# Patient Record
Sex: FEMALE | Race: OTHER | NOT HISPANIC OR LATINO | URBAN - METROPOLITAN AREA
[De-identification: names, ages, dates, MRNs, and addresses within clinical notes are randomized per-mention and may not be internally consistent; named-entity substitution may affect disease eponyms.]

---

## 2017-01-01 ENCOUNTER — OUTPATIENT (OUTPATIENT)
Dept: OUTPATIENT SERVICES | Facility: HOSPITAL | Age: 82
LOS: 1 days | End: 2017-01-01
Payer: MEDICAID

## 2017-01-03 ENCOUNTER — RX RENEWAL (OUTPATIENT)
Age: 82
End: 2017-01-03

## 2017-01-04 ENCOUNTER — OUTPATIENT (OUTPATIENT)
Dept: OUTPATIENT SERVICES | Facility: HOSPITAL | Age: 82
LOS: 1 days | Discharge: HOME | End: 2017-01-04

## 2017-01-04 DIAGNOSIS — C92.10 CHRONIC MYELOID LEUKEMIA, BCR/ABL-POSITIVE, NOT HAVING ACHIEVED REMISSION: ICD-10-CM

## 2017-01-05 ENCOUNTER — RX RENEWAL (OUTPATIENT)
Age: 82
End: 2017-01-05

## 2017-01-06 ENCOUNTER — OUTPATIENT (OUTPATIENT)
Dept: OUTPATIENT SERVICES | Facility: HOSPITAL | Age: 82
LOS: 1 days | End: 2017-01-06
Payer: MEDICAID

## 2017-01-06 ENCOUNTER — RESULT CHARGE (OUTPATIENT)
Age: 82
End: 2017-01-06

## 2017-01-06 ENCOUNTER — APPOINTMENT (OUTPATIENT)
Dept: INTERNAL MEDICINE | Facility: CLINIC | Age: 82
End: 2017-01-06

## 2017-01-06 DIAGNOSIS — I10 ESSENTIAL (PRIMARY) HYPERTENSION: ICD-10-CM

## 2017-01-06 DIAGNOSIS — Z00.00 ENCOUNTER FOR GENERAL ADULT MEDICAL EXAMINATION WITHOUT ABNORMAL FINDINGS: ICD-10-CM

## 2017-01-06 DIAGNOSIS — I82.5Z9 CHRONIC EMBOLISM AND THROMBOSIS OF UNSPECIFIED DEEP VEINS OF UNSPECIFIED DISTAL LOWER EXTREMITY: ICD-10-CM

## 2017-01-06 DIAGNOSIS — E11.9 TYPE 2 DIABETES MELLITUS WITHOUT COMPLICATIONS: ICD-10-CM

## 2017-01-06 DIAGNOSIS — E78.0 PURE HYPERCHOLESTEROLEMIA: ICD-10-CM

## 2017-01-06 DIAGNOSIS — D47.1 CHRONIC MYELOPROLIFERATIVE DISEASE: ICD-10-CM

## 2017-01-06 DIAGNOSIS — E78.00 PURE HYPERCHOLESTEROLEMIA, UNSPECIFIED: ICD-10-CM

## 2017-01-06 DIAGNOSIS — E03.9 HYPOTHYROIDISM, UNSPECIFIED: ICD-10-CM

## 2017-01-06 LAB
INR PPP: 2.3 RATIO
POCT-PROTHROMBIN TIME: 27.8 SECS
QUALITY CONTROL: YES

## 2017-01-06 PROCEDURE — 85610 PROTHROMBIN TIME: CPT

## 2017-01-09 ENCOUNTER — APPOINTMENT (OUTPATIENT)
Dept: INTERNAL MEDICINE | Facility: CLINIC | Age: 82
End: 2017-01-09

## 2017-01-18 ENCOUNTER — RESULT REVIEW (OUTPATIENT)
Age: 82
End: 2017-01-18

## 2017-01-19 ENCOUNTER — APPOINTMENT (OUTPATIENT)
Dept: HEMATOLOGY ONCOLOGY | Facility: CLINIC | Age: 82
End: 2017-01-19

## 2017-01-19 VITALS
BODY MASS INDEX: 25.6 KG/M2 | HEART RATE: 100 BPM | OXYGEN SATURATION: 96 % | WEIGHT: 126.74 LBS | DIASTOLIC BLOOD PRESSURE: 78 MMHG | RESPIRATION RATE: 16 BRPM | TEMPERATURE: 98.4 F | SYSTOLIC BLOOD PRESSURE: 165 MMHG

## 2017-01-19 LAB
HCT VFR BLD CALC: 30.9 % — LOW (ref 34.5–45)
HGB BLD-MCNC: 10.9 G/DL — LOW (ref 11.5–15.5)
MCHC RBC-ENTMCNC: 35.3 GM/DL — SIGNIFICANT CHANGE UP (ref 32–36)
MCHC RBC-ENTMCNC: 41.8 PG — HIGH (ref 27–34)
MCV RBC AUTO: 118 FL — HIGH (ref 80–100)
PLATELET # BLD AUTO: 211 K/UL — SIGNIFICANT CHANGE UP (ref 150–400)
RBC # BLD: 2.62 M/UL — LOW (ref 3.8–5.2)
RBC # FLD: 12.8 % — SIGNIFICANT CHANGE UP (ref 10.3–14.5)
WBC # BLD: 3.6 K/UL — LOW (ref 3.8–10.5)
WBC # FLD AUTO: 3.6 K/UL — LOW (ref 3.8–10.5)

## 2017-01-20 DIAGNOSIS — R69 ILLNESS, UNSPECIFIED: ICD-10-CM

## 2017-01-23 DIAGNOSIS — D45 POLYCYTHEMIA VERA: ICD-10-CM

## 2017-01-30 ENCOUNTER — RX RENEWAL (OUTPATIENT)
Age: 82
End: 2017-01-30

## 2017-02-03 ENCOUNTER — APPOINTMENT (OUTPATIENT)
Dept: INTERNAL MEDICINE | Facility: CLINIC | Age: 82
End: 2017-02-03

## 2017-02-03 ENCOUNTER — RESULT CHARGE (OUTPATIENT)
Age: 82
End: 2017-02-03

## 2017-02-03 LAB
INR PPP: 1.6 RATIO
POCT-PROTHROMBIN TIME: 18.7 SECS
QUALITY CONTROL: YES

## 2017-02-14 ENCOUNTER — OUTPATIENT (OUTPATIENT)
Dept: OUTPATIENT SERVICES | Facility: HOSPITAL | Age: 82
LOS: 1 days | Discharge: HOME | End: 2017-02-14

## 2017-02-14 DIAGNOSIS — C92.10 CHRONIC MYELOID LEUKEMIA, BCR/ABL-POSITIVE, NOT HAVING ACHIEVED REMISSION: ICD-10-CM

## 2017-02-15 ENCOUNTER — RESULT REVIEW (OUTPATIENT)
Age: 82
End: 2017-02-15

## 2017-02-16 ENCOUNTER — APPOINTMENT (OUTPATIENT)
Dept: HEMATOLOGY ONCOLOGY | Facility: CLINIC | Age: 82
End: 2017-02-16

## 2017-02-16 VITALS
OXYGEN SATURATION: 97 % | TEMPERATURE: 99 F | RESPIRATION RATE: 16 BRPM | WEIGHT: 126.76 LBS | BODY MASS INDEX: 25.6 KG/M2 | DIASTOLIC BLOOD PRESSURE: 75 MMHG | HEART RATE: 92 BPM | SYSTOLIC BLOOD PRESSURE: 120 MMHG

## 2017-02-16 LAB
HCT VFR BLD CALC: 33 % — LOW (ref 34.5–45)
HGB BLD-MCNC: 11 G/DL — LOW (ref 11.5–15.5)
MCHC RBC-ENTMCNC: 33.4 G/DL — SIGNIFICANT CHANGE UP (ref 32–36)
MCHC RBC-ENTMCNC: 40.2 PG — HIGH (ref 27–34)
MCV RBC AUTO: 120 FL — HIGH (ref 80–100)
PLATELET # BLD AUTO: 322 K/UL — SIGNIFICANT CHANGE UP (ref 150–400)
RBC # BLD: 2.74 M/UL — LOW (ref 3.8–5.2)
RBC # FLD: 12.8 % — SIGNIFICANT CHANGE UP (ref 10.3–14.5)
WBC # BLD: 7.8 K/UL — SIGNIFICANT CHANGE UP (ref 3.8–10.5)
WBC # FLD AUTO: 7.8 K/UL — SIGNIFICANT CHANGE UP (ref 3.8–10.5)

## 2017-02-17 ENCOUNTER — APPOINTMENT (OUTPATIENT)
Dept: INTERNAL MEDICINE | Facility: CLINIC | Age: 82
End: 2017-02-17

## 2017-02-17 ENCOUNTER — OUTPATIENT (OUTPATIENT)
Dept: OUTPATIENT SERVICES | Facility: HOSPITAL | Age: 82
LOS: 1 days | End: 2017-02-17
Payer: MEDICAID

## 2017-02-17 ENCOUNTER — RESULT CHARGE (OUTPATIENT)
Age: 82
End: 2017-02-17

## 2017-02-17 DIAGNOSIS — I82.5Z9 CHRONIC EMBOLISM AND THROMBOSIS OF UNSPECIFIED DEEP VEINS OF UNSPECIFIED DISTAL LOWER EXTREMITY: ICD-10-CM

## 2017-02-17 DIAGNOSIS — D47.1 CHRONIC MYELOPROLIFERATIVE DISEASE: ICD-10-CM

## 2017-02-17 DIAGNOSIS — E11.9 TYPE 2 DIABETES MELLITUS WITHOUT COMPLICATIONS: ICD-10-CM

## 2017-02-17 DIAGNOSIS — E03.9 HYPOTHYROIDISM, UNSPECIFIED: ICD-10-CM

## 2017-02-17 DIAGNOSIS — Z00.00 ENCOUNTER FOR GENERAL ADULT MEDICAL EXAMINATION WITHOUT ABNORMAL FINDINGS: ICD-10-CM

## 2017-02-17 DIAGNOSIS — I10 ESSENTIAL (PRIMARY) HYPERTENSION: ICD-10-CM

## 2017-02-17 DIAGNOSIS — E78.0 PURE HYPERCHOLESTEROLEMIA: ICD-10-CM

## 2017-02-17 DIAGNOSIS — D45 POLYCYTHEMIA VERA: ICD-10-CM

## 2017-02-17 DIAGNOSIS — Z79.01 LONG TERM (CURRENT) USE OF ANTICOAGULANTS: ICD-10-CM

## 2017-02-17 DIAGNOSIS — E78.00 PURE HYPERCHOLESTEROLEMIA, UNSPECIFIED: ICD-10-CM

## 2017-02-17 LAB
INR PPP: 2.9 RATIO
POCT-PROTHROMBIN TIME: 34.6 SECS
QUALITY CONTROL: YES

## 2017-02-17 PROCEDURE — 85610 PROTHROMBIN TIME: CPT

## 2017-03-03 ENCOUNTER — MEDICATION RENEWAL (OUTPATIENT)
Age: 82
End: 2017-03-03

## 2017-03-10 ENCOUNTER — OUTPATIENT (OUTPATIENT)
Dept: OUTPATIENT SERVICES | Facility: HOSPITAL | Age: 82
LOS: 1 days | Discharge: HOME | End: 2017-03-10

## 2017-03-10 DIAGNOSIS — C92.10 CHRONIC MYELOID LEUKEMIA, BCR/ABL-POSITIVE, NOT HAVING ACHIEVED REMISSION: ICD-10-CM

## 2017-03-13 ENCOUNTER — APPOINTMENT (OUTPATIENT)
Dept: HEMATOLOGY ONCOLOGY | Facility: CLINIC | Age: 82
End: 2017-03-13

## 2017-03-14 ENCOUNTER — APPOINTMENT (OUTPATIENT)
Dept: INTERNAL MEDICINE | Facility: CLINIC | Age: 82
End: 2017-03-14

## 2017-03-27 ENCOUNTER — OUTPATIENT (OUTPATIENT)
Dept: OUTPATIENT SERVICES | Facility: HOSPITAL | Age: 82
LOS: 1 days | End: 2017-03-27
Payer: MEDICAID

## 2017-03-27 ENCOUNTER — APPOINTMENT (OUTPATIENT)
Dept: INTERNAL MEDICINE | Facility: CLINIC | Age: 82
End: 2017-03-27

## 2017-03-27 ENCOUNTER — RESULT CHARGE (OUTPATIENT)
Age: 82
End: 2017-03-27

## 2017-03-27 VITALS
SYSTOLIC BLOOD PRESSURE: 124 MMHG | HEIGHT: 59 IN | HEART RATE: 86 BPM | WEIGHT: 126 LBS | DIASTOLIC BLOOD PRESSURE: 60 MMHG | OXYGEN SATURATION: 98 % | BODY MASS INDEX: 25.4 KG/M2

## 2017-03-27 VITALS — SYSTOLIC BLOOD PRESSURE: 108 MMHG | DIASTOLIC BLOOD PRESSURE: 56 MMHG

## 2017-03-27 DIAGNOSIS — I10 ESSENTIAL (PRIMARY) HYPERTENSION: ICD-10-CM

## 2017-03-27 LAB
HBA1C MFR BLD HPLC: 7.9
INR PPP: 2.8 RATIO
POCT-PROTHROMBIN TIME: 33.8 SECS
QUALITY CONTROL: YES

## 2017-03-27 PROCEDURE — 83036 HEMOGLOBIN GLYCOSYLATED A1C: CPT

## 2017-03-27 PROCEDURE — G0463: CPT

## 2017-03-27 PROCEDURE — 85610 PROTHROMBIN TIME: CPT

## 2017-03-28 DIAGNOSIS — E11.9 TYPE 2 DIABETES MELLITUS WITHOUT COMPLICATIONS: ICD-10-CM

## 2017-03-28 DIAGNOSIS — I82.5Z9 CHRONIC EMBOLISM AND THROMBOSIS OF UNSPECIFIED DEEP VEINS OF UNSPECIFIED DISTAL LOWER EXTREMITY: ICD-10-CM

## 2017-03-28 DIAGNOSIS — D47.3 ESSENTIAL (HEMORRHAGIC) THROMBOCYTHEMIA: ICD-10-CM

## 2017-04-03 ENCOUNTER — MEDICATION RENEWAL (OUTPATIENT)
Age: 82
End: 2017-04-03

## 2017-04-04 ENCOUNTER — OUTPATIENT (OUTPATIENT)
Dept: OUTPATIENT SERVICES | Facility: HOSPITAL | Age: 82
LOS: 1 days | Discharge: HOME | End: 2017-04-04

## 2017-04-04 ENCOUNTER — RX RENEWAL (OUTPATIENT)
Age: 82
End: 2017-04-04

## 2017-04-04 DIAGNOSIS — C92.10 CHRONIC MYELOID LEUKEMIA, BCR/ABL-POSITIVE, NOT HAVING ACHIEVED REMISSION: ICD-10-CM

## 2017-04-06 ENCOUNTER — APPOINTMENT (OUTPATIENT)
Dept: HEMATOLOGY ONCOLOGY | Facility: CLINIC | Age: 82
End: 2017-04-06

## 2017-04-07 ENCOUNTER — INPATIENT (INPATIENT)
Facility: HOSPITAL | Age: 82
LOS: 5 days | Discharge: HOME | DRG: 637 | End: 2017-04-13
Attending: HOSPITALIST | Admitting: HOSPITALIST
Payer: MEDICAID

## 2017-04-07 ENCOUNTER — APPOINTMENT (OUTPATIENT)
Dept: INTERNAL MEDICINE | Facility: CLINIC | Age: 82
End: 2017-04-07

## 2017-04-07 VITALS — SYSTOLIC BLOOD PRESSURE: 142 MMHG | DIASTOLIC BLOOD PRESSURE: 92 MMHG | HEART RATE: 128 BPM | RESPIRATION RATE: 22 BRPM

## 2017-04-07 DIAGNOSIS — I10 ESSENTIAL (PRIMARY) HYPERTENSION: ICD-10-CM

## 2017-04-07 DIAGNOSIS — E11.65 TYPE 2 DIABETES MELLITUS WITH HYPERGLYCEMIA: ICD-10-CM

## 2017-04-07 DIAGNOSIS — R55 SYNCOPE AND COLLAPSE: ICD-10-CM

## 2017-04-07 DIAGNOSIS — K56.69 OTHER INTESTINAL OBSTRUCTION: ICD-10-CM

## 2017-04-07 DIAGNOSIS — I82.409 ACUTE EMBOLISM AND THROMBOSIS OF UNSPECIFIED DEEP VEINS OF UNSPECIFIED LOWER EXTREMITY: ICD-10-CM

## 2017-04-07 DIAGNOSIS — A41.9 SEPSIS, UNSPECIFIED ORGANISM: ICD-10-CM

## 2017-04-07 DIAGNOSIS — E87.2 ACIDOSIS: ICD-10-CM

## 2017-04-07 DIAGNOSIS — I26.99 OTHER PULMONARY EMBOLISM WITHOUT ACUTE COR PULMONALE: ICD-10-CM

## 2017-04-07 DIAGNOSIS — N30.90 CYSTITIS, UNSPECIFIED WITHOUT HEMATURIA: ICD-10-CM

## 2017-04-07 DIAGNOSIS — D45 POLYCYTHEMIA VERA: ICD-10-CM

## 2017-04-07 LAB
ACETONE SERPL-MCNC: ABNORMAL
ACETONE SERPL-MCNC: ABNORMAL
ALBUMIN SERPL ELPH-MCNC: 3.6 G/DL — SIGNIFICANT CHANGE UP (ref 3.3–5)
ALP SERPL-CCNC: 96 U/L — SIGNIFICANT CHANGE UP (ref 40–120)
ALT FLD-CCNC: 44 U/L RC — SIGNIFICANT CHANGE UP (ref 10–45)
ANION GAP SERPL CALC-SCNC: 19 MMOL/L — HIGH (ref 5–17)
ANION GAP SERPL CALC-SCNC: 19 MMOL/L — HIGH (ref 5–17)
ANION GAP SERPL CALC-SCNC: 22 MMOL/L — HIGH (ref 5–17)
APPEARANCE UR: CLEAR — SIGNIFICANT CHANGE UP
APTT BLD: 40.7 SEC — HIGH (ref 27.5–37.4)
AST SERPL-CCNC: 56 U/L — HIGH (ref 10–40)
BASOPHILS # BLD AUTO: 0.1 K/UL — SIGNIFICANT CHANGE UP (ref 0–0.2)
BASOPHILS NFR BLD AUTO: 1.1 % — SIGNIFICANT CHANGE UP (ref 0–2)
BILIRUB SERPL-MCNC: 0.4 MG/DL — SIGNIFICANT CHANGE UP (ref 0.2–1.2)
BILIRUB UR-MCNC: NEGATIVE — SIGNIFICANT CHANGE UP
BUN SERPL-MCNC: 18 MG/DL — SIGNIFICANT CHANGE UP (ref 7–23)
BUN SERPL-MCNC: 20 MG/DL — SIGNIFICANT CHANGE UP (ref 7–23)
BUN SERPL-MCNC: 26 MG/DL — HIGH (ref 7–23)
CALCIUM SERPL-MCNC: 8.6 MG/DL — SIGNIFICANT CHANGE UP (ref 8.4–10.5)
CALCIUM SERPL-MCNC: 9 MG/DL — SIGNIFICANT CHANGE UP (ref 8.4–10.5)
CALCIUM SERPL-MCNC: 9.4 MG/DL — SIGNIFICANT CHANGE UP (ref 8.4–10.5)
CHLORIDE SERPL-SCNC: 106 MMOL/L — SIGNIFICANT CHANGE UP (ref 96–108)
CHLORIDE SERPL-SCNC: 107 MMOL/L — SIGNIFICANT CHANGE UP (ref 96–108)
CHLORIDE SERPL-SCNC: 98 MMOL/L — SIGNIFICANT CHANGE UP (ref 96–108)
CK MB CFR SERPL CALC: 1 NG/ML — SIGNIFICANT CHANGE UP (ref 0–3.8)
CK SERPL-CCNC: 62 U/L — SIGNIFICANT CHANGE UP (ref 25–170)
CO2 SERPL-SCNC: 13 MMOL/L — LOW (ref 22–31)
CO2 SERPL-SCNC: 15 MMOL/L — LOW (ref 22–31)
CO2 SERPL-SCNC: 19 MMOL/L — LOW (ref 22–31)
COLOR SPEC: YELLOW — SIGNIFICANT CHANGE UP
CREAT SERPL-MCNC: 0.82 MG/DL — SIGNIFICANT CHANGE UP (ref 0.5–1.3)
CREAT SERPL-MCNC: 0.87 MG/DL — SIGNIFICANT CHANGE UP (ref 0.5–1.3)
CREAT SERPL-MCNC: 0.97 MG/DL — SIGNIFICANT CHANGE UP (ref 0.5–1.3)
DIFF PNL FLD: ABNORMAL
EOSINOPHIL # BLD AUTO: 0 K/UL — SIGNIFICANT CHANGE UP (ref 0–0.5)
EOSINOPHIL NFR BLD AUTO: 0.1 % — SIGNIFICANT CHANGE UP (ref 0–6)
GAS PNL BLDV: SIGNIFICANT CHANGE UP
GLUCOSE SERPL-MCNC: 163 MG/DL — HIGH (ref 70–99)
GLUCOSE SERPL-MCNC: 232 MG/DL — HIGH (ref 70–99)
GLUCOSE SERPL-MCNC: 310 MG/DL — HIGH (ref 70–99)
GLUCOSE UR QL: >1000
HCT VFR BLD CALC: 36.1 % — SIGNIFICANT CHANGE UP (ref 34.5–45)
HGB BLD-MCNC: 12.3 G/DL — SIGNIFICANT CHANGE UP (ref 11.5–15.5)
INR BLD: 2.24 RATIO — HIGH (ref 0.88–1.16)
KETONES UR-MCNC: ABNORMAL
LEUKOCYTE ESTERASE UR-ACNC: NEGATIVE — SIGNIFICANT CHANGE UP
LIDOCAIN IGE QN: 41 U/L — SIGNIFICANT CHANGE UP (ref 7–60)
LYMPHOCYTES # BLD AUTO: 0.5 K/UL — LOW (ref 1–3.3)
LYMPHOCYTES # BLD AUTO: 4.9 % — LOW (ref 13–44)
MAGNESIUM SERPL-MCNC: 1.5 MG/DL — LOW (ref 1.6–2.6)
MCHC RBC-ENTMCNC: 34 GM/DL — SIGNIFICANT CHANGE UP (ref 32–36)
MCHC RBC-ENTMCNC: 38.4 PG — HIGH (ref 27–34)
MCV RBC AUTO: 113 FL — HIGH (ref 80–100)
MONOCYTES # BLD AUTO: 0.5 K/UL — SIGNIFICANT CHANGE UP (ref 0–0.9)
MONOCYTES NFR BLD AUTO: 4.6 % — SIGNIFICANT CHANGE UP (ref 2–14)
NEUTROPHILS # BLD AUTO: 9.1 K/UL — HIGH (ref 1.8–7.4)
NEUTROPHILS NFR BLD AUTO: 89.3 % — HIGH (ref 43–77)
NITRITE UR-MCNC: NEGATIVE — SIGNIFICANT CHANGE UP
NT-PROBNP SERPL-SCNC: 593 PG/ML — HIGH (ref 0–300)
PH UR: 5.5 — SIGNIFICANT CHANGE UP (ref 4.8–8)
PHOSPHATE SERPL-MCNC: 2.6 MG/DL — SIGNIFICANT CHANGE UP (ref 2.5–4.5)
PLATELET # BLD AUTO: 319 K/UL — SIGNIFICANT CHANGE UP (ref 150–400)
POTASSIUM SERPL-MCNC: 4.3 MMOL/L — SIGNIFICANT CHANGE UP (ref 3.5–5.3)
POTASSIUM SERPL-MCNC: 4.4 MMOL/L — SIGNIFICANT CHANGE UP (ref 3.5–5.3)
POTASSIUM SERPL-MCNC: 4.6 MMOL/L — SIGNIFICANT CHANGE UP (ref 3.5–5.3)
POTASSIUM SERPL-SCNC: 4.3 MMOL/L — SIGNIFICANT CHANGE UP (ref 3.5–5.3)
POTASSIUM SERPL-SCNC: 4.4 MMOL/L — SIGNIFICANT CHANGE UP (ref 3.5–5.3)
POTASSIUM SERPL-SCNC: 4.6 MMOL/L — SIGNIFICANT CHANGE UP (ref 3.5–5.3)
PROT SERPL-MCNC: 7.4 G/DL — SIGNIFICANT CHANGE UP (ref 6–8.3)
PROT UR-MCNC: 100 MG/DL
PROTHROM AB SERPL-ACNC: 24.6 SEC — HIGH (ref 9.8–12.7)
RAPID RVP RESULT: SIGNIFICANT CHANGE UP
RBC # BLD: 3.19 M/UL — LOW (ref 3.8–5.2)
RBC # FLD: 12.7 % — SIGNIFICANT CHANGE UP (ref 10.3–14.5)
SODIUM SERPL-SCNC: 136 MMOL/L — SIGNIFICANT CHANGE UP (ref 135–145)
SODIUM SERPL-SCNC: 141 MMOL/L — SIGNIFICANT CHANGE UP (ref 135–145)
SODIUM SERPL-SCNC: 141 MMOL/L — SIGNIFICANT CHANGE UP (ref 135–145)
SP GR SPEC: 1.02 — SIGNIFICANT CHANGE UP (ref 1.01–1.02)
TROPONIN T SERPL-MCNC: <0.01 NG/ML — SIGNIFICANT CHANGE UP (ref 0–0.06)
UROBILINOGEN FLD QL: NEGATIVE — SIGNIFICANT CHANGE UP
WBC # BLD: 10.2 K/UL — SIGNIFICANT CHANGE UP (ref 3.8–10.5)
WBC # FLD AUTO: 10.2 K/UL — SIGNIFICANT CHANGE UP (ref 3.8–10.5)

## 2017-04-07 PROCEDURE — 99285 EMERGENCY DEPT VISIT HI MDM: CPT | Mod: 25

## 2017-04-07 PROCEDURE — 93010 ELECTROCARDIOGRAM REPORT: CPT

## 2017-04-07 PROCEDURE — 70450 CT HEAD/BRAIN W/O DYE: CPT | Mod: 26

## 2017-04-07 PROCEDURE — 71010: CPT | Mod: 26

## 2017-04-07 RX ORDER — INSULIN GLARGINE 100 [IU]/ML
10 INJECTION, SOLUTION SUBCUTANEOUS ONCE
Qty: 0 | Refills: 0 | Status: DISCONTINUED | OUTPATIENT
Start: 2017-04-07 | End: 2017-04-07

## 2017-04-07 RX ORDER — INSULIN LISPRO 100/ML
5 VIAL (ML) SUBCUTANEOUS ONCE
Qty: 0 | Refills: 0 | Status: DISCONTINUED | OUTPATIENT
Start: 2017-04-07 | End: 2017-04-07

## 2017-04-07 RX ORDER — WARFARIN SODIUM 2.5 MG/1
1 TABLET ORAL
Qty: 0 | Refills: 0 | COMMUNITY

## 2017-04-07 RX ORDER — SIMVASTATIN 20 MG/1
40 TABLET, FILM COATED ORAL AT BEDTIME
Qty: 0 | Refills: 0 | Status: DISCONTINUED | OUTPATIENT
Start: 2017-04-07 | End: 2017-04-13

## 2017-04-07 RX ORDER — METFORMIN HYDROCHLORIDE 850 MG/1
1 TABLET ORAL
Qty: 0 | Refills: 0 | COMMUNITY

## 2017-04-07 RX ORDER — AZITHROMYCIN 500 MG/1
500 TABLET, FILM COATED ORAL ONCE
Qty: 0 | Refills: 0 | Status: DISCONTINUED | OUTPATIENT
Start: 2017-04-07 | End: 2017-04-07

## 2017-04-07 RX ORDER — SODIUM CHLORIDE 9 MG/ML
1000 INJECTION, SOLUTION INTRAVENOUS
Qty: 0 | Refills: 0 | Status: DISCONTINUED | OUTPATIENT
Start: 2017-04-07 | End: 2017-04-08

## 2017-04-07 RX ORDER — GLUCAGON INJECTION, SOLUTION 0.5 MG/.1ML
1 INJECTION, SOLUTION SUBCUTANEOUS ONCE
Qty: 0 | Refills: 0 | Status: DISCONTINUED | OUTPATIENT
Start: 2017-04-07 | End: 2017-04-08

## 2017-04-07 RX ORDER — FELODIPINE 5 MG/1
1 TABLET, FILM COATED, EXTENDED RELEASE ORAL
Qty: 0 | Refills: 0 | COMMUNITY

## 2017-04-07 RX ORDER — DEXTROSE 50 % IN WATER 50 %
25 SYRINGE (ML) INTRAVENOUS ONCE
Qty: 0 | Refills: 0 | Status: DISCONTINUED | OUTPATIENT
Start: 2017-04-07 | End: 2017-04-08

## 2017-04-07 RX ORDER — INSULIN GLARGINE 100 [IU]/ML
8 INJECTION, SOLUTION SUBCUTANEOUS ONCE
Qty: 0 | Refills: 0 | Status: COMPLETED | OUTPATIENT
Start: 2017-04-07 | End: 2017-04-07

## 2017-04-07 RX ORDER — SODIUM CHLORIDE 9 MG/ML
3 INJECTION INTRAMUSCULAR; INTRAVENOUS; SUBCUTANEOUS ONCE
Qty: 0 | Refills: 0 | Status: COMPLETED | OUTPATIENT
Start: 2017-04-07 | End: 2017-04-07

## 2017-04-07 RX ORDER — SODIUM CHLORIDE 9 MG/ML
1000 INJECTION INTRAMUSCULAR; INTRAVENOUS; SUBCUTANEOUS ONCE
Qty: 0 | Refills: 0 | Status: COMPLETED | OUTPATIENT
Start: 2017-04-07 | End: 2017-04-07

## 2017-04-07 RX ORDER — INSULIN GLARGINE 100 [IU]/ML
10 INJECTION, SOLUTION SUBCUTANEOUS AT BEDTIME
Qty: 0 | Refills: 0 | Status: DISCONTINUED | OUTPATIENT
Start: 2017-04-07 | End: 2017-04-07

## 2017-04-07 RX ORDER — MAGNESIUM SULFATE 500 MG/ML
2 VIAL (ML) INJECTION ONCE
Qty: 0 | Refills: 0 | Status: COMPLETED | OUTPATIENT
Start: 2017-04-07 | End: 2017-04-07

## 2017-04-07 RX ORDER — CHOLECALCIFEROL (VITAMIN D3) 125 MCG
2000 CAPSULE ORAL
Qty: 0 | Refills: 0 | Status: DISCONTINUED | OUTPATIENT
Start: 2017-04-07 | End: 2017-04-13

## 2017-04-07 RX ORDER — SODIUM CHLORIDE 9 MG/ML
1000 INJECTION INTRAMUSCULAR; INTRAVENOUS; SUBCUTANEOUS
Qty: 0 | Refills: 0 | Status: DISCONTINUED | OUTPATIENT
Start: 2017-04-07 | End: 2017-04-07

## 2017-04-07 RX ORDER — WARFARIN SODIUM 2.5 MG/1
5 TABLET ORAL ONCE
Qty: 0 | Refills: 0 | Status: COMPLETED | OUTPATIENT
Start: 2017-04-07 | End: 2017-04-07

## 2017-04-07 RX ORDER — INSULIN LISPRO 100/ML
VIAL (ML) SUBCUTANEOUS AT BEDTIME
Qty: 0 | Refills: 0 | Status: DISCONTINUED | OUTPATIENT
Start: 2017-04-07 | End: 2017-04-07

## 2017-04-07 RX ORDER — CEFTRIAXONE 500 MG/1
1 INJECTION, POWDER, FOR SOLUTION INTRAMUSCULAR; INTRAVENOUS ONCE
Qty: 0 | Refills: 0 | Status: COMPLETED | OUTPATIENT
Start: 2017-04-07 | End: 2017-04-07

## 2017-04-07 RX ORDER — LEVOTHYROXINE SODIUM 125 MCG
150 TABLET ORAL DAILY
Qty: 0 | Refills: 0 | Status: DISCONTINUED | OUTPATIENT
Start: 2017-04-07 | End: 2017-04-13

## 2017-04-07 RX ORDER — DEXTROSE 50 % IN WATER 50 %
12.5 SYRINGE (ML) INTRAVENOUS ONCE
Qty: 0 | Refills: 0 | Status: DISCONTINUED | OUTPATIENT
Start: 2017-04-07 | End: 2017-04-08

## 2017-04-07 RX ORDER — INSULIN LISPRO 100/ML
VIAL (ML) SUBCUTANEOUS
Qty: 0 | Refills: 0 | Status: DISCONTINUED | OUTPATIENT
Start: 2017-04-07 | End: 2017-04-07

## 2017-04-07 RX ORDER — CHOLECALCIFEROL (VITAMIN D3) 125 MCG
2 CAPSULE ORAL
Qty: 0 | Refills: 0 | COMMUNITY

## 2017-04-07 RX ORDER — DEXTROSE 50 % IN WATER 50 %
1 SYRINGE (ML) INTRAVENOUS ONCE
Qty: 0 | Refills: 0 | Status: DISCONTINUED | OUTPATIENT
Start: 2017-04-07 | End: 2017-04-08

## 2017-04-07 RX ORDER — INSULIN LISPRO 100/ML
VIAL (ML) SUBCUTANEOUS EVERY 4 HOURS
Qty: 0 | Refills: 0 | Status: DISCONTINUED | OUTPATIENT
Start: 2017-04-07 | End: 2017-04-08

## 2017-04-07 RX ADMIN — CEFTRIAXONE 100 GRAM(S): 500 INJECTION, POWDER, FOR SOLUTION INTRAMUSCULAR; INTRAVENOUS at 14:32

## 2017-04-07 RX ADMIN — SODIUM CHLORIDE 1000 MILLILITER(S): 9 INJECTION INTRAMUSCULAR; INTRAVENOUS; SUBCUTANEOUS at 12:30

## 2017-04-07 RX ADMIN — SODIUM CHLORIDE 3 MILLILITER(S): 9 INJECTION INTRAMUSCULAR; INTRAVENOUS; SUBCUTANEOUS at 11:50

## 2017-04-07 RX ADMIN — SODIUM CHLORIDE 75 MILLILITER(S): 9 INJECTION, SOLUTION INTRAVENOUS at 21:14

## 2017-04-07 RX ADMIN — SODIUM CHLORIDE 1000 MILLILITER(S): 9 INJECTION INTRAMUSCULAR; INTRAVENOUS; SUBCUTANEOUS at 14:55

## 2017-04-07 RX ADMIN — SIMVASTATIN 40 MILLIGRAM(S): 20 TABLET, FILM COATED ORAL at 21:54

## 2017-04-07 RX ADMIN — WARFARIN SODIUM 5 MILLIGRAM(S): 2.5 TABLET ORAL at 21:54

## 2017-04-07 RX ADMIN — Medication 50 GRAM(S): at 14:31

## 2017-04-07 RX ADMIN — INSULIN GLARGINE 8 UNIT(S): 100 INJECTION, SOLUTION SUBCUTANEOUS at 21:54

## 2017-04-07 RX ADMIN — Medication: at 20:07

## 2017-04-07 NOTE — H&P ADULT. - PROBLEM SELECTOR PLAN 3
-2/2 dehydration  -CTH neg for hemorage or mass effect  -encourage PO intake  -Start IV line, IVF @ 50cc/hr -2/2 dehydration  -CTH neg for hemorage or mass effect  -encourage PO intake  -Start IV line, IVF D51/2NS @ 50cc/hr

## 2017-04-07 NOTE — ED PROVIDER NOTE - OBJECTIVE STATEMENT
82yo F hx of sp hysterectomy, Polycythemia on hydroxyurea, DMII on metformin, hypothryoid, CVA 2007 no focal residual weakness, SBO treated w/o surgery x 2 (2011), Rt DVT on coumadin, pw progressive weakness x 1 week, and N/V x 1.  Pt walks.   Last BM yesterday, Pt endorse epigastric abd pain  ONC: Hematology  PMD: [phone] [facility] 84yo F hx of sp hysterectomy, Polycythemia on hydroxyurea, DMII on metformin, hypothryoid, CVA 2007 no focal residual weakness, SBO treated w/o surgery x 2 (2011), Rt DVT on coumadin, pw progressive weakness x 1 week, and fall and  N/V x 1 today.  Pt walks with cane at baseline. Had episode of abd pain aw decreased po intake and constipation 1 week ago. Pt passed bm 1 day later. Pt and daughter state pt had unwitnessed fall today and was found down. Son in law heard fall in next room and came to help. pt denies loc, change in vision, focal numbness or weakness.  Pt then had episode of n/v after the fall. Pt states her legs gave out. she did not slip.  Currently denies pain. No fever/chills, no change in vision, no throat pain, no chest pain, no abdominal pain, no dysuria, no joint pain, no rashes, no focal numbness or weakness, no hematochezia or melena.   Last BM yesterday, Pt endorse epigastric abd pain  ONC: Hematology  PMD: [phone] [facility] 82yo F hx of sp hysterectomy, Polycythemia on hydroxyurea, DMII on metformin, hypothyroid, CVA 2007 no focal residual weakness, SBO treated w/o surgery x 2 (2011), Rt DVT on coumadin, pw progressive weakness x 1 week, and fall and  N/V x 1 today.  Pt walks with cane at baseline. Had episode of abd pain aw decreased po intake and constipation 1 week ago. Pt passed bm 1 day later. Pt and daughter state pt had unwitnessed fall today and was found down. Son in law heard fall in next room and came to help. pt denies loc, change in vision, focal numbness or weakness.  Pt then had episode of n/v after the fall. Pt states her legs gave out. she did not slip.  Currently denies pain. No fever/chills, no change in vision, no throat pain, no chest pain, no abdominal pain, no dysuria, no joint pain, no rashes, no focal numbness or weakness, no hematochezia or melena.   Last BM yesterday, Pt endorse epigastric abd pain  ONC: Hematology  PMD: [phone] [facility]

## 2017-04-07 NOTE — ED PROVIDER NOTE - SECONDARY DIAGNOSIS.
Malaise and fatigue Type 2 diabetes mellitus with hyperglycemia, unspecified long term insulin use status

## 2017-04-07 NOTE — H&P ADULT. - HISTORY OF PRESENT ILLNESS
83F PMHx of SBO, DMII, Polycythemia Vera (Pipe 2 +) , DVT, HTN, HLD BIBEMS after a fall following 1 week of poor PO intake due to abdominal pain N/V. Patient stated that she felt light headed this morning after she rolled out of bed and prior falling. No one witnessed the fall, son-in-law found patient on the floor. Pt has not had similar episode in the past ***.Daughter states that patient has been unable to tolerate regular food starting about a week ago and vomited most of her food due to abdominal pain similar to her SBO abdominal pain. At the time patient consumed mostly liquid diet( tea and cranberry juice). Her symptoms improved over the proceeding days and she was able to hold down a regular food on the day of admission. However, patient has been progressively weak over the past week leading up to the fall. Patient endorses increased urinary frequency but denies burning sensation, painful urination, and back pain. Pt denied chest pain, fever, head ache, focal weakness, slurred speech, loss of consciousness. Patient endorsed having regular BM and passing gas.     ED VS: T  /92 P128 RR: 22    ANC 9.1,  Acetone in urine and serum,  Mg 1.5.     Pt received  2 L bolus NS and Ceftriaxone x1 for suspected UTI and Mg SO4 83F PMHx of SBO, DMII, Polycythemia Vera (Pipe 2 +) , DVT, HTN, HLD BIBEMS after a fall following 1 week of poor PO intake due to abdominal pain N/V. Patient stated that she felt light headed this morning after she rolled out of bed and prior falling. No one witnessed the fall, son-in-law found patient on the floor. Pt has total of 3 fall episodes over the past 3 weeks however, the first two falls were not proceeded by lighheadedness .Daughter states that patient has been unable to tolerate regular food starting about a week ago and vomited most of her food due to abdominal pain similar to her SBO abdominal pain. At the time patient consumed mostly liquid diet( tea and cranberry juice). Her symptoms improved over the proceeding days and she was able to hold down a regular food on the day of admission. However, patient has been progressively weak over the past week leading up to the fall. Patient endorses increased urinary frequency but denies burning sensation, painful urination, and back pain. Pt denied chest pain, fever, head ache, focal weakness, slurred speech, loss of consciousness. Patient endorsed having regular BM and passing gas.     ED VS: T  /92 P128 RR: 22    ANC 9.1,  Acetone in urine and serum,  Mg 1.5.     Pt received  2 L bolus NS and Ceftriaxone x1 for suspected UTI and Mg SO4 83F PMHx of SBO, DMII, Polycythemia Vera (Pipe 2 +) , DVT, HTN, HLD BIBEMS after a fall following 1 week of poor PO intake due to abdominal pain N/V. Patient stated that she felt light headed this morning after she rolled out of bed and prior falling. No one witnessed the fall, son-in-law found patient on the floor. Pt has total of 3 fall episodes over the past 3 weeks however, the first two falls were not proceeded by [facility] [employer] .Daughter states that patient has been unable to tolerate regular food starting about a week ago and vomited most of her food due to abdominal pain similar to her SBO abdominal pain. At the time patient consumed mostly liquid diet( tea and cranberry juice). Her symptoms improved over the proceeding days and she was able to hold down a regular food on the day of admission. However, patient has been progressively weak over the past week leading up to the fall. Patient endorses increased urinary frequency but denies burning sensation, painful urination, and back pain. Pt denied chest pain, fever, head ache, focal weakness, slurred speech, loss of consciousness. Patient endorsed having regular BM and passing gas.     ED VS: T101.5 rectal   /92 P128 RR: 22 O2: 93% on RA    ANC 9.1,  Acetone in urine and serum,  Mg 1.5.     Pt received  2 L bolus NS and Ceftriaxone x1 for suspected UTI and Mg SO4

## 2017-04-07 NOTE — ED PROVIDER NOTE - CARE PLAN
Principal Discharge DX:	Cystitis  Secondary Diagnosis:	Malaise and fatigue  Secondary Diagnosis:	Type 2 diabetes mellitus with hyperglycemia, unspecified long term insulin use status

## 2017-04-07 NOTE — ED PROVIDER NOTE - PHYSICAL EXAMINATION
AAOX3, NAD, NCAT, EOMI, PERRL, MMM, Neck Supple, no cspine tenderness or decrease rom. HR regular, tachycardic to 120, sys and de la torre blowing murmur, RESP: TachypniecABD S/NT/ND +BS, No CVAT, EXT warm, well perfused, No Edema, Distal Pulses Intact, No Rash,  MAEx4, Sensation to soft touch grossly intact, normal strength/tone. AAOX3, NAD, NCAT, EOMI, PERRL, MMM, Neck Supple, no cspine tenderness or decrease rom. HR regular, tachycardic to 120, sys and de la torre blowing murmur, RESP: TachypniecA, no wheeze, breath sounds symmetric. ABD S/epigastric ttp w/o guard or rebound. ND  Ext warm, well perfused, No Edema, Distal Pulses Intact, No Rash,  MAEx4, Sensation to soft touch grossly intact, normal strength/tone.

## 2017-04-07 NOTE — ED PROVIDER NOTE - PMH
Adult Hypothyroidism    Benign Hypertension    Deep Vein Thrombosis (DVT)    Diabetes    Hypercholesteremia    SBO (Small Bowel Obstruction)    Stroke

## 2017-04-07 NOTE — H&P ADULT. - PROBLEM SELECTOR PLAN 1
-Meets criteria with Fever, tachycardia, suspect UTI given polyuria  -s/p Cefriaxone x1 in ED and 2L NL bolus  -BCx, UCx sent  -U/A obtained after abx was neg  -Obtain bladder scan  -cont Ceftriaxone pending culture results  -Encourage PO hydration and feeds  -Monitor VS per routine, continue tele monitoring  -trend CBC with diff

## 2017-04-07 NOTE — H&P ADULT. - FAMILY HISTORY
Child  Still living? Unknown  Family history of diabetes mellitus (DM), Age at diagnosis: Age Unknown  Family history of breast cancer, Age at diagnosis: Age Unknown     Father  Still living? Unknown  Family history of secondary lung cancer, Age at diagnosis: Age Unknown

## 2017-04-07 NOTE — H&P ADULT. - PROBLEM SELECTOR PROBLEM 5
Benign Hypertension Type 2 diabetes mellitus with hyperglycemia, without long-term current use of insulin

## 2017-04-07 NOTE — ED ADULT NURSE NOTE - OBJECTIVE STATEMENT
Pt presents to Ed with c/o fall. Per pt daughter pt had an unwitnessed fall today, Pt reports that she did not slip   and fall her legs gave out on her. Pt A&Ox3 denies chest pain or sob, + episode of nausea with vomiting after fall,   no fever chills headache or dizziness. Pt ambulates with a cane.

## 2017-04-07 NOTE — ED PROVIDER NOTE - ATTENDING CONTRIBUTION TO CARE
I, Dr. Ryne Ponce, interviewed the patient and performed a physical exam and spoke to the resident about the plan of care for this patient.  Pt with fall with intermittent malaise and fatigue for about one week.  No reported fever at home.  Exam: febrile rectal temp, 5-/5 motor LE, nontender abdomen, slightly tachycardic, no respiratory distress, clear lungs.  Plan: likely cystitis, antibx, fluids, labs, tba.

## 2017-04-07 NOTE — H&P ADULT. - NEGATIVE NEUROLOGICAL SYMPTOMS
no syncope/no facial palsy/no headache/no loss of consciousness/pre-syncope/no tremors/no loss of sensation

## 2017-04-07 NOTE — H&P ADULT. - PROBLEM SELECTOR PROBLEM 4
Type 2 diabetes mellitus with hyperglycemia, without long-term current use of insulin SBO (Small Bowel Obstruction)

## 2017-04-07 NOTE — H&P ADULT. - LAB RESULTS AND INTERPRETATION
Labs significant for hyperglycemia and  glucosuria. Pt doesn't meet criteria for DKA but does have acetone in urine. ANC is elevated at 9.1, may be infectious or reactionary.

## 2017-04-07 NOTE — H&P ADULT. - RADIOLOGY RESULTS AND INTERPRETATION
chronic changes on CT without evidence of acute stoke or hemorrhage. CXR suggestive of unilateral L pleural effusion - to be correlated with PE

## 2017-04-07 NOTE — H&P ADULT. - ASSESSMENT
83F PMHx DMII, chronic SBO, Pipe 2+ PCV, BIBEMS after pre-syncopal fall 83F PMHx DMII, chronic SBO, Pipe 2+ PCV, BIBEMS after pre-syncopal fall due to dehydration in setting of PO feed intolerance. 83F PMHx DMII, chronic SBO, Pipe 2+ PCV, BIBEMS after pre-syncopal fall due to dehydration in setting of Sepsis and PO feed intolerance.

## 2017-04-07 NOTE — H&P ADULT. - PROBLEM SELECTOR PLAN 5
-Started IVF @ 50,  ISS, Lantus 10,    -obtain A1c, monitor FS.  -adjust insulin regiment prn  -  -  -  -  - -Started IVF @ 50,  ISS, Lantus 10,    -obtain A1c, monitor FS.  -adjust insulin regiment prn

## 2017-04-07 NOTE — H&P ADULT. - RS GEN PE MLT RESP DETAILS PC
no chest wall tenderness/good air movement/breath sounds equal/airway patent/respirations non-labored/basilar crackles bilaterally

## 2017-04-07 NOTE — H&P ADULT. - PROBLEM SELECTOR PLAN 2
-HCO3 13, Gap 21 ,   -2/2 ketotic state triggered Hyperglycemic diuresis   - continue volume rustication IVF 50cc/hr   -Start SSI and Lantus 10  - repeat BMP Q4, replete lytes prn  - repeat ABG if acidosis worsens or doesn't improve  -start DKA protocol if ABG pH < 7.3 -HCO3 13, Gap 21 ,   -2/2 ketotic state triggered Hyperglycemic diuresis   - continue volume rustication IVF with D5 1/2 NS @ 75cc/hr   -Start SSI and Lantus 10  - repeat BMP Q4, replete lytes prn  - repeat ABG if acidosis worsens or doesn't improve  -start DKA protocol if ABG pH < 7.3

## 2017-04-07 NOTE — ED PROVIDER NOTE - MEDICAL DECISION MAKING DETAILS
Generalized weakness and fall on coumadin, concern for sepsis vs cardiac. PE significant for no ext signs of trauma, cardiac murmur, tachypnea, tachycardia, mild epi gastric tenderness - Sepsis and c/u breen - undifferentiated: 1) IV Access/IVF/LABS/UA/Cultures/CE  2) CXR 3) IV Abx 4) Admit

## 2017-04-07 NOTE — ED PROVIDER NOTE - PROGRESS NOTE DETAILS
Dr. Ponce Note: pt improved, UA resulted with expected infection as likely source of malaise and fever, antibx already given, fluids given, doubt DKA but will recheck labs and acetone, stable for admission. Dr. Ponce Note: small acetone, will repeat labs and call endocrine for consult for possible mild DKA. Discussed with Endocrine -Igormis - Pt to be started on home insulin dose; recommend a1c. This was communicated to the inpt team. - Ayden French, Resident Discussed repeat labwork with Dr. Green. He will coordinate insulin regimen with team. Pt remains welll appearing. repeat hr 94. - Ayden French, Resident

## 2017-04-08 ENCOUNTER — TRANSCRIPTION ENCOUNTER (OUTPATIENT)
Age: 82
End: 2017-04-08

## 2017-04-08 LAB
ANION GAP SERPL CALC-SCNC: 13 MMOL/L — SIGNIFICANT CHANGE UP (ref 5–17)
ANION GAP SERPL CALC-SCNC: 14 MMOL/L — SIGNIFICANT CHANGE UP (ref 5–17)
BUN SERPL-MCNC: 11 MG/DL — SIGNIFICANT CHANGE UP (ref 7–23)
BUN SERPL-MCNC: 14 MG/DL — SIGNIFICANT CHANGE UP (ref 7–23)
CALCIUM SERPL-MCNC: 8.7 MG/DL — SIGNIFICANT CHANGE UP (ref 8.4–10.5)
CALCIUM SERPL-MCNC: 8.7 MG/DL — SIGNIFICANT CHANGE UP (ref 8.4–10.5)
CHLORIDE SERPL-SCNC: 104 MMOL/L — SIGNIFICANT CHANGE UP (ref 96–108)
CHLORIDE SERPL-SCNC: 107 MMOL/L — SIGNIFICANT CHANGE UP (ref 96–108)
CO2 SERPL-SCNC: 18 MMOL/L — LOW (ref 22–31)
CO2 SERPL-SCNC: 21 MMOL/L — LOW (ref 22–31)
CREAT SERPL-MCNC: 0.63 MG/DL — SIGNIFICANT CHANGE UP (ref 0.5–1.3)
CREAT SERPL-MCNC: 0.67 MG/DL — SIGNIFICANT CHANGE UP (ref 0.5–1.3)
CULTURE RESULTS: NO GROWTH — SIGNIFICANT CHANGE UP
GLUCOSE SERPL-MCNC: 170 MG/DL — HIGH (ref 70–99)
GLUCOSE SERPL-MCNC: 212 MG/DL — HIGH (ref 70–99)
HBA1C BLD-MCNC: 8.6 % — HIGH (ref 4–5.6)
HCT VFR BLD CALC: 35.9 % — SIGNIFICANT CHANGE UP (ref 34.5–45)
HGB BLD-MCNC: 12 G/DL — SIGNIFICANT CHANGE UP (ref 11.5–15.5)
INR BLD: 2.37 RATIO — HIGH (ref 0.88–1.16)
MCHC RBC-ENTMCNC: 33.3 GM/DL — SIGNIFICANT CHANGE UP (ref 32–36)
MCHC RBC-ENTMCNC: 38.1 PG — HIGH (ref 27–34)
MCV RBC AUTO: 114 FL — HIGH (ref 80–100)
PLATELET # BLD AUTO: 303 K/UL — SIGNIFICANT CHANGE UP (ref 150–400)
POTASSIUM SERPL-MCNC: 3.9 MMOL/L — SIGNIFICANT CHANGE UP (ref 3.5–5.3)
POTASSIUM SERPL-MCNC: 3.9 MMOL/L — SIGNIFICANT CHANGE UP (ref 3.5–5.3)
POTASSIUM SERPL-SCNC: 3.9 MMOL/L — SIGNIFICANT CHANGE UP (ref 3.5–5.3)
POTASSIUM SERPL-SCNC: 3.9 MMOL/L — SIGNIFICANT CHANGE UP (ref 3.5–5.3)
PROTHROM AB SERPL-ACNC: 26.3 SEC — HIGH (ref 9.8–12.7)
RBC # BLD: 3.14 M/UL — LOW (ref 3.8–5.2)
RBC # FLD: 12.7 % — SIGNIFICANT CHANGE UP (ref 10.3–14.5)
SODIUM SERPL-SCNC: 138 MMOL/L — SIGNIFICANT CHANGE UP (ref 135–145)
SODIUM SERPL-SCNC: 139 MMOL/L — SIGNIFICANT CHANGE UP (ref 135–145)
SPECIMEN SOURCE: SIGNIFICANT CHANGE UP
WBC # BLD: 6.3 K/UL — SIGNIFICANT CHANGE UP (ref 3.8–10.5)
WBC # FLD AUTO: 6.3 K/UL — SIGNIFICANT CHANGE UP (ref 3.8–10.5)

## 2017-04-08 PROCEDURE — 99223 1ST HOSP IP/OBS HIGH 75: CPT | Mod: GC

## 2017-04-08 RX ORDER — DEXTROSE 50 % IN WATER 50 %
12.5 SYRINGE (ML) INTRAVENOUS ONCE
Qty: 0 | Refills: 0 | Status: DISCONTINUED | OUTPATIENT
Start: 2017-04-08 | End: 2017-04-13

## 2017-04-08 RX ORDER — WARFARIN SODIUM 2.5 MG/1
2.5 TABLET ORAL ONCE
Qty: 0 | Refills: 0 | Status: COMPLETED | OUTPATIENT
Start: 2017-04-08 | End: 2017-04-08

## 2017-04-08 RX ORDER — GLUCAGON INJECTION, SOLUTION 0.5 MG/.1ML
1 INJECTION, SOLUTION SUBCUTANEOUS ONCE
Qty: 0 | Refills: 0 | Status: DISCONTINUED | OUTPATIENT
Start: 2017-04-08 | End: 2017-04-13

## 2017-04-08 RX ORDER — DEXTROSE 50 % IN WATER 50 %
25 SYRINGE (ML) INTRAVENOUS ONCE
Qty: 0 | Refills: 0 | Status: DISCONTINUED | OUTPATIENT
Start: 2017-04-08 | End: 2017-04-13

## 2017-04-08 RX ORDER — HYDROXYUREA 500 MG/1
500 CAPSULE ORAL DAILY
Qty: 0 | Refills: 0 | Status: DISCONTINUED | OUTPATIENT
Start: 2017-04-08 | End: 2017-04-13

## 2017-04-08 RX ORDER — INSULIN LISPRO 100/ML
VIAL (ML) SUBCUTANEOUS
Qty: 0 | Refills: 0 | Status: DISCONTINUED | OUTPATIENT
Start: 2017-04-08 | End: 2017-04-13

## 2017-04-08 RX ORDER — LOSARTAN POTASSIUM 100 MG/1
50 TABLET, FILM COATED ORAL DAILY
Qty: 0 | Refills: 0 | Status: DISCONTINUED | OUTPATIENT
Start: 2017-04-08 | End: 2017-04-13

## 2017-04-08 RX ORDER — SODIUM CHLORIDE 9 MG/ML
1000 INJECTION, SOLUTION INTRAVENOUS
Qty: 0 | Refills: 0 | Status: DISCONTINUED | OUTPATIENT
Start: 2017-04-08 | End: 2017-04-13

## 2017-04-08 RX ORDER — INSULIN LISPRO 100/ML
VIAL (ML) SUBCUTANEOUS AT BEDTIME
Qty: 0 | Refills: 0 | Status: DISCONTINUED | OUTPATIENT
Start: 2017-04-08 | End: 2017-04-13

## 2017-04-08 RX ORDER — DEXTROSE 50 % IN WATER 50 %
1 SYRINGE (ML) INTRAVENOUS ONCE
Qty: 0 | Refills: 0 | Status: DISCONTINUED | OUTPATIENT
Start: 2017-04-08 | End: 2017-04-13

## 2017-04-08 RX ORDER — AMLODIPINE BESYLATE 2.5 MG/1
10 TABLET ORAL DAILY
Qty: 0 | Refills: 0 | Status: DISCONTINUED | OUTPATIENT
Start: 2017-04-09 | End: 2017-04-13

## 2017-04-08 RX ORDER — INSULIN GLARGINE 100 [IU]/ML
5 INJECTION, SOLUTION SUBCUTANEOUS AT BEDTIME
Qty: 0 | Refills: 0 | Status: DISCONTINUED | OUTPATIENT
Start: 2017-04-08 | End: 2017-04-13

## 2017-04-08 RX ADMIN — Medication 1: at 18:21

## 2017-04-08 RX ADMIN — INSULIN GLARGINE 5 UNIT(S): 100 INJECTION, SOLUTION SUBCUTANEOUS at 22:05

## 2017-04-08 RX ADMIN — SIMVASTATIN 40 MILLIGRAM(S): 20 TABLET, FILM COATED ORAL at 22:06

## 2017-04-08 RX ADMIN — LOSARTAN POTASSIUM 50 MILLIGRAM(S): 100 TABLET, FILM COATED ORAL at 13:20

## 2017-04-08 RX ADMIN — HYDROXYUREA 500 MILLIGRAM(S): 500 CAPSULE ORAL at 11:28

## 2017-04-08 RX ADMIN — Medication 2000 UNIT(S): at 22:06

## 2017-04-08 RX ADMIN — Medication 2: at 06:30

## 2017-04-08 RX ADMIN — Medication 150 MICROGRAM(S): at 06:04

## 2017-04-08 RX ADMIN — Medication 1: at 13:15

## 2017-04-08 RX ADMIN — WARFARIN SODIUM 2.5 MILLIGRAM(S): 2.5 TABLET ORAL at 22:06

## 2017-04-08 RX ADMIN — Medication 2000 UNIT(S): at 08:32

## 2017-04-08 NOTE — DISCHARGE NOTE ADULT - PATIENT PORTAL LINK FT
“You can access the [facility] Patient Portal, offered by [facility], by registering with the following website: [email or web address]”

## 2017-04-08 NOTE — DISCHARGE NOTE ADULT - PLAN OF CARE
A1C <7 Your A1C was 8.1 AG < 14 You were admitted for a metabolic complication of your Diabetes Mellitus. This can be avoided by appropriate glycemic control. Please check your finger sticks regularly.  -Please take your discharge anti-glycemic agents Patient would follow-up with Hematology You have a diagnosis of Polycythemia Vera.  Please continue Hydroxyurea  F/u with Heme as outpatient Your A1C was 8.1 on admission  -Please take your discharge anti-glycemic agents A1C < 7 You were admitted for a metabolic complication of your Diabetes Mellitus. This can be avoided by appropriate glycemic control. Please check your finger sticks regularly.  -Please take your discharge anti-glycemic agents  -You are discharged on Metformin  -f/u with your PCP in the next 2 weeks You have a history of Polycythemia Vera.  Please continue Hydroxyurea  F/u with Heme as outpatient Your A1C was 8.6 on admission  -Please take your discharge anti-glycemic agents  -Follow-up with your PCP within the next 2 weeks On the CT scan of your head, there was an abnormality in the pituitary gland. This can influence your body's hormone levels. You should have an MRI brain performed to evaluate this, but this is not an emergency. Follow up at the clinic ([phone]) to help set this up. Follow up with your primary care physician ([address] [place] [address] [phone]) within 2 weeks Follow up with your primary care physician ([address] [place] [address] [phone]) within 2 weeks You were admitted for a metabolic complication of your Diabetes Mellitus. This can be avoided by appropriate glycemic control. Please check your finger sticks regularly.  -Please take your metformin 500mg twice daily.  -You are discharged on Metformin  -f/u with your PCP in the next 2 weeks [phone] Your A1C was 8.6 on admission  -Please take your metformin 500mg two times per day.  -Follow-up with your PCP within the next 2 weeks You had one positive blood culture grow a bacteria after 5 days of growth. The other three bottles and subsequent cultures were negative. They may have been a contaminated sample, however you must finish your course of antibiotics in case this was a real infection. Fortunately, you are not showing any clinical signs of infection at this time. Finish 10 additional days of Flagyl.

## 2017-04-08 NOTE — DIETITIAN INITIAL EVALUATION ADULT. - PROBLEM SELECTOR PLAN 2
-HCO3 13, Gap 21 ,   -2/2 ketotic state triggered Hyperglycemic diuresis   - continue volume rustication IVF with D5 1/2 NS @ 75cc/hr   -Start SSI and Lantus 10  - repeat BMP Q4, replete lytes prn  - repeat ABG if acidosis worsens or doesn't improve  -start DKA protocol if ABG pH < 7.3

## 2017-04-08 NOTE — DIETITIAN INITIAL EVALUATION ADULT. - ENERGY NEEDS
Ht:5ft1in, Wt:124.5pounds (adm),   BMI:23.6 (likely inaccurate as pt with edema),   IBW:105pounds (+/-10%), 119%IBW  Pt with 1+generalized, 2+aggie leg edema; no pressure ulcers noted.  Pertinent Information: Pt admitted after presyncopal fall due to dehydration in setting of sepsis and PO feeding intolerance.

## 2017-04-08 NOTE — DISCHARGE NOTE ADULT - SECONDARY DIAGNOSIS.
Type 2 diabetes mellitus with hyperglycemia, unspecified long term insulin use status Polycythemia vera SBO (Small Bowel Obstruction) Benign Hypertension Pituitary abnormality Bacteremia

## 2017-04-08 NOTE — DISCHARGE NOTE ADULT - CARE PLAN
Principal Discharge DX:	Metabolic acidosis, increased anion gap (IAG)  Secondary Diagnosis:	Type 2 diabetes mellitus with hyperglycemia, unspecified long term insulin use status  Secondary Diagnosis:	Polycythemia vera  Secondary Diagnosis:	SBO (Small Bowel Obstruction)  Secondary Diagnosis:	Benign Hypertension Principal Discharge DX:	Metabolic acidosis, increased anion gap (IAG)  Secondary Diagnosis:	Type 2 diabetes mellitus with hyperglycemia, unspecified long term insulin use status  Goal:	A1C <7  Instructions for follow-up, activity and diet:	Your A1C was 8.1  Secondary Diagnosis:	Polycythemia vera  Secondary Diagnosis:	SBO (Small Bowel Obstruction)  Secondary Diagnosis:	Benign Hypertension Principal Discharge DX:	Metabolic acidosis, increased anion gap (IAG)  Goal:	AG < 14  Instructions for follow-up, activity and diet:	You were admitted for a metabolic complication of your Diabetes Mellitus. This can be avoided by appropriate glycemic control. Please check your finger sticks regularly.  -Please take your discharge anti-glycemic agents  Secondary Diagnosis:	Type 2 diabetes mellitus with hyperglycemia, unspecified long term insulin use status  Goal:	A1C <7  Instructions for follow-up, activity and diet:	Your A1C was 8.1 on admission  -Please take your discharge anti-glycemic agents  Secondary Diagnosis:	Polycythemia vera  Goal:	Patient would follow-up with Hematology  Instructions for follow-up, activity and diet:	You have a diagnosis of Polycythemia Vera.  Please continue Hydroxyurea  F/u with Heme as outpatient Principal Discharge DX:	Metabolic acidosis, increased anion gap (IAG)  Goal:	AG < 14  Instructions for follow-up, activity and diet:	You were admitted for a metabolic complication of your Diabetes Mellitus. This can be avoided by appropriate glycemic control. Please check your finger sticks regularly.  -Please take your discharge anti-glycemic agents  -You are discharged on Metformin  -f/u with your PCP in the next 2 weeks  Secondary Diagnosis:	Type 2 diabetes mellitus with hyperglycemia, unspecified long term insulin use status  Goal:	A1C < 7  Instructions for follow-up, activity and diet:	Your A1C was 8.6 on admission  -Please take your discharge anti-glycemic agents  -Follow-up with your PCP within the next 2 weeks  Secondary Diagnosis:	Polycythemia vera  Goal:	Patient would follow-up with Hematology  Instructions for follow-up, activity and diet:	You have a history of Polycythemia Vera.  Please continue Hydroxyurea  F/u with Heme as outpatient Principal Discharge DX:	Metabolic acidosis, increased anion gap (IAG)  Goal:	Follow up with your primary care physician ([address] [place] [address] [phone]) within 2 weeks  Instructions for follow-up, activity and diet:	You were admitted for a metabolic complication of your Diabetes Mellitus. This can be avoided by appropriate glycemic control. Please check your finger sticks regularly.  -Please take your metformin 500mg twice daily.  -You are discharged on Metformin  -f/u with your PCP in the next 2 weeks [phone]  Secondary Diagnosis:	Type 2 diabetes mellitus with hyperglycemia, unspecified long term insulin use status  Goal:	Follow up with your primary care physician ([address] [place] [address] [phone]) within 2 weeks  Instructions for follow-up, activity and diet:	Your A1C was 8.6 on admission  -Please take your metformin 500mg two times per day.  -Follow-up with your PCP within the next 2 weeks  Secondary Diagnosis:	Polycythemia vera  Goal:	Patient would follow-up with Hematology  Instructions for follow-up, activity and diet:	You have a history of Polycythemia Vera.  Please continue Hydroxyurea  F/u with Heme as outpatient  Secondary Diagnosis:	Pituitary abnormality  Instructions for follow-up, activity and diet:	On the CT scan of your head, there was an abnormality in the pituitary gland. This can influence your body's hormone levels. You should have an MRI brain performed to evaluate this, but this is not an emergency. Follow up at the clinic ([phone]) to help set this up. Principal Discharge DX:	Metabolic acidosis, increased anion gap (IAG)  Goal:	Follow up with your primary care physician ([address] [place] [address] [phone]) within 2 weeks  Instructions for follow-up, activity and diet:	You were admitted for a metabolic complication of your Diabetes Mellitus. This can be avoided by appropriate glycemic control. Please check your finger sticks regularly.  -Please take your metformin 500mg twice daily.  -You are discharged on Metformin  -f/u with your PCP in the next 2 weeks [phone]  Secondary Diagnosis:	Type 2 diabetes mellitus with hyperglycemia, unspecified long term insulin use status  Goal:	Follow up with your primary care physician ([address] [place] [address] [phone]) within 2 weeks  Instructions for follow-up, activity and diet:	Your A1C was 8.6 on admission  -Please take your metformin 500mg two times per day.  -Follow-up with your PCP within the next 2 weeks  Secondary Diagnosis:	Polycythemia vera  Goal:	Patient would follow-up with Hematology  Instructions for follow-up, activity and diet:	You have a history of Polycythemia Vera.  Please continue Hydroxyurea  F/u with Heme as outpatient  Secondary Diagnosis:	Pituitary abnormality  Instructions for follow-up, activity and diet:	On the CT scan of your head, there was an abnormality in the pituitary gland. This can influence your body's hormone levels. You should have an MRI brain performed to evaluate this, but this is not an emergency. Follow up at the clinic ([phone]) to help set this up.  Secondary Diagnosis:	Bacteremia  Instructions for follow-up, activity and diet:	You had one positive blood culture grow a bacteria after 5 days of growth. The other three bottles and subsequent cultures were negative. They may have been a contaminated sample, however you must finish your course of antibiotics in case this was a real infection. Fortunately, you are not showing any clinical signs of infection at this time. Finish 10 additional days of Flagyl. Principal Discharge DX:	Metabolic acidosis, increased anion gap (IAG)  Goal:	Follow up with your primary care physician ([address] [place] [address] [phone]) within 2 weeks  Instructions for follow-up, activity and diet:	You were admitted for a metabolic complication of your Diabetes Mellitus. This can be avoided by appropriate glycemic control. Please check your finger sticks regularly.  -Please take your metformin 500mg twice daily.  -You are discharged on Metformin  -f/u with your PCP in the next 2 weeks [phone]  Secondary Diagnosis:	Type 2 diabetes mellitus with hyperglycemia, unspecified long term insulin use status  Goal:	Follow up with your primary care physician ([address] [place] [address] [phone]) within 2 weeks  Instructions for follow-up, activity and diet:	Your A1C was 8.6 on admission  -Please take your metformin 500mg two times per day.  -Follow-up with your PCP within the next 2 weeks  Secondary Diagnosis:	Polycythemia vera  Goal:	Patient would follow-up with Hematology  Instructions for follow-up, activity and diet:	You have a history of Polycythemia Vera.  Please continue Hydroxyurea  F/u with Heme as outpatient  Secondary Diagnosis:	Pituitary abnormality  Instructions for follow-up, activity and diet:	On the CT scan of your head, there was an abnormality in the pituitary gland. This can influence your body's hormone levels. You should have an MRI brain performed to evaluate this, but this is not an emergency. Follow up at the clinic ([phone]) to help set this up.  Secondary Diagnosis:	Bacteremia  Instructions for follow-up, activity and diet:	You had one positive blood culture grow a bacteria after 5 days of growth. The other three bottles and subsequent cultures were negative. They may have been a contaminated sample, however you must finish your course of antibiotics in case this was a real infection. Fortunately, you are not showing any clinical signs of infection at this time. Finish 10 additional days of Flagyl. Principal Discharge DX:	Metabolic acidosis, increased anion gap (IAG)  Goal:	Follow up with your primary care physician ([address] [place] [address] [phone]) within 2 weeks  Instructions for follow-up, activity and diet:	You were admitted for a metabolic complication of your Diabetes Mellitus. This can be avoided by appropriate glycemic control. Please check your finger sticks regularly.  -Please take your metformin 500mg twice daily.  -You are discharged on Metformin  -f/u with your PCP in the next 2 weeks [phone]  Secondary Diagnosis:	Type 2 diabetes mellitus with hyperglycemia, unspecified long term insulin use status  Goal:	Follow up with your primary care physician ([address] [place] [address] [phone]) within 2 weeks  Instructions for follow-up, activity and diet:	Your A1C was 8.6 on admission  -Please take your metformin 500mg two times per day.  -Follow-up with your PCP within the next 2 weeks  Secondary Diagnosis:	Polycythemia vera  Goal:	Patient would follow-up with Hematology  Instructions for follow-up, activity and diet:	You have a history of Polycythemia Vera.  Please continue Hydroxyurea  F/u with Heme as outpatient  Secondary Diagnosis:	Pituitary abnormality  Instructions for follow-up, activity and diet:	On the CT scan of your head, there was an abnormality in the pituitary gland. This can influence your body's hormone levels. You should have an MRI brain performed to evaluate this, but this is not an emergency. Follow up at the clinic ([phone]) to help set this up.  Secondary Diagnosis:	Bacteremia  Instructions for follow-up, activity and diet:	You had one positive blood culture grow a bacteria after 5 days of growth. The other three bottles and subsequent cultures were negative. They may have been a contaminated sample, however you must finish your course of antibiotics in case this was a real infection. Fortunately, you are not showing any clinical signs of infection at this time. Finish 10 additional days of Flagyl.

## 2017-04-08 NOTE — DIETITIAN INITIAL EVALUATION ADULT. - ORAL INTAKE PTA
Pt reports decreased PO intake for about 1 month PTA. States prior to this she ate 3 meals/day with occasional snacks./poor

## 2017-04-08 NOTE — DISCHARGE NOTE ADULT - MEDICATION SUMMARY - MEDICATIONS TO TAKE
I will START or STAY ON the medications listed below when I get home from the hospital:    blink  -- 1 drop(s) to each affected eye in the morning and at bedtime  -- Indication: For Dry eyes    metroNIDAZOLE 500 mg oral tablet  -- 1 tab(s) by mouth every 8 hours for 10 more days (last day 4/22/17)  -- Indication: For Bacteremia    losartan 50 mg oral tablet  -- 1 tab(s) by mouth once a day (in the morning)  -- Indication: For HTN    warfarin 2.5 mg oral tablet  -- 1 tab(s) by mouth once a day on Mon, Tues, Wed, Thurs , Sat & Sun  -- Indication: For Afib    warfarin 2.5 mg oral tablet  -- 2 tab(s) by mouth once a day on Fridays  -- Indication: For Afib    metFORMIN 500 mg oral tablet  -- 1 tab(s) by mouth 2 times a day  -- Indication: For Diabetes    simvastatin 40 mg oral tablet  -- 1 tab(s) by mouth once a day (in the evening)  -- Indication: For HLD    hydroxyurea 500 mg oral capsule  -- 1 cap(s) by mouth once a day  -- Indication: For Polycythemia vera    alendronate 70 mg oral tablet  -- 1 tab(s) by mouth once a week ( wednesdays )  -- Indication: For Osteoporosis    felodipine 10 mg oral tablet, extended release  -- 1 tab(s) by mouth once a day (in the morning)  -- Indication: For HTN    senna oral tablet  -- 1 tab(s) by mouth once a day  -- Indication: For Constipation    docusate sodium 100 mg oral capsule  -- 1 cap(s) by mouth 2 times a day  -- Indication: For Constipation    levothyroxine 150 mcg (0.15 mg) oral tablet  -- 1 tab(s) by mouth once a day (in the morning)  -- Indication: For Hypothyroidism    Vitamin D3 2000 intl units oral capsule  -- 1 cap(s) by mouth 2 times a day  -- Indication: For Osteoporosis

## 2017-04-08 NOTE — DIETITIAN INITIAL EVALUATION ADULT. - NS AS NUTRI INTERV ED CONTENT
Provided verbal and written instruction on consistent CHO/heart healthy diet guidelines and Coumadin/Vitamin K interaction; written materials provided./Recommended modifications/Purpose of the nutrition education/Nutrition relationship to health/disease

## 2017-04-08 NOTE — DIETITIAN INITIAL EVALUATION ADULT. - NUTRITION INTERVENTION
Meals and Snack/Nutrition Education/Collaboration and Referral of Nutrition Care/Medical Food Supplements

## 2017-04-08 NOTE — DISCHARGE NOTE ADULT - PROVIDER TOKENS
FREE:[LAST:[General Internal Medicine],PHONE:[phone]],FAX:[(   )    -],ADDRESS:[[address] [place] [address]   [phone]]]

## 2017-04-08 NOTE — DIETITIAN INITIAL EVALUATION ADULT. - PHYSICAL APPEARANCE
However, nutrition focused physical exam conducted and pt found with mild temporal wasting./overweight

## 2017-04-08 NOTE — DIETITIAN INITIAL EVALUATION ADULT. - PROBLEM SELECTOR PLAN 3
-2/2 dehydration  -CTH neg for hemorage or mass effect  -encourage PO intake  -Start IV line, IVF D51/2NS @ 50cc/hr

## 2017-04-08 NOTE — DIETITIAN INITIAL EVALUATION ADULT. - SOURCE
patient/other (specify)/comprehensive chart review. Noted pt's preferred language is Tagalog, however, pt offered  phone and refused- requested to conduct interview in English.

## 2017-04-08 NOTE — DIETITIAN INITIAL EVALUATION ADULT. - OTHER INFO
Pt reports weight loss as above to new weight of 120pounds. Noted admission weight 124.5pounds, ?4.5pound weight gain due to fluid retention as noted pt with edema. Pt reports food allergy to "spicy food" with reaction of mouth swelling. Pt unable to specify which ingredients in spicy food she is allergic to. Noted micronutrient supplementation PTA consisted of vitamin D3 per H&P. Pt reports weight loss as above to new weight of 120pounds. Noted admission weight 124.5pounds, ?4.5pound weight gain due to fluid retention as noted pt with edema. Pt reports food allergy to "spicy food" with reaction of mouth swelling. Pt unable to specify which ingredients in spicy food she is allergic to. Noted micronutrient supplementation PTA consisted of vitamin D3 per H&P. Pt reports self monitoring morning fasting blood glucose daily, unable to recall results but states "sometimes they are high and sometimes they are low." Currently, pt continued with a decreased appetite and reports eating <25% of meals. Pt willing to try oral nutritional supplements to promote PO intake. Denies nausea/vomiting/diarrhea at this time. Pt reports no difficulty chewing/swallowing. Pt receptive to instruction on nutrition therapy for DM, heart health, and Coumadin/Vitamin K interaction; accepted written materials.

## 2017-04-08 NOTE — DISCHARGE NOTE ADULT - CARE PROVIDER_API CALL
General Internal Medicine,   [address] [facility], [facility] [address]   [place] [address]   [phone]  Phone: [phone]  Fax: (   )    -

## 2017-04-08 NOTE — DIETITIAN INITIAL EVALUATION ADULT. - ADHERENCE
Pt states she did not follow any therapeutic diet guidelines. Reports living with daughter who does the cooking. Pt states she adds some salt to food, avoids sugar, and chooses sugar free sweets when possible.

## 2017-04-08 NOTE — DISCHARGE NOTE ADULT - HOSPITAL COURSE
83F PMHx DMII, Chronic SBO, Pipe 2+ PCV, BIBEMS after pre-syncopal fall due to dehydration in the setting of HONK, now mild DKA. Pt also has an AG of 23 , VBG pH 7.34 83F PMHx DMII, Chronic SBO, Pipe 2+ PCV, BIBEMS after pre-syncopal fall due to dehydration in the setting of HONK, now mild DKA. Pt also has an AG of 22 , VBG pH 7.34. One week prior to admission patient had experienced episodes of nausea, vomiting,  poor PO intake and polyuria. Symptoms however resolved a couple days prior to presentation. Patient was febrile and tachycardic in ED prompting sepsis management with Ceftriaxone x 1 and 2 L Normal saline volume resuscitation Urinalysis, Chest X-ray was done, Urine culture, urinalysis and blood cultures were sent. Urinalysis was positive for ketones and glucose, X-ray show ?L lung plural effusion vs atelectasis.   Insulin sliding scale with Lantus 10U were started with D5 1/2NS at 75 cc/hr at admission. Serial BMP showed resolution of metabolic acidosis, and normalization of anion gap overnight. Patient was then monitored off antibiotics for one day without sings of infection. Physical therapy evaluated patient and recommended*****.  Endocrine discharge medication rec *****  Pt is medically stable at discharge 83F PMHx DMII, Chronic SBO, Pipe 2+ PCV, BIBEMS after an unwitnessed pre-syncopal fall due to dehydration in the setting of Hyperglycemia hyperosmolar Non-ketotic state, with mild DKA. Patient also had an anion gap of 22 , and VBG pH of 7.34 at admisson. One week prior to admission patient had experienced episodes of nausea, vomiting,  poor PO intake and polyuria. Symptoms however resolved a couple days prior to presentation. Patient was febrile and tachycardic in ED prompting sepsis management with Ceftriaxone x 1 and 2 L Normal saline volume resuscitation Urinalysis, Chest X-ray was done, Urine culture, urinalysis and blood cultures were sent. Urinalysis was positive for ketones and glucose, X-ray show ?L lung plural effusion vs atelectasis. Cultures were negative.  Insulin sliding scale with Lantus 10U were started with D5 1/2NS at 75 cc/hr at admission. Serial BMP showed resolution of metabolic acidosis, and normalization of anion gap overnight. Patient was then monitored off antibiotics without sings of infection. Physical therapy evaluated patient and recommended [facility]. Family and  facilitated acceptance and transfer to [facility].  Endocrinology recommended that patient continues on home Metformin 1000mg daily.  Pt is medically stable at discharge 83F PMHx DMII, Chronic SBO, Pipe 2+ PCV, BIBEMS after an unwitnessed pre-syncopal fall due to dehydration in the setting of Hyperglycemia hyperosmolar Non-ketotic state, with mild DKA. Patient also had an anion gap of 22 , and VBG pH of 7.34 at admisson. One week prior to admission patient had experienced episodes of nausea, vomiting,  poor PO intake and polyuria. Symptoms however resolved a couple days prior to presentation. Patient was febrile and tachycardic in ED prompting sepsis management with Ceftriaxone x 1 and 2 L Normal saline volume resuscitation Urinalysis, Chest X-ray was done, Urine culture, urinalysis and blood cultures were sent. Urinalysis was positive for ketones and glucose, X-ray show ?L lung plural effusion vs atelectasis. Cultures were negative.  Insulin sliding scale with Lantus 10U were started with D5 1/2NS at 75 cc/hr at admission. Serial BMP showed resolution of metabolic acidosis, and normalization of anion gap overnight. Patient was then monitored off antibiotics without sings of infection. Physical therapy evaluated patient and recommended [facility]. Family and  facilitated acceptance and transfer to [facility].  Endocrinology recommended that patient continues on home Metformin 1000mg daily.  Pt is medically stable at discharge    When patient was about to be discharged to [facility] on 4/12, a BCx became positive for GNRs at 5 days of growth. The other 3 original bottles remained negative and subsequent cultures were negative. She was initially continued on CTX and changed to Flagyl at discharge as the organism was identified as Bacteroides. She was discharged to [facility] in stable condition. 83F PMHx DMII, Chronic SBO, Pipe 2+ PCV, BIBEMS after an unwitnessed pre-syncopal fall due to dehydration in the setting of Hyperglycemia hyperosmolar Non-ketotic state, with mild DKA. Patient also had an anion gap of 22 , and VBG pH of 7.34 at admisson. One week prior to admission patient had experienced episodes of nausea, vomiting,  poor PO intake and polyuria. Symptoms however resolved a couple days prior to presentation. Patient was febrile and tachycardic in ED prompting sepsis management with Ceftriaxone x 1 and 2 L Normal saline volume resuscitation Urinalysis, Chest X-ray was done, Urine culture, urinalysis and blood cultures were sent. Urinalysis was positive for ketones and glucose, X-ray show ?L lung plural effusion vs atelectasis. Cultures were negative.  Insulin sliding scale with Lantus 10U were started with D5 1/2NS at 75 cc/hr at admission. Serial BMP showed resolution of metabolic acidosis, and normalization of anion gap overnight. Patient was then monitored off antibiotics without sings of infection. Physical therapy evaluated patient and recommended [facility]. Family and  facilitated acceptance and transfer to [facility].  Endocrinology recommended that patient continues on home Metformin 1000mg daily.  Pt is medically stable at discharge    When patient was about to be discharged to [facility] on 4/12, a BCx became positive for GNRs at 5 days of growth. The other 3 original bottles remained negative and subsequent cultures were negative. She was initially continued on CTX and changed to Flagyl at discharge as the organism was identified as Bacteroides. She was discharged to [facility] in stable condition.   To clarify, pt did not have an infection leading to sepsis upon admission.  SIRs criteria found to be due to DKA. Pt also diagnosed with severe protein calorie malnutrition on 4/8/17 as noted by 30 lb 20% unintentional weight loss in 3 months PTA, inadequate po intake x 1 mo PTA, Found with temporal muscle wasting.

## 2017-04-09 LAB
ANION GAP SERPL CALC-SCNC: 13 MMOL/L — SIGNIFICANT CHANGE UP (ref 5–17)
APTT BLD: 42.2 SEC — HIGH (ref 27.5–37.4)
BUN SERPL-MCNC: 10 MG/DL — SIGNIFICANT CHANGE UP (ref 7–23)
CALCIUM SERPL-MCNC: 8.7 MG/DL — SIGNIFICANT CHANGE UP (ref 8.4–10.5)
CALCIUM SERPL-MCNC: 8.9 MG/DL — SIGNIFICANT CHANGE UP (ref 8.4–10.5)
CHLORIDE SERPL-SCNC: 104 MMOL/L — SIGNIFICANT CHANGE UP (ref 96–108)
CO2 SERPL-SCNC: 23 MMOL/L — SIGNIFICANT CHANGE UP (ref 22–31)
CORTIS AM PEAK SERPL-MCNC: 10.3 UG/DL — SIGNIFICANT CHANGE UP (ref 6–18.4)
CREAT SERPL-MCNC: 0.71 MG/DL — SIGNIFICANT CHANGE UP (ref 0.5–1.3)
GLUCOSE SERPL-MCNC: 158 MG/DL — HIGH (ref 70–99)
HCT VFR BLD CALC: 37.7 % — SIGNIFICANT CHANGE UP (ref 34.5–45)
HGB BLD-MCNC: 12.2 G/DL — SIGNIFICANT CHANGE UP (ref 11.5–15.5)
INR BLD: 3.18 RATIO — HIGH (ref 0.88–1.16)
MCHC RBC-ENTMCNC: 32.3 GM/DL — SIGNIFICANT CHANGE UP (ref 32–36)
MCHC RBC-ENTMCNC: 36.6 PG — HIGH (ref 27–34)
MCV RBC AUTO: 113 FL — HIGH (ref 80–100)
PLATELET # BLD AUTO: 330 K/UL — SIGNIFICANT CHANGE UP (ref 150–400)
POTASSIUM SERPL-MCNC: 3.9 MMOL/L — SIGNIFICANT CHANGE UP (ref 3.5–5.3)
POTASSIUM SERPL-SCNC: 3.9 MMOL/L — SIGNIFICANT CHANGE UP (ref 3.5–5.3)
PROTHROM AB SERPL-ACNC: 35.5 SEC — HIGH (ref 9.8–12.7)
PTH-INTACT FLD-MCNC: 63 PG/ML — SIGNIFICANT CHANGE UP (ref 15–65)
RBC # BLD: 3.33 M/UL — LOW (ref 3.8–5.2)
RBC # FLD: 12.4 % — SIGNIFICANT CHANGE UP (ref 10.3–14.5)
SODIUM SERPL-SCNC: 140 MMOL/L — SIGNIFICANT CHANGE UP (ref 135–145)
T4 FREE SERPL-MCNC: 1.6 NG/DL — SIGNIFICANT CHANGE UP (ref 0.9–1.8)
TSH SERPL-MCNC: 2.07 UIU/ML — SIGNIFICANT CHANGE UP (ref 0.27–4.2)
WBC # BLD: 4.9 K/UL — SIGNIFICANT CHANGE UP (ref 3.8–10.5)
WBC # FLD AUTO: 4.9 K/UL — SIGNIFICANT CHANGE UP (ref 3.8–10.5)

## 2017-04-09 PROCEDURE — 93010 ELECTROCARDIOGRAM REPORT: CPT

## 2017-04-09 PROCEDURE — 99233 SBSQ HOSP IP/OBS HIGH 50: CPT | Mod: GC

## 2017-04-09 RX ORDER — WARFARIN SODIUM 2.5 MG/1
2.5 TABLET ORAL ONCE
Qty: 0 | Refills: 0 | Status: COMPLETED | OUTPATIENT
Start: 2017-04-09 | End: 2017-04-09

## 2017-04-09 RX ADMIN — Medication: at 18:23

## 2017-04-09 RX ADMIN — Medication 2000 UNIT(S): at 21:40

## 2017-04-09 RX ADMIN — Medication 2000 UNIT(S): at 09:43

## 2017-04-09 RX ADMIN — INSULIN GLARGINE 5 UNIT(S): 100 INJECTION, SOLUTION SUBCUTANEOUS at 21:47

## 2017-04-09 RX ADMIN — Medication 150 MICROGRAM(S): at 05:51

## 2017-04-09 RX ADMIN — HYDROXYUREA 500 MILLIGRAM(S): 500 CAPSULE ORAL at 09:43

## 2017-04-09 RX ADMIN — AMLODIPINE BESYLATE 10 MILLIGRAM(S): 2.5 TABLET ORAL at 05:51

## 2017-04-09 RX ADMIN — WARFARIN SODIUM 2.5 MILLIGRAM(S): 2.5 TABLET ORAL at 21:39

## 2017-04-09 RX ADMIN — LOSARTAN POTASSIUM 50 MILLIGRAM(S): 100 TABLET, FILM COATED ORAL at 05:51

## 2017-04-09 RX ADMIN — Medication 1: at 21:40

## 2017-04-09 RX ADMIN — SIMVASTATIN 40 MILLIGRAM(S): 20 TABLET, FILM COATED ORAL at 21:39

## 2017-04-10 LAB
24R-OH-CALCIDIOL SERPL-MCNC: 46.2 NG/ML — SIGNIFICANT CHANGE UP (ref 30–100)
ACTH SER-ACNC: 32 PG/ML — SIGNIFICANT CHANGE UP (ref 0–46)
ANION GAP SERPL CALC-SCNC: 12 MMOL/L — SIGNIFICANT CHANGE UP (ref 5–17)
APTT BLD: 43.5 SEC — HIGH (ref 27.5–37.4)
BUN SERPL-MCNC: 14 MG/DL — SIGNIFICANT CHANGE UP (ref 7–23)
CALCIUM SERPL-MCNC: 8.9 MG/DL — SIGNIFICANT CHANGE UP (ref 8.4–10.5)
CHLORIDE SERPL-SCNC: 105 MMOL/L — SIGNIFICANT CHANGE UP (ref 96–108)
CO2 SERPL-SCNC: 22 MMOL/L — SIGNIFICANT CHANGE UP (ref 22–31)
CREAT SERPL-MCNC: 0.64 MG/DL — SIGNIFICANT CHANGE UP (ref 0.5–1.3)
GLUCOSE SERPL-MCNC: 139 MG/DL — HIGH (ref 70–99)
HCT VFR BLD CALC: 36.1 % — SIGNIFICANT CHANGE UP (ref 34.5–45)
HGB BLD-MCNC: 12.3 G/DL — SIGNIFICANT CHANGE UP (ref 11.5–15.5)
INR BLD: 3.22 RATIO — HIGH (ref 0.88–1.16)
MCHC RBC-ENTMCNC: 34.1 GM/DL — SIGNIFICANT CHANGE UP (ref 32–36)
MCHC RBC-ENTMCNC: 38.3 PG — HIGH (ref 27–34)
MCV RBC AUTO: 112 FL — HIGH (ref 80–100)
PLATELET # BLD AUTO: 329 K/UL — SIGNIFICANT CHANGE UP (ref 150–400)
POTASSIUM SERPL-MCNC: 4 MMOL/L — SIGNIFICANT CHANGE UP (ref 3.5–5.3)
POTASSIUM SERPL-SCNC: 4 MMOL/L — SIGNIFICANT CHANGE UP (ref 3.5–5.3)
PROTHROM AB SERPL-ACNC: 35.6 SEC — HIGH (ref 9.8–12.7)
RBC # BLD: 3.22 M/UL — LOW (ref 3.8–5.2)
RBC # FLD: 12.2 % — SIGNIFICANT CHANGE UP (ref 10.3–14.5)
SODIUM SERPL-SCNC: 139 MMOL/L — SIGNIFICANT CHANGE UP (ref 135–145)
WBC # BLD: 4.7 K/UL — SIGNIFICANT CHANGE UP (ref 3.8–10.5)
WBC # FLD AUTO: 4.7 K/UL — SIGNIFICANT CHANGE UP (ref 3.8–10.5)

## 2017-04-10 PROCEDURE — 99232 SBSQ HOSP IP/OBS MODERATE 35: CPT | Mod: GC

## 2017-04-10 RX ORDER — METFORMIN HYDROCHLORIDE 850 MG/1
1 TABLET ORAL
Qty: 0 | Refills: 0 | COMMUNITY

## 2017-04-10 RX ORDER — WARFARIN SODIUM 2.5 MG/1
2 TABLET ORAL ONCE
Qty: 0 | Refills: 0 | Status: COMPLETED | OUTPATIENT
Start: 2017-04-10 | End: 2017-04-10

## 2017-04-10 RX ORDER — METFORMIN HYDROCHLORIDE 850 MG/1
1 TABLET ORAL
Qty: 60 | Refills: 0
Start: 2017-04-10 | End: 2017-05-10

## 2017-04-10 RX ADMIN — Medication 150 MICROGRAM(S): at 05:17

## 2017-04-10 RX ADMIN — HYDROXYUREA 500 MILLIGRAM(S): 500 CAPSULE ORAL at 12:27

## 2017-04-10 RX ADMIN — Medication 2000 UNIT(S): at 12:27

## 2017-04-10 RX ADMIN — WARFARIN SODIUM 2 MILLIGRAM(S): 2.5 TABLET ORAL at 21:59

## 2017-04-10 RX ADMIN — Medication: at 15:30

## 2017-04-10 RX ADMIN — Medication: at 19:21

## 2017-04-10 RX ADMIN — SIMVASTATIN 40 MILLIGRAM(S): 20 TABLET, FILM COATED ORAL at 21:59

## 2017-04-10 RX ADMIN — Medication 2000 UNIT(S): at 21:59

## 2017-04-10 RX ADMIN — AMLODIPINE BESYLATE 10 MILLIGRAM(S): 2.5 TABLET ORAL at 05:17

## 2017-04-10 RX ADMIN — LOSARTAN POTASSIUM 50 MILLIGRAM(S): 100 TABLET, FILM COATED ORAL at 05:17

## 2017-04-10 RX ADMIN — INSULIN GLARGINE 5 UNIT(S): 100 INJECTION, SOLUTION SUBCUTANEOUS at 21:59

## 2017-04-10 NOTE — PHYSICAL THERAPY INITIAL EVALUATION ADULT - ADDITIONAL COMMENTS
Pt lives at home with daughter and the daughter family, pt states she has one flight of stairs to bedroom with 2 hand rails and 3 steps to den with one hand rail, pt states she was independent with ambulation and ADL's. however over past year pt states she has had 3 falls

## 2017-04-10 NOTE — PHYSICAL THERAPY INITIAL EVALUATION ADULT - PERTINENT HX OF CURRENT PROBLEM, REHAB EVAL
Pt is a 83 year old female admitted to [facility] on 4/7/17 for cystitis. Pt with PMH of DM2, chronic SBO, Jak2+PCV, BIBEMS after pre-syncopal fall due to dehydration in setting of sepsis and PO feed intolerance. Hospital Course: Sepsis: fever, tachycardia, tachypnea on admission, Anion Gap metabolic acidosis secondary to developing DKA  gap closed s/p volume resuscitation and insulin

## 2017-04-11 LAB
ANION GAP SERPL CALC-SCNC: 12 MMOL/L — SIGNIFICANT CHANGE UP (ref 5–17)
BUN SERPL-MCNC: 23 MG/DL — SIGNIFICANT CHANGE UP (ref 7–23)
CALCIUM SERPL-MCNC: 9.2 MG/DL — SIGNIFICANT CHANGE UP (ref 8.4–10.5)
CHLORIDE SERPL-SCNC: 106 MMOL/L — SIGNIFICANT CHANGE UP (ref 96–108)
CO2 SERPL-SCNC: 23 MMOL/L — SIGNIFICANT CHANGE UP (ref 22–31)
CREAT SERPL-MCNC: 0.74 MG/DL — SIGNIFICANT CHANGE UP (ref 0.5–1.3)
GLUCOSE SERPL-MCNC: 138 MG/DL — HIGH (ref 70–99)
INR BLD: 3.32 RATIO — HIGH (ref 0.88–1.16)
POTASSIUM SERPL-MCNC: 4.2 MMOL/L — SIGNIFICANT CHANGE UP (ref 3.5–5.3)
POTASSIUM SERPL-SCNC: 4.2 MMOL/L — SIGNIFICANT CHANGE UP (ref 3.5–5.3)
PROTHROM AB SERPL-ACNC: 37.1 SEC — HIGH (ref 9.8–12.7)
SODIUM SERPL-SCNC: 141 MMOL/L — SIGNIFICANT CHANGE UP (ref 135–145)

## 2017-04-11 PROCEDURE — 99232 SBSQ HOSP IP/OBS MODERATE 35: CPT | Mod: GC

## 2017-04-11 RX ORDER — WARFARIN SODIUM 2.5 MG/1
1 TABLET ORAL ONCE
Qty: 0 | Refills: 0 | Status: COMPLETED | OUTPATIENT
Start: 2017-04-11 | End: 2017-04-11

## 2017-04-11 RX ADMIN — AMLODIPINE BESYLATE 10 MILLIGRAM(S): 2.5 TABLET ORAL at 06:25

## 2017-04-11 RX ADMIN — LOSARTAN POTASSIUM 50 MILLIGRAM(S): 100 TABLET, FILM COATED ORAL at 06:25

## 2017-04-11 RX ADMIN — Medication: at 17:34

## 2017-04-11 RX ADMIN — HYDROXYUREA 500 MILLIGRAM(S): 500 CAPSULE ORAL at 14:01

## 2017-04-11 RX ADMIN — WARFARIN SODIUM 1 MILLIGRAM(S): 2.5 TABLET ORAL at 21:53

## 2017-04-11 RX ADMIN — Medication: at 13:34

## 2017-04-11 RX ADMIN — SIMVASTATIN 40 MILLIGRAM(S): 20 TABLET, FILM COATED ORAL at 21:53

## 2017-04-11 RX ADMIN — Medication 150 MICROGRAM(S): at 06:25

## 2017-04-11 RX ADMIN — Medication 2000 UNIT(S): at 13:34

## 2017-04-11 RX ADMIN — Medication 2000 UNIT(S): at 21:53

## 2017-04-11 RX ADMIN — INSULIN GLARGINE 5 UNIT(S): 100 INJECTION, SOLUTION SUBCUTANEOUS at 21:53

## 2017-04-12 LAB
APTT BLD: 45.3 SEC — HIGH (ref 27.5–37.4)
CULTURE RESULTS: SIGNIFICANT CHANGE UP
GRAM STN FLD: SIGNIFICANT CHANGE UP
INR BLD: 3 RATIO — HIGH (ref 0.88–1.16)
PROTHROM AB SERPL-ACNC: 33.1 SEC — HIGH (ref 9.8–12.7)
SPECIMEN SOURCE: SIGNIFICANT CHANGE UP

## 2017-04-12 PROCEDURE — 99232 SBSQ HOSP IP/OBS MODERATE 35: CPT | Mod: GC

## 2017-04-12 RX ORDER — CEFTRIAXONE 500 MG/1
INJECTION, POWDER, FOR SOLUTION INTRAMUSCULAR; INTRAVENOUS
Qty: 0 | Refills: 0 | Status: DISCONTINUED | OUTPATIENT
Start: 2017-04-12 | End: 2017-04-13

## 2017-04-12 RX ORDER — DOCUSATE SODIUM 100 MG
100 CAPSULE ORAL
Qty: 0 | Refills: 0 | Status: DISCONTINUED | OUTPATIENT
Start: 2017-04-12 | End: 2017-04-13

## 2017-04-12 RX ORDER — SENNA PLUS 8.6 MG/1
1 TABLET ORAL DAILY
Qty: 0 | Refills: 0 | Status: DISCONTINUED | OUTPATIENT
Start: 2017-04-12 | End: 2017-04-13

## 2017-04-12 RX ORDER — WARFARIN SODIUM 2.5 MG/1
1 TABLET ORAL ONCE
Qty: 0 | Refills: 0 | Status: COMPLETED | OUTPATIENT
Start: 2017-04-12 | End: 2017-04-12

## 2017-04-12 RX ORDER — CEFTRIAXONE 500 MG/1
1 INJECTION, POWDER, FOR SOLUTION INTRAMUSCULAR; INTRAVENOUS EVERY 24 HOURS
Qty: 0 | Refills: 0 | Status: DISCONTINUED | OUTPATIENT
Start: 2017-04-13 | End: 2017-04-13

## 2017-04-12 RX ORDER — CEFTRIAXONE 500 MG/1
1 INJECTION, POWDER, FOR SOLUTION INTRAMUSCULAR; INTRAVENOUS ONCE
Qty: 0 | Refills: 0 | Status: COMPLETED | OUTPATIENT
Start: 2017-04-12 | End: 2017-04-12

## 2017-04-12 RX ADMIN — SENNA PLUS 1 TABLET(S): 8.6 TABLET ORAL at 10:30

## 2017-04-12 RX ADMIN — AMLODIPINE BESYLATE 10 MILLIGRAM(S): 2.5 TABLET ORAL at 05:27

## 2017-04-12 RX ADMIN — Medication 100 MILLIGRAM(S): at 18:07

## 2017-04-12 RX ADMIN — Medication 150 MICROGRAM(S): at 05:27

## 2017-04-12 RX ADMIN — WARFARIN SODIUM 1 MILLIGRAM(S): 2.5 TABLET ORAL at 21:31

## 2017-04-12 RX ADMIN — Medication 1: at 13:39

## 2017-04-12 RX ADMIN — LOSARTAN POTASSIUM 50 MILLIGRAM(S): 100 TABLET, FILM COATED ORAL at 05:27

## 2017-04-12 RX ADMIN — SIMVASTATIN 40 MILLIGRAM(S): 20 TABLET, FILM COATED ORAL at 21:31

## 2017-04-12 RX ADMIN — INSULIN GLARGINE 5 UNIT(S): 100 INJECTION, SOLUTION SUBCUTANEOUS at 21:31

## 2017-04-12 RX ADMIN — Medication 2000 UNIT(S): at 10:28

## 2017-04-12 RX ADMIN — CEFTRIAXONE 100 GRAM(S): 500 INJECTION, POWDER, FOR SOLUTION INTRAMUSCULAR; INTRAVENOUS at 11:38

## 2017-04-12 RX ADMIN — Medication 100 MILLIGRAM(S): at 05:27

## 2017-04-12 RX ADMIN — Medication 2000 UNIT(S): at 21:31

## 2017-04-12 RX ADMIN — HYDROXYUREA 500 MILLIGRAM(S): 500 CAPSULE ORAL at 10:29

## 2017-04-12 NOTE — PROVIDER CONTACT NOTE (OTHER) - ACTION/TREATMENT ORDERED:
No treatment at this time. Continue to monitor.
MD aware. Order for colace and senna. Continue to monitor.
MD notified and aware. Will continue to closely monitor the patient

## 2017-04-12 NOTE — PROVIDER CONTACT NOTE (OTHER) - ASSESSMENT
Pt A+Ox3. Pt states last BM was 4/7; states she hasn't been eating much during her hospital stay because she doesn't like the food. +BSx4. Pt states she is passing gas.
Patient is A&OX3. Patient is asymptomatic. VS T-97.9 P-84 R-18 Bp-152/73 O2-97% on room air. Patient is SR 90s now

## 2017-04-12 NOTE — PROVIDER CONTACT NOTE (OTHER) - SITUATION
last BM 4/7
Monitor showed 5.2 seconds of PAT, HR 160s. Pt asymptomatic. 127/72, HR 94, T 97.5. EKG done
Patient had PAT for 3 seconds on the monitor

## 2017-04-13 ENCOUNTER — APPOINTMENT (OUTPATIENT)
Dept: HEMATOLOGY ONCOLOGY | Facility: CLINIC | Age: 82
End: 2017-04-13

## 2017-04-13 VITALS
SYSTOLIC BLOOD PRESSURE: 89 MMHG | RESPIRATION RATE: 18 BRPM | TEMPERATURE: 97 F | DIASTOLIC BLOOD PRESSURE: 56 MMHG | HEART RATE: 85 BPM | OXYGEN SATURATION: 98 %

## 2017-04-13 LAB
ANION GAP SERPL CALC-SCNC: 13 MMOL/L — SIGNIFICANT CHANGE UP (ref 5–17)
BASOPHILS # BLD AUTO: 0 K/UL — SIGNIFICANT CHANGE UP (ref 0–0.2)
BASOPHILS NFR BLD AUTO: 1 % — SIGNIFICANT CHANGE UP (ref 0–2)
BUN SERPL-MCNC: 24 MG/DL — HIGH (ref 7–23)
CALCIUM SERPL-MCNC: 9.8 MG/DL — SIGNIFICANT CHANGE UP (ref 8.4–10.5)
CHLORIDE SERPL-SCNC: 104 MMOL/L — SIGNIFICANT CHANGE UP (ref 96–108)
CO2 SERPL-SCNC: 23 MMOL/L — SIGNIFICANT CHANGE UP (ref 22–31)
CREAT SERPL-MCNC: 0.73 MG/DL — SIGNIFICANT CHANGE UP (ref 0.5–1.3)
EOSINOPHIL # BLD AUTO: 0.1 K/UL — SIGNIFICANT CHANGE UP (ref 0–0.5)
EOSINOPHIL NFR BLD AUTO: 3 % — SIGNIFICANT CHANGE UP (ref 0–6)
GLUCOSE SERPL-MCNC: 158 MG/DL — HIGH (ref 70–99)
HCT VFR BLD CALC: 33.7 % — LOW (ref 34.5–45)
HGB BLD-MCNC: 12 G/DL — SIGNIFICANT CHANGE UP (ref 11.5–15.5)
INR BLD: 2.11 RATIO — HIGH (ref 0.88–1.16)
LYMPHOCYTES # BLD AUTO: 1 K/UL — SIGNIFICANT CHANGE UP (ref 1–3.3)
LYMPHOCYTES # BLD AUTO: 16 % — SIGNIFICANT CHANGE UP (ref 13–44)
MAGNESIUM SERPL-MCNC: 1.7 MG/DL — SIGNIFICANT CHANGE UP (ref 1.6–2.6)
MCHC RBC-ENTMCNC: 35.5 GM/DL — SIGNIFICANT CHANGE UP (ref 32–36)
MCHC RBC-ENTMCNC: 39.9 PG — HIGH (ref 27–34)
MCV RBC AUTO: 112 FL — HIGH (ref 80–100)
MONOCYTES # BLD AUTO: 0.5 K/UL — SIGNIFICANT CHANGE UP (ref 0–0.9)
MONOCYTES NFR BLD AUTO: 6 % — SIGNIFICANT CHANGE UP (ref 2–14)
NEUTROPHILS # BLD AUTO: 4.8 K/UL — SIGNIFICANT CHANGE UP (ref 1.8–7.4)
NEUTROPHILS NFR BLD AUTO: 72 % — SIGNIFICANT CHANGE UP (ref 43–77)
PHOSPHATE SERPL-MCNC: 3.7 MG/DL — SIGNIFICANT CHANGE UP (ref 2.5–4.5)
PLATELET # BLD AUTO: 339 K/UL — SIGNIFICANT CHANGE UP (ref 150–400)
POTASSIUM SERPL-MCNC: 4.6 MMOL/L — SIGNIFICANT CHANGE UP (ref 3.5–5.3)
POTASSIUM SERPL-SCNC: 4.6 MMOL/L — SIGNIFICANT CHANGE UP (ref 3.5–5.3)
PROTHROM AB SERPL-ACNC: 23.4 SEC — HIGH (ref 9.8–12.7)
RBC # BLD: 3 M/UL — LOW (ref 3.8–5.2)
RBC # FLD: 12.6 % — SIGNIFICANT CHANGE UP (ref 10.3–14.5)
SODIUM SERPL-SCNC: 140 MMOL/L — SIGNIFICANT CHANGE UP (ref 135–145)
WBC # BLD: 6.5 K/UL — SIGNIFICANT CHANGE UP (ref 3.8–10.5)
WBC # FLD AUTO: 6.5 K/UL — SIGNIFICANT CHANGE UP (ref 3.8–10.5)

## 2017-04-13 PROCEDURE — 83735 ASSAY OF MAGNESIUM: CPT

## 2017-04-13 PROCEDURE — 87086 URINE CULTURE/COLONY COUNT: CPT

## 2017-04-13 PROCEDURE — 85014 HEMATOCRIT: CPT

## 2017-04-13 PROCEDURE — 84439 ASSAY OF FREE THYROXINE: CPT

## 2017-04-13 PROCEDURE — 82550 ASSAY OF CK (CPK): CPT

## 2017-04-13 PROCEDURE — 84295 ASSAY OF SERUM SODIUM: CPT

## 2017-04-13 PROCEDURE — 84132 ASSAY OF SERUM POTASSIUM: CPT

## 2017-04-13 PROCEDURE — 80053 COMPREHEN METABOLIC PANEL: CPT

## 2017-04-13 PROCEDURE — 87581 M.PNEUMON DNA AMP PROBE: CPT

## 2017-04-13 PROCEDURE — 83970 ASSAY OF PARATHORMONE: CPT

## 2017-04-13 PROCEDURE — 84100 ASSAY OF PHOSPHORUS: CPT

## 2017-04-13 PROCEDURE — 87040 BLOOD CULTURE FOR BACTERIA: CPT

## 2017-04-13 PROCEDURE — 82306 VITAMIN D 25 HYDROXY: CPT

## 2017-04-13 PROCEDURE — 82024 ASSAY OF ACTH: CPT

## 2017-04-13 PROCEDURE — 82553 CREATINE MB FRACTION: CPT

## 2017-04-13 PROCEDURE — 84443 ASSAY THYROID STIM HORMONE: CPT

## 2017-04-13 PROCEDURE — 82310 ASSAY OF CALCIUM: CPT

## 2017-04-13 PROCEDURE — 82947 ASSAY GLUCOSE BLOOD QUANT: CPT

## 2017-04-13 PROCEDURE — 93005 ELECTROCARDIOGRAM TRACING: CPT

## 2017-04-13 PROCEDURE — 87486 CHLMYD PNEUM DNA AMP PROBE: CPT

## 2017-04-13 PROCEDURE — 96374 THER/PROPH/DIAG INJ IV PUSH: CPT

## 2017-04-13 PROCEDURE — 87798 DETECT AGENT NOS DNA AMP: CPT

## 2017-04-13 PROCEDURE — 99285 EMERGENCY DEPT VISIT HI MDM: CPT | Mod: 25

## 2017-04-13 PROCEDURE — 97116 GAIT TRAINING THERAPY: CPT

## 2017-04-13 PROCEDURE — 96375 TX/PRO/DX INJ NEW DRUG ADDON: CPT

## 2017-04-13 PROCEDURE — 85730 THROMBOPLASTIN TIME PARTIAL: CPT

## 2017-04-13 PROCEDURE — 97161 PT EVAL LOW COMPLEX 20 MIN: CPT

## 2017-04-13 PROCEDURE — 85027 COMPLETE CBC AUTOMATED: CPT

## 2017-04-13 PROCEDURE — 83690 ASSAY OF LIPASE: CPT

## 2017-04-13 PROCEDURE — 82533 TOTAL CORTISOL: CPT

## 2017-04-13 PROCEDURE — 84484 ASSAY OF TROPONIN QUANT: CPT

## 2017-04-13 PROCEDURE — 83036 HEMOGLOBIN GLYCOSYLATED A1C: CPT

## 2017-04-13 PROCEDURE — 82435 ASSAY OF BLOOD CHLORIDE: CPT

## 2017-04-13 PROCEDURE — 70450 CT HEAD/BRAIN W/O DYE: CPT

## 2017-04-13 PROCEDURE — 80048 BASIC METABOLIC PNL TOTAL CA: CPT

## 2017-04-13 PROCEDURE — 83880 ASSAY OF NATRIURETIC PEPTIDE: CPT

## 2017-04-13 PROCEDURE — 83605 ASSAY OF LACTIC ACID: CPT

## 2017-04-13 PROCEDURE — 81001 URINALYSIS AUTO W/SCOPE: CPT

## 2017-04-13 PROCEDURE — 97530 THERAPEUTIC ACTIVITIES: CPT

## 2017-04-13 PROCEDURE — 71045 X-RAY EXAM CHEST 1 VIEW: CPT

## 2017-04-13 PROCEDURE — 99239 HOSP IP/OBS DSCHRG MGMT >30: CPT

## 2017-04-13 PROCEDURE — 82330 ASSAY OF CALCIUM: CPT

## 2017-04-13 PROCEDURE — 82803 BLOOD GASES ANY COMBINATION: CPT

## 2017-04-13 PROCEDURE — 87633 RESP VIRUS 12-25 TARGETS: CPT

## 2017-04-13 PROCEDURE — 82009 KETONE BODYS QUAL: CPT

## 2017-04-13 PROCEDURE — 85610 PROTHROMBIN TIME: CPT

## 2017-04-13 RX ORDER — WARFARIN SODIUM 2.5 MG/1
2 TABLET ORAL ONCE
Qty: 0 | Refills: 0 | Status: DISCONTINUED | OUTPATIENT
Start: 2017-04-13 | End: 2017-04-13

## 2017-04-13 RX ORDER — DOCUSATE SODIUM 100 MG
1 CAPSULE ORAL
Qty: 0 | Refills: 0 | COMMUNITY
Start: 2017-04-13

## 2017-04-13 RX ORDER — SENNA PLUS 8.6 MG/1
1 TABLET ORAL
Qty: 0 | Refills: 0 | COMMUNITY
Start: 2017-04-13

## 2017-04-13 RX ADMIN — HYDROXYUREA 500 MILLIGRAM(S): 500 CAPSULE ORAL at 11:21

## 2017-04-13 RX ADMIN — CEFTRIAXONE 100 GRAM(S): 500 INJECTION, POWDER, FOR SOLUTION INTRAMUSCULAR; INTRAVENOUS at 09:21

## 2017-04-13 RX ADMIN — AMLODIPINE BESYLATE 10 MILLIGRAM(S): 2.5 TABLET ORAL at 05:22

## 2017-04-13 RX ADMIN — Medication 2000 UNIT(S): at 09:20

## 2017-04-13 RX ADMIN — LOSARTAN POTASSIUM 50 MILLIGRAM(S): 100 TABLET, FILM COATED ORAL at 05:22

## 2017-04-13 RX ADMIN — Medication 1: at 13:46

## 2017-04-13 RX ADMIN — Medication 150 MICROGRAM(S): at 05:22

## 2017-04-14 ENCOUNTER — APPOINTMENT (OUTPATIENT)
Dept: INTERNAL MEDICINE | Facility: CLINIC | Age: 82
End: 2017-04-14

## 2017-04-14 LAB
CULTURE RESULTS: SIGNIFICANT CHANGE UP
SPECIMEN SOURCE: SIGNIFICANT CHANGE UP

## 2017-04-17 LAB
CULTURE RESULTS: SIGNIFICANT CHANGE UP
CULTURE RESULTS: SIGNIFICANT CHANGE UP
SPECIMEN SOURCE: SIGNIFICANT CHANGE UP
SPECIMEN SOURCE: SIGNIFICANT CHANGE UP

## 2017-05-11 ENCOUNTER — APPOINTMENT (OUTPATIENT)
Dept: INTERNAL MEDICINE | Facility: CLINIC | Age: 82
End: 2017-05-11

## 2017-05-11 ENCOUNTER — OUTPATIENT (OUTPATIENT)
Dept: OUTPATIENT SERVICES | Facility: HOSPITAL | Age: 82
LOS: 1 days | Discharge: HOME | End: 2017-05-11

## 2017-05-11 DIAGNOSIS — C92.10 CHRONIC MYELOID LEUKEMIA, BCR/ABL-POSITIVE, NOT HAVING ACHIEVED REMISSION: ICD-10-CM

## 2017-05-12 ENCOUNTER — APPOINTMENT (OUTPATIENT)
Dept: INTERNAL MEDICINE | Facility: CLINIC | Age: 82
End: 2017-05-12

## 2017-05-15 ENCOUNTER — APPOINTMENT (OUTPATIENT)
Dept: HEMATOLOGY ONCOLOGY | Facility: CLINIC | Age: 82
End: 2017-05-15

## 2017-05-17 RX ORDER — WALKER
EACH MISCELLANEOUS
Qty: 1 | Refills: 0 | Status: ACTIVE | OUTPATIENT
Start: 2017-05-17

## 2017-05-19 ENCOUNTER — OUTPATIENT (OUTPATIENT)
Dept: OUTPATIENT SERVICES | Facility: HOSPITAL | Age: 82
LOS: 1 days | End: 2017-05-19
Payer: MEDICAID

## 2017-05-19 ENCOUNTER — APPOINTMENT (OUTPATIENT)
Dept: INTERNAL MEDICINE | Facility: CLINIC | Age: 82
End: 2017-05-19

## 2017-05-19 ENCOUNTER — RESULT CHARGE (OUTPATIENT)
Age: 82
End: 2017-05-19

## 2017-05-19 VITALS
SYSTOLIC BLOOD PRESSURE: 130 MMHG | BODY MASS INDEX: 24.39 KG/M2 | DIASTOLIC BLOOD PRESSURE: 84 MMHG | HEIGHT: 59 IN | HEART RATE: 100 BPM | OXYGEN SATURATION: 96 % | WEIGHT: 121 LBS

## 2017-05-19 DIAGNOSIS — E11.9 TYPE 2 DIABETES MELLITUS WITHOUT COMPLICATIONS: ICD-10-CM

## 2017-05-19 DIAGNOSIS — I10 ESSENTIAL (PRIMARY) HYPERTENSION: ICD-10-CM

## 2017-05-19 DIAGNOSIS — E03.9 HYPOTHYROIDISM, UNSPECIFIED: ICD-10-CM

## 2017-05-19 DIAGNOSIS — I82.5Z9 CHRONIC EMBOLISM AND THROMBOSIS OF UNSPECIFIED DEEP VEINS OF UNSPECIFIED DISTAL LOWER EXTREMITY: ICD-10-CM

## 2017-05-19 DIAGNOSIS — E78.00 PURE HYPERCHOLESTEROLEMIA, UNSPECIFIED: ICD-10-CM

## 2017-05-19 LAB
INR PPP: 3.1 RATIO
POCT-PROTHROMBIN TIME: 37.5 SECS
QUALITY CONTROL: YES

## 2017-05-19 PROCEDURE — G0463: CPT

## 2017-05-19 PROCEDURE — 85610 PROTHROMBIN TIME: CPT

## 2017-05-25 ENCOUNTER — RESULT CHARGE (OUTPATIENT)
Age: 82
End: 2017-05-25

## 2017-05-26 ENCOUNTER — OUTPATIENT (OUTPATIENT)
Dept: OUTPATIENT SERVICES | Facility: HOSPITAL | Age: 82
LOS: 1 days | End: 2017-05-26
Payer: MEDICAID

## 2017-05-26 ENCOUNTER — APPOINTMENT (OUTPATIENT)
Dept: INTERNAL MEDICINE | Facility: CLINIC | Age: 82
End: 2017-05-26

## 2017-05-26 DIAGNOSIS — Z79.01 LONG TERM (CURRENT) USE OF ANTICOAGULANTS: ICD-10-CM

## 2017-05-26 DIAGNOSIS — I10 ESSENTIAL (PRIMARY) HYPERTENSION: ICD-10-CM

## 2017-05-26 LAB
INR PPP: 2.7 RATIO
POCT-PROTHROMBIN TIME: 32.8 SECS
QUALITY CONTROL: YES

## 2017-05-26 PROCEDURE — 85610 PROTHROMBIN TIME: CPT

## 2017-06-09 ENCOUNTER — OUTPATIENT (OUTPATIENT)
Dept: OUTPATIENT SERVICES | Facility: HOSPITAL | Age: 82
LOS: 1 days | Discharge: HOME | End: 2017-06-09

## 2017-06-09 DIAGNOSIS — C92.10 CHRONIC MYELOID LEUKEMIA, BCR/ABL-POSITIVE, NOT HAVING ACHIEVED REMISSION: ICD-10-CM

## 2017-06-12 ENCOUNTER — APPOINTMENT (OUTPATIENT)
Dept: HEMATOLOGY ONCOLOGY | Facility: CLINIC | Age: 82
End: 2017-06-12

## 2017-06-15 ENCOUNTER — RX RENEWAL (OUTPATIENT)
Age: 82
End: 2017-06-15

## 2017-06-19 ENCOUNTER — APPOINTMENT (OUTPATIENT)
Dept: HEMATOLOGY ONCOLOGY | Facility: CLINIC | Age: 82
End: 2017-06-19

## 2017-06-19 ENCOUNTER — RESULT REVIEW (OUTPATIENT)
Age: 82
End: 2017-06-19

## 2017-06-19 VITALS
HEART RATE: 94 BPM | WEIGHT: 117.95 LBS | RESPIRATION RATE: 16 BRPM | BODY MASS INDEX: 23.82 KG/M2 | SYSTOLIC BLOOD PRESSURE: 101 MMHG | TEMPERATURE: 98.3 F | DIASTOLIC BLOOD PRESSURE: 65 MMHG | OXYGEN SATURATION: 98 %

## 2017-06-23 ENCOUNTER — APPOINTMENT (OUTPATIENT)
Dept: INTERNAL MEDICINE | Facility: CLINIC | Age: 82
End: 2017-06-23

## 2017-06-23 ENCOUNTER — RESULT CHARGE (OUTPATIENT)
Age: 82
End: 2017-06-23

## 2017-06-23 ENCOUNTER — OUTPATIENT (OUTPATIENT)
Dept: OUTPATIENT SERVICES | Facility: HOSPITAL | Age: 82
LOS: 1 days | End: 2017-06-23
Payer: MEDICAID

## 2017-06-23 DIAGNOSIS — E78.00 PURE HYPERCHOLESTEROLEMIA, UNSPECIFIED: ICD-10-CM

## 2017-06-23 DIAGNOSIS — E03.9 HYPOTHYROIDISM, UNSPECIFIED: ICD-10-CM

## 2017-06-23 DIAGNOSIS — E11.9 TYPE 2 DIABETES MELLITUS WITHOUT COMPLICATIONS: ICD-10-CM

## 2017-06-23 DIAGNOSIS — Z79.01 LONG TERM (CURRENT) USE OF ANTICOAGULANTS: ICD-10-CM

## 2017-06-23 DIAGNOSIS — I82.5Z9 CHRONIC EMBOLISM AND THROMBOSIS OF UNSPECIFIED DEEP VEINS OF UNSPECIFIED DISTAL LOWER EXTREMITY: ICD-10-CM

## 2017-06-23 DIAGNOSIS — I10 ESSENTIAL (PRIMARY) HYPERTENSION: ICD-10-CM

## 2017-06-23 LAB
INR PPP: 1.6 RATIO
POCT-PROTHROMBIN TIME: 19.4 SECS
QUALITY CONTROL: YES

## 2017-06-23 PROCEDURE — 85610 PROTHROMBIN TIME: CPT

## 2017-06-30 ENCOUNTER — APPOINTMENT (OUTPATIENT)
Dept: INTERNAL MEDICINE | Facility: CLINIC | Age: 82
End: 2017-06-30

## 2017-06-30 ENCOUNTER — RESULT CHARGE (OUTPATIENT)
Age: 82
End: 2017-06-30

## 2017-06-30 ENCOUNTER — OUTPATIENT (OUTPATIENT)
Dept: OUTPATIENT SERVICES | Facility: HOSPITAL | Age: 82
LOS: 1 days | End: 2017-06-30
Payer: MEDICAID

## 2017-06-30 DIAGNOSIS — Z79.01 LONG TERM (CURRENT) USE OF ANTICOAGULANTS: ICD-10-CM

## 2017-06-30 DIAGNOSIS — I82.5Z9 CHRONIC EMBOLISM AND THROMBOSIS OF UNSPECIFIED DEEP VEINS OF UNSPECIFIED DISTAL LOWER EXTREMITY: ICD-10-CM

## 2017-06-30 DIAGNOSIS — I10 ESSENTIAL (PRIMARY) HYPERTENSION: ICD-10-CM

## 2017-06-30 DIAGNOSIS — E11.9 TYPE 2 DIABETES MELLITUS WITHOUT COMPLICATIONS: ICD-10-CM

## 2017-06-30 DIAGNOSIS — E03.9 HYPOTHYROIDISM, UNSPECIFIED: ICD-10-CM

## 2017-06-30 DIAGNOSIS — E78.00 PURE HYPERCHOLESTEROLEMIA, UNSPECIFIED: ICD-10-CM

## 2017-06-30 LAB
INR PPP: 1.8 RATIO
POCT-PROTHROMBIN TIME: 21.8 SECS
QUALITY CONTROL: YES

## 2017-06-30 PROCEDURE — 85610 PROTHROMBIN TIME: CPT

## 2017-07-07 ENCOUNTER — APPOINTMENT (OUTPATIENT)
Dept: INTERNAL MEDICINE | Facility: CLINIC | Age: 82
End: 2017-07-07

## 2017-07-07 ENCOUNTER — OUTPATIENT (OUTPATIENT)
Dept: OUTPATIENT SERVICES | Facility: HOSPITAL | Age: 82
LOS: 1 days | End: 2017-07-07
Payer: MEDICAID

## 2017-07-07 ENCOUNTER — RESULT CHARGE (OUTPATIENT)
Age: 82
End: 2017-07-07

## 2017-07-07 DIAGNOSIS — E78.00 PURE HYPERCHOLESTEROLEMIA, UNSPECIFIED: ICD-10-CM

## 2017-07-07 DIAGNOSIS — I82.5Z9 CHRONIC EMBOLISM AND THROMBOSIS OF UNSPECIFIED DEEP VEINS OF UNSPECIFIED DISTAL LOWER EXTREMITY: ICD-10-CM

## 2017-07-07 DIAGNOSIS — Z79.01 LONG TERM (CURRENT) USE OF ANTICOAGULANTS: ICD-10-CM

## 2017-07-07 DIAGNOSIS — E11.9 TYPE 2 DIABETES MELLITUS WITHOUT COMPLICATIONS: ICD-10-CM

## 2017-07-07 DIAGNOSIS — I10 ESSENTIAL (PRIMARY) HYPERTENSION: ICD-10-CM

## 2017-07-07 DIAGNOSIS — E03.9 HYPOTHYROIDISM, UNSPECIFIED: ICD-10-CM

## 2017-07-07 LAB
INR PPP: 2.6 RATIO
POCT-PROTHROMBIN TIME: 31.7 SECS
QUALITY CONTROL: YES

## 2017-07-07 PROCEDURE — 85610 PROTHROMBIN TIME: CPT

## 2017-07-21 ENCOUNTER — APPOINTMENT (OUTPATIENT)
Dept: INTERNAL MEDICINE | Facility: CLINIC | Age: 82
End: 2017-07-21

## 2017-07-21 ENCOUNTER — RESULT CHARGE (OUTPATIENT)
Age: 82
End: 2017-07-21

## 2017-07-21 LAB
INR PPP: 3 RATIO
POCT-PROTHROMBIN TIME: 35.6 SECS
QUALITY CONTROL: YES

## 2017-07-24 ENCOUNTER — RX RENEWAL (OUTPATIENT)
Age: 82
End: 2017-07-24

## 2017-07-31 ENCOUNTER — RX RENEWAL (OUTPATIENT)
Age: 82
End: 2017-07-31

## 2017-08-04 ENCOUNTER — RESULT CHARGE (OUTPATIENT)
Age: 82
End: 2017-08-04

## 2017-08-04 ENCOUNTER — APPOINTMENT (OUTPATIENT)
Dept: INTERNAL MEDICINE | Facility: CLINIC | Age: 82
End: 2017-08-04

## 2017-08-04 ENCOUNTER — OUTPATIENT (OUTPATIENT)
Dept: OUTPATIENT SERVICES | Facility: HOSPITAL | Age: 82
LOS: 1 days | End: 2017-08-04
Payer: MEDICAID

## 2017-08-04 DIAGNOSIS — E11.9 TYPE 2 DIABETES MELLITUS WITHOUT COMPLICATIONS: ICD-10-CM

## 2017-08-04 DIAGNOSIS — I82.5Z9 CHRONIC EMBOLISM AND THROMBOSIS OF UNSPECIFIED DEEP VEINS OF UNSPECIFIED DISTAL LOWER EXTREMITY: ICD-10-CM

## 2017-08-04 DIAGNOSIS — I10 ESSENTIAL (PRIMARY) HYPERTENSION: ICD-10-CM

## 2017-08-04 DIAGNOSIS — E78.00 PURE HYPERCHOLESTEROLEMIA, UNSPECIFIED: ICD-10-CM

## 2017-08-04 DIAGNOSIS — E03.9 HYPOTHYROIDISM, UNSPECIFIED: ICD-10-CM

## 2017-08-04 DIAGNOSIS — Z79.01 LONG TERM (CURRENT) USE OF ANTICOAGULANTS: ICD-10-CM

## 2017-08-04 LAB
INR PPP: 2.5 RATIO
POCT-PROTHROMBIN TIME: 30.5 SECS
QUALITY CONTROL: YES

## 2017-08-04 PROCEDURE — 85610 PROTHROMBIN TIME: CPT

## 2017-08-14 ENCOUNTER — APPOINTMENT (OUTPATIENT)
Dept: INTERNAL MEDICINE | Facility: CLINIC | Age: 82
End: 2017-08-14

## 2017-08-18 ENCOUNTER — APPOINTMENT (OUTPATIENT)
Dept: INTERNAL MEDICINE | Facility: CLINIC | Age: 82
End: 2017-08-18

## 2017-08-18 ENCOUNTER — RESULT CHARGE (OUTPATIENT)
Age: 82
End: 2017-08-18

## 2017-08-18 ENCOUNTER — OUTPATIENT (OUTPATIENT)
Dept: OUTPATIENT SERVICES | Facility: HOSPITAL | Age: 82
LOS: 1 days | End: 2017-08-18
Payer: MEDICAID

## 2017-08-18 DIAGNOSIS — Z79.01 LONG TERM (CURRENT) USE OF ANTICOAGULANTS: ICD-10-CM

## 2017-08-18 DIAGNOSIS — E11.9 TYPE 2 DIABETES MELLITUS WITHOUT COMPLICATIONS: ICD-10-CM

## 2017-08-18 DIAGNOSIS — E03.9 HYPOTHYROIDISM, UNSPECIFIED: ICD-10-CM

## 2017-08-18 DIAGNOSIS — I82.5Z9 CHRONIC EMBOLISM AND THROMBOSIS OF UNSPECIFIED DEEP VEINS OF UNSPECIFIED DISTAL LOWER EXTREMITY: ICD-10-CM

## 2017-08-18 DIAGNOSIS — I10 ESSENTIAL (PRIMARY) HYPERTENSION: ICD-10-CM

## 2017-08-18 DIAGNOSIS — E78.00 PURE HYPERCHOLESTEROLEMIA, UNSPECIFIED: ICD-10-CM

## 2017-08-18 LAB
INR PPP: 2.2 RATIO
POCT-PROTHROMBIN TIME: 26.4 SECS
QUALITY CONTROL: YES

## 2017-08-18 PROCEDURE — 85610 PROTHROMBIN TIME: CPT

## 2017-08-21 ENCOUNTER — RX RENEWAL (OUTPATIENT)
Age: 82
End: 2017-08-21

## 2017-09-01 PROCEDURE — G9001: CPT

## 2017-09-08 ENCOUNTER — RESULT CHARGE (OUTPATIENT)
Age: 82
End: 2017-09-08

## 2017-09-08 ENCOUNTER — APPOINTMENT (OUTPATIENT)
Dept: INTERNAL MEDICINE | Facility: CLINIC | Age: 82
End: 2017-09-08
Payer: MEDICAID

## 2017-09-08 LAB
INR PPP: 2.5 RATIO
POCT-PROTHROMBIN TIME: 30.5 SECS
QUALITY CONTROL: YES

## 2017-09-08 PROCEDURE — ZZZZZ: CPT

## 2017-09-14 ENCOUNTER — RECORD ABSTRACTING (OUTPATIENT)
Age: 82
End: 2017-09-14

## 2017-09-14 ENCOUNTER — OUTPATIENT (OUTPATIENT)
Dept: OUTPATIENT SERVICES | Facility: HOSPITAL | Age: 82
LOS: 1 days | Discharge: HOME | End: 2017-09-14

## 2017-09-14 DIAGNOSIS — C92.10 CHRONIC MYELOID LEUKEMIA, BCR/ABL-POSITIVE, NOT HAVING ACHIEVED REMISSION: ICD-10-CM

## 2017-09-17 ENCOUNTER — TRANSCRIPTION ENCOUNTER (OUTPATIENT)
Age: 82
End: 2017-09-17

## 2017-09-18 ENCOUNTER — RESULT REVIEW (OUTPATIENT)
Age: 82
End: 2017-09-18

## 2017-09-18 ENCOUNTER — APPOINTMENT (OUTPATIENT)
Dept: HEMATOLOGY ONCOLOGY | Facility: CLINIC | Age: 82
End: 2017-09-18

## 2017-09-18 VITALS
RESPIRATION RATE: 16 BRPM | BODY MASS INDEX: 23.6 KG/M2 | SYSTOLIC BLOOD PRESSURE: 131 MMHG | DIASTOLIC BLOOD PRESSURE: 80 MMHG | TEMPERATURE: 98.9 F | OXYGEN SATURATION: 96 % | WEIGHT: 116.84 LBS | HEART RATE: 87 BPM

## 2017-09-18 LAB
ALBUMIN SERPL ELPH-MCNC: 4.2 G/DL — SIGNIFICANT CHANGE UP (ref 3.3–5)
ALP SERPL-CCNC: 52 U/L — SIGNIFICANT CHANGE UP (ref 40–120)
ALT FLD-CCNC: 12 U/L — SIGNIFICANT CHANGE UP (ref 10–45)
ANION GAP SERPL CALC-SCNC: 14 MMOL/L — SIGNIFICANT CHANGE UP (ref 5–17)
AST SERPL-CCNC: 19 U/L — SIGNIFICANT CHANGE UP (ref 10–40)
BILIRUB SERPL-MCNC: 0.4 MG/DL — SIGNIFICANT CHANGE UP (ref 0.2–1.2)
BUN SERPL-MCNC: 21 MG/DL — SIGNIFICANT CHANGE UP (ref 7–23)
CALCIUM SERPL-MCNC: 9.7 MG/DL — SIGNIFICANT CHANGE UP (ref 8.4–10.5)
CHLORIDE SERPL-SCNC: 101 MMOL/L — SIGNIFICANT CHANGE UP (ref 96–108)
CO2 SERPL-SCNC: 23 MMOL/L — SIGNIFICANT CHANGE UP (ref 22–31)
CREAT SERPL-MCNC: 0.89 MG/DL — SIGNIFICANT CHANGE UP (ref 0.5–1.3)
GLUCOSE SERPL-MCNC: 139 MG/DL — HIGH (ref 70–99)
HCT VFR BLD CALC: 37 % — SIGNIFICANT CHANGE UP (ref 34.5–45)
HGB BLD-MCNC: 13 G/DL — SIGNIFICANT CHANGE UP (ref 11.5–15.5)
MCHC RBC-ENTMCNC: 35 G/DL — SIGNIFICANT CHANGE UP (ref 32–36)
MCHC RBC-ENTMCNC: 38.8 PG — HIGH (ref 27–34)
MCV RBC AUTO: 111 FL — HIGH (ref 80–100)
PLATELET # BLD AUTO: 340 K/UL — SIGNIFICANT CHANGE UP (ref 150–400)
POTASSIUM SERPL-MCNC: 5.3 MMOL/L — SIGNIFICANT CHANGE UP (ref 3.5–5.3)
POTASSIUM SERPL-SCNC: 5.3 MMOL/L — SIGNIFICANT CHANGE UP (ref 3.5–5.3)
PROT SERPL-MCNC: 7.4 G/DL — SIGNIFICANT CHANGE UP (ref 6–8.3)
RBC # BLD: 3.34 M/UL — LOW (ref 3.8–5.2)
RBC # FLD: 12.3 % — SIGNIFICANT CHANGE UP (ref 10.3–14.5)
SODIUM SERPL-SCNC: 138 MMOL/L — SIGNIFICANT CHANGE UP (ref 135–145)
WBC # BLD: 5.8 K/UL — SIGNIFICANT CHANGE UP (ref 3.8–10.5)
WBC # FLD AUTO: 5.8 K/UL — SIGNIFICANT CHANGE UP (ref 3.8–10.5)

## 2017-09-19 DIAGNOSIS — D47.3 ESSENTIAL (HEMORRHAGIC) THROMBOCYTHEMIA: ICD-10-CM

## 2017-09-22 ENCOUNTER — OUTPATIENT (OUTPATIENT)
Dept: OUTPATIENT SERVICES | Facility: HOSPITAL | Age: 82
LOS: 1 days | End: 2017-09-22
Payer: MEDICAID

## 2017-09-22 ENCOUNTER — APPOINTMENT (OUTPATIENT)
Dept: INTERNAL MEDICINE | Facility: CLINIC | Age: 82
End: 2017-09-22
Payer: MEDICAID

## 2017-09-22 ENCOUNTER — RESULT CHARGE (OUTPATIENT)
Age: 82
End: 2017-09-22

## 2017-09-22 VITALS
DIASTOLIC BLOOD PRESSURE: 84 MMHG | WEIGHT: 113 LBS | SYSTOLIC BLOOD PRESSURE: 132 MMHG | HEIGHT: 59 IN | BODY MASS INDEX: 22.78 KG/M2

## 2017-09-22 DIAGNOSIS — I10 ESSENTIAL (PRIMARY) HYPERTENSION: ICD-10-CM

## 2017-09-22 DIAGNOSIS — E78.00 PURE HYPERCHOLESTEROLEMIA, UNSPECIFIED: ICD-10-CM

## 2017-09-22 LAB
HBA1C MFR BLD HPLC: 6.6 %
INR PPP: 1.89 RATIO
POCT-PROTHROMBIN TIME: 22 SECS
QUALITY CONTROL: YES

## 2017-09-22 PROCEDURE — 99213 OFFICE O/P EST LOW 20 MIN: CPT | Mod: GE

## 2017-09-22 PROCEDURE — G0463: CPT

## 2017-09-22 PROCEDURE — 85610 PROTHROMBIN TIME: CPT

## 2017-09-29 DIAGNOSIS — E11.9 TYPE 2 DIABETES MELLITUS WITHOUT COMPLICATIONS: ICD-10-CM

## 2017-09-29 DIAGNOSIS — E78.00 PURE HYPERCHOLESTEROLEMIA, UNSPECIFIED: ICD-10-CM

## 2017-09-29 DIAGNOSIS — M81.0 AGE-RELATED OSTEOPOROSIS WITHOUT CURRENT PATHOLOGICAL FRACTURE: ICD-10-CM

## 2017-09-29 DIAGNOSIS — D45 POLYCYTHEMIA VERA: ICD-10-CM

## 2017-09-29 DIAGNOSIS — I82.5Z9 CHRONIC EMBOLISM AND THROMBOSIS OF UNSPECIFIED DEEP VEINS OF UNSPECIFIED DISTAL LOWER EXTREMITY: ICD-10-CM

## 2017-10-02 ENCOUNTER — TRANSCRIPTION ENCOUNTER (OUTPATIENT)
Age: 82
End: 2017-10-02

## 2017-10-09 ENCOUNTER — RESULT CHARGE (OUTPATIENT)
Age: 82
End: 2017-10-09

## 2017-10-09 ENCOUNTER — APPOINTMENT (OUTPATIENT)
Dept: INTERNAL MEDICINE | Facility: CLINIC | Age: 82
End: 2017-10-09

## 2017-10-09 ENCOUNTER — OUTPATIENT (OUTPATIENT)
Dept: OUTPATIENT SERVICES | Facility: HOSPITAL | Age: 82
LOS: 1 days | End: 2017-10-09
Payer: MEDICARE

## 2017-10-09 DIAGNOSIS — Z79.01 LONG TERM (CURRENT) USE OF ANTICOAGULANTS: ICD-10-CM

## 2017-10-09 DIAGNOSIS — E78.00 PURE HYPERCHOLESTEROLEMIA, UNSPECIFIED: ICD-10-CM

## 2017-10-09 DIAGNOSIS — I10 ESSENTIAL (PRIMARY) HYPERTENSION: ICD-10-CM

## 2017-10-09 DIAGNOSIS — E11.9 TYPE 2 DIABETES MELLITUS WITHOUT COMPLICATIONS: ICD-10-CM

## 2017-10-09 DIAGNOSIS — M81.0 AGE-RELATED OSTEOPOROSIS WITHOUT CURRENT PATHOLOGICAL FRACTURE: ICD-10-CM

## 2017-10-09 DIAGNOSIS — D45 POLYCYTHEMIA VERA: ICD-10-CM

## 2017-10-09 DIAGNOSIS — I82.5Z9 CHRONIC EMBOLISM AND THROMBOSIS OF UNSPECIFIED DEEP VEINS OF UNSPECIFIED DISTAL LOWER EXTREMITY: ICD-10-CM

## 2017-10-09 LAB
INR PPP: 3.3 RATIO
POCT-PROTHROMBIN TIME: 39.6 SECS
QUALITY CONTROL: YES

## 2017-10-09 PROCEDURE — 85610 PROTHROMBIN TIME: CPT

## 2017-10-19 ENCOUNTER — RESULT CHARGE (OUTPATIENT)
Age: 82
End: 2017-10-19

## 2017-10-19 ENCOUNTER — APPOINTMENT (OUTPATIENT)
Dept: INTERNAL MEDICINE | Facility: CLINIC | Age: 82
End: 2017-10-19

## 2017-10-19 LAB
INR PPP: 2.2 RATIO
POCT-PROTHROMBIN TIME: 26 SECS
QUALITY CONTROL: YES

## 2017-11-10 ENCOUNTER — APPOINTMENT (OUTPATIENT)
Dept: INTERNAL MEDICINE | Facility: CLINIC | Age: 82
End: 2017-11-10

## 2017-11-10 ENCOUNTER — RESULT CHARGE (OUTPATIENT)
Age: 82
End: 2017-11-10

## 2017-11-10 ENCOUNTER — OUTPATIENT (OUTPATIENT)
Dept: OUTPATIENT SERVICES | Facility: HOSPITAL | Age: 82
LOS: 1 days | End: 2017-11-10
Payer: MEDICAID

## 2017-11-10 DIAGNOSIS — E78.00 PURE HYPERCHOLESTEROLEMIA, UNSPECIFIED: ICD-10-CM

## 2017-11-10 DIAGNOSIS — D45 POLYCYTHEMIA VERA: ICD-10-CM

## 2017-11-10 DIAGNOSIS — I10 ESSENTIAL (PRIMARY) HYPERTENSION: ICD-10-CM

## 2017-11-10 DIAGNOSIS — E11.9 TYPE 2 DIABETES MELLITUS WITHOUT COMPLICATIONS: ICD-10-CM

## 2017-11-10 DIAGNOSIS — Z79.01 LONG TERM (CURRENT) USE OF ANTICOAGULANTS: ICD-10-CM

## 2017-11-10 DIAGNOSIS — I82.5Z9 CHRONIC EMBOLISM AND THROMBOSIS OF UNSPECIFIED DEEP VEINS OF UNSPECIFIED DISTAL LOWER EXTREMITY: ICD-10-CM

## 2017-11-10 DIAGNOSIS — M81.0 AGE-RELATED OSTEOPOROSIS WITHOUT CURRENT PATHOLOGICAL FRACTURE: ICD-10-CM

## 2017-11-10 LAB
INR PPP: 1.2 RATIO
POCT-PROTHROMBIN TIME: 14.4 SECS
QUALITY CONTROL: YES

## 2017-11-10 PROCEDURE — 85610 PROTHROMBIN TIME: CPT

## 2017-11-15 LAB
INR PPP: 2 RATIO
PT BLD: 22.9 SEC

## 2017-11-17 ENCOUNTER — RX RENEWAL (OUTPATIENT)
Age: 82
End: 2017-11-17

## 2017-12-01 ENCOUNTER — APPOINTMENT (OUTPATIENT)
Dept: INTERNAL MEDICINE | Facility: CLINIC | Age: 82
End: 2017-12-01

## 2017-12-12 ENCOUNTER — RX RENEWAL (OUTPATIENT)
Age: 82
End: 2017-12-12

## 2017-12-13 ENCOUNTER — RX RENEWAL (OUTPATIENT)
Age: 82
End: 2017-12-13

## 2017-12-15 ENCOUNTER — RECORD ABSTRACTING (OUTPATIENT)
Age: 82
End: 2017-12-15

## 2017-12-15 ENCOUNTER — OUTPATIENT (OUTPATIENT)
Dept: OUTPATIENT SERVICES | Facility: HOSPITAL | Age: 82
LOS: 1 days | Discharge: HOME | End: 2017-12-15

## 2017-12-15 DIAGNOSIS — C92.10 CHRONIC MYELOID LEUKEMIA, BCR/ABL-POSITIVE, NOT HAVING ACHIEVED REMISSION: ICD-10-CM

## 2017-12-20 ENCOUNTER — RECORD ABSTRACTING (OUTPATIENT)
Age: 82
End: 2017-12-20

## 2017-12-21 ENCOUNTER — APPOINTMENT (OUTPATIENT)
Dept: HEMATOLOGY ONCOLOGY | Facility: CLINIC | Age: 82
End: 2017-12-21
Payer: MEDICARE

## 2017-12-21 ENCOUNTER — RESULT REVIEW (OUTPATIENT)
Age: 82
End: 2017-12-21

## 2017-12-21 VITALS
RESPIRATION RATE: 16 BRPM | HEART RATE: 82 BPM | OXYGEN SATURATION: 99 % | DIASTOLIC BLOOD PRESSURE: 70 MMHG | TEMPERATURE: 98 F | SYSTOLIC BLOOD PRESSURE: 120 MMHG

## 2017-12-21 LAB
ALBUMIN SERPL ELPH-MCNC: 4.2 G/DL — SIGNIFICANT CHANGE UP (ref 3.3–5)
ALP SERPL-CCNC: 59 U/L — SIGNIFICANT CHANGE UP (ref 40–120)
ALT FLD-CCNC: 18 U/L — SIGNIFICANT CHANGE UP (ref 10–45)
ANION GAP SERPL CALC-SCNC: 14 MMOL/L — SIGNIFICANT CHANGE UP (ref 5–17)
AST SERPL-CCNC: 18 U/L — SIGNIFICANT CHANGE UP (ref 10–40)
BILIRUB SERPL-MCNC: 0.2 MG/DL — SIGNIFICANT CHANGE UP (ref 0.2–1.2)
BUN SERPL-MCNC: 20 MG/DL — SIGNIFICANT CHANGE UP (ref 7–23)
CALCIUM SERPL-MCNC: 9.8 MG/DL — SIGNIFICANT CHANGE UP (ref 8.4–10.5)
CHLORIDE SERPL-SCNC: 98 MMOL/L — SIGNIFICANT CHANGE UP (ref 96–108)
CO2 SERPL-SCNC: 30 MMOL/L — SIGNIFICANT CHANGE UP (ref 22–31)
CREAT SERPL-MCNC: 0.74 MG/DL — SIGNIFICANT CHANGE UP (ref 0.5–1.3)
GLUCOSE SERPL-MCNC: 135 MG/DL — HIGH (ref 70–99)
POTASSIUM SERPL-MCNC: 5 MMOL/L — SIGNIFICANT CHANGE UP (ref 3.5–5.3)
POTASSIUM SERPL-SCNC: 5 MMOL/L — SIGNIFICANT CHANGE UP (ref 3.5–5.3)
PROT SERPL-MCNC: 7.5 G/DL — SIGNIFICANT CHANGE UP (ref 6–8.3)
SODIUM SERPL-SCNC: 142 MMOL/L — SIGNIFICANT CHANGE UP (ref 135–145)

## 2017-12-21 PROCEDURE — 99214 OFFICE O/P EST MOD 30 MIN: CPT

## 2017-12-22 ENCOUNTER — RESULT CHARGE (OUTPATIENT)
Age: 82
End: 2017-12-22

## 2017-12-22 ENCOUNTER — APPOINTMENT (OUTPATIENT)
Dept: INTERNAL MEDICINE | Facility: CLINIC | Age: 82
End: 2017-12-22
Payer: MEDICARE

## 2017-12-22 ENCOUNTER — LABORATORY RESULT (OUTPATIENT)
Age: 82
End: 2017-12-22

## 2017-12-22 ENCOUNTER — OUTPATIENT (OUTPATIENT)
Dept: OUTPATIENT SERVICES | Facility: HOSPITAL | Age: 82
LOS: 1 days | End: 2017-12-22
Payer: MEDICARE

## 2017-12-22 VITALS
DIASTOLIC BLOOD PRESSURE: 60 MMHG | SYSTOLIC BLOOD PRESSURE: 122 MMHG | HEIGHT: 59 IN | WEIGHT: 111 LBS | BODY MASS INDEX: 22.38 KG/M2

## 2017-12-22 DIAGNOSIS — I10 ESSENTIAL (PRIMARY) HYPERTENSION: ICD-10-CM

## 2017-12-22 DIAGNOSIS — Z00.00 ENCOUNTER FOR GENERAL ADULT MEDICAL EXAMINATION W/OUT ABNORMAL FINDINGS: ICD-10-CM

## 2017-12-22 DIAGNOSIS — D45 POLYCYTHEMIA VERA: ICD-10-CM

## 2017-12-22 LAB
INR PPP: 1.4 RATIO
POCT-PROTHROMBIN TIME: 16.6 SECS
QUALITY CONTROL: YES

## 2017-12-22 PROCEDURE — 85610 PROTHROMBIN TIME: CPT

## 2017-12-22 PROCEDURE — G0463: CPT

## 2017-12-22 PROCEDURE — 99213 OFFICE O/P EST LOW 20 MIN: CPT | Mod: GE

## 2017-12-29 DIAGNOSIS — E03.9 HYPOTHYROIDISM, UNSPECIFIED: ICD-10-CM

## 2017-12-29 DIAGNOSIS — I82.5Z9 CHRONIC EMBOLISM AND THROMBOSIS OF UNSPECIFIED DEEP VEINS OF UNSPECIFIED DISTAL LOWER EXTREMITY: ICD-10-CM

## 2017-12-29 DIAGNOSIS — E11.9 TYPE 2 DIABETES MELLITUS WITHOUT COMPLICATIONS: ICD-10-CM

## 2018-01-09 LAB
INR PPP: 2.52 RATIO
PT BLD: 29 SEC

## 2018-01-10 LAB
T4 FREE SERPL-MCNC: 2.6 NG/DL
TSH SERPL-ACNC: 0.04 UIU/ML
TSH SERPL-ACNC: 0.18 UIU/ML

## 2018-01-16 LAB
INR PPP: 2.85 RATIO
PT BLD: 32.9 SEC

## 2018-01-24 LAB
INR PPP: 2.87 RATIO
PT BLD: 33.1 SEC

## 2018-01-25 ENCOUNTER — RECORD ABSTRACTING (OUTPATIENT)
Age: 83
End: 2018-01-25

## 2018-02-13 ENCOUNTER — MEDICATION RENEWAL (OUTPATIENT)
Age: 83
End: 2018-02-13

## 2018-02-13 ENCOUNTER — RECORD ABSTRACTING (OUTPATIENT)
Age: 83
End: 2018-02-13

## 2018-02-26 ENCOUNTER — RX RENEWAL (OUTPATIENT)
Age: 83
End: 2018-02-26

## 2018-03-13 LAB
INR PPP: 1.94 RATIO
PT BLD: 22.2 SEC

## 2018-03-15 LAB
INR PPP: 1.95 RATIO
PT BLD: 22.3 SEC

## 2018-03-19 ENCOUNTER — OUTPATIENT (OUTPATIENT)
Dept: OUTPATIENT SERVICES | Facility: HOSPITAL | Age: 83
LOS: 1 days | Discharge: HOME | End: 2018-03-19

## 2018-03-19 DIAGNOSIS — C92.10 CHRONIC MYELOID LEUKEMIA, BCR/ABL-POSITIVE, NOT HAVING ACHIEVED REMISSION: ICD-10-CM

## 2018-03-21 LAB
INR PPP: 3.46 RATIO
PT BLD: 40.1 SEC

## 2018-04-01 ENCOUNTER — OUTPATIENT (OUTPATIENT)
Dept: OUTPATIENT SERVICES | Facility: HOSPITAL | Age: 83
LOS: 1 days | End: 2018-04-01
Payer: MEDICAID

## 2018-04-01 PROCEDURE — G9001: CPT

## 2018-04-04 LAB
INR PPP: 1.7 RATIO
PT BLD: 19.4 SEC

## 2018-04-05 ENCOUNTER — MEDICATION RENEWAL (OUTPATIENT)
Age: 83
End: 2018-04-05

## 2018-04-10 ENCOUNTER — RX RENEWAL (OUTPATIENT)
Age: 83
End: 2018-04-10

## 2018-04-10 ENCOUNTER — INPATIENT (INPATIENT)
Facility: HOSPITAL | Age: 83
LOS: 9 days | Discharge: HOME | DRG: 74 | End: 2018-04-20
Attending: PSYCHIATRY & NEUROLOGY | Admitting: PSYCHIATRY & NEUROLOGY
Payer: MEDICARE

## 2018-04-10 VITALS
RESPIRATION RATE: 18 BRPM | SYSTOLIC BLOOD PRESSURE: 153 MMHG | DIASTOLIC BLOOD PRESSURE: 82 MMHG | OXYGEN SATURATION: 98 % | TEMPERATURE: 98 F | HEART RATE: 87 BPM

## 2018-04-10 DIAGNOSIS — R29.810 FACIAL WEAKNESS: ICD-10-CM

## 2018-04-10 DIAGNOSIS — R69 ILLNESS, UNSPECIFIED: ICD-10-CM

## 2018-04-10 LAB
ALBUMIN SERPL ELPH-MCNC: 4 G/DL — SIGNIFICANT CHANGE UP (ref 3.3–5)
ALP SERPL-CCNC: 64 U/L — SIGNIFICANT CHANGE UP (ref 40–120)
ALT FLD-CCNC: 25 U/L RC — SIGNIFICANT CHANGE UP (ref 10–45)
ANION GAP SERPL CALC-SCNC: 14 MMOL/L — SIGNIFICANT CHANGE UP (ref 5–17)
APTT BLD: 39.9 SEC — HIGH (ref 27.5–37.4)
AST SERPL-CCNC: 21 U/L — SIGNIFICANT CHANGE UP (ref 10–40)
B-OH-BUTYR SERPL-SCNC: 0.6 MMOL/L — HIGH
BASOPHILS # BLD AUTO: 0.1 K/UL — SIGNIFICANT CHANGE UP (ref 0–0.2)
BASOPHILS NFR BLD AUTO: 0.6 % — SIGNIFICANT CHANGE UP (ref 0–2)
BILIRUB SERPL-MCNC: 0.4 MG/DL — SIGNIFICANT CHANGE UP (ref 0.2–1.2)
BLD GP AB SCN SERPL QL: NEGATIVE — SIGNIFICANT CHANGE UP
BUN SERPL-MCNC: 22 MG/DL — SIGNIFICANT CHANGE UP (ref 7–23)
CALCIUM SERPL-MCNC: 9.5 MG/DL — SIGNIFICANT CHANGE UP (ref 8.4–10.5)
CHLORIDE SERPL-SCNC: 105 MMOL/L — SIGNIFICANT CHANGE UP (ref 96–108)
CO2 SERPL-SCNC: 19 MMOL/L — LOW (ref 22–31)
CREAT SERPL-MCNC: 0.91 MG/DL — SIGNIFICANT CHANGE UP (ref 0.5–1.3)
EOSINOPHIL # BLD AUTO: 0.1 K/UL — SIGNIFICANT CHANGE UP (ref 0–0.5)
EOSINOPHIL NFR BLD AUTO: 0.8 % — SIGNIFICANT CHANGE UP (ref 0–6)
GAS PNL BLDV: SIGNIFICANT CHANGE UP
GLUCOSE BLDC GLUCOMTR-MCNC: 157 MG/DL — HIGH (ref 70–99)
GLUCOSE SERPL-MCNC: 235 MG/DL — HIGH (ref 70–99)
HCT VFR BLD CALC: 38.6 % — SIGNIFICANT CHANGE UP (ref 34.5–45)
HGB BLD-MCNC: 12.6 G/DL — SIGNIFICANT CHANGE UP (ref 11.5–15.5)
INR BLD: 2.4 RATIO — HIGH (ref 0.88–1.16)
LYMPHOCYTES # BLD AUTO: 1.8 K/UL — SIGNIFICANT CHANGE UP (ref 1–3.3)
LYMPHOCYTES # BLD AUTO: 18.6 % — SIGNIFICANT CHANGE UP (ref 13–44)
MCHC RBC-ENTMCNC: 32.5 GM/DL — SIGNIFICANT CHANGE UP (ref 32–36)
MCHC RBC-ENTMCNC: 36.7 PG — HIGH (ref 27–34)
MCV RBC AUTO: 113 FL — HIGH (ref 80–100)
MONOCYTES # BLD AUTO: 0.6 K/UL — SIGNIFICANT CHANGE UP (ref 0–0.9)
MONOCYTES NFR BLD AUTO: 6.5 % — SIGNIFICANT CHANGE UP (ref 2–14)
NEUTROPHILS # BLD AUTO: 7 K/UL — SIGNIFICANT CHANGE UP (ref 1.8–7.4)
NEUTROPHILS NFR BLD AUTO: 73.5 % — SIGNIFICANT CHANGE UP (ref 43–77)
PLATELET # BLD AUTO: 324 K/UL — SIGNIFICANT CHANGE UP (ref 150–400)
POTASSIUM SERPL-MCNC: 4.3 MMOL/L — SIGNIFICANT CHANGE UP (ref 3.5–5.3)
POTASSIUM SERPL-SCNC: 4.3 MMOL/L — SIGNIFICANT CHANGE UP (ref 3.5–5.3)
PROT SERPL-MCNC: 8.1 G/DL — SIGNIFICANT CHANGE UP (ref 6–8.3)
PROTHROM AB SERPL-ACNC: 26.4 SEC — HIGH (ref 9.8–12.7)
RBC # BLD: 3.42 M/UL — LOW (ref 3.8–5.2)
RBC # FLD: 12.1 % — SIGNIFICANT CHANGE UP (ref 10.3–14.5)
RH IG SCN BLD-IMP: POSITIVE — SIGNIFICANT CHANGE UP
SODIUM SERPL-SCNC: 138 MMOL/L — SIGNIFICANT CHANGE UP (ref 135–145)
TROPONIN T SERPL-MCNC: <0.01 NG/ML — SIGNIFICANT CHANGE UP (ref 0–0.06)
TSH SERPL-MCNC: 0.27 UIU/ML — SIGNIFICANT CHANGE UP (ref 0.27–4.2)
WBC # BLD: 9.5 K/UL — SIGNIFICANT CHANGE UP (ref 3.8–10.5)
WBC # FLD AUTO: 9.5 K/UL — SIGNIFICANT CHANGE UP (ref 3.8–10.5)

## 2018-04-10 PROCEDURE — 70496 CT ANGIOGRAPHY HEAD: CPT | Mod: 26

## 2018-04-10 PROCEDURE — 99284 EMERGENCY DEPT VISIT MOD MDM: CPT | Mod: 25,GC

## 2018-04-10 PROCEDURE — 93010 ELECTROCARDIOGRAM REPORT: CPT

## 2018-04-10 PROCEDURE — 70450 CT HEAD/BRAIN W/O DYE: CPT | Mod: 26,59

## 2018-04-10 PROCEDURE — 70553 MRI BRAIN STEM W/O & W/DYE: CPT | Mod: 26

## 2018-04-10 PROCEDURE — 70498 CT ANGIOGRAPHY NECK: CPT | Mod: 26

## 2018-04-10 PROCEDURE — 74177 CT ABD & PELVIS W/CONTRAST: CPT | Mod: 26

## 2018-04-10 PROCEDURE — 71045 X-RAY EXAM CHEST 1 VIEW: CPT | Mod: 26

## 2018-04-10 RX ORDER — DEXTROSE 50 % IN WATER 50 %
1 SYRINGE (ML) INTRAVENOUS ONCE
Qty: 0 | Refills: 0 | Status: DISCONTINUED | OUTPATIENT
Start: 2018-04-10 | End: 2018-04-10

## 2018-04-10 RX ORDER — LEVOTHYROXINE SODIUM 125 MCG
125 TABLET ORAL DAILY
Qty: 0 | Refills: 0 | Status: DISCONTINUED | OUTPATIENT
Start: 2018-04-10 | End: 2018-04-17

## 2018-04-10 RX ORDER — DEXTROSE 50 % IN WATER 50 %
12.5 SYRINGE (ML) INTRAVENOUS ONCE
Qty: 0 | Refills: 0 | Status: DISCONTINUED | OUTPATIENT
Start: 2018-04-10 | End: 2018-04-10

## 2018-04-10 RX ORDER — DEXTROSE 50 % IN WATER 50 %
25 SYRINGE (ML) INTRAVENOUS ONCE
Qty: 0 | Refills: 0 | Status: DISCONTINUED | OUTPATIENT
Start: 2018-04-10 | End: 2018-04-10

## 2018-04-10 RX ORDER — INSULIN LISPRO 100/ML
VIAL (ML) SUBCUTANEOUS AT BEDTIME
Qty: 0 | Refills: 0 | Status: DISCONTINUED | OUTPATIENT
Start: 2018-04-10 | End: 2018-04-15

## 2018-04-10 RX ORDER — HYDROXYUREA 500 MG/1
500 CAPSULE ORAL DAILY
Qty: 0 | Refills: 0 | Status: DISCONTINUED | OUTPATIENT
Start: 2018-04-10 | End: 2018-04-20

## 2018-04-10 RX ORDER — SODIUM CHLORIDE 9 MG/ML
1000 INJECTION, SOLUTION INTRAVENOUS
Qty: 0 | Refills: 0 | Status: DISCONTINUED | OUTPATIENT
Start: 2018-04-10 | End: 2018-04-10

## 2018-04-10 RX ORDER — INSULIN LISPRO 100/ML
VIAL (ML) SUBCUTANEOUS
Qty: 0 | Refills: 0 | Status: DISCONTINUED | OUTPATIENT
Start: 2018-04-10 | End: 2018-04-15

## 2018-04-10 RX ORDER — LISINOPRIL 2.5 MG/1
40 TABLET ORAL DAILY
Qty: 0 | Refills: 0 | Status: DISCONTINUED | OUTPATIENT
Start: 2018-04-10 | End: 2018-04-20

## 2018-04-10 RX ORDER — SODIUM CHLORIDE 9 MG/ML
1000 INJECTION, SOLUTION INTRAVENOUS
Qty: 0 | Refills: 0 | Status: DISCONTINUED | OUTPATIENT
Start: 2018-04-10 | End: 2018-04-20

## 2018-04-10 RX ORDER — METRONIDAZOLE 500 MG
1 TABLET ORAL
Qty: 0 | Refills: 0 | COMMUNITY

## 2018-04-10 RX ORDER — DEXTROSE 50 % IN WATER 50 %
25 SYRINGE (ML) INTRAVENOUS ONCE
Qty: 0 | Refills: 0 | Status: DISCONTINUED | OUTPATIENT
Start: 2018-04-10 | End: 2018-04-20

## 2018-04-10 RX ORDER — ATORVASTATIN CALCIUM 80 MG/1
40 TABLET, FILM COATED ORAL AT BEDTIME
Qty: 0 | Refills: 0 | Status: DISCONTINUED | OUTPATIENT
Start: 2018-04-10 | End: 2018-04-20

## 2018-04-10 RX ORDER — WARFARIN SODIUM 2.5 MG/1
2.5 TABLET ORAL ONCE
Qty: 0 | Refills: 0 | Status: DISCONTINUED | OUTPATIENT
Start: 2018-04-10 | End: 2018-04-10

## 2018-04-10 RX ORDER — ACETAMINOPHEN 500 MG
1000 TABLET ORAL ONCE
Qty: 0 | Refills: 0 | Status: COMPLETED | OUTPATIENT
Start: 2018-04-10 | End: 2018-04-10

## 2018-04-10 RX ORDER — DEXTROSE 50 % IN WATER 50 %
12.5 SYRINGE (ML) INTRAVENOUS ONCE
Qty: 0 | Refills: 0 | Status: DISCONTINUED | OUTPATIENT
Start: 2018-04-10 | End: 2018-04-20

## 2018-04-10 RX ORDER — SODIUM CHLORIDE 9 MG/ML
1000 INJECTION INTRAMUSCULAR; INTRAVENOUS; SUBCUTANEOUS ONCE
Qty: 0 | Refills: 0 | Status: COMPLETED | OUTPATIENT
Start: 2018-04-10 | End: 2018-04-10

## 2018-04-10 RX ORDER — GLUCAGON INJECTION, SOLUTION 0.5 MG/.1ML
1 INJECTION, SOLUTION SUBCUTANEOUS ONCE
Qty: 0 | Refills: 0 | Status: DISCONTINUED | OUTPATIENT
Start: 2018-04-10 | End: 2018-04-10

## 2018-04-10 RX ORDER — LOSARTAN POTASSIUM 100 MG/1
1 TABLET, FILM COATED ORAL
Qty: 0 | Refills: 0 | COMMUNITY

## 2018-04-10 RX ORDER — INSULIN GLARGINE 100 [IU]/ML
5 INJECTION, SOLUTION SUBCUTANEOUS AT BEDTIME
Qty: 0 | Refills: 0 | Status: DISCONTINUED | OUTPATIENT
Start: 2018-04-10 | End: 2018-04-10

## 2018-04-10 RX ORDER — INSULIN LISPRO 100/ML
1 VIAL (ML) SUBCUTANEOUS
Qty: 0 | Refills: 0 | Status: DISCONTINUED | OUTPATIENT
Start: 2018-04-10 | End: 2018-04-10

## 2018-04-10 RX ORDER — GLUCAGON INJECTION, SOLUTION 0.5 MG/.1ML
1 INJECTION, SOLUTION SUBCUTANEOUS ONCE
Qty: 0 | Refills: 0 | Status: DISCONTINUED | OUTPATIENT
Start: 2018-04-10 | End: 2018-04-20

## 2018-04-10 RX ORDER — ONDANSETRON 8 MG/1
4 TABLET, FILM COATED ORAL ONCE
Qty: 0 | Refills: 0 | Status: COMPLETED | OUTPATIENT
Start: 2018-04-10 | End: 2018-04-10

## 2018-04-10 RX ORDER — CHOLECALCIFEROL (VITAMIN D3) 125 MCG
2000 CAPSULE ORAL
Qty: 0 | Refills: 0 | Status: DISCONTINUED | OUTPATIENT
Start: 2018-04-10 | End: 2018-04-20

## 2018-04-10 RX ORDER — LEVOTHYROXINE SODIUM 125 MCG
1 TABLET ORAL
Qty: 0 | Refills: 0 | COMMUNITY

## 2018-04-10 RX ORDER — DEXTROSE 50 % IN WATER 50 %
1 SYRINGE (ML) INTRAVENOUS ONCE
Qty: 0 | Refills: 0 | Status: DISCONTINUED | OUTPATIENT
Start: 2018-04-10 | End: 2018-04-20

## 2018-04-10 RX ORDER — FELODIPINE 5 MG/1
1 TABLET, FILM COATED, EXTENDED RELEASE ORAL
Qty: 0 | Refills: 0 | COMMUNITY

## 2018-04-10 RX ADMIN — SODIUM CHLORIDE 1000 MILLILITER(S): 9 INJECTION INTRAMUSCULAR; INTRAVENOUS; SUBCUTANEOUS at 09:42

## 2018-04-10 RX ADMIN — Medication 400 MILLIGRAM(S): at 17:12

## 2018-04-10 RX ADMIN — ONDANSETRON 4 MILLIGRAM(S): 8 TABLET, FILM COATED ORAL at 09:42

## 2018-04-10 RX ADMIN — Medication 1000 MILLIGRAM(S): at 17:43

## 2018-04-10 NOTE — H&P ADULT - NSHPLABSRESULTS_GEN_ALL_CORE
< from: CT Brain Stroke Protocol (04.10.18 @ 08:00) >    IMPRESSION:  No acute hemorrhage. Patchy hypoattenuation within the white matter,   likely representing chronic microvascular changes. Unchanged   calcification of the bilateral globus pallidus and dentate nuclei within   the cerebellum. These findings were discussed with Dr. Choi at 4/10/2018   8:21 AM by Dr. Chu with read back confirmation.       < end of copied text >    < from: CT Abdomen and Pelvis w/ IV Cont (04.10.18 @ 08:43) >      IMPRESSION: No acute pathology or evidence of bowel obstruction as   questioned.    < end of copied text > < from: CT Brain Stroke Protocol (04.10.18 @ 08:00) >    IMPRESSION:  No acute hemorrhage. Patchy hypoattenuation within the white matter,   likely representing chronic microvascular changes. Unchanged   calcification of the bilateral globus pallidus and dentate nuclei within   the cerebellum. These findings were discussed with Dr. Choi at 4/10/2018   8:21 AM by Dr. Chu with read back confirmation.       < end of copied text >    < from: CT Abdomen and Pelvis w/ IV Cont (04.10.18 @ 08:43) >      IMPRESSION: No acute pathology or evidence of bowel obstruction as   questioned.    < end of copied text >    < from: CT Angio Head w/ IV Cont (04.10.18 @ 08:44) >    CTA head: The arteries of the [place] of [place] are normal in caliber.   There is no aneurysm.    CTA neck: Suspicion of progressive right ICA stenosis which on prior   Doppler of 6/11/2014 was in the 50-69% range and now measures 87% by   NASCET criteria with dense atherosclerotic calcification seen. Left ICA   with a roughly 62% stenosis by NASCET criteria which appears to be a   progression compared with the patient's prior study as well. Recommend   correlation with ultrasound for further evaluation    < end of copied text >

## 2018-04-10 NOTE — ED PROVIDER NOTE - CONSTITUTIONAL, MLM
normal... Well appearing, well nourished, awake, alert, x2. Cooperative w/ exam. Able to identify objects and her daughter. Slurred speech.

## 2018-04-10 NOTE — ED PROVIDER NOTE - GASTROINTESTINAL, MLM
Abdomen soft, non-tender, no guarding. Abdomen distended, mild firmness, not peritonea. No abd tenderness.

## 2018-04-10 NOTE — ED PROVIDER NOTE - NEUROLOGICAL, MLM
Alert and oriented x 2. L sided loss of nasolabial fold w/ some slurred speech. Able to identify a pen and glasses. Not cooperative w/ visual field exam. Moving all extremities. Sensation appreciable.

## 2018-04-10 NOTE — H&P ADULT - ASSESSMENT
84 RH female PMH CVA, DVT on coumadin, Polycythemia vera Hypothyroid, SBO, HLD, DM, hx of stroke p/w left facial droop, N/V. PE lower motor left facial subtle weakness, very subtle dysmetria bilaterally L >R. CTh as above no acute pathology CTA with right ICA 87% stenosis left CA 62% stenosis  NIHSS 4, pre MRS 3will admit to r/o CVA (? left pontine)    Would get MRI brain without contrast to look at the extent and distribution of the stroke  pending above may consider getting carotid doppler to further eval carotids  continue with Coumadin for secondary stroke prevention at this time.   Atorvastatin 40, titrate the dose according to LDL.   TTE with bubble study and telemetry   DVT prophylaxis, Neurochecks  Permissive HTN up to 220/120 mmHg for first 24 hours after the symptom onset followed by gradual normotension.   HbA1C and LDL.   PT/OT/Speech and swallow/safety eval. 84 RH female PMH CVA, DVT on coumadin, Polycythemia vera Hypothyroid, SBO, HLD, DM, hx of stroke p/w left facial droop, N/V. PE lower motor left facial subtle weakness, very subtle dysmetria bilaterally L >R. CTh as above no acute pathology CTA with right ICA 87% stenosis left CA 62% stenosis  NIHSS 4, pre MRS 3will admit to r/o CVA (? left pontine)  NIHSS 4 preMRS 2    Would get MRI brain without contrast to look at the extent and distribution of the stroke  pending above may consider getting carotid doppler to further eval carotids  continue with Coumadin for secondary stroke prevention at this time.   Atorvastatin 40, titrate the dose according to LDL.   TTE with bubble study and telemetry   DVT prophylaxis, Neurochecks  Permissive HTN up to 220/120 mmHg for first 24 hours after the symptom onset followed by gradual normotension.   HbA1C and LDL.   PT/OT/Speech and swallow/safety eval. 84 RH female PMH CVA, DVT on coumadin, Polycythemia vera Hypothyroid, SBO, HLD, DM, hx of stroke p/w left facial droop, N/V. PE lower motor left facial subtle weakness, very subtle dysmetria bilaterally L >R. CTh as above no acute pathology CTA with right ICA 87% stenosis left CA 62% stenosis  NIHSS 4, pre MRS 3will admit to r/o CVA (? left pontine)  NIHSS 4 preMRS 2    Would get MRI brain without contrast to look at the extent and distribution of the stroke  pending above may consider getting carotid doppler to further eval carotids  Will hold coumadin tonight, if INR 4/11 is <2, will redose at that time  Atorvastatin 40, titrate the dose according to LDL.   TTE with bubble study and telemetry   DVT prophylaxis, Neurochecks  Permissive HTN up to 220/120 mmHg for first 24 hours after the symptom onset followed by gradual normotension.   HbA1C and LDL.   PT/OT/Speech and swallow/safety eval.

## 2018-04-10 NOTE — ED PROVIDER NOTE - OBJECTIVE STATEMENT
83 y/o female hx of DVT on coumadin, Hypothyroid, HLD, DM, hx of stroke p/w facial droop. Per daughter, went to bed at 10:30 pm, normal. Woke up this morning with a L sided facial droop so daughter called EMS. Got 5 of coumadin last night. Patient A+Ox2, able to name objects and identify her daughter. Obvious L facial droop w/ loss nasolabial fold. Finger stick 190. Code stroke called w/ neuro at the stretcher at 7:50 am.

## 2018-04-10 NOTE — H&P ADULT - NSHPPHYSICALEXAM_GEN_ALL_CORE
Neuro: AAOx3; knows age, month, obeys commands, no to very subtle dysarthria; calculations intact, fluent names, repeats     CN: PERRL, EOMI, VFF normal gaze preference, facial palsy including forehead and eye closure     Motor: no drift other than subtle LLE drift and diffuse weakness L > R    Sensory: Intact to LT and PP no extinction    Coordination: FTN grossly intact bilaterally with very subtle dysmetria bilaterally with L > R  reflexes: brisk out of proportion to age and hx of DM with bilaterally toes upgoing

## 2018-04-10 NOTE — ED PROVIDER NOTE - ATTENDING CONTRIBUTION TO CARE
Initially a code stroke based on pre-activation from EMS, pt with 10+hrs of facial droop and confusion.  While in ED, pt with increasing abdominal distension and vomiting.  BS 220s, no signs of DKA on initial blood work.  Exam revealed L facial droop, follows commands, abdomen soft and nontender initially, RRR no cardiac irregularity on initial exam.

## 2018-04-10 NOTE — ED PROVIDER NOTE - MEDICAL DECISION MAKING DETAILS
Frank Chu (Resident): 83 y/o hx of stroke, DVT on coumadin, hypothyroid, DM p.w facial droop. Last normal over 9 hours ago - not a TPA candidate cooperative w/ exam except visual fields, likely 2/2 to the comotion at her bedside - neuro at bedside, normal FS - ddx includes ICH vs occlusive stroke - also will consider cardiac causes, and infectious etiology - patient currently protecting her airway Frank Chu (Resident): 83 y/o hx of stroke, DVT on coumadin, hypothyroid, DM p.w facial droop. Last normal over 9 hours ago - not a TPA candidate cooperative w/ exam except visual fields, likely 2/2 to the comotion at her bedside - neuro at bedside, normal FS - ddx includes ICH vs occlusive stroke - also will consider cardiac causes, and infectious etiology - patient currently protecting her airway - also has hx of SBO and some abd distension w/ vomiting, will run off CTA head and neck to catch abdomen and r/o SBO

## 2018-04-10 NOTE — H&P ADULT - HISTORY OF PRESENT ILLNESS
84 RH female PMH CVA, DVT on coumadin, Polycythemia vera Hypothyroid, SBO, HLD, DM, hx of stroke p/w left facial droop, N/V. LKN last night 4/9 at 11 PM, prior to going to sleep. Upon wakening patient's daughter noted new left facial weakness. Per  daughter face presently does not look significantly different than baseline ( it appears different compared to BL pictures). Patient could not say if she has new weakness, difficulty with gait requiring assistance at baseline (recently at rehab). Denies numbness, visual changes. Denies abdominal pain, + bowel movement this am, Endorses N/V. Denies vertigo 84 RH [place] female presenting with left facial droop, vertigo, nausea, vomiting. Patient has PMH CVA (residual left sided weakness), DVT on coumadin, Polycythemia vera Hypothyroid, SBO, HLD, DM. LKN last night 4/9 at 11 PM, prior to going to sleep. Around 2am patient woke up and felt like the room was spinning. In the morning, daughter noted new left facial weakness. Per daughter, face presently does not look significantly different than baseline (it appears different compared to recent pictures). Patient reports generalized weakness and had difficulty with gait requiring assistance at baseline (recently at rehab). Denies numbness, visual changes, abdominal pain, vertigo. Had bowel movement this am, Endorses nausea/vomiting.

## 2018-04-10 NOTE — H&P ADULT - ATTENDING COMMENTS
84-year-old right-handed [place] lady First evaluated at [facility] on 4/11/18 with dizziness. She was a somewhat difficult historian. She has a history of "3 strokes", beginning about 6 years prior to admission. Most of these previous strokes seemed to involve a facial palsy and probable dysarthria, from which she largely recovered. She has also had a DVT and has been continuing Coumadin. During the night of 4/10/18, she developed dizziness or imbalance, nausea, vomiting, and a probably new left facial palsy. ROS otherwise negative. Exam. Alert and attentive;? Mild dysarthria; moderate-severe left facial palsy which mostly spared the frontalis; moved all extremities well against gravity without drift; very mild action tremor of her arms but no limb ataxia; gait not tested; mildly decreased temperature sensation over her right face, arm and leg. Remainder of neurologic exam was nonfocal. CT head (4/10/18) to my eye was unremarkable, showing calcification of the dentate nuclei bilaterally. CTA neck (4/10/18) to my eye showed severe right ICA stenosis, measured at "87%", and moderate left ICA stenosis, measured at "62%". MRI brain (4/11/18) to my eye showed possible chronic lacunar infarcts in the corona radiata/centrum semiovale bilaterally, but no acute pathology. There was a small hyperintensity on DWI involving the left lateral medulla, but no correlate on FLAIR therefore this might have been artifactual Impression. She has a history of "3 strokes" over the previous 6 years, mostly with facial palsy and dysarthria, and largely resolving. She has been taking Coumadin for a DVT. On 4/10/18 she developed dizziness/imbalance, nausea, vomiting and a left facial palsy. Her clinical syndrome may localize to the right kavitha but this is uncertain. Etiology is uncertain, but it's possible that she has had a small, MRI- negative infarct, most likely due to small vessel disease. She has severe, asymptomatic right carotid stenosis, and moderate asymptomatic left carotid stenosis. Given her age, suspect that she will not benefit from revascularization of her carotid arteries, either endarterectomy or stenting. I doubt that she will benefit from further neurovascular investigation. Plan. Swallowing evaluation; PT/OT evaluation; continue Coumadin; emphasis should be on discharge planning. 84-year-old right-handed [place] lady First evaluated at [facility] on 4/11/18 with dizziness. She was a somewhat difficult historian. She has a history of "3 strokes", beginning about 6 years prior to admission. Most of these previous strokes seemed to involve a facial palsy and probable dysarthria, from which she largely recovered. She has also had a DVT and has been continuing Coumadin. During the night of 4/10/18, she developed dizziness or imbalance, nausea, vomiting, and a probably new left facial palsy. ROS otherwise negative. Exam. Alert and attentive;? Mild dysarthria; moderate-severe left facial palsy which mostly spared the frontalis; moved all extremities well against gravity without drift; very mild action tremor of her arms but no limb ataxia; gait not tested; mildly decreased temperature sensation over her right face, arm and leg. Remainder of neurologic exam was nonfocal. CT head (4/10/18) to my eye was unremarkable, showing calcification of the dentate nuclei bilaterally. CTA neck (4/10/18) to my eye showed severe right ICA stenosis, measured at "87%", and moderate left ICA stenosis, measured at "62%". MRI brain (4/11/18) to my eye showed possible chronic lacunar infarcts in the corona radiata/centrum semiovale bilaterally, but no acute pathology. There was a small hyperintensity on DWI involving the left lateral medulla, but no correlate on FLAIR therefore this might have been artifactual Impression. She has a history of "3 strokes" over the previous 6 years, mostly with facial palsy and dysarthria, and largely resolving. She has been taking Coumadin for a DVT. On 4/10/18 she developed dizziness/imbalance, nausea, vomiting and a left facial palsy. Her clinical syndrome may localize to the right kavitha but this is uncertain. Etiology is uncertain, but it's possible that she has had a small, MRI- negative infarct, most likely due to small vessel disease. She has severe, asymptomatic right carotid stenosis, and moderate asymptomatic left carotid stenosis. Given her age, suspect that she will not benefit from revascularization of her carotid arteries, either endarterectomy or stenting. I doubt that she will benefit from further neurovascular investigation. Plan. Swallowing evaluation; PT/OT evaluation; continue Coumadin; emphasis should be on discharge planning. Note- no chemical DVT prophylaxis required because pt is therapeutically anticoagulated. No mechanical DVT prophylaxis because pt may have bilateral DVTs and this would present a risk in terms of venous thromboembolism.

## 2018-04-10 NOTE — ED ADULT NURSE NOTE - OBJECTIVE STATEMENT
84yr female presented to ED by EMS with daughter and 20g IV in R hand c/o facial droop and slurred speech.  Per daughter pt was last seen normal around 10:30pm last night, daughter noticed pt had slurred speech this morning when speaking with her and facial droop to L side of face. Pt has hx of Stroke with residual L sided weakness, DVT and SBO, on Coumadin.  Pt is a&ox3, slurring speech noted, L sided weakness noted, per daughter she is "a little more weaker than normal," facial droop noted to L side, Pt is able to follow commands, responds to questions, pt was vomiting upon arrival, reports some nausea now, abdomin soft distended, non tender to palpation, no complaints of chest pain or sob, no ha, dizziess, skin warm dry & intact.  Pt was taken to CT with Neuro Team at bedside, 2 IV established by RN 20g L hand.

## 2018-04-11 LAB
ALBUMIN SERPL ELPH-MCNC: 3.5 G/DL — SIGNIFICANT CHANGE UP (ref 3.3–5)
ALP SERPL-CCNC: 65 U/L — SIGNIFICANT CHANGE UP (ref 40–120)
ALT FLD-CCNC: 16 U/L RC — SIGNIFICANT CHANGE UP (ref 10–45)
ANION GAP SERPL CALC-SCNC: 20 MMOL/L — HIGH (ref 5–17)
APTT BLD: 30.8 SEC — SIGNIFICANT CHANGE UP (ref 27.5–37.4)
AST SERPL-CCNC: 13 U/L — SIGNIFICANT CHANGE UP (ref 10–40)
BILIRUB SERPL-MCNC: 0.6 MG/DL — SIGNIFICANT CHANGE UP (ref 0.2–1.2)
BUN SERPL-MCNC: 16 MG/DL — SIGNIFICANT CHANGE UP (ref 7–23)
CALCIUM SERPL-MCNC: 10 MG/DL — SIGNIFICANT CHANGE UP (ref 8.4–10.5)
CHLORIDE SERPL-SCNC: 108 MMOL/L — SIGNIFICANT CHANGE UP (ref 96–108)
CHOLEST SERPL-MCNC: 190 MG/DL — SIGNIFICANT CHANGE UP (ref 10–199)
CHOLEST SERPL-MCNC: 205 MG/DL — HIGH (ref 10–199)
CO2 SERPL-SCNC: 17 MMOL/L — LOW (ref 22–31)
CREAT SERPL-MCNC: 0.89 MG/DL — SIGNIFICANT CHANGE UP (ref 0.5–1.3)
GLUCOSE BLDC GLUCOMTR-MCNC: 114 MG/DL — HIGH (ref 70–99)
GLUCOSE BLDC GLUCOMTR-MCNC: 129 MG/DL — HIGH (ref 70–99)
GLUCOSE BLDC GLUCOMTR-MCNC: 212 MG/DL — HIGH (ref 70–99)
GLUCOSE BLDC GLUCOMTR-MCNC: 213 MG/DL — HIGH (ref 70–99)
GLUCOSE SERPL-MCNC: 135 MG/DL — HIGH (ref 70–99)
HBA1C BLD-MCNC: 7 % — HIGH (ref 4–5.6)
HCT VFR BLD CALC: 42 % — SIGNIFICANT CHANGE UP (ref 34.5–45)
HDLC SERPL-MCNC: 59 MG/DL — SIGNIFICANT CHANGE UP (ref 40–125)
HDLC SERPL-MCNC: 64 MG/DL — SIGNIFICANT CHANGE UP (ref 40–125)
HGB BLD-MCNC: 14.1 G/DL — SIGNIFICANT CHANGE UP (ref 11.5–15.5)
INR BLD: 2.25 RATIO — HIGH (ref 0.88–1.16)
LIPID PNL WITH DIRECT LDL SERPL: 104 MG/DL — SIGNIFICANT CHANGE UP
LIPID PNL WITH DIRECT LDL SERPL: 123 MG/DL — SIGNIFICANT CHANGE UP
MCHC RBC-ENTMCNC: 33.5 GM/DL — SIGNIFICANT CHANGE UP (ref 32–36)
MCHC RBC-ENTMCNC: 37.8 PG — HIGH (ref 27–34)
MCV RBC AUTO: 113 FL — HIGH (ref 80–100)
PLATELET # BLD AUTO: 336 K/UL — SIGNIFICANT CHANGE UP (ref 150–400)
POTASSIUM SERPL-MCNC: 3.7 MMOL/L — SIGNIFICANT CHANGE UP (ref 3.5–5.3)
POTASSIUM SERPL-SCNC: 3.7 MMOL/L — SIGNIFICANT CHANGE UP (ref 3.5–5.3)
PROT SERPL-MCNC: 7.7 G/DL — SIGNIFICANT CHANGE UP (ref 6–8.3)
PROTHROM AB SERPL-ACNC: 24.7 SEC — HIGH (ref 9.8–12.7)
RBC # BLD: 3.72 M/UL — LOW (ref 3.8–5.2)
RBC # FLD: 12 % — SIGNIFICANT CHANGE UP (ref 10.3–14.5)
SODIUM SERPL-SCNC: 145 MMOL/L — SIGNIFICANT CHANGE UP (ref 135–145)
TOTAL CHOLESTEROL/HDL RATIO MEASUREMENT: 3 RATIO — LOW (ref 3.3–7.1)
TOTAL CHOLESTEROL/HDL RATIO MEASUREMENT: 3.5 RATIO — SIGNIFICANT CHANGE UP (ref 3.3–7.1)
TRIGL SERPL-MCNC: 110 MG/DL — SIGNIFICANT CHANGE UP (ref 10–149)
TRIGL SERPL-MCNC: 117 MG/DL — SIGNIFICANT CHANGE UP (ref 10–149)
WBC # BLD: 8.3 K/UL — SIGNIFICANT CHANGE UP (ref 3.8–10.5)
WBC # FLD AUTO: 8.3 K/UL — SIGNIFICANT CHANGE UP (ref 3.8–10.5)

## 2018-04-11 PROCEDURE — 99223 1ST HOSP IP/OBS HIGH 75: CPT

## 2018-04-11 RX ORDER — ACETAMINOPHEN 500 MG
1000 TABLET ORAL ONCE
Qty: 0 | Refills: 0 | Status: COMPLETED | OUTPATIENT
Start: 2018-04-11 | End: 2018-04-11

## 2018-04-11 RX ORDER — ACETAMINOPHEN 500 MG
650 TABLET ORAL EVERY 6 HOURS
Qty: 0 | Refills: 0 | Status: DISCONTINUED | OUTPATIENT
Start: 2018-04-11 | End: 2018-04-20

## 2018-04-11 RX ORDER — WARFARIN SODIUM 2.5 MG/1
2.5 TABLET ORAL AT BEDTIME
Qty: 0 | Refills: 0 | Status: COMPLETED | OUTPATIENT
Start: 2018-04-11 | End: 2018-04-11

## 2018-04-11 RX ADMIN — Medication 1 DROP(S): at 17:49

## 2018-04-11 RX ADMIN — Medication 400 MILLIGRAM(S): at 01:07

## 2018-04-11 RX ADMIN — Medication 2000 UNIT(S): at 17:49

## 2018-04-11 RX ADMIN — Medication 1 DROP(S): at 06:44

## 2018-04-11 RX ADMIN — ATORVASTATIN CALCIUM 40 MILLIGRAM(S): 80 TABLET, FILM COATED ORAL at 22:08

## 2018-04-11 RX ADMIN — WARFARIN SODIUM 2.5 MILLIGRAM(S): 2.5 TABLET ORAL at 22:08

## 2018-04-11 RX ADMIN — HYDROXYUREA 500 MILLIGRAM(S): 500 CAPSULE ORAL at 15:51

## 2018-04-11 RX ADMIN — Medication 1000 MILLIGRAM(S): at 01:37

## 2018-04-11 RX ADMIN — Medication 4: at 17:48

## 2018-04-11 RX ADMIN — Medication 650 MILLIGRAM(S): at 15:50

## 2018-04-11 NOTE — SWALLOW BEDSIDE ASSESSMENT ADULT - SWALLOW EVAL: RECOMMENDED FEEDING/EATING TECHNIQUES
small sips/bites/crush medication (when feasible)/maintain upright posture during/after eating for 30 mins/no straws/position upright (90 degrees)

## 2018-04-11 NOTE — SWALLOW BEDSIDE ASSESSMENT ADULT - ORAL PHASE
Delayed oral transit time suspected premature spillover Lingual stasis/Decreased anterior-posterior movement of the bolus/Palatal stasis/Delayed oral transit time

## 2018-04-11 NOTE — SWALLOW BEDSIDE ASSESSMENT ADULT - PHARYNGEAL PHASE
Delayed pharyngeal swallow/Decreased laryngeal elevation Decreased laryngeal elevation/Wet vocal quality post oral intake/Delayed pharyngeal swallow

## 2018-04-11 NOTE — SWALLOW BEDSIDE ASSESSMENT ADULT - COMMENTS
Hx contd: HC: 4/10 Failed dysphagia screen @ 08:20. CT Brain: No acute hemorrhage. Patchy hypoattenuation within the white matter, likely representing chronic microvascular changes. Unchanged calcification of the bilateral globus pallidus and dentate nuclei within the cerebellum. CT A/P: No acute pathology or evidence of bowel obstruction as questioned. CTA head: The arteries of the [place] of [place] are normal in caliber. There is no aneurysm. CTA neck: Suspicion of progressive right ICA stenosis which on prior Doppler of 6/11/2014 was in the 50-69% range and now measures 87% by NASCET criteria with dense atherosclerotic calcification seen. Left ICA with a roughly 62% stenosis by NASCET criteria which appears to be a progression compared with the patient's prior study as well. Recommend correlation with ultrasound for further evaluation. CXR: Left basilar atelectasis. MRI Head: No acute infarct or hemorrhage. Fairly extensive periventricular white matter and pontine microvascular ischemic change with scattered chronic white matter infarcts and a tiny chronic right pontine infarct. Subtle enhancement of the left facial nerve labyrinthine segment with extension to the geniculate ganglion on may represent Bell palsy. Hx contd: HC: 4/10 Failed dysphagia screen @ 08:20. CT Brain: No acute hemorrhage. Patchy hypoattenuation within the white matter, likely representing chronic microvascular changes. Unchanged calcification of the bilateral globus pallidus and dentate nuclei within the cerebellum. CT A/P: No acute pathology or evidence of bowel obstruction as questioned. CTA head: The arteries of the [place] of [place] are normal in caliber. There is no aneurysm. CTA neck: Suspicion of progressive right ICA stenosis which on prior Doppler of 6/11/2014 was in the 50-69% range and now measures 87% by NASCET criteria with dense atherosclerotic calcification seen. Left ICA with a roughly 62% stenosis by NASCET criteria which appears to be a progression compared with the patient's prior study as well. Recommend correlation with ultrasound for further evaluation. CXR: Left basilar atelectasis. MRI Head: No acute infarct or hemorrhage. Fairly extensive periventricular white matter and pontine microvascular ischemic change with scattered chronic white matter infarcts and a tiny chronic right pontine infarct. Subtle enhancement of the left facial nerve labyrinthine segment with extension to the geniculate ganglion on may represent Bell palsy.    *Of note per d/w Stroke MD, team currently suspected unimaged CVA.

## 2018-04-11 NOTE — SWALLOW BEDSIDE ASSESSMENT ADULT - SLP PERTINENT HISTORY OF CURRENT PROBLEM
85yo F p/w L facial droop, vertigo, nausea, vomiting. Pt has PMH CVA (residual L sided weakness), DVT on coumadin, Polycythemia vera Hypothyroid, SBO, HLD, DM. LKN last night 4/9 at 11 PM, prior to going to sleep. Around 2am pt woke up and felt like the room was spinning. In the morning, daughter noted new L facial weakness. Per daughter, face presently does not look significantly different than baseline (it appears different compared to recent pictures). Pt reports generalized weakness and had difficulty with gait requiring assistance at baseline (recently at rehab). Denies numbness, visual changes, abdominal pain, vertigo. Had bowel movement this am, Endorses nausea/vomiting. Per Assessment PE lower motor left facial subtle weakness, very subtle dysmetria bilaterally L >R. CTh as above no acute pathology CTA with R ICA 87% stenosis L CA 62% stenosis  NIHSS 4, pre MRS 3. Dx: will admit to r/o CVA (? left pontine).

## 2018-04-11 NOTE — SWALLOW BEDSIDE ASSESSMENT ADULT - SWALLOW EVAL: PATIENT/FAMILY GOALS STATEMENT
Patient and daughter at bedside report h/o CVA in 2004, at which point pt reports she did not experience any dysphagia. Pt never participated in swallowing assessments or therapy, and was discharged home. Denies h/o PNA, denies h/o GERD. Daughter reports pt's clarity of speech is impaired and has increased latency in responses since Monday.

## 2018-04-11 NOTE — SWALLOW BEDSIDE ASSESSMENT ADULT - SLP GENERAL OBSERVATIONS
Patient encountered supine in bed, positioned upright for purpose of evaluation. Evaluation performed in English with daughter supplementing as needed in Tagalog. Pt A&O grossly x3, able to make basic wants and needs known and follow directives for purpose of evaluation. Mildly imprecise articulation noted.

## 2018-04-11 NOTE — SWALLOW BEDSIDE ASSESSMENT ADULT - SWALLOW EVAL: DIAGNOSIS
Patient presents with oral and suspected pharyngeal dysphagia characterized by impaired oral management with chewables, delayed pharyngeal swallow trigger greatest with thin liquids, reduced hyolaryngeal elevation upon palpation, c/o difficulty initiating swallow with chewables in absence of liquid wash, and s/s of laryngeal penetration and/or aspiration with thin liquids.

## 2018-04-11 NOTE — SWALLOW BEDSIDE ASSESSMENT ADULT - ASR SWALLOW ASPIRATION MONITOR
gurgly voice/pneumonia/fever/throat clearing/Monitor for s/s aspiration/laryngeal penetration. If noted:  D/C p.o. intake, provide non-oral nutrition/hydration/meds, and contact this service @ [phone]/upper respiratory infection/cough/change of breathing pattern

## 2018-04-12 LAB
GLUCOSE BLDC GLUCOMTR-MCNC: 103 MG/DL — HIGH (ref 70–99)
GLUCOSE BLDC GLUCOMTR-MCNC: 204 MG/DL — HIGH (ref 70–99)
GLUCOSE BLDC GLUCOMTR-MCNC: 205 MG/DL — HIGH (ref 70–99)
GLUCOSE BLDC GLUCOMTR-MCNC: 244 MG/DL — HIGH (ref 70–99)
INR BLD: 2.86 RATIO — HIGH (ref 0.88–1.16)
PROTHROM AB SERPL-ACNC: 33 SEC — HIGH (ref 10–13.1)

## 2018-04-12 PROCEDURE — 93970 EXTREMITY STUDY: CPT | Mod: 26

## 2018-04-12 PROCEDURE — 74230 X-RAY XM SWLNG FUNCJ C+: CPT | Mod: 26

## 2018-04-12 RX ORDER — WARFARIN SODIUM 2.5 MG/1
2.5 TABLET ORAL ONCE
Qty: 0 | Refills: 0 | Status: DISCONTINUED | OUTPATIENT
Start: 2018-04-12 | End: 2018-04-12

## 2018-04-12 RX ORDER — ASPIRIN/CALCIUM CARB/MAGNESIUM 324 MG
81 TABLET ORAL DAILY
Qty: 0 | Refills: 0 | Status: DISCONTINUED | OUTPATIENT
Start: 2018-04-12 | End: 2018-04-20

## 2018-04-12 RX ADMIN — Medication 650 MILLIGRAM(S): at 22:24

## 2018-04-12 RX ADMIN — Medication 4: at 17:56

## 2018-04-12 RX ADMIN — HYDROXYUREA 500 MILLIGRAM(S): 500 CAPSULE ORAL at 12:37

## 2018-04-12 RX ADMIN — Medication 4: at 08:59

## 2018-04-12 RX ADMIN — LISINOPRIL 40 MILLIGRAM(S): 2.5 TABLET ORAL at 06:24

## 2018-04-12 RX ADMIN — Medication 125 MICROGRAM(S): at 06:24

## 2018-04-12 RX ADMIN — Medication 81 MILLIGRAM(S): at 17:56

## 2018-04-12 RX ADMIN — Medication 2000 UNIT(S): at 17:56

## 2018-04-12 RX ADMIN — Medication 4: at 12:37

## 2018-04-12 RX ADMIN — Medication 1 DROP(S): at 06:24

## 2018-04-12 RX ADMIN — ATORVASTATIN CALCIUM 40 MILLIGRAM(S): 80 TABLET, FILM COATED ORAL at 22:03

## 2018-04-12 RX ADMIN — Medication 2000 UNIT(S): at 06:24

## 2018-04-12 RX ADMIN — Medication 1 DROP(S): at 17:56

## 2018-04-12 NOTE — SWALLOW VFSS/MBS ASSESSMENT ADULT - PHARYNGEAL PHASE COMMENTS
Patient with difficulty initiating cued repeat swallow to clear oropharyngeal residue; vallecular residue noted spilling into hypopharynx, however upon cues to initiate repeat swallow pt continuously stated "I feel like I swallowed already." Pt benefitted from liquid wash to clear oropharyngeal residue. trace aspiration noted with retrieval to the level of the vocal folds with teaspoon of nectar thick liquid when utilized as a wash

## 2018-04-12 NOTE — OCCUPATIONAL THERAPY INITIAL EVALUATION ADULT - ADDITIONAL COMMENTS
MR Head 4/10 No acute infarct or hemorrhage. Fairly extensive periventricular white matter and pontine microvascular ischemic change with scattered chronic white matter infarcts and a tiny chronic right pontine infarct.Subtle enhancement of the left facial nerve labyrinthine segment with extension to the geniculate ganglion on may represent Bell palsy..

## 2018-04-12 NOTE — SWALLOW VFSS/MBS ASSESSMENT ADULT - LARYNGEAL PENETRATION DURING THE SWALLOW - SILENT
deep, over the arytenoids, without complete retrieval/Mild episode of trace deep penetration, retrieved with subsequent swallows Trace/shallow, over the arytenoids, with retrieval Mild/Moderate over the arytenoids, to the level of the vocal folds without retrieval/Moderate/Mild Trace

## 2018-04-12 NOTE — PROGRESS NOTE ADULT - ASSESSMENT
ASSESSMENT: 84-year-old right-handed [place] lady First evaluated at [facility] on 4/11/18 with dizziness. She was a somewhat difficult historian. She has a history of "3 strokes", beginning about 6 years prior to admission. Most of these previous strokes seemed to involve a facial palsy and probable dysarthria, from which she largely recovered. She has also had a DVT and has been continuing Coumadin. On 4/10/18 she developed dizziness/imbalance, nausea, vomiting and a left facial palsy. Her clinical syndrome may localize to the right kavitha but this is uncertain. Etiology is uncertain, but it's possible that she has had a small, MRI- negative infarct, most likely due to small vessel disease. She has severe, asymptomatic right carotid stenosis, and moderate asymptomatic left carotid stenosis. Given her age, suspect that she will not benefit from revascularization of her carotid arteries, either endarterectomy or stenting. I doubt that she will benefit from further neurovascular investigation.      NEURO: neurologically without acute change, slow titration to normotension, titrate home statin to LDL goal less than 70, MR imaging as noted above,  Physical therapy/OT eval pending.     ANTITHROMBOTIC THERAPY: Coumadin with INR goal 2-3 for history of DVT.     PULMONARY: CXR left basilar atelectasis encourage incentive spirometry, protecting airway, saturating well     CARDIOVASCULAR: check TTE, cardiac monitoring                              SBP goal: slow titration to normotension     GASTROINTESTINAL:  dysphagia screen failed, SLP eval for  D1 nectar diet and MBS      Diet: D1 Nectar     RENAL: BUN/Cr without acute change, good urine output      Na Goal: Greater than 135     Claudio:n    HEMATOLOGY: H/H without acute change, Platelets 336, LE duplex pending to confirm B/L DVT.     DVT ppx: Therapeutic on Coumadin (INR goal 2-3), mechanical ppx held at this time given concern for b/l DVT    ID: afebrile, no leukocytosis     OTHER: current medical condition and plan of care d/w patient at bedside, all questions answered and concerns addressed.     DISPOSITION: Rehab or home depending on PT eval once stable and workup is complete      CORE MEASURES:        Admission NIHSS: 4     TPA: [] YES [x] NO      LDL/HDL:123/59     Depression  : p     Statin Therapy: y      Dysphagia Screen: [] PASS [x] FAIL     Smoking [] YES [x] NO      Afib [] YES [x] NO     Stroke Education [] YES [] NO

## 2018-04-12 NOTE — SWALLOW VFSS/MBS ASSESSMENT ADULT - RESIDUE IN LARYNGEAL VESTIBULE / VENTRICLE
along the laryngeal surface of the epiglottis/Trace along the laryngeal surface of the epiglottis and the vocal folds/Trace along the laryngeal surface of the epiglottis and the vocal folds/Mild/Trace

## 2018-04-12 NOTE — SWALLOW VFSS/MBS ASSESSMENT ADULT - ORAL PHASE COMMENTS
up to 11.3s oral transit time >9s oral transit time majority of oral prep took place off flouro to minimize exposure; >45s oral transit time

## 2018-04-12 NOTE — PHYSICAL THERAPY INITIAL EVALUATION ADULT - ADDITIONAL COMMENTS
Pt lives in private house with daughter, son in law and grandson, 3 steps to enter, bed/bath on 2nd floor

## 2018-04-12 NOTE — SWALLOW VFSS/MBS ASSESSMENT ADULT - SLP PERTINENT HISTORY OF CURRENT PROBLEM
83yo F p/w L facial droop, vertigo, nausea, vomiting. Pt has PMH CVA (residual L sided weakness), DVT on coumadin, Polycythemia vera Hypothyroid, SBO, HLD, DM. LKN last night 4/9 at 11 PM, prior to going to sleep. Around 2am pt woke up and felt like the room was spinning. In the morning, daughter noted new L facial weakness. Per daughter, face presently does not look significantly different than baseline (it appears different compared to recent pictures). Pt reports generalized weakness and had difficulty with gait requiring assistance at baseline (recently at rehab). Denies numbness, visual changes, abdominal pain, vertigo. Had bowel movement this am, Endorses nausea/vomiting. Per Assessment PE lower motor left facial subtle weakness, very subtle dysmetria bilaterally L >R. CTh as above no acute pathology CTA with R ICA 87% stenosis L CA 62% stenosis  NIHSS 4, pre MRS 3. Dx: will admit to r/o CVA (? left pontine).

## 2018-04-12 NOTE — OCCUPATIONAL THERAPY INITIAL EVALUATION ADULT - PERTINENT HX OF CURRENT PROBLEM, REHAB EVAL
84 RH F p/w L facial droop, vertigo, nausea, vomiting.LKN last night 4/9 at 11 PM, prior to going to sleep. Around 2am patient woke up and felt like the room was spinning. In the morning, daughter noted new left facial weakness.Reports generalized weakness and had difficulty with gait requiring assistance at baseline (recently at rehab).

## 2018-04-12 NOTE — SWALLOW VFSS/MBS ASSESSMENT ADULT - COMMENTS
Hx contd: HC: 4/10 Failed dysphagia screen @ 08:20. CT Brain: No acute hemorrhage. Patchy hypoattenuation within the white matter, likely representing chronic microvascular changes. Unchanged calcification of the bilateral globus pallidus and dentate nuclei within the cerebellum. CT A/P: No acute pathology or evidence of bowel obstruction as questioned. CTA head: The arteries of the [place] are normal in caliber. There is no aneurysm. CTA neck: Suspicion of progressive right ICA stenosis which on prior Doppler of 6/11/2014 was in the 50-69% range and now measures 87% by NASCET criteria with dense atherosclerotic calcification seen. Left ICA with a roughly 62% stenosis by NASCET criteria which appears to be a progression compared with the patient's prior study as well. Recommend correlation with ultrasound for further evaluation. CXR: Left basilar atelectasis. MRI Head: No acute infarct or hemorrhage. Fairly extensive periventricular white matter and pontine microvascular ischemic change with scattered chronic white matter infarcts and a tiny chronic right pontine infarct. Subtle enhancement of the left facial nerve labyrinthine segment with extension to the geniculate ganglion on may represent Bell palsy.

## 2018-04-12 NOTE — OCCUPATIONAL THERAPY INITIAL EVALUATION ADULT - LIVES WITH, PROFILE
Lives in private house with daughter, son in law and grandson, 3 steps to enter, bed/bath on 2nd floor

## 2018-04-12 NOTE — PHYSICAL THERAPY INITIAL EVALUATION ADULT - PERTINENT HX OF CURRENT PROBLEM, REHAB EVAL
Pt is an 83 y/o female p/w L facial droop, vertigo, nausea, vomiting. LKN last night 4/9 at 11 PM, prior to going to sleep. Around 2am patient woke up and felt like the room was spinning. In the morning, daughter noted new left facial weakness. Reports generalized weakness and had difficulty with gait requiring assistance at baseline (recently at rehab).

## 2018-04-12 NOTE — PHYSICAL THERAPY INITIAL EVALUATION ADULT - GENERAL OBSERVATIONS, REHAB EVAL
Pt received semi-supine in bed, being fed by PCA Josefina, (+) telemetry. Pt alert, agreeable to PT eval.

## 2018-04-12 NOTE — PHYSICAL THERAPY INITIAL EVALUATION ADULT - STRENGTHENING, PT EVAL
GOAL: Pt will improve B LE strength by 1/2 grade to improve level of independence with mobility skills and ADLs in 2 weeks.

## 2018-04-12 NOTE — OCCUPATIONAL THERAPY INITIAL EVALUATION ADULT - PLANNED THERAPY INTERVENTIONS, OT EVAL
strengthening/neuromuscular re-education/ADL retraining/balance training/bed mobility training/transfer training

## 2018-04-12 NOTE — SWALLOW VFSS/MBS ASSESSMENT ADULT - ORAL PHASE
moderate spillover to the level of the pyriform sinuses mild-moderate spillover to the level of the pyriform sinuses moderate episodic spillover to the level of the pyriform sinuses mild spillover to the level of the pyriform sinuses +mild post swallow residue/Reduced anterior - posterior transport/Residue in oral cavity

## 2018-04-12 NOTE — PHYSICAL THERAPY INITIAL EVALUATION ADULT - PLANNED THERAPY INTERVENTIONS, PT EVAL
gait training/strengthening/balance training/GOAL: Pt will negotiate 3 stairs with 1 HR and step to pattern with CGA in 2 weeks./bed mobility training/transfer training

## 2018-04-12 NOTE — PHYSICAL THERAPY INITIAL EVALUATION ADULT - IMPAIRMENTS FOUND, PT EVAL
gait, locomotion, and balance/posture/cognitive impairment/muscle strength/aerobic capacity/endurance

## 2018-04-12 NOTE — SWALLOW VFSS/MBS ASSESSMENT ADULT - ROSENBEK'S PENETRATION ASPIRATION SCALE
(3) contrast remains above the vocal cords, visible residue remains (penetration) episode of 3 reduced to 2 with subsequent swallows (1) no aspiration, contrast does not enter airway (2) contrast enters airway, remains above the vocal cords, no residue remains (penetration) (6) contrast passes glottis, no subglottic residue remains (aspiration)

## 2018-04-12 NOTE — SWALLOW VFSS/MBS ASSESSMENT ADULT - RECOMMENDED CONSISTENCY
Dysphagia 1 with Honey thickened liquids    MD/team Please enter the following as notes to RN: 1) Full assist with meals, 2) Crush meds or provide via alternate source, 3) Provide small single bites and sips at slow rate, 4) Alternate food and liquids to aid in oral and pharyngeal clearance, 5) No straws, 6) Maintain upright posture during/after eating for 30 mins, 7) Aspiration precautions. Monitor for s/s aspiration/laryngeal penetration. If noted:  D/C p.o. intake, provide non-oral nutrition/hydration/meds.

## 2018-04-12 NOTE — SWALLOW VFSS/MBS ASSESSMENT ADULT - ASPIRATION DURING THE SWALLOW - SILENT
Trace/episodic, with retrieval to the level of the vocal folds; +latent cough >30s following incident with retrieval to the level of the vocal folds/Trace

## 2018-04-12 NOTE — OCCUPATIONAL THERAPY INITIAL EVALUATION ADULT - ADL RETRAINING, OT EVAL
Patient will be independent with UB dressing within two weeks. Pt will be independent with toileting in two weeks

## 2018-04-12 NOTE — PROGRESS NOTE ADULT - SUBJECTIVE AND OBJECTIVE BOX
THE PATIENT WAS SEEN AND EXAMINED BY ME WITH THE HOUSESTAFF AND STROKE TEAM DURING MORNING ROUNDS.   HPI:  84 RH [place] female presented with left facial droop, vertigo, nausea, vomiting. Patient has PMH Stroke (residual left sided weakness), DVT on coumadin, Polycythemia vera Hypothyroid, SBO, HLD, DM. LKN last night 4/9 at 11 PM, prior to going to sleep. Around 2am morning of admission patient woke up and felt like the room was spinning. In the morning, daughter noted new left facial weakness. Per daughter, face presently does not look significantly different than baseline (it appears different compared to recent pictures). Patient reported generalized weakness and had difficulty with gait requiring assistance at baseline (recently at rehab). Denied numbness, visual changes, abdominal pain, vertigo. Had bowel movement this am, Endorses nausea/vomiting.      SUBJECTIVE: No events overnight.  No new neurologic complaints.      acetaminophen   Tablet 650 milliGRAM(s) Oral every 6 hours PRN  artificial  tears Solution 1 Drop(s) Both EYES two times a day  atorvastatin 40 milliGRAM(s) Oral at bedtime  cholecalciferol 2000 Unit(s) Oral two times a day  dextrose 5%. 1000 milliLiter(s) IV Continuous <Continuous>  dextrose 50% Injectable 12.5 Gram(s) IV Push once  dextrose 50% Injectable 25 Gram(s) IV Push once  dextrose 50% Injectable 25 Gram(s) IV Push once  dextrose Gel 1 Dose(s) Oral once PRN  glucagon  Injectable 1 milliGRAM(s) IntraMuscular once PRN  hydroxyurea 500 milliGRAM(s) Oral daily  insulin lispro (HumaLOG) corrective regimen sliding scale   SubCutaneous three times a day before meals  insulin lispro (HumaLOG) corrective regimen sliding scale   SubCutaneous at bedtime  levothyroxine 125 MICROGram(s) Oral daily  lisinopril 40 milliGRAM(s) Oral daily      PHYSICAL EXAM:   Vital Signs Last 24 Hrs  T(C): 36.6 (12 Apr 2018 05:00), Max: 36.9 (12 Apr 2018 00:00)  T(F): 97.9 (12 Apr 2018 05:00), Max: 98.4 (12 Apr 2018 00:00)  HR: 80 (12 Apr 2018 09:02) (80 - 97)  BP: 126/67 (12 Apr 2018 09:02) (126/67 - 171/84)  BP(mean): --  RR: 18 (12 Apr 2018 05:00) (18 - 18)  SpO2: 97% (12 Apr 2018 09:02) (94% - 99%)    General: No acute distress  HEENT: EOM intact, visual fields full  Abdomen: Soft, nontender, nondistended   Extremities: No edema    NEUROLOGICAL EXAM:  Mental status: Awake, alert, oriented x3, no aphasia, no neglect, normal memory   Cranial Nerves: left facial palsy, no nystagmus,  dysarthria,  tongue midline  Motor exam: moved all extremities well against gravity without drift; very mild action tremor of her arms  Sensation:  mildly decreased temperature sensation over her right face, arm and leg  Coordination/ Gait: No dysmetria   LABS:                        14.1   8.3   )-----------( 336      ( 11 Apr 2018 10:22 )             42.0    04-11    145  |  108  |  16  ----------------------------<  135<H>  3.7   |  17<L>  |  0.89    Ca    10.0      11 Apr 2018 10:22    TPro  7.7  /  Alb  3.5  /  TBili  0.6  /  DBili  x   /  AST  13  /  ALT  16  /  AlkPhos  65  04-11  PT/INR - ( 11 Apr 2018 10:22 )   PT: 24.7 sec;   INR: 2.25 ratio         PTT - ( 11 Apr 2018 10:22 )  PTT:30.8 sec  Hemoglobin A1C, Whole Blood: 7.0 % (04-11 @ 13:31)      IMAGING: Reviewed by me.   CT Angio Neck w/ IV Cont (04.10.18)   CTA head: The arteries of the [place] are normal in caliber.   There is no aneurysm.    CTA neck: Suspicion of progressive right ICA stenosis which on prior   Doppler of 6/11/2014 was in the 50-69% range and now measures 87% by   NASCET criteria with dense atherosclerotic calcification seen. Left ICA   with a roughly 62% stenosis by NASCET criteria which appears to be a   progression compared with the patient's prior study as well. Recommend   correlation with ultrasound for further evaluation       CT Brain Stroke Protocol (04.10.18)   No acute hemorrhage. Patchy hypoattenuation within the white matter,   likely representing chronic microvascular changes. Unchanged   calcification of the bilateral globus pallidus and dentate nuclei within   the cerebellum.     MR Head w/wo IV Cont (04.10.18)   No acute infarct or hemorrhage.  Fairly extensive periventricular white matter and pontine microvascular   ischemic change with scattered chronic white matter infarcts and a tiny   chronic right pontine infarct.  Subtle enhancement of the left facial nerve labyrinthine segment with   extension to the geniculate ganglion on may represent Bell palsy.. . Patient was off floor for testing. This note is duplicate of previous day.    HPI:  84 RH [place] female presented with left facial droop, vertigo, nausea, vomiting. Patient has PMH Stroke (residual left sided weakness), DVT on coumadin, Polycythemia vera Hypothyroid, SBO, HLD, DM. LKN last night 4/9 at 11 PM, prior to going to sleep. Around 2am morning of admission patient woke up and felt like the room was spinning. In the morning, daughter noted new left facial weakness. Per daughter, face presently does not look significantly different than baseline (it appears different compared to recent pictures). Patient reported generalized weakness and had difficulty with gait requiring assistance at baseline (recently at rehab). Denied numbness, visual changes, abdominal pain, vertigo. Had bowel movement this am, Endorses nausea/vomiting.      SUBJECTIVE: No events overnight.  No new neurologic complaints.      acetaminophen   Tablet 650 milliGRAM(s) Oral every 6 hours PRN  artificial  tears Solution 1 Drop(s) Both EYES two times a day  atorvastatin 40 milliGRAM(s) Oral at bedtime  cholecalciferol 2000 Unit(s) Oral two times a day  dextrose 5%. 1000 milliLiter(s) IV Continuous <Continuous>  dextrose 50% Injectable 12.5 Gram(s) IV Push once  dextrose 50% Injectable 25 Gram(s) IV Push once  dextrose 50% Injectable 25 Gram(s) IV Push once  dextrose Gel 1 Dose(s) Oral once PRN  glucagon  Injectable 1 milliGRAM(s) IntraMuscular once PRN  hydroxyurea 500 milliGRAM(s) Oral daily  insulin lispro (HumaLOG) corrective regimen sliding scale   SubCutaneous three times a day before meals  insulin lispro (HumaLOG) corrective regimen sliding scale   SubCutaneous at bedtime  levothyroxine 125 MICROGram(s) Oral daily  lisinopril 40 milliGRAM(s) Oral daily      PHYSICAL EXAM:   Vital Signs Last 24 Hrs  T(C): 36.6 (12 Apr 2018 05:00), Max: 36.9 (12 Apr 2018 00:00)  T(F): 97.9 (12 Apr 2018 05:00), Max: 98.4 (12 Apr 2018 00:00)  HR: 80 (12 Apr 2018 09:02) (80 - 97)  BP: 126/67 (12 Apr 2018 09:02) (126/67 - 171/84)  BP(mean): --  RR: 18 (12 Apr 2018 05:00) (18 - 18)  SpO2: 97% (12 Apr 2018 09:02) (94% - 99%)    General: No acute distress  HEENT: EOM intact, visual fields full  Abdomen: Soft, nontender, nondistended   Extremities: No edema    NEUROLOGICAL EXAM: Mental status: Awake, alert, oriented x3, no aphasia, no neglect, normal memory   Cranial Nerves: left facial palsy, no nystagmus,  dysarthria,  tongue midline  Motor exam: moved all extremities well against gravity without drift; very mild action tremor of her arms  Sensation:  mildly decreased temperature sensation over her right face, arm and leg  Coordination/ Gait: No dysmetria   LABS:                        14.1   8.3   )-----------( 336      ( 11 Apr 2018 10:22 )             42.0    04-11    145  |  108  |  16  ----------------------------<  135<H>  3.7   |  17<L>  |  0.89    Ca    10.0      11 Apr 2018 10:22    TPro  7.7  /  Alb  3.5  /  TBili  0.6  /  DBili  x   /  AST  13  /  ALT  16  /  AlkPhos  65  04-11  PT/INR - ( 11 Apr 2018 10:22 )   PT: 24.7 sec;   INR: 2.25 ratio         PTT - ( 11 Apr 2018 10:22 )  PTT:30.8 sec  Hemoglobin A1C, Whole Blood: 7.0 % (04-11 @ 13:31)      IMAGING: Reviewed by me.   CT Angio Neck w/ IV Cont (04.10.18)   CTA head: The arteries of the [place] are normal in caliber.   There is no aneurysm.    CTA neck: Suspicion of progressive right ICA stenosis which on prior   Doppler of 6/11/2014 was in the 50-69% range and now measures 87% by   NASCET criteria with dense atherosclerotic calcification seen. Left ICA   with a roughly 62% stenosis by NASCET criteria which appears to be a   progression compared with the patient's prior study as well. Recommend   correlation with ultrasound for further evaluation       CT Brain Stroke Protocol (04.10.18)   No acute hemorrhage. Patchy hypoattenuation within the white matter,   likely representing chronic microvascular changes. Unchanged   calcification of the bilateral globus pallidus and dentate nuclei within   the cerebellum.     MR Head w/wo IV Cont (04.10.18)   No acute infarct or hemorrhage.  Fairly extensive periventricular white matter and pontine microvascular   ischemic change with scattered chronic white matter infarcts and a tiny   chronic right pontine infarct.  Subtle enhancement of the left facial nerve labyrinthine segment with   extension to the geniculate ganglion on may represent Bell palsy..

## 2018-04-12 NOTE — OCCUPATIONAL THERAPY INITIAL EVALUATION ADULT - STRENGTHENING, PT EVAL
Patient will increase strength in LUE/LLE by 1/2 grade in two weeks to facilitate increased ability to perform ADLs and functional mobility

## 2018-04-12 NOTE — PHYSICAL THERAPY INITIAL EVALUATION ADULT - BALANCE TRAINING, PT EVAL
GOAL: Pt will improve static/dynamic sitting and standing balance by 1/2 grade to improve level of independence with mobility skills and ADLs in 2 weeks.

## 2018-04-12 NOTE — OCCUPATIONAL THERAPY INITIAL EVALUATION ADULT - DIAGNOSIS, OT EVAL
Decreased balance, strength, endurance, coordination, cognition impacting ability to perform ADLs and functional mobility

## 2018-04-12 NOTE — SWALLOW VFSS/MBS ASSESSMENT ADULT - MODE OF PRESENTATION
spoon/fed by clinician spoon/self fed/cup/fed by clinician cup/spoon/self fed/fed by clinician self fed/cup/straw fed by clinician/spoon

## 2018-04-12 NOTE — SWALLOW VFSS/MBS ASSESSMENT ADULT - DIAGNOSTIC IMPRESSIONS
Patient presents with oropharyngeal dysphagia characterized by impaired oral management, premature bolus spillover, mild-moderate pharyngeal retention and difficulty initiating cued repeat swallows, deep silent laryngeal penetration with nectar thickened liquids without retrieval, and trace silent aspiration with thin liquids and chewables. A chin tuck posture is ineffective in maintaining airway protection.     Disorders include: reduced lingual strength/ROM/Rate of motion, reduced tongue to palate contact, reduced BOT to posterior pharyngeal wall contact, delay in trigger of the swallow reflex, reduced hyo-laryngeal excursion, reduced laryngeal closure, reduced pharyngeal contractility, and s/s of reduced supraglottic and subglottic sensation.

## 2018-04-13 LAB
ANION GAP SERPL CALC-SCNC: 14 MMOL/L — SIGNIFICANT CHANGE UP (ref 5–17)
BUN SERPL-MCNC: 30 MG/DL — HIGH (ref 7–23)
CALCIUM SERPL-MCNC: 9.5 MG/DL — SIGNIFICANT CHANGE UP (ref 8.4–10.5)
CHLORIDE SERPL-SCNC: 106 MMOL/L — SIGNIFICANT CHANGE UP (ref 96–108)
CO2 SERPL-SCNC: 19 MMOL/L — LOW (ref 22–31)
CREAT SERPL-MCNC: 0.88 MG/DL — SIGNIFICANT CHANGE UP (ref 0.5–1.3)
GLUCOSE BLDC GLUCOMTR-MCNC: 135 MG/DL — HIGH (ref 70–99)
GLUCOSE BLDC GLUCOMTR-MCNC: 236 MG/DL — HIGH (ref 70–99)
GLUCOSE BLDC GLUCOMTR-MCNC: 261 MG/DL — HIGH (ref 70–99)
GLUCOSE BLDC GLUCOMTR-MCNC: 265 MG/DL — HIGH (ref 70–99)
GLUCOSE SERPL-MCNC: 139 MG/DL — HIGH (ref 70–99)
HCT VFR BLD CALC: 39.1 % — SIGNIFICANT CHANGE UP (ref 34.5–45)
HGB BLD-MCNC: 12.6 G/DL — SIGNIFICANT CHANGE UP (ref 11.5–15.5)
INR BLD: 2.27 RATIO — HIGH (ref 0.88–1.16)
MCHC RBC-ENTMCNC: 32.2 GM/DL — SIGNIFICANT CHANGE UP (ref 32–36)
MCHC RBC-ENTMCNC: 37 PG — HIGH (ref 27–34)
MCV RBC AUTO: 114.7 FL — HIGH (ref 80–100)
PLATELET # BLD AUTO: 319 K/UL — SIGNIFICANT CHANGE UP (ref 150–400)
POTASSIUM SERPL-MCNC: 4.3 MMOL/L — SIGNIFICANT CHANGE UP (ref 3.5–5.3)
POTASSIUM SERPL-SCNC: 4.3 MMOL/L — SIGNIFICANT CHANGE UP (ref 3.5–5.3)
PROTHROM AB SERPL-ACNC: 26.1 SEC — HIGH (ref 10–13.1)
RBC # BLD: 3.41 M/UL — LOW (ref 3.8–5.2)
RBC # FLD: 13 % — SIGNIFICANT CHANGE UP (ref 10.3–14.5)
SODIUM SERPL-SCNC: 139 MMOL/L — SIGNIFICANT CHANGE UP (ref 135–145)
WBC # BLD: 6.3 K/UL — SIGNIFICANT CHANGE UP (ref 3.8–10.5)
WBC # FLD AUTO: 6.3 K/UL — SIGNIFICANT CHANGE UP (ref 3.8–10.5)

## 2018-04-13 PROCEDURE — 99233 SBSQ HOSP IP/OBS HIGH 50: CPT

## 2018-04-13 RX ORDER — WARFARIN SODIUM 2.5 MG/1
2.5 TABLET ORAL ONCE
Qty: 0 | Refills: 0 | Status: COMPLETED | OUTPATIENT
Start: 2018-04-13 | End: 2018-04-13

## 2018-04-13 RX ADMIN — Medication 6: at 17:27

## 2018-04-13 RX ADMIN — Medication 1 DROP(S): at 17:28

## 2018-04-13 RX ADMIN — Medication 1 DROP(S): at 05:29

## 2018-04-13 RX ADMIN — Medication 2000 UNIT(S): at 05:29

## 2018-04-13 RX ADMIN — WARFARIN SODIUM 2.5 MILLIGRAM(S): 2.5 TABLET ORAL at 21:32

## 2018-04-13 RX ADMIN — Medication 125 MICROGRAM(S): at 05:29

## 2018-04-13 RX ADMIN — Medication 81 MILLIGRAM(S): at 12:04

## 2018-04-13 RX ADMIN — Medication 6: at 12:26

## 2018-04-13 RX ADMIN — Medication 2000 UNIT(S): at 17:27

## 2018-04-13 RX ADMIN — Medication 60 MILLIGRAM(S): at 17:27

## 2018-04-13 RX ADMIN — HYDROXYUREA 500 MILLIGRAM(S): 500 CAPSULE ORAL at 12:04

## 2018-04-13 RX ADMIN — ATORVASTATIN CALCIUM 40 MILLIGRAM(S): 80 TABLET, FILM COATED ORAL at 21:31

## 2018-04-13 NOTE — PROGRESS NOTE ADULT - ASSESSMENT
ASSESSMENT: 84-year-old right-handed Greek lady First evaluated at [facility] on 4/11/18 with dizziness. She was a somewhat difficult historian. She has a history of "3 strokes", beginning about 6 years prior to admission. Most of these previous strokes seemed to involve a facial palsy and probable dysarthria, from which she largely recovered. She has also had a DVT and has been continuing Coumadin. On 4/10/18 she developed dizziness/imbalance, nausea, vomiting and a left facial palsy. Her clinical syndrome may localize to the right kavitha but this is uncertain. Etiology is uncertain, but it's possible that she has had a small, MRI- negative infarct, most likely due to small vessel disease. She has severe, asymptomatic right carotid stenosis, and moderate asymptomatic left carotid stenosis. Given her age, suspect that she will not benefit from revascularization of her carotid arteries, either endarterectomy or stenting. I doubt that she will benefit from further neurovascular investigation.      NEURO: neurologically without acute change, slow titration to normotension, titrate home statin to LDL goal less than 70, MR imaging as noted above,  Physical therapy/OT eval pending.     ANTITHROMBOTIC THERAPY: Coumadin with INR goal 2-3 for history of DVT.     PULMONARY: CXR left basilar atelectasis encourage incentive spirometry, protecting airway, saturating well     CARDIOVASCULAR: check TTE, cardiac monitoring                              SBP goal: slow titration to normotension     GASTROINTESTINAL:  dysphagia screen failed, SLP eval for  D1 nectar diet and MBS      Diet: D1 Nectar     RENAL: BUN/Cr without acute change, good urine output      Na Goal: Greater than 135     Claudio:n    HEMATOLOGY: H/H without acute change, Platelets 336, LE duplex pending to confirm B/L DVT.     DVT ppx: Therapeutic on Coumadin (INR goal 2-3), mechanical ppx held at this time given concern for b/l DVT    ID: afebrile, no leukocytosis     OTHER: current medical condition and plan of care d/w patient at bedside, all questions answered and concerns addressed.     DISPOSITION: Rehab or home depending on PT eval once stable and workup is complete      CORE MEASURES:        Admission NIHSS: 4     TPA: [] YES [x] NO      LDL/HDL:123/59     Depression  : p     Statin Therapy: y      Dysphagia Screen: [] PASS [x] FAIL     Smoking [] YES [x] NO      Afib [] YES [x] NO     Stroke Education [] YES [] NO ASSESSMENT: 84-year-old right-handed Nigerian lady First evaluated at [facility] on 4/11/18 with dizziness. She was a somewhat difficult historian. She has a history of "3 strokes", beginning about 6 years prior to admission. Most of these previous strokes seemed to involve a facial palsy and probable dysarthria, from which she largely recovered. She has also had a DVT and has been continuing Coumadin. On 4/10/18 she developed dizziness/imbalance, nausea, vomiting and a left facial palsy. 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 She has severe, asymptomatic right carotid stenosis, and moderate asymptomatic left carotid stenosis. Given her age, suspect that she will not benefit from revascularization of her carotid arteries, either endarterectomy or stenting. I doubt that she will benefit from further neurovascular investigation.      NEURO: neurologically without acute change, slow titration to normotension, titrate home statin to LDL goal less than 70, MR imaging as noted above,  PT/OT  recommends rehab placement.     ANTITHROMBOTIC THERAPY: Coumadin with INR goal 2-3 for history of DVT. Prothrombin Time and INR, Plasma in AM (04.13.18 @ 07:55)      PULMONARY: CXR left basilar atelectasis encourage incentive spirometry, protecting airway, saturating well     CARDIOVASCULAR: check TTE, cardiac monitoring                              SBP goal: slow titration to normotension     GASTROINTESTINAL:  dysphagia screen failed, SLP eval for  D1 nectar diet and MBS      Diet: D1 Nectar     RENAL: BUN/Cr without acute change, good urine output      Na Goal: Greater than 135     Claudio:n    HEMATOLOGY: H/H without acute change, Platelets 336, LE duplex pending to confirm B/L DVT.     DVT ppx: Therapeutic on Coumadin (INR goal 2-3), mechanical ppx held at this time given concern for b/l DVT    ID: afebrile, no leukocytosis     OTHER: current medical condition and plan of care d/w patient at bedside, all questions answered and concerns addressed.     DISPOSITION: Rehab or home depending on PT eval once stable and workup is complete      CORE MEASURES:        Admission NIHSS: 4     TPA: [] YES [x] NO      LDL/HDL:123/59     Depression  : p     Statin Therapy: y      Dysphagia Screen: [] PASS [x] FAIL     Smoking [] YES [x] NO      Afib [] YES [x] NO     Stroke Education [] YES [] NO ASSESSMENT: 84-year-old right-handed [place]yanese lady First evaluated at [facility] on 4/11/18 with dizziness. She was a somewhat difficult historian. She has a history of "3 strokes", beginning about 6 years prior to admission. Most of these previous strokes seemed to involve a facial palsy and probable dysarthria, from which she largely recovered. She has also had a DVT and has been continuing Coumadin. On 4/10/18 she developed dizziness/imbalance, nausea, vomiting and a left facial palsy. 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 She has severe, asymptomatic right carotid stenosis, and moderate asymptomatic left carotid stenosis. Given her age, suspect that she will not benefit from revascularization of her carotid arteries, either endarterectomy or stenting. I doubt that she will benefit from further neurovascular investigation.      NEURO: neurologically without acute change, slow titration to normotension, titrate home statin to LDL goal less than 70, MR imaging as noted above,  PT/OT  recommends rehab placement.     ANTITHROMBOTIC THERAPY: Coumadin with INR goal 2-3 for history of DVT.       PULMONARY: CXR left basilar atelectasis encourage incentive spirometry, protecting airway, saturating well     CARDIOVASCULAR: check TTE, cardiac monitoring                              SBP goal: slow titration to normotension     GASTROINTESTINAL:  dysphagia screen failed, SLP eval for  D1 nectar diet and MBS      Diet: D1 Nectar     RENAL: BUN/Cr without acute change, good urine output      Na Goal: Greater than 135     Claudio: n     HEMATOLOGY: H/H without acute change,  LE duplex pending to confirm B/L DVT.     DVT ppx: Therapeutic on Coumadin (INR goal 2-3), mechanical ppx held at this time given concern for b/l DVT    ID: afebrile, no leukocytosis     OTHER: current medical condition and plan of care d/w patient at bedside, all questions answered and concerns addressed.     DISPOSITION: Rehab placement       CORE MEASURES:        Admission NIHSS: 4     TPA: [] YES [x] NO      LDL/HDL:123/59     Depression  : p     Statin Therapy: y      Dysphagia Screen: [] PASS [x] FAIL     Smoking [] YES [x] NO      Afib [] YES [x] NO     Stroke Education [] YES [] NO ASSESSMENT: 84-year-old right-handed [place] lady First evaluated at [facility] on 4/11/18 with dizziness. She was a somewhat difficult historian. She has a history of "3 strokes", beginning about 6 years prior to admission. Most of these previous strokes seemed to involve a facial palsy and probable dysarthria, from which she largely recovered. She has also had a DVT and has been continuing Coumadin. On 4/10/18 she developed dizziness/imbalance, nausea, vomiting and a left facial palsy. 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 She has severe, asymptomatic right carotid stenosis, and moderate asymptomatic left carotid stenosis. Given her age, suspect that she will not benefit from revascularization of her carotid arteries, either endarterectomy or stenting. I doubt that she will benefit from further neurovascular investigation.      NEURO: neurologically without acute change, clinical exam along with MRI findings suggestive of Bell's Palsy. Patient to be started on Prednisone taper starting at 60 mg daily x5 days   slow titration to normotension, titrate home statin to LDL goal less than 70, MR imaging as noted above,  PT/OT  recommends rehab placement.     ANTITHROMBOTIC THERAPY: Coumadin with INR goal 2-3 for history of DVT.       PULMONARY: CXR left basilar atelectasis encourage incentive spirometry, protecting airway, saturating well     CARDIOVASCULAR: check TTE, cardiac monitoring                              SBP goal: slow titration to normotension     GASTROINTESTINAL:  dysphagia screen failed, SLP eval for  D1 nectar diet and MBS      Diet: D1 Nectar     RENAL: BUN/Cr without acute change, good urine output      Na Goal: Greater than 135     Claudio: n     HEMATOLOGY: H/H without acute change,  LE duplex pending to confirm B/L DVT.     DVT ppx: Therapeutic on Coumadin (INR goal 2-3), mechanical ppx held at this time given concern for b/l DVT    ID: afebrile, no leukocytosis     OTHER: current medical condition and plan of care d/w patient at bedside, all questions answered and concerns addressed.     DISPOSITION: Rehab placement       CORE MEASURES:        Admission NIHSS: 4     TPA: [] YES [x] NO      LDL/HDL:123/59     Depression  : p     Statin Therapy: y      Dysphagia Screen: [] PASS [x] FAIL     Smoking [] YES [x] NO      Afib [] YES [x] NO     Stroke Education [] YES [] NO ASSESSMENT: 84-year-old right-handed [place]i lady First evaluated at [facility] on 4/11/18 with dizziness. She was a somewhat difficult historian. She has a history of "3 strokes", beginning about 6 years prior to admission. Most of these previous strokes seemed to involve a facial palsy and probable dysarthria, from which she largely recovered. She has also had a DVT and has been continuing Coumadin. On 4/10/18 she developed dizziness/imbalance, nausea, vomiting and a left facial palsy. 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 She has severe, asymptomatic right carotid stenosis, and moderate asymptomatic left carotid stenosis. Given her age, suspect that she will not benefit from revascularization of her carotid arteries, either endarterectomy or stenting. I doubt that she will benefit from further neurovascular investigation.      NEURO: neurologically without acute change, clinical exam along with MRI findings suggestive of Bell's Palsy. Patient to be started on Prednisone taper starting at 60 mg daily x5 days with vigilant to attention glucose control   slow titration to normotension, titrate home statin to LDL goal less than 70, MR imaging as noted above,  PT/OT  recommends rehab placement.     ANTITHROMBOTIC THERAPY: Coumadin with INR goal 2-3 for history of DVT.       PULMONARY: CXR left basilar atelectasis encourage incentive spirometry, protecting airway, saturating well     CARDIOVASCULAR: check TTE, cardiac monitoring                              SBP goal: slow titration to normotension     GASTROINTESTINAL:  dysphagia screen failed, SLP eval for  D1 nectar diet and MBS      Diet: D1 Nectar     RENAL: BUN/Cr without acute change, good urine output      Na Goal: Greater than 135     Claudio: n     HEMATOLOGY: H/H without acute change,  LE duplex pending to confirm B/L DVT.     DVT ppx: Therapeutic on Coumadin (INR goal 2-3), mechanical ppx held at this time given concern for b/l DVT    ID: afebrile, no leukocytosis     OTHER: current medical condition and plan of care d/w patient at bedside, all questions answered and concerns addressed.     DISPOSITION: Rehab placement       CORE MEASURES:        Admission NIHSS: 4     TPA: [] YES [x] NO      LDL/HDL:123/59     Depression  : p     Statin Therapy: y      Dysphagia Screen: [] PASS [x] FAIL     Smoking [] YES [x] NO      Afib [] YES [x] NO     Stroke Education [] YES [] NO ASSESSMENT: 84-year-old right-handed Austrian lady First evaluated at [facility] on 4/11/18 with dizziness. She was a somewhat difficult historian. She has a history of "3 strokes", beginning about 6 years prior to admission. Most of these previous strokes seemed to involve a facial palsy and probable dysarthria, from which she largely recovered. She has also had a DVT and has been continuing Coumadin. On 4/10/18 she developed dizziness/imbalance, nausea, vomiting and a left facial palsy. 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 She has severe, asymptomatic right carotid stenosis, and moderate asymptomatic left carotid stenosis. Given her age, suspect that she will not benefit from revascularization of her carotid arteries, either endarterectomy or stenting. I doubt that she will benefit from further neurovascular investigation.      NEURO: neurologically without acute change, clinical exam along with MRI findings suggestive of Bell's Palsy. Patient to be started on Prednisone taper starting at 60 mg daily x5 days with vigilant to attention glucose control   slow titration to normotension, titrate home statin to LDL goal less than 70, MR imaging as noted above,  PT/OT  recommends rehab placement.     ANTITHROMBOTIC THERAPY: [facility] Duplex (-) for DVT's will d/c Coumadin and start Aspirin 81 mg daily.       PULMONARY: CXR left basilar atelectasis encourage incentive spirometry, protecting airway, saturating well     CARDIOVASCULAR: check TTE, cardiac monitoring                              SBP goal: slow titration to normotension     GASTROINTESTINAL:  dysphagia screen failed, SLP eval for  D1 nectar diet and MBS      Diet: D1 Nectar     RENAL: BUN/Cr without acute change, good urine output      Na Goal: Greater than 135     Claudio: n     HEMATOLOGY: H/H without acute change,  LE duplex pending to confirm B/L DVT.     DVT ppx: Therapeutic on Coumadin (INR goal 2-3), mechanical ppx held at this time given concern for b/l DVT    ID: afebrile, no leukocytosis     OTHER: current medical condition and plan of care d/w patient at bedside, all questions answered and concerns addressed.     DISPOSITION: Rehab placement       CORE MEASURES:        Admission NIHSS: 4     TPA: [] YES [x] NO      LDL/HDL:123/59     Depression  : p     Statin Therapy: y      Dysphagia Screen: [] PASS [x] FAIL     Smoking [] YES [x] NO      Afib [] YES [x] NO     Stroke Education [] YES [] NO ASSESSMENT: 84-year-old right-handed [place]e lady First evaluated at [facility] on 4/11/18 with dizziness. She was a somewhat difficult historian. She has a history of "3 strokes", beginning about 6 years prior to admission. Most of these previous strokes seemed to involve a facial palsy and probable dysarthria, from which she largely recovered. She has also had a DVT and has been continuing Coumadin. On 4/10/18 she developed dizziness/imbalance, nausea, vomiting and a left facial palsy. 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 She has severe, asymptomatic right carotid stenosis, and moderate asymptomatic left carotid stenosis. Given her age, suspect that she will not benefit from revascularization of her carotid arteries, either endarterectomy or stenting. I doubt that she will benefit from further neurovascular investigation.      NEURO: neurologically without acute change, clinical exam along with MRI findings suggestive of Bell's Palsy. At this point, highly doubt stroke. Patient to be started on Prednisone taper starting at 60 mg daily x5 days with vigilant to attention glucose control   slow titration to normotension, titrate home statin to LDL goal less than 70, MR imaging as noted above,  PT/OT  recommends rehab placement.     ANTITHROMBOTIC THERAPY: VA Duplex (-) for DVT's will d/c Coumadin and start Aspirin 81 mg daily.       PULMONARY: CXR left basilar atelectasis encourage incentive spirometry, protecting airway, saturating well     CARDIOVASCULAR: check TTE, cardiac monitoring                              SBP goal: slow titration to normotension     GASTROINTESTINAL:  dysphagia screen failed, SLP eval for  D1 nectar diet and MBS      Diet: D1 Nectar     RENAL: BUN/Cr without acute change, good urine output      Na Goal: Greater than 135     Claudio: n     HEMATOLOGY: H/H without acute change,  LE duplex pending to confirm B/L DVT.     DVT ppx: Therapeutic on Coumadin (INR goal 2-3), mechanical ppx held at this time given concern for b/l DVT    ID: afebrile, no leukocytosis     OTHER: current medical condition and plan of care d/w patient at bedside, all questions answered and concerns addressed.     DISPOSITION: Rehab placement       CORE MEASURES:        Admission NIHSS: 4     TPA: [] YES [x] NO      LDL/HDL:123/59     Depression  : p     Statin Therapy: y      Dysphagia Screen: [] PASS [x] FAIL     Smoking [] YES [x] NO      Afib [] YES [x] NO     Stroke Education [] YES [] NO

## 2018-04-13 NOTE — PROGRESS NOTE ADULT - SUBJECTIVE AND OBJECTIVE BOX
THE PATIENT WAS SEEN AND EXAMINED BY ME WITH THE HOUSESTAFF AND STROKE TEAM DURING MORNING ROUNDS.   HPI:  84 RH [place] female presenting with left facial droop, vertigo, nausea, vomiting. Patient has PMH CVA (residual left sided weakness), DVT on coumadin, Polycythemia vera Hypothyroid, SBO, HLD, DM. LKN last night 4/9 at 11 PM, prior to going to sleep. Around 2am patient woke up and felt like the room was spinning. In the morning, daughter noted new left facial weakness. Per daughter, face presently does not look significantly different than baseline (it appears different compared to recent pictures). Patient reports generalized weakness and had difficulty with gait requiring assistance at baseline (recently at rehab). Denies numbness, visual changes, abdominal pain, vertigo. Had bowel movement this am, Endorses nausea/vomiting. (10 Apr 2018 09:25)      SUBJECTIVE: No events overnight.  No new neurologic complaints.      acetaminophen   Tablet 650 milliGRAM(s) Oral every 6 hours PRN  artificial  tears Solution 1 Drop(s) Both EYES two times a day  aspirin  chewable 81 milliGRAM(s) Oral daily  atorvastatin 40 milliGRAM(s) Oral at bedtime  cholecalciferol 2000 Unit(s) Oral two times a day  dextrose 5%. 1000 milliLiter(s) IV Continuous <Continuous>  dextrose 50% Injectable 12.5 Gram(s) IV Push once  dextrose 50% Injectable 25 Gram(s) IV Push once  dextrose 50% Injectable 25 Gram(s) IV Push once  dextrose Gel 1 Dose(s) Oral once PRN  glucagon  Injectable 1 milliGRAM(s) IntraMuscular once PRN  hydroxyurea 500 milliGRAM(s) Oral daily  insulin lispro (HumaLOG) corrective regimen sliding scale   SubCutaneous three times a day before meals  insulin lispro (HumaLOG) corrective regimen sliding scale   SubCutaneous at bedtime  levothyroxine 125 MICROGram(s) Oral daily  lisinopril 40 milliGRAM(s) Oral daily  warfarin 2.5 milliGRAM(s) Oral once      PHYSICAL EXAM:   Vital Signs Last 24 Hrs  T(C): 36.9 (13 Apr 2018 04:23), Max: 36.9 (13 Apr 2018 04:23)  T(F): 98.4 (13 Apr 2018 04:23), Max: 98.4 (13 Apr 2018 04:23)  HR: 73 (13 Apr 2018 05:25) (73 - 89)  BP: 105/57 (13 Apr 2018 05:25) (101/61 - 138/75)  BP(mean): --  RR: 18 (13 Apr 2018 04:23) (18 - 18)  SpO2: 96% (13 Apr 2018 04:23) (95% - 99%)    General: No acute distress  HEENT: EOM intact, visual fields full  Abdomen: Soft, nontender, nondistended   Extremities: No edema    NEUROLOGICAL EXAM:  Mental status: Awake, alert, oriented x3, no aphasia, no neglect, normal memory   Cranial Nerves: No facial asymmetry, no nystagmus, no dysarthria,  tongue midline  Motor exam: Normal tone, no drift, 5/5 RUE, 5/5 RLE, 5/5 LUE, 5/5 LLE, normal fine finger movements.  Sensation: Intact to light touch   Coordination/ Gait: No dysmetria, ALAN intact and symmetric bilaterally    LABS:                        12.6   6.30  )-----------( 319      ( 13 Apr 2018 07:44 )             39.1    04-13    139  |  106  |  30<H>  ----------------------------<  139<H>  4.3   |  19<L>  |  0.88    Ca    9.5      13 Apr 2018 05:58    TPro  7.7  /  Alb  3.5  /  TBili  0.6  /  DBili  x   /  AST  13  /  ALT  16  /  AlkPhos  65  04-11  PT/INR - ( 13 Apr 2018 07:55 )   PT: 26.1 sec;   INR: 2.27 ratio         PTT - ( 11 Apr 2018 10:22 )  PTT:30.8 sec  Hemoglobin A1C, Whole Blood: 7.0 % (04-11 @ 13:31)      IMAGING: Reviewed by me. THE PATIENT WAS SEEN AND EXAMINED BY ME WITH THE HOUSESTAFF AND STROKE TEAM DURING MORNING ROUNDS.   HPI:  84 RH [place] female presenting with left facial droop, vertigo, nausea, vomiting. Patient has PMH CVA (residual left sided weakness), DVT on coumadin, Polycythemia vera Hypothyroid, SBO, HLD, DM. LKN last night 4/9 at 11 PM, prior to going to sleep. Around 2am patient woke up and felt like the room was spinning. In the morning, daughter noted new left facial weakness. Per daughter, face presently does not look significantly different than baseline (it appears different compared to recent pictures). Patient reports generalized weakness and had difficulty with gait requiring assistance at baseline (recently at rehab). Denies numbness, visual changes, abdominal pain, vertigo. Had bowel movement this am, Endorses nausea/vomiting. (10 Apr 2018 09:25)      SUBJECTIVE: No events overnight.  No new neurologic complaints.      acetaminophen   Tablet 650 milliGRAM(s) Oral every 6 hours PRN  artificial  tears Solution 1 Drop(s) Both EYES two times a day  aspirin  chewable 81 milliGRAM(s) Oral daily  atorvastatin 40 milliGRAM(s) Oral at bedtime  cholecalciferol 2000 Unit(s) Oral two times a day  dextrose 5%. 1000 milliLiter(s) IV Continuous <Continuous>  dextrose 50% Injectable 12.5 Gram(s) IV Push once  dextrose 50% Injectable 25 Gram(s) IV Push once  dextrose 50% Injectable 25 Gram(s) IV Push once  dextrose Gel 1 Dose(s) Oral once PRN  glucagon  Injectable 1 milliGRAM(s) IntraMuscular once PRN  hydroxyurea 500 milliGRAM(s) Oral daily  insulin lispro (HumaLOG) corrective regimen sliding scale   SubCutaneous three times a day before meals  insulin lispro (HumaLOG) corrective regimen sliding scale   SubCutaneous at bedtime  levothyroxine 125 MICROGram(s) Oral daily  lisinopril 40 milliGRAM(s) Oral daily  warfarin 2.5 milliGRAM(s) Oral once      PHYSICAL EXAM:   Vital Signs Last 24 Hrs  T(C): 36.9 (13 Apr 2018 04:23), Max: 36.9 (13 Apr 2018 04:23)  T(F): 98.4 (13 Apr 2018 04:23), Max: 98.4 (13 Apr 2018 04:23)  HR: 73 (13 Apr 2018 05:25) (73 - 89)  BP: 105/57 (13 Apr 2018 05:25) (101/61 - 138/75)  BP(mean): --  RR: 18 (13 Apr 2018 04:23) (18 - 18)  SpO2: 96% (13 Apr 2018 04:23) (95% - 99%)    General: No acute distress  HEENT: EOM intact, visual fields full  Abdomen: Soft, nontender, nondistended   Extremities: No edema    NEUROLOGICAL EXAM:  Mental status: Awake, alert, oriented x3, no aphasia, no neglect, normal memory   Cranial Nerves: left facial palsy, + clear involvement of the of the left Frontalis muscle ,no nystagmus,  dysarthria,  tongue midline  Motor exam: moved all extremities well against gravity without drift; very mild action tremor of her arms  Sensation:  mildly decreased temperature sensation over her right face, arm and leg  Coordination/ Gait: No dysmetria   LABS:                        12.6   6.30  )-----------( 319      ( 13 Apr 2018 07:44 )             39.1    04-13    139  |  106  |  30<H>  ----------------------------<  139<H>  4.3   |  19<L>  |  0.88    Ca    9.5      13 Apr 2018 05:58    TPro  7.7  /  Alb  3.5  /  TBili  0.6  /  DBili  x   /  AST  13  /  ALT  16  /  AlkPhos  65  04-11  PT/INR - ( 13 Apr 2018 07:55 )   PT: 26.1 sec;   INR: 2.27 ratio         PTT - ( 11 Apr 2018 10:22 )  PTT:30.8 sec  Hemoglobin A1C, Whole Blood: 7.0 % (04-11 @ 13:31)      IMAGING: Reviewed by me.     CT Angio Neck w/ IV Cont (04.10.18)   CTA head: The arteries of the [place] are normal in caliber.   There is no aneurysm.    CTA neck: Suspicion of progressive right ICA stenosis which on prior   Doppler of 6/11/2014 was in the 50-69% range and now measures 87% by   NASCET criteria with dense atherosclerotic calcification seen. Left ICA   with a roughly 62% stenosis by NASCET criteria which appears to be a   progression compared with the patient's prior study as well. Recommend   correlation with ultrasound for further evaluation       CT Brain Stroke Protocol (04.10.18)   No acute hemorrhage. Patchy hypoattenuation within the white matter,   likely representing chronic microvascular changes. Unchanged   calcification of the bilateral globus pallidus and dentate nuclei within   the cerebellum.     MR Head w/wo IV Cont (04.10.18)   No acute infarct or hemorrhage.  Fairly extensive periventricular white matter and pontine microvascular   ischemic change with scattered chronic white matter infarcts and a tiny   chronic right pontine infarct.  Subtle enhancement of the left facial nerve labyrinthine segment with   extension to the geniculate ganglion on may represent Bell palsy.. THE PATIENT WAS SEEN AND EXAMINED BY ME WITH THE HOUSESTAFF AND STROKE TEAM DURING MORNING ROUNDS.   HPI:  84 RH [place] female presenting with left facial droop, vertigo, nausea, vomiting. Patient has PMH CVA (residual left sided weakness), DVT on coumadin, Polycythemia vera Hypothyroid, SBO, HLD, DM. LKN last night 4/9 at 11 PM, prior to going to sleep. Around 2am patient woke up and felt like the room was spinning. In the morning, daughter noted new left facial weakness. Per daughter, face presently does not look significantly different than baseline (it appears different compared to recent pictures). Patient reports generalized weakness and had difficulty with gait requiring assistance at baseline (recently at rehab). Denies numbness, visual changes, abdominal pain, vertigo. Had bowel movement this am,       SUBJECTIVE: No events overnight.  No new neurologic complaints.      acetaminophen   Tablet 650 milliGRAM(s) Oral every 6 hours PRN  artificial  tears Solution 1 Drop(s) Both EYES two times a day  aspirin  chewable 81 milliGRAM(s) Oral daily  atorvastatin 40 milliGRAM(s) Oral at bedtime  cholecalciferol 2000 Unit(s) Oral two times a day  dextrose 5%. 1000 milliLiter(s) IV Continuous <Continuous>  dextrose 50% Injectable 12.5 Gram(s) IV Push once  dextrose 50% Injectable 25 Gram(s) IV Push once  dextrose 50% Injectable 25 Gram(s) IV Push once  dextrose Gel 1 Dose(s) Oral once PRN  glucagon  Injectable 1 milliGRAM(s) IntraMuscular once PRN  hydroxyurea 500 milliGRAM(s) Oral daily  insulin lispro (HumaLOG) corrective regimen sliding scale   SubCutaneous three times a day before meals  insulin lispro (HumaLOG) corrective regimen sliding scale   SubCutaneous at bedtime  levothyroxine 125 MICROGram(s) Oral daily  lisinopril 40 milliGRAM(s) Oral daily  warfarin 2.5 milliGRAM(s) Oral once      PHYSICAL EXAM:   Vital Signs Last 24 Hrs  T(C): 36.9 (13 Apr 2018 04:23), Max: 36.9 (13 Apr 2018 04:23)  T(F): 98.4 (13 Apr 2018 04:23), Max: 98.4 (13 Apr 2018 04:23)  HR: 73 (13 Apr 2018 05:25) (73 - 89)  BP: 105/57 (13 Apr 2018 05:25) (101/61 - 138/75)  BP(mean): --  RR: 18 (13 Apr 2018 04:23) (18 - 18)  SpO2: 96% (13 Apr 2018 04:23) (95% - 99%)    General: No acute distress  HEENT: EOM intact, visual fields full  Abdomen: Soft, nontender, nondistended   Extremities: No edema    NEUROLOGICAL EXAM:  Mental status: Awake, alert, oriented x3, no aphasia, no neglect, normal memory   Cranial Nerves: left facial palsy, + clear involvement of the of the left Frontalis muscle ,no nystagmus,  dysarthria,  tongue midline  Motor exam: moved all extremities well against gravity without drift; very mild action tremor of her arms  Sensation:  mildly decreased temperature sensation over her right face, arm and leg  Coordination/ Gait: No dysmetria   LABS:                        12.6   6.30  )-----------( 319      ( 13 Apr 2018 07:44 )             39.1    04-13    139  |  106  |  30<H>  ----------------------------<  139<H>  4.3   |  19<L>  |  0.88    Ca    9.5      13 Apr 2018 05:58    TPro  7.7  /  Alb  3.5  /  TBili  0.6  /  DBili  x   /  AST  13  /  ALT  16  /  AlkPhos  65  04-11  PT/INR - ( 13 Apr 2018 07:55 )   PT: 26.1 sec;   INR: 2.27 ratio         PTT - ( 11 Apr 2018 10:22 )  PTT:30.8 sec  Hemoglobin A1C, Whole Blood: 7.0 % (04-11 @ 13:31)      IMAGING: Reviewed by me.     CT Angio Neck w/ IV Cont (04.10.18)   CTA head: The arteries of the [place] are normal in caliber.   There is no aneurysm.    CTA neck: Suspicion of progressive right ICA stenosis which on prior   Doppler of 6/11/2014 was in the 50-69% range and now measures 87% by   NASCET criteria with dense atherosclerotic calcification seen. Left ICA   with a roughly 62% stenosis by NASCET criteria which appears to be a   progression compared with the patient's prior study as well. Recommend   correlation with ultrasound for further evaluation       CT Brain Stroke Protocol (04.10.18)   No acute hemorrhage. Patchy hypoattenuation within the white matter,   likely representing chronic microvascular changes. Unchanged   calcification of the bilateral globus pallidus and dentate nuclei within   the cerebellum.     MR Head w/wo IV Cont (04.10.18)   No acute infarct or hemorrhage.  Fairly extensive periventricular white matter and pontine microvascular   ischemic change with scattered chronic white matter infarcts and a tiny   chronic right pontine infarct.  Subtle enhancement of the left facial nerve labyrinthine segment with   extension to the geniculate ganglion on may represent Bell palsy..

## 2018-04-14 LAB
GLUCOSE BLDC GLUCOMTR-MCNC: 197 MG/DL — HIGH (ref 70–99)
GLUCOSE BLDC GLUCOMTR-MCNC: 233 MG/DL — HIGH (ref 70–99)
GLUCOSE BLDC GLUCOMTR-MCNC: 304 MG/DL — HIGH (ref 70–99)
GLUCOSE BLDC GLUCOMTR-MCNC: 346 MG/DL — HIGH (ref 70–99)

## 2018-04-14 PROCEDURE — 99233 SBSQ HOSP IP/OBS HIGH 50: CPT

## 2018-04-14 RX ORDER — ENOXAPARIN SODIUM 100 MG/ML
40 INJECTION SUBCUTANEOUS DAILY
Qty: 0 | Refills: 0 | Status: DISCONTINUED | OUTPATIENT
Start: 2018-04-14 | End: 2018-04-20

## 2018-04-14 RX ADMIN — Medication 1 DROP(S): at 05:40

## 2018-04-14 RX ADMIN — Medication 1 DROP(S): at 17:50

## 2018-04-14 RX ADMIN — Medication 125 MICROGRAM(S): at 05:40

## 2018-04-14 RX ADMIN — Medication 2000 UNIT(S): at 17:50

## 2018-04-14 RX ADMIN — Medication 8: at 12:45

## 2018-04-14 RX ADMIN — LISINOPRIL 40 MILLIGRAM(S): 2.5 TABLET ORAL at 05:40

## 2018-04-14 RX ADMIN — ENOXAPARIN SODIUM 40 MILLIGRAM(S): 100 INJECTION SUBCUTANEOUS at 21:22

## 2018-04-14 RX ADMIN — HYDROXYUREA 500 MILLIGRAM(S): 500 CAPSULE ORAL at 12:30

## 2018-04-14 RX ADMIN — Medication 2000 UNIT(S): at 05:40

## 2018-04-14 RX ADMIN — Medication 60 MILLIGRAM(S): at 05:40

## 2018-04-14 RX ADMIN — Medication 81 MILLIGRAM(S): at 12:30

## 2018-04-14 RX ADMIN — Medication 2: at 08:30

## 2018-04-14 RX ADMIN — ATORVASTATIN CALCIUM 40 MILLIGRAM(S): 80 TABLET, FILM COATED ORAL at 21:22

## 2018-04-14 RX ADMIN — Medication 8: at 17:50

## 2018-04-14 NOTE — PROGRESS NOTE ADULT - ASSESSMENT
ASSESSMENT: 84-year-old right-handed [place] lady First evaluated at [facility] on 4/11/18 with dizziness. She was a somewhat difficult historian. She has a history of "3 strokes", beginning about 6 years prior to admission. Most of these previous strokes seemed to involve a facial palsy and probable dysarthria, from which she largely recovered. She has also had a DVT and has been continuing Coumadin. On 4/10/18 she developed dizziness/imbalance, nausea, vomiting and a left facial palsy. 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 She has severe, asymptomatic right carotid stenosis, and moderate asymptomatic left carotid stenosis. Given her age, suspect that she will not benefit from revascularization of her carotid arteries, either endarterectomy or stenting. I doubt that she will benefit from further neurovascular investigation.      NEURO: neurologically without acute change, clinical exam along with MRI findings suggestive of Bell's Palsy. At this point, highly doubt stroke. Patient to be started on Prednisone taper starting at 60 mg daily x5 days with vigilant to attention glucose control   slow titration to normotension, titrate home statin to LDL goal less than 70, MR imaging as noted above,  PT/OT  recommends rehab placement.     ANTITHROMBOTIC THERAPY: VA Duplex (-) for DVT's will d/c Coumadin and start Aspirin 81 mg daily.       PULMONARY: CXR left basilar atelectasis encourage incentive spirometry, protecting airway, saturating well     CARDIOVASCULAR: check TTE, cardiac monitoring                              SBP goal: slow titration to normotension     GASTROINTESTINAL:  dysphagia screen failed, SLP eval for  D1 nectar diet and MBS      Diet: D1 Nectar     RENAL: BUN/Cr without acute change, good urine output      Na Goal: Greater than 135     Claudio: n     HEMATOLOGY: H/H without acute change,  LE duplex pending to confirm B/L DVT.     DVT ppx: Therapeutic on Coumadin (INR goal 2-3), mechanical ppx held at this time given concern for b/l DVT    ID: afebrile, no leukocytosis     OTHER: current medical condition and plan of care d/w patient at bedside, all questions answered and concerns addressed.     DISPOSITION: Rehab placement       CORE MEASURES:        Admission NIHSS: 4     TPA: [] YES [x] NO      LDL/HDL:123/59     Depression  : p     Statin Therapy: y      Dysphagia Screen: [] PASS [x] FAIL     Smoking [] YES [x] NO      Afib [] YES [x] NO     Stroke Education [] YES [] NO ASSESSMENT: 84-year-old right-handed [place] lady First evaluated at [facility] on 4/11/18 with dizziness. She was a somewhat difficult historian. She has a history of "3 strokes", beginning about 6 years prior to admission. Most of these previous strokes seemed to involve a facial palsy and probable dysarthria, from which she largely recovered. She has also had a DVT and has been continuing Coumadin. On 4/10/18 she developed dizziness/imbalance, nausea, vomiting and a left facial palsy. 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 She has severe, asymptomatic right carotid stenosis, and moderate asymptomatic left carotid stenosis. Given her age, suspect that she will not benefit from revascularization of her carotid arteries, either endarterectomy or stenting. I doubt that she will benefit from further neurovascular investigation.      NEURO: neurologically without acute change, clinical exam along with MRI findings suggestive of Bell's Palsy. At this point, highly doubt stroke. Patient to be started on Prednisone taper starting at 60 mg daily x5 days with vigilant to attention glucose control, slow titration to normotension, titrate home statin to LDL goal less than 70, MR imaging as noted above,  PT/OT  recommends rehab placement.     ANTITHROMBOTIC THERAPY: VA Duplex (-) for DVT's will d/c Coumadin and start Aspirin 81 mg daily.       PULMONARY: CXR left basilar atelectasis encourage incentive spirometry, protecting airway, saturating well     CARDIOVASCULAR: penidng TTE or may be done as outpatient, cardiac monitoring                              SBP goal: slow titration to normotension     GASTROINTESTINAL:  dysphagia screen failed, SLP eval for  D1 nectar diet      Diet: D1 Nectar     RENAL: BUN/Cr without acute change, good urine output      Na Goal: Greater than 135     Claudio: n     HEMATOLOGY: H/H without acute change,  LE duplex pending negative for DVT, coumadin stopped, started on ASA and chemical dvt ppx.     DVT ppx: lovenox    ID: afebrile, no leukocytosis     OTHER: current medical condition and plan of care d/w patient at bedside, all questions answered and concerns addressed.     DISPOSITION: Rehab placement pending.       CORE MEASURES:        Admission NIHSS: 4     TPA: [] YES [x] NO      LDL/HDL:123/59     Depression  : p     Statin Therapy: y      Dysphagia Screen: [] PASS [x] FAIL     Smoking [] YES [x] NO      Afib [] YES [x] NO     Stroke Education [] YES [] NO ASSESSMENT: 84-year-old right-handed [place] lady First evaluated at [facility] on 4/11/18 with dizziness. She was a somewhat difficult historian. She has a history of "3 strokes", beginning about 6 years prior to admission. Most of these previous strokes seemed to involve a facial palsy and probable dysarthria, from which she largely recovered. She has also had a DVT and has been continuing Coumadin. On 4/10/18 she developed dizziness/imbalance, nausea, vomiting and a left facial palsy. 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 She has severe, asymptomatic right carotid stenosis, and moderate asymptomatic left carotid stenosis. Given her age, suspect that she will not benefit from revascularization of her carotid arteries, either endarterectomy or stenting. I doubt that she will benefit from further neurovascular investigation.      NEURO: neurologically without acute change, clinical exam along with MRI findings suggestive of Bell's Palsy. no evidence of acute infarction on MRI. Patient to be started on Prednisone taper starting at 60 mg daily x5 days with vigilant to attention glucose control, slow titration to normotension, titrate home statin to LDL goal less than 70, MR imaging as noted above,  PT/OT  recommends rehab placement.     ANTITHROMBOTIC THERAPY: VA Duplex (-) for DVT's will d/c Coumadin and start Aspirin 81 mg daily.       PULMONARY: CXR left basilar atelectasis encourage incentive spirometry, protecting airway, saturating well     CARDIOVASCULAR: penidng TTE or may be done as outpatient, cardiac monitoring                              SBP goal: slow titration to normotension     GASTROINTESTINAL:  dysphagia screen failed, SLP eval for  D1 nectar diet      Diet: D1 Nectar     RENAL: BUN/Cr without acute change, good urine output      Na Goal: Greater than 135     Claudio: n     HEMATOLOGY: H/H without acute change,  LE duplex pending negative for DVT, coumadin stopped, started on ASA and chemical dvt ppx.     DVT ppx: lovenox    ID: afebrile, no leukocytosis     OTHER: current medical condition and plan of care d/w patient at bedside, all questions answered and concerns addressed.     DISPOSITION: Rehab placement pending.       CORE MEASURES:        Admission NIHSS: 4     TPA: [] YES [x] NO      LDL/HDL:123/59     Depression  : p     Statin Therapy: y      Dysphagia Screen: [] PASS [x] FAIL     Smoking [] YES [x] NO      Afib [] YES [x] NO     Stroke Education [] YES [] NO

## 2018-04-14 NOTE — PROGRESS NOTE ADULT - SUBJECTIVE AND OBJECTIVE BOX
THE PATIENT WAS SEEN AND EXAMINED BY ME WITH THE HOUSESTAFF AND STROKE TEAM DURING MORNING ROUNDS.   HPI:  84 RH [place] female presented with left facial droop, vertigo, nausea, vomiting. Patient has PMH Stroke (residual left sided weakness), DVT on coumadin, Polycythemia vera Hypothyroid, SBO, HLD, DM. LKN last night 4/9 at 11 PM, prior to going to sleep. Around 2am morning of admission patient woke up and felt like the room was spinning. In the morning, daughter noted new left facial weakness. Per daughter, face presently does not look significantly different than baseline (it appears different compared to recent pictures). Patient reported generalized weakness and had difficulty with gait requiring assistance at baseline (recently at rehab). Denied numbness, visual changes, abdominal pain, vertigo. Had bowel movement this am, Endorses nausea/vomiting.      SUBJECTIVE: No events overnight.  No new neurologic complaints.      acetaminophen   Tablet 650 milliGRAM(s) Oral every 6 hours PRN  artificial  tears Solution 1 Drop(s) Both EYES two times a day  aspirin  chewable 81 milliGRAM(s) Oral daily  atorvastatin 40 milliGRAM(s) Oral at bedtime  cholecalciferol 2000 Unit(s) Oral two times a day  dextrose 5%. 1000 milliLiter(s) IV Continuous <Continuous>  dextrose 50% Injectable 12.5 Gram(s) IV Push once  dextrose 50% Injectable 25 Gram(s) IV Push once  dextrose 50% Injectable 25 Gram(s) IV Push once  dextrose Gel 1 Dose(s) Oral once PRN  glucagon  Injectable 1 milliGRAM(s) IntraMuscular once PRN  hydroxyurea 500 milliGRAM(s) Oral daily  insulin lispro (HumaLOG) corrective regimen sliding scale   SubCutaneous three times a day before meals  insulin lispro (HumaLOG) corrective regimen sliding scale   SubCutaneous at bedtime  levothyroxine 125 MICROGram(s) Oral daily  lisinopril 40 milliGRAM(s) Oral daily  predniSONE   Tablet 60 milliGRAM(s) Oral daily      PHYSICAL EXAM:   Vital Signs Last 24 Hrs  T(C): 36.4 (14 Apr 2018 05:30), Max: 36.8 (13 Apr 2018 15:30)  T(F): 97.5 (14 Apr 2018 05:30), Max: 98.3 (13 Apr 2018 15:30)  HR: 80 (14 Apr 2018 05:30) (74 - 90)  BP: 113/73 (14 Apr 2018 05:30) (104/60 - 152/84)  BP(mean): --  RR: 18 (14 Apr 2018 05:30) (18 - 18)  SpO2: 95% (14 Apr 2018 05:30) (95% - 97%)    General: No acute distress  HEENT: EOM intact, visual fields full  Abdomen: Soft, nontender, nondistended   Extremities: No edema    NEUROLOGICAL EXAM: Mental status: Awake, alert, oriented x3, no aphasia, no neglect, normal memory   Cranial Nerves: left facial palsy, no nystagmus,  dysarthria,  tongue midline  Motor exam: moved all extremities well against gravity without drift; very mild action tremor of her arms  Sensation:  mildly decreased temperature sensation over her right face, arm and leg  Coordination/ Gait: No dysmetria     LABS:                        12.6   6.30  )-----------( 319      ( 13 Apr 2018 07:44 )             39.1    04-13    139  |  106  |  30<H>  ----------------------------<  139<H>  4.3   |  19<L>  |  0.88    Ca    9.5      13 Apr 2018 05:58    PT/INR - ( 13 Apr 2018 07:55 )   PT: 26.1 sec;   INR: 2.27 ratio           Hemoglobin A1C, Whole Blood: 7.0 % (04-11 @ 13:31)      IMAGING: Reviewed by me.   CTA neck: Suspicion of progressive right ICA stenosis which on prior   Doppler of 6/11/2014 was in the 50-69% range and now measures 87% by   NASCET criteria with dense atherosclerotic calcification seen. Left ICA   with a roughly 62% stenosis by NASCET criteria which appears to be a   progression compared with the patient's prior study as well. Recommend   correlation with ultrasound for further evaluation       CT Brain Stroke Protocol (04.10.18)   No acute hemorrhage. Patchy hypoattenuation within the white matter,   likely representing chronic microvascular changes. Unchanged   calcification of the bilateral globus pallidus and dentate nuclei within   the cerebellum.     MR Head w/wo IV Cont (04.10.18)   No acute infarct or hemorrhage.  Fairly extensive periventricular white matter and pontine microvascular   ischemic change with scattered chronic white matter infarcts and a tiny   chronic right pontine infarct.  Subtle enhancement of the left facial nerve labyrinthine segment with   extension to the geniculate ganglion on may represent Bell palsy..

## 2018-04-15 ENCOUNTER — TRANSCRIPTION ENCOUNTER (OUTPATIENT)
Age: 83
End: 2018-04-15

## 2018-04-15 DIAGNOSIS — M81.0 AGE-RELATED OSTEOPOROSIS WITHOUT CURRENT PATHOLOGICAL FRACTURE: ICD-10-CM

## 2018-04-15 DIAGNOSIS — E03.9 HYPOTHYROIDISM, UNSPECIFIED: ICD-10-CM

## 2018-04-15 DIAGNOSIS — E78.4 OTHER HYPERLIPIDEMIA: ICD-10-CM

## 2018-04-15 DIAGNOSIS — I10 ESSENTIAL (PRIMARY) HYPERTENSION: ICD-10-CM

## 2018-04-15 DIAGNOSIS — E55.9 VITAMIN D DEFICIENCY, UNSPECIFIED: ICD-10-CM

## 2018-04-15 DIAGNOSIS — E11.65 TYPE 2 DIABETES MELLITUS WITH HYPERGLYCEMIA: ICD-10-CM

## 2018-04-15 LAB
GLUCOSE BLDC GLUCOMTR-MCNC: 201 MG/DL — HIGH (ref 70–99)
GLUCOSE BLDC GLUCOMTR-MCNC: 300 MG/DL — HIGH (ref 70–99)
GLUCOSE BLDC GLUCOMTR-MCNC: 319 MG/DL — HIGH (ref 70–99)
GLUCOSE BLDC GLUCOMTR-MCNC: 320 MG/DL — HIGH (ref 70–99)

## 2018-04-15 PROCEDURE — 99223 1ST HOSP IP/OBS HIGH 75: CPT | Mod: GC

## 2018-04-15 RX ORDER — INSULIN LISPRO 100/ML
VIAL (ML) SUBCUTANEOUS
Qty: 0 | Refills: 0 | Status: DISCONTINUED | OUTPATIENT
Start: 2018-04-15 | End: 2018-04-17

## 2018-04-15 RX ORDER — INSULIN GLARGINE 100 [IU]/ML
8 INJECTION, SOLUTION SUBCUTANEOUS AT BEDTIME
Qty: 0 | Refills: 0 | Status: DISCONTINUED | OUTPATIENT
Start: 2018-04-15 | End: 2018-04-16

## 2018-04-15 RX ORDER — SODIUM CHLORIDE 9 MG/ML
500 INJECTION INTRAMUSCULAR; INTRAVENOUS; SUBCUTANEOUS
Qty: 0 | Refills: 0 | Status: DISCONTINUED | OUTPATIENT
Start: 2018-04-15 | End: 2018-04-19

## 2018-04-15 RX ORDER — INSULIN LISPRO 100/ML
VIAL (ML) SUBCUTANEOUS
Qty: 0 | Refills: 0 | Status: DISCONTINUED | OUTPATIENT
Start: 2018-04-15 | End: 2018-04-15

## 2018-04-15 RX ORDER — SODIUM CHLORIDE 9 MG/ML
1000 INJECTION INTRAMUSCULAR; INTRAVENOUS; SUBCUTANEOUS
Qty: 0 | Refills: 0 | Status: DISCONTINUED | OUTPATIENT
Start: 2018-04-15 | End: 2018-04-19

## 2018-04-15 RX ORDER — INSULIN GLARGINE 100 [IU]/ML
4 INJECTION, SOLUTION SUBCUTANEOUS AT BEDTIME
Qty: 0 | Refills: 0 | Status: DISCONTINUED | OUTPATIENT
Start: 2018-04-15 | End: 2018-04-15

## 2018-04-15 RX ORDER — SODIUM CHLORIDE 9 MG/ML
250 INJECTION INTRAMUSCULAR; INTRAVENOUS; SUBCUTANEOUS ONCE
Qty: 0 | Refills: 0 | Status: COMPLETED | OUTPATIENT
Start: 2018-04-15 | End: 2018-04-15

## 2018-04-15 RX ORDER — INSULIN LISPRO 100/ML
4 VIAL (ML) SUBCUTANEOUS
Qty: 0 | Refills: 0 | Status: DISCONTINUED | OUTPATIENT
Start: 2018-04-15 | End: 2018-04-16

## 2018-04-15 RX ORDER — INSULIN GLARGINE 100 [IU]/ML
3 INJECTION, SOLUTION SUBCUTANEOUS AT BEDTIME
Qty: 0 | Refills: 0 | Status: DISCONTINUED | OUTPATIENT
Start: 2018-04-15 | End: 2018-04-15

## 2018-04-15 RX ADMIN — Medication 2000 UNIT(S): at 05:22

## 2018-04-15 RX ADMIN — Medication 4 UNIT(S): at 18:15

## 2018-04-15 RX ADMIN — SODIUM CHLORIDE 250 MILLILITER(S): 9 INJECTION INTRAMUSCULAR; INTRAVENOUS; SUBCUTANEOUS at 12:52

## 2018-04-15 RX ADMIN — Medication 81 MILLIGRAM(S): at 11:55

## 2018-04-15 RX ADMIN — HYDROXYUREA 500 MILLIGRAM(S): 500 CAPSULE ORAL at 11:56

## 2018-04-15 RX ADMIN — SODIUM CHLORIDE 250 MILLILITER(S): 9 INJECTION INTRAMUSCULAR; INTRAVENOUS; SUBCUTANEOUS at 18:19

## 2018-04-15 RX ADMIN — LISINOPRIL 40 MILLIGRAM(S): 2.5 TABLET ORAL at 05:22

## 2018-04-15 RX ADMIN — Medication 60 MILLIGRAM(S): at 05:22

## 2018-04-15 RX ADMIN — ENOXAPARIN SODIUM 40 MILLIGRAM(S): 100 INJECTION SUBCUTANEOUS at 11:56

## 2018-04-15 RX ADMIN — ATORVASTATIN CALCIUM 40 MILLIGRAM(S): 80 TABLET, FILM COATED ORAL at 21:40

## 2018-04-15 RX ADMIN — INSULIN GLARGINE 8 UNIT(S): 100 INJECTION, SOLUTION SUBCUTANEOUS at 21:40

## 2018-04-15 RX ADMIN — Medication 2000 UNIT(S): at 17:22

## 2018-04-15 RX ADMIN — Medication 1 DROP(S): at 05:22

## 2018-04-15 RX ADMIN — Medication 3: at 12:48

## 2018-04-15 RX ADMIN — Medication 125 MICROGRAM(S): at 05:22

## 2018-04-15 RX ADMIN — Medication 4: at 18:15

## 2018-04-15 RX ADMIN — Medication 4: at 08:56

## 2018-04-15 RX ADMIN — Medication 1 DROP(S): at 17:22

## 2018-04-15 RX ADMIN — SODIUM CHLORIDE 75 MILLILITER(S): 9 INJECTION INTRAMUSCULAR; INTRAVENOUS; SUBCUTANEOUS at 20:56

## 2018-04-15 NOTE — DISCHARGE NOTE ADULT - CARE PLAN
Principal Discharge DX:	Bell palsy  Goal:	side effect prevention  Assessment and plan of treatment:	- C/W steroids  Secondary Diagnosis:	Age-related osteoporosis without current pathological fracture  Secondary Diagnosis:	Adult hypothyroidism  Secondary Diagnosis:	Hypertension associated with diabetes  Secondary Diagnosis:	Deep vein thrombosis (DVT) of distal vein of both lower extremities, unspecified chronicity  Secondary Diagnosis:	Hypercholesteremia  Secondary Diagnosis:	Vitamin D deficiency

## 2018-04-15 NOTE — CONSULT NOTE ADULT - ASSESSMENT
83 y/o female with well controlled T2DM c/w neuropathy and steroid induced hyperglycemia here with Bell's palsy requiring steroid management. Patient also noted to have h/o hypothyroidism, osteoporosis, vitamin D Deficiency, hypertension, and hyperlipidemia

## 2018-04-15 NOTE — DISCHARGE NOTE ADULT - MEDICATION SUMMARY - MEDICATIONS TO STOP TAKING
I will STOP taking the medications listed below when I get home from the hospital:    simvastatin 40 mg oral tablet  -- 1 tab(s) by mouth once a day (in the evening) I will STOP taking the medications listed below when I get home from the hospital:  None I will STOP taking the medications listed below when I get home from the hospital:    warfarin 2.5 mg oral tablet  -- 1 tab(s) by mouth once a day on Mon, Tues, Wed, Thurs , Sat & Sun    warfarin 2.5 mg oral tablet  -- 2 tab(s) by mouth once a day on Fridays

## 2018-04-15 NOTE — CONSULT NOTE ADULT - SUBJECTIVE AND OBJECTIVE BOX
Reason For Consult: Steroid Induced Hyperglycemia    HPI: 84 RH [place] female presenting with left facial droop, vertigo, nausea, vomiting. Patient has PMH CVA (residual left sided weakness), DVT on coumadin, Polycythemia vera Hypothyroid, SBO, HLD, DM. LKN last night 4/9 at 11 PM, prior to going to sleep. Around 2am patient woke up and felt like the room was spinning. In the morning, daughter noted new left facial weakness. Per daughter, face presently does not look significantly different than baseline (it appears different compared to recent pictures). Patient reports generalized weakness and had difficulty with gait requiring assistance at baseline (recently at rehab). Denies numbness, visual changes, abdominal pain, vertigo. Had bowel movement this am, Endorses nausea/vomiting. (10 Apr 2018 09:25)      Endocrine History:        PAST MEDICAL & SURGICAL HISTORY:  SBO (Small Bowel Obstruction)  Stroke  Deep Vein Thrombosis (DVT)  Adult Hypothyroidism  Hypercholesteremia  Diabetes  Benign Hypertension  FH: Total Abdominal Hysterectomy and Bilateral Salpingo-Oophorectomy      FAMILY HISTORY:  Family history of breast cancer (Child)  Family history of secondary lung cancer  Family history of diabetes mellitus (DM) (Child)      Social History:    Outpatient Medications:  Home Medications:   * Patient Currently Takes Medications as of 10-Apr-2018 11:38 documented in Structured Notes  · 	metFORMIN 500 mg oral tablet: 1 tab(s) orally 2 times a day, Last Dose Taken:    · 	lisinopril 40 mg oral tablet: 1 tab(s) orally once a day, Last Dose Taken:    · 	levothyroxine 125 mcg (0.125 mg) oral tablet: 1 tab(s) orally once a day, Last Dose Taken:    · 	alendronate 70 mg oral tablet: 1 tab(s) orally once a week ( wednesdays ), Last Dose Taken:    · 	hydroxyurea 500 mg oral capsule: 1 cap(s) orally once a day, Last Dose Taken:    · 	simvastatin 40 mg oral tablet: 1 tab(s) orally once a day (in the evening), Last Dose Taken:    · 	warfarin 2.5 mg oral tablet: 1 tab(s) orally once a day on Mon, Tues, Wed, Thurs , Sat & Sun, Last Dose Taken:    · 	warfarin 2.5 mg oral tablet: 2 tab(s) orally once a day on Fridays, Last Dose Taken:    · 	blink: 1 drop(s) to each affected eye in the morning and at bedtime, Last Dose Taken:    · 	Vitamin D3 2000 intl units oral capsule: 1 cap(s) orally 2 times a day, Last Dose Taken:      MEDICATIONS  (STANDING):  artificial  tears Solution 1 Drop(s) Both EYES two times a day  aspirin  chewable 81 milliGRAM(s) Oral daily  atorvastatin 40 milliGRAM(s) Oral at bedtime  cholecalciferol 2000 Unit(s) Oral two times a day  dextrose 5%. 1000 milliLiter(s) (50 mL/Hr) IV Continuous <Continuous>  dextrose 50% Injectable 12.5 Gram(s) IV Push once  dextrose 50% Injectable 25 Gram(s) IV Push once  dextrose 50% Injectable 25 Gram(s) IV Push once  enoxaparin Injectable 40 milliGRAM(s) SubCutaneous daily  hydroxyurea 500 milliGRAM(s) Oral daily  insulin glargine Injectable (LANTUS) 4 Unit(s) SubCutaneous at bedtime  insulin lispro (HumaLOG) corrective regimen sliding scale LOW  SubCutaneous three times a day before meals  insulin lispro (HumaLOG) corrective regimen sliding scale MOD  SubCutaneous at bedtime  levothyroxine 125 MICROGram(s) Oral daily  lisinopril 40 milliGRAM(s) Oral daily  predniSONE   Tablet 60 milliGRAM(s) Oral daily  sodium chloride 0.9% Bolus 250 milliLiter(s) IV Bolus once    MEDICATIONS  (PRN):  acetaminophen   Tablet 650 milliGRAM(s) Oral every 6 hours PRN pain  dextrose Gel 1 Dose(s) Oral once PRN Blood Glucose LESS THAN 70 milliGRAM(s)/deciliter  glucagon  Injectable 1 milliGRAM(s) IntraMuscular once PRN Glucose LESS THAN 70 milligrams/deciliter      Allergies  No Known Allergies      Review of Systems:  Constitutional: No fever  Eyes: No blurry vision  Neuro: No tremors  HEENT: No pain  Cardiovascular: No chest pain, palpitations  Respiratory: No SOB, no cough  GI: No nausea, vomiting, abdominal pain  : No dysuria  Skin: no rash  Psych: no depression  Endocrine: no polyuria, polydipsia  Hem/lymph: no swelling  Osteoporosis: no fractures    ALL OTHER SYSTEMS REVIEWED AND NEGATIVE      PHYSICAL EXAM:  VITALS: T(C): 36.7 (04-15-18 @ 12:27)  T(F): 98.1 (04-15-18 @ 12:27), Max: 98.1 (04-15-18 @ 12:27)  HR: 82 (04-15-18 @ 12:27) (76 - 99)  BP: 107/67 (04-15-18 @ 12:27) (96/58 - 155/70)  RR:  (18 - 18)  SpO2:  (94% - 98%)  Wt(kg): 56 kg    GENERAL: NAD, well-groomed, well-developed  EYES: No proptosis, no lid lag, anicteric  HEENT:  Atraumatic, Normocephalic, moist mucous membranes  THYROID: Normal size, no palpable nodules  RESPIRATORY: Clear to auscultation bilaterally; No rales, rhonchi, wheezing, or rubs  CARDIOVASCULAR: Regular rate and rhythm; No murmurs; no peripheral edema  GI: Soft, nontender, non distended, normal bowel sounds  SKIN: Dry, intact, No rashes or lesions  MUSCULOSKELETAL: Full range of motion, normal strength  NEURO: sensation intact, extraocular movements intact, no tremor, normal reflexes  PSYCH: Alert and oriented x 3, normal affect, normal mood  CUSHING'S SIGNS: no striae    POCT Blood Glucose.: 300 mg/dL (04-15-18 @ 12:26)  POCT Blood Glucose.: 201 mg/dL (04-15-18 @ 08:02)    POCT Blood Glucose.: 233 mg/dL (04-14-18 @ 21:37)  POCT Blood Glucose.: 346 mg/dL (04-14-18 @ 17:18)  POCT Blood Glucose.: 304 mg/dL (04-14-18 @ 12:23)  POCT Blood Glucose.: 197 mg/dL (04-14-18 @ 08:14)    POCT Blood Glucose.: 236 mg/dL (04-13-18 @ 21:39)  POCT Blood Glucose.: 261 mg/dL (04-13-18 @ 17:24)  POCT Blood Glucose.: 265 mg/dL (04-13-18 @ 12:12)  POCT Blood Glucose.: 135 mg/dL (04-13-18 @ 08:53)    POCT Blood Glucose.: 103 mg/dL (04-12-18 @ 21:57)  POCT Blood Glucose.: 244 mg/dL (04-12-18 @ 17:43)                            12.6   6.30  )-----------( 319      ( 13 Apr 2018 07:44 )             39.1       04-13    139  |  106  |  30<H>  ----------------------------<  139<H>  4.3   |  19<L>  |  0.88    EGFR if : 70  EGFR if non : 60    Ca    9.5      04-13        Thyroid Function Tests:  04-10 @ 10:59   TSH 0.27       Hemoglobin A1C, Whole Blood: 7.0 % <H> [4.0 - 5.6] (04-11-18 @ 13:31)      04-11 Chol 205<H>  HDL 59 Trig 117,   04-10 Chol 190  HDL 64 Trig 110 Reason For Consult: Steroid Induced Hyperglycemia    HPI: 84 RH [place] female presenting with left facial droop, vertigo, nausea, vomiting. Patient has PMH CVA (residual left sided weakness), DVT on coumadin, Polycythemia vera Hypothyroid, SBO, HLD, DM. LKN last night 4/9 at 11 PM, prior to going to sleep. Around 2am patient woke up and felt like the room was spinning. In the morning, daughter noted new left facial weakness. Per daughter, face presently does not look significantly different than baseline (it appears different compared to recent pictures). Patient reports generalized weakness and had difficulty with gait requiring assistance at baseline (recently at rehab). Denies numbness, visual changes, abdominal pain, vertigo. Had bowel movement this am, Endorses nausea/vomiting. (10 Apr 2018 09:25)      Endocrine History:  No endocrinologist. Gets managed by Internal medicine at [address] [place] [address].  Patient is A&Ox3 and relatively good historian, does not know medication dosages. Also called and spoke with daughter Magalie    T2DM diagnosed at age 70. Takes Metformin 500 mg BID (confirmed with daughter) and checks FS once/day with FS in 100s. Admits to neuropathy and cannot recall last ophtho visit. No h/o CKD, CAD. But admits to h/o CVA x 3 episodes  Eats 3 meals/day and breakfast is coffee, boiled egg, bread and butter. Lunch is beef or chicken, vegetables and some rice. Dinner is the same. No soda or juice. Admits to strong family history of T2DM in mother and 4/5 sisters    Admits to h/o hypothyroidism, diagnosed at age 20, and has been on LT4 125 mcg QD (recently dose adjusted in Jan 2018), TSH is 0.27. Patient has no palpitations, tremors, diaphoresis, but admits to walking with a cane. No recent falls.    Patient also has a h/o osteoporosis and takes Fosamax 70 mg once/week since 2016 (confirmed by daughter) and has upper and low dentures and no atypical thigh pain. She has no h/o fractures but admits to frequent falls and last fall was in 2017 and she was in rehab at the time. takes Vitamin D3 2000 units QD    She is adherent to HTN and HLD medications including Lisinopril and Lipitor.    PAST MEDICAL & SURGICAL HISTORY:  SBO (Small Bowel Obstruction)  Stroke  Deep Vein Thrombosis (DVT)  Adult Hypothyroidism  Hypercholesteremia  Diabetes  Benign Hypertension  FH: Total Abdominal Hysterectomy and Bilateral Salpingo-Oophorectomy      FAMILY HISTORY:  Family history of breast cancer (Child)  Family history of secondary lung cancer  Family history of diabetes mellitus (DM) (Child)      Social History: No tobacco, alcohol, illicit drug use. Lives at home with daughter and her family. Son-in-law is a dentist    Outpatient Medications:  Home Medications:   * Patient Currently Takes Medications as of 10-Apr-2018 11:38 documented in Structured Notes  · 	metFORMIN 500 mg oral tablet: 1 tab(s) orally 2 times a day, Last Dose Taken:    · 	lisinopril 40 mg oral tablet: 1 tab(s) orally once a day, Last Dose Taken:    · 	levothyroxine 125 mcg (0.125 mg) oral tablet: 1 tab(s) orally once a day, Last Dose Taken:    · 	alendronate 70 mg oral tablet: 1 tab(s) orally once a week ( wednesdays ), Last Dose Taken:    · 	hydroxyurea 500 mg oral capsule: 1 cap(s) orally once a day, Last Dose Taken:    · 	simvastatin 40 mg oral tablet: 1 tab(s) orally once a day (in the evening), Last Dose Taken:    · 	warfarin 2.5 mg oral tablet: 1 tab(s) orally once a day on Mon, Tues, Wed, Thurs , Sat & Sun, Last Dose Taken:    · 	warfarin 2.5 mg oral tablet: 2 tab(s) orally once a day on Fridays, Last Dose Taken:    · 	blink: 1 drop(s) to each affected eye in the morning and at bedtime, Last Dose Taken:    · 	Vitamin D3 2000 intl units oral capsule: 1 cap(s) orally 2 times a day, Last Dose Taken:      MEDICATIONS  (STANDING):  artificial  tears Solution 1 Drop(s) Both EYES two times a day  aspirin  chewable 81 milliGRAM(s) Oral daily  atorvastatin 40 milliGRAM(s) Oral at bedtime  cholecalciferol 2000 Unit(s) Oral two times a day  dextrose 5%. 1000 milliLiter(s) (50 mL/Hr) IV Continuous <Continuous>  dextrose 50% Injectable 12.5 Gram(s) IV Push once  dextrose 50% Injectable 25 Gram(s) IV Push once  dextrose 50% Injectable 25 Gram(s) IV Push once  enoxaparin Injectable 40 milliGRAM(s) SubCutaneous daily  hydroxyurea 500 milliGRAM(s) Oral daily  insulin glargine Injectable (LANTUS) 4 Unit(s) SubCutaneous at bedtime  insulin lispro (HumaLOG) corrective regimen sliding scale LOW  SubCutaneous three times a day before meals  insulin lispro (HumaLOG) corrective regimen sliding scale MOD  SubCutaneous at bedtime  levothyroxine 125 MICROGram(s) Oral daily  lisinopril 40 milliGRAM(s) Oral daily  predniSONE   Tablet 60 milliGRAM(s) Oral daily  sodium chloride 0.9% Bolus 250 milliLiter(s) IV Bolus once    MEDICATIONS  (PRN):  acetaminophen   Tablet 650 milliGRAM(s) Oral every 6 hours PRN pain  dextrose Gel 1 Dose(s) Oral once PRN Blood Glucose LESS THAN 70 milliGRAM(s)/deciliter  glucagon  Injectable 1 milliGRAM(s) IntraMuscular once PRN Glucose LESS THAN 70 milligrams/deciliter      Allergies  No Known Allergies      Review of Systems:  Constitutional: No fever  Eyes: No blurry vision  Neuro: No tremors  HEENT: No pain  Cardiovascular: No chest pain, palpitations  Respiratory: No SOB, no cough  GI: No nausea, vomiting, abdominal pain  : No dysuria  Skin: no rash  Psych: no depression  Endocrine: no polyuria, polydipsia  Hem/lymph: no swelling  Osteoporosis: no fractures    ALL OTHER SYSTEMS REVIEWED AND NEGATIVE      PHYSICAL EXAM:  VITALS: T(C): 36.7 (04-15-18 @ 12:27)  T(F): 98.1 (04-15-18 @ 12:27), Max: 98.1 (04-15-18 @ 12:27)  HR: 82 (04-15-18 @ 12:27) (76 - 99)  BP: 107/67 (04-15-18 @ 12:27) (96/58 - 155/70)  RR:  (18 - 18)  SpO2:  (94% - 98%)  Wt(kg): 56 kg    GENERAL: NAD, well-groomed, well-developed, thin female  EYES: No proptosis, no lid lag, anicteric  HEENT:  Atraumatic, Normocephalic, moist mucous membranes, Left sided facial droop noted on exam  THYROID: Normal size, no palpable nodules  RESPIRATORY: Clear to auscultation bilaterally; No rales, rhonchi, wheezing, or rubs  CARDIOVASCULAR: Regular rate and rhythm; No murmurs; no peripheral edema  GI: Soft, nontender, non distended, normal bowel sounds  SKIN: Dry, intact, No rashes or lesions  MUSCULOSKELETAL: Full range of motion, normal strength  NEURO: sensation intact, extraocular movements intact, no tremor  PSYCH: Alert and oriented x 3, normal affect, normal mood  CUSHING'S SIGNS: no striae    POCT Blood Glucose.: 300 mg/dL (04-15-18 @ 12:26)  POCT Blood Glucose.: 201 mg/dL (04-15-18 @ 08:02)    POCT Blood Glucose.: 233 mg/dL (04-14-18 @ 21:37)  POCT Blood Glucose.: 346 mg/dL (04-14-18 @ 17:18)  POCT Blood Glucose.: 304 mg/dL (04-14-18 @ 12:23)  POCT Blood Glucose.: 197 mg/dL (04-14-18 @ 08:14)    POCT Blood Glucose.: 236 mg/dL (04-13-18 @ 21:39)  POCT Blood Glucose.: 261 mg/dL (04-13-18 @ 17:24)  POCT Blood Glucose.: 265 mg/dL (04-13-18 @ 12:12)  POCT Blood Glucose.: 135 mg/dL (04-13-18 @ 08:53)    POCT Blood Glucose.: 103 mg/dL (04-12-18 @ 21:57)  POCT Blood Glucose.: 244 mg/dL (04-12-18 @ 17:43)                            12.6   6.30  )-----------( 319      ( 13 Apr 2018 07:44 )             39.1       04-13    139  |  106  |  30<H>  ----------------------------<  139<H>  4.3   |  19<L>  |  0.88    EGFR if : 70  EGFR if non : 60    Ca    9.5      04-13        Thyroid Function Tests:  04-10 @ 10:59   TSH 0.27       Hemoglobin A1C, Whole Blood: 7.0 % <H> [4.0 - 5.6] (04-11-18 @ 13:31)      04-11 Chol 205<H>  HDL 59 Trig 117,   04-10 Chol 190  HDL 64 Trig 110

## 2018-04-15 NOTE — DISCHARGE NOTE ADULT - MEDICATION SUMMARY - MEDICATIONS TO TAKE
I will START or STAY ON the medications listed below when I get home from the hospital:    predniSONE 5 mg oral tablet  -- 6 tab(s) oral - by mouth once a day x 1 days  5 tab(s) oral - by mouth once a day x 1 days  4 tab(s) oral - by mouth once a day x 1 days  3 tab(s) oral - by mouth once a day x 1 days  2 tab(s) oral - by mouth once a day x 1 days  1 tab(s) oral - by mouth once a day x 1 days  -- It is very important that you take or use this exactly as directed.  Do not skip doses or discontinue unless directed by your doctor.  Obtain medical advice before taking any non-prescription drugs as some may affect the action of this medication.  Take with food or milk.    -- Indication: For Bell's palsy    acetaminophen 325 mg oral tablet  -- 2 tab(s) by mouth every 6 hours, As needed, pain  -- Indication: For pain    aspirin 81 mg oral tablet, chewable  -- 1 tab(s) by mouth once a day  -- Indication: For stroke prevention    lisinopril 40 mg oral tablet  -- 1 tab(s) by mouth once a day  -- Indication: For Essential hypertension    warfarin 2.5 mg oral tablet  -- 1 tab(s) by mouth once a day on Mon, Tues, Wed, Thurs , Sat & Sun  -- Indication: For DVT    warfarin 2.5 mg oral tablet  -- 2 tab(s) by mouth once a day on Fridays  -- Indication: For DVT    metFORMIN 500 mg oral tablet  -- 1 tab(s) by mouth 2 times a day  -- Indication: For Controlled type 2 diabetes mellitus with hyperglycemia, without long-term current use of insulin    insulin glargine  -- 8 unit(s) subcutaneous once a day  -- Indication: For Controlled type 2 diabetes mellitus with hyperglycemia, without long-term current use of insulin    atorvastatin 40 mg oral tablet  -- 1 tab(s) by mouth once a day (at bedtime)  -- Indication: For Other hyperlipidemia    hydroxyurea 500 mg oral capsule  -- 1 cap(s) by mouth once a day  -- Indication: For Age-related osteoporosis without current pathological fracture    alendronate 70 mg oral tablet  -- 1 tab(s) by mouth once a week ( wednesdays )  -- Indication: For Age-related osteoporosis without current pathological fracture    Artificial Tears ophthalmic solution  -- 1 drop(s) to each affected eye 2 times a day  -- Indication: For Eye dryness    levothyroxine 125 mcg (0.125 mg) oral tablet  -- 1 tab(s) by mouth once a day  -- Indication: For Hypothyroidism, unspecified type    Vitamin D3 2000 intl units oral capsule  -- 1 cap(s) by mouth 2 times a day  -- Indication: For Age-related osteoporosis without current pathological fracture I will START or STAY ON the medications listed below when I get home from the hospital:    acetaminophen 325 mg oral tablet  -- 2 tab(s) by mouth every 6 hours, As needed, pain  -- Indication: For pain    aspirin 81 mg oral tablet, chewable  -- 1 tab(s) by mouth once a day  -- Indication: For stroke prevention    lisinopril 40 mg oral tablet  -- 1 tab(s) by mouth once a day  -- Indication: For Essential hypertension    warfarin 2.5 mg oral tablet  -- 1 tab(s) by mouth once a day on Mon, Tues, Wed, Thurs , Sat & Sun  -- Indication: For DVT    warfarin 2.5 mg oral tablet  -- 2 tab(s) by mouth once a day on Fridays  -- Indication: For DVT    metFORMIN 500 mg oral tablet  -- 1 tab(s) by mouth 2 times a day  -- Indication: For Controlled type 2 diabetes mellitus with hyperglycemia, without long-term current use of insulin    atorvastatin 40 mg oral tablet  -- 1 tab(s) by mouth once a day (at bedtime)  -- Indication: For Other hyperlipidemia    hydroxyurea 500 mg oral capsule  -- 1 cap(s) by mouth once a day  -- Indication: For Age-related osteoporosis without current pathological fracture    alendronate 70 mg oral tablet  -- 1 tab(s) by mouth once a week ( wednesdays )  -- Indication: For Age-related osteoporosis without current pathological fracture    Artificial Tears ophthalmic solution  -- 1 drop(s) to each affected eye 2 times a day  -- Indication: For Eye dryness    levothyroxine 125 mcg (0.125 mg) oral tablet  -- 1 tab(s) by mouth once a day  -- Indication: For Hypothyroidism, unspecified type    Vitamin D3 2000 intl units oral capsule  -- 1 cap(s) by mouth 2 times a day  -- Indication: For Age-related osteoporosis without current pathological fracture I will START or STAY ON the medications listed below when I get home from the hospital:    acetaminophen 325 mg oral tablet  -- 2 tab(s) by mouth every 6 hours, As needed, pain  -- Indication: For pain    aspirin 81 mg oral tablet, chewable  -- 1 tab(s) by mouth once a day  -- Indication: For stroke prevention    lisinopril 40 mg oral tablet  -- 1 tab(s) by mouth once a day  -- Indication: For Essential hypertension    metFORMIN 500 mg oral tablet  -- 1 tab(s) by mouth 2 times a day  -- Indication: For Controlled type 2 diabetes mellitus with hyperglycemia, without long-term current use of insulin    atorvastatin 40 mg oral tablet  -- 1 tab(s) by mouth once a day (at bedtime)  -- Indication: For Other hyperlipidemia    hydroxyurea 500 mg oral capsule  -- 1 cap(s) by mouth once a day  -- Indication: For Age-related osteoporosis without current pathological fracture    alendronate 70 mg oral tablet  -- 1 tab(s) by mouth once a week ( wednesdays )  -- Indication: For Age-related osteoporosis without current pathological fracture    Artificial Tears ophthalmic solution  -- 1 drop(s) to each affected eye 2 times a day  -- Indication: For Eye dryness    levothyroxine 125 mcg (0.125 mg) oral tablet  -- 1 tab(s) by mouth once a day  -- Indication: For Hypothyroidism, unspecified type    Vitamin D3 2000 intl units oral capsule  -- 1 cap(s) by mouth 2 times a day  -- Indication: For Age-related osteoporosis without current pathological fracture I will START or STAY ON the medications listed below when I get home from the hospital:    acetaminophen 325 mg oral tablet  -- 2 tab(s) by mouth every 6 hours, As needed, pain  -- Indication: For pain    aspirin 81 mg oral tablet, chewable  -- 1 tab(s) by mouth once a day  -- Indication: For stroke prevention    lisinopril 40 mg oral tablet  -- 1 tab(s) by mouth once a day  -- Indication: For Essential hypertension    metFORMIN 500 mg oral tablet  -- 1 tab(s) by mouth 2 times a day  -- Indication: For Controlled type 2 diabetes mellitus with hyperglycemia, without long-term current use of insulin    atorvastatin 40 mg oral tablet  -- 1 tab(s) by mouth once a day (at bedtime)  -- Indication: For HLD    hydroxyurea 500 mg oral capsule  -- 1 cap(s) by mouth once a day  -- Indication: For Age-related osteoporosis without current pathological fracture    alendronate 70 mg oral tablet  -- 1 tab(s) by mouth once a week ( wednesdays )  -- Indication: For Age-related osteoporosis without current pathological fracture    Artificial Tears ophthalmic solution  -- 1 drop(s) to each affected eye 2 times a day  -- Indication: For Eye dryness    levothyroxine 125 mcg (0.125 mg) oral tablet  -- 1 tab(s) by mouth once a day  -- Indication: For Hypothyroidism, unspecified type    Vitamin D3 2000 intl units oral capsule  -- 1 cap(s) by mouth 2 times a day  -- Indication: For Age-related osteoporosis without current pathological fracture

## 2018-04-15 NOTE — PROGRESS NOTE ADULT - SUBJECTIVE AND OBJECTIVE BOX
THE PATIENT WAS SEEN AND EXAMINED BY ME WITH THE HOUSESTAFF AND STROKE TEAM DURING MORNING ROUNDS.   HPI:  84 RH [place] female presented with left facial droop, vertigo, nausea, vomiting. Patient has PMH Stroke (residual left sided weakness), DVT on coumadin, Polycythemia vera Hypothyroid, SBO, HLD, DM. LKN last night 4/9 at 11 PM, prior to going to sleep. Around 2am morning of admission patient woke up and felt like the room was spinning. In the morning, daughter noted new left facial weakness. Per daughter, face presently does not look significantly different than baseline (it appears different compared to recent pictures). Patient reported generalized weakness and had difficulty with gait requiring assistance at baseline (recently at rehab). Denied numbness, visual changes, abdominal pain, vertigo. Had bowel movement this am, Endorses nausea/vomiting      SUBJECTIVE: No events overnight.  No new neurologic complaints.      acetaminophen   Tablet 650 milliGRAM(s) Oral every 6 hours PRN  artificial  tears Solution 1 Drop(s) Both EYES two times a day  aspirin  chewable 81 milliGRAM(s) Oral daily  atorvastatin 40 milliGRAM(s) Oral at bedtime  cholecalciferol 2000 Unit(s) Oral two times a day  dextrose 5%. 1000 milliLiter(s) IV Continuous <Continuous>  dextrose 50% Injectable 12.5 Gram(s) IV Push once  dextrose 50% Injectable 25 Gram(s) IV Push once  dextrose 50% Injectable 25 Gram(s) IV Push once  dextrose Gel 1 Dose(s) Oral once PRN  enoxaparin Injectable 40 milliGRAM(s) SubCutaneous daily  glucagon  Injectable 1 milliGRAM(s) IntraMuscular once PRN  hydroxyurea 500 milliGRAM(s) Oral daily  insulin lispro (HumaLOG) corrective regimen sliding scale   SubCutaneous three times a day before meals  insulin lispro (HumaLOG) corrective regimen sliding scale   SubCutaneous at bedtime  levothyroxine 125 MICROGram(s) Oral daily  lisinopril 40 milliGRAM(s) Oral daily  predniSONE   Tablet 60 milliGRAM(s) Oral daily      PHYSICAL EXAM:   Vital Signs Last 24 Hrs  T(C): 36.4 (15 Apr 2018 05:00), Max: 36.7 (14 Apr 2018 11:59)  T(F): 97.6 (15 Apr 2018 05:00), Max: 98 (14 Apr 2018 11:59)  HR: 76 (15 Apr 2018 05:00) (76 - 99)  BP: 115/73 (15 Apr 2018 05:00) (96/58 - 118/73)  BP(mean): --  RR: 18 (15 Apr 2018 05:00) (18 - 18)  SpO2: 96% (15 Apr 2018 05:00) (94% - 96%)    General: No acute distress  HEENT: EOM intact, visual fields full  Abdomen: Soft, nontender, nondistended   Extremities: No edema    NEUROLOGICAL EXAM: Mental status: Awake, alert, oriented x3, no aphasia, no neglect, normal memory   Cranial Nerves: left facial palsy, no nystagmus,  dysarthria,  tongue midline  Motor exam: moved all extremities well against gravity without drift; very mild action tremor of her arms  Sensation:  mildly decreased temperature sensation over her right face, arm and leg  Coordination/ Gait: No dysmetria     LABS:                        12.6   6.30  )-----------( 319      ( 13 Apr 2018 07:44 )             39.1        PT/INR - ( 13 Apr 2018 07:55 )   PT: 26.1 sec;   INR: 2.27 ratio           Hemoglobin A1C, Whole Blood: 7.0 % (04-11 @ 13:31)      IMAGING: Reviewed by me.   CTA neck: Suspicion of progressive right ICA stenosis which on prior   Doppler of 6/11/2014 was in the 50-69% range and now measures 87% by   NASCET criteria with dense atherosclerotic calcification seen. Left ICA   with a roughly 62% stenosis by NASCET criteria which appears to be a   progression compared with the patient's prior study as well. Recommend   correlation with ultrasound for further evaluation       CT Brain Stroke Protocol (04.10.18)   No acute hemorrhage. Patchy hypoattenuation within the white matter,   likely representing chronic microvascular changes. Unchanged   calcification of the bilateral globus pallidus and dentate nuclei within   the cerebellum.     MR Head w/wo IV Cont (04.10.18)   No acute infarct or hemorrhage.  Fairly extensive periventricular white matter and pontine microvascular   ischemic change with scattered chronic white matter infarcts and a tiny   chronic right pontine infarct.  Subtle enhancement of the left facial nerve labyrinthine segment with   extension to the geniculate ganglion on may represent Bell palsy..

## 2018-04-15 NOTE — DISCHARGE NOTE ADULT - OTHER SIGNIFICANT FINDINGS
< from: MR Head w/wo IV Cont (04.10.18 @ 21:17) >  Impression:    No acute infarct or hemorrhage.    Fairly extensive periventricular white matter and pontine microvascular   ischemic change with scattered chronic white matter infarcts and a tiny   chronic right pontine infarct.    Subtle enhancement of the left facial nerve labyrinthine segment with   extension to the geniculate ganglion on may represent Bell palsy..    < end of copied text >      < from: CT Angio Neck w/ IV Cont (04.10.18 @ 08:44) >    IMPRESSION:    CTA head: The arteries of the [place] are normal in caliber.   There is no aneurysm.    CTA neck: Suspicion of progressive right ICA stenosis which on prior   Doppler of 6/11/2014 was in the 50-69% range and now measures 87% by   NASCET criteria with dense atherosclerotic calcification seen. Left ICA   with a roughly 62% stenosis by NASCET criteria which appears to be a   progression compared with the patient's prior study as well. Recommend   correlation with ultrasound for further evaluation    < end of copied text >

## 2018-04-15 NOTE — DISCHARGE NOTE ADULT - HOSPITAL COURSE
84-year-old right-handed [place] Islander lady First evaluated at [facility] on 4/11/18 with dizziness, left sided weakness which was chronic and acute left sided facial weakness, initially more severe in lower face than upper face.     She has also had a DVT and has been continuing Coumadin. On 4/10/18 she developed dizziness/imbalance, nausea, vomiting and a left facial palsy. 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     She has severe, asymptomatic right carotid stenosis, and moderate asymptomatic left carotid stenosis. Given her age, suspect that she will not benefit from revascularization of her carotid arteries, either endarterectomy or stenting. I doubt that she will benefit from further neurovascular investigation. 84-year-old right-handed [place] lady First evaluated at [facility] on 4/11/18 with dizziness, left sided weakness which was chronic and acute left sided facial weakness, initially more severe in lower face than upper face.     She has also had a DVT and has been continuing Coumadin. On 4/10/18 she developed dizziness/imbalance, nausea, vomiting and a left facial palsy. 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 We repeated Doppler LE which didn't show any evidence of DVT in LE, Warfarin was stopped.    She has severe, asymptomatic right carotid stenosis, and moderate asymptomatic left carotid stenosis. Given her age, suspect that she will not benefit from revascularization of her carotid arteries, either endarterectomy or stenting. I doubt that she will benefit from further neurovascular investigation. She was started on ASA, which she will continue indefinitely.    Given Bell's palsy, we started medrol dose pack, since the patient had Hx of T2DM, and multiple endocrine issues, endocrinology was consulted, outpatient medical management of DM as medication reconciliation and outpatient follow up with endocrinology recommended.    Physical therapy evaluated the patient and recommended subacute rehab, for which she was rejected. The daughter decided to take the patient home with home care and PT training. Logistics explained to her.

## 2018-04-15 NOTE — PROGRESS NOTE ADULT - ASSESSMENT
ASSESSMENT: 84-year-old right-handed [place] lady First evaluated at [facility] on 4/11/18 with dizziness. She was a somewhat difficult historian. She has a history of "3 strokes", beginning about 6 years prior to admission. Most of these previous strokes seemed to involve a facial palsy and probable dysarthria, from which she largely recovered. She has also had a DVT and has been continuing Coumadin. On 4/10/18 she developed dizziness/imbalance, nausea, vomiting and a left facial palsy. 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 She has severe, asymptomatic right carotid stenosis, and moderate asymptomatic left carotid stenosis. Given her age, suspect that she will not benefit from revascularization of her carotid arteries, either endarterectomy or stenting. I doubt that she will benefit from further neurovascular investigation.      NEURO: neurologically without acute change, clinical exam along with MRI findings suggestive of Bell's Palsy. no evidence of acute infarction on MRI. Patient to be started on Prednisone taper starting at 60 mg daily x5 days with vigilant to attention glucose control, slow titration to normotension, titrate home statin to LDL goal less than 70, MR imaging as noted above,  PT/OT  recommends rehab placement.     ANTITHROMBOTIC THERAPY: VA Duplex (-) for DVT's will d/c Coumadin and start Aspirin 81 mg daily.       PULMONARY: CXR left basilar atelectasis encourage incentive spirometry, protecting airway, saturating well     CARDIOVASCULAR: penidng TTE or may be done as outpatient, cardiac monitoring                              SBP goal: slow titration to normotension     GASTROINTESTINAL:  dysphagia screen failed, SLP eval for  D1 nectar diet      Diet: D1 Nectar     RENAL: BUN/Cr without acute change, good urine output      Na Goal: Greater than 135     Claudio: n     HEMATOLOGY: H/H without acute change,  LE duplex pending negative for DVT, coumadin stopped, started on ASA and chemical dvt ppx.     DVT ppx: lovenox    ID: afebrile, no leukocytosis     OTHER: current medical condition and plan of care d/w patient at bedside, all questions answered and concerns addressed.  Blood sugars rising while on Prednisone, will consult endocrine, presently on sliding scale only.    DISPOSITION: Rehab placement pending.       CORE MEASURES:        Admission NIHSS: 4     TPA: [] YES [x] NO      LDL/HDL:123/59     Depression  : p     Statin Therapy: y      Dysphagia Screen: [] PASS [x] FAIL     Smoking [] YES [x] NO      Afib [] YES [x] NO     Stroke Education [] YES [] NO ASSESSMENT: 84-year-old right-handed [place] lady First evaluated at [facility] on 4/11/18 with dizziness. She was a somewhat difficult historian. She has a history of "3 strokes", beginning about 6 years prior to admission. Most of these previous strokes seemed to involve a facial palsy and probable dysarthria, from which she largely recovered. She has also had a DVT and has been continuing Coumadin. On 4/10/18 she developed dizziness/imbalance, nausea, vomiting and a left facial palsy. 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 She has severe, asymptomatic right carotid stenosis, and moderate asymptomatic left carotid stenosis. Given her age, suspect that she will not benefit from revascularization of her carotid arteries, either endarterectomy or stenting. I doubt that she will benefit from further neurovascular investigation.      NEURO: neurologically without acute change, clinical exam along with MRI findings suggestive of Bell's Palsy. no evidence of acute infarction on MRI. Patient to be started on Prednisone taper starting at 60 mg daily x5 days with vigilant to attention glucose control, slow titration to normotension, titrate home statin to LDL goal less than 70, MR imaging as noted above,  PT/OT  recommends rehab placement.     ANTITHROMBOTIC THERAPY: VA Duplex (-) for DVT's will d/c Coumadin and start Aspirin 81 mg daily.       PULMONARY: CXR left basilar atelectasis encourage incentive spirometry, protecting airway, saturating well     CARDIOVASCULAR: penidng TTE or may be done as outpatient, cardiac monitoring                              SBP goal: slow titration to normotension     GASTROINTESTINAL:  dysphagia screen failed, SLP eval for  D1 nectar diet      Diet: D1 Nectar     RENAL: BUN/Cr without acute change, good urine output      Na Goal: Greater than 135     Claudio: n     HEMATOLOGY: H/H without acute change,  LE duplex pending negative for DVT, coumadin stopped, started on ASA and chemical dvt ppx.     DVT ppx: lovenox    ID: afebrile, no leukocytosis     OTHER: current medical condition and plan of care d/w patient at bedside, all questions answered and concerns addressed.  Blood sugars rising while on Prednisone, on before meal sliding scale and QHS sliding scale- added 3 units Lantus Qhs, called endocrine for consult, will continue to monitor.    DISPOSITION: Rehab placement pending.       CORE MEASURES:        Admission NIHSS: 4     TPA: [] YES [x] NO      LDL/HDL:123/59     Depression  : 0     Statin Therapy: y      Dysphagia Screen: [] PASS [x] FAIL     Smoking [] YES [x] NO      Afib [] YES [x] NO     Stroke Education [x] YES [] NO

## 2018-04-15 NOTE — DISCHARGE NOTE ADULT - PATIENT PORTAL LINK FT
You can access the [facility] Patient Portal, offered by [facility], by registering with the following website: [email or web address]

## 2018-04-15 NOTE — CONSULT NOTE ADULT - PROBLEM SELECTOR RECOMMENDATION 9
- Patient at goal HbA1c while only on Metformin 500 mg BID at home  - Noted to have acute steroid induced hyperglycemia  - Recommend Lantus 10 units QHS  - Recommend Humalog 4 units TID AC and low SSI AC and HS    Outpatient Plan  - Likely only Metformin 1000 mg BID and then after steroids are complete can resume Metformin 500 mg BID  - Incase FS > 250 with Metformin 1000 mg BID x 2 days, then can add Tradjenta 5 mg QD, to be determined in rehab facility  - F/u [phone] with Endocrinology given multiple endocrine issues - Patient at goal HbA1c while only on Metformin 500 mg BID at home  - Noted to have acute steroid induced hyperglycemia  - Recommend Lantus 8 units QHS  - Recommend Humalog 4 units TID AC and low SSI AC only    Outpatient Plan  - Likely only Metformin 1000 mg BID and then after steroids are complete can resume Metformin 500 mg BID  - Incase FS > 250 with Metformin 1000 mg BID x 2 days, then can add Tradjenta 5 mg QD, to be determined in rehab facility  - F/u [facility] with Endocrinology given multiple endocrine issues

## 2018-04-15 NOTE — DISCHARGE NOTE ADULT - CARE PROVIDER_API CALL
Diogenes Coy (MBBS), Neurology; Vascular Neurology  [address] [address]  Phone: [phone]  Fax: [phone] Diogenes Coy (LETICIA), Neurology; Vascular Neurology  [address] [facility]  [address]  [place] [address]  Phone: [phone]  Fax: [phone]    Balbina Jurado), EndocrinologyMetabDiabetes; Internal Medicine  [address] [facility]  [address]  [place] [address]  Phone: [phone]  Fax: [phone]

## 2018-04-15 NOTE — DISCHARGE NOTE ADULT - CARE PROVIDERS DIRECT ADDRESSES
[email or web address]_Unknown

## 2018-04-15 NOTE — CONSULT NOTE ADULT - PROBLEM SELECTOR RECOMMENDATION 6
- Recommend continue Lipitor 40 mg QHS    D/w attending    Sintia Bright DO  Endocrine Fellow   Pager: [phone] (9am-5pm on Weekdays)  Pager: [phone] (5pm-9am on Weekdays and Weekends)

## 2018-04-15 NOTE — DISCHARGE NOTE ADULT - SECONDARY DIAGNOSIS.
Age-related osteoporosis without current pathological fracture Adult hypothyroidism Hypertension associated with diabetes Deep vein thrombosis (DVT) of distal vein of both lower extremities, unspecified chronicity Hypercholesteremia Vitamin D deficiency

## 2018-04-15 NOTE — CONSULT NOTE ADULT - PROBLEM SELECTOR RECOMMENDATION 3
- Recommend continue Fosamax 70 mg once/week and monitor for signs of GERD  - F/u outpatient DXA and monitor renal function while on bisphosphonates

## 2018-04-16 ENCOUNTER — OUTPATIENT (OUTPATIENT)
Dept: OUTPATIENT SERVICES | Facility: HOSPITAL | Age: 83
LOS: 1 days | Discharge: HOME | End: 2018-04-16

## 2018-04-16 DIAGNOSIS — C92.10 CHRONIC MYELOID LEUKEMIA, BCR/ABL-POSITIVE, NOT HAVING ACHIEVED REMISSION: ICD-10-CM

## 2018-04-16 LAB
ANION GAP SERPL CALC-SCNC: 11 MMOL/L — SIGNIFICANT CHANGE UP (ref 5–17)
BASOPHILS # BLD AUTO: 0.04 K/UL — SIGNIFICANT CHANGE UP (ref 0–0.2)
BASOPHILS NFR BLD AUTO: 0.9 % — SIGNIFICANT CHANGE UP (ref 0–2)
BUN SERPL-MCNC: 28 MG/DL — HIGH (ref 7–23)
CALCIUM SERPL-MCNC: 8.9 MG/DL — SIGNIFICANT CHANGE UP (ref 8.4–10.5)
CHLORIDE SERPL-SCNC: 105 MMOL/L — SIGNIFICANT CHANGE UP (ref 96–108)
CO2 SERPL-SCNC: 22 MMOL/L — SIGNIFICANT CHANGE UP (ref 22–31)
CREAT SERPL-MCNC: 0.66 MG/DL — SIGNIFICANT CHANGE UP (ref 0.5–1.3)
EOSINOPHIL # BLD AUTO: 0 K/UL — SIGNIFICANT CHANGE UP (ref 0–0.5)
EOSINOPHIL NFR BLD AUTO: 0 % — SIGNIFICANT CHANGE UP (ref 0–6)
GLUCOSE BLDC GLUCOMTR-MCNC: 225 MG/DL — HIGH (ref 70–99)
GLUCOSE BLDC GLUCOMTR-MCNC: 327 MG/DL — HIGH (ref 70–99)
GLUCOSE BLDC GLUCOMTR-MCNC: 362 MG/DL — HIGH (ref 70–99)
GLUCOSE BLDC GLUCOMTR-MCNC: 383 MG/DL — HIGH (ref 70–99)
GLUCOSE SERPL-MCNC: 387 MG/DL — HIGH (ref 70–99)
HCT VFR BLD CALC: 38.4 % — SIGNIFICANT CHANGE UP (ref 34.5–45)
HGB BLD-MCNC: 12.7 G/DL — SIGNIFICANT CHANGE UP (ref 11.5–15.5)
LYMPHOCYTES # BLD AUTO: 0.21 K/UL — LOW (ref 1–3.3)
LYMPHOCYTES # BLD AUTO: 4.3 % — LOW (ref 13–44)
MCHC RBC-ENTMCNC: 33.1 GM/DL — SIGNIFICANT CHANGE UP (ref 32–36)
MCHC RBC-ENTMCNC: 36.5 PG — HIGH (ref 27–34)
MCV RBC AUTO: 110.3 FL — HIGH (ref 80–100)
MONOCYTES # BLD AUTO: 0.04 K/UL — SIGNIFICANT CHANGE UP (ref 0–0.9)
MONOCYTES NFR BLD AUTO: 0.9 % — LOW (ref 2–14)
NEUTROPHILS # BLD AUTO: 4.58 K/UL — SIGNIFICANT CHANGE UP (ref 1.8–7.4)
NEUTROPHILS NFR BLD AUTO: 93 % — HIGH (ref 43–77)
PLATELET # BLD AUTO: 381 K/UL — SIGNIFICANT CHANGE UP (ref 150–400)
POTASSIUM SERPL-MCNC: 4.6 MMOL/L — SIGNIFICANT CHANGE UP (ref 3.5–5.3)
POTASSIUM SERPL-SCNC: 4.6 MMOL/L — SIGNIFICANT CHANGE UP (ref 3.5–5.3)
RBC # BLD: 3.48 M/UL — LOW (ref 3.8–5.2)
RBC # FLD: 12.6 % — SIGNIFICANT CHANGE UP (ref 10.3–14.5)
SODIUM SERPL-SCNC: 138 MMOL/L — SIGNIFICANT CHANGE UP (ref 135–145)
T4 FREE SERPL-MCNC: 1.8 NG/DL — SIGNIFICANT CHANGE UP (ref 0.9–1.8)
WBC # BLD: 4.88 K/UL — SIGNIFICANT CHANGE UP (ref 3.8–10.5)
WBC # FLD AUTO: 4.88 K/UL — SIGNIFICANT CHANGE UP (ref 3.8–10.5)

## 2018-04-16 PROCEDURE — 99232 SBSQ HOSP IP/OBS MODERATE 35: CPT

## 2018-04-16 PROCEDURE — 93306 TTE W/DOPPLER COMPLETE: CPT | Mod: 26

## 2018-04-16 RX ORDER — ACETAMINOPHEN 500 MG
2 TABLET ORAL
Qty: 0 | Refills: 0 | DISCHARGE
Start: 2018-04-16

## 2018-04-16 RX ORDER — SIMVASTATIN 20 MG/1
1 TABLET, FILM COATED ORAL
Qty: 0 | Refills: 0 | COMMUNITY

## 2018-04-16 RX ORDER — ATORVASTATIN CALCIUM 80 MG/1
1 TABLET, FILM COATED ORAL
Qty: 0 | Refills: 0 | COMMUNITY
Start: 2018-04-16

## 2018-04-16 RX ORDER — INSULIN GLARGINE 100 [IU]/ML
11 INJECTION, SOLUTION SUBCUTANEOUS AT BEDTIME
Qty: 0 | Refills: 0 | Status: DISCONTINUED | OUTPATIENT
Start: 2018-04-16 | End: 2018-04-17

## 2018-04-16 RX ORDER — INSULIN GLARGINE 100 [IU]/ML
8 INJECTION, SOLUTION SUBCUTANEOUS
Qty: 0 | Refills: 0 | COMMUNITY
Start: 2018-04-16

## 2018-04-16 RX ORDER — INSULIN LISPRO 100/ML
6 VIAL (ML) SUBCUTANEOUS
Qty: 0 | Refills: 0 | Status: DISCONTINUED | OUTPATIENT
Start: 2018-04-16 | End: 2018-04-18

## 2018-04-16 RX ORDER — ASPIRIN/CALCIUM CARB/MAGNESIUM 324 MG
1 TABLET ORAL
Qty: 0 | Refills: 0 | DISCHARGE
Start: 2018-04-16

## 2018-04-16 RX ADMIN — ATORVASTATIN CALCIUM 40 MILLIGRAM(S): 80 TABLET, FILM COATED ORAL at 21:34

## 2018-04-16 RX ADMIN — Medication 4 UNIT(S): at 12:32

## 2018-04-16 RX ADMIN — LISINOPRIL 40 MILLIGRAM(S): 2.5 TABLET ORAL at 05:19

## 2018-04-16 RX ADMIN — Medication 125 MICROGRAM(S): at 05:19

## 2018-04-16 RX ADMIN — Medication 2000 UNIT(S): at 17:41

## 2018-04-16 RX ADMIN — Medication 5: at 12:31

## 2018-04-16 RX ADMIN — Medication 6 UNIT(S): at 17:42

## 2018-04-16 RX ADMIN — Medication 1 DROP(S): at 17:41

## 2018-04-16 RX ADMIN — Medication 1 DROP(S): at 05:19

## 2018-04-16 RX ADMIN — Medication 4: at 17:42

## 2018-04-16 RX ADMIN — Medication 81 MILLIGRAM(S): at 11:17

## 2018-04-16 RX ADMIN — Medication 4 UNIT(S): at 12:35

## 2018-04-16 RX ADMIN — Medication 2000 UNIT(S): at 05:19

## 2018-04-16 RX ADMIN — ENOXAPARIN SODIUM 40 MILLIGRAM(S): 100 INJECTION SUBCUTANEOUS at 11:17

## 2018-04-16 RX ADMIN — Medication 60 MILLIGRAM(S): at 05:19

## 2018-04-16 RX ADMIN — INSULIN GLARGINE 11 UNIT(S): 100 INJECTION, SOLUTION SUBCUTANEOUS at 21:34

## 2018-04-16 RX ADMIN — Medication 5: at 10:41

## 2018-04-16 NOTE — PROGRESS NOTE ADULT - ASSESSMENT
ASSESSMENT: 84-year-old right-handed Syrian lady First evaluated at [facility] on 4/11/18 with dizziness. She was a somewhat difficult historian. She has a history of "3 strokes", beginning about 6 years prior to admission. Most of these previous strokes seemed to involve a facial palsy and probable dysarthria, from which she largely recovered. She has also had a DVT and has been continuing Coumadin. On 4/10/18 she developed dizziness/imbalance, nausea, vomiting and a left facial palsy. 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 She has severe, asymptomatic right carotid stenosis, and moderate asymptomatic left carotid stenosis. Given her age, suspect that she will not benefit from revascularization of her carotid arteries, either endarterectomy or stenting. I doubt that she will benefit from further neurovascular investigation.      NEURO: neurologically without acute change, clinical exam along with MRI findings suggestive of Bell's Palsy. no evidence of acute infarction on MRI. Patient to be started on Prednisone taper starting at 60 mg daily x5 days with vigilant attention  to glucose control, slow titration to normotension, titrate home statin to LDL goal less than 70, MR imaging as noted above,  PT/OT  recommends rehab placement.     ANTITHROMBOTIC THERAPY: VA Duplex (-) for DVTs, continue ASA alone at this time.       PULMONARY: CXR left basilar atelectasis encourage incentive spirometry, protecting airway, saturating well     CARDIOVASCULAR: pending TTE (may be done as outpatient), cardiac monitoring                              SBP goal: slow titration to normotension     GASTROINTESTINAL:  dysphagia screen failed, SLP eval for  D1 nectar diet, tolerating well      Diet: D1 Nectar     RENAL:  good urine output, maintain adequate hydration      Na Goal: Greater than 135     Claudio: n     HEMATOLOGY: no active bleeding noted  LE duplex pending negative for DVT, coumadin stopped,  on ASA and chemical dvt ppx.     DVT ppx: lovenox    ID: afebrile, no si/sx of infection     OTHER: current medical condition and plan of care d/w patient at bedside, all questions answered and concerns addressed.  Blood sugars rising while on Prednisone, endocrine consult appreciated: Lantus/Humalog while admitted, likely outpatient plan for metformin as recommended then + Trajenta (TBD in rehab).     DISPOSITION: Rehab placement pending.       CORE MEASURES:        Admission NIHSS: 4     TPA: [] YES [x] NO      LDL/HDL:123/59     Depression  : 0     Statin Therapy: y      Dysphagia Screen: [] PASS [x] FAIL     Smoking [] YES [x] NO      Afib [] YES [x] NO     Stroke Education [x] YES [] NO

## 2018-04-16 NOTE — PROGRESS NOTE ADULT - SUBJECTIVE AND OBJECTIVE BOX
Chief Complaint: type 2 diabetes exacerbated by steroids     Interval history: Pt continues on high dose steroids at 60 mg prednisone daily.  Order will run through 4/18.  She is on MDI but sugars consistently in 300s over past day.     MEDICATIONS  (STANDING):  artificial  tears Solution 1 Drop(s) Both EYES two times a day  aspirin  chewable 81 milliGRAM(s) Oral daily  atorvastatin 40 milliGRAM(s) Oral at bedtime  cholecalciferol 2000 Unit(s) Oral two times a day  dextrose 5%. 1000 milliLiter(s) (50 mL/Hr) IV Continuous <Continuous>  dextrose 50% Injectable 12.5 Gram(s) IV Push once  dextrose 50% Injectable 25 Gram(s) IV Push once  dextrose 50% Injectable 25 Gram(s) IV Push once  enoxaparin Injectable 40 milliGRAM(s) SubCutaneous daily  hydroxyurea 500 milliGRAM(s) Oral daily  insulin glargine Injectable (LANTUS) 8 Unit(s) SubCutaneous at bedtime  insulin lispro (HumaLOG) corrective regimen sliding scale   SubCutaneous three times a day before meals  insulin lispro Injectable (HumaLOG) 4 Unit(s) SubCutaneous three times a day before meals  levothyroxine 125 MICROGram(s) Oral daily  lisinopril 40 milliGRAM(s) Oral daily  predniSONE   Tablet 60 milliGRAM(s) Oral daily  sodium chloride 0.9%. 500 milliLiter(s) (250 mL/Hr) IV Continuous <Continuous>  sodium chloride 0.9%. 1000 milliLiter(s) (75 mL/Hr) IV Continuous <Continuous>    MEDICATIONS  (PRN):  acetaminophen   Tablet 650 milliGRAM(s) Oral every 6 hours PRN pain  dextrose Gel 1 Dose(s) Oral once PRN Blood Glucose LESS THAN 70 milliGRAM(s)/deciliter  glucagon  Injectable 1 milliGRAM(s) IntraMuscular once PRN Glucose LESS THAN 70 milligrams/deciliter      Allergies    No Known Allergies    Intolerances      Review of Systems:  Skin: no rash  Endocrine: no polyuria, polydipsia    ALL OTHER SYSTEMS REVIEWED AND NEGATIVE    PHYSICAL EXAM:  VITALS: T(C): 36.7 (04-16-18 @ 07:24)  T(F): 98.1 (04-16-18 @ 07:24), Max: 98.2 (04-15-18 @ 19:47)  HR: 77 (04-16-18 @ 07:24) (77 - 95)  BP: 152/77 (04-16-18 @ 07:24) (97/61 - 152/77)  RR:  (18 - 18)  SpO2:  (96% - 99%)  Wt(kg): --  GENERAL: NAD, well-developed  EYES: No proptosis, sclera anicteric  HEENT:  Atraumatic, Normocephalic, moist mucous membranes  SKIN: Dry, intact, No diffuse rashes     POCT Blood Glucose.: 362 mg/dL (04-16-18 @ 12:24)  POCT Blood Glucose.: 383 mg/dL (04-16-18 @ 10:26)  POCT Blood Glucose.: 319 mg/dL (04-15-18 @ 21:28)  POCT Blood Glucose.: 320 mg/dL (04-15-18 @ 17:26)  POCT Blood Glucose.: 300 mg/dL (04-15-18 @ 12:26)  POCT Blood Glucose.: 201 mg/dL (04-15-18 @ 08:02)  POCT Blood Glucose.: 233 mg/dL (04-14-18 @ 21:37)  POCT Blood Glucose.: 346 mg/dL (04-14-18 @ 17:18)  POCT Blood Glucose.: 304 mg/dL (04-14-18 @ 12:23)  POCT Blood Glucose.: 197 mg/dL (04-14-18 @ 08:14)  POCT Blood Glucose.: 236 mg/dL (04-13-18 @ 21:39)  POCT Blood Glucose.: 261 mg/dL (04-13-18 @ 17:24)      04-16    138  |  105  |  28<H>  ----------------------------<  387<H>  4.6   |  22  |  0.66    EGFR if : 94  EGFR if non : 81    Ca    8.9      04-16            Thyroid Function Tests:  04-16 @ 09:33 TSH -- FreeT4 1.8 T3 -- Anti TPO -- Anti Thyroglobulin Ab -- TSI --  04-10 @ 10:59 TSH 0.27 FreeT4 -- T3 -- Anti TPO -- Anti Thyroglobulin Ab -- TSI --      Hemoglobin A1C, Whole Blood: 7.0 % <H> [4.0 - 5.6] (04-11-18 @ 13:31)

## 2018-04-16 NOTE — PROGRESS NOTE ADULT - ASSESSMENT
85 y/o female with well controlled T2DM c/w neuropathy and steroid induced hyperglycemia here with Bell's palsy requiring steroid management. Patient also noted to have h/o hypothyroidism, osteoporosis, vitamin D Deficiency, hypertension, and hyperlipidemia

## 2018-04-16 NOTE — PROGRESS NOTE ADULT - SUBJECTIVE AND OBJECTIVE BOX
THE PATIENT WAS SEEN AND EXAMINED BY ME WITH THE HOUSESTAFF AND STROKE TEAM DURING MORNING ROUNDS.   HPI:  84 RH [place] female presented with left facial droop, vertigo, nausea, vomiting. Patient has PMH Stroke (residual left sided weakness), DVT on coumadin, Polycythemia vera Hypothyroid, SBO, HLD, DM. LKN last night 4/9 at 11 PM, prior to going to sleep. Around 2am morning of admission patient woke up and felt like the room was spinning. In the morning, daughter noted new left facial weakness. Per daughter, face presently does not look significantly different than baseline (it appears different compared to recent pictures). Patient reported generalized weakness and had difficulty with gait requiring assistance at baseline (recently at rehab). Denied numbness, visual changes, abdominal pain, vertigo. Had bowel movement this am, Endorses nausea/vomiting      SUBJECTIVE: No events overnight.  No new neurologic complaints.      acetaminophen   Tablet 650 milliGRAM(s) Oral every 6 hours PRN  artificial  tears Solution 1 Drop(s) Both EYES two times a day  aspirin  chewable 81 milliGRAM(s) Oral daily  atorvastatin 40 milliGRAM(s) Oral at bedtime  cholecalciferol 2000 Unit(s) Oral two times a day  dextrose 5%. 1000 milliLiter(s) IV Continuous <Continuous>  dextrose 50% Injectable 12.5 Gram(s) IV Push once  dextrose 50% Injectable 25 Gram(s) IV Push once  dextrose 50% Injectable 25 Gram(s) IV Push once  dextrose Gel 1 Dose(s) Oral once PRN  enoxaparin Injectable 40 milliGRAM(s) SubCutaneous daily  glucagon  Injectable 1 milliGRAM(s) IntraMuscular once PRN  hydroxyurea 500 milliGRAM(s) Oral daily  insulin glargine Injectable (LANTUS) 8 Unit(s) SubCutaneous at bedtime  insulin lispro (HumaLOG) corrective regimen sliding scale   SubCutaneous three times a day before meals  insulin lispro Injectable (HumaLOG) 4 Unit(s) SubCutaneous three times a day before meals  levothyroxine 125 MICROGram(s) Oral daily  lisinopril 40 milliGRAM(s) Oral daily  predniSONE   Tablet 60 milliGRAM(s) Oral daily  sodium chloride 0.9%. 500 milliLiter(s) IV Continuous <Continuous>  sodium chloride 0.9%. 1000 milliLiter(s) IV Continuous <Continuous>      PHYSICAL EXAM:   Vital Signs Last 24 Hrs  T(C): 36.7 (16 Apr 2018 07:24), Max: 36.8 (15 Apr 2018 19:47)  T(F): 98.1 (16 Apr 2018 07:24), Max: 98.2 (15 Apr 2018 19:47)  HR: 77 (16 Apr 2018 07:24) (77 - 95)  BP: 152/77 (16 Apr 2018 07:24) (97/61 - 152/77)  BP(mean): --  RR: 18 (16 Apr 2018 07:24) (18 - 18)  SpO2: 96% (16 Apr 2018 07:24) (96% - 99%)    General: No acute distress  HEENT: EOM intact, visual fields full  Abdomen: Soft, nontender, nondistended   Extremities: No edema    NEUROLOGICAL EXAM: Mental status: Awake, alert, oriented x3, no aphasia, no neglect, normal memory   Cranial Nerves: left facial palsy, no nystagmus,  dysarthria,  tongue midline  Motor exam: moved all extremities well against gravity without drift; very mild action tremor of her arms  Sensation:  mildly decreased temperature sensation over her right face, arm and leg  Coordination/ Gait: No dysmetria     LABS:         Hemoglobin A1C, Whole Blood: 7.0 % (04-11 @ 13:31)      IMAGING: Reviewed by me.     CTA neck: Suspicion of progressive right ICA stenosis which on prior   Doppler of 6/11/2014 was in the 50-69% range and now measures 87% by   NASCET criteria with dense atherosclerotic calcification seen. Left ICA   with a roughly 62% stenosis by NASCET criteria which appears to be a   progression compared with the patient's prior study as well. Recommend   correlation with ultrasound for further evaluation       CT Brain Stroke Protocol (04.10.18)   No acute hemorrhage. Patchy hypoattenuation within the white matter,   likely representing chronic microvascular changes. Unchanged   calcification of the bilateral globus pallidus and dentate nuclei within   the cerebellum.     MR Head w/wo IV Cont (04.10.18)   No acute infarct or hemorrhage.  Fairly extensive periventricular white matter and pontine microvascular   ischemic change with scattered chronic white matter infarcts and a tiny   chronic right pontine infarct.  Subtle enhancement of the left facial nerve labyrinthine segment with   extension to the geniculate ganglion on may represent Bell palsy..

## 2018-04-17 LAB
GLUCOSE BLDC GLUCOMTR-MCNC: 228 MG/DL — HIGH (ref 70–99)
GLUCOSE BLDC GLUCOMTR-MCNC: 263 MG/DL — HIGH (ref 70–99)
GLUCOSE BLDC GLUCOMTR-MCNC: 267 MG/DL — HIGH (ref 70–99)
GLUCOSE BLDC GLUCOMTR-MCNC: 280 MG/DL — HIGH (ref 70–99)

## 2018-04-17 PROCEDURE — 99232 SBSQ HOSP IP/OBS MODERATE 35: CPT

## 2018-04-17 RX ORDER — LEVOTHYROXINE SODIUM 125 MCG
100 TABLET ORAL DAILY
Qty: 0 | Refills: 0 | Status: DISCONTINUED | OUTPATIENT
Start: 2018-04-17 | End: 2018-04-20

## 2018-04-17 RX ORDER — INSULIN GLARGINE 100 [IU]/ML
13 INJECTION, SOLUTION SUBCUTANEOUS AT BEDTIME
Qty: 0 | Refills: 0 | Status: COMPLETED | OUTPATIENT
Start: 2018-04-17 | End: 2018-04-17

## 2018-04-17 RX ORDER — SODIUM CHLORIDE 9 MG/ML
500 INJECTION INTRAMUSCULAR; INTRAVENOUS; SUBCUTANEOUS ONCE
Qty: 0 | Refills: 0 | Status: DISCONTINUED | OUTPATIENT
Start: 2018-04-17 | End: 2018-04-19

## 2018-04-17 RX ORDER — INSULIN LISPRO 100/ML
VIAL (ML) SUBCUTANEOUS AT BEDTIME
Qty: 0 | Refills: 0 | Status: DISCONTINUED | OUTPATIENT
Start: 2018-04-17 | End: 2018-04-19

## 2018-04-17 RX ORDER — INSULIN LISPRO 100/ML
VIAL (ML) SUBCUTANEOUS
Qty: 0 | Refills: 0 | Status: DISCONTINUED | OUTPATIENT
Start: 2018-04-17 | End: 2018-04-19

## 2018-04-17 RX ADMIN — ATORVASTATIN CALCIUM 40 MILLIGRAM(S): 80 TABLET, FILM COATED ORAL at 21:29

## 2018-04-17 RX ADMIN — Medication 6 UNIT(S): at 18:26

## 2018-04-17 RX ADMIN — Medication 6 UNIT(S): at 12:13

## 2018-04-17 RX ADMIN — Medication 2: at 21:30

## 2018-04-17 RX ADMIN — Medication 2000 UNIT(S): at 18:27

## 2018-04-17 RX ADMIN — Medication 1 DROP(S): at 05:31

## 2018-04-17 RX ADMIN — INSULIN GLARGINE 13 UNIT(S): 100 INJECTION, SOLUTION SUBCUTANEOUS at 21:29

## 2018-04-17 RX ADMIN — Medication 3: at 09:16

## 2018-04-17 RX ADMIN — Medication 2000 UNIT(S): at 05:25

## 2018-04-17 RX ADMIN — Medication 60 MILLIGRAM(S): at 05:25

## 2018-04-17 RX ADMIN — Medication 4: at 12:14

## 2018-04-17 RX ADMIN — Medication 6: at 19:00

## 2018-04-17 RX ADMIN — Medication 81 MILLIGRAM(S): at 12:05

## 2018-04-17 RX ADMIN — ENOXAPARIN SODIUM 40 MILLIGRAM(S): 100 INJECTION SUBCUTANEOUS at 12:06

## 2018-04-17 RX ADMIN — Medication 125 MICROGRAM(S): at 05:26

## 2018-04-17 RX ADMIN — HYDROXYUREA 500 MILLIGRAM(S): 500 CAPSULE ORAL at 12:05

## 2018-04-17 RX ADMIN — Medication 6 UNIT(S): at 09:17

## 2018-04-17 RX ADMIN — SODIUM CHLORIDE 250 MILLILITER(S): 9 INJECTION INTRAMUSCULAR; INTRAVENOUS; SUBCUTANEOUS at 18:38

## 2018-04-17 RX ADMIN — Medication 1 DROP(S): at 18:28

## 2018-04-17 NOTE — PROVIDER CONTACT NOTE (OTHER) - ASSESSMENT
Pt alert and oriented x3, neurologically and hemodynamically stable.
/71, HR 90  c/o dizziness increased when standing
Dopplers negative for B/L LE dvts
asymptomatic
neuro exam unchanged, denies dizziness

## 2018-04-17 NOTE — PROVIDER CONTACT NOTE (OTHER) - SITUATION
Pt has history of b/l le dvt's. Doppers (4/12) negative. Provider notified to see whether it was ok to put PAS stockings on patient.
b/p 96/61, hr 102. pt awake and responsive with no complaints
c/o dizziness  sbp<120
sbp 104/68
Pt's  at 1718.

## 2018-04-17 NOTE — PROGRESS NOTE ADULT - SUBJECTIVE AND OBJECTIVE BOX
THE PATIENT WAS SEEN AND EXAMINED BY ME WITH THE HOUSESTAFF AND STROKE TEAM DURING MORNING ROUNDS.   HPI:  84 RH [place] female presented with left facial droop, vertigo, nausea, vomiting. Patient has PMH Stroke (residual left sided weakness), DVT on coumadin, Polycythemia vera Hypothyroid, SBO, HLD, DM. LKN last night 4/9 at 11 PM, prior to going to sleep. Around 2am morning of admission patient woke up and felt like the room was spinning. In the morning, daughter noted new left facial weakness. Per daughter, face presently does not look significantly different than baseline (it appears different compared to recent pictures). Patient reported generalized weakness and had difficulty with gait requiring assistance at baseline (recently at rehab). Denied numbness, visual changes, abdominal pain, vertigo. Had bowel movement this am, Endorsed nausea/vomiting.        SUBJECTIVE: No events overnight.  No new neurologic complaints.      acetaminophen   Tablet 650 milliGRAM(s) Oral every 6 hours PRN  artificial  tears Solution 1 Drop(s) Both EYES two times a day  aspirin  chewable 81 milliGRAM(s) Oral daily  atorvastatin 40 milliGRAM(s) Oral at bedtime  cholecalciferol 2000 Unit(s) Oral two times a day  dextrose 5%. 1000 milliLiter(s) IV Continuous <Continuous>  dextrose 50% Injectable 12.5 Gram(s) IV Push once  dextrose 50% Injectable 25 Gram(s) IV Push once  dextrose 50% Injectable 25 Gram(s) IV Push once  dextrose Gel 1 Dose(s) Oral once PRN  enoxaparin Injectable 40 milliGRAM(s) SubCutaneous daily  glucagon  Injectable 1 milliGRAM(s) IntraMuscular once PRN  hydroxyurea 500 milliGRAM(s) Oral daily  insulin glargine Injectable (LANTUS) 11 Unit(s) SubCutaneous at bedtime  insulin lispro (HumaLOG) corrective regimen sliding scale   SubCutaneous three times a day before meals  insulin lispro Injectable (HumaLOG) 6 Unit(s) SubCutaneous three times a day before meals  levothyroxine 100 MICROGram(s) Oral daily  lisinopril 40 milliGRAM(s) Oral daily  predniSONE   Tablet 60 milliGRAM(s) Oral daily  sodium chloride 0.9%. 500 milliLiter(s) IV Continuous <Continuous>  sodium chloride 0.9%. 1000 milliLiter(s) IV Continuous <Continuous>      PHYSICAL EXAM:   Vital Signs Last 24 Hrs  T(C): 36.9 (17 Apr 2018 05:12), Max: 36.9 (17 Apr 2018 05:12)  T(F): 98.4 (17 Apr 2018 05:12), Max: 98.4 (17 Apr 2018 05:12)  HR: 83 (17 Apr 2018 05:12) (83 - 98)  BP: 116/74 (17 Apr 2018 05:12) (111/71 - 155/78)  BP(mean): --  RR: 18 (17 Apr 2018 05:12) (18 - 18)  SpO2: 97% (17 Apr 2018 05:12) (95% - 98%)    General: No acute distress  HEENT: EOM intact, visual fields full  Abdomen: Soft, nontender, nondistended   Extremities: No edema    NEUROLOGICAL EXAM: Mental status: Awake, alert, oriented x3, no aphasia, no neglect, normal memory   Cranial Nerves: left facial palsy, no nystagmus,  dysarthria,  tongue midline  Motor exam: moved all extremities well against gravity without drift; very mild action tremor of her arms  Sensation:  mildly decreased temperature sensation over her right face, arm and leg  Coordination/ Gait: No dysmetria     LABS:                        12.7   4.88  )-----------( 381      ( 16 Apr 2018 15:17 )             38.4    04-16    138  |  105  |  28<H>  ----------------------------<  387<H>  4.6   |  22  |  0.66    Ca    8.9      16 Apr 2018 12:16      Hemoglobin A1C, Whole Blood: 7.0 % (04-11 @ 13:31)      IMAGING: Reviewed by me.   CT Angio Neck w/ IV Cont (04.10.18)   CTA head: The arteries of the [place] are normal in caliber.   There is no aneurysm.    CTA neck: Suspicion of progressive right ICA stenosis which on prior   Doppler of 6/11/2014 was in the 50-69% range and now measures 87% by   NASCET criteria with dense atherosclerotic calcification seen. Left ICA   with a roughly 62% stenosis by NASCET criteria which appears to be a   progression compared with the patient's prior study as well. Recommend   correlation with ultrasound for further evaluation       CT Brain Stroke Protocol (04.10.18)   No acute hemorrhage. Patchy hypoattenuation within the white matter,   likely representing chronic microvascular changes. Unchanged   calcification of the bilateral globus pallidus and dentate nuclei within   the cerebellum.     MR Head w/wo IV Cont (04.10.18)   No acute infarct or hemorrhage.  Fairly extensive periventricular white matter and pontine microvascular   ischemic change with scattered chronic white matter infarcts and a tiny   chronic right pontine infarct.  Subtle enhancement of the left facial nerve labyrinthine segment with   extension to the geniculate ganglion on may represent Bell palsy..

## 2018-04-17 NOTE — PROVIDER CONTACT NOTE (OTHER) - BACKGROUND
HIstory of B/L LE dvts. Admitted for stroke
facial weakness
facial weakness
issac estrada, hx stroke
Patient is a diabetic and started prednisone yesterday. Patient has sliding scale humalog ordered.

## 2018-04-17 NOTE — PROGRESS NOTE ADULT - ASSESSMENT
ASSESSMENT: 84-year-old right-handed [place] lady First evaluated at [facility] on 4/11/18 with dizziness. She was a somewhat difficult historian. She has a history of "3 strokes", beginning about 6 years prior to admission. Most of these previous strokes seemed to involve a facial palsy and probable dysarthria, from which she largely recovered. She has also had a DVT and has been continuing Coumadin. On 4/10/18 she developed dizziness/imbalance, nausea, vomiting and a left facial palsy. 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 She has severe, asymptomatic right carotid stenosis, and moderate asymptomatic left carotid stenosis. Given her age, suspect that she will not benefit from revascularization of her carotid arteries, either endarterectomy or stenting. I doubt that she will benefit from further neurovascular investigation.      NEURO: neurologically without acute change, clinical exam along with MRI findings suggestive of Bell's Palsy. no evidence of acute infarction on MRI. Patient to be started on Prednisone taper starting at 60 mg daily x5 days with vigilant attention  to glucose control, slow titration to normotension, titrate home statin to LDL goal less than 70, MR imaging as noted above,  PT/OT  recommends rehab placement.     ANTITHROMBOTIC THERAPY: VA Duplex (-) for DVTs, continue ASA alone at this time.       PULMONARY: CXR left basilar atelectasis encourage incentive spirometry, protecting airway, saturating well     CARDIOVASCULAR:  TTE ef 78%, minimal Mr, mild AR, mild TR,  maintain normotension                              SBP goal:  normotension     GASTROINTESTINAL:  dysphagia screen failed, SLP eval for  D1 nectar diet, tolerating well      Diet: D1 Nectar     RENAL:  good urine output, maintain adequate hydration      Na Goal: Greater than 135     Claudio: n     HEMATOLOGY: no active bleeding noted  LE duplex pending negative for DVT, coumadin stopped,  on ASA and chemical dvt ppx.     DVT ppx: lovenox    ID: afebrile, no si/sx of infection     OTHER: current medical condition and plan of care d/w patient at bedside, all questions answered and concerns addressed.  Blood sugars rising while on Prednisone, endocrine consult appreciated    DISPOSITION: Rehab placement pending.       CORE MEASURES:        Admission NIHSS: 4     TPA: [] YES [x] NO      LDL/HDL:123/59     Depression  : 0     Statin Therapy: y      Dysphagia Screen: [] PASS [x] FAIL     Smoking [] YES [x] NO      Afib [] YES [x] NO     Stroke Education [x] YES [] NO

## 2018-04-17 NOTE — PROGRESS NOTE ADULT - PROBLEM SELECTOR PLAN 1
- Patient at goal HbA1c while only on Metformin 500 mg BID at home  - Noted to have acute steroid induced hyperglycemia  - Recommend increase Lantus from 11 to 12 and to give last dose of this dose of lantus tomorrow AM assuming that she will get 1 more dose of 60 mg prednisone tomorrow AM   - Recommend continue humalog at 6 units TID AC   -will increase from low to moderate SSI AC hs    Outpatient Plan  - after steroids are complete can resume Metformin 500 mg BID as long as GFR is >30  - F/u [address] [place] [address] with Endocrinology given multiple endocrine issues.    If pt goes to rehab: would suggest to send on the above insulin doses until steroids have stopped.  Meaning, continue the humalog 6 units tidac with last dose before dinner on the day when final dose of prednisone was given that AM.  Would give lantus up until the night BEFORE the final dose of steroid is given, then stop. After insulin off, then resume metformin 500 mg bid in rehab going forward.

## 2018-04-18 LAB
GLUCOSE BLDC GLUCOMTR-MCNC: 150 MG/DL — HIGH (ref 70–99)
GLUCOSE BLDC GLUCOMTR-MCNC: 232 MG/DL — HIGH (ref 70–99)
GLUCOSE BLDC GLUCOMTR-MCNC: 254 MG/DL — HIGH (ref 70–99)
GLUCOSE BLDC GLUCOMTR-MCNC: 257 MG/DL — HIGH (ref 70–99)

## 2018-04-18 PROCEDURE — 99232 SBSQ HOSP IP/OBS MODERATE 35: CPT

## 2018-04-18 RX ADMIN — ATORVASTATIN CALCIUM 40 MILLIGRAM(S): 80 TABLET, FILM COATED ORAL at 21:39

## 2018-04-18 RX ADMIN — Medication 60 MILLIGRAM(S): at 05:15

## 2018-04-18 RX ADMIN — ENOXAPARIN SODIUM 40 MILLIGRAM(S): 100 INJECTION SUBCUTANEOUS at 13:14

## 2018-04-18 RX ADMIN — Medication 6 UNIT(S): at 08:48

## 2018-04-18 RX ADMIN — LISINOPRIL 40 MILLIGRAM(S): 2.5 TABLET ORAL at 05:17

## 2018-04-18 RX ADMIN — Medication 81 MILLIGRAM(S): at 13:13

## 2018-04-18 RX ADMIN — Medication 2000 UNIT(S): at 17:46

## 2018-04-18 RX ADMIN — HYDROXYUREA 500 MILLIGRAM(S): 500 CAPSULE ORAL at 13:13

## 2018-04-18 RX ADMIN — Medication 2: at 21:39

## 2018-04-18 RX ADMIN — Medication 6: at 17:45

## 2018-04-18 RX ADMIN — Medication 1 DROP(S): at 05:15

## 2018-04-18 RX ADMIN — Medication 4: at 13:13

## 2018-04-18 RX ADMIN — Medication 100 MICROGRAM(S): at 05:15

## 2018-04-18 RX ADMIN — Medication 1 DROP(S): at 17:46

## 2018-04-18 RX ADMIN — Medication 2000 UNIT(S): at 05:39

## 2018-04-18 NOTE — PROGRESS NOTE ADULT - ASSESSMENT
ASSESSMENT: 84-year-old right-handed [place] lady First evaluated at [facility] on 4/11/18 with dizziness. She was a somewhat difficult historian. She has a history of "3 strokes", beginning about 6 years prior to admission. Most of these previous strokes seemed to involve a facial palsy and probable dysarthria, from which she largely recovered. She has also had a DVT and has been continuing Coumadin. On 4/10/18 she developed dizziness/imbalance, nausea, vomiting and a left facial palsy. 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 She has severe, asymptomatic right carotid stenosis, and moderate asymptomatic left carotid stenosis. Given her age, suspect that she will not benefit from revascularization of her carotid arteries, either endarterectomy or stenting.     NEURO: neurologically without acute change, clinical exam along with MRI findings suggestive of Bell's Palsy. no evidence of acute infarction on MRI. S/p 5 day course of prednisone,  titration to normotension, titrate home statin to LDL goal less than 70, MR imaging as noted above,  PT/OT  recommends rehab placement.     ANTITHROMBOTIC THERAPY: VA Duplex (-) for DVTs, continue ASA alone at this time.  She should follow up with PMD re: DVT.      PULMONARY: CXR left basilar atelectasis encourage incentive spirometry, protecting airway, saturating well     CARDIOVASCULAR:  TTE ef 78%, minimal Mr, mild AR, mild TR,  maintain normotension                              SBP goal:  normotension     GASTROINTESTINAL:  dysphagia screen failed, SLP eval for  D1 nectar diet, tolerating well      Diet: D1 Nectar     RENAL:  good urine output, maintain adequate hydration      Na Goal: Greater than 135     Clauido: n     HEMATOLOGY: no active bleeding noted  LE duplex pending negative for DVT, coumadin stopped,  on ASA and chemical dvt ppx.     DVT ppx: lovenox    ID: afebrile, no si/sx of infection     OTHER: current medical condition and plan of care d/w patient at bedside, all questions answered and concerns addressed.  Blood sugars were rising while on Prednisone, endocrine consult appreciated, regimen to be adjusted accordingly as steroids taper off according to endo recs.    DISPOSITION: Rehab placement pending.       CORE MEASURES:        Admission NIHSS: 4     TPA: [] YES [x] NO      LDL/HDL:123/59     Depression  : 0     Statin Therapy: y      Dysphagia Screen: [] PASS [x] FAIL     Smoking [] YES [x] NO      Afib [] YES [x] NO     Stroke Education [x] YES [] NO

## 2018-04-18 NOTE — DIETITIAN INITIAL EVALUATION ADULT. - ADHERENCE
Pt followed a consistent CHO diet and avoided sweets and sugary beverages. Ate regular diet consistency PTA. Confirms NKFA. Took vitamin D3 PTA. States daughter checked her sugar once daily PTA and cannot recall usual sugar levels. Took metformin PTA./good

## 2018-04-18 NOTE — PROGRESS NOTE ADULT - SUBJECTIVE AND OBJECTIVE BOX
Chief Complaint: type 2 diabetes     Interval history: Pt states that she is feeling well, no acute complaints.  Appetite is good.  Her sugars have been on 100-200s over the past day.      MEDICATIONS  (STANDING):  artificial  tears Solution 1 Drop(s) Both EYES two times a day  aspirin  chewable 81 milliGRAM(s) Oral daily  atorvastatin 40 milliGRAM(s) Oral at bedtime  cholecalciferol 2000 Unit(s) Oral two times a day  dextrose 5%. 1000 milliLiter(s) (50 mL/Hr) IV Continuous <Continuous>  dextrose 50% Injectable 12.5 Gram(s) IV Push once  dextrose 50% Injectable 25 Gram(s) IV Push once  dextrose 50% Injectable 25 Gram(s) IV Push once  enoxaparin Injectable 40 milliGRAM(s) SubCutaneous daily  hydroxyurea 500 milliGRAM(s) Oral daily  insulin lispro (HumaLOG) corrective regimen sliding scale   SubCutaneous three times a day before meals  insulin lispro (HumaLOG) corrective regimen sliding scale   SubCutaneous at bedtime  levothyroxine 100 MICROGram(s) Oral daily  lisinopril 40 milliGRAM(s) Oral daily  sodium chloride 0.9% Bolus 500 milliLiter(s) IV Bolus once  sodium chloride 0.9%. 500 milliLiter(s) (250 mL/Hr) IV Continuous <Continuous>  sodium chloride 0.9%. 1000 milliLiter(s) (75 mL/Hr) IV Continuous <Continuous>    MEDICATIONS  (PRN):  acetaminophen   Tablet 650 milliGRAM(s) Oral every 6 hours PRN pain  dextrose Gel 1 Dose(s) Oral once PRN Blood Glucose LESS THAN 70 milliGRAM(s)/deciliter  glucagon  Injectable 1 milliGRAM(s) IntraMuscular once PRN Glucose LESS THAN 70 milligrams/deciliter      Allergies    No Known Allergies    Intolerances      Review of Systems:  Skin: no rash  Endocrine: no polyuria, polydipsia    ALL OTHER SYSTEMS REVIEWED AND NEGATIVE    PHYSICAL EXAM:  VITALS: T(C): 36.4 (04-18-18 @ 12:09)  T(F): 97.6 (04-18-18 @ 12:09), Max: 98.5 (04-17-18 @ 18:42)  HR: 80 (04-18-18 @ 12:09) (75 - 102)  BP: 111/70 (04-18-18 @ 12:09) (96/61 - 148/77)  RR:  (18 - 18)  SpO2:  (95% - 99%)  Wt(kg): --  GENERAL: NAD, well-developed, sitting up in chair with feet elevated   EYES: No proptosis, sclera anicteric  HEENT:  Atraumatic, Normocephalic, moist mucous membranes  SKIN: Dry, intact, No diffuse rashes   PSYCH: Alert and oriented x 3, normal affect, normal mood    POCT Blood Glucose.: 232 mg/dL (04-18-18 @ 12:55)  POCT Blood Glucose.: 150 mg/dL (04-18-18 @ 08:33)  POCT Blood Glucose.: 280 mg/dL (04-17-18 @ 21:18)  POCT Blood Glucose.: 263 mg/dL (04-17-18 @ 17:16)  POCT Blood Glucose.: 228 mg/dL (04-17-18 @ 11:55)  POCT Blood Glucose.: 267 mg/dL (04-17-18 @ 09:05)  POCT Blood Glucose.: 225 mg/dL (04-16-18 @ 21:33)  POCT Blood Glucose.: 327 mg/dL (04-16-18 @ 17:24)  POCT Blood Glucose.: 362 mg/dL (04-16-18 @ 12:24)  POCT Blood Glucose.: 383 mg/dL (04-16-18 @ 10:26)  POCT Blood Glucose.: 319 mg/dL (04-15-18 @ 21:28)  POCT Blood Glucose.: 320 mg/dL (04-15-18 @ 17:26)      04-16    138  |  105  |  28<H>  ----------------------------<  387<H>  4.6   |  22  |  0.66    EGFR if : 94  EGFR if non : 81    Ca    8.9      04-16            Thyroid Function Tests:  04-16 @ 09:33 TSH -- FreeT4 1.8 T3 -- Anti TPO -- Anti Thyroglobulin Ab -- TSI --  04-10 @ 10:59 TSH 0.27 FreeT4 -- T3 -- Anti TPO -- Anti Thyroglobulin Ab -- TSI --      Hemoglobin A1C, Whole Blood: 7.0 % <H> [4.0 - 5.6] (04-11-18 @ 13:31)

## 2018-04-18 NOTE — DIETITIAN INITIAL EVALUATION ADULT. - NS AS NUTRI INTERV MEALS SNACK
Carbohydrate - modified diet/Texture-modified diet/1) Encourage good PO intake  . 2) Provide food preferences within therapeutic diet when requested.

## 2018-04-18 NOTE — DIETITIAN INITIAL EVALUATION ADULT. - ENERGY NEEDS
ht: 59 inches, wt: 124.7 pounds, BMI: 25.2 kg/m2, IBW: 97.5 pounds (+/- 10%), 128 %IBW  Edema: none noted. Skin: intact.  Other pertinent information: 85 y/o female presented c nausea/vomiting facial droop and history of multiple strokes. MRI suggestive of Bell's Palsy, no evidence of infarct.

## 2018-04-18 NOTE — PROGRESS NOTE ADULT - SUBJECTIVE AND OBJECTIVE BOX
THE PATIENT WAS SEEN AND EXAMINED BY ME WITH THE HOUSESTAFF AND STROKE TEAM DURING MORNING ROUNDS.   HPI:  84 RH [place] female presented with left facial droop, vertigo, nausea, vomiting. Patient has PMH Stroke (residual left sided weakness), DVT on coumadin, Polycythemia vera Hypothyroid, SBO, HLD, DM. LKN last night 4/9 at 11 PM, prior to going to sleep. Around 2am morning of admission patient woke up and felt like the room was spinning. In the morning, daughter noted new left facial weakness. Per daughter, face presently does not look significantly different than baseline (it appears different compared to recent pictures). Patient reported generalized weakness and had difficulty with gait requiring assistance at baseline (recently at rehab). Denied numbness, visual changes, abdominal pain, vertigo. Had bowel movement this am, Endorsed nausea/vomiting.      SUBJECTIVE: No events overnight.  No new neurologic complaints.      acetaminophen   Tablet 650 milliGRAM(s) Oral every 6 hours PRN  artificial  tears Solution 1 Drop(s) Both EYES two times a day  aspirin  chewable 81 milliGRAM(s) Oral daily  atorvastatin 40 milliGRAM(s) Oral at bedtime  cholecalciferol 2000 Unit(s) Oral two times a day  dextrose 5%. 1000 milliLiter(s) IV Continuous <Continuous>  dextrose 50% Injectable 12.5 Gram(s) IV Push once  dextrose 50% Injectable 25 Gram(s) IV Push once  dextrose 50% Injectable 25 Gram(s) IV Push once  dextrose Gel 1 Dose(s) Oral once PRN  enoxaparin Injectable 40 milliGRAM(s) SubCutaneous daily  glucagon  Injectable 1 milliGRAM(s) IntraMuscular once PRN  hydroxyurea 500 milliGRAM(s) Oral daily  insulin lispro (HumaLOG) corrective regimen sliding scale   SubCutaneous three times a day before meals  insulin lispro (HumaLOG) corrective regimen sliding scale   SubCutaneous at bedtime  insulin lispro Injectable (HumaLOG) 6 Unit(s) SubCutaneous three times a day before meals  levothyroxine 100 MICROGram(s) Oral daily  lisinopril 40 milliGRAM(s) Oral daily  sodium chloride 0.9% Bolus 500 milliLiter(s) IV Bolus once  sodium chloride 0.9%. 500 milliLiter(s) IV Continuous <Continuous>  sodium chloride 0.9%. 1000 milliLiter(s) IV Continuous <Continuous>      PHYSICAL EXAM:   Vital Signs Last 24 Hrs  T(C): 36.9 (18 Apr 2018 08:02), Max: 36.9 (17 Apr 2018 18:42)  T(F): 98.5 (18 Apr 2018 08:02), Max: 98.5 (17 Apr 2018 18:42)  HR: 79 (18 Apr 2018 08:02) (75 - 102)  BP: 137/83 (18 Apr 2018 08:02) (96/61 - 148/77)  BP(mean): --  RR: 18 (18 Apr 2018 08:02) (18 - 18)  SpO2: 96% (18 Apr 2018 08:02) (96% - 99%)    General: No acute distress  HEENT: EOM intact, visual fields full  Abdomen: Soft, nontender, nondistended   Extremities: No edema    NEUROLOGICAL EXAM: Mental status: Awake, alert, oriented x3, no aphasia, no neglect, normal memory   Cranial Nerves: left facial palsy, no nystagmus,  dysarthria,  tongue midline  Motor exam: moved all extremities well against gravity without drift; very mild action tremor of her arms  Sensation:  mildly decreased temperature sensation over her right face, arm and leg  Coordination/ Gait: No dysmetria     LABS:                        12.7   4.88  )-----------( 381      ( 16 Apr 2018 15:17 )             38.4    04-16    138  |  105  |  28<H>  ----------------------------<  387<H>  4.6   |  22  |  0.66    Ca    8.9      16 Apr 2018 12:16      Hemoglobin A1C, Whole Blood: 7.0 % (04-11 @ 13:31)      IMAGING: Reviewed by me.   CT Angio Neck w/ IV Cont (04.10.18)   CTA head: The arteries of the [place] are normal in caliber.   There is no aneurysm.    CTA neck: Suspicion of progressive right ICA stenosis which on prior   Doppler of 6/11/2014 was in the 50-69% range and now measures 87% by   NASCET criteria with dense atherosclerotic calcification seen. Left ICA   with a roughly 62% stenosis by NASCET criteria which appears to be a   progression compared with the patient's prior study as well. Recommend   correlation with ultrasound for further evaluation       CT Brain Stroke Protocol (04.10.18)   No acute hemorrhage. Patchy hypoattenuation within the white matter,   likely representing chronic microvascular changes. Unchanged   calcification of the bilateral globus pallidus and dentate nuclei within   the cerebellum.     MR Head w/wo IV Cont (04.10.18)   No acute infarct or hemorrhage.  Fairly extensive periventricular white matter and pontine microvascular   ischemic change with scattered chronic white matter infarcts and a tiny   chronic right pontine infarct.  Subtle enhancement of the left facial nerve labyrinthine segment with   extension to the geniculate ganglion on may represent Bell palsy..

## 2018-04-18 NOTE — PROGRESS NOTE ADULT - PROBLEM SELECTOR PLAN 1
- Patient at goal HbA1c while only on Metformin 500 mg BID at home  - Noted to have acute steroid induced hyperglycemia  - Received last dose of 60 mg prednisone this morning with expectation that it will be out of her system overnight tonight; given that, will stop her standing lantus and humalog and watch how she does as sugars typically well controlled by A1c off of any steroids   -ok to continue SSI for now achs, if sugars are only mildly elevated, would reduce to low SSI    Outpatient Plan  - after steroids are complete can resume Metformin 500 mg BID as long as GFR is >30 outpt or at rehab, but here would just keep on SSI while inpt   - F/u [facility] with Endocrinology given multiple endocrine issues.

## 2018-04-19 ENCOUNTER — APPOINTMENT (OUTPATIENT)
Dept: HEMATOLOGY ONCOLOGY | Facility: CLINIC | Age: 83
End: 2018-04-19

## 2018-04-19 LAB
GLUCOSE BLDC GLUCOMTR-MCNC: 112 MG/DL — HIGH (ref 70–99)
GLUCOSE BLDC GLUCOMTR-MCNC: 144 MG/DL — HIGH (ref 70–99)
GLUCOSE BLDC GLUCOMTR-MCNC: 159 MG/DL — HIGH (ref 70–99)
GLUCOSE BLDC GLUCOMTR-MCNC: 187 MG/DL — HIGH (ref 70–99)

## 2018-04-19 PROCEDURE — 99232 SBSQ HOSP IP/OBS MODERATE 35: CPT

## 2018-04-19 RX ORDER — INSULIN LISPRO 100/ML
VIAL (ML) SUBCUTANEOUS
Qty: 0 | Refills: 0 | Status: DISCONTINUED | OUTPATIENT
Start: 2018-04-19 | End: 2018-04-20

## 2018-04-19 RX ORDER — INSULIN LISPRO 100/ML
VIAL (ML) SUBCUTANEOUS AT BEDTIME
Qty: 0 | Refills: 0 | Status: DISCONTINUED | OUTPATIENT
Start: 2018-04-19 | End: 2018-04-20

## 2018-04-19 RX ADMIN — HYDROXYUREA 500 MILLIGRAM(S): 500 CAPSULE ORAL at 12:12

## 2018-04-19 RX ADMIN — Medication 100 MICROGRAM(S): at 05:20

## 2018-04-19 RX ADMIN — Medication 2000 UNIT(S): at 05:20

## 2018-04-19 RX ADMIN — Medication 1 DROP(S): at 05:21

## 2018-04-19 RX ADMIN — ATORVASTATIN CALCIUM 40 MILLIGRAM(S): 80 TABLET, FILM COATED ORAL at 21:51

## 2018-04-19 RX ADMIN — Medication 2000 UNIT(S): at 17:51

## 2018-04-19 RX ADMIN — Medication 1 DROP(S): at 17:50

## 2018-04-19 RX ADMIN — LISINOPRIL 40 MILLIGRAM(S): 2.5 TABLET ORAL at 05:20

## 2018-04-19 RX ADMIN — ENOXAPARIN SODIUM 40 MILLIGRAM(S): 100 INJECTION SUBCUTANEOUS at 12:11

## 2018-04-19 RX ADMIN — Medication 81 MILLIGRAM(S): at 12:11

## 2018-04-19 RX ADMIN — Medication 2: at 12:46

## 2018-04-19 NOTE — PROGRESS NOTE ADULT - PROBLEM SELECTOR PROBLEM 3
Age-related osteoporosis without current pathological fracture

## 2018-04-19 NOTE — PROGRESS NOTE ADULT - ASSESSMENT
85 y/o female with well controlled T2DM c/w neuropathy and steroid induced hyperglycemia here with Bell's palsy requiring steroid management. Patient also noted to have h/o hypothyroidism, osteoporosis, vitamin D Deficiency, hypertension, and hyperlipidemia. Final dose of steroids was on AM of 4/18.

## 2018-04-19 NOTE — PROGRESS NOTE ADULT - ASSESSMENT
ASSESSMENT: 84-year-old right-handed [place] lady First evaluated at [facility] on 4/11/18 with dizziness. She was a somewhat difficult historian. She has a history of "3 strokes", beginning about 6 years prior to admission. Most of these previous strokes seemed to involve a facial palsy and probable dysarthria, from which she largely recovered. She has also had a DVT and has been continuing Coumadin. On 4/10/18 she developed dizziness/imbalance, nausea, vomiting and a left facial palsy. 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 She has severe, asymptomatic right carotid stenosis, and moderate asymptomatic left carotid stenosis. Given her age, suspect that she will not benefit from revascularization of her carotid arteries, either endarterectomy or stenting.     NEURO: neurologically without acute change, clinical exam along with MRI findings suggestive of Bell's Palsy. no evidence of acute infarction on MRI. S/p 5 day course of prednisone,  titration to normotension, titrate home statin to LDL goal less than 70, MR imaging as noted above,  PT/OT  recommends [facility].     ANTITHROMBOTIC THERAPY: VA Duplex (-) for DVTs, continue ASA alone at this time.  She should follow up with PMD re: DVT.      PULMONARY: CXR left basilar atelectasis encourage incentive spirometry, protecting airway, saturating well     CARDIOVASCULAR:  TTE ef 78%, minimal Mr, mild AR, mild TR,  maintain normotension                              SBP goal:  normotension     GASTROINTESTINAL:  dysphagia screen failed, SLP eval for  D1 nectar diet, tolerating well      Diet: D1 Nectar     RENAL:  good urine output, maintain adequate hydration      Na Goal: Greater than 135     Claudio: n     HEMATOLOGY: no active bleeding noted  LE duplex pending negative for DVT, coumadin stopped,  on ASA and chemical dvt ppx.     DVT ppx: lovenox    ID: afebrile, no si/sx of infection     OTHER: current medical condition and plan of care d/w patient at bedside, all questions answered and concerns addressed.  Blood sugars were rising while on Prednisone, endocrine consult appreciated, regimen to be adjusted accordingly as steroids tapered off according to endo recs. Maintain on HISS while inpatient.     DISPOSITION: Rehab placement pending.       CORE MEASURES:        Admission NIHSS: 4     TPA: [] YES [x] NO      LDL/HDL:123/59     Depression  : 0     Statin Therapy: y      Dysphagia Screen: [] PASS [x] FAIL     Smoking [] YES [x] NO      Afib [] YES [x] NO     Stroke Education [x] YES [] NO

## 2018-04-19 NOTE — PROGRESS NOTE ADULT - PROBLEM SELECTOR PROBLEM 1
Controlled type 2 diabetes mellitus with hyperglycemia, without long-term current use of insulin

## 2018-04-19 NOTE — PROGRESS NOTE ADULT - SUBJECTIVE AND OBJECTIVE BOX
Chief Complaint: type 2 diabetes     Interval history: Pt had last dose of steroids yesterday AM which was a 60 mg prednisone dose.  Face appears a bit more symmetric today.  Her steroid course has finished with no steroid given this AM.  In that setting, sugars are much improved and on SSI only.  Appetite ok.     MEDICATIONS  (STANDING):  artificial  tears Solution 1 Drop(s) Both EYES two times a day  aspirin  chewable 81 milliGRAM(s) Oral daily  atorvastatin 40 milliGRAM(s) Oral at bedtime  cholecalciferol 2000 Unit(s) Oral two times a day  dextrose 5%. 1000 milliLiter(s) (50 mL/Hr) IV Continuous <Continuous>  dextrose 50% Injectable 12.5 Gram(s) IV Push once  dextrose 50% Injectable 25 Gram(s) IV Push once  dextrose 50% Injectable 25 Gram(s) IV Push once  enoxaparin Injectable 40 milliGRAM(s) SubCutaneous daily  hydroxyurea 500 milliGRAM(s) Oral daily  insulin lispro (HumaLOG) corrective regimen sliding scale   SubCutaneous three times a day before meals  insulin lispro (HumaLOG) corrective regimen sliding scale   SubCutaneous at bedtime  levothyroxine 100 MICROGram(s) Oral daily  lisinopril 40 milliGRAM(s) Oral daily    MEDICATIONS  (PRN):  acetaminophen   Tablet 650 milliGRAM(s) Oral every 6 hours PRN pain  dextrose Gel 1 Dose(s) Oral once PRN Blood Glucose LESS THAN 70 milliGRAM(s)/deciliter  glucagon  Injectable 1 milliGRAM(s) IntraMuscular once PRN Glucose LESS THAN 70 milligrams/deciliter      Allergies    No Known Allergies    Intolerances      Review of Systems:  Skin: no rash  Endocrine: no polyuria, polydipsia    ALL OTHER SYSTEMS REVIEWED AND NEGATIVE    PHYSICAL EXAM:  VITALS: T(C): 37.1 (04-19-18 @ 12:19)  T(F): 98.8 (04-19-18 @ 12:19), Max: 98.8 (04-19-18 @ 12:19)  HR: 76 (04-19-18 @ 12:19) (70 - 102)  BP: 113/71 (04-19-18 @ 12:19) (103/64 - 142/76)  RR:  (18 - 18)  SpO2:  (94% - 98%)  Wt(kg): --  GENERAL: NAD, well-developed  EYES: No proptosis, sclera anicteric  HEENT:  Atraumatic, Normocephalic, moist mucous membranes  SKIN: Dry, intact, No diffuse rashes   PSYCH: Alert and oriented x 3, normal affect, normal mood    POCT Blood Glucose.: 159 mg/dL (04-19-18 @ 12:45)  POCT Blood Glucose.: 112 mg/dL (04-19-18 @ 09:03)  POCT Blood Glucose.: 254 mg/dL (04-18-18 @ 21:39)  POCT Blood Glucose.: 257 mg/dL (04-18-18 @ 17:34)  POCT Blood Glucose.: 232 mg/dL (04-18-18 @ 12:55)  POCT Blood Glucose.: 150 mg/dL (04-18-18 @ 08:33)  POCT Blood Glucose.: 280 mg/dL (04-17-18 @ 21:18)  POCT Blood Glucose.: 263 mg/dL (04-17-18 @ 17:16)  POCT Blood Glucose.: 228 mg/dL (04-17-18 @ 11:55)  POCT Blood Glucose.: 267 mg/dL (04-17-18 @ 09:05)  POCT Blood Glucose.: 225 mg/dL (04-16-18 @ 21:33)  POCT Blood Glucose.: 327 mg/dL (04-16-18 @ 17:24)                  Thyroid Function Tests:  04-16 @ 09:33 TSH -- FreeT4 1.8 T3 -- Anti TPO -- Anti Thyroglobulin Ab -- TSI --  04-10 @ 10:59 TSH 0.27 FreeT4 -- T3 -- Anti TPO -- Anti Thyroglobulin Ab -- TSI --      Hemoglobin A1C, Whole Blood: 7.0 % <H> [4.0 - 5.6] (04-11-18 @ 13:31)

## 2018-04-19 NOTE — PROGRESS NOTE ADULT - SUBJECTIVE AND OBJECTIVE BOX
THE PATIENT WAS SEEN AND EXAMINED BY ME WITH THE HOUSESTAFF AND STROKE TEAM DURING MORNING ROUNDS.   HPI:  84 RH [place] female presented with left facial droop, vertigo, nausea, vomiting. Patient has PMH Stroke (residual left sided weakness), DVT on coumadin, Polycythemia vera Hypothyroid, SBO, HLD, DM. LKN last night 4/9 at 11 PM, prior to going to sleep. Around 2am morning of admission patient woke up and felt like the room was spinning. In the morning, daughter noted new left facial weakness. Per daughter, face presently does not look significantly different than baseline (it appears different compared to recent pictures). Patient reported generalized weakness and had difficulty with gait requiring assistance at baseline (recently at rehab). Denied numbness, visual changes, abdominal pain, vertigo. Had bowel movement this am, Endorsed nausea/vomiting.        SUBJECTIVE: No events overnight.  No new neurologic complaints.      acetaminophen   Tablet 650 milliGRAM(s) Oral every 6 hours PRN  artificial  tears Solution 1 Drop(s) Both EYES two times a day  aspirin  chewable 81 milliGRAM(s) Oral daily  atorvastatin 40 milliGRAM(s) Oral at bedtime  cholecalciferol 2000 Unit(s) Oral two times a day  dextrose 5%. 1000 milliLiter(s) IV Continuous <Continuous>  dextrose 50% Injectable 12.5 Gram(s) IV Push once  dextrose 50% Injectable 25 Gram(s) IV Push once  dextrose 50% Injectable 25 Gram(s) IV Push once  dextrose Gel 1 Dose(s) Oral once PRN  enoxaparin Injectable 40 milliGRAM(s) SubCutaneous daily  glucagon  Injectable 1 milliGRAM(s) IntraMuscular once PRN  hydroxyurea 500 milliGRAM(s) Oral daily  insulin lispro (HumaLOG) corrective regimen sliding scale   SubCutaneous three times a day before meals  insulin lispro (HumaLOG) corrective regimen sliding scale   SubCutaneous at bedtime  levothyroxine 100 MICROGram(s) Oral daily  lisinopril 40 milliGRAM(s) Oral daily  sodium chloride 0.9% Bolus 500 milliLiter(s) IV Bolus once  sodium chloride 0.9%. 500 milliLiter(s) IV Continuous <Continuous>  sodium chloride 0.9%. 1000 milliLiter(s) IV Continuous <Continuous>      PHYSICAL EXAM:   Vital Signs Last 24 Hrs  T(C): 36.9 (19 Apr 2018 09:08), Max: 37 (19 Apr 2018 04:50)  T(F): 98.4 (19 Apr 2018 09:08), Max: 98.6 (19 Apr 2018 04:50)  HR: 70 (19 Apr 2018 09:08) (70 - 102)  BP: 120/68 (19 Apr 2018 09:08) (103/64 - 142/76)  BP(mean): --  RR: 18 (19 Apr 2018 09:08) (18 - 18)  SpO2: 98% (19 Apr 2018 09:08) (94% - 98%)    General: No acute distress  HEENT: EOM intact, visual fields full  Abdomen: Soft, nontender, nondistended   Extremities: No edema    NEUROLOGICAL EXAM: Mental status: Awake, alert, oriented x3, no aphasia, no neglect, normal memory   Cranial Nerves: left facial palsy, no nystagmus,  dysarthria,  tongue midline  Motor exam: moved all extremities well against gravity without drift; very mild action tremor of her arms  Sensation:  mildly decreased temperature sensation over her right face, arm and leg  Coordination/ Gait: No dysmetria     LABS:         Hemoglobin A1C, Whole Blood: 7.0 % (04-11 @ 13:31)      IMAGING: Reviewed by me.     CT Angio Neck w/ IV Cont (04.10.18)   CTA head: The arteries of the [place] are normal in caliber.   There is no aneurysm.    CTA neck: Suspicion of progressive right ICA stenosis which on prior   Doppler of 6/11/2014 was in the 50-69% range and now measures 87% by   NASCET criteria with dense atherosclerotic calcification seen. Left ICA   with a roughly 62% stenosis by NASCET criteria which appears to be a   progression compared with the patient's prior study as well. Recommend   correlation with ultrasound for further evaluation       CT Brain Stroke Protocol (04.10.18)   No acute hemorrhage. Patchy hypoattenuation within the white matter,   likely representing chronic microvascular changes. Unchanged   calcification of the bilateral globus pallidus and dentate nuclei within   the cerebellum.     MR Head w/wo IV Cont (04.10.18)   No acute infarct or hemorrhage.  Fairly extensive periventricular white matter and pontine microvascular   ischemic change with scattered chronic white matter infarcts and a tiny   chronic right pontine infarct.  Subtle enhancement of the left facial nerve labyrinthine segment with   extension to the geniculate ganglion on may represent Bell palsy..

## 2018-04-19 NOTE — PROGRESS NOTE ADULT - PROBLEM SELECTOR PLAN 2
- TSH low end of normal at 0.27 and free T4 at ULN at 1.8 today  - recommend that team reducees Synthroid to 100 mcg QD with close outpt f/u as pt's weight based dose is about 90 mcg daily .
- TSH low end of normal at 0.27 and free T4 at ULN at 1.8   - after above labs resulted, we recommended that team reduce Synthroid to 100 mcg QD with close outpt f/u as pt's weight based dose is about 90 mcg daily (and goal not to overreplace in her case).

## 2018-04-19 NOTE — PROGRESS NOTE ADULT - PROBLEM SELECTOR PLAN 4
Recommend continue Vitamin D3 2000 units QD. Recommend continue Vitamin D3 2000 units QD.    WILL SIGN OFF.  please call if future questions arise [phone]

## 2018-04-19 NOTE — PROGRESS NOTE ADULT - PROBLEM SELECTOR PLAN 1
- Patient at goal HbA1c while only on Metformin 500 mg BID at home  - Noted to have acute steroid induced hyperglycemia, with steroids ending as of morning of 4/18  -while inpt for remainder of stay, would reduce from moderate to low SSI achs only    Outpatient Plan  - resume Metformin 500 mg BID as long as GFR is >30   - F/u [address] [place] [address] with Endocrinology given multiple endocrine issues.

## 2018-04-19 NOTE — PROGRESS NOTE ADULT - PROBLEM SELECTOR PLAN 3
Recommend continue Fosamax 70 mg once/week and monitor for signs of GERD  - F/u outpatient DXA and monitor renal function while on bisphosphonates.

## 2018-04-20 VITALS
TEMPERATURE: 98 F | OXYGEN SATURATION: 97 % | RESPIRATION RATE: 18 BRPM | SYSTOLIC BLOOD PRESSURE: 94 MMHG | DIASTOLIC BLOOD PRESSURE: 61 MMHG | HEART RATE: 85 BPM

## 2018-04-20 LAB
GLUCOSE BLDC GLUCOMTR-MCNC: 122 MG/DL — HIGH (ref 70–99)
GLUCOSE BLDC GLUCOMTR-MCNC: 199 MG/DL — HIGH (ref 70–99)

## 2018-04-20 PROCEDURE — 83605 ASSAY OF LACTIC ACID: CPT

## 2018-04-20 PROCEDURE — 70496 CT ANGIOGRAPHY HEAD: CPT

## 2018-04-20 PROCEDURE — 93005 ELECTROCARDIOGRAM TRACING: CPT

## 2018-04-20 PROCEDURE — 99285 EMERGENCY DEPT VISIT HI MDM: CPT | Mod: 25

## 2018-04-20 PROCEDURE — 80061 LIPID PANEL: CPT

## 2018-04-20 PROCEDURE — 92611 MOTION FLUOROSCOPY/SWALLOW: CPT

## 2018-04-20 PROCEDURE — 85014 HEMATOCRIT: CPT

## 2018-04-20 PROCEDURE — 82947 ASSAY GLUCOSE BLOOD QUANT: CPT

## 2018-04-20 PROCEDURE — G8996: CPT

## 2018-04-20 PROCEDURE — G8998: CPT

## 2018-04-20 PROCEDURE — 74230 X-RAY XM SWLNG FUNCJ C+: CPT

## 2018-04-20 PROCEDURE — 92610 EVALUATE SWALLOWING FUNCTION: CPT

## 2018-04-20 PROCEDURE — 82435 ASSAY OF BLOOD CHLORIDE: CPT

## 2018-04-20 PROCEDURE — 84295 ASSAY OF SERUM SODIUM: CPT

## 2018-04-20 PROCEDURE — 85610 PROTHROMBIN TIME: CPT

## 2018-04-20 PROCEDURE — 96374 THER/PROPH/DIAG INJ IV PUSH: CPT | Mod: XU

## 2018-04-20 PROCEDURE — 84443 ASSAY THYROID STIM HORMONE: CPT

## 2018-04-20 PROCEDURE — G8988: CPT

## 2018-04-20 PROCEDURE — 97161 PT EVAL LOW COMPLEX 20 MIN: CPT

## 2018-04-20 PROCEDURE — 82010 KETONE BODYS QUAN: CPT

## 2018-04-20 PROCEDURE — 74177 CT ABD & PELVIS W/CONTRAST: CPT

## 2018-04-20 PROCEDURE — 97530 THERAPEUTIC ACTIVITIES: CPT

## 2018-04-20 PROCEDURE — G8978: CPT

## 2018-04-20 PROCEDURE — G8987: CPT

## 2018-04-20 PROCEDURE — G8979: CPT

## 2018-04-20 PROCEDURE — 80048 BASIC METABOLIC PNL TOTAL CA: CPT

## 2018-04-20 PROCEDURE — 97116 GAIT TRAINING THERAPY: CPT

## 2018-04-20 PROCEDURE — 85730 THROMBOPLASTIN TIME PARTIAL: CPT

## 2018-04-20 PROCEDURE — G8997: CPT

## 2018-04-20 PROCEDURE — 86900 BLOOD TYPING SEROLOGIC ABO: CPT

## 2018-04-20 PROCEDURE — 82962 GLUCOSE BLOOD TEST: CPT

## 2018-04-20 PROCEDURE — 93306 TTE W/DOPPLER COMPLETE: CPT

## 2018-04-20 PROCEDURE — 84132 ASSAY OF SERUM POTASSIUM: CPT

## 2018-04-20 PROCEDURE — 93970 EXTREMITY STUDY: CPT

## 2018-04-20 PROCEDURE — A9585: CPT

## 2018-04-20 PROCEDURE — 86901 BLOOD TYPING SEROLOGIC RH(D): CPT

## 2018-04-20 PROCEDURE — 97110 THERAPEUTIC EXERCISES: CPT

## 2018-04-20 PROCEDURE — 84484 ASSAY OF TROPONIN QUANT: CPT

## 2018-04-20 PROCEDURE — 97166 OT EVAL MOD COMPLEX 45 MIN: CPT

## 2018-04-20 PROCEDURE — 71045 X-RAY EXAM CHEST 1 VIEW: CPT

## 2018-04-20 PROCEDURE — 70450 CT HEAD/BRAIN W/O DYE: CPT

## 2018-04-20 PROCEDURE — 82330 ASSAY OF CALCIUM: CPT

## 2018-04-20 PROCEDURE — 82803 BLOOD GASES ANY COMBINATION: CPT

## 2018-04-20 PROCEDURE — 85027 COMPLETE CBC AUTOMATED: CPT

## 2018-04-20 PROCEDURE — 83036 HEMOGLOBIN GLYCOSYLATED A1C: CPT

## 2018-04-20 PROCEDURE — 70498 CT ANGIOGRAPHY NECK: CPT

## 2018-04-20 PROCEDURE — 80053 COMPREHEN METABOLIC PANEL: CPT

## 2018-04-20 PROCEDURE — 84439 ASSAY OF FREE THYROXINE: CPT

## 2018-04-20 PROCEDURE — 70553 MRI BRAIN STEM W/O & W/DYE: CPT

## 2018-04-20 PROCEDURE — 86850 RBC ANTIBODY SCREEN: CPT

## 2018-04-20 RX ORDER — ATORVASTATIN CALCIUM 80 MG/1
1 TABLET, FILM COATED ORAL
Qty: 30 | Refills: 0
Start: 2018-04-20 | End: 2018-05-19

## 2018-04-20 RX ORDER — WARFARIN SODIUM 2.5 MG/1
1 TABLET ORAL
Qty: 0 | Refills: 0 | COMMUNITY

## 2018-04-20 RX ORDER — WARFARIN SODIUM 2.5 MG/1
2 TABLET ORAL
Qty: 0 | Refills: 0 | COMMUNITY

## 2018-04-20 RX ADMIN — ENOXAPARIN SODIUM 40 MILLIGRAM(S): 100 INJECTION SUBCUTANEOUS at 12:20

## 2018-04-20 RX ADMIN — HYDROXYUREA 500 MILLIGRAM(S): 500 CAPSULE ORAL at 12:20

## 2018-04-20 RX ADMIN — LISINOPRIL 40 MILLIGRAM(S): 2.5 TABLET ORAL at 05:36

## 2018-04-20 RX ADMIN — Medication 100 MICROGRAM(S): at 05:36

## 2018-04-20 RX ADMIN — Medication 2000 UNIT(S): at 05:36

## 2018-04-20 RX ADMIN — Medication 1: at 12:24

## 2018-04-20 RX ADMIN — Medication 81 MILLIGRAM(S): at 12:20

## 2018-04-20 RX ADMIN — Medication 1 DROP(S): at 05:36

## 2018-04-20 NOTE — PROGRESS NOTE ADULT - SUBJECTIVE AND OBJECTIVE BOX
THE PATIENT WAS SEEN AND EXAMINED BY ME WITH THE HOUSESTAFF AND STROKE TEAM DURING MORNING ROUNDS.   HPI:  84 RH [place] female presenting with left facial droop, vertigo, nausea, vomiting. Patient has PMH CVA (residual left sided weakness), DVT on coumadin, Polycythemia vera Hypothyroid, SBO, HLD, DM. LKN last night 4/9 at 11 PM, prior to going to sleep. Around 2am patient woke up and felt like the room was spinning. In the morning, daughter noted new left facial weakness. Per daughter, face presently does not look significantly different than baseline (it appears different compared to recent pictures). Patient reports generalized weakness and had difficulty with gait requiring assistance at baseline (recently at rehab). Denies numbness, visual changes, abdominal pain, vertigo. Had bowel movement this am, Endorses nausea/vomiting. (10 Apr 2018 09:25)      SUBJECTIVE: No events overnight.  No new neurologic complaints.      acetaminophen   Tablet 650 milliGRAM(s) Oral every 6 hours PRN  artificial  tears Solution 1 Drop(s) Both EYES two times a day  aspirin  chewable 81 milliGRAM(s) Oral daily  atorvastatin 40 milliGRAM(s) Oral at bedtime  cholecalciferol 2000 Unit(s) Oral two times a day  dextrose 5%. 1000 milliLiter(s) IV Continuous <Continuous>  dextrose 50% Injectable 12.5 Gram(s) IV Push once  dextrose 50% Injectable 25 Gram(s) IV Push once  dextrose 50% Injectable 25 Gram(s) IV Push once  dextrose Gel 1 Dose(s) Oral once PRN  enoxaparin Injectable 40 milliGRAM(s) SubCutaneous daily  glucagon  Injectable 1 milliGRAM(s) IntraMuscular once PRN  hydroxyurea 500 milliGRAM(s) Oral daily  insulin lispro (HumaLOG) corrective regimen sliding scale   SubCutaneous three times a day before meals  insulin lispro (HumaLOG) corrective regimen sliding scale   SubCutaneous at bedtime  levothyroxine 100 MICROGram(s) Oral daily  lisinopril 40 milliGRAM(s) Oral daily      PHYSICAL EXAM:   Vital Signs Last 24 Hrs  T(C): 36.7 (20 Apr 2018 04:57), Max: 37.1 (19 Apr 2018 12:19)  T(F): 98 (20 Apr 2018 04:57), Max: 98.8 (19 Apr 2018 12:19)  HR: 71 (20 Apr 2018 04:57) (69 - 76)  BP: 136/64 (20 Apr 2018 04:57) (95/56 - 136/64)  BP(mean): --  RR: 18 (20 Apr 2018 04:57) (18 - 18)  SpO2: 95% (20 Apr 2018 04:57) (95% - 98%)    General: No acute distress  HEENT: EOM intact, visual fields full  Abdomen: Soft, nontender, nondistended   Extremities: No edema    NEUROLOGICAL EXAM:  Mental status: Awake, alert, oriented x3, no aphasia, no neglect, normal memory   Cranial Nerves: No facial asymmetry, no nystagmus, no dysarthria,  tongue midline  Motor exam: Normal tone, no drift, 5/5 RUE, 5/5 RLE, 5/5 LUE, 5/5 LLE, normal fine finger movements.  Sensation: Intact to light touch   Coordination/ Gait: No dysmetria, ALAN intact and symmetric bilaterally    LABS:         Hemoglobin A1C, Whole Blood: 7.0 % (04-11 @ 13:31)      IMAGING: Reviewed by me.     CT Angio Neck w/ IV Cont (04.10.18)   CTA head: The arteries of the [place] are normal in caliber.   There is no aneurysm.    CTA neck: Suspicion of progressive right ICA stenosis which on prior   Doppler of 6/11/2014 was in the 50-69% range and now measures 87% by   NASCET criteria with dense atherosclerotic calcification seen. Left ICA   with a roughly 62% stenosis by NASCET criteria which appears to be a   progression compared with the patient's prior study as well. Recommend   correlation with ultrasound for further evaluation       CT Brain Stroke Protocol (04.10.18)   No acute hemorrhage. Patchy hypoattenuation within the white matter,   likely representing chronic microvascular changes. Unchanged   calcification of the bilateral globus pallidus and dentate nuclei within   the cerebellum.     MR Head w/wo IV Cont (04.10.18)   No acute infarct or hemorrhage.  Fairly extensive periventricular white matter and pontine microvascular   ischemic change with scattered chronic white matter infarcts and a tiny   chronic right pontine infarct.  Subtle enhancement of the left facial nerve labyrinthine segment with   extension to the geniculate ganglion on may represent Bell palsy..

## 2018-04-20 NOTE — PROGRESS NOTE ADULT - ASSESSMENT
ASSESSMENT: 84-year-old right-handed French lady First evaluated at [facility] on 4/11/18 with dizziness. She was a somewhat difficult historian. She has a history of "3 strokes", beginning about 6 years prior to admission. Most of these previous strokes seemed to involve a facial palsy and probable dysarthria, from which she largely recovered. She has also had a DVT and has been continuing Coumadin. On 4/10/18 she developed dizziness/imbalance, nausea, vomiting and a left facial palsy. 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 She has severe, asymptomatic right carotid stenosis, and moderate asymptomatic left carotid stenosis. Given her age, suspect that she will not benefit from revascularization of her carotid arteries, either endarterectomy or stenting.     NEURO: neurologically without acute change, clinical exam along with MRI findings suggestive of Bell's Palsy. no evidence of acute infarction on MRI. S/p 5 day course of prednisone,  titration to normotension, titrate home statin to LDL goal less than 70, MR imaging as noted above,  PT/OT  recommends VICENTA.     ANTITHROMBOTIC THERAPY: VA Duplex (-) for DVTs, continue ASA alone at this time.  She should follow up with PMD re: DVT.      PULMONARY: CXR left basilar atelectasis encourage incentive spirometry, protecting airway, saturating well     CARDIOVASCULAR:  TTE ef 78%, minimal Mr, mild AR, mild TR,  maintain normotension                              SBP goal:  normotension     GASTROINTESTINAL:  dysphagia screen failed, SLP eval for  D1 nectar diet, tolerating well      Diet: D1 Nectar     RENAL:  good urine output, maintain adequate hydration      Na Goal: Greater than 135     Claudio: n     HEMATOLOGY: no active bleeding noted  LE duplex pending negative for DVT, coumadin stopped,  on ASA and chemical dvt ppx.     DVT ppx: lovenox    ID: afebrile, no si/sx of infection     OTHER: current medical condition and plan of care d/w patient at bedside, all questions answered and concerns addressed.  Blood sugars were rising while on Prednisone, endocrine consult appreciated, regimen to be adjusted accordingly as steroids tapered off according to endo recs. Maintain on HISS while inpatient.     DISPOSITION: Rehab placement pending.       CORE MEASURES:        Admission NIHSS: 4     TPA: [] YES [x] NO      LDL/HDL:123/59     Depression  : 0     Statin Therapy: y      Dysphagia Screen: [] PASS [x] FAIL     Smoking [] YES [x] NO      Afib [] YES [x] NO     Stroke Education [x] YES [] NO ASSESSMENT: 84-year-old right-handed [place] lady First evaluated at [facility] on 4/11/18 with dizziness. She was a somewhat difficult historian. She has a history of "3 strokes", beginning about 6 years prior to admission. Most of these previous strokes seemed to involve a facial palsy and probable dysarthria, from which she largely recovered. She has also had a DVT and has been continuing Coumadin. On 4/10/18 she developed dizziness/imbalance, nausea, vomiting and a left facial palsy. 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 She has severe, asymptomatic right carotid stenosis, and moderate asymptomatic left carotid stenosis. Given her age, suspect that she will not benefit from revascularization of her carotid arteries, either endarterectomy or stenting.     NEURO: neurologically without acute change, clinical exam along with MRI findings suggestive of Bell's Palsy. no evidence of acute infarction on MRI. S/p 5 day course of prednisone,  titration to normotension, titrate home statin to LDL goal less than 70, MR imaging as noted above,  PT/OT  recommends VICENTA.     ANTITHROMBOTIC THERAPY: VA Duplex (-) for DVTs, continue ASA alone at this time.  She should follow up with PMD re: DVT.      PULMONARY: CXR left basilar atelectasis encourage incentive spirometry, protecting airway, saturating well     CARDIOVASCULAR:  TTE ef 78%, minimal Mr, mild AR, mild TR,  maintain normotension                              SBP goal:  normotension     GASTROINTESTINAL:  dysphagia screen failed, SLP eval for  D1 nectar diet, tolerating well      Diet: D1 Nectar     RENAL:  good urine output, maintain adequate hydration      Na Goal: Greater than 135     Claudio: n     HEMATOLOGY: no active bleeding noted  LE duplex pending negative for DVT, coumadin stopped,  on ASA and chemical dvt ppx.     DVT ppx: lovenox    ID: afebrile, no si/sx of infection     OTHER: current medical condition and plan of care d/w patient at bedside, all questions answered and concerns addressed.  Endocrine was consulted for steroid induced hyperglycemia now with hypoglycemia off steroids, per Endocrine moderate to low ISS as inpatient and to Metformin 500 mg BID as o/p.     DISPOSITION: Rehab placement pending.       CORE MEASURES:        Admission NIHSS: 4     TPA: [] YES [x] NO      LDL/HDL:123/59     Depression  : 0     Statin Therapy: y      Dysphagia Screen: [] PASS [x] FAIL     Smoking [] YES [x] NO      Afib [] YES [x] NO     Stroke Education [x] YES [] NO

## 2018-04-20 NOTE — PROGRESS NOTE ADULT - PROVIDER SPECIALTY LIST ADULT
Endocrinology
Endocrinology
Neurology
Endocrinology
Neurology
Neurology
Endocrinology

## 2018-04-24 ENCOUNTER — MEDICATION RENEWAL (OUTPATIENT)
Age: 83
End: 2018-04-24

## 2018-05-04 ENCOUNTER — APPOINTMENT (OUTPATIENT)
Dept: INTERNAL MEDICINE | Facility: CLINIC | Age: 83
End: 2018-05-04
Payer: MEDICARE

## 2018-05-04 ENCOUNTER — OUTPATIENT (OUTPATIENT)
Dept: OUTPATIENT SERVICES | Facility: HOSPITAL | Age: 83
LOS: 1 days | End: 2018-05-04
Payer: MEDICARE

## 2018-05-04 VITALS
BODY MASS INDEX: 22.18 KG/M2 | WEIGHT: 110 LBS | HEART RATE: 90 BPM | DIASTOLIC BLOOD PRESSURE: 52 MMHG | SYSTOLIC BLOOD PRESSURE: 110 MMHG | HEIGHT: 59 IN

## 2018-05-04 DIAGNOSIS — G51.0 BELL'S PALSY: ICD-10-CM

## 2018-05-04 DIAGNOSIS — I10 ESSENTIAL (PRIMARY) HYPERTENSION: ICD-10-CM

## 2018-05-04 PROCEDURE — G0463: CPT

## 2018-05-04 PROCEDURE — 99213 OFFICE O/P EST LOW 20 MIN: CPT | Mod: GE

## 2018-05-09 ENCOUNTER — RX RENEWAL (OUTPATIENT)
Age: 83
End: 2018-05-09

## 2018-05-10 DIAGNOSIS — G51.0 BELL'S PALSY: ICD-10-CM

## 2018-05-24 ENCOUNTER — APPOINTMENT (OUTPATIENT)
Dept: ENDOCRINOLOGY | Facility: CLINIC | Age: 83
End: 2018-05-24

## 2018-05-25 ENCOUNTER — OUTPATIENT (OUTPATIENT)
Dept: OUTPATIENT SERVICES | Facility: HOSPITAL | Age: 83
LOS: 1 days | Discharge: HOME | End: 2018-05-25

## 2018-05-25 DIAGNOSIS — C92.10 CHRONIC MYELOID LEUKEMIA, BCR/ABL-POSITIVE, NOT HAVING ACHIEVED REMISSION: ICD-10-CM

## 2018-05-30 ENCOUNTER — RECORD ABSTRACTING (OUTPATIENT)
Age: 83
End: 2018-05-30

## 2018-05-31 ENCOUNTER — RESULT REVIEW (OUTPATIENT)
Age: 83
End: 2018-05-31

## 2018-05-31 ENCOUNTER — APPOINTMENT (OUTPATIENT)
Dept: HEMATOLOGY ONCOLOGY | Facility: CLINIC | Age: 83
End: 2018-05-31

## 2018-05-31 VITALS
TEMPERATURE: 98 F | RESPIRATION RATE: 16 BRPM | DIASTOLIC BLOOD PRESSURE: 83 MMHG | SYSTOLIC BLOOD PRESSURE: 135 MMHG | HEART RATE: 83 BPM | OXYGEN SATURATION: 98 % | WEIGHT: 110.89 LBS | BODY MASS INDEX: 22.4 KG/M2

## 2018-05-31 LAB
ALBUMIN SERPL ELPH-MCNC: 3.8 G/DL — SIGNIFICANT CHANGE UP (ref 3.3–5)
ALP SERPL-CCNC: 64 U/L — SIGNIFICANT CHANGE UP (ref 40–120)
ALT FLD-CCNC: 33 U/L — SIGNIFICANT CHANGE UP (ref 10–45)
ANION GAP SERPL CALC-SCNC: 12 MMOL/L — SIGNIFICANT CHANGE UP (ref 5–17)
AST SERPL-CCNC: 30 U/L — SIGNIFICANT CHANGE UP (ref 10–40)
BASOPHILS # BLD AUTO: 0.1 K/UL — SIGNIFICANT CHANGE UP (ref 0–0.2)
BASOPHILS NFR BLD AUTO: 1.5 % — SIGNIFICANT CHANGE UP (ref 0–2)
BILIRUB SERPL-MCNC: 0.4 MG/DL — SIGNIFICANT CHANGE UP (ref 0.2–1.2)
BUN SERPL-MCNC: 16 MG/DL — SIGNIFICANT CHANGE UP (ref 7–23)
CALCIUM SERPL-MCNC: 9.7 MG/DL — SIGNIFICANT CHANGE UP (ref 8.4–10.5)
CHLORIDE SERPL-SCNC: 105 MMOL/L — SIGNIFICANT CHANGE UP (ref 96–108)
CO2 SERPL-SCNC: 24 MMOL/L — SIGNIFICANT CHANGE UP (ref 22–31)
CREAT SERPL-MCNC: 0.62 MG/DL — SIGNIFICANT CHANGE UP (ref 0.5–1.3)
EOSINOPHIL # BLD AUTO: 0.1 K/UL — SIGNIFICANT CHANGE UP (ref 0–0.5)
EOSINOPHIL NFR BLD AUTO: 1.4 % — SIGNIFICANT CHANGE UP (ref 0–6)
GLUCOSE SERPL-MCNC: 147 MG/DL — HIGH (ref 70–99)
LYMPHOCYTES # BLD AUTO: 1 K/UL — SIGNIFICANT CHANGE UP (ref 1–3.3)
LYMPHOCYTES # BLD AUTO: 22.9 % — SIGNIFICANT CHANGE UP (ref 13–44)
MONOCYTES # BLD AUTO: 0.4 K/UL — SIGNIFICANT CHANGE UP (ref 0–0.9)
MONOCYTES NFR BLD AUTO: 8.4 % — SIGNIFICANT CHANGE UP (ref 2–14)
NEUTROPHILS # BLD AUTO: 2.8 K/UL — SIGNIFICANT CHANGE UP (ref 1.8–7.4)
NEUTROPHILS NFR BLD AUTO: 65.8 % — SIGNIFICANT CHANGE UP (ref 43–77)
POTASSIUM SERPL-MCNC: 4.4 MMOL/L — SIGNIFICANT CHANGE UP (ref 3.5–5.3)
POTASSIUM SERPL-SCNC: 4.4 MMOL/L — SIGNIFICANT CHANGE UP (ref 3.5–5.3)
PROT SERPL-MCNC: 6.5 G/DL — SIGNIFICANT CHANGE UP (ref 6–8.3)
SODIUM SERPL-SCNC: 141 MMOL/L — SIGNIFICANT CHANGE UP (ref 135–145)

## 2018-05-31 RX ORDER — LISINOPRIL 40 MG/1
40 TABLET ORAL
Qty: 30 | Refills: 5 | Status: DISCONTINUED | COMMUNITY
Start: 2018-04-24 | End: 2018-05-31

## 2018-06-08 ENCOUNTER — RX RENEWAL (OUTPATIENT)
Age: 83
End: 2018-06-08

## 2018-06-12 DIAGNOSIS — R13.10 DYSPHAGIA, UNSPECIFIED: ICD-10-CM

## 2018-06-18 ENCOUNTER — RX RENEWAL (OUTPATIENT)
Age: 83
End: 2018-06-18

## 2018-06-26 ENCOUNTER — RX RENEWAL (OUTPATIENT)
Age: 83
End: 2018-06-26

## 2018-06-26 ENCOUNTER — APPOINTMENT (OUTPATIENT)
Dept: ENDOCRINOLOGY | Facility: CLINIC | Age: 83
End: 2018-06-26
Payer: MEDICARE

## 2018-06-26 VITALS
WEIGHT: 110 LBS | DIASTOLIC BLOOD PRESSURE: 60 MMHG | BODY MASS INDEX: 22.18 KG/M2 | HEIGHT: 59 IN | SYSTOLIC BLOOD PRESSURE: 110 MMHG

## 2018-06-26 LAB
GLUCOSE BLDC GLUCOMTR-MCNC: 118
HBA1C MFR BLD HPLC: 6.5

## 2018-06-26 PROCEDURE — 99205 OFFICE O/P NEW HI 60 MIN: CPT | Mod: 25

## 2018-06-26 PROCEDURE — 77085 DXA BONE DENSITY AXL VRT FX: CPT | Mod: GA

## 2018-06-27 ENCOUNTER — OUTPATIENT (OUTPATIENT)
Dept: OUTPATIENT SERVICES | Facility: HOSPITAL | Age: 83
LOS: 1 days | Discharge: HOME | End: 2018-06-27

## 2018-06-27 DIAGNOSIS — C92.10 CHRONIC MYELOID LEUKEMIA, BCR/ABL-POSITIVE, NOT HAVING ACHIEVED REMISSION: ICD-10-CM

## 2018-07-02 ENCOUNTER — RESULT REVIEW (OUTPATIENT)
Age: 83
End: 2018-07-02

## 2018-07-02 ENCOUNTER — APPOINTMENT (OUTPATIENT)
Dept: HEMATOLOGY ONCOLOGY | Facility: CLINIC | Age: 83
End: 2018-07-02

## 2018-07-02 VITALS
OXYGEN SATURATION: 98 % | DIASTOLIC BLOOD PRESSURE: 71 MMHG | HEART RATE: 90 BPM | SYSTOLIC BLOOD PRESSURE: 108 MMHG | WEIGHT: 111.33 LBS | TEMPERATURE: 98.6 F | RESPIRATION RATE: 16 BRPM | BODY MASS INDEX: 22.49 KG/M2

## 2018-07-12 ENCOUNTER — APPOINTMENT (OUTPATIENT)
Dept: SPEECH THERAPY | Facility: HOSPITAL | Age: 83
End: 2018-07-12
Payer: MEDICARE

## 2018-07-12 ENCOUNTER — APPOINTMENT (OUTPATIENT)
Dept: RADIOLOGY | Facility: HOSPITAL | Age: 83
End: 2018-07-12
Payer: MEDICARE

## 2018-07-12 ENCOUNTER — OUTPATIENT (OUTPATIENT)
Dept: OUTPATIENT SERVICES | Facility: HOSPITAL | Age: 83
LOS: 1 days | Discharge: HOME | End: 2018-07-12

## 2018-07-12 ENCOUNTER — OUTPATIENT (OUTPATIENT)
Dept: OUTPATIENT SERVICES | Facility: HOSPITAL | Age: 83
LOS: 1 days | End: 2018-07-12

## 2018-07-12 DIAGNOSIS — R13.10 DYSPHAGIA, UNSPECIFIED: ICD-10-CM

## 2018-07-12 PROCEDURE — 74230 X-RAY XM SWLNG FUNCJ C+: CPT | Mod: 26

## 2018-07-16 ENCOUNTER — APPOINTMENT (OUTPATIENT)
Dept: HEMATOLOGY ONCOLOGY | Facility: CLINIC | Age: 83
End: 2018-07-16

## 2018-07-16 ENCOUNTER — RESULT REVIEW (OUTPATIENT)
Age: 83
End: 2018-07-16

## 2018-07-16 DIAGNOSIS — R13.12 DYSPHAGIA, OROPHARYNGEAL PHASE: ICD-10-CM

## 2018-07-16 LAB
ANION GAP SERPL CALC-SCNC: 15 MMOL/L — SIGNIFICANT CHANGE UP (ref 5–17)
BASOPHILS # BLD AUTO: 0 K/UL — SIGNIFICANT CHANGE UP (ref 0–0.2)
BASOPHILS NFR BLD AUTO: 0.6 % — SIGNIFICANT CHANGE UP (ref 0–2)
BUN SERPL-MCNC: 28 MG/DL — HIGH (ref 7–23)
CALCIUM SERPL-MCNC: 9.5 MG/DL — SIGNIFICANT CHANGE UP (ref 8.4–10.5)
CHLORIDE SERPL-SCNC: 106 MMOL/L — SIGNIFICANT CHANGE UP (ref 96–108)
CO2 SERPL-SCNC: 24 MMOL/L — SIGNIFICANT CHANGE UP (ref 22–31)
CREAT SERPL-MCNC: 0.69 MG/DL — SIGNIFICANT CHANGE UP (ref 0.5–1.3)
EOSINOPHIL # BLD AUTO: 0.1 K/UL — SIGNIFICANT CHANGE UP (ref 0–0.5)
EOSINOPHIL NFR BLD AUTO: 1.7 % — SIGNIFICANT CHANGE UP (ref 0–6)
GLUCOSE SERPL-MCNC: 120 MG/DL — HIGH (ref 70–99)
LYMPHOCYTES # BLD AUTO: 1.2 K/UL — SIGNIFICANT CHANGE UP (ref 1–3.3)
LYMPHOCYTES # BLD AUTO: 23.8 % — SIGNIFICANT CHANGE UP (ref 13–44)
MONOCYTES # BLD AUTO: 0.4 K/UL — SIGNIFICANT CHANGE UP (ref 0–0.9)
MONOCYTES NFR BLD AUTO: 8.5 % — SIGNIFICANT CHANGE UP (ref 2–14)
NEUTROPHILS # BLD AUTO: 3.3 K/UL — SIGNIFICANT CHANGE UP (ref 1.8–7.4)
NEUTROPHILS NFR BLD AUTO: 65.4 % — SIGNIFICANT CHANGE UP (ref 43–77)
POTASSIUM SERPL-MCNC: 4.7 MMOL/L — SIGNIFICANT CHANGE UP (ref 3.5–5.3)
POTASSIUM SERPL-SCNC: 4.7 MMOL/L — SIGNIFICANT CHANGE UP (ref 3.5–5.3)
SODIUM SERPL-SCNC: 145 MMOL/L — SIGNIFICANT CHANGE UP (ref 135–145)

## 2018-07-31 ENCOUNTER — OUTPATIENT (OUTPATIENT)
Dept: OUTPATIENT SERVICES | Facility: HOSPITAL | Age: 83
LOS: 1 days | Discharge: HOME | End: 2018-07-31

## 2018-07-31 DIAGNOSIS — C92.10 CHRONIC MYELOID LEUKEMIA, BCR/ABL-POSITIVE, NOT HAVING ACHIEVED REMISSION: ICD-10-CM

## 2018-08-10 ENCOUNTER — RX RENEWAL (OUTPATIENT)
Age: 83
End: 2018-08-10

## 2018-08-10 ENCOUNTER — APPOINTMENT (OUTPATIENT)
Dept: INTERNAL MEDICINE | Facility: CLINIC | Age: 83
End: 2018-08-10

## 2018-08-28 ENCOUNTER — OUTPATIENT (OUTPATIENT)
Dept: OUTPATIENT SERVICES | Facility: HOSPITAL | Age: 83
LOS: 1 days | Discharge: HOME | End: 2018-08-28

## 2018-08-28 DIAGNOSIS — C92.10 CHRONIC MYELOID LEUKEMIA, BCR/ABL-POSITIVE, NOT HAVING ACHIEVED REMISSION: ICD-10-CM

## 2018-09-06 ENCOUNTER — RECORD ABSTRACTING (OUTPATIENT)
Age: 83
End: 2018-09-06

## 2018-09-06 ENCOUNTER — RESULT REVIEW (OUTPATIENT)
Age: 83
End: 2018-09-06

## 2018-09-06 ENCOUNTER — APPOINTMENT (OUTPATIENT)
Dept: ULTRASOUND IMAGING | Facility: IMAGING CENTER | Age: 83
End: 2018-09-06
Payer: MEDICARE

## 2018-09-06 ENCOUNTER — APPOINTMENT (OUTPATIENT)
Dept: HEMATOLOGY ONCOLOGY | Facility: CLINIC | Age: 83
End: 2018-09-06

## 2018-09-06 ENCOUNTER — OUTPATIENT (OUTPATIENT)
Dept: OUTPATIENT SERVICES | Facility: HOSPITAL | Age: 83
LOS: 1 days | End: 2018-09-06
Payer: MEDICARE

## 2018-09-06 VITALS
HEART RATE: 87 BPM | DIASTOLIC BLOOD PRESSURE: 75 MMHG | RESPIRATION RATE: 17 BRPM | SYSTOLIC BLOOD PRESSURE: 111 MMHG | OXYGEN SATURATION: 97 % | TEMPERATURE: 98.2 F

## 2018-09-06 DIAGNOSIS — D45 POLYCYTHEMIA VERA: ICD-10-CM

## 2018-09-06 LAB
ALBUMIN SERPL ELPH-MCNC: 3.9 G/DL — SIGNIFICANT CHANGE UP (ref 3.3–5)
ALP SERPL-CCNC: 67 U/L — SIGNIFICANT CHANGE UP (ref 30–120)
ALT FLD-CCNC: 12 U/L — SIGNIFICANT CHANGE UP (ref 10–45)
ANION GAP SERPL CALC-SCNC: 17 MMOL/L — SIGNIFICANT CHANGE UP (ref 5–17)
AST SERPL-CCNC: 15 U/L — SIGNIFICANT CHANGE UP (ref 10–40)
BASOPHILS # BLD AUTO: 0.1 K/UL — SIGNIFICANT CHANGE UP (ref 0–0.2)
BASOPHILS NFR BLD AUTO: 0.7 % — SIGNIFICANT CHANGE UP (ref 0–2)
BILIRUB SERPL-MCNC: 0.2 MG/DL — SIGNIFICANT CHANGE UP (ref 0.2–1.2)
BUN SERPL-MCNC: 26 MG/DL — HIGH (ref 7–23)
CALCIUM SERPL-MCNC: 9.5 MG/DL — SIGNIFICANT CHANGE UP (ref 8.4–10.5)
CHLORIDE SERPL-SCNC: 102 MMOL/L — SIGNIFICANT CHANGE UP (ref 96–108)
CO2 SERPL-SCNC: 21 MMOL/L — LOW (ref 22–31)
CREAT SERPL-MCNC: 0.86 MG/DL — SIGNIFICANT CHANGE UP (ref 0.5–1.3)
EOSINOPHIL # BLD AUTO: 0.4 K/UL — SIGNIFICANT CHANGE UP (ref 0–0.5)
EOSINOPHIL NFR BLD AUTO: 5.3 % — SIGNIFICANT CHANGE UP (ref 0–6)
GLUCOSE SERPL-MCNC: 296 MG/DL — HIGH (ref 70–99)
LYMPHOCYTES # BLD AUTO: 1.1 K/UL — SIGNIFICANT CHANGE UP (ref 1–3.3)
LYMPHOCYTES # BLD AUTO: 15.1 % — SIGNIFICANT CHANGE UP (ref 13–44)
MONOCYTES # BLD AUTO: 0.4 K/UL — SIGNIFICANT CHANGE UP (ref 0–0.9)
MONOCYTES NFR BLD AUTO: 6.1 % — SIGNIFICANT CHANGE UP (ref 2–14)
NEUTROPHILS # BLD AUTO: 5.4 K/UL — SIGNIFICANT CHANGE UP (ref 1.8–7.4)
NEUTROPHILS NFR BLD AUTO: 72.9 % — SIGNIFICANT CHANGE UP (ref 43–77)
POTASSIUM SERPL-MCNC: 4.8 MMOL/L — SIGNIFICANT CHANGE UP (ref 3.5–5.3)
POTASSIUM SERPL-SCNC: 4.8 MMOL/L — SIGNIFICANT CHANGE UP (ref 3.5–5.3)
PROT SERPL-MCNC: 6.5 G/DL — SIGNIFICANT CHANGE UP (ref 6–8.3)
SODIUM SERPL-SCNC: 140 MMOL/L — SIGNIFICANT CHANGE UP (ref 135–145)

## 2018-09-06 PROCEDURE — 93970 EXTREMITY STUDY: CPT

## 2018-09-06 PROCEDURE — 93970 EXTREMITY STUDY: CPT | Mod: 26

## 2018-09-10 ENCOUNTER — OUTPATIENT (OUTPATIENT)
Dept: OUTPATIENT SERVICES | Facility: HOSPITAL | Age: 83
LOS: 1 days | End: 2018-09-10
Payer: MEDICARE

## 2018-09-10 ENCOUNTER — APPOINTMENT (OUTPATIENT)
Dept: INTERNAL MEDICINE | Facility: CLINIC | Age: 83
End: 2018-09-10
Payer: MEDICARE

## 2018-09-10 VITALS
WEIGHT: 113 LBS | HEIGHT: 59 IN | SYSTOLIC BLOOD PRESSURE: 110 MMHG | DIASTOLIC BLOOD PRESSURE: 70 MMHG | BODY MASS INDEX: 22.78 KG/M2

## 2018-09-10 DIAGNOSIS — E11.42 TYPE 2 DIABETES MELLITUS WITH DIABETIC POLYNEUROPATHY: ICD-10-CM

## 2018-09-10 DIAGNOSIS — I87.8 OTHER SPECIFIED DISORDERS OF VEINS: ICD-10-CM

## 2018-09-10 DIAGNOSIS — I10 ESSENTIAL (PRIMARY) HYPERTENSION: ICD-10-CM

## 2018-09-10 PROCEDURE — G0463: CPT

## 2018-09-10 PROCEDURE — 99214 OFFICE O/P EST MOD 30 MIN: CPT | Mod: GC

## 2018-09-10 RX ORDER — GABAPENTIN 300 MG/1
300 CAPSULE ORAL
Qty: 30 | Refills: 2 | Status: DISCONTINUED | COMMUNITY
Start: 2018-05-31 | End: 2018-09-10

## 2018-09-10 RX ORDER — GABAPENTIN 300 MG/1
300 CAPSULE ORAL
Qty: 180 | Refills: 1 | Status: DISCONTINUED | COMMUNITY
Start: 2018-06-26 | End: 2018-09-10

## 2018-09-10 NOTE — HISTORY OF PRESENT ILLNESS
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  Pt. reports that she is experiencing burning pain that keeps her up at night.  She recently had b/l dopplers of her legs 9/6 which showed no DVTs.  Her gabapentin was increased from 300 to 600mg/day however pt. reports that she still is experiencing pain.  Pt. does not regularly elevate legs and doesn't use compression stockings on a regular basis.  \par \par

## 2018-09-10 NOTE — ASSESSMENT
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 \par \par #Venous stasis\par - gave script for SCD's \par \par #DM2\par - latest HbgA1c = 6.7%\par \par Belle Rustemi\par PGY1

## 2018-09-10 NOTE — REVIEW OF SYSTEMS
[Lower Ext Edema] : lower extremity edema [Diarrhea] : diarrhea [Fever] : no fever [Fatigue] : no fatigue [Vision Problems] : no vision problems [Sore Throat] : no sore throat [Chest Pain] : no chest pain [Palpitations] : no palpitations [Shortness Of Breath] : no shortness of breath [Abdominal Pain] : no abdominal pain [Nausea] : no nausea [Constipation] : no constipation [Vomiting] : no vomiting [Dysuria] : no dysuria

## 2018-09-13 ENCOUNTER — APPOINTMENT (OUTPATIENT)
Dept: NEUROLOGY | Facility: CLINIC | Age: 83
End: 2018-09-13

## 2018-09-27 ENCOUNTER — RESULT REVIEW (OUTPATIENT)
Age: 83
End: 2018-09-27

## 2018-09-27 ENCOUNTER — APPOINTMENT (OUTPATIENT)
Dept: HEMATOLOGY ONCOLOGY | Facility: CLINIC | Age: 83
End: 2018-09-27

## 2018-09-27 VITALS
SYSTOLIC BLOOD PRESSURE: 138 MMHG | RESPIRATION RATE: 16 BRPM | TEMPERATURE: 98 F | DIASTOLIC BLOOD PRESSURE: 68 MMHG | OXYGEN SATURATION: 96 % | HEART RATE: 81 BPM

## 2018-10-03 ENCOUNTER — APPOINTMENT (OUTPATIENT)
Dept: INTERNAL MEDICINE | Facility: CLINIC | Age: 83
End: 2018-10-03

## 2018-10-19 ENCOUNTER — APPOINTMENT (OUTPATIENT)
Dept: INTERNAL MEDICINE | Facility: CLINIC | Age: 83
End: 2018-10-19
Payer: MEDICARE

## 2018-10-19 VITALS
BODY MASS INDEX: 23.79 KG/M2 | HEIGHT: 59 IN | DIASTOLIC BLOOD PRESSURE: 70 MMHG | HEART RATE: 46 BPM | SYSTOLIC BLOOD PRESSURE: 140 MMHG | OXYGEN SATURATION: 86 % | WEIGHT: 118 LBS

## 2018-10-19 DIAGNOSIS — H26.9 UNSPECIFIED CATARACT: ICD-10-CM

## 2018-10-19 PROCEDURE — 99213 OFFICE O/P EST LOW 20 MIN: CPT | Mod: GE

## 2018-10-19 RX ORDER — PREGABALIN 50 MG/1
50 CAPSULE ORAL
Qty: 60 | Refills: 0 | Status: DISCONTINUED | COMMUNITY
Start: 2018-09-10 | End: 2018-10-19

## 2018-10-19 NOTE — PHYSICAL EXAM
[No Acute Distress] : no acute distress [Well Nourished] : well nourished [EOMI] : extraocular movements intact [Normal TMs] : both tympanic membranes were normal [No Respiratory Distress] : no respiratory distress  [Clear to Auscultation] : lungs were clear to auscultation bilaterally [No Accessory Muscle Use] : no accessory muscle use [Normal Rate] : normal rate  [Regular Rhythm] : with a regular rhythm [Normal S1, S2] : normal S1 and S2 [No Murmur] : no murmur heard [No Edema] : there was no peripheral edema [Soft] : abdomen soft [Non Tender] : non-tender [Non-distended] : non-distended [Normal Bowel Sounds] : normal bowel sounds [No Rash] : no rash [No Focal Deficits] : no focal deficits

## 2018-10-24 ENCOUNTER — RX RENEWAL (OUTPATIENT)
Age: 83
End: 2018-10-24

## 2018-10-26 RX ORDER — ZOSTER VACCINE RECOMBINANT, ADJUVANTED 50 MCG/0.5
50 KIT INTRAMUSCULAR ONCE
Qty: 1 | Refills: 0 | Status: COMPLETED | COMMUNITY
Start: 2018-10-26

## 2018-11-01 ENCOUNTER — OUTPATIENT (OUTPATIENT)
Dept: OUTPATIENT SERVICES | Facility: HOSPITAL | Age: 83
LOS: 1 days | Discharge: HOME | End: 2018-11-01

## 2018-11-01 DIAGNOSIS — C92.10 CHRONIC MYELOID LEUKEMIA, BCR/ABL-POSITIVE, NOT HAVING ACHIEVED REMISSION: ICD-10-CM

## 2018-11-12 ENCOUNTER — RESULT REVIEW (OUTPATIENT)
Age: 83
End: 2018-11-12

## 2018-11-12 ENCOUNTER — APPOINTMENT (OUTPATIENT)
Dept: HEMATOLOGY ONCOLOGY | Facility: CLINIC | Age: 83
End: 2018-11-12

## 2018-11-12 VITALS
HEART RATE: 82 BPM | RESPIRATION RATE: 17 BRPM | TEMPERATURE: 98.6 F | SYSTOLIC BLOOD PRESSURE: 151 MMHG | DIASTOLIC BLOOD PRESSURE: 69 MMHG | OXYGEN SATURATION: 97 %

## 2018-11-12 LAB
ALBUMIN SERPL ELPH-MCNC: 4.5 G/DL — SIGNIFICANT CHANGE UP (ref 3.3–5)
ALP SERPL-CCNC: 91 U/L — SIGNIFICANT CHANGE UP (ref 30–120)
ALT FLD-CCNC: 22 U/L — SIGNIFICANT CHANGE UP (ref 10–45)
ANION GAP SERPL CALC-SCNC: 18 MMOL/L — HIGH (ref 5–17)
AST SERPL-CCNC: 20 U/L — SIGNIFICANT CHANGE UP (ref 10–40)
BILIRUB SERPL-MCNC: 0.3 MG/DL — SIGNIFICANT CHANGE UP (ref 0.2–1.2)
BUN SERPL-MCNC: 26 MG/DL — HIGH (ref 7–23)
CALCIUM SERPL-MCNC: 10 MG/DL — SIGNIFICANT CHANGE UP (ref 8.4–10.5)
CHLORIDE SERPL-SCNC: 96 MMOL/L — SIGNIFICANT CHANGE UP (ref 96–108)
CO2 SERPL-SCNC: 26 MMOL/L — SIGNIFICANT CHANGE UP (ref 22–31)
CREAT SERPL-MCNC: 0.84 MG/DL — SIGNIFICANT CHANGE UP (ref 0.5–1.3)
GLUCOSE SERPL-MCNC: 157 MG/DL — HIGH (ref 70–99)
HCT VFR BLD CALC: 41.8 % — SIGNIFICANT CHANGE UP (ref 34.5–45)
HGB BLD-MCNC: 13.9 G/DL — SIGNIFICANT CHANGE UP (ref 11.5–15.5)
MCHC RBC-ENTMCNC: 32.4 PG — SIGNIFICANT CHANGE UP (ref 27–34)
MCHC RBC-ENTMCNC: 33.3 G/DL — SIGNIFICANT CHANGE UP (ref 32–36)
MCV RBC AUTO: 97.2 FL — SIGNIFICANT CHANGE UP (ref 80–100)
PLATELET # BLD AUTO: 468 K/UL — HIGH (ref 150–400)
POTASSIUM SERPL-MCNC: 5.4 MMOL/L — HIGH (ref 3.5–5.3)
POTASSIUM SERPL-SCNC: 5.4 MMOL/L — HIGH (ref 3.5–5.3)
PROT SERPL-MCNC: 7.8 G/DL — SIGNIFICANT CHANGE UP (ref 6–8.3)
RBC # BLD: 4.3 M/UL — SIGNIFICANT CHANGE UP (ref 3.8–5.2)
RBC # FLD: 12.4 % — SIGNIFICANT CHANGE UP (ref 10.3–14.5)
SODIUM SERPL-SCNC: 139 MMOL/L — SIGNIFICANT CHANGE UP (ref 135–145)
WBC # BLD: 9.5 K/UL — SIGNIFICANT CHANGE UP (ref 3.8–10.5)
WBC # FLD AUTO: 9.5 K/UL — SIGNIFICANT CHANGE UP (ref 3.8–10.5)

## 2018-11-14 RX ORDER — PREGABALIN 100 MG/1
100 CAPSULE ORAL TWICE DAILY
Qty: 60 | Refills: 2 | Status: DISCONTINUED | COMMUNITY
Start: 2018-10-19 | End: 2018-11-14

## 2018-11-14 NOTE — ASSESSMENT
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 Carrillo, longitudinally following with Dr. De La O

## 2018-11-14 NOTE — HISTORY OF PRESENT ILLNESS
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

## 2018-11-14 NOTE — PHYSICAL EXAM
[Ambulatory and capable of all self care but unable to carry out any work activities] : Status 2- Ambulatory and capable of all self care but unable to carry out any work activities. Up and about more than 50% of waking hours [Normal] : affect appropriate [de-identified] : cane/wheelchiar dependent for ambulation [de-identified] : chronic venous changes in lower extremity

## 2018-11-14 NOTE — REVIEW OF SYSTEMS
[Dysphagia] : dysphagia [Difficulty Walking] : difficulty walking [Fever] : no fever [Chills] : no chills [Eye Pain] : no eye pain [Odynophagia] : no odynophagia [Chest Pain] : no chest pain [Shortness Of Breath] : no shortness of breath [Abdominal Pain] : no abdominal pain [Vomiting] : no vomiting [Dysuria] : no dysuria [Joint Pain] : no joint pain [Easy Bleeding] : no tendency for easy bleeding [Easy Bruising] : no tendency for easy bruising [FreeTextEntry4] : chronic since stroke

## 2018-11-19 NOTE — ASSESSMENT
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

## 2018-11-19 NOTE — REVIEW OF SYSTEMS
[Fever] : no fever [Chills] : no chills [Fatigue] : no fatigue [Night Sweats] : no night sweats [Earache] : no earache [Chest Pain] : no chest pain [Palpitations] : no palpitations [Leg Claudication] : no leg claudication [Lower Ext Edema] : no lower extremity edema [Orthopnea] : no orthopnea [Shortness Of Breath] : no shortness of breath [Wheezing] : no wheezing [Dyspnea on Exertion] : no dyspnea on exertion [Abdominal Pain] : no abdominal pain [Nausea] : no nausea [Constipation] : no constipation [Diarrhea] : diarrhea [Vomiting] : no vomiting [Dysuria] : no dysuria [Hematuria] : no hematuria [Headache] : no headache [Dizziness] : no dizziness [Depression] : no depression [FreeTextEntry3] : no acute visit changes

## 2018-11-19 NOTE — HISTORY OF PRESENT ILLNESS
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  \par \par Pt. saw heme/onc in September for monitoring of polycythemia vera.  Pt. is currently at Hct goal.  B/L duplex LE US were performed to rule out DVT because pt is to stop warfarin because of bleeding risk; results showed no DVT.  \par \par On her last visit pt. was complaining of persistent burning pain in her legs b/l and LE swelling.  At that visit pt was switched from gabapentin to lyrica and pt. was given a script for compression stockings.  Pt. reports that the lyrica produced the same relief as the gabapentin but that the compression stockings helped her edema \par \par Pt. has no new complaints or hospital visits.  Depression screen negative.  Up to date w/ pneumonia vaccine, hep C screen.  \par

## 2018-11-19 NOTE — PLAN
[FreeTextEntry1] : # LE burning pain\par -  2/2 to peripheral neuropathy 2/2 long standing DM\par - at last visit A1c was 6.7% (Septmber- did not recheck because <3 months ago) and pt's gabapentin was switched to lyrica.  Pt reports that switch to lyrica did not make a difference.  Increased dosage from 50mg BID to 100mg BID.  Advised to make f/u appointment in 6 months to reassess response.  \par \par # Lower extremity swelling\par - 2/2 to venous valvular incompetence as pt. does not have a history of HF \par - on last visit pt. was given a script for compression stockings and told to elevate legs as much as possible.  \par \par #Hypothyroidism\par - Pt has no signs or symptoms of hypo or hyperthyroidism but because she has not had a TSH w/ reflex T4 performed recently, ordered to assess if levothyroxine dosage is sufficient.  \par \par #DM2\par - last A1c in september was 6.7%; has f/u visit w/ endocrinology in november - will likely have repeat a1c then\par - gave referral for ophthalmology and podiatry for annual eye and foot exam \par \par #Health care maintenance\par - HCV screen negative, received prevnar and pneumovax\par - gave flu shot and wrote script for shingles vaccine; on next 6 month visit needs script for second dose of singles vaccine\par

## 2018-12-04 ENCOUNTER — RECORD ABSTRACTING (OUTPATIENT)
Age: 83
End: 2018-12-04

## 2018-12-10 ENCOUNTER — RX RENEWAL (OUTPATIENT)
Age: 83
End: 2018-12-10

## 2018-12-10 ENCOUNTER — TRANSCRIPTION ENCOUNTER (OUTPATIENT)
Age: 83
End: 2018-12-10

## 2018-12-24 ENCOUNTER — APPOINTMENT (OUTPATIENT)
Dept: INTERNAL MEDICINE | Facility: CLINIC | Age: 83
End: 2018-12-24

## 2019-01-15 NOTE — CONSULT NOTE ADULT - PROBLEM SELECTOR RECOMMENDATION 2
No significant past surgical history - TSH low end of normal at 0.27  - Given risk of Atrial fibrillation with excess thyroid hormone, recommend check FT4 in the am. If FT4 is high, recommend change Synthroid to 112 mcg QD

## 2019-02-14 ENCOUNTER — INPATIENT (INPATIENT)
Facility: HOSPITAL | Age: 84
LOS: 11 days | Discharge: NURSING, REHAB OR LONG-TERM CARE | DRG: 335 | End: 2019-02-26
Attending: SURGERY | Admitting: SURGERY
Payer: MEDICARE

## 2019-02-14 VITALS
WEIGHT: 119.93 LBS | HEART RATE: 102 BPM | OXYGEN SATURATION: 96 % | TEMPERATURE: 98 F | RESPIRATION RATE: 18 BRPM | SYSTOLIC BLOOD PRESSURE: 191 MMHG | DIASTOLIC BLOOD PRESSURE: 90 MMHG

## 2019-02-14 DIAGNOSIS — K56.609 UNSPECIFIED INTESTINAL OBSTRUCTION, UNSPECIFIED AS TO PARTIAL VERSUS COMPLETE OBSTRUCTION: ICD-10-CM

## 2019-02-14 LAB
ALBUMIN SERPL ELPH-MCNC: 4.3 G/DL — SIGNIFICANT CHANGE UP (ref 3.3–5)
ALP SERPL-CCNC: 90 U/L — SIGNIFICANT CHANGE UP (ref 40–120)
ALT FLD-CCNC: 19 U/L — SIGNIFICANT CHANGE UP (ref 10–45)
ANION GAP SERPL CALC-SCNC: 18 MMOL/L — HIGH (ref 5–17)
APTT BLD: 31 SEC — SIGNIFICANT CHANGE UP (ref 27.5–36.3)
AST SERPL-CCNC: 21 U/L — SIGNIFICANT CHANGE UP (ref 10–40)
BASOPHILS # BLD AUTO: 0.1 K/UL — SIGNIFICANT CHANGE UP (ref 0–0.2)
BASOPHILS NFR BLD AUTO: 0.4 % — SIGNIFICANT CHANGE UP (ref 0–2)
BILIRUB SERPL-MCNC: 0.4 MG/DL — SIGNIFICANT CHANGE UP (ref 0.2–1.2)
BLD GP AB SCN SERPL QL: NEGATIVE — SIGNIFICANT CHANGE UP
BUN SERPL-MCNC: 24 MG/DL — HIGH (ref 7–23)
CALCIUM SERPL-MCNC: 10.5 MG/DL — SIGNIFICANT CHANGE UP (ref 8.4–10.5)
CHLORIDE SERPL-SCNC: 95 MMOL/L — LOW (ref 96–108)
CO2 SERPL-SCNC: 24 MMOL/L — SIGNIFICANT CHANGE UP (ref 22–31)
CREAT SERPL-MCNC: 0.78 MG/DL — SIGNIFICANT CHANGE UP (ref 0.5–1.3)
EOSINOPHIL # BLD AUTO: 0.1 K/UL — SIGNIFICANT CHANGE UP (ref 0–0.5)
EOSINOPHIL NFR BLD AUTO: 0.4 % — SIGNIFICANT CHANGE UP (ref 0–6)
GAS PNL BLDV: SIGNIFICANT CHANGE UP
GAS PNL BLDV: SIGNIFICANT CHANGE UP
GLUCOSE SERPL-MCNC: 238 MG/DL — HIGH (ref 70–99)
HCT VFR BLD CALC: 50.6 % — HIGH (ref 34.5–45)
HGB BLD-MCNC: 16.7 G/DL — HIGH (ref 11.5–15.5)
INR BLD: 1.01 RATIO — SIGNIFICANT CHANGE UP (ref 0.88–1.16)
LIDOCAIN IGE QN: 42 U/L — SIGNIFICANT CHANGE UP (ref 7–60)
LYMPHOCYTES # BLD AUTO: 1 K/UL — SIGNIFICANT CHANGE UP (ref 1–3.3)
LYMPHOCYTES # BLD AUTO: 6.4 % — LOW (ref 13–44)
MCHC RBC-ENTMCNC: 32.2 PG — SIGNIFICANT CHANGE UP (ref 27–34)
MCHC RBC-ENTMCNC: 33 GM/DL — SIGNIFICANT CHANGE UP (ref 32–36)
MCV RBC AUTO: 97.6 FL — SIGNIFICANT CHANGE UP (ref 80–100)
MONOCYTES # BLD AUTO: 0.5 K/UL — SIGNIFICANT CHANGE UP (ref 0–0.9)
MONOCYTES NFR BLD AUTO: 3.3 % — SIGNIFICANT CHANGE UP (ref 2–14)
NEUTROPHILS # BLD AUTO: 13.3 K/UL — HIGH (ref 1.8–7.4)
NEUTROPHILS NFR BLD AUTO: 89.6 % — HIGH (ref 43–77)
PLATELET # BLD AUTO: 538 K/UL — HIGH (ref 150–400)
POTASSIUM SERPL-MCNC: 4.6 MMOL/L — SIGNIFICANT CHANGE UP (ref 3.5–5.3)
POTASSIUM SERPL-SCNC: 4.6 MMOL/L — SIGNIFICANT CHANGE UP (ref 3.5–5.3)
PROT SERPL-MCNC: 8.1 G/DL — SIGNIFICANT CHANGE UP (ref 6–8.3)
PROTHROM AB SERPL-ACNC: 11.6 SEC — SIGNIFICANT CHANGE UP (ref 10–12.9)
RBC # BLD: 5.18 M/UL — SIGNIFICANT CHANGE UP (ref 3.8–5.2)
RBC # FLD: 13.8 % — SIGNIFICANT CHANGE UP (ref 10.3–14.5)
RH IG SCN BLD-IMP: POSITIVE — SIGNIFICANT CHANGE UP
SODIUM SERPL-SCNC: 137 MMOL/L — SIGNIFICANT CHANGE UP (ref 135–145)
WBC # BLD: 14.9 K/UL — HIGH (ref 3.8–10.5)
WBC # FLD AUTO: 14.9 K/UL — HIGH (ref 3.8–10.5)

## 2019-02-14 PROCEDURE — 99285 EMERGENCY DEPT VISIT HI MDM: CPT | Mod: 25

## 2019-02-14 PROCEDURE — 99222 1ST HOSP IP/OBS MODERATE 55: CPT

## 2019-02-14 PROCEDURE — 71045 X-RAY EXAM CHEST 1 VIEW: CPT | Mod: 26

## 2019-02-14 PROCEDURE — 74177 CT ABD & PELVIS W/CONTRAST: CPT | Mod: 26

## 2019-02-14 PROCEDURE — 93010 ELECTROCARDIOGRAM REPORT: CPT

## 2019-02-14 RX ORDER — INSULIN LISPRO 100/ML
VIAL (ML) SUBCUTANEOUS EVERY 6 HOURS
Qty: 0 | Refills: 0 | Status: DISCONTINUED | OUTPATIENT
Start: 2019-02-14 | End: 2019-02-19

## 2019-02-14 RX ORDER — SODIUM CHLORIDE 9 MG/ML
500 INJECTION INTRAMUSCULAR; INTRAVENOUS; SUBCUTANEOUS ONCE
Qty: 0 | Refills: 0 | Status: COMPLETED | OUTPATIENT
Start: 2019-02-14 | End: 2019-02-14

## 2019-02-14 RX ORDER — ACETAMINOPHEN 500 MG
1000 TABLET ORAL ONCE
Qty: 0 | Refills: 0 | Status: COMPLETED | OUTPATIENT
Start: 2019-02-14 | End: 2019-02-14

## 2019-02-14 RX ORDER — ONDANSETRON 8 MG/1
4 TABLET, FILM COATED ORAL ONCE
Qty: 0 | Refills: 0 | Status: COMPLETED | OUTPATIENT
Start: 2019-02-14 | End: 2019-02-14

## 2019-02-14 RX ORDER — LEVOTHYROXINE SODIUM 125 MCG
62.5 TABLET ORAL DAILY
Qty: 0 | Refills: 0 | Status: DISCONTINUED | OUTPATIENT
Start: 2019-02-14 | End: 2019-02-14

## 2019-02-14 RX ORDER — ENOXAPARIN SODIUM 100 MG/ML
40 INJECTION SUBCUTANEOUS DAILY
Qty: 0 | Refills: 0 | Status: DISCONTINUED | OUTPATIENT
Start: 2019-02-14 | End: 2019-02-19

## 2019-02-14 RX ORDER — ACETAMINOPHEN 500 MG
1000 TABLET ORAL ONCE
Qty: 0 | Refills: 0 | Status: COMPLETED | OUTPATIENT
Start: 2019-02-15 | End: 2019-02-15

## 2019-02-14 RX ORDER — ONDANSETRON 8 MG/1
4 TABLET, FILM COATED ORAL ONCE
Qty: 0 | Refills: 0 | Status: DISCONTINUED | OUTPATIENT
Start: 2019-02-14 | End: 2019-02-14

## 2019-02-14 RX ORDER — SODIUM CHLORIDE 9 MG/ML
1000 INJECTION, SOLUTION INTRAVENOUS
Qty: 0 | Refills: 0 | Status: DISCONTINUED | OUTPATIENT
Start: 2019-02-14 | End: 2019-02-15

## 2019-02-14 RX ORDER — METOPROLOL TARTRATE 50 MG
5 TABLET ORAL EVERY 6 HOURS
Qty: 0 | Refills: 0 | Status: DISCONTINUED | OUTPATIENT
Start: 2019-02-14 | End: 2019-02-22

## 2019-02-14 RX ORDER — LEVOTHYROXINE SODIUM 125 MCG
62.5 TABLET ORAL AT BEDTIME
Qty: 0 | Refills: 0 | Status: DISCONTINUED | OUTPATIENT
Start: 2019-02-14 | End: 2019-02-17

## 2019-02-14 RX ADMIN — SODIUM CHLORIDE 500 MILLILITER(S): 9 INJECTION INTRAMUSCULAR; INTRAVENOUS; SUBCUTANEOUS at 18:33

## 2019-02-14 RX ADMIN — Medication 1000 MILLIGRAM(S): at 15:00

## 2019-02-14 RX ADMIN — ONDANSETRON 4 MILLIGRAM(S): 8 TABLET, FILM COATED ORAL at 18:34

## 2019-02-14 RX ADMIN — Medication 400 MILLIGRAM(S): at 14:41

## 2019-02-14 RX ADMIN — Medication 1000 MILLIGRAM(S): at 23:00

## 2019-02-14 RX ADMIN — SODIUM CHLORIDE 500 MILLILITER(S): 9 INJECTION INTRAMUSCULAR; INTRAVENOUS; SUBCUTANEOUS at 14:41

## 2019-02-14 RX ADMIN — Medication 400 MILLIGRAM(S): at 22:25

## 2019-02-14 RX ADMIN — SODIUM CHLORIDE 100 MILLILITER(S): 9 INJECTION, SOLUTION INTRAVENOUS at 22:25

## 2019-02-14 RX ADMIN — ONDANSETRON 4 MILLIGRAM(S): 8 TABLET, FILM COATED ORAL at 14:41

## 2019-02-14 NOTE — ED ADULT NURSE NOTE - NSIMPLEMENTINTERV_GEN_ALL_ED
Implemented All Fall with Harm Risk Interventions:  [place] to call system. Call bell, personal items and telephone within reach. Instruct patient to call for assistance. Room bathroom lighting operational. Non-slip footwear when patient is off stretcher. Physically safe environment: no spills, clutter or unnecessary equipment. Stretcher in lowest position, wheels locked, appropriate side rails in place. Provide visual cue, wrist band, yellow gown, etc. Monitor gait and stability. Monitor for mental status changes and reorient to person, place, and time. Review medications for side effects contributing to fall risk. Reinforce activity limits and safety measures with patient and family. Provide visual clues: red socks.

## 2019-02-14 NOTE — ED ADULT NURSE REASSESSMENT NOTE - STATUS
1/13/2017          Antoni Martínez  [address]  [facility] [place] [address]    Dear Antoni:    [facility] wants to ensure your discharge home is safe and you or your loved ones have had all your questions answered regarding your care after you leave the hospital.    You may receive a telephone call within two days of your discharge.  This call is to make certain you understand your discharge instructions as well as ensure we provided you with the best care possible during your stay with us.     The call will only last approximately 3-5 minutes and will be done by a nurse.    Once again, we want to ensure your discharge home is safe and that you have a clear understanding of any next steps in your care.  If you have any questions or concerns, please do not hesitate to contact us, we are here for you.  Thank you for choosing [facility] for your healthcare needs.    Sincerely,    Karthik Miranda    [facility]         admitted, awaiting bed

## 2019-02-14 NOTE — ED PROVIDER NOTE - OBJECTIVE STATEMENT
84yo F with PMH HTN, DM, CVA, DVT, Polycythemia vera (on hydoxyurea), SBO x 2, PSH hysterectomy, presenting with 84yo F with PMH HTN, DM, CVA, DVT, Polycythemia vera (on hydroxyurea), SBO x 2, PSH hysterectomy, presenting with daughter at bedside c/o abd pain/n/v x 2 days. Pt reports sharp epigastric pain radiating down entire L side of abd to suprapubic area began after breakfast yesterday with associated nausea and 1 episode of vomiting. Pain improved with normal BM yesterday. Pt felt well until this AM when attempting to eat tea and toast had another episode of vomiting and unable to tolerate PO meds. Abd pain returned, 9/10 in severity, and feels similar to SBO in past. No BM today and denies flatulence. Takes 81mg ASA daily, no other blood thinners. Denies fever/chills, CP, palpitations, cough, SOB, diarrhea, melena, BRBPR, urinary symptoms.     [facility] 84[facility] F with PMH HTN, DM, CVA, DVT, Polycythemia vera (on hydroxyurea), SBO x 2, PSH hysterectomy, presenting with daughter at bedside c/o abd pain/n/v x 2 days. Pt reports sharp epigastric pain radiating down entire L side of abd to suprapubic area began after breakfast yesterday with associated nausea and 1 episode of vomiting. Pain improved with normal BM yesterday. Pt felt well until this AM when attempting to eat tea and toast had another episode of vomiting and unable to tolerate PO meds. Abd pain returned, 9/10 in severity, and feels similar to SBO in past. No BM today and denies flatulence. Takes 81mg ASA daily, no other blood thinners. Denies fever/chills, CP, palpitations, cough, SOB, diarrhea, melena, BRBPR, urinary symptoms, back pain.     [facility] 84yo F with PMH HTN, DM, CVA, DVT, Polycythemia vera (on hydroxyurea), SBO x 2, PSH hysterectomy, presenting with daughter at bedside c/o abd pain/n/v x 2 days. Pt reports sharp epigastric pain radiating down entire L side of abd to suprapubic area began after breakfast yesterday with associated nausea and 1 episode of nb/nb vomiting. Pain improved with normal BM yesterday. Pt felt well until this AM when attempting to eat tea and toast had another episode of vomiting and unable to tolerate PO meds. Abd pain returned, 9/10 in severity, and feels similar to SBO in past. No BM today and denies flatulence. Takes 81mg ASA daily, no other blood thinners. Denies fever/chills, CP, palpitations, cough, SOB, diarrhea, melena, BRBPR, urinary symptoms, back pain.     [facility]

## 2019-02-14 NOTE — ED PROVIDER NOTE - ATTENDING CONTRIBUTION TO CARE
86yo F with PMH HTN, DM, CVA, DVT, Polycythemia vera (on hydroxyurea), SBO x 2, PSH hysterectomy, presents with abdominal pain, N/V since yesterday. epigastric pain radiating to entire abdomen. sharp constant pain. pain improved yesterday after bowel movement. but today patient had breakfast and then pain recurred with associated vomiting. pain rated as severe. not passing flatus today. no bm today. feels like her prior sbo's.  no f/ch, uri symptoms, cough, urinary complaints.   PE nontoxic. lungs CTA. abd diffusely ttp. non peritoneal. not significantly distended.

## 2019-02-14 NOTE — ED ADULT NURSE NOTE - OBJECTIVE STATEMENT
85 y.o F A&Ox3 with PMH of HTN, DVT, CVA, polycythemia (takes hydroxyurea), hysterectomy, SBOx2. presents to the ED c.o ABD pain, n/v, for 2 days. Pt. reports sharp epigastric pain radiating to LLQ. Pt. also endorses nausea and vomiting today. Pt. reports she had a normal BM yesterday. Has not been passing flatus. Reports pain worsened this morning after she had a piece of toast and tea and then had an episode of emesis. As per family, pt. has not been able to keep anything down today. Takes baby aspirin daily. ABD appears slight distended, tender to palpation, pos. bowel sounds auscultated. Denies fevers, chills, dizziness, SOB, CP, palpitations, blood in stool, dysuria, hematuria. Safety and comfort provided. Family at bedside.

## 2019-02-14 NOTE — H&P ADULT - ATTENDING COMMENTS
Patient was seen and examined on admission  on 2/14.  85  year old female with  past medical history significant for HTN, DM, CVA, DVT, Polycythemia vera (on hydroxyurea), SBO x 2, PSH hysterectomy, Initial sinus tachycardia which improved after IVF resuscitation. She now is hemodynamically appropriate. On physical exam abdomen is mildly distended but overall soft.   I have reviewed PMH, PSH, labs, meds and imaging.  WBC 14, likely hemoconcentrated as Hb is 16 and the patient has been undergoing emesis.  Will monitor AM labs to monitor leukocytosis.  Lactate 1.2   CT scan of the abdomen/pelvis demonstrates small bowel obstruction with single transition point in the anterior mid abdomen.    Patient admitted to ACS service.  NPO, IVF  Will continue serial abdominal exams.  Repeat labs in the morning.   If no resolution will consider exploratory laparotomy and lysis of adhesions.  I have discussed the plan with the patient and her daughter. Patient was seen and examined on admission  on 2/14.  85  year old female with  past medical history significant for HTN, DM, CVA, DVT, Polycythemia vera (on hydroxyurea), SBO x 2, PSH hysterectomy, Initial sinus tachycardia which improved after IVF resuscitation. She now is hemodynamically appropriate. On physical exam abdomen is mildly distended but overall soft.   I have reviewed PMH, PSH, labs, meds and imaging.  WBC 14, likely hemoconcentrated as Hb is 16 and the patient has been undergoing emesis.  Will monitor AM labs to monitor leukocytosis.  Lactate 1.2   glc 148  CT scan of the abdomen/pelvis demonstrates small bowel obstruction with single transition point in the anterior mid abdomen.    Patient admitted to ACS service.  NPO, IVF  Will continue serial abdominal exams.  Repeat labs in the morning.   If no resolution will consider exploratory laparotomy and lysis of adhesions.  Will monitor blood glucose closely for hyperglycemia secondary to DM type 2-goal BG < 180  -will continue insulin sliding scale.   I have discussed the plan with the patient and her daughter. Patient was seen and examined on admission  on 2/14.  85  year old female with  past medical history significant for HTN, DM, CVA, DVT, Polycythemia vera (on hydroxyurea), SBO x 2, PSH hysterectomy, Initial sinus tachycardia which improved after IVF resuscitation. She now is hemodynamically appropriate. On physical exam abdomen is mildly distended but overall soft.   I have reviewed PMH, PSH, labs, meds and imaging.  WBC 14, likely hemoconcentrated as Hb is 16 and the patient has been undergoing emesis.  Will monitor AM labs to monitor leukocytosis.  Lactate 1.2   glc 148  CT scan of the abdomen/pelvis demonstrates small bowel obstruction with single transition point in the anterior mid abdomen.    Patient admitted to ACS service.  NPO, IVF  Will continue serial abdominal exams.  Repeat labs in the morning.   If no resolution will consider exploratory laparotomy and lysis of adhesions.  Will monitor blood glucose closely for hyperglycemia secondary to DM type 2-goal BG < 180  -will continue insulin sliding scale.   Hypothyroidism- will continue on IV synthroid until bowel function returns  I have discussed the plan with the patient and her daughter.

## 2019-02-14 NOTE — H&P ADULT - NSHPLABSRESULTS_GEN_ALL_CORE
16.7   14.9  )-----------( 538      ( 14 Feb 2019 14:23 )             50.6   02-14    137  |  95<L>  |  24<H>  ----------------------------<  238<H>  4.6   |  24  |  0.78    Ca    10.5      14 Feb 2019 14:23    TPro  8.1  /  Alb  4.3  /  TBili  0.4  /  DBili  x   /  AST  21  /  ALT  19  /  AlkPhos  90  02-14  < from: CT Abdomen and Pelvis w/ Oral Cont and w/ IV Cont (02.14.19 @ 16:36) >    FINDINGS:    LOWER CHEST: Small bibasilar atelectasis. Small calcification in the   visualized right breast. Coronary arterial calcification    LIVER: Within normal limits.  BILE DUCTS: Minimal intrahepatic biliary ductal dilatation. The common   bile duct measures 1.2 cm and tapers distally to 5 mm.  GALLBLADDER: Cholelithiasis.  SPLEEN: 1.5 cm hypodense focus in the spleen, unchanged and likely a   cyst..  PANCREAS: Within normal limits.  ADRENALS: Within normal limits.  KIDNEYS/URETERS: Subcentimeter hypodense focus in the left kidney, most   likely cysts. Bilateral subcentimeter hypodense foci, too small to   characterize. No hydronephrosis. Metric enhancement.    BLADDER: Within normal limits.  REPRODUCTIVE ORGANS: Nonvisualized uterus. Hysterectomy.No adnexal   masses. Small fluid in the vagina.    BOWEL:  Small bowel obstruction with transition point in the anterior mid   abdomen (2:71). Distal ileum and colon are nondilated. No pneumatosis or   portal venous gas. Periampullary partially air-filled duodenal   diverticulum, unchanged.  PERITONEUM: Small volume interloop ascites.   VESSELS:  Atherosclerotic vascular calcifications.  RETROPERITONEUM: No lymphadenopathy.    ABDOMINAL WALL: Tiny fat-containing umbilical hernia.  BONES: Within normal limits.    IMPRESSION:     Small bowel obstruction with single transition point in the anterior mid   abdomen as described above. Interloop ascites is noted.    < end of copied text >

## 2019-02-14 NOTE — H&P ADULT - ASSESSMENT
85F w/ PMHx as noted now with n/v x 2 days with recurrent SBO, as per patient this is her third obstruction in a couple years others have resolved nonoperatively . Patient currently no longer nauseousness or vomiting, will admit to surgery for furtehr management    - Admit to Dr. Alba  - NPO/ IVF  - NGT decompression  - consult Hematology , pt follows with Dr. Hurley, on hydroxyurea and aspirin for Polycythemia vera , NPO currently should she be on a non PO equivalent of medications  - DVT ppx  - Strict Is and Os, sylvester given patient incontinent at baseline  - f/u AM labs    S Yannick-Mikael PGY-2  ACS

## 2019-02-14 NOTE — H&P ADULT - FAMILY HISTORY
Family history of secondary lung cancer     Child  Still living? Unknown  Family history of diabetes mellitus (DM), Age at diagnosis: Age Unknown  Family history of breast cancer, Age at diagnosis: Age Unknown

## 2019-02-14 NOTE — H&P ADULT - HISTORY OF PRESENT ILLNESS
86yo F with PMH HTN, DM, CVA, DVT, Polycythemia vera (on hydroxyurea), SBO x 2, PSH hysterectomy, presenting with daughter at bedside c/o abd pain/n/v x 2 days. Pt reports sharp epigastric pain radiating down entire L side of abd to suprapubic area began after breakfast yesterday with associated nausea and 1 episode of nb/nb vomiting. Pain improved with normal BM yesterday. Pt felt well until this AM when attempting to eat tea and toast had another episode of vomiting and unable to tolerate PO meds. Abd pain returned, 9/10 in severity, and feels similar to SBO in past. No BM today and denies flatulence. Takes 81mg ASA daily, no other blood thinners. Denies fever/chills, CP, palpitations, cough, SOB, diarrhea, melena, BRBPR, urinary symptoms, back pain.     In ED, patient was slightly tachycardic and hypertensive was started on IVF. On exam she was softly distended with what she described as soreness on palpation with well healed lower abdominal incision. CT demonstrated SBO with transition point in mid abdomen. 86yo F with PMH HTN, DM, CVA, DVT, Polycythemia vera (on hydroxyurea), SBO x 2, PSH hysterectomy, presenting with daughter at bedside c/o abd pain/n/v x 2 days. Pt reports sharp epigastric pain radiating down entire L side of abd to suprapubic area began after breakfast yesterday with associated nausea and 1 episode of nb/nb vomiting. Pain improved with normal BM yesterday. Pt felt well until this AM when attempting to eat tea and toast had another episode of vomiting and unable to tolerate PO meds. Abd pain returned, 9/10 in severity, and feels similar to SBO in past. No BM today and denies flatulence. Takes 81mg ASA daily, no other blood thinners. Denies fever/chills, CP, palpitations, cough, SOB, diarrhea, melena, BRBPR, urinary symptoms, back pain.     In ED, patient was tachycardic and hypertensive was started on IVF. On exam she was softly distended with what she described as soreness on palpation with well healed lower abdominal incision. CT demonstrated SBO with transition point in mid abdomen.

## 2019-02-14 NOTE — ED PROVIDER NOTE - NS ED MD EM SELECTION
[employer].  The following targets are tested for: Enterococcus,  vancomycin resistant enterococci, Listeria monocytogenes,  coagulase negative staphylococci, S. aureus,  methicillin resistant S. aureus, Streptococcus agalactiae  (Group B), S. pneumoniae, S. pyogenes (Group A),  Acinetobacter baumannii, Enterobacter cloacae, E. coli,  Klebsiella oxytoca, K. pneumoniae, Proteus sp.,  Serratia marcescens, Haemophilus influenzae,  Neisseria meningitidis, Pseudomonas aeruginosa, Candida  albicans, C. glabrata, C krusei, C parapsilosis,  C. tropicalis and the KPC resistance gene.    Organism Identification: Blood Culture PCR    Method Type: PCR              Radiology:  reviewed  Advanced directives addressed: full resuscitation [address] Comprehensive

## 2019-02-14 NOTE — ED ADULT NURSE REASSESSMENT NOTE - NS ED NURSE REASSESS COMMENT FT1
Patient received bed assignment. Patient aware of assignment. Report given to receiving RADHA Santos. CHITO. Patient stable for transport. Chart given to charge desk. Safety and comfort maintained while in ED.
Pt. reports nausea at this time with one episode of clear liquid emesis. MD aware and zofran to be administered. Pt. placed on room, cleaned and changed. Awaiting surgery to place NG tube as per MD. Will continue to monitor. VSS.
Report given to holding RADHA Flaherty. Patient aware. Patient currently comfortable. VSS.
Report received from RADHA Mathews. Surgery at bedside placing NG tube. As per HAROLDO Argueta, hold off on VBG for now.
Surgery at bedside.
Unable to obtain VBG after multiple attempts by RNs and EDT. MD is aware and reports to hold off at this time.
NG tube placed by surgery. Patient pending chest x-ray to confirm placement.

## 2019-02-15 LAB
ANION GAP SERPL CALC-SCNC: 16 MMOL/L — SIGNIFICANT CHANGE UP (ref 5–17)
APPEARANCE UR: CLEAR — SIGNIFICANT CHANGE UP
BACTERIA # UR AUTO: NEGATIVE — SIGNIFICANT CHANGE UP
BILIRUB UR-MCNC: NEGATIVE — SIGNIFICANT CHANGE UP
BUN SERPL-MCNC: 22 MG/DL — SIGNIFICANT CHANGE UP (ref 7–23)
CALCIUM SERPL-MCNC: 9 MG/DL — SIGNIFICANT CHANGE UP (ref 8.4–10.5)
CHLORIDE SERPL-SCNC: 96 MMOL/L — SIGNIFICANT CHANGE UP (ref 96–108)
CO2 SERPL-SCNC: 23 MMOL/L — SIGNIFICANT CHANGE UP (ref 22–31)
COLOR SPEC: YELLOW — SIGNIFICANT CHANGE UP
CREAT SERPL-MCNC: 0.82 MG/DL — SIGNIFICANT CHANGE UP (ref 0.5–1.3)
DIFF PNL FLD: NEGATIVE — SIGNIFICANT CHANGE UP
EPI CELLS # UR: 3 /HPF — SIGNIFICANT CHANGE UP
GLUCOSE BLDC GLUCOMTR-MCNC: 123 MG/DL — HIGH (ref 70–99)
GLUCOSE BLDC GLUCOMTR-MCNC: 123 MG/DL — HIGH (ref 70–99)
GLUCOSE BLDC GLUCOMTR-MCNC: 143 MG/DL — HIGH (ref 70–99)
GLUCOSE BLDC GLUCOMTR-MCNC: 146 MG/DL — HIGH (ref 70–99)
GLUCOSE SERPL-MCNC: 148 MG/DL — HIGH (ref 70–99)
GLUCOSE UR QL: NEGATIVE — SIGNIFICANT CHANGE UP
HCT VFR BLD CALC: 43.7 % — SIGNIFICANT CHANGE UP (ref 34.5–45)
HGB BLD-MCNC: 13.8 G/DL — SIGNIFICANT CHANGE UP (ref 11.5–15.5)
HYALINE CASTS # UR AUTO: 0 /LPF — SIGNIFICANT CHANGE UP (ref 0–2)
KETONES UR-MCNC: ABNORMAL
LEUKOCYTE ESTERASE UR-ACNC: NEGATIVE — SIGNIFICANT CHANGE UP
MAGNESIUM SERPL-MCNC: 1.5 MG/DL — LOW (ref 1.6–2.6)
MCHC RBC-ENTMCNC: 31.6 GM/DL — LOW (ref 32–36)
MCHC RBC-ENTMCNC: 31.8 PG — SIGNIFICANT CHANGE UP (ref 27–34)
MCV RBC AUTO: 100.7 FL — HIGH (ref 80–100)
NITRITE UR-MCNC: NEGATIVE — SIGNIFICANT CHANGE UP
PH UR: 6.5 — SIGNIFICANT CHANGE UP (ref 5–8)
PHOSPHATE SERPL-MCNC: 4 MG/DL — SIGNIFICANT CHANGE UP (ref 2.5–4.5)
PLATELET # BLD AUTO: 525 K/UL — HIGH (ref 150–400)
POTASSIUM SERPL-MCNC: 4 MMOL/L — SIGNIFICANT CHANGE UP (ref 3.5–5.3)
POTASSIUM SERPL-SCNC: 4 MMOL/L — SIGNIFICANT CHANGE UP (ref 3.5–5.3)
PROT UR-MCNC: ABNORMAL
RBC # BLD: 4.34 M/UL — SIGNIFICANT CHANGE UP (ref 3.8–5.2)
RBC # FLD: 14.1 % — SIGNIFICANT CHANGE UP (ref 10.3–14.5)
RBC CASTS # UR COMP ASSIST: 2 /HPF — SIGNIFICANT CHANGE UP (ref 0–4)
SODIUM SERPL-SCNC: 135 MMOL/L — SIGNIFICANT CHANGE UP (ref 135–145)
SP GR SPEC: >1.05 (ref 1.01–1.02)
UROBILINOGEN FLD QL: NEGATIVE — SIGNIFICANT CHANGE UP
WBC # BLD: 15.24 K/UL — HIGH (ref 3.8–10.5)
WBC # FLD AUTO: 15.24 K/UL — HIGH (ref 3.8–10.5)
WBC UR QL: 3 /HPF — SIGNIFICANT CHANGE UP (ref 0–5)

## 2019-02-15 PROCEDURE — 99232 SBSQ HOSP IP/OBS MODERATE 35: CPT

## 2019-02-15 PROCEDURE — 99223 1ST HOSP IP/OBS HIGH 75: CPT

## 2019-02-15 PROCEDURE — 99222 1ST HOSP IP/OBS MODERATE 55: CPT | Mod: GC

## 2019-02-15 RX ORDER — MAGNESIUM SULFATE 500 MG/ML
2 VIAL (ML) INJECTION ONCE
Qty: 0 | Refills: 0 | Status: COMPLETED | OUTPATIENT
Start: 2019-02-15 | End: 2019-02-15

## 2019-02-15 RX ORDER — ACETAMINOPHEN 500 MG
1000 TABLET ORAL ONCE
Qty: 0 | Refills: 0 | Status: COMPLETED | OUTPATIENT
Start: 2019-02-15 | End: 2019-02-15

## 2019-02-15 RX ORDER — DEXTROSE MONOHYDRATE, SODIUM CHLORIDE, AND POTASSIUM CHLORIDE 50; .745; 4.5 G/1000ML; G/1000ML; G/1000ML
1000 INJECTION, SOLUTION INTRAVENOUS
Qty: 0 | Refills: 0 | Status: DISCONTINUED | OUTPATIENT
Start: 2019-02-15 | End: 2019-02-18

## 2019-02-15 RX ADMIN — Medication 5 MILLIGRAM(S): at 04:43

## 2019-02-15 RX ADMIN — ENOXAPARIN SODIUM 40 MILLIGRAM(S): 100 INJECTION SUBCUTANEOUS at 12:24

## 2019-02-15 RX ADMIN — Medication 400 MILLIGRAM(S): at 04:43

## 2019-02-15 RX ADMIN — Medication 400 MILLIGRAM(S): at 12:24

## 2019-02-15 RX ADMIN — Medication 5 MILLIGRAM(S): at 12:25

## 2019-02-15 RX ADMIN — Medication 400 MILLIGRAM(S): at 21:13

## 2019-02-15 RX ADMIN — Medication 1000 MILLIGRAM(S): at 05:10

## 2019-02-15 RX ADMIN — DEXTROSE MONOHYDRATE, SODIUM CHLORIDE, AND POTASSIUM CHLORIDE 100 MILLILITER(S): 50; .745; 4.5 INJECTION, SOLUTION INTRAVENOUS at 18:03

## 2019-02-15 RX ADMIN — SODIUM CHLORIDE 100 MILLILITER(S): 9 INJECTION, SOLUTION INTRAVENOUS at 12:24

## 2019-02-15 RX ADMIN — Medication 50 GRAM(S): at 18:00

## 2019-02-15 RX ADMIN — Medication 1000 MILLIGRAM(S): at 21:50

## 2019-02-15 NOTE — PROGRESS NOTE ADULT - SUBJECTIVE AND OBJECTIVE BOX
General Surgery Progress Note    SUBJECTIVE:  The patient was seen and examined. No acute events overnight.     OBJECTIVE:     ** VITAL SIGNS / I&O's **    Vital Signs Last 24 Hrs  T(C): 37 (15 Feb 2019 09:37), Max: 37.2 (15 Feb 2019 04:43)  T(F): 98.6 (15 Feb 2019 09:37), Max: 99 (15 Feb 2019 04:43)  HR: 106 (15 Feb 2019 09:37) (95 - 110)  BP: 148/85 (15 Feb 2019 09:37) (116/75 - 191/90)  BP(mean): --  RR: 18 (15 Feb 2019 09:37) (16 - 19)  SpO2: 93% (15 Feb 2019 09:37) (92% - 96%)      14 Feb 2019 07:01  -  15 Feb 2019 07:00  --------------------------------------------------------  IN:    lactated ringers.: 700 mL    Solution: 200 mL  Total IN: 900 mL    OUT:    Nasoenteral Tube: 50 mL    Voided: 200 mL  Total OUT: 250 mL    Total NET: 650 mL          ** PHYSICAL EXAM **    -- CONSTITUTIONAL: Alert, NAD, NGT in place to sxn  -- PULMONARY: non-labored respirations  -- ABDOMEN: soft, non-distended, min tender      ** LABS **                          13.8   15.24 )-----------( 525      ( 15 Feb 2019 09:08 )             43.7     15 Feb 2019 07:16    135    |  96     |  22     ----------------------------<  148    4.0     |  23     |  0.82     Ca    9.0        15 Feb 2019 07:16  Phos  4.0       15 Feb 2019 07:16  Mg     1.5       15 Feb 2019 07:16    TPro  8.1    /  Alb  4.3    /  TBili  0.4    /  DBili  x      /  AST  21     /  ALT  19     /  AlkPhos  90     14 Feb 2019 14:23    PT/INR - ( 14 Feb 2019 20:19 )   PT: 11.6 sec;   INR: 1.01 ratio         PTT - ( 14 Feb 2019 20:19 )  PTT:31.0 sec  CAPILLARY BLOOD GLUCOSE      POCT Blood Glucose.: 146 mg/dL (15 Feb 2019 05:23)  POCT Blood Glucose.: 143 mg/dL (15 Feb 2019 00:12)        LIVER FUNCTIONS - ( 14 Feb 2019 14:23 )  Alb: 4.3 g/dL / Pro: 8.1 g/dL / ALK PHOS: 90 U/L / ALT: 19 U/L / AST: 21 U/L / GGT: x             Urinalysis Basic - ( 15 Feb 2019 07:23 )    Color: Yellow / Appearance: Clear / SG: >1.050 / pH: x  Gluc: x / Ketone: Small  / Bili: Negative / Urobili: Negative   Blood: x / Protein: 30 mg/dL / Nitrite: Negative   Leuk Esterase: Negative / RBC: 2 /hpf / WBC 3 /HPF   Sq Epi: x / Non Sq Epi: 3 /hpf / Bacteria: Negative        MEDICATIONS  (STANDING):  enoxaparin Injectable 40 milliGRAM(s) SubCutaneous daily  insulin lispro (HumaLOG) corrective regimen sliding scale   SubCutaneous every 6 hours  lactated ringers. 1000 milliLiter(s) (100 mL/Hr) IV Continuous <Continuous>  levothyroxine Injectable 62.5 MICROGram(s) IV Push at bedtime  metoprolol tartrate Injectable 5 milliGRAM(s) IV Push every 6 hours    MEDICATIONS  (PRN):

## 2019-02-15 NOTE — CONSULT NOTE ADULT - PROBLEM SELECTOR RECOMMENDATION 2
stable. holding home meds 2/2 NPO status.   -continue sliding scale q6h when NPO. Fingerstick monitoring Q6h.   -will assist with resuming home meds, titrating insulin as needed

## 2019-02-15 NOTE — CONSULT NOTE ADULT - SUBJECTIVE AND OBJECTIVE BOX
HPI:  85F with PMH HTN, DM, CVA, DVT, Polycythemia vera (on hydroxyurea), SBO x 2, PSH hysterectomy who presented with nausea, vomiting and diarrhea for 2 days. On arrival to the ED, patient was tachycardic and hypertensive was started on IVF. On exam she was softly distended with what she described as soreness on palpation with well healed lower abdominal incision. CT demonstrated SBO with transition point in mid abdomen.     Patient denies fever, chills, palpitations sob.       Allergies    No Known Allergies    Intolerances        MEDICATIONS  (STANDING):  acetaminophen  IVPB .. 1000 milliGRAM(s) IV Intermittent once  dextrose 5% + sodium chloride 0.45% with potassium chloride 20 mEq/L 1000 milliLiter(s) (100 mL/Hr) IV Continuous <Continuous>  enoxaparin Injectable 40 milliGRAM(s) SubCutaneous daily  insulin lispro (HumaLOG) corrective regimen sliding scale   SubCutaneous every 6 hours  levothyroxine Injectable 62.5 MICROGram(s) IV Push at bedtime  metoprolol tartrate Injectable 5 milliGRAM(s) IV Push every 6 hours    MEDICATIONS  (PRN):      PAST MEDICAL & SURGICAL HISTORY:  SBO (Small Bowel Obstruction)  Stroke  Deep Vein Thrombosis (DVT)  Adult Hypothyroidism  Hypercholesteremia  Diabetes  Benign Hypertension  FH: Total Abdominal Hysterectomy and Bilateral Salpingo-Oophorectomy      FAMILY HISTORY:  Family history of breast cancer (Child)  Family history of secondary lung cancer  Family history of diabetes mellitus (DM) (Child)      SOCIAL HISTORY: No EtOH, no tobacco    REVIEW OF SYSTEMS: per hPI         T(F): 98.5 (02-15-19 @ 16:58), Max: 99 (02-15-19 @ 04:43)  HR: 99 (02-15-19 @ 16:58)  BP: 109/65 (02-15-19 @ 16:58)  RR: 18 (02-15-19 @ 16:58)  SpO2: 95% (02-15-19 @ 16:58)  Wt(kg): --    GENERAL: NAD, elderly  HEAD:  Atraumatic, Normocephalic  EYES: EOMIconjunctiva and sclera clear  NECK: Supple  CHEST/LUNG: Clear to auscultation   HEART: Regular rate and rhythm  ABDOMEN: tenderness to palpation, NGT in place  EXTREMITIES:   No clubbing, cyanosis, or edema  NEUROLOGY: non-focal  SKIN: No rashes or lesions                          13.8   15.24 )-----------( 525      ( 15 Feb 2019 09:08 )             43.7       02-15    135  |  96  |  22  ----------------------------<  148<H>  4.0   |  23  |  0.82    Ca    9.0      15 Feb 2019 07:16  Phos  4.0     02-15  Mg     1.5     02-15    TPro  8.1  /  Alb  4.3  /  TBili  0.4  /  DBili  x   /  AST  21  /  ALT  19  /  AlkPhos  90  02-14      Magnesium, Serum: 1.5 mg/dL (02-15 @ 07:16)  Phosphorus Level, Serum: 4.0 mg/dL (02-15 @ 07:16)

## 2019-02-15 NOTE — CONSULT NOTE ADULT - SUBJECTIVE AND OBJECTIVE BOX
TRAUMA/ACUTE CARE SURGERY - HOSPITAL MEDICINE CO-MANAGEMENT INITIAL VISIT NOTE  Contact Number:[id number]    BISHOP BURNS  [phone]    CHIEF COMPLAINT: Patient is a 85y old  Female who presents with a chief complaint of SBO (15 Feb 2019 10:35)      HPI: 86yo F with PMH HTN, DM, CVA, DVT, Polycythemia vera (on hydroxyurea), SBO x 2, PSH hysterectomy, presenting with daughter at bedside c/o abd pain/n/v x 2 days. Pt reports sharp epigastric pain radiating down entire L side of abd to suprapubic area began after breakfast yesterday with associated nausea and 1 episode of nb/nb vomiting. Pain improved with normal BM yesterday. Pt felt well until this AM when attempting to eat tea and toast had another episode of vomiting and unable to tolerate PO meds. Abd pain returned, 9/10 in severity, and feels similar to SBO in past. No BM today and denies flatulence. Takes 81mg ASA daily, no other blood thinners. Denies fever/chills, CP, palpitations, cough, SOB, diarrhea, melena, BRBPR, urinary symptoms, back pain.     In ED, patient was tachycardic and hypertensive was started on IVF. On exam she was softly distended with what she described as soreness on palpation with well healed lower abdominal incision. CT demonstrated SBO with transition point in mid abdomen. (14 Feb 2019 21:18)      History limited due to: [ ] Dementia  [ ] Delirium  [ ] Condition    Pain Symptoms if applicable:             	                         none	   mild         moderate         severe  	                            0	    1-3	     4-6	         7-10  Pain:  Location:	  Modifying factors:	  Associated symptoms:	    Allergies    No Known Allergies    Intolerances        enoxaparin Injectable 40 milliGRAM(s) SubCutaneous daily  insulin lispro (HumaLOG) corrective regimen sliding scale   SubCutaneous every 6 hours  lactated ringers. 1000 milliLiter(s) IV Continuous <Continuous>  levothyroxine Injectable 62.5 MICROGram(s) IV Push at bedtime  magnesium sulfate  IVPB 2 Gram(s) IV Intermittent once  metoprolol tartrate Injectable 5 milliGRAM(s) IV Push every 6 hours      MEDICATIONS  (STANDING):  enoxaparin Injectable 40 milliGRAM(s) SubCutaneous daily  insulin lispro (HumaLOG) corrective regimen sliding scale   SubCutaneous every 6 hours  lactated ringers. 1000 milliLiter(s) (100 mL/Hr) IV Continuous <Continuous>  levothyroxine Injectable 62.5 MICROGram(s) IV Push at bedtime  magnesium sulfate  IVPB 2 Gram(s) IV Intermittent once  metoprolol tartrate Injectable 5 milliGRAM(s) IV Push every 6 hours    MEDICATIONS  (PRN):      PAST MEDICAL & SURGICAL HISTORY:  SBO (Small Bowel Obstruction)  Stroke  Deep Vein Thrombosis (DVT)  Adult Hypothyroidism  Hypercholesteremia  Diabetes  Benign Hypertension  FH: Total Abdominal Hysterectomy and Bilateral Salpingo-Oophorectomy  [ ] Reviewed     Functional Assessment: [ ] Independent  [ ] Assistance  [ ] Total care  [ ] Non-ambulatory    SOCIAL HISTORY:  Residence: [ ] [facility]  [ ] SNF  [ ] Community  [ ] Substance abuse:   [ ] Tobacco:   [ ] Alcohol use:     FAMILY HISTORY:  Family history of breast cancer (Child)  Family history of secondary lung cancer  Family history of diabetes mellitus (DM) (Child)  [ ] No pertinent family history in first degree relatives     REVIEW OF SYSTEMS:    Review of Systems:  14 point ROS negative in detail except for the above: ***  Unable to evaluate ROS due to: cognitive deficits / altered mental status    CONSTITUTIONAL: No fever, weight loss, or fatigue  EYES: No eye pain, visual disturbances, or discharge  ENMT:  No difficulty hearing, tinnitus, vertigo; No sinus or throat pain  NECK: No pain or stiffness  BREASTS: No pain, masses, or nipple discharge  RESPIRATORY: No cough, wheezing, chills or hemoptysis; No shortness of breath  CARDIOVASCULAR: No chest pain, palpitations, dizziness, or leg swelling  GASTROINTESTINAL: No abdominal or epigastric pain. No nausea, vomiting, or hematemesis; No diarrhea or constipation. No melena or hematochezia.  GENITOURINARY: No dysuria, frequency, hematuria, or incontinence  NEUROLOGICAL: No headaches, memory loss, loss of strength, numbness, or tremors  SKIN: No itching, burning, rashes, or lesions   LYMPH NODES: No enlarged glands  ENDOCRINE: No heat or cold intolerance; No hair loss  MUSCULOSKELETAL: No muscle or back pain  PSYCHIATRIC: No depression, anxiety, mood swings, or difficulty sleeping  HEME/LYMPH: No easy bruising, or bleeding gums  ALLERGY AND IMMUNOLOGIC: No hives or eczema    [  ] All other ROS negative  [  ] Unable to obtain due to poor mental status    PHYSICAL EXAM:    T(C): 37.2 (02-15-19 @ 12:17), Max: 37.2 (02-15-19 @ 04:43)  HR: 108 (02-15-19 @ 12:17) (95 - 110)  BP: 156/81 (02-15-19 @ 12:17) (116/75 - 159/80)  RR: 18 (02-15-19 @ 12:17) (16 - 19)  SpO2: 93% (02-15-19 @ 12:17) (92% - 96%)    GENERAL: NAD, well-groomed, well-developed  HEAD:  Atraumatic, Normocephalic  EYES: EOMI, PERRLA, conjunctiva and sclera clear  ENMT: Moist mucous membranes  NECK: Supple, No JVD  RESPIRATORY: Clear to auscultation bilaterally; No rales, rhonchi, wheezing, or rubs  CARDIOVASCULAR: Regular rate and rhythm; No murmurs, rubs, or gallops  GASTROINTESTINAL: Soft, Nontender, Nondistended; Bowel sounds present  GENITOURINARY: Not examined  EXTREMITIES:  2+ Peripheral Pulses, No clubbing, cyanosis, or edema  NERVOUS SYSTEM:  Alert & Oriented X3; Moving all 4 extremities; No gross sensory deficits  HEME/LYMPH: No lymphadenopathy noted  SKIN: No rashes or lesions; Incisions C/D/I    LABS:                        13.8   15.24 )-----------( 525      ( 15 Feb 2019 09:08 )             43.7     Hemoglobin: 13.8 g/dL (02-15 @ 09:08)  Hemoglobin: 16.7 g/dL (02-14 @ 14:23)    02-15    135  |  96  |  22  ----------------------------<  148<H>  4.0   |  23  |  0.82    Ca    9.0      15 Feb 2019 07:16  Phos  4.0     02-15  Mg     1.5     02-15    TPro  8.1  /  Alb  4.3  /  TBili  0.4  /  DBili  x   /  AST  21  /  ALT  19  /  AlkPhos  90  02-14    PT/INR - ( 14 Feb 2019 20:19 )   PT: 11.6 sec;   INR: 1.01 ratio         PTT - ( 14 Feb 2019 20:19 )  PTT:31.0 sec  Urinalysis Basic - ( 15 Feb 2019 07:23 )    Color: Yellow / Appearance: Clear / SG: >1.050 / pH: x  Gluc: x / Ketone: Small  / Bili: Negative / Urobili: Negative   Blood: x / Protein: 30 mg/dL / Nitrite: Negative   Leuk Esterase: Negative / RBC: 2 /hpf / WBC 3 /HPF   Sq Epi: x / Non Sq Epi: 3 /hpf / Bacteria: Negative      CAPILLARY BLOOD GLUCOSE      POCT Blood Glucose.: 123 mg/dL (15 Feb 2019 12:07)    Microbiology     RADIOLOGY & ADDITIONAL STUDIES:    EKG:   Personally Reviewed:  [ ] YES     Imaging:   Personally Reviewed:  [ ] YES               [ ] Consultant(s) Notes Reviewed  [x] Care Discussed with Consultants/Other Providers: Trauma Team    [ ] Fall risks identified:    [ ] High risk medications identified:    [ ] Probable osteoporosis    [ ] Possible osteomalacia    [ ] Increased delirium risk    [ ] Delirium and other risks can be reduced by:          -early ambulation          -minimizing "tethers" - IV, oxygen, catheters, etc          -avoiding hypnotics and sedatives          -maintaining hydration/nutrition          -avoid anticholinergics - diphenhydramine, etc          -pain control          -supportive environment    Advanced Directives: [ ] DNR  [ ] No feeding tube  [ ] MOLST in chart  [ ] MOLST completed today  [ ] Unknown TRAUMA/ACUTE CARE SURGERY - HOSPITAL MEDICINE CO-MANAGEMENT INITIAL VISIT NOTE  Contact Number:[phone]    BISHOP BURNS  [phone]    CHIEF COMPLAINT: Patient is a 85y old  Female who presents with a chief complaint of SBO (15 Feb 2019 10:35)      HPI: 86yo F with PMH HTN, DM, CVA, DVT, Polycythemia vera (on hydroxyurea), SBO x 2, PSH hysterectomy, presenting with daughter at bedside c/o abd pain/n/v x 2 days. Pt reports sharp epigastric pain radiating down entire L side of abd to suprapubic area began after breakfast yesterday with associated nausea and 1 episode of nb/nb vomiting. Pain improved with normal BM yesterday. Pt felt well until this AM when attempting to eat tea and toast had another episode of vomiting and unable to tolerate PO meds. Abd pain returned, 9/10 in severity, and feels similar to SBO in past. No BM today and denies flatulence. Takes 81mg ASA daily, no other blood thinners. Denies fever/chills, CP, palpitations, cough, SOB, diarrhea, melena, BRBPR, urinary symptoms, back pain.     In ED, patient was tachycardic and hypertensive was started on IVF. On exam she was softly distended with what she described as soreness on palpation with well healed lower abdominal incision. CT demonstrated SBO with transition point in mid abdomen. (14 Feb 2019 21:18)      History limited due to: [ ] Dementia  [ ] Delirium  [ x] Condition    Pain Symptoms if applicable:             	                         none	   mild         moderate         severe  	                            0	    1-3	     4-6	         7-10  Pain: moderate  Location:	diffuse abdomen  Modifying factors: movement makes worse	  Associated symptoms  nausea, vomiting.     Allergies    No Known Allergies    Intolerances        enoxaparin Injectable 40 milliGRAM(s) SubCutaneous daily  insulin lispro (HumaLOG) corrective regimen sliding scale   SubCutaneous every 6 hours  lactated ringers. 1000 milliLiter(s) IV Continuous <Continuous>  levothyroxine Injectable 62.5 MICROGram(s) IV Push at bedtime  magnesium sulfate  IVPB 2 Gram(s) IV Intermittent once  metoprolol tartrate Injectable 5 milliGRAM(s) IV Push every 6 hours      MEDICATIONS  (STANDING):  enoxaparin Injectable 40 milliGRAM(s) SubCutaneous daily  insulin lispro (HumaLOG) corrective regimen sliding scale   SubCutaneous every 6 hours  lactated ringers. 1000 milliLiter(s) (100 mL/Hr) IV Continuous <Continuous>  levothyroxine Injectable 62.5 MICROGram(s) IV Push at bedtime  magnesium sulfate  IVPB 2 Gram(s) IV Intermittent once  metoprolol tartrate Injectable 5 milliGRAM(s) IV Push every 6 hours    MEDICATIONS  (PRN):      PAST MEDICAL & SURGICAL HISTORY:  SBO (Small Bowel Obstruction)  Stroke  Deep Vein Thrombosis (DVT)  Adult Hypothyroidism  Hypercholesteremia  Diabetes  Benign Hypertension  FH: Total Abdominal Hysterectomy and Bilateral Salpingo-Oophorectomy  [x ] Reviewed     Functional Assessment: [ ] Independent  [x ] Assistance  [ ] Total care  [ ] Non-ambulatory    SOCIAL HISTORY:  Residence: [ ] long term  [ ] SNF  [ x] Community  [ ] Substance abuse: denies  [ ] Tobacco: denies  [ ] Alcohol use: denies    FAMILY HISTORY:  Family history of breast cancer (Child)  Family history of secondary lung cancer  Family history of diabetes mellitus (DM) (Child)  [ x] No pertinent family history in first degree relatives     REVIEW OF SYSTEMS:    Review of Systems:  14 point ROS negative in detail except for the above: as per HPI  Unable to evaluate ROS due to: pt actively with significant pain        PHYSICAL EXAM:    T(C): 37.2 (02-15-19 @ 12:17), Max: 37.2 (02-15-19 @ 04:43)  HR: 108 (02-15-19 @ 12:17) (95 - 110)  BP: 156/81 (02-15-19 @ 12:17) (116/75 - 159/80)  RR: 18 (02-15-19 @ 12:17) (16 - 19)  SpO2: 93% (02-15-19 @ 12:17) (92% - 96%)    GENERAL: distressed 2/2 pain  HEAD:  Atraumatic, Normocephalic  EYES: EOMI, PERRLA, conjunctiva and sclera clear  ENMT: Moist mucous membranes  NECK: Supple, No JVD  RESPIRATORY: Clear to auscultation bilaterally; No rales, rhonchi, wheezing, or rubs  CARDIOVASCULAR: Regular rate and rhythm; No murmurs, rubs, or gallops  GASTROINTESTINAL: distended, diffusely tender, NG tube in place.   GENITOURINARY: Not examined  EXTREMITIES:  2+ Peripheral Pulses, No clubbing, cyanosis, or edema  NERVOUS SYSTEM:  Alert & Oriented X3; Moving all 4 extremities; No gross sensory deficits  HEME/LYMPH: No lymphadenopathy noted  SKIN: No rashes or lesions    LABS:                        13.8   15.24 )-----------( 525      ( 15 Feb 2019 09:08 )             43.7     Hemoglobin: 13.8 g/dL (02-15 @ 09:08)  Hemoglobin: 16.7 g/dL (02-14 @ 14:23)    02-15    135  |  96  |  22  ----------------------------<  148<H>  4.0   |  23  |  0.82    Ca    9.0      15 Feb 2019 07:16  Phos  4.0     02-15  Mg     1.5     02-15    TPro  8.1  /  Alb  4.3  /  TBili  0.4  /  DBili  x   /  AST  21  /  ALT  19  /  AlkPhos  90  02-14    PT/INR - ( 14 Feb 2019 20:19 )   PT: 11.6 sec;   INR: 1.01 ratio         PTT - ( 14 Feb 2019 20:19 )  PTT:31.0 sec  Urinalysis Basic - ( 15 Feb 2019 07:23 )    Color: Yellow / Appearance: Clear / SG: >1.050 / pH: x  Gluc: x / Ketone: Small  / Bili: Negative / Urobili: Negative   Blood: x / Protein: 30 mg/dL / Nitrite: Negative   Leuk Esterase: Negative / RBC: 2 /hpf / WBC 3 /HPF   Sq Epi: x / Non Sq Epi: 3 /hpf / Bacteria: Negative      CAPILLARY BLOOD GLUCOSE      POCT Blood Glucose.: 123 mg/dL (15 Feb 2019 12:07)    Microbiology     RADIOLOGY & ADDITIONAL STUDIES:    EKG:   Personally Reviewed:  [x ] YES     Imaging:   Personally Reviewed:  [x ] YES               [ ] Consultant(s) Notes Reviewed  [x] Care Discussed with Consultants/Other Providers: Trauma Team    [x ] Increased delirium risk    [x ] Delirium and other risks can be reduced by:          -early ambulation          -minimizing "tethers" - IV, oxygen, catheters, etc          -avoiding hypnotics and sedatives          -maintaining hydration/nutrition          -avoid anticholinergics - diphenhydramine, etc          -pain control          -supportive environment    Advanced Directives: [ ] DNR  [ ] No feeding tube  [ ] MOLST in chart  [ ] MOLST completed today  [x ] Unknown

## 2019-02-15 NOTE — PROGRESS NOTE ADULT - ASSESSMENT
85F w/ PMHx as noted now with n/v x 2 days with recurrent SBO, as per patient this is her third obstruction in a couple years others have resolved nonoperatively . Patient currently no longer nauseousness or vomiting, will admit to surgery for furtehr management    PLAN:  - cont NPO/ IVF  - cont NGT for decompression  - f/u Hematology c/s (pt follows with Dr. Hurley, on hydroxyurea and aspirin for Polycythemia vera , NPO currently, should she be on a non PO equivalent of medications)  - lovenox for DVT ppx  - f/u AM labs 85F with recurrent SBO    PLAN:  - cont NPO/ IVF  - cont NGT for decompression  - f/u Hematology c/s (pt follows with Dr. Hurley, on hydroxyurea and aspirin for Polycythemia vera , NPO currently, should she be on a non PO equivalent of medications)  - lovenox for DVT ppx  - f/u AM labs

## 2019-02-16 LAB
ANION GAP SERPL CALC-SCNC: 27 MMOL/L — HIGH (ref 5–17)
BUN SERPL-MCNC: 30 MG/DL — HIGH (ref 7–23)
CALCIUM SERPL-MCNC: 8.7 MG/DL — SIGNIFICANT CHANGE UP (ref 8.4–10.5)
CHLORIDE SERPL-SCNC: 96 MMOL/L — SIGNIFICANT CHANGE UP (ref 96–108)
CO2 SERPL-SCNC: 11 MMOL/L — LOW (ref 22–31)
CREAT SERPL-MCNC: 0.97 MG/DL — SIGNIFICANT CHANGE UP (ref 0.5–1.3)
GLUCOSE BLDC GLUCOMTR-MCNC: 217 MG/DL — HIGH (ref 70–99)
GLUCOSE BLDC GLUCOMTR-MCNC: 249 MG/DL — HIGH (ref 70–99)
GLUCOSE BLDC GLUCOMTR-MCNC: 269 MG/DL — HIGH (ref 70–99)
GLUCOSE BLDC GLUCOMTR-MCNC: 288 MG/DL — HIGH (ref 70–99)
GLUCOSE SERPL-MCNC: 215 MG/DL — HIGH (ref 70–99)
HCT VFR BLD CALC: 40.5 % — SIGNIFICANT CHANGE UP (ref 34.5–45)
HGB BLD-MCNC: 12.3 G/DL — SIGNIFICANT CHANGE UP (ref 11.5–15.5)
MAGNESIUM SERPL-MCNC: 2.4 MG/DL — SIGNIFICANT CHANGE UP (ref 1.6–2.6)
MCHC RBC-ENTMCNC: 30.4 GM/DL — LOW (ref 32–36)
MCHC RBC-ENTMCNC: 31.9 PG — SIGNIFICANT CHANGE UP (ref 27–34)
MCV RBC AUTO: 104.9 FL — HIGH (ref 80–100)
PHOSPHATE SERPL-MCNC: 4.3 MG/DL — SIGNIFICANT CHANGE UP (ref 2.5–4.5)
PLATELET # BLD AUTO: 471 K/UL — HIGH (ref 150–400)
POTASSIUM SERPL-MCNC: 4.7 MMOL/L — SIGNIFICANT CHANGE UP (ref 3.5–5.3)
POTASSIUM SERPL-SCNC: 4.7 MMOL/L — SIGNIFICANT CHANGE UP (ref 3.5–5.3)
RBC # BLD: 3.86 M/UL — SIGNIFICANT CHANGE UP (ref 3.8–5.2)
RBC # FLD: 14.9 % — HIGH (ref 10.3–14.5)
SODIUM SERPL-SCNC: 134 MMOL/L — LOW (ref 135–145)
WBC # BLD: 9.1 K/UL — SIGNIFICANT CHANGE UP (ref 3.8–10.5)
WBC # FLD AUTO: 9.1 K/UL — SIGNIFICANT CHANGE UP (ref 3.8–10.5)

## 2019-02-16 PROCEDURE — 99232 SBSQ HOSP IP/OBS MODERATE 35: CPT

## 2019-02-16 PROCEDURE — 74018 RADEX ABDOMEN 1 VIEW: CPT | Mod: 26

## 2019-02-16 PROCEDURE — 71045 X-RAY EXAM CHEST 1 VIEW: CPT | Mod: 26

## 2019-02-16 RX ORDER — ACETAMINOPHEN 500 MG
1000 TABLET ORAL ONCE
Qty: 0 | Refills: 0 | Status: COMPLETED | OUTPATIENT
Start: 2019-02-16 | End: 2019-02-16

## 2019-02-16 RX ORDER — ACETAMINOPHEN 500 MG
1000 TABLET ORAL ONCE
Qty: 0 | Refills: 0 | Status: COMPLETED | OUTPATIENT
Start: 2019-02-16 | End: 2019-02-17

## 2019-02-16 RX ADMIN — Medication 4: at 05:59

## 2019-02-16 RX ADMIN — DEXTROSE MONOHYDRATE, SODIUM CHLORIDE, AND POTASSIUM CHLORIDE 100 MILLILITER(S): 50; .745; 4.5 INJECTION, SOLUTION INTRAVENOUS at 05:52

## 2019-02-16 RX ADMIN — DEXTROSE MONOHYDRATE, SODIUM CHLORIDE, AND POTASSIUM CHLORIDE 100 MILLILITER(S): 50; .745; 4.5 INJECTION, SOLUTION INTRAVENOUS at 21:15

## 2019-02-16 RX ADMIN — Medication 1000 MILLIGRAM(S): at 10:00

## 2019-02-16 RX ADMIN — Medication 6: at 19:15

## 2019-02-16 RX ADMIN — ENOXAPARIN SODIUM 40 MILLIGRAM(S): 100 INJECTION SUBCUTANEOUS at 13:16

## 2019-02-16 RX ADMIN — Medication 400 MILLIGRAM(S): at 09:43

## 2019-02-16 RX ADMIN — Medication 4: at 13:16

## 2019-02-16 RX ADMIN — Medication 5 MILLIGRAM(S): at 00:16

## 2019-02-16 RX ADMIN — Medication 5 MILLIGRAM(S): at 13:15

## 2019-02-16 RX ADMIN — Medication 5 MILLIGRAM(S): at 05:51

## 2019-02-16 RX ADMIN — Medication 62.5 MICROGRAM(S): at 00:16

## 2019-02-16 RX ADMIN — Medication 5 MILLIGRAM(S): at 18:40

## 2019-02-16 RX ADMIN — Medication 62.5 MICROGRAM(S): at 21:15

## 2019-02-16 RX ADMIN — Medication 6: at 00:19

## 2019-02-16 NOTE — PROGRESS NOTE ADULT - SUBJECTIVE AND OBJECTIVE BOX
[facility] ATP SURGERY DAILY PROGRESS NOTE    SUBJECTIVE:  -  NGT may have slipped overnight  -  no flatus or bm  -  underwent phlebotomy 500 cc yesterday    OBJECTIVE:    Vital Signs Last 24 Hrs  T(C): 37 (16 Feb 2019 14:34), Max: 37.6 (16 Feb 2019 09:30)  T(F): 98.6 (16 Feb 2019 14:34), Max: 99.7 (16 Feb 2019 09:30)  HR: 91 (16 Feb 2019 14:34) (91 - 107)  BP: 109/67 (16 Feb 2019 14:34) (109/65 - 137/74)  BP(mean): --  RR: 18 (16 Feb 2019 14:34) (18 - 18)  SpO2: 94% (16 Feb 2019 14:34) (93% - 96%)    I&O's Detail    15 Feb 2019 07:01  -  16 Feb 2019 07:00  --------------------------------------------------------  IN:    dextrose 5% + sodium chloride 0.45% with potassium chloride 20 mEq/L: 2400 mL    Solution: 250 mL  Total IN: 2650 mL    OUT:    Nasoenteral Tube: 200 mL    Voided: 650 mL  Total OUT: 850 mL    Total NET: 1800 mL      16 Feb 2019 07:01  -  16 Feb 2019 16:28  --------------------------------------------------------  IN:  Total IN: 0 mL    OUT:    Voided: 140 mL  Total OUT: 140 mL    Total NET: -140 mL        LABS:                        12.3   9.10  )-----------( 471      ( 16 Feb 2019 09:18 )             40.5     02-16    134<L>  |  96  |  30<H>  ----------------------------<  215<H>  4.7   |  11<L>  |  0.97    Ca    8.7      16 Feb 2019 07:45  Phos  4.3     02-16  Mg     2.4     02-16      EXAM:  -- CONSTITUTIONAL: Alert, NAD, NGT in place to sxn  -- PULMONARY: non-labored respirations  -- ABDOMEN: soft, non-distended, min tender

## 2019-02-16 NOTE — PROGRESS NOTE ADULT - ASSESSMENT
85F with recurrent SBO, admitted for NGT decompression    PLAN:  - AXR to eval NGT positioning, progression of SBO  - cont NPO/ IVF  - cont NGT for decompression  - f/u Hematology c/s (pt follows with Dr. Hurley, on hydroxyurea and aspirin for Polycythemia vera , NPO currently, should she be on a non PO equivalent of medications)  - lovenox for DVT ppx  - f/u AM labs    ATP SURG[facility]  [id number]

## 2019-02-17 DIAGNOSIS — M81.0 AGE-RELATED OSTEOPOROSIS WITHOUT CURRENT PATHOLOGICAL FRACTURE: ICD-10-CM

## 2019-02-17 DIAGNOSIS — I10 ESSENTIAL (PRIMARY) HYPERTENSION: ICD-10-CM

## 2019-02-17 DIAGNOSIS — E03.9 HYPOTHYROIDISM, UNSPECIFIED: ICD-10-CM

## 2019-02-17 DIAGNOSIS — E78.5 HYPERLIPIDEMIA, UNSPECIFIED: ICD-10-CM

## 2019-02-17 DIAGNOSIS — E11.65 TYPE 2 DIABETES MELLITUS WITH HYPERGLYCEMIA: ICD-10-CM

## 2019-02-17 LAB
ANION GAP SERPL CALC-SCNC: 14 MMOL/L — SIGNIFICANT CHANGE UP (ref 5–17)
BLD GP AB SCN SERPL QL: NEGATIVE — SIGNIFICANT CHANGE UP
BUN SERPL-MCNC: 28 MG/DL — HIGH (ref 7–23)
CALCIUM SERPL-MCNC: 8.6 MG/DL — SIGNIFICANT CHANGE UP (ref 8.4–10.5)
CHLORIDE SERPL-SCNC: 100 MMOL/L — SIGNIFICANT CHANGE UP (ref 96–108)
CO2 SERPL-SCNC: 26 MMOL/L — SIGNIFICANT CHANGE UP (ref 22–31)
CREAT SERPL-MCNC: 0.92 MG/DL — SIGNIFICANT CHANGE UP (ref 0.5–1.3)
GLUCOSE BLDC GLUCOMTR-MCNC: 222 MG/DL — HIGH (ref 70–99)
GLUCOSE BLDC GLUCOMTR-MCNC: 233 MG/DL — HIGH (ref 70–99)
GLUCOSE BLDC GLUCOMTR-MCNC: 272 MG/DL — HIGH (ref 70–99)
GLUCOSE BLDC GLUCOMTR-MCNC: 276 MG/DL — HIGH (ref 70–99)
GLUCOSE BLDC GLUCOMTR-MCNC: 299 MG/DL — HIGH (ref 70–99)
GLUCOSE SERPL-MCNC: 283 MG/DL — HIGH (ref 70–99)
HCT VFR BLD CALC: 37.5 % — SIGNIFICANT CHANGE UP (ref 34.5–45)
HGB BLD-MCNC: 11.9 G/DL — SIGNIFICANT CHANGE UP (ref 11.5–15.5)
MAGNESIUM SERPL-MCNC: 2 MG/DL — SIGNIFICANT CHANGE UP (ref 1.6–2.6)
MCHC RBC-ENTMCNC: 31.1 PG — SIGNIFICANT CHANGE UP (ref 27–34)
MCHC RBC-ENTMCNC: 31.7 GM/DL — LOW (ref 32–36)
MCV RBC AUTO: 97.9 FL — SIGNIFICANT CHANGE UP (ref 80–100)
PHOSPHATE SERPL-MCNC: 1.8 MG/DL — LOW (ref 2.5–4.5)
PLATELET # BLD AUTO: 315 K/UL — SIGNIFICANT CHANGE UP (ref 150–400)
POTASSIUM SERPL-MCNC: 4.3 MMOL/L — SIGNIFICANT CHANGE UP (ref 3.5–5.3)
POTASSIUM SERPL-SCNC: 4.3 MMOL/L — SIGNIFICANT CHANGE UP (ref 3.5–5.3)
RBC # BLD: 3.83 M/UL — SIGNIFICANT CHANGE UP (ref 3.8–5.2)
RBC # FLD: 14.3 % — SIGNIFICANT CHANGE UP (ref 10.3–14.5)
RH IG SCN BLD-IMP: POSITIVE — SIGNIFICANT CHANGE UP
SODIUM SERPL-SCNC: 140 MMOL/L — SIGNIFICANT CHANGE UP (ref 135–145)
WBC # BLD: 8.55 K/UL — SIGNIFICANT CHANGE UP (ref 3.8–10.5)
WBC # FLD AUTO: 8.55 K/UL — SIGNIFICANT CHANGE UP (ref 3.8–10.5)

## 2019-02-17 PROCEDURE — 74018 RADEX ABDOMEN 1 VIEW: CPT | Mod: 26

## 2019-02-17 PROCEDURE — 99223 1ST HOSP IP/OBS HIGH 75: CPT | Mod: GC

## 2019-02-17 PROCEDURE — 99232 SBSQ HOSP IP/OBS MODERATE 35: CPT

## 2019-02-17 PROCEDURE — 71045 X-RAY EXAM CHEST 1 VIEW: CPT | Mod: 26

## 2019-02-17 PROCEDURE — 74176 CT ABD & PELVIS W/O CONTRAST: CPT | Mod: 26

## 2019-02-17 RX ORDER — LEVOTHYROXINE SODIUM 125 MCG
90 TABLET ORAL AT BEDTIME
Qty: 0 | Refills: 0 | Status: DISCONTINUED | OUTPATIENT
Start: 2019-02-17 | End: 2019-02-18

## 2019-02-17 RX ORDER — INSULIN GLARGINE 100 [IU]/ML
12 INJECTION, SOLUTION SUBCUTANEOUS AT BEDTIME
Qty: 0 | Refills: 0 | Status: DISCONTINUED | OUTPATIENT
Start: 2019-02-17 | End: 2019-02-19

## 2019-02-17 RX ORDER — POTASSIUM PHOSPHATE, MONOBASIC POTASSIUM PHOSPHATE, DIBASIC 236; 224 MG/ML; MG/ML
30 INJECTION, SOLUTION INTRAVENOUS ONCE
Qty: 0 | Refills: 0 | Status: COMPLETED | OUTPATIENT
Start: 2019-02-17 | End: 2019-02-17

## 2019-02-17 RX ORDER — HYDRALAZINE HCL 50 MG
5 TABLET ORAL ONCE
Qty: 0 | Refills: 0 | Status: COMPLETED | OUTPATIENT
Start: 2019-02-17 | End: 2019-02-17

## 2019-02-17 RX ADMIN — DEXTROSE MONOHYDRATE, SODIUM CHLORIDE, AND POTASSIUM CHLORIDE 100 MILLILITER(S): 50; .745; 4.5 INJECTION, SOLUTION INTRAVENOUS at 10:41

## 2019-02-17 RX ADMIN — POTASSIUM PHOSPHATE, MONOBASIC POTASSIUM PHOSPHATE, DIBASIC 83.33 MILLIMOLE(S): 236; 224 INJECTION, SOLUTION INTRAVENOUS at 11:28

## 2019-02-17 RX ADMIN — Medication 1000 MILLIGRAM(S): at 01:41

## 2019-02-17 RX ADMIN — Medication 5 MILLIGRAM(S): at 11:28

## 2019-02-17 RX ADMIN — Medication 5 MILLIGRAM(S): at 01:08

## 2019-02-17 RX ADMIN — Medication 6: at 01:10

## 2019-02-17 RX ADMIN — Medication 4: at 18:27

## 2019-02-17 RX ADMIN — Medication 6: at 05:45

## 2019-02-17 RX ADMIN — Medication 400 MILLIGRAM(S): at 01:11

## 2019-02-17 RX ADMIN — INSULIN GLARGINE 12 UNIT(S): 100 INJECTION, SOLUTION SUBCUTANEOUS at 23:15

## 2019-02-17 RX ADMIN — Medication 5 MILLIGRAM(S): at 17:37

## 2019-02-17 RX ADMIN — Medication 5 MILLIGRAM(S): at 05:45

## 2019-02-17 RX ADMIN — ENOXAPARIN SODIUM 40 MILLIGRAM(S): 100 INJECTION SUBCUTANEOUS at 11:28

## 2019-02-17 RX ADMIN — Medication 6: at 12:54

## 2019-02-17 RX ADMIN — Medication 5 MILLIGRAM(S): at 01:38

## 2019-02-17 NOTE — PROGRESS NOTE ADULT - SUBJECTIVE AND OBJECTIVE BOX
General Surgery Progress Note    SUBJECTIVE:  The patient was seen and examined. No acute events overnight. +flatus, +BMs. Denies n/v, cp, sob, abd pain.    OBJECTIVE:     ** VITAL SIGNS / I&O's **    Vital Signs Last 24 Hrs  T(C): 37.6 (17 Feb 2019 13:03), Max: 37.6 (16 Feb 2019 21:56)  T(F): 99.7 (17 Feb 2019 13:03), Max: 99.7 (17 Feb 2019 13:03)  HR: 105 (17 Feb 2019 13:03) (91 - 121)  BP: 175/84 (17 Feb 2019 13:03) (105/60 - 186/79)  BP(mean): --  RR: 18 (17 Feb 2019 13:03) (18 - 18)  SpO2: 95% (17 Feb 2019 13:03) (93% - 95%)      16 Feb 2019 07:01  -  17 Feb 2019 07:00  --------------------------------------------------------  IN:    dextrose 5% + sodium chloride 0.45% with potassium chloride 20 mEq/L: 1200 mL    Solution: 100 mL  Total IN: 1300 mL    OUT:    Nasoenteral Tube: 1450 mL    Voided: 815 mL  Total OUT: 2265 mL    Total NET: -965 mL      17 Feb 2019 07:01  -  17 Feb 2019 14:31  --------------------------------------------------------  IN:    Solution: 500 mL  Total IN: 500 mL    OUT:    Nasoenteral Tube: 500 mL  Total OUT: 500 mL    Total NET: 0 mL          ** PHYSICAL EXAM **    -- CONSTITUTIONAL: Alert, NAD  -- PULMONARY: non-labored respirations  -- ABDOMEN: soft, mild distension, tender, NGT to suction      ** LABS **                          11.9   8.55  )-----------( 315      ( 17 Feb 2019 09:12 )             37.5     17 Feb 2019 07:28    140    |  100    |  28     ----------------------------<  283    4.3     |  26     |  0.92     Ca    8.6        17 Feb 2019 07:28  Phos  1.8       17 Feb 2019 07:28  Mg     2.0       17 Feb 2019 07:28        CAPILLARY BLOOD GLUCOSE      POCT Blood Glucose.: 299 mg/dL (17 Feb 2019 12:42)  POCT Blood Glucose.: 272 mg/dL (17 Feb 2019 05:35)  POCT Blood Glucose.: 276 mg/dL (17 Feb 2019 01:09)  POCT Blood Glucose.: 288 mg/dL (16 Feb 2019 18:56)                MEDICATIONS  (STANDING):  dextrose 5% + sodium chloride 0.45% with potassium chloride 20 mEq/L 1000 milliLiter(s) (100 mL/Hr) IV Continuous <Continuous>  enoxaparin Injectable 40 milliGRAM(s) SubCutaneous daily  insulin glargine Injectable (LANTUS) 12 Unit(s) SubCutaneous at bedtime  insulin lispro (HumaLOG) corrective regimen sliding scale   SubCutaneous every 6 hours  levothyroxine Injectable 62.5 MICROGram(s) IV Push at bedtime  metoprolol tartrate Injectable 5 milliGRAM(s) IV Push every 6 hours    MEDICATIONS  (PRN):

## 2019-02-17 NOTE — CONSULT NOTE ADULT - PROBLEM SELECTOR RECOMMENDATION 4
-holding lisinopril with NPO status  -cont IV metoprolol with holding parameters   -holding asa, lipitor
- resume Lipitor 40 mg daily when able to take PO

## 2019-02-17 NOTE — CONSULT NOTE ADULT - PROBLEM SELECTOR RECOMMENDATION 9
likely 2/2 adhesions. now NPO with IVF, NG to suction.   -continue management per surgery  -pain control- to try IV tylenol today   -serial abdominal exams
- patient with hyperglycemia in setting of SBO, suspect component of stress hyperglycemia   - while inpatient and NPO, start Lantus 12 units qhs with moderate correction scale q6  - when diet is advanced, start consistent carb diet  - will follow  - for discharge: d/c on Metformin 500 mg BID with follow up with Dr. Lyman as outpatient - [phone]

## 2019-02-17 NOTE — CONSULT NOTE ADULT - PROBLEM SELECTOR RECOMMENDATION 2
- home dose of LT4 is 125 mcg daily  - bioavailability of oral LT4 is ~75% or so. Increase LT4 to 90 mcg IV daily  - repeat TFTs as outpatient - home dose of LT4 is 125 mcg daily  - bioavailability of oral LT4 is ~75% or so. Increase LT4 to 90 mcg IV daily  - repeat TFTs for inpatient

## 2019-02-17 NOTE — CONSULT NOTE ADULT - PROBLEM SELECTOR RECOMMENDATION 3
stable, on hydroxyurea, follows at [facility].   -can resume home med when no longer NPO
- BP labile   - continue to monitor for now

## 2019-02-17 NOTE — CONSULT NOTE ADULT - PROBLEM SELECTOR PROBLEM 1
SBO (small bowel obstruction)
Controlled type 2 diabetes mellitus with hyperglycemia, unspecified whether long term insulin use

## 2019-02-17 NOTE — PROGRESS NOTE ADULT - ASSESSMENT
85F with recurrent SBO, admitted for NGT decompression    PLAN:  - f/u AXR to reeval abd distension  - cont NPO/ IVF/ NGT for decompression (monitor ngt output)  - f/u Hematology recs  - lovenox for DVT ppx  - f/u AM labs    [facility]  [id number]

## 2019-02-17 NOTE — CONSULT NOTE ADULT - PROBLEM SELECTOR RECOMMENDATION 5
- resume Fosamax as outpatient  - repeat BMD in June 2020 - resume Fosamax as outpatient  - repeat BMD in June 2020  -can repeat a vitamin D level for tomorrow

## 2019-02-17 NOTE — CONSULT NOTE ADULT - ATTENDING COMMENTS
Patient seen and agree with Dr. Pugh's note. Patient is 85F with PMH HTN, DM, CVA, DVT, Polycythemia vera (on hydroxyurea), SBO x 2, PSH hysterectomy who presented with nausea, vomiting and diarrhea for 2 days. On arrival to the ED, patient was tachycardic and hypertensive was started on IVF. On exam she was softly distended with what she described as soreness on palpation with well healed lower abdominal incision. CT demonstrated SBO with transition point in mid abdomen.     # PV  - On Hydrea at home  - Hct 50% on admission, most recent today of 43%  - S/p phlebotomy 500ml   - goal hct <42%    # Small Bowel Obstruction, etiology?  - NPO  - NGT on suction currently  - care by surgery
call placed to pts daughter to discuss care. awaiting call back.
Agree with Dr. Saxena assessment and plan above.  Repeat TFT's to ensure that she is chemically euthyroid. As far as the diabetes, agree with basal/bolus insulin. Likely has hyperglycemia from acute illness.  Diabetes discharge plan will be pending her insulin needs and resolution of acute illness.  Endocrine will continue to follow

## 2019-02-17 NOTE — CONSULT NOTE ADULT - ASSESSMENT
85F with PMH HTN, DM, CVA, DVT, Polycythemia vera (on hydroxyurea), SBO x 2, PSH hysterectomy who presented with nausea, vomiting and diarrhea for 2 days. On arrival to the ED, patient was tachycardic and hypertensive was started on IVF. On exam she was softly distended with what she described as soreness on palpation with well healed lower abdominal incision. CT demonstrated SBO with transition point in mid abdomen.     # PV  - On hydrea at home  - Hct 50 on admission, most recent today of 43  - S/p phlebotomy 500ml   - goal hct <42    # SBO  - NPO  - NGT on suction currently  - care by surgery     Daphne Pugh  Hematology Fellow  [phone]
84yo F with PMH HTN, DM, CVA, DVT, Polycythemia vera (on hydroxyurea), SBO x 2, PSH hysterectomy, presenting with recurrent SBO
86 y/o F with PMH HTN, HLD, DM2, osteoporosis, CVA, DVT, Polycythemia vera (on hydroxyurea), SBO x 2, here hyperglycemia in setting of small bowel obstruction.

## 2019-02-17 NOTE — CONSULT NOTE ADULT - SUBJECTIVE AND OBJECTIVE BOX
HPI:  84 y/o F with PMH HTN, HLD, DM2, osteoporosis, CVA, DVT, Polycythemia vera (on hydroxyurea), SBO x 2, here with small bowel instruction.     PAST MEDICAL & SURGICAL HISTORY:  SBO (Small Bowel Obstruction)  Stroke  Deep Vein Thrombosis (DVT)  Adult Hypothyroidism  Hypercholesteremia  Diabetes  Benign Hypertension  FH: Total Abdominal Hysterectomy and Bilateral Salpingo-Oophorectomy      FAMILY HISTORY:  Family history of breast cancer (Child)  Family history of secondary lung cancer  Family history of diabetes mellitus (DM) (Child)      Social History:  No cigarette use  No alcohol use  Lives with daughter    Outpatient Medications:  · 	atorvastatin 40 mg oral tablet: 1 tab(s) orally once a day (at bedtime)  · 	aspirin 81 mg oral tablet, chewable: 1 tab(s) orally once a day  · 	acetaminophen 325 mg oral tablet: 2 tab(s) orally every 6 hours, As needed, pain  · 	metFORMIN 500 mg oral tablet: 1 tab(s) orally 2 times a day  · 	lisinopril 40 mg oral tablet: 1 tab(s) orally once a day  · 	levothyroxine 125 mcg (0.125 mg) oral tablet: 1 tab(s) orally once a day  · 	Artificial Tears ophthalmic solution: 1 drop(s) to each affected eye 2 times a day  · 	alendronate 70 mg oral tablet: 1 tab(s) orally once a week ( wednesdays )  · 	hydroxyurea 500 mg oral capsule: 1 cap(s) orally once a day  · 	Vitamin D3 2000 intl units oral capsule: 1 cap(s) orally 2 times a day    MEDICATIONS  (STANDING):  dextrose 5% + sodium chloride 0.45% with potassium chloride 20 mEq/L 1000 milliLiter(s) (100 mL/Hr) IV Continuous <Continuous>  enoxaparin Injectable 40 milliGRAM(s) SubCutaneous daily  insulin lispro (HumaLOG) corrective regimen sliding scale   SubCutaneous every 6 hours  levothyroxine Injectable 62.5 MICROGram(s) IV Push at bedtime  metoprolol tartrate Injectable 5 milliGRAM(s) IV Push every 6 hours    MEDICATIONS  (PRN):      Allergies  No Known Allergies    Review of Systems:  Constitutional: No fever  Eyes: No blurry vision  Neuro: No tremors  HEENT: No pain  Cardiovascular: No chest pain, palpitations  Respiratory: No SOB, no cough  GI: + nausea, vomiting, abdominal pain  : No dysuria  Skin: no rash  Endocrine: + polyuria, polydipsia    ALL OTHER SYSTEMS REVIEWED AND NEGATIVE    PHYSICAL EXAM:  VITALS: T(C): 37.1 (02-17-19 @ 09:12)  T(F): 98.7 (02-17-19 @ 09:12), Max: 99.6 (02-16-19 @ 21:56)  HR: 105 (02-17-19 @ 09:12) (91 - 121)  BP: 155/88 (02-17-19 @ 09:12) (105/60 - 186/79)  RR:  (18 - 18)  SpO2:  (93% - 95%)  Wt(kg): --  GENERAL: NAD, well-developed  EYES: No proptosis, anicteric  HEENT:  Atraumatic, Normocephalic  THYROID: No goiter, no palpable nodules  RESPIRATORY: Clear to auscultation bilaterally; No rales, rhonchi, wheezing  CARDIOVASCULAR: Regular rate and rhythm; No murmurs; no peripheral edema  GI: Soft, nontender, non distended, normal bowel sounds  SKIN: Dry, intact, No ulcers or wounds on feet  MUSCULOSKELETAL: Full range of motion, normal strength  PSYCH: Alert and oriented x 3, reactive affect      POCT Blood Glucose.: 299 mg/dL (02-17-19 @ 12:42)  POCT Blood Glucose.: 272 mg/dL (02-17-19 @ 05:35)  POCT Blood Glucose.: 276 mg/dL (02-17-19 @ 01:09)  POCT Blood Glucose.: 288 mg/dL (02-16-19 @ 18:56)  POCT Blood Glucose.: 249 mg/dL (02-16-19 @ 13:02)  POCT Blood Glucose.: 217 mg/dL (02-16-19 @ 05:55)  POCT Blood Glucose.: 269 mg/dL (02-16-19 @ 00:16)  POCT Blood Glucose.: 123 mg/dL (02-15-19 @ 17:25)  POCT Blood Glucose.: 123 mg/dL (02-15-19 @ 12:07)  POCT Blood Glucose.: 146 mg/dL (02-15-19 @ 05:23)  POCT Blood Glucose.: 143 mg/dL (02-15-19 @ 00:12)                          11.9   8.55  )-----------( 315      ( 17 Feb 2019 09:12 )             37.5       02-17    140  |  100  |  28<H>  ----------------------------<  283<H>  4.3   |  26  |  0.92    EGFR if : 66  EGFR if non : 57<L>    Ca    8.6      02-17  Mg     2.0     02-17  Phos  1.8     02-17    TPro  8.1  /  Alb  4.3  /  TBili  0.4  /  DBili  x   /  AST  21  /  ALT  19  /  AlkPhos  90  02-14 HPI:  84 y/o F with PMH HTN, HLD, DM2, osteoporosis, CVA, DVT, Polycythemia vera (on hydroxyurea), SBO x 2, here with small bowel instruction. Consult called for evaluation of hyperglycemia. She saw Dr. Lyman in our office in June 2018. Last HbA1c was 6.7% in September 2018. She takes metformin 500 mg BID at home. BG is checked usually once daily, in the AM, with BG in the 120's. No symptoms of neuropathy but does have pain in the feet. Last saw optho 2 years ago, no retinopathy. No history of nephropathy. She does not drink soda or juice. Does have rice and drinks thickened liquids due to history of dysphagia. Endorses blurry vision. Had polyuria, polydipsia prior to coming to the hospital. Currently NPO with BG in the 200's. Has abdominal pain, nausea, vomiting. Has a NGT in place.      Also has a history of hypothyroidism, takes LT4 125 mcg daily. TSH 0.69 in November 2018. Adherent to therapy. No neck complaints. No chest pain, palpitations. +Abdominal pain, nausea, vomiting. No diarrhea or constipation. Had a BM earlier today and is passing gas. No heat or cold intolerance. Weight stable.    Also has history of osteoporosis. On Fosamax 70 mg once weekly. DEXA in June 2016 improved in femoral neck, worse in spine however BMD were performed in 2 different sites.     PAST MEDICAL & SURGICAL HISTORY:  SBO (Small Bowel Obstruction)  Stroke  Deep Vein Thrombosis (DVT)  Adult Hypothyroidism  Hypercholesteremia  Diabetes  Benign Hypertension  FH: Total Abdominal Hysterectomy and Bilateral Salpingo-Oophorectomy      FAMILY HISTORY:  Family history of breast cancer (Child)  Family history of secondary lung cancer  Family history of diabetes mellitus (DM) (mother, Child)  Thyroid disease in brother      Social History:  No cigarette use  No alcohol use  Lives with daughter    Outpatient Medications:  · 	atorvastatin 40 mg oral tablet: 1 tab(s) orally once a day (at bedtime)  · 	aspirin 81 mg oral tablet, chewable: 1 tab(s) orally once a day  · 	acetaminophen 325 mg oral tablet: 2 tab(s) orally every 6 hours, As needed, pain  · 	metFORMIN 500 mg oral tablet: 1 tab(s) orally 2 times a day  · 	lisinopril 40 mg oral tablet: 1 tab(s) orally once a day  · 	levothyroxine 125 mcg (0.125 mg) oral tablet: 1 tab(s) orally once a day  · 	Artificial Tears ophthalmic solution: 1 drop(s) to each affected eye 2 times a day  · 	alendronate 70 mg oral tablet: 1 tab(s) orally once a week ( wednesdays )  · 	hydroxyurea 500 mg oral capsule: 1 cap(s) orally once a day  · 	Vitamin D3 2000 intl units oral capsule: 1 cap(s) orally 2 times a day    MEDICATIONS  (STANDING):  dextrose 5% + sodium chloride 0.45% with potassium chloride 20 mEq/L 1000 milliLiter(s) (100 mL/Hr) IV Continuous <Continuous>  enoxaparin Injectable 40 milliGRAM(s) SubCutaneous daily  insulin lispro (HumaLOG) corrective regimen sliding scale   SubCutaneous every 6 hours  levothyroxine Injectable 62.5 MICROGram(s) IV Push at bedtime  metoprolol tartrate Injectable 5 milliGRAM(s) IV Push every 6 hours    MEDICATIONS  (PRN):      Allergies  No Known Allergies    Review of Systems:  Constitutional: No fever  Eyes: + blurry vision  Neuro: No tremors  HEENT: No pain  Cardiovascular: No chest pain, palpitations  Respiratory: No SOB, no cough  GI: + nausea, vomiting, abdominal pain  : No dysuria  Skin: no rash  Endocrine: + polyuria, polydipsia    ALL OTHER SYSTEMS REVIEWED AND NEGATIVE    PHYSICAL EXAM:  VITALS: T(C): 37.1 (02-17-19 @ 09:12)  T(F): 98.7 (02-17-19 @ 09:12), Max: 99.6 (02-16-19 @ 21:56)  HR: 105 (02-17-19 @ 09:12) (91 - 121)  BP: 155/88 (02-17-19 @ 09:12) (105/60 - 186/79)  RR:  (18 - 18)  SpO2:  (93% - 95%)  Wt(kg): --  GENERAL: NAD, well-developed  EYES: No proptosis, anicteric  HEENT:  Atraumatic, Normocephalic, NGT in place  THYROID: No goiter, no palpable nodules  RESPIRATORY: Clear to auscultation bilaterally; No rales, rhonchi, wheezing  CARDIOVASCULAR: Regular rate and rhythm; No murmurs; no peripheral edema  GI: Soft, nontender, non distended, normal bowel sounds  SKIN: Dry, intact, No ulcers or wounds on feet  MUSCULOSKELETAL: Full range of motion, normal strength  PSYCH: Alert and oriented x 3, reactive affect      POCT Blood Glucose.: 299 mg/dL (02-17-19 @ 12:42)  POCT Blood Glucose.: 272 mg/dL (02-17-19 @ 05:35)  POCT Blood Glucose.: 276 mg/dL (02-17-19 @ 01:09)  POCT Blood Glucose.: 288 mg/dL (02-16-19 @ 18:56)  POCT Blood Glucose.: 249 mg/dL (02-16-19 @ 13:02)  POCT Blood Glucose.: 217 mg/dL (02-16-19 @ 05:55)  POCT Blood Glucose.: 269 mg/dL (02-16-19 @ 00:16)  POCT Blood Glucose.: 123 mg/dL (02-15-19 @ 17:25)  POCT Blood Glucose.: 123 mg/dL (02-15-19 @ 12:07)  POCT Blood Glucose.: 146 mg/dL (02-15-19 @ 05:23)  POCT Blood Glucose.: 143 mg/dL (02-15-19 @ 00:12)                          11.9   8.55  )-----------( 315      ( 17 Feb 2019 09:12 )             37.5       02-17    140  |  100  |  28<H>  ----------------------------<  283<H>  4.3   |  26  |  0.92    EGFR if : 66  EGFR if non : 57<L>    Ca    8.6      02-17  Mg     2.0     02-17  Phos  1.8     02-17    TPro  8.1  /  Alb  4.3  /  TBili  0.4  /  DBili  x   /  AST  21  /  ALT  19  /  AlkPhos  90  02-14 HPI:  84 y/o F with PMH HTN, HLD, DM2, osteoporosis, CVA, DVT, Polycythemia vera (on hydroxyurea), SBO x 2, here with small bowel obstruction. Consult called for evaluation of hyperglycemia. She saw Dr. Lyman in our office in June 2018. Last HbA1c was 6.7% in September 2018. She takes metformin 500 mg BID at home. BG is checked usually once daily, in the AM, with BG in the 120's. No symptoms of neuropathy but does have pain in the feet. Last saw optho 2 years ago, no retinopathy. No history of nephropathy. She does not drink soda or juice. Does have rice and drinks thickened liquids due to history of dysphagia. Endorses blurry vision. Had polyuria, polydipsia prior to coming to the hospital. Currently NPO with BG in the 200's. Has abdominal pain, nausea, vomiting. Has a NGT in place.      Also has a history of hypothyroidism, takes LT4 125 mcg daily. TSH 0.69 in November 2018. Adherent to therapy. No neck complaints. No chest pain, palpitations. +Abdominal pain, nausea, vomiting. No diarrhea or constipation. Had a BM earlier today and is passing gas. No heat or cold intolerance. Weight stable.    Also has history of osteoporosis. On Fosamax 70 mg once weekly. DEXA in June 2016 improved in femoral neck, worse in spine however BMD were performed in 2 different sites.     PAST MEDICAL & SURGICAL HISTORY:  SBO (Small Bowel Obstruction)  Stroke  Deep Vein Thrombosis (DVT)  Adult Hypothyroidism  Hypercholesteremia  Diabetes  Benign Hypertension  FH: Total Abdominal Hysterectomy and Bilateral Salpingo-Oophorectomy      FAMILY HISTORY:  Family history of breast cancer (Child)  Family history of secondary lung cancer  Family history of diabetes mellitus (DM) (mother, Child)  Thyroid disease in brother      Social History:  No cigarette use  No alcohol use  Lives with daughter    Outpatient Medications:  · 	atorvastatin 40 mg oral tablet: 1 tab(s) orally once a day (at bedtime)  · 	aspirin 81 mg oral tablet, chewable: 1 tab(s) orally once a day  · 	acetaminophen 325 mg oral tablet: 2 tab(s) orally every 6 hours, As needed, pain  · 	metFORMIN 500 mg oral tablet: 1 tab(s) orally 2 times a day  · 	lisinopril 40 mg oral tablet: 1 tab(s) orally once a day  · 	levothyroxine 125 mcg (0.125 mg) oral tablet: 1 tab(s) orally once a day  · 	Artificial Tears ophthalmic solution: 1 drop(s) to each affected eye 2 times a day  · 	alendronate 70 mg oral tablet: 1 tab(s) orally once a week ( wednesdays )  · 	hydroxyurea 500 mg oral capsule: 1 cap(s) orally once a day  · 	Vitamin D3 2000 intl units oral capsule: 1 cap(s) orally 2 times a day    MEDICATIONS  (STANDING):  dextrose 5% + sodium chloride 0.45% with potassium chloride 20 mEq/L 1000 milliLiter(s) (100 mL/Hr) IV Continuous <Continuous>  enoxaparin Injectable 40 milliGRAM(s) SubCutaneous daily  insulin lispro (HumaLOG) corrective regimen sliding scale   SubCutaneous every 6 hours  levothyroxine Injectable 62.5 MICROGram(s) IV Push at bedtime  metoprolol tartrate Injectable 5 milliGRAM(s) IV Push every 6 hours    MEDICATIONS  (PRN):      Allergies  No Known Allergies    Review of Systems:  Constitutional: No fever  Eyes: + blurry vision  Neuro: No tremors  HEENT: No pain  Cardiovascular: No chest pain, palpitations  Respiratory: No SOB, no cough  GI: + nausea, vomiting, abdominal pain  : No dysuria  Skin: no rash  Endocrine: + polyuria, polydipsia    ALL OTHER SYSTEMS REVIEWED AND NEGATIVE    PHYSICAL EXAM:  VITALS: T(C): 37.1 (02-17-19 @ 09:12)  T(F): 98.7 (02-17-19 @ 09:12), Max: 99.6 (02-16-19 @ 21:56)  HR: 105 (02-17-19 @ 09:12) (91 - 121)  BP: 155/88 (02-17-19 @ 09:12) (105/60 - 186/79)  RR:  (18 - 18)  SpO2:  (93% - 95%)  Wt(kg): --  GENERAL: NAD, well-developed  EYES: No proptosis, anicteric  HEENT:  Atraumatic, Normocephalic, NGT in place  THYROID: No goiter, no palpable nodules  RESPIRATORY: Clear to auscultation bilaterally; No rales, rhonchi, wheezing  CARDIOVASCULAR: Regular rate and rhythm; No murmurs; no peripheral edema  GI: Soft, nontender, non distended, normal bowel sounds  SKIN: Dry, intact, No ulcers or wounds on feet  MUSCULOSKELETAL: Full range of motion, normal strength  PSYCH: Alert and oriented x 3, reactive affect      POCT Blood Glucose.: 299 mg/dL (02-17-19 @ 12:42)  POCT Blood Glucose.: 272 mg/dL (02-17-19 @ 05:35)  POCT Blood Glucose.: 276 mg/dL (02-17-19 @ 01:09)  POCT Blood Glucose.: 288 mg/dL (02-16-19 @ 18:56)  POCT Blood Glucose.: 249 mg/dL (02-16-19 @ 13:02)  POCT Blood Glucose.: 217 mg/dL (02-16-19 @ 05:55)  POCT Blood Glucose.: 269 mg/dL (02-16-19 @ 00:16)  POCT Blood Glucose.: 123 mg/dL (02-15-19 @ 17:25)  POCT Blood Glucose.: 123 mg/dL (02-15-19 @ 12:07)  POCT Blood Glucose.: 146 mg/dL (02-15-19 @ 05:23)  POCT Blood Glucose.: 143 mg/dL (02-15-19 @ 00:12)                          11.9   8.55  )-----------( 315      ( 17 Feb 2019 09:12 )             37.5       02-17    140  |  100  |  28<H>  ----------------------------<  283<H>  4.3   |  26  |  0.92    EGFR if : 66  EGFR if non : 57<L>    Ca    8.6      02-17  Mg     2.0     02-17  Phos  1.8     02-17    TPro  8.1  /  Alb  4.3  /  TBili  0.4  /  DBili  x   /  AST  21  /  ALT  19  /  AlkPhos  90  02-14 HPI:  86 y/o F with PMH HTN, HLD, DM2, osteoporosis, CVA, DVT, Polycythemia vera (on hydroxyurea), SBO x 2, here with small bowel obstruction. Consult called for evaluation of hyperglycemia. She saw Dr. Lyman in our office in June 2018. Last HbA1c was 6.7% in September 2018. She takes metformin 500 mg BID at home. BG is checked usually once daily, in the AM, with BG in the 120's. No symptoms of neuropathy but does have pain in the feet. Last saw optho 2 years ago, no retinopathy. No history of nephropathy. She does not drink soda or juice. Does have rice and drinks thickened liquids due to history of dysphagia. Endorses blurry vision. Had polyuria, polydipsia prior to coming to the hospital. Currently NPO with BG in the 200's. Has abdominal pain, nausea, vomiting. Has a NGT in place.      Also has a history of hypothyroidism, takes LT4 125 mcg daily. TSH 0.69 in November 2018. Adherent to therapy. No neck complaints. No chest pain, palpitations. +Abdominal pain, nausea, vomiting. No diarrhea or constipation. Had a BM earlier today and is passing gas. No heat or cold intolerance. Weight stable.    Also has history of osteoporosis. On Fosamax 70 mg once weekly. DEXA in June 2016 improved in femoral neck, worse in spine however BMD were performed in 2 different sites.     PAST MEDICAL & SURGICAL HISTORY:  SBO (Small Bowel Obstruction)  Stroke  Deep Vein Thrombosis (DVT)  Adult Hypothyroidism  Hypercholesteremia  Diabetes  Benign Hypertension  FH: Total Abdominal Hysterectomy and Bilateral Salpingo-Oophorectomy      FAMILY HISTORY:  Family history of breast cancer (Child)  Family history of secondary lung cancer  Family history of diabetes mellitus (DM) (mother, Child)  Thyroid disease in brother      Social History:  No cigarette use  No alcohol use  Lives with daughter    Outpatient Medications:  · 	atorvastatin 40 mg oral tablet: 1 tab(s) orally once a day (at bedtime)  · 	aspirin 81 mg oral tablet, chewable: 1 tab(s) orally once a day  · 	acetaminophen 325 mg oral tablet: 2 tab(s) orally every 6 hours, As needed, pain  · 	metFORMIN 500 mg oral tablet: 1 tab(s) orally 2 times a day  · 	lisinopril 40 mg oral tablet: 1 tab(s) orally once a day  · 	levothyroxine 125 mcg (0.125 mg) oral tablet: 1 tab(s) orally once a day  · 	Artificial Tears ophthalmic solution: 1 drop(s) to each affected eye 2 times a day  · 	alendronate 70 mg oral tablet: 1 tab(s) orally once a week ( wednesdays )  · 	hydroxyurea 500 mg oral capsule: 1 cap(s) orally once a day  · 	Vitamin D3 2000 intl units oral capsule: 1 cap(s) orally 2 times a day    MEDICATIONS  (STANDING):  dextrose 5% + sodium chloride 0.45% with potassium chloride 20 mEq/L 1000 milliLiter(s) (100 mL/Hr) IV Continuous <Continuous>  enoxaparin Injectable 40 milliGRAM(s) SubCutaneous daily  insulin lispro (HumaLOG) corrective regimen sliding scale   SubCutaneous every 6 hours  levothyroxine Injectable 62.5 MICROGram(s) IV Push at bedtime  metoprolol tartrate Injectable 5 milliGRAM(s) IV Push every 6 hours    MEDICATIONS  (PRN):  Allergies  No Known Allergies    Review of Systems:  Constitutional: No fever  Eyes: + blurry vision  Neuro: No tremors  HEENT: No pain  Cardiovascular: No chest pain, palpitations  Respiratory: No SOB, no cough  GI: + nausea, vomiting, abdominal pain  : No dysuria  Skin: no rash  Endocrine: + polyuria, polydipsia    ALL OTHER SYSTEMS REVIEWED AND NEGATIVE    PHYSICAL EXAM:  VITALS: T(C): 37.1 (02-17-19 @ 09:12)  T(F): 98.7 (02-17-19 @ 09:12), Max: 99.6 (02-16-19 @ 21:56)  HR: 105 (02-17-19 @ 09:12) (91 - 121)  BP: 155/88 (02-17-19 @ 09:12) (105/60 - 186/79)  RR:  (18 - 18)  SpO2:  (93% - 95%)  Wt(kg): --  GENERAL: NAD, well-developed  EYES: No proptosis, anicteric  HEENT:  Atraumatic, Normocephalic, NGT in place  THYROID: No goiter, no palpable nodules  RESPIRATORY: Clear to auscultation bilaterally; No rales, rhonchi, wheezing  CARDIOVASCULAR: Regular rate and rhythm; No murmurs; no peripheral edema  GI: Soft, nontender, non distended, normal bowel sounds  SKIN: Dry, intact, No ulcers or wounds on feet  MUSCULOSKELETAL: Full range of motion, normal strength  PSYCH: Alert and oriented x 3, reactive affect      POCT Blood Glucose.: 299 mg/dL (02-17-19 @ 12:42)  POCT Blood Glucose.: 272 mg/dL (02-17-19 @ 05:35)  POCT Blood Glucose.: 276 mg/dL (02-17-19 @ 01:09)  POCT Blood Glucose.: 288 mg/dL (02-16-19 @ 18:56)  POCT Blood Glucose.: 249 mg/dL (02-16-19 @ 13:02)  POCT Blood Glucose.: 217 mg/dL (02-16-19 @ 05:55)  POCT Blood Glucose.: 269 mg/dL (02-16-19 @ 00:16)  POCT Blood Glucose.: 123 mg/dL (02-15-19 @ 17:25)  POCT Blood Glucose.: 123 mg/dL (02-15-19 @ 12:07)  POCT Blood Glucose.: 146 mg/dL (02-15-19 @ 05:23)  POCT Blood Glucose.: 143 mg/dL (02-15-19 @ 00:12)                          11.9   8.55  )-----------( 315      ( 17 Feb 2019 09:12 )             37.5       02-17    140  |  100  |  28<H>  ----------------------------<  283<H>  4.3   |  26  |  0.92    EGFR if : 66  EGFR if non : 57<L>    Ca    8.6      02-17  Mg     2.0     02-17  Phos  1.8     02-17    TPro  8.1  /  Alb  4.3  /  TBili  0.4  /  DBili  x   /  AST  21  /  ALT  19  /  AlkPhos  90  02-14

## 2019-02-17 NOTE — CHART NOTE - NSCHARTNOTEFT_GEN_A_CORE
Pt removed NGT late this afternoon. Per nurse, pt had 1x emesis as well.  Went to see pt and replace NGT. NGT now placed to suction. Will obtain CXR to confirm placement.  Plan:  -f/u CXR

## 2019-02-17 NOTE — CONSULT NOTE ADULT - PROBLEM SELECTOR PROBLEM 2
Controlled type 2 diabetes mellitus with hyperglycemia, unspecified whether long term insulin use
Hypothyroidism (acquired)

## 2019-02-18 ENCOUNTER — TRANSCRIPTION ENCOUNTER (OUTPATIENT)
Age: 84
End: 2019-02-18

## 2019-02-18 DIAGNOSIS — Z29.9 ENCOUNTER FOR PROPHYLACTIC MEASURES, UNSPECIFIED: ICD-10-CM

## 2019-02-18 DIAGNOSIS — D45 POLYCYTHEMIA VERA: ICD-10-CM

## 2019-02-18 DIAGNOSIS — K56.609 UNSPECIFIED INTESTINAL OBSTRUCTION, UNSPECIFIED AS TO PARTIAL VERSUS COMPLETE OBSTRUCTION: ICD-10-CM

## 2019-02-18 LAB
24R-OH-CALCIDIOL SERPL-MCNC: 51.9 NG/ML — SIGNIFICANT CHANGE UP (ref 30–80)
ANION GAP SERPL CALC-SCNC: 14 MMOL/L — SIGNIFICANT CHANGE UP (ref 5–17)
APTT BLD: 27.2 SEC — LOW (ref 27.5–36.3)
BUN SERPL-MCNC: 32 MG/DL — HIGH (ref 7–23)
CALCIUM SERPL-MCNC: 8.5 MG/DL — SIGNIFICANT CHANGE UP (ref 8.4–10.5)
CHLORIDE SERPL-SCNC: 99 MMOL/L — SIGNIFICANT CHANGE UP (ref 96–108)
CO2 SERPL-SCNC: 32 MMOL/L — HIGH (ref 22–31)
CREAT SERPL-MCNC: 1.68 MG/DL — HIGH (ref 0.5–1.3)
GLUCOSE BLDC GLUCOMTR-MCNC: 148 MG/DL — HIGH (ref 70–99)
GLUCOSE BLDC GLUCOMTR-MCNC: 155 MG/DL — HIGH (ref 70–99)
GLUCOSE BLDC GLUCOMTR-MCNC: 160 MG/DL — HIGH (ref 70–99)
GLUCOSE BLDC GLUCOMTR-MCNC: 180 MG/DL — HIGH (ref 70–99)
GLUCOSE BLDC GLUCOMTR-MCNC: 230 MG/DL — HIGH (ref 70–99)
GLUCOSE SERPL-MCNC: 173 MG/DL — HIGH (ref 70–99)
HCT VFR BLD CALC: 36.8 % — SIGNIFICANT CHANGE UP (ref 34.5–45)
HGB BLD-MCNC: 11.8 G/DL — SIGNIFICANT CHANGE UP (ref 11.5–15.5)
INR BLD: 1.04 RATIO — SIGNIFICANT CHANGE UP (ref 0.88–1.16)
MAGNESIUM SERPL-MCNC: 1.9 MG/DL — SIGNIFICANT CHANGE UP (ref 1.6–2.6)
MCHC RBC-ENTMCNC: 31.7 PG — SIGNIFICANT CHANGE UP (ref 27–34)
MCHC RBC-ENTMCNC: 32.1 GM/DL — SIGNIFICANT CHANGE UP (ref 32–36)
MCV RBC AUTO: 98.9 FL — SIGNIFICANT CHANGE UP (ref 80–100)
PHOSPHATE SERPL-MCNC: 3.3 MG/DL — SIGNIFICANT CHANGE UP (ref 2.5–4.5)
PLATELET # BLD AUTO: 226 K/UL — SIGNIFICANT CHANGE UP (ref 150–400)
POTASSIUM SERPL-MCNC: 4.1 MMOL/L — SIGNIFICANT CHANGE UP (ref 3.5–5.3)
POTASSIUM SERPL-SCNC: 4.1 MMOL/L — SIGNIFICANT CHANGE UP (ref 3.5–5.3)
PROTHROM AB SERPL-ACNC: 11.8 SEC — SIGNIFICANT CHANGE UP (ref 10–13.1)
RBC # BLD: 3.72 M/UL — LOW (ref 3.8–5.2)
RBC # FLD: 13.8 % — SIGNIFICANT CHANGE UP (ref 10.3–14.5)
SODIUM SERPL-SCNC: 145 MMOL/L — SIGNIFICANT CHANGE UP (ref 135–145)
T4 FREE SERPL-MCNC: 1.9 NG/DL — HIGH (ref 0.9–1.8)
TSH SERPL-MCNC: 0.51 UIU/ML — SIGNIFICANT CHANGE UP (ref 0.27–4.2)
WBC # BLD: 10.63 K/UL — HIGH (ref 3.8–10.5)
WBC # FLD AUTO: 10.63 K/UL — HIGH (ref 3.8–10.5)

## 2019-02-18 PROCEDURE — 71045 X-RAY EXAM CHEST 1 VIEW: CPT | Mod: 26

## 2019-02-18 RX ORDER — SODIUM CHLORIDE 9 MG/ML
1000 INJECTION, SOLUTION INTRAVENOUS ONCE
Qty: 0 | Refills: 0 | Status: COMPLETED | OUTPATIENT
Start: 2019-02-18 | End: 2019-02-18

## 2019-02-18 RX ORDER — DEXTROSE MONOHYDRATE, SODIUM CHLORIDE, AND POTASSIUM CHLORIDE 50; .745; 4.5 G/1000ML; G/1000ML; G/1000ML
1000 INJECTION, SOLUTION INTRAVENOUS
Qty: 0 | Refills: 0 | Status: DISCONTINUED | OUTPATIENT
Start: 2019-02-18 | End: 2019-02-18

## 2019-02-18 RX ORDER — SODIUM CHLORIDE 9 MG/ML
1000 INJECTION, SOLUTION INTRAVENOUS
Qty: 0 | Refills: 0 | Status: DISCONTINUED | OUTPATIENT
Start: 2019-02-18 | End: 2019-02-20

## 2019-02-18 RX ORDER — ACETAMINOPHEN 500 MG
1000 TABLET ORAL ONCE
Qty: 0 | Refills: 0 | Status: COMPLETED | OUTPATIENT
Start: 2019-02-18 | End: 2019-02-18

## 2019-02-18 RX ORDER — SODIUM CHLORIDE 9 MG/ML
1000 INJECTION, SOLUTION INTRAVENOUS
Qty: 0 | Refills: 0 | Status: DISCONTINUED | OUTPATIENT
Start: 2019-02-18 | End: 2019-02-18

## 2019-02-18 RX ORDER — LEVOTHYROXINE SODIUM 125 MCG
84 TABLET ORAL AT BEDTIME
Qty: 0 | Refills: 0 | Status: DISCONTINUED | OUTPATIENT
Start: 2019-02-18 | End: 2019-02-22

## 2019-02-18 RX ORDER — PANTOPRAZOLE SODIUM 20 MG/1
40 TABLET, DELAYED RELEASE ORAL ONCE
Qty: 0 | Refills: 0 | Status: COMPLETED | OUTPATIENT
Start: 2019-02-18 | End: 2019-02-18

## 2019-02-18 RX ADMIN — SODIUM CHLORIDE 150 MILLILITER(S): 9 INJECTION, SOLUTION INTRAVENOUS at 03:42

## 2019-02-18 RX ADMIN — Medication 5 MILLIGRAM(S): at 00:49

## 2019-02-18 RX ADMIN — Medication 400 MILLIGRAM(S): at 02:21

## 2019-02-18 RX ADMIN — Medication 5 MILLIGRAM(S): at 06:30

## 2019-02-18 RX ADMIN — Medication 5 MILLIGRAM(S): at 18:20

## 2019-02-18 RX ADMIN — PANTOPRAZOLE SODIUM 40 MILLIGRAM(S): 20 TABLET, DELAYED RELEASE ORAL at 02:22

## 2019-02-18 RX ADMIN — Medication 84 MICROGRAM(S): at 21:49

## 2019-02-18 RX ADMIN — Medication 2: at 18:20

## 2019-02-18 RX ADMIN — Medication 5 MILLIGRAM(S): at 23:53

## 2019-02-18 RX ADMIN — Medication 5 MILLIGRAM(S): at 13:01

## 2019-02-18 RX ADMIN — Medication 1000 MILLIGRAM(S): at 02:51

## 2019-02-18 RX ADMIN — DEXTROSE MONOHYDRATE, SODIUM CHLORIDE, AND POTASSIUM CHLORIDE 100 MILLILITER(S): 50; .745; 4.5 INJECTION, SOLUTION INTRAVENOUS at 01:07

## 2019-02-18 RX ADMIN — INSULIN GLARGINE 12 UNIT(S): 100 INJECTION, SOLUTION SUBCUTANEOUS at 21:49

## 2019-02-18 RX ADMIN — Medication 90 MICROGRAM(S): at 00:28

## 2019-02-18 RX ADMIN — Medication 2: at 06:37

## 2019-02-18 RX ADMIN — SODIUM CHLORIDE 1000 MILLILITER(S): 9 INJECTION, SOLUTION INTRAVENOUS at 06:30

## 2019-02-18 RX ADMIN — Medication 4: at 01:07

## 2019-02-18 RX ADMIN — ENOXAPARIN SODIUM 40 MILLIGRAM(S): 100 INJECTION SUBCUTANEOUS at 13:02

## 2019-02-18 NOTE — PHYSICAL THERAPY INITIAL EVALUATION ADULT - PHYSICAL ASSIST/NONPHYSICAL ASSIST: SIT/STAND, REHAB EVAL
Patient has graduated from the Complex Case [facility]  program on 1/30/18. No hospitalization or ED admission post 30 days from discharge of 11/8/17. Episode closed. 1 person assist/verbal cues

## 2019-02-18 NOTE — PROGRESS NOTE ADULT - ASSESSMENT
85F with recurrent SBO, admitted for NGT decompression    PLAN:  - now 4 days inpt with NGT decompression without clinical resolution of SBO  - AXR in early AM to assess resolution  - added on for OR tomorrow in case SBO hasn't resolved on AXR tomorrow morning  - cont NPO/ IVF  - cont NGT for decompression  - f/u Hematology c/s (pt follows with Dr. Hurley, on hydroxyurea and aspirin for Polycythemia vera , NPO currently, should she be on a non PO equivalent of medications)  - lovenox for DVT ppx  - f/u AM labs    [facility]  [id number]

## 2019-02-18 NOTE — PHYSICAL THERAPY INITIAL EVALUATION ADULT - DISCHARGE DISPOSITION, PT EVAL
rehabilitation facility/subacute rehab; If pt returns home will require home PT, assist with all functional mobility and RW

## 2019-02-18 NOTE — PHYSICAL THERAPY INITIAL EVALUATION ADULT - GENERAL OBSERVATIONS, REHAB EVAL
pt received semi-supine in bed in NAD +IV, b/l venodynes, bed alarm NGT, prima-fit, b/l wrist restraints

## 2019-02-18 NOTE — PROGRESS NOTE ADULT - PROBLEM SELECTOR PLAN 1
- continue with Lantus 12 units qhs with moderate correction scale q6  - will adjust Lantus dose based on BG values later today  - will follow  - for discharge: Metformin 500 mg BID. Outpatient follow up with Dr. Lyman at [address] [place] [address] [phone]
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
## 2019-02-18 NOTE — PROGRESS NOTE ADULT - SUBJECTIVE AND OBJECTIVE BOX
Chief Complaint: f/u DM2, hypothyroidism    History:  Patient remains NPO with NGT in place. BG improved with Lantus 12 units qhs. Still has some abdominal pain, nausea, vomiting.     MEDICATIONS  (STANDING):  enoxaparin Injectable 40 milliGRAM(s) SubCutaneous daily  insulin glargine Injectable (LANTUS) 12 Unit(s) SubCutaneous at bedtime  insulin lispro (HumaLOG) corrective regimen sliding scale   SubCutaneous every 6 hours  lactated ringers. 1000 milliLiter(s) (150 mL/Hr) IV Continuous <Continuous>  levothyroxine Injectable 90 MICROGram(s) IV Push at bedtime  metoprolol tartrate Injectable 5 milliGRAM(s) IV Push every 6 hours    MEDICATIONS  (PRN):      Allergies  No Known Allergies    PHYSICAL EXAM:  VITALS: T(C): 37.1 (02-18-19 @ 09:40)  T(F): 98.7 (02-18-19 @ 09:40), Max: 100.3 (02-18-19 @ 00:47)  HR: 96 (02-18-19 @ 09:40) (91 - 115)  BP: 170/90 (02-18-19 @ 09:40) (139/67 - 177/91)  RR:  (18 - 18)  SpO2:  (91% - 96%)  Wt(kg): --  GENERAL: NAD  RESPIRATORY: Clear to auscultation bilaterally  CARDIOVASCULAR: RRR, no murmurs  GI: Soft, nontender, non distended    POCT Blood Glucose.: 180 mg/dL (02-18-19 @ 06:35)  POCT Blood Glucose.: 230 mg/dL (02-18-19 @ 01:05)  POCT Blood Glucose.: 222 mg/dL (02-17-19 @ 23:13)  POCT Blood Glucose.: 233 mg/dL (02-17-19 @ 18:23)  POCT Blood Glucose.: 299 mg/dL (02-17-19 @ 12:42)  POCT Blood Glucose.: 272 mg/dL (02-17-19 @ 05:35)  POCT Blood Glucose.: 276 mg/dL (02-17-19 @ 01:09)  POCT Blood Glucose.: 288 mg/dL (02-16-19 @ 18:56)  POCT Blood Glucose.: 249 mg/dL (02-16-19 @ 13:02)  POCT Blood Glucose.: 217 mg/dL (02-16-19 @ 05:55)  POCT Blood Glucose.: 269 mg/dL (02-16-19 @ 00:16)  POCT Blood Glucose.: 123 mg/dL (02-15-19 @ 17:25)      02-18    145  |  99  |  32<H>  ----------------------------<  173<H>  4.1   |  32<H>  |  1.68<H>    EGFR if : 32<L>  EGFR if non : 27<L>    Ca    8.5      02-18  Mg     1.9     02-18  Phos  3.3     02-18            Thyroid Function Tests:  02-18 @ 10:49 TSH 0.51 FreeT4 1.9 T3 -- Anti TPO -- Anti Thyroglobulin Ab -- TSI --

## 2019-02-18 NOTE — PHYSICAL THERAPY INITIAL EVALUATION ADULT - PRECAUTIONS/LIMITATIONS, REHAB EVAL
Pain improved with normal BM yesterday. Pt felt well until this AM when attempting to eat tea and toast had another episode of vomiting and unable to tolerate PO meds. Abd pain returned, 9/10 in severity, and feels similar to SBO in past. No BM today and denies flatulence. Takes 81mg ASA daily, no other blood thinners. CT demonstrated SBO with transition point in mid abdomen. Pain improved with normal BM yesterday. Pt felt well until this AM when attempting to eat tea and toast had another episode of vomiting and unable to tolerate PO meds. Abd pain returned, 9/10 in severity, and feels similar to SBO in past. No BM today and denies flatulence. Takes 81mg ASA daily, no other blood thinners. CT demonstrated SBO with transition point in mid abdomen./fall precautions

## 2019-02-18 NOTE — PROGRESS NOTE ADULT - SUBJECTIVE AND OBJECTIVE BOX
[facility] ATP SURGERY DAILY PROGRESS NOTE    SUBJECTIVE:  -  NGT came out 3x overnight  -  endorses flatus and bm    OBJECTIVE:    Vital Signs Last 24 Hrs  T(C): 36.9 (18 Feb 2019 14:40), Max: 37.9 (18 Feb 2019 00:47)  T(F): 98.5 (18 Feb 2019 14:40), Max: 100.3 (18 Feb 2019 00:47)  HR: 91 (18 Feb 2019 14:40) (91 - 115)  BP: 178/87 (18 Feb 2019 14:40) (139/67 - 178/87)  BP(mean): --  RR: 18 (18 Feb 2019 14:40) (18 - 18)  SpO2: 93% (18 Feb 2019 14:40) (91% - 96%)    I&O's Detail    17 Feb 2019 07:01  -  18 Feb 2019 07:00  --------------------------------------------------------  IN:    dextrose 5% + sodium chloride 0.45% with potassium chloride 20 mEq/L: 1000 mL    Lactated Ringers IV Bolus: 1000 mL    lactated ringers.: 600 mL    Solution: 500 mL    Solution: 100 mL  Total IN: 3200 mL    OUT:    Nasoenteral Tube: 2750 mL    Voided: 340 mL  Total OUT: 3090 mL    Total NET: 110 mL      18 Feb 2019 07:01  -  18 Feb 2019 16:07  --------------------------------------------------------  IN:  Total IN: 0 mL    OUT:    Voided: 1000 mL  Total OUT: 1000 mL    Total NET: -1000 mL        LABS:                        11.8   10.63 )-----------( 226      ( 18 Feb 2019 10:49 )             36.8     02-18    145  |  99  |  32<H>  ----------------------------<  173<H>  4.1   |  32<H>  |  1.68<H>    Ca    8.5      18 Feb 2019 07:17  Phos  3.3     02-18  Mg     1.9     02-18      EXAM:  -- CONSTITUTIONAL: Alert, NAD, NGT in place to sxn  -- PULMONARY: non-labored respirations  -- ABDOMEN: soft, decreased moderate distension, minimally tender

## 2019-02-18 NOTE — CHART NOTE - NSCHARTNOTEFT_GEN_A_CORE
Patient had a repeat CT abd to evaluate obstruction last night which showed "Redemonstration of small bowel obstruction with a transition point in the mid abdomen. Slightly increased distention of the proximal loops of small bowel. The NG tube terminates in the first portion of the duodenum."    Patient seen and examined at bedside.  She complains of diffuse mild abdominal pain.  She states she has not been passing flatus since yesterday evening.  Patient pulled out NG tube x2 and was re-inserted.  Patient also pulled out IV and a new IV was placed via ultrasound guided access.  She denies any nausea, or vomiting and is endorsing an appetite at this time.    Physical Exam:  - General: Resting comfortably in bed, NAD  - Respiratory: No increased WOB  - Abdomen: soft, minimally distended with generalized pain, no rebound tenderness or guarding    ASSESSMENT/PLAN:  - Keep NPO/NGT/IVF  - Possible OR today? (pt has active type & screen), coags ordered  - Continue to monitor abdominal exam/ bowel function      Amaris Tai PA-C  [phone]

## 2019-02-18 NOTE — PROGRESS NOTE ADULT - ASSESSMENT
84 y/o F with PMH HTN, HLD, DM2, osteoporosis, CVA, DVT, Polycythemia vera (on hydroxyurea), SBO x 2, here hyperglycemia in setting of small bowel obstruction.

## 2019-02-18 NOTE — PHYSICAL THERAPY INITIAL EVALUATION ADULT - ADDITIONAL COMMENTS
Pt is a poor historian; as per care coordination notes: Pt lives with her daughter in a pvt home.Daughteridentifies herself as caregiver. Pt was dependent in  ADL and ambulated with a walker and supv. prior to admission. 5 days 7 hrs HA service.

## 2019-02-18 NOTE — PHYSICAL THERAPY INITIAL EVALUATION ADULT - PERTINENT HX OF CURRENT PROBLEM, REHAB EVAL
84yo F c/o abd pain/n/v x 2 days. Pt reports sharp epigastric pain radiating down entire L side of abd to suprapubic area began after breakfast yesterday a/w nausea and 1 episode of nb/nb vomiting.

## 2019-02-19 LAB
ANION GAP SERPL CALC-SCNC: 13 MMOL/L — SIGNIFICANT CHANGE UP (ref 5–17)
ANION GAP SERPL CALC-SCNC: 15 MMOL/L — SIGNIFICANT CHANGE UP (ref 5–17)
BLD GP AB SCN SERPL QL: NEGATIVE — SIGNIFICANT CHANGE UP
BUN SERPL-MCNC: 29 MG/DL — HIGH (ref 7–23)
BUN SERPL-MCNC: 30 MG/DL — HIGH (ref 7–23)
CALCIUM SERPL-MCNC: 7.4 MG/DL — LOW (ref 8.4–10.5)
CALCIUM SERPL-MCNC: 8.8 MG/DL — SIGNIFICANT CHANGE UP (ref 8.4–10.5)
CHLORIDE SERPL-SCNC: 101 MMOL/L — SIGNIFICANT CHANGE UP (ref 96–108)
CHLORIDE SERPL-SCNC: 103 MMOL/L — SIGNIFICANT CHANGE UP (ref 96–108)
CO2 SERPL-SCNC: 22 MMOL/L — SIGNIFICANT CHANGE UP (ref 22–31)
CO2 SERPL-SCNC: 28 MMOL/L — SIGNIFICANT CHANGE UP (ref 22–31)
CREAT ?TM UR-MCNC: 24 MG/DL — SIGNIFICANT CHANGE UP
CREAT SERPL-MCNC: 1.36 MG/DL — HIGH (ref 0.5–1.3)
CREAT SERPL-MCNC: 1.49 MG/DL — HIGH (ref 0.5–1.3)
GLUCOSE BLDC GLUCOMTR-MCNC: 104 MG/DL — HIGH (ref 70–99)
GLUCOSE BLDC GLUCOMTR-MCNC: 124 MG/DL — HIGH (ref 70–99)
GLUCOSE BLDC GLUCOMTR-MCNC: 72 MG/DL — SIGNIFICANT CHANGE UP (ref 70–99)
GLUCOSE BLDC GLUCOMTR-MCNC: 81 MG/DL — SIGNIFICANT CHANGE UP (ref 70–99)
GLUCOSE BLDC GLUCOMTR-MCNC: 98 MG/DL — SIGNIFICANT CHANGE UP (ref 70–99)
GLUCOSE SERPL-MCNC: 129 MG/DL — HIGH (ref 70–99)
GLUCOSE SERPL-MCNC: 94 MG/DL — SIGNIFICANT CHANGE UP (ref 70–99)
HCT VFR BLD CALC: 37.4 % — SIGNIFICANT CHANGE UP (ref 34.5–45)
HCT VFR BLD CALC: 42.1 % — SIGNIFICANT CHANGE UP (ref 34.5–45)
HGB BLD-MCNC: 12.3 G/DL — SIGNIFICANT CHANGE UP (ref 11.5–15.5)
HGB BLD-MCNC: 12.8 G/DL — SIGNIFICANT CHANGE UP (ref 11.5–15.5)
MAGNESIUM SERPL-MCNC: 1.5 MG/DL — LOW (ref 1.6–2.6)
MAGNESIUM SERPL-MCNC: 1.6 MG/DL — SIGNIFICANT CHANGE UP (ref 1.6–2.6)
MCHC RBC-ENTMCNC: 30.4 GM/DL — LOW (ref 32–36)
MCHC RBC-ENTMCNC: 31 PG — SIGNIFICANT CHANGE UP (ref 27–34)
MCHC RBC-ENTMCNC: 32.5 PG — SIGNIFICANT CHANGE UP (ref 27–34)
MCHC RBC-ENTMCNC: 33 GM/DL — SIGNIFICANT CHANGE UP (ref 32–36)
MCV RBC AUTO: 101.9 FL — HIGH (ref 80–100)
MCV RBC AUTO: 98.4 FL — SIGNIFICANT CHANGE UP (ref 80–100)
PHOSPHATE SERPL-MCNC: 2.7 MG/DL — SIGNIFICANT CHANGE UP (ref 2.5–4.5)
PHOSPHATE SERPL-MCNC: 4.4 MG/DL — SIGNIFICANT CHANGE UP (ref 2.5–4.5)
PLATELET # BLD AUTO: 167 K/UL — SIGNIFICANT CHANGE UP (ref 150–400)
PLATELET # BLD AUTO: 216 K/UL — SIGNIFICANT CHANGE UP (ref 150–400)
POTASSIUM SERPL-MCNC: 4.2 MMOL/L — SIGNIFICANT CHANGE UP (ref 3.5–5.3)
POTASSIUM SERPL-MCNC: 4.4 MMOL/L — SIGNIFICANT CHANGE UP (ref 3.5–5.3)
POTASSIUM SERPL-SCNC: 4.2 MMOL/L — SIGNIFICANT CHANGE UP (ref 3.5–5.3)
POTASSIUM SERPL-SCNC: 4.4 MMOL/L — SIGNIFICANT CHANGE UP (ref 3.5–5.3)
RBC # BLD: 3.8 M/UL — SIGNIFICANT CHANGE UP (ref 3.8–5.2)
RBC # BLD: 4.13 M/UL — SIGNIFICANT CHANGE UP (ref 3.8–5.2)
RBC # FLD: 12.7 % — SIGNIFICANT CHANGE UP (ref 10.3–14.5)
RBC # FLD: 13.6 % — SIGNIFICANT CHANGE UP (ref 10.3–14.5)
RH IG SCN BLD-IMP: POSITIVE — SIGNIFICANT CHANGE UP
SODIUM SERPL-SCNC: 140 MMOL/L — SIGNIFICANT CHANGE UP (ref 135–145)
SODIUM SERPL-SCNC: 142 MMOL/L — SIGNIFICANT CHANGE UP (ref 135–145)
SODIUM UR-SCNC: 179 MMOL/L — SIGNIFICANT CHANGE UP
WBC # BLD: 15.6 K/UL — HIGH (ref 3.8–10.5)
WBC # BLD: 21.44 K/UL — HIGH (ref 3.8–10.5)
WBC # FLD AUTO: 15.6 K/UL — HIGH (ref 3.8–10.5)
WBC # FLD AUTO: 21.44 K/UL — HIGH (ref 3.8–10.5)

## 2019-02-19 PROCEDURE — 99233 SBSQ HOSP IP/OBS HIGH 50: CPT

## 2019-02-19 PROCEDURE — 44005 FREEING OF BOWEL ADHESION: CPT

## 2019-02-19 PROCEDURE — 74018 RADEX ABDOMEN 1 VIEW: CPT | Mod: 26

## 2019-02-19 RX ORDER — INSULIN LISPRO 100/ML
VIAL (ML) SUBCUTANEOUS EVERY 6 HOURS
Qty: 0 | Refills: 0 | Status: DISCONTINUED | OUTPATIENT
Start: 2019-02-19 | End: 2019-02-20

## 2019-02-19 RX ORDER — INSULIN GLARGINE 100 [IU]/ML
8 INJECTION, SOLUTION SUBCUTANEOUS AT BEDTIME
Qty: 0 | Refills: 0 | Status: DISCONTINUED | OUTPATIENT
Start: 2019-02-19 | End: 2019-02-19

## 2019-02-19 RX ORDER — ACETAMINOPHEN 500 MG
1000 TABLET ORAL ONCE
Qty: 0 | Refills: 0 | Status: COMPLETED | OUTPATIENT
Start: 2019-02-20 | End: 2019-02-20

## 2019-02-19 RX ORDER — HYDROMORPHONE HYDROCHLORIDE 2 MG/ML
0.25 INJECTION INTRAMUSCULAR; INTRAVENOUS; SUBCUTANEOUS
Qty: 0 | Refills: 0 | Status: DISCONTINUED | OUTPATIENT
Start: 2019-02-19 | End: 2019-02-20

## 2019-02-19 RX ORDER — MAGNESIUM SULFATE 500 MG/ML
2 VIAL (ML) INJECTION ONCE
Qty: 0 | Refills: 0 | Status: COMPLETED | OUTPATIENT
Start: 2019-02-19 | End: 2019-02-19

## 2019-02-19 RX ORDER — ENOXAPARIN SODIUM 100 MG/ML
30 INJECTION SUBCUTANEOUS EVERY 24 HOURS
Qty: 0 | Refills: 0 | Status: DISCONTINUED | OUTPATIENT
Start: 2019-02-20 | End: 2019-02-26

## 2019-02-19 RX ORDER — INSULIN GLARGINE 100 [IU]/ML
6 INJECTION, SOLUTION SUBCUTANEOUS AT BEDTIME
Qty: 0 | Refills: 0 | Status: DISCONTINUED | OUTPATIENT
Start: 2019-02-19 | End: 2019-02-20

## 2019-02-19 RX ADMIN — SODIUM CHLORIDE 125 MILLILITER(S): 9 INJECTION, SOLUTION INTRAVENOUS at 21:00

## 2019-02-19 RX ADMIN — Medication 50 GRAM(S): at 21:57

## 2019-02-19 RX ADMIN — ENOXAPARIN SODIUM 40 MILLIGRAM(S): 100 INJECTION SUBCUTANEOUS at 12:13

## 2019-02-19 RX ADMIN — Medication 5 MILLIGRAM(S): at 05:34

## 2019-02-19 RX ADMIN — SODIUM CHLORIDE 125 MILLILITER(S): 9 INJECTION, SOLUTION INTRAVENOUS at 12:14

## 2019-02-19 RX ADMIN — Medication 5 MILLIGRAM(S): at 12:14

## 2019-02-19 RX ADMIN — INSULIN GLARGINE 6 UNIT(S): 100 INJECTION, SOLUTION SUBCUTANEOUS at 21:47

## 2019-02-19 RX ADMIN — ENOXAPARIN SODIUM 30 MILLIGRAM(S): 100 INJECTION SUBCUTANEOUS at 23:43

## 2019-02-19 RX ADMIN — Medication 84 MICROGRAM(S): at 21:47

## 2019-02-19 NOTE — DIETITIAN INITIAL EVALUATION ADULT. - NS AS NUTRI INTERV MEALS SNACK
Medical team to advance diet as feasible to consistent CHO clears followed by consistent CHO low fiber- texture/consistency per team/SLP

## 2019-02-19 NOTE — PROGRESS NOTE ADULT - PROBLEM SELECTOR PLAN 2
- TSH is 0.51 with free T4 of 1.9  - Home dose of Synthroid is 125 mcg daily.   - given her age, would keep TSH in the mid-normal range. On discharge, would decrease Synthroid to 112 mcg daily. C/w IV 84 mcg daily.

## 2019-02-19 NOTE — PROGRESS NOTE ADULT - PROBLEM SELECTOR PLAN 1
- decrease Lantus to 6 units qhs  - change correction scale to low correction q6  - will follow  - for discharge: Metformin 500 mg BID. Outpatient follow up with Dr. Lyman at [address] [place] [address] - [phone]

## 2019-02-19 NOTE — DIETITIAN INITIAL EVALUATION ADULT. - ENERGY NEEDS
Ht:  60 Wt: 120 pounds BMI: 23.4 kg/m2 IBW:  100 pounds(+/-10%) %%  +2 bilateral ankle and foot edema. No pressure ulcers documented.

## 2019-02-19 NOTE — PROGRESS NOTE ADULT - ASSESSMENT
85F with recurrent SBO, SBO not resolving.    PLAN:  - OR tonight, PPC (now 5 days inpt with NGT decompression without clinical resolution of SBO)  - cont NPO/ IVF/ NGT till surgery  - cont to f/u Hematology c/s (pt follows with Dr. Hurley, on hydroxyurea and aspirin for Polycythemia vera , NPO currently, should she be on a non PO equivalent of medications)  - replete lytes as appropriate    ATP SURGERY  [id number]

## 2019-02-19 NOTE — PROGRESS NOTE ADULT - PROBLEM SELECTOR PLAN 1
Non resolving  - cont NG suction  - plan for OR today  - continue pain control and care as per surgery

## 2019-02-19 NOTE — BRIEF OPERATIVE NOTE - PROCEDURE
<<-----Click on this checkbox to enter Procedure Serosal tear repair  02/19/2019    Active  CCEN12  Exploratory laparotomy  02/19/2019    Active  CCEN12  Lysis of adhesions  02/19/2019    Active  CCEN12

## 2019-02-19 NOTE — PRE-ANESTHESIA EVALUATION ADULT - NSANTHOSAYNRD_GEN_A_CORE
No. YESICA screening performed.  STOP BANG Legend: 0-2 = LOW Risk; 3-4 = INTERMEDIATE Risk; 5-8 = HIGH Risk

## 2019-02-19 NOTE — PROGRESS NOTE ADULT - PROBLEM SELECTOR PLAN 3
Holding hydrea at this time  - H/O consulted, waiting for recs on whether to resume hydrea or not.  - s/p therapeutic phlebotomy last week  - goal HCT < 42

## 2019-02-19 NOTE — BRIEF OPERATIVE NOTE - OPERATION/FINDINGS
Midline laparotomy performed-loops of bowel densely adherent to the anterior abdominal wall. This was taken down sharply with metzenbaum scissors. Proximal small bowel noted to be very dilated, fluid filled, but viable. Transition point clearly located where loops of bowel were adherent to each other and the anterior abd wall. Extensive ATUL performed. 2 small serosal tears were created and repairs with interrupted 3-0 silk Lembert sutures. Small bowel was examined from Treitz to Treves, no further serosal injuries or enterotomies were noted. Omentum was draped down over small bowel. Fascia was closed with #1 looped PDS. Skin stapled

## 2019-02-19 NOTE — DIETITIAN INITIAL EVALUATION ADULT. - OTHER INFO
Pt seen for NPO status. Per chart, pt  recurrent SBO, admitted for NGT decompression.  NGT output 100ml x 24 hours. Pt currently NPO day #5. Per pt's daughter pt with stable weight,  pounds, consistent with dosing weight 120 pounds. Pt''s daughter educated on diet advancement, noted below.

## 2019-02-19 NOTE — PROGRESS NOTE ADULT - ASSESSMENT
86yo F with PMH HTN, DM, CVA, DVT, Polycythemia vera (on hydroxyurea), SBO x 2, PSH hysterectomy, presenting with recurrent SBO

## 2019-02-19 NOTE — DIETITIAN INITIAL EVALUATION ADULT. - ADHERENCE
Pt with history of Bell's Palsy (April 2018), S/P MBS recommended for dysphagia I honey consistency diet. Per pt's daughter pt consumes honey thick beverages, and mostly pureed foods but is able to tolerate soft bread. Pt also with history of DM managed with Metformin, pt's daughter monitors blood sugars daily. Pt noted to have HgbA1c (4/2018) 7.0%- pending HgbA1c this admission./good

## 2019-02-19 NOTE — PROGRESS NOTE ADULT - SUBJECTIVE AND OBJECTIVE BOX
General Surgery Progress Note    SUBJECTIVE:  The patient was seen and examined. No acute events overnight. -flatus, -BM. NGT to suction. Urine less concentrated.    OBJECTIVE:     ** VITAL SIGNS / I&O's **    Vital Signs Last 24 Hrs  T(C): 36.6 (19 Feb 2019 14:22), Max: 37.9 (18 Feb 2019 22:08)  T(F): 97.9 (19 Feb 2019 14:22), Max: 100.3 (18 Feb 2019 22:08)  HR: 94 (19 Feb 2019 14:22) (85 - 103)  BP: 133/71 (19 Feb 2019 14:22) (133/71 - 172/81)  BP(mean): --  RR: 17 (19 Feb 2019 14:22) (17 - 18)  SpO2: 94% (19 Feb 2019 14:22) (94% - 98%)      18 Feb 2019 07:01  -  19 Feb 2019 07:00  --------------------------------------------------------  IN:    lactated ringers.: 1500 mL  Total IN: 1500 mL    OUT:    Nasoenteral Tube: 100 mL    Voided: 1350 mL  Total OUT: 1450 mL    Total NET: 50 mL      19 Feb 2019 07:01  -  19 Feb 2019 15:30  --------------------------------------------------------  IN:  Total IN: 0 mL    OUT:    Voided: 750 mL  Total OUT: 750 mL    Total NET: -750 mL          ** PHYSICAL EXAM **    -- CONSTITUTIONAL: Alert, NAD, NGT to suction  -- PULMONARY: non-labored respirations  -- ABDOMEN: soft, less distended, less tender  -- : urine less concentrated      ** LABS **                          12.8   21.44 )-----------( 216      ( 19 Feb 2019 11:55 )             42.1     19 Feb 2019 08:31    142    |  101    |  29     ----------------------------<  94     4.4     |  28     |  1.49     Ca    8.8        19 Feb 2019 08:31  Phos  2.7       19 Feb 2019 08:31  Mg     1.6       19 Feb 2019 08:31      PT/INR - ( 18 Feb 2019 10:53 )   PT: 11.8 sec;   INR: 1.04 ratio         PTT - ( 18 Feb 2019 10:53 )  PTT:27.2 sec  CAPILLARY BLOOD GLUCOSE      POCT Blood Glucose.: 81 mg/dL (19 Feb 2019 12:01)  POCT Blood Glucose.: 98 mg/dL (19 Feb 2019 05:40)  POCT Blood Glucose.: 148 mg/dL (18 Feb 2019 23:52)  POCT Blood Glucose.: 160 mg/dL (18 Feb 2019 17:50)                MEDICATIONS  (STANDING):  enoxaparin Injectable 40 milliGRAM(s) SubCutaneous daily  insulin glargine Injectable (LANTUS) 8 Unit(s) SubCutaneous at bedtime  insulin lispro (HumaLOG) corrective regimen sliding scale   SubCutaneous every 6 hours  lactated ringers. 1000 milliLiter(s) (125 mL/Hr) IV Continuous <Continuous>  levothyroxine Injectable 84 MICROGram(s) IV Push at bedtime  metoprolol tartrate Injectable 5 milliGRAM(s) IV Push every 6 hours    MEDICATIONS  (PRN):

## 2019-02-19 NOTE — DIETITIAN INITIAL EVALUATION ADULT. - ORAL INTAKE PTA
Per pt's daughter pt with very good appetite and PO intake PTA. Pt takes 2000IU vitamin D3 2x daily. NKFA./good

## 2019-02-19 NOTE — DIETITIAN INITIAL EVALUATION ADULT. - NS AS NUTRI INTERV ED CONTENT
Pt's daughter educated on diet advancement  from clears to low fiber as medically feasible. Brief low fiber education provided to pt's daughter including foods recommended, foods to avoid. RD remains available for additional nutrition education with diet advancement

## 2019-02-19 NOTE — PROGRESS NOTE ADULT - SUBJECTIVE AND OBJECTIVE BOX
Chief Complaint: f/u DM2    History:  BG less than 100 mg/dL today, has been NPO since admission. May go to OR this afternoon. Now in restraints as she tried to pull out her NGT tube yesterday.    MEDICATIONS  (STANDING):  enoxaparin Injectable 40 milliGRAM(s) SubCutaneous daily  insulin glargine Injectable (LANTUS) 8 Unit(s) SubCutaneous at bedtime  insulin lispro (HumaLOG) corrective regimen sliding scale   SubCutaneous every 6 hours  lactated ringers. 1000 milliLiter(s) (125 mL/Hr) IV Continuous <Continuous>  levothyroxine Injectable 84 MICROGram(s) IV Push at bedtime  metoprolol tartrate Injectable 5 milliGRAM(s) IV Push every 6 hours    MEDICATIONS  (PRN):      Allergies  No Known Allergies    PHYSICAL EXAM:  VITALS: T(C): 36.6 (02-19-19 @ 15:35)  T(F): 97.9 (02-19-19 @ 15:35), Max: 100.3 (02-18-19 @ 22:08)  HR: 94 (02-19-19 @ 15:35) (85 - 103)  BP: 133/71 (02-19-19 @ 15:35) (133/71 - 172/81)  RR:  (17 - 18)  SpO2:  (94% - 98%)  Wt(kg): --  GENERAL: NAD, well-developed  RESPIRATORY: Clear to auscultation bilaterally; No rales, rhonchi, wheezing, or rubs  CARDIOVASCULAR: Regular rate and rhythm; No murmurs  GI: Soft, nontender, non-distended    POCT Blood Glucose.: 81 mg/dL (02-19-19 @ 12:01)  POCT Blood Glucose.: 98 mg/dL (02-19-19 @ 05:40)  POCT Blood Glucose.: 148 mg/dL (02-18-19 @ 23:52)  POCT Blood Glucose.: 160 mg/dL (02-18-19 @ 17:50)  POCT Blood Glucose.: 155 mg/dL (02-18-19 @ 13:09)  POCT Blood Glucose.: 180 mg/dL (02-18-19 @ 06:35)  POCT Blood Glucose.: 230 mg/dL (02-18-19 @ 01:05)  POCT Blood Glucose.: 222 mg/dL (02-17-19 @ 23:13)  POCT Blood Glucose.: 233 mg/dL (02-17-19 @ 18:23)  POCT Blood Glucose.: 299 mg/dL (02-17-19 @ 12:42)  POCT Blood Glucose.: 272 mg/dL (02-17-19 @ 05:35)  POCT Blood Glucose.: 276 mg/dL (02-17-19 @ 01:09)  POCT Blood Glucose.: 288 mg/dL (02-16-19 @ 18:56)      02-19    142  |  101  |  29<H>  ----------------------------<  94  4.4   |  28  |  1.49<H>    EGFR if : 37<L>  EGFR if non : 32<L>    Ca    8.8      02-19  Mg     1.6     02-19  Phos  2.7     02-19            Thyroid Function Tests:  02-18 @ 10:49 TSH 0.51 FreeT4 1.9 T3 -- Anti TPO -- Anti Thyroglobulin Ab -- TSI --

## 2019-02-19 NOTE — PROGRESS NOTE ADULT - SUBJECTIVE AND OBJECTIVE BOX
Hospitalist- Genaro Bolden MD  Pager: [phone]  If no response or off-hours, page [phone]  -------------------------------------  Patient is a 85y old  Female who presents with a chief complaint of SBO (19 Feb 2019 16:01)  Subjective: No acute events overnight- still not return of bowel function. Pt reports pain is still present, somewhat controlled. Planned for OR today.     14 point ros otherwise negative.     VITAL SIGNS:  Vital Signs Last 24 Hrs  T(C): 36.6 (19 Feb 2019 16:41), Max: 37.9 (18 Feb 2019 22:08)  T(F): 97.9 (19 Feb 2019 15:35), Max: 100.3 (18 Feb 2019 22:08)  HR: 94 (19 Feb 2019 16:41) (85 - 103)  BP: 133/71 (19 Feb 2019 16:41) (133/71 - 172/81)  BP(mean): --  RR: 17 (19 Feb 2019 16:41) (17 - 18)  SpO2: 94% (19 Feb 2019 16:41) (94% - 98%)      PHYSICAL EXAM:     GENERAL: no acute distress  HEENT: PERRLA, EOMI, moist oropharynx, +NG in place  RESPIRATORY: CTAB, no w/c   CARDIOVASCULAR: RRR, no murmurs, gallops, rubs  ABDOMINAL: soft, mildly distended and minimally tender  EXTREMITIES: no clubbing, cyanosis, or edema  NEUROLOGICAL: alert and oriented x 3, non-focal  SKIN: no rashes or lesions   MUSCULOSKELETAL: no gross joint deformity                          12.8   21.44 )-----------( 216      ( 19 Feb 2019 11:55 )             42.1     02-19    142  |  101  |  29<H>  ----------------------------<  94  4.4   |  28  |  1.49<H>    Ca    8.8      19 Feb 2019 08:31  Phos  2.7     02-19  Mg     1.6     02-19        CAPILLARY BLOOD GLUCOSE      POCT Blood Glucose.: 72 mg/dL (19 Feb 2019 16:10)  POCT Blood Glucose.: 81 mg/dL (19 Feb 2019 12:01)  POCT Blood Glucose.: 98 mg/dL (19 Feb 2019 05:40)  POCT Blood Glucose.: 148 mg/dL (18 Feb 2019 23:52)      MEDICATIONS  (STANDING):  enoxaparin Injectable 40 milliGRAM(s) SubCutaneous daily  insulin glargine Injectable (LANTUS) 6 Unit(s) SubCutaneous at bedtime  insulin lispro (HumaLOG) corrective regimen sliding scale   SubCutaneous every 6 hours  lactated ringers. 1000 milliLiter(s) (125 mL/Hr) IV Continuous <Continuous>  levothyroxine Injectable 84 MICROGram(s) IV Push at bedtime  metoprolol tartrate Injectable 5 milliGRAM(s) IV Push every 6 hours    MEDICATIONS  (PRN):

## 2019-02-20 ENCOUNTER — APPOINTMENT (OUTPATIENT)
Dept: OPHTHALMOLOGY | Facility: CLINIC | Age: 84
End: 2019-02-20

## 2019-02-20 LAB
ANION GAP SERPL CALC-SCNC: 14 MMOL/L — SIGNIFICANT CHANGE UP (ref 5–17)
BASOPHILS # BLD AUTO: 0.08 K/UL — SIGNIFICANT CHANGE UP (ref 0–0.2)
BASOPHILS NFR BLD AUTO: 0.6 % — SIGNIFICANT CHANGE UP (ref 0–2)
BUN SERPL-MCNC: 34 MG/DL — HIGH (ref 7–23)
CALCIUM SERPL-MCNC: 7.8 MG/DL — LOW (ref 8.4–10.5)
CHLORIDE SERPL-SCNC: 101 MMOL/L — SIGNIFICANT CHANGE UP (ref 96–108)
CO2 SERPL-SCNC: 23 MMOL/L — SIGNIFICANT CHANGE UP (ref 22–31)
CREAT SERPL-MCNC: 1.6 MG/DL — HIGH (ref 0.5–1.3)
EOSINOPHIL # BLD AUTO: 0 K/UL — SIGNIFICANT CHANGE UP (ref 0–0.5)
EOSINOPHIL NFR BLD AUTO: 0 % — SIGNIFICANT CHANGE UP (ref 0–6)
GLUCOSE BLDC GLUCOMTR-MCNC: 110 MG/DL — HIGH (ref 70–99)
GLUCOSE BLDC GLUCOMTR-MCNC: 161 MG/DL — HIGH (ref 70–99)
GLUCOSE BLDC GLUCOMTR-MCNC: 190 MG/DL — HIGH (ref 70–99)
GLUCOSE BLDC GLUCOMTR-MCNC: 253 MG/DL — HIGH (ref 70–99)
GLUCOSE SERPL-MCNC: 116 MG/DL — HIGH (ref 70–99)
HCT VFR BLD CALC: 39.3 % — SIGNIFICANT CHANGE UP (ref 34.5–45)
HGB BLD-MCNC: 11.4 G/DL — LOW (ref 11.5–15.5)
IMM GRANULOCYTES NFR BLD AUTO: 1.8 % — HIGH (ref 0–1.5)
LYMPHOCYTES # BLD AUTO: 0.72 K/UL — LOW (ref 1–3.3)
LYMPHOCYTES # BLD AUTO: 5.3 % — LOW (ref 13–44)
MAGNESIUM SERPL-MCNC: 2.7 MG/DL — HIGH (ref 1.6–2.6)
MCHC RBC-ENTMCNC: 29 GM/DL — LOW (ref 32–36)
MCHC RBC-ENTMCNC: 30.8 PG — SIGNIFICANT CHANGE UP (ref 27–34)
MCV RBC AUTO: 106.2 FL — HIGH (ref 80–100)
MONOCYTES # BLD AUTO: 0.57 K/UL — SIGNIFICANT CHANGE UP (ref 0–0.9)
MONOCYTES NFR BLD AUTO: 4.2 % — SIGNIFICANT CHANGE UP (ref 2–14)
NEUTROPHILS # BLD AUTO: 11.96 K/UL — HIGH (ref 1.8–7.4)
NEUTROPHILS NFR BLD AUTO: 88.1 % — HIGH (ref 43–77)
PHOSPHATE SERPL-MCNC: 5.1 MG/DL — HIGH (ref 2.5–4.5)
PLATELET # BLD AUTO: 200 K/UL — SIGNIFICANT CHANGE UP (ref 150–400)
POTASSIUM SERPL-MCNC: 4.7 MMOL/L — SIGNIFICANT CHANGE UP (ref 3.5–5.3)
POTASSIUM SERPL-SCNC: 4.7 MMOL/L — SIGNIFICANT CHANGE UP (ref 3.5–5.3)
RBC # BLD: 3.7 M/UL — LOW (ref 3.8–5.2)
RBC # FLD: 13.7 % — SIGNIFICANT CHANGE UP (ref 10.3–14.5)
SODIUM SERPL-SCNC: 138 MMOL/L — SIGNIFICANT CHANGE UP (ref 135–145)
WBC # BLD: 13.58 K/UL — HIGH (ref 3.8–10.5)
WBC # FLD AUTO: 13.58 K/UL — HIGH (ref 3.8–10.5)

## 2019-02-20 PROCEDURE — 99233 SBSQ HOSP IP/OBS HIGH 50: CPT

## 2019-02-20 PROCEDURE — 99232 SBSQ HOSP IP/OBS MODERATE 35: CPT

## 2019-02-20 RX ORDER — SODIUM CHLORIDE 9 MG/ML
1000 INJECTION, SOLUTION INTRAVENOUS
Qty: 0 | Refills: 0 | Status: DISCONTINUED | OUTPATIENT
Start: 2019-02-20 | End: 2019-02-21

## 2019-02-20 RX ORDER — DEXTROSE 50 % IN WATER 50 %
25 SYRINGE (ML) INTRAVENOUS ONCE
Qty: 0 | Refills: 0 | Status: DISCONTINUED | OUTPATIENT
Start: 2019-02-20 | End: 2019-02-26

## 2019-02-20 RX ORDER — INSULIN LISPRO 100/ML
VIAL (ML) SUBCUTANEOUS
Qty: 0 | Refills: 0 | Status: DISCONTINUED | OUTPATIENT
Start: 2019-02-20 | End: 2019-02-20

## 2019-02-20 RX ORDER — ACETAMINOPHEN 500 MG
1000 TABLET ORAL ONCE
Qty: 0 | Refills: 0 | Status: COMPLETED | OUTPATIENT
Start: 2019-02-21 | End: 2019-02-21

## 2019-02-20 RX ORDER — SODIUM CHLORIDE 9 MG/ML
1000 INJECTION, SOLUTION INTRAVENOUS
Qty: 0 | Refills: 0 | Status: DISCONTINUED | OUTPATIENT
Start: 2019-02-20 | End: 2019-02-20

## 2019-02-20 RX ORDER — HYDROMORPHONE HYDROCHLORIDE 2 MG/ML
0.25 INJECTION INTRAMUSCULAR; INTRAVENOUS; SUBCUTANEOUS ONCE
Qty: 0 | Refills: 0 | Status: DISCONTINUED | OUTPATIENT
Start: 2019-02-20 | End: 2019-02-20

## 2019-02-20 RX ORDER — DEXTROSE 50 % IN WATER 50 %
12.5 SYRINGE (ML) INTRAVENOUS ONCE
Qty: 0 | Refills: 0 | Status: DISCONTINUED | OUTPATIENT
Start: 2019-02-20 | End: 2019-02-26

## 2019-02-20 RX ORDER — DEXTROSE 50 % IN WATER 50 %
15 SYRINGE (ML) INTRAVENOUS ONCE
Qty: 0 | Refills: 0 | Status: DISCONTINUED | OUTPATIENT
Start: 2019-02-20 | End: 2019-02-26

## 2019-02-20 RX ORDER — INSULIN GLARGINE 100 [IU]/ML
5 INJECTION, SOLUTION SUBCUTANEOUS AT BEDTIME
Qty: 0 | Refills: 0 | Status: DISCONTINUED | OUTPATIENT
Start: 2019-02-20 | End: 2019-02-22

## 2019-02-20 RX ORDER — INSULIN LISPRO 100/ML
VIAL (ML) SUBCUTANEOUS EVERY 6 HOURS
Qty: 0 | Refills: 0 | Status: DISCONTINUED | OUTPATIENT
Start: 2019-02-20 | End: 2019-02-21

## 2019-02-20 RX ORDER — ALBUMIN HUMAN 25 %
500 VIAL (ML) INTRAVENOUS ONCE
Qty: 0 | Refills: 0 | Status: COMPLETED | OUTPATIENT
Start: 2019-02-20 | End: 2019-02-20

## 2019-02-20 RX ORDER — GLUCAGON INJECTION, SOLUTION 0.5 MG/.1ML
1 INJECTION, SOLUTION SUBCUTANEOUS ONCE
Qty: 0 | Refills: 0 | Status: DISCONTINUED | OUTPATIENT
Start: 2019-02-20 | End: 2019-02-26

## 2019-02-20 RX ADMIN — Medication 400 MILLIGRAM(S): at 09:14

## 2019-02-20 RX ADMIN — Medication 400 MILLIGRAM(S): at 14:59

## 2019-02-20 RX ADMIN — Medication 1000 MILLIGRAM(S): at 21:45

## 2019-02-20 RX ADMIN — HYDROMORPHONE HYDROCHLORIDE 0.25 MILLIGRAM(S): 2 INJECTION INTRAMUSCULAR; INTRAVENOUS; SUBCUTANEOUS at 13:41

## 2019-02-20 RX ADMIN — SODIUM CHLORIDE 100 MILLILITER(S): 9 INJECTION, SOLUTION INTRAVENOUS at 11:46

## 2019-02-20 RX ADMIN — Medication 125 MILLILITER(S): at 09:13

## 2019-02-20 RX ADMIN — ENOXAPARIN SODIUM 30 MILLIGRAM(S): 100 INJECTION SUBCUTANEOUS at 22:50

## 2019-02-20 RX ADMIN — SODIUM CHLORIDE 125 MILLILITER(S): 9 INJECTION, SOLUTION INTRAVENOUS at 09:15

## 2019-02-20 RX ADMIN — Medication 400 MILLIGRAM(S): at 00:40

## 2019-02-20 RX ADMIN — Medication 5 MILLIGRAM(S): at 17:48

## 2019-02-20 RX ADMIN — Medication 1000 MILLIGRAM(S): at 09:30

## 2019-02-20 RX ADMIN — Medication 1000 MILLIGRAM(S): at 15:14

## 2019-02-20 RX ADMIN — Medication 5 MILLIGRAM(S): at 11:46

## 2019-02-20 RX ADMIN — Medication 400 MILLIGRAM(S): at 21:12

## 2019-02-20 RX ADMIN — INSULIN GLARGINE 5 UNIT(S): 100 INJECTION, SOLUTION SUBCUTANEOUS at 22:50

## 2019-02-20 RX ADMIN — Medication 84 MICROGRAM(S): at 22:25

## 2019-02-20 RX ADMIN — HYDROMORPHONE HYDROCHLORIDE 0.25 MILLIGRAM(S): 2 INJECTION INTRAMUSCULAR; INTRAVENOUS; SUBCUTANEOUS at 13:56

## 2019-02-20 NOTE — PROGRESS NOTE ADULT - SUBJECTIVE AND OBJECTIVE BOX
Hospitalist- Genaro Bolden MD  Pager: [phone]  If no response or off-hours, page [phone]  -------------------------------------  Patient is a 85y old  Female who presents with a chief complaint of SBO (20 Feb 2019 09:34)  INTERVAL HPI/OVERNIGHT EVENTS: Pt s/p OR with lysis of adhesions yesterday, no reported complications. This morning, still has NG tube. Reports some abdominal discomfort and dry mouth. NO fevers/chills, no other complaints.    14 point ros otherwise negative.     VITAL SIGNS:  T(F): 98 (02-20-19 @ 09:20)  HR: 88 (02-20-19 @ 11:23)  BP: 127/76 (02-20-19 @ 11:23)  RR: 16 (02-20-19 @ 09:20)  SpO2: 96% (02-20-19 @ 09:20)  Wt(kg): --    PHYSICAL EXAM:    Constitutional: no distress  Eyes: PERRLA, EOMI  ENMT: NCAT, +dry mm, +NG  Neck: SUPPLE  Respiratory: CTAB  Cardiovascular: RRR, NO MURMURS APPRECIATED  Gastrointestinal: SOFT, MILD DIFFUSE TENDERNESS,   Extremities: no CCE	  Neurological: ao x 2, nonfocal  Musculoskeletal: no deformities    MEDICATIONS  (STANDING):  acetaminophen  IVPB .. 1000 milliGRAM(s) IV Intermittent once  dextrose 5% + lactated ringers. 1000 milliLiter(s) (100 mL/Hr) IV Continuous <Continuous>  dextrose 5%. 1000 milliLiter(s) (50 mL/Hr) IV Continuous <Continuous>  dextrose 50% Injectable 12.5 Gram(s) IV Push once  dextrose 50% Injectable 25 Gram(s) IV Push once  dextrose 50% Injectable 25 Gram(s) IV Push once  enoxaparin Injectable 30 milliGRAM(s) SubCutaneous every 24 hours  insulin glargine Injectable (LANTUS) 5 Unit(s) SubCutaneous at bedtime  insulin lispro (HumaLOG) corrective regimen sliding scale   SubCutaneous three times a day before meals  levothyroxine Injectable 84 MICROGram(s) IV Push at bedtime  metoprolol tartrate Injectable 5 milliGRAM(s) IV Push every 6 hours    MEDICATIONS  (PRN):  dextrose 40% Gel 15 Gram(s) Oral once PRN Blood Glucose LESS THAN 70 milliGRAM(s)/deciliter  glucagon  Injectable 1 milliGRAM(s) IntraMuscular once PRN Glucose LESS THAN 70 milligrams/deciliter      Allergies    No Known Drug Allergies  spices (Rash)    Intolerances        LABS:                        11.4   13.58 )-----------( 200      ( 20 Feb 2019 12:55 )             39.3     02-20    138  |  101  |  34<H>  ----------------------------<  116<H>  4.7   |  23  |  1.60<H>    Ca    7.8<L>      20 Feb 2019 07:06  Phos  5.1     02-20  Mg     2.7     02-20

## 2019-02-20 NOTE — PROGRESS NOTE ADULT - SUBJECTIVE AND OBJECTIVE BOX
Chief Complaint/Follow-up on:   DM    Subjective:  remains NPO with NG tube   blood glucose 116   yesterday with tightly controlled blood glucose     MEDICATIONS  (STANDING):  acetaminophen  IVPB .. 1000 milliGRAM(s) IV Intermittent once  acetaminophen  IVPB .. 1000 milliGRAM(s) IV Intermittent once  enoxaparin Injectable 30 milliGRAM(s) SubCutaneous every 24 hours  insulin glargine Injectable (LANTUS) 6 Unit(s) SubCutaneous at bedtime  insulin lispro (HumaLOG) corrective regimen sliding scale   SubCutaneous every 6 hours  lactated ringers. 1000 milliLiter(s) (125 mL/Hr) IV Continuous <Continuous>  levothyroxine Injectable 84 MICROGram(s) IV Push at bedtime  metoprolol tartrate Injectable 5 milliGRAM(s) IV Push every 6 hours    MEDICATIONS  (PRN):      PHYSICAL EXAM:  VITALS: T(C): 36.7 (02-20-19 @ 09:20)  T(F): 98 (02-20-19 @ 09:20), Max: 98.7 (02-20-19 @ 05:15)  HR: 81 (02-20-19 @ 09:20) (72 - 98)  BP: 134/63 (02-20-19 @ 09:20) (106/65 - 161/76)  RR:  (14 - 17)  SpO2:  (93% - 100%)  Wt(kg): --  GENERAL: NAD, NG tube in place   HEENT:  Atraumatic, Normocephalic, moist mucous membranes  RESPIRATORY: Clear to auscultation bilaterally; No rales, rhonchi, wheezing, or rubs  CARDIOVASCULAR: Regular rate and rhythm; No murmurs; no peripheral edema  GI: Soft, nontender, non distended, normal bowel sounds      POCT Blood Glucose.: 110 mg/dL (02-20-19 @ 05:12)  POCT Blood Glucose.: 124 mg/dL (02-19-19 @ 23:40)  POCT Blood Glucose.: 104 mg/dL (02-19-19 @ 20:57)  POCT Blood Glucose.: 72 mg/dL (02-19-19 @ 16:10)  POCT Blood Glucose.: 81 mg/dL (02-19-19 @ 12:01)  POCT Blood Glucose.: 98 mg/dL (02-19-19 @ 05:40)  POCT Blood Glucose.: 148 mg/dL (02-18-19 @ 23:52)  POCT Blood Glucose.: 160 mg/dL (02-18-19 @ 17:50)  POCT Blood Glucose.: 155 mg/dL (02-18-19 @ 13:09)  POCT Blood Glucose.: 180 mg/dL (02-18-19 @ 06:35)  POCT Blood Glucose.: 230 mg/dL (02-18-19 @ 01:05)  POCT Blood Glucose.: 222 mg/dL (02-17-19 @ 23:13)  POCT Blood Glucose.: 233 mg/dL (02-17-19 @ 18:23)  POCT Blood Glucose.: 299 mg/dL (02-17-19 @ 12:42)    02-20    138  |  101  |  34<H>  ----------------------------<  116<H>  4.7   |  23  |  1.60<H>    EGFR if : 34<L>  EGFR if non : 29<L>    Ca    7.8<L>      02-20  Mg     2.7     02-20  Phos  5.1     02-20            Thyroid Function Tests:  02-18 @ 10:49 TSH 0.51 FreeT4 1.9 T3 -- Anti TPO -- Anti Thyroglobulin Ab -- TSI --

## 2019-02-20 NOTE — CHART NOTE - NSCHARTNOTEFT_GEN_A_CORE
ACS SURGERY POST-OP NOTE    PROCEDURE: Ex-lap, lysis of adhesions, serosal tear repair     SUBJECTIVE/ROS: Patient seen and examined at bedside. Patient is lethargic but responds appropriately to questions, and follows simple commands.  Her pain is controlled.  She denies chest pain, SOB, n/v.         MEDICATIONS  (STANDING):  acetaminophen  IVPB .. 1000 milliGRAM(s) IV Intermittent once  acetaminophen  IVPB .. 1000 milliGRAM(s) IV Intermittent once  acetaminophen  IVPB .. 1000 milliGRAM(s) IV Intermittent once  acetaminophen  IVPB .. 1000 milliGRAM(s) IV Intermittent once  enoxaparin Injectable 30 milliGRAM(s) SubCutaneous every 24 hours  insulin glargine Injectable (LANTUS) 6 Unit(s) SubCutaneous at bedtime  insulin lispro (HumaLOG) corrective regimen sliding scale   SubCutaneous every 6 hours  lactated ringers. 1000 milliLiter(s) (125 mL/Hr) IV Continuous <Continuous>  levothyroxine Injectable 84 MICROGram(s) IV Push at bedtime  metoprolol tartrate Injectable 5 milliGRAM(s) IV Push every 6 hours    MEDICATIONS  (PRN):  HYDROmorphone  Injectable 0.25 milliGRAM(s) IV Push every 10 minutes PRN Moderate Pain (4 - 6)      OBJECTIVE:    Vital Signs Last 24 Hrs  T(C): 36.4 (2019 23:00), Max: 37.7 (2019 05:43)  T(F): 97.5 (2019 23:00), Max: 99.8 (2019 05:43)  HR: 82 (2019 00:00) (72 - 103)  BP: 129/60 (2019 00:00) (129/60 - 172/81)  BP(mean): --  RR: 14 (2019 00:00) (14 - 18)  SpO2: 100% (2019 00:00) (93% - 100%)        I&O's Detail    2019 07:01  -  2019 07:00  --------------------------------------------------------  IN:    lactated ringers.: 1500 mL  Total IN: 1500 mL    OUT:    Nasoenteral Tube: 100 mL    Voided: 1350 mL  Total OUT: 1450 mL    Total NET: 50 mL      2019 07:01  -  2019 00:30  --------------------------------------------------------  IN:    lactated ringers.: 500 mL  Total IN: 500 mL    OUT:    Indwelling Catheter - Urethral: 140 mL    Voided: 750 mL  Total OUT: 890 mL    Total NET: -390 mL          Daily Height in cm: 152.4 (2019 15:35)    Daily Weight in k.4 (2019 11:23)    LABS:                        12.3   15.6  )-----------( 167      ( 2019 20:46 )             37.4         140  |  103  |  30<H>  ----------------------------<  129<H>  4.2   |  22  |  1.36<H>    Ca    7.4<L>      2019 20:46  Phos  4.4       Mg     1.5           PT/INR - ( 2019 10:53 )   PT: 11.8 sec;   INR: 1.04 ratio         PTT - ( 2019 10:53 )  PTT:27.2 sec              PHYSICAL EXAM:  Constitutional: Resting in bed, NAD  Neuro: PERRLA, EOM intact, tongue midline  Respiratory: No increased WOB, saturating well on NC  Abdomen: soft, appropriate tenderness, mildly distended.  Midline dressing in place, c/d/i.  NGT in place with minimal bilious output.  Psych: Lethargic but arousable (baseline)        ASSESSMENT/PLAN:  85F with recurrent SBO, with persistent obstruction despite conservative measures.  She is s/p Ex-lap, lysis of adhesions, repair of serosal tear 2019    - Pain control  - NPO/IVF/NGT  - Monitor FS  - Monitor UOP   - DVT ppx- lovenox  - Await GI function  - oob/amb  - f/u AM labs      Amaris Tai PA-C  [phone]

## 2019-02-20 NOTE — PROGRESS NOTE ADULT - ASSESSMENT
86 y/o F with PMH HTN, HLD, DM2, osteoporosis, CVA, DVT, Polycythemia vera (on hydroxyurea), SBO x 2, here hyperglycemia in setting of small bowel obstruction now s/p exlap, ATUL, repair of serosal tear.  currentl NPO with NG tube

## 2019-02-20 NOTE — PROGRESS NOTE ADULT - ASSESSMENT
84yo F with PMH HTN, DM, CVA, DVT, Polycythemia vera (on hydroxyurea), SBO x 2, PSH hysterectomy, presenting with recurrent SBO

## 2019-02-20 NOTE — PROGRESS NOTE ADULT - ASSESSMENT
85F with recurrent SBO, now s/p exlap, ATUL, repair of serosal tear.    PLAN:  - NPO/IVF/NGT  - monitor am labs, replete as needed  - lovenox for dvt ppx  - monitor GIF, NGT output, urine output  - pain control as needed  - appreciate heme recs (pt follows with Dr. Hurley)     [facility]  [id number]

## 2019-02-20 NOTE — PROGRESS NOTE ADULT - SUBJECTIVE AND OBJECTIVE BOX
General Surgery Progress Note    SUBJECTIVE:  The patient was seen and examined. No acute events overnight. NGT in place. Denies n/v, cp, sob, abd pain. -flatus, -BM.    OBJECTIVE:     ** VITAL SIGNS / I&O's **    Vital Signs Last 24 Hrs  T(C): 37.1 (20 Feb 2019 05:15), Max: 37.1 (20 Feb 2019 05:15)  T(F): 98.7 (20 Feb 2019 05:15), Max: 98.7 (20 Feb 2019 05:15)  HR: 77 (20 Feb 2019 05:15) (72 - 98)  BP: 108/62 (20 Feb 2019 05:15) (106/65 - 161/76)  BP(mean): --  RR: 17 (20 Feb 2019 05:15) (14 - 17)  SpO2: 98% (20 Feb 2019 05:15) (93% - 100%)      19 Feb 2019 07:01  -  20 Feb 2019 07:00  --------------------------------------------------------  IN:    lactated ringers.: 1375 mL  Total IN: 1375 mL    OUT:    Indwelling Catheter - Urethral: 490 mL    Voided: 750 mL  Total OUT: 1240 mL    Total NET: 135 mL          ** PHYSICAL EXAM **    -- CONSTITUTIONAL: Alert, NAD  -- PULMONARY: non-labored respirations  -- ABDOMEN: soft, non-distended, appropriately tender; c/d/i dressing/incision    ** LABS **                          12.3   15.6  )-----------( 167      ( 19 Feb 2019 20:46 )             37.4     19 Feb 2019 20:46    140    |  103    |  30     ----------------------------<  129    4.2     |  22     |  1.36     Ca    7.4        19 Feb 2019 20:46  Phos  4.4       19 Feb 2019 20:46  Mg     1.5       19 Feb 2019 20:46      PT/INR - ( 18 Feb 2019 10:53 )   PT: 11.8 sec;   INR: 1.04 ratio         PTT - ( 18 Feb 2019 10:53 )  PTT:27.2 sec  CAPILLARY BLOOD GLUCOSE      POCT Blood Glucose.: 110 mg/dL (20 Feb 2019 05:12)  POCT Blood Glucose.: 124 mg/dL (19 Feb 2019 23:40)  POCT Blood Glucose.: 104 mg/dL (19 Feb 2019 20:57)  POCT Blood Glucose.: 72 mg/dL (19 Feb 2019 16:10)  POCT Blood Glucose.: 81 mg/dL (19 Feb 2019 12:01)                MEDICATIONS  (STANDING):  acetaminophen  IVPB .. 1000 milliGRAM(s) IV Intermittent once  acetaminophen  IVPB .. 1000 milliGRAM(s) IV Intermittent once  acetaminophen  IVPB .. 1000 milliGRAM(s) IV Intermittent once  enoxaparin Injectable 30 milliGRAM(s) SubCutaneous every 24 hours  insulin glargine Injectable (LANTUS) 6 Unit(s) SubCutaneous at bedtime  insulin lispro (HumaLOG) corrective regimen sliding scale   SubCutaneous every 6 hours  lactated ringers. 1000 milliLiter(s) (125 mL/Hr) IV Continuous <Continuous>  levothyroxine Injectable 84 MICROGram(s) IV Push at bedtime  metoprolol tartrate Injectable 5 milliGRAM(s) IV Push every 6 hours    MEDICATIONS  (PRN):

## 2019-02-20 NOTE — PROGRESS NOTE ADULT - PROBLEM SELECTOR PLAN 2
- cont lantus 5 units nightly  - cont sliding scale and fingerstick monitoring  - endocrine recs appreciated

## 2019-02-20 NOTE — PROGRESS NOTE ADULT - PROBLEM SELECTOR PLAN 2
- TSH is 0.51 with free T4 of 1.9  - Home dose of Synthroid is 125 mcg daily.   - given her age, would keep TSH in the mid-normal range. On discharge, would decrease Synthroid to 112 mcg daily. C/w IV 84 mcg daily.  Check Free T4 tomorrow to ensure Free T4 is normal and not elevated

## 2019-02-20 NOTE — PROGRESS NOTE ADULT - PROBLEM SELECTOR PLAN 1
while NPO  - decrease Lantus to 5 units qhs in setting of NPO  - FSBG q6hrs and low dose correction scale to low correction   - get A1C     if plans to eat, will stop q6hr scale and place on low dose scale premeals and add premeal lispro if hyperglycemia ensures    FOR HYPOGLYCEMIA  *BG <70  1 amp D50 IV push, followed by  D5 IV infusion titrated to maintain target - 180 mg/dL  dc D5W when above 200 for two FSBG  (please place on the pump for IVF with dextrose so it will run at a set rate 75cc/hr-100cc/hr). monitor volume, respiratory status if placed on fluids.    NOTE: Avoid dextrose containing solutions except for treatment of hypoglycemia.          - for discharge: Metformin 500 mg BID if GFR >50 and LFTS stable. Outpatient follow up with Dr. Lyman at [address] [place] [address] [phone]

## 2019-02-21 LAB
ANION GAP SERPL CALC-SCNC: 12 MMOL/L — SIGNIFICANT CHANGE UP (ref 5–17)
BUN SERPL-MCNC: 32 MG/DL — HIGH (ref 7–23)
CALCIUM SERPL-MCNC: 7.8 MG/DL — LOW (ref 8.4–10.5)
CHLORIDE SERPL-SCNC: 104 MMOL/L — SIGNIFICANT CHANGE UP (ref 96–108)
CO2 SERPL-SCNC: 24 MMOL/L — SIGNIFICANT CHANGE UP (ref 22–31)
CREAT SERPL-MCNC: 1.55 MG/DL — HIGH (ref 0.5–1.3)
GLUCOSE BLDC GLUCOMTR-MCNC: 103 MG/DL — HIGH (ref 70–99)
GLUCOSE BLDC GLUCOMTR-MCNC: 138 MG/DL — HIGH (ref 70–99)
GLUCOSE BLDC GLUCOMTR-MCNC: 208 MG/DL — HIGH (ref 70–99)
GLUCOSE BLDC GLUCOMTR-MCNC: 268 MG/DL — HIGH (ref 70–99)
GLUCOSE BLDC GLUCOMTR-MCNC: 99 MG/DL — SIGNIFICANT CHANGE UP (ref 70–99)
GLUCOSE SERPL-MCNC: 153 MG/DL — HIGH (ref 70–99)
HBA1C BLD-MCNC: 8.6 % — HIGH (ref 4–5.6)
HCT VFR BLD CALC: 35.1 % — SIGNIFICANT CHANGE UP (ref 34.5–45)
HGB BLD-MCNC: 10.8 G/DL — LOW (ref 11.5–15.5)
MAGNESIUM SERPL-MCNC: 2.2 MG/DL — SIGNIFICANT CHANGE UP (ref 1.6–2.6)
MCHC RBC-ENTMCNC: 30.8 GM/DL — LOW (ref 32–36)
MCHC RBC-ENTMCNC: 31.2 PG — SIGNIFICANT CHANGE UP (ref 27–34)
MCV RBC AUTO: 101.4 FL — HIGH (ref 80–100)
PHOSPHATE SERPL-MCNC: 2.2 MG/DL — LOW (ref 2.5–4.5)
PLATELET # BLD AUTO: 272 K/UL — SIGNIFICANT CHANGE UP (ref 150–400)
POTASSIUM SERPL-MCNC: 4.1 MMOL/L — SIGNIFICANT CHANGE UP (ref 3.5–5.3)
POTASSIUM SERPL-SCNC: 4.1 MMOL/L — SIGNIFICANT CHANGE UP (ref 3.5–5.3)
RBC # BLD: 3.46 M/UL — LOW (ref 3.8–5.2)
RBC # FLD: 13.7 % — SIGNIFICANT CHANGE UP (ref 10.3–14.5)
SODIUM SERPL-SCNC: 140 MMOL/L — SIGNIFICANT CHANGE UP (ref 135–145)
T4 FREE SERPL-MCNC: 3 NG/DL — HIGH (ref 0.9–1.8)
WBC # BLD: 15.08 K/UL — HIGH (ref 3.8–10.5)
WBC # FLD AUTO: 15.08 K/UL — HIGH (ref 3.8–10.5)

## 2019-02-21 PROCEDURE — 99233 SBSQ HOSP IP/OBS HIGH 50: CPT

## 2019-02-21 RX ORDER — INSULIN LISPRO 100/ML
VIAL (ML) SUBCUTANEOUS AT BEDTIME
Qty: 0 | Refills: 0 | Status: DISCONTINUED | OUTPATIENT
Start: 2019-02-21 | End: 2019-02-26

## 2019-02-21 RX ORDER — SODIUM CHLORIDE 9 MG/ML
1000 INJECTION, SOLUTION INTRAVENOUS
Qty: 0 | Refills: 0 | Status: DISCONTINUED | OUTPATIENT
Start: 2019-02-21 | End: 2019-02-22

## 2019-02-21 RX ORDER — INSULIN LISPRO 100/ML
VIAL (ML) SUBCUTANEOUS
Qty: 0 | Refills: 0 | Status: DISCONTINUED | OUTPATIENT
Start: 2019-02-21 | End: 2019-02-26

## 2019-02-21 RX ADMIN — Medication 400 MILLIGRAM(S): at 10:31

## 2019-02-21 RX ADMIN — Medication 5 MILLIGRAM(S): at 00:54

## 2019-02-21 RX ADMIN — Medication 5 MILLIGRAM(S): at 17:07

## 2019-02-21 RX ADMIN — Medication 1000 MILLIGRAM(S): at 03:30

## 2019-02-21 RX ADMIN — INSULIN GLARGINE 5 UNIT(S): 100 INJECTION, SOLUTION SUBCUTANEOUS at 23:10

## 2019-02-21 RX ADMIN — Medication 5 MILLIGRAM(S): at 12:08

## 2019-02-21 RX ADMIN — Medication 84 MICROGRAM(S): at 23:10

## 2019-02-21 RX ADMIN — Medication 2: at 00:54

## 2019-02-21 RX ADMIN — Medication 1000 MILLIGRAM(S): at 11:20

## 2019-02-21 RX ADMIN — Medication 400 MILLIGRAM(S): at 03:02

## 2019-02-21 RX ADMIN — SODIUM CHLORIDE 50 MILLILITER(S): 9 INJECTION, SOLUTION INTRAVENOUS at 15:03

## 2019-02-21 RX ADMIN — Medication 1: at 06:38

## 2019-02-21 RX ADMIN — Medication 5 MILLIGRAM(S): at 06:35

## 2019-02-21 RX ADMIN — ENOXAPARIN SODIUM 30 MILLIGRAM(S): 100 INJECTION SUBCUTANEOUS at 23:10

## 2019-02-21 NOTE — PROGRESS NOTE ADULT - ASSESSMENT
85F with recurrent SBO, now s/p exlap, ATUL, repair of serosal tear.    PLAN:  - NGT clamp trial  - NPO/MIVF  - monitor am labs, replete as needed  - lovenox for dvt ppx  - monitor GIF, NGT output, urine output  - pain control as needed  - appreciate heme recs (pt follows with Dr. Hurley)     [facility]  [id number]

## 2019-02-21 NOTE — PROGRESS NOTE ADULT - PROBLEM SELECTOR PLAN 1
Non resolving  - NG clamp trial today  - s/p lysis of adhesions in OR  - continue pain control and care as per surgery

## 2019-02-21 NOTE — PROGRESS NOTE ADULT - SUBJECTIVE AND OBJECTIVE BOX
[facility] ATP SURGERY DAILY PROGRESS NOTE    SUBJECTIVE:  -  NGT putting out nothing  -  no flatus or bm    OBJECTIVE:    Vital Signs Last 24 Hrs  T(C): 36.6 (21 Feb 2019 06:42), Max: 36.7 (20 Feb 2019 09:20)  T(F): 97.9 (21 Feb 2019 06:42), Max: 98 (20 Feb 2019 09:20)  HR: 70 (21 Feb 2019 06:42) (70 - 90)  BP: 152/81 (21 Feb 2019 06:42) (96/52 - 152/81)  BP(mean): --  RR: 18 (21 Feb 2019 06:42) (16 - 18)  SpO2: 95% (21 Feb 2019 06:42) (94% - 96%)    I&O's Detail    20 Feb 2019 07:01  -  21 Feb 2019 07:00  --------------------------------------------------------  IN:    dextrose 5% + lactated ringers.: 1200 mL    Solution: 200 mL  Total IN: 1400 mL    OUT:    Indwelling Catheter - Urethral: 1700 mL    Nasoenteral Tube: 100 mL  Total OUT: 1800 mL    Total NET: -400 mL        LABS:                        11.4   13.58 )-----------( 200      ( 20 Feb 2019 12:55 )             39.3     02-20    138  |  101  |  34<H>  ----------------------------<  116<H>  4.7   |  23  |  1.60<H>    Ca    7.8<L>      20 Feb 2019 07:06  Phos  5.1     02-20  Mg     2.7     02-20      EXAM:  -- CONSTITUTIONAL: Alert, NAD, NGT in place to sxn  -- PULMONARY: non-labored respirations  -- ABDOMEN: soft, decreased moderate distension, minimally tender

## 2019-02-21 NOTE — PROGRESS NOTE ADULT - SUBJECTIVE AND OBJECTIVE BOX
Hospitalist- Genaro Bolden MD  Pager: [phone]  If no response or off-hours, page [phone]  -------------------------------------  Patient is a 85y old  Female who presents with a chief complaint of SBO (21 Feb 2019 07:42)  Subjective: No acute events overnight. Pt with bm and flatus. Still has some abdomina discomfort. No fevers/chills.    14 point ros otherwise negative.     VITAL SIGNS:  Vital Signs Last 24 Hrs  T(C): 36.7 (21 Feb 2019 16:36), Max: 36.8 (21 Feb 2019 13:35)  T(F): 98.1 (21 Feb 2019 16:36), Max: 98.2 (21 Feb 2019 13:35)  HR: 80 (21 Feb 2019 16:36) (70 - 90)  BP: 160/80 (21 Feb 2019 16:36) (132/71 - 175/81)  BP(mean): --  RR: 18 (21 Feb 2019 16:36) (17 - 18)  SpO2: 95% (21 Feb 2019 16:36) (94% - 96%)      PHYSICAL EXAM:     GENERAL: no acute distress  HEENT: PERRLA, EOMI, moist oropharynx   RESPIRATORY: CTAB, no w/c   CARDIOVASCULAR: RRR, no murmurs, gallops, rubs  ABDOMINAL: soft, non-tender, non-distended, positive bowel sounds   EXTREMITIES: no clubbing, cyanosis, or edema  NEUROLOGICAL: alert and oriented x 3, non-focal  SKIN: no rashes or lesions   MUSCULOSKELETAL: no gross joint deformity                          11.4   13.58 )-----------( 200      ( 20 Feb 2019 12:55 )             39.3     02-21    140  |  104  |  32<H>  ----------------------------<  153<H>  4.1   |  24  |  1.55<H>    Ca    7.8<L>      21 Feb 2019 11:21  Phos  2.2     02-21  Mg     2.2     02-21        CAPILLARY BLOOD GLUCOSE      POCT Blood Glucose.: 99 mg/dL (21 Feb 2019 17:39)  POCT Blood Glucose.: 138 mg/dL (21 Feb 2019 12:34)  POCT Blood Glucose.: 208 mg/dL (21 Feb 2019 06:35)  POCT Blood Glucose.: 268 mg/dL (21 Feb 2019 00:48)  POCT Blood Glucose.: 253 mg/dL (20 Feb 2019 22:48)      MEDICATIONS  (STANDING):  dextrose 50% Injectable 12.5 Gram(s) IV Push once  dextrose 50% Injectable 25 Gram(s) IV Push once  dextrose 50% Injectable 25 Gram(s) IV Push once  enoxaparin Injectable 30 milliGRAM(s) SubCutaneous every 24 hours  insulin glargine Injectable (LANTUS) 5 Unit(s) SubCutaneous at bedtime  insulin lispro (HumaLOG) corrective regimen sliding scale   SubCutaneous three times a day before meals  insulin lispro (HumaLOG) corrective regimen sliding scale   SubCutaneous at bedtime  lactated ringers. 1000 milliLiter(s) (50 mL/Hr) IV Continuous <Continuous>  levothyroxine Injectable 84 MICROGram(s) IV Push at bedtime  metoprolol tartrate Injectable 5 milliGRAM(s) IV Push every 6 hours    MEDICATIONS  (PRN):  dextrose 40% Gel 15 Gram(s) Oral once PRN Blood Glucose LESS THAN 70 milliGRAM(s)/deciliter  glucagon  Injectable 1 milliGRAM(s) IntraMuscular once PRN Glucose LESS THAN 70 milligrams/deciliter

## 2019-02-21 NOTE — CHART NOTE - NSCHARTNOTEFT_GEN_A_CORE
endocrine chart note         84 y/o F with PMH HTN, HLD, DM2, osteoporosis, CVA, DVT, Polycythemia vera (on hydroxyurea), SBO x 2, here hyperglycemia in setting of small bowel obstruction now s/p exlap, ATUL, repair of serosal tear.  currently NPO with NG tube on dextrose maintaince fluids with resultant hyperglycemia.     Problem/Plan - 1:  ·  Problem: Controlled type 2 diabetes mellitus with hyperglycemia, unspecified whether long term insulin use.    Plan: while NPO  -  Lantus to 5 units qhs in setting of NPO  - FSBG q6hrs and low dose correction scale to low correction   - get A1C (pending collection)    if plans to eat, will stop q6hr scale and place on low dose scale premeals and add premeal lispro if hyperglycemia ensues    FOR HYPOGLYCEMIA  *BG <70  1 amp D50 IV push, followed by  D5 IV infusion titrated to maintain target - 180 mg/dL (please lower the rate of current IV fluids with dextrose as pt with hyperglycemia overnight)  dc D5W when above 200 for two FSBG  (please place on the pump for IVF with dextrose so it will run at a set rate 75cc/hr-100cc/hr). monitor volume, respiratory status if placed on fluids.    NOTE: Avoid dextrose containing solutions except for treatment of hypoglycemia.    inform endocrine of hypoglycemia or persistent hyperglycemia episodes as changes in pts insulin regimen will need to be made.   notify endocrine if any plans to be NPO/diet changes as this will also affect insulin regimen.      - for discharge: Metformin 500 mg BID if GFR >50 and LFTS stable. Outpatient follow up with Dr. Lyman at [address] [place] [address] - [phone].      Problem/Plan - 2:  ·  Problem: Hypothyroidism (acquired).    Plan: - TSH is 0.51 with free T4 of 1.9  - Home dose of Synthroid is 125 mcg daily.   - given her age, would keep TSH in the mid-normal range. On discharge, would decrease Synthroid to 112 mcg daily. C/w IV 84 mcg daily.  Check Free T4 (pending collection) to ensure Free T4 is normal and not elevated.

## 2019-02-21 NOTE — PROVIDER CONTACT NOTE (OTHER) - REASON
Pt had a midline or possible extended PIV line place din OR on right upper arm and had some bruising and ecchymosis below the site

## 2019-02-22 LAB
ANION GAP SERPL CALC-SCNC: 13 MMOL/L — SIGNIFICANT CHANGE UP (ref 5–17)
BUN SERPL-MCNC: 27 MG/DL — HIGH (ref 7–23)
CALCIUM SERPL-MCNC: 8.2 MG/DL — LOW (ref 8.4–10.5)
CHLORIDE SERPL-SCNC: 105 MMOL/L — SIGNIFICANT CHANGE UP (ref 96–108)
CO2 SERPL-SCNC: 22 MMOL/L — SIGNIFICANT CHANGE UP (ref 22–31)
CREAT SERPL-MCNC: 1.39 MG/DL — HIGH (ref 0.5–1.3)
GLUCOSE BLDC GLUCOMTR-MCNC: 141 MG/DL — HIGH (ref 70–99)
GLUCOSE BLDC GLUCOMTR-MCNC: 160 MG/DL — HIGH (ref 70–99)
GLUCOSE BLDC GLUCOMTR-MCNC: 236 MG/DL — HIGH (ref 70–99)
GLUCOSE BLDC GLUCOMTR-MCNC: 81 MG/DL — SIGNIFICANT CHANGE UP (ref 70–99)
GLUCOSE BLDC GLUCOMTR-MCNC: 82 MG/DL — SIGNIFICANT CHANGE UP (ref 70–99)
GLUCOSE SERPL-MCNC: 78 MG/DL — SIGNIFICANT CHANGE UP (ref 70–99)
HCT VFR BLD CALC: 39.7 % — SIGNIFICANT CHANGE UP (ref 34.5–45)
HGB BLD-MCNC: 12.5 G/DL — SIGNIFICANT CHANGE UP (ref 11.5–15.5)
MAGNESIUM SERPL-MCNC: 2 MG/DL — SIGNIFICANT CHANGE UP (ref 1.6–2.6)
MCHC RBC-ENTMCNC: 31.5 GM/DL — LOW (ref 32–36)
MCHC RBC-ENTMCNC: 32.1 PG — SIGNIFICANT CHANGE UP (ref 27–34)
MCV RBC AUTO: 101.8 FL — HIGH (ref 80–100)
PHOSPHATE SERPL-MCNC: 1.5 MG/DL — LOW (ref 2.5–4.5)
PLATELET # BLD AUTO: 231 K/UL — SIGNIFICANT CHANGE UP (ref 150–400)
POTASSIUM SERPL-MCNC: 4.1 MMOL/L — SIGNIFICANT CHANGE UP (ref 3.5–5.3)
POTASSIUM SERPL-SCNC: 4.1 MMOL/L — SIGNIFICANT CHANGE UP (ref 3.5–5.3)
RBC # BLD: 3.9 M/UL — SIGNIFICANT CHANGE UP (ref 3.8–5.2)
RBC # FLD: 13.7 % — SIGNIFICANT CHANGE UP (ref 10.3–14.5)
SODIUM SERPL-SCNC: 140 MMOL/L — SIGNIFICANT CHANGE UP (ref 135–145)
WBC # BLD: 14.95 K/UL — HIGH (ref 3.8–10.5)
WBC # FLD AUTO: 14.95 K/UL — HIGH (ref 3.8–10.5)

## 2019-02-22 PROCEDURE — 99233 SBSQ HOSP IP/OBS HIGH 50: CPT

## 2019-02-22 RX ORDER — LISINOPRIL 2.5 MG/1
40 TABLET ORAL DAILY
Qty: 0 | Refills: 0 | Status: DISCONTINUED | OUTPATIENT
Start: 2019-02-22 | End: 2019-02-26

## 2019-02-22 RX ORDER — ATORVASTATIN CALCIUM 80 MG/1
40 TABLET, FILM COATED ORAL AT BEDTIME
Qty: 0 | Refills: 0 | Status: DISCONTINUED | OUTPATIENT
Start: 2019-02-22 | End: 2019-02-26

## 2019-02-22 RX ORDER — INSULIN GLARGINE 100 [IU]/ML
4 INJECTION, SOLUTION SUBCUTANEOUS AT BEDTIME
Qty: 0 | Refills: 0 | Status: DISCONTINUED | OUTPATIENT
Start: 2019-02-22 | End: 2019-02-26

## 2019-02-22 RX ORDER — ACETAMINOPHEN 500 MG
650 TABLET ORAL EVERY 6 HOURS
Qty: 0 | Refills: 0 | Status: DISCONTINUED | OUTPATIENT
Start: 2019-02-22 | End: 2019-02-23

## 2019-02-22 RX ORDER — LEVOTHYROXINE SODIUM 125 MCG
125 TABLET ORAL DAILY
Qty: 0 | Refills: 0 | Status: DISCONTINUED | OUTPATIENT
Start: 2019-02-23 | End: 2019-02-23

## 2019-02-22 RX ORDER — SODIUM CHLORIDE 9 MG/ML
500 INJECTION INTRAMUSCULAR; INTRAVENOUS; SUBCUTANEOUS ONCE
Qty: 0 | Refills: 0 | Status: COMPLETED | OUTPATIENT
Start: 2019-02-22 | End: 2019-02-22

## 2019-02-22 RX ORDER — SODIUM,POTASSIUM PHOSPHATES 278-250MG
2 POWDER IN PACKET (EA) ORAL ONCE
Qty: 0 | Refills: 0 | Status: COMPLETED | OUTPATIENT
Start: 2019-02-22 | End: 2019-02-22

## 2019-02-22 RX ADMIN — Medication 650 MILLIGRAM(S): at 16:06

## 2019-02-22 RX ADMIN — Medication 5 MILLIGRAM(S): at 06:18

## 2019-02-22 RX ADMIN — INSULIN GLARGINE 4 UNIT(S): 100 INJECTION, SOLUTION SUBCUTANEOUS at 22:23

## 2019-02-22 RX ADMIN — ATORVASTATIN CALCIUM 40 MILLIGRAM(S): 80 TABLET, FILM COATED ORAL at 22:23

## 2019-02-22 RX ADMIN — Medication 650 MILLIGRAM(S): at 14:26

## 2019-02-22 RX ADMIN — Medication 2 TABLET(S): at 12:21

## 2019-02-22 RX ADMIN — SODIUM CHLORIDE 1000 MILLILITER(S): 9 INJECTION INTRAMUSCULAR; INTRAVENOUS; SUBCUTANEOUS at 16:07

## 2019-02-22 RX ADMIN — Medication 5 MILLIGRAM(S): at 01:01

## 2019-02-22 RX ADMIN — ENOXAPARIN SODIUM 30 MILLIGRAM(S): 100 INJECTION SUBCUTANEOUS at 23:59

## 2019-02-22 RX ADMIN — LISINOPRIL 40 MILLIGRAM(S): 2.5 TABLET ORAL at 13:28

## 2019-02-22 NOTE — PROGRESS NOTE ADULT - ASSESSMENT
84 y/o F with PMH HTN, hypothyroidism HLD, DM2, osteoporosis, CVA, DVT, Polycythemia vera (on hydroxyurea), SBO x 2, here hyperglycemia in setting of small bowel obstruction now s/p exlap, ATUL, repair of serosal tear.  intially NPO with NG tube, now passing flatus, placed on diet   Blood glucose tightly controlled, with decreasing Lantus requirements throughout the week,    Additionally, with hypothyroidism placed on IV levoT, with elevated Free T4. 84 y/o F with PMH HTN, hypothyroidism HLD, DM2 A1C 8.6, osteoporosis, CVA, DVT, Polycythemia vera (on hydroxyurea), SBO x 2, here hyperglycemia in setting of small bowel obstruction now s/p exlap, ATUL, repair of serosal tear.  intially NPO with NG tube, now passing flatus, placed on diet   Blood glucose tightly controlled, with decreasing Lantus requirements throughout the week,    Additionally, with hypothyroidism placed on IV levoT, with elevated Free T4.

## 2019-02-22 NOTE — PROGRESS NOTE ADULT - PROBLEM SELECTOR PLAN 1
- s/p lysis of adhesions in OR  - +return of bowel function  - advance diet, continue pain control and care as per surgery

## 2019-02-22 NOTE — PROGRESS NOTE ADULT - PROBLEM SELECTOR PLAN 2
- TSH is 0.51 with free T4 of 1.9  - Home dose of Synthroid is 125 mcg daily and was placed on IV levoT 84mcg, with elevated Free T4 of 3 this am   please dc IV levoT   hold PO LevoT today and tomorrow   check Free T4 sunday am and if normalized will restart PO levoThyroxine at a lower dose of 112mcg daily.   - given her age, would keep TSH in the mid-normal range.  repeat TFTS outpt

## 2019-02-22 NOTE — PROGRESS NOTE ADULT - SUBJECTIVE AND OBJECTIVE BOX
[facility] ATP SURGERY DAILY PROGRESS NOTE    SUBJECTIVE:  -  tolerating CLD  -  pos flatus and bm (witnessed by team)    OBJECTIVE:    Vital Signs Last 24 Hrs  T(C): 36.6 (21 Feb 2019 06:42), Max: 36.7 (20 Feb 2019 09:20)  T(F): 97.9 (21 Feb 2019 06:42), Max: 98 (20 Feb 2019 09:20)  HR: 70 (21 Feb 2019 06:42) (70 - 90)  BP: 152/81 (21 Feb 2019 06:42) (96/52 - 152/81)  BP(mean): --  RR: 18 (21 Feb 2019 06:42) (16 - 18)  SpO2: 95% (21 Feb 2019 06:42) (94% - 96%)    I&O's Detail    20 Feb 2019 07:01  -  21 Feb 2019 07:00  --------------------------------------------------------  IN:    dextrose 5% + lactated ringers.: 1200 mL    Solution: 200 mL  Total IN: 1400 mL    OUT:    Indwelling Catheter - Urethral: 1700 mL    Nasoenteral Tube: 100 mL  Total OUT: 1800 mL    Total NET: -400 mL        LABS:                        11.4   13.58 )-----------( 200      ( 20 Feb 2019 12:55 )             39.3     02-20    138  |  101  |  34<H>  ----------------------------<  116<H>  4.7   |  23  |  1.60<H>    Ca    7.8<L>      20 Feb 2019 07:06  Phos  5.1     02-20  Mg     2.7     02-20      EXAM:  -- CONSTITUTIONAL: Alert, NAD, NGT in place to sxn  -- PULMONARY: non-labored respirations  -- ABDOMEN: soft, decreased moderate distension, minimally tender [facility] ATP SURGERY DAILY PROGRESS NOTE    SUBJECTIVE:  -  NGT d/c'd yesterday  -  tolerating CLD  -  clear yellow urine  -  +flatus and +bm (witnessed by team)  -  denies n/v, cp, sob      OBJECTIVE:    Vital Signs Last 24 Hrs  T(C): 36.6 (21 Feb 2019 06:42), Max: 36.7 (20 Feb 2019 09:20)  T(F): 97.9 (21 Feb 2019 06:42), Max: 98 (20 Feb 2019 09:20)  HR: 70 (21 Feb 2019 06:42) (70 - 90)  BP: 152/81 (21 Feb 2019 06:42) (96/52 - 152/81)  BP(mean): --  RR: 18 (21 Feb 2019 06:42) (16 - 18)  SpO2: 95% (21 Feb 2019 06:42) (94% - 96%)    I&O's Detail    20 Feb 2019 07:01  -  21 Feb 2019 07:00  --------------------------------------------------------  IN:    dextrose 5% + lactated ringers.: 1200 mL    Solution: 200 mL  Total IN: 1400 mL    OUT:    Indwelling Catheter - Urethral: 1700 mL    Nasoenteral Tube: 100 mL  Total OUT: 1800 mL    Total NET: -400 mL        LABS:                        11.4   13.58 )-----------( 200      ( 20 Feb 2019 12:55 )             39.3     02-20    138  |  101  |  34<H>  ----------------------------<  116<H>  4.7   |  23  |  1.60<H>    Ca    7.8<L>      20 Feb 2019 07:06  Phos  5.1     02-20  Mg     2.7     02-20      EXAM:  -- CONSTITUTIONAL: Alert, NAD  -- PULMONARY: non-labored respirations  -- ABDOMEN: soft, decreased moderate distension, minimally tender; c/d/i incision

## 2019-02-22 NOTE — PROGRESS NOTE ADULT - PROBLEM SELECTOR PLAN 2
- cont lantus 4 units nightly  - cont sliding scale and fingerstick monitoring  - endocrine recs appreciated

## 2019-02-22 NOTE — PROGRESS NOTE ADULT - SUBJECTIVE AND OBJECTIVE BOX
Hospitalist- Genaro Bolden MD  Pager: [phone]  If no response or off-hours, page [phone]  -------------------------------------  Patient is a 85y old  Female who presents with a chief complaint of SBO (22 Feb 2019 11:21)  Subjective: no acute events overnight. pt passing stool and flatus, tolerating clears. Today, she is pleasant and interactive, but appears somewhat confused. Orientable and follows commands.    14 point ros otherwise negative.     VITAL SIGNS:  Vital Signs Last 24 Hrs  T(C): 37.2 (22 Feb 2019 09:46), Max: 37.3 (22 Feb 2019 05:43)  T(F): 99 (22 Feb 2019 09:46), Max: 99.2 (22 Feb 2019 05:43)  HR: 79 (22 Feb 2019 10:37) (78 - 100)  BP: 162/81 (22 Feb 2019 10:37) (143/89 - 174/85)  BP(mean): --  RR: 18 (22 Feb 2019 09:46) (18 - 18)  SpO2: 94% (22 Feb 2019 09:46) (93% - 95%)      PHYSICAL EXAM:     GENERAL: no acute distress  HEENT: PERRLA, EOMI, moist oropharynx   RESPIRATORY: CTAB, no w/c   CARDIOVASCULAR: RRR, no murmurs, gallops, rubs  ABDOMINAL: soft, non-tender, non-distended, positive bowel sounds   EXTREMITIES: no clubbing, cyanosis, or edema  NEUROLOGICAL: alert and oriented x 2, non-focal  SKIN: no rashes or lesions   MUSCULOSKELETAL: no gross joint deformity                          12.5   14.95 )-----------( 231      ( 22 Feb 2019 09:34 )             39.7     02-22    140  |  105  |  27<H>  ----------------------------<  78  4.1   |  22  |  1.39<H>    Ca    8.2<L>      22 Feb 2019 07:10  Phos  1.5     02-22  Mg     2.0     02-22        CAPILLARY BLOOD GLUCOSE      POCT Blood Glucose.: 81 mg/dL (22 Feb 2019 08:10)  POCT Blood Glucose.: 82 mg/dL (22 Feb 2019 03:28)  POCT Blood Glucose.: 103 mg/dL (21 Feb 2019 22:50)  POCT Blood Glucose.: 99 mg/dL (21 Feb 2019 17:39)  POCT Blood Glucose.: 138 mg/dL (21 Feb 2019 12:34)      MEDICATIONS  (STANDING):  dextrose 50% Injectable 12.5 Gram(s) IV Push once  dextrose 50% Injectable 25 Gram(s) IV Push once  dextrose 50% Injectable 25 Gram(s) IV Push once  enoxaparin Injectable 30 milliGRAM(s) SubCutaneous every 24 hours  insulin glargine Injectable (LANTUS) 4 Unit(s) SubCutaneous at bedtime  insulin lispro (HumaLOG) corrective regimen sliding scale   SubCutaneous three times a day before meals  insulin lispro (HumaLOG) corrective regimen sliding scale   SubCutaneous at bedtime  metoprolol tartrate Injectable 5 milliGRAM(s) IV Push every 6 hours    MEDICATIONS  (PRN):  dextrose 40% Gel 15 Gram(s) Oral once PRN Blood Glucose LESS THAN 70 milliGRAM(s)/deciliter  glucagon  Injectable 1 milliGRAM(s) IntraMuscular once PRN Glucose LESS THAN 70 milligrams/deciliter

## 2019-02-22 NOTE — PROGRESS NOTE ADULT - PROBLEM SELECTOR PLAN 3
Holding hydrea at this time  - f/u h/o on resuming hydrea  - s/p therapeutic phlebotomy last week  - goal HCT < 42

## 2019-02-22 NOTE — PROGRESS NOTE ADULT - PROBLEM SELECTOR PLAN 1
on DIET:  check FSBG TID qac and hs  carb consistent diet   -low dose SS TIDAC/qhs  Lantus decrease to 4 units given tightly controlled blood glucose   add premeal lispro if hyperglycemia ensures (can start with 2 units premeals lispro if hyperglycemia >180 for two or more meals)    FOR HYPOGLYCEMIA  *BG <70  1 amp D50 IV push, followed by  D5 IV infusion titrated to maintain target - 180 mg/dL  dc D5W when above 200 for two FSBG  (please place on the pump for IVF with dextrose so it will run at a set rate 75cc/hr-100cc/hr). monitor volume, respiratory status if placed on fluids.    NOTE: Avoid dextrose containing solutions except for treatment of hypoglycemia.        - for discharge: Metformin 500 mg BID if GFR >50 and LFTS stable. Outpatient follow up with Dr. Lyman at [address] [place] [address] [phone] on DIET:  check FSBG TID qac and hs  carb consistent diet   -low dose SS TIDAC/qhs  Lantus decrease to 4 units given tightly controlled blood glucose   add premeal lispro if hyperglycemia ensures (can start with 2 units premeals lispro if hyperglycemia >180 for two or more meals)    FOR HYPOGLYCEMIA  *BG <70  1 amp D50 IV push, followed by  D5 IV infusion titrated to maintain target - 180 mg/dL  dc D5W when above 200 for two FSBG  (please place on the pump for IVF with dextrose so it will run at a set rate 75cc/hr-100cc/hr). monitor volume, respiratory status if placed on fluids.    NOTE: Avoid dextrose containing solutions except for treatment of hypoglycemia.        - for discharge: Metformin 500 mg BID if GFR >50 and LFTS stable. However if GFR remains less <40 can not dc on metformin at [place] and will have to dc on Januvia 50mg if covered.  Please verify coverage for Januvia with pts insurance.   Outpatient follow up with Dr. Lyman at [address] [place] [address] - [phone]  inform endocrine of dc planning for final dc reccs.

## 2019-02-22 NOTE — PROGRESS NOTE ADULT - ASSESSMENT
85F with recurrent SBO, now s/p exlap, ATUL, repair of serosal tear (2/19)    PLAN:  - c/w CLD  - monitor am labs, replete as needed  - lovenox for dvt ppx  - monitor GIF, NGT output, urine output  - pain control as needed  - appreciate heme recs (pt follows with Dr. Hurley)     ATP SURGERY  [id number] 85F with recurrent SBO, now s/p exlap, ATUL, repair of serosal tear (2/19). recovering well.    PLAN:  - advance diet to regular  - monitor am labs, replete as needed  - lovenox for dvt ppx  - pain control as needed  - appreciate heme recs (pt follows with Dr. Hurley)   - dispo planning: per PT, subacute rehab    ATP SURGERY  [id number]

## 2019-02-23 LAB
ANION GAP SERPL CALC-SCNC: 15 MMOL/L — SIGNIFICANT CHANGE UP (ref 5–17)
BUN SERPL-MCNC: 29 MG/DL — HIGH (ref 7–23)
CALCIUM SERPL-MCNC: 7.8 MG/DL — LOW (ref 8.4–10.5)
CHLORIDE SERPL-SCNC: 105 MMOL/L — SIGNIFICANT CHANGE UP (ref 96–108)
CO2 SERPL-SCNC: 21 MMOL/L — LOW (ref 22–31)
CREAT SERPL-MCNC: 1.23 MG/DL — SIGNIFICANT CHANGE UP (ref 0.5–1.3)
GLUCOSE BLDC GLUCOMTR-MCNC: 177 MG/DL — HIGH (ref 70–99)
GLUCOSE BLDC GLUCOMTR-MCNC: 186 MG/DL — HIGH (ref 70–99)
GLUCOSE BLDC GLUCOMTR-MCNC: 225 MG/DL — HIGH (ref 70–99)
GLUCOSE BLDC GLUCOMTR-MCNC: 229 MG/DL — HIGH (ref 70–99)
GLUCOSE BLDC GLUCOMTR-MCNC: 269 MG/DL — HIGH (ref 70–99)
GLUCOSE SERPL-MCNC: 204 MG/DL — HIGH (ref 70–99)
HCT VFR BLD CALC: 39.7 % — SIGNIFICANT CHANGE UP (ref 34.5–45)
HGB BLD-MCNC: 12.4 G/DL — SIGNIFICANT CHANGE UP (ref 11.5–15.5)
MAGNESIUM SERPL-MCNC: 1.7 MG/DL — SIGNIFICANT CHANGE UP (ref 1.6–2.6)
MCHC RBC-ENTMCNC: 30.9 PG — SIGNIFICANT CHANGE UP (ref 27–34)
MCHC RBC-ENTMCNC: 31.2 GM/DL — LOW (ref 32–36)
MCV RBC AUTO: 98.9 FL — SIGNIFICANT CHANGE UP (ref 80–100)
PHOSPHATE SERPL-MCNC: 1.8 MG/DL — LOW (ref 2.5–4.5)
PLATELET # BLD AUTO: 216 K/UL — SIGNIFICANT CHANGE UP (ref 150–400)
POTASSIUM SERPL-MCNC: 4 MMOL/L — SIGNIFICANT CHANGE UP (ref 3.5–5.3)
POTASSIUM SERPL-SCNC: 4 MMOL/L — SIGNIFICANT CHANGE UP (ref 3.5–5.3)
RBC # BLD: 4.02 M/UL — SIGNIFICANT CHANGE UP (ref 3.8–5.2)
RBC # FLD: 13.5 % — SIGNIFICANT CHANGE UP (ref 10.3–14.5)
SODIUM SERPL-SCNC: 141 MMOL/L — SIGNIFICANT CHANGE UP (ref 135–145)
WBC # BLD: 13.5 K/UL — HIGH (ref 3.8–10.5)
WBC # FLD AUTO: 13.5 K/UL — HIGH (ref 3.8–10.5)

## 2019-02-23 RX ORDER — LABETALOL HCL 100 MG
100 TABLET ORAL ONCE
Qty: 0 | Refills: 0 | Status: COMPLETED | OUTPATIENT
Start: 2019-02-23 | End: 2019-02-23

## 2019-02-23 RX ORDER — TRAMADOL HYDROCHLORIDE 50 MG/1
25 TABLET ORAL EVERY 6 HOURS
Qty: 0 | Refills: 0 | Status: DISCONTINUED | OUTPATIENT
Start: 2019-02-23 | End: 2019-02-26

## 2019-02-23 RX ORDER — SODIUM,POTASSIUM PHOSPHATES 278-250MG
1 POWDER IN PACKET (EA) ORAL
Qty: 0 | Refills: 0 | Status: COMPLETED | OUTPATIENT
Start: 2019-02-23 | End: 2019-02-23

## 2019-02-23 RX ORDER — ACETAMINOPHEN 500 MG
650 TABLET ORAL EVERY 6 HOURS
Qty: 0 | Refills: 0 | Status: DISCONTINUED | OUTPATIENT
Start: 2019-02-23 | End: 2019-02-26

## 2019-02-23 RX ORDER — TRAMADOL HYDROCHLORIDE 50 MG/1
25 TABLET ORAL EVERY 6 HOURS
Qty: 0 | Refills: 0 | Status: DISCONTINUED | OUTPATIENT
Start: 2019-02-23 | End: 2019-02-23

## 2019-02-23 RX ORDER — POTASSIUM PHOSPHATE, MONOBASIC POTASSIUM PHOSPHATE, DIBASIC 236; 224 MG/ML; MG/ML
30 INJECTION, SOLUTION INTRAVENOUS ONCE
Qty: 0 | Refills: 0 | Status: COMPLETED | OUTPATIENT
Start: 2019-02-23 | End: 2019-02-23

## 2019-02-23 RX ADMIN — INSULIN GLARGINE 4 UNIT(S): 100 INJECTION, SOLUTION SUBCUTANEOUS at 21:45

## 2019-02-23 RX ADMIN — POTASSIUM PHOSPHATE, MONOBASIC POTASSIUM PHOSPHATE, DIBASIC 83.33 MILLIMOLE(S): 236; 224 INJECTION, SOLUTION INTRAVENOUS at 22:07

## 2019-02-23 RX ADMIN — Medication 650 MILLIGRAM(S): at 14:36

## 2019-02-23 RX ADMIN — ATORVASTATIN CALCIUM 40 MILLIGRAM(S): 80 TABLET, FILM COATED ORAL at 21:45

## 2019-02-23 RX ADMIN — Medication 650 MILLIGRAM(S): at 14:06

## 2019-02-23 RX ADMIN — Medication 125 MICROGRAM(S): at 05:36

## 2019-02-23 RX ADMIN — Medication 650 MILLIGRAM(S): at 18:36

## 2019-02-23 RX ADMIN — LISINOPRIL 40 MILLIGRAM(S): 2.5 TABLET ORAL at 05:36

## 2019-02-23 RX ADMIN — Medication 100 MILLIGRAM(S): at 02:14

## 2019-02-23 RX ADMIN — Medication 2: at 18:50

## 2019-02-23 RX ADMIN — Medication 650 MILLIGRAM(S): at 18:06

## 2019-02-23 RX ADMIN — Medication 1: at 14:00

## 2019-02-23 RX ADMIN — Medication 1 TABLET(S): at 18:04

## 2019-02-23 RX ADMIN — Medication 1 TABLET(S): at 14:06

## 2019-02-23 NOTE — PROVIDER CONTACT NOTE (OTHER) - ASSESSMENT
right PIV site was ecchymotic in upper arm area below the site
pt temp 100.3, . All other VSS. No signs of distress.
All other VSS, pt in no acute distress
All other VSS.
No c/o N/V. Had 1 bm this shift. vss.
Pt Bp 170/85 Hr 109  Pt is restless and agitated w/ no c/o pain or discomfort. No signs of respiratory distress.
Pt calm, hypertensive and tachycardic, no c/o pain, no s/s of respiratory distress
Pt is alert and oriented, c/o abdominal pain. All other VSS. Pt is in no acute distress.
Pt is disoriented to place and situation.
Pt is in no acute distress, pt was OOB and ambulating in yun w walker during the day.
Pt using primafit to wall suction. Pt is in no acute distress.
pt is in no acute distress, all other VSS.

## 2019-02-23 NOTE — PROVIDER CONTACT NOTE (OTHER) - BACKGROUND
pt admitted for SBO
Pt a/f JAMALO
Pt s/p charlette, ATUL 2/19
admitted for sbo
pt a/f JAMALO
pt a/f JAMALO
pt s/p exlap, LAR

## 2019-02-23 NOTE — PROVIDER CONTACT NOTE (OTHER) - RECOMMENDATIONS
MD made aware
BP medication
BP medications?
MD aware and to evaluate patient
MD notified and aware
MD notified and aware, bladder scan to be performed.
MD notified and aware, will prescribe BP med.
MD notified and aware.
MD notified and aware.
Provider notified and aware.
Provider notified and aware.

## 2019-02-23 NOTE — PROGRESS NOTE ADULT - SUBJECTIVE AND OBJECTIVE BOX
General Surgery Progress Note    SUBJECTIVE:  The patient was seen and examined. No acute events overnight. +flatus/+BM. Overnight became tachycardic and hypertensive, now resolving after beta blocker given. Good uop. Tolerating regular diet    OBJECTIVE:     ** VITAL SIGNS / I&O's **    Vital Signs Last 24 Hrs  T(C): 36.6 (23 Feb 2019 09:33), Max: 37.1 (22 Feb 2019 22:02)  T(F): 97.9 (23 Feb 2019 09:33), Max: 98.7 (22 Feb 2019 22:02)  HR: 90 (23 Feb 2019 09:33) (90 - 114)  BP: 145/83 (23 Feb 2019 09:33) (145/83 - 180/79)  BP(mean): --  RR: 18 (23 Feb 2019 09:33) (18 - 18)  SpO2: 96% (23 Feb 2019 09:33) (92% - 98%)      22 Feb 2019 07:01  -  23 Feb 2019 07:00  --------------------------------------------------------  IN:    Oral Fluid: 930 mL    Sodium Chloride 0.9% IV Bolus: 500 mL  Total IN: 1430 mL    OUT:    Voided: 1400 mL  Total OUT: 1400 mL    Total NET: 30 mL      23 Feb 2019 07:01  -  23 Feb 2019 10:49  --------------------------------------------------------  IN:    Oral Fluid: 260 mL  Total IN: 260 mL    OUT:  Total OUT: 0 mL    Total NET: 260 mL          ** PHYSICAL EXAM **    -- CONSTITUTIONAL: Alert, NAD  -- PULMONARY: non-labored respirations  -- ABDOMEN: soft, mildly distended, moderately tender; c/d/i, staples intact.      ** LABS **                          12.4   13.5  )-----------( 216      ( 23 Feb 2019 07:04 )             39.7     23 Feb 2019 07:03    141    |  105    |  29     ----------------------------<  204    4.0     |  21     |  1.23     Ca    7.8        23 Feb 2019 07:03  Phos  1.8       23 Feb 2019 07:03  Mg     1.7       23 Feb 2019 07:03        CAPILLARY BLOOD GLUCOSE      POCT Blood Glucose.: 177 mg/dL (23 Feb 2019 08:45)  POCT Blood Glucose.: 186 mg/dL (23 Feb 2019 02:50)  POCT Blood Glucose.: 236 mg/dL (22 Feb 2019 21:37)  POCT Blood Glucose.: 160 mg/dL (22 Feb 2019 18:15)  POCT Blood Glucose.: 141 mg/dL (22 Feb 2019 12:51)                MEDICATIONS  (STANDING):  acetaminophen   Tablet .. 650 milliGRAM(s) Oral every 6 hours  atorvastatin 40 milliGRAM(s) Oral at bedtime  dextrose 50% Injectable 12.5 Gram(s) IV Push once  dextrose 50% Injectable 25 Gram(s) IV Push once  dextrose 50% Injectable 25 Gram(s) IV Push once  enoxaparin Injectable 30 milliGRAM(s) SubCutaneous every 24 hours  insulin glargine Injectable (LANTUS) 4 Unit(s) SubCutaneous at bedtime  insulin lispro (HumaLOG) corrective regimen sliding scale   SubCutaneous three times a day before meals  insulin lispro (HumaLOG) corrective regimen sliding scale   SubCutaneous at bedtime  lisinopril 40 milliGRAM(s) Oral daily    MEDICATIONS  (PRN):  dextrose 40% Gel 15 Gram(s) Oral once PRN Blood Glucose LESS THAN 70 milliGRAM(s)/deciliter  glucagon  Injectable 1 milliGRAM(s) IntraMuscular once PRN Glucose LESS THAN 70 milligrams/deciliter  traMADol 25 milliGRAM(s) Oral every 6 hours PRN moderate and severe pain

## 2019-02-23 NOTE — PROVIDER CONTACT NOTE (OTHER) - ACTION/TREATMENT ORDERED:
Zestril was given and is getting Tylenol ordered.
continue to monitor
ordered Zestril
Right  arm  Midline was removed
dr pearl stated he would review lab work for possible electrolyte issue
No action at this time. Will continue to monitor.
Awaiting order per MD. Will medicate as ordered. Will reassess after intervention, will continue to monitor.
Bladder scan to be performed, will continue to monitor.
Lobatalol to be ordered.
Lopressor given as ordered. No further actions, will continue to monitor
No further actions at this time, will continue to monitor.
No further actions taken, will continue to monitor.
Provider aware, no further actions taken, will continue to monitor.
Provider ordered mittens, placed new NGT. Safety measures in place, will continue to monitor.
Retake vitals once pt is calm
will continue to monitor patient,

## 2019-02-23 NOTE — CHART NOTE - NSCHARTNOTEFT_GEN_A_CORE
- Home dose of Synthroid is 125 mcg daily and was placed on IV levoT 84mcg during hospital course, found with elevated Free T4 of 3 on 2/21.  -As per previous endo recs, please hold PO levothyroxine for two days until Sunday 2/24. and check Ft4 in AM. Of note- pt did received PO levothyroxine 125mcg this AM.  -If Ft4 normalized will restart PO levoThyroxine at a lower dose of 112mcg daily.   -given her age, would keep TSH in the mid-normal range.  repeat TFTS outpt    If any further questions, can call endo at [phone]

## 2019-02-23 NOTE — PROGRESS NOTE ADULT - ASSESSMENT
85F with recurrent SBO, now s/p exlap, ATUL, repair of serosal tear (2/19). recovering well.    PLAN:  - cont regular diet  - monitor am labs, replete as needed  - lovenox for dvt ppx  - pain control as needed, tramadol added, ATC tylenol  - appreciate heme recs (pt follows with Dr. Hurley)   - dispo planning: subacute rehab    [facility]  [id number]

## 2019-02-24 LAB
ANION GAP SERPL CALC-SCNC: 13 MMOL/L — SIGNIFICANT CHANGE UP (ref 5–17)
BUN SERPL-MCNC: 33 MG/DL — HIGH (ref 7–23)
CALCIUM SERPL-MCNC: 7.2 MG/DL — LOW (ref 8.4–10.5)
CHLORIDE SERPL-SCNC: 109 MMOL/L — HIGH (ref 96–108)
CO2 SERPL-SCNC: 21 MMOL/L — LOW (ref 22–31)
CREAT SERPL-MCNC: 1.36 MG/DL — HIGH (ref 0.5–1.3)
GLUCOSE BLDC GLUCOMTR-MCNC: 133 MG/DL — HIGH (ref 70–99)
GLUCOSE BLDC GLUCOMTR-MCNC: 154 MG/DL — HIGH (ref 70–99)
GLUCOSE BLDC GLUCOMTR-MCNC: 173 MG/DL — HIGH (ref 70–99)
GLUCOSE BLDC GLUCOMTR-MCNC: 178 MG/DL — HIGH (ref 70–99)
GLUCOSE BLDC GLUCOMTR-MCNC: 206 MG/DL — HIGH (ref 70–99)
GLUCOSE SERPL-MCNC: 184 MG/DL — HIGH (ref 70–99)
HCT VFR BLD CALC: 34.2 % — LOW (ref 34.5–45)
HGB BLD-MCNC: 10.5 G/DL — LOW (ref 11.5–15.5)
MAGNESIUM SERPL-MCNC: 1.6 MG/DL — SIGNIFICANT CHANGE UP (ref 1.6–2.6)
MCHC RBC-ENTMCNC: 30.7 GM/DL — LOW (ref 32–36)
MCHC RBC-ENTMCNC: 31.3 PG — SIGNIFICANT CHANGE UP (ref 27–34)
MCV RBC AUTO: 101.8 FL — HIGH (ref 80–100)
PHOSPHATE SERPL-MCNC: 5.9 MG/DL — HIGH (ref 2.5–4.5)
PLATELET # BLD AUTO: 262 K/UL — SIGNIFICANT CHANGE UP (ref 150–400)
POTASSIUM SERPL-MCNC: 4.9 MMOL/L — SIGNIFICANT CHANGE UP (ref 3.5–5.3)
POTASSIUM SERPL-SCNC: 4.9 MMOL/L — SIGNIFICANT CHANGE UP (ref 3.5–5.3)
RBC # BLD: 3.36 M/UL — LOW (ref 3.8–5.2)
RBC # FLD: 14 % — SIGNIFICANT CHANGE UP (ref 10.3–14.5)
SODIUM SERPL-SCNC: 143 MMOL/L — SIGNIFICANT CHANGE UP (ref 135–145)
T4 FREE SERPL-MCNC: 1.6 NG/DL — SIGNIFICANT CHANGE UP (ref 0.9–1.8)
WBC # BLD: 14.45 K/UL — HIGH (ref 3.8–10.5)
WBC # FLD AUTO: 14.45 K/UL — HIGH (ref 3.8–10.5)

## 2019-02-24 RX ORDER — MAGNESIUM SULFATE 500 MG/ML
1 VIAL (ML) INJECTION ONCE
Qty: 0 | Refills: 0 | Status: COMPLETED | OUTPATIENT
Start: 2019-02-24 | End: 2019-02-24

## 2019-02-24 RX ADMIN — Medication 650 MILLIGRAM(S): at 01:36

## 2019-02-24 RX ADMIN — INSULIN GLARGINE 4 UNIT(S): 100 INJECTION, SOLUTION SUBCUTANEOUS at 22:33

## 2019-02-24 RX ADMIN — Medication 650 MILLIGRAM(S): at 05:51

## 2019-02-24 RX ADMIN — Medication 650 MILLIGRAM(S): at 12:45

## 2019-02-24 RX ADMIN — Medication 650 MILLIGRAM(S): at 23:02

## 2019-02-24 RX ADMIN — Medication 650 MILLIGRAM(S): at 18:13

## 2019-02-24 RX ADMIN — ENOXAPARIN SODIUM 30 MILLIGRAM(S): 100 INJECTION SUBCUTANEOUS at 23:01

## 2019-02-24 RX ADMIN — Medication 650 MILLIGRAM(S): at 20:15

## 2019-02-24 RX ADMIN — Medication 650 MILLIGRAM(S): at 13:00

## 2019-02-24 RX ADMIN — ENOXAPARIN SODIUM 30 MILLIGRAM(S): 100 INJECTION SUBCUTANEOUS at 01:37

## 2019-02-24 RX ADMIN — Medication 650 MILLIGRAM(S): at 01:37

## 2019-02-24 RX ADMIN — LISINOPRIL 40 MILLIGRAM(S): 2.5 TABLET ORAL at 05:51

## 2019-02-24 RX ADMIN — ATORVASTATIN CALCIUM 40 MILLIGRAM(S): 80 TABLET, FILM COATED ORAL at 22:34

## 2019-02-24 RX ADMIN — Medication 100 GRAM(S): at 18:12

## 2019-02-24 RX ADMIN — Medication 650 MILLIGRAM(S): at 05:50

## 2019-02-24 NOTE — PROGRESS NOTE ADULT - ASSESSMENT
85F with recurrent SBO, now s/p exlap, ATUL, repair of serosal tear (2/19). recovering well.    PLAN:  - cont regular diet as tolerated  - monitor am labs, replete as needed  - lovenox for dvt ppx  - pain control as needed  - appreciate heme recs (pt follows with Dr. Hurley)   - dispo planning: subacute rehab    [facility]  [id number]

## 2019-02-24 NOTE — PROGRESS NOTE ADULT - SUBJECTIVE AND OBJECTIVE BOX
General Surgery Progress Note    SUBJECTIVE:  The patient was seen and examined. No acute events overnight. +loose BMs.    OBJECTIVE:     ** VITAL SIGNS / I&O's **    Vital Signs Last 24 Hrs  T(C): 37.1 (24 Feb 2019 13:33), Max: 37.2 (24 Feb 2019 01:11)  T(F): 98.8 (24 Feb 2019 13:33), Max: 99 (24 Feb 2019 01:11)  HR: 77 (24 Feb 2019 13:33) (77 - 96)  BP: 148/81 (24 Feb 2019 13:33) (116/69 - 164/74)  BP(mean): --  RR: 18 (24 Feb 2019 13:33) (18 - 18)  SpO2: 99% (24 Feb 2019 13:33) (96% - 99%)      23 Feb 2019 07:01  -  24 Feb 2019 07:00  --------------------------------------------------------  IN:    Oral Fluid: 940 mL    Solution: 500 mL  Total IN: 1440 mL    OUT:  Total OUT: 0 mL    Total NET: 1440 mL      24 Feb 2019 07:01  -  24 Feb 2019 14:04  --------------------------------------------------------  IN:    Oral Fluid: 340 mL  Total IN: 340 mL    OUT:  Total OUT: 0 mL    Total NET: 340 mL          ** PHYSICAL EXAM **    -- CONSTITUTIONAL: Alert, NAD  -- PULMONARY: non-labored respirations  -- ABDOMEN: soft, nondistended, tender; c/d/i      ** LABS **                          10.5   14.45 )-----------( 262      ( 24 Feb 2019 10:20 )             34.2     24 Feb 2019 07:32    143    |  109    |  33     ----------------------------<  184    4.9     |  21     |  1.36     Ca    7.2        24 Feb 2019 07:32  Phos  5.9       24 Feb 2019 07:32  Mg     1.6       24 Feb 2019 07:32        CAPILLARY BLOOD GLUCOSE      POCT Blood Glucose.: 173 mg/dL (24 Feb 2019 12:42)  POCT Blood Glucose.: 154 mg/dL (24 Feb 2019 09:39)  POCT Blood Glucose.: 178 mg/dL (24 Feb 2019 03:26)  POCT Blood Glucose.: 229 mg/dL (23 Feb 2019 21:41)  POCT Blood Glucose.: 269 mg/dL (23 Feb 2019 18:25)                MEDICATIONS  (STANDING):  acetaminophen   Tablet .. 650 milliGRAM(s) Oral every 6 hours  atorvastatin 40 milliGRAM(s) Oral at bedtime  dextrose 50% Injectable 12.5 Gram(s) IV Push once  dextrose 50% Injectable 25 Gram(s) IV Push once  dextrose 50% Injectable 25 Gram(s) IV Push once  enoxaparin Injectable 30 milliGRAM(s) SubCutaneous every 24 hours  insulin glargine Injectable (LANTUS) 4 Unit(s) SubCutaneous at bedtime  insulin lispro (HumaLOG) corrective regimen sliding scale   SubCutaneous three times a day before meals  insulin lispro (HumaLOG) corrective regimen sliding scale   SubCutaneous at bedtime  lisinopril 40 milliGRAM(s) Oral daily  magnesium sulfate  IVPB 1 Gram(s) IV Intermittent once    MEDICATIONS  (PRN):  dextrose 40% Gel 15 Gram(s) Oral once PRN Blood Glucose LESS THAN 70 milliGRAM(s)/deciliter  glucagon  Injectable 1 milliGRAM(s) IntraMuscular once PRN Glucose LESS THAN 70 milligrams/deciliter  traMADol 25 milliGRAM(s) Oral every 6 hours PRN moderate and severe pain

## 2019-02-25 ENCOUNTER — TRANSCRIPTION ENCOUNTER (OUTPATIENT)
Age: 84
End: 2019-02-25

## 2019-02-25 DIAGNOSIS — D72.829 ELEVATED WHITE BLOOD CELL COUNT, UNSPECIFIED: ICD-10-CM

## 2019-02-25 LAB
ANION GAP SERPL CALC-SCNC: 13 MMOL/L — SIGNIFICANT CHANGE UP (ref 5–17)
APPEARANCE UR: ABNORMAL
BACTERIA # UR AUTO: ABNORMAL
BASOPHILS # BLD AUTO: 0 K/UL — SIGNIFICANT CHANGE UP (ref 0–0.2)
BILIRUB UR-MCNC: NEGATIVE — SIGNIFICANT CHANGE UP
BUN SERPL-MCNC: 28 MG/DL — HIGH (ref 7–23)
CALCIUM SERPL-MCNC: 7.3 MG/DL — LOW (ref 8.4–10.5)
CHLORIDE SERPL-SCNC: 106 MMOL/L — SIGNIFICANT CHANGE UP (ref 96–108)
CO2 SERPL-SCNC: 19 MMOL/L — LOW (ref 22–31)
COLOR SPEC: YELLOW — SIGNIFICANT CHANGE UP
CREAT SERPL-MCNC: 1.2 MG/DL — SIGNIFICANT CHANGE UP (ref 0.5–1.3)
DIFF PNL FLD: ABNORMAL
EOSINOPHIL # BLD AUTO: 0.1 K/UL — SIGNIFICANT CHANGE UP (ref 0–0.5)
EOSINOPHIL NFR BLD AUTO: 3 % — SIGNIFICANT CHANGE UP (ref 0–6)
EPI CELLS # UR: 73 /HPF — HIGH
GLUCOSE BLDC GLUCOMTR-MCNC: 130 MG/DL — HIGH (ref 70–99)
GLUCOSE BLDC GLUCOMTR-MCNC: 154 MG/DL — HIGH (ref 70–99)
GLUCOSE BLDC GLUCOMTR-MCNC: 155 MG/DL — HIGH (ref 70–99)
GLUCOSE BLDC GLUCOMTR-MCNC: 166 MG/DL — HIGH (ref 70–99)
GLUCOSE BLDC GLUCOMTR-MCNC: 171 MG/DL — HIGH (ref 70–99)
GLUCOSE BLDC GLUCOMTR-MCNC: 175 MG/DL — HIGH (ref 70–99)
GLUCOSE SERPL-MCNC: 139 MG/DL — HIGH (ref 70–99)
GLUCOSE UR QL: ABNORMAL
HCT VFR BLD CALC: 34.7 % — SIGNIFICANT CHANGE UP (ref 34.5–45)
HCT VFR BLD CALC: 34.8 % — SIGNIFICANT CHANGE UP (ref 34.5–45)
HGB BLD-MCNC: 10.8 G/DL — LOW (ref 11.5–15.5)
HGB BLD-MCNC: 11.7 G/DL — SIGNIFICANT CHANGE UP (ref 11.5–15.5)
HYALINE CASTS # UR AUTO: 7 /LPF — HIGH (ref 0–2)
KETONES UR-MCNC: NEGATIVE — SIGNIFICANT CHANGE UP
LEUKOCYTE ESTERASE UR-ACNC: NEGATIVE — SIGNIFICANT CHANGE UP
LYMPHOCYTES # BLD AUTO: 1.4 K/UL — SIGNIFICANT CHANGE UP (ref 1–3.3)
LYMPHOCYTES # BLD AUTO: 5 % — LOW (ref 13–44)
MAGNESIUM SERPL-MCNC: 1.9 MG/DL — SIGNIFICANT CHANGE UP (ref 1.6–2.6)
MCHC RBC-ENTMCNC: 31.1 GM/DL — LOW (ref 32–36)
MCHC RBC-ENTMCNC: 31.3 PG — SIGNIFICANT CHANGE UP (ref 27–34)
MCHC RBC-ENTMCNC: 32.6 PG — SIGNIFICANT CHANGE UP (ref 27–34)
MCHC RBC-ENTMCNC: 33.7 GM/DL — SIGNIFICANT CHANGE UP (ref 32–36)
MCV RBC AUTO: 100.6 FL — HIGH (ref 80–100)
MCV RBC AUTO: 96.8 FL — SIGNIFICANT CHANGE UP (ref 80–100)
METAMYELOCYTES # FLD: 5 % — HIGH (ref 0–0)
MONOCYTES # BLD AUTO: 0.9 K/UL — SIGNIFICANT CHANGE UP (ref 0–0.9)
MONOCYTES NFR BLD AUTO: 3 % — SIGNIFICANT CHANGE UP (ref 2–14)
MYELOCYTES NFR BLD: 2 % — HIGH (ref 0–0)
NEUTROPHILS # BLD AUTO: 17.4 K/UL — HIGH (ref 1.8–7.4)
NEUTROPHILS NFR BLD AUTO: 78 % — HIGH (ref 43–77)
NEUTS BAND # BLD: 4 % — SIGNIFICANT CHANGE UP (ref 0–8)
NITRITE UR-MCNC: NEGATIVE — SIGNIFICANT CHANGE UP
PH UR: 6.5 — SIGNIFICANT CHANGE UP (ref 5–8)
PHOSPHATE SERPL-MCNC: 2.1 MG/DL — LOW (ref 2.5–4.5)
PLAT MORPH BLD: NORMAL — SIGNIFICANT CHANGE UP
PLATELET # BLD AUTO: 340 K/UL — SIGNIFICANT CHANGE UP (ref 150–400)
PLATELET # BLD AUTO: 408 K/UL — HIGH (ref 150–400)
POTASSIUM SERPL-MCNC: 4.4 MMOL/L — SIGNIFICANT CHANGE UP (ref 3.5–5.3)
POTASSIUM SERPL-SCNC: 4.4 MMOL/L — SIGNIFICANT CHANGE UP (ref 3.5–5.3)
PROT UR-MCNC: ABNORMAL
RBC # BLD: 3.45 M/UL — LOW (ref 3.8–5.2)
RBC # BLD: 3.59 M/UL — LOW (ref 3.8–5.2)
RBC # FLD: 13.9 % — SIGNIFICANT CHANGE UP (ref 10.3–14.5)
RBC # FLD: 13.9 % — SIGNIFICANT CHANGE UP (ref 10.3–14.5)
RBC BLD AUTO: SIGNIFICANT CHANGE UP
RBC CASTS # UR COMP ASSIST: 3 /HPF — SIGNIFICANT CHANGE UP (ref 0–4)
SODIUM SERPL-SCNC: 138 MMOL/L — SIGNIFICANT CHANGE UP (ref 135–145)
SP GR SPEC: 1.02 — SIGNIFICANT CHANGE UP (ref 1.01–1.02)
UROBILINOGEN FLD QL: NEGATIVE — SIGNIFICANT CHANGE UP
WBC # BLD: 17.02 K/UL — HIGH (ref 3.8–10.5)
WBC # BLD: 19.8 K/UL — HIGH (ref 3.8–10.5)
WBC # FLD AUTO: 17.02 K/UL — HIGH (ref 3.8–10.5)
WBC # FLD AUTO: 19.8 K/UL — HIGH (ref 3.8–10.5)
WBC UR QL: 12 /HPF — HIGH (ref 0–5)

## 2019-02-25 PROCEDURE — 99024 POSTOP FOLLOW-UP VISIT: CPT

## 2019-02-25 PROCEDURE — 99233 SBSQ HOSP IP/OBS HIGH 50: CPT

## 2019-02-25 PROCEDURE — 99232 SBSQ HOSP IP/OBS MODERATE 35: CPT

## 2019-02-25 RX ORDER — TRAMADOL HYDROCHLORIDE 50 MG/1
0.5 TABLET ORAL
Qty: 0 | Refills: 0 | DISCHARGE
Start: 2019-02-25

## 2019-02-25 RX ORDER — LEVOTHYROXINE SODIUM 125 MCG
1 TABLET ORAL
Qty: 0 | Refills: 0 | COMMUNITY

## 2019-02-25 RX ORDER — CIPROFLOXACIN LACTATE 400MG/40ML
250 VIAL (ML) INTRAVENOUS EVERY 12 HOURS
Qty: 0 | Refills: 0 | Status: DISCONTINUED | OUTPATIENT
Start: 2019-02-25 | End: 2019-02-26

## 2019-02-25 RX ADMIN — Medication 650 MILLIGRAM(S): at 19:46

## 2019-02-25 RX ADMIN — ATORVASTATIN CALCIUM 40 MILLIGRAM(S): 80 TABLET, FILM COATED ORAL at 22:42

## 2019-02-25 RX ADMIN — Medication 250 MILLIGRAM(S): at 22:42

## 2019-02-25 RX ADMIN — ENOXAPARIN SODIUM 30 MILLIGRAM(S): 100 INJECTION SUBCUTANEOUS at 22:50

## 2019-02-25 RX ADMIN — Medication 650 MILLIGRAM(S): at 18:46

## 2019-02-25 RX ADMIN — Medication 650 MILLIGRAM(S): at 05:32

## 2019-02-25 RX ADMIN — Medication 650 MILLIGRAM(S): at 12:40

## 2019-02-25 RX ADMIN — LISINOPRIL 40 MILLIGRAM(S): 2.5 TABLET ORAL at 05:33

## 2019-02-25 RX ADMIN — INSULIN GLARGINE 4 UNIT(S): 100 INJECTION, SOLUTION SUBCUTANEOUS at 22:50

## 2019-02-25 NOTE — PROGRESS NOTE ADULT - ASSESSMENT
85F with recurrent SBO, now s/p exlap, ATUL, repair of serosal tear (2/19). recovering well. +GIF.    PLAN:  - cont regular diet as tolerated  - monitor am labs, replete as needed  - lovenox for dvt ppx  - pain control as needed  - appreciate heme recs (pt follows with Dr. Hurley)   - dispo planning: subacute rehab    [facility]  [id number]

## 2019-02-25 NOTE — PROGRESS NOTE ADULT - PROBLEM SELECTOR PROBLEM 2
Controlled type 2 diabetes mellitus with hyperglycemia, unspecified whether long term insulin use Leukocytosis

## 2019-02-25 NOTE — PROGRESS NOTE ADULT - SUBJECTIVE AND OBJECTIVE BOX
Chief Complaint/Follow-up on: T2DM/hypothyroidism    Subjective: Pt found eating her lunch: chicken, mashed potatoes and string beans. She has a good appetite. Minimal abdominal pain. X-nita to rehab today.    MEDICATIONS  (STANDING):  acetaminophen   Tablet .. 650 milliGRAM(s) Oral every 6 hours  atorvastatin 40 milliGRAM(s) Oral at bedtime  dextrose 50% Injectable 12.5 Gram(s) IV Push once  dextrose 50% Injectable 25 Gram(s) IV Push once  dextrose 50% Injectable 25 Gram(s) IV Push once  enoxaparin Injectable 30 milliGRAM(s) SubCutaneous every 24 hours  insulin glargine Injectable (LANTUS) 4 Unit(s) SubCutaneous at bedtime  insulin lispro (HumaLOG) corrective regimen sliding scale   SubCutaneous three times a day before meals  insulin lispro (HumaLOG) corrective regimen sliding scale   SubCutaneous at bedtime  lisinopril 40 milliGRAM(s) Oral daily    MEDICATIONS  (PRN):  dextrose 40% Gel 15 Gram(s) Oral once PRN Blood Glucose LESS THAN 70 milliGRAM(s)/deciliter  glucagon  Injectable 1 milliGRAM(s) IntraMuscular once PRN Glucose LESS THAN 70 milligrams/deciliter  traMADol 25 milliGRAM(s) Oral every 6 hours PRN moderate and severe pain      PHYSICAL EXAM:  VITALS: T(C): 37 (02-25-19 @ 13:38)  T(F): 98.6 (02-25-19 @ 13:38), Max: 98.9 (02-24-19 @ 20:56)  HR: 80 (02-25-19 @ 13:38) (79 - 90)  BP: 146/98 (02-25-19 @ 13:38) (142/79 - 176/75)  RR:  (18 - 18)  SpO2:  (96% - 98%)  Wt(kg): --  GENERAL: NAD, well-groomed, well-developed  EYES: No proptosis, no injection  RESPIRATORY: Clear to auscultation bilaterally; No rales, rhonchi, wheezing, or rubs  CARDIOVASCULAR: Regular rate and rhythm; No murmurs  GI: Soft, nontender, non distended, normal bowel sounds    POCT Blood Glucose.: 166 mg/dL (02-25-19 @ 12:41)  POCT Blood Glucose.: 130 mg/dL (02-25-19 @ 08:42)  POCT Blood Glucose.: 154 mg/dL (02-25-19 @ 03:10)  POCT Blood Glucose.: 206 mg/dL (02-24-19 @ 21:50)  POCT Blood Glucose.: 133 mg/dL (02-24-19 @ 18:10)  POCT Blood Glucose.: 173 mg/dL (02-24-19 @ 12:42)  POCT Blood Glucose.: 154 mg/dL (02-24-19 @ 09:39)  POCT Blood Glucose.: 178 mg/dL (02-24-19 @ 03:26)  POCT Blood Glucose.: 229 mg/dL (02-23-19 @ 21:41)  POCT Blood Glucose.: 269 mg/dL (02-23-19 @ 18:25)  POCT Blood Glucose.: 225 mg/dL (02-23-19 @ 13:33)  POCT Blood Glucose.: 177 mg/dL (02-23-19 @ 08:45)  POCT Blood Glucose.: 186 mg/dL (02-23-19 @ 02:50)  POCT Blood Glucose.: 236 mg/dL (02-22-19 @ 21:37)  POCT Blood Glucose.: 160 mg/dL (02-22-19 @ 18:15)    02-25    138  |  106  |  28<H>  ----------------------------<  139<H>  4.4   |  19<L>  |  1.20    EGFR if : 48<L>  EGFR if non : 41<L>    Ca    7.3<L>      02-25  Mg     1.9     02-25  Phos  2.1     02-25            Thyroid Function Tests:  02-24 @ 09:56 FreeT4 1.6   02-21 @ 22:10 FreeT4 3.0       Hemoglobin A1C, Whole Blood: 8.6 % <H> [4.0 - 5.6] (02-21-19 @ 18:11)

## 2019-02-25 NOTE — DISCHARGE NOTE ADULT - PLAN OF CARE
recovering from surgery 1. Activity-Ambulate as tolerated, no heavy lifting   2.  Diet- Low fiber diet  3.  Please follow up within 1 week- Call sooner with worsening abdominal pain, nausea, vomiting, excessive diarrhea,  fever, chills, or inability to tolerate food or liquids Endocrinology team was consulted while you were in the hospital. Changes were made to your medication for hypothyroidism. You will now be taking Levothyroxine 112 mcg daily. You have a new appointment set up with Dr. Lyman at [address] [place] [address] on March 29th at 2:15. If you can not make this appointment, please call to reschedule the appointment. Please follow up with Dr. Hurley at the [facility]. You have an appointment scheduled in the system on March 7th at 2:20 PM. If you can not make this appointment, please call to reschedule. You have a new appointment set up with Dr. Lyman at [address] [place] [address] on March 29th at 2:15. If you can not make this appointment, please call to reschedule the appointment. 1. Activity-Ambulate as tolerated, no heavy lifting   2.  Diet- Low fiber diet  3.  Please follow up in 1 week with Dr. Beltre- Call sooner with worsening abdominal pain, nausea, vomiting, excessive diarrhea,  fever, chills, or inability to tolerate food or liquids Please follow up with Dr. Hurley/ Dr. Chevy Chu  at the [facility]. You have an appointment scheduled in the system on March 7th at 2:20 PM. They will also follow up blood clots and the [facility] you were [facility] You have a new appointment set up with Dr. Lyman at [address] [place] [address] on March 29th at 2:15. If you can not make this appointment, please call to reschedule the appointment.    metformin 500mg twice daily blood thinner - Eliquis Eliquis 5mg twice daily for 7 days to start 2/26/19   then decrease dose Eliquis 2.5mg twice daily   follow up with hematology Dr. Chu - will determine duration of treating clots Eliquis 10mg twice daily for 7 days to start 2/26/19   then decrease dose Eliquis 5mg twice daily 3/5/19  follow up with hematology Dr. Chu - will determine duration of treating clots

## 2019-02-25 NOTE — PROGRESS NOTE ADULT - PROBLEM SELECTOR PLAN 2
-Discharge on Synthroid 112mcg po qdaily - should be given an hour before other meds and food. Recheck TFTs in 4-6 weeks.  Evangelina Jacinto MD  Days: [phone]  Nights/weekends: [phone]

## 2019-02-25 NOTE — PROGRESS NOTE ADULT - ASSESSMENT
84 y/o F with PMH HTN, hypothyroidism HLD, DM2 A1C 8.6, osteoporosis, CVA, DVT, Polycythemia vera (on hydroxyurea), SBO x 2, here hyperglycemia in setting of small bowel obstruction now s/p exlap, ATUL, repair of serosal tear, endocrine following for elevated bs and abnl TFTs.

## 2019-02-25 NOTE — PROGRESS NOTE ADULT - PROBLEM SELECTOR PLAN 4
- cont lisinopril 40mg po daily Pt s/p therapeutic phlebotomy during this admission. Leukocytosis increasing without overt s/s or infection- suspect 2/2 hydrea being held  - H/O to advise on when to resume hydrea

## 2019-02-25 NOTE — PROGRESS NOTE ADULT - PROBLEM SELECTOR PROBLEM 3
Polycythemia vera Controlled type 2 diabetes mellitus with hyperglycemia, unspecified whether long term insulin use

## 2019-02-25 NOTE — PROGRESS NOTE ADULT - SUBJECTIVE AND OBJECTIVE BOX
General Surgery Progress Note    SUBJECTIVE:  The patient was seen and examined. No acute events overnight. +GIF. Tolerating diet. No complaints    OBJECTIVE:     ** VITAL SIGNS / I&O's **    Vital Signs Last 24 Hrs  T(C): 36.9 (25 Feb 2019 01:22), Max: 37.2 (24 Feb 2019 20:56)  T(F): 98.4 (25 Feb 2019 01:22), Max: 98.9 (24 Feb 2019 20:56)  HR: 81 (25 Feb 2019 01:22) (77 - 90)  BP: 143/78 (25 Feb 2019 01:22) (128/83 - 165/95)  BP(mean): --  RR: 18 (25 Feb 2019 01:22) (18 - 18)  SpO2: 96% (25 Feb 2019 01:22) (96% - 99%)      24 Feb 2019 07:01  -  25 Feb 2019 07:00  --------------------------------------------------------  IN:    Oral Fluid: 520 mL    Solution: 100 mL  Total IN: 620 mL    OUT:  Total OUT: 0 mL    Total NET: 620 mL          ** PHYSICAL EXAM **    -- CONSTITUTIONAL: Alert, NAD  -- PULMONARY: non-labored respirations  -- ABDOMEN: soft, non-distended, mildly tender      ** LABS **                          10.5   14.45 )-----------( 262      ( 24 Feb 2019 10:20 )             34.2     24 Feb 2019 07:32    143    |  109    |  33     ----------------------------<  184    4.9     |  21     |  1.36     Ca    7.2        24 Feb 2019 07:32  Phos  5.9       24 Feb 2019 07:32  Mg     1.6       24 Feb 2019 07:32        CAPILLARY BLOOD GLUCOSE      POCT Blood Glucose.: 154 mg/dL (25 Feb 2019 03:10)  POCT Blood Glucose.: 206 mg/dL (24 Feb 2019 21:50)  POCT Blood Glucose.: 133 mg/dL (24 Feb 2019 18:10)  POCT Blood Glucose.: 173 mg/dL (24 Feb 2019 12:42)  POCT Blood Glucose.: 154 mg/dL (24 Feb 2019 09:39)                MEDICATIONS  (STANDING):  acetaminophen   Tablet .. 650 milliGRAM(s) Oral every 6 hours  atorvastatin 40 milliGRAM(s) Oral at bedtime  dextrose 50% Injectable 12.5 Gram(s) IV Push once  dextrose 50% Injectable 25 Gram(s) IV Push once  dextrose 50% Injectable 25 Gram(s) IV Push once  enoxaparin Injectable 30 milliGRAM(s) SubCutaneous every 24 hours  insulin glargine Injectable (LANTUS) 4 Unit(s) SubCutaneous at bedtime  insulin lispro (HumaLOG) corrective regimen sliding scale   SubCutaneous three times a day before meals  insulin lispro (HumaLOG) corrective regimen sliding scale   SubCutaneous at bedtime  lisinopril 40 milliGRAM(s) Oral daily    MEDICATIONS  (PRN):  dextrose 40% Gel 15 Gram(s) Oral once PRN Blood Glucose LESS THAN 70 milliGRAM(s)/deciliter  glucagon  Injectable 1 milliGRAM(s) IntraMuscular once PRN Glucose LESS THAN 70 milligrams/deciliter  traMADol 25 milliGRAM(s) Oral every 6 hours PRN moderate and severe pain

## 2019-02-25 NOTE — DISCHARGE NOTE ADULT - ADDITIONAL INSTRUCTIONS
Please follow with Dr. Beltre within 1-2 weeks of discharge from the hospital.   Follow-up:   Dr. Lyman- March 29th at 2:15  Dr. Hurley- March 7th at 2:20 PM  Primary Care Doctor- please follow up within 2 weeks of discharge from the hospital.

## 2019-02-25 NOTE — PROGRESS NOTE ADULT - PROBLEM SELECTOR PLAN 1
s/p OR for lysis of adhesions and repair of serosal tear. Pt stable, tolerating PO, + flatus and BM. Improved  - cont pain control and care as per surgery

## 2019-02-25 NOTE — DISCHARGE NOTE ADULT - MEDICATION SUMMARY - MEDICATIONS TO TAKE
I will START or STAY ON the medications listed below when I get home from the hospital:    acetaminophen 325 mg oral tablet  -- 2 tab(s) by mouth every 6 hours, As needed, pain  -- Indication: For Pain     aspirin 81 mg oral tablet, chewable  -- 1 tab(s) by mouth once a day  -- Indication: For Polycythemia vera    traMADol 50 mg oral tablet  -- 0.5 tab(s) by mouth every 6 hours, As needed, moderate and severe pain  -- Indication: For Pain     lisinopril 40 mg oral tablet  -- 1 tab(s) by mouth once a day  -- Indication: For Hypertension     metFORMIN 500 mg oral tablet  -- 1 tab(s) by mouth 2 times a day  -- Indication: For Controlled type 2 diabetes mellitus with hyperglycemia, unspecified whether long term insulin use    atorvastatin 40 mg oral tablet  -- 1 tab(s) by mouth once a day (at bedtime)  -- Indication: For Hyperlipidemia, unspecified hyperlipidemia type    hydroxyurea 500 mg oral capsule  -- 1 cap(s) by mouth once a day  -- Indication: For Polycythemia vera    alendronate 70 mg oral tablet  -- 1 tab(s) by mouth once a week ( wednesdays )  -- Indication: For Osteoporosis     Artificial Tears ophthalmic solution  -- 1 drop(s) to each affected eye 2 times a day  -- Indication: For eye care     levothyroxine 112 mcg (0.112 mg) oral tablet  -- 1 tab(s) by mouth once a day  -- Indication: For Hypothyroidism     Vitamin D3 2000 intl units oral capsule  -- 1 cap(s) by mouth 2 times a day  -- Indication: For vitamin I will START or STAY ON the medications listed below when I get home from the hospital:    acetaminophen 325 mg oral tablet  -- 2 tab(s) by mouth every 6 hours, As needed, pain  -- Indication: For Pain     aspirin 81 mg oral tablet, chewable  -- 1 tab(s) by mouth once a day  -- Indication: For Polycythemia vera    traMADol 50 mg oral tablet  -- 0.5 tab(s) by mouth every 6 hours, As needed, moderate and severe pain  -- Indication: For Pain     lisinopril 40 mg oral tablet  -- 1 tab(s) by mouth once a day  -- Indication: For Hypertension     apixaban 5 mg oral tablet  -- 1 tab(s) by mouth every 12 hours for 7 days then change to 2.5mg every 12 hours   -- Indication: For Dvt    metFORMIN 500 mg oral tablet  -- 1 tab(s) by mouth 2 times a day  -- Indication: For Controlled type 2 diabetes mellitus with hyperglycemia, unspecified whether long term insulin use    atorvastatin 40 mg oral tablet  -- 1 tab(s) by mouth once a day (at bedtime)  -- Indication: For Hyperlipidemia, unspecified hyperlipidemia type    hydroxyurea 500 mg oral capsule  -- 1 cap(s) by mouth once a day  -- Indication: For Polycythemia vera    alendronate 70 mg oral tablet  -- 1 tab(s) by mouth once a week ( wednesdays )  -- Indication: For Osteoporosis     Artificial Tears ophthalmic solution  -- 1 drop(s) to each affected eye 2 times a day  -- Indication: For eye care     levothyroxine 112 mcg (0.112 mg) oral tablet  -- 1 tab(s) by mouth once a day  -- Indication: For Hypothyroidism     Vitamin D3 2000 intl units oral capsule  -- 1 cap(s) by mouth 2 times a day  -- Indication: For vitamin I will START or STAY ON the medications listed below when I get home from the hospital:    acetaminophen 325 mg oral tablet  -- 2 tab(s) by mouth every 6 hours, As needed, pain  -- Indication: For Pain     aspirin 81 mg oral tablet, chewable  -- 1 tab(s) by mouth once a day  -- Indication: For Polycythemia vera    traMADol 50 mg oral tablet  -- 0.5 tab(s) by mouth every 6 hours, As needed, moderate and severe pain  -- Indication: For Pain     lisinopril 40 mg oral tablet  -- 1 tab(s) by mouth once a day  -- Indication: For Hypertension     apixaban 5 mg oral tablet  -- 2 tab(s) by mouth every 12 hours  -- Indication: For new DVTs    apixaban 5 mg oral tablet  -- 1 tab(s) by mouth every 12 hours  -- Indication: For new DVTs    metFORMIN 500 mg oral tablet  -- 1 tab(s) by mouth 2 times a day  -- Indication: For Controlled type 2 diabetes mellitus with hyperglycemia, unspecified whether long term insulin use    atorvastatin 40 mg oral tablet  -- 1 tab(s) by mouth once a day (at bedtime)  -- Indication: For Hyperlipidemia, unspecified hyperlipidemia type    hydroxyurea 500 mg oral capsule  -- 1 cap(s) by mouth once a day  -- Indication: For Polycythemia vera    alendronate 70 mg oral tablet  -- 1 tab(s) by mouth once a week ( wednesdays )  -- Indication: For Osteoporosis     Artificial Tears ophthalmic solution  -- 1 drop(s) to each affected eye 2 times a day  -- Indication: For eye care     levothyroxine 112 mcg (0.112 mg) oral tablet  -- 1 tab(s) by mouth once a day  -- Indication: For Hypothyroidism     Vitamin D3 2000 intl units oral capsule  -- 1 cap(s) by mouth 2 times a day  -- Indication: For vitamin I will START or STAY ON the medications listed below when I get home from the hospital:    acetaminophen 325 mg oral tablet  -- 2 tab(s) by mouth every 6 hours, As needed, pain  -- Indication: For Pain     aspirin 81 mg oral tablet, chewable  -- 1 tab(s) by mouth once a day  -- Indication: For Polycythemia vera    traMADol 50 mg oral tablet  -- 0.5 tab(s) by mouth every 6 hours, As needed, moderate and severe pain  -- Indication: For Pain     lisinopril 40 mg oral tablet  -- 1 tab(s) by mouth once a day  -- Indication: For Hypertension     apixaban 5 mg oral tablet  -- 1 tab(s) by mouth every 12 hours to start march 5, 2019  -- Indication: For blood clots to Start on march 5 after 7 days of 10mg twice daily     apixaban 5 mg oral tablet  -- 2 tab(s)  (10mg) by mouth every 12 hours for 7 days   -- Indication: For Blood clots for first 7 days of treatment     metFORMIN 500 mg oral tablet  -- 1 tab(s) by mouth 2 times a day  -- Indication: For Controlled type 2 diabetes mellitus with hyperglycemia, unspecified whether long term insulin use    atorvastatin 40 mg oral tablet  -- 1 tab(s) by mouth once a day (at bedtime)  -- Indication: For Hyperlipidemia, unspecified hyperlipidemia type    hydroxyurea 500 mg oral capsule  -- 1 cap(s) by mouth once a day  -- Indication: For Polycythemia vera    alendronate 70 mg oral tablet  -- 1 tab(s) by mouth once a week ( wednesdays )  -- Indication: For Osteoporosis     Artificial Tears ophthalmic solution  -- 1 drop(s) to each affected eye 2 times a day  -- Indication: For eye care     levothyroxine 112 mcg (0.112 mg) oral tablet  -- 1 tab(s) by mouth once a day  -- Indication: For Hypothyroidism     Vitamin D3 2000 intl units oral capsule  -- 1 cap(s) by mouth 2 times a day  -- Indication: For vitamin

## 2019-02-25 NOTE — DISCHARGE NOTE ADULT - HOSPITAL COURSE
84 yo F with PMH HTN, DM, CVA, DVT, Polycythemia vera (on hydroxyurea), SBO x 2, PSH hysterectomy, presenting with daughter at bedside c/o abd pain/n/v x 2 days. Pt reports sharp epigastric pain radiating down entire L side of abd to suprapubic area began after breakfast yesterday with associated nausea and 1 episode of nb/nb vomiting. Patient admitted with a small bowel obstruction. Nasogastric tube placed for decompression. HOD 1 (2/15) medicine consulted for medical co-management. Hematology/oncology consulted for history of polycythemia vera. Patient received phlebotomy once today. HOD 2 (2/16) continued nasogastric tube decompression with a possible resolving SBO, patient with questionable flatus. HOD 3 (2/17) endocrinology consulted for hyperglycemia. Patient endorses flatus and bowel movements. Repeat abdominal x-ray still with dilated loops of small bowel. Patient removed NGT x 3 times. HOD 4 (2/18) patient remains to have a persistent SBO and if abdominal x-ray shows persistent SBO will need surgical intervention.  HOD 5 (2/19) Patient taken to the OR for an exploratory laparotomy, lysis of adhesions and serosal tear repair. HOD 6 (2/20) patient doing well post operatively and transferred out of the PACU to the surgical floor with NGT in place. HOD 7 (2/21) underwent a nasogastric tube clamp trial and then the NGT was discontinued. Right arm midline was removed. Patient was started on a clear liquid diet. HOD 8 (2/22) patient tolerating a clear liquid diet and was changed to a regular diet, and had a bowel movement and flatus. HOD 9/10 (2/23-2/24) Patient tolerating a regular diet. HOD 11 (2/25) Patient cleared for discharge. Final endocrine recommendations are to decrease levothyroxine to 112 mcg daily and continue with metformin 500 mg BID.   While the patient was in the hospital, labs were checked on daily basis and vitals were checked every 4 hours.   At the time of discharge, the patient was hemodynamically stable, was tolerating PO diet, was voiding urine and passing stool, was ambulating, and was comfortable with adequate pain control. The patient was instructed to follow up with Dr. Beltre within 1-2 weeks after discharge from the hospital. The patient felt comfortable with discharge. The patient was discharged to rehab. The patient had no other issues. 84 yo F with PMH HTN, DM, CVA, DVT, Polycythemia vera (on hydroxyurea), SBO x 2, PSH hysterectomy, presenting with daughter at bedside c/o abd pain/n/v x 2 days. Pt reports sharp epigastric pain radiating down entire L side of abd to suprapubic area began after breakfast yesterday with associated nausea and 1 episode of nb/nb vomiting. Patient admitted with a small bowel obstruction. Nasogastric tube placed for decompression. HOD 1 (2/15) medicine consulted for medical co-management. Hematology/oncology consulted for history of polycythemia vera. Patient received phlebotomy once today. HOD 2 (2/16) continued nasogastric tube decompression with a possible resolving SBO, patient with questionable flatus. HOD 3 (2/17) endocrinology consulted for hyperglycemia. Patient endorses flatus and bowel movements. Repeat abdominal x-ray still with dilated loops of small bowel. Patient removed NGT x 3 times. HOD 4 (2/18) patient remains to have a persistent SBO and if abdominal x-ray shows persistent SBO will need surgical intervention.  HOD 5 (2/19) Patient taken to the OR for an exploratory laparotomy, lysis of adhesions and serosal tear repair. HOD 6 (2/20) patient doing well post operatively and transferred out of the PACU to the surgical floor with NGT in place. HOD 7 (2/21) underwent a nasogastric tube clamp trial and then the NGT was discontinued. Right arm midline was removed. Patient was started on a clear liquid diet. HOD 8 (2/22) patient tolerating a clear liquid diet and was changed to a regular diet, and had a bowel movement and flatus. HOD 9/10 (2/23-2/24) Patient tolerating a regular diet. HOD 11 (2/25) Patient cleared for discharge. Final endocrine recommendations are to decrease levothyroxine to 112 mcg daily and continue with metformin 500 mg BID.   While the patient was in the hospital, labs were checked on daily basis and vitals were checked every 4 hours.   At the time of discharge, the patient was hemodynamically stable, was tolerating PO diet, was voiding urine and passing stool, was ambulating, and was comfortable with adequate pain control. The patient was instructed to follow up with Dr. Beltre within 1-2 weeks after discharge from the hospital. The patient felt comfortable with discharge. The patient was discharged to rehab. The patient had no other issues.  Pt with persistent leukocytosis, no fever UA/ UCx sent, [facility] duplex done work up negative believed to be from holding Hydroxyurea and at time of discharge ok to resume 84 yo F with PMH HTN, DM, CVA, DVT, Polycythemia vera (on hydroxyurea), SBO x 2, PSH hysterectomy, presenting with daughter at bedside c/o abd pain/n/v x 2 days. Pt reports sharp epigastric pain radiating down entire L side of abd to suprapubic area began after breakfast yesterday with associated nausea and 1 episode of nb/nb vomiting. Patient admitted with a small bowel obstruction. Nasogastric tube placed for decompression. HOD 1 (2/15) medicine consulted for medical co-management. Hematology/oncology consulted for history of polycythemia vera. Patient received phlebotomy once today. HOD 2 (2/16) continued nasogastric tube decompression with a possible resolving SBO, patient with questionable flatus. HOD 3 (2/17) endocrinology consulted for hyperglycemia. Patient endorses flatus and bowel movements. Repeat abdominal x-ray still with dilated loops of small bowel. Patient removed NGT x 3 times. HOD 4 (2/18) patient remains to have a persistent SBO and if abdominal x-ray shows persistent SBO will need surgical intervention.  HOD 5 (2/19) Patient taken to the OR for an exploratory laparotomy, lysis of adhesions and serosal tear repair. HOD 6 (2/20) patient doing well post operatively and transferred out of the PACU to the surgical floor with NGT in place. HOD 7 (2/21) underwent a nasogastric tube clamp trial and then the NGT was discontinued. Right arm midline was removed. Patient was started on a clear liquid diet. HOD 8 (2/22) patient tolerating a clear liquid diet and was changed to a regular diet, and had a bowel movement and flatus. HOD 9/10 (2/23-2/24) Patient tolerating a regular diet. HOD 11 (2/25) Patient cleared for discharge. Final endocrine recommendations are to decrease levothyroxine to 112 mcg daily and continue with metformin 500 mg BID.   While the patient was in the hospital, labs were checked on daily basis and vitals were checked every 4 hours.   At the time of discharge, the patient was hemodynamically stable, was tolerating PO diet, was voiding urine and passing stool, was ambulating, and was comfortable with adequate pain control. The patient was instructed to follow up with Dr. Beltre within 1-2 weeks after discharge from the hospital. The patient felt comfortable with discharge. The patient was discharged to rehab. The patient had no other issues.  Pt with persistent leukocytosis, no fever UA/ UCx sent, VA duplex believed to be from holding Hydroxyurea and at time of discharge ok to resume   VA duplex showed 86 yo F with PMH HTN, DM, CVA, DVT, Polycythemia vera (on hydroxyurea), SBO x 2, PSH hysterectomy, presenting with daughter at bedside c/o abd pain/n/v x 2 days. Pt reports sharp epigastric pain radiating down entire L side of abd to suprapubic area began after breakfast yesterday with associated nausea and 1 episode of nb/nb vomiting. Patient admitted with a small bowel obstruction. Nasogastric tube placed for decompression. HOD 1 (2/15) medicine consulted for medical co-management. Hematology/oncology consulted for history of polycythemia vera. Patient received phlebotomy once today. HOD 2 (2/16) continued nasogastric tube decompression with a possible resolving SBO, patient with questionable flatus. HOD 3 (2/17) endocrinology consulted for hyperglycemia. Patient endorses flatus and bowel movements. Repeat abdominal x-ray still with dilated loops of small bowel. Patient removed NGT x 3 times. HOD 4 (2/18) patient remains to have a persistent SBO and if abdominal x-ray shows persistent SBO will need surgical intervention.  HOD 5 (2/19) Patient taken to the OR for an exploratory laparotomy, lysis of adhesions and serosal tear repair. HOD 6 (2/20) patient doing well post operatively and transferred out of the PACU to the surgical floor with NGT in place. HOD 7 (2/21) underwent a nasogastric tube clamp trial and then the NGT was discontinued. Right arm midline was removed. Patient was started on a clear liquid diet. HOD 8 (2/22) patient tolerating a clear liquid diet and was changed to a regular diet, and had a bowel movement and flatus. HOD 9/10 (2/23-2/24) Patient tolerating a regular diet. HOD 11 (2/25) Patient cleared for discharge. Final endocrine recommendations are to decrease levothyroxine to 112 mcg daily and continue with metformin 500 mg BID.   While the patient was in the hospital, labs were checked on daily basis and vitals were checked every 4 hours.   At the time of discharge, the patient was hemodynamically stable, was tolerating PO diet, was voiding urine and passing stool, was ambulating, and was comfortable with adequate pain control. The patient was instructed to follow up with Dr. Beltre within 1-2 weeks after discharge from the hospital. The patient felt comfortable with discharge. The patient was discharged to rehab. The patient had no other issues.  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 medicine had convo with hem/onc plan for eliquis 5mg bid for 7 days then 2.5mg po daily - duration to be determined by hematology

## 2019-02-25 NOTE — PROGRESS NOTE ADULT - SUBJECTIVE AND OBJECTIVE BOX
Hospitalist- Genaro Bolden MD  Pager: [phone]  If no response or off-hours, page [phone]  -------------------------------------  Patient is a 85y old  Female who presents with a chief complaint of SBO (25 Feb 2019 11:02)  Subjective: no acute events overnight. Today, patient reports she is 'ok,' states abd pain is improved. +flatus and BM, tolerating PO. No fevers/chills. NO N/V, no sob/cough.      14 point ros otherwise negative.     VITAL SIGNS:  Vital Signs Last 24 Hrs  T(C): 37 (25 Feb 2019 13:38), Max: 37.2 (24 Feb 2019 20:56)  T(F): 98.6 (25 Feb 2019 13:38), Max: 98.9 (24 Feb 2019 20:56)  HR: 80 (25 Feb 2019 13:38) (79 - 90)  BP: 146/98 (25 Feb 2019 13:38) (142/79 - 176/75)  BP(mean): --  RR: 18 (25 Feb 2019 13:38) (18 - 18)  SpO2: 97% (25 Feb 2019 13:38) (96% - 98%)      PHYSICAL EXAM:     GENERAL: no acute distress  HEENT: PERRLA, EOMI, moist oropharynx   RESPIRATORY: CTAB, no w/c   CARDIOVASCULAR: RRR, no murmurs, gallops, rubs  ABDOMINAL: soft, non-tender, non-distended, positive bowel sounds   EXTREMITIES: no clubbing, cyanosis, or edema  NEUROLOGICAL: alert and oriented x 2, non-focal  SKIN: no rashes or lesions   MUSCULOSKELETAL: no gross joint deformity                          10.8   17.02 )-----------( 340      ( 25 Feb 2019 10:37 )             34.7     02-25    138  |  106  |  28<H>  ----------------------------<  139<H>  4.4   |  19<L>  |  1.20    Ca    7.3<L>      25 Feb 2019 07:40  Phos  2.1     02-25  Mg     1.9     02-25        CAPILLARY BLOOD GLUCOSE      POCT Blood Glucose.: 166 mg/dL (25 Feb 2019 12:41)  POCT Blood Glucose.: 130 mg/dL (25 Feb 2019 08:42)  POCT Blood Glucose.: 154 mg/dL (25 Feb 2019 03:10)  POCT Blood Glucose.: 206 mg/dL (24 Feb 2019 21:50)  POCT Blood Glucose.: 133 mg/dL (24 Feb 2019 18:10)      MEDICATIONS  (STANDING):  acetaminophen   Tablet .. 650 milliGRAM(s) Oral every 6 hours  atorvastatin 40 milliGRAM(s) Oral at bedtime  dextrose 50% Injectable 12.5 Gram(s) IV Push once  dextrose 50% Injectable 25 Gram(s) IV Push once  dextrose 50% Injectable 25 Gram(s) IV Push once  enoxaparin Injectable 30 milliGRAM(s) SubCutaneous every 24 hours  insulin glargine Injectable (LANTUS) 4 Unit(s) SubCutaneous at bedtime  insulin lispro (HumaLOG) corrective regimen sliding scale   SubCutaneous three times a day before meals  insulin lispro (HumaLOG) corrective regimen sliding scale   SubCutaneous at bedtime  lisinopril 40 milliGRAM(s) Oral daily    MEDICATIONS  (PRN):  dextrose 40% Gel 15 Gram(s) Oral once PRN Blood Glucose LESS THAN 70 milliGRAM(s)/deciliter  glucagon  Injectable 1 milliGRAM(s) IntraMuscular once PRN Glucose LESS THAN 70 milligrams/deciliter  traMADol 25 milliGRAM(s) Oral every 6 hours PRN moderate and severe pain Hospitalist- Genaro Bolden MD  Pager: [phone]  If no response or off-hours, page [phone]  -------------------------------------  Patient is a 85y old  Female who presents with a chief complaint of SBO (25 Feb 2019 11:02)  Subjective: no acute events overnight. Today, patient reports she is 'ok,' states abd pain is improved. +flatus and BM, tolerating PO. No fevers/chills. NO N/V, no sob/cough.    14 point ros otherwise negative.     VITAL SIGNS:  Vital Signs Last 24 Hrs  T(C): 37 (25 Feb 2019 13:38), Max: 37.2 (24 Feb 2019 20:56)  T(F): 98.6 (25 Feb 2019 13:38), Max: 98.9 (24 Feb 2019 20:56)  HR: 80 (25 Feb 2019 13:38) (79 - 90)  BP: 146/98 (25 Feb 2019 13:38) (142/79 - 176/75)  BP(mean): --  RR: 18 (25 Feb 2019 13:38) (18 - 18)  SpO2: 97% (25 Feb 2019 13:38) (96% - 98%)      PHYSICAL EXAM:     GENERAL: no acute distress  HEENT: PERRLA, EOMI, moist oropharynx   RESPIRATORY: CTAB, no w/c   CARDIOVASCULAR: RRR, no murmurs, gallops, rubs  ABDOMINAL: soft, non-tender, non-distended, positive bowel sounds   EXTREMITIES: no clubbing, cyanosis, or edema  NEUROLOGICAL: alert and oriented x 2, non-focal  SKIN: no rashes or lesions   MUSCULOSKELETAL: no gross joint deformity                          10.8   17.02 )-----------( 340      ( 25 Feb 2019 10:37 )             34.7     02-25    138  |  106  |  28<H>  ----------------------------<  139<H>  4.4   |  19<L>  |  1.20    Ca    7.3<L>      25 Feb 2019 07:40  Phos  2.1     02-25  Mg     1.9     02-25    CAPILLARY BLOOD GLUCOSE    POCT Blood Glucose.: 166 mg/dL (25 Feb 2019 12:41)  POCT Blood Glucose.: 130 mg/dL (25 Feb 2019 08:42)  POCT Blood Glucose.: 154 mg/dL (25 Feb 2019 03:10)  POCT Blood Glucose.: 206 mg/dL (24 Feb 2019 21:50)  POCT Blood Glucose.: 133 mg/dL (24 Feb 2019 18:10)    MEDICATIONS  (STANDING):  acetaminophen   Tablet .. 650 milliGRAM(s) Oral every 6 hours  atorvastatin 40 milliGRAM(s) Oral at bedtime  dextrose 50% Injectable 12.5 Gram(s) IV Push once  dextrose 50% Injectable 25 Gram(s) IV Push once  dextrose 50% Injectable 25 Gram(s) IV Push once  enoxaparin Injectable 30 milliGRAM(s) SubCutaneous every 24 hours  insulin glargine Injectable (LANTUS) 4 Unit(s) SubCutaneous at bedtime  insulin lispro (HumaLOG) corrective regimen sliding scale   SubCutaneous three times a day before meals  insulin lispro (HumaLOG) corrective regimen sliding scale   SubCutaneous at bedtime  lisinopril 40 milliGRAM(s) Oral daily    MEDICATIONS  (PRN):  dextrose 40% Gel 15 Gram(s) Oral once PRN Blood Glucose LESS THAN 70 milliGRAM(s)/deciliter  glucagon  Injectable 1 milliGRAM(s) IntraMuscular once PRN Glucose LESS THAN 70 milligrams/deciliter  traMADol 25 milliGRAM(s) Oral every 6 hours PRN moderate and severe pain

## 2019-02-25 NOTE — DISCHARGE NOTE ADULT - NS AS ACTIVITY OBS
No Heavy lifting/straining/Do not make important decisions/Do not drive or operate machinery/while taking pain medications

## 2019-02-25 NOTE — DISCHARGE NOTE ADULT - CARE PROVIDER_API CALL
Scot Beltre (MD)  Surgery; Surgical Critical Care  [address] [place] [address]  Phone: [phone]  Fax: [phone]  Follow Up Time:     Amber Lyman ()  Internal Medicine  [address] [place] [address] [address]  [place] [address]  Phone: [phone]  Fax: [phone]  Follow Up Time:     Frank Hurley)  Internal Medicine  [address] [place] [address]  Phone: [phone]  Fax: [phone]  Follow Up Time: Scot Beltre (MD)  Surgery; Surgical Critical Care  [address] [place] [address]  Phone: [phone]  Fax: [phone]  Follow Up Time:     Amber Lyman ()  Internal Medicine  [address], [address]  [place] [address]  Phone: [phone]  Fax: [phone]  Follow Up Time:     Frank Hurley (MD)  Internal Medicine  [address] [place] [address]  Phone: [phone]  Fax: [phone]  Follow Up Time:     Chevy Chu)  Hematology; Medical Oncology  [address] [place] [address]  Phone: [phone]  Fax: [phone]  Follow Up Time:

## 2019-02-25 NOTE — DISCHARGE NOTE ADULT - CARE PROVIDERS DIRECT ADDRESSES
[email or web address],DirectAddress_Unknown [email or web address]_[email or web address]_Unknown,[email or web address]

## 2019-02-25 NOTE — CHART NOTE - NSCHARTNOTEFT_GEN_A_CORE
Discussed with Dr. Stanley's final endocrine recommendations for discharge.   Hypothyroidism: Levothyroxine 112 mcg daily PO  DM: Metformin 500 mg BID PO   Patient can follow up outpatient with Dr. Lyman. Appointment rescheduled for March 29th at 2:15 PM.   Danielle Porter PA-C   [id number]

## 2019-02-25 NOTE — PROGRESS NOTE ADULT - PROBLEM SELECTOR PLAN 3
Pt s/p therapeutic phlebotomy during this admission. Leukocytosis increasing without overt s/s or infection- suspect 2/2 hydrea being held  - H/O to advise on when to resume hydrea - cont lantus 4 units nightly and sliding scale with fingerstick monitoring for now  Endocrine recs appreciated- pt to start metformin 500mg po bid upon discharge.

## 2019-02-25 NOTE — PROGRESS NOTE ADULT - PROBLEM SELECTOR PLAN 1
Dispo: Pt with CrCl above 30 but less than 50, so discharge on metformin 500mg po bid, cannot go above that dose.   Outpatient follow up with Dr. Mary Lyman 3/29/18 at 215PM at [address] [facility] [address], [place] [address] - [phone]  inform endocrine of dc planning for final dc reccs.

## 2019-02-25 NOTE — PROGRESS NOTE ADULT - PROBLEM SELECTOR PLAN 2
- cont lantus 4 units nightly and sliding scale with fingerstick monitoring for now  Endocrine recs appreciated- pt to start metformin 500mg po bid upon discharge. uptrending white count without fevers or any localizing signs/symptoms. Possibly reactive from recent surgery vs. ?effect of witholding hydrea vs. must consider occult infection  - f/u diff on am's cbc  - send UA- if positive, would send uculture  - H/O to advise re: hydrea

## 2019-02-25 NOTE — DISCHARGE NOTE ADULT - PROVIDER TOKENS
PROVIDER:[TOKEN:[[id number]]],PROVIDER:[TOKEN:[[id number]]],PROVIDER:[TOKEN:[[id number]]] PROVIDER:[TOKEN:[[id number]]],PROVIDER:[TOKEN:[[id number]]],PROVIDER:[TOKEN:[[id number]]],PROVIDER:[TOKEN:[[id number]]]

## 2019-02-25 NOTE — DISCHARGE NOTE ADULT - CARE PLAN
Principal Discharge DX:	SBO (small bowel obstruction)  Goal:	recovering from surgery  Assessment and plan of treatment:	1. Activity-Ambulate as tolerated, no heavy lifting   2.  Diet- Low fiber diet  3.  Please follow up within 1 week- Call sooner with worsening abdominal pain, nausea, vomiting, excessive diarrhea,  fever, chills, or inability to tolerate food or liquids  Secondary Diagnosis:	Hypothyroidism (acquired)  Assessment and plan of treatment:	Endocrinology team was consulted while you were in the hospital. Changes were made to your medication for hypothyroidism. You will now be taking Levothyroxine 112 mcg daily. You have a new appointment set up with Dr. Lyman at [address] [place] [address] on March 29th at 2:15. If you can not make this appointment, please call to reschedule the appointment.  Secondary Diagnosis:	Polycythemia vera  Assessment and plan of treatment:	Please follow up with Dr. Hurley at the [facility]. You have an appointment scheduled in the system on March 7th at 2:20 PM. If you can not make this appointment, please call to reschedule.  Secondary Diagnosis:	Diabetes  Assessment and plan of treatment:	You have a new appointment set up with Dr. Lyman at [address] [place] [address] on March 29th at 2:15. If you can not make this appointment, please call to reschedule the appointment. Principal Discharge DX:	SBO (small bowel obstruction)  Goal:	recovering from surgery  Assessment and plan of treatment:	1. Activity-Ambulate as tolerated, no heavy lifting   2.  Diet- Low fiber diet  3.  Please follow up in 1 week with Dr. Beltre- Call sooner with worsening abdominal pain, nausea, vomiting, excessive diarrhea,  fever, chills, or inability to tolerate food or liquids  Secondary Diagnosis:	Hypothyroidism (acquired)  Assessment and plan of treatment:	Endocrinology team was consulted while you were in the hospital. Changes were made to your medication for hypothyroidism. You will now be taking Levothyroxine 112 mcg daily. You have a new appointment set up with Dr. Lyman at [address] [place] [address] on March 29th at 2:15. If you can not make this appointment, please call to reschedule the appointment.  Secondary Diagnosis:	Polycythemia vera  Assessment and plan of treatment:	Please follow up with Dr. Hurley at the [facility]. You have an appointment scheduled in the system on March 7th at 2:20 PM. If you can not make this appointment, please call to reschedule.  Secondary Diagnosis:	Diabetes  Assessment and plan of treatment:	You have a new appointment set up with Dr. Lyman at [address] [place] [address] on March 29th at 2:15. If you can not make this appointment, please call to reschedule the appointment. Principal Discharge DX:	SBO (small bowel obstruction)  Goal:	recovering from surgery  Assessment and plan of treatment:	1. Activity-Ambulate as tolerated, no heavy lifting   2.  Diet- Low fiber diet  3.  Please follow up in 1 week with Dr. Beltre- Call sooner with worsening abdominal pain, nausea, vomiting, excessive diarrhea,  fever, chills, or inability to tolerate food or liquids  Secondary Diagnosis:	Hypothyroidism (acquired)  Assessment and plan of treatment:	Endocrinology team was consulted while you were in the hospital. Changes were made to your medication for hypothyroidism. You will now be taking Levothyroxine 112 mcg daily. You have a new appointment set up with Dr. Lyman at [address] [place] [address] on March 29th at 2:15. If you can not make this appointment, please call to reschedule the appointment.  Secondary Diagnosis:	Polycythemia vera  Assessment and plan of treatment:	Please follow up with Dr. Hurley/ Dr. Chevy Chu  at the [facility]. You have an appointment scheduled in the system on March 7th at 2:20 PM. They will also follow up blood clots and the new medication you were started on - Tabatha  Secondary Diagnosis:	Diabetes  Assessment and plan of treatment:	You have a new appointment set up with Dr. Lyman at [address] [place] [address] on March 29th at 2:15. If you can not make this appointment, please call to reschedule the appointment.    metformin 500mg twice daily  Secondary Diagnosis:	DVT (deep venous thrombosis)  Goal:	blood thinner - Eliquis  Assessment and plan of treatment:	Eliquis 5mg twice daily for 7 days to start 2/26/19   then decrease dose Eliquis 2.5mg twice daily   follow up with hematology Dr. Chu - will determine duration of treating clots Principal Discharge DX:	SBO (small bowel obstruction)  Goal:	recovering from surgery  Assessment and plan of treatment:	1. Activity-Ambulate as tolerated, no heavy lifting   2.  Diet- Low fiber diet  3.  Please follow up in 1 week with Dr. Beltre- Call sooner with worsening abdominal pain, nausea, vomiting, excessive diarrhea,  fever, chills, or inability to tolerate food or liquids  Secondary Diagnosis:	Hypothyroidism (acquired)  Assessment and plan of treatment:	Endocrinology team was consulted while you were in the hospital. Changes were made to your medication for hypothyroidism. You will now be taking Levothyroxine 112 mcg daily. You have a new appointment set up with Dr. Lyman at [address] [place] [address] on March 29th at 2:15. If you can not make this appointment, please call to reschedule the appointment.  Secondary Diagnosis:	Polycythemia vera  Assessment and plan of treatment:	Please follow up with Dr. Hurley/ Dr. Chevy Chu  at the [facility]. You have an appointment scheduled in the system on March 7th at 2:20 PM. They will also follow up blood clots and the new medication you were started on - Tabatha  Secondary Diagnosis:	Diabetes  Assessment and plan of treatment:	You have a new appointment set up with Dr. Lyman at [address] [place] [address] on March 29th at 2:15. If you can not make this appointment, please call to reschedule the appointment.    metformin 500mg twice daily  Secondary Diagnosis:	DVT (deep venous thrombosis)  Goal:	blood thinner - Eliquis  Assessment and plan of treatment:	Eliquis 10mg twice daily for 7 days to start 2/26/19   then decrease dose Eliquis 5mg twice daily 3/5/19  follow up with hematology Dr. Chu - will determine duration of treating clots

## 2019-02-25 NOTE — DISCHARGE NOTE ADULT - PATIENT PORTAL LINK FT
You can access the [facility] Patient Portal, offered by [facility], by registering with the following website: [email or web address]

## 2019-02-26 ENCOUNTER — OUTPATIENT (OUTPATIENT)
Dept: OUTPATIENT SERVICES | Facility: HOSPITAL | Age: 84
LOS: 1 days | Discharge: HOME | End: 2019-02-26

## 2019-02-26 VITALS
RESPIRATION RATE: 18 BRPM | DIASTOLIC BLOOD PRESSURE: 65 MMHG | OXYGEN SATURATION: 97 % | HEART RATE: 88 BPM | SYSTOLIC BLOOD PRESSURE: 139 MMHG

## 2019-02-26 DIAGNOSIS — C92.10 CHRONIC MYELOID LEUKEMIA, BCR/ABL-POSITIVE, NOT HAVING ACHIEVED REMISSION: ICD-10-CM

## 2019-02-26 LAB
ANION GAP SERPL CALC-SCNC: 14 MMOL/L — SIGNIFICANT CHANGE UP (ref 5–17)
BASOPHILS # BLD AUTO: 0 K/UL — SIGNIFICANT CHANGE UP (ref 0–0.2)
BUN SERPL-MCNC: 29 MG/DL — HIGH (ref 7–23)
CALCIUM SERPL-MCNC: 7.7 MG/DL — LOW (ref 8.4–10.5)
CHLORIDE SERPL-SCNC: 105 MMOL/L — SIGNIFICANT CHANGE UP (ref 96–108)
CO2 SERPL-SCNC: 17 MMOL/L — LOW (ref 22–31)
CREAT SERPL-MCNC: 1.31 MG/DL — HIGH (ref 0.5–1.3)
EOSINOPHIL # BLD AUTO: 0.2 K/UL — SIGNIFICANT CHANGE UP (ref 0–0.5)
EOSINOPHIL NFR BLD AUTO: 2 % — SIGNIFICANT CHANGE UP (ref 0–6)
GLUCOSE BLDC GLUCOMTR-MCNC: 126 MG/DL — HIGH (ref 70–99)
GLUCOSE BLDC GLUCOMTR-MCNC: 146 MG/DL — HIGH (ref 70–99)
GLUCOSE BLDC GLUCOMTR-MCNC: 98 MG/DL — SIGNIFICANT CHANGE UP (ref 70–99)
GLUCOSE SERPL-MCNC: 109 MG/DL — HIGH (ref 70–99)
HCT VFR BLD CALC: 34.4 % — LOW (ref 34.5–45)
HGB BLD-MCNC: 11.5 G/DL — SIGNIFICANT CHANGE UP (ref 11.5–15.5)
LYMPHOCYTES # BLD AUTO: 1.3 K/UL — SIGNIFICANT CHANGE UP (ref 1–3.3)
LYMPHOCYTES # BLD AUTO: 9 % — LOW (ref 13–44)
MAGNESIUM SERPL-MCNC: 1.7 MG/DL — SIGNIFICANT CHANGE UP (ref 1.6–2.6)
MCHC RBC-ENTMCNC: 32.7 PG — SIGNIFICANT CHANGE UP (ref 27–34)
MCHC RBC-ENTMCNC: 33.3 GM/DL — SIGNIFICANT CHANGE UP (ref 32–36)
MCV RBC AUTO: 98.1 FL — SIGNIFICANT CHANGE UP (ref 80–100)
MONOCYTES # BLD AUTO: 0.9 K/UL — SIGNIFICANT CHANGE UP (ref 0–0.9)
MONOCYTES NFR BLD AUTO: 3 % — SIGNIFICANT CHANGE UP (ref 2–14)
MYELOCYTES NFR BLD: 5 % — HIGH (ref 0–0)
NEUTROPHILS # BLD AUTO: 15.7 K/UL — HIGH (ref 1.8–7.4)
NEUTROPHILS NFR BLD AUTO: 76 % — SIGNIFICANT CHANGE UP (ref 43–77)
NEUTS BAND # BLD: 4 % — SIGNIFICANT CHANGE UP (ref 0–8)
PHOSPHATE SERPL-MCNC: 1.8 MG/DL — LOW (ref 2.5–4.5)
PLAT MORPH BLD: NORMAL — SIGNIFICANT CHANGE UP
PLATELET # BLD AUTO: 430 K/UL — HIGH (ref 150–400)
POTASSIUM SERPL-MCNC: 5 MMOL/L — SIGNIFICANT CHANGE UP (ref 3.5–5.3)
POTASSIUM SERPL-SCNC: 5 MMOL/L — SIGNIFICANT CHANGE UP (ref 3.5–5.3)
RBC # BLD: 3.5 M/UL — LOW (ref 3.8–5.2)
RBC # FLD: 14 % — SIGNIFICANT CHANGE UP (ref 10.3–14.5)
RBC BLD AUTO: SIGNIFICANT CHANGE UP
SODIUM SERPL-SCNC: 136 MMOL/L — SIGNIFICANT CHANGE UP (ref 135–145)
VARIANT LYMPHS # BLD: 1 % — SIGNIFICANT CHANGE UP (ref 0–6)
WBC # BLD: 18.1 K/UL — HIGH (ref 3.8–10.5)
WBC # FLD AUTO: 18.1 K/UL — HIGH (ref 3.8–10.5)

## 2019-02-26 PROCEDURE — 97162 PT EVAL MOD COMPLEX 30 MIN: CPT

## 2019-02-26 PROCEDURE — 74177 CT ABD & PELVIS W/CONTRAST: CPT

## 2019-02-26 PROCEDURE — 83690 ASSAY OF LIPASE: CPT

## 2019-02-26 PROCEDURE — 96376 TX/PRO/DX INJ SAME DRUG ADON: CPT

## 2019-02-26 PROCEDURE — 86901 BLOOD TYPING SEROLOGIC RH(D): CPT

## 2019-02-26 PROCEDURE — 84295 ASSAY OF SERUM SODIUM: CPT

## 2019-02-26 PROCEDURE — 80053 COMPREHEN METABOLIC PANEL: CPT

## 2019-02-26 PROCEDURE — 96375 TX/PRO/DX INJ NEW DRUG ADDON: CPT

## 2019-02-26 PROCEDURE — 96365 THER/PROPH/DIAG IV INF INIT: CPT | Mod: XU

## 2019-02-26 PROCEDURE — 97530 THERAPEUTIC ACTIVITIES: CPT

## 2019-02-26 PROCEDURE — 93970 EXTREMITY STUDY: CPT

## 2019-02-26 PROCEDURE — 97116 GAIT TRAINING THERAPY: CPT

## 2019-02-26 PROCEDURE — 85610 PROTHROMBIN TIME: CPT

## 2019-02-26 PROCEDURE — 82947 ASSAY GLUCOSE BLOOD QUANT: CPT

## 2019-02-26 PROCEDURE — 82330 ASSAY OF CALCIUM: CPT

## 2019-02-26 PROCEDURE — 71045 X-RAY EXAM CHEST 1 VIEW: CPT

## 2019-02-26 PROCEDURE — 86850 RBC ANTIBODY SCREEN: CPT

## 2019-02-26 PROCEDURE — 83036 HEMOGLOBIN GLYCOSYLATED A1C: CPT

## 2019-02-26 PROCEDURE — 82570 ASSAY OF URINE CREATININE: CPT

## 2019-02-26 PROCEDURE — 85014 HEMATOCRIT: CPT

## 2019-02-26 PROCEDURE — 93970 EXTREMITY STUDY: CPT | Mod: 26

## 2019-02-26 PROCEDURE — 84132 ASSAY OF SERUM POTASSIUM: CPT

## 2019-02-26 PROCEDURE — 83735 ASSAY OF MAGNESIUM: CPT

## 2019-02-26 PROCEDURE — 84300 ASSAY OF URINE SODIUM: CPT

## 2019-02-26 PROCEDURE — 74018 RADEX ABDOMEN 1 VIEW: CPT

## 2019-02-26 PROCEDURE — 99233 SBSQ HOSP IP/OBS HIGH 50: CPT

## 2019-02-26 PROCEDURE — 82435 ASSAY OF BLOOD CHLORIDE: CPT

## 2019-02-26 PROCEDURE — 85027 COMPLETE CBC AUTOMATED: CPT

## 2019-02-26 PROCEDURE — 84100 ASSAY OF PHOSPHORUS: CPT

## 2019-02-26 PROCEDURE — 84443 ASSAY THYROID STIM HORMONE: CPT

## 2019-02-26 PROCEDURE — 83605 ASSAY OF LACTIC ACID: CPT

## 2019-02-26 PROCEDURE — 84439 ASSAY OF FREE THYROXINE: CPT

## 2019-02-26 PROCEDURE — 86900 BLOOD TYPING SEROLOGIC ABO: CPT

## 2019-02-26 PROCEDURE — P9045: CPT

## 2019-02-26 PROCEDURE — 81001 URINALYSIS AUTO W/SCOPE: CPT

## 2019-02-26 PROCEDURE — 99232 SBSQ HOSP IP/OBS MODERATE 35: CPT

## 2019-02-26 PROCEDURE — 85730 THROMBOPLASTIN TIME PARTIAL: CPT

## 2019-02-26 PROCEDURE — 74176 CT ABD & PELVIS W/O CONTRAST: CPT

## 2019-02-26 PROCEDURE — 80048 BASIC METABOLIC PNL TOTAL CA: CPT

## 2019-02-26 PROCEDURE — 82306 VITAMIN D 25 HYDROXY: CPT

## 2019-02-26 PROCEDURE — 99285 EMERGENCY DEPT VISIT HI MDM: CPT | Mod: 25

## 2019-02-26 PROCEDURE — 93005 ELECTROCARDIOGRAM TRACING: CPT

## 2019-02-26 PROCEDURE — 99195 PHLEBOTOMY: CPT

## 2019-02-26 PROCEDURE — 82962 GLUCOSE BLOOD TEST: CPT

## 2019-02-26 PROCEDURE — 82803 BLOOD GASES ANY COMBINATION: CPT

## 2019-02-26 RX ORDER — APIXABAN 2.5 MG/1
1 TABLET, FILM COATED ORAL
Qty: 0 | Refills: 0 | COMMUNITY
Start: 2019-02-26

## 2019-02-26 RX ORDER — APIXABAN 2.5 MG/1
2 TABLET, FILM COATED ORAL
Qty: 28 | Refills: 0 | OUTPATIENT
Start: 2019-02-26 | End: 2019-03-04

## 2019-02-26 RX ORDER — APIXABAN 2.5 MG/1
10 TABLET, FILM COATED ORAL EVERY 12 HOURS
Qty: 0 | Refills: 0 | Status: DISCONTINUED | OUTPATIENT
Start: 2019-02-26 | End: 2019-02-26

## 2019-02-26 RX ORDER — APIXABAN 2.5 MG/1
2 TABLET, FILM COATED ORAL
Qty: 0 | Refills: 0 | DISCHARGE
Start: 2019-02-26 | End: 2019-03-05

## 2019-02-26 RX ORDER — APIXABAN 2.5 MG/1
2 TABLET, FILM COATED ORAL
Qty: 0 | Refills: 0 | COMMUNITY
Start: 2019-02-26 | End: 2019-03-05

## 2019-02-26 RX ORDER — APIXABAN 2.5 MG/1
5 TABLET, FILM COATED ORAL EVERY 12 HOURS
Qty: 0 | Refills: 0 | Status: DISCONTINUED | OUTPATIENT
Start: 2019-02-26 | End: 2019-02-26

## 2019-02-26 RX ADMIN — Medication 650 MILLIGRAM(S): at 05:31

## 2019-02-26 RX ADMIN — Medication 62.5 MILLIMOLE(S): at 10:40

## 2019-02-26 RX ADMIN — Medication 250 MILLIGRAM(S): at 05:31

## 2019-02-26 RX ADMIN — LISINOPRIL 40 MILLIGRAM(S): 2.5 TABLET ORAL at 05:32

## 2019-02-26 RX ADMIN — Medication 650 MILLIGRAM(S): at 06:51

## 2019-02-26 RX ADMIN — Medication 650 MILLIGRAM(S): at 00:54

## 2019-02-26 NOTE — PROGRESS NOTE ADULT - SUBJECTIVE AND OBJECTIVE BOX
Chief Complaint/Follow-up on: T2DM    Subjective: Pt denies loose or frequent stools. No abdominal pain - ok with going back on metformin.    MEDICATIONS  (STANDING):  acetaminophen   Tablet .. 650 milliGRAM(s) Oral every 6 hours  atorvastatin 40 milliGRAM(s) Oral at bedtime  dextrose 50% Injectable 12.5 Gram(s) IV Push once  dextrose 50% Injectable 25 Gram(s) IV Push once  dextrose 50% Injectable 25 Gram(s) IV Push once  insulin glargine Injectable (LANTUS) 4 Unit(s) SubCutaneous at bedtime  insulin lispro (HumaLOG) corrective regimen sliding scale   SubCutaneous three times a day before meals  insulin lispro (HumaLOG) corrective regimen sliding scale   SubCutaneous at bedtime  lisinopril 40 milliGRAM(s) Oral daily    MEDICATIONS  (PRN):  dextrose 40% Gel 15 Gram(s) Oral once PRN Blood Glucose LESS THAN 70 milliGRAM(s)/deciliter  glucagon  Injectable 1 milliGRAM(s) IntraMuscular once PRN Glucose LESS THAN 70 milligrams/deciliter  traMADol 25 milliGRAM(s) Oral every 6 hours PRN moderate and severe pain      PHYSICAL EXAM:  VITALS: T(C): 37.4 (02-26-19 @ 11:20)  T(F): 99.4 (02-26-19 @ 11:20), Max: 99.4 (02-26-19 @ 01:14)  HR: 88 (02-26-19 @ 11:53) (84 - 89)  BP: 139/65 (02-26-19 @ 11:53) (139/65 - 166/84)  RR:  (18 - 18)  SpO2:  (95% - 97%)  Wt(kg): --  GENERAL: NAD, well-groomed, well-developed  HEENT:  Atraumatic, Normocephalic, moist mucous membranes  RESPIRATORY: Clear to auscultation bilaterally; No rales, rhonchi, wheezing, or rubs  CARDIOVASCULAR: Regular rate and rhythm; No murmurs  GI: Soft, nontender, non distended, normal bowel sounds    POCT Blood Glucose.: 146 mg/dL (02-26-19 @ 15:08)  POCT Blood Glucose.: 98 mg/dL (02-26-19 @ 11:06)  POCT Blood Glucose.: 126 mg/dL (02-26-19 @ 02:56)  POCT Blood Glucose.: 171 mg/dL (02-25-19 @ 22:48)  POCT Blood Glucose.: 175 mg/dL (02-25-19 @ 21:39)  POCT Blood Glucose.: 155 mg/dL (02-25-19 @ 17:32)  POCT Blood Glucose.: 166 mg/dL (02-25-19 @ 12:41)  POCT Blood Glucose.: 130 mg/dL (02-25-19 @ 08:42)  POCT Blood Glucose.: 154 mg/dL (02-25-19 @ 03:10)  POCT Blood Glucose.: 206 mg/dL (02-24-19 @ 21:50)  POCT Blood Glucose.: 133 mg/dL (02-24-19 @ 18:10)  POCT Blood Glucose.: 173 mg/dL (02-24-19 @ 12:42)  POCT Blood Glucose.: 154 mg/dL (02-24-19 @ 09:39)  POCT Blood Glucose.: 178 mg/dL (02-24-19 @ 03:26)  POCT Blood Glucose.: 229 mg/dL (02-23-19 @ 21:41)  POCT Blood Glucose.: 269 mg/dL (02-23-19 @ 18:25)    02-26    136  |  105  |  29<H>  ----------------------------<  109<H>  5.0   |  17<L>  |  1.31<H>    EGFR if : 43<L>  EGFR if non : 37<L>    Ca    7.7<L>      02-26  Mg     1.7     02-26  Phos  1.8     02-26            Thyroid Function Tests:  02-24 @ 09:56 FreeT4 1.6   02-21 @ 22:10 FreeT4 3.0       Hemoglobin A1C, Whole Blood: 8.6 % <H> [4.0 - 5.6] (02-21-19 @ 18:11)

## 2019-02-26 NOTE — PROGRESS NOTE ADULT - PROBLEM SELECTOR PLAN 7
- ppx: lovenox  - diet: dasth/tlc/cc  - dispo: rehab.
- ppx: lovenox  - diet: dasth/tlc/cc  - dispo: likely rehab

## 2019-02-26 NOTE — PROGRESS NOTE ADULT - PROBLEM SELECTOR PLAN 3
- cont lantus 4 units nightly and sliding scale with fingerstick monitoring for now  Endocrine recs appreciated- pt to start metformin 500mg po bid upon discharge.

## 2019-02-26 NOTE — PROGRESS NOTE ADULT - SUBJECTIVE AND OBJECTIVE BOX
Hospitalist- Genaro Bolden MD  Pager: [phone]  If no response or off-hours, page [phone]  -------------------------------------  Patient is a 85y old  Female who presents with a chief complaint of SBO (26 Feb 2019 08:00)  Subjective: pt with uptrending white count. Today, no noted fevers/chills, no abd pain, tolerating PO, no n/v.    14 point ros otherwise negative.     VITAL SIGNS:  Vital Signs Last 24 Hrs  T(C): 37.4 (26 Feb 2019 11:20), Max: 37.4 (26 Feb 2019 01:14)  T(F): 99.4 (26 Feb 2019 11:20), Max: 99.4 (26 Feb 2019 01:14)  HR: 88 (26 Feb 2019 11:53) (83 - 89)  BP: 139/65 (26 Feb 2019 11:53) (139/65 - 166/86)  BP(mean): --  RR: 18 (26 Feb 2019 11:53) (18 - 18)  SpO2: 97% (26 Feb 2019 11:53) (93% - 97%)      PHYSICAL EXAM:     GENERAL: no acute distress  HEENT: PERRLA, EOMI, moist oropharynx   RESPIRATORY: CTAB, no w/c   CARDIOVASCULAR: RRR, no murmurs, gallops, rubs  ABDOMINAL: soft, non-tender, non-distended, positive bowel sounds   EXTREMITIES: no clubbing, cyanosis, or edema, +RLE posterior thigh tenderness  NEUROLOGICAL: alert and oriented x 2, non-focal  SKIN: no rashes or lesions   MUSCULOSKELETAL: no gross joint deformity                          11.5   18.1  )-----------( 430      ( 26 Feb 2019 07:16 )             34.4     02-26    136  |  105  |  29<H>  ----------------------------<  109<H>  5.0   |  17<L>  |  1.31<H>    Ca    7.7<L>      26 Feb 2019 07:13  Phos  1.8     02-26  Mg     1.7     02-26        CAPILLARY BLOOD GLUCOSE      POCT Blood Glucose.: 146 mg/dL (26 Feb 2019 15:08)  POCT Blood Glucose.: 98 mg/dL (26 Feb 2019 11:06)  POCT Blood Glucose.: 126 mg/dL (26 Feb 2019 02:56)  POCT Blood Glucose.: 171 mg/dL (25 Feb 2019 22:48)  POCT Blood Glucose.: 175 mg/dL (25 Feb 2019 21:39)  POCT Blood Glucose.: 155 mg/dL (25 Feb 2019 17:32)      MEDICATIONS  (STANDING):  acetaminophen   Tablet .. 650 milliGRAM(s) Oral every 6 hours  atorvastatin 40 milliGRAM(s) Oral at bedtime  dextrose 50% Injectable 12.5 Gram(s) IV Push once  dextrose 50% Injectable 25 Gram(s) IV Push once  dextrose 50% Injectable 25 Gram(s) IV Push once  insulin glargine Injectable (LANTUS) 4 Unit(s) SubCutaneous at bedtime  insulin lispro (HumaLOG) corrective regimen sliding scale   SubCutaneous three times a day before meals  insulin lispro (HumaLOG) corrective regimen sliding scale   SubCutaneous at bedtime  lisinopril 40 milliGRAM(s) Oral daily    MEDICATIONS  (PRN):  dextrose 40% Gel 15 Gram(s) Oral once PRN Blood Glucose LESS THAN 70 milliGRAM(s)/deciliter  glucagon  Injectable 1 milliGRAM(s) IntraMuscular once PRN Glucose LESS THAN 70 milligrams/deciliter  traMADol 25 milliGRAM(s) Oral every 6 hours PRN moderate and severe pain

## 2019-02-26 NOTE — PROGRESS NOTE ADULT - SUBJECTIVE AND OBJECTIVE BOX
[facility] ATP SURGERY DAILY PROGRESS NOTE    SUBJECTIVE:  -  continues to have diarrhea  -  WBC still increasing, at 19 now  -  afebrile throughout    OBJECTIVE:    Vital Signs Last 24 Hrs  T(C): 37.1 (26 Feb 2019 05:40), Max: 37.4 (26 Feb 2019 01:14)  T(F): 98.7 (26 Feb 2019 05:40), Max: 99.4 (26 Feb 2019 01:14)  HR: 84 (26 Feb 2019 05:40) (80 - 89)  BP: 156/82 (26 Feb 2019 05:40) (146/98 - 176/75)  BP(mean): --  RR: 18 (26 Feb 2019 05:40) (18 - 18)  SpO2: 96% (26 Feb 2019 05:40) (93% - 97%)    I&O's Detail    25 Feb 2019 07:01  -  26 Feb 2019 07:00  --------------------------------------------------------  IN:    Oral Fluid: 1160 mL  Total IN: 1160 mL    OUT:  Total OUT: 0 mL    Total NET: 1160 mL        LABS:                        11.7   19.8  )-----------( 408      ( 25 Feb 2019 16:41 )             34.8     02-25    138  |  106  |  28<H>  ----------------------------<  139<H>  4.4   |  19<L>  |  1.20    Ca    7.3<L>      25 Feb 2019 07:40  Phos  2.1     02-25  Mg     1.9     02-25      EXAM:  -- CONSTITUTIONAL: Alert, NAD  -- PULMONARY: non-labored respirations  -- ABDOMEN: soft, non-distended, mildly tender                   midline lap with staples, c/d/i, no erythema or drainage  -- SKIN: bilat UE with ecchymoses from needle sticks, but no erythema or induration [facility] ATP SURGERY DAILY PROGRESS NOTE    SUBJECTIVE:  -  + loose BM  -  WBC still increasing, at 19 now  -  afebrile throughout    OBJECTIVE:    Vital Signs Last 24 Hrs  T(C): 37.1 (26 Feb 2019 05:40), Max: 37.4 (26 Feb 2019 01:14)  T(F): 98.7 (26 Feb 2019 05:40), Max: 99.4 (26 Feb 2019 01:14)  HR: 84 (26 Feb 2019 05:40) (80 - 89)  BP: 156/82 (26 Feb 2019 05:40) (146/98 - 176/75)  BP(mean): --  RR: 18 (26 Feb 2019 05:40) (18 - 18)  SpO2: 96% (26 Feb 2019 05:40) (93% - 97%)    I&O's Detail    25 Feb 2019 07:01  -  26 Feb 2019 07:00  --------------------------------------------------------  IN:    Oral Fluid: 1160 mL  Total IN: 1160 mL    OUT:  Total OUT: 0 mL    Total NET: 1160 mL        LABS:                        11.7   19.8  )-----------( 408      ( 25 Feb 2019 16:41 )             34.8     02-25    138  |  106  |  28<H>  ----------------------------<  139<H>  4.4   |  19<L>  |  1.20    Ca    7.3<L>      25 Feb 2019 07:40  Phos  2.1     02-25  Mg     1.9     02-25      EXAM:  -- CONSTITUTIONAL: Alert, NAD  -- PULMONARY: non-labored respirations  -- ABDOMEN: soft, non-distended, mildly tender                   midline lap with staples, c/d/i, no erythema or drainage  -- SKIN: bilat UE with ecchymoses from needle sticks, but no erythema or induration [facility] ATP SURGERY DAILY PROGRESS NOTE    SUBJECTIVE:  -  + loose BM,   -  WBC still increasing, at 19 now  -  afebrile throughout    OBJECTIVE:    Vital Signs Last 24 Hrs  T(C): 37.1 (26 Feb 2019 05:40), Max: 37.4 (26 Feb 2019 01:14)  T(F): 98.7 (26 Feb 2019 05:40), Max: 99.4 (26 Feb 2019 01:14)  HR: 84 (26 Feb 2019 05:40) (80 - 89)  BP: 156/82 (26 Feb 2019 05:40) (146/98 - 176/75)  BP(mean): --  RR: 18 (26 Feb 2019 05:40) (18 - 18)  SpO2: 96% (26 Feb 2019 05:40) (93% - 97%)    I&O's Detail    25 Feb 2019 07:01  -  26 Feb 2019 07:00  --------------------------------------------------------  IN:    Oral Fluid: 1160 mL  Total IN: 1160 mL    OUT:  Total OUT: 0 mL    Total NET: 1160 mL        LABS:                        11.7   19.8  )-----------( 408      ( 25 Feb 2019 16:41 )             34.8     02-25    138  |  106  |  28<H>  ----------------------------<  139<H>  4.4   |  19<L>  |  1.20    Ca    7.3<L>      25 Feb 2019 07:40  Phos  2.1     02-25  Mg     1.9     02-25      EXAM:  -- CONSTITUTIONAL: Alert, NAD  -- PULMONARY: non-labored respirations  -- ABDOMEN: soft, non-distended, mildly tender                   midline lap with staples, c/d/i, no erythema or drainage  -- SKIN: bilat UE with ecchymoses from needle sticks, but no erythema or induration

## 2019-02-26 NOTE — PROGRESS NOTE ADULT - PROBLEM SELECTOR PLAN 1
Dispo: Pt with CrCl above 30 but less than 50, so discharge on metformin 500mg po bid, cannot go above that dose.   Outpatient follow up with Dr. Mary Lyman 3/29/18 at 215PM at [address] [facility] [address], [place] [address] - [phone]  inform endocrine of dc planning for final dc reccs.

## 2019-02-26 NOTE — PROGRESS NOTE ADULT - PROBLEM SELECTOR PLAN 2
-Discharge on Synthroid 112mcg po qdaily - should be given an hour before other meds and food. Recheck TFTs in 4-6 weeks.  Evangelina Jacinto MD  Days: [phone]  Nights/weekends: [phone]

## 2019-02-26 NOTE — PROGRESS NOTE ADULT - ASSESSMENT
86 y/o F with PMH HTN, hypothyroidism HLD, DM2 A1C 8.6, osteoporosis, CVA, DVT, Polycythemia vera (on hydroxyurea), SBO x 2, here hyperglycemia in setting of small bowel obstruction now s/p exlap, ATUL, repair of serosal tear, endocrine following for elevated bs and abnl TFTs.

## 2019-02-26 NOTE — PROGRESS NOTE ADULT - ATTENDING COMMENTS
Patient seen and examined  States that she feels much better  States that she has BM x 2 over the last 24 hours  vitals stable  abd - moderately distended, soft, nontender  WBC=8.5  Electrolytes = WNL  hyperglycemia being addressed by endocrine  AXR still with dilated loops of small bowel    - Clinically improving, but radiographically still with SBO ??  - Will order CT scan.  If still with SBO, will suggest OR for ATUL tomorrow given non-resolution of SBO over 72 hours  - I have attempted to call daughter / son at contact information in EMR, no answer so far
Patient seen and examined on rounds  No GI function at this time  abd - soft, mild diffuse tenderness without peritoneal signs    - likely adhesive SBO, continue attempt at non-op treatment
Patient seen and examined with daughter at bedside  ?? flatus.  - BM  Abdominal pain improved  vitals stable  abd - moderately distended, soft, nontender    - ? resolving SBO ?  - I discussed with daughter and patient the possible need for OR if SBO not resolving in the next 24-48 hours.
diet as tolerated  mobilize
for rehab
looks well  NGT discontinued  encourage mobilization
need to closely monitor finger stick  abdomen soft  breathing comfortable
resting comfortable  soft abdomen   persistent SBO confirmed by recent CT scan  discussed with patient and family  if AXR shows persistent SBO by tomorrow will plan for exploratory laparotomy
seen and examined 02-25-19 @ 1227    tolerating regular diet  3 loose stools this morning    afeb  AVSS  soft / NT / ND    WBC = 20    s/p ex-lap / ATUL on 2/19 for SBO  -currently undergoing workup for leukocytosis, but may just be secondary to stopping hydroxyurea  -otherwise stable for transfer ot [facility]
afeb  AVSS    WBC = 20 -> 18    RLE venous duplex - DVT extending from right tibioperoneal trunk to SMV    s/p ex-lap / ATUL on 2/19 for SBO  -ok to transfer ot [facility] on therapeutic LMWH or oral anticoagulant
Daina Kevin MD  Pager [phone] ([facility])/ [phone] ([facility]) [please provide 10 digit call back number]  Nights and weekends: [phone]  Please note that this patient may be followed by a different provider tomorrow. If no answer or after hours, please contact [phone].  For final dc reccomendations, please call [phone] or page the endocrine fellow on call.
Daina Kevin MD  Pager [phone] ([facility])/ [phone] ([facility]) [please provide 10 digit call back number]  Nights and weekends: [phone]  Please note that this patient may be followed by a different provider tomorrow. If no answer or after hours, please contact [phone].  For final dc reccomendations, please call [phone] or page the endocrine fellow on call.
Unable to see patient as she is in the OR when we were rounding.   Case discussed with fellow.  Evangelina Jacinto MD  [phone]

## 2019-02-26 NOTE — PROGRESS NOTE ADULT - ASSESSMENT
85F with recurrent SBO, now s/p exlap, ATUL, repair of serosal tear (2/19), now with GIF but increasing WBC    PLAN:  - cont regular diet as tolerated  - monitor am labs, WBC, replete as needed  - lovenox for dvt ppx  - pain control as needed  - appreciate heme recs (pt follows with Dr. Hurley)     DISPO: [facility] when ready    ATP SURGERY  [id number] 85F with recurrent SBO, now s/p exlap, ATUL, repair of serosal tear (2/19), now with GIF but increasing WBC    PLAN:  - cont regular diet as tolerated  - monitor am labs, WBC, replete as needed  - pain control as needed  - appreciate heme recs (pt follows with Dr. Hurley)   - found with DVT on bilat LE US this AM, d/t hx polycythemia started on eliquis 5mg po BID x7 days, then 2.5mg BID indefinitely after 7 days    DISPO: [facility] today    ATP SURGERY  [id number]

## 2019-02-26 NOTE — PROGRESS NOTE ADULT - PROBLEM SELECTOR PLAN 4
Pt s/p therapeutic phlebotomy during this admission. Leukocytosis increasing without overt s/s or infection- suspect 2/2 hydrea being held  - resume full dose hydrea, resume OP follow up with H/O. Pt s/p therapeutic phlebotomy during this admission.   - resume full dose hydrea, resume OP follow up with H/O.

## 2019-02-26 NOTE — PROGRESS NOTE ADULT - PROBLEM SELECTOR PLAN 2
uptrending white count without fevers- noted to have R thigh tenderness  - dopplers ordered B/L LE- found to have proximal RLE DVT  - start eliquis 10mg po bid x 7 days, 5mg po bid thereafter. Discussed risks and benefits with daughter, who agrees.  - per H/O, ok to resume hydrea as low suspicion for infection

## 2019-02-27 NOTE — DISCUSSION/SUMMARY
[Specialty: _____] : Specialty: [unfilled] [FreeTextEntry1] : called patient to f/u on recent hospitalization 2/14 - 2/25 - 85F with PMH HTN, DM, CVA, DVT, Polycythemia vera (on hydroxyurea), SBO x 2, PSH hysterectomy, admitted for small bowel obstruction, s/p ex lap w/ lysis of adhesions and serosal tear repair, NGT eventually dc'd and patient tolerating regular diet.  Patient to f/u with Dr. Beltre within 1-2 weeks after discharge from the hospital. She was discharged to rehab.  \par [facility] duplex showed - In the interval between 9/6/2018 and today, 2/26/2019, the patient has thrombosed her right posterior tibial peroneal trunk, popliteal, femoral and common femoral veins, with partial recannulization in the thigh. medicine had convo with hem/onc plan for eliquis 5mg bid for 7 days then 2.5mg po daily - duration to be determined by hematology. D/w daughter- patient to f/u with us after being discharged from rehab.  Will also f/u with heme-onc and surgery.

## 2019-03-04 ENCOUNTER — TRANSCRIPTION ENCOUNTER (OUTPATIENT)
Age: 84
End: 2019-03-04

## 2019-03-05 ENCOUNTER — INBOUND DOCUMENT (OUTPATIENT)
Age: 84
End: 2019-03-05

## 2019-03-05 ENCOUNTER — APPOINTMENT (OUTPATIENT)
Dept: ENDOCRINOLOGY | Facility: CLINIC | Age: 84
End: 2019-03-05

## 2019-03-05 RX ORDER — APIXABAN 2.5 MG/1
1 TABLET, FILM COATED ORAL
Qty: 0 | Refills: 0 | DISCHARGE
Start: 2019-03-05

## 2019-03-05 RX ORDER — APIXABAN 2.5 MG/1
1 TABLET, FILM COATED ORAL
Qty: 0 | Refills: 0 | COMMUNITY
Start: 2019-03-05

## 2019-03-05 RX ORDER — APIXABAN 2.5 MG/1
1 TABLET, FILM COATED ORAL
Qty: 60 | Refills: 2 | OUTPATIENT
Start: 2019-03-05 | End: 2019-06-02

## 2019-03-07 ENCOUNTER — APPOINTMENT (OUTPATIENT)
Dept: HEMATOLOGY ONCOLOGY | Facility: CLINIC | Age: 84
End: 2019-03-07

## 2019-03-14 ENCOUNTER — APPOINTMENT (OUTPATIENT)
Dept: HEMATOLOGY ONCOLOGY | Facility: CLINIC | Age: 84
End: 2019-03-14

## 2019-03-29 ENCOUNTER — APPOINTMENT (OUTPATIENT)
Dept: ENDOCRINOLOGY | Facility: CLINIC | Age: 84
End: 2019-03-29
Payer: MEDICARE

## 2019-03-29 VITALS
DIASTOLIC BLOOD PRESSURE: 66 MMHG | HEIGHT: 59 IN | OXYGEN SATURATION: 97 % | SYSTOLIC BLOOD PRESSURE: 110 MMHG | HEART RATE: 99 BPM

## 2019-03-29 LAB
GLUCOSE BLDC GLUCOMTR-MCNC: 175
HBA1C MFR BLD HPLC: 7.2

## 2019-03-29 PROCEDURE — 82962 GLUCOSE BLOOD TEST: CPT

## 2019-03-29 PROCEDURE — 99214 OFFICE O/P EST MOD 30 MIN: CPT | Mod: 25

## 2019-03-29 PROCEDURE — 83036 HEMOGLOBIN GLYCOSYLATED A1C: CPT | Mod: QW

## 2019-03-29 NOTE — REVIEW OF SYSTEMS
[Fatigue] : no fatigue [Decreased Appetite] : appetite not decreased [Recent Weight Gain (___ Lbs)] : no recent weight gain [Recent Weight Loss (___ Lbs)] : no recent weight loss [Visual Field Defect] : no visual field defect [Blurry Vision] : no blurred vision [Dry Eyes] : no dryness of the eyes [Eyes Itch] : no itching of the eyes [Dysphagia] : no dysphagia [Dysphonia] : no dysphonia [Neck Pain] : no neck pain [Nasal Congestion] : no nasal congestion [Chest Pain] : no chest pain [Palpitations] : no palpitations [Heart Rate Is Slow] : the heart rate was not slow [Shortness Of Breath] : no shortness of breath [Wheezing] : no wheezing was heard [Cough] : no cough [SOB on Exertion] : no shortness of breath during exertion [Nausea] : no nausea [Vomiting] : no vomiting was observed [Constipation] : no constipation [Diarrhea] : no diarrhea [Polyuria] : no polyuria [Irregular Menses] : regular menses [Joint Pain] : no joint pain [Joint Stiffness] : no joint stiffness [Muscle Weakness] : no muscle weakness [Muscle Cramps] : no muscle cramps [Acanthosis] : no acanthosis  [Hirsutism] : no hirsutism [Acne] : no acne [Hair Loss] : no hair loss [Headache] : no headaches [Tremors] : no tremors [Dizziness] : no dizziness [Pain/Numbness of Digits] : no pain/numbness of digits [Depression] : no depression [Anxiety] : no anxiety [Polydipsia] : no polydipsia [Galactorrhea] : no galactorrhea  [Cold Intolerance] : cold tolerant [Heat Intolerance] : heat tolerant [Easy Bruising] : no tendency for easy bruising [Swelling] : no swelling

## 2019-03-29 NOTE — ASSESSMENT
[FreeTextEntry1] : 85 year old female with history of polycythemia vera, DVT, osteoporosis, hypothyroidism here for follow up of DM Type 2. HgA1C is currently controlled at 7.2%. Can continue the same regimen of Metformin. Advised to follow a carb consistent diet. Also advised to check BG at variable times during the day\par \par -Continue metformin 500 mg BID\par -SMBGs 1-2 times per day \par -Continue ADA diet \par -Will check BMP, lipid panel, microalb/cr ratio\par \par Hypothyroidism\par -Last TSH in 04/2018 of 0.27\par -Check TSH today \par -Continue current dose of levothyroxine \par \par Osteoporosis\par -Continue on Fosamax for now \par -Continue calcium/vitamin D supplementation \par \par RTC in 4-5 months. \par

## 2019-03-29 NOTE — HISTORY OF PRESENT ILLNESS
[FreeTextEntry1] : Diabetes Follow up Patient HPI\par She has recently moved to a facility ( she is here with the health aid)\par Here with daughter who is helping with translation\par \par HPI:\par \par Duration of Diabetes: 30 years \par Is patient on Insulin? No \par If yes, how long on insulin? \par \par List Current Medications for Glycemic control and the doses:\par 1- Metformin 500 mg BID \par 2- \par 3- \par \par SMBG (self monitored blood glucose) readings: \par - Name of glucometer: \par - How often does the patient check BG? once a day \par - Does the patient keep a log? \par \par If detailed record is available, what is the range of most of the BG readings?\par She did not bring with her \par \par \par Does patient get Hypoglycemic episodes? None \par  \par \par Diabetic Complications: Is patient aware of having any of those complications?\par - Eyes: Retinopathy? history of cataracts \par  	When was the last fully dilated eye exam? 2 years ago \par - Feet: 	Neuropathy? Decreased sensation in her hands, history of diabetic neuropathy ( pain starts at 8 PM) She has been taking gabapentin once a day, but its not helping. \par - Kidneys: Nephropathy? No, GFr of 82 \par \par Diet: review patient's diet: \par Continue DM diet \par \par \par Exercise: review patient exercise habit: Wheelchair bound

## 2019-03-29 NOTE — PHYSICAL EXAM
[Alert] : alert [No Acute Distress] : no acute distress [Well Nourished] : well nourished [Well Developed] : well developed [Normal Sclera/Conjunctiva] : normal sclera/conjunctiva [PERRL] : pupils equal, round and reactive to light [EOMI] : extra ocular movement intact [No Proptosis] : no proptosis [Normal Outer Ear/Nose] : the ears and nose were normal in appearance [Normal TMs] : both tympanic membranes were normal [No Neck Mass] : no neck mass was observed [Supple] : the neck was supple [Thyroid Not Enlarged] : the thyroid was not enlarged [No Respiratory Distress] : no respiratory distress [Normal Rate and Effort] : normal respiratory rhythm and effort [No Accessory Muscle Use] : no accessory muscle use [Clear to Auscultation] : lungs were clear to auscultation bilaterally [Normal Rate] : heart rate was normal  [Normal S1, S2] : normal S1 and S2 [Regular Rhythm] : with a regular rhythm [Normal Bowel Sounds] : normal bowel sounds [Not Tender] : non-tender [Soft] : abdomen soft [Not Distended] : not distended [Normal] : normal and non tender [No CVA Tenderness] : no ~M costovertebral angle tenderness [No Spinal Tenderness] : no spinal tenderness [Normal Gait] : normal gait [No Joint Swelling] : no joint swelling seen [No Clubbing, Cyanosis] : no clubbing  or cyanosis of the fingernails [Normal Strength/Tone] : muscle strength and tone were normal [No Rash] : no rash [No Skin Lesions] : no skin lesions [Normal Reflexes] : deep tendon reflexes were 2+ and symmetric [No Motor Deficits] : the motor exam was normal [No Tremors] : no tremors [Normal Insight/Judgement] : insight and judgment were intact [Normal Mood] : the mood was normal [Kyphosis] : no kyphosis present [Scoliosis] : scoliosis not present [Foot Ulcers] : no foot ulcers [Acne] : no acne [de-identified] : +on wheelchair

## 2019-04-01 ENCOUNTER — OTHER (OUTPATIENT)
Age: 84
End: 2019-04-01

## 2019-04-01 LAB
ANION GAP SERPL CALC-SCNC: 16 MMOL/L
BUN SERPL-MCNC: 30 MG/DL
CALCIUM SERPL-MCNC: 11.1 MG/DL
CHLORIDE SERPL-SCNC: 98 MMOL/L
CHOLEST SERPL-MCNC: 199 MG/DL
CHOLEST/HDLC SERPL: 5 RATIO
CO2 SERPL-SCNC: 25 MMOL/L
CREAT SERPL-MCNC: 1.12 MG/DL
GLUCOSE SERPL-MCNC: 166 MG/DL
HDLC SERPL-MCNC: 40 MG/DL
LDLC SERPL CALC-MCNC: 114 MG/DL
POTASSIUM SERPL-SCNC: 5 MMOL/L
SODIUM SERPL-SCNC: 139 MMOL/L
T4 FREE SERPL-MCNC: 2.4 NG/DL
TRIGL SERPL-MCNC: 223 MG/DL
TSH SERPL-ACNC: 0.11 UIU/ML

## 2019-04-09 ENCOUNTER — APPOINTMENT (OUTPATIENT)
Dept: TRAUMA SURGERY | Facility: CLINIC | Age: 84
End: 2019-04-09
Payer: MEDICARE

## 2019-04-09 VITALS
SYSTOLIC BLOOD PRESSURE: 131 MMHG | HEIGHT: 59 IN | WEIGHT: 114 LBS | TEMPERATURE: 98 F | HEART RATE: 96 BPM | BODY MASS INDEX: 22.98 KG/M2 | DIASTOLIC BLOOD PRESSURE: 82 MMHG

## 2019-04-09 PROCEDURE — 99024 POSTOP FOLLOW-UP VISIT: CPT

## 2019-04-09 NOTE — ASSESSMENT
[FreeTextEntry1] : elderly patient\par s/p exploratory laparotomy and ATUL\par doing well\par rerturn of bowel function\par incisions very well healed\par abdomen soft non tender and not distended\par patient recovering well in rehab\par folllow up as needed

## 2019-04-30 ENCOUNTER — APPOINTMENT (OUTPATIENT)
Dept: ENDOCRINOLOGY | Facility: CLINIC | Age: 84
End: 2019-04-30

## 2019-06-03 ENCOUNTER — OUTPATIENT (OUTPATIENT)
Dept: OUTPATIENT SERVICES | Facility: HOSPITAL | Age: 84
LOS: 1 days | Discharge: HOME | End: 2019-06-03

## 2019-06-03 DIAGNOSIS — C92.10 CHRONIC MYELOID LEUKEMIA, BCR/ABL-POSITIVE, NOT HAVING ACHIEVED REMISSION: ICD-10-CM

## 2019-06-06 ENCOUNTER — RECORD ABSTRACTING (OUTPATIENT)
Age: 84
End: 2019-06-06

## 2019-06-06 ENCOUNTER — RESULT REVIEW (OUTPATIENT)
Age: 84
End: 2019-06-06

## 2019-06-06 ENCOUNTER — APPOINTMENT (OUTPATIENT)
Dept: HEMATOLOGY ONCOLOGY | Facility: CLINIC | Age: 84
End: 2019-06-06

## 2019-06-06 VITALS
DIASTOLIC BLOOD PRESSURE: 74 MMHG | RESPIRATION RATE: 16 BRPM | OXYGEN SATURATION: 98 % | HEART RATE: 89 BPM | SYSTOLIC BLOOD PRESSURE: 132 MMHG | TEMPERATURE: 98.8 F

## 2019-06-06 LAB
ALBUMIN SERPL ELPH-MCNC: 4 G/DL — SIGNIFICANT CHANGE UP (ref 3.3–5)
ALP SERPL-CCNC: 58 U/L — SIGNIFICANT CHANGE UP (ref 40–120)
ALT FLD-CCNC: 14 U/L — SIGNIFICANT CHANGE UP (ref 10–45)
ANION GAP SERPL CALC-SCNC: 14 MMOL/L — SIGNIFICANT CHANGE UP (ref 5–17)
AST SERPL-CCNC: 14 U/L — SIGNIFICANT CHANGE UP (ref 10–40)
BILIRUB SERPL-MCNC: <0.2 MG/DL — SIGNIFICANT CHANGE UP (ref 0.2–1.2)
BUN SERPL-MCNC: 37 MG/DL — HIGH (ref 7–23)
CALCIUM SERPL-MCNC: 9.7 MG/DL — SIGNIFICANT CHANGE UP (ref 8.4–10.5)
CHLORIDE SERPL-SCNC: 102 MMOL/L — SIGNIFICANT CHANGE UP (ref 96–108)
CO2 SERPL-SCNC: 20 MMOL/L — LOW (ref 22–31)
CREAT SERPL-MCNC: 1.3 MG/DL — SIGNIFICANT CHANGE UP (ref 0.5–1.3)
GLUCOSE SERPL-MCNC: 151 MG/DL — HIGH (ref 70–99)
HCT VFR BLD CALC: 32.4 % — LOW (ref 34.5–45)
HGB BLD-MCNC: 11.6 G/DL — SIGNIFICANT CHANGE UP (ref 11.5–15.5)
MCHC RBC-ENTMCNC: 35.8 G/DL — SIGNIFICANT CHANGE UP (ref 32–36)
MCHC RBC-ENTMCNC: 36.4 PG — HIGH (ref 27–34)
MCV RBC AUTO: 102 FL — HIGH (ref 80–100)
PLATELET # BLD AUTO: 400 K/UL — SIGNIFICANT CHANGE UP (ref 150–400)
POTASSIUM SERPL-MCNC: 5.4 MMOL/L — HIGH (ref 3.5–5.3)
POTASSIUM SERPL-SCNC: 5.4 MMOL/L — HIGH (ref 3.5–5.3)
PROT SERPL-MCNC: 6.7 G/DL — SIGNIFICANT CHANGE UP (ref 6–8.3)
RBC # BLD: 3.19 M/UL — LOW (ref 3.8–5.2)
RBC # FLD: 15.4 % — HIGH (ref 10.3–14.5)
SODIUM SERPL-SCNC: 136 MMOL/L — SIGNIFICANT CHANGE UP (ref 135–145)
WBC # BLD: 6.9 K/UL — SIGNIFICANT CHANGE UP (ref 3.8–10.5)
WBC # FLD AUTO: 6.9 K/UL — SIGNIFICANT CHANGE UP (ref 3.8–10.5)

## 2019-06-11 NOTE — ASSESSMENT
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), HTN/HLD, DM II presents for follow up\par \par 1) JAK2 + PV\par -Hct is currently 32, at goal of <42 based on the CYTO-PV trial will continue dose of Hydroxyurea 500mg daily, however patient has been on 500mg qod in the past due to leukopenia, if CBC stable next visit or if cytopenias developing will change dose to 500mg every other day\par -repeat CBC in 12 weeks \par \par 2) DVT\par -patient is high risk for bleeding given antiplatelet with aspirin and anticoagulation with apixaban, however she is very high risk for recurrent VTE including acute DVT/PE with HARRIS 2+ PV and history of multiple DVTs, benefit of anticoagulation outweights risk in patient's case. Discussed with patient and her daughter who were in agreements with plan after discussing R/B/A.  \par \par 3) SBO\par -well healed s/p ex Lap in 12/2018, appreciate Dr. Beltre care\par \par Frank Hurley PGY-6, case discussed with Dr. Kecia Bianchi, longitudinally following with Dr. De La O

## 2019-06-11 NOTE — PHYSICAL EXAM
[Ambulatory and capable of all self care but unable to carry out any work activities] : Status 2- Ambulatory and capable of all self care but unable to carry out any work activities. Up and about more than 50% of waking hours [Normal] : normal spine exam without palpable tenderness, no kyphosis or scoliosis [de-identified] : cane/wheelchiar dependent for ambulation [de-identified] : chronic venous changes in lower extremity

## 2019-06-11 NOTE — REVIEW OF SYSTEMS
[Fever] : no fever [Chills] : no chills [Eye Pain] : no eye pain [Dysphagia] : no dysphagia [Odynophagia] : no odynophagia [Chest Pain] : no chest pain [Shortness Of Breath] : no shortness of breath [Abdominal Pain] : no abdominal pain [Vomiting] : no vomiting [Joint Pain] : no joint pain [Skin Rash] : no skin rash [Easy Bleeding] : no tendency for easy bleeding [Easy Bruising] : no tendency for easy bruising

## 2019-06-11 NOTE — REASON FOR VISIT
[Follow-Up Visit] : a follow-up visit for [Family Member] : family member [FreeTextEntry2] : HARRIS 2+ P. Vera

## 2019-07-03 ENCOUNTER — APPOINTMENT (OUTPATIENT)
Dept: INTERNAL MEDICINE | Facility: CLINIC | Age: 84
End: 2019-07-03
Payer: MEDICARE

## 2019-07-03 ENCOUNTER — OUTPATIENT (OUTPATIENT)
Dept: OUTPATIENT SERVICES | Facility: HOSPITAL | Age: 84
LOS: 1 days | End: 2019-07-03
Payer: MEDICARE

## 2019-07-03 VITALS — SYSTOLIC BLOOD PRESSURE: 126 MMHG | HEART RATE: 92 BPM | DIASTOLIC BLOOD PRESSURE: 78 MMHG

## 2019-07-03 VITALS — OXYGEN SATURATION: 96 % | HEART RATE: 113 BPM | SYSTOLIC BLOOD PRESSURE: 106 MMHG | DIASTOLIC BLOOD PRESSURE: 70 MMHG

## 2019-07-03 DIAGNOSIS — I10 ESSENTIAL (PRIMARY) HYPERTENSION: ICD-10-CM

## 2019-07-03 PROCEDURE — 99213 OFFICE O/P EST LOW 20 MIN: CPT | Mod: GE

## 2019-07-03 RX ORDER — ERGOCALCIFEROL 1.25 MG/1
1.25 MG CAPSULE, LIQUID FILLED ORAL
Qty: 8 | Refills: 0 | Status: ACTIVE | COMMUNITY
Start: 2019-07-03 | End: 1900-01-01

## 2019-07-03 RX ORDER — METFORMIN HYDROCHLORIDE 500 MG/1
500 TABLET, COATED ORAL
Qty: 1 | Refills: 3 | Status: DISCONTINUED | COMMUNITY
Start: 2019-07-03 | End: 2019-07-03

## 2019-07-03 RX ORDER — LEVOTHYROXINE SODIUM 0.12 MG/1
125 TABLET ORAL
Qty: 90 | Refills: 1 | Status: DISCONTINUED | COMMUNITY
Start: 2018-01-10 | End: 2019-07-03

## 2019-07-05 ENCOUNTER — LABORATORY RESULT (OUTPATIENT)
Age: 84
End: 2019-07-05

## 2019-07-05 PROCEDURE — 82043 UR ALBUMIN QUANTITATIVE: CPT

## 2019-07-05 PROCEDURE — 80053 COMPREHEN METABOLIC PANEL: CPT

## 2019-07-05 PROCEDURE — G0463: CPT

## 2019-07-06 LAB
ALBUMIN SERPL ELPH-MCNC: 4 G/DL — SIGNIFICANT CHANGE UP (ref 3.3–5)
ALP SERPL-CCNC: 63 U/L — SIGNIFICANT CHANGE UP (ref 40–120)
ALT FLD-CCNC: 14 U/L — SIGNIFICANT CHANGE UP (ref 10–45)
ANION GAP SERPL CALC-SCNC: 13 MMOL/L — SIGNIFICANT CHANGE UP (ref 5–17)
AST SERPL-CCNC: 14 U/L — SIGNIFICANT CHANGE UP (ref 10–40)
BILIRUB SERPL-MCNC: 0.2 MG/DL — SIGNIFICANT CHANGE UP (ref 0.2–1.2)
BUN SERPL-MCNC: 42 MG/DL — HIGH (ref 7–23)
CALCIUM SERPL-MCNC: 9.6 MG/DL — SIGNIFICANT CHANGE UP (ref 8.4–10.5)
CHLORIDE SERPL-SCNC: 104 MMOL/L — SIGNIFICANT CHANGE UP (ref 96–108)
CO2 SERPL-SCNC: 24 MMOL/L — SIGNIFICANT CHANGE UP (ref 22–31)
CREAT ?TM UR-MCNC: 60 MG/DL — SIGNIFICANT CHANGE UP
CREAT SERPL-MCNC: 1.49 MG/DL — HIGH (ref 0.5–1.3)
GLUCOSE SERPL-MCNC: 295 MG/DL — HIGH (ref 70–99)
MICROALBUMIN UR-MCNC: 13.2 MG/DL — SIGNIFICANT CHANGE UP
MICROALBUMIN/CREAT UR-RTO: 220 MG/G — HIGH (ref 0–30)
POTASSIUM SERPL-MCNC: 5.3 MMOL/L — SIGNIFICANT CHANGE UP (ref 3.5–5.3)
POTASSIUM SERPL-SCNC: 5.3 MMOL/L — SIGNIFICANT CHANGE UP (ref 3.5–5.3)
PROT SERPL-MCNC: 6.8 G/DL — SIGNIFICANT CHANGE UP (ref 6–8.3)
SODIUM SERPL-SCNC: 141 MMOL/L — SIGNIFICANT CHANGE UP (ref 135–145)

## 2019-07-07 NOTE — ASSESSMENT
[FreeTextEntry1] : Ms. BURNS is a 86 year F with a PMH of HTN, Hypothyroidism, Polycythemia Vera, Hypercholesterolemia, DM2 here for a check up post-discharge from rehab.\par \par #Elevated Cr 2/2 diabetic nephropathy?\par - Will recheck CMP and if remains elevated will refer to Nephrology\par \par #Hyperkalemia - 5.4 on recent CMP\par - Will recheck CMP\par - Continue with Losartan for now as pt has had mild hyperkalemia before and then normalized while on Losartan and probably is renal protective as of right now\par \par #HCM\par - PHQ2 negative\par - Immunizations up to date\par \par Rest of plan as above\par \par Case discussed with Dr. Jara\par \par RTC in 3 months\par \par Yamilex Smith MD\par Internal Medicine PGY2

## 2019-07-07 NOTE — PHYSICAL EXAM
[Well Nourished] : well nourished [No Acute Distress] : no acute distress [Well Developed] : well developed [Well-Appearing] : well-appearing [Normal Sclera/Conjunctiva] : normal sclera/conjunctiva [EOMI] : extraocular movements intact [Normal Outer Ear/Nose] : the outer ears and nose were normal in appearance [Normal Oropharynx] : the oropharynx was normal [No Lymphadenopathy] : no lymphadenopathy [Normal TMs] : both tympanic membranes were normal [No Respiratory Distress] : no respiratory distress  [No Accessory Muscle Use] : no accessory muscle use [Regular Rhythm] : with a regular rhythm [Clear to Auscultation] : lungs were clear to auscultation bilaterally [Normal Rate] : normal rate  [Soft] : abdomen soft [Normal S1, S2] : normal S1 and S2 [No Murmur] : no murmur heard [Non Tender] : non-tender [Non-distended] : non-distended [No HSM] : no HSM [No CVA Tenderness] : no CVA  tenderness [No Spinal Tenderness] : no spinal tenderness [Normal Bowel Sounds] : normal bowel sounds [No Joint Swelling] : no joint swelling [No Rash] : no rash [Alert and Oriented x3] : oriented to person, place, and time [Normal Affect] : the affect was normal [de-identified] : 1+ pitting edema

## 2019-07-07 NOTE — REVIEW OF SYSTEMS
[Lower Ext Edema] : lower extremity edema [Chills] : no chills [Fever] : no fever [Chest Pain] : no chest pain [Palpitations] : no palpitations [Recent Change In Weight] : ~T no recent weight change [Shortness Of Breath] : no shortness of breath [Cough] : no cough [Abdominal Pain] : no abdominal pain [Nausea] : no nausea [Constipation] : no constipation [Diarrhea] : diarrhea [Dysuria] : no dysuria [Vomiting] : no vomiting [Joint Pain] : no joint pain [Skin Rash] : no skin rash [Headache] : no headache

## 2019-07-07 NOTE — HISTORY OF PRESENT ILLNESS
[Family Member] : family member [FreeTextEntry1] : Ms. BURNS is a 86 year F with a PMH of HTN, Hypothyroidism, Polycythemia Vera, Hypercholesterolemia, DM2 here for a check up post-discharge from rehab. [de-identified] : As per patient's daughter patient was hospitalized in February for about 1.5 weeks for SBO and then went to [facility] for rehab due to deconditioning. Pt was then discharged later that month from rehab and as per daughter unable to obtain a follow up appointment until today. No acute concerns today. Patient has no abdominal pain, nausea or vomiting. Good appetite. Urinating well. Having good BMs.

## 2019-07-12 ENCOUNTER — RESULT REVIEW (OUTPATIENT)
Age: 84
End: 2019-07-12

## 2019-07-12 DIAGNOSIS — E03.9 HYPOTHYROIDISM, UNSPECIFIED: ICD-10-CM

## 2019-07-12 DIAGNOSIS — M81.0 AGE-RELATED OSTEOPOROSIS WITHOUT CURRENT PATHOLOGICAL FRACTURE: ICD-10-CM

## 2019-07-12 DIAGNOSIS — I82.5Z9 CHRONIC EMBOLISM AND THROMBOSIS OF UNSPECIFIED DEEP VEINS OF UNSPECIFIED DISTAL LOWER EXTREMITY: ICD-10-CM

## 2019-07-12 DIAGNOSIS — E11.9 TYPE 2 DIABETES MELLITUS WITHOUT COMPLICATIONS: ICD-10-CM

## 2019-07-12 DIAGNOSIS — D45 POLYCYTHEMIA VERA: ICD-10-CM

## 2019-07-12 RX ORDER — LISINOPRIL 20 MG/1
20 TABLET ORAL DAILY
Qty: 1 | Refills: 2 | Status: DISCONTINUED | COMMUNITY
Start: 2017-05-19 | End: 2019-07-12

## 2019-07-17 ENCOUNTER — FORM ENCOUNTER (OUTPATIENT)
Age: 84
End: 2019-07-17

## 2019-07-18 ENCOUNTER — OUTPATIENT (OUTPATIENT)
Dept: OUTPATIENT SERVICES | Facility: HOSPITAL | Age: 84
LOS: 1 days | End: 2019-07-18
Payer: MEDICARE

## 2019-07-18 ENCOUNTER — APPOINTMENT (OUTPATIENT)
Dept: ULTRASOUND IMAGING | Facility: IMAGING CENTER | Age: 84
End: 2019-07-18
Payer: MEDICARE

## 2019-07-18 DIAGNOSIS — E11.9 TYPE 2 DIABETES MELLITUS WITHOUT COMPLICATIONS: ICD-10-CM

## 2019-07-18 PROCEDURE — 93976 VASCULAR STUDY: CPT | Mod: 26,59

## 2019-07-18 PROCEDURE — 93975 VASCULAR STUDY: CPT

## 2019-07-18 PROCEDURE — 76857 US EXAM PELVIC LIMITED: CPT

## 2019-07-18 PROCEDURE — 76857 US EXAM PELVIC LIMITED: CPT | Mod: 26

## 2019-07-18 PROCEDURE — 76770 US EXAM ABDO BACK WALL COMP: CPT | Mod: XU

## 2019-08-12 ENCOUNTER — APPOINTMENT (OUTPATIENT)
Dept: NEPHROLOGY | Facility: CLINIC | Age: 84
End: 2019-08-12
Payer: MEDICARE

## 2019-08-12 VITALS
HEIGHT: 59 IN | OXYGEN SATURATION: 98 % | HEART RATE: 99 BPM | DIASTOLIC BLOOD PRESSURE: 89 MMHG | SYSTOLIC BLOOD PRESSURE: 136 MMHG

## 2019-08-12 PROCEDURE — 99204 OFFICE O/P NEW MOD 45 MIN: CPT

## 2019-08-15 NOTE — ASSESSMENT
[FreeTextEntry1] : Elevated creatinine: Will  repeat blood chemistry including phosphorus, , immunofixation and  repeat urinalysis and urine protein/creatinine .\par If reports are stable will follow up in 3 months.\par Will not consider ACEI at this time due to recent changes in creatinine and borer line high K level\par reviewed renal and  bladder sonogram. \par CKD risk factors-  elevated HbA1c, Hyperlipidemia, Hypertension, atrophic right kidney, alendronate.\par Discussed current level of renal function/proteinuria and its implications with patient and daughter.\par Discussed renal function preservation strategies including: Sleep hygiene, avoiding NSAIDs and herbal medications, avoiding excessive animal protein and sodium in diet, maintaining optimized weight, blood pressure, glucose and lipids.\par F/u in 12 weeks if labs are stable\par

## 2019-08-15 NOTE — REASON FOR VISIT
[Consultation] : a consultation visit [Family Member] : family member [FreeTextEntry1] : referred from medical clinic for nephrology consultation for elevated creatinine

## 2019-08-15 NOTE — PHYSICAL EXAM
[General Appearance - Alert] : alert [General Appearance - In No Acute Distress] : in no acute distress [Outer Ear] : the ears and nose were normal in appearance [Jugular Venous Distention Increased] : there was no jugular-venous distention [Auscultation Breath Sounds / Voice Sounds] : lungs were clear to auscultation bilaterally [Heart Sounds Gallop] : no gallops [Heart Sounds Pericardial Friction Rub] : no pericardial rub [Abdomen Soft] : soft [Abdomen Tenderness] : non-tender [Cervical Lymph Nodes Enlarged Anterior Bilaterally] : anterior cervical [Supraclavicular Lymph Nodes Enlarged Bilaterally] : supraclavicular [Axillary Lymph Nodes Enlarged Bilaterally] : axillary [Inguinal Lymph Nodes Enlarged Bilaterally] : inguinal [Involuntary Movements] : no involuntary movements were seen [] : no rash [FreeTextEntry1] : on wheel chair, gen weakness [Impaired Insight] : insight and judgment were intact [Oriented To Time, Place, And Person] : oriented to person, place, and time [Affect] : the affect was normal

## 2019-08-15 NOTE — HISTORY OF PRESENT ILLNESS
[FreeTextEntry1] : 86 years old female, born in [facility], living in  for 22 years\par She is accompanied by her daughter Magalie Lamar today.\par She has known HARRIS 2 mutation, has polycythemia and is on hydroxyurea.\par She has known DM (age 55) Not on insulin; HTN (age 55). H/o Hyperlipidemia. No h/o  Gout\par No known h/o kidney stone \par No hematuria/Transfusions\par Nocturia:2 times\par Has no h/o Pneumonia / h/o  UTI in April 2019 while in rehab.\par She had CVA in 1980 , uses walker and wheel chair for long distances.\par She has h/o LE DVT- recurrent right leg (2005), on eliquis.\par No known h/o active CAD /PVD/Neoplasia/active infections/bleeding.She is on levothyroxine for hypothyroidism.\par Reports no major allergies.  No known h/o tuberculosis or hepatitis.\par Most recent hospitalization/for: February 14, 2019. Needed surgery for SBO. \par Past surgeries:\par Laparotomy (2/2019) for SBO.from adhesions.\par Hysterectomy 1998\par No history of kidney/ bladder surgery.\par Non smoker.\par Fam:  Children: Two daughters-Healthy. She lives with her daughter\par Has family history of kidney disease- older sister had kidney disease but did not need dialysis\par Independent for ADL\par Able to walk < 1 block with walker, can climb stairs with difficulty.\par ROS: Has no h/o shortness of breath on exertion. No h/o Sleep apnea.  \par Functional/employment status:not working in [place]. 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 Creatinine from July: 1.49\par Urine microalb/creat 220\par Renal imaging: atrophic right kidney\par \par \par

## 2019-08-22 ENCOUNTER — LABORATORY RESULT (OUTPATIENT)
Age: 84
End: 2019-08-22

## 2019-08-22 ENCOUNTER — OUTPATIENT (OUTPATIENT)
Dept: OUTPATIENT SERVICES | Facility: HOSPITAL | Age: 84
LOS: 1 days | End: 2019-08-22
Payer: MEDICARE

## 2019-08-22 ENCOUNTER — RESULT CHARGE (OUTPATIENT)
Age: 84
End: 2019-08-22

## 2019-08-22 ENCOUNTER — APPOINTMENT (OUTPATIENT)
Dept: INTERNAL MEDICINE | Facility: CLINIC | Age: 84
End: 2019-08-22
Payer: MEDICARE

## 2019-08-22 VITALS
OXYGEN SATURATION: 97 % | HEART RATE: 98 BPM | DIASTOLIC BLOOD PRESSURE: 70 MMHG | WEIGHT: 113 LBS | BODY MASS INDEX: 22.78 KG/M2 | HEIGHT: 59 IN | SYSTOLIC BLOOD PRESSURE: 120 MMHG

## 2019-08-22 DIAGNOSIS — I10 ESSENTIAL (PRIMARY) HYPERTENSION: ICD-10-CM

## 2019-08-22 DIAGNOSIS — R79.89 OTHER SPECIFIED ABNORMAL FINDINGS OF BLOOD CHEMISTRY: ICD-10-CM

## 2019-08-22 PROCEDURE — 99214 OFFICE O/P EST MOD 30 MIN: CPT | Mod: GC

## 2019-08-22 NOTE — REVIEW OF SYSTEMS
[Incontinence] : incontinence [Fever] : no fever [Chills] : no chills [Night Sweats] : no night sweats [Recent Change In Weight] : ~T no recent weight change [Sore Throat] : no sore throat [Vision Problems] : no vision problems [Chest Pain] : no chest pain [Palpitations] : no palpitations [Lower Ext Edema] : no lower extremity edema [Shortness Of Breath] : no shortness of breath [Cough] : no cough [Abdominal Pain] : no abdominal pain [Nausea] : no nausea [Constipation] : no constipation [Diarrhea] : diarrhea [Vomiting] : no vomiting [Dysuria] : no dysuria [Hematuria] : no hematuria [Frequency] : no frequency [Headache] : no headache [Dizziness] : no dizziness [FreeTextEntry5] : Wears compression stocking at home  [FreeTextEntry8] : Per HPI

## 2019-08-22 NOTE — PHYSICAL EXAM
[No Acute Distress] : no acute distress [Normal Sclera/Conjunctiva] : normal sclera/conjunctiva [PERRL] : pupils equal round and reactive to light [Normal Oropharynx] : the oropharynx was normal [Thyroid Normal, No Nodules] : the thyroid was normal and there were no nodules present [No Respiratory Distress] : no respiratory distress  [No Accessory Muscle Use] : no accessory muscle use [Clear to Auscultation] : lungs were clear to auscultation bilaterally [Normal Rate] : normal rate  [Regular Rhythm] : with a regular rhythm [Normal S1, S2] : normal S1 and S2 [No Murmur] : no murmur heard [No Edema] : there was no peripheral edema [Soft] : abdomen soft [Non Tender] : non-tender [Non-distended] : non-distended [Normal Bowel Sounds] : normal bowel sounds [Normal Affect] : the affect was normal [Alert and Oriented x3] : oriented to person, place, and time [de-identified] : wears compression stocking during exam.

## 2019-08-22 NOTE — PLAN
[FreeTextEntry1] : # Elevated serum creatinine.\par Since none of the labs recommended by nephrologist on 8/12/2019 were performed due to logistics reason, we will reorder them today. \par - Serum immunofixation, urine microalbumin/creatinine, renal panel, urinalysis with reflex \par - Continue holding lisinopril and metformin. \par \par # DM 2\par Repeat A1c 8.4 \par - Followup with renal workup \par \par # HTN \par /70 today. Well controlled. \par \par # HCM\par Last mammograpm Birads 2 in 2016. \par - Defer mammogram to annual comprehensive visit.

## 2019-08-22 NOTE — HISTORY OF PRESENT ILLNESS
[Family Member] : family member [FreeTextEntry1] : Elevated serum creatinine. \par Patient speaks Tagalog language ([place] language) and some English, daughter helps translate.  [de-identified] : 86 year old Female with PMH of HTN, hypothyroidism, Polycythemia Vera, Hypercholesterolemia, DM 2 presents for followup regarding elevated serum creatinne. \par          Her serum Cr was 1.12 in April 2019, 1.3 on 6/7/2019, and 1.49 on 7/21/2019 (eGFR 31 mL/min/1.73m2). Renal ultrasound (7/18/2019) showed atrophic R kidney 5.2cm, urinary bladder WNL , post void volume 79 mL, normal symmetric blood flow throughout both kidneys. At last visit 7/21/2019 lisinopril and metformin discontinued due to concern for elevated creatinine. Patient saw nephrology on 8/12/2019, and nephrologist recommended the following labs: serum immunofixation, urine microalbumin/creatinine, renal panel, urinalysis with reflex. However, the lab was closed by the time patient finished her nephrology appointment, and none of the above recommended labs were performed. \par            Patient reports doing well in the interim. Patient has no dysuria/frequency/hesitancy/hematuria. Patient has been wearing a diaper due to chronic incontinence, and she changes her diaper three times a day (no increased frequency in the last 6 months). She denies any fever/chills/night sweats/weight loss. She denies any chest pain/shortness of breath. She denies any abdominal pain/nausea/vomiting/diarrhea/constipation.

## 2019-08-23 ENCOUNTER — LABORATORY RESULT (OUTPATIENT)
Age: 84
End: 2019-08-23

## 2019-08-23 LAB
ALBUMIN SERPL ELPH-MCNC: 4 G/DL — SIGNIFICANT CHANGE UP (ref 3.3–5)
ANION GAP SERPL CALC-SCNC: 18 MMOL/L — HIGH (ref 5–17)
BUN SERPL-MCNC: 46 MG/DL — HIGH (ref 7–23)
CALCIUM SERPL-MCNC: 10 MG/DL — SIGNIFICANT CHANGE UP (ref 8.4–10.5)
CHLORIDE SERPL-SCNC: 99 MMOL/L — SIGNIFICANT CHANGE UP (ref 96–108)
CO2 SERPL-SCNC: 18 MMOL/L — LOW (ref 22–31)
CREAT SERPL-MCNC: 1.31 MG/DL — HIGH (ref 0.5–1.3)
GLUCOSE SERPL-MCNC: 246 MG/DL — HIGH (ref 70–99)
INTERPRETATION SERPL IFE-IMP: SIGNIFICANT CHANGE UP
PHOSPHATE SERPL-MCNC: 3.2 MG/DL — SIGNIFICANT CHANGE UP (ref 2.5–4.5)
POTASSIUM SERPL-MCNC: 5.8 MMOL/L — HIGH (ref 3.5–5.3)
POTASSIUM SERPL-SCNC: 5.8 MMOL/L — HIGH (ref 3.5–5.3)
SODIUM SERPL-SCNC: 135 MMOL/L — SIGNIFICANT CHANGE UP (ref 135–145)

## 2019-08-23 PROCEDURE — 86334 IMMUNOFIX E-PHORESIS SERUM: CPT

## 2019-08-23 PROCEDURE — 83036 HEMOGLOBIN GLYCOSYLATED A1C: CPT

## 2019-08-23 PROCEDURE — G0463: CPT | Mod: 25

## 2019-08-23 PROCEDURE — 81003 URINALYSIS AUTO W/O SCOPE: CPT

## 2019-08-23 PROCEDURE — 82043 UR ALBUMIN QUANTITATIVE: CPT

## 2019-08-23 PROCEDURE — 80069 RENAL FUNCTION PANEL: CPT

## 2019-08-24 LAB
APPEARANCE UR: CLEAR — SIGNIFICANT CHANGE UP
BILIRUB UR-MCNC: NEGATIVE — SIGNIFICANT CHANGE UP
COLOR SPEC: SIGNIFICANT CHANGE UP
CREAT ?TM UR-MCNC: 57 MG/DL — SIGNIFICANT CHANGE UP
DIFF PNL FLD: NEGATIVE — SIGNIFICANT CHANGE UP
GLUCOSE UR QL: SIGNIFICANT CHANGE UP
KETONES UR-MCNC: NEGATIVE — SIGNIFICANT CHANGE UP
LEUKOCYTE ESTERASE UR-ACNC: NEGATIVE — SIGNIFICANT CHANGE UP
MICROALBUMIN UR-MCNC: 4.8 MG/DL — SIGNIFICANT CHANGE UP
MICROALBUMIN/CREAT UR-RTO: 84 MG/G — HIGH (ref 0–30)
NITRITE UR-MCNC: NEGATIVE — SIGNIFICANT CHANGE UP
PH UR: 5.5 — SIGNIFICANT CHANGE UP (ref 5–8)
PROT UR-MCNC: SIGNIFICANT CHANGE UP
SP GR SPEC: 1.01 — SIGNIFICANT CHANGE UP (ref 1.01–1.02)
UROBILINOGEN FLD QL: SIGNIFICANT CHANGE UP

## 2019-08-26 DIAGNOSIS — E11.9 TYPE 2 DIABETES MELLITUS WITHOUT COMPLICATIONS: ICD-10-CM

## 2019-08-26 DIAGNOSIS — R79.89 OTHER SPECIFIED ABNORMAL FINDINGS OF BLOOD CHEMISTRY: ICD-10-CM

## 2019-08-28 ENCOUNTER — APPOINTMENT (OUTPATIENT)
Dept: ENDOCRINOLOGY | Facility: CLINIC | Age: 84
End: 2019-08-28

## 2019-09-27 LAB — HBA1C MFR BLD HPLC: 8.6

## 2019-10-21 ENCOUNTER — OUTPATIENT (OUTPATIENT)
Dept: OUTPATIENT SERVICES | Facility: HOSPITAL | Age: 84
LOS: 1 days | Discharge: HOME | End: 2019-10-21

## 2019-10-21 DIAGNOSIS — C92.10 CHRONIC MYELOID LEUKEMIA, BCR/ABL-POSITIVE, NOT HAVING ACHIEVED REMISSION: ICD-10-CM

## 2019-10-29 DIAGNOSIS — C92.10 CHRONIC MYELOID LEUKEMIA, BCR/ABL-POSITIVE, NOT HAVING ACHIEVED REMISSION: ICD-10-CM

## 2019-10-31 ENCOUNTER — RESULT REVIEW (OUTPATIENT)
Age: 84
End: 2019-10-31

## 2019-10-31 ENCOUNTER — APPOINTMENT (OUTPATIENT)
Dept: HEMATOLOGY ONCOLOGY | Facility: CLINIC | Age: 84
End: 2019-10-31
Payer: MEDICARE

## 2019-10-31 VITALS
SYSTOLIC BLOOD PRESSURE: 138 MMHG | OXYGEN SATURATION: 90 % | HEART RATE: 90 BPM | DIASTOLIC BLOOD PRESSURE: 84 MMHG | RESPIRATION RATE: 15 BRPM | TEMPERATURE: 98.9 F

## 2019-10-31 LAB
BASOPHILS # BLD AUTO: 0 K/UL — SIGNIFICANT CHANGE UP (ref 0–0.2)
BASOPHILS NFR BLD AUTO: 0.7 % — SIGNIFICANT CHANGE UP (ref 0–2)
EOSINOPHIL # BLD AUTO: 0.1 K/UL — SIGNIFICANT CHANGE UP (ref 0–0.5)
EOSINOPHIL NFR BLD AUTO: 1.7 % — SIGNIFICANT CHANGE UP (ref 0–6)
HCT VFR BLD CALC: 36.1 % — SIGNIFICANT CHANGE UP (ref 34.5–45)
HGB BLD-MCNC: 12.8 G/DL — SIGNIFICANT CHANGE UP (ref 11.5–15.5)
LYMPHOCYTES # BLD AUTO: 1.2 K/UL — SIGNIFICANT CHANGE UP (ref 1–3.3)
LYMPHOCYTES # BLD AUTO: 21.9 % — SIGNIFICANT CHANGE UP (ref 13–44)
MCHC RBC-ENTMCNC: 35.5 G/DL — SIGNIFICANT CHANGE UP (ref 32–36)
MCHC RBC-ENTMCNC: 40.3 PG — HIGH (ref 27–34)
MCV RBC AUTO: 114 FL — HIGH (ref 80–100)
MONOCYTES # BLD AUTO: 0.5 K/UL — SIGNIFICANT CHANGE UP (ref 0–0.9)
MONOCYTES NFR BLD AUTO: 9 % — SIGNIFICANT CHANGE UP (ref 2–14)
NEUTROPHILS # BLD AUTO: 3.8 K/UL — SIGNIFICANT CHANGE UP (ref 1.8–7.4)
NEUTROPHILS NFR BLD AUTO: 66.7 % — SIGNIFICANT CHANGE UP (ref 43–77)
PLAT MORPH BLD: NORMAL — SIGNIFICANT CHANGE UP
PLATELET # BLD AUTO: 409 K/UL — HIGH (ref 150–400)
PLATELET CLUMP BLD QL SMEAR: PRESENT
RBC # BLD: 3.18 M/UL — LOW (ref 3.8–5.2)
RBC # FLD: 13.2 % — SIGNIFICANT CHANGE UP (ref 10.3–14.5)
RBC BLD AUTO: SIGNIFICANT CHANGE UP
WBC # BLD: 5.7 K/UL — SIGNIFICANT CHANGE UP (ref 3.8–10.5)
WBC # FLD AUTO: 5.7 K/UL — SIGNIFICANT CHANGE UP (ref 3.8–10.5)

## 2019-10-31 PROCEDURE — 99213 OFFICE O/P EST LOW 20 MIN: CPT

## 2019-10-31 RX ORDER — APIXABAN 5 MG/1
5 TABLET, FILM COATED ORAL
Qty: 120 | Refills: 2 | Status: DISCONTINUED | COMMUNITY
Start: 2019-06-06 | End: 2019-10-31

## 2019-10-31 RX ORDER — ASPIRIN ENTERIC COATED TABLETS 81 MG 81 MG/1
81 TABLET, DELAYED RELEASE ORAL
Refills: 0 | Status: DISCONTINUED | COMMUNITY
Start: 2018-05-31 | End: 2019-10-31

## 2019-10-31 NOTE — ASSESSMENT
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 \par \par 2) DVT\par -patient is high risk for bleeding given antiplatelet with aspirin ( DC ASA) and anticoagulation with apixaban, however she is very high risk for recurrent VTE including acute DVT/PE with HARRIS 2+ PV and history of multiple DVTs, benefit of anticoagulation outweights risk in patient's case. Discussed with patient and her son- in law ; eliquis dose was reduced to 2.5 mg BID as per recommendations in the literature >81 yo and <60Kg.\par \par RTC 4 months.

## 2019-10-31 NOTE — REVIEW OF SYSTEMS
[Negative] : Allergic/Immunologic [Fever] : no fever [Chills] : no chills [Eye Pain] : no eye pain [Dysphagia] : no dysphagia [Odynophagia] : no odynophagia [Chest Pain] : no chest pain [Shortness Of Breath] : no shortness of breath [Abdominal Pain] : no abdominal pain [Vomiting] : no vomiting [Joint Pain] : no joint pain [Skin Rash] : no skin rash [Easy Bleeding] : no tendency for easy bleeding [Easy Bruising] : no tendency for easy bruising

## 2019-10-31 NOTE — HISTORY OF PRESENT ILLNESS
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 \par \par No other changes in her medical, surgical or social history since 6/6/19.

## 2019-10-31 NOTE — PHYSICAL EXAM
[Normal] : affect appropriate [Capable of only limited self care, confined to bed or chair more than 50% of waking hours] : Status 3- Capable of only limited self care, confined to bed or chair more than 50% of waking hours [Thin] : thin [de-identified] : wheelchiar dependent for ambulation [de-identified] : chronic venous changes in lower extremity

## 2019-11-01 LAB
ALBUMIN SERPL ELPH-MCNC: 4.1 G/DL
ALP BLD-CCNC: 67 U/L
ALT SERPL-CCNC: 16 U/L
ANION GAP SERPL CALC-SCNC: 15 MMOL/L
AST SERPL-CCNC: 15 U/L
BILIRUB SERPL-MCNC: 0.2 MG/DL
BUN SERPL-MCNC: 40 MG/DL
CALCIUM SERPL-MCNC: 9.8 MG/DL
CHLORIDE SERPL-SCNC: 101 MMOL/L
CO2 SERPL-SCNC: 23 MMOL/L
CREAT SERPL-MCNC: 1.38 MG/DL
GLUCOSE SERPL-MCNC: 174 MG/DL
LDH SERPL-CCNC: 166 U/L
POTASSIUM SERPL-SCNC: 5 MMOL/L
PROT SERPL-MCNC: 7 G/DL
SODIUM SERPL-SCNC: 139 MMOL/L

## 2019-11-08 ENCOUNTER — APPOINTMENT (OUTPATIENT)
Dept: INTERNAL MEDICINE | Facility: CLINIC | Age: 84
End: 2019-11-08

## 2019-11-08 ENCOUNTER — MED ADMIN CHARGE (OUTPATIENT)
Age: 84
End: 2019-11-08

## 2019-11-08 ENCOUNTER — APPOINTMENT (OUTPATIENT)
Dept: ENDOCRINOLOGY | Facility: CLINIC | Age: 84
End: 2019-11-08
Payer: MEDICARE

## 2019-11-08 VITALS
SYSTOLIC BLOOD PRESSURE: 140 MMHG | BODY MASS INDEX: 23.59 KG/M2 | HEIGHT: 59 IN | OXYGEN SATURATION: 90 % | DIASTOLIC BLOOD PRESSURE: 84 MMHG | HEART RATE: 90 BPM | WEIGHT: 117 LBS

## 2019-11-08 PROCEDURE — 99214 OFFICE O/P EST MOD 30 MIN: CPT

## 2019-11-08 PROCEDURE — 83036 HEMOGLOBIN GLYCOSYLATED A1C: CPT | Mod: QW

## 2019-11-08 PROCEDURE — 82962 GLUCOSE BLOOD TEST: CPT

## 2019-11-08 NOTE — HISTORY OF PRESENT ILLNESS
[FreeTextEntry1] : Diabetes Follow up Patient HPI\par She has recently moved to a facility ( she is here with the health aid)\par Here with daughter who is helping with translation\par \par She has been seeing Dr. Wagner and metformin was held for a short time ( 3-4 weeks) \par \par \par HPI:\par \par Duration of Diabetes: 30 years \par Is patient on Insulin? No \par If yes, how long on insulin? \par \par List Current Medications for Glycemic control and the doses:\par 1- Metformin 500 mg BID \par 2- \par 3- \par \par SMBG (self monitored blood glucose) readings: \par - Name of glucometer: \par - How often does the patient check BG? once a day \par - Does the patient keep a log? \par \par If detailed record is available, what is the range of most of the BG readings?\par 120-150s \par \par \par Does patient get Hypoglycemic episodes? None \par  \par \par Diabetic Complications: Is patient aware of having any of those complications?\par - Eyes: Retinopathy? history of cataracts \par  	When was the last fully dilated eye exam? one month ago -optometrist - pending optho appt \par - Feet: 	Neuropathy? None \par - Kidneys: Nephropathy? No, GFr of 82 \par \par Diet: review patient's diet: \par Continue DM diet \par \par \par Exercise: review patient exercise habit: Wheelchair bound \par  \par

## 2019-11-08 NOTE — PHYSICAL EXAM
[Alert] : alert [No Acute Distress] : no acute distress [Well Nourished] : well nourished [Well Developed] : well developed [EOMI] : extra ocular movement intact [Normal Sclera/Conjunctiva] : normal sclera/conjunctiva [PERRL] : pupils equal, round and reactive to light [No Proptosis] : no proptosis [Normal Outer Ear/Nose] : the ears and nose were normal in appearance [No Neck Mass] : no neck mass was observed [Normal Hearing] : hearing was normal [Normal TMs] : both tympanic membranes were normal [Thyroid Not Enlarged] : the thyroid was not enlarged [Supple] : the neck was supple [Normal Rate and Effort] : normal respiratory rhythm and effort [Clear to Auscultation] : lungs were clear to auscultation bilaterally [No Respiratory Distress] : no respiratory distress [Normal S1, S2] : normal S1 and S2 [Normal Rate] : heart rate was normal  [Normal Bowel Sounds] : normal bowel sounds [Regular Rhythm] : with a regular rhythm [Soft] : abdomen soft [Not Tender] : non-tender [No CVA Tenderness] : no ~M costovertebral angle tenderness [Not Distended] : not distended [No Clubbing, Cyanosis] : no clubbing  or cyanosis of the fingernails [No Joint Swelling] : no joint swelling seen [Normal Gait] : normal gait [No Rash] : no rash [Normal Strength/Tone] : muscle strength and tone were normal [No Skin Lesions] : no skin lesions [Normal Reflexes] : deep tendon reflexes were 2+ and symmetric [No Tremors] : no tremors [No Motor Deficits] : the motor exam was normal [Oriented x3] : oriented to person, place, and time [Normal Insight/Judgement] : insight and judgment were intact [Normal Affect] : the affect was normal [Normal Mood] : the mood was normal

## 2019-11-08 NOTE — REVIEW OF SYSTEMS
[Fatigue] : no fatigue [Recent Weight Gain (___ Lbs)] : no recent weight gain [Decreased Appetite] : appetite not decreased [Recent Weight Loss (___ Lbs)] : no recent weight loss [Blurry Vision] : no blurred vision [Visual Field Defect] : no visual field defect [Eyes Itch] : no itching of the eyes [Dry Eyes] : no dryness of the eyes [Dysphagia] : no dysphagia [Dysphonia] : no dysphonia [Neck Pain] : no neck pain [Nasal Congestion] : no nasal congestion [Chest Pain] : no chest pain [Palpitations] : no palpitations [Heart Rate Is Slow] : the heart rate was not slow [Shortness Of Breath] : no shortness of breath [Heart Rate Is Fast] : the heart rate was not fast [Wheezing] : no wheezing was heard [SOB on Exertion] : no shortness of breath during exertion [Cough] : no cough [Vomiting] : no vomiting was observed [Constipation] : no constipation [Nausea] : no nausea [Diarrhea] : no diarrhea [Polyuria] : no polyuria [Irregular Menses] : regular menses [Joint Pain] : no joint pain [Joint Stiffness] : no joint stiffness [Hirsutism] : no hirsutism [Acanthosis] : no acanthosis  [Hair Loss] : no hair loss [Acne] : no acne [Headache] : no headaches [Tremors] : no tremors [Anxiety] : no anxiety [Depression] : no depression [Dizziness] : no dizziness [Galactorrhea] : no galactorrhea  [Polydipsia] : no polydipsia [Cold Intolerance] : cold tolerant [Easy Bleeding] : no ~M tendency for easy bleeding [Easy Bruising] : no tendency for easy bruising [Heat Intolerance] : heat tolerant [Swelling] : no swelling

## 2019-11-08 NOTE — ASSESSMENT
[FreeTextEntry1] : 86 year old female with history of polycythemia vera, DVT, osteoporosis, hypothyroidism here for follow up of DM Type 2. \par She has had a decrease in renal function, at this time will be switching her to januvia. \par \par -Start Januvia 25 mg daily \par -Discontinue metformin 500 mg BID\par -SMBGs 1-2 times per day \par -Continue ADA diet \par -Will check BMP, lipid panel, microalb/cr ratio\par \par Hypothyroidism\par -Check TSH today \par -Currently on levothyroxine 112 mcg daily \par \par Osteoporosis\par -Continue on Fosamax for now \par -Continue calcium/vitamin D supplementation\par -check vitamin D levels \par -Next DEXA on next visit  \par \par RTC in 4-5 months. \par

## 2019-11-10 ENCOUNTER — OTHER (OUTPATIENT)
Age: 84
End: 2019-11-10

## 2019-11-10 LAB
25(OH)D3 SERPL-MCNC: 82.3 NG/ML
ANION GAP SERPL CALC-SCNC: 19 MMOL/L
BUN SERPL-MCNC: 35 MG/DL
CALCIUM SERPL-MCNC: 9.8 MG/DL
CHLORIDE SERPL-SCNC: 104 MMOL/L
CHOLEST SERPL-MCNC: 250 MG/DL
CHOLEST/HDLC SERPL: 3.7 RATIO
CO2 SERPL-SCNC: 17 MMOL/L
CREAT SERPL-MCNC: 1.37 MG/DL
ESTIMATED AVERAGE GLUCOSE: 171 MG/DL
GLUCOSE BLDC GLUCOMTR-MCNC: 188
GLUCOSE SERPL-MCNC: 162 MG/DL
HBA1C MFR BLD HPLC: 7.6
HBA1C MFR BLD HPLC: 7.6 %
HDLC SERPL-MCNC: 67 MG/DL
LDLC SERPL CALC-MCNC: 143 MG/DL
POTASSIUM SERPL-SCNC: 5.3 MMOL/L
SODIUM SERPL-SCNC: 140 MMOL/L
T4 FREE SERPL-MCNC: 1.4 NG/DL
TRIGL SERPL-MCNC: 199 MG/DL
TSH SERPL-ACNC: 0.95 UIU/ML

## 2019-11-12 LAB
CREAT SPEC-SCNC: 29 MG/DL
MICROALBUMIN 24H UR DL<=1MG/L-MCNC: 12.3 MG/DL
MICROALBUMIN/CREAT 24H UR-RTO: 428 MG/G

## 2019-11-13 ENCOUNTER — OUTPATIENT (OUTPATIENT)
Dept: OUTPATIENT SERVICES | Facility: HOSPITAL | Age: 84
LOS: 1 days | End: 2019-11-13
Payer: MEDICARE

## 2019-11-13 ENCOUNTER — APPOINTMENT (OUTPATIENT)
Dept: PODIATRY | Facility: HOSPITAL | Age: 84
End: 2019-11-13

## 2019-11-13 VITALS — HEART RATE: 102 BPM | DIASTOLIC BLOOD PRESSURE: 74 MMHG | RESPIRATION RATE: 14 BRPM | SYSTOLIC BLOOD PRESSURE: 176 MMHG

## 2019-11-13 DIAGNOSIS — M79.609 PAIN IN UNSPECIFIED LIMB: ICD-10-CM

## 2019-11-13 DIAGNOSIS — E11.9 TYPE 2 DIABETES MELLITUS WITHOUT COMPLICATIONS: ICD-10-CM

## 2019-11-13 PROCEDURE — G0463: CPT

## 2019-11-13 NOTE — PHYSICAL EXAM
[Edema] : there was no peripheral edema [Skin Lesions] : no skin lesions [Sensation] : the sensory exam was normal to light touch and pinprick [Deep Tendon Reflexes (DTR)] : deep tendon reflexes were 2+ and symmetric [Motor Exam] : the motor exam was normal [FreeTextEntry1] : mildly elongated nails b/l

## 2019-11-13 NOTE — ASSESSMENT
[FreeTextEntry1] : 86F presents for diabetic foot exam\par -pt seen and evaluated \par -no open lesions, elongated toenails trimmed with nail nippers. tolerated without complications \par -educated pt on importance of proper diabetic footcare \par -RTC 3-4 months

## 2019-11-13 NOTE — HISTORY OF PRESENT ILLNESS
[FreeTextEntry1] : 86female referred to clinic by endocrinologist and internal medicine doctor for routine diabetic care. Pt presents with son who she lives with. Latest A1c is 7.6%. Had a podiatrist that they used to go for routine care but no longer accepts her insurance. Goes to a vascular doctor regularly - has a history of polycythemia vera and DVTs. Currently on eliquis and wears compression socks. Has no other pedal complaints.

## 2020-02-07 NOTE — ASSESSMENT
[FreeTextEntry1] : 86 year F with PMH of HTN, hypothyroidism, polycythemia vera, hypercholesterolemia, DM2 presents for followup regarding elevated serum creatinine. \par \par # Elevated serum creatinine.\par Since none of the labs recommended by nephrologist on 8/12/2019 were performed due to logistics reason, we will reorder them today. \par - Serum immunofixation, urine microalbumin/creatinine, renal panel, urinalysis with reflex \par - Continue holding lisinopril and metformin. \par \par # DM 2\par Repeat A1c 8.4 \par - Followup with renal workup \par \par # HTN \par /70 today. Well controlled. \par \par # HCM\par Last mammograpm Birads 2 in 2016. \par - Defer mammogram to annual comprehensive visit. 
Detail Level: Zone

## 2020-02-19 ENCOUNTER — OUTPATIENT (OUTPATIENT)
Dept: OUTPATIENT SERVICES | Facility: HOSPITAL | Age: 85
LOS: 1 days | Discharge: HOME | End: 2020-02-19

## 2020-02-19 DIAGNOSIS — C92.10 CHRONIC MYELOID LEUKEMIA, BCR/ABL-POSITIVE, NOT HAVING ACHIEVED REMISSION: ICD-10-CM

## 2020-02-20 ENCOUNTER — APPOINTMENT (OUTPATIENT)
Dept: HEMATOLOGY ONCOLOGY | Facility: CLINIC | Age: 85
End: 2020-02-20
Payer: MEDICARE

## 2020-02-20 ENCOUNTER — RESULT REVIEW (OUTPATIENT)
Age: 85
End: 2020-02-20

## 2020-02-20 VITALS
SYSTOLIC BLOOD PRESSURE: 177 MMHG | RESPIRATION RATE: 14 BRPM | OXYGEN SATURATION: 96 % | TEMPERATURE: 98.3 F | HEART RATE: 102 BPM | DIASTOLIC BLOOD PRESSURE: 60 MMHG

## 2020-02-20 LAB
BASOPHILS # BLD AUTO: 0.1 K/UL — SIGNIFICANT CHANGE UP (ref 0–0.2)
BASOPHILS NFR BLD AUTO: 0.9 % — SIGNIFICANT CHANGE UP (ref 0–2)
EOSINOPHIL # BLD AUTO: 0.2 K/UL — SIGNIFICANT CHANGE UP (ref 0–0.5)
EOSINOPHIL NFR BLD AUTO: 3 % — SIGNIFICANT CHANGE UP (ref 0–6)
HCT VFR BLD CALC: 40.9 % — SIGNIFICANT CHANGE UP (ref 34.5–45)
HGB BLD-MCNC: 13.4 G/DL — SIGNIFICANT CHANGE UP (ref 11.5–15.5)
LYMPHOCYTES # BLD AUTO: 1.2 K/UL — SIGNIFICANT CHANGE UP (ref 1–3.3)
LYMPHOCYTES # BLD AUTO: 17.5 % — SIGNIFICANT CHANGE UP (ref 13–44)
MCHC RBC-ENTMCNC: 32.8 G/DL — SIGNIFICANT CHANGE UP (ref 32–36)
MCHC RBC-ENTMCNC: 37.1 PG — HIGH (ref 27–34)
MCV RBC AUTO: 113 FL — HIGH (ref 80–100)
MONOCYTES # BLD AUTO: 0.4 K/UL — SIGNIFICANT CHANGE UP (ref 0–0.9)
MONOCYTES NFR BLD AUTO: 6.3 % — SIGNIFICANT CHANGE UP (ref 2–14)
NEUTROPHILS # BLD AUTO: 4.7 K/UL — SIGNIFICANT CHANGE UP (ref 1.8–7.4)
NEUTROPHILS NFR BLD AUTO: 72.3 % — SIGNIFICANT CHANGE UP (ref 43–77)
PLATELET # BLD AUTO: 399 K/UL — SIGNIFICANT CHANGE UP (ref 150–400)
RBC # BLD: 3.63 M/UL — LOW (ref 3.8–5.2)
RBC # FLD: 12.5 % — SIGNIFICANT CHANGE UP (ref 10.3–14.5)
WBC # BLD: 6.6 K/UL — SIGNIFICANT CHANGE UP (ref 3.8–10.5)
WBC # FLD AUTO: 6.6 K/UL — SIGNIFICANT CHANGE UP (ref 3.8–10.5)

## 2020-02-20 PROCEDURE — 99214 OFFICE O/P EST MOD 30 MIN: CPT

## 2020-02-20 NOTE — PHYSICAL EXAM
[Capable of only limited self care, confined to bed or chair more than 50% of waking hours] : Status 3- Capable of only limited self care, confined to bed or chair more than 50% of waking hours [Thin] : thin [Normal] : affect appropriate [de-identified] : right LE positive for redness of the skin, mild inflammation,  decreased pedal and popliteal pulses.  [de-identified] : chronic venous changes in lower extremity

## 2020-02-20 NOTE — ASSESSMENT
Date of Service: February 23, 2018     SUBJECTIVE:   The patient is a 62 year old female here for followup on medical problems.   ***  Past Medical History:   Diagnosis Date   • Anemia    • Anxiety    • Arthritis    • Bronchitis    • Colon polyp    • Depression    • Diverticulosis of colon    • Dyslipidemia    • Essential (primary) hypertension    • Insomnia    • Obesity    • Otitis media    • Sleep apnea     Using CPAP #11     ALLERGIES:  No Known Allergies  Current Outpatient Prescriptions   Medication Sig Dispense Refill   • zolpidem (AMBIEN) 5 MG tablet Take 5 mg by mouth nightly as needed for Sleep.     • atorvastatin (LIPITOR) 20 MG tablet TAKE 1 TABLET BY MOUTH DAILY 90 tablet 0   • PARoxetine (PAXIL) 10 MG tablet TAKE 1 TABLET BY MOUTH DAILY 30 tablet 0   • triamterene-hydrochlorothiazide (DYAZIDE) 37.5-25 MG per capsule TAKE 1 CAPSULE BY MOUTH DAILY 30 capsule 0   • LORazepam (ATIVAN) 1 MG tablet TAKE 1 TABLET BY MOUTH EVERY 6 HOURS AS NEEDED FOR ANXIETY 90 tablet 0   • HYDROcodone-acetaminophen (NORCO) 5-325 MG per tablet Take 1 tablet by mouth every 6 hours as needed for Pain. (Patient taking differently: Take 1 tablet by mouth every 6 hours as needed for Pain. Has Tramadol but does not take) 30 tablet 0   • acetaminophen (TYLENOL) 325 MG tablet Take 2 tablets by mouth every 6 hours as needed for Pain. 30 tablet 0   • aspirin 81 MG tablet Take 1 tablet by mouth daily. 30 tablet 0   • Cephalexin 500 MG Tab 4 tabs 1 hour prior to dental procedures 4 tablet 0   • traZODone (DESYREL) 50 MG tablet TAKE 1 TO 2 TABLETS BY MOUTH AT BEDTIME AS NEEDED FOR INSOMNIA 60 tablet 2     No current facility-administered medications for this visit.      Social History     Social History   • Marital status:      Spouse name: Daniel   • Number of children: 2   • Years of education: N/A     Occupational History   • Not on file.     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 \par \par 2) DVT\par -patient is high risk for bleeding given antiplatelet with aspirin ( DC ASA) and anticoagulation with apixaban, however she is very high risk for recurrent VTE including acute DVT/PE with HARRIS 2+ PV and history of multiple DVTs, benefit of anticoagulation outweights risk in patient's case. Continue eliquis  2.5 mg BID as per recommendations in the literature >81 yo and <60Kg.\par \par - Cellulitis of the right lower extremity; started on Keflex 500 mg q 12 hrs for 10 days. Recommended consultation with vascular surgeon. \par \par RTC 4 months.  Years: 10.00     Quit date: 2/10/1996   • Smokeless tobacco: Never Used   • Alcohol use 0.5 oz/week     1 Standard drinks or equivalent per week      Comment: once a week   • Drug use: No   • Sexual activity: Yes     Partners: Male     Other Topics Concern   • Not on file     Social History Narrative    , C-sections.      Review of patient's family status indicates:    Mother                                       76    Father                                             Comment: heart condition    Sister                         Alive                     Brother                        Alive                       Comment: uvuloplasty for sleep condition    Son                            Alive                     Maternal Grandmother                               Comment: scleroderma    Son                            Alive                        PHYSICAL EXAMINATION:   GENERAL: Well-developed, well-nourished female, no acute distress.   HEENT: PERRLA, EOMI. No conjunctival pallor or injection. Oropharynx: Moist oral mucosa. Tongue in midline. Throat without erythema or exudates.   NECK: Supple, no JVD, no thyromegaly, no carotid bruits.   LUNGS: Clear to auscultation bilaterally.   HEART: Normal S1, S2, regular rate and rhythm.   ABDOMEN: Soft, nontender, bowel sounds present.   EXTREMITIES: No edema. Pedal pulses present bilaterally.     ASSESSMENT AND PLAN:   ***  Preventive Medicine:   ***

## 2020-02-20 NOTE — HISTORY OF PRESENT ILLNESS
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 \par \par Patient is taking hydroxyurea 500 mg, \par \par No other changes in her medical, surgical or social history since 10/31/19.

## 2020-02-20 NOTE — REVIEW OF SYSTEMS
[Negative] : Endocrine [Fever] : no fever [Chills] : no chills [Eye Pain] : no eye pain [Dysphagia] : no dysphagia [Odynophagia] : no odynophagia [Chest Pain] : no chest pain [Shortness Of Breath] : no shortness of breath [Abdominal Pain] : no abdominal pain [Vomiting] : no vomiting [Joint Pain] : no joint pain [Skin Rash] : no skin rash [Easy Bleeding] : no tendency for easy bleeding [Easy Bruising] : no tendency for easy bruising

## 2020-02-24 LAB
ALBUMIN SERPL ELPH-MCNC: 4.4 G/DL
ALP BLD-CCNC: 87 U/L
ALT SERPL-CCNC: 33 U/L
ANION GAP SERPL CALC-SCNC: 19 MMOL/L
APTT BLD: 34.2 SEC
AST SERPL-CCNC: 26 U/L
BILIRUB SERPL-MCNC: <0.2 MG/DL
BUN SERPL-MCNC: 41 MG/DL
CALCIUM SERPL-MCNC: 9.4 MG/DL
CHLORIDE SERPL-SCNC: 105 MMOL/L
CO2 SERPL-SCNC: 18 MMOL/L
CREAT SERPL-MCNC: 1.54 MG/DL
GLUCOSE SERPL-MCNC: 232 MG/DL
INR PPP: 1 RATIO
LDH SERPL-CCNC: 212 U/L
POTASSIUM SERPL-SCNC: 4.8 MMOL/L
PROT SERPL-MCNC: 7.3 G/DL
PT BLD: 11.3 SEC
SODIUM SERPL-SCNC: 141 MMOL/L

## 2020-02-28 ENCOUNTER — OUTPATIENT (OUTPATIENT)
Dept: OUTPATIENT SERVICES | Facility: HOSPITAL | Age: 85
LOS: 1 days | End: 2020-02-28
Payer: MEDICARE

## 2020-02-28 ENCOUNTER — RESULT CHARGE (OUTPATIENT)
Age: 85
End: 2020-02-28

## 2020-02-28 ENCOUNTER — APPOINTMENT (OUTPATIENT)
Dept: INTERNAL MEDICINE | Facility: CLINIC | Age: 85
End: 2020-02-28
Payer: MEDICARE

## 2020-02-28 VITALS
HEART RATE: 81 BPM | BODY MASS INDEX: 23.59 KG/M2 | DIASTOLIC BLOOD PRESSURE: 70 MMHG | SYSTOLIC BLOOD PRESSURE: 120 MMHG | WEIGHT: 117 LBS | HEIGHT: 59 IN

## 2020-02-28 DIAGNOSIS — I10 ESSENTIAL (PRIMARY) HYPERTENSION: ICD-10-CM

## 2020-02-28 DIAGNOSIS — L03.116 CELLULITIS OF LEFT LOWER LIMB: ICD-10-CM

## 2020-02-28 DIAGNOSIS — I73.9 PERIPHERAL VASCULAR DISEASE, UNSPECIFIED: ICD-10-CM

## 2020-02-28 LAB — GLUCOSE BLDC GLUCOMTR-MCNC: 217

## 2020-02-28 PROCEDURE — 99214 OFFICE O/P EST MOD 30 MIN: CPT | Mod: GC

## 2020-02-28 PROCEDURE — G0463: CPT

## 2020-03-04 NOTE — PHYSICAL EXAM
[No Acute Distress] : no acute distress [Normal Sclera/Conjunctiva] : normal sclera/conjunctiva [EOMI] : extraocular movements intact [Well Nourished] : well nourished [Normal Outer Ear/Nose] : the outer ears and nose were normal in appearance [No Lymphadenopathy] : no lymphadenopathy [Normal Oropharynx] : the oropharynx was normal [No Respiratory Distress] : no respiratory distress  [Supple] : supple [Regular Rhythm] : with a regular rhythm [Normal Rate] : normal rate  [Clear to Auscultation] : lungs were clear to auscultation bilaterally [Soft] : abdomen soft [Normal Posterior Cervical Nodes] : no posterior cervical lymphadenopathy [Non Tender] : non-tender [Normal Anterior Cervical Nodes] : no anterior cervical lymphadenopathy [No Spinal Tenderness] : no spinal tenderness [No Joint Swelling] : no joint swelling [Grossly Normal Strength/Tone] : grossly normal strength/tone [Normal Gait] : normal gait [Coordination Grossly Intact] : coordination grossly intact [Normal Affect] : the affect was normal [Normal Insight/Judgement] : insight and judgment were intact [de-identified] : elderly female, in wheelchair. hard of hearing [de-identified] : Faint DP and PT pulses bilaterally, trace pitting edema to the ankle bilaterally. Darkening of bilateral feet, dry nails. Cold to touch bilateral feet [de-identified] : Erythematous 3in by 2inch, not well demarcated area above right ankle on anterior surface of RLE. Minimally warm to touch, minimally tender. No blisters or drainage.  [de-identified] : Decreased sensation to light tough BLE. Grossly normal ROM.

## 2020-03-04 NOTE — ASSESSMENT
[FreeTextEntry1] : Ms. Terry is an 86 year old female with a history JAK2 + PV, recurrent RLE DVT (2008 and 2010) on Eliquis, HTN, HLD, T2DM who presents for complaint of bilateral foot pain. Likely Peripheral vascular disease, exacerbated by venous stasis and resulting in her pain. Not convinced at this time regarding patient having cellulitis. \par \par 1. PAD\par       -Vascular surgery referral provided\par       -Will defer MELLO with P[facility] at this time given hx of DVT, will first to US Doppler Venous\par       -Counseled patient on proper wound care, hygiene, to use compression stockings and keep legs elevated\par \par 2. Cellulitis of RLE\par       -Given hx of no progression over last week, and physical exam signs not convinced at this time that patient has cellulitis\par       -Encouraged family to continue to monitor and if fevers or spreading, then patient can take previously prescribed Keflex\par     \par Patient seen and discussed with Dr. Day,\par \par Return in 2-3 months for CPE\par \par Nav Herron, PGY-1\par \par

## 2020-03-04 NOTE — END OF VISIT
[] : Resident [FreeTextEntry3] : RLE with some ill-defined erythema lower calf, not warm, ?from venous stasis changes, low suspicion for cellulitis, to monitor

## 2020-03-04 NOTE — REVIEW OF SYSTEMS
[Leg Claudication] : leg claudication [Lower Ext Edema] : lower extremity edema [Fever] : no fever [Chills] : no chills [Discharge] : no discharge [Itching] : no itching [Nasal Discharge] : no nasal discharge [Sore Throat] : no sore throat [Chest Pain] : no chest pain [Palpitations] : no palpitations [Shortness Of Breath] : no shortness of breath [Cough] : no cough [Abdominal Pain] : no abdominal pain [Nausea] : no nausea [Constipation] : no constipation [Dysuria] : no dysuria [Frequency] : no frequency [Joint Pain] : no joint pain [Back Pain] : no back pain [Headache] : no headache [Dizziness] : no dizziness [Fainting] : no fainting

## 2020-03-04 NOTE — DATA REVIEWED
[FreeTextEntry1] : reviewed most recent CBC, slight bump in SCr from 1.37 to 1.54. Last TSH normal, previous A1c 7.6%. Most recent CBC with Hct within goal of being below 42

## 2020-03-04 NOTE — HISTORY OF PRESENT ILLNESS
[FreeTextEntry1] : Follow-up, Right leg pain [de-identified] : Ms. Terry is an 86 year old female with a history JAK2 + PV, recurrent RLE DVT (2008 and 2010) on Eliquis, HTN, HLD, T2DM who presents for complaint of bilateral foot pain. Patient is present with her son in law who provides much of the history. Patient was seen one week ago by her Hematologist/Onc and there was concern about cellulitis of the right lower leg. Patient was provided with a one week prescription of Keflex, which she did not take (family forgot to administer). Family reports no fevers, no worsening of redness of the leg, but that patient has complained of worsened pain and cold feeling of both feet/toes that is especially worse at the end of the day. \par \par Patient wears compression stockings, has history of DVT's and is on Eliquis. Patient denies pain in either ankle or calf. She is wheelchair bound and feels both of her feet are always cold and painful.

## 2020-03-05 DIAGNOSIS — I73.9 PERIPHERAL VASCULAR DISEASE, UNSPECIFIED: ICD-10-CM

## 2020-03-05 DIAGNOSIS — L03.116 CELLULITIS OF LEFT LOWER LIMB: ICD-10-CM

## 2020-03-11 ENCOUNTER — APPOINTMENT (OUTPATIENT)
Dept: ENDOCRINOLOGY | Facility: CLINIC | Age: 85
End: 2020-03-11
Payer: MEDICARE

## 2020-03-11 VITALS
OXYGEN SATURATION: 91 % | DIASTOLIC BLOOD PRESSURE: 60 MMHG | HEIGHT: 59 IN | SYSTOLIC BLOOD PRESSURE: 90 MMHG | BODY MASS INDEX: 23.59 KG/M2 | WEIGHT: 117 LBS | HEART RATE: 79 BPM

## 2020-03-11 LAB
GLUCOSE BLDC GLUCOMTR-MCNC: 187
HBA1C MFR BLD HPLC: 7.6

## 2020-03-11 PROCEDURE — 77085 DXA BONE DENSITY AXL VRT FX: CPT | Mod: GA

## 2020-03-11 PROCEDURE — 82962 GLUCOSE BLOOD TEST: CPT

## 2020-03-11 PROCEDURE — 99214 OFFICE O/P EST MOD 30 MIN: CPT

## 2020-03-11 PROCEDURE — 83036 HEMOGLOBIN GLYCOSYLATED A1C: CPT | Mod: QW

## 2020-03-11 NOTE — REVIEW OF SYSTEMS
[Fatigue] : no fatigue [Decreased Appetite] : appetite not decreased [Recent Weight Gain (___ Lbs)] : no recent weight gain [Recent Weight Loss (___ Lbs)] : no recent weight loss [Visual Field Defect] : no visual field defect [Blurry Vision] : no blurred vision [Dry Eyes] : no dryness of the eyes [Eyes Itch] : no itching of the eyes [Dysphagia] : no dysphagia [Dysphonia] : no dysphonia [Neck Pain] : no neck pain [Nasal Congestion] : no nasal congestion [Chest Pain] : no chest pain [Palpitations] : no palpitations [Heart Rate Is Slow] : the heart rate was not slow [Heart Rate Is Fast] : the heart rate was not fast [Shortness Of Breath] : no shortness of breath [Wheezing] : no wheezing was heard [Cough] : no cough [SOB on Exertion] : no shortness of breath during exertion [Nausea] : no nausea [Vomiting] : no vomiting was observed [Constipation] : no constipation [Diarrhea] : no diarrhea [Polyuria] : no polyuria [Irregular Menses] : regular menses [Joint Pain] : no joint pain [Joint Stiffness] : no joint stiffness [Muscle Weakness] : no muscle weakness [Muscle Cramps] : no muscle cramps [Acanthosis] : no acanthosis  [Hirsutism] : no hirsutism [Acne] : no acne [Hair Loss] : no hair loss [Headache] : no headaches [Tremors] : no tremors [Dizziness] : no dizziness [Depression] : no depression [Anxiety] : no anxiety [Polydipsia] : no polydipsia [Galactorrhea] : no galactorrhea  [Cold Intolerance] : cold tolerant [Heat Intolerance] : heat tolerant [Easy Bleeding] : no ~M tendency for easy bleeding [Easy Bruising] : no tendency for easy bruising [Swelling] : no swelling

## 2020-03-11 NOTE — HISTORY OF PRESENT ILLNESS
[FreeTextEntry1] : Diabetes Follow up Patient HPI\par She has recently moved to a facility ( she is here with the health aid)\par Here with daughter who is helping with translation\par \par She has been seeing Dr. Wagner and metformin was held for a short time ( 3-4 weeks) \par \par \par HPI:\par \par Duration of Diabetes: 30 years \par Is patient on Insulin? No \par If yes, how long on insulin? \par \par List Current Medications for Glycemic control and the doses:\par 1- Januvia 25 mg daily \par 2- \par 3- \par \par SMBG (self monitored blood glucose) readings: \par - Name of glucometer: \par - How often does the patient check BG? once a day \par - Does the patient keep a log? \par \par If detailed record is available, what is the range of most of the BG readings?\par 125-145\par \par \par Does patient get Hypoglycemic episodes? None \par  \par \par Diabetic Complications: Is patient aware of having any of those complications?\par - Eyes: Retinopathy? history of cataracts \par  	When was the last fully dilated eye exam? one month ago -optometrist - pending optho appt \par - Feet: 	Neuropathy? None \par - Kidneys: Nephropathy? No, GFr of 82 \par \par Diet: review patient's diet: \par Continue DM diet \par \par \par Exercise: review patient exercise habit: Wheelchair bound \par

## 2020-03-11 NOTE — ASSESSMENT
[FreeTextEntry1] : 86 year old female with history of polycythemia vera, DVT, osteoporosis, hypothyroidism here for follow up of DM Type 2. Well controlled HgA1C today is 7.6%\par \par -Continue Januvia 25 mg daily \par -Discontinue metformin 500 mg BID\par -SMBGs 1-2 times per day \par -Continue ADA diet \par -Will check BMP, lipid panel, microalb/cr ratio\par \par Hypothyroidism\par -Previous TFTs were within normal limits \par -Currently on levothyroxine 112 mcg daily \par \par Osteoporosis\par -Continue on Fosamax for now \par -Continue calcium/vitamin D supplementation\par -DEXA scan on this visit \par \par RTC in 6 months. \par  \par

## 2020-03-11 NOTE — PHYSICAL EXAM
[Alert] : alert [No Acute Distress] : no acute distress [Well Nourished] : well nourished [Well Developed] : well developed [Normal Sclera/Conjunctiva] : normal sclera/conjunctiva [PERRL] : pupils equal, round and reactive to light [EOMI] : extra ocular movement intact [No Proptosis] : no proptosis [Normal Outer Ear/Nose] : the ears and nose were normal in appearance [Normal TMs] : both tympanic membranes were normal [Normal Hearing] : hearing was normal [No Neck Mass] : no neck mass was observed [Supple] : the neck was supple [Thyroid Not Enlarged] : the thyroid was not enlarged [No Respiratory Distress] : no respiratory distress [Normal Rate and Effort] : normal respiratory rhythm and effort [Clear to Auscultation] : lungs were clear to auscultation bilaterally [Normal Rate] : heart rate was normal  [Normal S1, S2] : normal S1 and S2 [Regular Rhythm] : with a regular rhythm [Normal Bowel Sounds] : normal bowel sounds [Not Tender] : non-tender [Soft] : abdomen soft [Not Distended] : not distended [No CVA Tenderness] : no ~M costovertebral angle tenderness [Normal Gait] : normal gait [No Joint Swelling] : no joint swelling seen [No Clubbing, Cyanosis] : no clubbing  or cyanosis of the fingernails [Normal Strength/Tone] : muscle strength and tone were normal [No Rash] : no rash [No Skin Lesions] : no skin lesions [Normal Reflexes] : deep tendon reflexes were 2+ and symmetric [No Motor Deficits] : the motor exam was normal [No Tremors] : no tremors [Oriented x3] : oriented to person, place, and time [Normal Insight/Judgement] : insight and judgment were intact [Normal Affect] : the affect was normal [Normal Mood] : the mood was normal [Right Foot Was Examined] : right foot ~C was examined [Left Foot Was Examined] : left foot ~C was examined [Normal] : normal [Full ROM] : with full range of motion [Diminished Throughout Both Feet] : normal tactile sensation with monofilament testing throughout both feet [FreeTextEntry4] : Decreased pedal pulses  [FreeTextEntry8] : Decreased pedal pulses

## 2020-04-29 ENCOUNTER — RX RENEWAL (OUTPATIENT)
Age: 85
End: 2020-04-29

## 2020-05-07 ENCOUNTER — RX RENEWAL (OUTPATIENT)
Age: 85
End: 2020-05-07

## 2020-06-04 NOTE — PATIENT PROFILE ADULT - LIVES WITH
Sarecycline Counseling: Patient advised regarding possible photosensitivity and discoloration of the teeth, skin, lips, tongue and gums.  Patient instructed to avoid sunlight, if possible.  When exposed to sunlight, patients should wear protective clothing, sunglasses, and sunscreen.  The patient was instructed to call the office immediately if the following severe adverse effects occur:  hearing changes, easy bruising/bleeding, severe headache, or vision changes.  The patient verbalized understanding of the proper use and possible adverse effects of sarecycline.  All of the patient's questions and concerns were addressed. adult child(shilpi)

## 2020-06-29 ENCOUNTER — OUTPATIENT (OUTPATIENT)
Dept: OUTPATIENT SERVICES | Facility: HOSPITAL | Age: 85
LOS: 1 days | Discharge: HOME | End: 2020-06-29

## 2020-06-29 DIAGNOSIS — C92.10 CHRONIC MYELOID LEUKEMIA, BCR/ABL-POSITIVE, NOT HAVING ACHIEVED REMISSION: ICD-10-CM

## 2020-06-30 ENCOUNTER — APPOINTMENT (OUTPATIENT)
Dept: HEMATOLOGY ONCOLOGY | Facility: CLINIC | Age: 85
End: 2020-06-30
Payer: MEDICARE

## 2020-06-30 PROCEDURE — 99214 OFFICE O/P EST MOD 30 MIN: CPT | Mod: 95

## 2020-06-30 NOTE — REVIEW OF SYSTEMS
[Negative] : Allergic/Immunologic [Fever] : no fever [Chills] : no chills [Dysphagia] : no dysphagia [Eye Pain] : no eye pain [Odynophagia] : no odynophagia [Chest Pain] : no chest pain [Shortness Of Breath] : no shortness of breath [Vomiting] : no vomiting [Abdominal Pain] : no abdominal pain [Joint Pain] : no joint pain [Skin Rash] : no skin rash [Easy Bleeding] : no tendency for easy bleeding [Easy Bruising] : no tendency for easy bruising

## 2020-06-30 NOTE — HISTORY OF PRESENT ILLNESS
[Disease:__________________________] : Disease: [unfilled] [0 - No Distress] : Distress Level: 0 [Home] : at home, [unfilled] , at the time of the visit. 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 \par \par Patient is taking hydroxyurea 500 mg, \par \par No other changes in her medical, surgical or social history since 10/31/19.

## 2020-06-30 NOTE — REASON FOR VISIT
[Family Member] : family member [Follow-Up Visit] : a follow-up visit for [FreeTextEntry2] : HARRIS 2+ P. Vera

## 2020-06-30 NOTE — PHYSICAL EXAM
[Capable of only limited self care, confined to bed or chair more than 50% of waking hours] : Status 3- Capable of only limited self care, confined to bed or chair more than 50% of waking hours [Thin] : thin [Normal] : full range of motion and no deformities appreciated [de-identified] : chronic venous changes in lower extremity

## 2020-06-30 NOTE — ASSESSMENT
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 \par \par 2) DVT\par -patient is high risk for bleeding given antiplatelet with aspirin ( DC ASA) and anticoagulation with apixaban, however she is very high risk for recurrent VTE including acute DVT/PE with HARRIS 2+ PV and history of multiple DVTs, benefit of anticoagulation outweights risk in patient's case. Continue eliquis  2.5 mg BID as per recommendations in the literature >79 yo and <60Kg.\par \par I made arrangements for blood work at home for surveillance of counts. \par \par RTC 3 months.

## 2020-07-06 ENCOUNTER — LABORATORY RESULT (OUTPATIENT)
Age: 85
End: 2020-07-06

## 2020-07-07 ENCOUNTER — APPOINTMENT (OUTPATIENT)
Dept: HEMATOLOGY ONCOLOGY | Facility: CLINIC | Age: 85
End: 2020-07-07
Payer: MEDICARE

## 2020-07-07 PROCEDURE — 99442: CPT | Mod: 95

## 2020-08-17 ENCOUNTER — RX RENEWAL (OUTPATIENT)
Age: 85
End: 2020-08-17

## 2020-08-21 ENCOUNTER — LABORATORY RESULT (OUTPATIENT)
Age: 85
End: 2020-08-21

## 2020-08-25 ENCOUNTER — OUTPATIENT (OUTPATIENT)
Dept: OUTPATIENT SERVICES | Facility: HOSPITAL | Age: 85
LOS: 1 days | Discharge: HOME | End: 2020-08-25

## 2020-08-25 DIAGNOSIS — C92.10 CHRONIC MYELOID LEUKEMIA, BCR/ABL-POSITIVE, NOT HAVING ACHIEVED REMISSION: ICD-10-CM

## 2020-08-28 ENCOUNTER — RESULT REVIEW (OUTPATIENT)
Age: 85
End: 2020-08-28

## 2020-08-28 ENCOUNTER — APPOINTMENT (OUTPATIENT)
Dept: HEMATOLOGY ONCOLOGY | Facility: CLINIC | Age: 85
End: 2020-08-28

## 2020-08-28 LAB
ALBUMIN SERPL ELPH-MCNC: 4.1 G/DL
ALP BLD-CCNC: 56 U/L
ALT SERPL-CCNC: 17 U/L
ANION GAP SERPL CALC-SCNC: 15 MMOL/L
ANISOCYTOSIS BLD QL: SLIGHT — SIGNIFICANT CHANGE UP
AST SERPL-CCNC: 13 U/L
BASOPHILS # BLD AUTO: 0.02 K/UL — SIGNIFICANT CHANGE UP (ref 0–0.2)
BASOPHILS NFR BLD AUTO: 1 % — SIGNIFICANT CHANGE UP (ref 0–2)
BILIRUB SERPL-MCNC: 0.2 MG/DL
BUN SERPL-MCNC: 58 MG/DL
CALCIUM SERPL-MCNC: 9.3 MG/DL
CHLORIDE SERPL-SCNC: 104 MMOL/L
CO2 SERPL-SCNC: 20 MMOL/L
CREAT SERPL-MCNC: 1.62 MG/DL
EOSINOPHIL # BLD AUTO: 0.02 K/UL — SIGNIFICANT CHANGE UP (ref 0–0.5)
EOSINOPHIL NFR BLD AUTO: 1 % — SIGNIFICANT CHANGE UP (ref 0–6)
GLUCOSE SERPL-MCNC: 307 MG/DL
HCT VFR BLD CALC: 27.8 % — LOW (ref 34.5–45)
HGB BLD-MCNC: 9.6 G/DL — LOW (ref 11.5–15.5)
LDH SERPL-CCNC: 198 U/L
LG PLATELETS BLD QL AUTO: SLIGHT — SIGNIFICANT CHANGE UP
LYMPHOCYTES # BLD AUTO: 0.75 K/UL — LOW (ref 1–3.3)
LYMPHOCYTES # BLD AUTO: 37 % — SIGNIFICANT CHANGE UP (ref 13–44)
MCHC RBC-ENTMCNC: 34.5 GM/DL — SIGNIFICANT CHANGE UP (ref 32–36)
MCHC RBC-ENTMCNC: 39.2 PG — HIGH (ref 27–34)
MCV RBC AUTO: 113.5 FL — HIGH (ref 80–100)
MONOCYTES # BLD AUTO: 0.3 K/UL — SIGNIFICANT CHANGE UP (ref 0–0.9)
MONOCYTES NFR BLD AUTO: 15 % — HIGH (ref 2–14)
NEUTROPHILS # BLD AUTO: 0.93 K/UL — LOW (ref 1.8–7.4)
NEUTROPHILS NFR BLD AUTO: 46 % — SIGNIFICANT CHANGE UP (ref 43–77)
NRBC # BLD: 0 /100 — SIGNIFICANT CHANGE UP (ref 0–0)
NRBC # BLD: SIGNIFICANT CHANGE UP /100 WBCS (ref 0–0)
PLAT MORPH BLD: NORMAL — SIGNIFICANT CHANGE UP
PLATELET # BLD AUTO: 357 K/UL — SIGNIFICANT CHANGE UP (ref 150–400)
POIKILOCYTOSIS BLD QL AUTO: SLIGHT — SIGNIFICANT CHANGE UP
POTASSIUM SERPL-SCNC: 5.3 MMOL/L
PROT SERPL-MCNC: 6.7 G/DL
RBC # BLD: 2.45 M/UL — LOW (ref 3.8–5.2)
RBC # FLD: 16.7 % — HIGH (ref 10.3–14.5)
RBC BLD AUTO: ABNORMAL
SODIUM SERPL-SCNC: 139 MMOL/L
WBC # BLD: 2.02 K/UL — LOW (ref 3.8–10.5)
WBC # FLD AUTO: 2.02 K/UL — LOW (ref 3.8–10.5)

## 2020-09-11 ENCOUNTER — RESULT REVIEW (OUTPATIENT)
Age: 85
End: 2020-09-11

## 2020-09-11 ENCOUNTER — APPOINTMENT (OUTPATIENT)
Dept: HEMATOLOGY ONCOLOGY | Facility: CLINIC | Age: 85
End: 2020-09-11

## 2020-09-11 LAB
ALBUMIN SERPL ELPH-MCNC: 4.1 G/DL
ALP BLD-CCNC: 62 U/L
ALT SERPL-CCNC: 16 U/L
ANION GAP SERPL CALC-SCNC: 16 MMOL/L
AST SERPL-CCNC: 13 U/L
BASOPHILS # BLD AUTO: 0.03 K/UL — SIGNIFICANT CHANGE UP (ref 0–0.2)
BASOPHILS NFR BLD AUTO: 0.6 % — SIGNIFICANT CHANGE UP (ref 0–2)
BILIRUB SERPL-MCNC: 0.2 MG/DL
BUN SERPL-MCNC: 47 MG/DL
CALCIUM SERPL-MCNC: 10 MG/DL
CHLORIDE SERPL-SCNC: 103 MMOL/L
CO2 SERPL-SCNC: 21 MMOL/L
CREAT SERPL-MCNC: 1.48 MG/DL
EOSINOPHIL # BLD AUTO: 0.07 K/UL — SIGNIFICANT CHANGE UP (ref 0–0.5)
EOSINOPHIL NFR BLD AUTO: 1.3 % — SIGNIFICANT CHANGE UP (ref 0–6)
GLUCOSE SERPL-MCNC: 228 MG/DL
HCT VFR BLD CALC: 32.8 % — LOW (ref 34.5–45)
HGB BLD-MCNC: 11.1 G/DL — LOW (ref 11.5–15.5)
IMM GRANULOCYTES NFR BLD AUTO: 0.9 % — SIGNIFICANT CHANGE UP (ref 0–1.5)
LDH SERPL-CCNC: 196 U/L
LYMPHOCYTES # BLD AUTO: 0.71 K/UL — LOW (ref 1–3.3)
LYMPHOCYTES # BLD AUTO: 13.1 % — SIGNIFICANT CHANGE UP (ref 13–44)
MCHC RBC-ENTMCNC: 33.8 G/DL — SIGNIFICANT CHANGE UP (ref 32–36)
MCHC RBC-ENTMCNC: 38.3 PG — HIGH (ref 27–34)
MCV RBC AUTO: 113.1 FL — HIGH (ref 80–100)
MONOCYTES # BLD AUTO: 0.62 K/UL — SIGNIFICANT CHANGE UP (ref 0–0.9)
MONOCYTES NFR BLD AUTO: 11.5 % — SIGNIFICANT CHANGE UP (ref 2–14)
NEUTROPHILS # BLD AUTO: 3.92 K/UL — SIGNIFICANT CHANGE UP (ref 1.8–7.4)
NEUTROPHILS NFR BLD AUTO: 72.6 % — SIGNIFICANT CHANGE UP (ref 43–77)
NRBC # BLD: 0 /100 WBCS — SIGNIFICANT CHANGE UP (ref 0–0)
PLATELET # BLD AUTO: 328 K/UL — SIGNIFICANT CHANGE UP (ref 150–400)
POTASSIUM SERPL-SCNC: 5 MMOL/L
PROT SERPL-MCNC: 6.8 G/DL
RBC # BLD: 2.9 M/UL — LOW (ref 3.8–5.2)
RBC # FLD: 15.5 % — HIGH (ref 10.3–14.5)
SODIUM SERPL-SCNC: 140 MMOL/L
WBC # BLD: 5.4 K/UL — SIGNIFICANT CHANGE UP (ref 3.8–10.5)
WBC # FLD AUTO: 5.4 K/UL — SIGNIFICANT CHANGE UP (ref 3.8–10.5)

## 2020-09-16 ENCOUNTER — APPOINTMENT (OUTPATIENT)
Dept: ENDOCRINOLOGY | Facility: CLINIC | Age: 85
End: 2020-09-16

## 2020-09-16 ENCOUNTER — APPOINTMENT (OUTPATIENT)
Dept: ENDOCRINOLOGY | Facility: CLINIC | Age: 85
End: 2020-09-16
Payer: MEDICARE

## 2020-09-16 VITALS
WEIGHT: 125 LBS | BODY MASS INDEX: 25.2 KG/M2 | HEIGHT: 59 IN | HEART RATE: 49 BPM | TEMPERATURE: 97.3 F | OXYGEN SATURATION: 85 %

## 2020-09-16 DIAGNOSIS — M81.0 AGE-RELATED OSTEOPOROSIS W/OUT CURRENT PATHOLOGICAL FRACTURE: ICD-10-CM

## 2020-09-16 LAB — GLUCOSE BLDC GLUCOMTR-MCNC: 270

## 2020-09-16 PROCEDURE — 99214 OFFICE O/P EST MOD 30 MIN: CPT

## 2020-09-16 NOTE — REVIEW OF SYSTEMS
[Fatigue] : no fatigue [Decreased Appetite] : appetite not decreased [Recent Weight Gain (___ Lbs)] : no recent weight gain [Recent Weight Loss (___ Lbs)] : no recent weight loss [Dry Eyes] : no dryness [Dysphagia] : no dysphagia [Visual Field Defect] : no visual field defect [Neck Pain] : no neck pain [Dysphonia] : no dysphonia [Nasal Congestion] : no nasal congestion [Chest Pain] : no chest pain [Slow Heart Rate] : heart rate is not slow [Palpitations] : no palpitations [Shortness Of Breath] : no shortness of breath [Fast Heart Rate] : heart rate is not fast [Cough] : no cough [Constipation] : no constipation [Nausea] : no nausea [Polyuria] : no polyuria [Vomiting] : no vomiting [Diarrhea] : no diarrhea [Joint Pain] : no joint pain [Irregular Menses] : regular menses [Acanthosis] : no acanthosis  [Muscle Weakness] : no muscle weakness [Acne] : no acne [Headaches] : no headaches [Tremors] : no tremors [Easy Bleeding] : no ~M tendency for easy bleeding [Polydipsia] : no polydipsia [Cold Intolerance] : no cold intolerance [Easy Bruising] : no tendency for easy bruising

## 2020-09-16 NOTE — HISTORY OF PRESENT ILLNESS
[FreeTextEntry1] : Diabetes Follow up Patient HPI\par She has recently moved to a facility ( she is here with the health aid)\par Here with daughter who is helping with translation\par \par She has been seeing Dr. Wagner and metformin was held for a short time ( 3-4 weeks) \par \par \par HPI:\par \par Duration of Diabetes: 30 years \par Is patient on Insulin? No \par If yes, how long on insulin? \par \par List Current Medications for Glycemic control and the doses:\par 1- Januvia 25 mg daily \par 2- \par 3- \par \par SMBG (self monitored blood glucose) readings: \par - Name of glucometer: \par - How often does the patient check BG? once a day \par - Does the patient keep a log? \par \par If detailed record is available, what is the range of most of the BG readings?\par 128-148\par \par \par Does patient get Hypoglycemic episodes? None \par  \par \par Diabetic Complications: Is patient aware of having any of those complications?\par - Eyes: Retinopathy? history of cataracts \par  	When was the last fully dilated eye exam? 10/2019, pending appt \par - Feet: 	Neuropathy? None \par - Kidneys: Nephropathy? No, GFr of 82 \par \par Diet: review patient's diet: \par Continue DM diet \par \par \par Exercise: review patient exercise habit: Wheelchair bound \par \par

## 2020-09-16 NOTE — PHYSICAL EXAM
[Alert] : alert [Well Nourished] : well nourished [No Acute Distress] : no acute distress [EOMI] : extra ocular movement intact [Normal Sclera/Conjunctiva] : normal sclera/conjunctiva [PERRL] : pupils equal, round and reactive to light [Normal Hearing] : hearing was normal [Normal Outer Ear/Nose] : the ears and nose were normal in appearance [Normal TMs] : both tympanic membranes were normal [No Neck Mass] : no neck mass was observed [Thyroid Not Enlarged] : the thyroid was not enlarged [No Respiratory Distress] : no respiratory distress [Clear to Auscultation] : lungs were clear to auscultation bilaterally [Normal S1, S2] : normal S1 and S2 [Normal Bowel Sounds] : normal bowel sounds [Regular Rhythm] : with a regular rhythm [Normal Rate] : heart rate was normal [Not Tender] : non-tender [Soft] : abdomen soft [Normal Gait] : normal gait [No Clubbing, Cyanosis] : no clubbing  or cyanosis of the fingernails [No Joint Swelling] : no joint swelling seen [Normal Strength/Tone] : muscle strength and tone were normal [No Skin Lesions] : no skin lesions [No Rash] : no rash [No Tremors] : no tremors [No Motor Deficits] : the motor exam was normal [Normal Reflexes] : deep tendon reflexes were 2+ and symmetric [Oriented x3] : oriented to person, place, and time [Normal Affect] : the affect was normal [Normal Insight/Judgement] : insight and judgment were intact [Normal Mood] : the mood was normal

## 2020-09-16 NOTE — ASSESSMENT
[FreeTextEntry1] : 87 year old female with history of polycythemia vera, DVT, osteoporosis, hypothyroidism here for follow up of DM Type 2. Well controlled HgA1C today is 7.3%\par \par -Continue Januvia 25 mg daily \par -SMBGs 1-2 times per day \par -Continue ADA diet \par -Will check BMP, lipid panel, microalb/cr ratio\par \par Hypothyroidism\par -Previous TFTs were within normal limits \par -Currently on levothyroxine 112 mcg daily \par \par Osteoporosis\par -Continue on Fosamax for now \par -Continue calcium/vitamin D supplementation\par -DEXA scan on this visit \par \par \par [email or web address] \par Email about Jovan Ag \par \par \par RTC in 6 months. \par

## 2020-10-06 ENCOUNTER — OUTPATIENT (OUTPATIENT)
Dept: OUTPATIENT SERVICES | Facility: HOSPITAL | Age: 85
LOS: 1 days | Discharge: HOME | End: 2020-10-06

## 2020-10-06 DIAGNOSIS — C92.10 CHRONIC MYELOID LEUKEMIA, BCR/ABL-POSITIVE, NOT HAVING ACHIEVED REMISSION: ICD-10-CM

## 2020-10-09 ENCOUNTER — RESULT REVIEW (OUTPATIENT)
Age: 85
End: 2020-10-09

## 2020-10-09 ENCOUNTER — LABORATORY RESULT (OUTPATIENT)
Age: 85
End: 2020-10-09

## 2020-10-09 ENCOUNTER — APPOINTMENT (OUTPATIENT)
Dept: HEMATOLOGY ONCOLOGY | Facility: CLINIC | Age: 85
End: 2020-10-09
Payer: MEDICARE

## 2020-10-09 VITALS
TEMPERATURE: 97.9 F | DIASTOLIC BLOOD PRESSURE: 75 MMHG | RESPIRATION RATE: 14 BRPM | HEART RATE: 70 BPM | SYSTOLIC BLOOD PRESSURE: 152 MMHG | OXYGEN SATURATION: 99 %

## 2020-10-09 LAB
BASOPHILS # BLD AUTO: 0.06 K/UL — SIGNIFICANT CHANGE UP (ref 0–0.2)
BASOPHILS NFR BLD AUTO: 1 % — SIGNIFICANT CHANGE UP (ref 0–2)
EOSINOPHIL # BLD AUTO: 0.27 K/UL — SIGNIFICANT CHANGE UP (ref 0–0.5)
EOSINOPHIL NFR BLD AUTO: 4.5 % — SIGNIFICANT CHANGE UP (ref 0–6)
HCT VFR BLD CALC: 36.4 % — SIGNIFICANT CHANGE UP (ref 34.5–45)
HGB BLD-MCNC: 12.1 G/DL — SIGNIFICANT CHANGE UP (ref 11.5–15.5)
IMM GRANULOCYTES NFR BLD AUTO: 0.5 % — SIGNIFICANT CHANGE UP (ref 0–1.5)
LYMPHOCYTES # BLD AUTO: 0.84 K/UL — LOW (ref 1–3.3)
LYMPHOCYTES # BLD AUTO: 13.9 % — SIGNIFICANT CHANGE UP (ref 13–44)
MCHC RBC-ENTMCNC: 33.2 G/DL — SIGNIFICANT CHANGE UP (ref 32–36)
MCHC RBC-ENTMCNC: 37.6 PG — HIGH (ref 27–34)
MCV RBC AUTO: 113 FL — HIGH (ref 80–100)
MONOCYTES # BLD AUTO: 0.38 K/UL — SIGNIFICANT CHANGE UP (ref 0–0.9)
MONOCYTES NFR BLD AUTO: 6.3 % — SIGNIFICANT CHANGE UP (ref 2–14)
NEUTROPHILS # BLD AUTO: 4.46 K/UL — SIGNIFICANT CHANGE UP (ref 1.8–7.4)
NEUTROPHILS NFR BLD AUTO: 73.8 % — SIGNIFICANT CHANGE UP (ref 43–77)
NRBC # BLD: 0 /100 WBCS — SIGNIFICANT CHANGE UP (ref 0–0)
PLATELET # BLD AUTO: 361 K/UL — SIGNIFICANT CHANGE UP (ref 150–400)
RBC # BLD: 3.22 M/UL — LOW (ref 3.8–5.2)
RBC # FLD: 14.3 % — SIGNIFICANT CHANGE UP (ref 10.3–14.5)
WBC # BLD: 6.04 K/UL — SIGNIFICANT CHANGE UP (ref 3.8–10.5)
WBC # FLD AUTO: 6.04 K/UL — SIGNIFICANT CHANGE UP (ref 3.8–10.5)

## 2020-10-09 PROCEDURE — 99213 OFFICE O/P EST LOW 20 MIN: CPT

## 2020-10-09 NOTE — ASSESSMENT
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  \par \par 2) DVT\par -patient is on  anticoagulation with Eliquis, she is very high risk for recurrent VTE including acute DVT/PE with HARRIS 2+ PV and history of multiple DVTs, benefit of anticoagulation outweights risk in patient's case. Continue eliquis  2.5 mg BID as per recommendations in the literature >81 yo and <60Kg.\par \par \par RTC 2 months.

## 2020-10-09 NOTE — PHYSICAL EXAM
[Capable of only limited self care, confined to bed or chair more than 50% of waking hours] : Status 3- Capable of only limited self care, confined to bed or chair more than 50% of waking hours [Thin] : thin [Normal] : normoactive bowel sounds, soft and nontender, no hepatosplenomegaly or masses appreciated [de-identified] : chronic venous changes in lower extremity

## 2020-10-09 NOTE — REVIEW OF SYSTEMS
[Negative] : Allergic/Immunologic [Fever] : no fever [Chills] : no chills [Eye Pain] : no eye pain [Dysphagia] : no dysphagia [Odynophagia] : no odynophagia [Chest Pain] : no chest pain [Shortness Of Breath] : no shortness of breath [Abdominal Pain] : no abdominal pain [Vomiting] : no vomiting [Joint Pain] : no joint pain [Skin Rash] : no skin rash [Confused] : confusion [Easy Bleeding] : no tendency for easy bleeding [Easy Bruising] : no tendency for easy bruising

## 2020-10-09 NOTE — HISTORY OF PRESENT ILLNESS
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 \par \par Hydroxyurea is on hold secondary to dropped in Hb 9.3 g/dl on 8/21/2020, counts have recovered 12.1 g/dl (12/9/2020). \par \par No other changes in her medical, surgical or social history since 6/30/2020.

## 2020-10-12 ENCOUNTER — LABORATORY RESULT (OUTPATIENT)
Age: 85
End: 2020-10-12

## 2020-10-12 ENCOUNTER — RX RENEWAL (OUTPATIENT)
Age: 85
End: 2020-10-12

## 2020-10-12 LAB
ALBUMIN SERPL ELPH-MCNC: 4.3 G/DL
ALP BLD-CCNC: 74 U/L
ALT SERPL-CCNC: 16 U/L
ANION GAP SERPL CALC-SCNC: 23 MMOL/L
AST SERPL-CCNC: 14 U/L
BILIRUB SERPL-MCNC: 0.2 MG/DL
BUN SERPL-MCNC: 33 MG/DL
CALCIUM SERPL-MCNC: 9.4 MG/DL
CHLORIDE SERPL-SCNC: 101 MMOL/L
CO2 SERPL-SCNC: 18 MMOL/L
CREAT SERPL-MCNC: 1.38 MG/DL
GLUCOSE SERPL-MCNC: 197 MG/DL
LDH SERPL-CCNC: 237 U/L
POTASSIUM SERPL-SCNC: 5.4 MMOL/L
PROT SERPL-MCNC: 7.2 G/DL
SODIUM SERPL-SCNC: 143 MMOL/L

## 2020-10-15 ENCOUNTER — INPATIENT (INPATIENT)
Facility: HOSPITAL | Age: 85
LOS: 14 days | Discharge: HOME | DRG: 689 | End: 2020-10-30
Attending: HOSPITALIST | Admitting: HOSPITALIST
Payer: MEDICARE

## 2020-10-15 VITALS
TEMPERATURE: 99 F | SYSTOLIC BLOOD PRESSURE: 186 MMHG | HEART RATE: 101 BPM | OXYGEN SATURATION: 97 % | RESPIRATION RATE: 16 BRPM | WEIGHT: 160.06 LBS | DIASTOLIC BLOOD PRESSURE: 98 MMHG | HEIGHT: 60 IN

## 2020-10-15 LAB
ALBUMIN SERPL ELPH-MCNC: 4.4 G/DL — SIGNIFICANT CHANGE UP (ref 3.3–5)
ALP SERPL-CCNC: 84 U/L — SIGNIFICANT CHANGE UP (ref 40–120)
ALT FLD-CCNC: 17 U/L — SIGNIFICANT CHANGE UP (ref 10–45)
ANION GAP SERPL CALC-SCNC: 12 MMOL/L — SIGNIFICANT CHANGE UP (ref 5–17)
APPEARANCE UR: CLEAR — SIGNIFICANT CHANGE UP
APTT BLD: 36.7 SEC — HIGH (ref 27.5–35.5)
AST SERPL-CCNC: 18 U/L — SIGNIFICANT CHANGE UP (ref 10–40)
BASOPHILS # BLD AUTO: 0 K/UL — SIGNIFICANT CHANGE UP (ref 0–0.2)
BASOPHILS NFR BLD AUTO: 0 % — SIGNIFICANT CHANGE UP (ref 0–2)
BILIRUB SERPL-MCNC: 0.3 MG/DL — SIGNIFICANT CHANGE UP (ref 0.2–1.2)
BILIRUB UR-MCNC: NEGATIVE — SIGNIFICANT CHANGE UP
BUN SERPL-MCNC: 34 MG/DL — HIGH (ref 7–23)
CALCIUM SERPL-MCNC: 9.9 MG/DL — SIGNIFICANT CHANGE UP (ref 8.4–10.5)
CHLORIDE SERPL-SCNC: 94 MMOL/L — LOW (ref 96–108)
CO2 SERPL-SCNC: 24 MMOL/L — SIGNIFICANT CHANGE UP (ref 22–31)
COLOR SPEC: COLORLESS — SIGNIFICANT CHANGE UP
CREAT SERPL-MCNC: 1.34 MG/DL — HIGH (ref 0.5–1.3)
DIFF PNL FLD: ABNORMAL
EOSINOPHIL # BLD AUTO: 0.62 K/UL — HIGH (ref 0–0.5)
EOSINOPHIL NFR BLD AUTO: 5.1 % — SIGNIFICANT CHANGE UP (ref 0–6)
GAS PNL BLDV: SIGNIFICANT CHANGE UP
GLUCOSE SERPL-MCNC: 187 MG/DL — HIGH (ref 70–99)
GLUCOSE UR QL: ABNORMAL
HCT VFR BLD CALC: 38.4 % — SIGNIFICANT CHANGE UP (ref 34.5–45)
HGB BLD-MCNC: 13.1 G/DL — SIGNIFICANT CHANGE UP (ref 11.5–15.5)
INR BLD: 1.16 RATIO — SIGNIFICANT CHANGE UP (ref 0.88–1.16)
KETONES UR-MCNC: NEGATIVE — SIGNIFICANT CHANGE UP
LEUKOCYTE ESTERASE UR-ACNC: NEGATIVE — SIGNIFICANT CHANGE UP
LYMPHOCYTES # BLD AUTO: 0.84 K/UL — LOW (ref 1–3.3)
LYMPHOCYTES # BLD AUTO: 6.9 % — LOW (ref 13–44)
MCHC RBC-ENTMCNC: 34.1 GM/DL — SIGNIFICANT CHANGE UP (ref 32–36)
MCHC RBC-ENTMCNC: 37 PG — HIGH (ref 27–34)
MCV RBC AUTO: 108.5 FL — HIGH (ref 80–100)
MONOCYTES # BLD AUTO: 0.62 K/UL — SIGNIFICANT CHANGE UP (ref 0–0.9)
MONOCYTES NFR BLD AUTO: 5.1 % — SIGNIFICANT CHANGE UP (ref 2–14)
NEUTROPHILS # BLD AUTO: 10.14 K/UL — HIGH (ref 1.8–7.4)
NEUTROPHILS NFR BLD AUTO: 82.9 % — HIGH (ref 43–77)
NITRITE UR-MCNC: NEGATIVE — SIGNIFICANT CHANGE UP
PH UR: 6.5 — SIGNIFICANT CHANGE UP (ref 5–8)
PLATELET # BLD AUTO: 371 K/UL — SIGNIFICANT CHANGE UP (ref 150–400)
POTASSIUM SERPL-MCNC: 5 MMOL/L — SIGNIFICANT CHANGE UP (ref 3.5–5.3)
POTASSIUM SERPL-SCNC: 5 MMOL/L — SIGNIFICANT CHANGE UP (ref 3.5–5.3)
PROT SERPL-MCNC: 8.1 G/DL — SIGNIFICANT CHANGE UP (ref 6–8.3)
PROT UR-MCNC: ABNORMAL
PROTHROM AB SERPL-ACNC: 13.8 SEC — HIGH (ref 10.6–13.6)
RBC # BLD: 3.54 M/UL — LOW (ref 3.8–5.2)
RBC # FLD: 13.4 % — SIGNIFICANT CHANGE UP (ref 10.3–14.5)
SODIUM SERPL-SCNC: 130 MMOL/L — LOW (ref 135–145)
SP GR SPEC: 1.01 — LOW (ref 1.01–1.02)
UROBILINOGEN FLD QL: NEGATIVE — SIGNIFICANT CHANGE UP
WBC # BLD: 12.23 K/UL — HIGH (ref 3.8–10.5)
WBC # FLD AUTO: 12.23 K/UL — HIGH (ref 3.8–10.5)

## 2020-10-15 PROCEDURE — 70450 CT HEAD/BRAIN W/O DYE: CPT | Mod: 26

## 2020-10-15 PROCEDURE — 99285 EMERGENCY DEPT VISIT HI MDM: CPT

## 2020-10-15 PROCEDURE — 71045 X-RAY EXAM CHEST 1 VIEW: CPT | Mod: 26

## 2020-10-15 RX ORDER — CEFTRIAXONE 500 MG/1
1000 INJECTION, POWDER, FOR SOLUTION INTRAMUSCULAR; INTRAVENOUS ONCE
Refills: 0 | Status: COMPLETED | OUTPATIENT
Start: 2020-10-15 | End: 2020-10-15

## 2020-10-15 RX ADMIN — CEFTRIAXONE 100 MILLIGRAM(S): 500 INJECTION, POWDER, FOR SOLUTION INTRAMUSCULAR; INTRAVENOUS at 23:56

## 2020-10-15 NOTE — ED ADULT NURSE NOTE - OBJECTIVE STATEMENT
Female 87 years old awake, alert and oriented only to her name was brought in by EMS for weakness. As per daughter pt has periods of confusion but got worse yesterday. Pt recognize her as her sister and calling names of dead members of family. Today she has frequency/urgency to urinate but when daughter check her diaper is dry. No fever, chills, nausea or dizziness. Daughter reports that for the past few days she's been weak and didn't sleep well last night. Pt has poor venous access. Tried twice but unsuccessful. Will inform MD. Safety maintained. Call bell within reach. Will continue to reassess.

## 2020-10-15 NOTE — ED ADULT TRIAGE NOTE - WEIGHT IN KG
Increase coumadin dosing as directed. Continue to monitor for signs of bleeding. Return to clinic in 2 weeks. 72.6

## 2020-10-15 NOTE — ED PROVIDER NOTE - CLINICAL SUMMARY MEDICAL DECISION MAKING FREE TEXT BOX
86yo F Hx presenting with daughter for urinary symptoms and increased altered mental status over past several days. recent urinary infection with similar symptoms. no f/c, abd pain, cp, sob at home. with benign abdominal exam. will obtain labs, UA/UC, CXR and likely admit. 88yo F Hx presenting with daughter for urinary symptoms and increased altered mental status over past several days. recent urinary infection with similar symptoms. no f/c, abd pain, cp, sob at home. with benign abdominal exam. will obtain labs, UA/UC, CXR and likely admit.    MASON Maldonado MD: Pt is a 88 y/o female with PMH HTN, HLD, DM, hypothyroid, CVA presenting with complaints of AMS. Per daughter, over the past 2 days pt has been more altered, confusing family members up, grasping at the air, which is similar to what she had done the previous time she had a UTI. Pt also has complaints of dysuria, increased frequency, urgency, decreased output. denies abdominal pain, fevers, chills, n/v, cp, sob. Ddx includes, however, is not limited to: uti, other infectious d/o, metabolic d/o, electrolyte abnormality, ICH, worsening dementia, other. Plan: basic labs, u/a, ucx, CTH, CXR, TBA

## 2020-10-15 NOTE — ED PROVIDER NOTE - PROGRESS NOTE DETAILS
[facility] PGY2 - Pt. signed out to me by Dr. Dionne Deleon/Sagrario.  Pending labs, U/A, admission for AMS.

## 2020-10-15 NOTE — ED PROVIDER NOTE - ATTENDING CONTRIBUTION TO CARE
I saw and evaluated patient with resident. I discussed H+P and MDM with resident. I agree with the statements made by the resident unless otherwise noted.    The care of this patient was in support of the [facility] countermeasures to Covid-19.

## 2020-10-15 NOTE — ED PROCEDURE NOTE - PROCEDURE ADDITIONAL DETAILS
18 gauge line placed in right AC.  Good blood return, flushes w/o resistance.  Pt. tolerated procedure well.

## 2020-10-15 NOTE — ED ADULT NURSE REASSESSMENT NOTE - NS ED NURSE REASSESS COMMENT FT1
Pt straight cath for clean urine specimen. Two RNS at bedside to confirm sterility. Clear yellow urine output with no resistance felt.
Patient moved to the hallway fro closer nurse observation.

## 2020-10-15 NOTE — ED ADULT NURSE NOTE - NSIMPLEMENTINTERV_GEN_ALL_ED
Implemented All Fall with Harm Risk Interventions:  [place] to call system. Call bell, personal items and telephone within reach. Instruct patient to call for assistance. Room bathroom lighting operational. Non-slip footwear when patient is off stretcher. Physically safe environment: no spills, clutter or unnecessary equipment. Stretcher in lowest position, wheels locked, appropriate side rails in place. Provide visual cue, wrist band, yellow gown, etc. Monitor gait and stability. Monitor for mental status changes and reorient to person, place, and time. Review medications for side effects contributing to fall risk. Reinforce activity limits and safety measures with patient and family. Provide visual clues: red socks.

## 2020-10-16 DIAGNOSIS — R09.89 OTHER SPECIFIED SYMPTOMS AND SIGNS INVOLVING THE CIRCULATORY AND RESPIRATORY SYSTEMS: ICD-10-CM

## 2020-10-16 DIAGNOSIS — N18.32 CHRONIC KIDNEY DISEASE, STAGE 3B: ICD-10-CM

## 2020-10-16 DIAGNOSIS — G93.41 METABOLIC ENCEPHALOPATHY: ICD-10-CM

## 2020-10-16 DIAGNOSIS — R65.10 SYSTEMIC INFLAMMATORY RESPONSE SYNDROME (SIRS) OF NON-INFECTIOUS ORIGIN WITHOUT ACUTE ORGAN DYSFUNCTION: ICD-10-CM

## 2020-10-16 DIAGNOSIS — I10 ESSENTIAL (PRIMARY) HYPERTENSION: ICD-10-CM

## 2020-10-16 DIAGNOSIS — E03.9 HYPOTHYROIDISM, UNSPECIFIED: ICD-10-CM

## 2020-10-16 DIAGNOSIS — I82.409 ACUTE EMBOLISM AND THROMBOSIS OF UNSPECIFIED DEEP VEINS OF UNSPECIFIED LOWER EXTREMITY: ICD-10-CM

## 2020-10-16 DIAGNOSIS — N39.0 URINARY TRACT INFECTION, SITE NOT SPECIFIED: ICD-10-CM

## 2020-10-16 DIAGNOSIS — Z29.9 ENCOUNTER FOR PROPHYLACTIC MEASURES, UNSPECIFIED: ICD-10-CM

## 2020-10-16 DIAGNOSIS — E87.1 HYPO-OSMOLALITY AND HYPONATREMIA: ICD-10-CM

## 2020-10-16 DIAGNOSIS — E11.9 TYPE 2 DIABETES MELLITUS WITHOUT COMPLICATIONS: ICD-10-CM

## 2020-10-16 LAB
ANION GAP SERPL CALC-SCNC: 11 MMOL/L — SIGNIFICANT CHANGE UP (ref 5–17)
BASOPHILS # BLD AUTO: 0.07 K/UL — SIGNIFICANT CHANGE UP (ref 0–0.2)
BASOPHILS NFR BLD AUTO: 0.8 % — SIGNIFICANT CHANGE UP (ref 0–2)
BUN SERPL-MCNC: 30 MG/DL — HIGH (ref 7–23)
CALCIUM SERPL-MCNC: 9.6 MG/DL — SIGNIFICANT CHANGE UP (ref 8.4–10.5)
CHLORIDE SERPL-SCNC: 99 MMOL/L — SIGNIFICANT CHANGE UP (ref 96–108)
CO2 SERPL-SCNC: 26 MMOL/L — SIGNIFICANT CHANGE UP (ref 22–31)
CREAT SERPL-MCNC: 1.2 MG/DL — SIGNIFICANT CHANGE UP (ref 0.5–1.3)
EOSINOPHIL # BLD AUTO: 0.32 K/UL — SIGNIFICANT CHANGE UP (ref 0–0.5)
EOSINOPHIL NFR BLD AUTO: 3.8 % — SIGNIFICANT CHANGE UP (ref 0–6)
FOLATE SERPL-MCNC: 16.8 NG/ML — SIGNIFICANT CHANGE UP
GLUCOSE BLDC GLUCOMTR-MCNC: 140 MG/DL — HIGH (ref 70–99)
GLUCOSE BLDC GLUCOMTR-MCNC: 141 MG/DL — HIGH (ref 70–99)
GLUCOSE BLDC GLUCOMTR-MCNC: 173 MG/DL — HIGH (ref 70–99)
GLUCOSE BLDC GLUCOMTR-MCNC: 229 MG/DL — HIGH (ref 70–99)
GLUCOSE BLDC GLUCOMTR-MCNC: 288 MG/DL — HIGH (ref 70–99)
GLUCOSE SERPL-MCNC: 156 MG/DL — HIGH (ref 70–99)
HCT VFR BLD CALC: 36.3 % — SIGNIFICANT CHANGE UP (ref 34.5–45)
HGB BLD-MCNC: 12.1 G/DL — SIGNIFICANT CHANGE UP (ref 11.5–15.5)
IMM GRANULOCYTES NFR BLD AUTO: 0.5 % — SIGNIFICANT CHANGE UP (ref 0–1.5)
LYMPHOCYTES # BLD AUTO: 0.99 K/UL — LOW (ref 1–3.3)
LYMPHOCYTES # BLD AUTO: 11.8 % — LOW (ref 13–44)
MAGNESIUM SERPL-MCNC: 1.9 MG/DL — SIGNIFICANT CHANGE UP (ref 1.6–2.6)
MCHC RBC-ENTMCNC: 33.3 GM/DL — SIGNIFICANT CHANGE UP (ref 32–36)
MCHC RBC-ENTMCNC: 36.4 PG — HIGH (ref 27–34)
MCV RBC AUTO: 109.3 FL — HIGH (ref 80–100)
MONOCYTES # BLD AUTO: 0.77 K/UL — SIGNIFICANT CHANGE UP (ref 0–0.9)
MONOCYTES NFR BLD AUTO: 9.1 % — SIGNIFICANT CHANGE UP (ref 2–14)
NEUTROPHILS # BLD AUTO: 6.23 K/UL — SIGNIFICANT CHANGE UP (ref 1.8–7.4)
NEUTROPHILS NFR BLD AUTO: 74 % — SIGNIFICANT CHANGE UP (ref 43–77)
NRBC # BLD: 0 /100 WBCS — SIGNIFICANT CHANGE UP (ref 0–0)
OSMOLALITY SERPL: 296 MOSMOL/KG — SIGNIFICANT CHANGE UP (ref 280–301)
PHOSPHATE SERPL-MCNC: 3.3 MG/DL — SIGNIFICANT CHANGE UP (ref 2.5–4.5)
PLATELET # BLD AUTO: 361 K/UL — SIGNIFICANT CHANGE UP (ref 150–400)
POTASSIUM SERPL-MCNC: 4.3 MMOL/L — SIGNIFICANT CHANGE UP (ref 3.5–5.3)
POTASSIUM SERPL-SCNC: 4.3 MMOL/L — SIGNIFICANT CHANGE UP (ref 3.5–5.3)
RAPID RVP RESULT: SIGNIFICANT CHANGE UP
RBC # BLD: 3.32 M/UL — LOW (ref 3.8–5.2)
RBC # FLD: 13.3 % — SIGNIFICANT CHANGE UP (ref 10.3–14.5)
SARS-COV-2 RNA SPEC QL NAA+PROBE: SIGNIFICANT CHANGE UP
SODIUM SERPL-SCNC: 136 MMOL/L — SIGNIFICANT CHANGE UP (ref 135–145)
TSH SERPL-MCNC: 2.34 UIU/ML — SIGNIFICANT CHANGE UP (ref 0.27–4.2)
VIT B12 SERPL-MCNC: 451 PG/ML — SIGNIFICANT CHANGE UP (ref 232–1245)
WBC # BLD: 8.42 K/UL — SIGNIFICANT CHANGE UP (ref 3.8–10.5)
WBC # FLD AUTO: 8.42 K/UL — SIGNIFICANT CHANGE UP (ref 3.8–10.5)

## 2020-10-16 PROCEDURE — 99223 1ST HOSP IP/OBS HIGH 75: CPT

## 2020-10-16 PROCEDURE — 12345: CPT | Mod: NC

## 2020-10-16 PROCEDURE — 99221 1ST HOSP IP/OBS SF/LOW 40: CPT

## 2020-10-16 RX ORDER — DEXTROSE 50 % IN WATER 50 %
15 SYRINGE (ML) INTRAVENOUS ONCE
Refills: 0 | Status: DISCONTINUED | OUTPATIENT
Start: 2020-10-16 | End: 2020-10-30

## 2020-10-16 RX ORDER — INSULIN LISPRO 100/ML
VIAL (ML) SUBCUTANEOUS
Refills: 0 | Status: DISCONTINUED | OUTPATIENT
Start: 2020-10-16 | End: 2020-10-19

## 2020-10-16 RX ORDER — INSULIN LISPRO 100/ML
VIAL (ML) SUBCUTANEOUS AT BEDTIME
Refills: 0 | Status: DISCONTINUED | OUTPATIENT
Start: 2020-10-16 | End: 2020-10-19

## 2020-10-16 RX ORDER — ACETAMINOPHEN 500 MG
650 TABLET ORAL EVERY 6 HOURS
Refills: 0 | Status: DISCONTINUED | OUTPATIENT
Start: 2020-10-16 | End: 2020-10-30

## 2020-10-16 RX ORDER — SODIUM CHLORIDE 9 MG/ML
1000 INJECTION, SOLUTION INTRAVENOUS
Refills: 0 | Status: DISCONTINUED | OUTPATIENT
Start: 2020-10-16 | End: 2020-10-30

## 2020-10-16 RX ORDER — CEFTRIAXONE 500 MG/1
1000 INJECTION, POWDER, FOR SOLUTION INTRAMUSCULAR; INTRAVENOUS EVERY 24 HOURS
Refills: 0 | Status: DISCONTINUED | OUTPATIENT
Start: 2020-10-16 | End: 2020-10-20

## 2020-10-16 RX ORDER — HYDROXYUREA 500 MG/1
1 CAPSULE ORAL
Qty: 0 | Refills: 0 | DISCHARGE

## 2020-10-16 RX ORDER — DEXTROSE 50 % IN WATER 50 %
25 SYRINGE (ML) INTRAVENOUS ONCE
Refills: 0 | Status: DISCONTINUED | OUTPATIENT
Start: 2020-10-16 | End: 2020-10-30

## 2020-10-16 RX ORDER — CHOLECALCIFEROL (VITAMIN D3) 125 MCG
1 CAPSULE ORAL
Qty: 0 | Refills: 0 | DISCHARGE

## 2020-10-16 RX ORDER — GLUCAGON INJECTION, SOLUTION 0.5 MG/.1ML
1 INJECTION, SOLUTION SUBCUTANEOUS ONCE
Refills: 0 | Status: DISCONTINUED | OUTPATIENT
Start: 2020-10-16 | End: 2020-10-30

## 2020-10-16 RX ORDER — SODIUM CHLORIDE 9 MG/ML
1000 INJECTION, SOLUTION INTRAVENOUS ONCE
Refills: 0 | Status: COMPLETED | OUTPATIENT
Start: 2020-10-16 | End: 2020-10-16

## 2020-10-16 RX ORDER — DEXTROSE 50 % IN WATER 50 %
12.5 SYRINGE (ML) INTRAVENOUS ONCE
Refills: 0 | Status: DISCONTINUED | OUTPATIENT
Start: 2020-10-16 | End: 2020-10-30

## 2020-10-16 RX ORDER — LISINOPRIL 2.5 MG/1
1 TABLET ORAL
Qty: 0 | Refills: 0 | DISCHARGE

## 2020-10-16 RX ORDER — APIXABAN 2.5 MG/1
2.5 TABLET, FILM COATED ORAL
Refills: 0 | Status: DISCONTINUED | OUTPATIENT
Start: 2020-10-16 | End: 2020-10-30

## 2020-10-16 RX ORDER — ATORVASTATIN CALCIUM 80 MG/1
10 TABLET, FILM COATED ORAL AT BEDTIME
Refills: 0 | Status: DISCONTINUED | OUTPATIENT
Start: 2020-10-16 | End: 2020-10-30

## 2020-10-16 RX ORDER — LEVOTHYROXINE SODIUM 125 MCG
112 TABLET ORAL DAILY
Refills: 0 | Status: DISCONTINUED | OUTPATIENT
Start: 2020-10-16 | End: 2020-10-30

## 2020-10-16 RX ADMIN — ATORVASTATIN CALCIUM 10 MILLIGRAM(S): 80 TABLET, FILM COATED ORAL at 21:20

## 2020-10-16 RX ADMIN — APIXABAN 2.5 MILLIGRAM(S): 2.5 TABLET, FILM COATED ORAL at 06:07

## 2020-10-16 RX ADMIN — SODIUM CHLORIDE 1000 MILLILITER(S): 9 INJECTION, SOLUTION INTRAVENOUS at 01:18

## 2020-10-16 RX ADMIN — Medication 1: at 22:48

## 2020-10-16 RX ADMIN — APIXABAN 2.5 MILLIGRAM(S): 2.5 TABLET, FILM COATED ORAL at 17:55

## 2020-10-16 RX ADMIN — Medication 2: at 17:55

## 2020-10-16 RX ADMIN — CEFTRIAXONE 100 MILLIGRAM(S): 500 INJECTION, POWDER, FOR SOLUTION INTRAMUSCULAR; INTRAVENOUS at 23:53

## 2020-10-16 RX ADMIN — Medication 112 MICROGRAM(S): at 06:07

## 2020-10-16 NOTE — PROGRESS NOTE ADULT - SUBJECTIVE AND OBJECTIVE BOX
Patient is a 87y old  Female who presents with a chief complaint of AMS (16 Oct 2020 12:49)      [facility]  [id number]    SUBJECTIVE / OVERNIGHT EVENTS: Patient seen and examined at bedside, not answering question appropriately, no acute events at night.     ROS:  unable to access due to dementia     Allergies    No Known Drug Allergies  spices (Rash)    Intolerances        MEDICATIONS  (STANDING):  apixaban 2.5 milliGRAM(s) Oral two times a day  atorvastatin 10 milliGRAM(s) Oral at bedtime  cefTRIAXone   IVPB 1000 milliGRAM(s) IV Intermittent every 24 hours  dextrose 5%. 1000 milliLiter(s) (50 mL/Hr) IV Continuous <Continuous>  dextrose 50% Injectable 12.5 Gram(s) IV Push once  dextrose 50% Injectable 25 Gram(s) IV Push once  dextrose 50% Injectable 25 Gram(s) IV Push once  insulin lispro (HumaLOG) corrective regimen sliding scale   SubCutaneous three times a day before meals  insulin lispro (HumaLOG) corrective regimen sliding scale   SubCutaneous at bedtime  levothyroxine 112 MICROGram(s) Oral daily    MEDICATIONS  (PRN):  acetaminophen   Tablet .. 650 milliGRAM(s) Oral every 6 hours PRN Temp greater or equal to 38C (100.4F), Mild Pain (1 - 3)  dextrose 40% Gel 15 Gram(s) Oral once PRN Blood Glucose LESS THAN 70 milliGRAM(s)/deciliter  glucagon  Injectable 1 milliGRAM(s) IntraMuscular once PRN Glucose LESS THAN 70 milligrams/deciliter      Vital Signs Last 24 Hrs  T(C): 36.7 (16 Oct 2020 11:38), Max: 37.4 (15 Oct 2020 19:19)  T(F): 98 (16 Oct 2020 11:38), Max: 99.3 (15 Oct 2020 19:19)  HR: 83 (16 Oct 2020 11:38) (83 - 101)  BP: 168/82 (16 Oct 2020 11:38) (141/79 - 186/98)  BP(mean): --  RR: 18 (16 Oct 2020 11:38) (16 - 20)  SpO2: 95% (16 Oct 2020 11:38) (91% - 99%)  CAPILLARY BLOOD GLUCOSE      POCT Blood Glucose.: 141 mg/dL (16 Oct 2020 12:29)  POCT Blood Glucose.: 140 mg/dL (16 Oct 2020 08:08)  POCT Blood Glucose.: 173 mg/dL (16 Oct 2020 04:02)    I&O's Summary    15 Oct 2020 07:01  -  16 Oct 2020 07:00  --------------------------------------------------------  IN: 120 mL / OUT: 0 mL / NET: 120 mL        PHYSICAL EXAM:  GENERAL: NAD, well-developed  HEAD:  Atraumatic, Normocephalic  EYES: EOMI, PERRLA, conjunctiva and sclera clear  NECK: Supple, No JVD  CHEST/LUNG: Clear to auscultation bilaterally; No wheeze  HEART: Regular rate and rhythm; No murmurs, rubs, or gallops  ABDOMEN: Soft, Nontender, Nondistended; Bowel sounds present  EXTREMITIES:  2+ Peripheral Pulses, No clubbing, cyanosis, or edema  NEUROLOGY: AAOx2( person and place) , non-focal  PSYCH: calm, not answering questions, somnolent       LABS:                        12.1   8.42  )-----------( 361      ( 16 Oct 2020 07:15 )             36.3     10-16    136  |  99  |  30<H>  ----------------------------<  156<H>  4.3   |  26  |  1.20    Ca    9.6      16 Oct 2020 07:15  Phos  3.3     10-16  Mg     1.9     10-16    TPro  8.1  /  Alb  4.4  /  TBili  0.3  /  DBili  x   /  AST  18  /  ALT  17  /  AlkPhos  84  10-15    PT/INR - ( 15 Oct 2020 20:13 )   PT: 13.8 sec;   INR: 1.16 ratio         PTT - ( 15 Oct 2020 20:13 )  PTT:36.7 sec      Urinalysis Basic - ( 15 Oct 2020 22:54 )    Color: Colorless / Appearance: Clear / S.008 / pH: x  Gluc: x / Ketone: Negative  / Bili: Negative / Urobili: Negative   Blood: x / Protein: 30 mg/dL / Nitrite: Negative   Leuk Esterase: Negative / RBC: 0 /hpf / WBC 0 /HPF   Sq Epi: x / Non Sq Epi: 11 /hpf / Bacteria: Negative        RADIOLOGY & ADDITIONAL TESTS:    Care Discussed with Consultants/Other Providers: Medicine NP     Case Discussed with Omar ortiz

## 2020-10-16 NOTE — H&P ADULT - PROBLEM SELECTOR PLAN 4
Cr 1.34 - labs reviewed on HIE, pts baseline appears to be 1.3-1.5 in recent months   will c/t trend   renally dose medications, avoid nephrotoxins

## 2020-10-16 NOTE — CONSULT NOTE ADULT - SUBJECTIVE AND OBJECTIVE BOX
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 Pt unable to provide hx 2/2 AMS, history obtained from dtr Magalie ([phone]).  Per dtr, pt was in usual state of health until Wednesday morning, when family noted pt to be confused - she was not making any sense, calling family members by wrong names, did not recognize daughter. At baseline, pt may have some mild dementia and family has noted progressive decline over past year, but normally able to answer questions appropriately, recognizes family members, oriented x 2-3. Dtr did not notice any fevers, chills, no cough, appetite was normal, no vomiting, no c/o abdominal pain, has been having normal BMs. Pt uses diapers due to incontinence, family denies hematuria, but +foul smelling. Pt endorsed dysuria and urgency to dtr, but family notes mostly dry diapers at home today. Pt seemed agitated last night and did not sleep at all, spent the night pacing at home. Dtr states pt had similar symptoms when she had UTI last year while at rehab last year.     PAST MEDICAL & SURGICAL HISTORY:  SBO (Small Bowel Obstruction)    Stroke    Deep Vein Thrombosis (DVT)    Adult Hypothyroidism    Hypercholesteremia    Diabetes    Benign Hypertension    FH: Total Abdominal Hysterectomy and Bilateral Salpingo-Oophorectomy      FAMILY HISTORY:  Family history of breast cancer (Child)    Family history of secondary lung cancer    Family history of diabetes mellitus (DM) (Child)      Social Hx:  Nonsmoker, no drug or alcohol use    Home Medications:  alendronate 70 mg oral tablet: 1 tab(s) orally once a week (  ) (16 Oct 2020 02:42)  apixaban 2.5 mg oral tablet: 1 tab(s) orally 2 times a day (16 Oct 2020 02:42)  Januvia 25 mg oral tablet: 1 tab(s) orally once a day (16 Oct 2020 02:42)  levothyroxine 112 mcg (0.112 mg) oral tablet: 1 tab(s) orally once a day (16 Oct 2020 02:42)  pravastatin 40 mg oral tablet: 1 tab(s) orally once a day (16 Oct 2020 02:42)    MEDICATIONS  (STANDING):  apixaban 2.5 milliGRAM(s) Oral two times a day  atorvastatin 10 milliGRAM(s) Oral at bedtime  cefTRIAXone   IVPB 1000 milliGRAM(s) IV Intermittent every 24 hours  dextrose 5%. 1000 milliLiter(s) (50 mL/Hr) IV Continuous <Continuous>  dextrose 50% Injectable 12.5 Gram(s) IV Push once  dextrose 50% Injectable 25 Gram(s) IV Push once  dextrose 50% Injectable 25 Gram(s) IV Push once  insulin lispro (HumaLOG) corrective regimen sliding scale   SubCutaneous three times a day before meals  insulin lispro (HumaLOG) corrective regimen sliding scale   SubCutaneous at bedtime  levothyroxine 112 MICROGram(s) Oral daily    MEDICATIONS  (PRN):  acetaminophen   Tablet .. 650 milliGRAM(s) Oral every 6 hours PRN Temp greater or equal to 38C (100.4F), Mild Pain (1 - 3)  dextrose 40% Gel 15 Gram(s) Oral once PRN Blood Glucose LESS THAN 70 milliGRAM(s)/deciliter  glucagon  Injectable 1 milliGRAM(s) IntraMuscular once PRN Glucose LESS THAN 70 milligrams/deciliter    Allergies  No Known Drug Allergies  spices (Rash)    Intolerances      REVIEW OF SYSTEMS    ROS: Pertinent positives in HPI, all other ROS were reviewed and are negative.      Vital Signs Last 24 Hrs  T(C): 36.7 (16 Oct 2020 11:38), Max: 37.4 (15 Oct 2020 19:19)  T(F): 98 (16 Oct 2020 11:38), Max: 99.3 (15 Oct 2020 19:19)  HR: 83 (16 Oct 2020 11:38) (83 - 101)  BP: 168/82 (16 Oct 2020 11:38) (141/79 - 186/98)  BP(mean): --  RR: 18 (16 Oct 2020 11:38) (16 - 20)  SpO2: 95% (16 Oct 2020 11:38) (91% - 99%)    NEUROLOGICAL EXAM:  MS: AAOX3, fluent, attends b/l; recent and remote memory intact; normal attention, language and fund of knowledge.   CN: VFF, EOMI, PERRL, no PRICE, no APD,  V1-3 intact, no facial asymmetry, t/p midline, SCM/trap intact.  Eyes-Fundi: no papilledema.  Motor: Strength: 5/5 4x. Tone: normal. Bulk: normal. DTR 2+ symm.  Plantar flex b/l. Sensation: intact to LT/PP/Vibration/Position/Temperature 4x.   Coordination: intact 4x.   Gait:  Romberg negative, pull test negative; walks with narrow base, pivots in 2 steps.    NIHSS  mRS    Labs:   cbc                      12.1   8.42  )-----------( 361      ( 16 Oct 2020 07:15 )             36.3     [id number]    136  |  99  |  30<H>  ----------------------------<  156<H>  4.3   |  26  |  1.20    Ca    9.6      16 Oct 2020 07:15  Phos  3.3     10-16  Mg     1.9     10-16    TPro  8.1  /  Alb  4.4  /  TBili  0.3  /  DBili  x   /  AST  18  /  ALT  17  /  AlkPhos  84  10-15    CoagsPT/INR - ( 15 Oct 2020 20:13 )   PT: 13.8 sec;   INR: 1.16 ratio         PTT - ( 15 Oct 2020 20:13 )  PTT:36.7 sec  Lipids  A1C  CardiacMarkers    LFTsLIVER FUNCTIONS - ( 15 Oct 2020 20:13 )  Alb: 4.4 g/dL / Pro: 8.1 g/dL / ALK PHOS: 84 U/L / ALT: 17 U/L / AST: 18 U/L / GGT: x           UAUrinalysis Basic - ( 15 Oct 2020 22:54 )    Color: Colorless / Appearance: Clear / S.008 / pH: x  Gluc: x / Ketone: Negative  / Bili: Negative / Urobili: Negative   Blood: x / Protein: 30 mg/dL / Nitrite: Negative   Leuk Esterase: Negative / RBC: 0 /hpf / WBC 0 /HPF   Sq Epi: x / Non Sq Epi: 11 /hpf / Bacteria: Negative [id number]  Patient is a 87y old  Female who presents with a chief complaint of AMS (16 Oct 2020 02:13)    HPI:  87F [place] with PMH of HTN, T2DM, hypothyroidism, 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 Pt unable to provide hx 2/2 AMS, history obtained from dtr Magalie ([phone]).  Per dtr, pt was in usual state of health until Wednesday morning, when family noted pt to be confused - she was not making any sense, calling family members by wrong names, did not recognize daughter. At baseline, pt may have some mild dementia and family has noted progressive decline over past year, but normally able to answer questions appropriately, recognizes family members, oriented x 2-3. Dtr did not notice any fevers, chills, no cough, appetite was normal, no vomiting, no c/o abdominal pain, has been having normal BMs. Pt uses diapers due to incontinence, family denies hematuria, but +foul smelling. Pt endorsed dysuria and urgency to dtr, but family notes mostly dry diapers at home today. Pt seemed agitated last night and did not sleep at all, spent the night pacing at home. Dtr states pt had similar symptoms when she had UTI last year while at rehab last year.     PAST MEDICAL & SURGICAL HISTORY:  SBO (Small Bowel Obstruction)    Stroke    Deep Vein Thrombosis (DVT)    Adult Hypothyroidism    Hypercholesteremia    Diabetes    Benign Hypertension    FH: Total Abdominal Hysterectomy and Bilateral Salpingo-Oophorectomy      FAMILY HISTORY:  Family history of breast cancer (Child)    Family history of secondary lung cancer    Family history of diabetes mellitus (DM) (Child)      Social Hx:  Nonsmoker, no drug or alcohol use    Home Medications:  alendronate 70 mg oral tablet: 1 tab(s) orally once a week (  ) (16 Oct 2020 02:42)  apixaban 2.5 mg oral tablet: 1 tab(s) orally 2 times a day (16 Oct 2020 02:42)  Januvia 25 mg oral tablet: 1 tab(s) orally once a day (16 Oct 2020 02:42)  levothyroxine 112 mcg (0.112 mg) oral tablet: 1 tab(s) orally once a day (16 Oct 2020 02:42)  pravastatin 40 mg oral tablet: 1 tab(s) orally once a day (16 Oct 2020 02:42)    MEDICATIONS  (STANDING):  apixaban 2.5 milliGRAM(s) Oral two times a day  atorvastatin 10 milliGRAM(s) Oral at bedtime  cefTRIAXone   IVPB 1000 milliGRAM(s) IV Intermittent every 24 hours  dextrose 5%. 1000 milliLiter(s) (50 mL/Hr) IV Continuous <Continuous>  dextrose 50% Injectable 12.5 Gram(s) IV Push once  dextrose 50% Injectable 25 Gram(s) IV Push once  dextrose 50% Injectable 25 Gram(s) IV Push once  insulin lispro (HumaLOG) corrective regimen sliding scale   SubCutaneous three times a day before meals  insulin lispro (HumaLOG) corrective regimen sliding scale   SubCutaneous at bedtime  levothyroxine 112 MICROGram(s) Oral daily    MEDICATIONS  (PRN):  acetaminophen   Tablet .. 650 milliGRAM(s) Oral every 6 hours PRN Temp greater or equal to 38C (100.4F), Mild Pain (1 - 3)  dextrose 40% Gel 15 Gram(s) Oral once PRN Blood Glucose LESS THAN 70 milliGRAM(s)/deciliter  glucagon  Injectable 1 milliGRAM(s) IntraMuscular once PRN Glucose LESS THAN 70 milligrams/deciliter    Allergies  No Known Drug Allergies  spices (Rash)    Intolerances      REVIEW OF SYSTEMS    ROS: Pertinent positives in HPI, all other ROS were reviewed and are negative.      Vital Signs Last 24 Hrs  T(C): 36.7 (16 Oct 2020 11:38), Max: 37.4 (15 Oct 2020 19:19)  T(F): 98 (16 Oct 2020 11:38), Max: 99.3 (15 Oct 2020 19:19)  HR: 83 (16 Oct 2020 11:38) (83 - 101)  BP: 168/82 (16 Oct 2020 11:38) (141/79 - 186/98)  BP(mean): --  RR: 18 (16 Oct 2020 11:38) (16 - 20)  SpO2: 95% (16 Oct 2020 11:38) (91% - 99%)    NEUROLOGICAL EXAM:  MS: awake and alert, oriented to person, place, did not state month or year. fluent, did not follow simple commands.   CN: EOMI, no facial asymmetry  Motor: Strength: 5/5 4x. DTR 2+ symm.  Plantar flex b/l. Sensation: intact to LT.     Labs:   cbc                      12.1   8.42  )-----------( 361      ( 16 Oct 2020 07:15 )             36.3     [id number]    136  |  99  |  30<H>  ----------------------------<  156<H>  4.3   |  26  |  1.20    Ca    9.6      16 Oct 2020 07:15  Phos  3.3     10-16  Mg     1.9     10-16    TPro  8.1  /  Alb  4.4  /  TBili  0.3  /  DBili  x   /  AST  18  /  ALT  17  /  AlkPhos  84  10-15    CoagsPT/INR - ( 15 Oct 2020 20:13 )   PT: 13.8 sec;   INR: 1.16 ratio         PTT - ( 15 Oct 2020 20:13 )  PTT:36.7 sec  Lipids  A1C  CardiacMarkers    LFTsLIVER FUNCTIONS - ( 15 Oct 2020 20:13 )  Alb: 4.4 g/dL / Pro: 8.1 g/dL / ALK PHOS: 84 U/L / ALT: 17 U/L / AST: 18 U/L / GGT: x           UAUrinalysis Basic - ( 15 Oct 2020 22:54 )    Color: Colorless / Appearance: Clear / S.008 / pH: x  Gluc: x / Ketone: Negative  / Bili: Negative / Urobili: Negative   Blood: x / Protein: 30 mg/dL / Nitrite: Negative   Leuk Esterase: Negative / RBC: 0 /hpf / WBC 0 /HPF   Sq Epi: x / Non Sq Epi: 11 /hpf / Bacteria: Negative

## 2020-10-16 NOTE — H&P ADULT - NSICDXPASTMEDICALHX_GEN_ALL_CORE_FT
PAST MEDICAL HISTORY:  Adult Hypothyroidism     Benign Hypertension     Deep Vein Thrombosis (DVT)     Diabetes     Hypercholesteremia     SBO (Small Bowel Obstruction)     Stroke

## 2020-10-16 NOTE — H&P ADULT - NSHPPHYSICALEXAM_GEN_ALL_CORE
Vital Signs Last 24 Hrs  T(C): 37.1 (16 Oct 2020 02:05), Max: 37.4 (15 Oct 2020 19:19)  T(F): 98.7 (16 Oct 2020 02:05), Max: 99.3 (15 Oct 2020 19:19)  HR: 92 (16 Oct 2020 02:05) (92 - 101)  BP: 142/76 (16 Oct 2020 02:05) (141/79 - 186/98)  BP(mean): --  RR: 18 (16 Oct 2020 02:05) (16 - 20)  SpO2: 98% (16 Oct 2020 02:05) (91% - 99%)    PHYSICAL EXAM:  GENERAL: NAD, well-developed, somnolent   HEAD:  Atraumatic, normocephalic  EYES: EOMI, conjunctiva and sclera clear  NECK: Supple, no JVD  CHEST/LUNG: Clear to auscultation bilaterally; no wheezing or rales  HEART: Regular rate and rhythm; no murmurs  ABDOMEN: Soft, nontender, nondistended; bowel sounds present  EXTREMITIES:  2+ Peripheral Pulses, no edema  PSYCH: calm but somnolent   NEUROLOGY: somnolent, alerts to verbal stimuli, oriented x 1, spontaneously moving all extremities, unable to assess neuro exam further    SKIN: No rashes or lesions

## 2020-10-16 NOTE — CHART NOTE - NSCHARTNOTEFT_GEN_A_CORE
TO BE COMPLETED WITHIN 6 HOURS OF INITIAL ASSESSMENT:    For use in patients that have 2 sepsis criteria and new organ dysfunction   •	New or increased oxygen requirement  •	Creatinine >2mg/dL  •	Bilirubin>2mg/dL  •	Platelet <100,000/mm3  •	INR >1.5, PTT>60  •	Lactate >2    If patient persistent hypotension (SBP<90) or any lactate >4 then provider evaluation (Physician/PA/NP) within 30 minutes of bolus completion is required.    Vital Signs Last 24 Hrs  T(C): 37.4 (15 Oct 2020 19:19), Max: 37.4 (15 Oct 2020 19:19)  T(F): 99.3 (15 Oct 2020 19:19), Max: 99.3 (15 Oct 2020 19:19)  HR: 93 (15 Oct 2020 19:19) (93 - 101)  BP: 141/79 (15 Oct 2020 19:19) (141/79 - 186/98)  BP(mean): --  RR: 18 (15 Oct 2020 22:56) (16 - 20)  SpO2: 99% (15 Oct 2020 22:56) (91% - 99%)  		  LUNGS:  [  ] Clear bilaterally [  ] Wheeze [  ] Rhonchi [  ] Rales [  ] Crackles; Other:  HEART: [  ]RRR [  ] No murmur [  ]  Normal S1S2 [  ] Tachycardia;  Other:  CAPILLARY REFILL:  	Fingers: [  ] less than 2 seconds [  ] more than 2 seconds                                           Toes: [  ]  less than 2 seconds [  ] more than 2 seconds   PERIPHERAL PULSES:  Radial: [  ] Palpable  [  ]  non-palpable                                         Dorsalis Pedis: [  ] Palpable  [  ] non-palpable                                         Posterior Tibial: [  ] Palpable  [  ] non-palpable                                          Other:  SKIN:   [  ]  Diaphoretic  [  ]  Mottling  [  ]  Cold extremities  [  ]  Warm [  ]  Dry                      Other:    BEDSIDE ULTRASOUND FINDINGS (IF APPLICABLE):    Labs:  15 Oct 2020 20:13    130    |  94     |  34     ----------------------------<  187    5.0     |  24     |  1.34     Ca    9.9        15 Oct 2020 20:13    TPro  8.1    /  Alb  4.4    /  TBili  0.3    /  DBili  x      /  AST  18     /  ALT  17     /  AlkPhos  84     15 Oct 2020 20:13                          13.1   12.23 )-----------( 371      ( 15 Oct 2020 20:13 )             38.4     PT/INR - ( 15 Oct 2020 20:13 )   PT: 13.8 sec;   INR: 1.16 ratio         PTT - ( 15 Oct 2020 20:13 )  PTT:36.7 sec  Lactate:    [ X] I have re-evaluated the patient's fluid status and reviewed vital signs. Clinical evaluation demonstrates an appropriate response to fluid resuscitation, with subsequent plan as follows:    Patient received a modified total of fluid resuscitation for the following reason:  [ ] obesity BMI > 30, patient received 30 cc/kg according to Ideal Body Weight   [ ] acute, decompensated CHF   [ ] pulmonary edema    [ ] ESRD with signs of fluid overload  [ ] presence of LVAD     Plan (orders must be placed in EMR):     [  ]  Check Repeat Lactate   [  ]  No change in current plan  [  ]  Start Vasopressors:  [ X]  Fluid Bolus: 1L LR  [ X ] other: ceftriaxone for UTI, f/u UC, check BCs    Care Discussed with Consultants/Other Providers [X ] YES  [ ] NO

## 2020-10-16 NOTE — H&P ADULT - PROBLEM SELECTOR PLAN 3
Na 130 on arrival to ED - possibly 2/2 poor appetite vs SIADH   s/p 1L IVF  c/w gentle maintenance IVF overnight   recheck BMP in AM

## 2020-10-16 NOTE — H&P ADULT - NSICDXFAMILYHX_GEN_ALL_CORE_FT
FAMILY HISTORY:  Family history of secondary lung cancer    Child  Still living? Unknown  Family history of breast cancer, Age at diagnosis: Age Unknown  Family history of diabetes mellitus (DM), Age at diagnosis: Age Unknown

## 2020-10-16 NOTE — H&P ADULT - NSHPLABSRESULTS_GEN_ALL_CORE
Labs, imaging and EKG personally reviewed and interpreted by me.                           13.1   12.23 )-----------( 371      ( 15 Oct 2020 20:13 )             38.4     10-15    130<L>  |  94<L>  |  34<H>  ----------------------------<  187<H>  5.0   |  24  |  1.34<H>    Ca    9.9      15 Oct 2020 20:13    TPro  8.1  /  Alb  4.4  /  TBili  0.3  /  DBili  x   /  AST  18  /  ALT  17  /  AlkPhos  84  10-15      PT/INR - ( 15 Oct 2020 20:13 )   PT: 13.8 sec;   INR: 1.16 ratio    PTT - ( 15 Oct 2020 20:13 )  PTT:36.7 sec    Urinalysis Basic - ( 15 Oct 2020 22:54 )  Color: Colorless / Appearance: Clear / S.008 / pH: x  Gluc: x / Ketone: Negative  / Bili: Negative / Urobili: Negative   Blood: x / Protein: 30 mg/dL / Nitrite: Negative   Leuk Esterase: Negative / RBC: 0 /hpf / WBC 0 /HPF   Sq Epi: x / Non Sq Epi: 11 /hpf / Bacteria: Negative      < from: CT Head No Cont (10.15.20 @ 20:54) >  FINDINGS:  No evidence of an acute hemorrhage, mass effect or mass. No midline shift or extra-axial fluid collection or hydrocephalus. Cerebral atrophy is appropriate for patient's stated age. Extensive patchy and confluent hypoattenuation within the white matter likely representing chronic ischemic microvascular disease. Unchanged calcifications of bilateral globus pallidus and dentate nuclei within the cerebellum. Chronic bilateral centrum semiovale and corona radiata infarcts. Atherosclerotic vascular calcifications at the skull base. Visualized bilateral paranasal sinuses and mastoid air cells are clear. No displaced calvarial fracture.    IMPRESSION:  No evidence of acute intracranial hemorrhage, large territorial infarct, or mass effect.        < from: Xray Chest 1 View AP/PA (10.15.20 @ 20:13) >  ******PRELIMINARY REPORT******    ******PRELIMINARY REPORT******        INTERPRETATION:  bibasilar atelectasis.

## 2020-10-16 NOTE — H&P ADULT - NSHPATTENDINGPLANDISCUSS_GEN_ALL_CORE
First visit WELL-CHILD EXAM/ admission H&P    Patient is admitted from clinic for hyperbilirubinemia. She was discharged yesterday from NICU. Only bili done was on  at 17 hours. Ernesto was negative.  Mom thinks he is feeding ok. Milk is coming in. Stools are still green. Home health had been set up due to limited social support.     Current complaints or questions:  Breaths fast at night  Patient presents with mom, grandma    No past medical history on file.  No current outpatient prescriptions on file.     No current facility-administered medications for this visit.     Mom is taking ibuprofen, needs to refill vitamins   .  ALLERGIES:  No Known Allergies  Social History     Social History   • Marital status: Single     Spouse name: N/A   • Number of children: N/A   • Years of education: N/A     Occupational History   • Not on file.     Social History Main Topics   • Smoking status: Not on file   • Smokeless tobacco: Not on file   • Alcohol use Not on file   • Drug use: Unknown   • Sexual activity: Not on file     Other Topics Concern   • Not on file     Social History Narrative   • No narrative on file       Patient was born via vaginal delivery after normal pregnancy. He was in the nicu due to respiratory distress and discharged yesterday.   Hearing screen passed   screen pending        Feeding:  Patient is taking breast ;   ounces every 3 hours.   Smoke exposure no, dad, does outside  Pets no  Lives with mom, dad, great grandparents, uncle    Development is appropriate for age including:  Patient focuses on face; is starting to track; response to noises; patient has intact reflexes of grasp, walking, startle; there is equal movement; patient lifts head to turn.    Other topics discussed and parents expressed no concern:  Siblings, safety, sleep pattern, home, fevers, rashes, pacifiers, cord care, bathing, car seats, feeding schedule, shots, and appointment scheduled.    PHYSICAL EXAM:  Visit  Vitals  Pulse 148   Temp 98.3 °F (36.8 °C) (Rectal)   Resp 38   Ht 19.5\" (49.5 cm)   Wt 2.977 kg   HC 34 cm (13.39\")   BMI 12.13 kg/m²       Vision and hearing are grossly intact.  General:  This is an 4 day old male in no acute distress, active, alert, with appropriate hygiene.  Head is normocephalic, atraumatic.  Anterior fontanelle is soft and flat:  Pupils are equal round and reactive to light, conjunctivae clear; TMs (tympanic membranes) are without erythema or fluid, canals are patent; there is no rhinorrhea, turbinates are pink and moist; mouth is moist without erythema or thrush.  Neck is supple without cervical or supraclavicular adenopathy, thyroid is not enlarged.  Heart is regular rate and rhythm without murmurs, pedal and femoral pulses are 2+, capillary refill is less than 2 seconds.  Lungs are clear without wheezes or rales, there are no retractions or nasal flare.  Abdomen is soft, nontender and nondistended, there is no hepatosplenomegaly, bowel sounds are present.  Umbilical cord is on.  Musculoskeletal exam shows no clubbing, cyanosis, edema, or deformities.  Ortolani and Bates tests are negative.  Neurologic strength is 4 over 4.  Startle reflex + , grasp +.  Skin shows no rashes.  Jaundice to abdomin  Genitalia male.  -5% weight change      ASSESSMENT:  This is an 4 day old male with mild weight loss but hyper bili at 22.      PLAN:    Admit to peds with triple phototherapy  IV fluids at 12 ml/hr and will adjust according to feeding  Mom is to pump and save. Will do formula to start  Lab : cbc, retic, crp, esr, blood culture on admission   repeat bili after 4 hours  Consult lactation and [place]  Strict I & O  Daily weights      bili       patient's dtr Magalie

## 2020-10-16 NOTE — H&P ADULT - PROBLEM SELECTOR PLAN 2
leucocytosis and tachycardic on arrival to ED, unclear etiology - possibly infectious from UTI although UA bland; CXR clear, not hypoxic/tachypneic to suggest PE and pt already on AC    - will treat empirically with ceftriaxone for now  - f/u cx data   - abdominal exam benign, lactate normal on arrival - low suspicion for abdominal pathology at this time, but can consider CT A/P if not improving   - monitor vitals

## 2020-10-16 NOTE — H&P ADULT - PROBLEM SELECTOR PROBLEM 9
History of Present Illness





- History of Present Illness


History of Present Illness: 











47 year old female with a past medical history of depression who came to the 

emergency room after reporting chest tightness.  The patient also reported some 

numbness and tingling running down her upper extremities. Patient still does 

reports some chest tightness that is located mid-sternally. The patient does 

state she has alot of stress in her life currently.  She reports having to work 

longer hours at her job due to a loss in workers and her son not wanting to see 

her any longer.  She reports crying daily for the past couple of months.  She 

denies any dizziness, changes in vision





Past medical history: depression


Surgical history:


Allergies: codeine, doxycycline, morphine, Penicillin, tetracycline


Social history:








Present on Admission





- Present on Admission


Any Indicators Present on Admission: No





Past Patient History





- Past Social History


Smoking Status: Never Smoked





- CARDIAC


Hx Cardiac Disorders: No





- PULMONARY


Hx Chronic Obstructive Pulmonary Disease (COPD): Yes


Hx Pulmonary Embolism: Yes





- HEENT


Hx HEENT Problems: Yes





- MUSCULOSKELETAL/RHEUMATOLOGICAL


Hx Arthritis: Yes





- GASTROINTESTINAL


Hx Gastrointestinal Disorders: Yes


Hx Colitis: Yes





- GENITOURINARY/GYNECOLOGICAL


Hx Genitourinary Disorders: Yes (SEE COMMENT)


Hx Urinary Tract Infection: Yes


Other/Comment: endometriosis sx; PID HX





- PSYCHIATRIC


Hx Anxiety: Yes


Hx Post Traumatic Stress Disorder: Yes


Hx Substance Use: No (''Past abuse of Xanax'')





- SURGICAL HISTORY


Hx Surgeries: Yes (SEE COMMENT)


Other/Comment: endometriosis removed





- ANESTHESIA


Hx Anesthesia: Yes


Hx Anesthesia Reactions: No





Meds


Allergies/Adverse Reactions: 


 Allergies











Allergy/AdvReac Type Severity Reaction Status Date / Time


 


codeine Allergy   Verified 04/28/18 08:28


 


doxycycline Allergy   Verified 04/28/18 08:28


 


morphine Allergy   Verified 04/28/18 08:28


 


Penicillins Allergy   Verified 04/28/18 08:28


 


tetracycline Allergy   Verified 04/28/18 08:28


 


steroids Allergy   Uncoded 11/16/17 11:03














Physical Exam





- Constitutional


Appears: Well





- Head Exam


Head Exam: ATRAUMATIC, NORMAL INSPECTION, NORMOCEPHALIC





- Eye Exam


Eye Exam: EOMI, Normal appearance, PERRL


Pupil Exam: NORMAL ACCOMODATION, PERRL





- ENT Exam


ENT Exam: Mucous Membranes Moist, Normal Exam





- Neck Exam


Neck exam: Positive for: Normal Inspection





- Respiratory Exam


Respiratory Exam: Decreased Breath Sounds





- Cardiovascular Exam


Cardiovascular Exam: REGULAR RHYTHM, +S1, +S2





- GI/Abdominal Exam


GI & Abdominal Exam: Diminished Bowel Sounds, Soft





- Rectal Exam


Rectal Exam: Deferred





Results





- Vital Signs


Recent Vital Signs: 





 Last Vital Signs











Temp  98.0 F   07/27/18 16:00


 


Pulse  64   07/27/18 16:01


 


Resp  20   07/27/18 16:00


 


BP  103/74   07/27/18 16:00


 


Pulse Ox  97   07/27/18 16:00














- Labs


Result Diagrams: 


 07/29/18 10:54





 07/29/18 10:54


Labs: 





 Laboratory Results - last 24 hr











  07/27/18 07/27/18 07/27/18





  00:44 10:42 10:42


 


WBC   6.6 


 


RBC   4.41 


 


Hgb   13.4 


 


Hct   39.3 


 


MCV   89.1 


 


MCH   30.3 


 


MCHC   34.0 


 


RDW   13.7 


 


Plt Count   274 


 


MPV   10.0 


 


Neut % (Auto)   59.9 


 


Lymph % (Auto)   31.1 


 


Mono % (Auto)   6.8 


 


Eos % (Auto)   1.9 


 


Baso % (Auto)   0.3 


 


Neut # (Auto)   4.0 


 


Lymph # (Auto)   2.1 


 


Mono # (Auto)   0.5 


 


Eos # (Auto)   0.1 


 


Baso # (Auto)   0.0 


 


D-Dimer, Quantitative   


 


Sodium    139


 


Potassium    3.7


 


Chloride    104


 


Carbon Dioxide    24


 


Anion Gap    15


 


BUN    14


 


Creatinine    0.7


 


Est GFR ( Amer)    > 60


 


Est GFR (Non-Af Amer)    > 60


 


Random Glucose    144 H


 


Calcium    9.0


 


Total Bilirubin    0.6


 


AST    16


 


ALT    31


 


Alkaline Phosphatase    62


 


Total Creatine Kinase  29 L   25 L


 


CK-MB (Mass)  0.36   < 0.22


 


Troponin I  < 0.0120   < 0.0120


 


Total Protein    6.7


 


Albumin    4.1


 


Globulin    2.6


 


Albumin/Globulin Ratio    1.6


 


Triglycerides    124  D


 


Cholesterol    171


 


LDL Cholesterol Direct    100


 


HDL Cholesterol    39


 


TSH 3rd Generation    1.03














  07/27/18





  16:36


 


WBC 


 


RBC 


 


Hgb 


 


Hct 


 


MCV 


 


MCH 


 


MCHC 


 


RDW 


 


Plt Count 


 


MPV 


 


Neut % (Auto) 


 


Lymph % (Auto) 


 


Mono % (Auto) 


 


Eos % (Auto) 


 


Baso % (Auto) 


 


Neut # (Auto) 


 


Lymph # (Auto) 


 


Mono # (Auto) 


 


Eos # (Auto) 


 


Baso # (Auto) 


 


D-Dimer, Quantitative  < 200


 


Sodium 


 


Potassium 


 


Chloride 


 


Carbon Dioxide 


 


Anion Gap 


 


BUN 


 


Creatinine 


 


Est GFR ( Amer) 


 


Est GFR (Non-Af Amer) 


 


Random Glucose 


 


Calcium 


 


Total Bilirubin 


 


AST 


 


ALT 


 


Alkaline Phosphatase 


 


Total Creatine Kinase 


 


CK-MB (Mass) 


 


Troponin I 


 


Total Protein 


 


Albumin 


 


Globulin 


 


Albumin/Globulin Ratio 


 


Triglycerides 


 


Cholesterol 


 


LDL Cholesterol Direct 


 


HDL Cholesterol 


 


TSH 3rd Generation 














Assessment & Plan





- Assessment and Plan (Free Text)


Plan: 





7 year old female with a past medical history of COPD and depression who came 

to the emergency room after reporting chest tightness.


Plan: 





1.Chest pain r/o acs


-EKG: Normal sinus rhythm @70bpm


-Troponins (-)x3


-Cardiology (Dr. Pa) consuled. Help appreciated.


-Lipid panel ordered. Will f/u with results.


-TSH ordered. Will f/u with results.


-Aspirin 325mg PO Daily





2. Insomnia


-Trazdone 200 mg PO HS





3. Anxiety


-Xanax 1.5mg PO HS





4. Depression


-Patient reports having multiple stressors in her life including her son not 

wanting to see her and increased pressure at work.


-Paxil 40mg PO Daily





PPX


Lovenox 40mg SC Daily


Protonix 40mg IVP Daily


Heart Healthy diet





Dispo: Awaiting Cardiology recommendations. Prophylactic measure

## 2020-10-16 NOTE — CONSULT NOTE ADULT - ASSESSMENT
Impression: Likely toxic-metabolic encephalopathy in patient with dementia at baseline.     Plan:  -medical management and supportive care per primary team Impression: Likely toxic-metabolic encephalopathy in patient with dementia at baseline.    Plan:  -medical management and supportive care per primary team

## 2020-10-16 NOTE — H&P ADULT - PROBLEM SELECTOR PLAN 1
acute encephalopathy x 2 days, unclear etiology. Per family, similar to when pt had UTI in past and pt endorsing urinary symptoms, but UA bland. No e/o PNA on CXR, not hypoxic, COVID/RVP neg; abdomen benign on exam, lactate normal. Mild hypoNa on labs, CKD at baseline. CTH without acute pathology     -ddx remains broad - UTI vs other infection vs metabolic derangement  - will c/w empiric ceftriaxone   - consider CT A/P if not improving to r/o abdominal source   - if mental status not improving, consider neurology consult in AM and possible LP, MRI   - check TSH, b12, folate   - neuro checks q4h for now

## 2020-10-16 NOTE — H&P ADULT - HISTORY OF PRESENT ILLNESS
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 Pt unable to provide hx 2/2 AMS, history obtained from dtr Magalie ([phone]).  Per dtr, pt was in usual state of health until Wednesday morning, when family noted pt to be confused - she was not making any sense, calling family members by wrong names, did not recognize daughter. At baseline, pt may have some mild dementia and family has noted progressive decline over past year, but normally able to answer questions appropriately, recognizes family members, oriented x 2-3. Dtr did not notice any fevers, chills, no cough, appetite was normal, no vomiting, no c/o abdominal pain, has been having normal BMs. Pt uses diapers due to incontinence, family denies hematuria, but +foul smelling. Pt endorsed dysuria and urgency to dtr, but family notes mostly dry diapers at home today. Pt seemed agitated last night and did not sleep at all, spent the night pacing at home. Dtr states pt had similar symptoms when she had UTI last year while at rehab last year.

## 2020-10-16 NOTE — H&P ADULT - PROBLEM SELECTOR PLAN 6
BP stable currently   pt taken off lisinopril per family by nephrology due to MIKKI/CKD  c/t monitor routinely, start antiHTN as indicated

## 2020-10-17 LAB
A1C WITH ESTIMATED AVERAGE GLUCOSE RESULT: 7 % — HIGH (ref 4–5.6)
ANION GAP SERPL CALC-SCNC: 12 MMOL/L — SIGNIFICANT CHANGE UP (ref 5–17)
BUN SERPL-MCNC: 48 MG/DL — HIGH (ref 7–23)
CALCIUM SERPL-MCNC: 9.7 MG/DL — SIGNIFICANT CHANGE UP (ref 8.4–10.5)
CHLORIDE SERPL-SCNC: 102 MMOL/L — SIGNIFICANT CHANGE UP (ref 96–108)
CO2 SERPL-SCNC: 24 MMOL/L — SIGNIFICANT CHANGE UP (ref 22–31)
CREAT ?TM UR-MCNC: 134 MG/DL — SIGNIFICANT CHANGE UP
CREAT SERPL-MCNC: 1.79 MG/DL — HIGH (ref 0.5–1.3)
ESTIMATED AVERAGE GLUCOSE: 154 MG/DL — HIGH (ref 68–114)
GLUCOSE BLDC GLUCOMTR-MCNC: 142 MG/DL — HIGH (ref 70–99)
GLUCOSE BLDC GLUCOMTR-MCNC: 164 MG/DL — HIGH (ref 70–99)
GLUCOSE BLDC GLUCOMTR-MCNC: 292 MG/DL — HIGH (ref 70–99)
GLUCOSE BLDC GLUCOMTR-MCNC: 292 MG/DL — HIGH (ref 70–99)
GLUCOSE SERPL-MCNC: 265 MG/DL — HIGH (ref 70–99)
OSMOLALITY UR: 578 MOSM/KG — SIGNIFICANT CHANGE UP (ref 50–1200)
POTASSIUM SERPL-MCNC: 4.7 MMOL/L — SIGNIFICANT CHANGE UP (ref 3.5–5.3)
POTASSIUM SERPL-SCNC: 4.7 MMOL/L — SIGNIFICANT CHANGE UP (ref 3.5–5.3)
SODIUM SERPL-SCNC: 138 MMOL/L — SIGNIFICANT CHANGE UP (ref 135–145)
SODIUM UR-SCNC: 53 MMOL/L — SIGNIFICANT CHANGE UP

## 2020-10-17 PROCEDURE — 99232 SBSQ HOSP IP/OBS MODERATE 35: CPT

## 2020-10-17 RX ADMIN — ATORVASTATIN CALCIUM 10 MILLIGRAM(S): 80 TABLET, FILM COATED ORAL at 22:02

## 2020-10-17 RX ADMIN — Medication 1: at 22:02

## 2020-10-17 RX ADMIN — CEFTRIAXONE 100 MILLIGRAM(S): 500 INJECTION, POWDER, FOR SOLUTION INTRAMUSCULAR; INTRAVENOUS at 23:06

## 2020-10-17 RX ADMIN — APIXABAN 2.5 MILLIGRAM(S): 2.5 TABLET, FILM COATED ORAL at 17:57

## 2020-10-17 RX ADMIN — Medication 3: at 13:13

## 2020-10-17 RX ADMIN — Medication 112 MICROGRAM(S): at 05:22

## 2020-10-17 RX ADMIN — APIXABAN 2.5 MILLIGRAM(S): 2.5 TABLET, FILM COATED ORAL at 05:22

## 2020-10-17 RX ADMIN — Medication 1: at 08:53

## 2020-10-17 NOTE — PROGRESS NOTE ADULT - SUBJECTIVE AND OBJECTIVE BOX
Patient is a 87y old  Female who presents with a chief complaint of AMS (16 Oct 2020 14:26)      SUBJECTIVE / OVERNIGHT EVENTS: Patient seen and examined at bedside. states that she feels well, is more communicative this morning, following all commands appropriately. No acute events overnight.     ROS:  All other review of systems negative    Allergies    No Known Drug Allergies  spices (Rash)    Intolerances        MEDICATIONS  (STANDING):  apixaban 2.5 milliGRAM(s) Oral two times a day  atorvastatin 10 milliGRAM(s) Oral at bedtime  cefTRIAXone   IVPB 1000 milliGRAM(s) IV Intermittent every 24 hours  dextrose 5%. 1000 milliLiter(s) (50 mL/Hr) IV Continuous <Continuous>  dextrose 50% Injectable 12.5 Gram(s) IV Push once  dextrose 50% Injectable 25 Gram(s) IV Push once  dextrose 50% Injectable 25 Gram(s) IV Push once  insulin lispro (HumaLOG) corrective regimen sliding scale   SubCutaneous three times a day before meals  insulin lispro (HumaLOG) corrective regimen sliding scale   SubCutaneous at bedtime  levothyroxine 112 MICROGram(s) Oral daily    MEDICATIONS  (PRN):  acetaminophen   Tablet .. 650 milliGRAM(s) Oral every 6 hours PRN Temp greater or equal to 38C (100.4F), Mild Pain (1 - 3)  dextrose 40% Gel 15 Gram(s) Oral once PRN Blood Glucose LESS THAN 70 milliGRAM(s)/deciliter  glucagon  Injectable 1 milliGRAM(s) IntraMuscular once PRN Glucose LESS THAN 70 milligrams/deciliter      Vital Signs Last 24 Hrs  T(C): 36.9 (17 Oct 2020 08:09), Max: 37.3 (17 Oct 2020 00:52)  T(F): 98.5 (17 Oct 2020 08:09), Max: 99.1 (17 Oct 2020 00:52)  HR: 86 (17 Oct 2020 08:09) (83 - 99)  BP: 141/76 (17 Oct 2020 08:09) (108/74 - 168/82)  BP(mean): --  RR: 18 (17 Oct 2020 08:09) (18 - 20)  SpO2: 95% (17 Oct 2020 08:09) (93% - 96%)  CAPILLARY BLOOD GLUCOSE      POCT Blood Glucose.: 164 mg/dL (17 Oct 2020 08:21)  POCT Blood Glucose.: 288 mg/dL (16 Oct 2020 21:57)  POCT Blood Glucose.: 229 mg/dL (16 Oct 2020 17:40)  POCT Blood Glucose.: 141 mg/dL (16 Oct 2020 12:29)    I&O's Summary    16 Oct 2020 07:01  -  17 Oct 2020 07:00  --------------------------------------------------------  IN: 530 mL / OUT: 150 mL / NET: 380 mL        PHYSICAL EXAM:  GENERAL: NAD, well-developed  HEAD:  Atraumatic, Normocephalic  EYES: EOMI, PERRLA, conjunctiva and sclera clear  NECK: Supple, No JVD  CHEST/LUNG: Clear to auscultation bilaterally; No wheeze  HEART: Regular rate and rhythm; No murmurs, rubs, or gallops  ABDOMEN: Soft, Nontender, Nondistended; Bowel sounds present  EXTREMITIES:  2+ Peripheral Pulses, No clubbing, cyanosis, or edema  NEUROLOGY: AAOx2, non-focal  PSYCH: calm, very alert today, answering question and following commands appropriately       LABS:                        12.1   8.42  )-----------( 361      ( 16 Oct 2020 07:15 )             36.3     10-16    136  |  99  |  30<H>  ----------------------------<  156<H>  4.3   |  26  |  1.20    Ca    9.6      16 Oct 2020 07:15  Phos  3.3     10-16  Mg     1.9     10-16    TPro  8.1  /  Alb  4.4  /  TBili  0.3  /  DBili  x   /  AST  18  /  ALT  17  /  AlkPhos  84  10-15    PT/INR - ( 15 Oct 2020 20:13 )   PT: 13.8 sec;   INR: 1.16 ratio         PTT - ( 15 Oct 2020 20:13 )  PTT:36.7 sec      Urinalysis Basic - ( 15 Oct 2020 22:54 )    Color: Colorless / Appearance: Clear / S.008 / pH: x  Gluc: x / Ketone: Negative  / Bili: Negative / Urobili: Negative   Blood: x / Protein: 30 mg/dL / Nitrite: Negative   Leuk Esterase: Negative / RBC: 0 /hpf / WBC 0 /HPF   Sq Epi: x / Non Sq Epi: 11 /hpf / Bacteria: Negative        RADIOLOGY & ADDITIONAL TESTS:    Consultant(s) Notes Reviewed:  neurology     Care Discussed with Consultants/Other Providers: medicine NP     Case Discussed with Magalie

## 2020-10-18 LAB
-  AMIKACIN: SIGNIFICANT CHANGE UP
-  AMOXICILLIN/CLAVULANIC ACID: SIGNIFICANT CHANGE UP
-  AMPICILLIN/SULBACTAM: SIGNIFICANT CHANGE UP
-  AMPICILLIN: SIGNIFICANT CHANGE UP
-  AZTREONAM: SIGNIFICANT CHANGE UP
-  CEFAZOLIN: SIGNIFICANT CHANGE UP
-  CEFEPIME: SIGNIFICANT CHANGE UP
-  CEFOXITIN: SIGNIFICANT CHANGE UP
-  CEFTRIAXONE: SIGNIFICANT CHANGE UP
-  CIPROFLOXACIN: SIGNIFICANT CHANGE UP
-  ERTAPENEM: SIGNIFICANT CHANGE UP
-  GENTAMICIN: SIGNIFICANT CHANGE UP
-  IMIPENEM: SIGNIFICANT CHANGE UP
-  LEVOFLOXACIN: SIGNIFICANT CHANGE UP
-  MEROPENEM: SIGNIFICANT CHANGE UP
-  NITROFURANTOIN: SIGNIFICANT CHANGE UP
-  PIPERACILLIN/TAZOBACTAM: SIGNIFICANT CHANGE UP
-  TIGECYCLINE: SIGNIFICANT CHANGE UP
-  TOBRAMYCIN: SIGNIFICANT CHANGE UP
-  TRIMETHOPRIM/SULFAMETHOXAZOLE: SIGNIFICANT CHANGE UP
ANION GAP SERPL CALC-SCNC: 14 MMOL/L — SIGNIFICANT CHANGE UP (ref 5–17)
BUN SERPL-MCNC: 38 MG/DL — HIGH (ref 7–23)
CALCIUM SERPL-MCNC: 9.9 MG/DL — SIGNIFICANT CHANGE UP (ref 8.4–10.5)
CHLORIDE SERPL-SCNC: 101 MMOL/L — SIGNIFICANT CHANGE UP (ref 96–108)
CO2 SERPL-SCNC: 25 MMOL/L — SIGNIFICANT CHANGE UP (ref 22–31)
CREAT SERPL-MCNC: 1.39 MG/DL — HIGH (ref 0.5–1.3)
CULTURE RESULTS: SIGNIFICANT CHANGE UP
GLUCOSE BLDC GLUCOMTR-MCNC: 179 MG/DL — HIGH (ref 70–99)
GLUCOSE BLDC GLUCOMTR-MCNC: 274 MG/DL — HIGH (ref 70–99)
GLUCOSE BLDC GLUCOMTR-MCNC: 309 MG/DL — HIGH (ref 70–99)
GLUCOSE BLDC GLUCOMTR-MCNC: 352 MG/DL — HIGH (ref 70–99)
GLUCOSE SERPL-MCNC: 194 MG/DL — HIGH (ref 70–99)
HCT VFR BLD CALC: 39.4 % — SIGNIFICANT CHANGE UP (ref 34.5–45)
HGB BLD-MCNC: 13.3 G/DL — SIGNIFICANT CHANGE UP (ref 11.5–15.5)
MCHC RBC-ENTMCNC: 33.8 GM/DL — SIGNIFICANT CHANGE UP (ref 32–36)
MCHC RBC-ENTMCNC: 37.8 PG — HIGH (ref 27–34)
MCV RBC AUTO: 111.9 FL — HIGH (ref 80–100)
METHOD TYPE: SIGNIFICANT CHANGE UP
NRBC # BLD: 0 /100 WBCS — SIGNIFICANT CHANGE UP (ref 0–0)
ORGANISM # SPEC MICROSCOPIC CNT: SIGNIFICANT CHANGE UP
ORGANISM # SPEC MICROSCOPIC CNT: SIGNIFICANT CHANGE UP
PLATELET # BLD AUTO: 438 K/UL — HIGH (ref 150–400)
POTASSIUM SERPL-MCNC: 4.4 MMOL/L — SIGNIFICANT CHANGE UP (ref 3.5–5.3)
POTASSIUM SERPL-SCNC: 4.4 MMOL/L — SIGNIFICANT CHANGE UP (ref 3.5–5.3)
RBC # BLD: 3.52 M/UL — LOW (ref 3.8–5.2)
RBC # FLD: 13.2 % — SIGNIFICANT CHANGE UP (ref 10.3–14.5)
SODIUM SERPL-SCNC: 140 MMOL/L — SIGNIFICANT CHANGE UP (ref 135–145)
SPECIMEN SOURCE: SIGNIFICANT CHANGE UP
WBC # BLD: 9.77 K/UL — SIGNIFICANT CHANGE UP (ref 3.8–10.5)
WBC # FLD AUTO: 9.77 K/UL — SIGNIFICANT CHANGE UP (ref 3.8–10.5)

## 2020-10-18 PROCEDURE — 99233 SBSQ HOSP IP/OBS HIGH 50: CPT

## 2020-10-18 RX ORDER — SODIUM CHLORIDE 9 MG/ML
1000 INJECTION INTRAMUSCULAR; INTRAVENOUS; SUBCUTANEOUS
Refills: 0 | Status: DISCONTINUED | OUTPATIENT
Start: 2020-10-18 | End: 2020-10-19

## 2020-10-18 RX ORDER — LEVOTHYROXINE SODIUM 125 MCG
1 TABLET ORAL
Qty: 0 | Refills: 0 | DISCHARGE

## 2020-10-18 RX ORDER — LEVOTHYROXINE SODIUM 125 MCG
1 TABLET ORAL
Qty: 0 | Refills: 0 | DISCHARGE
Start: 2020-10-18

## 2020-10-18 RX ADMIN — SODIUM CHLORIDE 50 MILLILITER(S): 9 INJECTION INTRAMUSCULAR; INTRAVENOUS; SUBCUTANEOUS at 17:10

## 2020-10-18 RX ADMIN — CEFTRIAXONE 100 MILLIGRAM(S): 500 INJECTION, POWDER, FOR SOLUTION INTRAMUSCULAR; INTRAVENOUS at 23:21

## 2020-10-18 RX ADMIN — Medication 5: at 13:01

## 2020-10-18 RX ADMIN — Medication 4: at 17:49

## 2020-10-18 RX ADMIN — Medication 1: at 22:32

## 2020-10-18 RX ADMIN — Medication 112 MICROGRAM(S): at 05:07

## 2020-10-18 RX ADMIN — APIXABAN 2.5 MILLIGRAM(S): 2.5 TABLET, FILM COATED ORAL at 17:10

## 2020-10-18 RX ADMIN — APIXABAN 2.5 MILLIGRAM(S): 2.5 TABLET, FILM COATED ORAL at 05:07

## 2020-10-18 RX ADMIN — ATORVASTATIN CALCIUM 10 MILLIGRAM(S): 80 TABLET, FILM COATED ORAL at 22:32

## 2020-10-18 RX ADMIN — Medication 1: at 09:03

## 2020-10-18 NOTE — PHYSICAL THERAPY INITIAL EVALUATION ADULT - TRANSFER SAFETY CONCERNS NOTED: BED/CHAIR, REHAB EVAL
decreased weight-shifting ability/decreased safety awareness/losing balance/decreased balance during turns

## 2020-10-18 NOTE — PROGRESS NOTE ADULT - SUBJECTIVE AND OBJECTIVE BOX
Patient is a 87y old  Female who presents with a chief complaint of AMS (17 Oct 2020 09:48)      SUBJECTIVE / OVERNIGHT EVENTS:    MEDICATIONS  (STANDING):  apixaban 2.5 milliGRAM(s) Oral two times a day  atorvastatin 10 milliGRAM(s) Oral at bedtime  cefTRIAXone   IVPB 1000 milliGRAM(s) IV Intermittent every 24 hours  dextrose 5%. 1000 milliLiter(s) (50 mL/Hr) IV Continuous <Continuous>  dextrose 50% Injectable 12.5 Gram(s) IV Push once  dextrose 50% Injectable 25 Gram(s) IV Push once  dextrose 50% Injectable 25 Gram(s) IV Push once  insulin lispro (HumaLOG) corrective regimen sliding scale   SubCutaneous three times a day before meals  insulin lispro (HumaLOG) corrective regimen sliding scale   SubCutaneous at bedtime  levothyroxine 112 MICROGram(s) Oral daily    MEDICATIONS  (PRN):  acetaminophen   Tablet .. 650 milliGRAM(s) Oral every 6 hours PRN Temp greater or equal to 38C (100.4F), Mild Pain (1 - 3)  dextrose 40% Gel 15 Gram(s) Oral once PRN Blood Glucose LESS THAN 70 milliGRAM(s)/deciliter  glucagon  Injectable 1 milliGRAM(s) IntraMuscular once PRN Glucose LESS THAN 70 milligrams/deciliter      Vital Signs Last 24 Hrs  T(C): 36.6 (18 Oct 2020 04:30), Max: 36.8 (17 Oct 2020 14:43)  T(F): 97.8 (18 Oct 2020 04:30), Max: 98.2 (17 Oct 2020 14:43)  HR: 92 (18 Oct 2020 04:30) (85 - 102)  BP: 150/70 (18 Oct 2020 04:30) (126/91 - 161/96)  BP(mean): --  RR: 18 (18 Oct 2020 04:30) (18 - 18)  SpO2: 96% (18 Oct 2020 04:30) (96% - 97%)  CAPILLARY BLOOD GLUCOSE      POCT Blood Glucose.: 179 mg/dL (18 Oct 2020 08:12)  POCT Blood Glucose.: 292 mg/dL (17 Oct 2020 21:56)  POCT Blood Glucose.: 142 mg/dL (17 Oct 2020 17:46)  POCT Blood Glucose.: 292 mg/dL (17 Oct 2020 12:59)    I&O's Summary    17 Oct 2020 07:01  -  18 Oct 2020 07:00  --------------------------------------------------------  IN: 920 mL / OUT: 900 mL / NET: 20 mL        PHYSICAL EXAM:        LABS:                        13.3   9.77  )-----------( 438      ( 18 Oct 2020 07:17 )             39.4     10-18    140  |  101  |  38<H>  ----------------------------<  194<H>  4.4   |  25  |  1.39<H>    Ca    9.9      18 Oct 2020 07:17                RADIOLOGY & ADDITIONAL TESTS:    Imaging Personally Reviewed:    Consultant(s) Notes Reviewed:      Care Discussed with Consultants/Other Providers:   Patient is a 87y old  Female who presents with a chief complaint of AMS (17 Oct 2020 09:48)      SUBJECTIVE / OVERNIGHT EVENTS:  Pt seen and examined. No acute events overnight. She denies any urinary symptoms.     MEDICATIONS  (STANDING):  apixaban 2.5 milliGRAM(s) Oral two times a day  atorvastatin 10 milliGRAM(s) Oral at bedtime  cefTRIAXone   IVPB 1000 milliGRAM(s) IV Intermittent every 24 hours  dextrose 5%. 1000 milliLiter(s) (50 mL/Hr) IV Continuous <Continuous>  dextrose 50% Injectable 12.5 Gram(s) IV Push once  dextrose 50% Injectable 25 Gram(s) IV Push once  dextrose 50% Injectable 25 Gram(s) IV Push once  insulin lispro (HumaLOG) corrective regimen sliding scale   SubCutaneous three times a day before meals  insulin lispro (HumaLOG) corrective regimen sliding scale   SubCutaneous at bedtime  levothyroxine 112 MICROGram(s) Oral daily    MEDICATIONS  (PRN):  acetaminophen   Tablet .. 650 milliGRAM(s) Oral every 6 hours PRN Temp greater or equal to 38C (100.4F), Mild Pain (1 - 3)  dextrose 40% Gel 15 Gram(s) Oral once PRN Blood Glucose LESS THAN 70 milliGRAM(s)/deciliter  glucagon  Injectable 1 milliGRAM(s) IntraMuscular once PRN Glucose LESS THAN 70 milligrams/deciliter      Vital Signs Last 24 Hrs  T(C): 36.6 (18 Oct 2020 04:30), Max: 36.8 (17 Oct 2020 14:43)  T(F): 97.8 (18 Oct 2020 04:30), Max: 98.2 (17 Oct 2020 14:43)  HR: 92 (18 Oct 2020 04:30) (85 - 102)  BP: 150/70 (18 Oct 2020 04:30) (126/91 - 161/96)  BP(mean): --  RR: 18 (18 Oct 2020 04:30) (18 - 18)  SpO2: 96% (18 Oct 2020 04:30) (96% - 97%)  CAPILLARY BLOOD GLUCOSE      POCT Blood Glucose.: 179 mg/dL (18 Oct 2020 08:12)  POCT Blood Glucose.: 292 mg/dL (17 Oct 2020 21:56)  POCT Blood Glucose.: 142 mg/dL (17 Oct 2020 17:46)  POCT Blood Glucose.: 292 mg/dL (17 Oct 2020 12:59)    I&O's Summary    17 Oct 2020 07:01  -  18 Oct 2020 07:00  --------------------------------------------------------  IN: 920 mL / OUT: 900 mL / NET: 20 mL        PHYSICAL EXAM:  GENERAL: NAD, anicteric, afebrile  HEAD:  Atraumatic, Normocephalic  EYES: EOMI, PERRLA, conjunctiva and sclera clear  NECK: Supple, No JVD  CHEST/LUNG: Clear to auscultation bilaterally; No wheeze  HEART: Regular rate and rhythm; No murmurs, rubs, or gallops  ABDOMEN: Soft, Nontender, Nondistended; Bowel sounds present  EXTREMITIES:  2+ Peripheral Pulses, No clubbing, cyanosis, or edema  NEUROLOGY: AAOx 1, non-focal  PSYCH: calm, cooperative; somewhat confused with tangential speech      LABS:                        13.3   9.77  )-----------( 438      ( 18 Oct 2020 07:17 )             39.4     10-18    140  |  101  |  38<H>  ----------------------------<  194<H>  4.4   |  25  |  1.39<H>    Ca    9.9      18 Oct 2020 07:17

## 2020-10-18 NOTE — PHYSICAL THERAPY INITIAL EVALUATION ADULT - PERTINENT HX OF CURRENT PROBLEM, REHAB EVAL
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 87F 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 Neuro c/s: medical management.

## 2020-10-18 NOTE — PHYSICAL THERAPY INITIAL EVALUATION ADULT - PLANNED THERAPY INTERVENTIONS, PT EVAL
Stairs Goal: Pt will go up /down 6 steps with + handrail and min assistx 1 in 2 weeks./transfer training/gait training/bed mobility training/balance training/strengthening

## 2020-10-18 NOTE — PHYSICAL THERAPY INITIAL EVALUATION ADULT - ADDITIONAL COMMENTS
Pt lives with her daughter and daughter's family in a private house  with + steps to enter and steps inside home,+ handrails. Pt had HHA 7 hours x 5 days/week.

## 2020-10-19 DIAGNOSIS — N17.9 ACUTE KIDNEY FAILURE, UNSPECIFIED: ICD-10-CM

## 2020-10-19 LAB
ANION GAP SERPL CALC-SCNC: 15 MMOL/L — SIGNIFICANT CHANGE UP (ref 5–17)
BUN SERPL-MCNC: 43 MG/DL — HIGH (ref 7–23)
CALCIUM SERPL-MCNC: 10 MG/DL — SIGNIFICANT CHANGE UP (ref 8.4–10.5)
CHLORIDE SERPL-SCNC: 102 MMOL/L — SIGNIFICANT CHANGE UP (ref 96–108)
CO2 SERPL-SCNC: 22 MMOL/L — SIGNIFICANT CHANGE UP (ref 22–31)
CREAT SERPL-MCNC: 1.69 MG/DL — HIGH (ref 0.5–1.3)
GLUCOSE BLDC GLUCOMTR-MCNC: 214 MG/DL — HIGH (ref 70–99)
GLUCOSE BLDC GLUCOMTR-MCNC: 217 MG/DL — HIGH (ref 70–99)
GLUCOSE BLDC GLUCOMTR-MCNC: 234 MG/DL — HIGH (ref 70–99)
GLUCOSE BLDC GLUCOMTR-MCNC: 361 MG/DL — HIGH (ref 70–99)
GLUCOSE SERPL-MCNC: 209 MG/DL — HIGH (ref 70–99)
HCT VFR BLD CALC: 38 % — SIGNIFICANT CHANGE UP (ref 34.5–45)
HGB BLD-MCNC: 12.5 G/DL — SIGNIFICANT CHANGE UP (ref 11.5–15.5)
MCHC RBC-ENTMCNC: 32.9 GM/DL — SIGNIFICANT CHANGE UP (ref 32–36)
MCHC RBC-ENTMCNC: 36.1 PG — HIGH (ref 27–34)
MCV RBC AUTO: 109.8 FL — HIGH (ref 80–100)
NRBC # BLD: 0 /100 WBCS — SIGNIFICANT CHANGE UP (ref 0–0)
PLATELET # BLD AUTO: 428 K/UL — HIGH (ref 150–400)
POTASSIUM SERPL-MCNC: 4.6 MMOL/L — SIGNIFICANT CHANGE UP (ref 3.5–5.3)
POTASSIUM SERPL-SCNC: 4.6 MMOL/L — SIGNIFICANT CHANGE UP (ref 3.5–5.3)
RBC # BLD: 3.46 M/UL — LOW (ref 3.8–5.2)
RBC # FLD: 13 % — SIGNIFICANT CHANGE UP (ref 10.3–14.5)
SODIUM SERPL-SCNC: 139 MMOL/L — SIGNIFICANT CHANGE UP (ref 135–145)
WBC # BLD: 10 K/UL — SIGNIFICANT CHANGE UP (ref 3.8–10.5)
WBC # FLD AUTO: 10 K/UL — SIGNIFICANT CHANGE UP (ref 3.8–10.5)

## 2020-10-19 PROCEDURE — 99233 SBSQ HOSP IP/OBS HIGH 50: CPT

## 2020-10-19 PROCEDURE — 74018 RADEX ABDOMEN 1 VIEW: CPT | Mod: 26

## 2020-10-19 RX ORDER — INSULIN LISPRO 100/ML
VIAL (ML) SUBCUTANEOUS AT BEDTIME
Refills: 0 | Status: DISCONTINUED | OUTPATIENT
Start: 2020-10-19 | End: 2020-10-20

## 2020-10-19 RX ORDER — POLYETHYLENE GLYCOL 3350 17 G/17G
17 POWDER, FOR SOLUTION ORAL DAILY
Refills: 0 | Status: DISCONTINUED | OUTPATIENT
Start: 2020-10-19 | End: 2020-10-30

## 2020-10-19 RX ORDER — INSULIN LISPRO 100/ML
VIAL (ML) SUBCUTANEOUS
Refills: 0 | Status: DISCONTINUED | OUTPATIENT
Start: 2020-10-19 | End: 2020-10-20

## 2020-10-19 RX ORDER — INSULIN GLARGINE 100 [IU]/ML
5 INJECTION, SOLUTION SUBCUTANEOUS AT BEDTIME
Refills: 0 | Status: DISCONTINUED | OUTPATIENT
Start: 2020-10-19 | End: 2020-10-21

## 2020-10-19 RX ADMIN — Medication 112 MICROGRAM(S): at 05:40

## 2020-10-19 RX ADMIN — ATORVASTATIN CALCIUM 10 MILLIGRAM(S): 80 TABLET, FILM COATED ORAL at 21:15

## 2020-10-19 RX ADMIN — Medication: at 13:13

## 2020-10-19 RX ADMIN — CEFTRIAXONE 100 MILLIGRAM(S): 500 INJECTION, POWDER, FOR SOLUTION INTRAMUSCULAR; INTRAVENOUS at 23:05

## 2020-10-19 RX ADMIN — INSULIN GLARGINE 5 UNIT(S): 100 INJECTION, SOLUTION SUBCUTANEOUS at 21:59

## 2020-10-19 RX ADMIN — APIXABAN 2.5 MILLIGRAM(S): 2.5 TABLET, FILM COATED ORAL at 17:03

## 2020-10-19 RX ADMIN — Medication 2: at 08:58

## 2020-10-19 RX ADMIN — POLYETHYLENE GLYCOL 3350 17 GRAM(S): 17 POWDER, FOR SOLUTION ORAL at 21:15

## 2020-10-19 RX ADMIN — Medication 4: at 18:23

## 2020-10-19 RX ADMIN — APIXABAN 2.5 MILLIGRAM(S): 2.5 TABLET, FILM COATED ORAL at 05:40

## 2020-10-19 NOTE — PROGRESS NOTE ADULT - SUBJECTIVE AND OBJECTIVE BOX
PROGRESS NOTE:   Authored by Dr. Angelica Ruelas MD  Pager [phone]     Patient is a 87y old  Female who presents with a chief complaint of AMS (18 Oct 2020 10:18)      SUBJECTIVE / OVERNIGHT EVENTS: Patient seen and examined at bedside. Patient confused, restless, anxious. Unable to use  phone. Large BM yesterday    ADDITIONAL REVIEW OF SYSTEMS: denies pain but unable to fully assess due to AMS    MEDICATIONS  (STANDING):  apixaban 2.5 milliGRAM(s) Oral two times a day  atorvastatin 10 milliGRAM(s) Oral at bedtime  cefTRIAXone   IVPB 1000 milliGRAM(s) IV Intermittent every 24 hours  dextrose 5%. 1000 milliLiter(s) (50 mL/Hr) IV Continuous <Continuous>  dextrose 50% Injectable 12.5 Gram(s) IV Push once  dextrose 50% Injectable 25 Gram(s) IV Push once  dextrose 50% Injectable 25 Gram(s) IV Push once  insulin lispro (HumaLOG) corrective regimen sliding scale   SubCutaneous three times a day before meals  insulin lispro (HumaLOG) corrective regimen sliding scale   SubCutaneous at bedtime  levothyroxine 112 MICROGram(s) Oral daily  sodium chloride 0.9%. 1000 milliLiter(s) (50 mL/Hr) IV Continuous <Continuous>    MEDICATIONS  (PRN):  acetaminophen   Tablet .. 650 milliGRAM(s) Oral every 6 hours PRN Temp greater or equal to 38C (100.4F), Mild Pain (1 - 3)  dextrose 40% Gel 15 Gram(s) Oral once PRN Blood Glucose LESS THAN 70 milliGRAM(s)/deciliter  glucagon  Injectable 1 milliGRAM(s) IntraMuscular once PRN Glucose LESS THAN 70 milligrams/deciliter      CAPILLARY BLOOD GLUCOSE      POCT Blood Glucose.: 234 mg/dL (19 Oct 2020 08:35)  POCT Blood Glucose.: 274 mg/dL (18 Oct 2020 22:23)  POCT Blood Glucose.: 309 mg/dL (18 Oct 2020 17:24)    I&O's Summary    18 Oct 2020 07:01  -  19 Oct 2020 07:00  --------------------------------------------------------  IN: 1400 mL / OUT: 1150 mL / NET: 250 mL    19 Oct 2020 07:01  -  19 Oct 2020 12:28  --------------------------------------------------------  IN: 240 mL / OUT: 0 mL / NET: 240 mL        PHYSICAL EXAM:  Vital Signs Last 24 Hrs  T(C): 36.7 (19 Oct 2020 10:32), Max: 37 (18 Oct 2020 20:32)  T(F): 98 (19 Oct 2020 10:32), Max: 98.6 (18 Oct 2020 20:32)  HR: 82 (19 Oct 2020 11:32) (82 - 109)  BP: 114/74 (19 Oct 2020 11:32) (113/74 - 165/78)  BP(mean): --  RR: 18 (19 Oct 2020 10:32) (18 - 20)  SpO2: 92% (19 Oct 2020 11:32) (92% - 100%)    CONSTITUTIONAL: Confused, restless  RESPIRATORY: Normal respiratory effort; lungs are clear to auscultation bilaterally  CARDIOVASCULAR: Regular rate and rhythm, normal S1 and S2, no murmur/rub/gallop; No lower extremity edema; Peripheral pulses are 2+ bilaterally  ABDOMEN: Nontender to palpation, normoactive bowel sounds, no rebound/guarding; No hepatosplenomegaly  MUSCLOSKELETAL: no clubbing or cyanosis of digits; no joint swelling or tenderness to palpation  PSYCH: A+O to person, restless, anxious, fast nonsensical talking    LABS:                        12.5   10.00 )-----------( 428      ( 19 Oct 2020 07:11 )             38.0     10-19    139  |  102  |  43<H>  ----------------------------<  209<H>  4.6   |  22  |  1.69<H>    Ca    10.0      19 Oct 2020 07:12                  RADIOLOGY & ADDITIONAL TESTS:  Results Reviewed:   Imaging Personally Reviewed:  Electrocardiogram Personally Reviewed:    COORDINATION OF CARE:  Care Discussed with Consultants/Other Providers [Y/N]:  Prior or Outpatient Records Reviewed [Y/N]:

## 2020-10-20 ENCOUNTER — TRANSCRIPTION ENCOUNTER (OUTPATIENT)
Age: 85
End: 2020-10-20

## 2020-10-20 LAB
ANION GAP SERPL CALC-SCNC: 12 MMOL/L — SIGNIFICANT CHANGE UP (ref 5–17)
BUN SERPL-MCNC: 44 MG/DL — HIGH (ref 7–23)
CALCIUM SERPL-MCNC: 9.9 MG/DL — SIGNIFICANT CHANGE UP (ref 8.4–10.5)
CHLORIDE SERPL-SCNC: 107 MMOL/L — SIGNIFICANT CHANGE UP (ref 96–108)
CO2 SERPL-SCNC: 21 MMOL/L — LOW (ref 22–31)
CREAT SERPL-MCNC: 1.33 MG/DL — HIGH (ref 0.5–1.3)
GLUCOSE BLDC GLUCOMTR-MCNC: 172 MG/DL — HIGH (ref 70–99)
GLUCOSE BLDC GLUCOMTR-MCNC: 216 MG/DL — HIGH (ref 70–99)
GLUCOSE BLDC GLUCOMTR-MCNC: 235 MG/DL — HIGH (ref 70–99)
GLUCOSE BLDC GLUCOMTR-MCNC: 246 MG/DL — HIGH (ref 70–99)
GLUCOSE SERPL-MCNC: 193 MG/DL — HIGH (ref 70–99)
HCT VFR BLD CALC: 38.8 % — SIGNIFICANT CHANGE UP (ref 34.5–45)
HGB BLD-MCNC: 12.4 G/DL — SIGNIFICANT CHANGE UP (ref 11.5–15.5)
MAGNESIUM SERPL-MCNC: 2.2 MG/DL — SIGNIFICANT CHANGE UP (ref 1.6–2.6)
MCHC RBC-ENTMCNC: 32 GM/DL — SIGNIFICANT CHANGE UP (ref 32–36)
MCHC RBC-ENTMCNC: 36 PG — HIGH (ref 27–34)
MCV RBC AUTO: 112.8 FL — HIGH (ref 80–100)
NRBC # BLD: 0 /100 WBCS — SIGNIFICANT CHANGE UP (ref 0–0)
PHOSPHATE SERPL-MCNC: 3.5 MG/DL — SIGNIFICANT CHANGE UP (ref 2.5–4.5)
PLATELET # BLD AUTO: 469 K/UL — HIGH (ref 150–400)
POTASSIUM SERPL-MCNC: 5 MMOL/L — SIGNIFICANT CHANGE UP (ref 3.5–5.3)
POTASSIUM SERPL-SCNC: 5 MMOL/L — SIGNIFICANT CHANGE UP (ref 3.5–5.3)
RBC # BLD: 3.44 M/UL — LOW (ref 3.8–5.2)
RBC # FLD: 13.1 % — SIGNIFICANT CHANGE UP (ref 10.3–14.5)
SODIUM SERPL-SCNC: 140 MMOL/L — SIGNIFICANT CHANGE UP (ref 135–145)
WBC # BLD: 12.58 K/UL — HIGH (ref 3.8–10.5)
WBC # FLD AUTO: 12.58 K/UL — HIGH (ref 3.8–10.5)

## 2020-10-20 PROCEDURE — 99232 SBSQ HOSP IP/OBS MODERATE 35: CPT

## 2020-10-20 RX ORDER — INSULIN LISPRO 100/ML
VIAL (ML) SUBCUTANEOUS
Refills: 0 | Status: DISCONTINUED | OUTPATIENT
Start: 2020-10-20 | End: 2020-10-30

## 2020-10-20 RX ORDER — INSULIN LISPRO 100/ML
VIAL (ML) SUBCUTANEOUS AT BEDTIME
Refills: 0 | Status: DISCONTINUED | OUTPATIENT
Start: 2020-10-20 | End: 2020-10-30

## 2020-10-20 RX ORDER — INSULIN LISPRO 100/ML
VIAL (ML) SUBCUTANEOUS AT BEDTIME
Refills: 0 | Status: DISCONTINUED | OUTPATIENT
Start: 2020-10-20 | End: 2020-10-20

## 2020-10-20 RX ADMIN — POLYETHYLENE GLYCOL 3350 17 GRAM(S): 17 POWDER, FOR SOLUTION ORAL at 12:52

## 2020-10-20 RX ADMIN — INSULIN GLARGINE 5 UNIT(S): 100 INJECTION, SOLUTION SUBCUTANEOUS at 22:20

## 2020-10-20 RX ADMIN — Medication 4: at 17:55

## 2020-10-20 RX ADMIN — ATORVASTATIN CALCIUM 10 MILLIGRAM(S): 80 TABLET, FILM COATED ORAL at 22:20

## 2020-10-20 RX ADMIN — Medication 112 MICROGRAM(S): at 05:23

## 2020-10-20 RX ADMIN — Medication 2: at 08:59

## 2020-10-20 RX ADMIN — APIXABAN 2.5 MILLIGRAM(S): 2.5 TABLET, FILM COATED ORAL at 05:23

## 2020-10-20 RX ADMIN — Medication 4: at 12:52

## 2020-10-20 RX ADMIN — APIXABAN 2.5 MILLIGRAM(S): 2.5 TABLET, FILM COATED ORAL at 17:18

## 2020-10-20 NOTE — PROGRESS NOTE ADULT - SUBJECTIVE AND OBJECTIVE BOX
PROGRESS NOTE:   Authored by Dr. Angelica Ruelas MD  Pager [phone]     Patient is a 87y old  Female who presents with a chief complaint of AMS (19 Oct 2020 12:28)      SUBJECTIVE / OVERNIGHT EVENTS: Patient seen and examined at bedside. Patient sitting up in bed, less anxious/agitated today, still confused. Denies pain    ADDITIONAL REVIEW OF SYSTEMS: unable to fully obtain due to AMS    MEDICATIONS  (STANDING):  apixaban 2.5 milliGRAM(s) Oral two times a day  atorvastatin 10 milliGRAM(s) Oral at bedtime  cefTRIAXone   IVPB 1000 milliGRAM(s) IV Intermittent every 24 hours  dextrose 5%. 1000 milliLiter(s) (50 mL/Hr) IV Continuous <Continuous>  dextrose 50% Injectable 12.5 Gram(s) IV Push once  dextrose 50% Injectable 25 Gram(s) IV Push once  dextrose 50% Injectable 25 Gram(s) IV Push once  insulin glargine Injectable (LANTUS) 5 Unit(s) SubCutaneous at bedtime  insulin lispro (HumaLOG) corrective regimen sliding scale   SubCutaneous three times a day before meals  insulin lispro (HumaLOG) corrective regimen sliding scale   SubCutaneous at bedtime  levothyroxine 112 MICROGram(s) Oral daily  polyethylene glycol 3350 17 Gram(s) Oral daily    MEDICATIONS  (PRN):  acetaminophen   Tablet .. 650 milliGRAM(s) Oral every 6 hours PRN Temp greater or equal to 38C (100.4F), Mild Pain (1 - 3)  dextrose 40% Gel 15 Gram(s) Oral once PRN Blood Glucose LESS THAN 70 milliGRAM(s)/deciliter  glucagon  Injectable 1 milliGRAM(s) IntraMuscular once PRN Glucose LESS THAN 70 milligrams/deciliter      CAPILLARY BLOOD GLUCOSE      POCT Blood Glucose.: 216 mg/dL (20 Oct 2020 12:23)  POCT Blood Glucose.: 172 mg/dL (20 Oct 2020 08:39)  POCT Blood Glucose.: 214 mg/dL (19 Oct 2020 21:53)  POCT Blood Glucose.: 217 mg/dL (19 Oct 2020 17:45)  POCT Blood Glucose.: 361 mg/dL (19 Oct 2020 13:06)    I&O's Summary    19 Oct 2020 07:01  -  20 Oct 2020 07:00  --------------------------------------------------------  IN: 720 mL / OUT: 450 mL / NET: 270 mL    20 Oct 2020 07:01  -  20 Oct 2020 12:56  --------------------------------------------------------  IN: 120 mL / OUT: 250 mL / NET: -130 mL        PHYSICAL EXAM:  Vital Signs Last 24 Hrs  T(C): 37.1 (20 Oct 2020 09:21), Max: 37.3 (20 Oct 2020 04:21)  T(F): 98.8 (20 Oct 2020 09:21), Max: 99.1 (20 Oct 2020 04:21)  HR: 91 (20 Oct 2020 09:21) (91 - 110)  BP: 126/73 (20 Oct 2020 09:21) (103/73 - 152/74)  BP(mean): --  RR: 18 (20 Oct 2020 09:21) (18 - 20)  SpO2: 94% (20 Oct 2020 09:21) (94% - 98%)    CONSTITUTIONAL: NAD  RESPIRATORY: Normal respiratory effort; lungs are clear to auscultation bilaterally  CARDIOVASCULAR: Regular rate and rhythm, normal S1 and S2, no murmur/rub/gallop; No lower extremity edema; Peripheral pulses are 2+ bilaterally  ABDOMEN: Nontender to palpation, normoactive bowel sounds, no rebound/guarding; No hepatosplenomegaly  MUSCLOSKELETAL: no clubbing or cyanosis of digits; no joint swelling or tenderness to palpation  PSYCH: A+O to person    LABS:                        12.4   12.58 )-----------( 469      ( 20 Oct 2020 07:17 )             38.8     10-20    140  |  107  |  44<H>  ----------------------------<  193<H>  5.0   |  21<L>  |  1.33<H>    Ca    9.9      20 Oct 2020 07:17  Phos  3.5     10-20  Mg     2.2     10-20                  RADIOLOGY & ADDITIONAL TESTS:  Results Reviewed:   Imaging Personally Reviewed:  Electrocardiogram Personally Reviewed:    COORDINATION OF CARE:  Care Discussed with Consultants/Other Providers [Y/N]:  Prior or Outpatient Records Reviewed [Y/N]:

## 2020-10-20 NOTE — DISCHARGE NOTE NURSING/CASE MANAGEMENT/SOCIAL WORK - PATIENT PORTAL LINK FT
You can access the FollowMyHealth Patient Portal offered by [facility] by registering at the following website: [email or web address]. By joining [facility] [employer]’s FollowMyHealth portal, you will also be able to view your health information using other applications (apps) compatible with our system.

## 2020-10-20 NOTE — DISCHARGE NOTE PROVIDER - HOSPITAL COURSE
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 Seen by neurology stated sx likely toxic-metabolic encephalopathy in patient with dementia at baseline. CTH was WNL. Completed 5 day course of IV Ceftriaxone. Bcx is negative. Pt is afebrile. VSS. During hospitalization, found to have MIKKI but bladder scan was normal.     Per Dr. 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 Seen by neurology stated sx likely toxic-metabolic encephalopathy in patient with dementia at baseline. CTH was WNL. Completed 5 day course of IV Ceftriaxone. Bcx is negative. Pt is afebrile. VSS. During hospitalization, found to have MIKKI but bladder scan was normal. improved Per Dr. 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 Clinically improved ;      Metabolic encephalopathy.  Persistent metabolic encephalopathy superimposed on vascular dementia. Repeat Head CT 10/26 with no new findings, moderate severity chronic white matter microvascular type changes.  Slight leukocytosis but no s/s of infection  Initially patient treated with abx for UTI, no new focus of infection  Type 2 diabetes mellitus without complication, without long-term current use of insulin.   This am FS 90s. Good appetite. Will need to monitor to ensure no hypoglycemia  - Will decrease lantus to 10 units and c/w premeal to 5 units.   - moderate correction scale  A1c 7.0  hold oral home medications  Monitor FS ac and hs.     MIKKI (acute kidney injury).  Resolved; likely prerenal etiology c/b UTI  Hyperkalemia also resolved  f/u BMP  Avoid nephrotoxins.    Tachycardia. Improved with BB - c/w metoprolol 12.5mg bid  TTE with LVOT, LVH.   cards input appreciated, avoid diuretics, titrate BB to HR <100.    Hyponatremia.  Resolved.   Essential hypertension. Plan: BP now at goal  Cont amlodipine 5 mg.   Deep Vein Thrombosis (DVT).  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 Will monitor CBC  c/w Eliquis (patient on 2.5mg bid).   f/u with hemeoncologist   SIRS (systemic inflammatory response syndrome).  s/p ceftriaxone for UTI (started 10/15); now resolved  - blood NGTD  - urine cx 10- 49,000 cfu E.coli.    Hypothyroidism.  Plan: c/w synthroid 112mcg.       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 Clinically improved     Metabolic encephalopathy.  Persistent metabolic encephalopathy superimposed on vascular dementia. Repeat Head CT 10/26 with no new findings, moderate severity chronic white matter microvascular type changes.  Slight leukocytosis but no s/s of infection  Initially patient treated with abx for UTI, no new focus of infection  Type 2 diabetes mellitus without complication, without long-term current use of insulin.   This am FS 90s. Good appetite. Will need to monitor to ensure no hypoglycemia  - c/w lantus to 10 units and c/w premeal to 5 units.   A1c 7.0  d/c to VICENTA on insulin   Monitor FS ac and hs.     MIKKI (acute kidney injury).  Resolved; likely prerenal etiology c/b UTI  Hyperkalemia also resolved    Tachycardia. Improved with BB - c/w metoprolol 12.5mg bid  TTE with LVOT, LVH.   cards input appreciated, avoid diuretics, titrate BB to HR <100.     Essential hypertension. Plan: BP now at goal  Cont amlodipine 5 mg.    Deep Vein Thrombosis (DVT).  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 Will monitor CBC  c/w Eliquis (patient on 2.5mg bid).     Hypothyroidism.  Plan: c/w synthroid 112mcg.

## 2020-10-20 NOTE — DISCHARGE NOTE PROVIDER - PROVIDER TOKENS
PROVIDER:[TOKEN:[[id number]],SCHEDULEDAPPT:[03/15/2021],SCHEDULEDAPPTTIME:[10:30 AM]],PROVIDER:[TOKEN:[[id number]],SCHEDULEDAPPT:[12/04/2020],SCHEDULEDAPPTTIME:[10:40 AM]]

## 2020-10-20 NOTE — DISCHARGE NOTE PROVIDER - NSDCFUSCHEDAPPT_GEN_ALL_CORE_FT
BISHOP BURNS ; 11/16/2020 ; [facility] [address] [facility]  BISHOP BURNS ; 12/04/2020 ; [facility]

## 2020-10-20 NOTE — DISCHARGE NOTE PROVIDER - CARE PROVIDERS DIRECT ADDRESSES
[email or web address]

## 2020-10-20 NOTE — DISCHARGE NOTE PROVIDER - NSFOLLOWUPCLINICS_GEN_ALL_ED_FT
[facility] General Internal Medicine  General Internal Medicine  [address] [id number]  Phone: [phone]  Fax:   Follow Up Time:

## 2020-10-20 NOTE — DISCHARGE NOTE PROVIDER - CARE PROVIDER_API CALL
Amber Lyman  INTERNAL MEDICINE  [address], [address]  [place] [address]  Phone: [phone]  Fax: [phone]  Scheduled Appointment: 03/15/2021 10:30 AM    Park Chamberlain  MEDICAL ONCOLOGY  [address] [place] [address], [place] [address]  Phone: [phone]  Fax: [phone]  Scheduled Appointment: 12/04/2020 10:40 AM

## 2020-10-20 NOTE — DISCHARGE NOTE PROVIDER - NSDCCPCAREPLAN_GEN_ALL_CORE_FT
PRINCIPAL DISCHARGE DIAGNOSIS  Diagnosis: Metabolic encephalopathy  Assessment and Plan of Treatment: - Likely toxic-metabolic encephalopathy in patient with dementia at baseline.  - CT head shows no acute finding   - Completed 5 day course of IV Ceftriaxone as antibiotic  - Please follow up primary care provider after discharge       PRINCIPAL DISCHARGE DIAGNOSIS  Diagnosis: Metabolic encephalopathy  Assessment and Plan of Treatment: - Likely toxic-metabolic encephalopathy in patient with dementia at baseline.  - CT head shows no acute finding   - Completed 5 day course of IV Ceftriaxone as antibiotic  - Please follow up primary care provider after discharge      SECONDARY DISCHARGE DIAGNOSES  Diagnosis: Tachycardia  Assessment and Plan of Treatment: Tachycardia  stable  continue with lopressor  monitor    Diagnosis: Type 2 diabetes mellitus without complication, without long-term current use of insulin  Assessment and Plan of Treatment: Type 2 diabetes mellitus without complication, without long-term current use of insulin  youe hemoglobin A1c 7;  monitor FS; insulin while at rehab  monitor FS to adjust insulin    Diagnosis: Deep Vein Thrombosis (DVT)  Assessment and Plan of Treatment: Deep Vein Thrombosis (DVT)  continue anticoagulation  follow up by oncology as scheduled    Diagnosis: MIKKI (acute kidney injury)  Assessment and Plan of Treatment: MIKKI (acute kidney injury)  avoid kidney toxic medication

## 2020-10-20 NOTE — DISCHARGE NOTE PROVIDER - NSDCMRMEDTOKEN_GEN_ALL_CORE_FT
alendronate 70 mg oral tablet: 1 tab(s) orally once a week ( wednesdays )  apixaban 2.5 mg oral tablet: 1 tab(s) orally 2 times a day  Januvia 25 mg oral tablet: 1 tab(s) orally once a day  levothyroxine 112 mcg (0.112 mg) oral tablet: 1 tab(s) orally once a day  pravastatin 40 mg oral tablet: 1 tab(s) orally once a day   alendronate 70 mg oral tablet: 1 tab(s) orally once a week ( wednesdays )  amLODIPine 5 mg oral tablet: 1 tab(s) orally once a day  apixaban 2.5 mg oral tablet: 1 tab(s) orally 2 times a day  Januvia 25 mg oral tablet: 1 tab(s) orally once a day  levothyroxine 112 mcg (0.112 mg) oral tablet: 1 tab(s) orally once a day  nystatin 100,000 units/g topical cream: 1 application topically 2 times a day  polyethylene glycol 3350 oral powder for reconstitution: 17 gram(s) orally once a day  pravastatin 40 mg oral tablet: 1 tab(s) orally once a day   alendronate 70 mg oral tablet: 1 tab(s) orally once a week ( wednesdays )  amLODIPine 5 mg oral tablet: 1 tab(s) orally once a day  apixaban 2.5 mg oral tablet: 1 tab(s) orally 2 times a day  insulin glargine: 10 unit(s) subcutaneous once a day (at bedtime)  monitor finger stick to adjust dose  insulin lispro 100 units/mL injectable solution: 5 unit(s) injectable 3 times a day (before meals)  monitro finger stick to adjust dose  levothyroxine 112 mcg (0.112 mg) oral tablet: 1 tab(s) orally once a day  metoprolol: 12.5 milligram(s) orally 2 times a day  nystatin 100,000 units/g topical cream: 1 application topically 2 times a day  polyethylene glycol 3350 oral powder for reconstitution: 17 gram(s) orally once a day  pravastatin 40 mg oral tablet: 1 tab(s) orally once a day   alendronate 70 mg oral tablet: 1 tab(s) orally once a week ( wednesdays )  amLODIPine 5 mg oral tablet: 1 tab(s) orally once a day  apixaban 2.5 mg oral tablet: 1 tab(s) orally 2 times a day  insulin glargine: 10 unit(s) subcutaneous once a day (at bedtime)  monitor finger stick to adjust dose  insulin lispro 100 units/mL injectable solution: 5 unit(s) injectable 3 times a day (before meals)  monitro finger stick to adjust dose  levothyroxine 112 mcg (0.112 mg) oral tablet: 1 tab(s) orally once a day  metoprolol tartrate 25 mg oral tablet: 1 tab(s) orally 2 times a day  adjust dose per HR/BP  nystatin 100,000 units/g topical cream: 1 application topically 2 times a day  polyethylene glycol 3350 oral powder for reconstitution: 17 gram(s) orally once a day  pravastatin 40 mg oral tablet: 1 tab(s) orally once a day

## 2020-10-21 LAB
ANION GAP SERPL CALC-SCNC: 12 MMOL/L — SIGNIFICANT CHANGE UP (ref 5–17)
ANION GAP SERPL CALC-SCNC: 13 MMOL/L — SIGNIFICANT CHANGE UP (ref 5–17)
BUN SERPL-MCNC: 44 MG/DL — HIGH (ref 7–23)
BUN SERPL-MCNC: 52 MG/DL — HIGH (ref 7–23)
CALCIUM SERPL-MCNC: 9.5 MG/DL — SIGNIFICANT CHANGE UP (ref 8.4–10.5)
CALCIUM SERPL-MCNC: 9.8 MG/DL — SIGNIFICANT CHANGE UP (ref 8.4–10.5)
CHLORIDE SERPL-SCNC: 101 MMOL/L — SIGNIFICANT CHANGE UP (ref 96–108)
CHLORIDE SERPL-SCNC: 105 MMOL/L — SIGNIFICANT CHANGE UP (ref 96–108)
CO2 SERPL-SCNC: 18 MMOL/L — LOW (ref 22–31)
CO2 SERPL-SCNC: 21 MMOL/L — LOW (ref 22–31)
CREAT SERPL-MCNC: 1.15 MG/DL — SIGNIFICANT CHANGE UP (ref 0.5–1.3)
CREAT SERPL-MCNC: 1.22 MG/DL — SIGNIFICANT CHANGE UP (ref 0.5–1.3)
CULTURE RESULTS: SIGNIFICANT CHANGE UP
CULTURE RESULTS: SIGNIFICANT CHANGE UP
GLUCOSE BLDC GLUCOMTR-MCNC: 236 MG/DL — HIGH (ref 70–99)
GLUCOSE BLDC GLUCOMTR-MCNC: 291 MG/DL — HIGH (ref 70–99)
GLUCOSE BLDC GLUCOMTR-MCNC: 363 MG/DL — HIGH (ref 70–99)
GLUCOSE BLDC GLUCOMTR-MCNC: 398 MG/DL — HIGH (ref 70–99)
GLUCOSE SERPL-MCNC: 206 MG/DL — HIGH (ref 70–99)
GLUCOSE SERPL-MCNC: 467 MG/DL — CRITICAL HIGH (ref 70–99)
HCT VFR BLD CALC: 44.4 % — SIGNIFICANT CHANGE UP (ref 34.5–45)
HGB BLD-MCNC: 14.8 G/DL — SIGNIFICANT CHANGE UP (ref 11.5–15.5)
MCHC RBC-ENTMCNC: 33.3 GM/DL — SIGNIFICANT CHANGE UP (ref 32–36)
MCHC RBC-ENTMCNC: 36.7 PG — HIGH (ref 27–34)
MCV RBC AUTO: 110.2 FL — HIGH (ref 80–100)
NRBC # BLD: 0 /100 WBCS — SIGNIFICANT CHANGE UP (ref 0–0)
PLATELET # BLD AUTO: 222 K/UL — SIGNIFICANT CHANGE UP (ref 150–400)
POTASSIUM SERPL-MCNC: 5 MMOL/L — SIGNIFICANT CHANGE UP (ref 3.5–5.3)
POTASSIUM SERPL-MCNC: 5.5 MMOL/L — HIGH (ref 3.5–5.3)
POTASSIUM SERPL-SCNC: 5 MMOL/L — SIGNIFICANT CHANGE UP (ref 3.5–5.3)
POTASSIUM SERPL-SCNC: 5.5 MMOL/L — HIGH (ref 3.5–5.3)
RBC # BLD: 4.03 M/UL — SIGNIFICANT CHANGE UP (ref 3.8–5.2)
RBC # FLD: 13 % — SIGNIFICANT CHANGE UP (ref 10.3–14.5)
SODIUM SERPL-SCNC: 134 MMOL/L — LOW (ref 135–145)
SODIUM SERPL-SCNC: 136 MMOL/L — SIGNIFICANT CHANGE UP (ref 135–145)
SPECIMEN SOURCE: SIGNIFICANT CHANGE UP
SPECIMEN SOURCE: SIGNIFICANT CHANGE UP
WBC # BLD: 10.15 K/UL — SIGNIFICANT CHANGE UP (ref 3.8–10.5)
WBC # FLD AUTO: 10.15 K/UL — SIGNIFICANT CHANGE UP (ref 3.8–10.5)

## 2020-10-21 PROCEDURE — 99233 SBSQ HOSP IP/OBS HIGH 50: CPT

## 2020-10-21 RX ORDER — INSULIN GLARGINE 100 [IU]/ML
10 INJECTION, SOLUTION SUBCUTANEOUS AT BEDTIME
Refills: 0 | Status: DISCONTINUED | OUTPATIENT
Start: 2020-10-21 | End: 2020-10-23

## 2020-10-21 RX ORDER — INSULIN LISPRO 100/ML
3 VIAL (ML) SUBCUTANEOUS
Refills: 0 | Status: DISCONTINUED | OUTPATIENT
Start: 2020-10-21 | End: 2020-10-23

## 2020-10-21 RX ORDER — FUROSEMIDE 40 MG
20 TABLET ORAL ONCE
Refills: 0 | Status: COMPLETED | OUTPATIENT
Start: 2020-10-21 | End: 2020-10-21

## 2020-10-21 RX ADMIN — Medication 3 UNIT(S): at 17:35

## 2020-10-21 RX ADMIN — Medication 4: at 09:13

## 2020-10-21 RX ADMIN — Medication 1: at 22:04

## 2020-10-21 RX ADMIN — Medication 10 MILLIGRAM(S): at 08:27

## 2020-10-21 RX ADMIN — Medication 10: at 13:32

## 2020-10-21 RX ADMIN — ATORVASTATIN CALCIUM 10 MILLIGRAM(S): 80 TABLET, FILM COATED ORAL at 21:26

## 2020-10-21 RX ADMIN — Medication 112 MICROGRAM(S): at 05:34

## 2020-10-21 RX ADMIN — APIXABAN 2.5 MILLIGRAM(S): 2.5 TABLET, FILM COATED ORAL at 17:36

## 2020-10-21 RX ADMIN — Medication 10: at 17:35

## 2020-10-21 RX ADMIN — INSULIN GLARGINE 10 UNIT(S): 100 INJECTION, SOLUTION SUBCUTANEOUS at 22:02

## 2020-10-21 RX ADMIN — POLYETHYLENE GLYCOL 3350 17 GRAM(S): 17 POWDER, FOR SOLUTION ORAL at 13:31

## 2020-10-21 RX ADMIN — Medication 20 MILLIGRAM(S): at 08:27

## 2020-10-21 RX ADMIN — APIXABAN 2.5 MILLIGRAM(S): 2.5 TABLET, FILM COATED ORAL at 05:34

## 2020-10-21 NOTE — PROGRESS NOTE ADULT - SUBJECTIVE AND OBJECTIVE BOX
PROGRESS NOTE:   Authored by Dr. Angelica Ruelas MD  Pager [phone]     Patient is a 87y old  Female who presents with a chief complaint of AMS (20 Oct 2020 13:57)      SUBJECTIVE / OVERNIGHT EVENTS: Patient seen and examined at bedside. Patient reports doing well, quite, confused, eating well. No BM in 3 days. Denies abd pain.    ADDITIONAL REVIEW OF SYSTEMS: As above    MEDICATIONS  (STANDING):  apixaban 2.5 milliGRAM(s) Oral two times a day  atorvastatin 10 milliGRAM(s) Oral at bedtime  dextrose 5%. 1000 milliLiter(s) (50 mL/Hr) IV Continuous <Continuous>  dextrose 50% Injectable 12.5 Gram(s) IV Push once  dextrose 50% Injectable 25 Gram(s) IV Push once  dextrose 50% Injectable 25 Gram(s) IV Push once  insulin glargine Injectable (LANTUS) 5 Unit(s) SubCutaneous at bedtime  insulin lispro (ADMELOG) corrective regimen sliding scale   SubCutaneous three times a day before meals  insulin lispro (ADMELOG) corrective regimen sliding scale   SubCutaneous at bedtime  levothyroxine 112 MICROGram(s) Oral daily  polyethylene glycol 3350 17 Gram(s) Oral daily    MEDICATIONS  (PRN):  acetaminophen   Tablet .. 650 milliGRAM(s) Oral every 6 hours PRN Temp greater or equal to 38C (100.4F), Mild Pain (1 - 3)  dextrose 40% Gel 15 Gram(s) Oral once PRN Blood Glucose LESS THAN 70 milliGRAM(s)/deciliter  glucagon  Injectable 1 milliGRAM(s) IntraMuscular once PRN Glucose LESS THAN 70 milligrams/deciliter      CAPILLARY BLOOD GLUCOSE      POCT Blood Glucose.: 236 mg/dL (21 Oct 2020 09:09)  POCT Blood Glucose.: 235 mg/dL (20 Oct 2020 22:11)  POCT Blood Glucose.: 246 mg/dL (20 Oct 2020 17:33)  POCT Blood Glucose.: 216 mg/dL (20 Oct 2020 12:23)    I&O's Summary    20 Oct 2020 07:01  -  21 Oct 2020 07:00  --------------------------------------------------------  IN: 490 mL / OUT: 1150 mL / NET: -660 mL    21 Oct 2020 07:01  -  21 Oct 2020 12:01  --------------------------------------------------------  IN: 240 mL / OUT: 0 mL / NET: 240 mL        PHYSICAL EXAM:  Vital Signs Last 24 Hrs  T(C): 36.4 (21 Oct 2020 09:48), Max: 37.3 (20 Oct 2020 21:33)  T(F): 97.5 (21 Oct 2020 09:48), Max: 99.2 (20 Oct 2020 21:33)  HR: 103 (21 Oct 2020 09:48) (99 - 103)  BP: 142/76 (21 Oct 2020 09:48) (142/76 - 153/82)  BP(mean): --  RR: 18 (21 Oct 2020 09:48) (18 - 18)  SpO2: 95% (21 Oct 2020 09:48) (94% - 99%)    CONSTITUTIONAL: NAD  RESPIRATORY: Normal respiratory effort; lungs are clear to auscultation bilaterally  CARDIOVASCULAR: Regular rate and rhythm, normal S1 and S2, no murmur/rub/gallop; No lower extremity edema; Peripheral pulses are 2+ bilaterally  ABDOMEN: Nontender to palpation, normoactive bowel sounds, no rebound/guarding; No hepatosplenomegaly  MUSCLOSKELETAL: no clubbing or cyanosis of digits; no joint swelling or tenderness to palpation  PSYCH: A+O to person; affect appropriate    LABS:                        14.8   10.15 )-----------( 222      ( 21 Oct 2020 06:26 )             44.4     10-21    136  |  105  |  44<H>  ----------------------------<  206<H>  5.5<H>   |  18<L>  |  1.15    Ca    9.5      21 Oct 2020 06:26  Phos  3.5     10-20  Mg     2.2     10-20                  RADIOLOGY & ADDITIONAL TESTS:  Results Reviewed:   Imaging Personally Reviewed:  Electrocardiogram Personally Reviewed:    COORDINATION OF CARE:  Care Discussed with Consultants/Other Providers [Y/N]:  Prior or Outpatient Records Reviewed [Y/N]:

## 2020-10-21 NOTE — DIETITIAN INITIAL EVALUATION ADULT. - ORAL INTAKE PTA/DIET HISTORY
Information obtained mostly from previous RD notes; pt very confused and unable to answer basic questions. Pt's intake unknown PTA. Unknown whether following therapeutic diet. Per previous RD note, "Pt with history of Bell's Palsy (April 2018), S/P MBS recommended for dysphagia I honey consistency diet (4/12/18). Per pt's daughter pt consumes honey thick beverages, and mostly pureed foods." Per H&P, pt c allergy to spices resulting in a rash.  No known micronutrient nor nutrient supplement use. Information obtained mostly from previous RD note; pt unable to answer basic questions. Pt's intake unknown PTA. Unknown pt following therapeutic diet. Per previous RD note,"...S/P MBS recommended for dysphagia I honey consistency diet (4/12/18). Per pt's daughter pt consumes honey thick beverages, and mostly pureed foods." RD unaware if pt continued modified texture diet PTA. Per H&P, allergy to spices resulting in a rash.  No known micronutrient nor nutrient supplement use.

## 2020-10-21 NOTE — DIETITIAN INITIAL EVALUATION ADULT. - ADD RECOMMEND
1) Continue current consistent carbohydrate, DASH, no concentrated K diet; consistency deferred to provider. 2) Provide Type 2 Diabetes Nutrition Therapy and foods high in K education as able.  3) Continue to trend labs, weight, skin integrity, and intake.

## 2020-10-21 NOTE — DIETITIAN INITIAL EVALUATION ADULT. - REASON INDICATOR FOR ASSESSMENT
Pt seen for length of stay.  Source: Medical record and RN (attempted to interview pt, Tagalong speaking  ID#[id number], Maya, however pt was very confused; called Emergency contact, Magalie [phone], no answer)

## 2020-10-21 NOTE — DIETITIAN INITIAL EVALUATION ADULT. - OTHER INFO
Pt with Hx of Type 2 diabetes; manages with Januvia. Unknown if pt is checking blood sugars. Recent A1C 7.0% (10/17), indicating fair glycemic control.    Dosing wt: 160 lbs. Daily wt in lbs: 138.8 (10/21). Per previous RD assessment, reports UBW of 120 pounds (2/19/19). Possible 19 lb wt gain x1.5 years (unknown intentional vs unintentional). Pt appears closer to daily wt today.    Per RN, pt is eating well with no known changes in appetite. Pt on regular textured diet, however is tolerating well with no known incidents of intolerance. No recent N/V, diarrhea, or constipation. Last BM 10/21.

## 2020-10-21 NOTE — DIETITIAN INITIAL EVALUATION ADULT. - PERTINENT LABORATORY DATA
10-21 Na 134 mmol/L<L> Glu 467 mg/dL<HH> K+ 5.0 mmol/L Cr  1.22 mg/dL BUN 52 mg/dL<H>  10-17 A1C 7.0%  CAPILLARY BLOOD GLUCOSE  POCT Blood Glucose.: 398 mg/dL (21 Oct 2020 12:48)  POCT Blood Glucose.: 236 mg/dL (21 Oct 2020 09:09)  POCT Blood Glucose.: 235 mg/dL (20 Oct 2020 22:11)  POCT Blood Glucose.: 246 mg/dL (20 Oct 2020 17:33)

## 2020-10-22 LAB
ANION GAP SERPL CALC-SCNC: 13 MMOL/L — SIGNIFICANT CHANGE UP (ref 5–17)
BUN SERPL-MCNC: 50 MG/DL — HIGH (ref 7–23)
CALCIUM SERPL-MCNC: 9.8 MG/DL — SIGNIFICANT CHANGE UP (ref 8.4–10.5)
CHLORIDE SERPL-SCNC: 102 MMOL/L — SIGNIFICANT CHANGE UP (ref 96–108)
CO2 SERPL-SCNC: 21 MMOL/L — LOW (ref 22–31)
CREAT SERPL-MCNC: 1.2 MG/DL — SIGNIFICANT CHANGE UP (ref 0.5–1.3)
GLUCOSE BLDC GLUCOMTR-MCNC: 192 MG/DL — HIGH (ref 70–99)
GLUCOSE BLDC GLUCOMTR-MCNC: 260 MG/DL — HIGH (ref 70–99)
GLUCOSE BLDC GLUCOMTR-MCNC: 358 MG/DL — HIGH (ref 70–99)
GLUCOSE BLDC GLUCOMTR-MCNC: 360 MG/DL — HIGH (ref 70–99)
GLUCOSE BLDC GLUCOMTR-MCNC: 440 MG/DL — HIGH (ref 70–99)
GLUCOSE SERPL-MCNC: 271 MG/DL — HIGH (ref 70–99)
HCT VFR BLD CALC: 38.1 % — SIGNIFICANT CHANGE UP (ref 34.5–45)
HGB BLD-MCNC: 12.5 G/DL — SIGNIFICANT CHANGE UP (ref 11.5–15.5)
MCHC RBC-ENTMCNC: 32.8 GM/DL — SIGNIFICANT CHANGE UP (ref 32–36)
MCHC RBC-ENTMCNC: 36.2 PG — HIGH (ref 27–34)
MCV RBC AUTO: 110.4 FL — HIGH (ref 80–100)
NRBC # BLD: 0 /100 WBCS — SIGNIFICANT CHANGE UP (ref 0–0)
PLATELET # BLD AUTO: 569 K/UL — HIGH (ref 150–400)
POTASSIUM SERPL-MCNC: 5.3 MMOL/L — SIGNIFICANT CHANGE UP (ref 3.5–5.3)
POTASSIUM SERPL-SCNC: 5.3 MMOL/L — SIGNIFICANT CHANGE UP (ref 3.5–5.3)
RBC # BLD: 3.45 M/UL — LOW (ref 3.8–5.2)
RBC # FLD: 13 % — SIGNIFICANT CHANGE UP (ref 10.3–14.5)
SARS-COV-2 RNA SPEC QL NAA+PROBE: SIGNIFICANT CHANGE UP
SODIUM SERPL-SCNC: 136 MMOL/L — SIGNIFICANT CHANGE UP (ref 135–145)
WBC # BLD: 10.89 K/UL — HIGH (ref 3.8–10.5)
WBC # FLD AUTO: 10.89 K/UL — HIGH (ref 3.8–10.5)

## 2020-10-22 PROCEDURE — 99233 SBSQ HOSP IP/OBS HIGH 50: CPT

## 2020-10-22 RX ADMIN — POLYETHYLENE GLYCOL 3350 17 GRAM(S): 17 POWDER, FOR SOLUTION ORAL at 13:09

## 2020-10-22 RX ADMIN — APIXABAN 2.5 MILLIGRAM(S): 2.5 TABLET, FILM COATED ORAL at 05:12

## 2020-10-22 RX ADMIN — Medication 3 UNIT(S): at 13:09

## 2020-10-22 RX ADMIN — Medication 3 UNIT(S): at 17:54

## 2020-10-22 RX ADMIN — Medication 10: at 17:55

## 2020-10-22 RX ADMIN — Medication 2: at 13:09

## 2020-10-22 RX ADMIN — APIXABAN 2.5 MILLIGRAM(S): 2.5 TABLET, FILM COATED ORAL at 17:55

## 2020-10-22 RX ADMIN — ATORVASTATIN CALCIUM 10 MILLIGRAM(S): 80 TABLET, FILM COATED ORAL at 22:33

## 2020-10-22 RX ADMIN — Medication 3 UNIT(S): at 10:02

## 2020-10-22 RX ADMIN — Medication 3: at 22:33

## 2020-10-22 RX ADMIN — Medication 6: at 10:00

## 2020-10-22 RX ADMIN — INSULIN GLARGINE 10 UNIT(S): 100 INJECTION, SOLUTION SUBCUTANEOUS at 22:33

## 2020-10-22 RX ADMIN — Medication 112 MICROGRAM(S): at 05:12

## 2020-10-22 NOTE — PROGRESS NOTE ADULT - SUBJECTIVE AND OBJECTIVE BOX
PROGRESS NOTE:   Authored by Dr. Angelica Ruelas MD  Pager [phone]     Patient is a 87y old  Female who presents with a chief complaint of AMS (21 Oct 2020 15:56)      SUBJECTIVE / OVERNIGHT EVENTS: Patient seen and examined at bedside. Patient sitting up in bed, confused but calm.     ADDITIONAL REVIEW OF SYSTEMS: unable to obtain due to dementia    MEDICATIONS  (STANDING):  apixaban 2.5 milliGRAM(s) Oral two times a day  atorvastatin 10 milliGRAM(s) Oral at bedtime  dextrose 5%. 1000 milliLiter(s) (50 mL/Hr) IV Continuous <Continuous>  dextrose 50% Injectable 12.5 Gram(s) IV Push once  dextrose 50% Injectable 25 Gram(s) IV Push once  dextrose 50% Injectable 25 Gram(s) IV Push once  insulin glargine Injectable (LANTUS) 10 Unit(s) SubCutaneous at bedtime  insulin lispro (ADMELOG) corrective regimen sliding scale   SubCutaneous three times a day before meals  insulin lispro (ADMELOG) corrective regimen sliding scale   SubCutaneous at bedtime  insulin lispro Injectable (ADMELOG) 3 Unit(s) SubCutaneous three times a day before meals  levothyroxine 112 MICROGram(s) Oral daily  polyethylene glycol 3350 17 Gram(s) Oral daily    MEDICATIONS  (PRN):  acetaminophen   Tablet .. 650 milliGRAM(s) Oral every 6 hours PRN Temp greater or equal to 38C (100.4F), Mild Pain (1 - 3)  dextrose 40% Gel 15 Gram(s) Oral once PRN Blood Glucose LESS THAN 70 milliGRAM(s)/deciliter  glucagon  Injectable 1 milliGRAM(s) IntraMuscular once PRN Glucose LESS THAN 70 milligrams/deciliter      CAPILLARY BLOOD GLUCOSE      POCT Blood Glucose.: 260 mg/dL (22 Oct 2020 08:39)  POCT Blood Glucose.: 291 mg/dL (21 Oct 2020 21:53)  POCT Blood Glucose.: 363 mg/dL (21 Oct 2020 17:31)  POCT Blood Glucose.: 398 mg/dL (21 Oct 2020 12:48)    I&O's Summary    21 Oct 2020 07:01  -  22 Oct 2020 07:00  --------------------------------------------------------  IN: 840 mL / OUT: 0 mL / NET: 840 mL        PHYSICAL EXAM:  Vital Signs Last 24 Hrs  T(C): 37 (22 Oct 2020 04:38), Max: 37 (22 Oct 2020 04:38)  T(F): 98.6 (22 Oct 2020 04:38), Max: 98.6 (22 Oct 2020 04:38)  HR: 107 (22 Oct 2020 04:38) (101 - 120)  BP: 157/83 (22 Oct 2020 04:38) (126/77 - 159/91)  BP(mean): --  RR: 18 (22 Oct 2020 04:38) (18 - 18)  SpO2: 97% (22 Oct 2020 04:38) (96% - 98%)    CONSTITUTIONAL: NAD  RESPIRATORY: Normal respiratory effort; lungs are clear to auscultation bilaterally  CARDIOVASCULAR: Regular rate and rhythm, normal S1 and S2, no murmur/rub/gallop; trace lower extremity edema; Peripheral pulses are 2+ bilaterally  ABDOMEN: Nontender to palpation, normoactive bowel sounds, no rebound/guarding; No hepatosplenomegaly  MUSCLOSKELETAL: no clubbing or cyanosis of digits; no joint swelling or tenderness to palpation  PSYCH: A+O to person; affect appropriate    LABS:                        12.5   10.89 )-----------( x        ( 22 Oct 2020 10:36 )             38.1     10-21    134<L>  |  101  |  52<H>  ----------------------------<  467<HH>  5.0   |  21<L>  |  1.22    Ca    9.8      21 Oct 2020 14:22                  RADIOLOGY & ADDITIONAL TESTS:  Results Reviewed:   Imaging Personally Reviewed:  Electrocardiogram Personally Reviewed:    COORDINATION OF CARE:  Care Discussed with Consultants/Other Providers [Y/N]:  Prior or Outpatient Records Reviewed [Y/N]:

## 2020-10-23 DIAGNOSIS — R00.0 TACHYCARDIA, UNSPECIFIED: ICD-10-CM

## 2020-10-23 LAB
ANION GAP SERPL CALC-SCNC: 12 MMOL/L — SIGNIFICANT CHANGE UP (ref 5–17)
ANION GAP SERPL CALC-SCNC: 15 MMOL/L — SIGNIFICANT CHANGE UP (ref 5–17)
BUN SERPL-MCNC: 48 MG/DL — HIGH (ref 7–23)
BUN SERPL-MCNC: 54 MG/DL — HIGH (ref 7–23)
CALCIUM SERPL-MCNC: 9.5 MG/DL — SIGNIFICANT CHANGE UP (ref 8.4–10.5)
CALCIUM SERPL-MCNC: 9.8 MG/DL — SIGNIFICANT CHANGE UP (ref 8.4–10.5)
CHLORIDE SERPL-SCNC: 101 MMOL/L — SIGNIFICANT CHANGE UP (ref 96–108)
CHLORIDE SERPL-SCNC: 105 MMOL/L — SIGNIFICANT CHANGE UP (ref 96–108)
CO2 SERPL-SCNC: 16 MMOL/L — LOW (ref 22–31)
CO2 SERPL-SCNC: 21 MMOL/L — LOW (ref 22–31)
CREAT SERPL-MCNC: 1.21 MG/DL — SIGNIFICANT CHANGE UP (ref 0.5–1.3)
CREAT SERPL-MCNC: 1.32 MG/DL — HIGH (ref 0.5–1.3)
GLUCOSE BLDC GLUCOMTR-MCNC: 130 MG/DL — HIGH (ref 70–99)
GLUCOSE BLDC GLUCOMTR-MCNC: 146 MG/DL — HIGH (ref 70–99)
GLUCOSE BLDC GLUCOMTR-MCNC: 241 MG/DL — HIGH (ref 70–99)
GLUCOSE BLDC GLUCOMTR-MCNC: 246 MG/DL — HIGH (ref 70–99)
GLUCOSE SERPL-MCNC: 233 MG/DL — HIGH (ref 70–99)
GLUCOSE SERPL-MCNC: 240 MG/DL — HIGH (ref 70–99)
HCT VFR BLD CALC: 41.2 % — SIGNIFICANT CHANGE UP (ref 34.5–45)
HGB BLD-MCNC: 13.2 G/DL — SIGNIFICANT CHANGE UP (ref 11.5–15.5)
MCHC RBC-ENTMCNC: 32 GM/DL — SIGNIFICANT CHANGE UP (ref 32–36)
MCHC RBC-ENTMCNC: 36.6 PG — HIGH (ref 27–34)
MCV RBC AUTO: 114.1 FL — HIGH (ref 80–100)
NRBC # BLD: 0 /100 WBCS — SIGNIFICANT CHANGE UP (ref 0–0)
PLATELET # BLD AUTO: 567 K/UL — HIGH (ref 150–400)
POTASSIUM SERPL-MCNC: 4.6 MMOL/L — SIGNIFICANT CHANGE UP (ref 3.5–5.3)
POTASSIUM SERPL-MCNC: 5.5 MMOL/L — HIGH (ref 3.5–5.3)
POTASSIUM SERPL-SCNC: 4.6 MMOL/L — SIGNIFICANT CHANGE UP (ref 3.5–5.3)
POTASSIUM SERPL-SCNC: 5.5 MMOL/L — HIGH (ref 3.5–5.3)
RBC # BLD: 3.61 M/UL — LOW (ref 3.8–5.2)
RBC # FLD: 13.1 % — SIGNIFICANT CHANGE UP (ref 10.3–14.5)
SODIUM SERPL-SCNC: 132 MMOL/L — LOW (ref 135–145)
SODIUM SERPL-SCNC: 138 MMOL/L — SIGNIFICANT CHANGE UP (ref 135–145)
WBC # BLD: 10.52 K/UL — HIGH (ref 3.8–10.5)
WBC # FLD AUTO: 10.52 K/UL — HIGH (ref 3.8–10.5)

## 2020-10-23 PROCEDURE — 99233 SBSQ HOSP IP/OBS HIGH 50: CPT

## 2020-10-23 RX ORDER — INSULIN GLARGINE 100 [IU]/ML
15 INJECTION, SOLUTION SUBCUTANEOUS AT BEDTIME
Refills: 0 | Status: DISCONTINUED | OUTPATIENT
Start: 2020-10-23 | End: 2020-10-25

## 2020-10-23 RX ORDER — SODIUM ZIRCONIUM CYCLOSILICATE 10 G/10G
5 POWDER, FOR SUSPENSION ORAL ONCE
Refills: 0 | Status: COMPLETED | OUTPATIENT
Start: 2020-10-23 | End: 2020-10-23

## 2020-10-23 RX ORDER — INSULIN LISPRO 100/ML
5 VIAL (ML) SUBCUTANEOUS
Refills: 0 | Status: DISCONTINUED | OUTPATIENT
Start: 2020-10-23 | End: 2020-10-25

## 2020-10-23 RX ORDER — AMLODIPINE BESYLATE 2.5 MG/1
5 TABLET ORAL DAILY
Refills: 0 | Status: DISCONTINUED | OUTPATIENT
Start: 2020-10-23 | End: 2020-10-30

## 2020-10-23 RX ORDER — SODIUM CHLORIDE 9 MG/ML
250 INJECTION INTRAMUSCULAR; INTRAVENOUS; SUBCUTANEOUS ONCE
Refills: 0 | Status: COMPLETED | OUTPATIENT
Start: 2020-10-23 | End: 2020-10-23

## 2020-10-23 RX ADMIN — POLYETHYLENE GLYCOL 3350 17 GRAM(S): 17 POWDER, FOR SOLUTION ORAL at 10:10

## 2020-10-23 RX ADMIN — Medication 5 UNIT(S): at 17:59

## 2020-10-23 RX ADMIN — Medication 3 UNIT(S): at 08:16

## 2020-10-23 RX ADMIN — Medication 112 MICROGRAM(S): at 05:10

## 2020-10-23 RX ADMIN — SODIUM CHLORIDE 250 MILLILITER(S): 9 INJECTION INTRAMUSCULAR; INTRAVENOUS; SUBCUTANEOUS at 10:10

## 2020-10-23 RX ADMIN — APIXABAN 2.5 MILLIGRAM(S): 2.5 TABLET, FILM COATED ORAL at 17:59

## 2020-10-23 RX ADMIN — Medication 4: at 13:09

## 2020-10-23 RX ADMIN — Medication 5 UNIT(S): at 13:08

## 2020-10-23 RX ADMIN — SODIUM ZIRCONIUM CYCLOSILICATE 5 GRAM(S): 10 POWDER, FOR SUSPENSION ORAL at 13:31

## 2020-10-23 RX ADMIN — INSULIN GLARGINE 15 UNIT(S): 100 INJECTION, SOLUTION SUBCUTANEOUS at 23:59

## 2020-10-23 RX ADMIN — Medication 4: at 08:16

## 2020-10-23 RX ADMIN — AMLODIPINE BESYLATE 5 MILLIGRAM(S): 2.5 TABLET ORAL at 10:09

## 2020-10-23 RX ADMIN — ATORVASTATIN CALCIUM 10 MILLIGRAM(S): 80 TABLET, FILM COATED ORAL at 23:59

## 2020-10-23 RX ADMIN — APIXABAN 2.5 MILLIGRAM(S): 2.5 TABLET, FILM COATED ORAL at 05:13

## 2020-10-23 RX ADMIN — Medication 10 MILLIGRAM(S): at 10:09

## 2020-10-23 NOTE — PROGRESS NOTE ADULT - SUBJECTIVE AND OBJECTIVE BOX
PROGRESS NOTE:   Authored by Dr. Angelica Ruelas MD  Pager [phone]     Patient is a 87y old  Female who presents with a chief complaint of AMS (22 Oct 2020 11:10)      SUBJECTIVE / OVERNIGHT EVENTS: Patient seen and examined at bedside. Patient confused but calm, no complaints, denies pain.    ADDITIONAL REVIEW OF SYSTEMS: unable to fully obtain due to dementia/AMS    MEDICATIONS  (STANDING):  amLODIPine   Tablet 5 milliGRAM(s) Oral daily  apixaban 2.5 milliGRAM(s) Oral two times a day  atorvastatin 10 milliGRAM(s) Oral at bedtime  dextrose 5%. 1000 milliLiter(s) (50 mL/Hr) IV Continuous <Continuous>  dextrose 50% Injectable 12.5 Gram(s) IV Push once  dextrose 50% Injectable 25 Gram(s) IV Push once  dextrose 50% Injectable 25 Gram(s) IV Push once  insulin glargine Injectable (LANTUS) 15 Unit(s) SubCutaneous at bedtime  insulin lispro (ADMELOG) corrective regimen sliding scale   SubCutaneous three times a day before meals  insulin lispro (ADMELOG) corrective regimen sliding scale   SubCutaneous at bedtime  insulin lispro Injectable (ADMELOG) 5 Unit(s) SubCutaneous three times a day before meals  levothyroxine 112 MICROGram(s) Oral daily  polyethylene glycol 3350 17 Gram(s) Oral daily  sodium zirconium cyclosilicate 5 Gram(s) Oral once    MEDICATIONS  (PRN):  acetaminophen   Tablet .. 650 milliGRAM(s) Oral every 6 hours PRN Temp greater or equal to 38C (100.4F), Mild Pain (1 - 3)  dextrose 40% Gel 15 Gram(s) Oral once PRN Blood Glucose LESS THAN 70 milliGRAM(s)/deciliter  glucagon  Injectable 1 milliGRAM(s) IntraMuscular once PRN Glucose LESS THAN 70 milligrams/deciliter      CAPILLARY BLOOD GLUCOSE      POCT Blood Glucose.: 246 mg/dL (23 Oct 2020 07:43)  POCT Blood Glucose.: 358 mg/dL (22 Oct 2020 22:28)  POCT Blood Glucose.: 440 mg/dL (22 Oct 2020 22:27)  POCT Blood Glucose.: 360 mg/dL (22 Oct 2020 17:19)  POCT Blood Glucose.: 192 mg/dL (22 Oct 2020 12:40)    I&O's Summary    22 Oct 2020 07:01  -  23 Oct 2020 07:00  --------------------------------------------------------  IN: 840 mL / OUT: 1400 mL / NET: -560 mL    23 Oct 2020 07:01  -  23 Oct 2020 12:20  --------------------------------------------------------  IN: 360 mL / OUT: 0 mL / NET: 360 mL        PHYSICAL EXAM:  Vital Signs Last 24 Hrs  T(C): 36.7 (23 Oct 2020 04:59), Max: 36.7 (23 Oct 2020 04:59)  T(F): 98.1 (23 Oct 2020 04:59), Max: 98.1 (23 Oct 2020 04:59)  HR: 111 (23 Oct 2020 04:59) (100 - 111)  BP: 141/74 (23 Oct 2020 04:59) (140/84 - 141/74)  BP(mean): --  RR: 18 (23 Oct 2020 04:59) (18 - 18)  SpO2: 98% (23 Oct 2020 04:59) (96% - 98%)    CONSTITUTIONAL: NAD  RESPIRATORY: Normal respiratory effort; lungs are clear to auscultation bilaterally  CARDIOVASCULAR: Regular rate and rhythm, normal S1 and S2, no murmur/rub/gallop; No lower extremity edema; Peripheral pulses are 2+ bilaterally  ABDOMEN: Nontender to palpation, normoactive bowel sounds, no rebound/guarding; No hepatosplenomegaly  MUSCLOSKELETAL: no clubbing or cyanosis of digits; no joint swelling or tenderness to palpation  PSYCH: A+O to person    LABS:                        13.2   10.52 )-----------( 567      ( 23 Oct 2020 07:01 )             41.2     10-23    132<L>  |  101  |  54<H>  ----------------------------<  240<H>  5.5<H>   |  16<L>  |  1.32<H>    Ca    9.8      23 Oct 2020 07:01                  RADIOLOGY & ADDITIONAL TESTS:  Results Reviewed:   Imaging Personally Reviewed:  Electrocardiogram Personally Reviewed:    COORDINATION OF CARE:  Care Discussed with Consultants/Other Providers [Y/N]:  Prior or Outpatient Records Reviewed [Y/N]:

## 2020-10-24 LAB
ANION GAP SERPL CALC-SCNC: 14 MMOL/L — SIGNIFICANT CHANGE UP (ref 5–17)
BUN SERPL-MCNC: 57 MG/DL — HIGH (ref 7–23)
CALCIUM SERPL-MCNC: 10.1 MG/DL — SIGNIFICANT CHANGE UP (ref 8.4–10.5)
CHLORIDE SERPL-SCNC: 106 MMOL/L — SIGNIFICANT CHANGE UP (ref 96–108)
CO2 SERPL-SCNC: 19 MMOL/L — LOW (ref 22–31)
CREAT SERPL-MCNC: 1.29 MG/DL — SIGNIFICANT CHANGE UP (ref 0.5–1.3)
GLUCOSE BLDC GLUCOMTR-MCNC: 167 MG/DL — HIGH (ref 70–99)
GLUCOSE BLDC GLUCOMTR-MCNC: 241 MG/DL — HIGH (ref 70–99)
GLUCOSE BLDC GLUCOMTR-MCNC: 395 MG/DL — HIGH (ref 70–99)
GLUCOSE BLDC GLUCOMTR-MCNC: 444 MG/DL — HIGH (ref 70–99)
GLUCOSE BLDC GLUCOMTR-MCNC: 451 MG/DL — CRITICAL HIGH (ref 70–99)
GLUCOSE SERPL-MCNC: 128 MG/DL — HIGH (ref 70–99)
HCT VFR BLD CALC: 41.5 % — SIGNIFICANT CHANGE UP (ref 34.5–45)
HGB BLD-MCNC: 13.5 G/DL — SIGNIFICANT CHANGE UP (ref 11.5–15.5)
MAGNESIUM SERPL-MCNC: 2.6 MG/DL — SIGNIFICANT CHANGE UP (ref 1.6–2.6)
MCHC RBC-ENTMCNC: 32.5 GM/DL — SIGNIFICANT CHANGE UP (ref 32–36)
MCHC RBC-ENTMCNC: 37.8 PG — HIGH (ref 27–34)
MCV RBC AUTO: 116.2 FL — HIGH (ref 80–100)
NRBC # BLD: 0 /100 WBCS — SIGNIFICANT CHANGE UP (ref 0–0)
PHOSPHATE SERPL-MCNC: 3.9 MG/DL — SIGNIFICANT CHANGE UP (ref 2.5–4.5)
PLATELET # BLD AUTO: 604 K/UL — HIGH (ref 150–400)
POTASSIUM SERPL-MCNC: 4.9 MMOL/L — SIGNIFICANT CHANGE UP (ref 3.5–5.3)
POTASSIUM SERPL-SCNC: 4.9 MMOL/L — SIGNIFICANT CHANGE UP (ref 3.5–5.3)
RBC # BLD: 3.57 M/UL — LOW (ref 3.8–5.2)
RBC # FLD: 13.2 % — SIGNIFICANT CHANGE UP (ref 10.3–14.5)
SODIUM SERPL-SCNC: 139 MMOL/L — SIGNIFICANT CHANGE UP (ref 135–145)
WBC # BLD: 9.74 K/UL — SIGNIFICANT CHANGE UP (ref 3.8–10.5)
WBC # FLD AUTO: 9.74 K/UL — SIGNIFICANT CHANGE UP (ref 3.8–10.5)

## 2020-10-24 PROCEDURE — 99232 SBSQ HOSP IP/OBS MODERATE 35: CPT

## 2020-10-24 RX ADMIN — Medication 5 UNIT(S): at 17:42

## 2020-10-24 RX ADMIN — Medication 5 UNIT(S): at 12:23

## 2020-10-24 RX ADMIN — Medication 2: at 08:45

## 2020-10-24 RX ADMIN — ATORVASTATIN CALCIUM 10 MILLIGRAM(S): 80 TABLET, FILM COATED ORAL at 22:22

## 2020-10-24 RX ADMIN — POLYETHYLENE GLYCOL 3350 17 GRAM(S): 17 POWDER, FOR SOLUTION ORAL at 12:23

## 2020-10-24 RX ADMIN — Medication 12: at 17:42

## 2020-10-24 RX ADMIN — AMLODIPINE BESYLATE 5 MILLIGRAM(S): 2.5 TABLET ORAL at 05:47

## 2020-10-24 RX ADMIN — Medication 112 MICROGRAM(S): at 05:47

## 2020-10-24 RX ADMIN — Medication 5 UNIT(S): at 08:44

## 2020-10-24 RX ADMIN — APIXABAN 2.5 MILLIGRAM(S): 2.5 TABLET, FILM COATED ORAL at 05:47

## 2020-10-24 RX ADMIN — INSULIN GLARGINE 15 UNIT(S): 100 INJECTION, SOLUTION SUBCUTANEOUS at 22:22

## 2020-10-24 RX ADMIN — Medication 10: at 12:23

## 2020-10-24 RX ADMIN — APIXABAN 2.5 MILLIGRAM(S): 2.5 TABLET, FILM COATED ORAL at 17:19

## 2020-10-24 NOTE — PROVIDER CONTACT NOTE (OTHER) - ASSESSMENT
pt is a+ox2, Vitals as follows: /78, , RR 18, O2sat 97% on ra, temp 98.3F. FS monitored- stable. pt has increased lethargy. sleeping throughout day and night. responsive with voice.

## 2020-10-24 NOTE — PROGRESS NOTE ADULT - SUBJECTIVE AND OBJECTIVE BOX
[facility] Division of Hospital Medicine  Chela Garcia MD  Pager (M-F, 3F-3W): [phone]  Other Times:  [phone]    Patient is a 87y old  Female who presents with a chief complaint of AMS (23 Oct 2020 12:20)      SUBJECTIVE / OVERNIGHT EVENTS: No acute events.    ADDITIONAL REVIEW OF SYSTEMS: Unable to obtain due to mental status    MEDICATIONS  (STANDING):  amLODIPine   Tablet 5 milliGRAM(s) Oral daily  apixaban 2.5 milliGRAM(s) Oral two times a day  atorvastatin 10 milliGRAM(s) Oral at bedtime  dextrose 5%. 1000 milliLiter(s) (50 mL/Hr) IV Continuous <Continuous>  dextrose 50% Injectable 12.5 Gram(s) IV Push once  dextrose 50% Injectable 25 Gram(s) IV Push once  dextrose 50% Injectable 25 Gram(s) IV Push once  insulin glargine Injectable (LANTUS) 15 Unit(s) SubCutaneous at bedtime  insulin lispro (ADMELOG) corrective regimen sliding scale   SubCutaneous three times a day before meals  insulin lispro (ADMELOG) corrective regimen sliding scale   SubCutaneous at bedtime  insulin lispro Injectable (ADMELOG) 5 Unit(s) SubCutaneous three times a day before meals  levothyroxine 112 MICROGram(s) Oral daily  polyethylene glycol 3350 17 Gram(s) Oral daily    MEDICATIONS  (PRN):  acetaminophen   Tablet .. 650 milliGRAM(s) Oral every 6 hours PRN Temp greater or equal to 38C (100.4F), Mild Pain (1 - 3)  dextrose 40% Gel 15 Gram(s) Oral once PRN Blood Glucose LESS THAN 70 milliGRAM(s)/deciliter  glucagon  Injectable 1 milliGRAM(s) IntraMuscular once PRN Glucose LESS THAN 70 milligrams/deciliter      CAPILLARY BLOOD GLUCOSE      POCT Blood Glucose.: 395 mg/dL (24 Oct 2020 12:01)  POCT Blood Glucose.: 167 mg/dL (24 Oct 2020 08:30)  POCT Blood Glucose.: 130 mg/dL (23 Oct 2020 23:44)    I&O's Summary    23 Oct 2020 07:01  -  24 Oct 2020 07:00  --------------------------------------------------------  IN: 1340 mL / OUT: 650 mL / NET: 690 mL    24 Oct 2020 07:01  -  24 Oct 2020 17:30  --------------------------------------------------------  IN: 240 mL / OUT: 0 mL / NET: 240 mL        PHYSICAL EXAM:  Vital Signs Last 24 Hrs  T(C): 36.9 (24 Oct 2020 14:03), Max: 37 (23 Oct 2020 20:59)  T(F): 98.4 (24 Oct 2020 14:03), Max: 98.6 (23 Oct 2020 20:59)  HR: 109 (24 Oct 2020 14:03) (102 - 109)  BP: 139/84 (24 Oct 2020 14:03) (135/79 - 140/78)  BP(mean): --  RR: 18 (24 Oct 2020 14:03) (18 - 18)  SpO2: 97% (24 Oct 2020 14:03) (96% - 97%)    CONSTITUTIONAL: NAD, frail appearing elderly woman  EYES: PERRLA; conjunctiva and sclera clear  ENMT: Moist oral mucosa  NECK: Supple  RESPIRATORY: Normal respiratory effort; lungs are clear to auscultation bilaterally  CARDIOVASCULAR:  normal S1 and S2, no murmur/rub/gallop; No lower extremity edema  ABDOMEN: Nontender to palpation, normoactive bowel sounds, no rebound/guarding  MUSCULOSKELETAL: no joint swelling or tenderness to palpation  PSYCH: A+O to person; affect appropriate  NEUROLOGY: no gross sensory deficits   SKIN: No rashes; no palpable lesions    LABS:                        13.5   9.74  )-----------( 604      ( 24 Oct 2020 07:12 )             41.5     10-24    139  |  106  |  57<H>  ----------------------------<  128<H>  4.9   |  19<L>  |  1.29    Ca    10.1      24 Oct 2020 07:12  Phos  3.9     10-24  Mg     2.6     10-24

## 2020-10-25 LAB
GLUCOSE BLDC GLUCOMTR-MCNC: 159 MG/DL — HIGH (ref 70–99)
GLUCOSE BLDC GLUCOMTR-MCNC: 193 MG/DL — HIGH (ref 70–99)
GLUCOSE BLDC GLUCOMTR-MCNC: 291 MG/DL — HIGH (ref 70–99)
GLUCOSE BLDC GLUCOMTR-MCNC: 291 MG/DL — HIGH (ref 70–99)
SARS-COV-2 RNA SPEC QL NAA+PROBE: SIGNIFICANT CHANGE UP

## 2020-10-25 PROCEDURE — 99232 SBSQ HOSP IP/OBS MODERATE 35: CPT

## 2020-10-25 PROCEDURE — 93306 TTE W/DOPPLER COMPLETE: CPT | Mod: 26

## 2020-10-25 PROCEDURE — 76377 3D RENDER W/INTRP POSTPROCES: CPT | Mod: 26

## 2020-10-25 RX ORDER — NYSTATIN CREAM 100000 [USP'U]/G
1 CREAM TOPICAL
Refills: 0 | Status: DISCONTINUED | OUTPATIENT
Start: 2020-10-25 | End: 2020-10-30

## 2020-10-25 RX ORDER — INSULIN GLARGINE 100 [IU]/ML
17 INJECTION, SOLUTION SUBCUTANEOUS AT BEDTIME
Refills: 0 | Status: DISCONTINUED | OUTPATIENT
Start: 2020-10-25 | End: 2020-10-27

## 2020-10-25 RX ORDER — INSULIN LISPRO 100/ML
7 VIAL (ML) SUBCUTANEOUS
Refills: 0 | Status: DISCONTINUED | OUTPATIENT
Start: 2020-10-25 | End: 2020-10-26

## 2020-10-25 RX ADMIN — Medication 2: at 08:49

## 2020-10-25 RX ADMIN — ATORVASTATIN CALCIUM 10 MILLIGRAM(S): 80 TABLET, FILM COATED ORAL at 22:25

## 2020-10-25 RX ADMIN — POLYETHYLENE GLYCOL 3350 17 GRAM(S): 17 POWDER, FOR SOLUTION ORAL at 12:32

## 2020-10-25 RX ADMIN — Medication 7 UNIT(S): at 17:42

## 2020-10-25 RX ADMIN — Medication 112 MICROGRAM(S): at 05:11

## 2020-10-25 RX ADMIN — Medication 6: at 12:33

## 2020-10-25 RX ADMIN — APIXABAN 2.5 MILLIGRAM(S): 2.5 TABLET, FILM COATED ORAL at 17:16

## 2020-10-25 RX ADMIN — Medication 5 UNIT(S): at 12:33

## 2020-10-25 RX ADMIN — AMLODIPINE BESYLATE 5 MILLIGRAM(S): 2.5 TABLET ORAL at 05:11

## 2020-10-25 RX ADMIN — APIXABAN 2.5 MILLIGRAM(S): 2.5 TABLET, FILM COATED ORAL at 05:11

## 2020-10-25 RX ADMIN — NYSTATIN CREAM 1 APPLICATION(S): 100000 CREAM TOPICAL at 17:21

## 2020-10-25 RX ADMIN — Medication 5 UNIT(S): at 08:49

## 2020-10-25 RX ADMIN — Medication 6: at 17:41

## 2020-10-25 RX ADMIN — INSULIN GLARGINE 17 UNIT(S): 100 INJECTION, SOLUTION SUBCUTANEOUS at 22:25

## 2020-10-25 NOTE — PROGRESS NOTE ADULT - SUBJECTIVE AND OBJECTIVE BOX
[facility] Division of Hospital Medicine  Chela Garcia MD  Pager (M-F, 8A-5P): [phone]  Other Times:  [phone]    Patient is a 87y old  Female who presents with a chief complaint of AMS (23 Oct 2020 12:20)      SUBJECTIVE / OVERNIGHT EVENTS: No acute events. FS remains poorly controlled - insulin adjusted.    ADDITIONAL REVIEW OF SYSTEMS: Unable to obtain due to mental status    MEDICATIONS  (STANDING):  amLODIPine   Tablet 5 milliGRAM(s) Oral daily  apixaban 2.5 milliGRAM(s) Oral two times a day  atorvastatin 10 milliGRAM(s) Oral at bedtime  dextrose 5%. 1000 milliLiter(s) (50 mL/Hr) IV Continuous <Continuous>  dextrose 50% Injectable 12.5 Gram(s) IV Push once  dextrose 50% Injectable 25 Gram(s) IV Push once  dextrose 50% Injectable 25 Gram(s) IV Push once  insulin glargine Injectable (LANTUS) 17 Unit(s) SubCutaneous at bedtime  insulin lispro (ADMELOG) corrective regimen sliding scale   SubCutaneous three times a day before meals  insulin lispro (ADMELOG) corrective regimen sliding scale   SubCutaneous at bedtime  insulin lispro Injectable (ADMELOG) 7 Unit(s) SubCutaneous three times a day before meals  levothyroxine 112 MICROGram(s) Oral daily  nystatin Cream 1 Application(s) Topical two times a day  polyethylene glycol 3350 17 Gram(s) Oral daily    MEDICATIONS  (PRN):  acetaminophen   Tablet .. 650 milliGRAM(s) Oral every 6 hours PRN Temp greater or equal to 38C (100.4F), Mild Pain (1 - 3)  dextrose 40% Gel 15 Gram(s) Oral once PRN Blood Glucose LESS THAN 70 milliGRAM(s)/deciliter  glucagon  Injectable 1 milliGRAM(s) IntraMuscular once PRN Glucose LESS THAN 70 milligrams/deciliter          PHYSICAL EXAM:  T(C): 37.1 (10-25-20 @ 12:16), Max: 37.1 (10-25-20 @ 12:16)  T(F): 98.8 (10-25-20 @ 12:16), Max: 98.8 (10-25-20 @ 12:16)  HR: 107 (10-25-20 @ 12:16) (102 - 109)  BP: 143/85 (10-25-20 @ 12:16) (122/80 - 143/85)  RR: 18 (10-25-20 @ 12:16) (18 - 18)  SpO2: 97% (10-25-20 @ 12:16) (97% - 97%)  Wt(kg): --    CONSTITUTIONAL: NAD, frail appearing elderly woman  EYES: PERRLA; conjunctiva and sclera clear  ENMT: Moist oral mucosa  NECK: Supple  RESPIRATORY: Normal respiratory effort; lungs are clear to auscultation bilaterally  CARDIOVASCULAR:  normal S1 and S2, no murmur/rub/gallop; No lower extremity edema  ABDOMEN: Nontender to palpation, normoactive bowel sounds, no rebound/guarding  MUSCULOSKELETAL: no joint swelling or tenderness to palpation  PSYCH: A+O to person; affect appropriate  NEUROLOGY: no gross sensory deficits   SKIN: No rashes; no palpable lesions    LABS/RADIOLOGY RESULTS:                          13.5   9.74  )-----------( 604      ( 24 Oct 2020 07:12 )             41.5   10-24    139  |  106  |  57<H>  ----------------------------<  128<H>  4.9   |  19<L>  |  1.29    Ca    10.1      24 Oct 2020 07:12  Phos  3.9     10-24  Mg     2.6     10-24

## 2020-10-26 LAB
GLUCOSE BLDC GLUCOMTR-MCNC: 205 MG/DL — HIGH (ref 70–99)
GLUCOSE BLDC GLUCOMTR-MCNC: 261 MG/DL — HIGH (ref 70–99)
GLUCOSE BLDC GLUCOMTR-MCNC: 266 MG/DL — HIGH (ref 70–99)
GLUCOSE BLDC GLUCOMTR-MCNC: 343 MG/DL — HIGH (ref 70–99)

## 2020-10-26 PROCEDURE — 99233 SBSQ HOSP IP/OBS HIGH 50: CPT

## 2020-10-26 PROCEDURE — 70450 CT HEAD/BRAIN W/O DYE: CPT | Mod: 26

## 2020-10-26 RX ORDER — POLYETHYLENE GLYCOL 3350 17 G/17G
17 POWDER, FOR SOLUTION ORAL
Qty: 0 | Refills: 0 | DISCHARGE
Start: 2020-10-26

## 2020-10-26 RX ORDER — AMLODIPINE BESYLATE 2.5 MG/1
1 TABLET ORAL
Qty: 0 | Refills: 0 | DISCHARGE
Start: 2020-10-26

## 2020-10-26 RX ORDER — NYSTATIN CREAM 100000 [USP'U]/G
1 CREAM TOPICAL
Qty: 0 | Refills: 0 | DISCHARGE
Start: 2020-10-26

## 2020-10-26 RX ORDER — INSULIN LISPRO 100/ML
10 VIAL (ML) SUBCUTANEOUS
Refills: 0 | Status: DISCONTINUED | OUTPATIENT
Start: 2020-10-26 | End: 2020-10-27

## 2020-10-26 RX ORDER — METOPROLOL TARTRATE 50 MG
12.5 TABLET ORAL
Refills: 0 | Status: DISCONTINUED | OUTPATIENT
Start: 2020-10-26 | End: 2020-10-30

## 2020-10-26 RX ADMIN — NYSTATIN CREAM 1 APPLICATION(S): 100000 CREAM TOPICAL at 18:12

## 2020-10-26 RX ADMIN — Medication 1: at 22:19

## 2020-10-26 RX ADMIN — NYSTATIN CREAM 1 APPLICATION(S): 100000 CREAM TOPICAL at 05:31

## 2020-10-26 RX ADMIN — Medication 7 UNIT(S): at 08:34

## 2020-10-26 RX ADMIN — Medication 7 UNIT(S): at 13:26

## 2020-10-26 RX ADMIN — APIXABAN 2.5 MILLIGRAM(S): 2.5 TABLET, FILM COATED ORAL at 05:30

## 2020-10-26 RX ADMIN — Medication 8: at 13:25

## 2020-10-26 RX ADMIN — POLYETHYLENE GLYCOL 3350 17 GRAM(S): 17 POWDER, FOR SOLUTION ORAL at 12:22

## 2020-10-26 RX ADMIN — INSULIN GLARGINE 17 UNIT(S): 100 INJECTION, SOLUTION SUBCUTANEOUS at 22:13

## 2020-10-26 RX ADMIN — ATORVASTATIN CALCIUM 10 MILLIGRAM(S): 80 TABLET, FILM COATED ORAL at 22:13

## 2020-10-26 RX ADMIN — AMLODIPINE BESYLATE 5 MILLIGRAM(S): 2.5 TABLET ORAL at 05:30

## 2020-10-26 RX ADMIN — Medication 112 MICROGRAM(S): at 05:30

## 2020-10-26 RX ADMIN — Medication 4: at 08:34

## 2020-10-26 RX ADMIN — Medication 7 UNIT(S): at 18:12

## 2020-10-26 RX ADMIN — Medication 6: at 18:12

## 2020-10-26 RX ADMIN — APIXABAN 2.5 MILLIGRAM(S): 2.5 TABLET, FILM COATED ORAL at 22:13

## 2020-10-26 NOTE — PROGRESS NOTE ADULT - SUBJECTIVE AND OBJECTIVE BOX
PROGRESS NOTE:   Authored by Dr. Angelica Ruelas MD  Pager [phone]     Patient is a 87y old  Female who presents with a chief complaint of AMS (25 Oct 2020 13:18)      SUBJECTIVE / OVERNIGHT EVENTS: Patient seen and examined at bedside. Patient laying in bed, appears leaning to the right, can move neck in the other direction. Worked with PT but overall appears weaker.    ADDITIONAL REVIEW OF SYSTEMS: unable to fully obtain due to confusion (did not understand  phone)    MEDICATIONS  (STANDING):  amLODIPine   Tablet 5 milliGRAM(s) Oral daily  apixaban 2.5 milliGRAM(s) Oral two times a day  atorvastatin 10 milliGRAM(s) Oral at bedtime  dextrose 5%. 1000 milliLiter(s) (50 mL/Hr) IV Continuous <Continuous>  dextrose 50% Injectable 12.5 Gram(s) IV Push once  dextrose 50% Injectable 25 Gram(s) IV Push once  dextrose 50% Injectable 25 Gram(s) IV Push once  insulin glargine Injectable (LANTUS) 17 Unit(s) SubCutaneous at bedtime  insulin lispro (ADMELOG) corrective regimen sliding scale   SubCutaneous three times a day before meals  insulin lispro (ADMELOG) corrective regimen sliding scale   SubCutaneous at bedtime  insulin lispro Injectable (ADMELOG) 7 Unit(s) SubCutaneous three times a day before meals  levothyroxine 112 MICROGram(s) Oral daily  nystatin Cream 1 Application(s) Topical two times a day  polyethylene glycol 3350 17 Gram(s) Oral daily    MEDICATIONS  (PRN):  acetaminophen   Tablet .. 650 milliGRAM(s) Oral every 6 hours PRN Temp greater or equal to 38C (100.4F), Mild Pain (1 - 3)  dextrose 40% Gel 15 Gram(s) Oral once PRN Blood Glucose LESS THAN 70 milliGRAM(s)/deciliter  glucagon  Injectable 1 milliGRAM(s) IntraMuscular once PRN Glucose LESS THAN 70 milligrams/deciliter      CAPILLARY BLOOD GLUCOSE      POCT Blood Glucose.: 205 mg/dL (26 Oct 2020 08:15)  POCT Blood Glucose.: 159 mg/dL (25 Oct 2020 22:12)  POCT Blood Glucose.: 291 mg/dL (25 Oct 2020 17:34)    I&O's Summary    25 Oct 2020 07:01  -  26 Oct 2020 07:00  --------------------------------------------------------  IN: 720 mL / OUT: 700 mL / NET: 20 mL        PHYSICAL EXAM:  Vital Signs Last 24 Hrs  T(C): 36.3 (26 Oct 2020 12:18), Max: 37.2 (26 Oct 2020 04:52)  T(F): 97.4 (26 Oct 2020 12:18), Max: 99 (26 Oct 2020 04:52)  HR: 108 (26 Oct 2020 12:18) (98 - 111)  BP: 135/75 (26 Oct 2020 12:18) (122/70 - 154/87)  BP(mean): --  RR: 18 (26 Oct 2020 12:18) (18 - 18)  SpO2: 98% (26 Oct 2020 12:18) (97% - 99%)    CONSTITUTIONAL: NAD  RESPIRATORY: Normal respiratory effort; lungs are clear to auscultation bilaterally  CARDIOVASCULAR: tachycardic, normal S1 and S2, no murmur/rub/gallop; No lower extremity edema; Peripheral pulses are 2+ bilaterally  ABDOMEN: Nontender to palpation, normoactive bowel sounds, no rebound/guarding; No hepatosplenomegaly  MUSCLOSKELETAL: no clubbing or cyanosis of digits; no joint swelling or tenderness to palpation  PSYCH: A+O to person    LABS:                      RADIOLOGY & ADDITIONAL TESTS:  Results Reviewed:   Imaging Personally Reviewed:  Electrocardiogram Personally Reviewed:    COORDINATION OF CARE:  Care Discussed with Consultants/Other Providers [Y/N]:  Prior or Outpatient Records Reviewed [Y/N]:

## 2020-10-26 NOTE — CONSULT NOTE ADULT - ASSESSMENT
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     - maintain euvolemia, avoid diuretics, would recommend gentle IV fluids, especially if patient is not taking in sufficient PO   - can start metoprolol tartrate 12.5mg bid to decrease inotropy and improve dynamic obstruction   - continue to monitor on telemetry   - cardiology will continue to follow

## 2020-10-26 NOTE — CONSULT NOTE ADULT - SUBJECTIVE AND OBJECTIVE BOX
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 Pt unable to provide hx 2/2 AMS, per chart pt was in usual state of health until earlier last week when family noted pt to be confused - at baseline mild dementia and family has noted progressive decline over past year, but normally able to answer questions oriented x 2-3. New AMS thought to be in s/o possible infection from UTI c/b progression of vascular dementia. TTE obtained in s/o persistent sinus tach, incidental finding of moderate LVOT obstruction, cardiology consulted for further management. Difficult to assess exercise tolerance or exertional symptoms as patient is not cooperative with history.     PMHx:   SBO (Small Bowel Obstruction)  Stroke  Deep Vein Thrombosis (DVT)  Adult Hypothyroidism  Hypercholesteremia  Diabetes  Diabetes  Benign Hypertension    PSHx:   FH: Total Abdominal Hysterectomy and Bilateral Salpingo-Oophorectomy    Allergies:  No Known Drug Allergies  spices (Rash)    Current Medications:   acetaminophen   Tablet .. 650 milliGRAM(s) Oral every 6 hours PRN  amLODIPine   Tablet 5 milliGRAM(s) Oral daily  apixaban 2.5 milliGRAM(s) Oral two times a day  atorvastatin 10 milliGRAM(s) Oral at bedtime  dextrose 40% Gel 15 Gram(s) Oral once PRN  dextrose 5%. 1000 milliLiter(s) IV Continuous <Continuous>  dextrose 50% Injectable 12.5 Gram(s) IV Push once  dextrose 50% Injectable 25 Gram(s) IV Push once  dextrose 50% Injectable 25 Gram(s) IV Push once  glucagon  Injectable 1 milliGRAM(s) IntraMuscular once PRN  insulin glargine Injectable (LANTUS) 17 Unit(s) SubCutaneous at bedtime  insulin lispro (ADMELOG) corrective regimen sliding scale   SubCutaneous three times a day before meals  insulin lispro (ADMELOG) corrective regimen sliding scale   SubCutaneous at bedtime  insulin lispro Injectable (ADMELOG) 7 Unit(s) SubCutaneous three times a day before meals  levothyroxine 112 MICROGram(s) Oral daily  nystatin Cream 1 Application(s) Topical two times a day  polyethylene glycol 3350 17 Gram(s) Oral daily      Physical Exam:  T(F): 97.4 (10-26), Max: 99 (10-26)  HR: 108 (10-26) (98 - 111)  BP: 135/75 (10-26) (122/70 - 154/87)  RR: 18 (10-26)  SpO2: 98% (10-26)  GENERAL: No acute distress, well-developed  HEAD:  Atraumatic, Normocephalic  ENT: EOMI, PERRLA, conjunctiva and sclera clear, Neck supple, No JVD, moist mucosa  CHEST/LUNG: Clear to auscultation bilaterally; No wheeze, equal breath sounds bilaterally   BACK: No spinal tenderness  HEART: Regular rate and rhythm; No murmurs, rubs, or gallops  ABDOMEN: Soft, Nontender, Nondistended; Bowel sounds present  EXTREMITIES:  No clubbing, cyanosis, or edema  PSYCH: Nl behavior, nl affect  NEUROLOGY: AAOx3, non-focal, cranial nerves intact  SKIN: Normal color, No rashes or lesions  LINES:    Cardiovascular Diagnostic Testing:    EKG sinus tach w/ repol     TTE 1. Aortic valve not well visualized; appears calcified.  2. Increased relative wall thickness with normal left  ventricular mass index, consistent with concentric left  ventricular remodeling.  3. Hyperdynamic left ventricular systolic function. Small  LV.    Peak left ventricular outflow tract gradient  equals 49 mm Hg, consistent with moderate LVOT obstruction.  4. The right ventricle is not well visualized; grossly  normal right ventricular systolic function. RV small.  5. Normal tricuspid valve. Moderate tricuspid  regurgitation.  6. Estimated pulmonary artery systolic pressure equals 46  mm Hg, assuming right atrial pressure equals 8 mm Hg,  consistent with mild pulmonary pressures.    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 Pt unable to provide hx 2/2 AMS, per chart pt was in usual state of health until earlier last week when family noted pt to be confused - at baseline mild dementia and family has noted progressive decline over past year, but normally able to answer questions oriented x 2-3. New AMS thought to be in s/o possible infection from UTI c/b progression of vascular dementia. TTE obtained in s/o persistent sinus tach, incidental finding of moderate LVOT obstruction, cardiology consulted for further management. Difficult to assess exercise tolerance or exertional symptoms as patient is not cooperative with history.     PMHx:   SBO (Small Bowel Obstruction)  Stroke  Deep Vein Thrombosis (DVT)  Adult Hypothyroidism  Hypercholesteremia  Diabetes  Diabetes  Benign Hypertension    PSHx:   FH: Total Abdominal Hysterectomy and Bilateral Salpingo-Oophorectomy    Allergies:  No Known Drug Allergies  spices (Rash)    Current Medications:   acetaminophen   Tablet .. 650 milliGRAM(s) Oral every 6 hours PRN  amLODIPine   Tablet 5 milliGRAM(s) Oral daily  apixaban 2.5 milliGRAM(s) Oral two times a day  atorvastatin 10 milliGRAM(s) Oral at bedtime  dextrose 40% Gel 15 Gram(s) Oral once PRN  dextrose 5%. 1000 milliLiter(s) IV Continuous <Continuous>  dextrose 50% Injectable 12.5 Gram(s) IV Push once  dextrose 50% Injectable 25 Gram(s) IV Push once  dextrose 50% Injectable 25 Gram(s) IV Push once  glucagon  Injectable 1 milliGRAM(s) IntraMuscular once PRN  insulin glargine Injectable (LANTUS) 17 Unit(s) SubCutaneous at bedtime  insulin lispro (ADMELOG) corrective regimen sliding scale   SubCutaneous three times a day before meals  insulin lispro (ADMELOG) corrective regimen sliding scale   SubCutaneous at bedtime  insulin lispro Injectable (ADMELOG) 7 Unit(s) SubCutaneous three times a day before meals  levothyroxine 112 MICROGram(s) Oral daily  nystatin Cream 1 Application(s) Topical two times a day  polyethylene glycol 3350 17 Gram(s) Oral daily      Physical Exam:  T(F): 97.4 (10-26), Max: 99 (10-26)  HR: 108 (10-26) (98 - 111)  BP: 135/75 (10-26) (122/70 - 154/87)  RR: 18 (10-26)  SpO2: 98% (10-26)  GENERAL: No acute distress, well-developed  HEAD:  Atraumatic, Normocephalic  ENT: EOMI, PERRLA, conjunctiva and sclera clear, Neck supple, No JVD, moist mucosa  CHEST/LUNG: Clear to auscultation bilaterally; No wheeze, equal breath sounds bilaterally   BACK: No spinal tenderness  HEART: Regular rate and rhythm; No murmurs, rubs, or gallops  ABDOMEN: Soft, Nontender, Nondistended; Bowel sounds present  EXTREMITIES:  No clubbing, cyanosis, or edema  PSYCH: Nl behavior, nl affect  NEUROLOGY: AAOx3, non-focal, cranial nerves intact  SKIN: Normal color, No rashes or lesions  LINES:    Cardiovascular Diagnostic Testing:    EKG sinus tach w/ repol     TTE 1. Aortic valve not well visualized; appears calcified.  2. Increased relative wall thickness with normal left  ventricular mass index, consistent with concentric left  ventricular remodeling.  3. Hyperdynamic left ventricular systolic function. Small  LV.    Peak left ventricular outflow tract gradient  equals 49 mm Hg, consistent with moderate LVOT obstruction.  4. The right ventricle is not well visualized; grossly  normal right ventricular systolic function. RV small.  5. Normal tricuspid valve. Moderate tricuspid  regurgitation.  6. Estimated pulmonary artery systolic pressure equals 46  mm Hg, assuming right atrial pressure equals 8 mm Hg,  consistent with mild pulmonary pressures.    CXR: Personally reviewed    Labs: Personally reviewed

## 2020-10-27 LAB
ANION GAP SERPL CALC-SCNC: 14 MMOL/L — SIGNIFICANT CHANGE UP (ref 5–17)
BUN SERPL-MCNC: 82 MG/DL — HIGH (ref 7–23)
CALCIUM SERPL-MCNC: 10.1 MG/DL — SIGNIFICANT CHANGE UP (ref 8.4–10.5)
CHLORIDE SERPL-SCNC: 106 MMOL/L — SIGNIFICANT CHANGE UP (ref 96–108)
CO2 SERPL-SCNC: 19 MMOL/L — LOW (ref 22–31)
CREAT SERPL-MCNC: 1.36 MG/DL — HIGH (ref 0.5–1.3)
GLUCOSE BLDC GLUCOMTR-MCNC: 126 MG/DL — HIGH (ref 70–99)
GLUCOSE BLDC GLUCOMTR-MCNC: 287 MG/DL — HIGH (ref 70–99)
GLUCOSE BLDC GLUCOMTR-MCNC: 76 MG/DL — SIGNIFICANT CHANGE UP (ref 70–99)
GLUCOSE BLDC GLUCOMTR-MCNC: 99 MG/DL — SIGNIFICANT CHANGE UP (ref 70–99)
GLUCOSE SERPL-MCNC: 82 MG/DL — SIGNIFICANT CHANGE UP (ref 70–99)
HCT VFR BLD CALC: 39.4 % — SIGNIFICANT CHANGE UP (ref 34.5–45)
HGB BLD-MCNC: 12.7 G/DL — SIGNIFICANT CHANGE UP (ref 11.5–15.5)
MCHC RBC-ENTMCNC: 32.2 GM/DL — SIGNIFICANT CHANGE UP (ref 32–36)
MCHC RBC-ENTMCNC: 35.9 PG — HIGH (ref 27–34)
MCV RBC AUTO: 111.3 FL — HIGH (ref 80–100)
NRBC # BLD: 0 /100 WBCS — SIGNIFICANT CHANGE UP (ref 0–0)
PLATELET # BLD AUTO: 576 K/UL — HIGH (ref 150–400)
POTASSIUM SERPL-MCNC: 5.1 MMOL/L — SIGNIFICANT CHANGE UP (ref 3.5–5.3)
POTASSIUM SERPL-SCNC: 5.1 MMOL/L — SIGNIFICANT CHANGE UP (ref 3.5–5.3)
RBC # BLD: 3.54 M/UL — LOW (ref 3.8–5.2)
RBC # FLD: 13.2 % — SIGNIFICANT CHANGE UP (ref 10.3–14.5)
SODIUM SERPL-SCNC: 139 MMOL/L — SIGNIFICANT CHANGE UP (ref 135–145)
WBC # BLD: 11.47 K/UL — HIGH (ref 3.8–10.5)
WBC # FLD AUTO: 11.47 K/UL — HIGH (ref 3.8–10.5)

## 2020-10-27 PROCEDURE — 99233 SBSQ HOSP IP/OBS HIGH 50: CPT

## 2020-10-27 RX ORDER — INSULIN LISPRO 100/ML
5 VIAL (ML) SUBCUTANEOUS
Refills: 0 | Status: DISCONTINUED | OUTPATIENT
Start: 2020-10-27 | End: 2020-10-30

## 2020-10-27 RX ORDER — SODIUM CHLORIDE 9 MG/ML
1000 INJECTION, SOLUTION INTRAVENOUS
Refills: 0 | Status: DISCONTINUED | OUTPATIENT
Start: 2020-10-27 | End: 2020-10-28

## 2020-10-27 RX ORDER — INSULIN GLARGINE 100 [IU]/ML
15 INJECTION, SOLUTION SUBCUTANEOUS AT BEDTIME
Refills: 0 | Status: DISCONTINUED | OUTPATIENT
Start: 2020-10-27 | End: 2020-10-28

## 2020-10-27 RX ADMIN — APIXABAN 2.5 MILLIGRAM(S): 2.5 TABLET, FILM COATED ORAL at 17:30

## 2020-10-27 RX ADMIN — Medication 112 MICROGRAM(S): at 05:25

## 2020-10-27 RX ADMIN — POLYETHYLENE GLYCOL 3350 17 GRAM(S): 17 POWDER, FOR SOLUTION ORAL at 12:48

## 2020-10-27 RX ADMIN — AMLODIPINE BESYLATE 5 MILLIGRAM(S): 2.5 TABLET ORAL at 05:25

## 2020-10-27 RX ADMIN — ATORVASTATIN CALCIUM 10 MILLIGRAM(S): 80 TABLET, FILM COATED ORAL at 22:47

## 2020-10-27 RX ADMIN — Medication 10 UNIT(S): at 08:32

## 2020-10-27 RX ADMIN — SODIUM CHLORIDE 75 MILLILITER(S): 9 INJECTION, SOLUTION INTRAVENOUS at 12:47

## 2020-10-27 RX ADMIN — Medication 10 MILLIGRAM(S): at 12:48

## 2020-10-27 RX ADMIN — Medication 12.5 MILLIGRAM(S): at 05:25

## 2020-10-27 RX ADMIN — Medication 5 UNIT(S): at 17:30

## 2020-10-27 RX ADMIN — NYSTATIN CREAM 1 APPLICATION(S): 100000 CREAM TOPICAL at 05:25

## 2020-10-27 RX ADMIN — APIXABAN 2.5 MILLIGRAM(S): 2.5 TABLET, FILM COATED ORAL at 05:25

## 2020-10-27 RX ADMIN — Medication 1: at 22:49

## 2020-10-27 RX ADMIN — Medication 12.5 MILLIGRAM(S): at 17:30

## 2020-10-27 RX ADMIN — INSULIN GLARGINE 15 UNIT(S): 100 INJECTION, SOLUTION SUBCUTANEOUS at 22:46

## 2020-10-27 RX ADMIN — NYSTATIN CREAM 1 APPLICATION(S): 100000 CREAM TOPICAL at 17:30

## 2020-10-27 NOTE — PROGRESS NOTE ADULT - SUBJECTIVE AND OBJECTIVE BOX
PROGRESS NOTE:   Authored by Dr. Angelica Ruelas MD  Pager [phone]     Patient is a 87y old  Female who presents with a chief complaint of AMS (27 Oct 2020 07:39)      SUBJECTIVE / OVERNIGHT EVENTS: Patient seen and examined at bedside. Patient sitting up in bed, again leaning to R side. However, when fed, sits up straight, able to chew. Denies pain, however, patient confused    ADDITIONAL REVIEW OF SYSTEMS: unable to obtain due to AMS    MEDICATIONS  (STANDING):  amLODIPine   Tablet 5 milliGRAM(s) Oral daily  apixaban 2.5 milliGRAM(s) Oral two times a day  atorvastatin 10 milliGRAM(s) Oral at bedtime  bisacodyl Suppository 10 milliGRAM(s) Rectal once  dextrose 5%. 1000 milliLiter(s) (50 mL/Hr) IV Continuous <Continuous>  dextrose 50% Injectable 12.5 Gram(s) IV Push once  dextrose 50% Injectable 25 Gram(s) IV Push once  dextrose 50% Injectable 25 Gram(s) IV Push once  insulin glargine Injectable (LANTUS) 17 Unit(s) SubCutaneous at bedtime  insulin lispro (ADMELOG) corrective regimen sliding scale   SubCutaneous three times a day before meals  insulin lispro (ADMELOG) corrective regimen sliding scale   SubCutaneous at bedtime  insulin lispro Injectable (ADMELOG) 10 Unit(s) SubCutaneous three times a day before meals  lactated ringers. 1000 milliLiter(s) (75 mL/Hr) IV Continuous <Continuous>  levothyroxine 112 MICROGram(s) Oral daily  metoprolol tartrate 12.5 milliGRAM(s) Oral two times a day  nystatin Cream 1 Application(s) Topical two times a day  polyethylene glycol 3350 17 Gram(s) Oral daily    MEDICATIONS  (PRN):  acetaminophen   Tablet .. 650 milliGRAM(s) Oral every 6 hours PRN Temp greater or equal to 38C (100.4F), Mild Pain (1 - 3)  dextrose 40% Gel 15 Gram(s) Oral once PRN Blood Glucose LESS THAN 70 milliGRAM(s)/deciliter  glucagon  Injectable 1 milliGRAM(s) IntraMuscular once PRN Glucose LESS THAN 70 milligrams/deciliter      CAPILLARY BLOOD GLUCOSE      POCT Blood Glucose.: 99 mg/dL (27 Oct 2020 08:11)  POCT Blood Glucose.: 261 mg/dL (26 Oct 2020 22:07)  POCT Blood Glucose.: 266 mg/dL (26 Oct 2020 18:09)  POCT Blood Glucose.: 343 mg/dL (26 Oct 2020 12:43)    I&O's Summary    26 Oct 2020 07:01  -  27 Oct 2020 07:00  --------------------------------------------------------  IN: 840 mL / OUT: 850 mL / NET: -10 mL    27 Oct 2020 07:01  -  27 Oct 2020 10:52  --------------------------------------------------------  IN: 120 mL / OUT: 0 mL / NET: 120 mL        PHYSICAL EXAM:  Vital Signs Last 24 Hrs  T(C): 36.3 (27 Oct 2020 04:02), Max: 36.9 (26 Oct 2020 21:13)  T(F): 97.4 (27 Oct 2020 04:02), Max: 98.5 (26 Oct 2020 21:13)  HR: 101 (27 Oct 2020 04:02) (101 - 111)  BP: 118/69 (27 Oct 2020 04:02) (118/69 - 158/76)  BP(mean): --  RR: 18 (27 Oct 2020 04:02) (18 - 18)  SpO2: 97% (27 Oct 2020 04:02) (97% - 99%)    CONSTITUTIONAL: NAD, frail  RESPIRATORY: Normal respiratory effort; lungs are clear to auscultation bilaterally  CARDIOVASCULAR: tachycardia, No lower extremity edema; Peripheral pulses are 2+ bilaterally  ABDOMEN: Nontender to palpation, normoactive bowel sounds, no rebound/guarding; No hepatosplenomegaly  MUSCLOSKELETAL: no clubbing or cyanosis of digits; no joint swelling or tenderness to palpation  PSYCH: A+O to person, slightly lethargic but wakes up when spoken to  5/5 strength 4 extremities    LABS:                        12.7   11.47 )-----------( 576      ( 27 Oct 2020 07:08 )             39.4     10-27    139  |  106  |  82<H>  ----------------------------<  82  5.1   |  19<L>  |  1.36<H>    Ca    10.1      27 Oct 2020 07:08                  RADIOLOGY & ADDITIONAL TESTS:  Results Reviewed:   Imaging Personally Reviewed:  Electrocardiogram Personally Reviewed:    COORDINATION OF CARE:  Care Discussed with Consultants/Other Providers [Y/N]:  Prior or Outpatient Records Reviewed [Y/N]:

## 2020-10-27 NOTE — PROGRESS NOTE ADULT - SUBJECTIVE AND OBJECTIVE BOX
Patient seen and examined at bedside.    Overnight Events: Patient appears at baseline this am, not cooperative in exam, hemodynamically stable, remains hypertensive, tachycardia appears to be improving.     Current Meds:  acetaminophen   Tablet .. 650 milliGRAM(s) Oral every 6 hours PRN  amLODIPine   Tablet 5 milliGRAM(s) Oral daily  apixaban 2.5 milliGRAM(s) Oral two times a day  atorvastatin 10 milliGRAM(s) Oral at bedtime  dextrose 40% Gel 15 Gram(s) Oral once PRN  dextrose 5%. 1000 milliLiter(s) IV Continuous <Continuous>  dextrose 50% Injectable 12.5 Gram(s) IV Push once  dextrose 50% Injectable 25 Gram(s) IV Push once  dextrose 50% Injectable 25 Gram(s) IV Push once  glucagon  Injectable 1 milliGRAM(s) IntraMuscular once PRN  insulin glargine Injectable (LANTUS) 17 Unit(s) SubCutaneous at bedtime  insulin lispro (ADMELOG) corrective regimen sliding scale   SubCutaneous three times a day before meals  insulin lispro (ADMELOG) corrective regimen sliding scale   SubCutaneous at bedtime  insulin lispro Injectable (ADMELOG) 10 Unit(s) SubCutaneous three times a day before meals  levothyroxine 112 MICROGram(s) Oral daily  metoprolol tartrate 12.5 milliGRAM(s) Oral two times a day  nystatin Cream 1 Application(s) Topical two times a day  polyethylene glycol 3350 17 Gram(s) Oral daily      Vitals:  T(F): 97.4 (10-27), Max: 98.5 (10-26)  HR: 101 (10-27) (101 - 111)  BP: 118/69 (10-27) (118/69 - 158/76)  RR: 18 (10-27)  SpO2: 97% (10-27)  I&O's Summary    26 Oct 2020 07:01  -  27 Oct 2020 07:00  --------------------------------------------------------  IN: 840 mL / OUT: 850 mL / NET: -10 mL        Physical Exam:  Appearance: No acute distress; well appearing  Eyes: PERRL, EOMI, pink conjunctiva  HEENT: Normal oral mucosa  Cardiovascular: RRR, S1, S2, no murmurs, rubs, or gallops; no edema; no JVD  Respiratory: Clear to auscultation bilaterally  Gastrointestinal: soft, non-tender, non-distended with normal bowel sounds  Musculoskeletal: No clubbing; no joint deformity   Neurologic: Non-focal  Lymphatic: No lymphadenopathy  Psychiatry: AAOx3, mood & affect appropriate  Skin: No rashes, ecchymoses, or cyanosis                          12.7   11.47 )-----------( 576      ( 27 Oct 2020 07:08 )             39.4     87F with PMH of HTN, T2DM, hypothyroidism, 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     - maintain euvolemia, avoid diuretics, would recommend gentle IV fluids, especially if patient is not taking in sufficient PO   - can start metoprolol tartrate 12.5mg bid to decrease inotropy and improve dynamic obstruction   - continue to monitor on telemetry    Patient seen and examined at bedside.    Overnight Events: Patient appears at baseline this am, not cooperative in exam, hemodynamically stable, remains hypertensive, tachycardia appears to be improving.     Current Meds:  acetaminophen   Tablet .. 650 milliGRAM(s) Oral every 6 hours PRN  amLODIPine   Tablet 5 milliGRAM(s) Oral daily  apixaban 2.5 milliGRAM(s) Oral two times a day  atorvastatin 10 milliGRAM(s) Oral at bedtime  dextrose 40% Gel 15 Gram(s) Oral once PRN  dextrose 5%. 1000 milliLiter(s) IV Continuous <Continuous>  dextrose 50% Injectable 12.5 Gram(s) IV Push once  dextrose 50% Injectable 25 Gram(s) IV Push once  dextrose 50% Injectable 25 Gram(s) IV Push once  glucagon  Injectable 1 milliGRAM(s) IntraMuscular once PRN  insulin glargine Injectable (LANTUS) 17 Unit(s) SubCutaneous at bedtime  insulin lispro (ADMELOG) corrective regimen sliding scale   SubCutaneous three times a day before meals  insulin lispro (ADMELOG) corrective regimen sliding scale   SubCutaneous at bedtime  insulin lispro Injectable (ADMELOG) 10 Unit(s) SubCutaneous three times a day before meals  levothyroxine 112 MICROGram(s) Oral daily  metoprolol tartrate 12.5 milliGRAM(s) Oral two times a day  nystatin Cream 1 Application(s) Topical two times a day  polyethylene glycol 3350 17 Gram(s) Oral daily      Vitals:  T(F): 97.4 (10-27), Max: 98.5 (10-26)  HR: 101 (10-27) (101 - 111)  BP: 118/69 (10-27) (118/69 - 158/76)  RR: 18 (10-27)  SpO2: 97% (10-27)  I&O's Summary    26 Oct 2020 07:01  -  27 Oct 2020 07:00  --------------------------------------------------------  IN: 840 mL / OUT: 850 mL / NET: -10 mL        Physical Exam:  Appearance: No acute distress; well appearing  Eyes: PERRL, EOMI, pink conjunctiva  HEENT: Normal oral mucosa  Cardiovascular: RRR, S1, S2, no murmurs, rubs, or gallops; no edema; no JVD  Respiratory: Clear to auscultation bilaterally  Gastrointestinal: soft, non-tender, non-distended with normal bowel sounds  Musculoskeletal: No clubbing; no joint deformity   Neurologic: Non-focal  Lymphatic: No lymphadenopathy  Psychiatry: AAOx3, mood & affect appropriate  Skin: No rashes, ecchymoses, or cyanosis                          12.7   11.47 )-----------( 576      ( 27 Oct 2020 07:08 )             39.4

## 2020-10-28 LAB
ANION GAP SERPL CALC-SCNC: 12 MMOL/L — SIGNIFICANT CHANGE UP (ref 5–17)
BUN SERPL-MCNC: 62 MG/DL — HIGH (ref 7–23)
CALCIUM SERPL-MCNC: 10 MG/DL — SIGNIFICANT CHANGE UP (ref 8.4–10.5)
CHLORIDE SERPL-SCNC: 110 MMOL/L — HIGH (ref 96–108)
CO2 SERPL-SCNC: 18 MMOL/L — LOW (ref 22–31)
CREAT SERPL-MCNC: 1.05 MG/DL — SIGNIFICANT CHANGE UP (ref 0.5–1.3)
GLUCOSE BLDC GLUCOMTR-MCNC: 198 MG/DL — HIGH (ref 70–99)
GLUCOSE BLDC GLUCOMTR-MCNC: 232 MG/DL — HIGH (ref 70–99)
GLUCOSE BLDC GLUCOMTR-MCNC: 79 MG/DL — SIGNIFICANT CHANGE UP (ref 70–99)
GLUCOSE BLDC GLUCOMTR-MCNC: 95 MG/DL — SIGNIFICANT CHANGE UP (ref 70–99)
GLUCOSE SERPL-MCNC: 92 MG/DL — SIGNIFICANT CHANGE UP (ref 70–99)
HCT VFR BLD CALC: 39.4 % — SIGNIFICANT CHANGE UP (ref 34.5–45)
HGB BLD-MCNC: 13.1 G/DL — SIGNIFICANT CHANGE UP (ref 11.5–15.5)
MCHC RBC-ENTMCNC: 33.2 GM/DL — SIGNIFICANT CHANGE UP (ref 32–36)
MCHC RBC-ENTMCNC: 35.8 PG — HIGH (ref 27–34)
MCV RBC AUTO: 107.7 FL — HIGH (ref 80–100)
NRBC # BLD: 0 /100 WBCS — SIGNIFICANT CHANGE UP (ref 0–0)
PLATELET # BLD AUTO: 429 K/UL — HIGH (ref 150–400)
POTASSIUM SERPL-MCNC: 4.8 MMOL/L — SIGNIFICANT CHANGE UP (ref 3.5–5.3)
POTASSIUM SERPL-SCNC: 4.8 MMOL/L — SIGNIFICANT CHANGE UP (ref 3.5–5.3)
RBC # BLD: 3.66 M/UL — LOW (ref 3.8–5.2)
RBC # FLD: 12.9 % — SIGNIFICANT CHANGE UP (ref 10.3–14.5)
SARS-COV-2 RNA SPEC QL NAA+PROBE: SIGNIFICANT CHANGE UP
SODIUM SERPL-SCNC: 140 MMOL/L — SIGNIFICANT CHANGE UP (ref 135–145)
WBC # BLD: 8.46 K/UL — SIGNIFICANT CHANGE UP (ref 3.8–10.5)
WBC # FLD AUTO: 8.46 K/UL — SIGNIFICANT CHANGE UP (ref 3.8–10.5)

## 2020-10-28 PROCEDURE — 99232 SBSQ HOSP IP/OBS MODERATE 35: CPT

## 2020-10-28 RX ORDER — INSULIN GLARGINE 100 [IU]/ML
10 INJECTION, SOLUTION SUBCUTANEOUS AT BEDTIME
Refills: 0 | Status: DISCONTINUED | OUTPATIENT
Start: 2020-10-28 | End: 2020-10-30

## 2020-10-28 RX ORDER — INSULIN GLARGINE 100 [IU]/ML
12 INJECTION, SOLUTION SUBCUTANEOUS AT BEDTIME
Refills: 0 | Status: DISCONTINUED | OUTPATIENT
Start: 2020-10-28 | End: 2020-10-28

## 2020-10-28 RX ADMIN — NYSTATIN CREAM 1 APPLICATION(S): 100000 CREAM TOPICAL at 18:35

## 2020-10-28 RX ADMIN — INSULIN GLARGINE 10 UNIT(S): 100 INJECTION, SOLUTION SUBCUTANEOUS at 22:26

## 2020-10-28 RX ADMIN — AMLODIPINE BESYLATE 5 MILLIGRAM(S): 2.5 TABLET ORAL at 05:58

## 2020-10-28 RX ADMIN — Medication 12.5 MILLIGRAM(S): at 05:58

## 2020-10-28 RX ADMIN — NYSTATIN CREAM 1 APPLICATION(S): 100000 CREAM TOPICAL at 05:58

## 2020-10-28 RX ADMIN — APIXABAN 2.5 MILLIGRAM(S): 2.5 TABLET, FILM COATED ORAL at 18:35

## 2020-10-28 RX ADMIN — Medication 112 MICROGRAM(S): at 05:58

## 2020-10-28 RX ADMIN — APIXABAN 2.5 MILLIGRAM(S): 2.5 TABLET, FILM COATED ORAL at 05:58

## 2020-10-28 RX ADMIN — Medication 2: at 12:33

## 2020-10-28 RX ADMIN — ATORVASTATIN CALCIUM 10 MILLIGRAM(S): 80 TABLET, FILM COATED ORAL at 22:27

## 2020-10-28 RX ADMIN — SODIUM CHLORIDE 75 MILLILITER(S): 9 INJECTION, SOLUTION INTRAVENOUS at 04:19

## 2020-10-28 RX ADMIN — Medication 12.5 MILLIGRAM(S): at 18:35

## 2020-10-28 RX ADMIN — Medication 5 UNIT(S): at 12:33

## 2020-10-28 RX ADMIN — POLYETHYLENE GLYCOL 3350 17 GRAM(S): 17 POWDER, FOR SOLUTION ORAL at 18:35

## 2020-10-28 NOTE — CHART NOTE - NSCHARTNOTEFT_GEN_A_CORE
Nutrition Follow Up Note  Patient seen for: length of stay follow up. Chart reviewed and events noted.     Source:   Medical record and RN (Pt noted as confused c AMS)    Diet :   Consistent Carbohydrate {No Snacks} (CSTCHO)  DASH/TLC {Sodium & Cholesterol Restricted} (DASH)  No Concentrated Potassium     Per RN, pt needs full assistance at meals with fair intake. Pt c good intake at breakfast and fair intake at lunch. No known recent N/V, constipation, or diarrhea. Last BM 10/27. Unable to provide nutrition education as pt is confused c no family at bedside.      PO intake :  50-75% meals in-house     Source for PO intake:  RN and flowsheet    Daily wt in lbs: 138.8 (10/21)  Dosing wt in lbs: 160 lbs  No other daily wts to trend, RD will continue to follow.    Pertinent Medications: MEDICATIONS  (STANDING):  amLODIPine   Tablet 5 milliGRAM(s) Oral daily  apixaban 2.5 milliGRAM(s) Oral two times a day  atorvastatin 10 milliGRAM(s) Oral at bedtime  dextrose 5%. 1000 milliLiter(s) (50 mL/Hr) IV Continuous <Continuous>  dextrose 50% Injectable 12.5 Gram(s) IV Push once  dextrose 50% Injectable 25 Gram(s) IV Push once  dextrose 50% Injectable 25 Gram(s) IV Push once  insulin glargine Injectable (LANTUS) 12 Unit(s) SubCutaneous at bedtime  insulin lispro (ADMELOG) corrective regimen sliding scale   SubCutaneous three times a day before meals  insulin lispro (ADMELOG) corrective regimen sliding scale   SubCutaneous at bedtime  insulin lispro Injectable (ADMELOG) 5 Unit(s) SubCutaneous three times a day before meals  levothyroxine 112 MICROGram(s) Oral daily  metoprolol tartrate 12.5 milliGRAM(s) Oral two times a day  nystatin Cream 1 Application(s) Topical two times a day  polyethylene glycol 3350 17 Gram(s) Oral daily    MEDICATIONS  (PRN):  acetaminophen   Tablet .. 650 milliGRAM(s) Oral every 6 hours PRN Temp greater or equal to 38C (100.4F), Mild Pain (1 - 3)  dextrose 40% Gel 15 Gram(s) Oral once PRN Blood Glucose LESS THAN 70 milliGRAM(s)/deciliter  glucagon  Injectable 1 milliGRAM(s) IntraMuscular once PRN Glucose LESS THAN 70 milligrams/deciliter    Pertinent Labs: 10-28 @ 07:09: Na 140, BUN 62<H>, Cr 1.05, BG 92, K+ 4.8    Finger Sticks:  POCT Blood Glucose.: 198 mg/dL (10-28 @ 11:48)  POCT Blood Glucose.: 79 mg/dL (10-28 @ 08:30)  POCT Blood Glucose.: 287 mg/dL (10-27 @ 22:22)  POCT Blood Glucose.: 126 mg/dL (10-27 @ 16:56)    Skin per nursing documentation: No pressure injuries noted.  Edema per nursing documentation: None noted.     Estimated Needs:   [x] no change since previous assessment  Daily wt 10/21 used for calculations: 138.8 lb  Estimated Energy Needs: (20-25 kcals/kg): 9132-1230 kcals  Estimated Protein Needs: (0.75-1.0 gm/kg): 47.17-62.9 gm  Estimated Fluid Needs: (20-25 cc/kg): 3921-5003 cc    Previous Nutrition Diagnosis: Altered Nutrition Related Lab Values.   Nutrition Diagnosis is: ongoing and to be addressed with nutrition education when able to provide education.    New Nutrition Diagnosis: N/A    Recommend  1) Continue current consistent carbohydrate, DASH; consistency deferred to provider.   -K now WNL, consider discontinuing no concentrated K.   2) Provide Type 2 Diabetes Nutrition Therapy education as able.      Monitoring and Evaluation:   Continue to monitor Nutritional intake, Tolerance to diet prescription, weights, labs, skin integrity    RD remains available upon request and will follow up per protocol  Jeannie Jorgensen, MS, RD, Pager #[phone]

## 2020-10-28 NOTE — PROGRESS NOTE ADULT - SUBJECTIVE AND OBJECTIVE BOX
PROGRESS NOTE:   Authored by Dr. Angelica Ruelas MD  Pager [phone]     Patient is a 87y old  Female who presents with a chief complaint of AMS (27 Oct 2020 10:52)      SUBJECTIVE / OVERNIGHT EVENTS: Patient seen and examined at bedside. Patient more awake today, eating breakfast, FS 79 this am. FS fluctuating. Confused but calm.     ADDITIONAL REVIEW OF SYSTEMS: unable to obtain    MEDICATIONS  (STANDING):  amLODIPine   Tablet 5 milliGRAM(s) Oral daily  apixaban 2.5 milliGRAM(s) Oral two times a day  atorvastatin 10 milliGRAM(s) Oral at bedtime  dextrose 5%. 1000 milliLiter(s) (50 mL/Hr) IV Continuous <Continuous>  dextrose 50% Injectable 12.5 Gram(s) IV Push once  dextrose 50% Injectable 25 Gram(s) IV Push once  dextrose 50% Injectable 25 Gram(s) IV Push once  insulin glargine Injectable (LANTUS) 15 Unit(s) SubCutaneous at bedtime  insulin lispro (ADMELOG) corrective regimen sliding scale   SubCutaneous three times a day before meals  insulin lispro (ADMELOG) corrective regimen sliding scale   SubCutaneous at bedtime  insulin lispro Injectable (ADMELOG) 5 Unit(s) SubCutaneous three times a day before meals  lactated ringers. 1000 milliLiter(s) (75 mL/Hr) IV Continuous <Continuous>  levothyroxine 112 MICROGram(s) Oral daily  metoprolol tartrate 12.5 milliGRAM(s) Oral two times a day  nystatin Cream 1 Application(s) Topical two times a day  polyethylene glycol 3350 17 Gram(s) Oral daily    MEDICATIONS  (PRN):  acetaminophen   Tablet .. 650 milliGRAM(s) Oral every 6 hours PRN Temp greater or equal to 38C (100.4F), Mild Pain (1 - 3)  dextrose 40% Gel 15 Gram(s) Oral once PRN Blood Glucose LESS THAN 70 milliGRAM(s)/deciliter  glucagon  Injectable 1 milliGRAM(s) IntraMuscular once PRN Glucose LESS THAN 70 milligrams/deciliter      CAPILLARY BLOOD GLUCOSE      POCT Blood Glucose.: 198 mg/dL (28 Oct 2020 11:48)  POCT Blood Glucose.: 79 mg/dL (28 Oct 2020 08:30)  POCT Blood Glucose.: 287 mg/dL (27 Oct 2020 22:22)  POCT Blood Glucose.: 126 mg/dL (27 Oct 2020 16:56)    I&O's Summary    27 Oct 2020 07:01  -  28 Oct 2020 07:00  --------------------------------------------------------  IN: 2215 mL / OUT: 400 mL / NET: 1815 mL    28 Oct 2020 07:01  -  28 Oct 2020 12:40  --------------------------------------------------------  IN: 240 mL / OUT: 450 mL / NET: -210 mL        PHYSICAL EXAM:  Vital Signs Last 24 Hrs  T(C): 36.3 (28 Oct 2020 06:00), Max: 36.9 (27 Oct 2020 20:04)  T(F): 97.4 (28 Oct 2020 06:00), Max: 98.5 (27 Oct 2020 20:04)  HR: 80 (28 Oct 2020 06:00) (80 - 94)  BP: 140/70 (28 Oct 2020 06:00) (122/75 - 140/70)  BP(mean): --  RR: 18 (28 Oct 2020 06:00) (18 - 18)  SpO2: 97% (28 Oct 2020 06:00) (94% - 97%)    CONSTITUTIONAL: NAD  RESPIRATORY: Normal respiratory effort; lungs are clear to auscultation bilaterally  CARDIOVASCULAR: Regular rate and rhythm, normal S1 and S2, no murmur/rub/gallop; No lower extremity edema; Peripheral pulses are 2+ bilaterally  ABDOMEN: Nontender to palpation, normoactive bowel sounds, no rebound/guarding; No hepatosplenomegaly  MUSCLOSKELETAL: no clubbing or cyanosis of digits; no joint swelling or tenderness to palpation  PSYCH: A+O to person and year; affect appropriate    LABS:                        13.1   8.46  )-----------( 429      ( 28 Oct 2020 07:04 )             39.4     10-28    140  |  110<H>  |  62<H>  ----------------------------<  92  4.8   |  18<L>  |  1.05    Ca    10.0      28 Oct 2020 07:09                  RADIOLOGY & ADDITIONAL TESTS:  Results Reviewed:   Imaging Personally Reviewed:  Electrocardiogram Personally Reviewed:    COORDINATION OF CARE:  Care Discussed with Consultants/Other Providers [Y/N]:  Prior or Outpatient Records Reviewed [Y/N]:

## 2020-10-29 LAB
ANION GAP SERPL CALC-SCNC: 10 MMOL/L — SIGNIFICANT CHANGE UP (ref 5–17)
BUN SERPL-MCNC: 40 MG/DL — HIGH (ref 7–23)
CALCIUM SERPL-MCNC: 9.7 MG/DL — SIGNIFICANT CHANGE UP (ref 8.4–10.5)
CHLORIDE SERPL-SCNC: 105 MMOL/L — SIGNIFICANT CHANGE UP (ref 96–108)
CO2 SERPL-SCNC: 22 MMOL/L — SIGNIFICANT CHANGE UP (ref 22–31)
CREAT SERPL-MCNC: 0.98 MG/DL — SIGNIFICANT CHANGE UP (ref 0.5–1.3)
GLUCOSE BLDC GLUCOMTR-MCNC: 209 MG/DL — HIGH (ref 70–99)
GLUCOSE BLDC GLUCOMTR-MCNC: 250 MG/DL — HIGH (ref 70–99)
GLUCOSE BLDC GLUCOMTR-MCNC: 304 MG/DL — HIGH (ref 70–99)
GLUCOSE BLDC GLUCOMTR-MCNC: 93 MG/DL — SIGNIFICANT CHANGE UP (ref 70–99)
GLUCOSE SERPL-MCNC: 229 MG/DL — HIGH (ref 70–99)
HCT VFR BLD CALC: 38.7 % — SIGNIFICANT CHANGE UP (ref 34.5–45)
HGB BLD-MCNC: 12.9 G/DL — SIGNIFICANT CHANGE UP (ref 11.5–15.5)
MCHC RBC-ENTMCNC: 33.3 GM/DL — SIGNIFICANT CHANGE UP (ref 32–36)
MCHC RBC-ENTMCNC: 35.7 PG — HIGH (ref 27–34)
MCV RBC AUTO: 107.2 FL — HIGH (ref 80–100)
NRBC # BLD: 0 /100 WBCS — SIGNIFICANT CHANGE UP (ref 0–0)
PLATELET # BLD AUTO: 632 K/UL — HIGH (ref 150–400)
POTASSIUM SERPL-MCNC: 4.7 MMOL/L — SIGNIFICANT CHANGE UP (ref 3.5–5.3)
POTASSIUM SERPL-SCNC: 4.7 MMOL/L — SIGNIFICANT CHANGE UP (ref 3.5–5.3)
RBC # BLD: 3.61 M/UL — LOW (ref 3.8–5.2)
RBC # FLD: 12.8 % — SIGNIFICANT CHANGE UP (ref 10.3–14.5)
SODIUM SERPL-SCNC: 137 MMOL/L — SIGNIFICANT CHANGE UP (ref 135–145)
WBC # BLD: 12.08 K/UL — HIGH (ref 3.8–10.5)
WBC # FLD AUTO: 12.08 K/UL — HIGH (ref 3.8–10.5)

## 2020-10-29 PROCEDURE — 99232 SBSQ HOSP IP/OBS MODERATE 35: CPT

## 2020-10-29 RX ORDER — PHENYLEPHRINE-SHARK LIVER OIL-MINERAL OIL-PETROLATUM RECTAL OINTMENT
1 OINTMENT (GRAM) RECTAL
Refills: 0 | Status: DISCONTINUED | OUTPATIENT
Start: 2020-10-29 | End: 2020-10-29

## 2020-10-29 RX ADMIN — NYSTATIN CREAM 1 APPLICATION(S): 100000 CREAM TOPICAL at 17:42

## 2020-10-29 RX ADMIN — NYSTATIN CREAM 1 APPLICATION(S): 100000 CREAM TOPICAL at 05:10

## 2020-10-29 RX ADMIN — APIXABAN 2.5 MILLIGRAM(S): 2.5 TABLET, FILM COATED ORAL at 05:10

## 2020-10-29 RX ADMIN — POLYETHYLENE GLYCOL 3350 17 GRAM(S): 17 POWDER, FOR SOLUTION ORAL at 12:44

## 2020-10-29 RX ADMIN — Medication 0: at 22:06

## 2020-10-29 RX ADMIN — Medication 5 UNIT(S): at 17:41

## 2020-10-29 RX ADMIN — Medication 12.5 MILLIGRAM(S): at 17:42

## 2020-10-29 RX ADMIN — Medication 8: at 12:44

## 2020-10-29 RX ADMIN — INSULIN GLARGINE 10 UNIT(S): 100 INJECTION, SOLUTION SUBCUTANEOUS at 22:06

## 2020-10-29 RX ADMIN — AMLODIPINE BESYLATE 5 MILLIGRAM(S): 2.5 TABLET ORAL at 05:10

## 2020-10-29 RX ADMIN — ATORVASTATIN CALCIUM 10 MILLIGRAM(S): 80 TABLET, FILM COATED ORAL at 22:06

## 2020-10-29 RX ADMIN — Medication 12.5 MILLIGRAM(S): at 05:10

## 2020-10-29 RX ADMIN — Medication 4: at 17:42

## 2020-10-29 RX ADMIN — Medication 5 UNIT(S): at 12:44

## 2020-10-29 RX ADMIN — APIXABAN 2.5 MILLIGRAM(S): 2.5 TABLET, FILM COATED ORAL at 17:42

## 2020-10-29 RX ADMIN — Medication 112 MICROGRAM(S): at 05:10

## 2020-10-29 NOTE — PROGRESS NOTE ADULT - SUBJECTIVE AND OBJECTIVE BOX
PROGRESS NOTE:   Authored by Dr. Angelica Ruelas MD  Pager [phone]     Patient is a 87y old  Female who presents with a chief complaint of AMS (28 Oct 2020 12:40)      SUBJECTIVE / OVERNIGHT EVENTS: Patient seen and examined at bedside. Patient sitting up in chair, head straight, awake, alert, able to communicate, eating. Denies pain.     ADDITIONAL REVIEW OF SYSTEMS: Unable to fully obtain due to dementia    MEDICATIONS  (STANDING):  amLODIPine   Tablet 5 milliGRAM(s) Oral daily  apixaban 2.5 milliGRAM(s) Oral two times a day  atorvastatin 10 milliGRAM(s) Oral at bedtime  dextrose 5%. 1000 milliLiter(s) (50 mL/Hr) IV Continuous <Continuous>  dextrose 50% Injectable 12.5 Gram(s) IV Push once  dextrose 50% Injectable 25 Gram(s) IV Push once  dextrose 50% Injectable 25 Gram(s) IV Push once  insulin glargine Injectable (LANTUS) 10 Unit(s) SubCutaneous at bedtime  insulin lispro (ADMELOG) corrective regimen sliding scale   SubCutaneous three times a day before meals  insulin lispro (ADMELOG) corrective regimen sliding scale   SubCutaneous at bedtime  insulin lispro Injectable (ADMELOG) 5 Unit(s) SubCutaneous three times a day before meals  levothyroxine 112 MICROGram(s) Oral daily  metoprolol tartrate 12.5 milliGRAM(s) Oral two times a day  nystatin Cream 1 Application(s) Topical two times a day  polyethylene glycol 3350 17 Gram(s) Oral daily    MEDICATIONS  (PRN):  acetaminophen   Tablet .. 650 milliGRAM(s) Oral every 6 hours PRN Temp greater or equal to 38C (100.4F), Mild Pain (1 - 3)  dextrose 40% Gel 15 Gram(s) Oral once PRN Blood Glucose LESS THAN 70 milliGRAM(s)/deciliter  glucagon  Injectable 1 milliGRAM(s) IntraMuscular once PRN Glucose LESS THAN 70 milligrams/deciliter      CAPILLARY BLOOD GLUCOSE      POCT Blood Glucose.: 93 mg/dL (29 Oct 2020 08:14)  POCT Blood Glucose.: 232 mg/dL (28 Oct 2020 21:29)  POCT Blood Glucose.: 95 mg/dL (28 Oct 2020 17:25)  POCT Blood Glucose.: 198 mg/dL (28 Oct 2020 11:48)    I&O's Summary    28 Oct 2020 07:01  -  29 Oct 2020 07:00  --------------------------------------------------------  IN: 1855 mL / OUT: 1650 mL / NET: 205 mL    29 Oct 2020 07:01  -  29 Oct 2020 11:46  --------------------------------------------------------  IN: 360 mL / OUT: 0 mL / NET: 360 mL        PHYSICAL EXAM:  Vital Signs Last 24 Hrs  T(C): 36.8 (29 Oct 2020 05:08), Max: 36.9 (28 Oct 2020 20:30)  T(F): 98.2 (29 Oct 2020 05:08), Max: 98.5 (28 Oct 2020 20:30)  HR: 90 (29 Oct 2020 05:08) (78 - 90)  BP: 139/72 (29 Oct 2020 05:08) (122/76 - 146/81)  BP(mean): --  RR: 18 (29 Oct 2020 05:08) (18 - 18)  SpO2: 97% (29 Oct 2020 05:08) (96% - 97%)    CONSTITUTIONAL: NAD  RESPIRATORY: Normal respiratory effort; lungs are clear to auscultation bilaterally  CARDIOVASCULAR: Regular rate and rhythm, normal S1 and S2, no murmur/rub/gallop; No lower extremity edema; Peripheral pulses are 2+ bilaterally  ABDOMEN: Nontender to palpation, normoactive bowel sounds, no rebound/guarding; No hepatosplenomegaly  MUSCLOSKELETAL: no clubbing or cyanosis of digits; no joint swelling or tenderness to palpation  PSYCH: A+O to person, place, and time; affect appropriate    LABS:                        12.9   12.08 )-----------( 632      ( 29 Oct 2020 10:25 )             38.7     10-29    137  |  105  |  40<H>  ----------------------------<  229<H>  4.7   |  22  |  0.98    Ca    9.7      29 Oct 2020 10:25                  RADIOLOGY & ADDITIONAL TESTS:  Results Reviewed:   Imaging Personally Reviewed:  Electrocardiogram Personally Reviewed:    COORDINATION OF CARE:  Care Discussed with Consultants/Other Providers [Y/N]:  Prior or Outpatient Records Reviewed [Y/N]:

## 2020-10-30 VITALS
SYSTOLIC BLOOD PRESSURE: 142 MMHG | OXYGEN SATURATION: 93 % | HEART RATE: 92 BPM | TEMPERATURE: 99 F | DIASTOLIC BLOOD PRESSURE: 75 MMHG | RESPIRATION RATE: 18 BRPM

## 2020-10-30 LAB
ANION GAP SERPL CALC-SCNC: 11 MMOL/L — SIGNIFICANT CHANGE UP (ref 5–17)
BUN SERPL-MCNC: 38 MG/DL — HIGH (ref 7–23)
CALCIUM SERPL-MCNC: 9.9 MG/DL — SIGNIFICANT CHANGE UP (ref 8.4–10.5)
CHLORIDE SERPL-SCNC: 102 MMOL/L — SIGNIFICANT CHANGE UP (ref 96–108)
CO2 SERPL-SCNC: 25 MMOL/L — SIGNIFICANT CHANGE UP (ref 22–31)
CREAT SERPL-MCNC: 0.88 MG/DL — SIGNIFICANT CHANGE UP (ref 0.5–1.3)
GLUCOSE BLDC GLUCOMTR-MCNC: 150 MG/DL — HIGH (ref 70–99)
GLUCOSE BLDC GLUCOMTR-MCNC: 209 MG/DL — HIGH (ref 70–99)
GLUCOSE BLDC GLUCOMTR-MCNC: 375 MG/DL — HIGH (ref 70–99)
GLUCOSE SERPL-MCNC: 335 MG/DL — HIGH (ref 70–99)
HCT VFR BLD CALC: 40.2 % — SIGNIFICANT CHANGE UP (ref 34.5–45)
HGB BLD-MCNC: 13.5 G/DL — SIGNIFICANT CHANGE UP (ref 11.5–15.5)
MCHC RBC-ENTMCNC: 33.6 GM/DL — SIGNIFICANT CHANGE UP (ref 32–36)
MCHC RBC-ENTMCNC: 35.7 PG — HIGH (ref 27–34)
MCV RBC AUTO: 106.3 FL — HIGH (ref 80–100)
NRBC # BLD: 0 /100 WBCS — SIGNIFICANT CHANGE UP (ref 0–0)
PLATELET # BLD AUTO: 643 K/UL — HIGH (ref 150–400)
POTASSIUM SERPL-MCNC: 4.8 MMOL/L — SIGNIFICANT CHANGE UP (ref 3.5–5.3)
POTASSIUM SERPL-SCNC: 4.8 MMOL/L — SIGNIFICANT CHANGE UP (ref 3.5–5.3)
RBC # BLD: 3.78 M/UL — LOW (ref 3.8–5.2)
RBC # FLD: 13 % — SIGNIFICANT CHANGE UP (ref 10.3–14.5)
SODIUM SERPL-SCNC: 138 MMOL/L — SIGNIFICANT CHANGE UP (ref 135–145)
WBC # BLD: 12.74 K/UL — HIGH (ref 3.8–10.5)
WBC # FLD AUTO: 12.74 K/UL — HIGH (ref 3.8–10.5)

## 2020-10-30 PROCEDURE — 80053 COMPREHEN METABOLIC PANEL: CPT

## 2020-10-30 PROCEDURE — 82803 BLOOD GASES ANY COMBINATION: CPT

## 2020-10-30 PROCEDURE — 85014 HEMATOCRIT: CPT

## 2020-10-30 PROCEDURE — 87186 SC STD MICRODIL/AGAR DIL: CPT

## 2020-10-30 PROCEDURE — 82330 ASSAY OF CALCIUM: CPT

## 2020-10-30 PROCEDURE — 82435 ASSAY OF BLOOD CHLORIDE: CPT

## 2020-10-30 PROCEDURE — 80048 BASIC METABOLIC PNL TOTAL CA: CPT

## 2020-10-30 PROCEDURE — 85730 THROMBOPLASTIN TIME PARTIAL: CPT

## 2020-10-30 PROCEDURE — 85025 COMPLETE CBC W/AUTO DIFF WBC: CPT

## 2020-10-30 PROCEDURE — 97530 THERAPEUTIC ACTIVITIES: CPT

## 2020-10-30 PROCEDURE — 81001 URINALYSIS AUTO W/SCOPE: CPT

## 2020-10-30 PROCEDURE — 82962 GLUCOSE BLOOD TEST: CPT

## 2020-10-30 PROCEDURE — 85610 PROTHROMBIN TIME: CPT

## 2020-10-30 PROCEDURE — 83036 HEMOGLOBIN GLYCOSYLATED A1C: CPT

## 2020-10-30 PROCEDURE — 97162 PT EVAL MOD COMPLEX 30 MIN: CPT

## 2020-10-30 PROCEDURE — 85027 COMPLETE CBC AUTOMATED: CPT

## 2020-10-30 PROCEDURE — 82607 VITAMIN B-12: CPT

## 2020-10-30 PROCEDURE — 93306 TTE W/DOPPLER COMPLETE: CPT

## 2020-10-30 PROCEDURE — 99285 EMERGENCY DEPT VISIT HI MDM: CPT | Mod: 25

## 2020-10-30 PROCEDURE — 84132 ASSAY OF SERUM POTASSIUM: CPT

## 2020-10-30 PROCEDURE — 0225U NFCT DS DNA&RNA 21 SARSCOV2: CPT

## 2020-10-30 PROCEDURE — 83930 ASSAY OF BLOOD OSMOLALITY: CPT

## 2020-10-30 PROCEDURE — 82947 ASSAY GLUCOSE BLOOD QUANT: CPT

## 2020-10-30 PROCEDURE — 82746 ASSAY OF FOLIC ACID SERUM: CPT

## 2020-10-30 PROCEDURE — 83735 ASSAY OF MAGNESIUM: CPT

## 2020-10-30 PROCEDURE — 97110 THERAPEUTIC EXERCISES: CPT

## 2020-10-30 PROCEDURE — U0003: CPT

## 2020-10-30 PROCEDURE — 76377 3D RENDER W/INTRP POSTPROCES: CPT

## 2020-10-30 PROCEDURE — 83605 ASSAY OF LACTIC ACID: CPT

## 2020-10-30 PROCEDURE — 74018 RADEX ABDOMEN 1 VIEW: CPT

## 2020-10-30 PROCEDURE — 83935 ASSAY OF URINE OSMOLALITY: CPT

## 2020-10-30 PROCEDURE — 97116 GAIT TRAINING THERAPY: CPT

## 2020-10-30 PROCEDURE — 99239 HOSP IP/OBS DSCHRG MGMT >30: CPT

## 2020-10-30 PROCEDURE — 82570 ASSAY OF URINE CREATININE: CPT

## 2020-10-30 PROCEDURE — 70450 CT HEAD/BRAIN W/O DYE: CPT

## 2020-10-30 PROCEDURE — 85018 HEMOGLOBIN: CPT

## 2020-10-30 PROCEDURE — 84100 ASSAY OF PHOSPHORUS: CPT

## 2020-10-30 PROCEDURE — 84443 ASSAY THYROID STIM HORMONE: CPT

## 2020-10-30 PROCEDURE — 71045 X-RAY EXAM CHEST 1 VIEW: CPT

## 2020-10-30 PROCEDURE — 84295 ASSAY OF SERUM SODIUM: CPT

## 2020-10-30 PROCEDURE — 87086 URINE CULTURE/COLONY COUNT: CPT

## 2020-10-30 PROCEDURE — 93005 ELECTROCARDIOGRAM TRACING: CPT

## 2020-10-30 PROCEDURE — 84300 ASSAY OF URINE SODIUM: CPT

## 2020-10-30 PROCEDURE — 96374 THER/PROPH/DIAG INJ IV PUSH: CPT

## 2020-10-30 PROCEDURE — 87040 BLOOD CULTURE FOR BACTERIA: CPT

## 2020-10-30 RX ORDER — SITAGLIPTIN 50 MG/1
1 TABLET, FILM COATED ORAL
Qty: 0 | Refills: 0 | DISCHARGE

## 2020-10-30 RX ORDER — INSULIN GLARGINE 100 [IU]/ML
10 INJECTION, SOLUTION SUBCUTANEOUS
Qty: 0 | Refills: 0 | DISCHARGE
Start: 2020-10-30

## 2020-10-30 RX ORDER — INSULIN LISPRO 100/ML
5 VIAL (ML) SUBCUTANEOUS
Qty: 0 | Refills: 0 | DISCHARGE
Start: 2020-10-30

## 2020-10-30 RX ORDER — METOPROLOL TARTRATE 50 MG
12.5 TABLET ORAL
Qty: 0 | Refills: 0 | DISCHARGE
Start: 2020-10-30

## 2020-10-30 RX ADMIN — AMLODIPINE BESYLATE 5 MILLIGRAM(S): 2.5 TABLET ORAL at 05:43

## 2020-10-30 RX ADMIN — Medication 5 UNIT(S): at 12:24

## 2020-10-30 RX ADMIN — APIXABAN 2.5 MILLIGRAM(S): 2.5 TABLET, FILM COATED ORAL at 05:43

## 2020-10-30 RX ADMIN — POLYETHYLENE GLYCOL 3350 17 GRAM(S): 17 POWDER, FOR SOLUTION ORAL at 12:25

## 2020-10-30 RX ADMIN — Medication 10: at 12:24

## 2020-10-30 RX ADMIN — Medication 12.5 MILLIGRAM(S): at 05:43

## 2020-10-30 RX ADMIN — Medication 112 MICROGRAM(S): at 05:43

## 2020-10-30 RX ADMIN — NYSTATIN CREAM 1 APPLICATION(S): 100000 CREAM TOPICAL at 05:43

## 2020-10-30 RX ADMIN — Medication 5 UNIT(S): at 08:51

## 2020-10-30 NOTE — PROGRESS NOTE ADULT - PROBLEM SELECTOR PROBLEM 2
MIKKI (acute kidney injury)
SIRS (systemic inflammatory response syndrome)
SIRS (systemic inflammatory response syndrome)
Type 2 diabetes mellitus without complication, without long-term current use of insulin
SIRS (systemic inflammatory response syndrome)

## 2020-10-30 NOTE — PROGRESS NOTE ADULT - ASSESSMENT
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 10/26 with progressing weakness, still with tachycardia
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 10/26 with progressing weakness, still with tachycardia
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 Clinically improved today awaiting insurance auth for [facility]
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 Clinically stable, received insurance auth, for [facility] today
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     - maintain euvolemia, avoid diuretics, would recommend gentle IV fluids, especially if patient is not taking in sufficient PO   - can start metoprolol tartrate 12.5mg bid to decrease inotropy and improve dynamic obstruction   - continue to monitor on telemetry

## 2020-10-30 NOTE — PROGRESS NOTE ADULT - NSHPATTENDINGPLANDISCUSS_GEN_ALL_CORE
Medicine DOROTHY Fair
Medicine DOROTHY Flowers
DOROTHY Flowers
DOROTHY Rudd
NP [place]
DOROTHY Rudd
NP

## 2020-10-30 NOTE — PROGRESS NOTE ADULT - ATTENDING COMMENTS
I have personally seen and examined the patient. I discussed the plan of care with the fellow, Dr. Robbins, and I agree with the plan of care he delineated.     LVOT obstruction demonstrated on echo may be exaggerated by intravascular volume depletion. On exam there is a soft systolic murmur, no bisferiens. Would avoid diuretics. Increase metoprolol to target HR < 100.
Dr. Millicent Araya   Division of Hospital Medicine  [facility]   Pager: [phone]
Dr. Millicent Araya   Division of Hospital Medicine  [facility]   Pager: [phone]
For [facility] and possibly LTC today   Discharge time 40 min
d/w daughter Magalie at length - at baseline - calm, confused, knows who she is, children, does not know date/day, increasing confusion in last year. Similar episode last year after found to have SBO and got surgery.  Lives at home with daughter and aide, walks with walker
d/w daughter Magalie at length - given worsening cognitive function and deconditioning - she is opting for [facility]. Long-term plan is to take her home.
d/w daughter Magalie at length - has been declining over last few weeks/months. Baseline cognitive impairment but definitely worsened in the hospital likely delirium superimposed on dementia. May improve at home. d/w daughter. Has had some sleep/wake cycle reversals. We spoke about using melatonin.  Lives at home with daughter and aide, walks with walker  Plan for d/c tomorrow if afebrile.
d/w daughter at length, answered all questions, understands and agrees with plan as above
d/w daughter at length, answered all questions, understands and agrees with plan as above.  Still pending [facility] auth but today not medically cleared given lethargy, rising BUN.
d/w daughter at length, answered all questions, understands and agrees with plan as above.  Still pending [facility] auth, medically cleared for discharge.
d/w daughter at length, answered all questions, understands and agrees with plan as above.  Still pending [facility] auth, medically cleared for discharge.

## 2020-10-30 NOTE — PROGRESS NOTE ADULT - PROBLEM SELECTOR PLAN 7
BP stable currently   pt taken off lisinopril per family by nephrology due to MIKKI/CKD
BP stable currently   pt taken off lisinopril per family by nephrology due to MIKKI/CKD  c/t monitor routinely, start antiHTN as indicated
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
hx of recurrent LE dvt 2/2 JAK2+ PV  c/w Eliquis (patient on 2.5mg bid)
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 Will monitor CBC  c/w Eliquis (patient on 2.5mg bid)
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

## 2020-10-30 NOTE — PROGRESS NOTE ADULT - PROBLEM SELECTOR PROBLEM 6
Essential hypertension
Stage 3b chronic kidney disease
Essential hypertension

## 2020-10-30 NOTE — PROGRESS NOTE ADULT - PROBLEM SELECTOR PLAN 5
Na 132, may be prerenal azotemia. IV 250cc and repeat BMP. If still low send urine lytes to r/o SIADH.
Resolved
check A1c  hold oral home medications  low ISS and monitor FS ac and hs
check A1c  hold oral home medications  low ISS and monitor FS ac and hs
resolved w/ gentle maintenance IVF overnight   monitor BMP
A1c 7.0  hold oral home medications  low ISS and monitor FS ac and hs

## 2020-10-30 NOTE — PROGRESS NOTE ADULT - PROBLEM SELECTOR PLAN 9
DVT ppx - therapeutic on Eliquis   DASH, CC diet
DVT ppx - therapeutic on Eliquis   DASH, CC diet  PT consult pending
c/w synthroid 112mcg
DVT ppx - therapeutic on Eliquis   DASH, CC diet  PT reccs home PT

## 2020-10-30 NOTE — PROGRESS NOTE ADULT - PROBLEM SELECTOR PLAN 3
Hyperglycemia - c/w lantus 5units and ISS to moderate  A1c 7.0  hold oral home medications  Monitor FS ac and hs
Hyperglycemia - c/w lantus 5units and ISS to moderate  A1c 7.0  hold oral home medications  Monitor FS ac and hs  Change to home meds on discharge
Hyperglycemia - lantus increased to 10 units and started 3 units premeal, ISS to moderate  A1c 7.0  hold oral home medications  Monitor FS ac and hs
Hyperglycemia - start lantus 5units and increase ISS to moderate  A1c 7.0  hold oral home medications  Monitor FS ac and hs
MIKKI on CKD stage 3B. Baseline Cr 1.3 to 1.5.   K 5.5 again 10/23, unclear etiology, Cr also slightly up and BUN elevated, consistent with prerenal azotemia. Give 250cc NS, Lokelma given. Monitor BMP at 2pm.   Diet changed to low K  Bladder scan wnl
Resolved; likely prerenal etiology c/b UTI  Hyperkalemia also resolved  Avoid nephrotoxins  c/w low potasium diet.
Resolved; likely prerenal etiology c/b UTI  Hyperkalemia also resolved  Daily BMP  Avoid nephrotoxins
resolved w/ gentle maintenance IVF overnight   monitor BMP

## 2020-10-30 NOTE — PROGRESS NOTE ADULT - SUBJECTIVE AND OBJECTIVE BOX
PROGRESS NOTE:   Authored by Dr. Angelica Ruelas MD  Pager [phone]     Patient is a 87y old  Female who presents with a chief complaint of AMS (29 Oct 2020 11:45)      SUBJECTIVE / OVERNIGHT EVENTS: Patient seen and examined at bedside. Patient sitting up in bed, eating breakfast (fed), no complaints, confused     ADDITIONAL REVIEW OF SYSTEMS: unable to fully obtain due to confusion    MEDICATIONS  (STANDING):  amLODIPine   Tablet 5 milliGRAM(s) Oral daily  apixaban 2.5 milliGRAM(s) Oral two times a day  atorvastatin 10 milliGRAM(s) Oral at bedtime  dextrose 5%. 1000 milliLiter(s) (50 mL/Hr) IV Continuous <Continuous>  dextrose 50% Injectable 12.5 Gram(s) IV Push once  dextrose 50% Injectable 25 Gram(s) IV Push once  dextrose 50% Injectable 25 Gram(s) IV Push once  insulin glargine Injectable (LANTUS) 10 Unit(s) SubCutaneous at bedtime  insulin lispro (ADMELOG) corrective regimen sliding scale   SubCutaneous three times a day before meals  insulin lispro (ADMELOG) corrective regimen sliding scale   SubCutaneous at bedtime  insulin lispro Injectable (ADMELOG) 5 Unit(s) SubCutaneous three times a day before meals  levothyroxine 112 MICROGram(s) Oral daily  metoprolol tartrate 12.5 milliGRAM(s) Oral two times a day  nystatin Cream 1 Application(s) Topical two times a day  polyethylene glycol 3350 17 Gram(s) Oral daily    MEDICATIONS  (PRN):  acetaminophen   Tablet .. 650 milliGRAM(s) Oral every 6 hours PRN Temp greater or equal to 38C (100.4F), Mild Pain (1 - 3)  dextrose 40% Gel 15 Gram(s) Oral once PRN Blood Glucose LESS THAN 70 milliGRAM(s)/deciliter  glucagon  Injectable 1 milliGRAM(s) IntraMuscular once PRN Glucose LESS THAN 70 milligrams/deciliter      CAPILLARY BLOOD GLUCOSE      POCT Blood Glucose.: 150 mg/dL (30 Oct 2020 08:24)  POCT Blood Glucose.: 250 mg/dL (29 Oct 2020 21:58)  POCT Blood Glucose.: 209 mg/dL (29 Oct 2020 17:12)  POCT Blood Glucose.: 304 mg/dL (29 Oct 2020 12:33)    I&O's Summary    29 Oct 2020 07:01  -  30 Oct 2020 07:00  --------------------------------------------------------  IN: 1280 mL / OUT: 1900 mL / NET: -620 mL    30 Oct 2020 07:01  -  30 Oct 2020 12:07  --------------------------------------------------------  IN: 240 mL / OUT: 0 mL / NET: 240 mL        PHYSICAL EXAM:  Vital Signs Last 24 Hrs  T(C): 37.3 (30 Oct 2020 08:43), Max: 37.4 (29 Oct 2020 20:30)  T(F): 99.2 (30 Oct 2020 08:43), Max: 99.3 (29 Oct 2020 20:30)  HR: 93 (30 Oct 2020 08:43) (86 - 100)  BP: 150/85 (30 Oct 2020 08:43) (139/89 - 164/80)  BP(mean): --  RR: 18 (30 Oct 2020 08:43) (18 - 18)  SpO2: 93% (30 Oct 2020 08:43) (93% - 98%)    CONSTITUTIONAL: NAD  RESPIRATORY: Normal respiratory effort; lungs are clear to auscultation bilaterally  CARDIOVASCULAR: Regular rate and rhythm, normal S1 and S2, no murmur/rub/gallop; No lower extremity edema; Peripheral pulses are 2+ bilaterally  ABDOMEN: Nontender to palpation, normoactive bowel sounds, no rebound/guarding; No hepatosplenomegaly  MUSCLOSKELETAL: no clubbing or cyanosis of digits; no joint swelling or tenderness to palpation  PSYCH: A+O to person    LABS:                        13.5   12.74 )-----------( 643      ( 30 Oct 2020 10:54 )             40.2     10-30    138  |  102  |  38<H>  ----------------------------<  335<H>  4.8   |  25  |  0.88    Ca    9.9      30 Oct 2020 10:54                  RADIOLOGY & ADDITIONAL TESTS:  Results Reviewed:   Imaging Personally Reviewed:  Electrocardiogram Personally Reviewed:    COORDINATION OF CARE:  Care Discussed with Consultants/Other Providers [Y/N]:  Prior or Outpatient Records Reviewed [Y/N]:

## 2020-10-30 NOTE — PROGRESS NOTE ADULT - PROBLEM SELECTOR PROBLEM 8
Deep Vein Thrombosis (DVT)
Hypothyroidism
Hypothyroidism
SIRS (systemic inflammatory response syndrome)
Hypothyroidism

## 2020-10-30 NOTE — PROGRESS NOTE ADULT - PROBLEM SELECTOR PLAN 1
Acute encephalopathy likely due to UTI and underlying vascular dementia.   -stable; extensive workup negative  -delirium precautions
Acute encephalopathy likely due to UTI and underlying vascular dementia.   -stable; extensive workup negative  -delirium precautions
Acute encephalopathy likely due to UTI and underlying vascular dementia.   Per daughter over last 2 days appetite good but very quite and leaning to R.  Will order CTH to ensure no new event. Likely metabolic encephalopathy superimposed on dementia. Check labs today  -stable; extensive workup negative  -delirium precautions
Acute encephalopathy likely due to UTI and underlying vascular dementia.   Some improvement after admission but waxing and waning confusion. pt is AAO x 1, confused but calm.  infectious work up negative, WBC nl, afebrile, no source of infection, s/p ceftriaxone for UTI  No significant electrolyte derangement noted  abdomen benign on exam, lactate normal.  - TSH, b12, folate: wnl  Patient with some baseline impairment, CVA in past most likely progressive vascular dementia
Acute encephalopathy likely due to UTI and underlying vascular dementia.   Some improvement after admission but waxing and waning confusion. pt is AAO x 1, confused but calm.  infectious work up negative, WBC nl, afebrile, no source of infection, s/p ceftriaxone for UTI  No significant electrolyte derangement noted  abdomen benign on exam, lactate normal.  - TSH, b12, folate: wnl  Patient with some baseline impairment, CVA in past most likely progressive vascular dementia  Large BM 10/18 - Abd xray with no significant findings
Persistent metabolic encephalopathy superimposed on vascular dementia. Repeat Head CT 10/26 with no new findings, moderate severity chronic white matter microvascular type changes.   Initially patient treated with abx for UTI, no new focus of infection  10/27 BUN rising significantly, hg stable and no evidence of GI bleed, suspect dehydration. Improved with IVF. On LR @ 75cc/hr. Will d/c IVF  No BM in 3 days - will give dulcolax suppository.   Patient eating well, good appetite, FS better controlled, c/w lantus 17 and humalog 10 units with meals. Monitor closely for hypoglycemia.  -delirium precautions
Persistent metabolic encephalopathy superimposed on vascular dementia. Repeat Head CT 10/26 with no new findings, moderate severity chronic white matter microvascular type changes.   Initially patient treated with abx for UTI, no new focus of infection  10/27 BUN rising significantly, hg stable and no evidence of GI bleed, suspect dehydration. Will start LR @75cc/hr.   No BM in 3 days - will give dulcolax suppository.   Patient eating well, good appetite, FS better controlled, c/w lantus 17 and humalog 10 units with meals. Monitor closely for hypoglycemia.  -delirium precautions
Persistent metabolic encephalopathy superimposed on vascular dementia. Repeat Head CT 10/26 with no new findings, moderate severity chronic white matter microvascular type changes. Clinically stable   Slight leukocytosis but no s/s of infection  Initially patient treated with abx for UTI, no new focus of infection  BUN/Cr stable  BM 10/29  Patient eating well, good appetite, FS better controlled, c/w lantus 10 and humalog 5 units with meals. Monitor closely for hypoglycemia.  -delirium precautions
Persistent metabolic encephalopathy superimposed on vascular dementia. Repeat Head CT 10/26 with no new findings, moderate severity chronic white matter microvascular type changes. Today looks great, sitting up, talking, eating.   Slight leukocytosis but no s/s of infection  Initially patient treated with abx for UTI, no new focus of infection  10/27 BUN rising significantly, hg stable and no evidence of GI bleed, suspect dehydration. Improved with IVF. On LR @ 75cc/hr. Resolved  BM this am  Patient eating well, good appetite, FS better controlled, c/w lantus 10 and humalog 5 units with meals. Monitor closely for hypoglycemia.  -delirium precautions
acute encephalopathy likely due to UTI and underlying vascular dementia.   Some improvement after admission but waxing and waning confusion. pt is AAO x 1, confused but calm.  infectious work up negative, WBC nl, afebrile, no source of infection, s/p ceftriaxone for UTI  No significant electrolyte derangement noted  abdomen benign on exam, lactate normal.  - TSH, b12, folate: wnl  Patient with some baseline impairment, CVA in past most likely progressive vascular dementia  Large BM 10/18 - Abd xray with no significant findings
acute encephalopathy x 2 days, unclear etiology. Per family, similar to when pt had UTI in past and pt endorsing urinary symptoms, but UA bland. No e/o PNA on CXR, not hypoxic, COVID/RVP neg; abdomen benign on exam, lactate normal.  -hypoNa resolved CKD at baseline. CTH without acute pathology   -ddx remains broad - UTI vs other infection vs metabolic derangement  - will c/w empiric ceftriaxone   -  CT A/P w/ cont ordered    - TSH, b12, folate: wnl   -neurology consult called
acute encephalopathy x 2 days, unclear etiology. Per family, similar to when pt had UTI in past and pt endorsing urinary symptoms, but UA bland. No e/o PNA on CXR, not hypoxic, COVID/RVP neg; abdomen benign on exam, lactate normal.  -hypoNa resolved CKD at baseline. CTH without acute pathology   -ddx remains broad - UTI vs worsening dementia   - will c/w empiric ceftriaxone   -  CT A/P w/ d/c'ed pt back to her baseline mental status today, d/w Dtr angel, abd wnl on exam, pt tolerating diet well, WBC wnl, and vitals stable  - TSH, b12, folate: wnl   -neurology rec noted
acute encephalopathy, some improvement after admission but since yesterday increased confusion. pt is AAO x 1,  confused with tangential speech  infectious work up negative so far  No significant electrolyte derangement noted  abdomen benign on exam, lactate normal.  - started on empiric ceftriaxone for possible UTI  afebrile, WBC normal  - TSH, b12, folate: wnl  Patient with some baseline impairment, CVA in past may be vascular dementia  Large BM yesterday - likely no obstruction, check AXR
acute encephalopathy, some improvement after admission but since yesterday increased confusion. pt is AAO x 1, confused but more calm.  infectious work up negative so far, although WBC slightly up today but afebrile, no source of infection, still on ceftriaxone  No significant electrolyte derangement noted  abdomen benign on exam, lactate normal.  - started on empiric ceftriaxone for possible UTI  afebrile, WBC normal  - TSH, b12, folate: wnl  Patient with some baseline impairment, CVA in past most likely progressive vascular dementia  Large BM 10/18 - Abd xray with no significant findings
acute encephalopathy x 2 days, unclear etiology. ; pt is AAO x 1,  confused with tangential speech  infectious work up negative so far  No significant electrolyte derangement noted  abdomen benign on exam, lactate normal.  - started on empiric ceftriaxone for possible UTI  - will check CT abd/pelvis to eval for occult infection  - TSH, b12, folate: wnl

## 2020-10-30 NOTE — PROGRESS NOTE ADULT - PROBLEM SELECTOR PROBLEM 3
Hyponatremia
Hyponatremia
MIKKI (acute kidney injury)
Type 2 diabetes mellitus without complication, without long-term current use of insulin
Hyponatremia

## 2020-10-30 NOTE — PROGRESS NOTE ADULT - PROBLEM SELECTOR PROBLEM 5
Hyponatremia
Type 2 diabetes mellitus without complication, without long-term current use of insulin

## 2020-10-30 NOTE — PROGRESS NOTE ADULT - PROBLEM SELECTOR PLAN 4
- c/w empiric ceftriaxone UTI (started 10/15) - d/c after today 5 day course  - blood NGTD  - urine cx 10- 49,000 cfu E.coli  - see above
- c/w empiric ceftriaxone UTI (started 10/15) - d/c today, 5 day course  - blood NGTD  - urine cx 10- 49,000 cfu E.coli  - see above
- c/w empiric ceftriaxone UTI (started 10/15) - s/p 5 day course  - blood NGTD  - urine cx 10- 49,000 cfu E.coli  - see above
- c/w empiric ceftriaxone UTI (started 10/15) - s/p 5 day course  - blood NGTD  - urine cx 10- 49,000 cfu E.coli  - see above
Cr 1.2 pts baseline appears to be 1.3-1.5   renally dose medications, avoid nephrotoxins
Cr 1.2 pts baseline appears to be 1.3-1.5   renally dose medications, avoid nephrotoxins
Improved with BB - c/w metoprolol 12.5mg bid  TTE with LVOT, LVH.   cards input appreciated, avoid diuretics, titrate BB to HR <100
Improved with BB - c/w metoprolol 12.5mg bid  TTE with LVOT, LVH.   cards input appreciated, avoid diuretics, titrate BB to HR <100
Improved with BB - increase to metoprolol 25mg bid  TTE with LVOT, LVH.   cards input appreciated, avoid diuretics, titrate BB to HR <100
Patient has been intermittently tachycardic during admission. Patient does not complain of pain, palpitations. Unclear etiology as not agitated. Saturating well.  Check 2Decho.
Sinus; asymptomatic  -f/u ECHO
Sinus; asymptomatic  -f/u ECHO
Sinus; asymptomatic  TTE with LVOT, LVH.   Cards consult for input - possible BB or ACE/ARB
Sinus; asymptomatic  TTE with LVOT, LVH.   cards input appreciated, avoid diuretics, titrate BB to HR <100, started metoprolol 12.5mg 10/26.
Cr 1.2 pts baseline appears to be 1.3-1.5   renally dose medications, avoid nephrotoxins

## 2020-10-30 NOTE — PROGRESS NOTE ADULT - PROBLEM SELECTOR PROBLEM 7
Deep Vein Thrombosis (DVT)
Essential hypertension
Deep Vein Thrombosis (DVT)

## 2020-10-30 NOTE — PROGRESS NOTE ADULT - PROBLEM SELECTOR PLAN 2
- c/w empirically with ceftriaxone   - f/u blood NGTD  - urine cx results pending   - see above
- c/w empirically with ceftriaxone   - f/u blood and urine cx   - see above
BMP pending. K 5.5 10/21 s/p 1 dose of lasix 20mg IV x1 and dulcolax suppository. Diet changed to low K  Bladder scan wnl
Cr improved, however K 5.5. Unclear cause. Will give 1 dose of lasix 20mg IV x1 and dulcolax suppository. Repeat K to ensure coming down. Change diet to low K  Bladder scan wnl
Cr increased to 1.33, patient eating well.  Bladder scan wnl   Moving BM  Avoid contrast given increasing Cr
Cr increased to 1.69, patient eating well.  Will check bladder scan, consider retention  Large BM yesterday  Avoid contrast given increasing Cr
FS up and down.   Good appetite.   - c/w lantus to 10 units and c/w premeal to 5 units.   - moderate correction scale  A1c 7.0  d/c to VICENTA on insulin regimen
Hyperglycemia  - lantus increased to 15 units and premeal increased to 5,  - moderate correction scale  A1c 7.0  hold oral home medications  Monitor FS ac and hs
Hyperglycemia  - lantus increased to 17 units and premeal increased to 7,  - moderate correction scale  A1c 7.0  hold oral home medications  Monitor FS ac and hs
Hyperglycemia - improved today. Per daughter good appetite. Will need to monitor to ensure no hypoglycemia  - lantus c/w 17 units and premeal c/w 10 (increased 10/26)  - moderate correction scale  A1c 7.0  hold oral home medications  Monitor FS ac and hs
Hyperglycemia - improved today. Per daughter good appetite. Will need to monitor to ensure no hypoglycemia  - lantus c/w 17 units and premeal c/w 7 (increased 10/25)  - moderate correction scale  A1c 7.0  hold oral home medications  Monitor FS ac and hs
Hyperglycemia - lantus increased to 15 units and premeal increased to 5, unclear etiology of hyperglycemia. ISS to moderate  A1c 7.0  hold oral home medications  Monitor FS ac and hs
This am FS 79. Good appetite. Will need to monitor to ensure no hypoglycemia  - Will decrease lantus to 12 units and reduce premeal to 5 units.   - moderate correction scale  A1c 7.0  hold oral home medications  Monitor FS ac and hs
This am FS 90s. Good appetite. Will need to monitor to ensure no hypoglycemia  - Will decrease lantus to 10 units and c/w premeal to 5 units.   - moderate correction scale  A1c 7.0  hold oral home medications  Monitor FS ac and hs
- c/w empiric ceftriaxone for now  - blood NGTD  - urine cx 10- 49,000 cfu E.coli  - see above

## 2020-10-30 NOTE — PROGRESS NOTE ADULT - PROBLEM SELECTOR PROBLEM 10
normal...
Prophylactic measure

## 2020-10-30 NOTE — PROGRESS NOTE ADULT - PROBLEM SELECTOR PROBLEM 1
Metabolic encephalopathy

## 2020-10-30 NOTE — PROGRESS NOTE ADULT - PROBLEM SELECTOR PROBLEM 4
SIRS (systemic inflammatory response syndrome)
Stage 3b chronic kidney disease
Stage 3b chronic kidney disease
Tachycardia
Stage 3b chronic kidney disease

## 2020-10-30 NOTE — PROGRESS NOTE ADULT - PROBLEM SELECTOR PROBLEM 9
Hypothyroidism
Prophylactic measure

## 2020-10-30 NOTE — PROGRESS NOTE ADULT - PROBLEM SELECTOR PLAN 8
- c/w empiric ceftriaxone UTI (started 10/15) - s/p 5 day course  - blood NGTD  - urine cx 10- 49,000 cfu E.coli  - see above
- s/p ceftriaxone for UTI (started 10/15); now resolved  - blood NGTD  - urine cx 10- 49,000 cfu E.coli
- s/p ceftriaxone for UTI (started 10/15); now resolved  - blood NGTD  - urine cx 10- 49,000 cfu E.coli  - see above
c/w synthroid 112mcg  check TSH
c/w synthroid 112mcg  check TSH
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
c/w synthroid 112mcg

## 2020-10-30 NOTE — PROGRESS NOTE ADULT - PROBLEM SELECTOR PLAN 6
BP now at goal  Cont amlodipine 5 mg
BP stable currently   pt taken off lisinopril per family by nephrology due to MIKKI/CKD  c/t monitor routinely, start antiHTN as indicated
BP stable currently   pt taken off lisinopril per family by nephrology due to MIKKI/CKD  c/t monitor routinely, start antiHTN as indicated
Cr slightly increased to 1.69, s/p IVF overnight, may be dehydration  check bladder scan to make sure no retention  pts baseline appears to be 1.3-1.5   renally dose medications, avoid nephrotoxins
Elevated BP  pt taken off lisinopril per family by nephrology due to MIKKI/CKD  Start amlodipine 5 mg and observe BP
pts baseline appears to be 1.3-1.5   renally dose medications, avoid nephrotoxins
BP stable currently   pt taken off lisinopril per family by nephrology due to MIKKI/CKD  c/t monitor routinely, start antiHTN as indicated

## 2020-11-03 ENCOUNTER — NON-APPOINTMENT (OUTPATIENT)
Age: 85
End: 2020-11-03

## 2020-11-16 ENCOUNTER — APPOINTMENT (OUTPATIENT)
Dept: INTERNAL MEDICINE | Facility: CLINIC | Age: 85
End: 2020-11-16

## 2020-11-23 NOTE — PHYSICAL THERAPY INITIAL EVALUATION ADULT - WEIGHT-BEARING RESTRICTIONS: SIT/STAND, REHAB EVAL
Progress Note    Patient: Arthur Red MRN: [id number]  SSN: [id number]    YOB: 1970  Age: 48 y.o. Sex: male      Admit Date: 11/3/2020    19 Days Post-Op    Procedure:  Procedure(s):  LAPAROTOMY EXPLORATORY, evacuation of hematoma. Subjective:     Patient states he feels a bit better today re: clear liquids. Currently on TPN. He denies n/v overnight. He denies all abdominal pain. Reporting loose stools still. Using CPAP overnight. Objective:     Visit Vitals  BP (!) 153/77 (BP 1 Location: Left arm, BP Patient Position: At rest;Sitting)   Pulse 86   Temp 98.2 °F (36.8 °C)   Resp 17   Ht 5' 11\" (1.803 m)   Wt 101.4 kg (223 lb 9.6 oz)   SpO2 96%   BMI 31.19 kg/m²       Temp (24hrs), Av.1 °F (36.7 °C), Min:97.6 °F (36.4 °C), Max:98.5 °F (36.9 °C)      Physical Exam:    Lungs:  CTA bilat  Cardiac:  RRR  ABDOMEN: soft, no distention, non-tender, incisions/staple line C/D/I  Extrem:  Moves all    Data Review: images and reports reviewed    Lab Review: All lab results for the last 24 hours reviewed. Assessment:     Hospital Problems  Date Reviewed: 2020          Codes Class Noted POA    Biliary colic XDL-06-CF: P50.27  ICD-9-CM: 574.20  2020 Unknown        Cholecystitis ICD-10-CM: K81.9  ICD-9-CM: 575.10  2020 Unknown              Plan/Recommendations/Medical Decision Making:     Nausea improving overnight  Cont TPN  Dr Ro Briceno SBFT  Clears as tolerated. Possible advancement of diet if w/out nausea over am and no concerns on SBFT. Cont cooley- consider void trial in upcoming days  Up and OOB/ambulate  IS      Pt seen and to discuss plan with Dr Santosh Dennis.    Fabricio Jeronimo, BROWN full weight-bearing

## 2020-12-03 ENCOUNTER — OUTPATIENT (OUTPATIENT)
Dept: OUTPATIENT SERVICES | Facility: HOSPITAL | Age: 85
LOS: 1 days | Discharge: HOME | End: 2020-12-03

## 2020-12-03 DIAGNOSIS — C92.10 CHRONIC MYELOID LEUKEMIA, BCR/ABL-POSITIVE, NOT HAVING ACHIEVED REMISSION: ICD-10-CM

## 2020-12-04 ENCOUNTER — APPOINTMENT (OUTPATIENT)
Dept: HEMATOLOGY ONCOLOGY | Facility: CLINIC | Age: 85
End: 2020-12-04
Payer: MEDICARE

## 2020-12-04 PROCEDURE — 99442: CPT

## 2020-12-04 NOTE — PHYSICAL EXAM
[Capable of only limited self care, confined to bed or chair more than 50% of waking hours] : Status 3- Capable of only limited self care, confined to bed or chair more than 50% of waking hours [Thin] : thin [Normal] : no peripheral adenopathy appreciated [de-identified] : chronic venous changes in lower extremity

## 2020-12-04 NOTE — ASSESSMENT
[FreeTextEntry1] : 87 F JAK2 + PV, recurrent RLE DVT (2008 then 2010), HTN/HLD, DM II \par \par 1) JAK2 + PV.  Hydroxyurea is on hold secondary to dropped in Hb 9.3 g/dl on 8/21/2020. Hb has recovered since 10/9/2020 at 12.1 g/dl. \par \par 2) DVT\par -patient is on  anticoagulation with Eliquis, she is very high risk for recurrent VTE including acute DVT/PE with HARRIS 2+ PV and history of multiple DVTs, benefit of anticoagulation outweights risk in patient's case. Continue eliquis  2.5 mg BID as per recommendations in the literature >81 yo and <60Kg.\par \par \par RTC 2 months.

## 2020-12-04 NOTE — HISTORY OF PRESENT ILLNESS
[Disease:__________________________] : Disease: [unfilled] [0 - No Distress] : Distress Level: 0 [Other Location: e.g. School (Enter Location, City,State)___] : at [unfilled], at the time of the visit. 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 Hb has recovered since 10/9/2020 at 12.1 g/dl. \par \par Patient had a recent admission to the hospital for UTI, transferred to rehab for generalized weakness. \par \par No other changes in her medical, surgical or social history since 10/9/2020.

## 2020-12-04 NOTE — REVIEW OF SYSTEMS
[Confused] : confusion [Negative] : Allergic/Immunologic [Fever] : no fever [Chills] : no chills [Eye Pain] : no eye pain [Dysphagia] : no dysphagia [Odynophagia] : no odynophagia [Chest Pain] : no chest pain [Shortness Of Breath] : no shortness of breath [Abdominal Pain] : no abdominal pain [Vomiting] : no vomiting [Joint Pain] : no joint pain [Skin Rash] : no skin rash [Easy Bleeding] : no tendency for easy bleeding [Easy Bruising] : no tendency for easy bruising [de-identified] : dementia

## 2020-12-14 ENCOUNTER — APPOINTMENT (OUTPATIENT)
Dept: INTERNAL MEDICINE | Facility: CLINIC | Age: 85
End: 2020-12-14

## 2020-12-14 ENCOUNTER — OUTPATIENT (OUTPATIENT)
Dept: OUTPATIENT SERVICES | Facility: HOSPITAL | Age: 85
LOS: 1 days | End: 2020-12-14
Payer: MEDICARE

## 2020-12-14 DIAGNOSIS — R00.0 TACHYCARDIA, UNSPECIFIED: ICD-10-CM

## 2020-12-14 DIAGNOSIS — I10 ESSENTIAL (PRIMARY) HYPERTENSION: ICD-10-CM

## 2020-12-14 PROCEDURE — G0463: CPT

## 2020-12-14 RX ORDER — CEPHALEXIN 500 MG/1
500 CAPSULE ORAL 3 TIMES DAILY
Qty: 21 | Refills: 0 | Status: DISCONTINUED | COMMUNITY
Start: 2020-02-20 | End: 2020-12-14

## 2020-12-15 ENCOUNTER — APPOINTMENT (OUTPATIENT)
Dept: HEMATOLOGY ONCOLOGY | Facility: CLINIC | Age: 85
End: 2020-12-15
Payer: MEDICARE

## 2020-12-15 PROCEDURE — 99213 OFFICE O/P EST LOW 20 MIN: CPT | Mod: 95

## 2020-12-15 NOTE — PHYSICAL EXAM
[Capable of only limited self care, confined to bed or chair more than 50% of waking hours] : Status 3- Capable of only limited self care, confined to bed or chair more than 50% of waking hours [Thin] : thin [Normal] : affect appropriate [de-identified] : Elderly woman bedridden [de-identified] : chronic venous changes in lower extremity

## 2020-12-15 NOTE — ASSESSMENT
[FreeTextEntry1] : 87 F JAK2 + PV, recurrent RLE DVT (2008 then 2010), HTN/HLD, DM II \par \par 1) JAK2 + PV.  Hydroxyurea is on hold secondary to dropped in Hb 9.3 g/dl on 8/21/2020. Hb has recovered since 10/9/2020 at 12.1 g/dl. Will make arrangements for home blood drawn. Will call family with results. \par \par 2) DVT\par -patient is on  anticoagulation with Eliquis, she is very high risk for recurrent VTE including acute DVT/PE with HARRIS 2+ PV and history of multiple DVTs, benefit of anticoagulation outweights risk in patient's case. Continue eliquis  2.5 mg BID as per recommendations in the literature >79 yo and <60Kg.\par \par I offered daughter SW services, she differed for now. \par \par This service was provided by using telehealth. The patient was at home and I was at [facility] The patient requested and participated in this encounter. The encounter face to face last 30   minutes coordinating his/her care and counseling.\par \par \par RTC 2 months.

## 2020-12-15 NOTE — PROGRESS NOTE ADULT - PROBLEM SELECTOR PLAN 1
[facility] THERAPY   [facility] [address], [address],[place], [address] [facility] [address] - Phone: [phone]  Fax: [phone]  DISCHARGE SUMMARY FOR PHYSICAL THERAPY          Patient Name: Pari Andre : 1952   Treatment/Medical Diagnosis: Right knee pain [M25.561]   Onset Date: 2020    Referral Source: Sophia Moody MD [facility]): 2020   Prior Hospitalization: See Medical History Provider #: [id number]   Prior Level of Function: I in all functional transfers and ambulation. Comorbidities: L TKR May 2020, OA    Medications: Verified on Patient Summary List   Vis[facility] from [facility]: 14 Missed Visits: 1     Goal/Measure of Progress Goal Met? 1. Patient will increase right knee flexion strength to >/= 4+/5 in order to improve knee stability with stair negotiation and squatting   Status at last Eval: Flex 4/5   Ext 4+/5  Current Status: Flex 4+/5   Ext 5/5  yes   2. Patient will be able to demonstrate a squat and lift of 8lbs without valgus knee collapse and with proper form in order to more effectively lift groceries off of the floor   Status at last Eval: Na  Current Status: Lift 16# box from floor to table, Fair mechanics, no valgus. progressing   3. Patient will demonstrate an increased FOTO score to 65/100 in order to improve function   Status at last Eval: 43 @ IE   61 @ assessment Current Status: 84 yes             4.  Patient will demonstrate increased pain free AROM in the right knee to >/= to 0-130 in order to allow for improved ability to transfer and improved gait dynamics   Status at last Eval: 1-121 deg  Current Status: 0-131 deg  yes         Key Functional Changes/Progress: Pt presented to [facility] s/p (R) TKR. Current max pain is 2/10, 95% improvement in symptoms since beginning PT. Current FOTO = 84, GROC = +7. (R) knee strength: 5/5 ext, 4+/5 flex. Current knee PROM = 0-131 deg. Pt is (I) and compliant with current HEP. Pt is demonstrating good progress of strength and ROM thus far. Pt did not return to PT following session on 11/20. Pt to be discharged at this time. Assessments/Recommendations: Other: Discontinue Therapy - Self Discharge. Progressing toward or have reached established goals. If you have any questions/comments please contact us directly at [phone]. Thank you for allowing us to assist in the care of your patient.     Therapist Signature: Ruma Washington, PT, DPT   Date: 22/40/9487   Certification Period:   Reporting Period: 9/17/2020 - 12/15/2020  10/16/2020 - 11/20/2020 Time: 1:49 PM - Patient at goal HbA1c while only on Metformin 500 mg BID at home  - Noted to have acute steroid induced hyperglycemia  - Recommend increase Lantus from 8 to 11 units QHS  - Recommend increase Humalog from 4 to 6 units TID AC and low SSI AC     Outpatient Plan  - Likely only Metformin 1000 mg BID and then after steroids are complete can resume Metformin 500 mg BID as long as GFR is >30  - F/u [address] [place] [address] with Endocrinology given multiple endocrine issues. - Patient at goal HbA1c while only on Metformin 500 mg BID at home  - Noted to have acute steroid induced hyperglycemia  - Recommend increase Lantus from 8 to 11 units QHS  - Recommend increase Humalog from 4 to 6 units TID AC and low SSI AC     Outpatient Plan  - Likely only Metformin 1000 mg BID and then after steroids are complete can resume Metformin 500 mg BID as long as GFR is >30  - F/u [address] [place] [address] with Endocrinology given multiple endocrine issues.    If pt goes to rehab: would suggest to send on the above insulin doses until steroids have stopped.  Meaning, continue the humalog 6 units tidac with last dose before dinner on the day when final dose of prednisone was given that AM.  Would give lantus up until the night BEFORE the final dose of steroid is given, then stop. After insulin off, then resume metformin 500 mg bid in rehab going forward.

## 2020-12-15 NOTE — REVIEW OF SYSTEMS
[Confused] : confusion [Negative] : Allergic/Immunologic [Vomiting] : vomiting [Fever] : no fever [Chills] : no chills [Eye Pain] : no eye pain [Dysphagia] : no dysphagia [Odynophagia] : no odynophagia [Chest Pain] : no chest pain [Shortness Of Breath] : no shortness of breath [Abdominal Pain] : no abdominal pain [Joint Pain] : no joint pain [Skin Rash] : no skin rash [Easy Bleeding] : no tendency for easy bleeding [Easy Bruising] : no tendency for easy bruising [FreeTextEntry2] : bedridden [FreeTextEntry7] : 12/14/2020, was seen by PCP.  [de-identified] : dementia

## 2020-12-15 NOTE — HISTORY OF PRESENT ILLNESS
[Disease:__________________________] : Disease: [unfilled] [0 - No Distress] : Distress Level: 0 [Other Location: e.g. School (Enter Location, City,State)___] : at [unfilled], at the time of the visit. [Other:____] : [unfilled] [Home] : at home, [unfilled] , at the time of the visit. 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 Hb has recovered since 10/9/2020 at 12.1 g/dl. \par \par Patient had a recent admission to the hospital for UTI, transferred to rehab for generalized weakness. Patient was discharged home, awaiting to start home PT. \par \par No other changes in her medical, surgical or social history since 12/4/2020.  [FreeTextEntry3] : Daughter-Magalie

## 2020-12-16 DIAGNOSIS — E03.9 HYPOTHYROIDISM, UNSPECIFIED: ICD-10-CM

## 2020-12-16 DIAGNOSIS — E11.9 TYPE 2 DIABETES MELLITUS WITHOUT COMPLICATIONS: ICD-10-CM

## 2020-12-16 DIAGNOSIS — R53.1 WEAKNESS: ICD-10-CM

## 2020-12-18 ENCOUNTER — LABORATORY RESULT (OUTPATIENT)
Age: 85
End: 2020-12-18

## 2020-12-20 ENCOUNTER — INPATIENT (INPATIENT)
Facility: HOSPITAL | Age: 85
LOS: 15 days | Discharge: NURSING, REHAB OR LONG-TERM CARE | DRG: 643 | End: 2021-01-05
Attending: HOSPITALIST | Admitting: HOSPITALIST
Payer: MEDICARE

## 2020-12-20 VITALS
OXYGEN SATURATION: 96 % | HEIGHT: 60 IN | HEART RATE: 87 BPM | WEIGHT: 130.07 LBS | DIASTOLIC BLOOD PRESSURE: 70 MMHG | SYSTOLIC BLOOD PRESSURE: 123 MMHG | TEMPERATURE: 99 F | RESPIRATION RATE: 18 BRPM

## 2020-12-20 DIAGNOSIS — G93.40 ENCEPHALOPATHY, UNSPECIFIED: ICD-10-CM

## 2020-12-20 DIAGNOSIS — E87.1 HYPO-OSMOLALITY AND HYPONATREMIA: ICD-10-CM

## 2020-12-20 DIAGNOSIS — Z90.49 ACQUIRED ABSENCE OF OTHER SPECIFIED PARTS OF DIGESTIVE TRACT: Chronic | ICD-10-CM

## 2020-12-20 DIAGNOSIS — I82.409 ACUTE EMBOLISM AND THROMBOSIS OF UNSPECIFIED DEEP VEINS OF UNSPECIFIED LOWER EXTREMITY: ICD-10-CM

## 2020-12-20 DIAGNOSIS — E11.9 TYPE 2 DIABETES MELLITUS WITHOUT COMPLICATIONS: ICD-10-CM

## 2020-12-20 DIAGNOSIS — I10 ESSENTIAL (PRIMARY) HYPERTENSION: ICD-10-CM

## 2020-12-20 DIAGNOSIS — D45 POLYCYTHEMIA VERA: ICD-10-CM

## 2020-12-20 DIAGNOSIS — E03.9 HYPOTHYROIDISM, UNSPECIFIED: ICD-10-CM

## 2020-12-20 LAB
ALBUMIN SERPL ELPH-MCNC: 3.1 G/DL — LOW (ref 3.3–5)
ALP SERPL-CCNC: 116 U/L — SIGNIFICANT CHANGE UP (ref 40–120)
ALT FLD-CCNC: 23 U/L — SIGNIFICANT CHANGE UP (ref 10–45)
ANION GAP SERPL CALC-SCNC: 13 MMOL/L — SIGNIFICANT CHANGE UP (ref 5–17)
APPEARANCE UR: CLEAR — SIGNIFICANT CHANGE UP
AST SERPL-CCNC: 26 U/L — SIGNIFICANT CHANGE UP (ref 10–40)
BASOPHILS # BLD AUTO: 0.06 K/UL — SIGNIFICANT CHANGE UP (ref 0–0.2)
BASOPHILS NFR BLD AUTO: 0.5 % — SIGNIFICANT CHANGE UP (ref 0–2)
BILIRUB SERPL-MCNC: 0.3 MG/DL — SIGNIFICANT CHANGE UP (ref 0.2–1.2)
BILIRUB UR-MCNC: NEGATIVE — SIGNIFICANT CHANGE UP
BUN SERPL-MCNC: 31 MG/DL — HIGH (ref 7–23)
CALCIUM SERPL-MCNC: 8.6 MG/DL — SIGNIFICANT CHANGE UP (ref 8.4–10.5)
CHLORIDE SERPL-SCNC: 97 MMOL/L — SIGNIFICANT CHANGE UP (ref 96–108)
CO2 SERPL-SCNC: 17 MMOL/L — LOW (ref 22–31)
COLOR SPEC: SIGNIFICANT CHANGE UP
CREAT SERPL-MCNC: 1.08 MG/DL — SIGNIFICANT CHANGE UP (ref 0.5–1.3)
DIFF PNL FLD: NEGATIVE — SIGNIFICANT CHANGE UP
EOSINOPHIL # BLD AUTO: 0.12 K/UL — SIGNIFICANT CHANGE UP (ref 0–0.5)
EOSINOPHIL NFR BLD AUTO: 1.1 % — SIGNIFICANT CHANGE UP (ref 0–6)
GAS PNL BLDV: SIGNIFICANT CHANGE UP
GAS PNL BLDV: SIGNIFICANT CHANGE UP
GLUCOSE SERPL-MCNC: 213 MG/DL — HIGH (ref 70–99)
GLUCOSE UR QL: NEGATIVE — SIGNIFICANT CHANGE UP
HCT VFR BLD CALC: 40.1 % — SIGNIFICANT CHANGE UP (ref 34.5–45)
HGB BLD-MCNC: 13.3 G/DL — SIGNIFICANT CHANGE UP (ref 11.5–15.5)
IMM GRANULOCYTES NFR BLD AUTO: 3.7 % — HIGH (ref 0–1.5)
KETONES UR-MCNC: NEGATIVE — SIGNIFICANT CHANGE UP
LEUKOCYTE ESTERASE UR-ACNC: NEGATIVE — SIGNIFICANT CHANGE UP
LYMPHOCYTES # BLD AUTO: 1.56 K/UL — SIGNIFICANT CHANGE UP (ref 1–3.3)
LYMPHOCYTES # BLD AUTO: 13.8 % — SIGNIFICANT CHANGE UP (ref 13–44)
MAGNESIUM SERPL-MCNC: 1.7 MG/DL — SIGNIFICANT CHANGE UP (ref 1.6–2.6)
MCHC RBC-ENTMCNC: 30.9 PG — SIGNIFICANT CHANGE UP (ref 27–34)
MCHC RBC-ENTMCNC: 33.2 GM/DL — SIGNIFICANT CHANGE UP (ref 32–36)
MCV RBC AUTO: 93 FL — SIGNIFICANT CHANGE UP (ref 80–100)
MONOCYTES # BLD AUTO: 0.85 K/UL — SIGNIFICANT CHANGE UP (ref 0–0.9)
MONOCYTES NFR BLD AUTO: 7.5 % — SIGNIFICANT CHANGE UP (ref 2–14)
NEUTROPHILS # BLD AUTO: 8.32 K/UL — HIGH (ref 1.8–7.4)
NEUTROPHILS NFR BLD AUTO: 73.4 % — SIGNIFICANT CHANGE UP (ref 43–77)
NITRITE UR-MCNC: NEGATIVE — SIGNIFICANT CHANGE UP
NRBC # BLD: 0 /100 WBCS — SIGNIFICANT CHANGE UP (ref 0–0)
PH UR: 6 — SIGNIFICANT CHANGE UP (ref 5–8)
PHOSPHATE SERPL-MCNC: 3.4 MG/DL — SIGNIFICANT CHANGE UP (ref 2.5–4.5)
PLATELET # BLD AUTO: 509 K/UL — HIGH (ref 150–400)
POTASSIUM SERPL-MCNC: 5.2 MMOL/L — SIGNIFICANT CHANGE UP (ref 3.5–5.3)
POTASSIUM SERPL-SCNC: 5.2 MMOL/L — SIGNIFICANT CHANGE UP (ref 3.5–5.3)
PROT SERPL-MCNC: 6.7 G/DL — SIGNIFICANT CHANGE UP (ref 6–8.3)
PROT UR-MCNC: 100 — SIGNIFICANT CHANGE UP
RBC # BLD: 4.31 M/UL — SIGNIFICANT CHANGE UP (ref 3.8–5.2)
RBC # FLD: 14.1 % — SIGNIFICANT CHANGE UP (ref 10.3–14.5)
SARS-COV-2 RNA SPEC QL NAA+PROBE: DETECTED
SODIUM SERPL-SCNC: 127 MMOL/L — LOW (ref 135–145)
SP GR SPEC: 1.01 — SIGNIFICANT CHANGE UP (ref 1.01–1.02)
UROBILINOGEN FLD QL: NEGATIVE — SIGNIFICANT CHANGE UP
WBC # BLD: 11.33 K/UL — HIGH (ref 3.8–10.5)
WBC # FLD AUTO: 11.33 K/UL — HIGH (ref 3.8–10.5)

## 2020-12-20 PROCEDURE — 71045 X-RAY EXAM CHEST 1 VIEW: CPT | Mod: 26

## 2020-12-20 PROCEDURE — 99223 1ST HOSP IP/OBS HIGH 75: CPT | Mod: GC

## 2020-12-20 PROCEDURE — 99291 CRITICAL CARE FIRST HOUR: CPT

## 2020-12-20 PROCEDURE — 93010 ELECTROCARDIOGRAM REPORT: CPT

## 2020-12-20 RX ORDER — DEXTROSE 50 % IN WATER 50 %
12.5 SYRINGE (ML) INTRAVENOUS ONCE
Refills: 0 | Status: DISCONTINUED | OUTPATIENT
Start: 2020-12-20 | End: 2021-01-05

## 2020-12-20 RX ORDER — LEVOTHYROXINE SODIUM 125 MCG
112 TABLET ORAL DAILY
Refills: 0 | Status: DISCONTINUED | OUTPATIENT
Start: 2020-12-20 | End: 2021-01-05

## 2020-12-20 RX ORDER — GLUCAGON INJECTION, SOLUTION 0.5 MG/.1ML
1 INJECTION, SOLUTION SUBCUTANEOUS ONCE
Refills: 0 | Status: DISCONTINUED | OUTPATIENT
Start: 2020-12-20 | End: 2021-01-05

## 2020-12-20 RX ORDER — CALCIUM GLUCONATE 100 MG/ML
1 VIAL (ML) INTRAVENOUS ONCE
Refills: 0 | Status: COMPLETED | OUTPATIENT
Start: 2020-12-20 | End: 2020-12-20

## 2020-12-20 RX ORDER — INSULIN LISPRO 100/ML
VIAL (ML) SUBCUTANEOUS
Refills: 0 | Status: DISCONTINUED | OUTPATIENT
Start: 2020-12-20 | End: 2021-01-05

## 2020-12-20 RX ORDER — INSULIN LISPRO 100/ML
VIAL (ML) SUBCUTANEOUS AT BEDTIME
Refills: 0 | Status: DISCONTINUED | OUTPATIENT
Start: 2020-12-20 | End: 2021-01-05

## 2020-12-20 RX ORDER — AMLODIPINE BESYLATE 2.5 MG/1
5 TABLET ORAL DAILY
Refills: 0 | Status: DISCONTINUED | OUTPATIENT
Start: 2020-12-20 | End: 2021-01-05

## 2020-12-20 RX ORDER — DEXTROSE 50 % IN WATER 50 %
25 SYRINGE (ML) INTRAVENOUS ONCE
Refills: 0 | Status: DISCONTINUED | OUTPATIENT
Start: 2020-12-20 | End: 2021-01-05

## 2020-12-20 RX ORDER — SODIUM CHLORIDE 9 MG/ML
1000 INJECTION INTRAMUSCULAR; INTRAVENOUS; SUBCUTANEOUS
Refills: 0 | Status: DISCONTINUED | OUTPATIENT
Start: 2020-12-20 | End: 2020-12-21

## 2020-12-20 RX ORDER — SODIUM CHLORIDE 9 MG/ML
1000 INJECTION, SOLUTION INTRAVENOUS
Refills: 0 | Status: DISCONTINUED | OUTPATIENT
Start: 2020-12-20 | End: 2021-01-05

## 2020-12-20 RX ORDER — METOPROLOL TARTRATE 50 MG
25 TABLET ORAL
Refills: 0 | Status: DISCONTINUED | OUTPATIENT
Start: 2020-12-20 | End: 2021-01-05

## 2020-12-20 RX ORDER — DEXTROSE 50 % IN WATER 50 %
15 SYRINGE (ML) INTRAVENOUS ONCE
Refills: 0 | Status: DISCONTINUED | OUTPATIENT
Start: 2020-12-20 | End: 2021-01-05

## 2020-12-20 RX ORDER — ATORVASTATIN CALCIUM 80 MG/1
40 TABLET, FILM COATED ORAL AT BEDTIME
Refills: 0 | Status: DISCONTINUED | OUTPATIENT
Start: 2020-12-20 | End: 2021-01-05

## 2020-12-20 RX ORDER — APIXABAN 2.5 MG/1
2.5 TABLET, FILM COATED ORAL EVERY 12 HOURS
Refills: 0 | Status: DISCONTINUED | OUTPATIENT
Start: 2020-12-20 | End: 2021-01-05

## 2020-12-20 RX ORDER — ZINC OXIDE 200 MG/G
1 OINTMENT TOPICAL
Refills: 0 | Status: DISCONTINUED | OUTPATIENT
Start: 2020-12-20 | End: 2021-01-05

## 2020-12-20 RX ORDER — SODIUM CHLORIDE 9 MG/ML
500 INJECTION INTRAMUSCULAR; INTRAVENOUS; SUBCUTANEOUS ONCE
Refills: 0 | Status: COMPLETED | OUTPATIENT
Start: 2020-12-20 | End: 2020-12-20

## 2020-12-20 RX ADMIN — Medication 1 DROP(S): at 22:39

## 2020-12-20 RX ADMIN — ATORVASTATIN CALCIUM 40 MILLIGRAM(S): 80 TABLET, FILM COATED ORAL at 21:51

## 2020-12-20 RX ADMIN — SODIUM CHLORIDE 75 MILLILITER(S): 9 INJECTION INTRAMUSCULAR; INTRAVENOUS; SUBCUTANEOUS at 21:51

## 2020-12-20 RX ADMIN — APIXABAN 2.5 MILLIGRAM(S): 2.5 TABLET, FILM COATED ORAL at 17:51

## 2020-12-20 RX ADMIN — ZINC OXIDE 1 APPLICATION(S): 200 OINTMENT TOPICAL at 22:38

## 2020-12-20 RX ADMIN — SODIUM CHLORIDE 500 MILLILITER(S): 9 INJECTION INTRAMUSCULAR; INTRAVENOUS; SUBCUTANEOUS at 11:50

## 2020-12-20 RX ADMIN — Medication 25 MILLIGRAM(S): at 17:51

## 2020-12-20 RX ADMIN — Medication 100 GRAM(S): at 11:50

## 2020-12-20 NOTE — H&P ADULT - ATTENDING COMMENTS
discussed above plan with resident on rounds. patient seen and examined. no acute complaints.   vitals, labs and imaging reviewed.  hyponatremia - likely hypovolemic - IVF hydration   check urine sodium, urine osms, serum osms   encephalopathy likely metabolic - COVID-19 infection without hypoxia - monitor O2 sat   if hypoxic, will start remdesivir and decadon  no focal deficits on exam, following some commands, per daughter, no change from baseline - no need for ct head at this time.   DM2- monitor FS, ISs for coverage, recent a1c 7.0 10/2020

## 2020-12-20 NOTE — H&P ADULT - PROBLEM SELECTOR PLAN 2
Patient with confusion, per daughter, has vascular dementia with baseline cognitive issues that daughter describes as fluctuating, but daughter has not noted acute change in cognition.   -Patient currently alert, but oriented only to person, unable to answer questions.   -Likely metabolic component with hyponatremia although difficult to assess if symptomatic  -Will get blood cx to rule out infxn, U/A clear, lungs clear on x-ray. Patient with confusion, per daughter, has vascular dementia with baseline cognitive issues that daughter describes as fluctuating, but daughter has not noted acute change in cognition.   -Will hold off on CT brain for now, patient with no focal defitics, does not appear to be far off baseline mentation.   -Patient currently alert, but oriented only to person, unable to answer questions.   - component with hyponatremia although difficult to assess if symptomatic  -U/A clear, lungs clear on x-ray.

## 2020-12-20 NOTE — ED PROVIDER NOTE - PHYSICAL EXAMINATION
General: nad  HEENT: EOMI, PERRLA, normal mucosa, normal oropharynx, no lesions on the lips or on oral mucosa, normal external ear  Neck: supple, no lymphadenopathy, full range of motion, no nuchal rigidity  CV: RRR, normal S1 and S2 with no murmur, capillary refill less than two seconds  Resp: lungs CTA b/l, good aeration bilaterally, symmetric chest wall   Abd: non-distended, soft, non-tender, no guarding/rebound   : no CVA tenderness  MSK: full range of motion, no cyanosis, no edema, no clubbing, no immobility  Neuro: CN II-XII grossly intact, moving all extremities

## 2020-12-20 NOTE — H&P ADULT - NSHPPHYSICALEXAM_GEN_ALL_CORE
PHYSICAL EXAM:      Constitutional: NAD, well nourished, well developed    Eyes: no conjunctival injection, PERRLA, EOMI, no scleral icterus.  ENMT: no thyromegaly, no lymphadenopathy appreciated.  Respiratory: LCTAB, no wheezing, crackles or rales  Cardiovascular:RRR, no murmurs appreciated, no JVD   Gastrointestinal: Some mild discomfort on palpation of preumbilical area  Genitourinary: no suprapubic tenderness.     Extremities:    Vascular:    Neurological:    Skin:    Lymph Nodes:    Musculoskeletal:    Psychiatric: PHYSICAL EXAM:      Constitutional: NAD, well nourished, well developed  Eyes: no conjunctival injection, PERRLA, EOMI, no scleral icterus.  ENMT: no thyromegaly, no lymphadenopathy appreciated.  Respiratory: LCTAB, no wheezing, crackles or rales  Cardiovascular:RRR, no murmurs appreciated, no JVD   Gastrointestinal: Some mild discomfort on palpation of preumbilical area  Genitourinary: no suprapubic tenderness.   Extremities: 1+ non-pitting edema in bilateral lower extremities   Neurological: moving all extremities equally against gravity, Alert, oriented only to self  Skin: no rashes, bruising or lesions   Psychiatric: normal affect despite mentation, does not appear anxious. Vital Signs Last 24 Hrs  T(C): 36.8 (20 Dec 2020 15:41), Max: 37.1 (20 Dec 2020 11:17)  T(F): 98.3 (20 Dec 2020 15:41), Max: 98.8 (20 Dec 2020 11:17)  HR: 97 (20 Dec 2020 17:50) (87 - 97)  BP: 121/75 (20 Dec 2020 17:50) (121/75 - 123/70)  BP(mean): --  RR: 18 (20 Dec 2020 15:41) (18 - 18)  SpO2: 94% (20 Dec 2020 15:41) (94% - 96%)    PHYSICAL EXAM:  Constitutional: NAD, well nourished, well developed  Eyes: no conjunctival injection, PERRLA, EOMI, no scleral icterus.  ENMT: no thyromegaly, no lymphadenopathy appreciated.  Respiratory: LCTAB, no wheezing, crackles or rales  Cardiovascular:RRR, no murmurs appreciated, no JVD   Gastrointestinal: Some mild discomfort on palpation of preumbilical area  Genitourinary: no suprapubic tenderness.   Extremities: 1+ non-pitting edema in bilateral lower extremities   Neurological: moving all extremities equally against gravity, Alert, oriented only to self  Skin: no rashes, bruising or lesions   Psychiatric: normal affect despite mentation, does not appear anxious.

## 2020-12-20 NOTE — CONSULT NOTE ADULT - ASSESSMENT
---incomplete    This is a 86 y/o F, w/ PMH of JAK2+ PV (diagnosed in 2016), recurrent RLE DVT (2008 then 2010, eliquis), HTN/HLD, DM II, who was sent in for abnormal lab.      #Hyponatremia  - unclear underlying cause, would start with hyponatremia workup, urine electrolyte and serum and urine osm  - s/p mild IVF hydration in the ED. if no improvement with Na, will likely need fluid restriction  - trend BMP    #JAK2+ PV  -hydroxyurea on hold, no indication for treatment for now  -goal Hct <42% per CYTO-PV trial  -trend CBC    #recurrent DVT  -patient is on anticoagulation with Eliquis, she is very high risk for recurrent VTE including acute DVT/PE with HARRIS 2+ PV and history of multiple DVTs, benefit of anticoagulation outweights risk in patient's case. Continue eliquis 2.5 mg BID as per recommendations in the literature >79 yo and <60Kg.      Travis Villarreal MD, PGY-4  Translational Medical Oncology Fellow  Pager: [phone]       This is a 86 y/o F, w/ PMH of JAK2+ PV (diagnosed in 2016), recurrent RLE DVT (2008 then 2010, eliquis), HTN/HLD, DM II, who was sent in for abnormal lab.  Would obtain collateral from family about patient mental status. Likely metabolic encephalopathy 2/2 hyponatremia, however, would be better to confirm the baseline.    #Hyponatremia  #Metabolic encephalopathy  - unclear underlying cause, would start with hyponatremia workup, urine electrolyte and serum and urine osm  - although there is no focal neurologic symptom, consider CTH to r/o any organic cause of AMS  - s/p mild IVF hydration in the ED. if no improvement with Na, will likely need fluid restriction  - trend BMP    #JAK2+ PV  -hydroxyurea on hold, no indication for treatment for now  -goal Hct <42% per CYTO-PV trial  -trend CBC    #recurrent DVT  -patient is on anticoagulation with Eliquis, she is very high risk for recurrent VTE including acute DVT/PE with HARRIS 2+ PV and history of multiple DVTs, benefit of anticoagulation outweights risk in patient's case. Continue eliquis 2.5 mg BID as per recommendations in the literature >79 yo and <60Kg.      Travis Villarreal MD, PGY-4  Translational Medical Oncology Fellow  Pager: [phone]  Case d/w Dr. Cazares

## 2020-12-20 NOTE — H&P ADULT - NSHPLABSRESULTS_GEN_ALL_CORE
LABS:  cret                        13.3   11.33 )-----------( 509      ( 20 Dec 2020 11:51 )             40.1     12-20    127<L>  |  97  |  31<H>  ----------------------------<  213<H>  5.2   |  17<L>  |  1.08    Ca    8.6      20 Dec 2020 11:51  Phos  3.4     12-20  Mg     1.7     12-20    TPro  6.7  /  Alb  3.1<L>  /  TBili  0.3  /  DBili  x   /  AST  26  /  ALT  23  /  AlkPhos  116  12-20      Urinalysis (12.20.20 @ 14:17)   Glucose Qualitative, Urine: Negative   Blood, Urine: Negative   pH Urine: 6.0   Color: Light Yellow   Urine Appearance: Clear   Bilirubin: Negative   Ketone - Urine: Negative   Specific Gravity: 1.013   Protein, Urine: 100   Urobilinogen: Negative   Nitrite: Negative   Leukocyte Esterase Concentration: Negative LABS:  cret                        13.3   11.33 )-----------( 509      ( 20 Dec 2020 11:51 )             40.1     12-20    127<L>  |  97  |  31<H>  ----------------------------<  213<H>  5.2   |  17<L>  |  1.08    Ca    8.6      20 Dec 2020 11:51  Phos  3.4     12-20  Mg     1.7     12-20    TPro  6.7  /  Alb  3.1<L>  /  TBili  0.3  /  DBili  x   /  AST  26  /  ALT  23  /  AlkPhos  116  12-20      Urinalysis (12.20.20 @ 14:17)   Glucose Qualitative, Urine: Negative   Blood, Urine: Negative   pH Urine: 6.0   Color: Light Yellow   Urine Appearance: Clear   Bilirubin: Negative   Ketone - Urine: Negative   Specific Gravity: 1.013   Protein, Urine: 100   Urobilinogen: Negative   Nitrite: Negative   Leukocyte Esterase Concentration: Negative    cxr film reviewed - atelectasis  prior TTE reviewed - ef 75%

## 2020-12-20 NOTE — H&P ADULT - PROBLEM SELECTOR PLAN 3
-per hematology consult, hydroxyurea on hold, no indication for treatment for now  -goal Hct <42% per CYTO-PV trial  -will be trending CBC

## 2020-12-20 NOTE — ED PROVIDER NOTE - ATTENDING CONTRIBUTION TO CARE
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 pt with no complaints but to attempt to speak with family due to dementia.

## 2020-12-20 NOTE — H&P ADULT - PROBLEM SELECTOR PLAN 1
Unclear etiology, hyponatremic to 127 on admission.   -Urine osm/Serum osm and Urine Na ordered to determine cause  -Patient appears clinically euvolemic  - Unclear etiology, hyponatremic to 127 on admission.   -Urine osm/Serum osm and Urine Na ordered to determine cause  -Patient appears clinically euvolemic  -Will give NS 75ml/hr

## 2020-12-20 NOTE — H&P ADULT - HISTORY OF PRESENT ILLNESS
This is a 86 y/o F, w/ PMH of JAK2+ PV (diagnosed in 2016), recurrent RLE DVT (2008 then 2010, eliquis), HTN/HLD, DM II, who was sent in for abnormal lab.  Patient is found to have hyponatremia, Ta=880, otherwise normal lab. Has underlying mild dementia but patient is able to express her idea and follows her commands at baseline. Patient unable to answer questions at this time, when asked questions, patient consistently nods her head.     12/16 hematologist ordered labs, called this am 2/2 low sodium high potassium. Lives with daughter, 1 week ago was discharged from rehab. Has good and bad days, has always been quiet 10th/11th was reading newspaper. Sometimes quiet. Has been eating well. Friday had some loose stools. Uses walker. Was at [facility] rehab for 5 weeks  Seen at 865. Patient has vascular dementia.  Was initially on intermittent phlebotomy but was put on hydroxyurea. However, hydroxyurea was discontinued as her H/H dropped to 9.3 g/dl on 8/21/2020. Hb has recovered since 10/9/2020 at 12.1 g/dl.       This is a 86 y/o F, w/ PMH of JAK2+ PV (diagnosed in 2016), recurrent RLE DVT (2008 then 2010, eliquis), HTN/HLD, DM II, vascular dementia, who was sent in by her outpatient hematologist for an abnormal lab.  Patient is found to have hyponatremia, Xh=524, otherwise normal labs. Has underlying mild dementia but patient is able to express her ideas and follow commands at baseline. Per patient's Magalie montano, patient returned home last week from 5 weeks at [facility], pt lives with daughter and daughter's family. Per daughter, patient has "good and bad days" with regards to mentation, states some days she is more quiet, other days is more oriented, able to do things like read the newspaper or communicate her wishes such as what she would like for dinner. Daughter has not noticed much of a change in patient's mentation within the past couple of days. Daughter states that on 12/18, patient had some loose stools, but otherwise no constipation/diarrhea. Daughter has not noticed patient complaining of pain, no recent falls or increased unsteadiness noted, no sick contacts.    Patient unable to answer questions at this time, when asked questions, patient consistently nods her head.     12/16 hematologist ordered labs, called this am 2/2 low sodium high potassium. Lives with daughter, 1 week ago was discharged from rehab. Has good and bad days, has always been quiet 10th/11th was reading newspaper. Sometimes quiet. Has been eating well. Friday had some loose stools. Uses walker. Was at [facility] for 5 weeks  Seen at 865. Patient has vascular dementia.  Was initially on intermittent phlebotomy but was put on hydroxyurea. However, hydroxyurea was discontinued as her H/H dropped to 9.3 g/dl on 8/21/2020. Hb has recovered since 10/9/2020 at 12.1 g/dl.       This is a 86 y/o F, w/ PMH of JAK2+ PV (diagnosed in 2016), recurrent RLE DVT (2008 then 2010, eliquis), HTN/HLD, DM II, vascular dementia, who was sent in by her outpatient hematologist for an abnormal lab.  Patient is found to have hyponatremia, Do=994, otherwise normal labs. Has underlying mild dementia but patient is able to express her ideas and follow commands at baseline. Per patient's Magalie montano, patient returned home last week from 5 weeks at [facility], pt lives with daughter and daughter's family. Per daughter, patient has "good and bad days" with regards to mentation, states some days she is more quiet, other days is more oriented, able to do things like read the newspaper or communicate her wishes such as what she would like for dinner. Daughter has not noticed much of a change in patient's mentation within the past couple of days. Reports that patient has had good appetite, has been eating and drinking. Daughter states that on 12/18, patient had some loose stools, but otherwise no constipation/diarrhea. Daughter has not noticed patient complaining of pain, no recent falls or increased unsteadiness noted, no sick contacts.    Patient unable to answer questions at this time, when asked questions, patient consistently nods her head.     Was initially on intermittent phlebotomy but was put on hydroxyurea. However, hydroxyurea was discontinued as her H/H dropped to 9.3 g/dl on 8/21/2020. Hb has recovered since 10/9/2020 at 12.1 g/dl.

## 2020-12-20 NOTE — ED PROVIDER NOTE - CLINICAL SUMMARY MEDICAL DECISION MAKING FREE TEXT BOX
lindsey pgy3: 88 yo f pw reported electrolyte abnormalities. pt arrives hds. ekg concerning for hyperk, peaked t waves, intervals appropriate. will re-eval labs, provide hydration. emr reviewed, hx of MIKKI and electrolyte abnormalities. no concern for acute abd pathology upon initial eval. reassess.

## 2020-12-20 NOTE — ED ADULT NURSE NOTE - NSIMPLEMENTINTERV_GEN_ALL_ED
Implemented All Fall with Harm Risk Interventions:  Speer to call system. Call bell, personal items and telephone within reach. Instruct patient to call for assistance. Room bathroom lighting operational. Non-slip footwear when patient is off stretcher. Physically safe environment: no spills, clutter or unnecessary equipment. Stretcher in lowest position, wheels locked, appropriate side rails in place. Provide visual cue, wrist band, yellow gown, etc. Monitor gait and stability. Monitor for mental status changes and reorient to person, place, and time. Review medications for side effects contributing to fall risk. Reinforce activity limits and safety measures with patient and family. Provide visual clues: red socks.

## 2020-12-20 NOTE — H&P ADULT - PROBLEM SELECTOR PLAN 4
-On DVT prophylaxis long term 2/2 polycythemia vera and history of recurrent DVT -On DVT prophylaxis long term 2/2 polycythemia vera and history of recurrent DVT, will continue eliquis 2.5mg per heme recs.

## 2020-12-20 NOTE — CONSULT NOTE ADULT - SUBJECTIVE AND OBJECTIVE BOX
KATYBISHOP  MRN-[id number]      Reason for Consult: PV    HPI:  This is a 86 y/o F, w/ PMH of JAK2+ PV (diagnosed in ), recurrent RLE DVT ( then , eliquis), HTN/HLD, DM II, who was sent in for abnormal lab.  Patient is found to have hyponatremia, Na 126, otherwise normal lab. Has underlying mild dementia but patient is able to express her idea and follows her commands at baseline.       Hematology history  Patient was referred to hematology clinic for persistently elevated WBC in 2016. There she was found to have JAK2 mutation.  Was initially on intermittent phlebotomy but was put on hydroxyurea. However, hydroxyurea was discontinued as her H/H dropped to 9.3 g/dl on 2020. Hb has recovered since 10/9/2020 at 12.1 g/dl.  Previously the hematocrit goal was <42 based on the CYTO-PV trial           PAST MEDICAL & SURGICAL HISTORY:  SBO (Small Bowel Obstruction)    Stroke    Deep Vein Thrombosis (DVT)    Adult Hypothyroidism    Hypercholesteremia    Diabetes    Benign Hypertension    FH: Total Abdominal Hysterectomy and Bilateral Salpingo-Oophorectomy        FAMILY HISTORY:  Family history of breast cancer (Child)    Family history of secondary lung cancer    Family history of diabetes mellitus (DM) (Child)      Social History:      Home Medications:  alendronate 70 mg oral tablet: 1 tab(s) orally once a week (  ) (16 Oct 2020 02:42)  amLODIPine 5 mg oral tablet: 1 tab(s) orally once a day (26 Oct 2020 09:02)  apixaban 2.5 mg oral tablet: 1 tab(s) orally 2 times a day (16 Oct 2020 02:42)  insulin glargine: 10 unit(s) subcutaneous once a day (at bedtime)  monitor finger stick to adjust dose (30 Oct 2020 11:35)  insulin lispro 100 units/mL injectable solution: 5 unit(s) injectable 3 times a day (before meals)  monitro finger stick to adjust dose (30 Oct 2020 11:35)  levothyroxine 112 mcg (0.112 mg) oral tablet: 1 tab(s) orally once a day (18 Oct 2020 11:52)  metoprolol tartrate 25 mg oral tablet: 1 tab(s) orally 2 times a day  adjust dose per HR/BP (30 Oct 2020 12:11)  nystatin 100,000 units/g topical cream: 1 application topically 2 times a day (26 Oct 2020 09:02)  polyethylene glycol 3350 oral powder for reconstitution: 17 gram(s) orally once a day (26 Oct 2020 09:02)  pravastatin 40 mg oral tablet: 1 tab(s) orally once a day (16 Oct 2020 02:42)    Allergies    No Known Drug Allergies  spices (Rash)    Intolerances        MEDICATIONS  (STANDING):    MEDICATIONS  (PRN):          Objectives:  Vital Signs Last 24 Hrs  T(C): 37.1 (20 Dec 2020 11:17), Max: 37.1 (20 Dec 2020 11:17)  T(F): 98.8 (20 Dec 2020 11:), Max: 98.8 (20 Dec 2020 11:)  HR: 87 (20 Dec 2020 11:) (87 - 87)  BP: 123/70 (20 Dec 2020 11:17) (123/70 - 123/70)  BP(mean): --  RR: 18 (20 Dec 2020 11:) (18 - 18)  SpO2: 96% (20 Dec 2020 11:) (96% - 96%)    Physical exam  General - NAD, alert and oriented  HEENT - PERRLA, EOM intact, sclera and conjunctiva clear, oropharynx, nares clear  Neck - Supple, no thyromegaly or thyroid nodules, no bruits  Cardiovascular - RRR no m/r/g, no JVD, no carotid bruits  Lungs - CTAB, no use of acessory muscles, no w/c/r  Abdomen - Normal bowel sounds, NT/ND  Genito Urinary –   Lymph Nodes - No LAD  Extremeties - No e/c/c  Skin - No rashes, skin warm and dry, no erythematous areas  Musculo Skeletal - 5/5 strength, normal range of motion, no swollen or erythematous joints.  Neurological – Alert and oriented x 3, CN 2-12 grossly intact.          Labs:    CBC Full  -  ( 20 Dec 2020 11:51 )  WBC Count : 11.33 K/uL  RBC Count : 4.31 M/uL  Hemoglobin : 13.3 g/dL  Hematocrit : 40.1 %  Platelet Count - Automated : 509 K/uL  Mean Cell Volume : 93.0 fl  Mean Cell Hemoglobin : 30.9 pg  Mean Cell Hemoglobin Concentration : 33.2 gm/dL  Auto Neutrophil # : 8.32 K/uL  Auto Lymphocyte # : 1.56 K/uL  Auto Monocyte # : 0.85 K/uL  Auto Eosinophil # : 0.12 K/uL  Auto Basophil # : 0.06 K/uL  Auto Neutrophil % : 73.4 %  Auto Lymphocyte % : 13.8 %  Auto Monocyte % : 7.5 %  Auto Eosinophil % : 1.1 %  Auto Basophil % : 0.5 %        12-    127<L>  |  97  |  31<H>  ----------------------------<  213<H>  5.2   |  17<L>  |  1.08    Ca    8.6      20 Dec 2020 11:51  Phos  3.4     12  Mg     1.7         TPro  6.7  /  Alb  3.1<L>  /  TBili  0.3  /  DBili  x   /  AST  26  /  ALT  23  /  AlkPhos  116  12-20    LIVER FUNCTIONS - ( 20 Dec 2020 11:51 )  Alb: 3.1 g/dL / Pro: 6.7 g/dL / ALK PHOS: 116 U/L / ALT: 23 U/L / AST: 26 U/L / GGT: x             Urinalysis Basic - ( 20 Dec 2020 14:17 )    Color: Light Yellow / Appearance: Clear / S.013 / pH: x  Gluc: x / Ketone: Negative  / Bili: Negative / Urobili: Negative   Blood: x / Protein: 100 / Nitrite: Negative   Leuk Esterase: Negative / RBC: x / WBC x   Sq Epi: x / Non Sq Epi: x / Bacteria: x            Imagings:       BISHOP BURNS  MRN-[id number]      Reason for Consult: PV    HPI:  This is a 88 y/o F, w/ PMH of JAK2+ PV (diagnosed in ), recurrent RLE DVT ( then , eliquis), HTN/HLD, DM II, who was sent in for abnormal lab.  Patient is found to have hyponatremia, Na 126, otherwise normal lab. Has underlying mild dementia but patient is able to express her idea and follows her commands at baseline.   During my interview with patient, patient was unable to answer any question even with . Although she appears to be following command partially, patient is unable to verbalize properly. Tried call patient's daughter Magalie, but she was not picking up the phone.      Hematology history  Patient was referred to hematology clinic for persistently elevated WBC in 2016. There she was found to have JAK2 mutation.  Was initially on intermittent phlebotomy but was put on hydroxyurea. However, hydroxyurea was discontinued as her H/H dropped to 9.3 g/dl on 2020. Hb has recovered since 10/9/2020 at 12.1 g/dl.  Previously the hematocrit goal was <42 based on the CYTO-PV trial           PAST MEDICAL & SURGICAL HISTORY:  SBO (Small Bowel Obstruction)    Stroke    Deep Vein Thrombosis (DVT)    Adult Hypothyroidism    Hypercholesteremia    Diabetes    Benign Hypertension    FH: Total Abdominal Hysterectomy and Bilateral Salpingo-Oophorectomy        FAMILY HISTORY:  Family history of breast cancer (Child)    Family history of secondary lung cancer    Family history of diabetes mellitus (DM) (Child)      Social History:      Home Medications:  alendronate 70 mg oral tablet: 1 tab(s) orally once a week (  ) (16 Oct 2020 02:42)  amLODIPine 5 mg oral tablet: 1 tab(s) orally once a day (26 Oct 2020 09:02)  apixaban 2.5 mg oral tablet: 1 tab(s) orally 2 times a day (16 Oct 2020 02:42)  insulin glargine: 10 unit(s) subcutaneous once a day (at bedtime)  monitor finger stick to adjust dose (30 Oct 2020 11:35)  insulin lispro 100 units/mL injectable solution: 5 unit(s) injectable 3 times a day (before meals)  monitro finger stick to adjust dose (30 Oct 2020 11:35)  levothyroxine 112 mcg (0.112 mg) oral tablet: 1 tab(s) orally once a day (18 Oct 2020 11:52)  metoprolol tartrate 25 mg oral tablet: 1 tab(s) orally 2 times a day  adjust dose per HR/BP (30 Oct 2020 12:11)  nystatin 100,000 units/g topical cream: 1 application topically 2 times a day (26 Oct 2020 09:02)  polyethylene glycol 3350 oral powder for reconstitution: 17 gram(s) orally once a day (26 Oct 2020 09:02)  pravastatin 40 mg oral tablet: 1 tab(s) orally once a day (16 Oct 2020 02:42)    Allergies    No Known Drug Allergies  spices (Rash)    Intolerances        MEDICATIONS  (STANDING):    MEDICATIONS  (PRN):          Objectives:  Vital Signs Last 24 Hrs  T(C): 37.1 (20 Dec 2020 11:17), Max: 37.1 (20 Dec 2020 11:17)  T(F): 98.8 (20 Dec 2020 11:17), Max: 98.8 (20 Dec 2020 11:17)  HR: 87 (20 Dec 2020 11:17) (87 - 87)  BP: 123/70 (20 Dec 2020 11:17) (123/70 - 123/70)  BP(mean): --  RR: 18 (20 Dec 2020 11:17) (18 - 18)  SpO2: 96% (20 Dec 2020 11:17) (96% - 96%)    Physical exam  General - NAD, confused  HEENT - PERRLA  Neck - Supple  Cardiovascular - RRR  Lungs - CTAB, no use of acessory muscles, no w/c/r  Abdomen - Normal bowel sounds, NT/ND  Lymph Nodes - No LAD  Extremeties - No e/c/c, b/l LE tenderness noted  Skin - No rashes, skin warm and dry, no erythematous areas  Neurological – Alert and oriented x 0, no focal finding noted          Labs:    CBC Full  -  ( 20 Dec 2020 11:51 )  WBC Count : 11.33 K/uL  RBC Count : 4.31 M/uL  Hemoglobin : 13.3 g/dL  Hematocrit : 40.1 %  Platelet Count - Automated : 509 K/uL  Mean Cell Volume : 93.0 fl  Mean Cell Hemoglobin : 30.9 pg  Mean Cell Hemoglobin Concentration : 33.2 gm/dL  Auto Neutrophil # : 8.32 K/uL  Auto Lymphocyte # : 1.56 K/uL  Auto Monocyte # : 0.85 K/uL  Auto Eosinophil # : 0.12 K/uL  Auto Basophil # : 0.06 K/uL  Auto Neutrophil % : 73.4 %  Auto Lymphocyte % : 13.8 %  Auto Monocyte % : 7.5 %  Auto Eosinophil % : 1.1 %  Auto Basophil % : 0.5 %        -    127<L>  |  97  |  31<H>  ----------------------------<  213<H>  5.2   |  17<L>  |  1.08    Ca    8.6      20 Dec 2020 11:51  Phos  3.4       Mg     1.7         TPro  6.7  /  Alb  3.1<L>  /  TBili  0.3  /  DBili  x   /  AST  26  /  ALT  23  /  AlkPhos  116      LIVER FUNCTIONS - ( 20 Dec 2020 11:51 )  Alb: 3.1 g/dL / Pro: 6.7 g/dL / ALK PHOS: 116 U/L / ALT: 23 U/L / AST: 26 U/L / GGT: x             Urinalysis Basic - ( 20 Dec 2020 14:17 )    Color: Light Yellow / Appearance: Clear / S.013 / pH: x  Gluc: x / Ketone: Negative  / Bili: Negative / Urobili: Negative   Blood: x / Protein: 100 / Nitrite: Negative   Leuk Esterase: Negative / RBC: x / WBC x   Sq Epi: x / Non Sq Epi: x / Bacteria: x            Imagings:

## 2020-12-20 NOTE — ED PROVIDER NOTE - PROGRESS NOTE DETAILS
lindsey pgy3: contacted hem. updated daughter. pt vss. unclear etiology of hyponatremia. no n/v/d noted in ED. endorsed to hospitalist.

## 2020-12-20 NOTE — H&P ADULT - NSHPSOCIALHISTORY_GEN_ALL_CORE
Patient lives with daughter and daughter's family. Patient lives with daughter and daughter's family. Does not drink alcohol or use tobacco.

## 2020-12-20 NOTE — CONSULT NOTE ADULT - ATTENDING COMMENTS
Patient discussed with fellow, history and labs reviewed. 87-year-old woman with known PV and rVTE admitted for AMS presumably secondary to hyponatremia (126). Patient is Eliquis and therapeutic phlebotomy with HCT target <42. No hematologic intervention required at this time. Patient to follow up as outpatient once electrolyte imbalance is corrected and mentation improves. Agree with fellow's plan and suggestion on head CT.

## 2020-12-20 NOTE — ED ADULT NURSE NOTE - OBJECTIVE STATEMENT
87 year old female at baseline mental status A&OX1 with hx of dementia, HTN, DM, HLD, present from PMD with abnormal labs (Hyperkalemia).  EMS states that patient was recently diagnosed with Polycythemia Vera and was found to have an elevated potassium sent in for reevaluation. Patient is a poor historian and unable to determine pertinent positives and negatives.

## 2020-12-20 NOTE — ED PROVIDER NOTE - OBJECTIVE STATEMENT
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 pt w/ poor mental status at baseline. pt denies acute complaints. reported elevated sodium and high potassium.

## 2020-12-20 NOTE — H&P ADULT - NSICDXPASTMEDICALHX_GEN_ALL_CORE_FT
PAST MEDICAL HISTORY:  Adult Hypothyroidism     Benign Hypertension     Deep Vein Thrombosis (DVT)     Diabetes     Hypercholesteremia     SBO (Small Bowel Obstruction) 08/2019    Stroke

## 2020-12-20 NOTE — H&P ADULT - NSICDXPASTSURGICALHX_GEN_ALL_CORE_FT
PAST SURGICAL HISTORY:  FH: Total Abdominal Hysterectomy and Bilateral Salpingo-Oophorectomy      PAST SURGICAL HISTORY:  FH: Total Abdominal Hysterectomy and Bilateral Salpingo-Oophorectomy     S/P small bowel resection 08/2019

## 2020-12-21 ENCOUNTER — NON-APPOINTMENT (OUTPATIENT)
Age: 85
End: 2020-12-21

## 2020-12-21 DIAGNOSIS — B34.2 CORONAVIRUS INFECTION, UNSPECIFIED: ICD-10-CM

## 2020-12-21 LAB
A1C WITH ESTIMATED AVERAGE GLUCOSE RESULT: 9.3 % — HIGH (ref 4–5.6)
ANION GAP SERPL CALC-SCNC: 13 MMOL/L — SIGNIFICANT CHANGE UP (ref 5–17)
ANION GAP SERPL CALC-SCNC: 14 MMOL/L — SIGNIFICANT CHANGE UP (ref 5–17)
BASOPHILS # BLD AUTO: 0.04 K/UL — SIGNIFICANT CHANGE UP (ref 0–0.2)
BASOPHILS NFR BLD AUTO: 0.4 % — SIGNIFICANT CHANGE UP (ref 0–2)
BUN SERPL-MCNC: 22 MG/DL — SIGNIFICANT CHANGE UP (ref 7–23)
BUN SERPL-MCNC: 23 MG/DL — SIGNIFICANT CHANGE UP (ref 7–23)
CALCIUM SERPL-MCNC: 8.8 MG/DL — SIGNIFICANT CHANGE UP (ref 8.4–10.5)
CALCIUM SERPL-MCNC: 9.1 MG/DL — SIGNIFICANT CHANGE UP (ref 8.4–10.5)
CHLORIDE SERPL-SCNC: 104 MMOL/L — SIGNIFICANT CHANGE UP (ref 96–108)
CHLORIDE SERPL-SCNC: 104 MMOL/L — SIGNIFICANT CHANGE UP (ref 96–108)
CO2 SERPL-SCNC: 16 MMOL/L — LOW (ref 22–31)
CO2 SERPL-SCNC: 19 MMOL/L — LOW (ref 22–31)
CREAT SERPL-MCNC: 0.87 MG/DL — SIGNIFICANT CHANGE UP (ref 0.5–1.3)
CREAT SERPL-MCNC: 0.98 MG/DL — SIGNIFICANT CHANGE UP (ref 0.5–1.3)
CULTURE RESULTS: NO GROWTH — SIGNIFICANT CHANGE UP
EOSINOPHIL # BLD AUTO: 0.1 K/UL — SIGNIFICANT CHANGE UP (ref 0–0.5)
EOSINOPHIL NFR BLD AUTO: 0.9 % — SIGNIFICANT CHANGE UP (ref 0–6)
ESTIMATED AVERAGE GLUCOSE: 220 MG/DL — HIGH (ref 68–114)
GLUCOSE SERPL-MCNC: 198 MG/DL — HIGH (ref 70–99)
GLUCOSE SERPL-MCNC: 318 MG/DL — HIGH (ref 70–99)
HCT VFR BLD CALC: 37 % — SIGNIFICANT CHANGE UP (ref 34.5–45)
HGB BLD-MCNC: 12.1 G/DL — SIGNIFICANT CHANGE UP (ref 11.5–15.5)
IMM GRANULOCYTES NFR BLD AUTO: 3.9 % — HIGH (ref 0–1.5)
LYMPHOCYTES # BLD AUTO: 1.07 K/UL — SIGNIFICANT CHANGE UP (ref 1–3.3)
LYMPHOCYTES # BLD AUTO: 9.9 % — LOW (ref 13–44)
MAGNESIUM SERPL-MCNC: 1.5 MG/DL — LOW (ref 1.6–2.6)
MCHC RBC-ENTMCNC: 30.3 PG — SIGNIFICANT CHANGE UP (ref 27–34)
MCHC RBC-ENTMCNC: 32.7 GM/DL — SIGNIFICANT CHANGE UP (ref 32–36)
MCV RBC AUTO: 92.7 FL — SIGNIFICANT CHANGE UP (ref 80–100)
MONOCYTES # BLD AUTO: 0.66 K/UL — SIGNIFICANT CHANGE UP (ref 0–0.9)
MONOCYTES NFR BLD AUTO: 6.1 % — SIGNIFICANT CHANGE UP (ref 2–14)
NEUTROPHILS # BLD AUTO: 8.55 K/UL — HIGH (ref 1.8–7.4)
NEUTROPHILS NFR BLD AUTO: 78.8 % — HIGH (ref 43–77)
NRBC # BLD: 0 /100 WBCS — SIGNIFICANT CHANGE UP (ref 0–0)
OSMOLALITY SERPL: 292 MOSMOL/KG — SIGNIFICANT CHANGE UP (ref 280–301)
OSMOLALITY UR: 333 MOSM/KG — SIGNIFICANT CHANGE UP (ref 50–1200)
PHOSPHATE SERPL-MCNC: 3.5 MG/DL — SIGNIFICANT CHANGE UP (ref 2.5–4.5)
PLATELET # BLD AUTO: 458 K/UL — HIGH (ref 150–400)
POTASSIUM SERPL-MCNC: 4.3 MMOL/L — SIGNIFICANT CHANGE UP (ref 3.5–5.3)
POTASSIUM SERPL-MCNC: 4.6 MMOL/L — SIGNIFICANT CHANGE UP (ref 3.5–5.3)
POTASSIUM SERPL-SCNC: 4.3 MMOL/L — SIGNIFICANT CHANGE UP (ref 3.5–5.3)
POTASSIUM SERPL-SCNC: 4.6 MMOL/L — SIGNIFICANT CHANGE UP (ref 3.5–5.3)
RBC # BLD: 3.99 M/UL — SIGNIFICANT CHANGE UP (ref 3.8–5.2)
RBC # FLD: 14.1 % — SIGNIFICANT CHANGE UP (ref 10.3–14.5)
SODIUM SERPL-SCNC: 134 MMOL/L — LOW (ref 135–145)
SODIUM SERPL-SCNC: 136 MMOL/L — SIGNIFICANT CHANGE UP (ref 135–145)
SODIUM UR-SCNC: 83 MMOL/L — SIGNIFICANT CHANGE UP
SPECIMEN SOURCE: SIGNIFICANT CHANGE UP
TSH SERPL-MCNC: 0.16 UIU/ML — LOW (ref 0.27–4.2)
WBC # BLD: 10.84 K/UL — HIGH (ref 3.8–10.5)
WBC # FLD AUTO: 10.84 K/UL — HIGH (ref 3.8–10.5)

## 2020-12-21 PROCEDURE — 99233 SBSQ HOSP IP/OBS HIGH 50: CPT | Mod: GC

## 2020-12-21 RX ORDER — INSULIN GLARGINE 100 [IU]/ML
5 INJECTION, SOLUTION SUBCUTANEOUS AT BEDTIME
Refills: 0 | Status: DISCONTINUED | OUTPATIENT
Start: 2020-12-21 | End: 2020-12-23

## 2020-12-21 RX ORDER — POLYETHYLENE GLYCOL 3350 17 G/17G
17 POWDER, FOR SOLUTION ORAL DAILY
Refills: 0 | Status: DISCONTINUED | OUTPATIENT
Start: 2020-12-21 | End: 2021-01-05

## 2020-12-21 RX ORDER — MAGNESIUM OXIDE 400 MG ORAL TABLET 241.3 MG
400 TABLET ORAL
Refills: 0 | Status: COMPLETED | OUTPATIENT
Start: 2020-12-21 | End: 2020-12-23

## 2020-12-21 RX ORDER — SENNA PLUS 8.6 MG/1
2 TABLET ORAL AT BEDTIME
Refills: 0 | Status: DISCONTINUED | OUTPATIENT
Start: 2020-12-21 | End: 2021-01-05

## 2020-12-21 RX ORDER — MAGNESIUM SULFATE 500 MG/ML
2 VIAL (ML) INJECTION ONCE
Refills: 0 | Status: COMPLETED | OUTPATIENT
Start: 2020-12-21 | End: 2020-12-21

## 2020-12-21 RX ORDER — INSULIN LISPRO 100/ML
2 VIAL (ML) SUBCUTANEOUS
Refills: 0 | Status: DISCONTINUED | OUTPATIENT
Start: 2020-12-21 | End: 2020-12-23

## 2020-12-21 RX ADMIN — Medication 25 MILLIGRAM(S): at 17:42

## 2020-12-21 RX ADMIN — APIXABAN 2.5 MILLIGRAM(S): 2.5 TABLET, FILM COATED ORAL at 17:41

## 2020-12-21 RX ADMIN — INSULIN GLARGINE 5 UNIT(S): 100 INJECTION, SOLUTION SUBCUTANEOUS at 21:54

## 2020-12-21 RX ADMIN — Medication 4: at 12:04

## 2020-12-21 RX ADMIN — Medication 50 GRAM(S): at 17:28

## 2020-12-21 RX ADMIN — APIXABAN 2.5 MILLIGRAM(S): 2.5 TABLET, FILM COATED ORAL at 05:30

## 2020-12-21 RX ADMIN — ATORVASTATIN CALCIUM 40 MILLIGRAM(S): 80 TABLET, FILM COATED ORAL at 21:21

## 2020-12-21 RX ADMIN — MAGNESIUM OXIDE 400 MG ORAL TABLET 400 MILLIGRAM(S): 241.3 TABLET ORAL at 17:41

## 2020-12-21 RX ADMIN — ZINC OXIDE 1 APPLICATION(S): 200 OINTMENT TOPICAL at 17:42

## 2020-12-21 RX ADMIN — Medication 6: at 16:53

## 2020-12-21 RX ADMIN — Medication 112 MICROGRAM(S): at 05:30

## 2020-12-21 RX ADMIN — Medication 1 DROP(S): at 17:41

## 2020-12-21 RX ADMIN — AMLODIPINE BESYLATE 5 MILLIGRAM(S): 2.5 TABLET ORAL at 05:30

## 2020-12-21 RX ADMIN — Medication 1 DROP(S): at 05:30

## 2020-12-21 RX ADMIN — Medication 25 MILLIGRAM(S): at 05:30

## 2020-12-21 RX ADMIN — ZINC OXIDE 1 APPLICATION(S): 200 OINTMENT TOPICAL at 05:30

## 2020-12-21 NOTE — PROGRESS NOTE ADULT - PROBLEM SELECTOR PLAN 7
-continue amlodipine 5mg   -continue metoprolol 25mg  -atorvastatin 40mg for HLD -SSI with FS before meals and at bedtime

## 2020-12-21 NOTE — PROGRESS NOTE ADULT - PROBLEM SELECTOR PLAN 3
-per hematology consult, hydroxyurea on hold, no indication for treatment for now  -goal Hct <42% per CYTO-PV trial  -will be trending CBC Patient with confusion, per daughter, has vascular dementia with baseline cognitive issues that daughter describes as fluctuating, but daughter has not noted acute change in cognition.   -Will hold off on CT brain for now, patient with no focal defitics, does not appear to be far off baseline mentation.   -Patient currently alert, but oriented only to person, unable to answer questions.   - component with hyponatremia although difficult to assess if symptomatic  -U/A clear, lungs clear on x-ray.

## 2020-12-21 NOTE — PROGRESS NOTE ADULT - SUBJECTIVE AND OBJECTIVE BOX
CONTACT INFO:    Ele Rosen MD PGY-1  Internal Medicine  [phone]      SUBJECTIVE:    Interval history: No acute events overnight. This morning, patient unable to answer questions, still just says yes whenever asked a question. Has been eating and drinking.     ROS: Unable to ascertain 2/2 patient's mental status.    OBJECTIVE:    MEDICATIONS  (STANDING):  amLODIPine   Tablet 5 milliGRAM(s) Oral daily  apixaban 2.5 milliGRAM(s) Oral every 12 hours  artificial  tears Solution 1 Drop(s) Both EYES two times a day  atorvastatin 40 milliGRAM(s) Oral at bedtime  dextrose 40% Gel 15 Gram(s) Oral once  dextrose 5%. 1000 milliLiter(s) (50 mL/Hr) IV Continuous <Continuous>  dextrose 5%. 1000 milliLiter(s) (100 mL/Hr) IV Continuous <Continuous>  dextrose 50% Injectable 25 Gram(s) IV Push once  dextrose 50% Injectable 12.5 Gram(s) IV Push once  dextrose 50% Injectable 25 Gram(s) IV Push once  glucagon  Injectable 1 milliGRAM(s) IntraMuscular once  insulin lispro (ADMELOG) corrective regimen sliding scale   SubCutaneous three times a day before meals  insulin lispro (ADMELOG) corrective regimen sliding scale   SubCutaneous at bedtime  levothyroxine 112 MICROGram(s) Oral daily  metoprolol tartrate 25 milliGRAM(s) Oral two times a day  sodium chloride 0.9%. 1000 milliLiter(s) (75 mL/Hr) IV Continuous <Continuous>  zinc oxide 20% Ointment 1 Application(s) Topical two times a day    MEDICATIONS  (PRN):      I&O's Detail    20 Dec 2020 07:01  -  21 Dec 2020 07:00  --------------------------------------------------------  IN:    sodium chloride 0.9%: 675 mL  Total IN: 675 mL    OUT:    Voided (mL): 1050 mL  Total OUT: 1050 mL    Total NET: -375 mL      CAPILLARY BLOOD GLUCOSE      POCT Blood Glucose.: 107 mg/dL (21 Dec 2020 07:52)  POCT Blood Glucose.: 142 mg/dL (20 Dec 2020 22:02)  POCT Blood Glucose.: 133 mg/dL (20 Dec 2020 17:47)    Vital Signs Last 24 Hrs  T(C): 36.6 (21 Dec 2020 05:53), Max: 36.8 (20 Dec 2020 15:41)  T(F): 97.8 (21 Dec 2020 05:53), Max: 98.3 (20 Dec 2020 15:41)  HR: 88 (21 Dec 2020 05:53) (87 - 97)  BP: 138/89 (21 Dec 2020 05:53) (121/75 - 145/77)  BP(mean): --  RR: 18 (21 Dec 2020 05:53) (18 - 18)  SpO2: 95% (21 Dec 2020 05:53) (94% - 95%)        ************************************          LABS:  cret                        12.1   10.84 )-----------( 458      ( 21 Dec 2020 11:06 )             37.0     12-20    127<L>  |  97  |  31<H>  ----------------------------<  213<H>  5.2   |  17<L>  |  1.08    Ca    8.6      20 Dec 2020 11:51  Phos  3.4     12-20  Mg     1.7     12-20    TPro  6.7  /  Alb  3.1<L>  /  TBili  0.3  /  DBili  x   /  AST  26  /  ALT  23  /  AlkPhos  116  12-20            Radiology:  No new imaging to review    CONTACT INFO:    Ele Rosen MD PGY-1  Internal Medicine  [phone]      SUBJECTIVE:    Interval history: No acute events overnight. This morning, patient unable to answer questions, still just says yes whenever asked a question. Does not appear to be in any distress, smiles.  Has been eating and drinking.     ROS: Unable to ascertain 2/2 patient's mental status.    OBJECTIVE:    MEDICATIONS  (STANDING):  amLODIPine   Tablet 5 milliGRAM(s) Oral daily  apixaban 2.5 milliGRAM(s) Oral every 12 hours  artificial  tears Solution 1 Drop(s) Both EYES two times a day  atorvastatin 40 milliGRAM(s) Oral at bedtime  dextrose 40% Gel 15 Gram(s) Oral once  dextrose 5%. 1000 milliLiter(s) (50 mL/Hr) IV Continuous <Continuous>  dextrose 5%. 1000 milliLiter(s) (100 mL/Hr) IV Continuous <Continuous>  dextrose 50% Injectable 25 Gram(s) IV Push once  dextrose 50% Injectable 12.5 Gram(s) IV Push once  dextrose 50% Injectable 25 Gram(s) IV Push once  glucagon  Injectable 1 milliGRAM(s) IntraMuscular once  insulin lispro (ADMELOG) corrective regimen sliding scale   SubCutaneous three times a day before meals  insulin lispro (ADMELOG) corrective regimen sliding scale   SubCutaneous at bedtime  levothyroxine 112 MICROGram(s) Oral daily  metoprolol tartrate 25 milliGRAM(s) Oral two times a day  sodium chloride 0.9%. 1000 milliLiter(s) (75 mL/Hr) IV Continuous <Continuous>  zinc oxide 20% Ointment 1 Application(s) Topical two times a day    MEDICATIONS  (PRN):    I&O's Detail    20 Dec 2020 07:01  -  21 Dec 2020 07:00  --------------------------------------------------------  IN:    sodium chloride 0.9%: 675 mL  Total IN: 675 mL    OUT:    Voided (mL): 1050 mL  Total OUT: 1050 mL    Total NET: -375 mL      CAPILLARY BLOOD GLUCOSE      POCT Blood Glucose.: 107 mg/dL (21 Dec 2020 07:52)  POCT Blood Glucose.: 142 mg/dL (20 Dec 2020 22:02)  POCT Blood Glucose.: 133 mg/dL (20 Dec 2020 17:47)    Vital Signs Last 24 Hrs  T(C): 36.6 (21 Dec 2020 05:53), Max: 36.8 (20 Dec 2020 15:41)  T(F): 97.8 (21 Dec 2020 05:53), Max: 98.3 (20 Dec 2020 15:41)  HR: 88 (21 Dec 2020 05:53) (87 - 97)  BP: 138/89 (21 Dec 2020 05:53) (121/75 - 145/77)  BP(mean): --  RR: 18 (21 Dec 2020 05:53) (18 - 18)  SpO2: 95% (21 Dec 2020 05:53) (94% - 95%)        ************************************          LABS:  cret                        12.1   10.84 )-----------( 458      ( 21 Dec 2020 11:06 )             37.0     12-20    127<L>  |  97  |  31<H>  ----------------------------<  213<H>  5.2   |  17<L>  |  1.08    Ca    8.6      20 Dec 2020 11:51  Phos  3.4     12-20  Mg     1.7     12-20    TPro  6.7  /  Alb  3.1<L>  /  TBili  0.3  /  DBili  x   /  AST  26  /  ALT  23  /  AlkPhos  116  12-20      Radiology:  No new imaging to review

## 2020-12-21 NOTE — PROGRESS NOTE ADULT - PROBLEM SELECTOR PLAN 1
Unclear etiology, hyponatremic to 127 on admission.   -Urine osm/Serum osm and Urine Na ordered to determine cause  -Patient appears clinically euvolemic  -Will give NS 75ml/hr Improving, 127 on admission and improved to 134 with IVF.   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

## 2020-12-21 NOTE — PROGRESS NOTE ADULT - PROBLEM SELECTOR PLAN 4
-On DVT prophylaxis long term 2/2 polycythemia vera and history of recurrent DVT, will continue eliquis 2.5mg per heme recs. -per hematology consult, hydroxyurea on hold, no indication for treatment for now  -goal Hct <42% per CYTO-PV trial  -will be trending CBC

## 2020-12-21 NOTE — PROGRESS NOTE ADULT - ATTENDING COMMENTS
Pt is admitted with Hyponatremia and is found to have Hyponatremia with noted correction of about 7 in 24 hours.  Please monitor SPO2 and if <94% will start Remdesivir and if needed O2 supplement will consider steroid.        Marli Strickland   HOspitalist   [phone]

## 2020-12-21 NOTE — PROGRESS NOTE ADULT - PROBLEM SELECTOR PLAN 5
-Continue with home levothyroxine 112mcg daily -On DVT prophylaxis long term 2/2 polycythemia vera and history of recurrent DVT, will continue eliquis 2.5mg per heme recs.

## 2020-12-21 NOTE — PROGRESS NOTE ADULT - PROBLEM SELECTOR PLAN 2
Patient with confusion, per daughter, has vascular dementia with baseline cognitive issues that daughter describes as fluctuating, but daughter has not noted acute change in cognition.   -Will hold off on CT brain for now, patient with no focal defitics, does not appear to be far off baseline mentation.   -Patient currently alert, but oriented only to person, unable to answer questions.   - component with hyponatremia although difficult to assess if symptomatic  -U/A clear, lungs clear on x-ray. Pt returned home from a 5 week rehab stay on 12/11, tested + for COVID in ED on presentation. Currently without any respiratory symptoms, CXR clear.   -Will continue with conservative mgmt as patient is not currently symptomatic  -If pt becomes SOB, will start remdecivir and decadron, supplemental oxygen prn.

## 2020-12-22 ENCOUNTER — TRANSCRIPTION ENCOUNTER (OUTPATIENT)
Age: 85
End: 2020-12-22

## 2020-12-22 DIAGNOSIS — R10.13 EPIGASTRIC PAIN: ICD-10-CM

## 2020-12-22 DIAGNOSIS — Z02.9 ENCOUNTER FOR ADMINISTRATIVE EXAMINATIONS, UNSPECIFIED: ICD-10-CM

## 2020-12-22 LAB
ANION GAP SERPL CALC-SCNC: 11 MMOL/L — SIGNIFICANT CHANGE UP (ref 5–17)
BUN SERPL-MCNC: 18 MG/DL — SIGNIFICANT CHANGE UP (ref 7–23)
CALCIUM SERPL-MCNC: 8.8 MG/DL — SIGNIFICANT CHANGE UP (ref 8.4–10.5)
CHLORIDE SERPL-SCNC: 106 MMOL/L — SIGNIFICANT CHANGE UP (ref 96–108)
CO2 SERPL-SCNC: 20 MMOL/L — LOW (ref 22–31)
CREAT SERPL-MCNC: 0.88 MG/DL — SIGNIFICANT CHANGE UP (ref 0.5–1.3)
D DIMER BLD IA.RAPID-MCNC: <150 NG/ML DDU — SIGNIFICANT CHANGE UP
ERYTHROCYTE [SEDIMENTATION RATE] IN BLOOD: 59 MM/HR — HIGH (ref 0–20)
GLUCOSE SERPL-MCNC: 105 MG/DL — HIGH (ref 70–99)
HCT VFR BLD CALC: 36.1 % — SIGNIFICANT CHANGE UP (ref 34.5–45)
HGB BLD-MCNC: 12.1 G/DL — SIGNIFICANT CHANGE UP (ref 11.5–15.5)
MAGNESIUM SERPL-MCNC: 2.1 MG/DL — SIGNIFICANT CHANGE UP (ref 1.6–2.6)
MCHC RBC-ENTMCNC: 30.8 PG — SIGNIFICANT CHANGE UP (ref 27–34)
MCHC RBC-ENTMCNC: 33.5 GM/DL — SIGNIFICANT CHANGE UP (ref 32–36)
MCV RBC AUTO: 91.9 FL — SIGNIFICANT CHANGE UP (ref 80–100)
NRBC # BLD: 0 /100 WBCS — SIGNIFICANT CHANGE UP (ref 0–0)
PLATELET # BLD AUTO: 449 K/UL — HIGH (ref 150–400)
POTASSIUM SERPL-MCNC: 4.4 MMOL/L — SIGNIFICANT CHANGE UP (ref 3.5–5.3)
POTASSIUM SERPL-SCNC: 4.4 MMOL/L — SIGNIFICANT CHANGE UP (ref 3.5–5.3)
RBC # BLD: 3.93 M/UL — SIGNIFICANT CHANGE UP (ref 3.8–5.2)
RBC # FLD: 14.1 % — SIGNIFICANT CHANGE UP (ref 10.3–14.5)
SODIUM SERPL-SCNC: 137 MMOL/L — SIGNIFICANT CHANGE UP (ref 135–145)
WBC # BLD: 9.98 K/UL — SIGNIFICANT CHANGE UP (ref 3.8–10.5)
WBC # FLD AUTO: 9.98 K/UL — SIGNIFICANT CHANGE UP (ref 3.8–10.5)

## 2020-12-22 PROCEDURE — 99233 SBSQ HOSP IP/OBS HIGH 50: CPT | Mod: GC

## 2020-12-22 RX ADMIN — Medication 25 MILLIGRAM(S): at 05:27

## 2020-12-22 RX ADMIN — Medication 1 DROP(S): at 05:28

## 2020-12-22 RX ADMIN — Medication 1 DROP(S): at 17:24

## 2020-12-22 RX ADMIN — Medication 2: at 17:11

## 2020-12-22 RX ADMIN — MAGNESIUM OXIDE 400 MG ORAL TABLET 400 MILLIGRAM(S): 241.3 TABLET ORAL at 17:24

## 2020-12-22 RX ADMIN — MAGNESIUM OXIDE 400 MG ORAL TABLET 400 MILLIGRAM(S): 241.3 TABLET ORAL at 13:53

## 2020-12-22 RX ADMIN — MAGNESIUM OXIDE 400 MG ORAL TABLET 400 MILLIGRAM(S): 241.3 TABLET ORAL at 09:21

## 2020-12-22 RX ADMIN — SENNA PLUS 2 TABLET(S): 8.6 TABLET ORAL at 22:24

## 2020-12-22 RX ADMIN — ZINC OXIDE 1 APPLICATION(S): 200 OINTMENT TOPICAL at 17:25

## 2020-12-22 RX ADMIN — ATORVASTATIN CALCIUM 40 MILLIGRAM(S): 80 TABLET, FILM COATED ORAL at 22:21

## 2020-12-22 RX ADMIN — Medication 2 UNIT(S): at 12:05

## 2020-12-22 RX ADMIN — AMLODIPINE BESYLATE 5 MILLIGRAM(S): 2.5 TABLET ORAL at 05:27

## 2020-12-22 RX ADMIN — Medication 25 MILLIGRAM(S): at 17:25

## 2020-12-22 RX ADMIN — ZINC OXIDE 1 APPLICATION(S): 200 OINTMENT TOPICAL at 05:29

## 2020-12-22 RX ADMIN — INSULIN GLARGINE 5 UNIT(S): 100 INJECTION, SOLUTION SUBCUTANEOUS at 22:23

## 2020-12-22 RX ADMIN — Medication 2 UNIT(S): at 08:18

## 2020-12-22 RX ADMIN — Medication 4: at 12:05

## 2020-12-22 RX ADMIN — APIXABAN 2.5 MILLIGRAM(S): 2.5 TABLET, FILM COATED ORAL at 17:24

## 2020-12-22 RX ADMIN — APIXABAN 2.5 MILLIGRAM(S): 2.5 TABLET, FILM COATED ORAL at 05:27

## 2020-12-22 RX ADMIN — Medication 2 UNIT(S): at 17:11

## 2020-12-22 RX ADMIN — Medication 112 MICROGRAM(S): at 05:27

## 2020-12-22 NOTE — PROGRESS NOTE ADULT - SUBJECTIVE AND OBJECTIVE BOX
CONTACT INFO:    Ele Rosen MD PGY-1  Internal Medicine  [phone]      SUBJECTIVE:  Interval history: No acute events overnight. This morning, patient is slightly better able to communicate, able to indicate that she is not having pain, when asked whether she has any shortness of breath, lightheadedness, she states "nothing". Unable to answer orientation questions.     ROS: Unable to ascertain 2/2 patient's mental status.    OBJECTIVE:  MEDICATIONS  (STANDING):  amLODIPine   Tablet 5 milliGRAM(s) Oral daily  apixaban 2.5 milliGRAM(s) Oral every 12 hours  artificial  tears Solution 1 Drop(s) Both EYES two times a day  atorvastatin 40 milliGRAM(s) Oral at bedtime  dextrose 40% Gel 15 Gram(s) Oral once  dextrose 5%. 1000 milliLiter(s) (50 mL/Hr) IV Continuous <Continuous>  dextrose 5%. 1000 milliLiter(s) (100 mL/Hr) IV Continuous <Continuous>  dextrose 50% Injectable 25 Gram(s) IV Push once  dextrose 50% Injectable 12.5 Gram(s) IV Push once  dextrose 50% Injectable 25 Gram(s) IV Push once  glucagon  Injectable 1 milliGRAM(s) IntraMuscular once  insulin glargine Injectable (LANTUS) 5 Unit(s) SubCutaneous at bedtime  insulin lispro (ADMELOG) corrective regimen sliding scale   SubCutaneous three times a day before meals  insulin lispro (ADMELOG) corrective regimen sliding scale   SubCutaneous at bedtime  insulin lispro Injectable (ADMELOG) 2 Unit(s) SubCutaneous three times a day before meals  levothyroxine 112 MICROGram(s) Oral daily  magnesium oxide 400 milliGRAM(s) Oral three times a day with meals  metoprolol tartrate 25 milliGRAM(s) Oral two times a day  polyethylene glycol 3350 17 Gram(s) Oral daily  senna 2 Tablet(s) Oral at bedtime  zinc oxide 20% Ointment 1 Application(s) Topical two times a day    MEDICATIONS  (PRN):  I&O's Detail    21 Dec 2020 07:01  -  22 Dec 2020 07:00  --------------------------------------------------------  IN:    Oral Fluid: 840 mL  Total IN: 840 mL    OUT:    Voided (mL): 615 mL  Total OUT: 615 mL    Total NET: 225 mL      CAPILLARY BLOOD GLUCOSE      POCT Blood Glucose.: 127 mg/dL (22 Dec 2020 08:13)  POCT Blood Glucose.: 211 mg/dL (21 Dec 2020 21:37)  POCT Blood Glucose.: 272 mg/dL (21 Dec 2020 16:47)  POCT Blood Glucose.: 236 mg/dL (21 Dec 2020 12:01)    Vital Signs Last 24 Hrs  T(C): 36.2 (22 Dec 2020 04:48), Max: 37.3 (21 Dec 2020 11:00)  T(F): 97.2 (22 Dec 2020 04:48), Max: 99.1 (21 Dec 2020 11:00)  HR: 89 (22 Dec 2020 04:48) (74 - 94)  BP: 139/73 (22 Dec 2020 04:48) (111/70 - 147/76)  BP(mean): --  RR: 18 (22 Dec 2020 04:48) (18 - 18)  SpO2: 99% (22 Dec 2020 04:48) (94% - 99%)      PHYSICAL EXAM:  Constitutional: NAD, well nourished, well developed  Eyes: no conjunctival injection, PERRLA, EOMI, no scleral icterus.  ENMT: no thyromegaly, no lymphadenopathy appreciated.  Respiratory: LCTAB, no wheezing, crackles or rales  Cardiovascular:RRR, no murmurs appreciated, no JVD   Gastrointestinal: Some mild discomfort on palpation of preumbilical area  Genitourinary: no suprapubic tenderness.   Extremities: 1+ non-pitting edema in bilateral lower extremities   Neurological: moving all extremities equally against gravity, Alert, oriented only to self  Skin: no rashes, bruising or lesions   Psychiatric: normal affect despite mentation, does not appear anxious.      LABS:                        12.1   9.98  )-----------( 449      ( 22 Dec 2020 07:10 )             36.1     12-22    137  |  106  |  18  ----------------------------<  105<H>  4.4   |  20<L>  |  0.88    Ca    8.8      22 Dec 2020 07:07  Phos  3.5     12-21  Mg     2.1     12-22    TPro  6.7  /  Alb  3.1<L>  /  TBili  0.3  /  DBili  x   /  AST  26  /  ALT  23  /  AlkPhos  116  12-20      Radiology:  No new imaging to review    CONTACT INFO:    Ele Rosen MD PGY-1  Internal Medicine  [phone]      SUBJECTIVE:  Interval history: No acute events overnight. This morning, patient is slightly better able to communicate, able to indicate that she is not having pain, when asked whether she has any shortness of breath, lightheadedness, she states "nothing". Unable to answer orientation questions.     ROS: Unable to ascertain 2/2 patient's mental status.    OBJECTIVE:  MEDICATIONS  (STANDING):  amLODIPine   Tablet 5 milliGRAM(s) Oral daily  apixaban 2.5 milliGRAM(s) Oral every 12 hours  artificial  tears Solution 1 Drop(s) Both EYES two times a day  atorvastatin 40 milliGRAM(s) Oral at bedtime  dextrose 40% Gel 15 Gram(s) Oral once  dextrose 5%. 1000 milliLiter(s) (50 mL/Hr) IV Continuous <Continuous>  dextrose 5%. 1000 milliLiter(s) (100 mL/Hr) IV Continuous <Continuous>  dextrose 50% Injectable 25 Gram(s) IV Push once  dextrose 50% Injectable 12.5 Gram(s) IV Push once  dextrose 50% Injectable 25 Gram(s) IV Push once  glucagon  Injectable 1 milliGRAM(s) IntraMuscular once  insulin glargine Injectable (LANTUS) 5 Unit(s) SubCutaneous at bedtime  insulin lispro (ADMELOG) corrective regimen sliding scale   SubCutaneous three times a day before meals  insulin lispro (ADMELOG) corrective regimen sliding scale   SubCutaneous at bedtime  insulin lispro Injectable (ADMELOG) 2 Unit(s) SubCutaneous three times a day before meals  levothyroxine 112 MICROGram(s) Oral daily  magnesium oxide 400 milliGRAM(s) Oral three times a day with meals  metoprolol tartrate 25 milliGRAM(s) Oral two times a day  polyethylene glycol 3350 17 Gram(s) Oral daily  senna 2 Tablet(s) Oral at bedtime  zinc oxide 20% Ointment 1 Application(s) Topical two times a day    MEDICATIONS  (PRN):  I&O's Detail    21 Dec 2020 07:01  -  22 Dec 2020 07:00  --------------------------------------------------------  IN:    Oral Fluid: 840 mL  Total IN: 840 mL    OUT:    Voided (mL): 615 mL  Total OUT: 615 mL    Total NET: 225 mL    CAPILLARY BLOOD GLUCOSE  POCT Blood Glucose.: 127 mg/dL (22 Dec 2020 08:13)  POCT Blood Glucose.: 211 mg/dL (21 Dec 2020 21:37)  POCT Blood Glucose.: 272 mg/dL (21 Dec 2020 16:47)  POCT Blood Glucose.: 236 mg/dL (21 Dec 2020 12:01)    Vital Signs Last 24 Hrs  T(C): 36.2 (22 Dec 2020 04:48), Max: 37.3 (21 Dec 2020 11:00)  T(F): 97.2 (22 Dec 2020 04:48), Max: 99.1 (21 Dec 2020 11:00)  HR: 89 (22 Dec 2020 04:48) (74 - 94)  BP: 139/73 (22 Dec 2020 04:48) (111/70 - 147/76)  BP(mean): --  RR: 18 (22 Dec 2020 04:48) (18 - 18)  SpO2: 99% (22 Dec 2020 04:48) (94% - 99%)      PHYSICAL EXAM:  Constitutional: NAD, well nourished, well developed  Eyes: no conjunctival injection, PERRLA, EOMI, no scleral icterus.  ENMT: no thyromegaly, no lymphadenopathy appreciated.  Respiratory: LCTAB, no wheezing, crackles or rales  Cardiovascular:RRR, no murmurs appreciated, no JVD   Gastrointestinal: Some mild discomfort on palpation of preumbilical area  Genitourinary: no suprapubic tenderness.   Extremities: 1+ non-pitting edema in bilateral lower extremities   Neurological: moving all extremities equally against gravity, Alert, oriented only to self  Skin: no rashes, bruising or lesions   Psychiatric: normal affect despite mentation, does not appear anxious.    LABS:                        12.1   9.98  )-----------( 449      ( 22 Dec 2020 07:10 )             36.1     12-22    137  |  106  |  18  ----------------------------<  105<H>  4.4   |  20<L>  |  0.88    Ca    8.8      22 Dec 2020 07:07  Phos  3.5     12-21  Mg     2.1     12-22    TPro  6.7  /  Alb  3.1<L>  /  TBili  0.3  /  DBili  x   /  AST  26  /  ALT  23  /  AlkPhos  116  12-20      Radiology:  No new imaging to review

## 2020-12-22 NOTE — DISCHARGE NOTE PROVIDER - NSDCCPCAREPLAN_GEN_ALL_CORE_FT
PRINCIPAL DISCHARGE DIAGNOSIS  Diagnosis: Hyponatremia  Assessment and Plan of Treatment: You came into the hospital because of low sodium on blood test. We treated you with intravenous fluid, your sodium level has normalized and remained normal throughout this hospitalization.      SECONDARY DISCHARGE DIAGNOSES  Diagnosis: COVID-19 virus infection  Assessment and Plan of Treatment: You were tested positive for coronavirus infection. Your oxygen status remains stable, with no supplement oxygen requirement, your other vital signs also remain stable. Thus we treated you conservatively, mainly by giving you fluid, monitor you labs, monitor your vital signs, and you never required escalation of therapy for your coronavirus infection. On 1/3 you continue to be tested positive for coronavirus, thus you should still maintain isolation from other people until you tested negative.       Diagnosis: Urinary tract infection  Assessment and Plan of Treatment: On admission your urinalysis was concerning for urinary tract infection, you also had abdominal pain, and we obtained CAT scan of your abdomen which also showed findings suggestive of urinary tract infection. Thus we treated you with antibiotics, and you have finished the course.   Please call your primary care phyisician if you develop symptoms of pain with urination, frequent urination, or fever.     PRINCIPAL DISCHARGE DIAGNOSIS  Diagnosis: Hyponatremia  Assessment and Plan of Treatment: You came into the hospital because of low sodium on blood test. We treated you with intravenous fluid, your sodium level has normalized and remained normal throughout this hospitalization.      SECONDARY DISCHARGE DIAGNOSES  Diagnosis: Incontinence associated dermatitis  Assessment and Plan of Treatment: you have dermatitis around your perineal area secondary to urinary incontinence. We are prescribing you nystatin topical and zinc oxide topical cream. It is important for you to keep the area clean and dry.    Diagnosis: COVID-19 virus infection  Assessment and Plan of Treatment: You were tested positive for coronavirus infection. Your oxygen status remains stable, with no supplement oxygen requirement, your other vital signs also remain stable. Thus we treated you conservatively, mainly by giving you fluid, monitor you labs, monitor your vital signs, and you never required escalation of therapy for your coronavirus infection. On 1/3 you continue to be tested positive for coronavirus, thus you should still maintain isolation from other people until you tested negative.       Diagnosis: Urinary tract infection  Assessment and Plan of Treatment: On admission your urinalysis was concerning for urinary tract infection, you also had abdominal pain, and we obtained CAT scan of your abdomen which also showed findings suggestive of urinary tract infection. Thus we treated you with antibiotics, and you have finished the course.   Please call your primary care phyisician if you develop symptoms of pain with urination, frequent urination, or fever.

## 2020-12-22 NOTE — DISCHARGE NOTE PROVIDER - HOSPITAL COURSE
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 Patient is found to have hyponatremia, Ae=264, otherwise normal labs on presentation. On admission, patient unable to answer questions, but did not appear to be in distress. COVID swab from ED came back + for coronavirus, although pt did not display any respiratory symptoms throughout thecourse of her admission. 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 Patient is found to have hyponatremia, Ws=371, otherwise normal labs on presentation. On admission, patient unable to answer questions, but did not appear to be in distress. COVID swab from ED came back + for coronavirus, although pt did not display any respiratory symptoms throughout the course of her admission. Patient was started on IVF at 75cc/hr and her hyponatremia resolved. Fluids were discontinued and patient's sodium remained WNL. PO intake was good throughout admission. Patient did endorse some mid-abdominal pain on examination during her admission, U/A revealed UTI and patient was started on a 3 day course of ceftriaxone. Due to continued abdominal tenderness on physical exam and since patient was not able to verbalize subjective complaints given mental status, CT abdomen and pelvis was obtained, which did not reveal acute GI pathology, but did reveal diffuse urinary bladder wall thickening/mucosal hyperemia possibly related to cystitis. Given patient's limited ability to communicate continued urinary symptoms, once pt finished the 3 day course of ceftriaxone, she was transitioned to a 4 day course of macrobid for a total of 7 days of abx treatment.     Patient was evaluated by physical therapy, who recommended V[facility] given pt's functional status. Family agreed, and case mgmt setting up arrangements for either a COVID+ VICENTA or return to her prior VIC[facility]A once she tests negative . 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 Patient is found to have hyponatremia, Cs=607, otherwise normal labs on presentation. On admission, patient unable to answer questions, but did not appear to be in distress. COVID swab from ED came back + for coronavirus, although pt did not display any respiratory symptoms throughout the course of her admission. Patient was started on IVF at 75cc/hr and her hyponatremia resolved. Fluids were discontinued and patient's sodium remained WNL. PO intake was good throughout admission. Patient did endorse some mid-abdominal pain on examination during her admission, U/A revealed UTI and patient was started on a 3 day course of ceftriaxone. Due to continued abdominal tenderness on physical exam and since patient was not able to verbalize subjective complaints given mental status, CT abdomen and pelvis was obtained, which did not reveal acute GI pathology, but did reveal diffuse urinary bladder wall thickening/mucosal hyperemia possibly related to cystitis. Given patient's limited ability to communicate continued urinary symptoms, once pt finished the 3 day course of ceftriaxone, she was transitioned to a 4 day course of macrobid for a total of 7 days of abx treatment.     Patient was evaluated by physical therapy, who recommended VICENTA given pt's functional status. COVID PCR remained positive (last checked 1/3 Positive). Patient is discharged to [facility] rehab.

## 2020-12-22 NOTE — DISCHARGE NOTE PROVIDER - NSDCMRMEDTOKEN_GEN_ALL_CORE_FT
alendronate 70 mg oral tablet: 1 tab(s) orally once a week ( wednesdays )  amLODIPine 5 mg oral tablet: 1 tab(s) orally once a day  apixaban 2.5 mg oral tablet: 1 tab(s) orally 2 times a day  Artificial Tears ophthalmic solution: 1 drop(s) to each affected eye 2 times a day, As Needed - for dry eyes  atorvastatin 40 mg oral tablet: 1 tab(s) orally once a day  Balmex: Apply topically to affected area , As Needed - for rash  Januvia 25 mg oral tablet: 1 tab(s) orally once a day  levothyroxine 112 mcg (0.112 mg) oral tablet: 1 tab(s) orally once a day  metFORMIN 500 mg oral tablet: 1 tab(s) orally 2 times a day  metoprolol tartrate 25 mg oral tablet: 1 tab(s) orally 2 times a day   acetaminophen 325 mg oral tablet: 2 tab(s) orally every 6 hours, As needed, Temp greater or equal to 38C (100.4F), Mild Pain (1 - 3)  alendronate 70 mg oral tablet: 1 tab(s) orally once a week ( wednesdays )  amLODIPine 5 mg oral tablet: 1 tab(s) orally once a day  apixaban 2.5 mg oral tablet: 1 tab(s) orally 2 times a day  atorvastatin 40 mg oral tablet: 1 tab(s) orally once a day (at bedtime)  Dextrose 5% in Water intravenous solution: 1000 milliliter(s) intravenous   Dextrose 5% in Water intravenous solution: 1000 milliliter(s) intravenous   glucose 50% intravenous solution: 50 milliliter(s) intravenous once  glucose 50% intravenous solution: 25 milliliter(s) intravenous once  glucose 50% intravenous solution: 50 milliliter(s) intravenous once  insulin glargine: 8 unit(s) subcutaneous once a day (at bedtime)  insulin lispro 100 units/mL injectable solution: 8 unit(s) injectable 3 times a day (before meals)  levothyroxine 112 mcg (0.112 mg) oral tablet: 1 tab(s) orally once a day  melatonin 5 mg oral tablet: 1 tab(s) orally once a day (at bedtime)  metoprolol tartrate 25 mg oral tablet: 1 tab(s) orally 2 times a day  ocular lubricant ophthalmic solution: 1 drop(s) to each affected eye 2 times a day  polyethylene glycol 3350 oral powder for reconstitution: 17 gram(s) orally once a day  senna oral tablet: 2 tab(s) orally once a day (at bedtime)  zinc oxide 20% topical ointment: 1 application topically 2 times a day   acetaminophen 325 mg oral tablet: 2 tab(s) orally every 6 hours, As needed, Temp greater or equal to 38C (100.4F), Mild Pain (1 - 3)  alendronate 70 mg oral tablet: 1 tab(s) orally once a week ( wednesdays )  amLODIPine 5 mg oral tablet: 1 tab(s) orally once a day  apixaban 2.5 mg oral tablet: 1 tab(s) orally 2 times a day  atorvastatin 40 mg oral tablet: 1 tab(s) orally once a day (at bedtime)  Dextrose 5% in Water intravenous solution: 1000 milliliter(s) intravenous   Dextrose 5% in Water intravenous solution: 1000 milliliter(s) intravenous   glucose 50% intravenous solution: 50 milliliter(s) intravenous once  glucose 50% intravenous solution: 25 milliliter(s) intravenous once  glucose 50% intravenous solution: 50 milliliter(s) intravenous once  insulin glargine: 8 unit(s) subcutaneous once a day (at bedtime)  insulin lispro 100 units/mL injectable solution: 8 unit(s) injectable 3 times a day (before meals)  levothyroxine 112 mcg (0.112 mg) oral tablet: 1 tab(s) orally once a day  melatonin 5 mg oral tablet: 1 tab(s) orally once a day (at bedtime)  metoprolol tartrate 25 mg oral tablet: 1 tab(s) orally 2 times a day  nystatin 100,000 units/g topical cream: Apply topically to affected area 2 times a day  ocular lubricant ophthalmic solution: 1 drop(s) to each affected eye 2 times a day  polyethylene glycol 3350 oral powder for reconstitution: 17 gram(s) orally once a day  senna oral tablet: 2 tab(s) orally once a day (at bedtime)  zinc oxide 20% topical ointment: 1 application topically 2 times a day

## 2020-12-22 NOTE — PHYSICAL THERAPY INITIAL EVALUATION ADULT - PERTINENT HX OF CURRENT PROBLEM, REHAB EVAL
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

## 2020-12-22 NOTE — PROGRESS NOTE ADULT - PROBLEM SELECTOR PLAN 1
Improving, 127 on admission and improved to 134 with IVF.   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

## 2020-12-22 NOTE — PROGRESS NOTE ADULT - PROBLEM SELECTOR PLAN 4
-per hematology consult, hydroxyurea on hold, no indication for treatment for now  -goal Hct <42% per CYTO-PV trial  -will be trending CBC Patient with confusion, per daughter, has vascular dementia with baseline cognitive issues that daughter describes as fluctuating, but daughter has not noted acute change in cognition.   -Will hold off on CT brain for now, patient with no focal deficits, does not appear to be far off baseline mentation.   -Patient currently alert, but oriented only to person, unable to answer questions.   - component with hyponatremia although difficult to assess if symptomatic  -U/A clear, lungs clear on x-ray.

## 2020-12-22 NOTE — PHYSICAL THERAPY INITIAL EVALUATION ADULT - DISCHARGE DISPOSITION, PT EVAL
Subacute Rehab, if pt to go home, pt will require home PT and assistance for ALL functional mobility/activities, rolling walker, wheelchair/rehabilitation facility

## 2020-12-22 NOTE — DISCHARGE NOTE PROVIDER - NSDCQMAMI_CARD_ALL_CORE
"Attempted to outreach patient for pre-visit via "[employer]", no answer after a week. Sending outreach via Mail Out Letter now.    "
Pre-visit Chart review notification    
No

## 2020-12-22 NOTE — PROGRESS NOTE ADULT - PROBLEM SELECTOR PLAN 10
-Patient seen and evaluated by PT who recommends [facility]. Family in agreement with this. Case mgmt to make arrangements.

## 2020-12-22 NOTE — DISCHARGE NOTE PROVIDER - CARE PROVIDER_API CALL
Radha Day  INTERNAL MEDICINE  [address], [address]  [place] [address]  Phone: [phone]  Fax: [phone]  Established Patient  Scheduled Appointment: 01/13/2021

## 2020-12-22 NOTE — DISCHARGE NOTE PROVIDER - NSDCFUADDINST_GEN_ALL_CORE_FT
Patient has dermatitis around perineal area secondary to urinary incontinence. Please continue with nystatin topical and zinc oxide topical cream two times a day.  Please keep the area clean and dry, with frequent change of absorbant diaper.

## 2020-12-22 NOTE — PROGRESS NOTE ADULT - PROBLEM SELECTOR PLAN 2
Pt returned home from a 5 week rehab stay on 12/11, tested + for COVID in ED on presentation. Currently without any respiratory symptoms, CXR clear.   -Will continue with conservative mgmt as patient is not currently symptomatic  -If pt becomes SOB, will start remdecivir and decadron, supplemental oxygen prn. On physical exam, patient winces on palpation of the abdomen in the epigastric region, however she is unable to communicate details of the pain given cognitive impairment.   -Pt has been moving her bowels, no dark or bloody stools   -CT a/p? On physical exam, patient winces on palpation of the abdomen in the epigastric region, however she is unable to communicate details of the pain given cognitive impairment.   -Pt has been moving her bowels, no dark or bloody stools   -Won't pursue imaging at this time.

## 2020-12-22 NOTE — PHYSICAL THERAPY INITIAL EVALUATION ADULT - ADDITIONAL COMMENTS
contd from above: pt found to have hyponatremia, Bu=385, otherwise normal labs. Has underlying mild dementia but patient is able to express her ideas and follow commands at baseline. Per patient's Magalie montano, patient returned home last week from [facility] weeks at [facility], pt lives with daughter and daughter's family. Per daughter, patient has "good and bad days" with regards to mentation, states some days she is more quiet, other days is more oriented, able to do things like read the newspaper or communicate her wishes such as what she would like for dinner.CXR: mild L basilar atelectasis    social history: pt unable to provide history: as per chart pt lives in private house with son and daughter no steps to enter, HHA 7d/7h and assistance for ADLs

## 2020-12-23 LAB
ANION GAP SERPL CALC-SCNC: 12 MMOL/L — SIGNIFICANT CHANGE UP (ref 5–17)
BASOPHILS # BLD AUTO: 0.06 K/UL — SIGNIFICANT CHANGE UP (ref 0–0.2)
BASOPHILS NFR BLD AUTO: 0.5 % — SIGNIFICANT CHANGE UP (ref 0–2)
BUN SERPL-MCNC: 20 MG/DL — SIGNIFICANT CHANGE UP (ref 7–23)
CALCIUM SERPL-MCNC: 8.9 MG/DL — SIGNIFICANT CHANGE UP (ref 8.4–10.5)
CHLORIDE SERPL-SCNC: 102 MMOL/L — SIGNIFICANT CHANGE UP (ref 96–108)
CO2 SERPL-SCNC: 20 MMOL/L — LOW (ref 22–31)
CREAT SERPL-MCNC: 0.83 MG/DL — SIGNIFICANT CHANGE UP (ref 0.5–1.3)
EOSINOPHIL # BLD AUTO: 0.14 K/UL — SIGNIFICANT CHANGE UP (ref 0–0.5)
EOSINOPHIL NFR BLD AUTO: 1.2 % — SIGNIFICANT CHANGE UP (ref 0–6)
GLUCOSE SERPL-MCNC: 153 MG/DL — HIGH (ref 70–99)
HCT VFR BLD CALC: 38.5 % — SIGNIFICANT CHANGE UP (ref 34.5–45)
HGB BLD-MCNC: 12.4 G/DL — SIGNIFICANT CHANGE UP (ref 11.5–15.5)
IMM GRANULOCYTES NFR BLD AUTO: 2.1 % — HIGH (ref 0–1.5)
LYMPHOCYTES # BLD AUTO: 1.23 K/UL — SIGNIFICANT CHANGE UP (ref 1–3.3)
LYMPHOCYTES # BLD AUTO: 10.5 % — LOW (ref 13–44)
MAGNESIUM SERPL-MCNC: 1.9 MG/DL — SIGNIFICANT CHANGE UP (ref 1.6–2.6)
MCHC RBC-ENTMCNC: 30.2 PG — SIGNIFICANT CHANGE UP (ref 27–34)
MCHC RBC-ENTMCNC: 32.2 GM/DL — SIGNIFICANT CHANGE UP (ref 32–36)
MCV RBC AUTO: 93.7 FL — SIGNIFICANT CHANGE UP (ref 80–100)
MONOCYTES # BLD AUTO: 0.96 K/UL — HIGH (ref 0–0.9)
MONOCYTES NFR BLD AUTO: 8.2 % — SIGNIFICANT CHANGE UP (ref 2–14)
NEUTROPHILS # BLD AUTO: 9.04 K/UL — HIGH (ref 1.8–7.4)
NEUTROPHILS NFR BLD AUTO: 77.5 % — HIGH (ref 43–77)
NRBC # BLD: 0 /100 WBCS — SIGNIFICANT CHANGE UP (ref 0–0)
PHOSPHATE SERPL-MCNC: 2.2 MG/DL — LOW (ref 2.5–4.5)
PLATELET # BLD AUTO: 454 K/UL — HIGH (ref 150–400)
POTASSIUM SERPL-MCNC: 4.5 MMOL/L — SIGNIFICANT CHANGE UP (ref 3.5–5.3)
POTASSIUM SERPL-SCNC: 4.5 MMOL/L — SIGNIFICANT CHANGE UP (ref 3.5–5.3)
RBC # BLD: 4.11 M/UL — SIGNIFICANT CHANGE UP (ref 3.8–5.2)
RBC # FLD: 14.1 % — SIGNIFICANT CHANGE UP (ref 10.3–14.5)
SODIUM SERPL-SCNC: 134 MMOL/L — LOW (ref 135–145)
WBC # BLD: 11.67 K/UL — HIGH (ref 3.8–10.5)
WBC # FLD AUTO: 11.67 K/UL — HIGH (ref 3.8–10.5)

## 2020-12-23 PROCEDURE — 99233 SBSQ HOSP IP/OBS HIGH 50: CPT | Mod: GC

## 2020-12-23 RX ORDER — LANOLIN ALCOHOL/MO/W.PET/CERES
5 CREAM (GRAM) TOPICAL AT BEDTIME
Refills: 0 | Status: DISCONTINUED | OUTPATIENT
Start: 2020-12-23 | End: 2021-01-05

## 2020-12-23 RX ORDER — PANTOPRAZOLE SODIUM 20 MG/1
40 TABLET, DELAYED RELEASE ORAL
Refills: 0 | Status: DISCONTINUED | OUTPATIENT
Start: 2020-12-24 | End: 2020-12-30

## 2020-12-23 RX ORDER — INSULIN GLARGINE 100 [IU]/ML
7 INJECTION, SOLUTION SUBCUTANEOUS AT BEDTIME
Refills: 0 | Status: DISCONTINUED | OUTPATIENT
Start: 2020-12-23 | End: 2020-12-24

## 2020-12-23 RX ORDER — ACETAMINOPHEN 500 MG
650 TABLET ORAL EVERY 6 HOURS
Refills: 0 | Status: DISCONTINUED | OUTPATIENT
Start: 2020-12-23 | End: 2020-12-24

## 2020-12-23 RX ORDER — SODIUM,POTASSIUM PHOSPHATES 278-250MG
1 POWDER IN PACKET (EA) ORAL ONCE
Refills: 0 | Status: COMPLETED | OUTPATIENT
Start: 2020-12-23 | End: 2020-12-23

## 2020-12-23 RX ORDER — PANTOPRAZOLE SODIUM 20 MG/1
40 TABLET, DELAYED RELEASE ORAL ONCE
Refills: 0 | Status: COMPLETED | OUTPATIENT
Start: 2020-12-23 | End: 2020-12-23

## 2020-12-23 RX ORDER — INSULIN LISPRO 100/ML
3 VIAL (ML) SUBCUTANEOUS
Refills: 0 | Status: DISCONTINUED | OUTPATIENT
Start: 2020-12-23 | End: 2020-12-24

## 2020-12-23 RX ADMIN — Medication 4: at 21:26

## 2020-12-23 RX ADMIN — Medication 3 UNIT(S): at 18:11

## 2020-12-23 RX ADMIN — APIXABAN 2.5 MILLIGRAM(S): 2.5 TABLET, FILM COATED ORAL at 06:00

## 2020-12-23 RX ADMIN — APIXABAN 2.5 MILLIGRAM(S): 2.5 TABLET, FILM COATED ORAL at 18:14

## 2020-12-23 RX ADMIN — PANTOPRAZOLE SODIUM 40 MILLIGRAM(S): 20 TABLET, DELAYED RELEASE ORAL at 10:20

## 2020-12-23 RX ADMIN — INSULIN GLARGINE 7 UNIT(S): 100 INJECTION, SOLUTION SUBCUTANEOUS at 21:36

## 2020-12-23 RX ADMIN — SENNA PLUS 2 TABLET(S): 8.6 TABLET ORAL at 21:36

## 2020-12-23 RX ADMIN — Medication 3 UNIT(S): at 15:14

## 2020-12-23 RX ADMIN — Medication 4: at 18:10

## 2020-12-23 RX ADMIN — MAGNESIUM OXIDE 400 MG ORAL TABLET 400 MILLIGRAM(S): 241.3 TABLET ORAL at 18:14

## 2020-12-23 RX ADMIN — MAGNESIUM OXIDE 400 MG ORAL TABLET 400 MILLIGRAM(S): 241.3 TABLET ORAL at 10:20

## 2020-12-23 RX ADMIN — AMLODIPINE BESYLATE 5 MILLIGRAM(S): 2.5 TABLET ORAL at 06:00

## 2020-12-23 RX ADMIN — Medication 1 PACKET(S): at 18:14

## 2020-12-23 RX ADMIN — ATORVASTATIN CALCIUM 40 MILLIGRAM(S): 80 TABLET, FILM COATED ORAL at 21:36

## 2020-12-23 RX ADMIN — Medication 2: at 08:50

## 2020-12-23 RX ADMIN — Medication 25 MILLIGRAM(S): at 06:00

## 2020-12-23 RX ADMIN — ZINC OXIDE 1 APPLICATION(S): 200 OINTMENT TOPICAL at 18:18

## 2020-12-23 RX ADMIN — Medication 25 MILLIGRAM(S): at 18:17

## 2020-12-23 RX ADMIN — Medication 5 MILLIGRAM(S): at 21:36

## 2020-12-23 RX ADMIN — Medication 6: at 12:41

## 2020-12-23 RX ADMIN — ZINC OXIDE 1 APPLICATION(S): 200 OINTMENT TOPICAL at 06:02

## 2020-12-23 RX ADMIN — Medication 112 MICROGRAM(S): at 06:00

## 2020-12-23 RX ADMIN — Medication 1 DROP(S): at 06:01

## 2020-12-23 NOTE — PROGRESS NOTE ADULT - ATTENDING COMMENTS
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 On admission, patient afebrile, no white count, COVID+and hyponatremic.  Patient maintains her O2 saturation during admission and supportive care was maintained.  Na has stabilized .  Pt was seen by PT who recommends [facility]A and awaiting on placement.       [facility]

## 2020-12-23 NOTE — PROGRESS NOTE ADULT - PROBLEM SELECTOR PLAN 2
On physical exam, patient winces on palpation of the abdomen in the epigastric region, however she is unable to communicate details of the pain given cognitive impairment.   -Pt has been moving her bowels, no dark or bloody stools   -Won't pursue imaging at this time.

## 2020-12-23 NOTE — PROGRESS NOTE ADULT - PROBLEM SELECTOR PLAN 6
-On DVT prophylaxis long term 2/2 polycythemia vera and history of recurrent DVT, will continue eliquis 2.5mg per heme recs.

## 2020-12-23 NOTE — PROGRESS NOTE ADULT - PROBLEM SELECTOR PLAN 4
Patient with confusion, per daughter, has vascular dementia with baseline cognitive issues that daughter describes as fluctuating, but daughter has not noted acute change in cognition.   -Will hold off on CT brain for now, patient with no focal deficits, does not appear to be far off baseline mentation.   -Patient currently alert, but oriented only to person, unable to answer questions.   - component with hyponatremia although difficult to assess if symptomatic  -U/A clear, lungs clear on x-ray. Patient with confusion, per daughter, has vascular dementia with baseline cognitive issues that daughter describes as fluctuating, but daughter has not noted acute change in cognition.   -Patient currently alert, but oriented only to person. Able to answer some questions, mentation overall improved   - component with hyponatremia although difficult to assess if symptomatic  -U/A clear, lungs clear on x-ray.

## 2020-12-23 NOTE — PROGRESS NOTE ADULT - SUBJECTIVE AND OBJECTIVE BOX
CONTACT INFO:    Ele Rosen MD PGY-1  Internal Medicine  [phone]      SUBJECTIVE:  Interval history: No acute events overnight.     ROS: Unable to ascertain 2/2 patient's mental status.    OBJECTIVE:  MEDICATIONS  (STANDING):  amLODIPine   Tablet 5 milliGRAM(s) Oral daily  apixaban 2.5 milliGRAM(s) Oral every 12 hours  artificial  tears Solution 1 Drop(s) Both EYES two times a day  atorvastatin 40 milliGRAM(s) Oral at bedtime  dextrose 40% Gel 15 Gram(s) Oral once  dextrose 5%. 1000 milliLiter(s) (50 mL/Hr) IV Continuous <Continuous>  dextrose 5%. 1000 milliLiter(s) (100 mL/Hr) IV Continuous <Continuous>  dextrose 50% Injectable 25 Gram(s) IV Push once  dextrose 50% Injectable 12.5 Gram(s) IV Push once  dextrose 50% Injectable 25 Gram(s) IV Push once  glucagon  Injectable 1 milliGRAM(s) IntraMuscular once  insulin glargine Injectable (LANTUS) 5 Unit(s) SubCutaneous at bedtime  insulin lispro (ADMELOG) corrective regimen sliding scale   SubCutaneous three times a day before meals  insulin lispro (ADMELOG) corrective regimen sliding scale   SubCutaneous at bedtime  insulin lispro Injectable (ADMELOG) 2 Unit(s) SubCutaneous three times a day before meals  levothyroxine 112 MICROGram(s) Oral daily  magnesium oxide 400 milliGRAM(s) Oral three times a day with meals  metoprolol tartrate 25 milliGRAM(s) Oral two times a day  polyethylene glycol 3350 17 Gram(s) Oral daily  senna 2 Tablet(s) Oral at bedtime  zinc oxide 20% Ointment 1 Application(s) Topical two times a day    MEDICATIONS  (PRN):      I&O's Detail    22 Dec 2020 07:01  -  23 Dec 2020 07:00  --------------------------------------------------------  IN:    Oral Fluid: 600 mL  Total IN: 600 mL    OUT:    Voided (mL): 600 mL  Total OUT: 600 mL    Total NET: 0 mL    CAPILLARY BLOOD GLUCOSE      POCT Blood Glucose.: 244 mg/dL (22 Dec 2020 22:00)  POCT Blood Glucose.: 187 mg/dL (22 Dec 2020 17:03)  POCT Blood Glucose.: 208 mg/dL (22 Dec 2020 11:35)        Vital Signs Last 24 Hrs  T(C): 37 (23 Dec 2020 05:34), Max: 37.1 (22 Dec 2020 11:25)  T(F): 98.6 (23 Dec 2020 05:34), Max: 98.8 (22 Dec 2020 11:25)  HR: 93 (23 Dec 2020 05:34) (82 - 96)  BP: 157/88 (23 Dec 2020 05:34) (106/71 - 157/88)  BP(mean): --  RR: 18 (23 Dec 2020 05:34) (18 - 18)  SpO2: 98% (23 Dec 2020 05:34) (93% - 98%)    PHYSICAL EXAM:  Constitutional: NAD, well nourished, well developed  Eyes: no conjunctival injection, PERRLA, EOMI, no scleral icterus.  ENMT: no thyromegaly, no lymphadenopathy appreciated.  Respiratory: LCTAB, no wheezing, crackles or rales  Cardiovascular:RRR, no murmurs appreciated, no JVD   Gastrointestinal: Some mild discomfort on palpation of preumbilical area  Genitourinary: no suprapubic tenderness.   Extremities: 1+ non-pitting edema in bilateral lower extremities   Neurological: moving all extremities equally against gravity, Alert, oriented only to self  Skin: no rashes, bruising or lesions   Psychiatric: normal affect despite mentation, does not appear anxious.      LABS:  12-22 @ 07:07 Creatine 32 U/L [25 - 170]  cret                        12.4   11.67 )-----------( 454      ( 23 Dec 2020 07:07 )             38.5     12-23    134<L>  |  102  |  20  ----------------------------<  153<H>  4.5   |  20<L>  |  0.83    Ca    8.9      23 Dec 2020 07:03  Phos  2.2     12-23  Mg     1.9     12-23          Radiology:  No new imaging to review    CONTACT INFO:    Ele Rosen MD PGY-1  Internal Medicine  [phone]      SUBJECTIVE:  Interval history: No acute events overnight. This morning, patient complaining of some epigastric pain, unable to characterize pain, but does request medicine for the pain. Per nursing, pt has been moving bowels. No melena or hematochezia.     ROS: Unable to ascertain 2/2 patient's mental status.    OBJECTIVE:  MEDICATIONS  (STANDING):  amLODIPine   Tablet 5 milliGRAM(s) Oral daily  apixaban 2.5 milliGRAM(s) Oral every 12 hours  artificial  tears Solution 1 Drop(s) Both EYES two times a day  atorvastatin 40 milliGRAM(s) Oral at bedtime  dextrose 40% Gel 15 Gram(s) Oral once  dextrose 5%. 1000 milliLiter(s) (50 mL/Hr) IV Continuous <Continuous>  dextrose 5%. 1000 milliLiter(s) (100 mL/Hr) IV Continuous <Continuous>  dextrose 50% Injectable 25 Gram(s) IV Push once  dextrose 50% Injectable 12.5 Gram(s) IV Push once  dextrose 50% Injectable 25 Gram(s) IV Push once  glucagon  Injectable 1 milliGRAM(s) IntraMuscular once  insulin glargine Injectable (LANTUS) 5 Unit(s) SubCutaneous at bedtime  insulin lispro (ADMELOG) corrective regimen sliding scale   SubCutaneous three times a day before meals  insulin lispro (ADMELOG) corrective regimen sliding scale   SubCutaneous at bedtime  insulin lispro Injectable (ADMELOG) 2 Unit(s) SubCutaneous three times a day before meals  levothyroxine 112 MICROGram(s) Oral daily  magnesium oxide 400 milliGRAM(s) Oral three times a day with meals  metoprolol tartrate 25 milliGRAM(s) Oral two times a day  polyethylene glycol 3350 17 Gram(s) Oral daily  senna 2 Tablet(s) Oral at bedtime  zinc oxide 20% Ointment 1 Application(s) Topical two times a day    MEDICATIONS  (PRN):      I&O's Detail    22 Dec 2020 07:01  -  23 Dec 2020 07:00  --------------------------------------------------------  IN:    Oral Fluid: 600 mL  Total IN: 600 mL    OUT:    Voided (mL): 600 mL  Total OUT: 600 mL    Total NET: 0 mL    CAPILLARY BLOOD GLUCOSE      POCT Blood Glucose.: 244 mg/dL (22 Dec 2020 22:00)  POCT Blood Glucose.: 187 mg/dL (22 Dec 2020 17:03)  POCT Blood Glucose.: 208 mg/dL (22 Dec 2020 11:35)        Vital Signs Last 24 Hrs  T(C): 37 (23 Dec 2020 05:34), Max: 37.1 (22 Dec 2020 11:25)  T(F): 98.6 (23 Dec 2020 05:34), Max: 98.8 (22 Dec 2020 11:25)  HR: 93 (23 Dec 2020 05:34) (82 - 96)  BP: 157/88 (23 Dec 2020 05:34) (106/71 - 157/88)  BP(mean): --  RR: 18 (23 Dec 2020 05:34) (18 - 18)  SpO2: 98% (23 Dec 2020 05:34) (93% - 98%)    PHYSICAL EXAM:  Constitutional: NAD, well nourished, well developed  Eyes: no conjunctival injection, PERRLA, EOMI, no scleral icterus.  ENMT: no thyromegaly, no lymphadenopathy appreciated.  Respiratory: LCTAB, no wheezing, crackles or rales  Cardiovascular:RRR, no murmurs appreciated, no JVD   Gastrointestinal: Some mild discomfort on palpation of preumbilical area  Genitourinary: no suprapubic tenderness.   Extremities: 1+ non-pitting edema in bilateral lower extremities   Neurological: moving all extremities equally against gravity, Alert, oriented only to self  Skin: no rashes, bruising or lesions   Psychiatric: normal affect despite mentation, does not appear anxious.      LABS:  12-22 @ 07:07 Creatine 32 U/L [25 - 170]  cret                        12.4   11.67 )-----------( 454      ( 23 Dec 2020 07:07 )             38.5     12-23    134<L>  |  102  |  20  ----------------------------<  153<H>  4.5   |  20<L>  |  0.83    Ca    8.9      23 Dec 2020 07:03  Phos  2.2     12-23  Mg     1.9     12-23          Radiology:  No new imaging to review    CONTACT INFO:    Ele oRsen MD PGY-1  Internal Medicine  [phone]      SUBJECTIVE:  Interval history: No acute events overnight. This morning, patient complaining of some epigastric pain, unable to characterize pain, but does request medicine for the pain. Per nursing, pt has been moving bowels. No melena or hematochezia.     ROS: Unable to ascertain 2/2 patient's mental status.    OBJECTIVE:  MEDICATIONS  (STANDING):  amLODIPine   Tablet 5 milliGRAM(s) Oral daily  apixaban 2.5 milliGRAM(s) Oral every 12 hours  artificial  tears Solution 1 Drop(s) Both EYES two times a day  atorvastatin 40 milliGRAM(s) Oral at bedtime  dextrose 40% Gel 15 Gram(s) Oral once  dextrose 5%. 1000 milliLiter(s) (50 mL/Hr) IV Continuous <Continuous>  dextrose 5%. 1000 milliLiter(s) (100 mL/Hr) IV Continuous <Continuous>  dextrose 50% Injectable 25 Gram(s) IV Push once  dextrose 50% Injectable 12.5 Gram(s) IV Push once  dextrose 50% Injectable 25 Gram(s) IV Push once  glucagon  Injectable 1 milliGRAM(s) IntraMuscular once  insulin glargine Injectable (LANTUS) 5 Unit(s) SubCutaneous at bedtime  insulin lispro (ADMELOG) corrective regimen sliding scale   SubCutaneous three times a day before meals  insulin lispro (ADMELOG) corrective regimen sliding scale   SubCutaneous at bedtime  insulin lispro Injectable (ADMELOG) 2 Unit(s) SubCutaneous three times a day before meals  levothyroxine 112 MICROGram(s) Oral daily  magnesium oxide 400 milliGRAM(s) Oral three times a day with meals  metoprolol tartrate 25 milliGRAM(s) Oral two times a day  polyethylene glycol 3350 17 Gram(s) Oral daily  senna 2 Tablet(s) Oral at bedtime  zinc oxide 20% Ointment 1 Application(s) Topical two times a day    MEDICATIONS  (PRN):      I&O's Detail    22 Dec 2020 07:01  -  23 Dec 2020 07:00  --------------------------------------------------------  IN:    Oral Fluid: 600 mL  Total IN: 600 mL    OUT:    Voided (mL): 600 mL  Total OUT: 600 mL    Total NET: 0 mL    CAPILLARY BLOOD GLUCOSE      POCT Blood Glucose.: 244 mg/dL (22 Dec 2020 22:00)  POCT Blood Glucose.: 187 mg/dL (22 Dec 2020 17:03)  POCT Blood Glucose.: 208 mg/dL (22 Dec 2020 11:35)        Vital Signs Last 24 Hrs  T(C): 37 (23 Dec 2020 05:34), Max: 37.1 (22 Dec 2020 11:25)  T(F): 98.6 (23 Dec 2020 05:34), Max: 98.8 (22 Dec 2020 11:25)  HR: 93 (23 Dec 2020 05:34) (82 - 96)  BP: 157/88 (23 Dec 2020 05:34) (106/71 - 157/88)  BP(mean): --  RR: 18 (23 Dec 2020 05:34) (18 - 18)  SpO2: 98% (23 Dec 2020 05:34) (93% - 98%)    PHYSICAL EXAM:  Constitutional: NAD, well nourished, well developed  Eyes: no conjunctival injection, PERRLA, EOMI, no scleral icterus.  ENMT: no thyromegaly, no lymphadenopathy appreciated.  Respiratory: LCTAB, no wheezing, crackles or rales  Cardiovascular:RRR, no murmurs appreciated, no JVD   Gastrointestinal: Some mild discomfort on palpation of preumbilical area  Genitourinary: no suprapubic tenderness.   Extremities: 1+ non-pitting edema in bilateral lower extremities   Neurological: moving all extremities equally against gravity, Alert, oriented only to self  Skin: no rashes, bruising or lesions   Psychiatric: normal affect despite mentation, does not appear anxious.      LABS:  12-22 @ 07:07 Creatine 32 U/L [25 - 170]                          12.4   11.67 )-----------( 454      ( 23 Dec 2020 07:07 )             38.5     12-23    134<L>  |  102  |  20  ----------------------------<  153<H>  4.5   |  20<L>  |  0.83    Ca    8.9      23 Dec 2020 07:03  Phos  2.2     12-23  Mg     1.9     12-23                    Radiology:  No new imaging to review

## 2020-12-23 NOTE — PROGRESS NOTE ADULT - PROBLEM SELECTOR PLAN 1
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

## 2020-12-23 NOTE — PROGRESS NOTE ADULT - PROBLEM SELECTOR PLAN 3
Pt returned home from a 5 week rehab stay on 12/11, tested + for COVID in ED on presentation. Currently without any respiratory symptoms, CXR clear.   -Will continue with conservative mgmt as patient is not currently symptomatic  -If pt becomes SOB, will start remdecivir and decadron, supplemental oxygen prn.

## 2020-12-24 DIAGNOSIS — R65.10 SYSTEMIC INFLAMMATORY RESPONSE SYNDROME (SIRS) OF NON-INFECTIOUS ORIGIN WITHOUT ACUTE ORGAN DYSFUNCTION: ICD-10-CM

## 2020-12-24 LAB
ANION GAP SERPL CALC-SCNC: 13 MMOL/L — SIGNIFICANT CHANGE UP (ref 5–17)
APPEARANCE UR: ABNORMAL
BACTERIA # UR AUTO: ABNORMAL
BASOPHILS # BLD AUTO: 0.05 K/UL — SIGNIFICANT CHANGE UP (ref 0–0.2)
BASOPHILS NFR BLD AUTO: 0.3 % — SIGNIFICANT CHANGE UP (ref 0–2)
BILIRUB UR-MCNC: NEGATIVE — SIGNIFICANT CHANGE UP
BUN SERPL-MCNC: 23 MG/DL — SIGNIFICANT CHANGE UP (ref 7–23)
CALCIUM SERPL-MCNC: 9 MG/DL — SIGNIFICANT CHANGE UP (ref 8.4–10.5)
CHLORIDE SERPL-SCNC: 100 MMOL/L — SIGNIFICANT CHANGE UP (ref 96–108)
CO2 SERPL-SCNC: 20 MMOL/L — LOW (ref 22–31)
COLOR SPEC: ABNORMAL
CREAT SERPL-MCNC: 0.99 MG/DL — SIGNIFICANT CHANGE UP (ref 0.5–1.3)
DIFF PNL FLD: ABNORMAL
EOSINOPHIL # BLD AUTO: 0.09 K/UL — SIGNIFICANT CHANGE UP (ref 0–0.5)
EOSINOPHIL NFR BLD AUTO: 0.6 % — SIGNIFICANT CHANGE UP (ref 0–6)
EPI CELLS # UR: SIGNIFICANT CHANGE UP
GLUCOSE SERPL-MCNC: 141 MG/DL — HIGH (ref 70–99)
GLUCOSE UR QL: NEGATIVE — SIGNIFICANT CHANGE UP
HCT VFR BLD CALC: 37.2 % — SIGNIFICANT CHANGE UP (ref 34.5–45)
HGB BLD-MCNC: 12.1 G/DL — SIGNIFICANT CHANGE UP (ref 11.5–15.5)
IMM GRANULOCYTES NFR BLD AUTO: 1.9 % — HIGH (ref 0–1.5)
KETONES UR-MCNC: NEGATIVE — SIGNIFICANT CHANGE UP
LEUKOCYTE ESTERASE UR-ACNC: ABNORMAL
LYMPHOCYTES # BLD AUTO: 1.29 K/UL — SIGNIFICANT CHANGE UP (ref 1–3.3)
LYMPHOCYTES # BLD AUTO: 8.5 % — LOW (ref 13–44)
MAGNESIUM SERPL-MCNC: 1.8 MG/DL — SIGNIFICANT CHANGE UP (ref 1.6–2.6)
MCHC RBC-ENTMCNC: 29.7 PG — SIGNIFICANT CHANGE UP (ref 27–34)
MCHC RBC-ENTMCNC: 32.5 GM/DL — SIGNIFICANT CHANGE UP (ref 32–36)
MCV RBC AUTO: 91.2 FL — SIGNIFICANT CHANGE UP (ref 80–100)
MONOCYTES # BLD AUTO: 1.32 K/UL — HIGH (ref 0–0.9)
MONOCYTES NFR BLD AUTO: 8.7 % — SIGNIFICANT CHANGE UP (ref 2–14)
NEUTROPHILS # BLD AUTO: 12.1 K/UL — HIGH (ref 1.8–7.4)
NEUTROPHILS NFR BLD AUTO: 80 % — HIGH (ref 43–77)
NITRITE UR-MCNC: NEGATIVE — SIGNIFICANT CHANGE UP
NRBC # BLD: 0 /100 WBCS — SIGNIFICANT CHANGE UP (ref 0–0)
PH UR: 6 — SIGNIFICANT CHANGE UP (ref 5–8)
PLATELET # BLD AUTO: 441 K/UL — HIGH (ref 150–400)
POTASSIUM SERPL-MCNC: 4.7 MMOL/L — SIGNIFICANT CHANGE UP (ref 3.5–5.3)
POTASSIUM SERPL-SCNC: 4.7 MMOL/L — SIGNIFICANT CHANGE UP (ref 3.5–5.3)
PROT UR-MCNC: ABNORMAL
RBC # BLD: 4.08 M/UL — SIGNIFICANT CHANGE UP (ref 3.8–5.2)
RBC # FLD: 14.1 % — SIGNIFICANT CHANGE UP (ref 10.3–14.5)
RBC CASTS # UR COMP ASSIST: 23 /HPF — HIGH (ref 0–4)
SODIUM SERPL-SCNC: 133 MMOL/L — LOW (ref 135–145)
SP GR SPEC: 1.02 — SIGNIFICANT CHANGE UP (ref 1.01–1.02)
UROBILINOGEN FLD QL: NEGATIVE — SIGNIFICANT CHANGE UP
WBC # BLD: 15.13 K/UL — HIGH (ref 3.8–10.5)
WBC # FLD AUTO: 15.13 K/UL — HIGH (ref 3.8–10.5)
WBC UR QL: ABNORMAL

## 2020-12-24 PROCEDURE — 99233 SBSQ HOSP IP/OBS HIGH 50: CPT | Mod: GC

## 2020-12-24 PROCEDURE — 71045 X-RAY EXAM CHEST 1 VIEW: CPT | Mod: 26

## 2020-12-24 RX ORDER — SODIUM CHLORIDE 9 MG/ML
1000 INJECTION INTRAMUSCULAR; INTRAVENOUS; SUBCUTANEOUS
Refills: 0 | Status: DISCONTINUED | OUTPATIENT
Start: 2020-12-24 | End: 2020-12-25

## 2020-12-24 RX ORDER — INSULIN LISPRO 100/ML
7 VIAL (ML) SUBCUTANEOUS
Refills: 0 | Status: DISCONTINUED | OUTPATIENT
Start: 2020-12-24 | End: 2020-12-30

## 2020-12-24 RX ORDER — CEFTRIAXONE 500 MG/1
1000 INJECTION, POWDER, FOR SOLUTION INTRAMUSCULAR; INTRAVENOUS EVERY 24 HOURS
Refills: 0 | Status: COMPLETED | OUTPATIENT
Start: 2020-12-24 | End: 2020-12-26

## 2020-12-24 RX ORDER — ACETAMINOPHEN 500 MG
650 TABLET ORAL EVERY 6 HOURS
Refills: 0 | Status: DISCONTINUED | OUTPATIENT
Start: 2020-12-24 | End: 2021-01-05

## 2020-12-24 RX ORDER — INSULIN GLARGINE 100 [IU]/ML
10 INJECTION, SOLUTION SUBCUTANEOUS AT BEDTIME
Refills: 0 | Status: DISCONTINUED | OUTPATIENT
Start: 2020-12-24 | End: 2020-12-30

## 2020-12-24 RX ADMIN — ZINC OXIDE 1 APPLICATION(S): 200 OINTMENT TOPICAL at 17:21

## 2020-12-24 RX ADMIN — Medication 2: at 13:39

## 2020-12-24 RX ADMIN — CEFTRIAXONE 100 MILLIGRAM(S): 500 INJECTION, POWDER, FOR SOLUTION INTRAMUSCULAR; INTRAVENOUS at 17:20

## 2020-12-24 RX ADMIN — Medication 650 MILLIGRAM(S): at 07:09

## 2020-12-24 RX ADMIN — Medication 4: at 17:19

## 2020-12-24 RX ADMIN — Medication 650 MILLIGRAM(S): at 22:05

## 2020-12-24 RX ADMIN — Medication 112 MICROGRAM(S): at 06:29

## 2020-12-24 RX ADMIN — Medication 650 MILLIGRAM(S): at 06:29

## 2020-12-24 RX ADMIN — Medication 1 DROP(S): at 06:31

## 2020-12-24 RX ADMIN — AMLODIPINE BESYLATE 5 MILLIGRAM(S): 2.5 TABLET ORAL at 06:29

## 2020-12-24 RX ADMIN — PANTOPRAZOLE SODIUM 40 MILLIGRAM(S): 20 TABLET, DELAYED RELEASE ORAL at 06:29

## 2020-12-24 RX ADMIN — Medication 7 UNIT(S): at 17:20

## 2020-12-24 RX ADMIN — Medication 3 UNIT(S): at 13:40

## 2020-12-24 RX ADMIN — Medication 650 MILLIGRAM(S): at 22:35

## 2020-12-24 RX ADMIN — ATORVASTATIN CALCIUM 40 MILLIGRAM(S): 80 TABLET, FILM COATED ORAL at 22:04

## 2020-12-24 RX ADMIN — Medication 3 UNIT(S): at 09:15

## 2020-12-24 RX ADMIN — Medication 25 MILLIGRAM(S): at 06:29

## 2020-12-24 RX ADMIN — APIXABAN 2.5 MILLIGRAM(S): 2.5 TABLET, FILM COATED ORAL at 06:29

## 2020-12-24 RX ADMIN — SODIUM CHLORIDE 70 MILLILITER(S): 9 INJECTION INTRAMUSCULAR; INTRAVENOUS; SUBCUTANEOUS at 22:05

## 2020-12-24 RX ADMIN — APIXABAN 2.5 MILLIGRAM(S): 2.5 TABLET, FILM COATED ORAL at 17:07

## 2020-12-24 RX ADMIN — Medication 1 DROP(S): at 17:07

## 2020-12-24 RX ADMIN — ZINC OXIDE 1 APPLICATION(S): 200 OINTMENT TOPICAL at 06:30

## 2020-12-24 RX ADMIN — INSULIN GLARGINE 10 UNIT(S): 100 INJECTION, SOLUTION SUBCUTANEOUS at 22:04

## 2020-12-24 RX ADMIN — Medication 25 MILLIGRAM(S): at 17:08

## 2020-12-24 RX ADMIN — SODIUM CHLORIDE 70 MILLILITER(S): 9 INJECTION INTRAMUSCULAR; INTRAVENOUS; SUBCUTANEOUS at 17:08

## 2020-12-24 RX ADMIN — Medication 5 MILLIGRAM(S): at 22:04

## 2020-12-24 NOTE — PROGRESS NOTE ADULT - PROBLEM SELECTOR PLAN 1
On physical exam, patient winces on palpation of the abdomen in the epigastric region, however she is unable to communicate details of the pain given cognitive impairment.   -Pt has been moving her bowels, no dark or bloody stools   -CT abdomen pelvis On physical exam, patient winces on palpation of the abdomen in the epigastric region, however she is unable to communicate details of the pain given cognitive impairment.   -Pt has been moving her bowels, no dark or bloody stools   -CT abdomen pelvis is ordered

## 2020-12-24 NOTE — PROGRESS NOTE ADULT - ATTENDING COMMENTS
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 On admission, patient was afebrile, COVID+ and hyponatremic.  Patient maintains her O2 saturation during admission and supportive care was maintained.  Pt was seen by PT who recommends VICENTA and awaiting on placement.  On 12/24, patient had temp of  TMax: 101.7F with leucocytosis and abdnormal  Urinalysis and hence Ceftriaxone was started , please f/up UCX and Blood CX, Abd CT , inflammatory markers.        Marli Strickland   HOspitalist . 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 On admission, patient was afebrile, COVID+ and hyponatremic.  Patient maintains her O2 saturation during admission and supportive care was maintained.  Pt was seen by PT who recommends VIC[facility]A and awaiting on placement.  On 12/24, patient had temp of  TMax: 101.7F with leucocytosis and abnormal  Urinalysis and hence Ceftriaxone was started , please f/up UCX and Blood CX, Abd CT , inflammatory markers.        Marli Strickland   HOspitalist .

## 2020-12-24 NOTE — PROGRESS NOTE ADULT - PROBLEM SELECTOR PROBLEM 8
Patient has an appointment to check her A1C on 9/9. Please advise if fasting is required. Adult Hypothyroidism

## 2020-12-24 NOTE — DIETITIAN INITIAL EVALUATION ADULT. - OTHER INFO
Unable to conduct a face-to-face interview at this time due to limited contact restrictions related to pt's medical condition and isolation precautions. Unable to conduct a face-to-face interview at this time due to limited contact restrictions related to pt's medical condition and isolation precautions. Unable to reach patient via phone x multiple attempts. Noted to be confused/disoriented. Subjective information obtained from chart review and phone call with daughter (Magalie Lamar [phone]).     Flowsheets suggest fair-good PO intake in-house (50-75% of documented meals). Noted pt with fecal incontinence, last BM yesterday (12/23) per flowsheets. Daughter confirmed food allergy to spices (states pepper and other spices have made her cough). Per daughter, pt also with lactose intolerance.    Daughter reports pt with weight gain during recent rehab stay, unsure of UBW/most recent weight, states possibly ~120 pounds. Per previous RD note (10/21), pt weighed 138.8 pounds. Dosing/most recent weight (12/23) 130 pounds suggests wt loss, however daughter endorsing weight gain. Will continue to monitor and trend weights.    Noted pt with DM. Daughter reports checking glucose fingersticks twice daily (once before breakfast and once before dinner), values typically peaking at ~200-220 mg/dl. Daughter aware of signs/symptoms of hypoglycemia and treatment. Most recent HbA1c (9.3%) suggests suboptimal glycemic control.    Discussed with daughter RD role and importance of adequate nutrition in setting of acute illness. Daughter declined additional oral nutrition supplements at this time, states pt typically does not tolerate in setting of lactose intolerance. Food preferences obtained, will honor as able to encourage intake. Daughter amenable to downgrade of diet to provide softer foods for patient. Daughter with no nutrition-related questions or concerns at this time. Made aware RD remains available.

## 2020-12-24 NOTE — PROGRESS NOTE ADULT - PROBLEM SELECTOR PLAN 2
Pt febrile to 101.7 12/24 am, with leukocytosis to 15k. Will get infectious workup in the setting of known COVID infxn.  - repeat U/A  -f/u CT abdomen  -repeat CXR  - D Dimer, Ferritin, LDH  -Will reculture Pt febrile to 101.7 12/24 am, with leukocytosis to 15k. Will get infectious workup in the setting of known COVID infxn.  -U/A positive for UTI , ceftriaxone started   -f/u CT abdomen  -repeat CXR  - D Dimer, Ferritin, LDH  -Will reculture

## 2020-12-24 NOTE — DIETITIAN INITIAL EVALUATION ADULT. - ORAL NUTRITION SUPPLEMENTS
RD to discontinue Mighty Shakes, will continue to provide high-protein apple juice to optimize intake

## 2020-12-24 NOTE — PROGRESS NOTE ADULT - PROBLEM SELECTOR PLAN 5
Patient with confusion, per daughter, has vascular dementia with baseline cognitive issues that daughter describes as fluctuating, but daughter has not noted acute change in cognition.   -Patient currently alert, but oriented only to person. Able to answer some questions, mentation overall improved   - component with hyponatremia although difficult to assess if symptomatic  -U/A clear, lungs clear on x-ray.

## 2020-12-24 NOTE — DIETITIAN INITIAL EVALUATION ADULT. - ORAL INTAKE PTA/DIET HISTORY
Daughter reports pt with good appetite and PO intake PTA. Daughter prepares meals and limits pt's intake of carbohydrates in setting of DM. Daughter reports pt mainly consumes soft/mashed foods due to missing bottom dentures. Does not taking oral nutrition supplements at home. Per daughter, pt taking vitamin D supplement PTA.

## 2020-12-24 NOTE — DIETITIAN INITIAL EVALUATION ADULT. - PROBLEM SELECTOR PLAN 1
Unclear etiology, hyponatremic to 127 on admission.   -Urine osm/Serum osm and Urine Na ordered to determine cause  -Patient appears clinically euvolemic  -Will give NS 75ml/hr

## 2020-12-24 NOTE — DIETITIAN INITIAL EVALUATION ADULT. - PERTINENT LABORATORY DATA
Na 133, glucose 141  HbA1c (12/21): 9.3%    CAPILLARY BLOOD GLUCOSE  POCT Blood Glucose.: 303 mg/dL (23 Dec 2020 21:09)  POCT Blood Glucose.: 234 mg/dL (23 Dec 2020 17:27)  POCT Blood Glucose.: 302 mg/dL (23 Dec 2020 12:00)

## 2020-12-24 NOTE — DIETITIAN INITIAL EVALUATION ADULT. - DIET TYPE
Recommend downgrading diet to Mechanical Soft as pt missing lower dentures, requiring softer foods per daughter. Monitor/adjust as needed./low sodium/mechanical soft/consistent carbohydrate (evening snack)

## 2020-12-24 NOTE — PROGRESS NOTE ADULT - PROBLEM SELECTOR PLAN 9
-SSI with FS before meals and at bedtime -SSI with FS before meals and at bedtime  -adjusted insulin: now getting 7units with meals and 10units lantus at bedtime

## 2020-12-24 NOTE — PROGRESS NOTE ADULT - SUBJECTIVE AND OBJECTIVE BOX
CONTACT INFO:    Ele Rosen MD PGY-1  Internal Medicine  [phone]      SUBJECTIVE:  Interval history: No acute events overnight. This morning, patient only responds "yes" to questioning. A&Ox1.     ROS: Unable to ascertain 2/2 patient's mental status.    OBJECTIVE:  MEDICATIONS  (STANDING):  amLODIPine   Tablet 5 milliGRAM(s) Oral daily  apixaban 2.5 milliGRAM(s) Oral every 12 hours  artificial  tears Solution 1 Drop(s) Both EYES two times a day  atorvastatin 40 milliGRAM(s) Oral at bedtime  dextrose 40% Gel 15 Gram(s) Oral once  dextrose 5%. 1000 milliLiter(s) (50 mL/Hr) IV Continuous <Continuous>  dextrose 5%. 1000 milliLiter(s) (100 mL/Hr) IV Continuous <Continuous>  dextrose 50% Injectable 25 Gram(s) IV Push once  dextrose 50% Injectable 12.5 Gram(s) IV Push once  dextrose 50% Injectable 25 Gram(s) IV Push once  glucagon  Injectable 1 milliGRAM(s) IntraMuscular once  insulin glargine Injectable (LANTUS) 7 Unit(s) SubCutaneous at bedtime  insulin lispro (ADMELOG) corrective regimen sliding scale   SubCutaneous three times a day before meals  insulin lispro (ADMELOG) corrective regimen sliding scale   SubCutaneous at bedtime  insulin lispro Injectable (ADMELOG) 3 Unit(s) SubCutaneous three times a day before meals  levothyroxine 112 MICROGram(s) Oral daily  melatonin 5 milliGRAM(s) Oral at bedtime  metoprolol tartrate 25 milliGRAM(s) Oral two times a day  pantoprazole    Tablet 40 milliGRAM(s) Oral before breakfast  polyethylene glycol 3350 17 Gram(s) Oral daily  senna 2 Tablet(s) Oral at bedtime  zinc oxide 20% Ointment 1 Application(s) Topical two times a day    MEDICATIONS  (PRN):  acetaminophen   Tablet .. 650 milliGRAM(s) Oral every 6 hours PRN Temp greater or equal to 38C (100.4F), Mild Pain (1 - 3)      I&O's Detail    23 Dec 2020 07:01  -  24 Dec 2020 07:00  --------------------------------------------------------  IN:  Total IN: 0 mL    OUT:    Voided (mL): 600 mL  Total OUT: 600 mL    Total NET: -600 mL    CAPILLARY BLOOD GLUCOSE      POCT Blood Glucose.: 175 mg/dL (24 Dec 2020 13:09)  POCT Blood Glucose.: 303 mg/dL (23 Dec 2020 21:09)  POCT Blood Glucose.: 234 mg/dL (23 Dec 2020 17:27)      Vital Signs Last 24 Hrs  T(C): 38.7 (24 Dec 2020 05:00), Max: 38.7 (24 Dec 2020 05:00)  T(F): 101.7 (24 Dec 2020 05:00), Max: 101.7 (24 Dec 2020 05:00)  HR: 98 (24 Dec 2020 05:00) (78 - 98)  BP: 138/77 (24 Dec 2020 05:00) (118/82 - 138/77)  BP(mean): --  RR: 18 (24 Dec 2020 05:00) (18 - 18)  SpO2: 94% (24 Dec 2020 05:00) (94% - 94%)    PHYSICAL EXAM:  Constitutional: NAD, well nourished, well developed  Eyes: no conjunctival injection, PERRLA, EOMI, no scleral icterus.  ENMT: no thyromegaly, no lymphadenopathy appreciated.  Respiratory: LCTAB, no wheezing, crackles or rales  Cardiovascular:RRR, no murmurs appreciated, no JVD   Gastrointestinal: Some mild discomfort on palpation of mid epigastric area  Genitourinary: no suprapubic tenderness.   Extremities: 1+ non-pitting edema in bilateral lower extremities   Neurological: moving all extremities equally against gravity, Alert, oriented only to self  Skin: no rashes, bruising or lesions   Psychiatric: normal affect despite mentation, does not appear anxious.        LABS:                        12.1   15.13 )-----------( 441      ( 24 Dec 2020 06:20 )             37.2     12-24    133<L>  |  100  |  23  ----------------------------<  141<H>  4.7   |  20<L>  |  0.99    Ca    9.0      24 Dec 2020 06:20  Phos  2.2     12-23  Mg     1.8     12-24          Radiology:  No new imaging to review    CONTACT INFO:    Ele Rosen MD PGY-1  Internal Medicine  [phone]      SUBJECTIVE:  Interval history: No acute events overnight. This morning, patient only responds "yes" to questioning. A&Ox1. POs fever earlier in AM    ROS: Unable to ascertain 2/2 patient's mental status.    OBJECTIVE:  MEDICATIONS  (STANDING):  amLODIPine   Tablet 5 milliGRAM(s) Oral daily  apixaban 2.5 milliGRAM(s) Oral every 12 hours  artificial  tears Solution 1 Drop(s) Both EYES two times a day  atorvastatin 40 milliGRAM(s) Oral at bedtime  dextrose 40% Gel 15 Gram(s) Oral once  dextrose 5%. 1000 milliLiter(s) (50 mL/Hr) IV Continuous <Continuous>  dextrose 5%. 1000 milliLiter(s) (100 mL/Hr) IV Continuous <Continuous>  dextrose 50% Injectable 25 Gram(s) IV Push once  dextrose 50% Injectable 12.5 Gram(s) IV Push once  dextrose 50% Injectable 25 Gram(s) IV Push once  glucagon  Injectable 1 milliGRAM(s) IntraMuscular once  insulin glargine Injectable (LANTUS) 7 Unit(s) SubCutaneous at bedtime  insulin lispro (ADMELOG) corrective regimen sliding scale   SubCutaneous three times a day before meals  insulin lispro (ADMELOG) corrective regimen sliding scale   SubCutaneous at bedtime  insulin lispro Injectable (ADMELOG) 3 Unit(s) SubCutaneous three times a day before meals  levothyroxine 112 MICROGram(s) Oral daily  melatonin 5 milliGRAM(s) Oral at bedtime  metoprolol tartrate 25 milliGRAM(s) Oral two times a day  pantoprazole    Tablet 40 milliGRAM(s) Oral before breakfast  polyethylene glycol 3350 17 Gram(s) Oral daily  senna 2 Tablet(s) Oral at bedtime  zinc oxide 20% Ointment 1 Application(s) Topical two times a day    MEDICATIONS  (PRN):  acetaminophen   Tablet .. 650 milliGRAM(s) Oral every 6 hours PRN Temp greater or equal to 38C (100.4F), Mild Pain (1 - 3)      I&O's Detail    23 Dec 2020 07:01  -  24 Dec 2020 07:00  --------------------------------------------------------  IN:  Total IN: 0 mL    OUT:    Voided (mL): 600 mL  Total OUT: 600 mL    Total NET: -600 mL    CAPILLARY BLOOD GLUCOSE      POCT Blood Glucose.: 175 mg/dL (24 Dec 2020 13:09)  POCT Blood Glucose.: 303 mg/dL (23 Dec 2020 21:09)  POCT Blood Glucose.: 234 mg/dL (23 Dec 2020 17:27)      Vital Signs Last 24 Hrs  T(C): 38.7 (24 Dec 2020 05:00), Max: 38.7 (24 Dec 2020 05:00)  T(F): 101.7 (24 Dec 2020 05:00), Max: 101.7 (24 Dec 2020 05:00)  HR: 98 (24 Dec 2020 05:00) (78 - 98)  BP: 138/77 (24 Dec 2020 05:00) (118/82 - 138/77)  BP(mean): --  RR: 18 (24 Dec 2020 05:00) (18 - 18)  SpO2: 94% (24 Dec 2020 05:00) (94% - 94%)    PHYSICAL EXAM:  Constitutional: NAD, well nourished, well developed  Eyes: no conjunctival injection, no scleral icterus.  ENMT: no thyromegaly, no lymphadenopathy appreciated.  Respiratory: LCTAB, no wheezing, crackles or rales  Cardiovascular:RRR, no murmurs appreciated, no JVD   Gastrointestinal: Some mild discomfort on palpation of mid epigastric area  Genitourinary: no suprapubic tenderness.   Extremities: 1+ non-pitting edema in bilateral lower extremities   Neurological: moving all extremities equally against gravity, Alert, oriented only to self  Skin: no rashes, bruising or lesions   Psychiatric: normal affect despite mentation, does not appear anxious.        LABS:                        12.1   15.13 )-----------( 441      ( 24 Dec 2020 06:20 )             37.2     12-24    133<L>  |  100  |  23  ----------------------------<  141<H>  4.7   |  20<L>  |  0.99    Ca    9.0      24 Dec 2020 06:20  Phos  2.2     12-23  Mg     1.8     12-24          Radiology:  No new imaging to review

## 2020-12-24 NOTE — DIETITIAN INITIAL EVALUATION ADULT. - REASON INDICATOR FOR ASSESSMENT
Pt seen for length of stay assessment on COVID unit. Source: EMR, ( ). Pt is an 88 yo female with PMH of JAK2+ PV (diagnosed 2016), recurrent RLE DVT (2008, 2010), HTN/HLD, DM II, and vascular dementia, sent in by outpatient hematologist for abnormal lab (hyponatremia). Admitted 12/20.    Unable to conduct a face-to-face interview at this time due to limited contact restrictions related to pt's medical condition and isolation precautions. Pt seen for length of stay assessment on COVID unit. Source: EMR, pt's daughter (Magalie Lamar) over phone ([phone]). Pt is an 86 yo female with PMH of JAK2+ PV (diagnosed 2016), recurrent RLE DVT (2008, 2010), HTN/HLD, DM II, and vascular dementia, sent in by outpatient hematologist for abnormal lab (hyponatremia). Admitted 12/20.    Unable to conduct a face-to-face interview at this time due to limited contact restrictions related to pt's medical condition and isolation precautions.

## 2020-12-24 NOTE — DIETITIAN INITIAL EVALUATION ADULT. - ADD RECOMMEND
Diet education provided. Patient's daughter made aware RD remains available. Monitor PO intake, weight, labs, skin, GI status, diet.

## 2020-12-25 LAB
ANION GAP SERPL CALC-SCNC: 11 MMOL/L — SIGNIFICANT CHANGE UP (ref 5–17)
BASOPHILS # BLD AUTO: 0.05 K/UL — SIGNIFICANT CHANGE UP (ref 0–0.2)
BASOPHILS NFR BLD AUTO: 0.4 % — SIGNIFICANT CHANGE UP (ref 0–2)
BUN SERPL-MCNC: 22 MG/DL — SIGNIFICANT CHANGE UP (ref 7–23)
CALCIUM SERPL-MCNC: 8.5 MG/DL — SIGNIFICANT CHANGE UP (ref 8.4–10.5)
CHLORIDE SERPL-SCNC: 103 MMOL/L — SIGNIFICANT CHANGE UP (ref 96–108)
CO2 SERPL-SCNC: 19 MMOL/L — LOW (ref 22–31)
CREAT SERPL-MCNC: 0.86 MG/DL — SIGNIFICANT CHANGE UP (ref 0.5–1.3)
CRP SERPL-MCNC: 5.02 MG/DL — HIGH (ref 0–0.4)
D DIMER BLD IA.RAPID-MCNC: 303 NG/ML DDU — HIGH
EOSINOPHIL # BLD AUTO: 0.19 K/UL — SIGNIFICANT CHANGE UP (ref 0–0.5)
EOSINOPHIL NFR BLD AUTO: 1.6 % — SIGNIFICANT CHANGE UP (ref 0–6)
ERYTHROCYTE [SEDIMENTATION RATE] IN BLOOD: 79 MM/HR — HIGH (ref 0–20)
FERRITIN SERPL-MCNC: 250 NG/ML — HIGH (ref 15–150)
GLUCOSE SERPL-MCNC: 80 MG/DL — SIGNIFICANT CHANGE UP (ref 70–99)
HCT VFR BLD CALC: 35.5 % — SIGNIFICANT CHANGE UP (ref 34.5–45)
HGB BLD-MCNC: 11.7 G/DL — SIGNIFICANT CHANGE UP (ref 11.5–15.5)
IMM GRANULOCYTES NFR BLD AUTO: 2.2 % — HIGH (ref 0–1.5)
LDH SERPL L TO P-CCNC: 270 U/L — HIGH (ref 50–242)
LYMPHOCYTES # BLD AUTO: 1.15 K/UL — SIGNIFICANT CHANGE UP (ref 1–3.3)
LYMPHOCYTES # BLD AUTO: 9.9 % — LOW (ref 13–44)
MAGNESIUM SERPL-MCNC: 1.7 MG/DL — SIGNIFICANT CHANGE UP (ref 1.6–2.6)
MCHC RBC-ENTMCNC: 30.1 PG — SIGNIFICANT CHANGE UP (ref 27–34)
MCHC RBC-ENTMCNC: 33 GM/DL — SIGNIFICANT CHANGE UP (ref 32–36)
MCV RBC AUTO: 91.3 FL — SIGNIFICANT CHANGE UP (ref 80–100)
MONOCYTES # BLD AUTO: 0.95 K/UL — HIGH (ref 0–0.9)
MONOCYTES NFR BLD AUTO: 8.2 % — SIGNIFICANT CHANGE UP (ref 2–14)
NEUTROPHILS # BLD AUTO: 9.02 K/UL — HIGH (ref 1.8–7.4)
NEUTROPHILS NFR BLD AUTO: 77.7 % — HIGH (ref 43–77)
NRBC # BLD: 0 /100 WBCS — SIGNIFICANT CHANGE UP (ref 0–0)
PLATELET # BLD AUTO: 461 K/UL — HIGH (ref 150–400)
POTASSIUM SERPL-MCNC: 4.3 MMOL/L — SIGNIFICANT CHANGE UP (ref 3.5–5.3)
POTASSIUM SERPL-SCNC: 4.3 MMOL/L — SIGNIFICANT CHANGE UP (ref 3.5–5.3)
RBC # BLD: 3.89 M/UL — SIGNIFICANT CHANGE UP (ref 3.8–5.2)
RBC # FLD: 14.2 % — SIGNIFICANT CHANGE UP (ref 10.3–14.5)
SODIUM SERPL-SCNC: 133 MMOL/L — LOW (ref 135–145)
WBC # BLD: 11.62 K/UL — HIGH (ref 3.8–10.5)
WBC # FLD AUTO: 11.62 K/UL — HIGH (ref 3.8–10.5)

## 2020-12-25 PROCEDURE — 99233 SBSQ HOSP IP/OBS HIGH 50: CPT

## 2020-12-25 PROCEDURE — 74177 CT ABD & PELVIS W/CONTRAST: CPT | Mod: 26

## 2020-12-25 RX ORDER — SODIUM CHLORIDE 9 MG/ML
1000 INJECTION INTRAMUSCULAR; INTRAVENOUS; SUBCUTANEOUS
Refills: 0 | Status: DISCONTINUED | OUTPATIENT
Start: 2020-12-25 | End: 2020-12-28

## 2020-12-25 RX ORDER — SODIUM CHLORIDE 9 MG/ML
1000 INJECTION INTRAMUSCULAR; INTRAVENOUS; SUBCUTANEOUS
Refills: 0 | Status: COMPLETED | OUTPATIENT
Start: 2020-12-25 | End: 2020-12-25

## 2020-12-25 RX ADMIN — Medication 1 DROP(S): at 17:24

## 2020-12-25 RX ADMIN — SODIUM CHLORIDE 70 MILLILITER(S): 9 INJECTION INTRAMUSCULAR; INTRAVENOUS; SUBCUTANEOUS at 17:29

## 2020-12-25 RX ADMIN — CEFTRIAXONE 100 MILLIGRAM(S): 500 INJECTION, POWDER, FOR SOLUTION INTRAMUSCULAR; INTRAVENOUS at 17:23

## 2020-12-25 RX ADMIN — APIXABAN 2.5 MILLIGRAM(S): 2.5 TABLET, FILM COATED ORAL at 17:23

## 2020-12-25 RX ADMIN — PANTOPRAZOLE SODIUM 40 MILLIGRAM(S): 20 TABLET, DELAYED RELEASE ORAL at 06:08

## 2020-12-25 RX ADMIN — SODIUM CHLORIDE 70 MILLILITER(S): 9 INJECTION INTRAMUSCULAR; INTRAVENOUS; SUBCUTANEOUS at 22:13

## 2020-12-25 RX ADMIN — SENNA PLUS 2 TABLET(S): 8.6 TABLET ORAL at 22:11

## 2020-12-25 RX ADMIN — Medication 5 MILLIGRAM(S): at 22:12

## 2020-12-25 RX ADMIN — APIXABAN 2.5 MILLIGRAM(S): 2.5 TABLET, FILM COATED ORAL at 06:08

## 2020-12-25 RX ADMIN — Medication 25 MILLIGRAM(S): at 06:51

## 2020-12-25 RX ADMIN — Medication 650 MILLIGRAM(S): at 22:14

## 2020-12-25 RX ADMIN — Medication 650 MILLIGRAM(S): at 22:44

## 2020-12-25 RX ADMIN — Medication 7 UNIT(S): at 17:22

## 2020-12-25 RX ADMIN — AMLODIPINE BESYLATE 5 MILLIGRAM(S): 2.5 TABLET ORAL at 06:08

## 2020-12-25 RX ADMIN — Medication 4: at 17:22

## 2020-12-25 RX ADMIN — Medication 25 MILLIGRAM(S): at 17:23

## 2020-12-25 RX ADMIN — SODIUM CHLORIDE 70 MILLILITER(S): 9 INJECTION INTRAMUSCULAR; INTRAVENOUS; SUBCUTANEOUS at 13:53

## 2020-12-25 RX ADMIN — ZINC OXIDE 1 APPLICATION(S): 200 OINTMENT TOPICAL at 17:25

## 2020-12-25 RX ADMIN — Medication 1 DROP(S): at 06:08

## 2020-12-25 RX ADMIN — INSULIN GLARGINE 10 UNIT(S): 100 INJECTION, SOLUTION SUBCUTANEOUS at 22:12

## 2020-12-25 RX ADMIN — Medication 7 UNIT(S): at 08:59

## 2020-12-25 RX ADMIN — ATORVASTATIN CALCIUM 40 MILLIGRAM(S): 80 TABLET, FILM COATED ORAL at 22:11

## 2020-12-25 RX ADMIN — POLYETHYLENE GLYCOL 3350 17 GRAM(S): 17 POWDER, FOR SOLUTION ORAL at 11:23

## 2020-12-25 RX ADMIN — ZINC OXIDE 1 APPLICATION(S): 200 OINTMENT TOPICAL at 06:10

## 2020-12-25 RX ADMIN — Medication 112 MICROGRAM(S): at 06:08

## 2020-12-25 NOTE — PROGRESS NOTE ADULT - PROBLEM SELECTOR PLAN 1
On physical exam, patient winces on palpation of the abdomen in the epigastric region, however she is unable to communicate details of the pain given cognitive impairment.   -Pt has been moving her bowels, no dark or bloody stools   -CT abdomen pelvis is ordered

## 2020-12-25 NOTE — PROGRESS NOTE ADULT - PROBLEM SELECTOR PLAN 9
-SSI with FS before meals and at bedtime  -adjusted insulin: now getting 7units with meals and 10units lantus at bedtime

## 2020-12-25 NOTE — PROGRESS NOTE ADULT - SUBJECTIVE AND OBJECTIVE BOX
CONTACT INFO:    Ele Rosen MD PGY-1  Internal Medicine  [phone]      SUBJECTIVE:  Interval history: No acute events overnight.     ROS: Unable to ascertain 2/2 patient's mental status.    OBJECTIVE:  MEDICATIONS  (STANDING):  amLODIPine   Tablet 5 milliGRAM(s) Oral daily  apixaban 2.5 milliGRAM(s) Oral every 12 hours  artificial  tears Solution 1 Drop(s) Both EYES two times a day  atorvastatin 40 milliGRAM(s) Oral at bedtime  cefTRIAXone   IVPB 1000 milliGRAM(s) IV Intermittent every 24 hours  dextrose 40% Gel 15 Gram(s) Oral once  dextrose 5%. 1000 milliLiter(s) (50 mL/Hr) IV Continuous <Continuous>  dextrose 5%. 1000 milliLiter(s) (100 mL/Hr) IV Continuous <Continuous>  dextrose 50% Injectable 25 Gram(s) IV Push once  dextrose 50% Injectable 12.5 Gram(s) IV Push once  dextrose 50% Injectable 25 Gram(s) IV Push once  glucagon  Injectable 1 milliGRAM(s) IntraMuscular once  insulin glargine Injectable (LANTUS) 10 Unit(s) SubCutaneous at bedtime  insulin lispro (ADMELOG) corrective regimen sliding scale   SubCutaneous three times a day before meals  insulin lispro (ADMELOG) corrective regimen sliding scale   SubCutaneous at bedtime  insulin lispro Injectable (ADMELOG) 7 Unit(s) SubCutaneous three times a day before meals  levothyroxine 112 MICROGram(s) Oral daily  melatonin 5 milliGRAM(s) Oral at bedtime  metoprolol tartrate 25 milliGRAM(s) Oral two times a day  pantoprazole    Tablet 40 milliGRAM(s) Oral before breakfast  polyethylene glycol 3350 17 Gram(s) Oral daily  senna 2 Tablet(s) Oral at bedtime  sodium chloride 0.9%. 1000 milliLiter(s) (70 mL/Hr) IV Continuous <Continuous>  sodium chloride 0.9%. 1000 milliLiter(s) (70 mL/Hr) IV Continuous <Continuous>  zinc oxide 20% Ointment 1 Application(s) Topical two times a day    MEDICATIONS  (PRN):  acetaminophen   Tablet .. 650 milliGRAM(s) Oral every 6 hours PRN Temp greater or equal to 38C (100.4F), Mild Pain (1 - 3)      I&O's Detail    24 Dec 2020 07:01  -  25 Dec 2020 07:00  --------------------------------------------------------  IN:  Total IN: 0 mL    OUT:    Voided (mL): 650 mL  Total OUT: 650 mL    Total NET: -650 mL        CAPILLARY BLOOD GLUCOSE  POCT Blood Glucose.: 156 mg/dL (24 Dec 2020 21:11)  POCT Blood Glucose.: 175 mg/dL (24 Dec 2020 13:09)  POCT Blood Glucose.: 126 mg/dL (24 Dec 2020 08:58)      Vital Signs Last 24 Hrs  T(C): 36.9 (25 Dec 2020 05:45), Max: 37.7 (24 Dec 2020 21:13)  T(F): 98.4 (25 Dec 2020 05:45), Max: 99.8 (24 Dec 2020 21:13)  HR: 97 (25 Dec 2020 05:45) (82 - 109)  BP: 121/84 (25 Dec 2020 05:45) (100/63 - 138/87)  BP(mean): --  RR: 18 (25 Dec 2020 05:45) (18 - 20)  SpO2: 100% (25 Dec 2020 05:45) (95% - 100%)    PHYSICAL EXAM:  Constitutional: NAD, well nourished, well developed  Eyes: no conjunctival injection, no scleral icterus.  ENMT: no thyromegaly, no lymphadenopathy appreciated.  Respiratory: LCTAB, no wheezing, crackles or rales  Cardiovascular:RRR, no murmurs appreciated, no JVD   Gastrointestinal: Some mild discomfort on palpation of mid epigastric area  Genitourinary: no suprapubic tenderness.   Extremities: 1+ non-pitting edema in bilateral lower extremities   Neurological: moving all extremities equally against gravity, Alert, oriented only to self  Skin: no rashes, bruising or lesions   Psychiatric: normal affect despite mentation, does not appear anxious.        LABS:                          12.1   15.13 )-----------( 441      ( 24 Dec 2020 06:20 )             37.2     12-24    133<L>  |  100  |  23  ----------------------------<  141<H>  4.7   |  20<L>  |  0.99    Ca    9.0      24 Dec 2020 06:20  Mg     1.8     12-24                  Radiology:  No new imaging to review-- CT abdomen pelvis not yet performed.    CONTACT INFO:    Ele Rosen MD PGY-1  Internal Medicine  [phone]      SUBJECTIVE:  Interval history: No acute events overnight. This morning, patient eating breakfast. Denies abdominal pain or other complaints. Still A&Ox 1 and unable to answer most questioning, but appears to be content.     ROS: Unable to ascertain 2/2 patient's mental status.    OBJECTIVE:  MEDICATIONS  (STANDING):  amLODIPine   Tablet 5 milliGRAM(s) Oral daily  apixaban 2.5 milliGRAM(s) Oral every 12 hours  artificial  tears Solution 1 Drop(s) Both EYES two times a day  atorvastatin 40 milliGRAM(s) Oral at bedtime  cefTRIAXone   IVPB 1000 milliGRAM(s) IV Intermittent every 24 hours  dextrose 40% Gel 15 Gram(s) Oral once  dextrose 5%. 1000 milliLiter(s) (50 mL/Hr) IV Continuous <Continuous>  dextrose 5%. 1000 milliLiter(s) (100 mL/Hr) IV Continuous <Continuous>  dextrose 50% Injectable 25 Gram(s) IV Push once  dextrose 50% Injectable 12.5 Gram(s) IV Push once  dextrose 50% Injectable 25 Gram(s) IV Push once  glucagon  Injectable 1 milliGRAM(s) IntraMuscular once  insulin glargine Injectable (LANTUS) 10 Unit(s) SubCutaneous at bedtime  insulin lispro (ADMELOG) corrective regimen sliding scale   SubCutaneous three times a day before meals  insulin lispro (ADMELOG) corrective regimen sliding scale   SubCutaneous at bedtime  insulin lispro Injectable (ADMELOG) 7 Unit(s) SubCutaneous three times a day before meals  levothyroxine 112 MICROGram(s) Oral daily  melatonin 5 milliGRAM(s) Oral at bedtime  metoprolol tartrate 25 milliGRAM(s) Oral two times a day  pantoprazole    Tablet 40 milliGRAM(s) Oral before breakfast  polyethylene glycol 3350 17 Gram(s) Oral daily  senna 2 Tablet(s) Oral at bedtime  sodium chloride 0.9%. 1000 milliLiter(s) (70 mL/Hr) IV Continuous <Continuous>  sodium chloride 0.9%. 1000 milliLiter(s) (70 mL/Hr) IV Continuous <Continuous>  zinc oxide 20% Ointment 1 Application(s) Topical two times a day    MEDICATIONS  (PRN):  acetaminophen   Tablet .. 650 milliGRAM(s) Oral every 6 hours PRN Temp greater or equal to 38C (100.4F), Mild Pain (1 - 3)      I&O's Detail    24 Dec 2020 07:01  -  25 Dec 2020 07:00  --------------------------------------------------------  IN:  Total IN: 0 mL    OUT:    Voided (mL): 650 mL  Total OUT: 650 mL    Total NET: -650 mL        CAPILLARY BLOOD GLUCOSE  POCT Blood Glucose.: 156 mg/dL (24 Dec 2020 21:11)  POCT Blood Glucose.: 175 mg/dL (24 Dec 2020 13:09)  POCT Blood Glucose.: 126 mg/dL (24 Dec 2020 08:58)      Vital Signs Last 24 Hrs  T(C): 36.9 (25 Dec 2020 05:45), Max: 37.7 (24 Dec 2020 21:13)  T(F): 98.4 (25 Dec 2020 05:45), Max: 99.8 (24 Dec 2020 21:13)  HR: 97 (25 Dec 2020 05:45) (82 - 109)  BP: 121/84 (25 Dec 2020 05:45) (100/63 - 138/87)  BP(mean): --  RR: 18 (25 Dec 2020 05:45) (18 - 20)  SpO2: 100% (25 Dec 2020 05:45) (95% - 100%)    PHYSICAL EXAM:  Constitutional: NAD, well nourished, well developed  Eyes: no conjunctival injection, no scleral icterus.  ENMT: no thyromegaly, no lymphadenopathy appreciated.  Respiratory: LCTAB, no wheezing, crackles or rales  Cardiovascular:RRR, no murmurs appreciated, no JVD   Gastrointestinal: Some mild discomfort on palpation of mid epigastric area  Genitourinary: no suprapubic tenderness.   Extremities: 1+ non-pitting edema in bilateral lower extremities   Neurological: moving all extremities equally against gravity, Alert, oriented only to self  Skin: no rashes, bruising or lesions   Psychiatric: normal affect despite mentation, does not appear anxious.        LABS:                        12.1   15.13 )-----------( 441      ( 24 Dec 2020 06:20 )             37.2     12-24    133<L>  |  100  |  23  ----------------------------<  141<H>  4.7   |  20<L>  |  0.99    Ca    9.0      24 Dec 2020 06:20  Mg     1.8     12-24        Radiology:  No new imaging to review-- CT abdomen pelvis not yet performed.    CONTACT INFO:    Ele Rosen MD PGY-1  Internal Medicine  [phone]      SUBJECTIVE:  Interval history: No acute events overnight. This morning, patient eating breakfast. Denies abdominal pain or other complaints. Still A&Ox 1 and unable to answer most questioning, but appears to be content.     ROS: Unable to ascertain 2/2 patient's mental status.    OBJECTIVE:  MEDICATIONS  (STANDING):  amLODIPine   Tablet 5 milliGRAM(s) Oral daily  apixaban 2.5 milliGRAM(s) Oral every 12 hours  artificial  tears Solution 1 Drop(s) Both EYES two times a day  atorvastatin 40 milliGRAM(s) Oral at bedtime  cefTRIAXone   IVPB 1000 milliGRAM(s) IV Intermittent every 24 hours  dextrose 40% Gel 15 Gram(s) Oral once  dextrose 5%. 1000 milliLiter(s) (50 mL/Hr) IV Continuous <Continuous>  dextrose 5%. 1000 milliLiter(s) (100 mL/Hr) IV Continuous <Continuous>  dextrose 50% Injectable 25 Gram(s) IV Push once  dextrose 50% Injectable 12.5 Gram(s) IV Push once  dextrose 50% Injectable 25 Gram(s) IV Push once  glucagon  Injectable 1 milliGRAM(s) IntraMuscular once  insulin glargine Injectable (LANTUS) 10 Unit(s) SubCutaneous at bedtime  insulin lispro (ADMELOG) corrective regimen sliding scale   SubCutaneous three times a day before meals  insulin lispro (ADMELOG) corrective regimen sliding scale   SubCutaneous at bedtime  insulin lispro Injectable (ADMELOG) 7 Unit(s) SubCutaneous three times a day before meals  levothyroxine 112 MICROGram(s) Oral daily  melatonin 5 milliGRAM(s) Oral at bedtime  metoprolol tartrate 25 milliGRAM(s) Oral two times a day  pantoprazole    Tablet 40 milliGRAM(s) Oral before breakfast  polyethylene glycol 3350 17 Gram(s) Oral daily  senna 2 Tablet(s) Oral at bedtime  sodium chloride 0.9%. 1000 milliLiter(s) (70 mL/Hr) IV Continuous <Continuous>  sodium chloride 0.9%. 1000 milliLiter(s) (70 mL/Hr) IV Continuous <Continuous>  zinc oxide 20% Ointment 1 Application(s) Topical two times a day    MEDICATIONS  (PRN):  acetaminophen   Tablet .. 650 milliGRAM(s) Oral every 6 hours PRN Temp greater or equal to 38C (100.4F), Mild Pain (1 - 3)      I&O's Detail    24 Dec 2020 07:01  -  25 Dec 2020 07:00  --------------------------------------------------------  IN:  Total IN: 0 mL    OUT:    Voided (mL): 650 mL  Total OUT: 650 mL    Total NET: -650 mL    CAPILLARY BLOOD GLUCOSE  POCT Blood Glucose.: 156 mg/dL (24 Dec 2020 21:11)  POCT Blood Glucose.: 175 mg/dL (24 Dec 2020 13:09)  POCT Blood Glucose.: 126 mg/dL (24 Dec 2020 08:58)      Vital Signs Last 24 Hrs  T(C): 36.9 (25 Dec 2020 05:45), Max: 37.7 (24 Dec 2020 21:13)  T(F): 98.4 (25 Dec 2020 05:45), Max: 99.8 (24 Dec 2020 21:13)  HR: 97 (25 Dec 2020 05:45) (82 - 109)  BP: 121/84 (25 Dec 2020 05:45) (100/63 - 138/87)  BP(mean): --  RR: 18 (25 Dec 2020 05:45) (18 - 20)  SpO2: 100% (25 Dec 2020 05:45) (95% - 100%)    PHYSICAL EXAM:  Constitutional: NAD, well nourished, well developed  Eyes: no conjunctival injection, no scleral icterus.  ENMT: no thyromegaly, no lymphadenopathy appreciated.  Respiratory: LCTAB, no wheezing, crackles or rales  Cardiovascular:RRR, no murmurs appreciated, no JVD   Gastrointestinal: Some mild discomfort on palpation of mid epigastric area  Genitourinary: no suprapubic tenderness.   Extremities: 1+ non-pitting edema in bilateral lower extremities   Neurological: moving all extremities equally against gravity, Alert, oriented only to self  Skin: no rashes, bruising or lesions   Psychiatric: normal affect despite mentation, does not appear anxious.        LABS:                        12.1   15.13 )-----------( 441      ( 24 Dec 2020 06:20 )             37.2     12-24    133<L>  |  100  |  23  ----------------------------<  141<H>  4.7   |  20<L>  |  0.99    Ca    9.0      24 Dec 2020 06:20  Mg     1.8     12-24        Radiology:  No new imaging to review-- CT abdomen pelvis not yet performed.

## 2020-12-25 NOTE — PROGRESS NOTE ADULT - ATTENDING COMMENTS
Patient seen and examined at bedside.  Agree with resident evaluation and plan.   Abdomen is soft and nontender, currently denying abd pain.  F/U CT A/P.

## 2020-12-25 NOTE — PROGRESS NOTE ADULT - PROBLEM SELECTOR PLAN 6
-per hematology consult, hydroxyurea on hold, no indication for treatment for now  -goal Hct <42% per CYTO-PV trial  -will be trending CBC -per hematology consult, hydroxyurea on hold, no indication for treatment for now  -goal Hct <42% per CYTO-PV trial

## 2020-12-25 NOTE — PROGRESS NOTE ADULT - PROBLEM SELECTOR PLAN 2
Pt febrile to 101.7 12/24 am, with leukocytosis to 15k. Will get infectious workup in the setting of known COVID infxn.  -U/A positive for UTI , ceftriaxone started   -f/u CT abdomen  -repeat CXR  - D Dimer, Ferritin, LDH  -Will reculture Pt febrile to 101.7 12/24 am, with leukocytosis to 15k. Will get infectious workup in the setting of known COVID infxn.  -U/A positive for UTI , ceftriaxone started   -f/u CT abdomen  -repeat CXR  - D Dimer, Ferritin, LDH Pt febrile to 101.7 12/24 am, with leukocytosis to 15k. Will get infectious workup in the setting of known COVID infxn.  -U/A positive for UTI , ceftriaxone started 12/24  -f/u CT abdomen  -repeat CXR  - D Dimer, Ferritin, LDH

## 2020-12-26 LAB
ANION GAP SERPL CALC-SCNC: 12 MMOL/L — SIGNIFICANT CHANGE UP (ref 5–17)
BUN SERPL-MCNC: 23 MG/DL — SIGNIFICANT CHANGE UP (ref 7–23)
CALCIUM SERPL-MCNC: 8.6 MG/DL — SIGNIFICANT CHANGE UP (ref 8.4–10.5)
CHLORIDE SERPL-SCNC: 106 MMOL/L — SIGNIFICANT CHANGE UP (ref 96–108)
CO2 SERPL-SCNC: 20 MMOL/L — LOW (ref 22–31)
CREAT SERPL-MCNC: 1 MG/DL — SIGNIFICANT CHANGE UP (ref 0.5–1.3)
GLUCOSE SERPL-MCNC: 115 MG/DL — HIGH (ref 70–99)
HCT VFR BLD CALC: 35 % — SIGNIFICANT CHANGE UP (ref 34.5–45)
HGB BLD-MCNC: 11.7 G/DL — SIGNIFICANT CHANGE UP (ref 11.5–15.5)
LDH SERPL L TO P-CCNC: 249 U/L — HIGH (ref 50–242)
MAGNESIUM SERPL-MCNC: 1.9 MG/DL — SIGNIFICANT CHANGE UP (ref 1.6–2.6)
MCHC RBC-ENTMCNC: 31 PG — SIGNIFICANT CHANGE UP (ref 27–34)
MCHC RBC-ENTMCNC: 33.4 GM/DL — SIGNIFICANT CHANGE UP (ref 32–36)
MCV RBC AUTO: 92.6 FL — SIGNIFICANT CHANGE UP (ref 80–100)
NRBC # BLD: 0 /100 WBCS — SIGNIFICANT CHANGE UP (ref 0–0)
PHOSPHATE SERPL-MCNC: 3.1 MG/DL — SIGNIFICANT CHANGE UP (ref 2.5–4.5)
PLATELET # BLD AUTO: 450 K/UL — HIGH (ref 150–400)
POTASSIUM SERPL-MCNC: 4.7 MMOL/L — SIGNIFICANT CHANGE UP (ref 3.5–5.3)
POTASSIUM SERPL-SCNC: 4.7 MMOL/L — SIGNIFICANT CHANGE UP (ref 3.5–5.3)
RBC # BLD: 3.78 M/UL — LOW (ref 3.8–5.2)
RBC # FLD: 14.2 % — SIGNIFICANT CHANGE UP (ref 10.3–14.5)
SODIUM SERPL-SCNC: 138 MMOL/L — SIGNIFICANT CHANGE UP (ref 135–145)
WBC # BLD: 8.79 K/UL — SIGNIFICANT CHANGE UP (ref 3.8–10.5)
WBC # FLD AUTO: 8.79 K/UL — SIGNIFICANT CHANGE UP (ref 3.8–10.5)

## 2020-12-26 PROCEDURE — 99233 SBSQ HOSP IP/OBS HIGH 50: CPT

## 2020-12-26 RX ADMIN — Medication 1 DROP(S): at 17:37

## 2020-12-26 RX ADMIN — Medication 112 MICROGRAM(S): at 06:05

## 2020-12-26 RX ADMIN — Medication 7 UNIT(S): at 08:51

## 2020-12-26 RX ADMIN — APIXABAN 2.5 MILLIGRAM(S): 2.5 TABLET, FILM COATED ORAL at 17:30

## 2020-12-26 RX ADMIN — Medication 5 MILLIGRAM(S): at 22:05

## 2020-12-26 RX ADMIN — ZINC OXIDE 1 APPLICATION(S): 200 OINTMENT TOPICAL at 17:37

## 2020-12-26 RX ADMIN — Medication 1 DROP(S): at 06:07

## 2020-12-26 RX ADMIN — CEFTRIAXONE 100 MILLIGRAM(S): 500 INJECTION, POWDER, FOR SOLUTION INTRAMUSCULAR; INTRAVENOUS at 17:29

## 2020-12-26 RX ADMIN — Medication 7 UNIT(S): at 17:30

## 2020-12-26 RX ADMIN — Medication 25 MILLIGRAM(S): at 06:05

## 2020-12-26 RX ADMIN — POLYETHYLENE GLYCOL 3350 17 GRAM(S): 17 POWDER, FOR SOLUTION ORAL at 11:25

## 2020-12-26 RX ADMIN — SENNA PLUS 2 TABLET(S): 8.6 TABLET ORAL at 22:05

## 2020-12-26 RX ADMIN — AMLODIPINE BESYLATE 5 MILLIGRAM(S): 2.5 TABLET ORAL at 06:34

## 2020-12-26 RX ADMIN — ATORVASTATIN CALCIUM 40 MILLIGRAM(S): 80 TABLET, FILM COATED ORAL at 22:05

## 2020-12-26 RX ADMIN — ZINC OXIDE 1 APPLICATION(S): 200 OINTMENT TOPICAL at 06:07

## 2020-12-26 RX ADMIN — APIXABAN 2.5 MILLIGRAM(S): 2.5 TABLET, FILM COATED ORAL at 06:05

## 2020-12-26 RX ADMIN — Medication 2: at 17:30

## 2020-12-26 RX ADMIN — INSULIN GLARGINE 10 UNIT(S): 100 INJECTION, SOLUTION SUBCUTANEOUS at 22:04

## 2020-12-26 RX ADMIN — Medication 25 MILLIGRAM(S): at 17:30

## 2020-12-26 RX ADMIN — PANTOPRAZOLE SODIUM 40 MILLIGRAM(S): 20 TABLET, DELAYED RELEASE ORAL at 06:05

## 2020-12-26 NOTE — PROGRESS NOTE ADULT - PROBLEM SELECTOR PLAN 2
Pt febrile to 101.7 12/24 am, with leukocytosis to 15k. Will get infectious workup in the setting of known COVID infxn.  -U/A positive for UTI , ceftriaxone started 12/24  -f/u CT abdomen  -repeat CXR  - D Dimer, Ferritin, LDH Pt febrile to 101.7 12/24 am, with leukocytosis to 15k. Will get infectious workup in the setting of known COVID infxn.  -U/A positive for UTI , ceftriaxone started 12/24  -CT abdomen as above  -repeat CXR  - D Dimer, Ferritin, LDH

## 2020-12-26 NOTE — PROGRESS NOTE ADULT - PROBLEM SELECTOR PLAN 1
On physical exam, patient winces on palpation of the abdomen in the epigastric region, however she is unable to communicate details of the pain given cognitive impairment.   -Pt has been moving her bowels, no dark or bloody stools   -CT abdomen pelvis is ordered On physical exam, patient winces on palpation of the abdomen in the epigastric region, however she is unable to communicate details of the pain given cognitive impairment.   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

## 2020-12-26 NOTE — PROGRESS NOTE ADULT - ATTENDING COMMENTS
Patient seen and examined at bedside.   Agree with resident evaluation and plan.  CT A/P consistent with cystitis.  UC with GNR.   C/W ceftriaxone as we await speciation and sensitivities.  Stable from respiratory standpoint.

## 2020-12-26 NOTE — PROGRESS NOTE ADULT - SUBJECTIVE AND OBJECTIVE BOX
PROGRESS NOTE:     CONTACT INFO:   Thompson (Xiao) Anne   NS: [phone]/[facility]: [id number]  PGY-2    Patient is a 87y old  Female who presents with a chief complaint of Hyponatremia (25 Dec 2020 07:29)      SUBJECTIVE / OVERNIGHT EVENTS:    ADDITIONAL REVIEW OF SYSTEMS:    MEDICATIONS  (STANDING):  amLODIPine   Tablet 5 milliGRAM(s) Oral daily  apixaban 2.5 milliGRAM(s) Oral every 12 hours  artificial  tears Solution 1 Drop(s) Both EYES two times a day  atorvastatin 40 milliGRAM(s) Oral at bedtime  cefTRIAXone   IVPB 1000 milliGRAM(s) IV Intermittent every 24 hours  dextrose 40% Gel 15 Gram(s) Oral once  dextrose 5%. 1000 milliLiter(s) (50 mL/Hr) IV Continuous <Continuous>  dextrose 5%. 1000 milliLiter(s) (100 mL/Hr) IV Continuous <Continuous>  dextrose 50% Injectable 25 Gram(s) IV Push once  dextrose 50% Injectable 12.5 Gram(s) IV Push once  dextrose 50% Injectable 25 Gram(s) IV Push once  glucagon  Injectable 1 milliGRAM(s) IntraMuscular once  insulin glargine Injectable (LANTUS) 10 Unit(s) SubCutaneous at bedtime  insulin lispro (ADMELOG) corrective regimen sliding scale   SubCutaneous three times a day before meals  insulin lispro (ADMELOG) corrective regimen sliding scale   SubCutaneous at bedtime  insulin lispro Injectable (ADMELOG) 7 Unit(s) SubCutaneous three times a day before meals  levothyroxine 112 MICROGram(s) Oral daily  melatonin 5 milliGRAM(s) Oral at bedtime  metoprolol tartrate 25 milliGRAM(s) Oral two times a day  pantoprazole    Tablet 40 milliGRAM(s) Oral before breakfast  polyethylene glycol 3350 17 Gram(s) Oral daily  senna 2 Tablet(s) Oral at bedtime  sodium chloride 0.9%. 1000 milliLiter(s) (70 mL/Hr) IV Continuous <Continuous>  zinc oxide 20% Ointment 1 Application(s) Topical two times a day    MEDICATIONS  (PRN):  acetaminophen   Tablet .. 650 milliGRAM(s) Oral every 6 hours PRN Temp greater or equal to 38C (100.4F), Mild Pain (1 - 3)      CAPILLARY BLOOD GLUCOSE      POCT Blood Glucose.: 131 mg/dL (26 Dec 2020 08:22)  POCT Blood Glucose.: 209 mg/dL (25 Dec 2020 21:24)  POCT Blood Glucose.: 221 mg/dL (25 Dec 2020 17:09)  POCT Blood Glucose.: 70 mg/dL (25 Dec 2020 13:04)    I&O's Summary    25 Dec 2020 07:01  -  26 Dec 2020 07:00  --------------------------------------------------------  IN: 410 mL / OUT: 1100 mL / NET: -690 mL        PHYSICAL EXAM:  Vital Signs Last 24 Hrs  T(C): 36.8 (26 Dec 2020 05:25), Max: 37.6 (25 Dec 2020 20:52)  T(F): 98.2 (26 Dec 2020 05:25), Max: 99.6 (25 Dec 2020 20:52)  HR: 92 (26 Dec 2020 05:25) (90 - 111)  BP: 152/62 (26 Dec 2020 05:25) (121/60 - 152/62)  BP(mean): --  RR: 20 (26 Dec 2020 05:25) (18 - 20)  SpO2: 94% (26 Dec 2020 05:25) (94% - 97%)    CONSTITUTIONAL: NAD, well-developed  RESPIRATORY: Normal respiratory effort; lungs are clear to auscultation bilaterally  CARDIOVASCULAR: Regular rate and rhythm, normal S1 and S2, no murmur/rub/gallop; No lower extremity edema; Peripheral pulses are 2+ bilaterally  ABDOMEN: Nontender to palpation, normoactive bowel sounds, no rebound/guarding; No hepatosplenomegaly  MUSCLOSKELETAL: no clubbing or cyanosis of digits; no joint swelling or tenderness to palpation  PSYCH: A+O to person, place, and time; affect appropriate    LABS:                        11.7   8.79  )-----------( 450      ( 26 Dec 2020 07:07 )             35.0     12-    138  |  106  |  23  ----------------------------<  115<H>  4.7   |  20<L>  |  1.00    Ca    8.6      26 Dec 2020 07:05  Phos  3.1     12-26  Mg     1.9     12-26            Urinalysis Basic - ( 24 Dec 2020 11:20 )    Color: Light Orange / Appearance: Turbid / S.021 / pH: x  Gluc: x / Ketone: Negative  / Bili: Negative / Urobili: Negative   Blood: x / Protein: 300 mg/dL / Nitrite: Negative   Leuk Esterase: Large / RBC: 23 /hpf / WBC 26-50   Sq Epi: x / Non Sq Epi: Few / Bacteria: Many        Culture - Urine (collected 24 Dec 2020 21:33)  Source: .Urine Clean Catch (Midstream)  Preliminary Report (26 Dec 2020 04:20):    >100,000 CFU/ml Gram Negative Rods    Culture - Blood (collected 24 Dec 2020 16:29)  Source: .Blood Blood-Venous  Preliminary Report (25 Dec 2020 17:01):    No growth to date.    Culture - Blood (collected 24 Dec 2020 16:29)  Source: .Blood Blood-Peripheral  Preliminary Report (25 Dec 2020 17:01):    No growth to date.        RADIOLOGY & ADDITIONAL TESTS:  Results Reviewed:   Imaging Personally Reviewed:  Electrocardiogram Personally Reviewed:    COORDINATION OF CARE:  Care Discussed with Consultants/Other Providers [Y/N]:  Prior or Outpatient Records Reviewed [Y/N]:   PROGRESS NOTE:     CONTACT INFO:   Thompson (Xiao) Anne   NS: [phone]/ANNEJ: [id number]  PGY-2    Patient is a 87y old  Female who presents with a chief complaint of Hyponatremia (25 Dec 2020 07:29)      SUBJECTIVE / OVERNIGHT EVENTS: no acute event overnight. Denies abd pain, cp, fever, chills. Hasn't had a BM yet.     ADDITIONAL REVIEW OF SYSTEMS:    MEDICATIONS  (STANDING):  amLODIPine   Tablet 5 milliGRAM(s) Oral daily  apixaban 2.5 milliGRAM(s) Oral every 12 hours  artificial  tears Solution 1 Drop(s) Both EYES two times a day  atorvastatin 40 milliGRAM(s) Oral at bedtime  cefTRIAXone   IVPB 1000 milliGRAM(s) IV Intermittent every 24 hours  dextrose 40% Gel 15 Gram(s) Oral once  dextrose 5%. 1000 milliLiter(s) (50 mL/Hr) IV Continuous <Continuous>  dextrose 5%. 1000 milliLiter(s) (100 mL/Hr) IV Continuous <Continuous>  dextrose 50% Injectable 25 Gram(s) IV Push once  dextrose 50% Injectable 12.5 Gram(s) IV Push once  dextrose 50% Injectable 25 Gram(s) IV Push once  glucagon  Injectable 1 milliGRAM(s) IntraMuscular once  insulin glargine Injectable (LANTUS) 10 Unit(s) SubCutaneous at bedtime  insulin lispro (ADMELOG) corrective regimen sliding scale   SubCutaneous three times a day before meals  insulin lispro (ADMELOG) corrective regimen sliding scale   SubCutaneous at bedtime  insulin lispro Injectable (ADMELOG) 7 Unit(s) SubCutaneous three times a day before meals  levothyroxine 112 MICROGram(s) Oral daily  melatonin 5 milliGRAM(s) Oral at bedtime  metoprolol tartrate 25 milliGRAM(s) Oral two times a day  pantoprazole    Tablet 40 milliGRAM(s) Oral before breakfast  polyethylene glycol 3350 17 Gram(s) Oral daily  senna 2 Tablet(s) Oral at bedtime  sodium chloride 0.9%. 1000 milliLiter(s) (70 mL/Hr) IV Continuous <Continuous>  zinc oxide 20% Ointment 1 Application(s) Topical two times a day    MEDICATIONS  (PRN):  acetaminophen   Tablet .. 650 milliGRAM(s) Oral every 6 hours PRN Temp greater or equal to 38C (100.4F), Mild Pain (1 - 3)      CAPILLARY BLOOD GLUCOSE      POCT Blood Glucose.: 131 mg/dL (26 Dec 2020 08:22)  POCT Blood Glucose.: 209 mg/dL (25 Dec 2020 21:24)  POCT Blood Glucose.: 221 mg/dL (25 Dec 2020 17:09)  POCT Blood Glucose.: 70 mg/dL (25 Dec 2020 13:04)    I&O's Summary    25 Dec 2020 07:01  -  26 Dec 2020 07:00  --------------------------------------------------------  IN: 410 mL / OUT: 1100 mL / NET: -690 mL        PHYSICAL EXAM:  Vital Signs Last 24 Hrs  T(C): 36.8 (26 Dec 2020 05:25), Max: 37.6 (25 Dec 2020 20:52)  T(F): 98.2 (26 Dec 2020 05:25), Max: 99.6 (25 Dec 2020 20:52)  HR: 92 (26 Dec 2020 05:25) (90 - 111)  BP: 152/62 (26 Dec 2020 05:25) (121/60 - 152/62)  BP(mean): --  RR: 20 (26 Dec 2020 05:25) (18 - 20)  SpO2: 94% (26 Dec 2020 05:25) (94% - 97%)    CONSTITUTIONAL: NAD, well-developed  RESPIRATORY: Normal respiratory effort; lungs are clear to auscultation bilaterally  CARDIOVASCULAR: Regular rate and rhythm, normal S1 and S2, no murmur/rub/gallop; No lower extremity edema  ABDOMEN: Nontender to palpation, normoactive bowel sounds, no rebound/guarding  MUSCLOSKELETAL: no clubbing or cyanosis of digits; no joint swelling or tenderness to palpation  PSYCH: A+O to person; affect appropriate    LABS:                        11.7   8.79  )-----------( 450      ( 26 Dec 2020 07:07 )             35.0         138  |  106  |  23  ----------------------------<  115<H>  4.7   |  20<L>  |  1.00    Ca    8.6      26 Dec 2020 07:05  Phos  3.1     12-  Mg     1.9     12-26            Urinalysis Basic - ( 24 Dec 2020 11:20 )    Color: Light Orange / Appearance: Turbid / S.021 / pH: x  Gluc: x / Ketone: Negative  / Bili: Negative / Urobili: Negative   Blood: x / Protein: 300 mg/dL / Nitrite: Negative   Leuk Esterase: Large / RBC: 23 /hpf / WBC 26-50   Sq Epi: x / Non Sq Epi: Few / Bacteria: Many        Culture - Urine (collected 24 Dec 2020 21:33)  Source: .Urine Clean Catch (Midstream)  Preliminary Report (26 Dec 2020 04:20):    >100,000 CFU/ml Gram Negative Rods    Culture - Blood (collected 24 Dec 2020 16:29)  Source: .Blood Blood-Venous  Preliminary Report (25 Dec 2020 17:01):    No growth to date.    Culture - Blood (collected 24 Dec 2020 16:29)  Source: .Blood Blood-Peripheral  Preliminary Report (25 Dec 2020 17:01):    No growth to date.        RADIOLOGY & ADDITIONAL TESTS:  Results Reviewed:   Imaging Personally Reviewed:  Electrocardiogram Personally Reviewed:    COORDINATION OF CARE:  Care Discussed with Consultants/Other Providers [Y/N]:  Prior or Outpatient Records Reviewed [Y/N]:

## 2020-12-27 LAB
-  AMIKACIN: SIGNIFICANT CHANGE UP
-  AMOXICILLIN/CLAVULANIC ACID: SIGNIFICANT CHANGE UP
-  AMPICILLIN/SULBACTAM: SIGNIFICANT CHANGE UP
-  AMPICILLIN: SIGNIFICANT CHANGE UP
-  AZTREONAM: SIGNIFICANT CHANGE UP
-  CEFAZOLIN: SIGNIFICANT CHANGE UP
-  CEFEPIME: SIGNIFICANT CHANGE UP
-  CEFOXITIN: SIGNIFICANT CHANGE UP
-  CEFTRIAXONE: SIGNIFICANT CHANGE UP
-  CIPROFLOXACIN: SIGNIFICANT CHANGE UP
-  ERTAPENEM: SIGNIFICANT CHANGE UP
-  GENTAMICIN: SIGNIFICANT CHANGE UP
-  IMIPENEM: SIGNIFICANT CHANGE UP
-  LEVOFLOXACIN: SIGNIFICANT CHANGE UP
-  MEROPENEM: SIGNIFICANT CHANGE UP
-  NITROFURANTOIN: SIGNIFICANT CHANGE UP
-  PIPERACILLIN/TAZOBACTAM: SIGNIFICANT CHANGE UP
-  TIGECYCLINE: SIGNIFICANT CHANGE UP
-  TOBRAMYCIN: SIGNIFICANT CHANGE UP
-  TRIMETHOPRIM/SULFAMETHOXAZOLE: SIGNIFICANT CHANGE UP
ANION GAP SERPL CALC-SCNC: 12 MMOL/L — SIGNIFICANT CHANGE UP (ref 5–17)
BUN SERPL-MCNC: 25 MG/DL — HIGH (ref 7–23)
CALCIUM SERPL-MCNC: 8.9 MG/DL — SIGNIFICANT CHANGE UP (ref 8.4–10.5)
CHLORIDE SERPL-SCNC: 104 MMOL/L — SIGNIFICANT CHANGE UP (ref 96–108)
CO2 SERPL-SCNC: 20 MMOL/L — LOW (ref 22–31)
CREAT SERPL-MCNC: 1.09 MG/DL — SIGNIFICANT CHANGE UP (ref 0.5–1.3)
CRP SERPL-MCNC: 2.49 MG/DL — HIGH (ref 0–0.4)
CULTURE RESULTS: SIGNIFICANT CHANGE UP
CULTURE RESULTS: SIGNIFICANT CHANGE UP
D DIMER BLD IA.RAPID-MCNC: 353 NG/ML DDU — HIGH
FERRITIN SERPL-MCNC: 262 NG/ML — HIGH (ref 15–150)
GLUCOSE SERPL-MCNC: 87 MG/DL — SIGNIFICANT CHANGE UP (ref 70–99)
HCT VFR BLD CALC: 32.9 % — LOW (ref 34.5–45)
HGB BLD-MCNC: 10.7 G/DL — LOW (ref 11.5–15.5)
MCHC RBC-ENTMCNC: 30.6 PG — SIGNIFICANT CHANGE UP (ref 27–34)
MCHC RBC-ENTMCNC: 32.5 GM/DL — SIGNIFICANT CHANGE UP (ref 32–36)
MCV RBC AUTO: 94 FL — SIGNIFICANT CHANGE UP (ref 80–100)
METHOD TYPE: SIGNIFICANT CHANGE UP
NRBC # BLD: 0 /100 WBCS — SIGNIFICANT CHANGE UP (ref 0–0)
PLATELET # BLD AUTO: 315 K/UL — SIGNIFICANT CHANGE UP (ref 150–400)
POTASSIUM SERPL-MCNC: 5 MMOL/L — SIGNIFICANT CHANGE UP (ref 3.5–5.3)
POTASSIUM SERPL-SCNC: 5 MMOL/L — SIGNIFICANT CHANGE UP (ref 3.5–5.3)
RBC # BLD: 3.5 M/UL — LOW (ref 3.8–5.2)
RBC # FLD: 12.6 % — SIGNIFICANT CHANGE UP (ref 10.3–14.5)
SODIUM SERPL-SCNC: 136 MMOL/L — SIGNIFICANT CHANGE UP (ref 135–145)
SPECIMEN SOURCE: SIGNIFICANT CHANGE UP
SPECIMEN SOURCE: SIGNIFICANT CHANGE UP
WBC # BLD: 5.89 K/UL — SIGNIFICANT CHANGE UP (ref 3.8–10.5)
WBC # FLD AUTO: 5.89 K/UL — SIGNIFICANT CHANGE UP (ref 3.8–10.5)

## 2020-12-27 PROCEDURE — 99233 SBSQ HOSP IP/OBS HIGH 50: CPT

## 2020-12-27 RX ORDER — NITROFURANTOIN MACROCRYSTAL 50 MG
100 CAPSULE ORAL
Refills: 0 | Status: COMPLETED | OUTPATIENT
Start: 2020-12-27 | End: 2020-12-31

## 2020-12-27 RX ADMIN — Medication 7 UNIT(S): at 17:50

## 2020-12-27 RX ADMIN — AMLODIPINE BESYLATE 5 MILLIGRAM(S): 2.5 TABLET ORAL at 06:39

## 2020-12-27 RX ADMIN — Medication 1 DROP(S): at 06:40

## 2020-12-27 RX ADMIN — Medication 4: at 13:08

## 2020-12-27 RX ADMIN — ZINC OXIDE 1 APPLICATION(S): 200 OINTMENT TOPICAL at 17:51

## 2020-12-27 RX ADMIN — Medication 100 MILLIGRAM(S): at 19:42

## 2020-12-27 RX ADMIN — Medication 5 MILLIGRAM(S): at 22:14

## 2020-12-27 RX ADMIN — SENNA PLUS 2 TABLET(S): 8.6 TABLET ORAL at 22:14

## 2020-12-27 RX ADMIN — INSULIN GLARGINE 10 UNIT(S): 100 INJECTION, SOLUTION SUBCUTANEOUS at 22:14

## 2020-12-27 RX ADMIN — Medication 1 DROP(S): at 17:51

## 2020-12-27 RX ADMIN — APIXABAN 2.5 MILLIGRAM(S): 2.5 TABLET, FILM COATED ORAL at 06:39

## 2020-12-27 RX ADMIN — POLYETHYLENE GLYCOL 3350 17 GRAM(S): 17 POWDER, FOR SOLUTION ORAL at 13:18

## 2020-12-27 RX ADMIN — ZINC OXIDE 1 APPLICATION(S): 200 OINTMENT TOPICAL at 06:40

## 2020-12-27 RX ADMIN — APIXABAN 2.5 MILLIGRAM(S): 2.5 TABLET, FILM COATED ORAL at 17:50

## 2020-12-27 RX ADMIN — Medication 7 UNIT(S): at 09:01

## 2020-12-27 RX ADMIN — ATORVASTATIN CALCIUM 40 MILLIGRAM(S): 80 TABLET, FILM COATED ORAL at 22:14

## 2020-12-27 RX ADMIN — Medication 2: at 17:49

## 2020-12-27 RX ADMIN — Medication 7 UNIT(S): at 13:08

## 2020-12-27 RX ADMIN — Medication 25 MILLIGRAM(S): at 06:38

## 2020-12-27 RX ADMIN — Medication 1 TABLET(S): at 13:18

## 2020-12-27 RX ADMIN — Medication 25 MILLIGRAM(S): at 17:50

## 2020-12-27 RX ADMIN — Medication 112 MICROGRAM(S): at 06:39

## 2020-12-27 RX ADMIN — PANTOPRAZOLE SODIUM 40 MILLIGRAM(S): 20 TABLET, DELAYED RELEASE ORAL at 06:39

## 2020-12-27 NOTE — PROGRESS NOTE ADULT - PROBLEM SELECTOR PLAN 2
Pt febrile to 101.7 12/24 am, with leukocytosis to 15k. Will get infectious workup in the setting of known COVID infxn.  -U/A positive for UTI , ceftriaxone started 12/24  -CT abdomen as above  -repeat CXR  - D Dimer, Ferritin, LDH Pt febrile to 101.7 12/24 am, with leukocytosis to 15k. Will get infectious workup in the setting of known COVID infxn.  -U/A positive for UTI , ceftriaxone started 12/24-12/27  -CT abdomen as above  - D Dimer, Ferritin, LDH Pt febrile to 101.7 12/24 am, with leukocytosis to 15k. Will get infectious workup in the setting of known COVID infxn.  -U/A positive for UTI cx: E coli, ceftriaxone started 12/24-12/26 will give 4 day course of bactrim.   -CT abdomen as above  - D Dimer, Ferritin, LDH

## 2020-12-27 NOTE — PROGRESS NOTE ADULT - SUBJECTIVE AND OBJECTIVE BOX
PROGRESS NOTE:     CONTACT INFO:   Thompson (Xiao) Anne   NS: [phone]/[facility]: [id number]  PGY-2    Patient is a 87y old  Female who presents with a chief complaint of Hyponatremia (25 Dec 2020 07:29)      SUBJECTIVE / OVERNIGHT EVENTS: No acute events overnight.    ADDITIONAL REVIEW OF SYSTEMS:  MEDICATIONS  (STANDING):  amLODIPine   Tablet 5 milliGRAM(s) Oral daily  apixaban 2.5 milliGRAM(s) Oral every 12 hours  artificial  tears Solution 1 Drop(s) Both EYES two times a day  atorvastatin 40 milliGRAM(s) Oral at bedtime  dextrose 40% Gel 15 Gram(s) Oral once  dextrose 5%. 1000 milliLiter(s) (50 mL/Hr) IV Continuous <Continuous>  dextrose 5%. 1000 milliLiter(s) (100 mL/Hr) IV Continuous <Continuous>  dextrose 50% Injectable 25 Gram(s) IV Push once  dextrose 50% Injectable 12.5 Gram(s) IV Push once  dextrose 50% Injectable 25 Gram(s) IV Push once  glucagon  Injectable 1 milliGRAM(s) IntraMuscular once  insulin glargine Injectable (LANTUS) 10 Unit(s) SubCutaneous at bedtime  insulin lispro (ADMELOG) corrective regimen sliding scale   SubCutaneous three times a day before meals  insulin lispro (ADMELOG) corrective regimen sliding scale   SubCutaneous at bedtime  insulin lispro Injectable (ADMELOG) 7 Unit(s) SubCutaneous three times a day before meals  levothyroxine 112 MICROGram(s) Oral daily  melatonin 5 milliGRAM(s) Oral at bedtime  metoprolol tartrate 25 milliGRAM(s) Oral two times a day  pantoprazole    Tablet 40 milliGRAM(s) Oral before breakfast  polyethylene glycol 3350 17 Gram(s) Oral daily  senna 2 Tablet(s) Oral at bedtime  sodium chloride 0.9%. 1000 milliLiter(s) (70 mL/Hr) IV Continuous <Continuous>  zinc oxide 20% Ointment 1 Application(s) Topical two times a day    MEDICATIONS  (PRN):  acetaminophen   Tablet .. 650 milliGRAM(s) Oral every 6 hours PRN Temp greater or equal to 38C (100.4F), Mild Pain (1 - 3)      CAPILLARY BLOOD GLUCOSE  POCT Blood Glucose.: 138 mg/dL (26 Dec 2020 20:59)  POCT Blood Glucose.: 188 mg/dL (26 Dec 2020 17:19)  POCT Blood Glucose.: 76 mg/dL (26 Dec 2020 13:14)  POCT Blood Glucose.: 131 mg/dL (26 Dec 2020 08:22)      I&O's Detail    26 Dec 2020 07:01  -  27 Dec 2020 07:00  --------------------------------------------------------  IN:  Total IN: 0 mL    OUT:    Voided (mL): 1200 mL  Total OUT: 1200 mL    Total NET: -1200 mL          PHYSICAL EXAM:  Vital Signs Last 24 Hrs  T(C): 36.6 (27 Dec 2020 06:13), Max: 36.7 (26 Dec 2020 12:03)  T(F): 97.8 (27 Dec 2020 06:13), Max: 98.1 (26 Dec 2020 12:03)  HR: 98 (27 Dec 2020 06:13) (84 - 106)  BP: 159/92 (27 Dec 2020 06:13) (142/69 - 163/95)  BP(mean): --  RR: 18 (27 Dec 2020 06:13) (18 - 20)  SpO2: 94% (27 Dec 2020 06:13) (94% - 94%)    CONSTITUTIONAL: NAD, well-developed  RESPIRATORY: Normal respiratory effort; lungs are clear to auscultation bilaterally  CARDIOVASCULAR: Regular rate and rhythm, normal S1 and S2, no murmur/rub/gallop; No lower extremity edema  ABDOMEN: Nontender to palpation, normoactive bowel sounds, no rebound/guarding  MUSCLOSKELETAL: no clubbing or cyanosis of digits; no joint swelling or tenderness to palpation  PSYCH: A+O to person; affect appropriate      LABS:  cret                        11.7   8.79  )-----------( 450      ( 26 Dec 2020 07:07 )             35.0     12    138  |  106  |  23  ----------------------------<  115<H>  4.7   |  20<L>  |  1.00    Ca    8.6      26 Dec 2020 07:05  Phos  3.1       Mg     1.9           Urinalysis Basic - ( 24 Dec 2020 11:20 )    Color: Light Orange / Appearance: Turbid / S.021 / pH: x  Gluc: x / Ketone: Negative  / Bili: Negative / Urobili: Negative   Blood: x / Protein: 300 mg/dL / Nitrite: Negative   Leuk Esterase: Large / RBC: 23 /hpf / WBC 26-50   Sq Epi: x / Non Sq Epi: Few / Bacteria: Many      Culture - Urine (collected 24 Dec 2020 21:33)  Source: .Urine Clean Catch (Midstream)  Preliminary Report (26 Dec 2020 04:20):    >100,000 CFU/ml Gram Negative Rods    Culture - Blood (collected 24 Dec 2020 16:29)  Source: .Blood Blood-Venous  Preliminary Report (25 Dec 2020 17:01):    No growth to date.    Culture - Blood (collected 24 Dec 2020 16:29)  Source: .Blood Blood-Peripheral  Preliminary Report (25 Dec 2020 17:01):    No growth to date.    RADIOLOGY & ADDITIONAL TESTS:  Results Reviewed:   Imaging Personally Reviewed:  Electrocardiogram Personally Reviewed:    COORDINATION OF CARE:  Care Discussed with Consultants/Other Providers [Y/N]:  Prior or Outpatient Records Reviewed [Y/N]:   PROGRESS NOTE:     CONTACT INFO:     Ele Rosen MD PGY-1  Internal Medicine  [phone]    Patient is a 87y old  Female who presents with a chief complaint of Hyponatremia (25 Dec 2020 07:29)      SUBJECTIVE / OVERNIGHT EVENTS: No acute events overnight.    ADDITIONAL REVIEW OF SYSTEMS:  MEDICATIONS  (STANDING):  amLODIPine   Tablet 5 milliGRAM(s) Oral daily  apixaban 2.5 milliGRAM(s) Oral every 12 hours  artificial  tears Solution 1 Drop(s) Both EYES two times a day  atorvastatin 40 milliGRAM(s) Oral at bedtime  dextrose 40% Gel 15 Gram(s) Oral once  dextrose 5%. 1000 milliLiter(s) (50 mL/Hr) IV Continuous <Continuous>  dextrose 5%. 1000 milliLiter(s) (100 mL/Hr) IV Continuous <Continuous>  dextrose 50% Injectable 25 Gram(s) IV Push once  dextrose 50% Injectable 12.5 Gram(s) IV Push once  dextrose 50% Injectable 25 Gram(s) IV Push once  glucagon  Injectable 1 milliGRAM(s) IntraMuscular once  insulin glargine Injectable (LANTUS) 10 Unit(s) SubCutaneous at bedtime  insulin lispro (ADMELOG) corrective regimen sliding scale   SubCutaneous three times a day before meals  insulin lispro (ADMELOG) corrective regimen sliding scale   SubCutaneous at bedtime  insulin lispro Injectable (ADMELOG) 7 Unit(s) SubCutaneous three times a day before meals  levothyroxine 112 MICROGram(s) Oral daily  melatonin 5 milliGRAM(s) Oral at bedtime  metoprolol tartrate 25 milliGRAM(s) Oral two times a day  pantoprazole    Tablet 40 milliGRAM(s) Oral before breakfast  polyethylene glycol 3350 17 Gram(s) Oral daily  senna 2 Tablet(s) Oral at bedtime  sodium chloride 0.9%. 1000 milliLiter(s) (70 mL/Hr) IV Continuous <Continuous>  zinc oxide 20% Ointment 1 Application(s) Topical two times a day    MEDICATIONS  (PRN):  acetaminophen   Tablet .. 650 milliGRAM(s) Oral every 6 hours PRN Temp greater or equal to 38C (100.4F), Mild Pain (1 - 3)      CAPILLARY BLOOD GLUCOSE  POCT Blood Glucose.: 138 mg/dL (26 Dec 2020 20:59)  POCT Blood Glucose.: 188 mg/dL (26 Dec 2020 17:19)  POCT Blood Glucose.: 76 mg/dL (26 Dec 2020 13:14)  POCT Blood Glucose.: 131 mg/dL (26 Dec 2020 08:22)      I&O's Detail    26 Dec 2020 07:01  -  27 Dec 2020 07:00  --------------------------------------------------------  IN:  Total IN: 0 mL    OUT:    Voided (mL): 1200 mL  Total OUT: 1200 mL    Total NET: -1200 mL          PHYSICAL EXAM:  Vital Signs Last 24 Hrs  T(C): 36.6 (27 Dec 2020 06:13), Max: 36.7 (26 Dec 2020 12:03)  T(F): 97.8 (27 Dec 2020 06:13), Max: 98.1 (26 Dec 2020 12:03)  HR: 98 (27 Dec 2020 06:13) (84 - 106)  BP: 159/92 (27 Dec 2020 06:13) (142/69 - 163/95)  BP(mean): --  RR: 18 (27 Dec 2020 06:13) (18 - 20)  SpO2: 94% (27 Dec 2020 06:13) (94% - 94%)    CONSTITUTIONAL: NAD, well-developed  RESPIRATORY: Normal respiratory effort; lungs are clear to auscultation bilaterally  CARDIOVASCULAR: Regular rate and rhythm, normal S1 and S2, no murmur/rub/gallop; No lower extremity edema  ABDOMEN: Nontender to palpation, normoactive bowel sounds, no rebound/guarding  MUSCLOSKELETAL: no clubbing or cyanosis of digits; no joint swelling or tenderness to palpation  PSYCH: A+O to person; affect appropriate      LABS:  cret                        11.7   8.79  )-----------( 450      ( 26 Dec 2020 07:07 )             35.0         138  |  106  |  23  ----------------------------<  115<H>  4.7   |  20<L>  |  1.00    Ca    8.6      26 Dec 2020 07:05  Phos  3.1       Mg     1.9           Urinalysis Basic - ( 24 Dec 2020 11:20 )    Color: Light Orange / Appearance: Turbid / S.021 / pH: x  Gluc: x / Ketone: Negative  / Bili: Negative / Urobili: Negative   Blood: x / Protein: 300 mg/dL / Nitrite: Negative   Leuk Esterase: Large / RBC: 23 /hpf / WBC 26-50   Sq Epi: x / Non Sq Epi: F    CULTURE RESULTS:                20 @ 21:33  Specimen Source: --  Method Type: --  Gram Stain - RRL: --  Gram Stain - Wound: --  Bacteria: --  Culture Results:   >100,000 CFU/ml Gram Negative Rods      Specimen Source:   Method Type:   Gram Stain:   Culture Results: Culture Results:   >100,000 CFU/ml Gram Negative Rods (20 @ 21:33)  Culture Results:   No growth to date. (20 @ 16:29)  Culture Results:   No growth to date. (20 @ 16:29)  Culture Results:   No growth to date. (20 @ 10:14)  Culture Results:   No growth to date. (20 @ 10:14)  Culture Results:   No growth (20 @ 16:51)    Bacteria: Bacteria: Many (20 @ 11:20)        RADIOLOGY & ADDITIONAL TESTS:    CT Abdomen and Pelvis     EXAM:  CT ABDOMEN AND PELVIS IC                        PROCEDURE DATE:  2020    INTERPRETATION:  CLINICAL INFORMATION: Abdominal pain.    COMPARISON: CT abdomen and pelvis 2019.  PROCEDURE:  CT of the Abdomen and Pelvis was performed with intravenous contrast.  Intravenous contrast: 90 ml Omnipaque 350. 10 ml discarded.  Oral contrast: None.  Sagittal and coronal reformats were performed.  FINDINGS:  Study partially limited by motion artifact.    LOWER CHEST: Trace pleural effusions and associated compressive atelectasis. Coronary artery calcifications.  LIVER: Within normal limits.  BILE DUCTS: Normal caliber.  GALLBLADDER: Cholelithiasis. The gallbladder is contracted.  SPLEEN: The spleen is normal in size. Again noted is 12 mm simple appearing cyst in the lower pole, unchanged.  PANCREAS: Within normal limits.  ADRENALS: Within normal limits.  KIDNEYS/URETERS: Markedly Atrophic right kidney. The left kidneys normal in size without hydronephrosis or lesions.  BLADDER: Diffuse mild bladder wall thickening and mucosal hyperemia which may be related to cystitis.  REPRODUCTIVE ORGANS: Hysterectomy.  BOWEL: No bowel obstruction. Appendix is normal.  PERITONEUM: No ascites.  VESSELS: Extensive atherosclerotic changes.  RETROPERITONEUM/LYMPH NODES: No lymphadenopathy.  ABDOMINAL WALL: Postsurgical changes.  BONES: Degenerative changes.    IMPRESSION:  No bowel obstruction.  Diffuse urinary bladder wall thickening and mucosal hyperemia which may be related to cystitis. Correlation with urinalysis is recommended.  Extensive atherosclerotic calcifications.  Markedly atrophic right kidney.     PROGRESS NOTE:     CONTACT INFO:     Ele Rosen MD PGY-1  Internal Medicine  [phone]    Patient is a 87y old  Female who presents with a chief complaint of Hyponatremia (25 Dec 2020 07:29)      SUBJECTIVE / OVERNIGHT EVENTS: No acute events overnight. This morning, patient seen while eating breakfast, endorses good appetite. States she slept well, denies any complaints. Denies abdominal pain specifically. No SOB, CP, cough. A&O x 1.     ADDITIONAL REVIEW OF SYSTEMS:  MEDICATIONS  (STANDING):  amLODIPine   Tablet 5 milliGRAM(s) Oral daily  apixaban 2.5 milliGRAM(s) Oral every 12 hours  artificial  tears Solution 1 Drop(s) Both EYES two times a day  atorvastatin 40 milliGRAM(s) Oral at bedtime  dextrose 40% Gel 15 Gram(s) Oral once  dextrose 5%. 1000 milliLiter(s) (50 mL/Hr) IV Continuous <Continuous>  dextrose 5%. 1000 milliLiter(s) (100 mL/Hr) IV Continuous <Continuous>  dextrose 50% Injectable 25 Gram(s) IV Push once  dextrose 50% Injectable 12.5 Gram(s) IV Push once  dextrose 50% Injectable 25 Gram(s) IV Push once  glucagon  Injectable 1 milliGRAM(s) IntraMuscular once  insulin glargine Injectable (LANTUS) 10 Unit(s) SubCutaneous at bedtime  insulin lispro (ADMELOG) corrective regimen sliding scale   SubCutaneous three times a day before meals  insulin lispro (ADMELOG) corrective regimen sliding scale   SubCutaneous at bedtime  insulin lispro Injectable (ADMELOG) 7 Unit(s) SubCutaneous three times a day before meals  levothyroxine 112 MICROGram(s) Oral daily  melatonin 5 milliGRAM(s) Oral at bedtime  metoprolol tartrate 25 milliGRAM(s) Oral two times a day  pantoprazole    Tablet 40 milliGRAM(s) Oral before breakfast  polyethylene glycol 3350 17 Gram(s) Oral daily  senna 2 Tablet(s) Oral at bedtime  sodium chloride 0.9%. 1000 milliLiter(s) (70 mL/Hr) IV Continuous <Continuous>  zinc oxide 20% Ointment 1 Application(s) Topical two times a day    MEDICATIONS  (PRN):  acetaminophen   Tablet .. 650 milliGRAM(s) Oral every 6 hours PRN Temp greater or equal to 38C (100.4F), Mild Pain (1 - 3)      CAPILLARY BLOOD GLUCOSE  POCT Blood Glucose.: 138 mg/dL (26 Dec 2020 20:59)  POCT Blood Glucose.: 188 mg/dL (26 Dec 2020 17:19)  POCT Blood Glucose.: 76 mg/dL (26 Dec 2020 13:14)  POCT Blood Glucose.: 131 mg/dL (26 Dec 2020 08:22)      I&O's Detail    26 Dec 2020 07:01  -  27 Dec 2020 07:00  --------------------------------------------------------  IN:  Total IN: 0 mL    OUT:    Voided (mL): 1200 mL  Total OUT: 1200 mL    Total NET: -1200 mL          PHYSICAL EXAM:  Vital Signs Last 24 Hrs  T(C): 36.6 (27 Dec 2020 06:13), Max: 36.7 (26 Dec 2020 12:03)  T(F): 97.8 (27 Dec 2020 06:13), Max: 98.1 (26 Dec 2020 12:03)  HR: 98 (27 Dec 2020 06:13) (84 - 106)  BP: 159/92 (27 Dec 2020 06:13) (142/69 - 163/95)  BP(mean): --  RR: 18 (27 Dec 2020 06:13) (18 - 20)  SpO2: 94% (27 Dec 2020 06:13) (94% - 94%)    CONSTITUTIONAL: NAD, well-developed  RESPIRATORY: Normal respiratory effort; lungs are clear to auscultation bilaterally  CARDIOVASCULAR: Regular rate and rhythm, normal S1 and S2, no murmur/rub/gallop; No lower extremity edema  ABDOMEN: Nontender to palpation, normoactive bowel sounds, no rebound/guarding  MUSCLOSKELETAL: no clubbing or cyanosis of digits; no joint swelling or tenderness to palpation  PSYCH: A+O to person; affect appropriate      LABS:  cret                        11.7   8.79  )-----------( 450      ( 26 Dec 2020 07:07 )             35.0     12-    138  |  106  |  23  ----------------------------<  115<H>  4.7   |  20<L>  |  1.00    Ca    8.6      26 Dec 2020 07:05  Phos  3.1       Mg     1.9           Urinalysis Basic - ( 24 Dec 2020 11:20 )    Color: Light Orange / Appearance: Turbid / S.021 / pH: x  Gluc: x / Ketone: Negative  / Bili: Negative / Urobili: Negative   Blood: x / Protein: 300 mg/dL / Nitrite: Negative   Leuk Esterase: Large / RBC: 23 /hpf / WBC 26-50   Sq Epi: x / Non Sq Epi: F    CULTURE RESULTS:                20 @ 21:33  Specimen Source: --  Method Type: --  Gram Stain - RRL: --  Gram Stain - Wound: --  Bacteria: --  Culture Results:   >100,000 CFU/ml Gram Negative Rods      Specimen Source:   Method Type:   Gram Stain:   Culture Results: Culture Results:   >100,000 CFU/ml Gram Negative Rods (20 @ 21:33)  Culture Results:   No growth to date. (20 @ 16:29)  Culture Results:   No growth to date. (20 @ 16:29)  Culture Results:   No growth to date. (20 @ 10:14)  Culture Results:   No growth to date. (20 @ 10:14)  Culture Results:   No growth (20 @ 16:51)    Bacteria: Bacteria: Many (20 @ 11:20)        RADIOLOGY & ADDITIONAL TESTS:    CT Abdomen and Pelvis     EXAM:  CT ABDOMEN AND PELVIS IC                        PROCEDURE DATE:  2020    INTERPRETATION:  CLINICAL INFORMATION: Abdominal pain.    COMPARISON: CT abdomen and pelvis 2019.  PROCEDURE:  CT of the Abdomen and Pelvis was performed with intravenous contrast.  Intravenous contrast: 90 ml Omnipaque 350. 10 ml discarded.  Oral contrast: None.  Sagittal and coronal reformats were performed.  FINDINGS:  Study partially limited by motion artifact.    LOWER CHEST: Trace pleural effusions and associated compressive atelectasis. Coronary artery calcifications.  LIVER: Within normal limits.  BILE DUCTS: Normal caliber.  GALLBLADDER: Cholelithiasis. The gallbladder is contracted.  SPLEEN: The spleen is normal in size. Again noted is 12 mm simple appearing cyst in the lower pole, unchanged.  PANCREAS: Within normal limits.  ADRENALS: Within normal limits.  KIDNEYS/URETERS: Markedly Atrophic right kidney. The left kidneys normal in size without hydronephrosis or lesions.  BLADDER: Diffuse mild bladder wall thickening and mucosal hyperemia which may be related to cystitis.  REPRODUCTIVE ORGANS: Hysterectomy.  BOWEL: No bowel obstruction. Appendix is normal.  PERITONEUM: No ascites.  VESSELS: Extensive atherosclerotic changes.  RETROPERITONEUM/LYMPH NODES: No lymphadenopathy.  ABDOMINAL WALL: Postsurgical changes.  BONES: Degenerative changes.    IMPRESSION:  No bowel obstruction.  Diffuse urinary bladder wall thickening and mucosal hyperemia which may be related to cystitis. Correlation with urinalysis is recommended.  Extensive atherosclerotic calcifications.  Markedly atrophic right kidney.     PROGRESS NOTE:     CONTACT INFO:     Ele Rosen MD PGY-1  Internal Medicine  [phone]    Patient is a 87y old  Female who presents with a chief complaint of Hyponatremia (25 Dec 2020 07:29)      SUBJECTIVE / OVERNIGHT EVENTS: No acute events overnight. This morning, patient seen while eating breakfast, endorses good appetite. States she slept well, denies any complaints. Denies abdominal pain specifically. No SOB, CP, cough. A&O x 1.     ADDITIONAL REVIEW OF SYSTEMS:  MEDICATIONS  (STANDING):  amLODIPine   Tablet 5 milliGRAM(s) Oral daily  apixaban 2.5 milliGRAM(s) Oral every 12 hours  artificial  tears Solution 1 Drop(s) Both EYES two times a day  atorvastatin 40 milliGRAM(s) Oral at bedtime  dextrose 40% Gel 15 Gram(s) Oral once  dextrose 5%. 1000 milliLiter(s) (50 mL/Hr) IV Continuous <Continuous>  dextrose 5%. 1000 milliLiter(s) (100 mL/Hr) IV Continuous <Continuous>  dextrose 50% Injectable 25 Gram(s) IV Push once  dextrose 50% Injectable 12.5 Gram(s) IV Push once  dextrose 50% Injectable 25 Gram(s) IV Push once  glucagon  Injectable 1 milliGRAM(s) IntraMuscular once  insulin glargine Injectable (LANTUS) 10 Unit(s) SubCutaneous at bedtime  insulin lispro (ADMELOG) corrective regimen sliding scale   SubCutaneous three times a day before meals  insulin lispro (ADMELOG) corrective regimen sliding scale   SubCutaneous at bedtime  insulin lispro Injectable (ADMELOG) 7 Unit(s) SubCutaneous three times a day before meals  levothyroxine 112 MICROGram(s) Oral daily  melatonin 5 milliGRAM(s) Oral at bedtime  metoprolol tartrate 25 milliGRAM(s) Oral two times a day  pantoprazole    Tablet 40 milliGRAM(s) Oral before breakfast  polyethylene glycol 3350 17 Gram(s) Oral daily  senna 2 Tablet(s) Oral at bedtime  sodium chloride 0.9%. 1000 milliLiter(s) (70 mL/Hr) IV Continuous <Continuous>  zinc oxide 20% Ointment 1 Application(s) Topical two times a day    MEDICATIONS  (PRN):  acetaminophen   Tablet .. 650 milliGRAM(s) Oral every 6 hours PRN Temp greater or equal to 38C (100.4F), Mild Pain (1 - 3)      CAPILLARY BLOOD GLUCOSE  POCT Blood Glucose.: 138 mg/dL (26 Dec 2020 20:59)  POCT Blood Glucose.: 188 mg/dL (26 Dec 2020 17:19)  POCT Blood Glucose.: 76 mg/dL (26 Dec 2020 13:14)  POCT Blood Glucose.: 131 mg/dL (26 Dec 2020 08:22)      I&O's Detail    26 Dec 2020 07:01  -  27 Dec 2020 07:00  --------------------------------------------------------  IN:  Total IN: 0 mL    OUT:    Voided (mL): 1200 mL  Total OUT: 1200 mL    Total NET: -1200 mL          PHYSICAL EXAM:  Vital Signs Last 24 Hrs  T(C): 36.6 (27 Dec 2020 06:13), Max: 36.7 (26 Dec 2020 12:03)  T(F): 97.8 (27 Dec 2020 06:13), Max: 98.1 (26 Dec 2020 12:03)  HR: 98 (27 Dec 2020 06:13) (84 - 106)  BP: 159/92 (27 Dec 2020 06:13) (142/69 - 163/95)  BP(mean): --  RR: 18 (27 Dec 2020 06:13) (18 - 20)  SpO2: 94% (27 Dec 2020 06:13) (94% - 94%)    CONSTITUTIONAL: NAD, well-developed  RESPIRATORY: Normal respiratory effort; lungs are clear to auscultation bilaterally  CARDIOVASCULAR: Regular rate and rhythm, normal S1 and S2, no murmur/rub/gallop; No lower extremity edema  ABDOMEN: Nontender to palpation, normoactive bowel sounds, no rebound/guarding  MUSCLOSKELETAL: no clubbing or cyanosis of digits; no joint swelling or tenderness to palpation  PSYCH: A+O to person; affect appropriate      LABS:                          11.7   8.79  )-----------( 450      ( 26 Dec 2020 07:07 )             35.0         136  |  104  |  25<H>  ----------------------------<  87  5.0   |  20<L>  |  1.09    Ca    8.9      27 Dec 2020 07:06  Phos  3.1       Mg     1.9                       Urinalysis Basic - ( 24 Dec 2020 11:20 )    Color: Light Orange / Appearance: Turbid / S.021 / pH: x  Gluc: x / Ketone: Negative  / Bili: Negative / Urobili: Negative   Blood: x / Protein: 300 mg/dL / Nitrite: Negative   Leuk Esterase: Large / RBC: 23 /hpf / WBC 26-50   Sq Epi: x / Non Sq Epi: F    CULTURE RESULTS:                20 @ 21:33  Specimen Source: --  Method Type: --  Gram Stain - RRL: --  Gram Stain - Wound: --  Bacteria: --  Culture Results:   >100,000 CFU/ml Gram Negative Rods      Specimen Source:   Method Type:   Gram Stain:   Culture Results: Culture Results:   >100,000 CFU/ml Gram Negative Rods (20 @ 21:33)  Culture Results:   No growth to date. (20 @ 16:29)  Culture Results:   No growth to date. (20 @ 16:29)  Culture Results:   No growth to date. (20 @ 10:14)  Culture Results:   No growth to date. (20 @ 10:14)  Culture Results:   No growth (20 @ 16:51)    Bacteria: Bacteria: Many (20 @ 11:20)        RADIOLOGY & ADDITIONAL TESTS:    CT Abdomen and Pelvis     EXAM:  CT ABDOMEN AND PELVIS IC                        PROCEDURE DATE:  2020    INTERPRETATION:  CLINICAL INFORMATION: Abdominal pain.    COMPARISON: CT abdomen and pelvis 2019.  PROCEDURE:  CT of the Abdomen and Pelvis was performed with intravenous contrast.  Intravenous contrast: 90 ml Omnipaque 350. 10 ml discarded.  Oral contrast: None.  Sagittal and coronal reformats were performed.  FINDINGS:  Study partially limited by motion artifact.    LOWER CHEST: Trace pleural effusions and associated compressive atelectasis. Coronary artery calcifications.  LIVER: Within normal limits.  BILE DUCTS: Normal caliber.  GALLBLADDER: Cholelithiasis. The gallbladder is contracted.  SPLEEN: The spleen is normal in size. Again noted is 12 mm simple appearing cyst in the lower pole, unchanged.  PANCREAS: Within normal limits.  ADRENALS: Within normal limits.  KIDNEYS/URETERS: Markedly Atrophic right kidney. The left kidneys normal in size without hydronephrosis or lesions.  BLADDER: Diffuse mild bladder wall thickening and mucosal hyperemia which may be related to cystitis.  REPRODUCTIVE ORGANS: Hysterectomy.  BOWEL: No bowel obstruction. Appendix is normal.  PERITONEUM: No ascites.  VESSELS: Extensive atherosclerotic changes.  RETROPERITONEUM/LYMPH NODES: No lymphadenopathy.  ABDOMINAL WALL: Postsurgical changes.  BONES: Degenerative changes.    IMPRESSION:  No bowel obstruction.  Diffuse urinary bladder wall thickening and mucosal hyperemia which may be related to cystitis. Correlation with urinalysis is recommended.  Extensive atherosclerotic calcifications.  Markedly atrophic right kidney.

## 2020-12-27 NOTE — PROGRESS NOTE ADULT - PROBLEM SELECTOR PLAN 3
Pt returned home from a 5 week rehab stay on 12/11, tested + for COVID in ED on presentation. Currently without any respiratory symptoms, CXR clear.   -Will continue with conservative mgmt as patient is not currently symptomatic  -If pt becomes SOB, will start remdecivir and decadron, supplemental oxygen prn. Pt returned home from a 5 week rehab stay on 12/11, tested + for COVID in ED on presentation. Currently without any respiratory symptoms, CXR clear.   -Will continue with conservative mgmt as patient is not currently symptomatic  -If pt becomes SOB, will start remdecivir and decadron, supplemental oxygen prn.  -Covid swab done 12/27

## 2020-12-27 NOTE — PROGRESS NOTE ADULT - ATTENDING COMMENTS
Patient seen and examined at bedside.   Agree with resident assessment and plan.     #UTI  #COVID  #Epigastric pain   #Encephalopathy     -UC growing E.coli, sens to ceftriaxone and nitrofurantoin. Would complete 7 days of therapy with nitrofurantoin given impressive CT findings and pt being poor historian to assess improvement in symptoms   -Needs VICENTA

## 2020-12-27 NOTE — PROGRESS NOTE ADULT - PROBLEM SELECTOR PLAN 6
-per hematology consult, hydroxyurea on hold, no indication for treatment for now  -goal Hct <42% per CYTO-PV trial

## 2020-12-27 NOTE — PROGRESS NOTE ADULT - PROBLEM SELECTOR PLAN 1
-CT abdomen pelvis noted for R atrophic kidney and cystitis -CT abdomen pelvis noted for R atrophic kidney and cystitis, abdominal pain likely 2/2 cystitis, will continue with ceftriaxone -CT abdomen pelvis noted for R atrophic kidney and cystitis, abdominal pain likely 2/2 cystitis. As of 12/27, pt no longer complaining of abdominal pain

## 2020-12-28 LAB
-  AMIKACIN: SIGNIFICANT CHANGE UP
-  AMOXICILLIN/CLAVULANIC ACID: SIGNIFICANT CHANGE UP
-  AMPICILLIN/SULBACTAM: SIGNIFICANT CHANGE UP
-  AMPICILLIN: SIGNIFICANT CHANGE UP
-  AZTREONAM: SIGNIFICANT CHANGE UP
-  CEFAZOLIN: SIGNIFICANT CHANGE UP
-  CEFEPIME: SIGNIFICANT CHANGE UP
-  CEFOXITIN: SIGNIFICANT CHANGE UP
-  CEFTRIAXONE: SIGNIFICANT CHANGE UP
-  CIPROFLOXACIN: SIGNIFICANT CHANGE UP
-  ERTAPENEM: SIGNIFICANT CHANGE UP
-  GENTAMICIN: SIGNIFICANT CHANGE UP
-  IMIPENEM: SIGNIFICANT CHANGE UP
-  LEVOFLOXACIN: SIGNIFICANT CHANGE UP
-  MEROPENEM: SIGNIFICANT CHANGE UP
-  NITROFURANTOIN: SIGNIFICANT CHANGE UP
-  PIPERACILLIN/TAZOBACTAM: SIGNIFICANT CHANGE UP
-  TIGECYCLINE: SIGNIFICANT CHANGE UP
-  TOBRAMYCIN: SIGNIFICANT CHANGE UP
-  TRIMETHOPRIM/SULFAMETHOXAZOLE: SIGNIFICANT CHANGE UP
ANION GAP SERPL CALC-SCNC: 10 MMOL/L — SIGNIFICANT CHANGE UP (ref 5–17)
BASOPHILS # BLD AUTO: 0.09 K/UL — SIGNIFICANT CHANGE UP (ref 0–0.2)
BASOPHILS NFR BLD AUTO: 0.9 % — SIGNIFICANT CHANGE UP (ref 0–2)
BUN SERPL-MCNC: 26 MG/DL — HIGH (ref 7–23)
CALCIUM SERPL-MCNC: 9.1 MG/DL — SIGNIFICANT CHANGE UP (ref 8.4–10.5)
CHLORIDE SERPL-SCNC: 104 MMOL/L — SIGNIFICANT CHANGE UP (ref 96–108)
CO2 SERPL-SCNC: 23 MMOL/L — SIGNIFICANT CHANGE UP (ref 22–31)
CREAT SERPL-MCNC: 1.03 MG/DL — SIGNIFICANT CHANGE UP (ref 0.5–1.3)
CULTURE RESULTS: SIGNIFICANT CHANGE UP
EOSINOPHIL # BLD AUTO: 0.35 K/UL — SIGNIFICANT CHANGE UP (ref 0–0.5)
EOSINOPHIL NFR BLD AUTO: 3.5 % — SIGNIFICANT CHANGE UP (ref 0–6)
GLUCOSE BLDC GLUCOMTR-MCNC: 109 MG/DL — HIGH (ref 70–99)
GLUCOSE BLDC GLUCOMTR-MCNC: 147 MG/DL — HIGH (ref 70–99)
GLUCOSE BLDC GLUCOMTR-MCNC: 211 MG/DL — HIGH (ref 70–99)
GLUCOSE SERPL-MCNC: 78 MG/DL — SIGNIFICANT CHANGE UP (ref 70–99)
HCT VFR BLD CALC: 38.2 % — SIGNIFICANT CHANGE UP (ref 34.5–45)
HGB BLD-MCNC: 12.5 G/DL — SIGNIFICANT CHANGE UP (ref 11.5–15.5)
IMM GRANULOCYTES NFR BLD AUTO: 2.7 % — HIGH (ref 0–1.5)
LYMPHOCYTES # BLD AUTO: 1.42 K/UL — SIGNIFICANT CHANGE UP (ref 1–3.3)
LYMPHOCYTES # BLD AUTO: 14 % — SIGNIFICANT CHANGE UP (ref 13–44)
MAGNESIUM SERPL-MCNC: 1.8 MG/DL — SIGNIFICANT CHANGE UP (ref 1.6–2.6)
MCHC RBC-ENTMCNC: 30 PG — SIGNIFICANT CHANGE UP (ref 27–34)
MCHC RBC-ENTMCNC: 32.7 GM/DL — SIGNIFICANT CHANGE UP (ref 32–36)
MCV RBC AUTO: 91.6 FL — SIGNIFICANT CHANGE UP (ref 80–100)
METHOD TYPE: SIGNIFICANT CHANGE UP
MONOCYTES # BLD AUTO: 0.91 K/UL — HIGH (ref 0–0.9)
MONOCYTES NFR BLD AUTO: 9 % — SIGNIFICANT CHANGE UP (ref 2–14)
NEUTROPHILS # BLD AUTO: 7.08 K/UL — SIGNIFICANT CHANGE UP (ref 1.8–7.4)
NEUTROPHILS NFR BLD AUTO: 69.9 % — SIGNIFICANT CHANGE UP (ref 43–77)
NRBC # BLD: 0 /100 WBCS — SIGNIFICANT CHANGE UP (ref 0–0)
ORGANISM # SPEC MICROSCOPIC CNT: SIGNIFICANT CHANGE UP
PLATELET # BLD AUTO: 512 K/UL — HIGH (ref 150–400)
POTASSIUM SERPL-MCNC: 4.7 MMOL/L — SIGNIFICANT CHANGE UP (ref 3.5–5.3)
POTASSIUM SERPL-SCNC: 4.7 MMOL/L — SIGNIFICANT CHANGE UP (ref 3.5–5.3)
RBC # BLD: 4.17 M/UL — SIGNIFICANT CHANGE UP (ref 3.8–5.2)
RBC # FLD: 14.3 % — SIGNIFICANT CHANGE UP (ref 10.3–14.5)
SARS-COV-2 RNA SPEC QL NAA+PROBE: DETECTED
SODIUM SERPL-SCNC: 137 MMOL/L — SIGNIFICANT CHANGE UP (ref 135–145)
SPECIMEN SOURCE: SIGNIFICANT CHANGE UP
WBC # BLD: 10.12 K/UL — SIGNIFICANT CHANGE UP (ref 3.8–10.5)
WBC # FLD AUTO: 10.12 K/UL — SIGNIFICANT CHANGE UP (ref 3.8–10.5)

## 2020-12-28 PROCEDURE — 99233 SBSQ HOSP IP/OBS HIGH 50: CPT | Mod: GC

## 2020-12-28 RX ORDER — MAGNESIUM SULFATE 500 MG/ML
2 VIAL (ML) INJECTION ONCE
Refills: 0 | Status: COMPLETED | OUTPATIENT
Start: 2020-12-28 | End: 2020-12-28

## 2020-12-28 RX ADMIN — SENNA PLUS 2 TABLET(S): 8.6 TABLET ORAL at 22:04

## 2020-12-28 RX ADMIN — APIXABAN 2.5 MILLIGRAM(S): 2.5 TABLET, FILM COATED ORAL at 17:42

## 2020-12-28 RX ADMIN — Medication 7 UNIT(S): at 08:50

## 2020-12-28 RX ADMIN — ZINC OXIDE 1 APPLICATION(S): 200 OINTMENT TOPICAL at 17:51

## 2020-12-28 RX ADMIN — PANTOPRAZOLE SODIUM 40 MILLIGRAM(S): 20 TABLET, DELAYED RELEASE ORAL at 05:56

## 2020-12-28 RX ADMIN — Medication 25 MILLIGRAM(S): at 05:55

## 2020-12-28 RX ADMIN — ZINC OXIDE 1 APPLICATION(S): 200 OINTMENT TOPICAL at 05:55

## 2020-12-28 RX ADMIN — INSULIN GLARGINE 10 UNIT(S): 100 INJECTION, SOLUTION SUBCUTANEOUS at 22:03

## 2020-12-28 RX ADMIN — Medication 100 MILLIGRAM(S): at 05:55

## 2020-12-28 RX ADMIN — Medication 25 MILLIGRAM(S): at 17:43

## 2020-12-28 RX ADMIN — APIXABAN 2.5 MILLIGRAM(S): 2.5 TABLET, FILM COATED ORAL at 05:55

## 2020-12-28 RX ADMIN — Medication 100 MILLIGRAM(S): at 17:42

## 2020-12-28 RX ADMIN — Medication 112 MICROGRAM(S): at 05:55

## 2020-12-28 RX ADMIN — Medication 1 DROP(S): at 05:55

## 2020-12-28 RX ADMIN — Medication 7 UNIT(S): at 13:07

## 2020-12-28 RX ADMIN — Medication 50 GRAM(S): at 17:42

## 2020-12-28 RX ADMIN — Medication 7 UNIT(S): at 17:47

## 2020-12-28 RX ADMIN — Medication 1 DROP(S): at 17:51

## 2020-12-28 RX ADMIN — ATORVASTATIN CALCIUM 40 MILLIGRAM(S): 80 TABLET, FILM COATED ORAL at 22:05

## 2020-12-28 RX ADMIN — Medication 5 MILLIGRAM(S): at 22:05

## 2020-12-28 RX ADMIN — AMLODIPINE BESYLATE 5 MILLIGRAM(S): 2.5 TABLET ORAL at 05:55

## 2020-12-28 NOTE — PROGRESS NOTE ADULT - PROBLEM SELECTOR PLAN 10
-Patient seen and evaluated by PT who recommends [facility]. Family in agreement with this. Case mgmt to make arrangements. -Patient seen and evaluated by PT who recommends [facility]. Family in agreement with this. Case mgmt awaiting COVID+ bed or bed at her prior facility [facility] (whichever can happen soonest).

## 2020-12-28 NOTE — PROGRESS NOTE ADULT - PROBLEM SELECTOR PLAN 1
-CT abdomen pelvis noted for R atrophic kidney and cystitis, abdominal pain likely 2/2 cystitis. As of 12/27, pt no longer complaining of abdominal pain

## 2020-12-28 NOTE — PROGRESS NOTE ADULT - SUBJECTIVE AND OBJECTIVE BOX
PROGRESS NOTE:     CONTACT INFO:     Ele Rosen MD PGY-1  Internal Medicine  [phone]    Patient is a 87y old  Female who presents with a chief complaint of Hyponatremia (25 Dec 2020 07:29)      SUBJECTIVE / OVERNIGHT EVENTS: No acute events overnight. This morning, ***  MEDICATIONS  (STANDING):  amLODIPine   Tablet 5 milliGRAM(s) Oral daily  apixaban 2.5 milliGRAM(s) Oral every 12 hours  artificial  tears Solution 1 Drop(s) Both EYES two times a day  atorvastatin 40 milliGRAM(s) Oral at bedtime  dextrose 40% Gel 15 Gram(s) Oral once  dextrose 5%. 1000 milliLiter(s) (50 mL/Hr) IV Continuous <Continuous>  dextrose 5%. 1000 milliLiter(s) (100 mL/Hr) IV Continuous <Continuous>  dextrose 50% Injectable 25 Gram(s) IV Push once  dextrose 50% Injectable 12.5 Gram(s) IV Push once  dextrose 50% Injectable 25 Gram(s) IV Push once  glucagon  Injectable 1 milliGRAM(s) IntraMuscular once  insulin glargine Injectable (LANTUS) 10 Unit(s) SubCutaneous at bedtime  insulin lispro (ADMELOG) corrective regimen sliding scale   SubCutaneous three times a day before meals  insulin lispro (ADMELOG) corrective regimen sliding scale   SubCutaneous at bedtime  insulin lispro Injectable (ADMELOG) 7 Unit(s) SubCutaneous three times a day before meals  levothyroxine 112 MICROGram(s) Oral daily  melatonin 5 milliGRAM(s) Oral at bedtime  metoprolol tartrate 25 milliGRAM(s) Oral two times a day  nitrofurantoin monohydrate/macrocrystals (MACROBID) 100 milliGRAM(s) Oral two times a day  pantoprazole    Tablet 40 milliGRAM(s) Oral before breakfast  polyethylene glycol 3350 17 Gram(s) Oral daily  senna 2 Tablet(s) Oral at bedtime  sodium chloride 0.9%. 1000 milliLiter(s) (70 mL/Hr) IV Continuous <Continuous>  zinc oxide 20% Ointment 1 Application(s) Topical two times a day    MEDICATIONS  (PRN):  acetaminophen   Tablet .. 650 milliGRAM(s) Oral every 6 hours PRN Temp greater or equal to 38C (100.4F), Mild Pain (1 - 3)        CAPILLARY BLOOD GLUCOSE      POCT Blood Glucose.: 202 mg/dL (27 Dec 2020 21:55)  POCT Blood Glucose.: 161 mg/dL (27 Dec 2020 17:48)  POCT Blood Glucose.: 211 mg/dL (27 Dec 2020 12:13)  POCT Blood Glucose.: 103 mg/dL (27 Dec 2020 08:53)      I&O's Detail    27 Dec 2020 07:01  -  28 Dec 2020 07:00  --------------------------------------------------------  IN:    Oral Fluid: 720 mL  Total IN: 720 mL    OUT:    Voided (mL): 2200 mL  Total OUT: 2200 mL    Total NET: -1480 mL            PHYSICAL EXAM:  Vital Signs Last 24 Hrs  T(C): 36.9 (28 Dec 2020 04:32), Max: 36.9 (28 Dec 2020 04:32)  T(F): 98.4 (28 Dec 2020 04:32), Max: 98.4 (28 Dec 2020 04:32)  HR: 84 (28 Dec 2020 04:32) (84 - 100)  BP: 152/80 (28 Dec 2020 04:32) (133/70 - 152/80)  BP(mean): --  RR: 20 (28 Dec 2020 04:32) (18 - 24)  SpO2: 96% (28 Dec 2020 04:32) (93% - 98%)    CONSTITUTIONAL: NAD, well-developed  RESPIRATORY: Normal respiratory effort; lungs are clear to auscultation bilaterally  CARDIOVASCULAR: Regular rate and rhythm, normal S1 and S2, no murmur/rub/gallop; No lower extremity edema  ABDOMEN: Nontender to palpation, normoactive bowel sounds, no rebound/guarding  MUSCLOSKELETAL: no clubbing or cyanosis of digits; no joint swelling or tenderness to palpation  PSYCH: A+O to person; affect appropriate      LABS:                          11.7   8.79  )-----------( 450      ( 26 Dec 2020 07:07 )             35.0     12-    136  |  104  |  25<H>  ----------------------------<  87  5.0   |  20<L>  |  1.09    Ca    8.9      27 Dec 2020 07:06  Phos  3.1       Mg     1.9                       Urinalysis Basic - ( 24 Dec 2020 11:20 )    Color: Light Orange / Appearance: Turbid / S.021 / pH: x  Gluc: x / Ketone: Negative  / Bili: Negative / Urobili: Negative   Blood: x / Protein: 300 mg/dL / Nitrite: Negative   Leuk Esterase: Large / RBC: 23 /hpf / WBC 26-50   Sq Epi: x / Non Sq Epi: F    CULTURE RESULTS:                20 @ 21:33  Specimen Source: --  Method Type: --  Gram Stain - RRL: --  Gram Stain - Wound: --  Bacteria: --  Culture Results:   >100,000 CFU/ml Gram Negative Rods      Specimen Source:   Method Type:   Gram Stain:   Culture Results: Culture Results:   >100,000 CFU/ml Gram Negative Rods (20 @ 21:33)  Culture Results:   No growth to date. (20 @ 16:29)  Culture Results:   No growth to date. (20 @ 16:29)  Culture Results:   No growth to date. (20 @ 10:14)  Culture Results:   No growth to date. (20 @ 10:14)  Culture Results:   No growth (20 @ 16:51)    Bacteria: Bacteria: Many (20 @ 11:20)        RADIOLOGY & ADDITIONAL TESTS:    CT Abdomen and Pelvis     EXAM:  CT ABDOMEN AND PELVIS IC                        PROCEDURE DATE:  2020    INTERPRETATION:  CLINICAL INFORMATION: Abdominal pain.    COMPARISON: CT abdomen and pelvis 2019.  PROCEDURE:  CT of the Abdomen and Pelvis was performed with intravenous contrast.  Intravenous contrast: 90 ml Omnipaque 350. 10 ml discarded.  Oral contrast: None.  Sagittal and coronal reformats were performed.  FINDINGS:  Study partially limited by motion artifact.    LOWER CHEST: Trace pleural effusions and associated compressive atelectasis. Coronary artery calcifications.  LIVER: Within normal limits.  BILE DUCTS: Normal caliber.  GALLBLADDER: Cholelithiasis. The gallbladder is contracted.  SPLEEN: The spleen is normal in size. Again noted is 12 mm simple appearing cyst in the lower pole, unchanged.  PANCREAS: Within normal limits.  ADRENALS: Within normal limits.  KIDNEYS/URETERS: Markedly Atrophic right kidney. The left kidneys normal in size without hydronephrosis or lesions.  BLADDER: Diffuse mild bladder wall thickening and mucosal hyperemia which may be related to cystitis.  REPRODUCTIVE ORGANS: Hysterectomy.  BOWEL: No bowel obstruction. Appendix is normal.  PERITONEUM: No ascites.  VESSELS: Extensive atherosclerotic changes.  RETROPERITONEUM/LYMPH NODES: No lymphadenopathy.  ABDOMINAL WALL: Postsurgical changes.  BONES: Degenerative changes.    IMPRESSION:  No bowel obstruction.  Diffuse urinary bladder wall thickening and mucosal hyperemia which may be related to cystitis. Correlation with urinalysis is recommended.  Extensive atherosclerotic calcifications.  Markedly atrophic right kidney.     PROGRESS NOTE:     CONTACT INFO:     Ele Rosen MD PGY-1  Internal Medicine  [phone]    Patient is a 87y old  Female who presents with a chief complaint of Hyponatremia (25 Dec 2020 07:29)      SUBJECTIVE / OVERNIGHT EVENTS: No acute events overnight. This morning, patient has no complaints. States she slept well, has been eating well, has been moving bowels. Denies abdominal pain, SOB, cough, lightheadedness. A&Ox1      MEDICATIONS  (STANDING):  amLODIPine   Tablet 5 milliGRAM(s) Oral daily  apixaban 2.5 milliGRAM(s) Oral every 12 hours  artificial  tears Solution 1 Drop(s) Both EYES two times a day  atorvastatin 40 milliGRAM(s) Oral at bedtime  dextrose 40% Gel 15 Gram(s) Oral once  dextrose 5%. 1000 milliLiter(s) (50 mL/Hr) IV Continuous <Continuous>  dextrose 5%. 1000 milliLiter(s) (100 mL/Hr) IV Continuous <Continuous>  dextrose 50% Injectable 25 Gram(s) IV Push once  dextrose 50% Injectable 12.5 Gram(s) IV Push once  dextrose 50% Injectable 25 Gram(s) IV Push once  glucagon  Injectable 1 milliGRAM(s) IntraMuscular once  insulin glargine Injectable (LANTUS) 10 Unit(s) SubCutaneous at bedtime  insulin lispro (ADMELOG) corrective regimen sliding scale   SubCutaneous three times a day before meals  insulin lispro (ADMELOG) corrective regimen sliding scale   SubCutaneous at bedtime  insulin lispro Injectable (ADMELOG) 7 Unit(s) SubCutaneous three times a day before meals  levothyroxine 112 MICROGram(s) Oral daily  melatonin 5 milliGRAM(s) Oral at bedtime  metoprolol tartrate 25 milliGRAM(s) Oral two times a day  nitrofurantoin monohydrate/macrocrystals (MACROBID) 100 milliGRAM(s) Oral two times a day  pantoprazole    Tablet 40 milliGRAM(s) Oral before breakfast  polyethylene glycol 3350 17 Gram(s) Oral daily  senna 2 Tablet(s) Oral at bedtime  sodium chloride 0.9%. 1000 milliLiter(s) (70 mL/Hr) IV Continuous <Continuous>  zinc oxide 20% Ointment 1 Application(s) Topical two times a day    MEDICATIONS  (PRN):  acetaminophen   Tablet .. 650 milliGRAM(s) Oral every 6 hours PRN Temp greater or equal to 38C (100.4F), Mild Pain (1 - 3)        CAPILLARY BLOOD GLUCOSE      POCT Blood Glucose.: 202 mg/dL (27 Dec 2020 21:55)  POCT Blood Glucose.: 161 mg/dL (27 Dec 2020 17:48)  POCT Blood Glucose.: 211 mg/dL (27 Dec 2020 12:13)  POCT Blood Glucose.: 103 mg/dL (27 Dec 2020 08:53)      I&O's Detail    27 Dec 2020 07:01  -  28 Dec 2020 07:00  --------------------------------------------------------  IN:    Oral Fluid: 720 mL  Total IN: 720 mL    OUT:    Voided (mL): 2200 mL  Total OUT: 2200 mL    Total NET: -1480 mL            PHYSICAL EXAM:  Vital Signs Last 24 Hrs  T(C): 36.9 (28 Dec 2020 04:32), Max: 36.9 (28 Dec 2020 04:32)  T(F): 98.4 (28 Dec 2020 04:32), Max: 98.4 (28 Dec 2020 04:32)  HR: 84 (28 Dec 2020 04:32) (84 - 100)  BP: 152/80 (28 Dec 2020 04:32) (133/70 - 152/80)  BP(mean): --  RR: 20 (28 Dec 2020 04:32) (18 - 24)  SpO2: 96% (28 Dec 2020 04:32) (93% - 98%)    CONSTITUTIONAL: NAD, well-developed  RESPIRATORY: Normal respiratory effort; lungs are clear to auscultation bilaterally  CARDIOVASCULAR: Regular rate and rhythm, normal S1 and S2, no murmur/rub/gallop; No lower extremity edema  ABDOMEN: Nontender to palpation, normoactive bowel sounds, no rebound/guarding  MUSCLOSKELETAL: no clubbing or cyanosis of digits; no joint swelling or tenderness to palpation  PSYCH: A+O to person; affect appropriate      LABS:                          11.7   8.79  )-----------( 450      ( 26 Dec 2020 07:07 )             35.0     12-27    136  |  104  |  25<H>  ----------------------------<  87  5.0   |  20<L>  |  1.09    Ca    8.9      27 Dec 2020 07:06  Phos  3.1       Mg     1.9                       Urinalysis Basic - ( 24 Dec 2020 11:20 )    Color: Light Orange / Appearance: Turbid / S.021 / pH: x  Gluc: x / Ketone: Negative  / Bili: Negative / Urobili: Negative   Blood: x / Protein: 300 mg/dL / Nitrite: Negative   Leuk Esterase: Large / RBC: 23 /hpf / WBC 26-50   Sq Epi: x / Non Sq Epi: F    CULTURE RESULTS:                20 @ 21:33  Specimen Source: --  Method Type: --  Gram Stain - RRL: --  Gram Stain - Wound: --  Bacteria: --  Culture Results:   >100,000 CFU/ml Gram Negative Rods      Specimen Source:   Method Type:   Gram Stain:   Culture Results: Culture Results:   >100,000 CFU/ml Gram Negative Rods (20 @ 21:33)  Culture Results:   No growth to date. (20 @ 16:29)  Culture Results:   No growth to date. (20 @ 16:29)  Culture Results:   No growth to date. (20 @ 10:14)  Culture Results:   No growth to date. (20 @ 10:14)  Culture Results:   No growth (20 @ 16:51)    Bacteria: Bacteria: Many (20 @ 11:20)        RADIOLOGY & ADDITIONAL TESTS:    CT Abdomen and Pelvis     EXAM:  CT ABDOMEN AND PELVIS IC                        PROCEDURE DATE:  2020    INTERPRETATION:  CLINICAL INFORMATION: Abdominal pain.    COMPARISON: CT abdomen and pelvis 2019.  PROCEDURE:  CT of the Abdomen and Pelvis was performed with intravenous contrast.  Intravenous contrast: 90 ml Omnipaque 350. 10 ml discarded.  Oral contrast: None.  Sagittal and coronal reformats were performed.  FINDINGS:  Study partially limited by motion artifact.    LOWER CHEST: Trace pleural effusions and associated compressive atelectasis. Coronary artery calcifications.  LIVER: Within normal limits.  BILE DUCTS: Normal caliber.  GALLBLADDER: Cholelithiasis. The gallbladder is contracted.  SPLEEN: The spleen is normal in size. Again noted is 12 mm simple appearing cyst in the lower pole, unchanged.  PANCREAS: Within normal limits.  ADRENALS: Within normal limits.  KIDNEYS/URETERS: Markedly Atrophic right kidney. The left kidneys normal in size without hydronephrosis or lesions.  BLADDER: Diffuse mild bladder wall thickening and mucosal hyperemia which may be related to cystitis.  REPRODUCTIVE ORGANS: Hysterectomy.  BOWEL: No bowel obstruction. Appendix is normal.  PERITONEUM: No ascites.  VESSELS: Extensive atherosclerotic changes.  RETROPERITONEUM/LYMPH NODES: No lymphadenopathy.  ABDOMINAL WALL: Postsurgical changes.  BONES: Degenerative changes.    IMPRESSION:  No bowel obstruction.  Diffuse urinary bladder wall thickening and mucosal hyperemia which may be related to cystitis. Correlation with urinalysis is recommended.  Extensive atherosclerotic calcifications.  Markedly atrophic right kidney.     PROGRESS NOTE:     CONTACT INFO:     Ele Rosen MD PGY-1  Internal Medicine  [phone]    Patient is a 87y old  Female who presents with a chief complaint of Hyponatremia (25 Dec 2020 07:29)      SUBJECTIVE / OVERNIGHT EVENTS: No acute events overnight. This morning, patient has no complaints. States she slept well, has been eating well, has been moving bowels. Denies abdominal pain, SOB, cough, lightheadedness. A&Ox1      MEDICATIONS  (STANDING):  amLODIPine   Tablet 5 milliGRAM(s) Oral daily  apixaban 2.5 milliGRAM(s) Oral every 12 hours  artificial  tears Solution 1 Drop(s) Both EYES two times a day  atorvastatin 40 milliGRAM(s) Oral at bedtime  dextrose 40% Gel 15 Gram(s) Oral once  dextrose 5%. 1000 milliLiter(s) (50 mL/Hr) IV Continuous <Continuous>  dextrose 5%. 1000 milliLiter(s) (100 mL/Hr) IV Continuous <Continuous>  dextrose 50% Injectable 25 Gram(s) IV Push once  dextrose 50% Injectable 12.5 Gram(s) IV Push once  dextrose 50% Injectable 25 Gram(s) IV Push once  glucagon  Injectable 1 milliGRAM(s) IntraMuscular once  insulin glargine Injectable (LANTUS) 10 Unit(s) SubCutaneous at bedtime  insulin lispro (ADMELOG) corrective regimen sliding scale   SubCutaneous three times a day before meals  insulin lispro (ADMELOG) corrective regimen sliding scale   SubCutaneous at bedtime  insulin lispro Injectable (ADMELOG) 7 Unit(s) SubCutaneous three times a day before meals  levothyroxine 112 MICROGram(s) Oral daily  melatonin 5 milliGRAM(s) Oral at bedtime  metoprolol tartrate 25 milliGRAM(s) Oral two times a day  nitrofurantoin monohydrate/macrocrystals (MACROBID) 100 milliGRAM(s) Oral two times a day  pantoprazole    Tablet 40 milliGRAM(s) Oral before breakfast  polyethylene glycol 3350 17 Gram(s) Oral daily  senna 2 Tablet(s) Oral at bedtime  sodium chloride 0.9%. 1000 milliLiter(s) (70 mL/Hr) IV Continuous <Continuous>  zinc oxide 20% Ointment 1 Application(s) Topical two times a day    MEDICATIONS  (PRN):  acetaminophen   Tablet .. 650 milliGRAM(s) Oral every 6 hours PRN Temp greater or equal to 38C (100.4F), Mild Pain (1 - 3)      CAPILLARY BLOOD GLUCOSE      POCT Blood Glucose.: 109 mg/dL (28 Dec 2020 12:34)  POCT Blood Glucose.: 94 mg/dL (28 Dec 2020 08:43)  POCT Blood Glucose.: 202 mg/dL (27 Dec 2020 21:55)  POCT Blood Glucose.: 161 mg/dL (27 Dec 2020 17:48)      I&O's Detail    27 Dec 2020 07:01  -  28 Dec 2020 07:00  --------------------------------------------------------  IN:    Oral Fluid: 720 mL  Total IN: 720 mL    OUT:    Voided (mL): 2200 mL  Total OUT: 2200 mL    Total NET: -1480 mL        PHYSICAL EXAM:  Vital Signs Last 24 Hrs  T(C): 36.9 (28 Dec 2020 04:32), Max: 36.9 (28 Dec 2020 04:32)  T(F): 98.4 (28 Dec 2020 04:32), Max: 98.4 (28 Dec 2020 04:32)  HR: 84 (28 Dec 2020 04:32) (84 - 100)  BP: 152/80 (28 Dec 2020 04:32) (133/70 - 152/80)  BP(mean): --  RR: 20 (28 Dec 2020 04:32) (18 - 24)  SpO2: 96% (28 Dec 2020 04:32) (93% - 98%)    CONSTITUTIONAL: NAD, well-developed  RESPIRATORY: Normal respiratory effort; lungs are clear to auscultation bilaterally  CARDIOVASCULAR: Regular rate and rhythm, normal S1 and S2, no murmur/rub/gallop; No lower extremity edema  ABDOMEN: Nontender to palpation, normoactive bowel sounds, no rebound/guarding  MUSCLOSKELETAL: no clubbing or cyanosis of digits; no joint swelling or tenderness to palpation  PSYCH: A+O to person; affect appropriate      LABS:  cret                        12.5   10.12 )-----------( 512      ( 28 Dec 2020 06:25 )             38.2     12-    137  |  104  |  26<H>  ----------------------------<  78  4.7   |  23  |  1.03    Ca    9.1      28 Dec 2020 06:25  Mg     1.8           Urinalysis Basic - ( 24 Dec 2020 11:20 )    Color: Light Orange / Appearance: Turbid / S.021 / pH: x  Gluc: x / Ketone: Negative  / Bili: Negative / Urobili: Negative   Blood: x / Protein: 300 mg/dL / Nitrite: Negative   Leuk Esterase: Large / RBC: 23 /hpf / WBC 26-50   Sq Epi: x / Non Sq Epi: F    CULTURE RESULTS:                20 @ 21:33  Specimen Source: --  Method Type: --  Gram Stain - RRL: --  Gram Stain - Wound: --  Bacteria: --  Culture Results:   >100,000 CFU/ml Gram Negative Rods      Specimen Source:   Method Type:   Gram Stain:   Culture Results: Culture Results:   >100,000 CFU/ml Gram Negative Rods (20 @ 21:33)  Culture Results:   No growth to date. (20 @ 16:29)  Culture Results:   No growth to date. (20 @ 16:29)  Culture Results:   No growth to date. (20 @ 10:14)  Culture Results:   No growth to date. (20 @ 10:14)  Culture Results:   No growth (20 @ 16:51)    Bacteria: Bacteria: Many (20 @ 11:20)        RADIOLOGY & ADDITIONAL TESTS:    CT Abdomen and Pelvis     EXAM:  CT ABDOMEN AND PELVIS IC                        PROCEDURE DATE:  2020    INTERPRETATION:  CLINICAL INFORMATION: Abdominal pain.    COMPARISON: CT abdomen and pelvis 2019.  PROCEDURE:  CT of the Abdomen and Pelvis was performed with intravenous contrast.  Intravenous contrast: 90 ml Omnipaque 350. 10 ml discarded.  Oral contrast: None.  Sagittal and coronal reformats were performed.  FINDINGS:  Study partially limited by motion artifact.    LOWER CHEST: Trace pleural effusions and associated compressive atelectasis. Coronary artery calcifications.  LIVER: Within normal limits.  BILE DUCTS: Normal caliber.  GALLBLADDER: Cholelithiasis. The gallbladder is contracted.  SPLEEN: The spleen is normal in size. Again noted is 12 mm simple appearing cyst in the lower pole, unchanged.  PANCREAS: Within normal limits.  ADRENALS: Within normal limits.  KIDNEYS/URETERS: Markedly Atrophic right kidney. The left kidneys normal in size without hydronephrosis or lesions.  BLADDER: Diffuse mild bladder wall thickening and mucosal hyperemia which may be related to cystitis.  REPRODUCTIVE ORGANS: Hysterectomy.  BOWEL: No bowel obstruction. Appendix is normal.  PERITONEUM: No ascites.  VESSELS: Extensive atherosclerotic changes.  RETROPERITONEUM/LYMPH NODES: No lymphadenopathy.  ABDOMINAL WALL: Postsurgical changes.  BONES: Degenerative changes.    IMPRESSION:  No bowel obstruction.  Diffuse urinary bladder wall thickening and mucosal hyperemia which may be related to cystitis. Correlation with urinalysis is recommended.  Extensive atherosclerotic calcifications.  Markedly atrophic right kidney.

## 2020-12-28 NOTE — PROGRESS NOTE ADULT - PROBLEM SELECTOR PLAN 3
Pt returned home from a 5 week rehab stay on 12/11, tested + for COVID in ED on presentation. Currently without any respiratory symptoms, CXR clear.   -Will continue with conservative mgmt as patient is not currently symptomatic  -If pt becomes SOB, will start remdecivir and decadron, supplemental oxygen prn.  -Covid swab done 12/27 Pt returned home from a 5 week rehab stay on 12/11, tested + for COVID in ED on presentation. Currently without any respiratory symptoms, CXR clear.   -Will continue with conservative mgmt as patient is not currently symptomatic  -If pt becomes SOB, will start remdecivir and decadron, supplemental oxygen prn.  -Repeat COVID swab done 12/28

## 2020-12-28 NOTE — PROGRESS NOTE ADULT - PROBLEM SELECTOR PLAN 2
Pt febrile to 101.7 12/24 am, with leukocytosis to 15k. Will get infectious workup in the setting of known COVID infxn.  -U/A positive for UTI cx: E coli, ceftriaxone started 12/24-12/26 will give 4 day course of bactrim.   -CT abdomen as above  - D Dimer, Ferritin, LDH Pt febrile to 101.7 12/24 am, with leukocytosis to 15k. met sepsis criteria on admission now resolved.  -U/A positive for UTI cx: E coli, ceftriaxone started 12/24-12/26 will give 4 day course of bactrim.   -CT abdomen as above  - D Dimer, Ferritin, LDH Pt febrile to 101.7 12/24 am, with leukocytosis to 15k. met sepsis criteria on admission now resolved.  -U/A positive for UTI cx: E coli, ceftriaxone started 12/24-12/26 will give 4 day course of macrobid.  -CT abdomen as above.

## 2020-12-28 NOTE — PROGRESS NOTE ADULT - ATTENDING COMMENTS
Oliva Dey MD  Division of Hospital Medicine  [facility]   Pager: [phone]    Patient seen and examined today with Team 5 Resident and Interns. 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     1. E. Coli UTI: s/p ceftriaxone x 3 days. continue with nitrofurantoin to complete total 7 day course (last day 12/30/20)  2. COVID-19: not hypoxic, not requiring treatment for COVID-19 at this time.  3. Hypoantremia: resolved s/p IVF.    COVID-19 swab sent today 12/28.  Discharge planning to [facility] pending negative COVID swab.    Rest as detailed in note above.

## 2020-12-28 NOTE — PROGRESS NOTE ADULT - PROBLEM SELECTOR PLAN 4
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 Resolved with IV fluids. 127 on admission.  -Patient appears clinically euvolemic  -Pt off fluids, eating and drinking well.

## 2020-12-29 LAB
CULTURE RESULTS: SIGNIFICANT CHANGE UP
CULTURE RESULTS: SIGNIFICANT CHANGE UP
GLUCOSE BLDC GLUCOMTR-MCNC: 112 MG/DL — HIGH (ref 70–99)
GLUCOSE BLDC GLUCOMTR-MCNC: 140 MG/DL — HIGH (ref 70–99)
GLUCOSE BLDC GLUCOMTR-MCNC: 202 MG/DL — HIGH (ref 70–99)
GLUCOSE BLDC GLUCOMTR-MCNC: 99 MG/DL — SIGNIFICANT CHANGE UP (ref 70–99)
SPECIMEN SOURCE: SIGNIFICANT CHANGE UP
SPECIMEN SOURCE: SIGNIFICANT CHANGE UP

## 2020-12-29 PROCEDURE — 99232 SBSQ HOSP IP/OBS MODERATE 35: CPT | Mod: GC

## 2020-12-29 RX ADMIN — PANTOPRAZOLE SODIUM 40 MILLIGRAM(S): 20 TABLET, DELAYED RELEASE ORAL at 06:39

## 2020-12-29 RX ADMIN — APIXABAN 2.5 MILLIGRAM(S): 2.5 TABLET, FILM COATED ORAL at 17:26

## 2020-12-29 RX ADMIN — Medication 25 MILLIGRAM(S): at 06:39

## 2020-12-29 RX ADMIN — AMLODIPINE BESYLATE 5 MILLIGRAM(S): 2.5 TABLET ORAL at 06:39

## 2020-12-29 RX ADMIN — Medication 1 DROP(S): at 17:28

## 2020-12-29 RX ADMIN — Medication 100 MILLIGRAM(S): at 06:42

## 2020-12-29 RX ADMIN — Medication 7 UNIT(S): at 12:55

## 2020-12-29 RX ADMIN — APIXABAN 2.5 MILLIGRAM(S): 2.5 TABLET, FILM COATED ORAL at 06:39

## 2020-12-29 RX ADMIN — ZINC OXIDE 1 APPLICATION(S): 200 OINTMENT TOPICAL at 17:26

## 2020-12-29 RX ADMIN — Medication 112 MICROGRAM(S): at 06:39

## 2020-12-29 RX ADMIN — Medication 7 UNIT(S): at 08:55

## 2020-12-29 RX ADMIN — Medication 5 MILLIGRAM(S): at 22:43

## 2020-12-29 RX ADMIN — INSULIN GLARGINE 10 UNIT(S): 100 INJECTION, SOLUTION SUBCUTANEOUS at 22:43

## 2020-12-29 RX ADMIN — Medication 1 DROP(S): at 06:39

## 2020-12-29 RX ADMIN — ATORVASTATIN CALCIUM 40 MILLIGRAM(S): 80 TABLET, FILM COATED ORAL at 22:43

## 2020-12-29 RX ADMIN — Medication 25 MILLIGRAM(S): at 17:28

## 2020-12-29 RX ADMIN — SENNA PLUS 2 TABLET(S): 8.6 TABLET ORAL at 22:43

## 2020-12-29 RX ADMIN — ZINC OXIDE 1 APPLICATION(S): 200 OINTMENT TOPICAL at 06:38

## 2020-12-29 RX ADMIN — Medication 100 MILLIGRAM(S): at 17:28

## 2020-12-29 NOTE — PROGRESS NOTE ADULT - PROBLEM SELECTOR PLAN 3
Pt returned home from a 5 week rehab stay on 12/11, tested + for COVID in ED on presentation. Currently without any respiratory symptoms, CXR clear.   -Will continue with conservative mgmt as patient is not currently symptomatic  -If pt becomes SOB, will start remdecivir and decadron, supplemental oxygen prn.  -Repeat COVID swab done 12/28

## 2020-12-29 NOTE — PROGRESS NOTE ADULT - PROBLEM SELECTOR PLAN 4
Resolved with IV fluids. 127 on admission.  -Patient appears clinically euvolemic  -Pt off fluids, eating and drinking well.

## 2020-12-29 NOTE — PROGRESS NOTE ADULT - PROBLEM SELECTOR PLAN 2
Pt febrile to 101.7 12/24 am, with leukocytosis to 15k. met sepsis criteria on admission now resolved.  -U/A positive for UTI cx: E coli, ceftriaxone started 12/24-12/26 will give 4 day course of macrobid.  -CT abdomen as above.

## 2020-12-29 NOTE — PROGRESS NOTE ADULT - ATTENDING COMMENTS
Oliva Dey MD  Division of Hospital Medicine  [facility]   Pager: [phone]    Patient seen and examined today with Team 5 Resident and Interns. 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     1. E. Coli UTI: s/p ceftriaxone x 3 days. continue with nitrofurantoin to complete total 7 day course (last day 12/30/20)  2. COVID-19: not hypoxic, not requiring treatment for COVID-19 at this time.  3. Hyponatremia: resolved s/p IVF.    Discharge planning to [facility] pending negative COVID swab (or to [facility] if they accept her)  Repeat COVID swab tomorrow 12/30.    Rest as detailed in note above.

## 2020-12-29 NOTE — PROGRESS NOTE ADULT - PROBLEM SELECTOR PLAN 10
-Patient seen and evaluated by PT who recommends [facility]. Family in agreement with this. Case mgmt arranging [facility], No COVID+ facilities, will have to wait until pt clears infxn. Will retest 12/30. Continue with PT while inpatient.

## 2020-12-29 NOTE — PROGRESS NOTE ADULT - SUBJECTIVE AND OBJECTIVE BOX
PROGRESS NOTE:     CONTACT INFO:     Ele Rosen MD PGY-1  Internal Medicine  [phone]    Patient is a 87y old  Female who presents with a chief complaint of Hyponatremia (25 Dec 2020 07:29)      SUBJECTIVE / OVERNIGHT EVENTS: No acute events overnight. This morning, patient has no complaints. Eating well, has been moving bowels. Denies abdominal pain, SOB, cough, lightheadedness. A&Ox1      MEDICATIONS  (STANDING):  amLODIPine   Tablet 5 milliGRAM(s) Oral daily  apixaban 2.5 milliGRAM(s) Oral every 12 hours  artificial  tears Solution 1 Drop(s) Both EYES two times a day  atorvastatin 40 milliGRAM(s) Oral at bedtime  dextrose 40% Gel 15 Gram(s) Oral once  dextrose 5%. 1000 milliLiter(s) (50 mL/Hr) IV Continuous <Continuous>  dextrose 5%. 1000 milliLiter(s) (100 mL/Hr) IV Continuous <Continuous>  dextrose 50% Injectable 25 Gram(s) IV Push once  dextrose 50% Injectable 12.5 Gram(s) IV Push once  dextrose 50% Injectable 25 Gram(s) IV Push once  glucagon  Injectable 1 milliGRAM(s) IntraMuscular once  insulin glargine Injectable (LANTUS) 10 Unit(s) SubCutaneous at bedtime  insulin lispro (ADMELOG) corrective regimen sliding scale   SubCutaneous three times a day before meals  insulin lispro (ADMELOG) corrective regimen sliding scale   SubCutaneous at bedtime  insulin lispro Injectable (ADMELOG) 7 Unit(s) SubCutaneous three times a day before meals  levothyroxine 112 MICROGram(s) Oral daily  melatonin 5 milliGRAM(s) Oral at bedtime  metoprolol tartrate 25 milliGRAM(s) Oral two times a day  nitrofurantoin monohydrate/macrocrystals (MACROBID) 100 milliGRAM(s) Oral two times a day  pantoprazole    Tablet 40 milliGRAM(s) Oral before breakfast  polyethylene glycol 3350 17 Gram(s) Oral daily  senna 2 Tablet(s) Oral at bedtime  sodium chloride 0.9%. 1000 milliLiter(s) (70 mL/Hr) IV Continuous <Continuous>  zinc oxide 20% Ointment 1 Application(s) Topical two times a day    MEDICATIONS  (PRN):  acetaminophen   Tablet .. 650 milliGRAM(s) Oral every 6 hours PRN Temp greater or equal to 38C (100.4F), Mild Pain (1 - 3)      CAPILLARY BLOOD GLUCOSE  POCT Blood Glucose.: 112 mg/dL (29 Dec 2020 08:38)  POCT Blood Glucose.: 211 mg/dL (28 Dec 2020 21:18)  POCT Blood Glucose.: 147 mg/dL (28 Dec 2020 17:22)  POCT Blood Glucose.: 109 mg/dL (28 Dec 2020 12:34)    I&O's Detail    29 Dec 2020 07:01  -  29 Dec 2020 12:00  --------------------------------------------------------  IN:    Oral Fluid: 500 mL  Total IN: 500 mL    OUT:  Total OUT: 0 mL    Total NET: 500 mL      PHYSICAL EXAM:  Vital Signs Last 24 Hrs  T(C): 36.8 (29 Dec 2020 06:23), Max: 36.8 (29 Dec 2020 06:23)  T(F): 98.3 (29 Dec 2020 06:23), Max: 98.3 (29 Dec 2020 06:23)  HR: 86 (29 Dec 2020 06:23) (67 - 89)  BP: 100/80 (29 Dec 2020 06:23) (100/80 - 115/69)  BP(mean): --  RR: 18 (29 Dec 2020 06:23) (18 - 18)  SpO2: 95% (29 Dec 2020 06:23) (93% - 96%)    CONSTITUTIONAL: NAD, well-developed  RESPIRATORY: Normal respiratory effort; lungs are clear to auscultation bilaterally  CARDIOVASCULAR: Regular rate and rhythm, normal S1 and S2, no murmur/rub/gallop; No lower extremity edema  ABDOMEN: Nontender to palpation, normoactive bowel sounds, no rebound/guarding  MUSCLOSKELETAL: no clubbing or cyanosis of digits; no joint swelling or tenderness to palpation  PSYCH: A+O to person; affect appropriate    LABS:               12.5   10.12 )-----------( 512      ( 28 Dec 2020 06:25 )             38.2     12-    137  |  104  |  26<H>  ----------------------------<  78  4.7   |  23  |  1.03    Ca    9.1      28 Dec 2020 06:25  Mg     1.8           Urinalysis Basic - ( 24 Dec 2020 11:20 )    Color: Light Orange / Appearance: Turbid / S.021 / pH: x  Gluc: x / Ketone: Negative  / Bili: Negative / Urobili: Negative   Blood: x / Protein: 300 mg/dL / Nitrite: Negative   Leuk Esterase: Large / RBC: 23 /hpf / WBC 26-50   Sq Epi: x / Non Sq Epi: F    CULTURE RESULTS:                20 @ 21:33  Specimen Source: --  Method Type: --  Gram Stain - RRL: --  Gram Stain - Wound: --  Bacteria: --  Culture Results:   >100,000 CFU/ml Gram Negative Rods      Specimen Source:   Method Type:   Gram Stain:   Culture Results: Culture Results:   >100,000 CFU/ml Gram Negative Rods (20 @ 21:33)  Culture Results:   No growth to date. (20 @ 16:29)  Culture Results:   No growth to date. (20 @ 16:29)  Culture Results:   No growth to date. (20 @ 10:14)  Culture Results:   No growth to date. (20 @ 10:14)  Culture Results:   No growth (20 @ 16:51)    Bacteria: Bacteria: Many (20 @ 11:20)        RADIOLOGY & ADDITIONAL TESTS:    CT Abdomen and Pelvis     EXAM:  CT ABDOMEN AND PELVIS IC                        PROCEDURE DATE:  2020    INTERPRETATION:  CLINICAL INFORMATION: Abdominal pain.    COMPARISON: CT abdomen and pelvis 2019.  PROCEDURE:  CT of the Abdomen and Pelvis was performed with intravenous contrast.  Intravenous contrast: 90 ml Omnipaque 350. 10 ml discarded.  Oral contrast: None.  Sagittal and coronal reformats were performed.  FINDINGS:  Study partially limited by motion artifact.    LOWER CHEST: Trace pleural effusions and associated compressive atelectasis. Coronary artery calcifications.  LIVER: Within normal limits.  BILE DUCTS: Normal caliber.  GALLBLADDER: Cholelithiasis. The gallbladder is contracted.  SPLEEN: The spleen is normal in size. Again noted is 12 mm simple appearing cyst in the lower pole, unchanged.  PANCREAS: Within normal limits.  ADRENALS: Within normal limits.  KIDNEYS/URETERS: Markedly Atrophic right kidney. The left kidneys normal in size without hydronephrosis or lesions.  BLADDER: Diffuse mild bladder wall thickening and mucosal hyperemia which may be related to cystitis.  REPRODUCTIVE ORGANS: Hysterectomy.  BOWEL: No bowel obstruction. Appendix is normal.  PERITONEUM: No ascites.  VESSELS: Extensive atherosclerotic changes.  RETROPERITONEUM/LYMPH NODES: No lymphadenopathy.  ABDOMINAL WALL: Postsurgical changes.  BONES: Degenerative changes.    IMPRESSION:  No bowel obstruction.  Diffuse urinary bladder wall thickening and mucosal hyperemia which may be related to cystitis. Correlation with urinalysis is recommended.  Extensive atherosclerotic calcifications.  Markedly atrophic right kidney.

## 2020-12-30 DIAGNOSIS — N39.0 URINARY TRACT INFECTION, SITE NOT SPECIFIED: ICD-10-CM

## 2020-12-30 DIAGNOSIS — N17.9 ACUTE KIDNEY FAILURE, UNSPECIFIED: ICD-10-CM

## 2020-12-30 LAB
ANION GAP SERPL CALC-SCNC: 13 MMOL/L — SIGNIFICANT CHANGE UP (ref 5–17)
BASOPHILS # BLD AUTO: 0.08 K/UL — SIGNIFICANT CHANGE UP (ref 0–0.2)
BASOPHILS NFR BLD AUTO: 0.8 % — SIGNIFICANT CHANGE UP (ref 0–2)
BUN SERPL-MCNC: 40 MG/DL — HIGH (ref 7–23)
CALCIUM SERPL-MCNC: 9.9 MG/DL — SIGNIFICANT CHANGE UP (ref 8.4–10.5)
CHLORIDE SERPL-SCNC: 103 MMOL/L — SIGNIFICANT CHANGE UP (ref 96–108)
CO2 SERPL-SCNC: 22 MMOL/L — SIGNIFICANT CHANGE UP (ref 22–31)
CREAT SERPL-MCNC: 1.42 MG/DL — HIGH (ref 0.5–1.3)
EOSINOPHIL # BLD AUTO: 0.37 K/UL — SIGNIFICANT CHANGE UP (ref 0–0.5)
EOSINOPHIL NFR BLD AUTO: 3.7 % — SIGNIFICANT CHANGE UP (ref 0–6)
GLUCOSE BLDC GLUCOMTR-MCNC: 111 MG/DL — HIGH (ref 70–99)
GLUCOSE BLDC GLUCOMTR-MCNC: 112 MG/DL — HIGH (ref 70–99)
GLUCOSE BLDC GLUCOMTR-MCNC: 200 MG/DL — HIGH (ref 70–99)
GLUCOSE BLDC GLUCOMTR-MCNC: 228 MG/DL — HIGH (ref 70–99)
GLUCOSE SERPL-MCNC: 105 MG/DL — HIGH (ref 70–99)
HCT VFR BLD CALC: 35.9 % — SIGNIFICANT CHANGE UP (ref 34.5–45)
HGB BLD-MCNC: 11.5 G/DL — SIGNIFICANT CHANGE UP (ref 11.5–15.5)
IMM GRANULOCYTES NFR BLD AUTO: 4.2 % — HIGH (ref 0–1.5)
LYMPHOCYTES # BLD AUTO: 1.7 K/UL — SIGNIFICANT CHANGE UP (ref 1–3.3)
LYMPHOCYTES # BLD AUTO: 16.8 % — SIGNIFICANT CHANGE UP (ref 13–44)
MAGNESIUM SERPL-MCNC: 2.2 MG/DL — SIGNIFICANT CHANGE UP (ref 1.6–2.6)
MCHC RBC-ENTMCNC: 29.8 PG — SIGNIFICANT CHANGE UP (ref 27–34)
MCHC RBC-ENTMCNC: 32 GM/DL — SIGNIFICANT CHANGE UP (ref 32–36)
MCV RBC AUTO: 93 FL — SIGNIFICANT CHANGE UP (ref 80–100)
MONOCYTES # BLD AUTO: 0.95 K/UL — HIGH (ref 0–0.9)
MONOCYTES NFR BLD AUTO: 9.4 % — SIGNIFICANT CHANGE UP (ref 2–14)
NEUTROPHILS # BLD AUTO: 6.58 K/UL — SIGNIFICANT CHANGE UP (ref 1.8–7.4)
NEUTROPHILS NFR BLD AUTO: 65.1 % — SIGNIFICANT CHANGE UP (ref 43–77)
NRBC # BLD: 0 /100 WBCS — SIGNIFICANT CHANGE UP (ref 0–0)
PHOSPHATE SERPL-MCNC: 4.4 MG/DL — SIGNIFICANT CHANGE UP (ref 2.5–4.5)
PLATELET # BLD AUTO: 550 K/UL — HIGH (ref 150–400)
POTASSIUM SERPL-MCNC: 4.5 MMOL/L — SIGNIFICANT CHANGE UP (ref 3.5–5.3)
POTASSIUM SERPL-SCNC: 4.5 MMOL/L — SIGNIFICANT CHANGE UP (ref 3.5–5.3)
RBC # BLD: 3.86 M/UL — SIGNIFICANT CHANGE UP (ref 3.8–5.2)
RBC # FLD: 14.5 % — SIGNIFICANT CHANGE UP (ref 10.3–14.5)
SARS-COV-2 RNA SPEC QL NAA+PROBE: DETECTED
SODIUM SERPL-SCNC: 138 MMOL/L — SIGNIFICANT CHANGE UP (ref 135–145)
WBC # BLD: 10.1 K/UL — SIGNIFICANT CHANGE UP (ref 3.8–10.5)
WBC # FLD AUTO: 10.1 K/UL — SIGNIFICANT CHANGE UP (ref 3.8–10.5)

## 2020-12-30 PROCEDURE — 99233 SBSQ HOSP IP/OBS HIGH 50: CPT | Mod: GC

## 2020-12-30 RX ORDER — INSULIN LISPRO 100/ML
6 VIAL (ML) SUBCUTANEOUS
Refills: 0 | Status: DISCONTINUED | OUTPATIENT
Start: 2020-12-30 | End: 2020-12-31

## 2020-12-30 RX ORDER — SODIUM CHLORIDE 9 MG/ML
1000 INJECTION INTRAMUSCULAR; INTRAVENOUS; SUBCUTANEOUS
Refills: 0 | Status: DISCONTINUED | OUTPATIENT
Start: 2020-12-30 | End: 2020-12-31

## 2020-12-30 RX ORDER — INSULIN GLARGINE 100 [IU]/ML
8 INJECTION, SOLUTION SUBCUTANEOUS AT BEDTIME
Refills: 0 | Status: DISCONTINUED | OUTPATIENT
Start: 2020-12-30 | End: 2020-12-31

## 2020-12-30 RX ADMIN — AMLODIPINE BESYLATE 5 MILLIGRAM(S): 2.5 TABLET ORAL at 06:50

## 2020-12-30 RX ADMIN — Medication 100 MILLIGRAM(S): at 18:23

## 2020-12-30 RX ADMIN — Medication 5 MILLIGRAM(S): at 22:34

## 2020-12-30 RX ADMIN — ATORVASTATIN CALCIUM 40 MILLIGRAM(S): 80 TABLET, FILM COATED ORAL at 22:34

## 2020-12-30 RX ADMIN — Medication 7 UNIT(S): at 09:18

## 2020-12-30 RX ADMIN — ZINC OXIDE 1 APPLICATION(S): 200 OINTMENT TOPICAL at 06:50

## 2020-12-30 RX ADMIN — SODIUM CHLORIDE 75 MILLILITER(S): 9 INJECTION INTRAMUSCULAR; INTRAVENOUS; SUBCUTANEOUS at 13:40

## 2020-12-30 RX ADMIN — PANTOPRAZOLE SODIUM 40 MILLIGRAM(S): 20 TABLET, DELAYED RELEASE ORAL at 06:49

## 2020-12-30 RX ADMIN — Medication 112 MICROGRAM(S): at 06:50

## 2020-12-30 RX ADMIN — APIXABAN 2.5 MILLIGRAM(S): 2.5 TABLET, FILM COATED ORAL at 06:50

## 2020-12-30 RX ADMIN — SENNA PLUS 2 TABLET(S): 8.6 TABLET ORAL at 22:34

## 2020-12-30 RX ADMIN — Medication 25 MILLIGRAM(S): at 18:23

## 2020-12-30 RX ADMIN — Medication 100 MILLIGRAM(S): at 06:50

## 2020-12-30 RX ADMIN — ZINC OXIDE 1 APPLICATION(S): 200 OINTMENT TOPICAL at 18:23

## 2020-12-30 RX ADMIN — Medication 1 DROP(S): at 18:23

## 2020-12-30 RX ADMIN — Medication 4: at 18:24

## 2020-12-30 RX ADMIN — POLYETHYLENE GLYCOL 3350 17 GRAM(S): 17 POWDER, FOR SOLUTION ORAL at 13:40

## 2020-12-30 RX ADMIN — INSULIN GLARGINE 8 UNIT(S): 100 INJECTION, SOLUTION SUBCUTANEOUS at 22:33

## 2020-12-30 RX ADMIN — APIXABAN 2.5 MILLIGRAM(S): 2.5 TABLET, FILM COATED ORAL at 18:23

## 2020-12-30 RX ADMIN — Medication 25 MILLIGRAM(S): at 06:49

## 2020-12-30 RX ADMIN — Medication 1 DROP(S): at 06:50

## 2020-12-30 RX ADMIN — Medication 6 UNIT(S): at 18:24

## 2020-12-30 NOTE — PROGRESS NOTE ADULT - ATTENDING COMMENTS
Oliva Dey MD  Division of Hospital Medicine  [facility]   Pager: [phone]    Patient seen and examined today with Team 5 Resident and Interns. 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    1. MIKKI: new MIKKI with Cr 1.42 from 1.03. bladder scan with 67 cc, voiding well. will hydrate with NS @ 75 cc/hr for total 1L only. reassess Cr in AM.   2. E. Coli UTI: s/p ceftriaxone x 3 days. continue with nitrofurantoin to complete total 7 day course (last day 12/31/20)  3. COVID-19: not hypoxic, not requiring treatment for COVID-19 at this time.  4. T2DM: decreased lantus to 8 units and pre-meal admelog to 6 units qAC. will continue to adjust based on FS   5. Hyponatremia: resolved s/p IVF.    Discharge planning to [facility] pending negative COVID swab (or to [facility] if they accept her)  Repeat COVID swab sent today 12/30.    Rest as detailed in note above.

## 2020-12-30 NOTE — PROGRESS NOTE ADULT - PROBLEM SELECTOR PLAN 9
-SSI with FS before meals and at bedtime  -adjusted insulin: now getting 7units with meals and 10units lantus at bedtime -Patient seen and evaluated by PT who recommends [facility]. Family in agreement with this. Case mgmt arranging [facility], No COVID+ facilities, will have to wait until pt clears infxn. COVID swabbed 12/30

## 2020-12-30 NOTE — PROGRESS NOTE ADULT - PROBLEM SELECTOR PLAN 1
-CT abdomen pelvis noted for R atrophic kidney and cystitis, abdominal pain likely 2/2 cystitis. As of 12/27, pt no longer complaining of abdominal pain as of 12/30, pt with Cr increase from baseline of about 1 to 1.43   -Patient not currently on any nephrotoxic medications  -Will start NS 75cc's/hr  -Bladder scan to rule out post-renal etiology 2/2 retention. Will straight cath if >350cc's   -will decrease insulin to 80% -->now 8 units long acting hs and 6 units with meals until MIKKI resolves as of 12/30, pt with Cr increase from baseline of about 1 to 1.43   - Patient not currently on any nephrotoxic medications  - bladder scan 67 cc thus likely not retaining  - Will start NS 75ccs/hr  -will decrease insulin to 80% -->now 8 units long acting hs and 6 units with meals until MIKKI resolves

## 2020-12-30 NOTE — PROGRESS NOTE ADULT - PROBLEM SELECTOR PLAN 2
Pt febrile to 101.7 12/24 am, with leukocytosis to 15k. met sepsis criteria on admission now resolved.  -U/A positive for UTI cx: E coli, ceftriaxone started 12/24-12/26 will give 4 day course of macrobid.  -CT abdomen as above. Pt febrile to 101.7 12/24 am, with leukocytosis to 15k. met sepsis criteria on admission now resolved.  -U/A positive for UTI cx: E coli, ceftriaxone started 12/24-12/26 Now on Macrobid, will finish course 12/31.   -CT abdomen as above.

## 2020-12-30 NOTE — PROGRESS NOTE ADULT - PROBLEM SELECTOR PLAN 7
-On DVT prophylaxis long term 2/2 polycythemia vera and history of recurrent DVT, will continue eliquis 2.5mg per heme recs. -Continue with home levothyroxine 112mcg daily

## 2020-12-30 NOTE — PROGRESS NOTE ADULT - SUBJECTIVE AND OBJECTIVE BOX
PROGRESS NOTE:     CONTACT INFO:     Ele Rosen MD PGY-1  Internal Medicine  [phone]    Patient is a 87y old  Female who presents with a chief complaint of Hyponatremia (25 Dec 2020 07:29)      SUBJECTIVE / OVERNIGHT EVENTS: No acute events overnight. This morning, ***      MEDICATIONS  (STANDING):  amLODIPine   Tablet 5 milliGRAM(s) Oral daily  apixaban 2.5 milliGRAM(s) Oral every 12 hours  artificial  tears Solution 1 Drop(s) Both EYES two times a day  atorvastatin 40 milliGRAM(s) Oral at bedtime  dextrose 40% Gel 15 Gram(s) Oral once  dextrose 5%. 1000 milliLiter(s) (50 mL/Hr) IV Continuous <Continuous>  dextrose 5%. 1000 milliLiter(s) (100 mL/Hr) IV Continuous <Continuous>  dextrose 50% Injectable 25 Gram(s) IV Push once  dextrose 50% Injectable 12.5 Gram(s) IV Push once  dextrose 50% Injectable 25 Gram(s) IV Push once  glucagon  Injectable 1 milliGRAM(s) IntraMuscular once  insulin glargine Injectable (LANTUS) 10 Unit(s) SubCutaneous at bedtime  insulin lispro (ADMELOG) corrective regimen sliding scale   SubCutaneous three times a day before meals  insulin lispro (ADMELOG) corrective regimen sliding scale   SubCutaneous at bedtime  insulin lispro Injectable (ADMELOG) 7 Unit(s) SubCutaneous three times a day before meals  levothyroxine 112 MICROGram(s) Oral daily  melatonin 5 milliGRAM(s) Oral at bedtime  metoprolol tartrate 25 milliGRAM(s) Oral two times a day  nitrofurantoin monohydrate/macrocrystals (MACROBID) 100 milliGRAM(s) Oral two times a day  pantoprazole    Tablet 40 milliGRAM(s) Oral before breakfast  polyethylene glycol 3350 17 Gram(s) Oral daily  senna 2 Tablet(s) Oral at bedtime  sodium chloride 0.9%. 1000 milliLiter(s) (70 mL/Hr) IV Continuous <Continuous>  zinc oxide 20% Ointment 1 Application(s) Topical two times a day    MEDICATIONS  (PRN):  acetaminophen   Tablet .. 650 milliGRAM(s) Oral every 6 hours PRN Temp greater or equal to 38C (100.4F), Mild Pain (1 - 3)      CAPILLARY BLOOD GLUCOSE      POCT Blood Glucose.: 202 mg/dL (29 Dec 2020 21:51)  POCT Blood Glucose.: 140 mg/dL (29 Dec 2020 17:22)  POCT Blood Glucose.: 99 mg/dL (29 Dec 2020 12:05)  POCT Blood Glucose.: 112 mg/dL (29 Dec 2020 08:38)    I&O's Detail    29 Dec 2020 07:01  -  30 Dec 2020 07:00  --------------------------------------------------------  IN:    Oral Fluid: 1220 mL  Total IN: 1220 mL    OUT:  Total OUT: 0 mL    Total NET: 1220 mL          PHYSICAL EXAM:  Vital Signs Last 24 Hrs  T(C): 36.5 (30 Dec 2020 06:11), Max: 37.1 (29 Dec 2020 21:05)  T(F): 97.7 (30 Dec 2020 06:11), Max: 98.8 (29 Dec 2020 21:05)  HR: 88 (30 Dec 2020 06:11) (82 - 92)  BP: 138/70 (30 Dec 2020 06:11) (123/72 - 138/70)  BP(mean): --  RR: 18 (30 Dec 2020 06:11) (18 - 19)  SpO2: 97% (30 Dec 2020 06:11) (95% - 97%)  CONSTITUTIONAL: NAD, well-developed  RESPIRATORY: Normal respiratory effort; lungs are clear to auscultation bilaterally  CARDIOVASCULAR: Regular rate and rhythm, normal S1 and S2, no murmur/rub/gallop; No lower extremity edema  ABDOMEN: Nontender to palpation, normoactive bowel sounds, no rebound/guarding  MUSCLOSKELETAL: no clubbing or cyanosis of digits; no joint swelling or tenderness to palpation  PSYCH: A+O to person; affect appropriate          LABS:  *****    Urinalysis Basic - ( 24 Dec 2020 11:20 )    Color: Light Orange / Appearance: Turbid / S.021 / pH: x  Gluc: x / Ketone: Negative  / Bili: Negative / Urobili: Negative   Blood: x / Protein: 300 mg/dL / Nitrite: Negative   Leuk Esterase: Large / RBC: 23 /hpf / WBC 26-50   Sq Epi: x / Non Sq Epi: F    CULTURE RESULTS:                20 @ 21:33  Specimen Source: --  Method Type: --  Gram Stain - RRL: --  Gram Stain - Wound: --  Bacteria: --  Culture Results:   >100,000 CFU/ml Gram Negative Rods      Specimen Source:   Method Type:   Gram Stain:   Culture Results: Culture Results:   >100,000 CFU/ml Gram Negative Rods (20 @ 21:33)  Culture Results:   No growth to date. (20 @ 16:29)  Culture Results:   No growth to date. (20 @ 16:29)  Culture Results:   No growth to date. (20 @ 10:14)  Culture Results:   No growth to date. (20 @ 10:14)  Culture Results:   No growth (20 @ 16:51)    Bacteria: Bacteria: Many (20 @ 11:20)        RADIOLOGY & ADDITIONAL TESTS:  No new imaging to review.  PROGRESS NOTE:     CONTACT INFO:     Ele Rosen MD PGY-1  Internal Medicine  [phone]    Patient is a 87y old  Female who presents with a chief complaint of Hyponatremia (25 Dec 2020 07:29)      SUBJECTIVE / OVERNIGHT EVENTS: No acute events overnight. This morning, patient with no complaints, continues to be A&Ox1. Has been moving her bowels and eating well.       MEDICATIONS  (STANDING):  amLODIPine   Tablet 5 milliGRAM(s) Oral daily  apixaban 2.5 milliGRAM(s) Oral every 12 hours  artificial  tears Solution 1 Drop(s) Both EYES two times a day  atorvastatin 40 milliGRAM(s) Oral at bedtime  dextrose 40% Gel 15 Gram(s) Oral once  dextrose 5%. 1000 milliLiter(s) (50 mL/Hr) IV Continuous <Continuous>  dextrose 5%. 1000 milliLiter(s) (100 mL/Hr) IV Continuous <Continuous>  dextrose 50% Injectable 25 Gram(s) IV Push once  dextrose 50% Injectable 12.5 Gram(s) IV Push once  dextrose 50% Injectable 25 Gram(s) IV Push once  glucagon  Injectable 1 milliGRAM(s) IntraMuscular once  insulin glargine Injectable (LANTUS) 10 Unit(s) SubCutaneous at bedtime  insulin lispro (ADMELOG) corrective regimen sliding scale   SubCutaneous three times a day before meals  insulin lispro (ADMELOG) corrective regimen sliding scale   SubCutaneous at bedtime  insulin lispro Injectable (ADMELOG) 7 Unit(s) SubCutaneous three times a day before meals  levothyroxine 112 MICROGram(s) Oral daily  melatonin 5 milliGRAM(s) Oral at bedtime  metoprolol tartrate 25 milliGRAM(s) Oral two times a day  nitrofurantoin monohydrate/macrocrystals (MACROBID) 100 milliGRAM(s) Oral two times a day  pantoprazole    Tablet 40 milliGRAM(s) Oral before breakfast  polyethylene glycol 3350 17 Gram(s) Oral daily  senna 2 Tablet(s) Oral at bedtime  sodium chloride 0.9%. 1000 milliLiter(s) (70 mL/Hr) IV Continuous <Continuous>  zinc oxide 20% Ointment 1 Application(s) Topical two times a day    MEDICATIONS  (PRN):  acetaminophen   Tablet .. 650 milliGRAM(s) Oral every 6 hours PRN Temp greater or equal to 38C (100.4F), Mild Pain (1 - 3)    CAPILLARY BLOOD GLUCOSE  POCT Blood Glucose.: 202 mg/dL (29 Dec 2020 21:51)  POCT Blood Glucose.: 140 mg/dL (29 Dec 2020 17:22)  POCT Blood Glucose.: 99 mg/dL (29 Dec 2020 12:05)  POCT Blood Glucose.: 112 mg/dL (29 Dec 2020 08:38)      I&O's Detail    29 Dec 2020 07:01  -  30 Dec 2020 07:00  --------------------------------------------------------  IN:    Oral Fluid: 1220 mL  Total IN: 1220 mL    OUT:  Total OUT: 0 mL    Total NET: 1220 mL          PHYSICAL EXAM:  Vital Signs Last 24 Hrs  T(C): 36.5 (30 Dec 2020 06:11), Max: 37.1 (29 Dec 2020 21:05)  T(F): 97.7 (30 Dec 2020 06:11), Max: 98.8 (29 Dec 2020 21:05)  HR: 88 (30 Dec 2020 06:11) (82 - 92)  BP: 138/70 (30 Dec 2020 06:11) (123/72 - 138/70)  BP(mean): --  RR: 18 (30 Dec 2020 06:11) (18 - 19)  SpO2: 97% (30 Dec 2020 06:11) (95% - 97%)  CONSTITUTIONAL: NAD, well-developed  RESPIRATORY: Normal respiratory effort; lungs are clear to auscultation bilaterally  CARDIOVASCULAR: Regular rate and rhythm, normal S1 and S2, no murmur/rub/gallop; No lower extremity edema  ABDOMEN: Nontender to palpation, normoactive bowel sounds, no rebound/guarding  MUSCLOSKELETAL: no clubbing or cyanosis of digits; no joint swelling or tenderness to palpation  PSYCH: A+O to person; affect appropriate          LABS:  *****    Urinalysis Basic - ( 24 Dec 2020 11:20 )    Color: Light Orange / Appearance: Turbid / S.021 / pH: x  Gluc: x / Ketone: Negative  / Bili: Negative / Urobili: Negative   Blood: x / Protein: 300 mg/dL / Nitrite: Negative   Leuk Esterase: Large / RBC: 23 /hpf / WBC 26-50   Sq Epi: x / Non Sq Epi: F    CULTURE RESULTS:                20 @ 21:33  Specimen Source: --  Method Type: --  Gram Stain - RRL: --  Gram Stain - Wound: --  Bacteria: --  Culture Results:   >100,000 CFU/ml Gram Negative Rods      Specimen Source:   Method Type:   Gram Stain:   Culture Results: Culture Results:   >100,000 CFU/ml Gram Negative Rods (20 @ 21:33)  Culture Results:   No growth to date. (20 @ 16:29)  Culture Results:   No growth to date. (20 @ 16:29)  Culture Results:   No growth to date. (20 @ 10:14)  Culture Results:   No growth to date. (20 @ 10:14)  Culture Results:   No growth (20 @ 16:51)    Bacteria: Bacteria: Many (20 @ 11:20)        RADIOLOGY & ADDITIONAL TESTS:  No new imaging to review.

## 2020-12-30 NOTE — PROGRESS NOTE ADULT - PROBLEM SELECTOR PLAN 3
Pt returned home from a 5 week rehab stay on 12/11, tested + for COVID in ED on presentation. Currently without any respiratory symptoms, CXR clear.   -Will continue with conservative mgmt as patient is not currently symptomatic  -If pt becomes SOB, will start remdecivir and decadron, supplemental oxygen prn.  -Repeat COVID swab done 12/28 Pt returned home from a 5 week rehab stay on 12/11, tested + for COVID in ED on presentation. Currently without any respiratory symptoms, CXR clear.   -Will continue with conservative mgmt as patient is not currently symptomatic  -If pt becomes SOB, will start remdecivir and decadron, supplemental oxygen prn.  -Repeat COVID swab done 12/28 and 12/30

## 2020-12-30 NOTE — CHART NOTE - NSCHARTNOTEFT_GEN_A_CORE
Nutrition Follow-up  Patient seen for: nutrition follow-up on COVID unit    Source: EMR, team    Hospital course as per chart: Pt is an 88 yo female with PMH of JAK2+ PV (diagnosed 2016), recurrent RLE DVT (2008, 2010), HTN/HLD, DM II, and vascular dementia, sent in by outpatient hematologist for abnormal lab (hyponatremia). Admitted 12/20. On admission, patient afebrile, no white count, COVID+. Chart reviewed, events noted. Per team, hyponatremia resolved with IV fluids, "Patient appears clinically euvolemic."    Diet, Regular:   Consistent Carbohydrate {Evening Snack} (CSTCHOSN)  Low Sodium (12-20-20 @ 17:26) [Active]    Unable to conduct a face-to-face interview at this time due to limited contact restrictions related to pt's medical condition and isolation precautions. Unable to reach pt via phone x multiple attempts. Noted to be confused, disoriented, likely a poor candidate for RD interview at this time. Information obtained from comprehensive chart review at this time.     Team reports pt eating well; per flowsheets, consuming 50-75% of documented meals. Noted pt with fecal incontinence. Last BM yesterday (12/29) per chart.     PO intake:  < 50% [ ] 50-75% [x]   % [ ]      Source for PO intake: chart, team    Most recent weight/% Weight Change: no new weights to assess  Will continue to monitor and trend weights.     Pertinent Medications: MEDICATIONS  (STANDING):  amLODIPine   Tablet 5 milliGRAM(s) Oral daily  apixaban 2.5 milliGRAM(s) Oral every 12 hours  artificial  tears Solution 1 Drop(s) Both EYES two times a day  atorvastatin 40 milliGRAM(s) Oral at bedtime  dextrose 40% Gel 15 Gram(s) Oral once  dextrose 5%. 1000 milliLiter(s) (50 mL/Hr) IV Continuous <Continuous>  dextrose 5%. 1000 milliLiter(s) (100 mL/Hr) IV Continuous <Continuous>  dextrose 50% Injectable 25 Gram(s) IV Push once  dextrose 50% Injectable 12.5 Gram(s) IV Push once  dextrose 50% Injectable 25 Gram(s) IV Push once  glucagon  Injectable 1 milliGRAM(s) IntraMuscular once  insulin glargine Injectable (LANTUS) 10 Unit(s) SubCutaneous at bedtime  insulin lispro (ADMELOG) corrective regimen sliding scale   SubCutaneous three times a day before meals  insulin lispro (ADMELOG) corrective regimen sliding scale   SubCutaneous at bedtime  insulin lispro Injectable (ADMELOG) 7 Unit(s) SubCutaneous three times a day before meals  levothyroxine 112 MICROGram(s) Oral daily  melatonin 5 milliGRAM(s) Oral at bedtime  metoprolol tartrate 25 milliGRAM(s) Oral two times a day  nitrofurantoin monohydrate/macrocrystals (MACROBID) 100 milliGRAM(s) Oral two times a day  polyethylene glycol 3350 17 Gram(s) Oral daily  senna 2 Tablet(s) Oral at bedtime  sodium chloride 0.9%. 1000 milliLiter(s) (75 mL/Hr) IV Continuous <Continuous>  zinc oxide 20% Ointment 1 Application(s) Topical two times a day    MEDICATIONS  (PRN):  acetaminophen   Tablet .. 650 milliGRAM(s) Oral every 6 hours PRN Temp greater or equal to 38C (100.4F), Mild Pain (1 - 3)    Pertinent Labs:  12-30 Na138 mmol/L Glu 105 mg/dL<H> K+ 4.5 mmol/L Cr  1.42 mg/dL<H> BUN 40 mg/dL<H> 12-30 Phos 4.4 mg/dL    CAPILLARY BLOOD GLUCOSE  POCT Blood Glucose.: 112 mg/dL (30 Dec 2020 08:52)  POCT Blood Glucose.: 202 mg/dL (29 Dec 2020 21:51)  POCT Blood Glucose.: 140 mg/dL (29 Dec 2020 17:22)  POCT Blood Glucose.: 99 mg/dL (29 Dec 2020 12:05)    Skin: +skin tear, no pressure injuries per flowsheets   Edema: 1+ edema to b/l ankles as per flowsheets    Estimated Needs: no change since previous assessment  Based on upper  pounds  Estimated Energy Needs (25-30 kcal/kg): 0853-2225 kcal/day  Estimated Protein Needs (1-1.2 g/kg): 50-60 g/day  Estimated Fluid Needs (25-30 ml/kg): 4306-9456 ml/day    Previous Nutrition Diagnosis: Altered Nutrition Related Lab Values - nutrition diagnosis ongoing, being addressed with Consistent Carbohydrate diet, nutrition education as able     New Nutrition Diagnosis: not applicable    Recommendations:    Monitoring and Evaluation: Monitor PO intake, weight, labs, skin, GI status, diet     RD remains available upon request and will continue to follow-up per protocol.   Nilsa Grove MS, RD CDN pager #[phone] Nutrition Follow-up  Patient seen for: nutrition follow-up on COVID unit    Source: EMR, team, RN    Hospital course as per chart: Pt is an 86 yo female with PMH of JAK2+ PV (diagnosed 2016), recurrent RLE DVT (2008, 2010), HTN/HLD, DM II, and vascular dementia, sent in by outpatient hematologist for abnormal lab (hyponatremia). Admitted 12/20. On admission, patient afebrile, no white count, COVID+. Chart reviewed, events noted. Per team, hyponatremia resolved with IV fluids, "Patient appears clinically euvolemic."    Diet, Regular:   Consistent Carbohydrate {Evening Snack} (CSTCHOSN)  Low Sodium (12-20-20 @ 17:26) [Active]    Unable to conduct a face-to-face interview at this time due to limited contact restrictions related to pt's medical condition and isolation precautions. Unable to reach pt via phone x multiple attempts. Noted to be confused, disoriented, likely a poor candidate for RD interview at this time. Information obtained from comprehensive chart review at this time.     Team reports pt eating well; per flowsheets, consuming 50-75% of documented meals. RN reports pt tolerating diet well at this time, no difficulties chewing or swallowing noted. Noted pt with fecal incontinence. Last BM yesterday (12/29) per chart.     PO intake:  < 50% [ ] 50-75% [x]   % [ ]      Source for PO intake: chart, team    Most recent weight/% Weight Change: no new weights to assess  Will continue to monitor and trend weights.     Pertinent Medications: MEDICATIONS  (STANDING):  amLODIPine   Tablet 5 milliGRAM(s) Oral daily  apixaban 2.5 milliGRAM(s) Oral every 12 hours  artificial  tears Solution 1 Drop(s) Both EYES two times a day  atorvastatin 40 milliGRAM(s) Oral at bedtime  dextrose 40% Gel 15 Gram(s) Oral once  dextrose 5%. 1000 milliLiter(s) (50 mL/Hr) IV Continuous <Continuous>  dextrose 5%. 1000 milliLiter(s) (100 mL/Hr) IV Continuous <Continuous>  dextrose 50% Injectable 25 Gram(s) IV Push once  dextrose 50% Injectable 12.5 Gram(s) IV Push once  dextrose 50% Injectable 25 Gram(s) IV Push once  glucagon  Injectable 1 milliGRAM(s) IntraMuscular once  insulin glargine Injectable (LANTUS) 10 Unit(s) SubCutaneous at bedtime  insulin lispro (ADMELOG) corrective regimen sliding scale   SubCutaneous three times a day before meals  insulin lispro (ADMELOG) corrective regimen sliding scale   SubCutaneous at bedtime  insulin lispro Injectable (ADMELOG) 7 Unit(s) SubCutaneous three times a day before meals  levothyroxine 112 MICROGram(s) Oral daily  melatonin 5 milliGRAM(s) Oral at bedtime  metoprolol tartrate 25 milliGRAM(s) Oral two times a day  nitrofurantoin monohydrate/macrocrystals (MACROBID) 100 milliGRAM(s) Oral two times a day  polyethylene glycol 3350 17 Gram(s) Oral daily  senna 2 Tablet(s) Oral at bedtime  sodium chloride 0.9%. 1000 milliLiter(s) (75 mL/Hr) IV Continuous <Continuous>  zinc oxide 20% Ointment 1 Application(s) Topical two times a day    MEDICATIONS  (PRN):  acetaminophen   Tablet .. 650 milliGRAM(s) Oral every 6 hours PRN Temp greater or equal to 38C (100.4F), Mild Pain (1 - 3)    Pertinent Labs:  12-30 Na138 mmol/L Glu 105 mg/dL<H> K+ 4.5 mmol/L Cr  1.42 mg/dL<H> BUN 40 mg/dL<H> 12-30 Phos 4.4 mg/dL    CAPILLARY BLOOD GLUCOSE  POCT Blood Glucose.: 112 mg/dL (30 Dec 2020 08:52)  POCT Blood Glucose.: 202 mg/dL (29 Dec 2020 21:51)  POCT Blood Glucose.: 140 mg/dL (29 Dec 2020 17:22)  POCT Blood Glucose.: 99 mg/dL (29 Dec 2020 12:05)    Skin: +skin tear, no pressure injuries per flowsheets   Edema: 1+ edema to b/l ankles as per flowsheets    Estimated Needs: no change since previous assessment  Based on upper  pounds  Estimated Energy Needs (25-30 kcal/kg): 1716-3542 kcal/day  Estimated Protein Needs (1-1.2 g/kg): 50-60 g/day  Estimated Fluid Needs (25-30 ml/kg): 5552-9300 ml/day    Previous Nutrition Diagnosis: Altered Nutrition Related Lab Values - nutrition diagnosis ongoing, being addressed with Consistent Carbohydrate diet, nutrition education as able     New Nutrition Diagnosis: not applicable    Recommendations:  1) Continue current diet: consistent carbohydrate (evening snack), Low Sodium. Monitor tolerance and need for downgrade to Soft/Mechanical Soft. Adjust PRN.  2) RD to continue to provide high-protein apple juice to optimize intake    Monitoring and Evaluation: Monitor PO intake, weight, labs, skin, GI status, diet     RD remains available upon request and will continue to follow-up per protocol.   Nilsa Grove MS RD CDN pager #[phone]

## 2020-12-30 NOTE — PROGRESS NOTE ADULT - PROBLEM SELECTOR PLAN 5
Patient with confusion, per daughter, has vascular dementia with baseline cognitive issues that daughter describes as fluctuating, but daughter has not noted acute change in cognition.   -Patient currently alert, but oriented only to person. Able to answer some questions, mentation overall improved   - component with hyponatremia although difficult to assess if symptomatic  -U/A clear, lungs clear on x-ray. Patient with confusion, per daughter, has vascular dementia with baseline cognitive issues that daughter describes as fluctuating, but daughter has not noted acute change in cognition.  resolved. currently back at baseline.  -Patient currently alert, but oriented only to person. Able to answer some questions, mentation overall improved   - component with hyponatremia although difficult to assess if symptomatic  -U/A clear, lungs clear on x-ray.

## 2020-12-30 NOTE — PROGRESS NOTE ADULT - PROBLEM SELECTOR PLAN 8
-Continue with home levothyroxine 112mcg daily -SSI with FS before meals and at bedtime  -adjusted insulin: now getting 7units with meals and 10units lantus at bedtime -SSI with FS before meals and at bedtime  -decreased lantus to 8 units and pre-meal admelog to 6 units

## 2020-12-31 LAB
ANION GAP SERPL CALC-SCNC: 13 MMOL/L — SIGNIFICANT CHANGE UP (ref 5–17)
BUN SERPL-MCNC: 31 MG/DL — HIGH (ref 7–23)
CALCIUM SERPL-MCNC: 10 MG/DL — SIGNIFICANT CHANGE UP (ref 8.4–10.5)
CHLORIDE SERPL-SCNC: 104 MMOL/L — SIGNIFICANT CHANGE UP (ref 96–108)
CO2 SERPL-SCNC: 22 MMOL/L — SIGNIFICANT CHANGE UP (ref 22–31)
CREAT SERPL-MCNC: 1.09 MG/DL — SIGNIFICANT CHANGE UP (ref 0.5–1.3)
GLUCOSE BLDC GLUCOMTR-MCNC: 101 MG/DL — HIGH (ref 70–99)
GLUCOSE BLDC GLUCOMTR-MCNC: 105 MG/DL — HIGH (ref 70–99)
GLUCOSE BLDC GLUCOMTR-MCNC: 126 MG/DL — HIGH (ref 70–99)
GLUCOSE BLDC GLUCOMTR-MCNC: 244 MG/DL — HIGH (ref 70–99)
GLUCOSE SERPL-MCNC: 87 MG/DL — SIGNIFICANT CHANGE UP (ref 70–99)
POTASSIUM SERPL-MCNC: 4.7 MMOL/L — SIGNIFICANT CHANGE UP (ref 3.5–5.3)
POTASSIUM SERPL-SCNC: 4.7 MMOL/L — SIGNIFICANT CHANGE UP (ref 3.5–5.3)
SODIUM SERPL-SCNC: 139 MMOL/L — SIGNIFICANT CHANGE UP (ref 135–145)

## 2020-12-31 PROCEDURE — 99233 SBSQ HOSP IP/OBS HIGH 50: CPT | Mod: GC

## 2020-12-31 RX ORDER — INSULIN GLARGINE 100 [IU]/ML
8 INJECTION, SOLUTION SUBCUTANEOUS AT BEDTIME
Refills: 0 | Status: DISCONTINUED | OUTPATIENT
Start: 2020-12-31 | End: 2021-01-05

## 2020-12-31 RX ORDER — INSULIN LISPRO 100/ML
7 VIAL (ML) SUBCUTANEOUS
Refills: 0 | Status: DISCONTINUED | OUTPATIENT
Start: 2020-12-31 | End: 2021-01-05

## 2020-12-31 RX ORDER — INSULIN GLARGINE 100 [IU]/ML
10 INJECTION, SOLUTION SUBCUTANEOUS AT BEDTIME
Refills: 0 | Status: DISCONTINUED | OUTPATIENT
Start: 2020-12-31 | End: 2020-12-31

## 2020-12-31 RX ADMIN — AMLODIPINE BESYLATE 5 MILLIGRAM(S): 2.5 TABLET ORAL at 07:02

## 2020-12-31 RX ADMIN — Medication 1 DROP(S): at 07:02

## 2020-12-31 RX ADMIN — APIXABAN 2.5 MILLIGRAM(S): 2.5 TABLET, FILM COATED ORAL at 18:03

## 2020-12-31 RX ADMIN — Medication 25 MILLIGRAM(S): at 18:03

## 2020-12-31 RX ADMIN — POLYETHYLENE GLYCOL 3350 17 GRAM(S): 17 POWDER, FOR SOLUTION ORAL at 13:44

## 2020-12-31 RX ADMIN — Medication 5 MILLIGRAM(S): at 22:03

## 2020-12-31 RX ADMIN — SENNA PLUS 2 TABLET(S): 8.6 TABLET ORAL at 22:03

## 2020-12-31 RX ADMIN — ZINC OXIDE 1 APPLICATION(S): 200 OINTMENT TOPICAL at 07:04

## 2020-12-31 RX ADMIN — Medication 7 UNIT(S): at 18:03

## 2020-12-31 RX ADMIN — ATORVASTATIN CALCIUM 40 MILLIGRAM(S): 80 TABLET, FILM COATED ORAL at 22:03

## 2020-12-31 RX ADMIN — APIXABAN 2.5 MILLIGRAM(S): 2.5 TABLET, FILM COATED ORAL at 07:04

## 2020-12-31 RX ADMIN — Medication 1 DROP(S): at 18:04

## 2020-12-31 RX ADMIN — INSULIN GLARGINE 8 UNIT(S): 100 INJECTION, SOLUTION SUBCUTANEOUS at 22:03

## 2020-12-31 RX ADMIN — Medication 25 MILLIGRAM(S): at 07:04

## 2020-12-31 RX ADMIN — Medication 7 UNIT(S): at 13:44

## 2020-12-31 RX ADMIN — Medication 112 MICROGRAM(S): at 07:02

## 2020-12-31 RX ADMIN — Medication 6 UNIT(S): at 09:18

## 2020-12-31 RX ADMIN — Medication 100 MILLIGRAM(S): at 07:02

## 2020-12-31 RX ADMIN — ZINC OXIDE 1 APPLICATION(S): 200 OINTMENT TOPICAL at 18:04

## 2020-12-31 NOTE — PROGRESS NOTE ADULT - PROBLEM SELECTOR PLAN 3
Pt returned home from a 5 week rehab stay on 12/11, tested + for COVID in ED on presentation. Currently without any respiratory symptoms, CXR clear.   -Will continue with conservative mgmt as patient is not currently symptomatic  -If pt becomes SOB, will start remdecivir and decadron, supplemental oxygen prn.  -Repeat COVID swab done 12/28 and 12/30 Pt returned home from a 5 week rehab stay on 12/11, tested + for COVID in ED on presentation. Currently without any respiratory symptoms, CXR clear.   -Will continue with conservative mgmt as patient is not currently symptomatic  -If pt becomes SOB, will start remdecivir and decadron, supplemental oxygen prn.  -Repeat COVID swab done 12/28 and 12/30. Will reswab 1/1 Pt returned home from a 5 week rehab stay on 12/11, tested + for COVID in ED on presentation. Currently without any respiratory symptoms, CXR clear.   -Will continue with conservative mgmt as patient is not currently symptomatic  -If pt becomes SOB, will start remdecivir and decadron, supplemental oxygen prn.  -Repeat COVID swab done 12/28 and 12/30. Will reswab 1/3

## 2020-12-31 NOTE — PROGRESS NOTE ADULT - PROBLEM SELECTOR PLAN 8
-SSI with FS before meals and at bedtime  -decreased lantus to 8 units and pre-meal admelog to 6 units -SSI with FS before meals and at bedtime  -AM glucose well controlled, will stay with 8units lantus hs and reinstate premeal insulin 7units TID.

## 2020-12-31 NOTE — PROGRESS NOTE ADULT - PROBLEM SELECTOR PLAN 2
Pt febrile to 101.7 12/24 am, with leukocytosis to 15k. met sepsis criteria on admission now resolved.  -U/A positive for UTI cx: E coli, ceftriaxone started 12/24-12/26 Now on Macrobid, will finish course 12/31.   -CT abdomen as above. Pt febrile to 101.7 12/24 am, with leukocytosis to 15k. met sepsis criteria on admission now resolved.  -U/A positive for UTI cx: E coli, ceftriaxone started 12/24-12/26 Completed 7 day course of abx, no further urniary sxs  -CT abdomen as above.

## 2020-12-31 NOTE — PROGRESS NOTE ADULT - PROBLEM SELECTOR PLAN 9
-Patient seen and evaluated by PT who recommends [facility]. Family in agreement with this. Case mgmt arranging [facility], No COVID+ facilities, will have to wait until pt clears infxn. COVID swabbed 12/30 -Patient seen and evaluated by PT who recommends [facility]. Family in agreement with this. Case mgmt arranging [facility], No COVID+ facilities, will have to wait until pt clears infxn. Will COVID swab 1/1 -Patient seen and evaluated by PT who recommends [facility]. Family in agreement with this. Case mgmt arranging [facility], No COVID+ facilities, will have to wait until pt clears infxn. Will COVID swab 1/3

## 2020-12-31 NOTE — PROGRESS NOTE ADULT - PROBLEM SELECTOR PLAN 5
Patient with confusion, per daughter, has vascular dementia with baseline cognitive issues that daughter describes as fluctuating, but daughter has not noted acute change in cognition.  resolved. currently back at baseline.  -Patient currently alert, but oriented only to person. Able to answer some questions, mentation overall improved   - component with hyponatremia although difficult to assess if symptomatic  -U/A clear, lungs clear on x-ray. Patient with confusion, per daughter, has vascular dementia with baseline cognitive issues that daughter describes as fluctuating, but daughter has not noted acute change in cognition.  resolved. currently back at baseline.

## 2020-12-31 NOTE — PROGRESS NOTE ADULT - PROBLEM SELECTOR PLAN 1
as of 12/30, pt with Cr increase from baseline of about 1 to 1.43   - Patient not currently on any nephrotoxic medications  - bladder scan 67 cc thus likely not retaining  - Will start NS 75ccs/hr  -will decrease insulin to 80% -->now 8 units long acting hs and 6 units with meals until MIKKI resolves Now resolved. On 12/30, pt with Cr increase from baseline of about 1 to 1.43 , now back to baseline. Now resolved s/p IVF. On 12/30, pt with Cr increase from baseline of about 1 to 1.43 , now back to baseline.

## 2020-12-31 NOTE — PROGRESS NOTE ADULT - ATTENDING COMMENTS
Oliva Dey MD  Division of Hospital Medicine  [facility]   Pager: [phone]    Patient seen and examined today with Team 5 Resident and Interns. 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    1. Acute Kidney Injury: resolved s/p IVF yesterday. check BMP q2-3days to monitor Cr.  2. E. Coli UTI: s/p 7 day course of ceftriaxone and nitrofurantoin.   3. COVID-19: not hypoxic, not requiring treatment for COVID-19 at this time.  4. T2DM: continue lantus to 8 units and increase pre-meal admelog back to 7 units qAC. will continue to adjust based on FS   5. Hyponatremia: resolved s/p IVF.    Discharge planning to [facility] pending negative COVID swab (or to [facility] if they accept her)  Next COVID swab Sunday, 1/3/20. (will not swab tomorrow as after talking with CM, even if swab negative tomorrow, would not be able to obtain placement over weekend.    Rest as detailed in note above.

## 2020-12-31 NOTE — PROGRESS NOTE ADULT - SUBJECTIVE AND OBJECTIVE BOX
PROGRESS NOTE:     CONTACT INFO:     Ele Rosen MD PGY-1  Internal Medicine  [phone]    Patient is a 87y old  Female who presents with a chief complaint of Hyponatremia (25 Dec 2020 07:29)      SUBJECTIVE / OVERNIGHT EVENTS: Overnight, patient again with feculent vomiting. Switched to NPO     MEDICATIONS  (STANDING):  amLODIPine   Tablet 5 milliGRAM(s) Oral daily  apixaban 2.5 milliGRAM(s) Oral every 12 hours  artificial  tears Solution 1 Drop(s) Both EYES two times a day  atorvastatin 40 milliGRAM(s) Oral at bedtime  dextrose 40% Gel 15 Gram(s) Oral once  dextrose 5%. 1000 milliLiter(s) (50 mL/Hr) IV Continuous <Continuous>  dextrose 5%. 1000 milliLiter(s) (100 mL/Hr) IV Continuous <Continuous>  dextrose 50% Injectable 25 Gram(s) IV Push once  dextrose 50% Injectable 12.5 Gram(s) IV Push once  dextrose 50% Injectable 25 Gram(s) IV Push once  glucagon  Injectable 1 milliGRAM(s) IntraMuscular once  insulin glargine Injectable (LANTUS) 8 Unit(s) SubCutaneous at bedtime  insulin lispro (ADMELOG) corrective regimen sliding scale   SubCutaneous three times a day before meals  insulin lispro (ADMELOG) corrective regimen sliding scale   SubCutaneous at bedtime  insulin lispro Injectable (ADMELOG) 6 Unit(s) SubCutaneous three times a day before meals  levothyroxine 112 MICROGram(s) Oral daily  melatonin 5 milliGRAM(s) Oral at bedtime  metoprolol tartrate 25 milliGRAM(s) Oral two times a day  polyethylene glycol 3350 17 Gram(s) Oral daily  senna 2 Tablet(s) Oral at bedtime  sodium chloride 0.9%. 1000 milliLiter(s) (75 mL/Hr) IV Continuous <Continuous>  zinc oxide 20% Ointment 1 Application(s) Topical two times a day    MEDICATIONS  (PRN):  acetaminophen   Tablet .. 650 milliGRAM(s) Oral every 6 hours PRN Temp greater or equal to 38C (100.4F), Mild Pain (1 - 3)    I&O's Detail    30 Dec 2020 07:01  -  31 Dec 2020 07:00  --------------------------------------------------------  IN:    Oral Fluid: 240 mL  Total IN: 240 mL    OUT:    Voided (mL): 1050 mL  Total OUT: 1050 mL    Total NET: -810 mL          CAPILLARY BLOOD GLUCOSE      POCT Blood Glucose.: 200 mg/dL (30 Dec 2020 21:48)  POCT Blood Glucose.: 228 mg/dL (30 Dec 2020 17:44)  POCT Blood Glucose.: 111 mg/dL (30 Dec 2020 13:30)  POCT Blood Glucose.: 112 mg/dL (30 Dec 2020 08:52)      PHYSICAL EXAM:  Vital Signs Last 24 Hrs  T(C): 36.8 (31 Dec 2020 06:43), Max: 36.9 (30 Dec 2020 21:25)  T(F): 98.2 (31 Dec 2020 06:43), Max: 98.4 (30 Dec 2020 21:25)  HR: 93 (31 Dec 2020 06:43) (75 - 96)  BP: 135/74 (31 Dec 2020 06:43) (126/75 - 135/74)  BP(mean): --  RR: 18 (31 Dec 2020 06:43) (18 - 18)  SpO2: 96% (31 Dec 2020 06:43) (95% - 98%)    RESPIRATORY: Normal respiratory effort; lungs are clear to auscultation bilaterally  CARDIOVASCULAR: Regular rate and rhythm, normal S1 and S2, no murmur/rub/gallop; No lower extremity edema  ABDOMEN: Nontender to palpation, normoactive bowel sounds, no rebound/guarding  MUSCLOSKELETAL: no clubbing or cyanosis of digits; no joint swelling or tenderness to palpation  PSYCH: A+O to person; affect appropriate          LABS:  *****    Urinalysis Basic - ( 24 Dec 2020 11:20 )    Color: Light Orange / Appearance: Turbid / S.021 / pH: x  Gluc: x / Ketone: Negative  / Bili: Negative / Urobili: Negative   Blood: x / Protein: 300 mg/dL / Nitrite: Negative   Leuk Esterase: Large / RBC: 23 /hpf / WBC 26-50   Sq Epi: x / Non Sq Epi: F    CULTURE RESULTS:                20 @ 21:33  Specimen Source: --  Method Type: --  Gram Stain - RRL: --  Gram Stain - Wound: --  Bacteria: --  Culture Results:   >100,000 CFU/ml Gram Negative Rods      Specimen Source:   Method Type:   Gram Stain:   Culture Results: Culture Results:   >100,000 CFU/ml Gram Negative Rods (20 @ 21:33)  Culture Results:   No growth to date. (20 @ 16:29)  Culture Results:   No growth to date. (20 @ 16:29)  Culture Results:   No growth to date. (20 @ 10:14)  Culture Results:   No growth to date. (20 @ 10:14)  Culture Results:   No growth (20 @ 16:51)    Bacteria: Bacteria: Many (20 @ 11:20)        RADIOLOGY & ADDITIONAL TESTS:  No new imaging to review.  PROGRESS NOTE:     CONTACT INFO:     Ele Rosen MD PGY-1  Internal Medicine  [phone]    Patient is a 87y old  Female who presents with a chief complaint of Hyponatremia (25 Dec 2020 07:29)      SUBJECTIVE / OVERNIGHT EVENTS: No acute events overnight. ***    MEDICATIONS  (STANDING):  amLODIPine   Tablet 5 milliGRAM(s) Oral daily  apixaban 2.5 milliGRAM(s) Oral every 12 hours  artificial  tears Solution 1 Drop(s) Both EYES two times a day  atorvastatin 40 milliGRAM(s) Oral at bedtime  dextrose 40% Gel 15 Gram(s) Oral once  dextrose 5%. 1000 milliLiter(s) (50 mL/Hr) IV Continuous <Continuous>  dextrose 5%. 1000 milliLiter(s) (100 mL/Hr) IV Continuous <Continuous>  dextrose 50% Injectable 25 Gram(s) IV Push once  dextrose 50% Injectable 12.5 Gram(s) IV Push once  dextrose 50% Injectable 25 Gram(s) IV Push once  glucagon  Injectable 1 milliGRAM(s) IntraMuscular once  insulin glargine Injectable (LANTUS) 8 Unit(s) SubCutaneous at bedtime  insulin lispro (ADMELOG) corrective regimen sliding scale   SubCutaneous three times a day before meals  insulin lispro (ADMELOG) corrective regimen sliding scale   SubCutaneous at bedtime  insulin lispro Injectable (ADMELOG) 6 Unit(s) SubCutaneous three times a day before meals  levothyroxine 112 MICROGram(s) Oral daily  melatonin 5 milliGRAM(s) Oral at bedtime  metoprolol tartrate 25 milliGRAM(s) Oral two times a day  polyethylene glycol 3350 17 Gram(s) Oral daily  senna 2 Tablet(s) Oral at bedtime  sodium chloride 0.9%. 1000 milliLiter(s) (75 mL/Hr) IV Continuous <Continuous>  zinc oxide 20% Ointment 1 Application(s) Topical two times a day    MEDICATIONS  (PRN):  acetaminophen   Tablet .. 650 milliGRAM(s) Oral every 6 hours PRN Temp greater or equal to 38C (100.4F), Mild Pain (1 - 3)    I&O's Detail    30 Dec 2020 07:01  -  31 Dec 2020 07:00  --------------------------------------------------------  IN:    Oral Fluid: 240 mL  Total IN: 240 mL    OUT:    Voided (mL): 1050 mL  Total OUT: 1050 mL    Total NET: -810 mL          CAPILLARY BLOOD GLUCOSE      POCT Blood Glucose.: 200 mg/dL (30 Dec 2020 21:48)  POCT Blood Glucose.: 228 mg/dL (30 Dec 2020 17:44)  POCT Blood Glucose.: 111 mg/dL (30 Dec 2020 13:30)  POCT Blood Glucose.: 112 mg/dL (30 Dec 2020 08:52)      PHYSICAL EXAM:  Vital Signs Last 24 Hrs  T(C): 36.8 (31 Dec 2020 06:43), Max: 36.9 (30 Dec 2020 21:25)  T(F): 98.2 (31 Dec 2020 06:43), Max: 98.4 (30 Dec 2020 21:25)  HR: 93 (31 Dec 2020 06:43) (75 - 96)  BP: 135/74 (31 Dec 2020 06:43) (126/75 - 135/74)  BP(mean): --  RR: 18 (31 Dec 2020 06:43) (18 - 18)  SpO2: 96% (31 Dec 2020 06:43) (95% - 98%)    RESPIRATORY: Normal respiratory effort; lungs are clear to auscultation bilaterally  CARDIOVASCULAR: Regular rate and rhythm, normal S1 and S2, no murmur/rub/gallop; No lower extremity edema  ABDOMEN: Nontender to palpation, normoactive bowel sounds, no rebound/guarding  MUSCLOSKELETAL: no clubbing or cyanosis of digits; no joint swelling or tenderness to palpation  PSYCH: A+O to person; affect appropriate          LABS:  *****    Urinalysis Basic - ( 24 Dec 2020 11:20 )    Color: Light Orange / Appearance: Turbid / S.021 / pH: x  Gluc: x / Ketone: Negative  / Bili: Negative / Urobili: Negative   Blood: x / Protein: 300 mg/dL / Nitrite: Negative   Leuk Esterase: Large / RBC: 23 /hpf / WBC 26-50   Sq Epi: x / Non Sq Epi: F    CULTURE RESULTS:                20 @ 21:33  Specimen Source: --  Method Type: --  Gram Stain - RRL: --  Gram Stain - Wound: --  Bacteria: --  Culture Results:   >100,000 CFU/ml Gram Negative Rods      Specimen Source:   Method Type:   Gram Stain:   Culture Results: Culture Results:   >100,000 CFU/ml Gram Negative Rods (20 @ 21:33)  Culture Results:   No growth to date. (20 @ 16:29)  Culture Results:   No growth to date. (20 @ 16:29)  Culture Results:   No growth to date. (20 @ 10:14)  Culture Results:   No growth to date. (20 @ 10:14)  Culture Results:   No growth (20 @ 16:51)    Bacteria: Bacteria: Many (20 @ 11:20)        RADIOLOGY & ADDITIONAL TESTS:  No new imaging to review.  PROGRESS NOTE:     CONTACT INFO:     Ele Rosen MD PGY-1  Internal Medicine  [phone]    Patient is a 87y old  Female who presents with a chief complaint of Hyponatremia (25 Dec 2020 07:29)      SUBJECTIVE / OVERNIGHT EVENTS: No acute events overnight. This morning, patient A&Ox1 appears comfortable. Denies complaints including abdominal pain, chest pain.     MEDICATIONS  (STANDING):  amLODIPine   Tablet 5 milliGRAM(s) Oral daily  apixaban 2.5 milliGRAM(s) Oral every 12 hours  artificial  tears Solution 1 Drop(s) Both EYES two times a day  atorvastatin 40 milliGRAM(s) Oral at bedtime  dextrose 40% Gel 15 Gram(s) Oral once  dextrose 5%. 1000 milliLiter(s) (50 mL/Hr) IV Continuous <Continuous>  dextrose 5%. 1000 milliLiter(s) (100 mL/Hr) IV Continuous <Continuous>  dextrose 50% Injectable 25 Gram(s) IV Push once  dextrose 50% Injectable 12.5 Gram(s) IV Push once  dextrose 50% Injectable 25 Gram(s) IV Push once  glucagon  Injectable 1 milliGRAM(s) IntraMuscular once  insulin glargine Injectable (LANTUS) 8 Unit(s) SubCutaneous at bedtime  insulin lispro (ADMELOG) corrective regimen sliding scale   SubCutaneous three times a day before meals  insulin lispro (ADMELOG) corrective regimen sliding scale   SubCutaneous at bedtime  insulin lispro Injectable (ADMELOG) 6 Unit(s) SubCutaneous three times a day before meals  levothyroxine 112 MICROGram(s) Oral daily  melatonin 5 milliGRAM(s) Oral at bedtime  metoprolol tartrate 25 milliGRAM(s) Oral two times a day  polyethylene glycol 3350 17 Gram(s) Oral daily  senna 2 Tablet(s) Oral at bedtime  sodium chloride 0.9%. 1000 milliLiter(s) (75 mL/Hr) IV Continuous <Continuous>  zinc oxide 20% Ointment 1 Application(s) Topical two times a day    MEDICATIONS  (PRN):  acetaminophen   Tablet .. 650 milliGRAM(s) Oral every 6 hours PRN Temp greater or equal to 38C (100.4F), Mild Pain (1 - 3)    I&O's Detail    30 Dec 2020 07:01  -  31 Dec 2020 07:00  --------------------------------------------------------  IN:    Oral Fluid: 240 mL  Total IN: 240 mL    OUT:    Voided (mL): 1050 mL  Total OUT: 1050 mL    Total NET: -810 mL          CAPILLARY BLOOD GLUCOSE      POCT Blood Glucose.: 200 mg/dL (30 Dec 2020 21:48)  POCT Blood Glucose.: 228 mg/dL (30 Dec 2020 17:44)  POCT Blood Glucose.: 111 mg/dL (30 Dec 2020 13:30)  POCT Blood Glucose.: 112 mg/dL (30 Dec 2020 08:52)      PHYSICAL EXAM:  Vital Signs Last 24 Hrs  T(C): 36.8 (31 Dec 2020 06:43), Max: 36.9 (30 Dec 2020 21:25)  T(F): 98.2 (31 Dec 2020 06:43), Max: 98.4 (30 Dec 2020 21:25)  HR: 93 (31 Dec 2020 06:43) (75 - 96)  BP: 135/74 (31 Dec 2020 06:43) (126/75 - 135/74)  BP(mean): --  RR: 18 (31 Dec 2020 06:43) (18 - 18)  SpO2: 96% (31 Dec 2020 06:43) (95% - 98%)    RESPIRATORY: Normal respiratory effort; lungs are clear to auscultation bilaterally  CARDIOVASCULAR: Regular rate and rhythm, normal S1 and S2, no murmur/rub/gallop; No lower extremity edema  ABDOMEN: Nontender to palpation, normoactive bowel sounds, no rebound/guarding  MUSCLOSKELETAL: no clubbing or cyanosis of digits; no joint swelling or tenderness to palpation  PSYCH: A+O to person; affect appropriate          LABS:  *****    Urinalysis Basic - ( 24 Dec 2020 11:20 )    Color: Light Orange / Appearance: Turbid / S.021 / pH: x  Gluc: x / Ketone: Negative  / Bili: Negative / Urobili: Negative   Blood: x / Protein: 300 mg/dL / Nitrite: Negative   Leuk Esterase: Large / RBC: 23 /hpf / WBC 26-50   Sq Epi: x / Non Sq Epi: F    CULTURE RESULTS:                20 @ 21:33  Specimen Source: --  Method Type: --  Gram Stain - RRL: --  Gram Stain - Wound: --  Bacteria: --  Culture Results:   >100,000 CFU/ml Gram Negative Rods      Specimen Source:   Method Type:   Gram Stain:   Culture Results: Culture Results:   >100,000 CFU/ml Gram Negative Rods (20 @ 21:33)  Culture Results:   No growth to date. (20 @ 16:29)  Culture Results:   No growth to date. (20 @ 16:29)  Culture Results:   No growth to date. (20 @ 10:14)  Culture Results:   No growth to date. (20 @ 10:14)  Culture Results:   No growth (20 @ 16:51)    Bacteria: Bacteria: Many (20 @ 11:20)        RADIOLOGY & ADDITIONAL TESTS:  No new imaging to review.  PROGRESS NOTE:     CONTACT INFO:     Ele Rosen MD PGY-1  Internal Medicine  [phone]    Patient is a 87y old  Female who presents with a chief complaint of Hyponatremia (25 Dec 2020 07:29)    SUBJECTIVE / OVERNIGHT EVENTS: No acute events overnight. This morning, patient A&Ox1 appears comfortable. Denies complaints including abdominal pain, chest pain.     MEDICATIONS  (STANDING):  amLODIPine   Tablet 5 milliGRAM(s) Oral daily  apixaban 2.5 milliGRAM(s) Oral every 12 hours  artificial  tears Solution 1 Drop(s) Both EYES two times a day  atorvastatin 40 milliGRAM(s) Oral at bedtime  dextrose 40% Gel 15 Gram(s) Oral once  dextrose 5%. 1000 milliLiter(s) (50 mL/Hr) IV Continuous <Continuous>  dextrose 5%. 1000 milliLiter(s) (100 mL/Hr) IV Continuous <Continuous>  dextrose 50% Injectable 25 Gram(s) IV Push once  dextrose 50% Injectable 12.5 Gram(s) IV Push once  dextrose 50% Injectable 25 Gram(s) IV Push once  glucagon  Injectable 1 milliGRAM(s) IntraMuscular once  insulin glargine Injectable (LANTUS) 8 Unit(s) SubCutaneous at bedtime  insulin lispro (ADMELOG) corrective regimen sliding scale   SubCutaneous three times a day before meals  insulin lispro (ADMELOG) corrective regimen sliding scale   SubCutaneous at bedtime  insulin lispro Injectable (ADMELOG) 6 Unit(s) SubCutaneous three times a day before meals  levothyroxine 112 MICROGram(s) Oral daily  melatonin 5 milliGRAM(s) Oral at bedtime  metoprolol tartrate 25 milliGRAM(s) Oral two times a day  polyethylene glycol 3350 17 Gram(s) Oral daily  senna 2 Tablet(s) Oral at bedtime  sodium chloride 0.9%. 1000 milliLiter(s) (75 mL/Hr) IV Continuous <Continuous>  zinc oxide 20% Ointment 1 Application(s) Topical two times a day    MEDICATIONS  (PRN):  acetaminophen   Tablet .. 650 milliGRAM(s) Oral every 6 hours PRN Temp greater or equal to 38C (100.4F), Mild Pain (1 - 3)    I&O's Detail    30 Dec 2020 07:01  -  31 Dec 2020 07:00  --------------------------------------------------------  IN:    Oral Fluid: 240 mL  Total IN: 240 mL    OUT:    Voided (mL): 1050 mL  Total OUT: 1050 mL    Total NET: -810 mL      CAPILLARY BLOOD GLUCOSE      POCT Blood Glucose.: 200 mg/dL (30 Dec 2020 21:48)  POCT Blood Glucose.: 228 mg/dL (30 Dec 2020 17:44)  POCT Blood Glucose.: 111 mg/dL (30 Dec 2020 13:30)  POCT Blood Glucose.: 112 mg/dL (30 Dec 2020 08:52)      PHYSICAL EXAM:  Vital Signs Last 24 Hrs  T(C): 36.8 (31 Dec 2020 06:43), Max: 36.9 (30 Dec 2020 21:25)  T(F): 98.2 (31 Dec 2020 06:43), Max: 98.4 (30 Dec 2020 21:25)  HR: 93 (31 Dec 2020 06:43) (75 - 96)  BP: 135/74 (31 Dec 2020 06:43) (126/75 - 135/74)  BP(mean): --  RR: 18 (31 Dec 2020 06:43) (18 - 18)  SpO2: 96% (31 Dec 2020 06:43) (95% - 98%)    RESPIRATORY: Normal respiratory effort; lungs are clear to auscultation bilaterally  CARDIOVASCULAR: Regular rate and rhythm, normal S1 and S2, no murmur/rub/gallop; No lower extremity edema  ABDOMEN: Nontender to palpation, normoactive bowel sounds, no rebound/guarding  MUSCLOSKELETAL: no clubbing or cyanosis of digits; no joint swelling or tenderness to palpation  PSYCH: A+O to person; affect appropriate    LABS:               11.5   10.10 )-----------( 550      ( 30 Dec 2020 06:59 )             35.9     12-    139  |  104  |  31<H>  ----------------------------<  87  4.7   |  22  |  1.09    Ca    10.0      31 Dec 2020 07:10  Phos  4.4     12-30  Mg     2.2     12-30    Urinalysis Basic - ( 24 Dec 2020 11:20 )    Color: Light Orange / Appearance: Turbid / S.021 / pH: x  Gluc: x / Ketone: Negative  / Bili: Negative / Urobili: Negative   Blood: x / Protein: 300 mg/dL / Nitrite: Negative   Leuk Esterase: Large / RBC: 23 /hpf / WBC 26-50   Sq Epi: x / Non Sq Epi: F    CULTURE RESULTS:                20 @ 21:33  Specimen Source: --  Method Type: --  Gram Stain - RRL: --  Gram Stain - Wound: --  Bacteria: --  Culture Results:   >100,000 CFU/ml Gram Negative Rods      Specimen Source:   Method Type:   Gram Stain:   Culture Results: Culture Results:   >100,000 CFU/ml Gram Negative Rods (20 @ 21:33)  Culture Results:   No growth to date. (20 @ 16:29)  Culture Results:   No growth to date. (20 @ 16:29)  Culture Results:   No growth to date. (20 @ 10:14)  Culture Results:   No growth to date. (20 @ 10:14)  Culture Results:   No growth (20 @ 16:51)    Bacteria: Bacteria: Many (20 @ 11:20)        RADIOLOGY & ADDITIONAL TESTS:  No new imaging to review.

## 2021-01-01 LAB
GLUCOSE BLDC GLUCOMTR-MCNC: 115 MG/DL — HIGH (ref 70–99)
GLUCOSE BLDC GLUCOMTR-MCNC: 140 MG/DL — HIGH (ref 70–99)
GLUCOSE BLDC GLUCOMTR-MCNC: 140 MG/DL — HIGH (ref 70–99)
GLUCOSE BLDC GLUCOMTR-MCNC: 188 MG/DL — HIGH (ref 70–99)

## 2021-01-01 PROCEDURE — 99232 SBSQ HOSP IP/OBS MODERATE 35: CPT | Mod: GC

## 2021-01-01 RX ADMIN — APIXABAN 2.5 MILLIGRAM(S): 2.5 TABLET, FILM COATED ORAL at 06:07

## 2021-01-01 RX ADMIN — Medication 7 UNIT(S): at 08:54

## 2021-01-01 RX ADMIN — ZINC OXIDE 1 APPLICATION(S): 200 OINTMENT TOPICAL at 17:48

## 2021-01-01 RX ADMIN — Medication 5 MILLIGRAM(S): at 21:22

## 2021-01-01 RX ADMIN — APIXABAN 2.5 MILLIGRAM(S): 2.5 TABLET, FILM COATED ORAL at 18:21

## 2021-01-01 RX ADMIN — Medication 7 UNIT(S): at 13:26

## 2021-01-01 RX ADMIN — Medication 1 DROP(S): at 21:22

## 2021-01-01 RX ADMIN — Medication 650 MILLIGRAM(S): at 22:15

## 2021-01-01 RX ADMIN — Medication 112 MICROGRAM(S): at 06:07

## 2021-01-01 RX ADMIN — Medication 25 MILLIGRAM(S): at 18:21

## 2021-01-01 RX ADMIN — Medication 650 MILLIGRAM(S): at 21:23

## 2021-01-01 RX ADMIN — ATORVASTATIN CALCIUM 40 MILLIGRAM(S): 80 TABLET, FILM COATED ORAL at 21:23

## 2021-01-01 RX ADMIN — SENNA PLUS 2 TABLET(S): 8.6 TABLET ORAL at 21:23

## 2021-01-01 RX ADMIN — ZINC OXIDE 1 APPLICATION(S): 200 OINTMENT TOPICAL at 06:08

## 2021-01-01 RX ADMIN — INSULIN GLARGINE 8 UNIT(S): 100 INJECTION, SOLUTION SUBCUTANEOUS at 21:22

## 2021-01-01 RX ADMIN — AMLODIPINE BESYLATE 5 MILLIGRAM(S): 2.5 TABLET ORAL at 06:07

## 2021-01-01 RX ADMIN — Medication 25 MILLIGRAM(S): at 06:07

## 2021-01-01 RX ADMIN — Medication 7 UNIT(S): at 17:46

## 2021-01-01 RX ADMIN — Medication 1 DROP(S): at 06:07

## 2021-01-01 NOTE — PROGRESS NOTE ADULT - PROBLEM SELECTOR PLAN 1
Now resolved s/p IVF. On 12/30, pt with Cr increase from baseline of about 1 to 1.43 , now back to baseline.

## 2021-01-01 NOTE — PROGRESS NOTE ADULT - PROBLEM SELECTOR PLAN 5
Patient with confusion, per daughter, has vascular dementia with baseline cognitive issues that daughter describes as fluctuating, but daughter has not noted acute change in cognition.  resolved. currently back at baseline.

## 2021-01-01 NOTE — PROGRESS NOTE ADULT - PROBLEM SELECTOR PLAN 9
-Patient seen and evaluated by PT who recommends [facility]. Family in agreement with this. Case mgmt arranging [facility], No COVID+ facilities, will have to wait until pt clears infxn. Will COVID swab 1/3

## 2021-01-01 NOTE — PROGRESS NOTE ADULT - PROBLEM SELECTOR PLAN 8
-SSI with FS before meals and at bedtime  -AM glucose well controlled, will stay with 8units lantus hs and reinstate premeal insulin 7units TID.

## 2021-01-01 NOTE — PROGRESS NOTE ADULT - PROBLEM SELECTOR PLAN 2
Pt febrile to 101.7 12/24 am, with leukocytosis to 15k. met sepsis criteria on admission now resolved.  -U/A positive for UTI cx: E coli, ceftriaxone started 12/24-12/26 Completed 7 day course of abx, no further urniary sxs  -CT abdomen as above.

## 2021-01-01 NOTE — PROGRESS NOTE ADULT - SUBJECTIVE AND OBJECTIVE BOX
PROGRESS NOTE:     CONTACT INFO:     Ele Rosen MD PGY-1  Internal Medicine  [phone]    Patient is a 87y old  Female who presents with a chief complaint of Hyponatremia (25 Dec 2020 07:29)    SUBJECTIVE / OVERNIGHT EVENTS: No acute events overnight. This morning, ***    MEDICATIONS  (STANDING):  amLODIPine   Tablet 5 milliGRAM(s) Oral daily  apixaban 2.5 milliGRAM(s) Oral every 12 hours  artificial  tears Solution 1 Drop(s) Both EYES two times a day  atorvastatin 40 milliGRAM(s) Oral at bedtime  dextrose 40% Gel 15 Gram(s) Oral once  dextrose 5%. 1000 milliLiter(s) (50 mL/Hr) IV Continuous <Continuous>  dextrose 5%. 1000 milliLiter(s) (100 mL/Hr) IV Continuous <Continuous>  dextrose 50% Injectable 25 Gram(s) IV Push once  dextrose 50% Injectable 12.5 Gram(s) IV Push once  dextrose 50% Injectable 25 Gram(s) IV Push once  glucagon  Injectable 1 milliGRAM(s) IntraMuscular once  insulin glargine Injectable (LANTUS) 8 Unit(s) SubCutaneous at bedtime  insulin lispro (ADMELOG) corrective regimen sliding scale   SubCutaneous three times a day before meals  insulin lispro (ADMELOG) corrective regimen sliding scale   SubCutaneous at bedtime  insulin lispro Injectable (ADMELOG) 6 Unit(s) SubCutaneous three times a day before meals  levothyroxine 112 MICROGram(s) Oral daily  melatonin 5 milliGRAM(s) Oral at bedtime  metoprolol tartrate 25 milliGRAM(s) Oral two times a day  polyethylene glycol 3350 17 Gram(s) Oral daily  senna 2 Tablet(s) Oral at bedtime  sodium chloride 0.9%. 1000 milliLiter(s) (75 mL/Hr) IV Continuous <Continuous>  zinc oxide 20% Ointment 1 Application(s) Topical two times a day    MEDICATIONS  (PRN):  acetaminophen   Tablet .. 650 milliGRAM(s) Oral every 6 hours PRN Temp greater or equal to 38C (100.4F), Mild Pain (1 - 3)    I&O's Detail    31 Dec 2020 07:01  -  01 Jan 2021 07:00  --------------------------------------------------------  IN:  Total IN: 0 mL    OUT:    Voided (mL): 500 mL  Total OUT: 500 mL    Total NET: -500 mL    CAPILLARY BLOOD GLUCOSE  POCT Blood Glucose.: 244 mg/dL (31 Dec 2020 21:45)  POCT Blood Glucose.: 126 mg/dL (31 Dec 2020 17:53)  POCT Blood Glucose.: 101 mg/dL (31 Dec 2020 13:01)  POCT Blood Glucose.: 105 mg/dL (31 Dec 2020 08:29)      PHYSICAL EXAM:  Vital Signs Last 24 Hrs  T(C): 36.7 (01 Jan 2021 05:28), Max: 37.2 (31 Dec 2020 21:22)  T(F): 98.1 (01 Jan 2021 05:28), Max: 98.9 (31 Dec 2020 21:22)  HR: 95 (01 Jan 2021 05:28) (81 - 102)  BP: 112/70 (01 Jan 2021 05:28) (112/70 - 129/78)  BP(mean): --  RR: 18 (01 Jan 2021 05:28) (16 - 18)  SpO2: 96% (01 Jan 2021 05:28) (96% - 99%)    RESPIRATORY: Normal respiratory effort; lungs are clear to auscultation bilaterally  CARDIOVASCULAR: Regular rate and rhythm, normal S1 and S2, no murmur/rub/gallop; No lower extremity edema  ABDOMEN: Nontender to palpation, normoactive bowel sounds, no rebound/guarding  MUSCLOSKELETAL: no clubbing or cyanosis of digits; no joint swelling or tenderness to palpation  PSYCH: A+O to person; affect appropriate        LABS:  No new labs to review       RADIOLOGY & ADDITIONAL TESTS:  No new imaging to review.

## 2021-01-01 NOTE — PROGRESS NOTE ADULT - PROBLEM SELECTOR PLAN 3
Pt returned home from a 5 week rehab stay on 12/11, tested + for COVID in ED on presentation. Currently without any respiratory symptoms, CXR clear.   -Will continue with conservative mgmt as patient is not currently symptomatic  -If pt becomes SOB, will start remdecivir and decadron, supplemental oxygen prn.  -Repeat COVID swab done 12/28 and 12/30. Will reswab 1/3

## 2021-01-02 LAB
GLUCOSE BLDC GLUCOMTR-MCNC: 134 MG/DL — HIGH (ref 70–99)
GLUCOSE BLDC GLUCOMTR-MCNC: 182 MG/DL — HIGH (ref 70–99)
GLUCOSE BLDC GLUCOMTR-MCNC: 183 MG/DL — HIGH (ref 70–99)
GLUCOSE BLDC GLUCOMTR-MCNC: 95 MG/DL — SIGNIFICANT CHANGE UP (ref 70–99)

## 2021-01-02 PROCEDURE — 99232 SBSQ HOSP IP/OBS MODERATE 35: CPT | Mod: GC

## 2021-01-02 RX ADMIN — Medication 112 MICROGRAM(S): at 05:16

## 2021-01-02 RX ADMIN — Medication 25 MILLIGRAM(S): at 05:16

## 2021-01-02 RX ADMIN — Medication 1 DROP(S): at 05:16

## 2021-01-02 RX ADMIN — Medication 7 UNIT(S): at 07:58

## 2021-01-02 RX ADMIN — Medication 1 DROP(S): at 17:10

## 2021-01-02 RX ADMIN — APIXABAN 2.5 MILLIGRAM(S): 2.5 TABLET, FILM COATED ORAL at 17:10

## 2021-01-02 RX ADMIN — Medication 7 UNIT(S): at 17:10

## 2021-01-02 RX ADMIN — ZINC OXIDE 1 APPLICATION(S): 200 OINTMENT TOPICAL at 17:11

## 2021-01-02 RX ADMIN — APIXABAN 2.5 MILLIGRAM(S): 2.5 TABLET, FILM COATED ORAL at 05:16

## 2021-01-02 RX ADMIN — Medication 7 UNIT(S): at 12:28

## 2021-01-02 RX ADMIN — Medication 25 MILLIGRAM(S): at 17:10

## 2021-01-02 RX ADMIN — ZINC OXIDE 1 APPLICATION(S): 200 OINTMENT TOPICAL at 05:17

## 2021-01-02 RX ADMIN — ATORVASTATIN CALCIUM 40 MILLIGRAM(S): 80 TABLET, FILM COATED ORAL at 21:36

## 2021-01-02 RX ADMIN — INSULIN GLARGINE 8 UNIT(S): 100 INJECTION, SOLUTION SUBCUTANEOUS at 21:36

## 2021-01-02 RX ADMIN — AMLODIPINE BESYLATE 5 MILLIGRAM(S): 2.5 TABLET ORAL at 05:16

## 2021-01-02 RX ADMIN — Medication 2: at 12:27

## 2021-01-02 RX ADMIN — Medication 5 MILLIGRAM(S): at 21:36

## 2021-01-02 NOTE — PROGRESS NOTE ADULT - PROBLEM SELECTOR PLAN 3
Pt returned home from a 5 week rehab stay on 12/11, tested + for COVID in ED on presentation. Currently without any respiratory symptoms, CXR clear.   -Will continue with conservative mgmt as patient is not currently symptomatic  -If pt becomes SOB, will start remdecivir and decadron, supplemental oxygen prn.  -Repeat COVID swab done 12/28 and 12/30. Reswabbed again 1/3 will f/u results

## 2021-01-02 NOTE — PROGRESS NOTE ADULT - SUBJECTIVE AND OBJECTIVE BOX
PROGRESS NOTE:     CONTACT INFO:     Ele Rosen MD PGY-1  Internal Medicine  [phone]    Patient is a 87y old  Female who presents with a chief complaint of Hyponatremia (25 Dec 2020 07:29)    SUBJECTIVE / OVERNIGHT EVENTS: No acute events overnight. This morning,patient resting in bed. Appears comfortable. Denies complaints, including abdominal pain, constipation/diarrhea, n/v, chest pain, headache, SOB.     MEDICATIONS  (STANDING):  amLODIPine   Tablet 5 milliGRAM(s) Oral daily  apixaban 2.5 milliGRAM(s) Oral every 12 hours  artificial  tears Solution 1 Drop(s) Both EYES two times a day  atorvastatin 40 milliGRAM(s) Oral at bedtime  dextrose 40% Gel 15 Gram(s) Oral once  dextrose 5%. 1000 milliLiter(s) (50 mL/Hr) IV Continuous <Continuous>  dextrose 5%. 1000 milliLiter(s) (100 mL/Hr) IV Continuous <Continuous>  dextrose 50% Injectable 25 Gram(s) IV Push once  dextrose 50% Injectable 12.5 Gram(s) IV Push once  dextrose 50% Injectable 25 Gram(s) IV Push once  glucagon  Injectable 1 milliGRAM(s) IntraMuscular once  insulin glargine Injectable (LANTUS) 8 Unit(s) SubCutaneous at bedtime  insulin lispro (ADMELOG) corrective regimen sliding scale   SubCutaneous three times a day before meals  insulin lispro (ADMELOG) corrective regimen sliding scale   SubCutaneous at bedtime  insulin lispro Injectable (ADMELOG) 6 Unit(s) SubCutaneous three times a day before meals  levothyroxine 112 MICROGram(s) Oral daily  melatonin 5 milliGRAM(s) Oral at bedtime  metoprolol tartrate 25 milliGRAM(s) Oral two times a day  polyethylene glycol 3350 17 Gram(s) Oral daily  senna 2 Tablet(s) Oral at bedtime  sodium chloride 0.9%. 1000 milliLiter(s) (75 mL/Hr) IV Continuous <Continuous>  zinc oxide 20% Ointment 1 Application(s) Topical two times a day    MEDICATIONS  (PRN):  acetaminophen   Tablet .. 650 milliGRAM(s) Oral every 6 hours PRN Temp greater or equal to 38C (100.4F), Mild Pain (1 - 3)    CAPILLARY BLOOD GLUCOSE  POCT Blood Glucose.: 188 mg/dL (01 Jan 2021 21:03)  POCT Blood Glucose.: 115 mg/dL (01 Jan 2021 17:12)  POCT Blood Glucose.: 140 mg/dL (01 Jan 2021 13:18)  POCT Blood Glucose.: 140 mg/dL (01 Jan 2021 08:42)    I&O's Detail  01 Jan 2021 07:01  -  02 Jan 2021 07:00  --------------------------------------------------------  IN:    Oral Fluid: 340 mL  Total IN: 340 mL    OUT:    Voided (mL): 600 mL  Total OUT: 600 mL    Total NET: -260 mL      PHYSICAL EXAM:  Vital Signs Last 24 Hrs  T(C): 36.8 (02 Jan 2021 04:41), Max: 36.9 (01 Jan 2021 21:11)  T(F): 98.3 (02 Jan 2021 04:41), Max: 98.5 (01 Jan 2021 21:11)  HR: 79 (02 Jan 2021 04:41) (79 - 100)  BP: 146/65 (02 Jan 2021 04:41) (97/53 - 146/65)  BP(mean): --  RR: 18 (02 Jan 2021 04:41) (18 - 18)  SpO2: 98% (02 Jan 2021 04:41) (93% - 98%)    RESPIRATORY: Normal respiratory effort; lungs are clear to auscultation bilaterally  CARDIOVASCULAR: Regular rate and rhythm, normal S1 and S2, no murmur/rub/gallop; No lower extremity edema  ABDOMEN: Nontender to palpation, normoactive bowel sounds, no rebound/guarding  MUSCLOSKELETAL: no clubbing or cyanosis of digits; no joint swelling or tenderness to palpation  PSYCH: A+O to person; affect appropriate          LABS:                          RADIOLOGY & ADDITIONAL TESTS:  No new imaging to review.  PROGRESS NOTE:     CONTACT INFO:     Ele Rosen MD PGY-1  Internal Medicine  [phone]    Patient is a 87y old  Female who presents with a chief complaint of Hyponatremia (25 Dec 2020 07:29)    SUBJECTIVE / OVERNIGHT EVENTS: No acute events overnight. This morning,patient resting in bed. Appears comfortable. Denies complaints, including abdominal pain, constipation/diarrhea, n/v, chest pain, headache, SOB.     MEDICATIONS  (STANDING):  amLODIPine   Tablet 5 milliGRAM(s) Oral daily  apixaban 2.5 milliGRAM(s) Oral every 12 hours  artificial  tears Solution 1 Drop(s) Both EYES two times a day  atorvastatin 40 milliGRAM(s) Oral at bedtime  dextrose 40% Gel 15 Gram(s) Oral once  dextrose 5%. 1000 milliLiter(s) (50 mL/Hr) IV Continuous <Continuous>  dextrose 5%. 1000 milliLiter(s) (100 mL/Hr) IV Continuous <Continuous>  dextrose 50% Injectable 25 Gram(s) IV Push once  dextrose 50% Injectable 12.5 Gram(s) IV Push once  dextrose 50% Injectable 25 Gram(s) IV Push once  glucagon  Injectable 1 milliGRAM(s) IntraMuscular once  insulin glargine Injectable (LANTUS) 8 Unit(s) SubCutaneous at bedtime  insulin lispro (ADMELOG) corrective regimen sliding scale   SubCutaneous three times a day before meals  insulin lispro (ADMELOG) corrective regimen sliding scale   SubCutaneous at bedtime  insulin lispro Injectable (ADMELOG) 6 Unit(s) SubCutaneous three times a day before meals  levothyroxine 112 MICROGram(s) Oral daily  melatonin 5 milliGRAM(s) Oral at bedtime  metoprolol tartrate 25 milliGRAM(s) Oral two times a day  polyethylene glycol 3350 17 Gram(s) Oral daily  senna 2 Tablet(s) Oral at bedtime  sodium chloride 0.9%. 1000 milliLiter(s) (75 mL/Hr) IV Continuous <Continuous>  zinc oxide 20% Ointment 1 Application(s) Topical two times a day    MEDICATIONS  (PRN):  acetaminophen   Tablet .. 650 milliGRAM(s) Oral every 6 hours PRN Temp greater or equal to 38C (100.4F), Mild Pain (1 - 3)    CAPILLARY BLOOD GLUCOSE  POCT Blood Glucose.: 188 mg/dL (01 Jan 2021 21:03)  POCT Blood Glucose.: 115 mg/dL (01 Jan 2021 17:12)  POCT Blood Glucose.: 140 mg/dL (01 Jan 2021 13:18)  POCT Blood Glucose.: 140 mg/dL (01 Jan 2021 08:42)    I&O's Detail  01 Jan 2021 07:01  -  02 Jan 2021 07:00  --------------------------------------------------------  IN:    Oral Fluid: 340 mL  Total IN: 340 mL    OUT:    Voided (mL): 600 mL  Total OUT: 600 mL    Total NET: -260 mL      PHYSICAL EXAM:  Vital Signs Last 24 Hrs  T(C): 36.8 (02 Jan 2021 04:41), Max: 36.9 (01 Jan 2021 21:11)  T(F): 98.3 (02 Jan 2021 04:41), Max: 98.5 (01 Jan 2021 21:11)  HR: 79 (02 Jan 2021 04:41) (79 - 100)  BP: 146/65 (02 Jan 2021 04:41) (97/53 - 146/65)  BP(mean): --  RR: 18 (02 Jan 2021 04:41) (18 - 18)  SpO2: 98% (02 Jan 2021 04:41) (93% - 98%)    RESPIRATORY: Normal respiratory effort; lungs are clear to auscultation bilaterally  CARDIOVASCULAR: Regular rate and rhythm, normal S1 and S2, no murmur/rub/gallop; No lower extremity edema  ABDOMEN: Nontender to palpation, normoactive bowel sounds, no rebound/guarding  MUSCLOSKELETAL: no clubbing or cyanosis of digits; no joint swelling or tenderness to palpation  PSYCH: A+O to person; affect appropriate          LABS:  No new labs to review     RADIOLOGY & ADDITIONAL TESTS:  No new imaging to review.

## 2021-01-03 ENCOUNTER — TRANSCRIPTION ENCOUNTER (OUTPATIENT)
Age: 86
End: 2021-01-03

## 2021-01-03 LAB
ANION GAP SERPL CALC-SCNC: 13 MMOL/L — SIGNIFICANT CHANGE UP (ref 5–17)
BASOPHILS # BLD AUTO: 0.14 K/UL — SIGNIFICANT CHANGE UP (ref 0–0.2)
BASOPHILS NFR BLD AUTO: 1.2 % — SIGNIFICANT CHANGE UP (ref 0–2)
BUN SERPL-MCNC: 38 MG/DL — HIGH (ref 7–23)
CALCIUM SERPL-MCNC: 9.7 MG/DL — SIGNIFICANT CHANGE UP (ref 8.4–10.5)
CHLORIDE SERPL-SCNC: 106 MMOL/L — SIGNIFICANT CHANGE UP (ref 96–108)
CO2 SERPL-SCNC: 20 MMOL/L — LOW (ref 22–31)
CREAT SERPL-MCNC: 1.04 MG/DL — SIGNIFICANT CHANGE UP (ref 0.5–1.3)
EOSINOPHIL # BLD AUTO: 0.3 K/UL — SIGNIFICANT CHANGE UP (ref 0–0.5)
EOSINOPHIL NFR BLD AUTO: 2.6 % — SIGNIFICANT CHANGE UP (ref 0–6)
GLUCOSE BLDC GLUCOMTR-MCNC: 111 MG/DL — HIGH (ref 70–99)
GLUCOSE BLDC GLUCOMTR-MCNC: 120 MG/DL — HIGH (ref 70–99)
GLUCOSE BLDC GLUCOMTR-MCNC: 231 MG/DL — HIGH (ref 70–99)
GLUCOSE BLDC GLUCOMTR-MCNC: 252 MG/DL — HIGH (ref 70–99)
GLUCOSE SERPL-MCNC: 181 MG/DL — HIGH (ref 70–99)
HCT VFR BLD CALC: 40.1 % — SIGNIFICANT CHANGE UP (ref 34.5–45)
HGB BLD-MCNC: 12 G/DL — SIGNIFICANT CHANGE UP (ref 11.5–15.5)
IMM GRANULOCYTES NFR BLD AUTO: 2.3 % — HIGH (ref 0–1.5)
LYMPHOCYTES # BLD AUTO: 1.41 K/UL — SIGNIFICANT CHANGE UP (ref 1–3.3)
LYMPHOCYTES # BLD AUTO: 12.4 % — LOW (ref 13–44)
MCHC RBC-ENTMCNC: 29.7 PG — SIGNIFICANT CHANGE UP (ref 27–34)
MCHC RBC-ENTMCNC: 29.9 GM/DL — LOW (ref 32–36)
MCV RBC AUTO: 99.3 FL — SIGNIFICANT CHANGE UP (ref 80–100)
MONOCYTES # BLD AUTO: 0.92 K/UL — HIGH (ref 0–0.9)
MONOCYTES NFR BLD AUTO: 8.1 % — SIGNIFICANT CHANGE UP (ref 2–14)
NEUTROPHILS # BLD AUTO: 8.33 K/UL — HIGH (ref 1.8–7.4)
NEUTROPHILS NFR BLD AUTO: 73.4 % — SIGNIFICANT CHANGE UP (ref 43–77)
NRBC # BLD: 0 /100 WBCS — SIGNIFICANT CHANGE UP (ref 0–0)
PLATELET # BLD AUTO: 579 K/UL — HIGH (ref 150–400)
POTASSIUM SERPL-MCNC: 5.1 MMOL/L — SIGNIFICANT CHANGE UP (ref 3.5–5.3)
POTASSIUM SERPL-SCNC: 5.1 MMOL/L — SIGNIFICANT CHANGE UP (ref 3.5–5.3)
RBC # BLD: 4.04 M/UL — SIGNIFICANT CHANGE UP (ref 3.8–5.2)
RBC # FLD: 14.8 % — HIGH (ref 10.3–14.5)
SARS-COV-2 RNA SPEC QL NAA+PROBE: DETECTED
SODIUM SERPL-SCNC: 139 MMOL/L — SIGNIFICANT CHANGE UP (ref 135–145)
WBC # BLD: 11.36 K/UL — HIGH (ref 3.8–10.5)
WBC # FLD AUTO: 11.36 K/UL — HIGH (ref 3.8–10.5)

## 2021-01-03 PROCEDURE — 99232 SBSQ HOSP IP/OBS MODERATE 35: CPT | Mod: GC

## 2021-01-03 RX ADMIN — Medication 1 DROP(S): at 17:52

## 2021-01-03 RX ADMIN — ZINC OXIDE 1 APPLICATION(S): 200 OINTMENT TOPICAL at 19:15

## 2021-01-03 RX ADMIN — Medication 5 MILLIGRAM(S): at 21:41

## 2021-01-03 RX ADMIN — APIXABAN 2.5 MILLIGRAM(S): 2.5 TABLET, FILM COATED ORAL at 06:04

## 2021-01-03 RX ADMIN — Medication 7 UNIT(S): at 17:51

## 2021-01-03 RX ADMIN — Medication 7 UNIT(S): at 12:46

## 2021-01-03 RX ADMIN — APIXABAN 2.5 MILLIGRAM(S): 2.5 TABLET, FILM COATED ORAL at 17:51

## 2021-01-03 RX ADMIN — Medication 1 DROP(S): at 06:05

## 2021-01-03 RX ADMIN — Medication 7 UNIT(S): at 08:18

## 2021-01-03 RX ADMIN — Medication 25 MILLIGRAM(S): at 17:51

## 2021-01-03 RX ADMIN — INSULIN GLARGINE 8 UNIT(S): 100 INJECTION, SOLUTION SUBCUTANEOUS at 21:41

## 2021-01-03 RX ADMIN — SENNA PLUS 2 TABLET(S): 8.6 TABLET ORAL at 21:41

## 2021-01-03 RX ADMIN — Medication 6: at 12:45

## 2021-01-03 RX ADMIN — Medication 25 MILLIGRAM(S): at 06:04

## 2021-01-03 RX ADMIN — Medication 112 MICROGRAM(S): at 06:04

## 2021-01-03 RX ADMIN — ATORVASTATIN CALCIUM 40 MILLIGRAM(S): 80 TABLET, FILM COATED ORAL at 21:41

## 2021-01-03 RX ADMIN — AMLODIPINE BESYLATE 5 MILLIGRAM(S): 2.5 TABLET ORAL at 06:05

## 2021-01-03 RX ADMIN — ZINC OXIDE 1 APPLICATION(S): 200 OINTMENT TOPICAL at 06:05

## 2021-01-03 RX ADMIN — POLYETHYLENE GLYCOL 3350 17 GRAM(S): 17 POWDER, FOR SOLUTION ORAL at 12:45

## 2021-01-03 NOTE — PROGRESS NOTE ADULT - SUBJECTIVE AND OBJECTIVE BOX
Department of Internal Medicine  Yamilex Smith M.D. | PGY-3  Pager: [phone] (NS), [phone] (MATILDE)        Patient is a 87y old  Female who presents with a chief complaint of Hyponatremia (03 Jan 2021 06:14)      SUBJECTIVE / OVERNIGHT EVENTS:  - No acute events overnight  - Denies any generalized pain, chest pain or shortness of breath    MEDICATIONS  (STANDING):  amLODIPine   Tablet 5 milliGRAM(s) Oral daily  apixaban 2.5 milliGRAM(s) Oral every 12 hours  artificial  tears Solution 1 Drop(s) Both EYES two times a day  atorvastatin 40 milliGRAM(s) Oral at bedtime  dextrose 40% Gel 15 Gram(s) Oral once  dextrose 5%. 1000 milliLiter(s) (50 mL/Hr) IV Continuous <Continuous>  dextrose 5%. 1000 milliLiter(s) (100 mL/Hr) IV Continuous <Continuous>  dextrose 50% Injectable 25 Gram(s) IV Push once  dextrose 50% Injectable 12.5 Gram(s) IV Push once  dextrose 50% Injectable 25 Gram(s) IV Push once  glucagon  Injectable 1 milliGRAM(s) IntraMuscular once  insulin glargine Injectable (LANTUS) 8 Unit(s) SubCutaneous at bedtime  insulin lispro (ADMELOG) corrective regimen sliding scale   SubCutaneous three times a day before meals  insulin lispro (ADMELOG) corrective regimen sliding scale   SubCutaneous at bedtime  insulin lispro Injectable (ADMELOG) 7 Unit(s) SubCutaneous three times a day before meals  levothyroxine 112 MICROGram(s) Oral daily  melatonin 5 milliGRAM(s) Oral at bedtime  metoprolol tartrate 25 milliGRAM(s) Oral two times a day  polyethylene glycol 3350 17 Gram(s) Oral daily  senna 2 Tablet(s) Oral at bedtime  zinc oxide 20% Ointment 1 Application(s) Topical two times a day    MEDICATIONS  (PRN):  acetaminophen   Tablet .. 650 milliGRAM(s) Oral every 6 hours PRN Temp greater or equal to 38C (100.4F), Mild Pain (1 - 3)      CAPILLARY BLOOD GLUCOSE      POCT Blood Glucose.: 120 mg/dL (03 Jan 2021 08:06)  POCT Blood Glucose.: 183 mg/dL (02 Jan 2021 21:17)  POCT Blood Glucose.: 95 mg/dL (02 Jan 2021 16:47)  POCT Blood Glucose.: 182 mg/dL (02 Jan 2021 11:50)    I&O's Summary    02 Jan 2021 07:01  -  03 Jan 2021 07:00  --------------------------------------------------------  IN: 540 mL / OUT: 950 mL / NET: -410 mL    03 Jan 2021 07:01  -  03 Jan 2021 09:52  --------------------------------------------------------  IN: 120 mL / OUT: 0 mL / NET: 120 mL        PHYSICAL EXAM:  Vital Signs Last 24 Hrs  T(C): 36.6 (03 Jan 2021 08:12), Max: 37.1 (02 Jan 2021 17:15)  T(F): 97.8 (03 Jan 2021 08:12), Max: 98.8 (02 Jan 2021 17:15)  HR: 85 (03 Jan 2021 08:12) (62 - 93)  BP: 132/81 (03 Jan 2021 08:12) (112/70 - 150/78)  BP(mean): --  RR: 18 (03 Jan 2021 08:12) (18 - 20)  SpO2: 95% (03 Jan 2021 08:12) (94% - 98%)    GENERAL: NAD, awake   RESPIRATORY: Normal respiratory effort; lungs are clear to auscultation bilaterally  CARDIOVASCULAR: Regular rate and rhythm, normal S1 and S2, no murmur/rub/gallop; No lower extremity edema  ABDOMEN: Nontender to palpation, normoactive bowel sounds, no rebound/guarding  MUSCLOSKELETAL: no clubbing or cyanosis of digits; no joint swelling or tenderness to palpation  PSYCH: A+O to person; affect appropriate    LABS:                      RADIOLOGY & ADDITIONAL TESTS:  Results Reviewed:   Imaging Personally Reviewed:  Electrocardiogram Personally Reviewed:    COORDINATION OF CARE:  Care Discussed with Consultants/Other Providers [Y/N]:  Prior or Outpatient Records Reviewed [Y/N]:

## 2021-01-03 NOTE — DISCHARGE NOTE NURSING/CASE MANAGEMENT/SOCIAL WORK - PATIENT PORTAL LINK FT
You can access the FollowMyHealth Patient Portal offered by [facility] by registering at the following website: [email or web address]. By joining [employer]’s FollowMyHealth portal, you will also be able to view your health information using other applications (apps) compatible with our system.

## 2021-01-04 LAB
GLUCOSE BLDC GLUCOMTR-MCNC: 114 MG/DL — HIGH (ref 70–99)
GLUCOSE BLDC GLUCOMTR-MCNC: 147 MG/DL — HIGH (ref 70–99)
GLUCOSE BLDC GLUCOMTR-MCNC: 180 MG/DL — HIGH (ref 70–99)
GLUCOSE BLDC GLUCOMTR-MCNC: 320 MG/DL — HIGH (ref 70–99)

## 2021-01-04 PROCEDURE — 99232 SBSQ HOSP IP/OBS MODERATE 35: CPT | Mod: GC

## 2021-01-04 RX ADMIN — Medication 7 UNIT(S): at 12:33

## 2021-01-04 RX ADMIN — Medication 1 DROP(S): at 05:24

## 2021-01-04 RX ADMIN — ZINC OXIDE 1 APPLICATION(S): 200 OINTMENT TOPICAL at 05:25

## 2021-01-04 RX ADMIN — Medication 7 UNIT(S): at 08:19

## 2021-01-04 RX ADMIN — INSULIN GLARGINE 8 UNIT(S): 100 INJECTION, SOLUTION SUBCUTANEOUS at 21:43

## 2021-01-04 RX ADMIN — POLYETHYLENE GLYCOL 3350 17 GRAM(S): 17 POWDER, FOR SOLUTION ORAL at 12:33

## 2021-01-04 RX ADMIN — APIXABAN 2.5 MILLIGRAM(S): 2.5 TABLET, FILM COATED ORAL at 17:22

## 2021-01-04 RX ADMIN — SENNA PLUS 2 TABLET(S): 8.6 TABLET ORAL at 21:42

## 2021-01-04 RX ADMIN — Medication 112 MICROGRAM(S): at 05:25

## 2021-01-04 RX ADMIN — ZINC OXIDE 1 APPLICATION(S): 200 OINTMENT TOPICAL at 17:23

## 2021-01-04 RX ADMIN — Medication 4: at 21:43

## 2021-01-04 RX ADMIN — Medication 2: at 17:22

## 2021-01-04 RX ADMIN — Medication 5 MILLIGRAM(S): at 21:42

## 2021-01-04 RX ADMIN — AMLODIPINE BESYLATE 5 MILLIGRAM(S): 2.5 TABLET ORAL at 05:25

## 2021-01-04 RX ADMIN — Medication 25 MILLIGRAM(S): at 05:25

## 2021-01-04 RX ADMIN — Medication 25 MILLIGRAM(S): at 17:22

## 2021-01-04 RX ADMIN — Medication 1 DROP(S): at 17:23

## 2021-01-04 RX ADMIN — ATORVASTATIN CALCIUM 40 MILLIGRAM(S): 80 TABLET, FILM COATED ORAL at 21:42

## 2021-01-04 RX ADMIN — Medication 7 UNIT(S): at 17:23

## 2021-01-04 RX ADMIN — APIXABAN 2.5 MILLIGRAM(S): 2.5 TABLET, FILM COATED ORAL at 05:25

## 2021-01-04 NOTE — PROGRESS NOTE ADULT - PROBLEM SELECTOR PLAN 6
-Patient seen and evaluated by PT who recommends [facility]. Family in agreement with this. Case mgmt arranging [facility], No COVID+ facilities, will have to wait until pt clears infxn. COVID 1/3 positive

## 2021-01-04 NOTE — PROGRESS NOTE ADULT - SUBJECTIVE AND OBJECTIVE BOX
PROGRESS NOTE:     CONTACT INFO:  Nina Trujillo MD  Internal Medicine PGY2  Pager: [phone]    Patient is a 87y old  Female who presents with a chief complaint of Hyponatremia (03 Jan 2021 06:14)      SUBJECTIVE / OVERNIGHT EVENTS:  No acute events overnight. Patient seen and evaluated at bedside. No fever/chills.  Denies SOB at rest, chest pain, palpitations, abdominal pain, nausea/vomiting    ADDITIONAL REVIEW OF SYSTEMS:    MEDICATIONS  (STANDING):  amLODIPine   Tablet 5 milliGRAM(s) Oral daily  apixaban 2.5 milliGRAM(s) Oral every 12 hours  artificial  tears Solution 1 Drop(s) Both EYES two times a day  atorvastatin 40 milliGRAM(s) Oral at bedtime  dextrose 40% Gel 15 Gram(s) Oral once  dextrose 5%. 1000 milliLiter(s) (50 mL/Hr) IV Continuous <Continuous>  dextrose 5%. 1000 milliLiter(s) (100 mL/Hr) IV Continuous <Continuous>  dextrose 50% Injectable 25 Gram(s) IV Push once  dextrose 50% Injectable 12.5 Gram(s) IV Push once  dextrose 50% Injectable 25 Gram(s) IV Push once  glucagon  Injectable 1 milliGRAM(s) IntraMuscular once  insulin glargine Injectable (LANTUS) 8 Unit(s) SubCutaneous at bedtime  insulin lispro (ADMELOG) corrective regimen sliding scale   SubCutaneous three times a day before meals  insulin lispro (ADMELOG) corrective regimen sliding scale   SubCutaneous at bedtime  insulin lispro Injectable (ADMELOG) 7 Unit(s) SubCutaneous three times a day before meals  levothyroxine 112 MICROGram(s) Oral daily  melatonin 5 milliGRAM(s) Oral at bedtime  metoprolol tartrate 25 milliGRAM(s) Oral two times a day  polyethylene glycol 3350 17 Gram(s) Oral daily  senna 2 Tablet(s) Oral at bedtime  zinc oxide 20% Ointment 1 Application(s) Topical two times a day    MEDICATIONS  (PRN):  acetaminophen   Tablet .. 650 milliGRAM(s) Oral every 6 hours PRN Temp greater or equal to 38C (100.4F), Mild Pain (1 - 3)      CAPILLARY BLOOD GLUCOSE      POCT Blood Glucose.: 231 mg/dL (03 Jan 2021 21:29)  POCT Blood Glucose.: 111 mg/dL (03 Jan 2021 17:03)  POCT Blood Glucose.: 252 mg/dL (03 Jan 2021 11:57)  POCT Blood Glucose.: 120 mg/dL (03 Jan 2021 08:06)    I&O's Summary    02 Jan 2021 07:01  -  03 Jan 2021 07:00  --------------------------------------------------------  IN: 540 mL / OUT: 950 mL / NET: -410 mL    03 Jan 2021 07:01  -  04 Jan 2021 06:56  --------------------------------------------------------  IN: 900 mL / OUT: 1000 mL / NET: -100 mL        PHYSICAL EXAM:  Vital Signs Last 24 Hrs  T(C): 36.6 (04 Jan 2021 04:33), Max: 37 (03 Jan 2021 17:11)  T(F): 97.8 (04 Jan 2021 04:33), Max: 98.6 (03 Jan 2021 17:11)  HR: 86 (04 Jan 2021 04:33) (81 - 100)  BP: 161/88 (04 Jan 2021 04:33) (116/64 - 161/88)  BP(mean): --  RR: 18 (04 Jan 2021 04:33) (18 - 18)  SpO2: 93% (04 Jan 2021 04:33) (92% - 96%)    CONSTITUTIONAL: NAD, well-developed  RESPIRATORY: Normal respiratory effort; lungs are clear to auscultation bilaterally  CARDIOVASCULAR: Regular rate and rhythm, normal S1 and S2, no murmur/rub/gallop; No lower extremity edema; Peripheral pulses are 2+ bilaterally  ABDOMEN: Nontender to palpation, normoactive bowel sounds, no rebound/guarding; No hepatosplenomegaly  MUSCLOSKELETAL: no clubbing or cyanosis of digits; no joint swelling or tenderness to palpation  PSYCH: A+O to person, place, and time; affect appropriate    LABS:                        12.0   11.36 )-----------( 579      ( 03 Jan 2021 11:28 )             40.1     01-03    139  |  106  |  38<H>  ----------------------------<  181<H>  5.1   |  20<L>  |  1.04    Ca    9.7      03 Jan 2021 11:28                  RADIOLOGY & ADDITIONAL TESTS:  Results Reviewed:   Imaging Personally Reviewed:  Electrocardiogram Personally Reviewed:    COORDINATION OF CARE:  Care Discussed with Consultants/Other Providers [Y/N]:  Prior or Outpatient Records Reviewed [Y/N]:   PROGRESS NOTE:     CONTACT INFO:  Nina Trujillo MD  Internal Medicine PGY2  Pager: [phone]    Patient is a 87y old  Female who presents with a chief complaint of Hyponatremia (03 Jan 2021 06:14)      SUBJECTIVE / OVERNIGHT EVENTS:  No acute events overnight. Patient seen and evaluated at bedside. No fever/chills.  Denies SOB at rest, chest pain, palpitations, abdominal pain, nausea/vomiting    ADDITIONAL REVIEW OF SYSTEMS:    MEDICATIONS  (STANDING):  amLODIPine   Tablet 5 milliGRAM(s) Oral daily  apixaban 2.5 milliGRAM(s) Oral every 12 hours  artificial  tears Solution 1 Drop(s) Both EYES two times a day  atorvastatin 40 milliGRAM(s) Oral at bedtime  dextrose 40% Gel 15 Gram(s) Oral once  dextrose 5%. 1000 milliLiter(s) (50 mL/Hr) IV Continuous <Continuous>  dextrose 5%. 1000 milliLiter(s) (100 mL/Hr) IV Continuous <Continuous>  dextrose 50% Injectable 25 Gram(s) IV Push once  dextrose 50% Injectable 12.5 Gram(s) IV Push once  dextrose 50% Injectable 25 Gram(s) IV Push once  glucagon  Injectable 1 milliGRAM(s) IntraMuscular once  insulin glargine Injectable (LANTUS) 8 Unit(s) SubCutaneous at bedtime  insulin lispro (ADMELOG) corrective regimen sliding scale   SubCutaneous three times a day before meals  insulin lispro (ADMELOG) corrective regimen sliding scale   SubCutaneous at bedtime  insulin lispro Injectable (ADMELOG) 7 Unit(s) SubCutaneous three times a day before meals  levothyroxine 112 MICROGram(s) Oral daily  melatonin 5 milliGRAM(s) Oral at bedtime  metoprolol tartrate 25 milliGRAM(s) Oral two times a day  polyethylene glycol 3350 17 Gram(s) Oral daily  senna 2 Tablet(s) Oral at bedtime  zinc oxide 20% Ointment 1 Application(s) Topical two times a day    MEDICATIONS  (PRN):  acetaminophen   Tablet .. 650 milliGRAM(s) Oral every 6 hours PRN Temp greater or equal to 38C (100.4F), Mild Pain (1 - 3)      CAPILLARY BLOOD GLUCOSE      POCT Blood Glucose.: 231 mg/dL (03 Jan 2021 21:29)  POCT Blood Glucose.: 111 mg/dL (03 Jan 2021 17:03)  POCT Blood Glucose.: 252 mg/dL (03 Jan 2021 11:57)  POCT Blood Glucose.: 120 mg/dL (03 Jan 2021 08:06)    I&O's Summary    02 Jan 2021 07:01  -  03 Jan 2021 07:00  --------------------------------------------------------  IN: 540 mL / OUT: 950 mL / NET: -410 mL    03 Jan 2021 07:01  -  04 Jan 2021 06:56  --------------------------------------------------------  IN: 900 mL / OUT: 1000 mL / NET: -100 mL        PHYSICAL EXAM:  Vital Signs Last 24 Hrs  T(C): 36.6 (04 Jan 2021 04:33), Max: 37 (03 Jan 2021 17:11)  T(F): 97.8 (04 Jan 2021 04:33), Max: 98.6 (03 Jan 2021 17:11)  HR: 86 (04 Jan 2021 04:33) (81 - 100)  BP: 161/88 (04 Jan 2021 04:33) (116/64 - 161/88)  BP(mean): --  RR: 18 (04 Jan 2021 04:33) (18 - 18)  SpO2: 93% (04 Jan 2021 04:33) (92% - 96%)    CONSTITUTIONAL: NAD, well-developed  RESPIRATORY: Normal respiratory effort; lungs are clear to auscultation bilaterally  CARDIOVASCULAR: Regular rate and rhythm, normal S1 and S2, no murmur/rub/gallop; No lower extremity edema; Peripheral pulses are 2+ bilaterally  ABDOMEN: Nontender to palpation, normoactive bowel sounds, no rebound/guarding; No hepatosplenomegaly  PSYCH: A+O to person, place, and time; affect appropriate    LABS:                        12.0   11.36 )-----------( 579      ( 03 Jan 2021 11:28 )             40.1     01-03    139  |  106  |  38<H>  ----------------------------<  181<H>  5.1   |  20<L>  |  1.04    Ca    9.7      03 Jan 2021 11:28    RADIOLOGY & ADDITIONAL TESTS:  Results Reviewed:   Imaging Personally Reviewed:  Electrocardiogram Personally Reviewed:    COORDINATION OF CARE:  Care Discussed with Consultants/Other Providers [Y/N]:  Prior or Outpatient Records Reviewed [Y/N]:

## 2021-01-04 NOTE — PROGRESS NOTE ADULT - PROBLEM SELECTOR PLAN 1
Pt returned home from a 5 week rehab stay on 12/11, tested + for COVID in ED on presentation. Currently without any respiratory symptoms, CXR clear.   -Will continue with conservative mgmt as patient is not currently symptomatic  -Repeat COVID swab 1/3 positive

## 2021-01-04 NOTE — PROGRESS NOTE ADULT - ATTENDING COMMENTS
Oliva Dey MD  Division of Hospital Medicine  [facility]   Pager: [phone]    Patient seen and examined today with Team 5 Resident and Interns. 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     Medically stable for discharge.   Pending negative COVID-19 swab for [facility] placement.   Last COVID-19 swab 1/3, positive. repeat swab tomorrow 1/5.  CM to discuss with daughter re: transfer to [facility] who will be accepting COVID+ patients.    Rest as detailed in note above.

## 2021-01-05 VITALS
DIASTOLIC BLOOD PRESSURE: 42 MMHG | SYSTOLIC BLOOD PRESSURE: 95 MMHG | RESPIRATION RATE: 18 BRPM | HEART RATE: 106 BPM | OXYGEN SATURATION: 94 % | TEMPERATURE: 98 F

## 2021-01-05 LAB
ANION GAP SERPL CALC-SCNC: 12 MMOL/L — SIGNIFICANT CHANGE UP (ref 5–17)
BUN SERPL-MCNC: 32 MG/DL — HIGH (ref 7–23)
CALCIUM SERPL-MCNC: 9.4 MG/DL — SIGNIFICANT CHANGE UP (ref 8.4–10.5)
CHLORIDE SERPL-SCNC: 106 MMOL/L — SIGNIFICANT CHANGE UP (ref 96–108)
CO2 SERPL-SCNC: 19 MMOL/L — LOW (ref 22–31)
CREAT SERPL-MCNC: 0.9 MG/DL — SIGNIFICANT CHANGE UP (ref 0.5–1.3)
GLUCOSE BLDC GLUCOMTR-MCNC: 111 MG/DL — HIGH (ref 70–99)
GLUCOSE BLDC GLUCOMTR-MCNC: 86 MG/DL — SIGNIFICANT CHANGE UP (ref 70–99)
GLUCOSE SERPL-MCNC: 177 MG/DL — HIGH (ref 70–99)
HCT VFR BLD CALC: 36.8 % — SIGNIFICANT CHANGE UP (ref 34.5–45)
HGB BLD-MCNC: 11.6 G/DL — SIGNIFICANT CHANGE UP (ref 11.5–15.5)
MAGNESIUM SERPL-MCNC: 1.9 MG/DL — SIGNIFICANT CHANGE UP (ref 1.6–2.6)
MCHC RBC-ENTMCNC: 30 PG — SIGNIFICANT CHANGE UP (ref 27–34)
MCHC RBC-ENTMCNC: 31.5 GM/DL — LOW (ref 32–36)
MCV RBC AUTO: 95.1 FL — SIGNIFICANT CHANGE UP (ref 80–100)
NRBC # BLD: 0 /100 WBCS — SIGNIFICANT CHANGE UP (ref 0–0)
PHOSPHATE SERPL-MCNC: 3.3 MG/DL — SIGNIFICANT CHANGE UP (ref 2.5–4.5)
PLATELET # BLD AUTO: 453 K/UL — HIGH (ref 150–400)
POTASSIUM SERPL-MCNC: 5.2 MMOL/L — SIGNIFICANT CHANGE UP (ref 3.5–5.3)
POTASSIUM SERPL-SCNC: 5.2 MMOL/L — SIGNIFICANT CHANGE UP (ref 3.5–5.3)
RBC # BLD: 3.87 M/UL — SIGNIFICANT CHANGE UP (ref 3.8–5.2)
RBC # FLD: 14.9 % — HIGH (ref 10.3–14.5)
SODIUM SERPL-SCNC: 137 MMOL/L — SIGNIFICANT CHANGE UP (ref 135–145)
WBC # BLD: 13 K/UL — HIGH (ref 3.8–10.5)
WBC # FLD AUTO: 13 K/UL — HIGH (ref 3.8–10.5)

## 2021-01-05 PROCEDURE — 99239 HOSP IP/OBS DSCHRG MGMT >30: CPT | Mod: GC

## 2021-01-05 RX ORDER — AMLODIPINE BESYLATE 2.5 MG/1
1 TABLET ORAL
Qty: 0 | Refills: 0 | DISCHARGE
Start: 2021-01-05

## 2021-01-05 RX ORDER — INSULIN LISPRO 100/ML
9 VIAL (ML) SUBCUTANEOUS
Refills: 0 | Status: DISCONTINUED | OUTPATIENT
Start: 2021-01-05 | End: 2021-01-05

## 2021-01-05 RX ORDER — ACETAMINOPHEN 500 MG
2 TABLET ORAL
Qty: 0 | Refills: 0 | DISCHARGE
Start: 2021-01-05

## 2021-01-05 RX ORDER — NYSTATIN CREAM 100000 [USP'U]/G
1 CREAM TOPICAL
Qty: 0 | Refills: 0 | DISCHARGE
Start: 2021-01-05

## 2021-01-05 RX ORDER — DEXTROSE 50 % IN WATER 50 %
50 SYRINGE (ML) INTRAVENOUS
Qty: 0 | Refills: 0 | DISCHARGE
Start: 2021-01-05

## 2021-01-05 RX ORDER — NYSTATIN CREAM 100000 [USP'U]/G
1 CREAM TOPICAL
Refills: 0 | Status: DISCONTINUED | OUTPATIENT
Start: 2021-01-05 | End: 2021-01-05

## 2021-01-05 RX ORDER — INSULIN GLARGINE 100 [IU]/ML
8 INJECTION, SOLUTION SUBCUTANEOUS
Qty: 0 | Refills: 0 | DISCHARGE
Start: 2021-01-05

## 2021-01-05 RX ORDER — ZINC OXIDE 200 MG/G
1 OINTMENT TOPICAL
Qty: 0 | Refills: 0 | DISCHARGE
Start: 2021-01-05

## 2021-01-05 RX ORDER — LEVOTHYROXINE SODIUM 125 MCG
1 TABLET ORAL
Qty: 0 | Refills: 0 | DISCHARGE
Start: 2021-01-05

## 2021-01-05 RX ORDER — ZINC OXIDE 200 MG/G
1 OINTMENT TOPICAL
Qty: 0 | Refills: 0 | DISCHARGE

## 2021-01-05 RX ORDER — INSULIN LISPRO 100/ML
8 VIAL (ML) SUBCUTANEOUS
Refills: 0 | Status: DISCONTINUED | OUTPATIENT
Start: 2021-01-05 | End: 2021-01-05

## 2021-01-05 RX ORDER — SODIUM CHLORIDE 9 MG/ML
1000 INJECTION, SOLUTION INTRAVENOUS
Qty: 0 | Refills: 0 | DISCHARGE
Start: 2021-01-05

## 2021-01-05 RX ORDER — METFORMIN HYDROCHLORIDE 850 MG/1
1 TABLET ORAL
Qty: 0 | Refills: 0 | DISCHARGE

## 2021-01-05 RX ORDER — SENNA PLUS 8.6 MG/1
2 TABLET ORAL
Qty: 0 | Refills: 0 | DISCHARGE
Start: 2021-01-05

## 2021-01-05 RX ORDER — ATORVASTATIN CALCIUM 80 MG/1
1 TABLET, FILM COATED ORAL
Qty: 0 | Refills: 0 | DISCHARGE

## 2021-01-05 RX ORDER — METOPROLOL TARTRATE 50 MG
1 TABLET ORAL
Qty: 0 | Refills: 0 | DISCHARGE
Start: 2021-01-05

## 2021-01-05 RX ORDER — METOPROLOL TARTRATE 50 MG
1 TABLET ORAL
Qty: 0 | Refills: 0 | DISCHARGE

## 2021-01-05 RX ORDER — SITAGLIPTIN 50 MG/1
1 TABLET, FILM COATED ORAL
Qty: 0 | Refills: 0 | DISCHARGE

## 2021-01-05 RX ORDER — LANOLIN ALCOHOL/MO/W.PET/CERES
1 CREAM (GRAM) TOPICAL
Qty: 0 | Refills: 0 | DISCHARGE
Start: 2021-01-05

## 2021-01-05 RX ORDER — ATORVASTATIN CALCIUM 80 MG/1
1 TABLET, FILM COATED ORAL
Qty: 0 | Refills: 0 | DISCHARGE
Start: 2021-01-05

## 2021-01-05 RX ORDER — DEXTROSE 50 % IN WATER 50 %
25 SYRINGE (ML) INTRAVENOUS
Qty: 0 | Refills: 0 | DISCHARGE
Start: 2021-01-05

## 2021-01-05 RX ORDER — INSULIN LISPRO 100/ML
8 VIAL (ML) SUBCUTANEOUS
Qty: 0 | Refills: 0 | DISCHARGE
Start: 2021-01-05

## 2021-01-05 RX ORDER — POLYETHYLENE GLYCOL 3350 17 G/17G
17 POWDER, FOR SOLUTION ORAL
Qty: 0 | Refills: 0 | DISCHARGE
Start: 2021-01-05

## 2021-01-05 RX ADMIN — Medication 7 UNIT(S): at 08:49

## 2021-01-05 RX ADMIN — Medication 1 DROP(S): at 06:27

## 2021-01-05 RX ADMIN — APIXABAN 2.5 MILLIGRAM(S): 2.5 TABLET, FILM COATED ORAL at 06:27

## 2021-01-05 RX ADMIN — Medication 25 MILLIGRAM(S): at 06:27

## 2021-01-05 RX ADMIN — ZINC OXIDE 1 APPLICATION(S): 200 OINTMENT TOPICAL at 06:27

## 2021-01-05 RX ADMIN — Medication 8 UNIT(S): at 12:40

## 2021-01-05 RX ADMIN — AMLODIPINE BESYLATE 5 MILLIGRAM(S): 2.5 TABLET ORAL at 06:27

## 2021-01-05 RX ADMIN — Medication 112 MICROGRAM(S): at 06:27

## 2021-01-05 NOTE — PROGRESS NOTE ADULT - ATTENDING COMMENTS
Oliva Dey MD  Division of Hospital Medicine  [facility]   Pager: [phone]    Patient seen and examined today with Team 5 Resident, Intern, and MS3. Agree with above findings, assessment, and plan with the following additions/exceptions:    Exam unchanged. still doing well without complaints. not hypoxic. no dyspnea nor cough.  Medically stable for discharge to [facility] today. Accepted to [facility] (accepting COVID+ patients).  Plan for discharge today to [facility] pending auth/approval for ambulance transportation.   Appreciate CM assistance.    Discharge planning time spent: 34 minutes.    Rest as detailed in note above.

## 2021-01-05 NOTE — PROGRESS NOTE ADULT - PROBLEM SELECTOR PLAN 5
-SSI with FS before meals and at bedtime  -AM glucose well controlled, will stay with 8units lantus hs and reinstate premeal insulin 7units TID. -SSI with FS before meals and at bedtime  -AM glucose acceptable, though FS tend to uptrend throughout the day  -c/w lantus 8 units qHS and increase premeal admelog to 8 units TID. continue slidig scale coverage

## 2021-01-05 NOTE — PROGRESS NOTE ADULT - SUBJECTIVE AND OBJECTIVE BOX
PROGRESS NOTE:     CONTACT INFO:  Nina Trujillo MD  Internal Medicine PGY2  Pager: [phone]    Patient is a 87y old  Female who presents with a chief complaint of Hyponatremia (04 Jan 2021 06:55)      SUBJECTIVE / OVERNIGHT EVENTS:      ADDITIONAL REVIEW OF SYSTEMS:    MEDICATIONS  (STANDING):  amLODIPine   Tablet 5 milliGRAM(s) Oral daily  apixaban 2.5 milliGRAM(s) Oral every 12 hours  artificial  tears Solution 1 Drop(s) Both EYES two times a day  atorvastatin 40 milliGRAM(s) Oral at bedtime  dextrose 40% Gel 15 Gram(s) Oral once  dextrose 5%. 1000 milliLiter(s) (50 mL/Hr) IV Continuous <Continuous>  dextrose 5%. 1000 milliLiter(s) (100 mL/Hr) IV Continuous <Continuous>  dextrose 50% Injectable 25 Gram(s) IV Push once  dextrose 50% Injectable 12.5 Gram(s) IV Push once  dextrose 50% Injectable 25 Gram(s) IV Push once  glucagon  Injectable 1 milliGRAM(s) IntraMuscular once  insulin glargine Injectable (LANTUS) 8 Unit(s) SubCutaneous at bedtime  insulin lispro (ADMELOG) corrective regimen sliding scale   SubCutaneous three times a day before meals  insulin lispro (ADMELOG) corrective regimen sliding scale   SubCutaneous at bedtime  insulin lispro Injectable (ADMELOG) 7 Unit(s) SubCutaneous three times a day before meals  levothyroxine 112 MICROGram(s) Oral daily  melatonin 5 milliGRAM(s) Oral at bedtime  metoprolol tartrate 25 milliGRAM(s) Oral two times a day  polyethylene glycol 3350 17 Gram(s) Oral daily  senna 2 Tablet(s) Oral at bedtime  zinc oxide 20% Ointment 1 Application(s) Topical two times a day    MEDICATIONS  (PRN):  acetaminophen   Tablet .. 650 milliGRAM(s) Oral every 6 hours PRN Temp greater or equal to 38C (100.4F), Mild Pain (1 - 3)      CAPILLARY BLOOD GLUCOSE      POCT Blood Glucose.: 320 mg/dL (04 Jan 2021 21:16)  POCT Blood Glucose.: 180 mg/dL (04 Jan 2021 17:04)  POCT Blood Glucose.: 147 mg/dL (04 Jan 2021 11:55)  POCT Blood Glucose.: 114 mg/dL (04 Jan 2021 07:59)    I&O's Summary    03 Jan 2021 07:01  -  04 Jan 2021 07:00  --------------------------------------------------------  IN: 900 mL / OUT: 1000 mL / NET: -100 mL    04 Jan 2021 07:01  -  05 Jan 2021 06:26  --------------------------------------------------------  IN: 440 mL / OUT: 200 mL / NET: 240 mL        PHYSICAL EXAM:  Vital Signs Last 24 Hrs  T(C): 36.9 (05 Jan 2021 05:05), Max: 37.1 (04 Jan 2021 11:10)  T(F): 98.5 (05 Jan 2021 05:05), Max: 98.8 (04 Jan 2021 11:10)  HR: 98 (05 Jan 2021 05:05) (76 - 100)  BP: 154/84 (05 Jan 2021 05:05) (109/60 - 154/84)  BP(mean): --  RR: 18 (05 Jan 2021 05:05) (16 - 18)  SpO2: 97% (05 Jan 2021 05:05) (94% - 97%)        LABS:                        12.0   11.36 )-----------( 579      ( 03 Jan 2021 11:28 )             40.1     01-03    139  |  106  |  38<H>  ----------------------------<  181<H>  5.1   |  20<L>  |  1.04    Ca    9.7      03 Jan 2021 11:28                  RADIOLOGY & ADDITIONAL TESTS:  Results Reviewed:   Imaging Personally Reviewed:  Electrocardiogram Personally Reviewed:    COORDINATION OF CARE:  Care Discussed with Consultants/Other Providers [Y/N]:  Prior or Outpatient Records Reviewed [Y/N]:   PROGRESS NOTE:     CONTACT INFO:  Nina Trujillo MD  Internal Medicine PGY2  Pager: [phone]    Patient is a 87y old  Female who presents with a chief complaint of Hyponatremia (04 Jan 2021 06:55)      SUBJECTIVE / OVERNIGHT EVENTS:  no acute events overnight. patient seen and examined bedside. "i feel okay" per patient. denies any fever, chills, chest pain, SOB, cough, abd pain, n/v/d/c nor dysuria.     ADDITIONAL REVIEW OF SYSTEMS:    MEDICATIONS  (STANDING):  amLODIPine   Tablet 5 milliGRAM(s) Oral daily  apixaban 2.5 milliGRAM(s) Oral every 12 hours  artificial  tears Solution 1 Drop(s) Both EYES two times a day  atorvastatin 40 milliGRAM(s) Oral at bedtime  dextrose 40% Gel 15 Gram(s) Oral once  dextrose 5%. 1000 milliLiter(s) (50 mL/Hr) IV Continuous <Continuous>  dextrose 5%. 1000 milliLiter(s) (100 mL/Hr) IV Continuous <Continuous>  dextrose 50% Injectable 25 Gram(s) IV Push once  dextrose 50% Injectable 12.5 Gram(s) IV Push once  dextrose 50% Injectable 25 Gram(s) IV Push once  glucagon  Injectable 1 milliGRAM(s) IntraMuscular once  insulin glargine Injectable (LANTUS) 8 Unit(s) SubCutaneous at bedtime  insulin lispro (ADMELOG) corrective regimen sliding scale   SubCutaneous three times a day before meals  insulin lispro (ADMELOG) corrective regimen sliding scale   SubCutaneous at bedtime  insulin lispro Injectable (ADMELOG) 7 Unit(s) SubCutaneous three times a day before meals  levothyroxine 112 MICROGram(s) Oral daily  melatonin 5 milliGRAM(s) Oral at bedtime  metoprolol tartrate 25 milliGRAM(s) Oral two times a day  polyethylene glycol 3350 17 Gram(s) Oral daily  senna 2 Tablet(s) Oral at bedtime  zinc oxide 20% Ointment 1 Application(s) Topical two times a day    MEDICATIONS  (PRN):  acetaminophen   Tablet .. 650 milliGRAM(s) Oral every 6 hours PRN Temp greater or equal to 38C (100.4F), Mild Pain (1 - 3)      CAPILLARY BLOOD GLUCOSE      POCT Blood Glucose.: 320 mg/dL (04 Jan 2021 21:16)  POCT Blood Glucose.: 180 mg/dL (04 Jan 2021 17:04)  POCT Blood Glucose.: 147 mg/dL (04 Jan 2021 11:55)  POCT Blood Glucose.: 114 mg/dL (04 Jan 2021 07:59)    I&O's Summary    03 Jan 2021 07:01  -  04 Jan 2021 07:00  --------------------------------------------------------  IN: 900 mL / OUT: 1000 mL / NET: -100 mL    04 Jan 2021 07:01  -  05 Jan 2021 06:26  --------------------------------------------------------  IN: 440 mL / OUT: 200 mL / NET: 240 mL        PHYSICAL EXAM:  Vital Signs Last 24 Hrs  T(C): 36.9 (05 Jan 2021 05:05), Max: 37.1 (04 Jan 2021 11:10)  T(F): 98.5 (05 Jan 2021 05:05), Max: 98.8 (04 Jan 2021 11:10)  HR: 98 (05 Jan 2021 05:05) (76 - 100)  BP: 154/84 (05 Jan 2021 05:05) (109/60 - 154/84)  BP(mean): --  RR: 18 (05 Jan 2021 05:05) (16 - 18)  SpO2: 97% (05 Jan 2021 05:05) (94% - 97%)    CONSTITUTIONAL: NAD, well-developed  RESPIRATORY: Normal respiratory effort; lungs are clear to auscultation bilaterally  CARDIOVASCULAR: Regular rate and rhythm, normal S1 and S2, no murmur/rub/gallop; No lower extremity edema; Peripheral pulses are 2+ bilaterally  ABDOMEN: Nontender to palpation, normoactive bowel sounds, no rebound/guarding; No hepatosplenomegaly  PSYCH: A+O to person and able to state is in hospital if given choices, not oriented to year (baseline).  NEURO: no focal deficits.   MSK: no tenderness to palpation of calves bilatearlly    LABS:                        12.0   11.36 )-----------( 579      ( 03 Jan 2021 11:28 )             40.1     01-03    139  |  106  |  38<H>  ----------------------------<  181<H>  5.1   |  20<L>  |  1.04    Ca    9.7      03 Jan 2021 11:28        RADIOLOGY & ADDITIONAL TESTS:  Results Reviewed: Cr within normal range, elevated serum and POC glucose, CBC stable  Imaging Personally Reviewed:  Electrocardiogram Personally Reviewed:    COORDINATION OF CARE:  Care Discussed with Consultants/Other Providers [Y/N]:  Prior or Outpatient Records Reviewed [Y/N]:

## 2021-01-05 NOTE — PROGRESS NOTE ADULT - PROBLEM SELECTOR PLAN 6
-Patient seen and evaluated by PT who recommends [facility]. Family in agreement with this. Case mgmt arranging [facility], No COVID+ facilities, will have to wait until pt clears infxn. COVID 1/3 positive -Patient seen and evaluated by PT who recommends [facility]. Family in agreement with this. Accepted to [facility] today, who is accepting patients with +COVID status.

## 2021-01-05 NOTE — PROGRESS NOTE ADULT - ASSESSMENT
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 On admission, patient afebrile, no white count, COVID+, unable to give history. 
  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 On admission, patient afebrile, no white count, COVID+, unable to give history. 
  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 On admission, patient afebrile, no white count, COVID+, unable to give history. 
  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 On admission, patient afebrile, no white count, COVID+, unable to give history. 
  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 On admission, patient afebrile, no white count, COVID+, unable to give history. 
  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 On admission, patient afebrile, no white count, COVID+, unable to give history. 
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 Course complicated by COVID+ requiring no O2 and UTI/Cystitis s/p 7 day abx course. Pending negative COVID swap to go to rehab. 
  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 On admission, patient afebrile, no white count, COVID+, unable to give history. 
  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 Course complicated by COVID+ requiring no O2 and UTI/Cystitis s/p 7 day abx course. 
  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 On admission, patient afebrile, no white count, COVID+, unable to give history. 
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 Course complicated by COVID+ requiring no O2 and UTI/Cystitis s/p 7 day abx course. Pending negative COVID swap to go to rehab. 
  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 On admission, patient afebrile, no white count, COVID+, unable to give history. 
  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 On admission, patient afebrile, no white count, COVID+, unable to give history. 
  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 On admission, patient afebrile, no white count, COVID+, unable to give history.

## 2021-01-05 NOTE — PROGRESS NOTE ADULT - PROBLEM SELECTOR PLAN 1
Pt returned home from a 5 week rehab stay on 12/11, tested + for COVID in ED on presentation. Currently without any respiratory symptoms, CXR clear.   -Will continue with conservative mgmt as patient is not currently symptomatic  -Repeat COVID swab 1/3 positive Pt returned home from a 5 week rehab stay on 12/11, tested + for COVID in ED on presentation. Currently without any respiratory symptoms, CXR clear. not hypoxic.   -Will continue with conservative mgmt as patient is not currently symptomatic  -Repeat COVID swab 1/3 positive. Accepted to [facility] with +COVID status.

## 2021-01-06 ENCOUNTER — NON-APPOINTMENT (OUTPATIENT)
Age: 86
End: 2021-01-06

## 2021-01-07 ENCOUNTER — NON-APPOINTMENT (OUTPATIENT)
Age: 86
End: 2021-01-07

## 2021-01-13 ENCOUNTER — APPOINTMENT (OUTPATIENT)
Dept: INTERNAL MEDICINE | Facility: CLINIC | Age: 86
End: 2021-01-13

## 2021-01-21 PROCEDURE — 83615 LACTATE (LD) (LDH) ENZYME: CPT

## 2021-01-21 PROCEDURE — 84295 ASSAY OF SERUM SODIUM: CPT

## 2021-01-21 PROCEDURE — 82803 BLOOD GASES ANY COMBINATION: CPT

## 2021-01-21 PROCEDURE — 85025 COMPLETE CBC W/AUTO DIFF WBC: CPT

## 2021-01-21 PROCEDURE — 85018 HEMOGLOBIN: CPT

## 2021-01-21 PROCEDURE — 85027 COMPLETE CBC AUTOMATED: CPT

## 2021-01-21 PROCEDURE — 71045 X-RAY EXAM CHEST 1 VIEW: CPT

## 2021-01-21 PROCEDURE — 83605 ASSAY OF LACTIC ACID: CPT

## 2021-01-21 PROCEDURE — 93005 ELECTROCARDIOGRAM TRACING: CPT

## 2021-01-21 PROCEDURE — 80053 COMPREHEN METABOLIC PANEL: CPT

## 2021-01-21 PROCEDURE — 80048 BASIC METABOLIC PNL TOTAL CA: CPT

## 2021-01-21 PROCEDURE — 82330 ASSAY OF CALCIUM: CPT

## 2021-01-21 PROCEDURE — 82550 ASSAY OF CK (CPK): CPT

## 2021-01-21 PROCEDURE — 82947 ASSAY GLUCOSE BLOOD QUANT: CPT

## 2021-01-21 PROCEDURE — 82962 GLUCOSE BLOOD TEST: CPT

## 2021-01-21 PROCEDURE — U0003: CPT

## 2021-01-21 PROCEDURE — 96374 THER/PROPH/DIAG INJ IV PUSH: CPT

## 2021-01-21 PROCEDURE — 99285 EMERGENCY DEPT VISIT HI MDM: CPT | Mod: 25

## 2021-01-21 PROCEDURE — 82435 ASSAY OF BLOOD CHLORIDE: CPT

## 2021-01-21 PROCEDURE — 84443 ASSAY THYROID STIM HORMONE: CPT

## 2021-01-21 PROCEDURE — 85379 FIBRIN DEGRADATION QUANT: CPT

## 2021-01-21 PROCEDURE — 83036 HEMOGLOBIN GLYCOSYLATED A1C: CPT

## 2021-01-21 PROCEDURE — 97116 GAIT TRAINING THERAPY: CPT

## 2021-01-21 PROCEDURE — 97530 THERAPEUTIC ACTIVITIES: CPT

## 2021-01-21 PROCEDURE — 85014 HEMATOCRIT: CPT

## 2021-01-21 PROCEDURE — 84100 ASSAY OF PHOSPHORUS: CPT

## 2021-01-21 PROCEDURE — 87086 URINE CULTURE/COLONY COUNT: CPT

## 2021-01-21 PROCEDURE — 83930 ASSAY OF BLOOD OSMOLALITY: CPT

## 2021-01-21 PROCEDURE — 83735 ASSAY OF MAGNESIUM: CPT

## 2021-01-21 PROCEDURE — 84300 ASSAY OF URINE SODIUM: CPT

## 2021-01-21 PROCEDURE — 97110 THERAPEUTIC EXERCISES: CPT

## 2021-01-21 PROCEDURE — 74177 CT ABD & PELVIS W/CONTRAST: CPT

## 2021-01-21 PROCEDURE — 85652 RBC SED RATE AUTOMATED: CPT

## 2021-01-21 PROCEDURE — 84132 ASSAY OF SERUM POTASSIUM: CPT

## 2021-01-21 PROCEDURE — 86140 C-REACTIVE PROTEIN: CPT

## 2021-01-21 PROCEDURE — 87040 BLOOD CULTURE FOR BACTERIA: CPT

## 2021-01-21 PROCEDURE — 84145 PROCALCITONIN (PCT): CPT

## 2021-01-21 PROCEDURE — 97162 PT EVAL MOD COMPLEX 30 MIN: CPT

## 2021-01-21 PROCEDURE — 87186 SC STD MICRODIL/AGAR DIL: CPT

## 2021-01-21 PROCEDURE — 83935 ASSAY OF URINE OSMOLALITY: CPT

## 2021-01-21 PROCEDURE — 82728 ASSAY OF FERRITIN: CPT

## 2021-01-21 PROCEDURE — 81001 URINALYSIS AUTO W/SCOPE: CPT

## 2021-01-21 PROCEDURE — U0005: CPT

## 2021-02-11 ENCOUNTER — APPOINTMENT (OUTPATIENT)
Dept: INTERNAL MEDICINE | Facility: CLINIC | Age: 86
End: 2021-02-11

## 2021-02-11 ENCOUNTER — OUTPATIENT (OUTPATIENT)
Dept: OUTPATIENT SERVICES | Facility: HOSPITAL | Age: 86
LOS: 1 days | End: 2021-02-11
Payer: MEDICARE

## 2021-02-11 VITALS — DIASTOLIC BLOOD PRESSURE: 58 MMHG | SYSTOLIC BLOOD PRESSURE: 100 MMHG | HEART RATE: 84 BPM

## 2021-02-11 DIAGNOSIS — Z90.49 ACQUIRED ABSENCE OF OTHER SPECIFIED PARTS OF DIGESTIVE TRACT: Chronic | ICD-10-CM

## 2021-02-11 DIAGNOSIS — I10 ESSENTIAL (PRIMARY) HYPERTENSION: ICD-10-CM

## 2021-02-11 PROCEDURE — G0463: CPT

## 2021-02-11 RX ORDER — AMLODIPINE BESYLATE 10 MG/1
10 TABLET ORAL
Qty: 90 | Refills: 2 | Status: DISCONTINUED | COMMUNITY
Start: 2020-12-14 | End: 2021-02-11

## 2021-02-11 RX ORDER — INSULIN GLARGINE 100 [IU]/ML
100 INJECTION, SOLUTION SUBCUTANEOUS
Qty: 1 | Refills: 0 | Status: DISCONTINUED | COMMUNITY
Start: 2021-02-11 | End: 2021-02-11

## 2021-02-11 RX ORDER — SITAGLIPTIN 25 MG/1
25 TABLET, FILM COATED ORAL
Qty: 30 | Refills: 5 | Status: DISCONTINUED | COMMUNITY
Start: 2019-11-08 | End: 2021-02-11

## 2021-02-11 RX ORDER — INSULIN LISPRO 100 U/ML
100 INJECTION, SOLUTION INTRAVENOUS; SUBCUTANEOUS
Qty: 1 | Refills: 0 | Status: DISCONTINUED | COMMUNITY
Start: 2021-02-11 | End: 2021-02-11

## 2021-02-12 RX ORDER — INSULIN LISPRO 100 U/ML
100 INJECTION, SOLUTION INTRAVENOUS; SUBCUTANEOUS
Qty: 3 | Refills: 0 | Status: DISCONTINUED | COMMUNITY
Start: 2021-02-11 | End: 2021-02-12

## 2021-02-12 RX ORDER — INSULIN GLARGINE 100 [IU]/ML
100 INJECTION, SOLUTION SUBCUTANEOUS
Qty: 3 | Refills: 0 | Status: DISCONTINUED | COMMUNITY
Start: 2021-02-11 | End: 2021-02-12

## 2021-02-12 NOTE — PHYSICAL EXAM
[No Acute Distress] : no acute distress [Well-Appearing] : well-appearing [No Respiratory Distress] : no respiratory distress  [No Accessory Muscle Use] : no accessory muscle use [Clear to Auscultation] : lungs were clear to auscultation bilaterally [Normal] : normal rate, regular rhythm, normal S1 and S2 and no murmur heard [Soft] : abdomen soft [Non Tender] : non-tender [Speech Grossly Normal] : speech grossly normal [Normal Mood] : the mood was normal [de-identified] : Performed by visiting nurse, as this was a telephonic visit  [de-identified] : Wound on L ankle, <.5 cm. Resolving rash on groin.

## 2021-02-12 NOTE — HISTORY OF PRESENT ILLNESS
[Home] : at home, [unfilled] , at the time of the visit. [Other Location: e.g. Home (Enter Location, City,State)___] : at [unfilled] [Family Member] : family member [FreeTextEntry3] : Magalie, daughter  [FreeTextEntry1] : f/u from rehab [de-identified] : 87F JAK2 + PV, recurrent RLE DVT (2008 then 2010) previously on warfarin (discontinued by neurology), HTN/HLD, DM II with spontaneous SBO in 12/2018 s/p ex lap with bowel resection and anastomosis now presents for follow up from rehab.\par \par Hospital Course:\par Discharge Date 05-Jan-2021\par Admission Date 20-Dec-2020 14:50\par Reason for Admission Hyponatremia\par Hospital Course \par The patient is an 88 y/o F, w/ PMH of JAK2+ PV (diagnosed in 2016), recurrent\par RLE DVT (2008 then 2010, eliquis), HTN/HLD, DM II, vascular dementia, who was\par sent in by her outpatient hematologist for an abnormal lab. Patient is found to\par have hyponatremia, Yt=361, otherwise normal labs on presentation. On admission,\par patient unable to answer questions, but did not appear to be in distress. COVID\par swab from ED came back + for coronavirus, although pt did not display any\par respiratory symptoms throughout the course of her admission. Patient was\par started on IVF at 75cc/hr and her hyponatremia resolved. Fluids were\par discontinued and patient's sodium remained WNL. PO intake was good throughout\par admission. Patient did endorse some mid-abdominal pain on examination during\par her admission, U/A revealed UTI and patient was started on a 3 day course of\par ceftriaxone. Due to continued abdominal tenderness on physical exam and since\par patient was not able to verbalize subjective complaints given mental status, CT\par abdomen and pelvis was obtained, which did not reveal acute GI pathology, but\par did reveal diffuse urinary bladder wall thickening/mucosal hyperemia possibly\par related to cystitis. Given patient's limited ability to communicate continued\par urinary symptoms, once pt finished the 3 day course of ceftriaxone, she was\par transitioned to a 4 day course of macrobid for a total of 7 days of abx\par treatment.\par \par Patient was evaluated by physical therapy, who recommended [facility] given pt's\par functional status. COVID PCR remained positive (last checked 1/3 Positive).\par Patient is discharged to [facility] rehab. \par \par Pt has no acute complaints currently. Pt p/w rehab f/u. Pt was d/c on Insulin, but normally takes Januvia and Metformin at home. Pt denies any fever, chills, chest pain, SOB, N/V, abd pain. Pt has some dizziness, since yest. Visiting nurse will eval patient 1-3 times a week. Patient has upcoming March 1st Heme appt, March 15th Endo appt.

## 2021-02-12 NOTE — END OF VISIT
[] : Resident [Time Spent: ___ minutes] : I have spent [unfilled] minutes of time on the encounter. [FreeTextEntry3] : Spoke with daughter who is caretaker of patient 87 years old with T2DM, PV, RLE DVT, hypertension.  Patient is doing well post discharge from rehab.  Patient's daughter was asking if they can transition back from insulin to oral hypoglycemic medications.  However we noted that in the hospital her A1c went from around 7% to close to 9% and that is likely why she was discharged on insulin.  So we will try and arrange lab fly draw at home so we can recheck her A1c.  Patient's daughter is also going to arrange follow-up with Dr. Lyman

## 2021-02-12 NOTE — REVIEW OF SYSTEMS
[Dizziness] : dizziness [Fever] : no fever [Chills] : no chills [Chest Pain] : no chest pain [Palpitations] : no palpitations [Shortness Of Breath] : no shortness of breath [Wheezing] : no wheezing [Abdominal Pain] : no abdominal pain [Nausea] : no nausea [Vomiting] : no vomiting [Skin Rash] : no skin rash [Fainting] : no fainting

## 2021-02-12 NOTE — ASSESSMENT
[FreeTextEntry1] : 87F JAK2 + PV, recurrent RLE DVT (2008 then 2010) previously on warfarin (discontinued by neurology), HTN/HLD, DM II with spontaneous SBO in 12/2018 s/p ex lap with bowel resection and anastomosis now presents for follow up from rehab.\par \par #DM\par  today\par -Resume Basaglar 14 units at bedtime and Admelog 8 units TID with meals, that was prescribed at rehab\par -Ophtho referral for Diabetic eye exam \par -F/u w/ Endo appt on 3/15\par \par #PV\par -Hold Hydroxyurea currently as per Heme \par -F/u w/ Heme appt on 3/1\par \par #HCM\par -Visiting nurse will come and evaluate 1-3 times a week \par \par RTC prn

## 2021-02-16 ENCOUNTER — LABORATORY RESULT (OUTPATIENT)
Age: 86
End: 2021-02-16

## 2021-02-16 ENCOUNTER — NON-APPOINTMENT (OUTPATIENT)
Age: 86
End: 2021-02-16

## 2021-02-17 DIAGNOSIS — I82.5Z9 CHRONIC EMBOLISM AND THROMBOSIS OF UNSPECIFIED DEEP VEINS OF UNSPECIFIED DISTAL LOWER EXTREMITY: ICD-10-CM

## 2021-02-17 DIAGNOSIS — E11.9 TYPE 2 DIABETES MELLITUS WITHOUT COMPLICATIONS: ICD-10-CM

## 2021-02-17 DIAGNOSIS — D47.1 CHRONIC MYELOPROLIFERATIVE DISEASE: ICD-10-CM

## 2021-02-23 ENCOUNTER — NON-APPOINTMENT (OUTPATIENT)
Age: 86
End: 2021-02-23

## 2021-02-24 ENCOUNTER — NON-APPOINTMENT (OUTPATIENT)
Age: 86
End: 2021-02-24

## 2021-02-25 ENCOUNTER — OUTPATIENT (OUTPATIENT)
Dept: OUTPATIENT SERVICES | Facility: HOSPITAL | Age: 86
LOS: 1 days | Discharge: HOME | End: 2021-02-25

## 2021-02-25 DIAGNOSIS — Z90.49 ACQUIRED ABSENCE OF OTHER SPECIFIED PARTS OF DIGESTIVE TRACT: Chronic | ICD-10-CM

## 2021-02-25 DIAGNOSIS — C92.10 CHRONIC MYELOID LEUKEMIA, BCR/ABL-POSITIVE, NOT HAVING ACHIEVED REMISSION: ICD-10-CM

## 2021-02-26 ENCOUNTER — OUTPATIENT (OUTPATIENT)
Dept: OUTPATIENT SERVICES | Facility: HOSPITAL | Age: 86
LOS: 1 days | Discharge: HOME | End: 2021-02-26

## 2021-02-26 DIAGNOSIS — C92.10 CHRONIC MYELOID LEUKEMIA, BCR/ABL-POSITIVE, NOT HAVING ACHIEVED REMISSION: ICD-10-CM

## 2021-02-26 DIAGNOSIS — Z90.49 ACQUIRED ABSENCE OF OTHER SPECIFIED PARTS OF DIGESTIVE TRACT: Chronic | ICD-10-CM

## 2021-03-01 ENCOUNTER — APPOINTMENT (OUTPATIENT)
Dept: HEMATOLOGY ONCOLOGY | Facility: CLINIC | Age: 86
End: 2021-03-01
Payer: MEDICARE

## 2021-03-01 PROCEDURE — 99213 OFFICE O/P EST LOW 20 MIN: CPT | Mod: 95

## 2021-03-01 RX ORDER — HYDROXYUREA 500 MG/1
500 CAPSULE ORAL DAILY
Qty: 60 | Refills: 2 | Status: DISCONTINUED | COMMUNITY
Start: 2020-07-07 | End: 2021-03-01

## 2021-03-01 NOTE — ASSESSMENT
[FreeTextEntry1] : 87 F JAK2 + PV, recurrent RLE DVT (2008 then 2010), HTN/HLD, DM II \par \par 1) JAK2 + PV.  Hydroxyurea is on hold secondary to dropped in Hb 9.3 g/dl on 8/21/2020. Hb has recovered since 10/9/2020 at 12.1 g/dl. Will make arrangements for home blood drawn. Will call family with results. \par \par 2) DVT\par -patient is on  anticoagulation with Eliquis, she is very high risk for recurrent VTE including acute DVT/PE with HARRIS 2+ PV and history of multiple DVTs, benefit of anticoagulation outweights risk in patient's case. Continue eliquis  2.5 mg BID as per recommendations in the literature >81 yo and <60Kg.\par \par I offered daughter SW services, she differed for now. \par \par This service was provided by using telehealth. The patient was at home and I was at [facility] [place]. The patient requested and participated in this encounter. The encounter face to face last 30   minutes coordinating his/her care and counseling.\par \par \par RTC 2 months.

## 2021-03-01 NOTE — REVIEW OF SYSTEMS
[Confused] : confusion [Vomiting] : no vomiting [Negative] : Gastrointestinal [Fever] : no fever [Chills] : no chills [Eye Pain] : no eye pain [Dysphagia] : no dysphagia [Odynophagia] : no odynophagia [Chest Pain] : no chest pain [Shortness Of Breath] : no shortness of breath [Abdominal Pain] : no abdominal pain [Joint Pain] : no joint pain [Skin Rash] : no skin rash [Easy Bleeding] : no tendency for easy bleeding [Easy Bruising] : no tendency for easy bruising [FreeTextEntry2] : bedridden [FreeTextEntry7] : 12/14/2020, was seen by PCP.  [de-identified] : dementia

## 2021-03-01 NOTE — HISTORY OF PRESENT ILLNESS
[Other Location: e.g. School (Enter Location, City,State)___] : at [unfilled], at the time of the visit. [Disease:__________________________] : Disease: [unfilled] [0 - No Distress] : Distress Level: 0 [Home] : at home, [unfilled] , at the time of the visit. [Medical Office: ([facility])___] : at the medical office located in  [Verbal consent obtained from patient] : the patient, [unfilled] [Other:____] : [unfilled] [de-identified] : 87F JAK2 + PV, recurrent RLE DVT (2008 then 2010) previously on warfarin (discontinued by neurology), HTN/HLD, DM II with spontaneous SBO in 12/2018 s/p ex lap with bowel resection and anastomosis now presents for follow up from rehab.\par \par  [de-identified] : \par \par Hydroxyurea is on hold secondary to dropped in Hb 9.3 g/dl on 8/21/2020. Hb has recovered since 10/9/2020 at 12.1 g/dl. \par \par Patient had a recent admission to the hospital for UTI, transferred to rehab for generalized weakness. Patient was discharged home, awaiting to start home PT. \par \par No other changes in her medical, surgical or social history since 12/4/2020.  [FreeTextEntry3] : Daughter-Magalie

## 2021-03-01 NOTE — ASSESSMENT
[FreeTextEntry1] : 87 F JAK2 + PV, recurrent RLE DVT (2008 then 2010), HTN/HLD, DM II \par \par 1) JAK2 + PV.  Hydroxyurea is on hold secondary to dropped in Hb 9.3 g/dl on 8/21/2020. Hb has recovered since 10/9/2020 at 12.1 g/dl. Will make arrangements for home blood drawn. Will call family with results. \par \par 2) DVT\par -patient is on  anticoagulation with Eliquis, she is very high risk for recurrent VTE including acute DVT/PE with HARRIS 2+ PV and history of multiple DVTs, benefit of anticoagulation outweights risk in patient's case. Continue eliquis  2.5 mg BID as per recommendations in the literature >79 yo and <60Kg.\par \par I offered daughter SW services, she differed for now. \par \par This service was provided by using telehealth. The patient was at home and I was at [facility] [place]. The patient requested and participated in this encounter. The encounter face to face last 30   minutes coordinating his/her care and counseling.\par \par \par RTC 2 months.

## 2021-03-01 NOTE — REVIEW OF SYSTEMS
[Confused] : confusion [Vomiting] : no vomiting [Negative] : Gastrointestinal [Fever] : no fever [Chills] : no chills [Eye Pain] : no eye pain [Dysphagia] : no dysphagia [Odynophagia] : no odynophagia [Chest Pain] : no chest pain [Shortness Of Breath] : no shortness of breath [Abdominal Pain] : no abdominal pain [Joint Pain] : no joint pain [Skin Rash] : no skin rash [Easy Bleeding] : no tendency for easy bleeding [Easy Bruising] : no tendency for easy bruising [FreeTextEntry2] : bedridden [FreeTextEntry7] : 12/14/2020, was seen by PCP.  [de-identified] : dementia

## 2021-03-01 NOTE — HISTORY OF PRESENT ILLNESS
[Other Location: e.g. School (Enter Location, City,State)___] : at [unfilled], at the time of the visit. [Disease:__________________________] : Disease: [unfilled] [0 - No Distress] : Distress Level: 0 [Home] : at home, [unfilled] , at the time of the visit. [Medical Office: ([facility])___] : at the medical office located in  [Verbal consent obtained from patient] : the patient, [unfilled] [Other:____] : [unfilled] [de-identified] : 87F JAK2 + PV, recurrent RLE DVT (2008 then 2010) previously on warfarin (discontinued by neurology), HTN/HLD, DM II with spontaneous SBO in 12/2018 s/p ex lap with bowel resection and anastomosis now presents for follow up from rehab.\par \par  [de-identified] : \par \par Hydroxyurea is on hold secondary to dropped in Hb 9.3 g/dl on 8/21/2020. Hb has recovered since 10/9/2020 at 12.1 g/dl. \par \par Patient had a recent admission to the hospital for UTI, transferred to rehab for generalized weakness. Patient was discharged home, awaiting to start home PT. \par \par No other changes in her medical, surgical or social history since 12/4/2020.  [FreeTextEntry3] : Daughter-Magalie

## 2021-03-01 NOTE — PHYSICAL EXAM
[Capable of only limited self care, confined to bed or chair more than 50% of waking hours] : Status 3- Capable of only limited self care, confined to bed or chair more than 50% of waking hours [Thin] : thin [Normal] : affect appropriate [de-identified] : Elderly woman bedridden [de-identified] : chronic venous changes in lower extremity

## 2021-03-01 NOTE — PHYSICAL EXAM
[Capable of only limited self care, confined to bed or chair more than 50% of waking hours] : Status 3- Capable of only limited self care, confined to bed or chair more than 50% of waking hours [Thin] : thin [Normal] : affect appropriate [de-identified] : Elderly woman bedridden [de-identified] : chronic venous changes in lower extremity

## 2021-03-03 ENCOUNTER — LABORATORY RESULT (OUTPATIENT)
Age: 86
End: 2021-03-03

## 2021-03-15 ENCOUNTER — APPOINTMENT (OUTPATIENT)
Dept: ENDOCRINOLOGY | Facility: CLINIC | Age: 86
End: 2021-03-15

## 2021-03-31 ENCOUNTER — APPOINTMENT (OUTPATIENT)
Dept: ENDOCRINOLOGY | Facility: CLINIC | Age: 86
End: 2021-03-31
Payer: MEDICARE

## 2021-03-31 PROCEDURE — ZZZZZ: CPT

## 2021-04-05 ENCOUNTER — RX RENEWAL (OUTPATIENT)
Age: 86
End: 2021-04-05

## 2021-04-19 ENCOUNTER — NON-APPOINTMENT (OUTPATIENT)
Age: 86
End: 2021-04-19

## 2021-04-23 ENCOUNTER — RX RENEWAL (OUTPATIENT)
Age: 86
End: 2021-04-23

## 2021-04-27 RX ORDER — BLOOD SUGAR DIAGNOSTIC
STRIP MISCELLANEOUS
Qty: 1 | Refills: 2 | Status: DISCONTINUED | COMMUNITY
Start: 2017-10-18 | End: 2021-04-27

## 2021-05-03 DIAGNOSIS — F03.91 UNSPECIFIED DEMENTIA WITH BEHAVIORAL DISTURBANCE: ICD-10-CM

## 2021-05-20 ENCOUNTER — OUTPATIENT (OUTPATIENT)
Dept: OUTPATIENT SERVICES | Facility: HOSPITAL | Age: 86
LOS: 1 days | Discharge: HOME | End: 2021-05-20

## 2021-05-20 DIAGNOSIS — C92.10 CHRONIC MYELOID LEUKEMIA, BCR/ABL-POSITIVE, NOT HAVING ACHIEVED REMISSION: ICD-10-CM

## 2021-05-20 DIAGNOSIS — Z90.49 ACQUIRED ABSENCE OF OTHER SPECIFIED PARTS OF DIGESTIVE TRACT: Chronic | ICD-10-CM

## 2021-05-25 ENCOUNTER — RESULT REVIEW (OUTPATIENT)
Age: 86
End: 2021-05-25

## 2021-05-25 ENCOUNTER — APPOINTMENT (OUTPATIENT)
Dept: HEMATOLOGY ONCOLOGY | Facility: CLINIC | Age: 86
End: 2021-05-25
Payer: MEDICARE

## 2021-05-25 VITALS
HEART RATE: 78 BPM | DIASTOLIC BLOOD PRESSURE: 80 MMHG | TEMPERATURE: 97.9 F | OXYGEN SATURATION: 97 % | SYSTOLIC BLOOD PRESSURE: 121 MMHG | RESPIRATION RATE: 17 BRPM

## 2021-05-25 LAB
ALBUMIN SERPL ELPH-MCNC: 4.2 G/DL
ALP BLD-CCNC: 135 U/L
ALT SERPL-CCNC: 31 U/L
ANION GAP SERPL CALC-SCNC: 13 MMOL/L
AST SERPL-CCNC: 24 U/L
BASOPHILS # BLD AUTO: 0.27 K/UL — HIGH (ref 0–0.2)
BASOPHILS NFR BLD AUTO: 1.4 % — SIGNIFICANT CHANGE UP (ref 0–2)
BILIRUB SERPL-MCNC: 0.2 MG/DL
BUN SERPL-MCNC: 37 MG/DL
CALCIUM SERPL-MCNC: 10.7 MG/DL
CHLORIDE SERPL-SCNC: 106 MMOL/L
CO2 SERPL-SCNC: 20 MMOL/L
CREAT SERPL-MCNC: 1.26 MG/DL
EOSINOPHIL # BLD AUTO: 1.13 K/UL — HIGH (ref 0–0.5)
EOSINOPHIL NFR BLD AUTO: 5.9 % — SIGNIFICANT CHANGE UP (ref 0–6)
GLUCOSE SERPL-MCNC: 156 MG/DL
HCT VFR BLD CALC: 41.2 % — SIGNIFICANT CHANGE UP (ref 34.5–45)
HGB BLD-MCNC: 12.2 G/DL — SIGNIFICANT CHANGE UP (ref 11.5–15.5)
IMM GRANULOCYTES NFR BLD AUTO: 1.2 % — SIGNIFICANT CHANGE UP (ref 0–1.5)
LDH SERPL-CCNC: 246 U/L
LYMPHOCYTES # BLD AUTO: 1.53 K/UL — SIGNIFICANT CHANGE UP (ref 1–3.3)
LYMPHOCYTES # BLD AUTO: 8 % — LOW (ref 13–44)
MCHC RBC-ENTMCNC: 23.6 PG — LOW (ref 27–34)
MCHC RBC-ENTMCNC: 29.6 G/DL — LOW (ref 32–36)
MCV RBC AUTO: 79.7 FL — LOW (ref 80–100)
MONOCYTES # BLD AUTO: 1.25 K/UL — HIGH (ref 0–0.9)
MONOCYTES NFR BLD AUTO: 6.5 % — SIGNIFICANT CHANGE UP (ref 2–14)
NEUTROPHILS # BLD AUTO: 14.81 K/UL — HIGH (ref 1.8–7.4)
NEUTROPHILS NFR BLD AUTO: 77 % — SIGNIFICANT CHANGE UP (ref 43–77)
NRBC # BLD: 0 /100 WBCS — SIGNIFICANT CHANGE UP (ref 0–0)
PLATELET # BLD AUTO: 866 K/UL — HIGH (ref 150–400)
POTASSIUM SERPL-SCNC: 5.1 MMOL/L
PROT SERPL-MCNC: 7.6 G/DL
RBC # BLD: 5.17 M/UL — SIGNIFICANT CHANGE UP (ref 3.8–5.2)
RBC # FLD: 18.3 % — HIGH (ref 10.3–14.5)
SODIUM SERPL-SCNC: 139 MMOL/L
WBC # BLD: 19.23 K/UL — HIGH (ref 3.8–10.5)
WBC # FLD AUTO: 19.23 K/UL — HIGH (ref 3.8–10.5)

## 2021-05-25 PROCEDURE — 99214 OFFICE O/P EST MOD 30 MIN: CPT

## 2021-05-25 RX ORDER — INSULIN GLARGINE 100 [IU]/ML
100 INJECTION, SOLUTION SUBCUTANEOUS
Qty: 3 | Refills: 0 | Status: DISCONTINUED | COMMUNITY
Start: 2021-02-12 | End: 2021-05-25

## 2021-05-25 NOTE — REVIEW OF SYSTEMS
[Confused] : confusion [Negative] : Allergic/Immunologic [Fever] : no fever [Chills] : no chills [Eye Pain] : no eye pain [Dysphagia] : no dysphagia [Odynophagia] : no odynophagia [Chest Pain] : no chest pain [Shortness Of Breath] : no shortness of breath [Abdominal Pain] : no abdominal pain [Vomiting] : no vomiting [Joint Pain] : no joint pain [Skin Rash] : no skin rash [Easy Bleeding] : no tendency for easy bleeding [Easy Bruising] : no tendency for easy bruising [FreeTextEntry2] : bedridden [de-identified] : dementia

## 2021-05-25 NOTE — HISTORY OF PRESENT ILLNESS
[Disease:__________________________] : Disease: [unfilled] [0 - No Distress] : Distress Level: 0 [Other:____] : [unfilled] [de-identified] : 88F JAK2 + PV, recurrent RLE DVT (2008 then 2010) previously on warfarin (discontinued by neurology), HTN/HLD, DM II with spontaneous SBO in 12/2018 s/p ex lap with bowel resection and anastomosis now presents for follow up from rehab.\par \par  [de-identified] : \par \par Hydroxyurea is on hold secondary to dropped in Hb 12.8 g/dl on 12/20/2020. . \par \par Patient is feeling well, has dementia not talkative. \par \par No other changes in her medical, surgical or social history since 3/1/2021.  [FreeTextEntry3] : Daughter-Magalie

## 2021-05-25 NOTE — PHYSICAL EXAM
[Capable of only limited self care, confined to bed or chair more than 50% of waking hours] : Status 3- Capable of only limited self care, confined to bed or chair more than 50% of waking hours [Thin] : thin [Normal] : normoactive bowel sounds, soft and nontender, no hepatosplenomegaly or masses appreciated [de-identified] : Elderly woman bedridden [de-identified] : chronic venous changes in lower extremity

## 2021-05-25 NOTE — ASSESSMENT
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  3/3/28- WBC 17.95, Hb 11.2, Hct 37.2, RDW 15.6, , neutrophils 13.69, lymphocytes 1.72, monocytes 1.23, eos 0.99, basos 0.17.  \par 5/25/21 -WBC is elevated 19.23\par PLTs  866\par Hb 12.2 g/dl and Hct 41.2%\par We will restart Hydroxyurea 500 mg M-W-F. \par \par 2) DVT\par -patient is on  anticoagulation with Eliquis, she is very high risk for recurrent VTE including acute DVT/PE with HARRIS 2+ PV and history of multiple DVTs, benefit of anticoagulation outweights risk in patient's case. Continue eliquis  2.5 mg BID as per recommendations in the literature >81 yo and <60Kg.\par \par Diabetes: On Januvia and Metformin\par HTN: On Amlodipine, Metoprolol\par HLD: on Atorvastatin.\par \par RTC 2 months. \par \par \par \par \par \par \par \par RTC 2 months.

## 2021-06-04 NOTE — PROGRESS NOTE ADULT - PROBLEM SELECTOR PROBLEM 6
----- Message from Joanne Shelley RN sent at 12/6/2019  3:48 PM CST -----  I presented last day of RT- [facility]     Prophylactic measure

## 2021-06-07 ENCOUNTER — APPOINTMENT (OUTPATIENT)
Dept: HEMATOLOGY ONCOLOGY | Facility: CLINIC | Age: 86
End: 2021-06-07

## 2021-06-07 LAB — JAK2RLX: NORMAL

## 2021-06-24 ENCOUNTER — RX RENEWAL (OUTPATIENT)
Age: 86
End: 2021-06-24

## 2021-06-24 RX ORDER — QUETIAPINE FUMARATE 25 MG/1
25 TABLET ORAL
Qty: 15 | Refills: 1 | Status: ACTIVE | COMMUNITY
Start: 2021-05-03 | End: 1900-01-01

## 2021-07-13 ENCOUNTER — LABORATORY RESULT (OUTPATIENT)
Age: 86
End: 2021-07-13

## 2021-07-19 RX ORDER — METFORMIN HYDROCHLORIDE 500 MG/1
500 TABLET, COATED ORAL TWICE DAILY
Qty: 180 | Refills: 2 | Status: ACTIVE | COMMUNITY
Start: 2020-12-14 | End: 1900-01-01

## 2021-07-22 ENCOUNTER — OUTPATIENT (OUTPATIENT)
Dept: OUTPATIENT SERVICES | Facility: HOSPITAL | Age: 86
LOS: 1 days | Discharge: HOME | End: 2021-07-22

## 2021-07-22 DIAGNOSIS — C92.10 CHRONIC MYELOID LEUKEMIA, BCR/ABL-POSITIVE, NOT HAVING ACHIEVED REMISSION: ICD-10-CM

## 2021-07-22 DIAGNOSIS — Z90.49 ACQUIRED ABSENCE OF OTHER SPECIFIED PARTS OF DIGESTIVE TRACT: Chronic | ICD-10-CM

## 2021-07-26 ENCOUNTER — APPOINTMENT (OUTPATIENT)
Dept: HEMATOLOGY ONCOLOGY | Facility: CLINIC | Age: 86
End: 2021-07-26
Payer: COMMERCIAL

## 2021-07-26 DIAGNOSIS — Z87.19 PERSONAL HISTORY OF OTHER DISEASES OF THE DIGESTIVE SYSTEM: ICD-10-CM

## 2021-07-26 PROCEDURE — 99213 OFFICE O/P EST LOW 20 MIN: CPT | Mod: 95

## 2021-07-28 NOTE — HISTORY OF PRESENT ILLNESS
[Disease:__________________________] : Disease: [unfilled] [Home] : at home, [unfilled] , at the time of the visit. 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 Has been taking Hydrea 500mg M/W/F since last seen. Reports feeling well with no acute complaints. Has occ. L. foot pain, ongoing for years but occurring more frequently in the last 2 months. Denies edema, erythema and inability to weight bear. Describes pain as stabbing in nature, occurs both at rest and with ambulating. Denies preceding injury/ trauma. Denies headaches, lightheadedness, dizziness, palpitations and SOB. \par \par No other changes in her medical, surgical or social history since 5/25/2021.

## 2021-07-28 NOTE — REVIEW OF SYSTEMS
[Confused] : confusion [Negative] : Allergic/Immunologic [Joint Pain] : joint pain [Fever] : no fever [Chills] : no chills [Eye Pain] : no eye pain [Dysphagia] : no dysphagia [Odynophagia] : no odynophagia [Chest Pain] : no chest pain [Shortness Of Breath] : no shortness of breath [Abdominal Pain] : no abdominal pain [Vomiting] : no vomiting [Skin Rash] : no skin rash [Easy Bleeding] : no tendency for easy bleeding [Easy Bruising] : no tendency for easy bruising [FreeTextEntry2] : ambulates with walker to bathroom [de-identified] : dementia

## 2021-07-28 NOTE — ASSESSMENT
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  Currently on Hydroxyurea 500 mg M-W-F. Seen in follow up via telehealth \par \par \par 1) JAK2 + PV.  3/3/28- WBC 17.95, Hb 11.2, Hct 37.2, RDW 15.6, , neutrophils 13.69, lymphocytes 1.72, monocytes 1.23, eos 0.99, basos 0.17.  \par 7/13/21 -WBC is 12.87, down from 19.23\par PLTs  439 down from 866\par Hb 11.0 g/dl and Hct 36.9 %\par \par --L. foot pain, based on description does not appear to be erythromelalgia. Less likely DVT patient endorsed compliance with Eliquis, since the patient has h/o recurrent DVT will obtain Venous duplex.  \par \par 2) DVT\par -patient is on  anticoagulation with Eliquis, she is very high risk for recurrent VTE including acute DVT/PE with HARRIS 2+ PV and history of multiple DVTs, benefit of anticoagulation outweighs risk in patient's case. Continue Eliquis  2.5 mg BID as per recommendations in the literature >81 yo and < 60Kg.\par \par Diabetes: On Januvia and Metformin\par HTN: On Amlodipine, Metoprolol\par HLD: on Atorvastatin.\par \par RTC 3 months. \par Repeat labs in 1 month via home draw.\par Case and management discussed with Dr. Chamberlain\par \par \par \par \par \par

## 2021-07-29 ENCOUNTER — APPOINTMENT (OUTPATIENT)
Dept: ULTRASOUND IMAGING | Facility: CLINIC | Age: 86
End: 2021-07-29
Payer: MEDICARE

## 2021-07-29 ENCOUNTER — OUTPATIENT (OUTPATIENT)
Dept: OUTPATIENT SERVICES | Facility: HOSPITAL | Age: 86
LOS: 1 days | End: 2021-07-29
Payer: MEDICARE

## 2021-07-29 DIAGNOSIS — I82.409 ACUTE EMBOLISM AND THROMBOSIS OF UNSPECIFIED DEEP VEINS OF UNSPECIFIED LOWER EXTREMITY: ICD-10-CM

## 2021-07-29 DIAGNOSIS — Z90.49 ACQUIRED ABSENCE OF OTHER SPECIFIED PARTS OF DIGESTIVE TRACT: Chronic | ICD-10-CM

## 2021-07-29 PROCEDURE — 93970 EXTREMITY STUDY: CPT | Mod: 26

## 2021-07-29 PROCEDURE — 93970 EXTREMITY STUDY: CPT

## 2021-08-20 ENCOUNTER — LABORATORY RESULT (OUTPATIENT)
Age: 86
End: 2021-08-20

## 2021-08-21 LAB
ALBUMIN SERPL ELPH-MCNC: 3.8 G/DL
ALP BLD-CCNC: 78 U/L
ALT SERPL-CCNC: 13 U/L
ANION GAP SERPL CALC-SCNC: 15 MMOL/L
AST SERPL-CCNC: 14 U/L
BASOPHILS # BLD AUTO: 0.08 K/UL
BASOPHILS NFR BLD AUTO: 0.9 %
BILIRUB SERPL-MCNC: 0.2 MG/DL
BUN SERPL-MCNC: 33 MG/DL
CALCIUM SERPL-MCNC: 9.5 MG/DL
CHLORIDE SERPL-SCNC: 106 MMOL/L
CO2 SERPL-SCNC: 16 MMOL/L
CREAT SERPL-MCNC: 1.27 MG/DL
EOSINOPHIL # BLD AUTO: 0.58 K/UL
EOSINOPHIL NFR BLD AUTO: 6.4 %
GLUCOSE SERPL-MCNC: 117 MG/DL
HCT VFR BLD CALC: 36 %
HGB BLD-MCNC: 10.7 G/DL
LDH SERPL-CCNC: 282 U/L
LYMPHOCYTES # BLD AUTO: 1 K/UL
LYMPHOCYTES NFR BLD AUTO: 10.9 %
MAN DIFF?: NORMAL
MCHC RBC-ENTMCNC: 26 PG
MCHC RBC-ENTMCNC: 29.7 GM/DL
MCV RBC AUTO: 87.4 FL
MONOCYTES # BLD AUTO: 0.41 K/UL
MONOCYTES NFR BLD AUTO: 4.5 %
NEUTROPHILS # BLD AUTO: 7.07 K/UL
NEUTROPHILS NFR BLD AUTO: 77.3 %
PLATELET # BLD AUTO: 409 K/UL
POTASSIUM SERPL-SCNC: 5.1 MMOL/L
PROT SERPL-MCNC: 6.6 G/DL
RBC # BLD: 4.12 M/UL
RBC # FLD: 24.3 %
SODIUM SERPL-SCNC: 137 MMOL/L
WBC # FLD AUTO: 9.14 K/UL

## 2021-08-23 ENCOUNTER — RX RENEWAL (OUTPATIENT)
Age: 86
End: 2021-08-23

## 2021-09-23 NOTE — DIETITIAN INITIAL EVALUATION ADULT. - PROBLEM SELECTOR PLAN 7
Problem: Knowledge Deficit  Goal: Patient/family/caregiver demonstrates understanding of disease process, treatment plan, medications, and discharge instructions  Description: Complete learning assessment and assess knowledge base    Interventions:  - Provide teaching at level of understanding  - Provide teaching via preferred learning methods  Outcome: Progressing hx of recurrent LE dvt 2/2 JAK2+ PV  c/w Eliquis

## 2021-09-26 NOTE — ED ADULT NURSE NOTE - TEMPLATE
09/26/21 0100   Events/Summary/Plan   Events/Summary/Plan Continuous neb given   Skin Inspection Respiratory Device Intact   Vital Signs   Respiration 18   $ Pulse Oximetry (Spot Check) Yes   Respiratory Assessment   Respiratory Pattern Within Normal Limits   Level of Consciousness Alert   Chest Exam   Work Of Breathing / Effort Within Normal Limits   Breath Sounds   RUL Breath Sounds Clear   RML Breath Sounds Clear;Diminished   RLL Breath Sounds Diminished   BERRY Breath Sounds Clear   LLL Breath Sounds Diminished   Secretions   Cough Non Productive      General

## 2021-10-11 ENCOUNTER — APPOINTMENT (OUTPATIENT)
Dept: INTERNAL MEDICINE | Facility: CLINIC | Age: 86
End: 2021-10-11
Payer: COMMERCIAL

## 2021-10-11 ENCOUNTER — MED ADMIN CHARGE (OUTPATIENT)
Age: 86
End: 2021-10-11

## 2021-10-11 ENCOUNTER — OUTPATIENT (OUTPATIENT)
Dept: OUTPATIENT SERVICES | Facility: HOSPITAL | Age: 86
LOS: 1 days | End: 2021-10-11
Payer: MEDICARE

## 2021-10-11 VITALS
WEIGHT: 120 LBS | OXYGEN SATURATION: 97 % | DIASTOLIC BLOOD PRESSURE: 80 MMHG | BODY MASS INDEX: 24.19 KG/M2 | SYSTOLIC BLOOD PRESSURE: 134 MMHG | HEART RATE: 74 BPM | HEIGHT: 59 IN

## 2021-10-11 DIAGNOSIS — Z90.49 ACQUIRED ABSENCE OF OTHER SPECIFIED PARTS OF DIGESTIVE TRACT: Chronic | ICD-10-CM

## 2021-10-11 DIAGNOSIS — I10 ESSENTIAL (PRIMARY) HYPERTENSION: ICD-10-CM

## 2021-10-11 DIAGNOSIS — Z23 ENCOUNTER FOR IMMUNIZATION: ICD-10-CM

## 2021-10-11 PROCEDURE — 99213 OFFICE O/P EST LOW 20 MIN: CPT | Mod: GE

## 2021-10-11 PROCEDURE — 90688 IIV4 VACCINE SPLT 0.5 ML IM: CPT

## 2021-10-11 PROCEDURE — G0008: CPT

## 2021-10-11 PROCEDURE — G0463: CPT | Mod: 25

## 2021-10-11 RX ORDER — NYSTATIN 100000 [USP'U]/G
100000 CREAM TOPICAL
Qty: 15 | Refills: 0 | Status: DISCONTINUED | COMMUNITY
Start: 2020-10-30 | End: 2021-10-11

## 2021-10-11 RX ORDER — PEN NEEDLE, DIABETIC 29 G X1/2"
32G X 4 MM NEEDLE, DISPOSABLE MISCELLANEOUS
Qty: 3 | Refills: 5 | Status: DISCONTINUED | COMMUNITY
Start: 2021-02-11 | End: 2021-10-11

## 2021-10-11 RX ORDER — BLOOD-GLUCOSE METER
KIT MISCELLANEOUS
Qty: 1 | Refills: 1 | Status: DISCONTINUED | COMMUNITY
Start: 2021-04-27 | End: 2021-10-11

## 2021-10-11 RX ORDER — LANCETS 28 GAUGE
EACH MISCELLANEOUS
Qty: 2 | Refills: 3 | Status: DISCONTINUED | COMMUNITY
Start: 2021-04-27 | End: 2021-10-11

## 2021-10-11 RX ORDER — NYSTATIN 100000 U/G
100000 OINTMENT TOPICAL
Qty: 30 | Refills: 0 | Status: DISCONTINUED | COMMUNITY
Start: 2021-02-10 | End: 2021-10-11

## 2021-10-11 RX ORDER — INSULIN ASPART 100 [IU]/ML
100 INJECTION, SOLUTION INTRAVENOUS; SUBCUTANEOUS
Qty: 3 | Refills: 0 | Status: DISCONTINUED | COMMUNITY
Start: 2021-02-12 | End: 2021-10-11

## 2021-10-11 RX ORDER — ZINC OXIDE 200 MG/G
20 OINTMENT TOPICAL
Qty: 60 | Refills: 0 | Status: DISCONTINUED | COMMUNITY
Start: 2021-02-10 | End: 2021-10-11

## 2021-10-11 RX ORDER — BLOOD-GLUCOSE METER
W/DEVICE KIT MISCELLANEOUS
Qty: 1 | Refills: 0 | Status: DISCONTINUED | COMMUNITY
Start: 2021-04-27 | End: 2021-10-11

## 2021-10-11 RX ORDER — BLOOD SUGAR DIAGNOSTIC
STRIP MISCELLANEOUS TWICE DAILY
Qty: 2 | Refills: 2 | Status: DISCONTINUED | COMMUNITY
Start: 2021-04-27 | End: 2021-10-11

## 2021-10-11 RX ORDER — MEDICAL SUPPLY, MISCELLANEOUS
MISCELLANEOUS MISCELLANEOUS
Qty: 2 | Refills: 0 | Status: DISCONTINUED | OUTPATIENT
Start: 2018-09-10 | End: 2021-10-11

## 2021-10-11 RX ORDER — INSULIN LISPRO 100 [IU]/ML
100 INJECTION, SOLUTION INTRAVENOUS; SUBCUTANEOUS
Qty: 3 | Refills: 0 | Status: DISCONTINUED | COMMUNITY
Start: 2020-10-30 | End: 2021-10-11

## 2021-10-11 RX ORDER — SENNOSIDES 8.6 MG TABLETS 8.6 MG/1
8.6 TABLET ORAL
Qty: 60 | Refills: 0 | Status: DISCONTINUED | COMMUNITY
Start: 2021-02-10 | End: 2021-10-11

## 2021-10-11 NOTE — PHYSICAL EXAM
[Normal] : no joint swelling and grossly normal strength and tone [Normal Affect] : the affect was normal [Normal Mood] : the mood was normal [de-identified] : Feet no ulcers [de-identified] : aao x 2 to self,clinic (did not know year, but knew month is Oct) [de-identified] : no foot ulcers

## 2021-10-11 NOTE — ASSESSMENT
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 Chamberlain\par -c/w hydroxyurea\par -c/w eliquis - no recent bleeding\par -had recent doppler negative, no LE edema on exam - calves symmetric\par \par #HTN\par BP 130s systolic on amlodipine 10mg qd\par decrease to 5mg qd as pt elderly and does not need as tight BP control\par \par #Osteopenia (hx of osteoporosis)\par - is s/p alendronate - no longer needs to be on it as DEXA from 2020 just with osteopenia\par \par #Hypothyroidism\par -on levothyroxine 112mcg\par -TSH today w/ reflex FT4 , was normal when checked 1 year ago\par \par #HCM\par - Med recc completed\par -Flu shot and TDap booster given today\par -s/p Pfizer COVID x2 -- advised to take COVID booster\par -CBC and CMP in Aug 2021 done by hematologist\par \par

## 2021-10-11 NOTE — HISTORY OF PRESENT ILLNESS
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 Since discharge to home pt has been doing well per daughter. Occasionally has "bouts of dementia" where she is up all night talking, and walking without her walker. When that occurs she gives the PRN seroquel which helps the patient sleep better. \par \par No recent falls, dizziness, episodes of weakness.\par Daughter reports saw ophtho recently (cataracts), following with [facility], and has f/u appt with them. Checks FSG at home, and fasting FSG around 110s. Has been taking amlodipine 10mg qd (was prescribed 5mg qd).

## 2021-10-12 DIAGNOSIS — E11.9 TYPE 2 DIABETES MELLITUS WITHOUT COMPLICATIONS: ICD-10-CM

## 2021-10-12 DIAGNOSIS — Z23 ENCOUNTER FOR IMMUNIZATION: ICD-10-CM

## 2021-10-14 ENCOUNTER — LABORATORY RESULT (OUTPATIENT)
Age: 86
End: 2021-10-14

## 2021-10-25 NOTE — PATIENT PROFILE ADULT - SURGICAL SITE INCISION
I will continue (Cortef) Hydrocortisone 10 mg 2 tablets (20 mg) in the morning and 1 tablet (10 mg) in the evening.  I recommend wearing a medic alert bracelet for adrenal insufficiency.  I recommend sick day rules: take 3x the dose for 3 days of hydrocortisone for major illness (fever >100.4) or major injury or surgery (60 mg in the morning and 30 mg in the evening). Take 2x the dose for 3 days (40 mg in the morning and 20 in the evening) for minor illness (cold without fever, minor injury).  For illness with vomiting, high fever, major injury go to the emergency department  I recommend carrying IM hydrocortisone 100 mg intramuscularly for emergencies and for camping or distant travel.   I advised the patient to continue to take Vitamin D3 supplement.   I discussed home safety measures and strategies to reduce the risk of falls.   I will repeat BMD 2023.  I will check labs in 6 months albumin, calcium, PTH, 25 vitamin D, TSH.   Patient given flu vaccination today in clinic.   I will have the patient return to endocrine clinic in 6 months.            FOLLOW-UP    It is recommended you schedule a follow-up appointment No follow-ups on file.    Office hours are 8:00 am to 5:00 pm Monday through Friday. If it is urgent that you speak with someone outside of these hours, please call [phone].    Thank you for choosing [facility].    At [facility], one important tool we use to improve our patient services is our Patient Survey. Following your visit you may receive our survey in the mail.     · Please take the time to complete the survey.     · If your visit with us was great, we want to hear about it.     · If we can improve, please let us know how.          no

## 2021-11-15 ENCOUNTER — APPOINTMENT (OUTPATIENT)
Dept: HEMATOLOGY ONCOLOGY | Facility: CLINIC | Age: 86
End: 2021-11-15
Payer: COMMERCIAL

## 2021-11-15 ENCOUNTER — RESULT REVIEW (OUTPATIENT)
Age: 86
End: 2021-11-15

## 2021-11-15 ENCOUNTER — OUTPATIENT (OUTPATIENT)
Dept: OUTPATIENT SERVICES | Facility: HOSPITAL | Age: 86
LOS: 1 days | Discharge: HOME | End: 2021-11-15

## 2021-11-15 VITALS
DIASTOLIC BLOOD PRESSURE: 76 MMHG | RESPIRATION RATE: 16 BRPM | HEART RATE: 87 BPM | OXYGEN SATURATION: 95 % | TEMPERATURE: 98.2 F | SYSTOLIC BLOOD PRESSURE: 124 MMHG

## 2021-11-15 DIAGNOSIS — Z90.49 ACQUIRED ABSENCE OF OTHER SPECIFIED PARTS OF DIGESTIVE TRACT: Chronic | ICD-10-CM

## 2021-11-15 DIAGNOSIS — C92.10 CHRONIC MYELOID LEUKEMIA, BCR/ABL-POSITIVE, NOT HAVING ACHIEVED REMISSION: ICD-10-CM

## 2021-11-15 LAB
BASOPHILS # BLD AUTO: 0.09 K/UL — SIGNIFICANT CHANGE UP (ref 0–0.2)
BASOPHILS NFR BLD AUTO: 1 % — SIGNIFICANT CHANGE UP (ref 0–2)
EOSINOPHIL # BLD AUTO: 0.35 K/UL — SIGNIFICANT CHANGE UP (ref 0–0.5)
EOSINOPHIL NFR BLD AUTO: 3.9 % — SIGNIFICANT CHANGE UP (ref 0–6)
HCT VFR BLD CALC: 38.5 % — SIGNIFICANT CHANGE UP (ref 34.5–45)
HGB BLD-MCNC: 11.9 G/DL — SIGNIFICANT CHANGE UP (ref 11.5–15.5)
IMM GRANULOCYTES NFR BLD AUTO: 0.3 % — SIGNIFICANT CHANGE UP (ref 0–1.5)
LYMPHOCYTES # BLD AUTO: 1.54 K/UL — SIGNIFICANT CHANGE UP (ref 1–3.3)
LYMPHOCYTES # BLD AUTO: 17.3 % — SIGNIFICANT CHANGE UP (ref 13–44)
MCHC RBC-ENTMCNC: 30.1 PG — SIGNIFICANT CHANGE UP (ref 27–34)
MCHC RBC-ENTMCNC: 30.9 G/DL — LOW (ref 32–36)
MCV RBC AUTO: 97.2 FL — SIGNIFICANT CHANGE UP (ref 80–100)
MONOCYTES # BLD AUTO: 0.63 K/UL — SIGNIFICANT CHANGE UP (ref 0–0.9)
MONOCYTES NFR BLD AUTO: 7.1 % — SIGNIFICANT CHANGE UP (ref 2–14)
NEUTROPHILS # BLD AUTO: 6.26 K/UL — SIGNIFICANT CHANGE UP (ref 1.8–7.4)
NEUTROPHILS NFR BLD AUTO: 70.4 % — SIGNIFICANT CHANGE UP (ref 43–77)
NRBC # BLD: 0 /100 WBCS — SIGNIFICANT CHANGE UP (ref 0–0)
PLATELET # BLD AUTO: 395 K/UL — SIGNIFICANT CHANGE UP (ref 150–400)
RBC # BLD: 3.96 M/UL — SIGNIFICANT CHANGE UP (ref 3.8–5.2)
RBC # FLD: 17.1 % — HIGH (ref 10.3–14.5)
WBC # BLD: 8.9 K/UL — SIGNIFICANT CHANGE UP (ref 3.8–10.5)
WBC # FLD AUTO: 8.9 K/UL — SIGNIFICANT CHANGE UP (ref 3.8–10.5)

## 2021-11-15 PROCEDURE — 99214 OFFICE O/P EST MOD 30 MIN: CPT

## 2021-11-15 PROCEDURE — 99072 ADDL SUPL MATRL&STAF TM PHE: CPT

## 2021-11-15 RX ORDER — LEVOTHYROXINE SODIUM 0.11 MG/1
112 TABLET ORAL
Qty: 90 | Refills: 0 | Status: DISCONTINUED | COMMUNITY
Start: 2020-12-14 | End: 2021-11-15

## 2021-11-15 NOTE — HISTORY OF PRESENT ILLNESS
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 \par \par Patient is feeling well, has dementia not talkative. \par \par No other changes in her medical, surgical or social history since  7/26/2021.  [FreeTextEntry3] : Daughter-Magalie

## 2021-11-15 NOTE — REVIEW OF SYSTEMS
[Confused] : confusion [Negative] : Allergic/Immunologic [Fever] : no fever [Chills] : no chills [Eye Pain] : no eye pain [Dysphagia] : no dysphagia [Odynophagia] : no odynophagia [Chest Pain] : no chest pain [Shortness Of Breath] : no shortness of breath [Abdominal Pain] : no abdominal pain [Vomiting] : no vomiting [Joint Pain] : no joint pain [Skin Rash] : no skin rash [Easy Bleeding] : no tendency for easy bleeding [Easy Bruising] : no tendency for easy bruising [FreeTextEntry2] : bedridden [de-identified] : dementia

## 2021-11-15 NOTE — ASSESSMENT
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  3/3/28- WBC 17.95, Hb 11.2, Hct 37.2, RDW 15.6, , neutrophils 13.69, lymphocytes 1.72, monocytes 1.23, eos 0.99, basos 0.17.  \par 5/25/21 -WBC is elevated 19.23\par She is on Hydroxyurea 500 mg TIW,  Hb 11.9 g/dl , . Continue current dose of Hydroxyurea. \par \par 2) DVT\par -patient is on  anticoagulation with Eliquis, she is very high risk for recurrent VTE including acute DVT/PE with HARRIS 2+ PV and history of multiple DVTs, benefit of anticoagulation outweights risk in patient's case. Continue eliquis  2.5 mg BID as per recommendations in the literature >81 yo and <60Kg.\par \par Diabetes: On Januvia and Metformin\par HTN: On Amlodipine, Metoprolol\par HLD: on Atorvastatin.\par \par RTC 2 months. \par \par \par \par \par \par \par \par RTC 2 months.

## 2021-11-15 NOTE — PHYSICAL EXAM
[Capable of only limited self care, confined to bed or chair more than 50% of waking hours] : Status 3- Capable of only limited self care, confined to bed or chair more than 50% of waking hours [Thin] : thin [Normal] : affect appropriate [de-identified] : Elderly woman bedridden [de-identified] : chronic venous changes in lower extremity

## 2021-11-16 LAB
ALBUMIN SERPL ELPH-MCNC: 4.3 G/DL
ALP BLD-CCNC: 98 U/L
ALT SERPL-CCNC: 14 U/L
ANION GAP SERPL CALC-SCNC: 21 MMOL/L
AST SERPL-CCNC: 18 U/L
BILIRUB SERPL-MCNC: <0.2 MG/DL
BUN SERPL-MCNC: 29 MG/DL
CALCIUM SERPL-MCNC: 9.7 MG/DL
CHLORIDE SERPL-SCNC: 100 MMOL/L
CO2 SERPL-SCNC: 17 MMOL/L
CREAT SERPL-MCNC: 1.37 MG/DL
GLUCOSE SERPL-MCNC: 178 MG/DL
LDH SERPL-CCNC: 204 U/L
POTASSIUM SERPL-SCNC: 5 MMOL/L
PROT SERPL-MCNC: 7.5 G/DL
SODIUM SERPL-SCNC: 138 MMOL/L

## 2021-12-20 ENCOUNTER — RX RENEWAL (OUTPATIENT)
Age: 86
End: 2021-12-20

## 2021-12-22 ENCOUNTER — RX RENEWAL (OUTPATIENT)
Age: 86
End: 2021-12-22

## 2022-01-01 NOTE — H&P ADULT - DOES THIS PATIENT HAVE A HISTORY OF OR HAS BEEN DX WITH HEART FAILURE?
Mom states baby's feeds have been going fair. Baby falls asleep easily at the breast.  Normal  behaviors reviewed. Mom shown how to hand express and several drops expressed now via hand. Baby in nursery for circ, but mom will call for next feed. Discussed with mother her plan for feeding. Reviewed the benefits of exclusive breast milk feeding during the hospital stay. Informed her of the risks of using formula to supplement in the first few days of life as well as the benefits of successful breast milk feeding; referred her to the Breastfeeding booklet about this information. She acknowledges understanding of information reviewed and states that it is her plan to breastfeed her infant. Will support her choice and offer additional information as needed. Reviewed breastfeeding basics:  How milk is made and normal  breastfeeding behaviors discussed. Supply and demand,  stomach size, early feeding cues, skin to skin bonding with comfortable positioning and baby led latch-on reviewed. How to identify signs of successful breastfeeding sessions reviewed; education on asymetrical latch, signs of effective latching vs shallow, in-effective latching, normal  feeding frequency and duration and expected infant output discussed. Normal course of breastfeeding discussed including the AAP's recommendation that children receive exclusive breast milk feedings for the first six months of life with breast milk feedings to continue through the first year of life and/or beyond as complimentary table foods are added. Breastfeeding Booklet and Warm line information provided with discussion. Discussed typical  weight loss and the importance of pediatrician appointment within 24-48 hours of discharge, at 2 weeks of life and normalcy of requesting pediatric weight checks as needed in between visits. Hand Expression Education:  Mom taught how to manually hand express her colostrum. Emphasized the importance of providing infant with valuable colostrum as infant rests skin to skin at breast.  Aware to avoid extended periods of non-feeding. Aware to offer 10-20+ drops of colostrum every 2-3 hours until infant is latching and nursing effectively. Taught the rationale behind this low tech but highly effective evidence based practice. Pt will successfully establish breastfeeding by feeding in response to early feeding cues   or wake every 3h, will obtain deep latch, and will keep log of feedings/output. Taught to BF at hunger cues and or q 2-3 hrs and to offer 10-20 drops of hand expressed colostrum at any non-feeds.       Breast Assessment  Left Breast: Medium  Left Nipple: Everted, Intact  Right Breast: Medium  Right Nipple: Everted, Intact  Breast- Feeding Assessment  Attends Breast-Feeding Classes: No  Breast-Feeding Experience: Yes  Breast Trauma/Surgery: No  Type/Quality: Good (per mother)  Lactation Consultant Visits  Breast-Feedings: Not breast-feeding (baby in nursery for circ)  Mother/Infant Observation  Mother Observation: Breast comfortable unknown

## 2022-01-12 LAB
ALBUMIN SERPL ELPH-MCNC: 3.7 G/DL
ALP BLD-CCNC: 84 U/L
ALT SERPL-CCNC: 11 U/L
ANION GAP SERPL CALC-SCNC: 15 MMOL/L
AST SERPL-CCNC: 13 U/L
BASOPHILS # BLD AUTO: 0.1 K/UL
BASOPHILS NFR BLD AUTO: 1.2 %
BILIRUB SERPL-MCNC: 0.2 MG/DL
BUN SERPL-MCNC: 24 MG/DL
CALCIUM SERPL-MCNC: 9.3 MG/DL
CHLORIDE SERPL-SCNC: 103 MMOL/L
CO2 SERPL-SCNC: 20 MMOL/L
CREAT SERPL-MCNC: 1.26 MG/DL
EOSINOPHIL # BLD AUTO: 0.36 K/UL
EOSINOPHIL NFR BLD AUTO: 4.4 %
GLUCOSE SERPL-MCNC: 147 MG/DL
HCT VFR BLD CALC: 31.2 %
HGB BLD-MCNC: 9 G/DL
IMM GRANULOCYTES NFR BLD AUTO: 0.5 %
LDH SERPL-CCNC: 157 U/L
LYMPHOCYTES # BLD AUTO: 1.05 K/UL
LYMPHOCYTES NFR BLD AUTO: 12.8 %
MAN DIFF?: NORMAL
MCHC RBC-ENTMCNC: 28.1 PG
MCHC RBC-ENTMCNC: 28.8 GM/DL
MCV RBC AUTO: 97.5 FL
MONOCYTES # BLD AUTO: 0.59 K/UL
MONOCYTES NFR BLD AUTO: 7.2 %
NEUTROPHILS # BLD AUTO: 6.05 K/UL
NEUTROPHILS NFR BLD AUTO: 73.9 %
PLATELET # BLD AUTO: 326 K/UL
POTASSIUM SERPL-SCNC: 4.8 MMOL/L
PROT SERPL-MCNC: 6.2 G/DL
RBC # BLD: 3.2 M/UL
RBC # FLD: 16.1 %
SODIUM SERPL-SCNC: 139 MMOL/L
WBC # FLD AUTO: 8.19 K/UL

## 2022-01-20 ENCOUNTER — OUTPATIENT (OUTPATIENT)
Dept: OUTPATIENT SERVICES | Facility: HOSPITAL | Age: 87
LOS: 1 days | Discharge: HOME | End: 2022-01-20

## 2022-01-20 DIAGNOSIS — Z90.49 ACQUIRED ABSENCE OF OTHER SPECIFIED PARTS OF DIGESTIVE TRACT: Chronic | ICD-10-CM

## 2022-01-20 DIAGNOSIS — C92.10 CHRONIC MYELOID LEUKEMIA, BCR/ABL-POSITIVE, NOT HAVING ACHIEVED REMISSION: ICD-10-CM

## 2022-01-24 ENCOUNTER — APPOINTMENT (OUTPATIENT)
Dept: HEMATOLOGY ONCOLOGY | Facility: CLINIC | Age: 87
End: 2022-01-24

## 2022-01-24 ENCOUNTER — APPOINTMENT (OUTPATIENT)
Dept: HEMATOLOGY ONCOLOGY | Facility: CLINIC | Age: 87
End: 2022-01-24
Payer: COMMERCIAL

## 2022-01-24 DIAGNOSIS — E11.9 TYPE 2 DIABETES MELLITUS W/OUT COMPLICATIONS: ICD-10-CM

## 2022-01-24 DIAGNOSIS — E03.9 HYPOTHYROIDISM, UNSPECIFIED: ICD-10-CM

## 2022-01-24 PROCEDURE — 99213 OFFICE O/P EST LOW 20 MIN: CPT | Mod: 95

## 2022-01-24 RX ORDER — PRAVASTATIN SODIUM 20 MG/1
20 TABLET ORAL
Qty: 90 | Refills: 1 | Status: COMPLETED | COMMUNITY
Start: 2019-11-10 | End: 2022-01-24

## 2022-01-25 NOTE — HISTORY OF PRESENT ILLNESS
[Home] : at home, [unfilled] , at the time of the visit. 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 Patient is currently taking Hydrea 500mg TIW. Per daughter, the patient has been well with no change in health status. Denies fevers, chills, night sweat,  and unintentional weight loss.\par \par No other changes in her medical, surgical or social history since  11/15/2021.

## 2022-01-25 NOTE — REVIEW OF SYSTEMS
[Confused] : confusion [Negative] : Heme/Lymph [Fever] : no fever [Chills] : no chills [Eye Pain] : no eye pain [Dysphagia] : no dysphagia [Odynophagia] : no odynophagia [Chest Pain] : no chest pain [Shortness Of Breath] : no shortness of breath [Abdominal Pain] : no abdominal pain [Vomiting] : no vomiting [Joint Pain] : no joint pain [Skin Rash] : no skin rash [Easy Bleeding] : no tendency for easy bleeding [Easy Bruising] : no tendency for easy bruising [de-identified] : dementia

## 2022-01-25 NOTE — PHYSICAL EXAM
[Capable of only limited self care, confined to bed or chair more than 50% of waking hours] : Status 3- Capable of only limited self care, confined to bed or chair more than 50% of waking hours [de-identified] : Elderly woman seen sitting in chair

## 2022-01-25 NOTE — ASSESSMENT
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  3/3/28- WBC 17.95, Hb 11.2, Hct 37.2, RDW 15.6, , neutrophils 13.69, lymphocytes 1.72, monocytes 1.23, eos 0.99, basos 0.17.  \par 5/25/21 -WBC is elevated 19.23\par \par -She is on Hydroxyurea 500 mg TIW- noted drop in HGB from 11.9 to 9.0 g/dL, WBC 3.20 and PLT 326K (1/11/22), Advised to discontinue Hydrea and recheck counts in 1 month.\par \par 2) DVT\par -patient is on  anticoagulation with Eliquis, she is very high risk for recurrent VTE including acute DVT/PE with HARRIS 2+ PV and history of multiple DVTs, benefit of anticoagulation outweights risk in patient's case. Continue eliquis  2.5 mg BID as per recommendations in the literature >79 yo and <60Kg.\par \par Diabetes: On Januvia and Metformin\par HTN: On Amlodipine, Metoprolol\par HLD: on Atorvastatin.\par \par RTC 2 months. \par Case and management discussed with Dr. Chamberlain\par \par \par \par \par \par \par \par RTC 2 months.

## 2022-02-18 RX ORDER — METOPROLOL TARTRATE 25 MG/1
25 TABLET, FILM COATED ORAL
Qty: 60 | Refills: 5 | Status: ACTIVE | COMMUNITY
Start: 2020-12-14 | End: 1900-01-01

## 2022-02-21 ENCOUNTER — LABORATORY RESULT (OUTPATIENT)
Age: 87
End: 2022-02-21

## 2022-02-22 ENCOUNTER — NON-APPOINTMENT (OUTPATIENT)
Age: 87
End: 2022-02-22

## 2022-02-28 NOTE — ED PROVIDER NOTE - OBJECTIVE STATEMENT
Take medication as prescribed  Start physical therapy  Follow up with Katherine Valerio in 2-3 weeks
86yo F Hx HTN, HLD, DM, hypothyroid, CVA presenting with complaints of AMS. pt daughter present and states that over the past 2 days pt has been more altered confusing family members, grasping at the air, which is similar to what she had done the previous time she had a UTI. pt also with complaints of dysuria, increased frequency, urgency, decreased output. denies abdominal pain, fevers, chills, n/v, cp, sob.

## 2022-03-08 NOTE — H&P ADULT - NSHPROSALLOTHERNEGRD_GEN_ALL_CORE
"    3/8/2022         RE: Wolf Andrea  [id number] [facility] [id number]        Dear Colleague,    Thank you for referring your patient, Wolf Andrea, to the [facility]. Please see a copy of my visit note below.    The patient is being seen for the following issue/s:      1. 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 Primary neuroendocrine tumor of pancreas    3. Prostate cancer (H)      He has a history of prostate cancer, follicular lymphoma of the small bowel, and low grade neuroendocrine tumor of pancreas seen on PET for lymphoma. He has been getting surveillance CT scans every 6 months.  He recently self palpated a lump in the right groin.  He denies any other lumps or bumps.    He denies abdominal pain, fevers, chills, night sweats, or unintentional weight loss    Last oncology visit note by  reviewed:    \"Prostate cancer - remains in clinical and biochemical remission  Follicular lymphoma, likely in abdominal nodes but not growing rapidly, symptomatic nor affecting general health      Oncology History/Treatment  Per Saskia Calles NP on 13 May 2021  Diagnosis/Stage:   2006: Prostate cancer - RICH  -resection  -salvage RT (rising PSA)     2015: Follicular lymphoma (ilealcecal valve)  -detected during routine screening colonoscopy, suspicious on path but did not confirm lymphoma  -1/2016: [facility] (Dr. Singh) repeat colonoscopy with biopsy: lymphoma confirmed  -6/2016: repeat biopsy ileocecal valve confirmed grade 1 follicular lymphoma  -PET: hypermetabolic involvement within the ileocecal region; lymph nodes with focal hypermetabolic activity in mesentery and 0.9 cm pancreatic lesion  -Bone marrow biopsy negative for lymphoma  -8/2017 repeat colonoscopy: non-ulcerated nodularity in distal ileum, consistent with known lymphoma. Stable from 2016.     2016: low-grade neuroendocrine tumor of pancreas  -detected small avid lesion on PET " "staging for lymphoma  -endoscopic biopsy: low-grade, Ki-67 index low, <1 cm size tumor  -Dr. Cortes recommended observation through scans\"       PHYSICAL EXAMINATION:    General: Todays' vital signs reviewed in the EMR.    ECOG PS is 1.  He is in no acute distress.  Lymphatic: I could not palpate a mass in the right groin today.      ASSESSMENT/PLAN:    1. 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 Primary neuroendocrine tumor of pancreas    3. Prostate cancer (H)      We ordered a CT chest, abdomen, pelvis with contrast on March 4, 2022 which reported:    A simple appearing exophytic cyst involving the lateral aspect of the right kidney measuring 6.4 x 5.4 cm which is similar to comparison.    An exophytic increased density lesion involving the posteromedial aspect of the left kidney measures 15 x 11 mm and is similar to prior CT as well.  A hemorrhagic or proteinaceous cyst is favored.    Mild interval decrease in a lobulated solid mesenteric mass in the left lower quadrant engulfing mesenteric vasculature which appears mildly decreased in size measuring 6.1 x 4.7 cm compared to 6.2 x 6.2 cm previously.    I have personally reviewed the CT scan images.    Comparison was made to November 2021, April 2021 and October 2020.    Surgical pathology report from June 2016 reviewed:Follicular lymphoma involving an ileocecal valve biopsy.    2022-03-04 labs reviewed which include an unremarkable CMP, undetectable PSA, and normal CBC.    Today, I discussed potential benefits of immunotherapy for follicular lymphoma with rituximab.  You wished to continue to observe your follicular lymphoma and come back in 6 weeks for follow-up with me which I think is reasonable.                Oncology Rooming Note    March 8, 2022 11:00 AM   Wolf Andrea is a 78 year old male who presents for:    Chief Complaint   Patient presents with     Oncology Clinic Visit     Follicular lymphoma of extranodal and solid " "organ sites - Provider visit only     Initial Vitals: BP (!) 160/82 (BP Location: Right arm, Patient Position: Sitting, Cuff Size: Adult Large)   Pulse 92   Temp 98.4  F (36.9  C) (Oral)   Resp 20   Ht 1.651 m (5' 5\")   Wt 90.5 kg (199 lb 8 oz)   SpO2 97%   BMI 33.20 kg/m   Estimated body mass index is 33.2 kg/m  as calculated from the following:    Height as of this encounter: 1.651 m (5' 5\").    Weight as of this encounter: 90.5 kg (199 lb 8 oz). Body surface area is 2.04 meters squared.  No Pain (0) Comment: Data Unavailable   No LMP for male patient.  Allergies reviewed: Yes  Medications reviewed: Yes    Medications: Medication refills not needed today.  Pharmacy name entered into [facility]:    [facility], [place] - [address] [facility] [place], [place] - [address] [facility] [place], [place] - [address] [facility]    Clinical concerns: Follicular lymphoma of extranodal and solid organ sites.       Coby De Leon [facility]                Again, thank you for allowing me to participate in the care of your patient.        Sincerely,        José Lee MD    " All other review of systems negative, except as noted in HPI

## 2022-03-13 ENCOUNTER — INPATIENT (INPATIENT)
Facility: HOSPITAL | Age: 87
LOS: 3 days | Discharge: HOME HEALTH | DRG: 871 | End: 2022-03-17
Attending: INTERNAL MEDICINE | Admitting: HOSPITALIST
Payer: MEDICARE

## 2022-03-13 VITALS
HEART RATE: 104 BPM | HEIGHT: 60 IN | OXYGEN SATURATION: 99 % | RESPIRATION RATE: 22 BRPM | DIASTOLIC BLOOD PRESSURE: 91 MMHG | SYSTOLIC BLOOD PRESSURE: 167 MMHG | WEIGHT: 130.07 LBS

## 2022-03-13 DIAGNOSIS — J96.01 ACUTE RESPIRATORY FAILURE WITH HYPOXIA: ICD-10-CM

## 2022-03-13 DIAGNOSIS — Z90.49 ACQUIRED ABSENCE OF OTHER SPECIFIED PARTS OF DIGESTIVE TRACT: Chronic | ICD-10-CM

## 2022-03-13 LAB
ALBUMIN SERPL ELPH-MCNC: 3.8 G/DL — SIGNIFICANT CHANGE UP (ref 3.3–5)
ALP SERPL-CCNC: 101 U/L — SIGNIFICANT CHANGE UP (ref 40–120)
ALT FLD-CCNC: 45 U/L — SIGNIFICANT CHANGE UP (ref 10–45)
ANION GAP SERPL CALC-SCNC: 18 MMOL/L — HIGH (ref 5–17)
APPEARANCE UR: ABNORMAL
APTT BLD: 37.6 SEC — HIGH (ref 27.5–35.5)
AST SERPL-CCNC: 36 U/L — SIGNIFICANT CHANGE UP (ref 10–40)
BACTERIA # UR AUTO: NEGATIVE — SIGNIFICANT CHANGE UP
BASE EXCESS BLDV CALC-SCNC: -7.6 MMOL/L — LOW (ref -2–2)
BASOPHILS # BLD AUTO: 0.07 K/UL — SIGNIFICANT CHANGE UP (ref 0–0.2)
BASOPHILS NFR BLD AUTO: 0.3 % — SIGNIFICANT CHANGE UP (ref 0–2)
BILIRUB SERPL-MCNC: 0.3 MG/DL — SIGNIFICANT CHANGE UP (ref 0.2–1.2)
BILIRUB UR-MCNC: NEGATIVE — SIGNIFICANT CHANGE UP
BUN SERPL-MCNC: 43 MG/DL — HIGH (ref 7–23)
CA-I SERPL-SCNC: 1.27 MMOL/L — SIGNIFICANT CHANGE UP (ref 1.15–1.33)
CALCIUM SERPL-MCNC: 10 MG/DL — SIGNIFICANT CHANGE UP (ref 8.4–10.5)
CHLORIDE BLDV-SCNC: 103 MMOL/L — SIGNIFICANT CHANGE UP (ref 96–108)
CHLORIDE SERPL-SCNC: 100 MMOL/L — SIGNIFICANT CHANGE UP (ref 96–108)
CO2 BLDV-SCNC: 20 MMOL/L — LOW (ref 22–26)
CO2 SERPL-SCNC: 18 MMOL/L — LOW (ref 22–31)
COLOR SPEC: YELLOW — SIGNIFICANT CHANGE UP
CREAT SERPL-MCNC: 1.81 MG/DL — HIGH (ref 0.5–1.3)
DIFF PNL FLD: ABNORMAL
EGFR: 27 ML/MIN/1.73M2 — LOW
EOSINOPHIL # BLD AUTO: 0.08 K/UL — SIGNIFICANT CHANGE UP (ref 0–0.5)
EOSINOPHIL NFR BLD AUTO: 0.4 % — SIGNIFICANT CHANGE UP (ref 0–6)
EPI CELLS # UR: 17 — HIGH
FLUAV H1 2009 PAND RNA SPEC QL NAA+PROBE: DETECTED
GAS PNL BLDV: 137 MMOL/L — SIGNIFICANT CHANGE UP (ref 136–145)
GAS PNL BLDV: SIGNIFICANT CHANGE UP
GAS PNL BLDV: SIGNIFICANT CHANGE UP
GLUCOSE BLDV-MCNC: 242 MG/DL — HIGH (ref 70–99)
GLUCOSE SERPL-MCNC: 246 MG/DL — HIGH (ref 70–99)
GLUCOSE UR QL: NEGATIVE — SIGNIFICANT CHANGE UP
GRAN CASTS # UR COMP ASSIST: 40 /LPF — SIGNIFICANT CHANGE UP
HCO3 BLDV-SCNC: 19 MMOL/L — LOW (ref 22–29)
HCT VFR BLD CALC: 33 % — LOW (ref 34.5–45)
HCT VFR BLDA CALC: 27 % — LOW (ref 34.5–46.5)
HGB BLD CALC-MCNC: 9.1 G/DL — LOW (ref 11.7–16.1)
HGB BLD-MCNC: 9.4 G/DL — LOW (ref 11.5–15.5)
HYALINE CASTS # UR AUTO: 1 /LPF — SIGNIFICANT CHANGE UP (ref 0–7)
IMM GRANULOCYTES NFR BLD AUTO: 0.9 % — SIGNIFICANT CHANGE UP (ref 0–1.5)
INR BLD: 1.5 RATIO — HIGH (ref 0.88–1.16)
KETONES UR-MCNC: NEGATIVE — SIGNIFICANT CHANGE UP
LACTATE BLDV-MCNC: 1.6 MMOL/L — SIGNIFICANT CHANGE UP (ref 0.7–2)
LEUKOCYTE ESTERASE UR-ACNC: NEGATIVE — SIGNIFICANT CHANGE UP
LYMPHOCYTES # BLD AUTO: 1.48 K/UL — SIGNIFICANT CHANGE UP (ref 1–3.3)
LYMPHOCYTES # BLD AUTO: 7 % — LOW (ref 13–44)
MCHC RBC-ENTMCNC: 24 PG — LOW (ref 27–34)
MCHC RBC-ENTMCNC: 28.5 GM/DL — LOW (ref 32–36)
MCV RBC AUTO: 84.2 FL — SIGNIFICANT CHANGE UP (ref 80–100)
MONOCYTES # BLD AUTO: 1.22 K/UL — HIGH (ref 0–0.9)
MONOCYTES NFR BLD AUTO: 5.8 % — SIGNIFICANT CHANGE UP (ref 2–14)
NEUTROPHILS # BLD AUTO: 18.1 K/UL — HIGH (ref 1.8–7.4)
NEUTROPHILS NFR BLD AUTO: 85.6 % — HIGH (ref 43–77)
NITRITE UR-MCNC: NEGATIVE — SIGNIFICANT CHANGE UP
NRBC # BLD: 0 /100 WBCS — SIGNIFICANT CHANGE UP (ref 0–0)
NT-PROBNP SERPL-SCNC: 789 PG/ML — HIGH (ref 0–300)
PCO2 BLDV: 41 MMHG — SIGNIFICANT CHANGE UP (ref 39–42)
PH BLDV: 7.27 — LOW (ref 7.32–7.43)
PH UR: 6 — SIGNIFICANT CHANGE UP (ref 5–8)
PLATELET # BLD AUTO: 458 K/UL — HIGH (ref 150–400)
PO2 BLDV: 37 MMHG — SIGNIFICANT CHANGE UP (ref 25–45)
POTASSIUM BLDV-SCNC: 4.8 MMOL/L — SIGNIFICANT CHANGE UP (ref 3.5–5.1)
POTASSIUM SERPL-MCNC: 4.8 MMOL/L — SIGNIFICANT CHANGE UP (ref 3.5–5.3)
POTASSIUM SERPL-SCNC: 4.8 MMOL/L — SIGNIFICANT CHANGE UP (ref 3.5–5.3)
PROT SERPL-MCNC: 7.8 G/DL — SIGNIFICANT CHANGE UP (ref 6–8.3)
PROT UR-MCNC: ABNORMAL
PROTHROM AB SERPL-ACNC: 17.5 SEC — HIGH (ref 10.5–13.4)
RAPID RVP RESULT: DETECTED
RBC # BLD: 3.92 M/UL — SIGNIFICANT CHANGE UP (ref 3.8–5.2)
RBC # FLD: 18.1 % — HIGH (ref 10.3–14.5)
RBC CASTS # UR COMP ASSIST: 2 /HPF — SIGNIFICANT CHANGE UP (ref 0–4)
SAO2 % BLDV: 52.8 % — LOW (ref 67–88)
SARS-COV-2 RNA SPEC QL NAA+PROBE: SIGNIFICANT CHANGE UP
SODIUM SERPL-SCNC: 136 MMOL/L — SIGNIFICANT CHANGE UP (ref 135–145)
SP GR SPEC: 1.02 — SIGNIFICANT CHANGE UP (ref 1.01–1.02)
TROPONIN T, HIGH SENSITIVITY RESULT: 40 NG/L — SIGNIFICANT CHANGE UP (ref 0–51)
TROPONIN T, HIGH SENSITIVITY RESULT: 42 NG/L — SIGNIFICANT CHANGE UP (ref 0–51)
UROBILINOGEN FLD QL: NEGATIVE — SIGNIFICANT CHANGE UP
WBC # BLD: 21.13 K/UL — HIGH (ref 3.8–10.5)
WBC # FLD AUTO: 21.13 K/UL — HIGH (ref 3.8–10.5)
WBC UR QL: 15 /HPF — HIGH (ref 0–5)

## 2022-03-13 PROCEDURE — 99285 EMERGENCY DEPT VISIT HI MDM: CPT

## 2022-03-13 PROCEDURE — 93308 TTE F-UP OR LMTD: CPT | Mod: 26

## 2022-03-13 PROCEDURE — 93010 ELECTROCARDIOGRAM REPORT: CPT

## 2022-03-13 PROCEDURE — 71045 X-RAY EXAM CHEST 1 VIEW: CPT | Mod: 26

## 2022-03-13 RX ORDER — ACETAMINOPHEN 500 MG
1000 TABLET ORAL ONCE
Refills: 0 | Status: COMPLETED | OUTPATIENT
Start: 2022-03-13 | End: 2022-03-13

## 2022-03-13 RX ORDER — SODIUM CHLORIDE 9 MG/ML
500 INJECTION, SOLUTION INTRAVENOUS ONCE
Refills: 0 | Status: COMPLETED | OUTPATIENT
Start: 2022-03-13 | End: 2022-03-13

## 2022-03-13 RX ORDER — PIPERACILLIN AND TAZOBACTAM 4; .5 G/20ML; G/20ML
3.38 INJECTION, POWDER, LYOPHILIZED, FOR SOLUTION INTRAVENOUS ONCE
Refills: 0 | Status: COMPLETED | OUTPATIENT
Start: 2022-03-13 | End: 2022-03-13

## 2022-03-13 RX ADMIN — SODIUM CHLORIDE 500 MILLILITER(S): 9 INJECTION, SOLUTION INTRAVENOUS at 22:30

## 2022-03-13 RX ADMIN — Medication 400 MILLIGRAM(S): at 21:26

## 2022-03-13 RX ADMIN — PIPERACILLIN AND TAZOBACTAM 200 GRAM(S): 4; .5 INJECTION, POWDER, LYOPHILIZED, FOR SOLUTION INTRAVENOUS at 21:53

## 2022-03-13 RX ADMIN — SODIUM CHLORIDE 500 MILLILITER(S): 9 INJECTION, SOLUTION INTRAVENOUS at 20:40

## 2022-03-13 RX ADMIN — Medication 30 MILLIGRAM(S): at 23:11

## 2022-03-13 RX ADMIN — Medication 1000 MILLIGRAM(S): at 23:00

## 2022-03-13 NOTE — ED ADULT NURSE REASSESSMENT NOTE - NS ED NURSE REASSESS COMMENT FT1
Straight cath performed with 2 RN's present at bedside for sterile confirmation, pt able to tolerate well. Approximately 300ml yellow urine drained from bladder.

## 2022-03-13 NOTE — ED PROVIDER NOTE - PROGRESS NOTE DETAILS
Tyrone Rust MD. pt appears more comfortable, still on 10L NRB. pt has pna. Attending MD Muro: BP 80/40, LR bolus opened up to complete 1L (initially ordered for 500cc). Repeat BP with LR infusion 130s systolic (MAP 66), fluid responsive. Will continue close observation to determine if further fluid resuscitation required. Tyrone Rust MD. initial attempt to place USIV was made, took a clip of basilic vein however, attempt not performed as pt got a 2nd PIV in the right hand by RN  RSV came back +for influenza, changed admission to droplet; tamiflu ordered

## 2022-03-13 NOTE — ED PROVIDER NOTE - NSICDXPASTSURGICALHX_GEN_ALL_CORE_FT
PAST SURGICAL HISTORY:  FH: Total Abdominal Hysterectomy and Bilateral Salpingo-Oophorectomy     S/P small bowel resection 08/2019

## 2022-03-13 NOTE — ED ADULT NURSE NOTE - OBJECTIVE STATEMENT
Pt is a 89 y/o female presenting to the ED by EMS for SOB. Per EMS, pt has had a cough with runny nose for a few days with worsening SOB. Pt was found to be 69% O2 sat on room air prior to arrival. PMH DM and vascular dementia. Upon arrival to [facility], pt noted to have O2 sat within normal limits on NRB with non-labored breathing, other VSS. Pt is alert, Pt is a 89 y/o female presenting to the ED by EMS for SOB. Per EMS, pt has had a cough with runny nose x 2-3 days with worsening SOB. Pt was found to be 69% O2 sat on room air prior to arrival. PMH DM, DVT on Eliquis, and vascular dementia. Upon arrival to [facility], pt noted to have O2 sat within normal limits on NRB with non-labored breathing, audible coarse crackles noted. Pt is alert, oriented to name, speaks clearly, able to follow commands, sensory/motor function intact, febrile other VSS. Pt is a 87 y/o female presenting to the ED by EMS for SOB. Per EMS, pt has had a cough with runny nose x 2-3 days with worsening SOB. Pt was found to have 69% O2 sat on room air prior to arrival. PMH DM, DVT on Eliquis, and vascular dementia. Upon arrival to [facility], pt noted to have O2 sat within normal limits on NRB with non-labored breathing, audible coarse crackles noted. Pt is alert, oriented to name, speaks clearly, able to follow commands, sensory/motor function intact, febrile other VSS. Pt unable to answer other physical assessment questions at this time.

## 2022-03-13 NOTE — ED PROVIDER NOTE - OBJECTIVE STATEMENT
87 yo F PMHx JAK2 polycythemia vera, RLE DVT on eliquis, HTN, T2DM, vascular dementia, presents to ED for few days of URI like sx and cough. Today, had severe SOB and 911 was called. Pt also had subjective fevers. Per EMS, RA sat 69%, improved to 100% on NRB @ 10L. Daughter providing history at bedside, Magalie;  87 yo F PMHx JAK2 polycythemia vera, RLE DVT on eliquis, HTN, T2DM, vascular dementia, presents to ED for few days of URI like sx and productive cough x2 days. Sick contacts in pt's daughter and son at home. Today, had severe SOB and 911 was called. Pt also had subjective fevers. Per EMS, RA sat 69%, improved to 100% on NRB @ 10L.   Magalie, daughter [phone]  Meds:  Amlodipine 10 mg qAM  Metoprolol tartrate 25 mg BID  Eliquis 2.5 mg BID  Januvia 25 mg qAM  Metformin 500 mg BID  Levothyroxine 112 mcg qAM  Atorvastatin 40 mg qHS  Hydroxyurea 500 mg 1 tab Monday, 1 tab Friday  Quetiapine 12.5 mg qHS PRN Daughter providing history at bedside, Magalie;  89 yo F PMHx JAK2 polycythemia vera, RLE DVT on eliquis, HTN, T2DM, vascular dementia, presents to ED for few days of URI like sx and productive cough x2 days. Sick contacts in pt's daughter and son at home. Today, had severe SOB and 911 was called. Pt also had subjective fevers. Per EMS, RA sat 69%, improved to 100% on NRB @ 10L.   Magalie, daughter [phone]   [facility]  Meds:  Amlodipine 10 mg qAM  Metoprolol tartrate 25 mg BID  Eliquis 2.5 mg BID  Januvia 25 mg qAM  Metformin 500 mg BID  Levothyroxine 112 mcg qAM  Atorvastatin 40 mg qHS  Hydroxyurea 500 mg 1 tab Monday, 1 tab Friday  Quetiapine 12.5 mg qHS PRN

## 2022-03-13 NOTE — ED ADULT NURSE NOTE - NSIMPLEMENTINTERV_GEN_ALL_ED
Implemented All Fall with Harm Risk Interventions:  [place] to call system. Call bell, personal items and telephone within reach. Instruct patient to call for assistance. Room bathroom lighting operational. Non-slip footwear when patient is off stretcher. Physically safe environment: no spills, clutter or unnecessary equipment. Stretcher in lowest position, wheels locked, appropriate side rails in place. Provide visual cue, wrist band, yellow gown, etc. Monitor gait and stability. Monitor for mental status changes and reorient to person, place, and time. Review medications for side effects contributing to fall risk. Reinforce activity limits and safety measures with patient and family. Provide visual clues: red socks.

## 2022-03-13 NOTE — ED PROVIDER NOTE - PHYSICAL EXAMINATION
PHYSICAL EXAM:  GENERAL: non-toxic appearing; in no respiratory distress  HEAD Atraumatic, Normocephalic  NECK: No JVD; trachea midline  EYES: PERRL, EOMs intact b/l w/out deficits; normal conjunctiva  CHEST/LUNG: coarse BS  HEART: RRR no murmur/gallops/rubs  ABDOMEN: soft, NT, ND  EXTREMITIES: No LE edema, +2 radial pulses b/l, +2 DP/PT pulses b/l  MUSCULOSKELETAL: FROM of all 4 extremities;  NERVOUS SYSTEM:  A&Ox1, no gross motor deficits;   SKIN:  Warm and dry as visualized

## 2022-03-13 NOTE — ED PROVIDER NOTE - NS ED ROS FT
Constitutional: fevers; no chills  HEENT: no visual changes, no sore throat, no rhinorrhea  CV: no cp; no palpitations  Resp: sob; cough  GI: no abd pain, no nausea, no vomiting, no diarrhea, no constipation  : no dysuria, no hematuria  MSK: no myalgias; no arthralgias  skin: no rashes  neuro: no HA, no numbness; no weakness, no tingling  endo: no polyuria, no polydipsia

## 2022-03-13 NOTE — ED PROVIDER NOTE - CARE PLAN
Principal Discharge DX:	Acute respiratory failure with hypoxia   1 Principal Discharge DX:	Acute respiratory failure with hypoxia  Secondary Diagnosis:	Sepsis, unspecified organism   Principal Discharge DX:	Acute respiratory failure with hypoxia  Secondary Diagnosis:	Sepsis, unspecified organism  Secondary Diagnosis:	Influenza A

## 2022-03-13 NOTE — ED PROVIDER NOTE - ATTENDING CONTRIBUTION TO CARE
Attending MD Muro:  I personally have seen and examined this patient. I have performed a substantive portion of the visit including all aspects of the medical decision making.  Resident note reviewed and agree on plan of care and except where noted.      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       HR: 104 (13 Mar 2022 20:32) (104 - 104)  BP: 167/91 (13 Mar 2022 20:32) (167/91 - 167/91)  RR: 22 (13 Mar 2022 20:32) (22 - 22)  SpO2: 99% on non re-breathing 10L/min (13 Mar 2022 20:32) (99% - 99%)    Attending MD Muro:    Gen: awake and alert, smiles, tachypneic, does not follow commands  Neck: supple, no meningimus  CV: heart with reg rhythm, no obvious murmur appreciated. 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 Patient on arrival is tachypneic, 100% on NRB, diffuse rhonchi on exam. Concern for possible PNA vs. aspiration event. Plan for rectal temp, pan culture, IV abx, CXR, admission     *The above represents an initial assessment/impression. Please refer to progress notes for potential changes in patient clinical course*

## 2022-03-14 DIAGNOSIS — Z29.9 ENCOUNTER FOR PROPHYLACTIC MEASURES, UNSPECIFIED: ICD-10-CM

## 2022-03-14 DIAGNOSIS — F03.90 UNSPECIFIED DEMENTIA WITHOUT BEHAVIORAL DISTURBANCE: ICD-10-CM

## 2022-03-14 DIAGNOSIS — E11.9 TYPE 2 DIABETES MELLITUS WITHOUT COMPLICATIONS: ICD-10-CM

## 2022-03-14 DIAGNOSIS — D45 POLYCYTHEMIA VERA: ICD-10-CM

## 2022-03-14 DIAGNOSIS — A41.9 SEPSIS, UNSPECIFIED ORGANISM: ICD-10-CM

## 2022-03-14 DIAGNOSIS — J10.1 INFLUENZA DUE TO OTHER IDENTIFIED INFLUENZA VIRUS WITH OTHER RESPIRATORY MANIFESTATIONS: ICD-10-CM

## 2022-03-14 DIAGNOSIS — I82.409 ACUTE EMBOLISM AND THROMBOSIS OF UNSPECIFIED DEEP VEINS OF UNSPECIFIED LOWER EXTREMITY: ICD-10-CM

## 2022-03-14 DIAGNOSIS — E03.9 HYPOTHYROIDISM, UNSPECIFIED: ICD-10-CM

## 2022-03-14 DIAGNOSIS — N17.9 ACUTE KIDNEY FAILURE, UNSPECIFIED: ICD-10-CM

## 2022-03-14 DIAGNOSIS — J96.01 ACUTE RESPIRATORY FAILURE WITH HYPOXIA: ICD-10-CM

## 2022-03-14 DIAGNOSIS — I10 ESSENTIAL (PRIMARY) HYPERTENSION: ICD-10-CM

## 2022-03-14 LAB
CREAT ?TM UR-MCNC: 45 MG/DL — SIGNIFICANT CHANGE UP
GLUCOSE BLDC GLUCOMTR-MCNC: 198 MG/DL — HIGH (ref 70–99)
GLUCOSE BLDC GLUCOMTR-MCNC: 203 MG/DL — HIGH (ref 70–99)
GLUCOSE BLDC GLUCOMTR-MCNC: 215 MG/DL — HIGH (ref 70–99)
GLUCOSE BLDC GLUCOMTR-MCNC: 237 MG/DL — HIGH (ref 70–99)
MRSA PCR RESULT.: SIGNIFICANT CHANGE UP
PROCALCITONIN SERPL-MCNC: 0.85 NG/ML — HIGH (ref 0.02–0.1)
S AUREUS DNA NOSE QL NAA+PROBE: SIGNIFICANT CHANGE UP
SODIUM UR-SCNC: 71 MMOL/L — SIGNIFICANT CHANGE UP

## 2022-03-14 PROCEDURE — 12345: CPT | Mod: NC,GC

## 2022-03-14 PROCEDURE — 99223 1ST HOSP IP/OBS HIGH 75: CPT

## 2022-03-14 RX ORDER — QUETIAPINE FUMARATE 200 MG/1
12.5 TABLET, FILM COATED ORAL AT BEDTIME
Refills: 0 | Status: DISCONTINUED | OUTPATIENT
Start: 2022-03-14 | End: 2022-03-17

## 2022-03-14 RX ORDER — DEXTROSE 50 % IN WATER 50 %
12.5 SYRINGE (ML) INTRAVENOUS ONCE
Refills: 0 | Status: DISCONTINUED | OUTPATIENT
Start: 2022-03-14 | End: 2022-03-17

## 2022-03-14 RX ORDER — DEXTROSE 50 % IN WATER 50 %
25 SYRINGE (ML) INTRAVENOUS ONCE
Refills: 0 | Status: DISCONTINUED | OUTPATIENT
Start: 2022-03-14 | End: 2022-03-17

## 2022-03-14 RX ORDER — ACETAMINOPHEN 500 MG
650 TABLET ORAL EVERY 6 HOURS
Refills: 0 | Status: DISCONTINUED | OUTPATIENT
Start: 2022-03-14 | End: 2022-03-17

## 2022-03-14 RX ORDER — SODIUM CHLORIDE 9 MG/ML
1000 INJECTION, SOLUTION INTRAVENOUS
Refills: 0 | Status: DISCONTINUED | OUTPATIENT
Start: 2022-03-14 | End: 2022-03-17

## 2022-03-14 RX ORDER — SODIUM CHLORIDE 9 MG/ML
1000 INJECTION INTRAMUSCULAR; INTRAVENOUS; SUBCUTANEOUS
Refills: 0 | Status: COMPLETED | OUTPATIENT
Start: 2022-03-14 | End: 2022-03-14

## 2022-03-14 RX ORDER — GLUCAGON INJECTION, SOLUTION 0.5 MG/.1ML
1 INJECTION, SOLUTION SUBCUTANEOUS ONCE
Refills: 0 | Status: DISCONTINUED | OUTPATIENT
Start: 2022-03-14 | End: 2022-03-17

## 2022-03-14 RX ORDER — INSULIN LISPRO 100/ML
VIAL (ML) SUBCUTANEOUS EVERY 6 HOURS
Refills: 0 | Status: DISCONTINUED | OUTPATIENT
Start: 2022-03-14 | End: 2022-03-15

## 2022-03-14 RX ORDER — LEVOTHYROXINE SODIUM 125 MCG
112 TABLET ORAL DAILY
Refills: 0 | Status: DISCONTINUED | OUTPATIENT
Start: 2022-03-14 | End: 2022-03-15

## 2022-03-14 RX ORDER — ATORVASTATIN CALCIUM 80 MG/1
40 TABLET, FILM COATED ORAL AT BEDTIME
Refills: 0 | Status: DISCONTINUED | OUTPATIENT
Start: 2022-03-14 | End: 2022-03-15

## 2022-03-14 RX ORDER — VANCOMYCIN HCL 1 G
750 VIAL (EA) INTRAVENOUS EVERY 24 HOURS
Refills: 0 | Status: DISCONTINUED | OUTPATIENT
Start: 2022-03-14 | End: 2022-03-14

## 2022-03-14 RX ORDER — DEXTROSE 50 % IN WATER 50 %
15 SYRINGE (ML) INTRAVENOUS ONCE
Refills: 0 | Status: DISCONTINUED | OUTPATIENT
Start: 2022-03-14 | End: 2022-03-17

## 2022-03-14 RX ORDER — APIXABAN 2.5 MG/1
2.5 TABLET, FILM COATED ORAL
Refills: 0 | Status: DISCONTINUED | OUTPATIENT
Start: 2022-03-14 | End: 2022-03-17

## 2022-03-14 RX ORDER — PIPERACILLIN AND TAZOBACTAM 4; .5 G/20ML; G/20ML
3.38 INJECTION, POWDER, LYOPHILIZED, FOR SOLUTION INTRAVENOUS EVERY 12 HOURS
Refills: 0 | Status: DISCONTINUED | OUTPATIENT
Start: 2022-03-14 | End: 2022-03-17

## 2022-03-14 RX ORDER — INSULIN GLARGINE 100 [IU]/ML
4 INJECTION, SOLUTION SUBCUTANEOUS AT BEDTIME
Refills: 0 | Status: DISCONTINUED | OUTPATIENT
Start: 2022-03-14 | End: 2022-03-15

## 2022-03-14 RX ORDER — HYDROXYUREA 500 MG/1
500 CAPSULE ORAL
Refills: 0 | Status: DISCONTINUED | OUTPATIENT
Start: 2022-03-14 | End: 2022-03-17

## 2022-03-14 RX ADMIN — PIPERACILLIN AND TAZOBACTAM 25 GRAM(S): 4; .5 INJECTION, POWDER, LYOPHILIZED, FOR SOLUTION INTRAVENOUS at 07:16

## 2022-03-14 RX ADMIN — APIXABAN 2.5 MILLIGRAM(S): 2.5 TABLET, FILM COATED ORAL at 07:14

## 2022-03-14 RX ADMIN — HYDROXYUREA 500 MILLIGRAM(S): 500 CAPSULE ORAL at 18:26

## 2022-03-14 RX ADMIN — Medication 2: at 06:56

## 2022-03-14 RX ADMIN — Medication 650 MILLIGRAM(S): at 19:45

## 2022-03-14 RX ADMIN — INSULIN GLARGINE 4 UNIT(S): 100 INJECTION, SOLUTION SUBCUTANEOUS at 22:36

## 2022-03-14 RX ADMIN — Medication 1: at 12:06

## 2022-03-14 RX ADMIN — Medication 30 MILLIGRAM(S): at 18:37

## 2022-03-14 RX ADMIN — ATORVASTATIN CALCIUM 40 MILLIGRAM(S): 80 TABLET, FILM COATED ORAL at 22:36

## 2022-03-14 RX ADMIN — PIPERACILLIN AND TAZOBACTAM 25 GRAM(S): 4; .5 INJECTION, POWDER, LYOPHILIZED, FOR SOLUTION INTRAVENOUS at 18:26

## 2022-03-14 RX ADMIN — SODIUM CHLORIDE 75 MILLILITER(S): 9 INJECTION INTRAMUSCULAR; INTRAVENOUS; SUBCUTANEOUS at 05:58

## 2022-03-14 RX ADMIN — Medication 650 MILLIGRAM(S): at 22:35

## 2022-03-14 RX ADMIN — SODIUM CHLORIDE 75 MILLILITER(S): 9 INJECTION INTRAMUSCULAR; INTRAVENOUS; SUBCUTANEOUS at 18:26

## 2022-03-14 RX ADMIN — Medication 250 MILLIGRAM(S): at 06:04

## 2022-03-14 RX ADMIN — APIXABAN 2.5 MILLIGRAM(S): 2.5 TABLET, FILM COATED ORAL at 18:27

## 2022-03-14 RX ADMIN — Medication 112 MICROGRAM(S): at 07:14

## 2022-03-14 RX ADMIN — Medication 2: at 18:36

## 2022-03-14 NOTE — H&P ADULT - PROBLEM SELECTOR PLAN 9
On home Metformin and Januvia  - ISS inpatient  - NPO for now given mental status and NRB mask  - FS q6h for now while NPO

## 2022-03-14 NOTE — H&P ADULT - PROBLEM SELECTOR PLAN 1
Documented SpO2=69% by EMS, in the setting of influenza and possible superimposed bacterial infection  - CXR showed right lower lung field airspace opacity versus prominent vasculature  - s/p zosyn x1  - c/w infectious workup as below  - wean off NRB as tolerated to maintain SpO2>90%. Currently KlX2=219% on NRB 10L/min

## 2022-03-14 NOTE — H&P ADULT - PROBLEM SELECTOR PLAN 8
On home amlodipine 10mg qAM and metoprolol tartrate 25mg BID  - hold antihypertensive medications for now in the setting of sepsis and soft BP in ED

## 2022-03-14 NOTE — H&P ADULT - PROBLEM SELECTOR PLAN 11
DVT ppx: Eliquis for prior DVT  Diet: NPO for now given mental status and NRB, obtain dysphagia screen prior to starting diet

## 2022-03-14 NOTE — PROGRESS NOTE ADULT - SUBJECTIVE AND OBJECTIVE BOX
Authored By:  Ludwig Schroeder MD  PGY-1, Internal Medicine    INTERVAL HPI/OVERNIGHT EVENTS:     SUBJECTIVE: Patient seen and examined at bedside this morning.     OBJECTIVE:      CAPILLARY BLOOD GLUCOSE      POCT Blood Glucose.: 203 mg/dL (14 Mar 2022 06:32)      ICU Vital Signs Last 24 Hrs  T(C): 36.3 (14 Mar 2022 05:11), Max: 39.1 (13 Mar 2022 21:15)  T(F): 97.3 (14 Mar 2022 05:11), Max: 102.4 (13 Mar 2022 21:15)  HR: 84 (14 Mar 2022 05:11) (79 - 104)  BP: 137/68 (14 Mar 2022 05:11) (84/37 - 167/91)  BP(mean): 76 (14 Mar 2022 00:20) (51 - 110)  ABP: --  ABP(mean): --  RR: 18 (14 Mar 2022 07:00) (17 - 26)  SpO2: 96% (14 Mar 2022 07:00) (93% - 100%)    PHYSICAL EXAM:      MEDICATIONS:  MEDICATIONS  (STANDING):  apixaban 2.5 milliGRAM(s) Oral two times a day  atorvastatin 40 milliGRAM(s) Oral at bedtime  dextrose 40% Gel 15 Gram(s) Oral once  dextrose 5%. 1000 milliLiter(s) (50 mL/Hr) IV Continuous <Continuous>  dextrose 5%. 1000 milliLiter(s) (100 mL/Hr) IV Continuous <Continuous>  dextrose 50% Injectable 25 Gram(s) IV Push once  dextrose 50% Injectable 12.5 Gram(s) IV Push once  dextrose 50% Injectable 25 Gram(s) IV Push once  glucagon  Injectable 1 milliGRAM(s) IntraMuscular once  hydroxyurea 500 milliGRAM(s) Oral <User Schedule>  insulin lispro (ADMELOG) corrective regimen sliding scale   SubCutaneous every 6 hours  levothyroxine 112 MICROGram(s) Oral daily  oseltamivir 30 milliGRAM(s) Oral daily  piperacillin/tazobactam IVPB.. 3.375 Gram(s) IV Intermittent every 12 hours  sodium chloride 0.9%. 1000 milliLiter(s) (75 mL/Hr) IV Continuous <Continuous>  vancomycin  IVPB 750 milliGRAM(s) IV Intermittent every 24 hours    MEDICATIONS  (PRN):  QUEtiapine 12.5 milliGRAM(s) Oral at bedtime PRN agitation      ALLERGIES:  Allergies    No Known Drug Allergies  spices (Rash)    Intolerances        LABS:                        9.4    21.13 )-----------( 458      ( 13 Mar 2022 20:51 )             33.0     Hemoglobin: 9.4 g/dL ( @ 20:51)    CBC Full  -  ( 13 Mar 2022 20:51 )  WBC Count : 21.13 K/uL  RBC Count : 3.92 M/uL  Hemoglobin : 9.4 g/dL  Hematocrit : 33.0 %  Platelet Count - Automated : 458 K/uL  Mean Cell Volume : 84.2 fl  Mean Cell Hemoglobin : 24.0 pg  Mean Cell Hemoglobin Concentration : 28.5 gm/dL  Auto Neutrophil # : 18.10 K/uL  Auto Lymphocyte # : 1.48 K/uL  Auto Monocyte # : 1.22 K/uL  Auto Eosinophil # : 0.08 K/uL  Auto Basophil # : 0.07 K/uL  Auto Neutrophil % : 85.6 %  Auto Lymphocyte % : 7.0 %  Auto Monocyte % : 5.8 %  Auto Eosinophil % : 0.4 %  Auto Basophil % : 0.3 %        136  |  100  |  43<H>  ----------------------------<  246<H>  4.8   |  18<L>  |  1.81<H>    Ca    10.0      13 Mar 2022 20:51    TPro  7.8  /  Alb  3.8  /  TBili  0.3  /  DBili  x   /  AST  36  /  ALT  45  /  AlkPhos  101      Creatinine Trend: 1.81<--  LIVER FUNCTIONS - ( 13 Mar 2022 20:51 )  Alb: 3.8 g/dL / Pro: 7.8 g/dL / ALK PHOS: 101 U/L / ALT: 45 U/L / AST: 36 U/L / GGT: x           PT/INR - ( 13 Mar 2022 20:51 )   PT: 17.5 sec;   INR: 1.50 ratio         PTT - ( 13 Mar 2022 20:51 )  PTT:37.6 sec    hs Troponin:                40 <<== 22 @ 22:59                42 <<== 22 @ 20:51        20:50 - VBG - pH: 7.27  | pCO2: 41    | pO2: 37    | Lactate: 1.6        Urinalysis Basic - ( 13 Mar 2022 21:17 )    Color: Yellow / Appearance: Slightly Turbid / S.020 / pH: x  Gluc: x / Ketone: Negative  / Bili: Negative / Urobili: Negative   Blood: x / Protein: 300 mg/dL / Nitrite: Negative   Leuk Esterase: Negative / RBC: 2 /hpf / WBC 15 /HPF   Sq Epi: x / Non Sq Epi: 17 / Bacteria: Negative      CSF:                      EKG:   MICROBIOLOGY:    IMAGING:      Labs, imaging, EKG personally reviewed    RADIOLOGY & ADDITIONAL TESTS: Reviewed. Authored By:  Ludwig Schroeder MD  PGY-1, Internal Medicine    INTERVAL HPI/OVERNIGHT EVENTS:     SUBJECTIVE: Patient seen and examined at bedside this morning. Patient AOx0. No acute events overnight. Per daughter, patient is normally AOx1-2 and walks with a walker. English speaking. ROS limited.     OBJECTIVE:      POCT Blood Glucose.: 203 mg/dL (14 Mar 2022 06:32)      ICU Vital Signs Last 24 Hrs  T(C): 36.3 (14 Mar 2022 05:11), Max: 39.1 (13 Mar 2022 21:15)  T(F): 97.3 (14 Mar 2022 05:11), Max: 102.4 (13 Mar 2022 21:15)  HR: 84 (14 Mar 2022 05:11) (79 - 104)  BP: 137/68 (14 Mar 2022 05:11) (84/37 - 167/91)  BP(mean): 76 (14 Mar 2022 00:20) (51 - 110)  ABP: --  ABP(mean): --  RR: 18 (14 Mar 2022 07:00) (17 - 26)  SpO2: 96% (14 Mar 2022 07:00) (93% - 100%)    PHYSICAL EXAM:  Gen: AAOx0, non-toxic  HEENT: EOMI, oral mucosa moist, normal conjunctiva  Lung: CTAB, no respiratory distress, no wheezes/rhonchi/rales B/L, speaking in full sentences  CV: RRR, no murmurs, rubs or gallops, no le edema  Abd: soft, NTND, no guarding, no CVA tenderness  Skin: Warm, well perfused, no rash  Psych: normal affect.      MEDICATIONS:  MEDICATIONS  (STANDING):  apixaban 2.5 milliGRAM(s) Oral two times a day  atorvastatin 40 milliGRAM(s) Oral at bedtime  dextrose 40% Gel 15 Gram(s) Oral once  dextrose 5%. 1000 milliLiter(s) (50 mL/Hr) IV Continuous <Continuous>  dextrose 5%. 1000 milliLiter(s) (100 mL/Hr) IV Continuous <Continuous>  dextrose 50% Injectable 25 Gram(s) IV Push once  dextrose 50% Injectable 12.5 Gram(s) IV Push once  dextrose 50% Injectable 25 Gram(s) IV Push once  glucagon  Injectable 1 milliGRAM(s) IntraMuscular once  hydroxyurea 500 milliGRAM(s) Oral <User Schedule>  insulin lispro (ADMELOG) corrective regimen sliding scale   SubCutaneous every 6 hours  levothyroxine 112 MICROGram(s) Oral daily  oseltamivir 30 milliGRAM(s) Oral daily  piperacillin/tazobactam IVPB.. 3.375 Gram(s) IV Intermittent every 12 hours  sodium chloride 0.9%. 1000 milliLiter(s) (75 mL/Hr) IV Continuous <Continuous>  vancomycin  IVPB 750 milliGRAM(s) IV Intermittent every 24 hours    MEDICATIONS  (PRN):  QUEtiapine 12.5 milliGRAM(s) Oral at bedtime PRN agitation      ALLERGIES:  Allergies    No Known Drug Allergies  spices (Rash)    Intolerances        LABS:                        9.4    21.13 )-----------( 458      ( 13 Mar 2022 20:51 )             33.0     Hemoglobin: 9.4 g/dL ( @ 20:51)    CBC Full  -  ( 13 Mar 2022 20:51 )  WBC Count : 21.13 K/uL  RBC Count : 3.92 M/uL  Hemoglobin : 9.4 g/dL  Hematocrit : 33.0 %  Platelet Count - Automated : 458 K/uL  Mean Cell Volume : 84.2 fl  Mean Cell Hemoglobin : 24.0 pg  Mean Cell Hemoglobin Concentration : 28.5 gm/dL  Auto Neutrophil # : 18.10 K/uL  Auto Lymphocyte # : 1.48 K/uL  Auto Monocyte # : 1.22 K/uL  Auto Eosinophil # : 0.08 K/uL  Auto Basophil # : 0.07 K/uL  Auto Neutrophil % : 85.6 %  Auto Lymphocyte % : 7.0 %  Auto Monocyte % : 5.8 %  Auto Eosinophil % : 0.4 %  Auto Basophil % : 0.3 %        136  |  100  |  43<H>  ----------------------------<  246<H>  4.8   |  18<L>  |  1.81<H>    Ca    10.0      13 Mar 2022 20:51    TPro  7.8  /  Alb  3.8  /  TBili  0.3  /  DBili  x   /  AST  36  /  ALT  45  /  AlkPhos  101      Creatinine Trend: 1.81<--  LIVER FUNCTIONS - ( 13 Mar 2022 20:51 )  Alb: 3.8 g/dL / Pro: 7.8 g/dL / ALK PHOS: 101 U/L / ALT: 45 U/L / AST: 36 U/L / GGT: x           PT/INR - ( 13 Mar 2022 20:51 )   PT: 17.5 sec;   INR: 1.50 ratio         PTT - ( 13 Mar 2022 20:51 )  PTT:37.6 sec    hs Troponin:                40 <<== 22 @ 22:59                42 <<== 22 @ 20:51        20:50 - VBG - pH: 7.27  | pCO2: 41    | pO2: 37    | Lactate: 1.6        Urinalysis Basic - ( 13 Mar 2022 21:17 )    Color: Yellow / Appearance: Slightly Turbid / S.020 / pH: x  Gluc: x / Ketone: Negative  / Bili: Negative / Urobili: Negative   Blood: x / Protein: 300 mg/dL / Nitrite: Negative   Leuk Esterase: Negative / RBC: 2 /hpf / WBC 15 /HPF   Sq Epi: x / Non Sq Epi: 17 / Bacteria: Negative      EKG:   MICROBIOLOGY:    IMAGING:      Labs, imaging, EKG personally reviewed    RADIOLOGY & ADDITIONAL TESTS: Reviewed.

## 2022-03-14 NOTE — H&P ADULT - NSHPREVIEWOFSYSTEMS_GEN_ALL_CORE
REVIEW OF SYSTEMS:  CONSTITUTIONAL: No weakness, fevers or chills  EYES/ENT: No visual changes;  No vertigo or throat pain   NECK: No pain or stiffness  RESPIRATORY: +cough, +shortness of breath No wheezing, hemoptysis;   CARDIOVASCULAR: No chest pain or palpitations  GASTROINTESTINAL: No abdominal or epigastric pain. No nausea, vomiting, or hematemesis; No diarrhea or constipation. No melena or hematochezia.  GENITOURINARY: No dysuria, frequency or hematuria  NEUROLOGICAL: No numbness or weakness  ENDOCRINE: No polyuria, polydipsia, heat or cold intolerance  SKIN: No itching, rashes

## 2022-03-14 NOTE — H&P ADULT - NSHPPHYSICALEXAM_GEN_ALL_CORE
T(C): 36.6 (03-14-22 @ 01:28), Max: 39.1 (03-13-22 @ 21:15)  HR: 81 (03-14-22 @ 01:28) (79 - 104)  BP: 116/59 (03-14-22 @ 01:28) (84/37 - 167/91)  RR: 20 (03-14-22 @ 01:28) (19 - 26)  SpO2: 100% (03-14-22 @ 01:28) (93% - 100%)    PHYSICAL EXAM:  GENERAL: NAD, resting in bed  HEAD:  Atraumatic, Normocephalic  EYES: PERRLA, conjunctiva and sclera clear  ENMT: Dry mucous membranes  NECK: Supple, No JVD  CHEST/LUNG: +coarse breath sounds; No wheezing, +breathing comfortably with NRB in place  HEART: Regular rate and rhythm; No murmurs  ABDOMEN: Soft, Nontender, Nondistended; Bowel sounds present  EXTREMITIES:  2+ Peripheral Pulses, No LE edema  LYMPH: No lymphadenopathy noted  SKIN: No rashes or lesions  NERVOUS SYSTEM:  Alert & Oriented X0-1, opens eys and nods to calling name, does not answer questions or follow commands, +withdraws extremities to noxious stimuli T(C): 36.6 (03-14-22 @ 01:28), Max: 39.1 (03-13-22 @ 21:15)  HR: 81 (03-14-22 @ 01:28) (79 - 104)  BP: 116/59 (03-14-22 @ 01:28) (84/37 - 167/91)  RR: 20 (03-14-22 @ 01:28) (19 - 26)  SpO2: 100% (03-14-22 @ 01:28) (93% - 100%)    PHYSICAL EXAM:  GENERAL: NAD, resting in bed  HEAD:  Atraumatic, Normocephalic  EYES: PERRLA, conjunctiva and sclera clear  ENMT: Dry mucous membranes  NECK: Supple, No JVD  CHEST/LUNG: +coarse breath sounds; No wheezing, +breathing comfortably with NRB in place  HEART: Regular rate and rhythm; No murmurs  ABDOMEN: Soft, Nontender, Nondistended; Bowel sounds present  EXTREMITIES:  2+ Peripheral Pulses, No LE edema  LYMPH: No lymphadenopathy noted  SKIN: No rashes or lesions  NERVOUS SYSTEM:  Alert & Oriented X0-1, opens eyes and nods to calling name, does not answer questions or follow commands, +withdraws extremities to noxious stimuli

## 2022-03-14 NOTE — H&P ADULT - PROBLEM SELECTOR PLAN 3
Patient tested positive for +Influenza AH3  - s/p tamiflu and 500cc NS x2  in ED  - c/w tamiflu  - c/w maintenance IVF @ 75cc/h x12h  - monitor fever curve Patient tested positive for +Influenza AH3  - s/p tamiflu and 500cc NS x2  in ED  - c/w tamiflu x5d  - c/w maintenance IVF @ 75cc/h x12h  - monitor fever curve

## 2022-03-14 NOTE — H&P ADULT - ATTENDING COMMENTS
88F with PMH of polycythemia vera, recurrent RLE DVT on Eliquis, HTN, vascular dementia admitted for acute hypoxic respiratory failure requiring NRB. RVP positive for Influenza AH3. Labs showing leukocytosis to 21 with CXR showing RLL opacity concerning for superimposed bacterial PNA. Will start tamiflu as well as vanc and zoysn. F/u MRS PCR, blood cx, urine cx. Satting 100% on NRB, will wean down supplemental O2 as tolerated. Patient also with MIKKI likely prerenal vs infection. Check bladder scan to r/o retention. Start maintenance fluids. F/u urine lytes and continue to trend Cr. Continue Eliquis and hydroxyurea for hx of PV and DVT. Med rec needs to be verified with family. Rest of care per plan above.

## 2022-03-14 NOTE — H&P ADULT - PROBLEM SELECTOR PLAN 2
Patient meets sepsis criteria with fever to T=102.4, tachycardia, tachypnea and leukocytosis wbc=21.13, in the setting of Influenza A and likely superimposed RLL bacterial pneumonia  -c/w zosyn for now empirically for superimposed bacterial pneumonia, consider transitioning to Augmentin and Azithromycin when more clinically stable   - f/u blood Cx x2, urine Cx, procalcitonin Patient meets sepsis criteria with fever to T=102.4, tachycardia, tachypnea and leukocytosis wbc=21.13, in the setting of Influenza A and likely superimposed RLL bacterial pneumonia  -c/w zosyn and Vancomycin empirically for superimposed bacterial infection  - f/u blood Cx x2, urine Cx, procalcitonin, MRSA PCR

## 2022-03-14 NOTE — H&P ADULT - HISTORY OF PRESENT ILLNESS
Patient is an 88F with PMH of JAK2 polycythemia vera, RLE DVT on Eliquis, HTN, vascular dementia, with spontaneous SBO (12/2018) s/p ex lap with bowel resection and anastomosis, now presents to ED for a few days of URI-like symptoms and productive cough x2 days. The following history was obtained from the daughter earlier, patient does not respond to questions at this time. Patient had a few days of URI symptoms before becoming acutely more short of breath, prompting the family to call EMS. Documented hypoxia to 69% per EMS, and patient was started on NRB. Documented sick contacts at home.    In the ED: T=102.4, HR=98, QR=260/98, RR=26, 98% on NRB 10L/min. Pertinent labs: wbc=21.13, ndz=887, BUN/Cr=43/1.81, mCYT=207, trop T=42-->40. VBG pH=7.27, pCO2=41, HCO3=19. +Influenza AH3.   CXR showed R lower lung field airspace opacity versus prominent vasculature  Given ofirmev, tamiflu, zosyn x1, LR 500cc x2

## 2022-03-14 NOTE — H&P ADULT - NSHPLABSRESULTS_GEN_ALL_CORE
LABS: Personally reviewed labs, imaging, and ECG                          9.4    21.13 )-----------( 458      ( 13 Mar 2022 20:51 )             33.0       -    136  |  100  |  43<H>  ----------------------------<  246<H>  4.8   |  18<L>  |  1.81<H>    Ca    10.0      13 Mar 2022 20:51    TPro  7.8  /  Alb  3.8  /  TBili  0.3  /  DBili  x   /  AST  36  /  ALT  45  /  AlkPhos  101  03-       LIVER FUNCTIONS - ( 13 Mar 2022 20:51 )  Alb: 3.8 g/dL / Pro: 7.8 g/dL / ALK PHOS: 101 U/L / ALT: 45 U/L / AST: 36 U/L / GGT: x                    Urinalysis Basic - ( 13 Mar 2022 21:17 )    Color: Yellow / Appearance: Slightly Turbid / S.020 / pH: x  Gluc: x / Ketone: Negative  / Bili: Negative / Urobili: Negative   Blood: x / Protein: 300 mg/dL / Nitrite: Negative   Leuk Esterase: Negative / RBC: 2 /hpf / WBC 15 /HPF   Sq Epi: x / Non Sq Epi: 17 / Bacteria: Negative        PT/INR - ( 13 Mar 2022 20:51 )   PT: 17.5 sec;   INR: 1.50 ratio         PTT - ( 13 Mar 2022 20:51 )  PTT:37.6 sec    Lactate Trend      CAPILLARY BLOOD GLUCOSE      POCT Blood Glucose.: 239 mg/dL (13 Mar 2022 20:39)        RADIOLOGY & ADDITIONAL TESTS:   < from: Xray Chest 1 View- PORTABLE-Urgent (22 @ 21:02) >    FINDINGS:    Right lower lung field airspace opacity versus prominent vasculature. No   pleural effusion.No pneumothorax. Cardiac silhouette size cannot be   accurately assessed on this projection. No acute osseous findings.    IMPRESSION:    Right lower lung field airspace opacity versus prominent vasculature.        ******PRELIMINARY REPORT******      < end of copied text > LABS: Personally reviewed labs, imaging, and ECG                          9.4    21.13 )-----------( 458      ( 13 Mar 2022 20:51 )             33.0       -    136  |  100  |  43<H>  ----------------------------<  246<H>  4.8   |  18<L>  |  1.81<H>    Ca    10.0      13 Mar 2022 20:51    TPro  7.8  /  Alb  3.8  /  TBili  0.3  /  DBili  x   /  AST  36  /  ALT  45  /  AlkPhos  101  03-       LIVER FUNCTIONS - ( 13 Mar 2022 20:51 )  Alb: 3.8 g/dL / Pro: 7.8 g/dL / ALK PHOS: 101 U/L / ALT: 45 U/L / AST: 36 U/L / GGT: x                    Urinalysis Basic - ( 13 Mar 2022 21:17 )    Color: Yellow / Appearance: Slightly Turbid / S.020 / pH: x  Gluc: x / Ketone: Negative  / Bili: Negative / Urobili: Negative   Blood: x / Protein: 300 mg/dL / Nitrite: Negative   Leuk Esterase: Negative / RBC: 2 /hpf / WBC 15 /HPF   Sq Epi: x / Non Sq Epi: 17 / Bacteria: Negative        PT/INR - ( 13 Mar 2022 20:51 )   PT: 17.5 sec;   INR: 1.50 ratio         PTT - ( 13 Mar 2022 20:51 )  PTT:37.6 sec    Lactate Trend      CAPILLARY BLOOD GLUCOSE      POCT Blood Glucose.: 239 mg/dL (13 Mar 2022 20:39)        RADIOLOGY & ADDITIONAL TESTS:   < from: Xray Chest 1 View- PORTABLE-Urgent (22 @ 21:02) >    FINDINGS:    Right lower lung field airspace opacity versus prominent vasculature. No   pleural effusion.No pneumothorax. Cardiac silhouette size cannot be   accurately assessed on this projection. No acute osseous findings.    IMPRESSION:    Right lower lung field airspace opacity versus prominent vasculature.        ******PRELIMINARY REPORT******      < end of copied text >    EKG, imaging, labs personally reviewed.

## 2022-03-14 NOTE — H&P ADULT - ASSESSMENT
Patient is an 88F with PMH of JAK2 polycythemia vera, recurrent RLE DVT (2008, 2010) on Eliquis, HTN, vascular dementia, with spontaneous SBO (12/2018) s/p ex lap with bowel resection and anastomosis now presents to ED with acute hypoxemic respiratory failure secondary, and found to have sepsis in the setting of influenza A with possible superimposed RLL bacterial pneumonia vs. less likely PE in the setting pf PV, also with MIKKI. Patient is an 88F with PMH of JAK2 polycythemia vera, recurrent RLE DVT (2008, 2010) on Eliquis, HTN, vascular dementia, with spontaneous SBO (12/2018) s/p ex lap with bowel resection and anastomosis now presents to ED with acute hypoxemic respiratory failure and sepsis in the setting of influenza A with possible superimposed RLL bacterial pneumonia vs. less likely PE in the setting pf PV, also with MIKKI.

## 2022-03-14 NOTE — H&P ADULT - PROBLEM SELECTOR PLAN 7
BUN/Cr=43/1.81, baseline Cr=0.90 in Jan 2021, MIKKI possibly secondary to decreased PO intake  vs. sepsis  - s/p IVF in ED  - c/w maintenance NS @75cc/hr x12h  - avoid nephrotoxic agents BUN/Cr=43/1.81, baseline Cr=0.90 in Jan 2021, MIKKI possibly secondary to decreased PO intake  vs. sepsis  - s/p IVF in ED  - c/w maintenance NS @75cc/hr x12h  - avoid nephrotoxic agents  - obtain bladder scan to assess for urinary retention  - trend BMP  - f/u Natalee, Ucr

## 2022-03-14 NOTE — PROVIDER CONTACT NOTE (OTHER) - ASSESSMENT
Pt AOx0-1, follows commands but minimally verbal. Pt was on 10L NRB throughout night, weaned to NC per night MD to do dysphagia screening and take PO meds. Pt tolerated nasal cannula for ~20 mins, but then desatted to mid 80s. Pt now on 8L NRB with SpO2 100%. No s/s of distress Pt AOx0-1, follows commands but minimally verbal. Pt was on 10L NRB throughout night, weaned to NC per night MD Ayden Agustin to do dysphagia screening and take PO meds. Pt tolerated nasal cannula for ~20 mins, but then desatted to mid 80s. Pt now on 8L NRB with SpO2 100%. No s/s of distress

## 2022-03-15 ENCOUNTER — NON-APPOINTMENT (OUTPATIENT)
Age: 87
End: 2022-03-15

## 2022-03-15 LAB
A1C WITH ESTIMATED AVERAGE GLUCOSE RESULT: 7.2 % — HIGH (ref 4–5.6)
ALBUMIN SERPL ELPH-MCNC: 2.8 G/DL — LOW (ref 3.3–5)
ALP SERPL-CCNC: 78 U/L — SIGNIFICANT CHANGE UP (ref 40–120)
ALT FLD-CCNC: 23 U/L — SIGNIFICANT CHANGE UP (ref 10–45)
ANION GAP SERPL CALC-SCNC: 13 MMOL/L — SIGNIFICANT CHANGE UP (ref 5–17)
AST SERPL-CCNC: 21 U/L — SIGNIFICANT CHANGE UP (ref 10–40)
BASOPHILS # BLD AUTO: 0.07 K/UL — SIGNIFICANT CHANGE UP (ref 0–0.2)
BASOPHILS NFR BLD AUTO: 0.5 % — SIGNIFICANT CHANGE UP (ref 0–2)
BILIRUB DIRECT SERPL-MCNC: <0.1 MG/DL — SIGNIFICANT CHANGE UP (ref 0–0.3)
BILIRUB DIRECT SERPL-MCNC: <0.1 MG/DL — SIGNIFICANT CHANGE UP (ref 0–0.3)
BILIRUB INDIRECT FLD-MCNC: SIGNIFICANT CHANGE UP MG/DL (ref 0.2–1)
BILIRUB INDIRECT FLD-MCNC: SIGNIFICANT CHANGE UP MG/DL (ref 0.2–1)
BILIRUB SERPL-MCNC: 0.2 MG/DL — SIGNIFICANT CHANGE UP (ref 0.2–1.2)
BILIRUB SERPL-MCNC: 0.3 MG/DL — SIGNIFICANT CHANGE UP (ref 0.2–1.2)
BILIRUB SERPL-MCNC: 0.3 MG/DL — SIGNIFICANT CHANGE UP (ref 0.2–1.2)
BUN SERPL-MCNC: 36 MG/DL — HIGH (ref 7–23)
CALCIUM SERPL-MCNC: 9.1 MG/DL — SIGNIFICANT CHANGE UP (ref 8.4–10.5)
CHLORIDE SERPL-SCNC: 109 MMOL/L — HIGH (ref 96–108)
CO2 SERPL-SCNC: 19 MMOL/L — LOW (ref 22–31)
CREAT SERPL-MCNC: 1.54 MG/DL — HIGH (ref 0.5–1.3)
CULTURE RESULTS: NO GROWTH — SIGNIFICANT CHANGE UP
EGFR: 32 ML/MIN/1.73M2 — LOW
EOSINOPHIL # BLD AUTO: 0.04 K/UL — SIGNIFICANT CHANGE UP (ref 0–0.5)
EOSINOPHIL NFR BLD AUTO: 0.3 % — SIGNIFICANT CHANGE UP (ref 0–6)
ESTIMATED AVERAGE GLUCOSE: 160 MG/DL — HIGH (ref 68–114)
FERRITIN SERPL-MCNC: 78 NG/ML — SIGNIFICANT CHANGE UP (ref 15–150)
GLUCOSE BLDC GLUCOMTR-MCNC: 151 MG/DL — HIGH (ref 70–99)
GLUCOSE BLDC GLUCOMTR-MCNC: 165 MG/DL — HIGH (ref 70–99)
GLUCOSE BLDC GLUCOMTR-MCNC: 166 MG/DL — HIGH (ref 70–99)
GLUCOSE BLDC GLUCOMTR-MCNC: 170 MG/DL — HIGH (ref 70–99)
GLUCOSE SERPL-MCNC: 134 MG/DL — HIGH (ref 70–99)
HCT VFR BLD CALC: 25.3 % — LOW (ref 34.5–45)
HCT VFR BLD CALC: 26 % — LOW (ref 34.5–45)
HCT VFR BLD CALC: 28.1 % — LOW (ref 34.5–45)
HGB BLD-MCNC: 7.2 G/DL — LOW (ref 11.5–15.5)
HGB BLD-MCNC: 7.3 G/DL — LOW (ref 11.5–15.5)
HGB BLD-MCNC: 7.8 G/DL — LOW (ref 11.5–15.5)
IMM GRANULOCYTES NFR BLD AUTO: 2.7 % — HIGH (ref 0–1.5)
IRON SATN MFR SERPL: 13 UG/DL — LOW (ref 30–160)
IRON SATN MFR SERPL: 5 % — LOW (ref 14–50)
LDH SERPL L TO P-CCNC: 234 U/L — SIGNIFICANT CHANGE UP (ref 50–242)
LDH SERPL L TO P-CCNC: 350 U/L — HIGH (ref 50–242)
LYMPHOCYTES # BLD AUTO: 0.85 K/UL — LOW (ref 1–3.3)
LYMPHOCYTES # BLD AUTO: 6.5 % — LOW (ref 13–44)
MAGNESIUM SERPL-MCNC: 1.7 MG/DL — SIGNIFICANT CHANGE UP (ref 1.6–2.6)
MCHC RBC-ENTMCNC: 23.2 PG — LOW (ref 27–34)
MCHC RBC-ENTMCNC: 23.3 PG — LOW (ref 27–34)
MCHC RBC-ENTMCNC: 23.4 PG — LOW (ref 27–34)
MCHC RBC-ENTMCNC: 27.8 GM/DL — LOW (ref 32–36)
MCHC RBC-ENTMCNC: 28.1 GM/DL — LOW (ref 32–36)
MCHC RBC-ENTMCNC: 28.5 GM/DL — LOW (ref 32–36)
MCV RBC AUTO: 81.9 FL — SIGNIFICANT CHANGE UP (ref 80–100)
MCV RBC AUTO: 83.3 FL — SIGNIFICANT CHANGE UP (ref 80–100)
MCV RBC AUTO: 83.6 FL — SIGNIFICANT CHANGE UP (ref 80–100)
MONOCYTES # BLD AUTO: 0.57 K/UL — SIGNIFICANT CHANGE UP (ref 0–0.9)
MONOCYTES NFR BLD AUTO: 4.4 % — SIGNIFICANT CHANGE UP (ref 2–14)
NEUTROPHILS # BLD AUTO: 11.2 K/UL — HIGH (ref 1.8–7.4)
NEUTROPHILS NFR BLD AUTO: 85.6 % — HIGH (ref 43–77)
NRBC # BLD: 0 /100 WBCS — SIGNIFICANT CHANGE UP (ref 0–0)
PHOSPHATE SERPL-MCNC: 3 MG/DL — SIGNIFICANT CHANGE UP (ref 2.5–4.5)
PLATELET # BLD AUTO: 347 K/UL — SIGNIFICANT CHANGE UP (ref 150–400)
PLATELET # BLD AUTO: 357 K/UL — SIGNIFICANT CHANGE UP (ref 150–400)
PLATELET # BLD AUTO: 359 K/UL — SIGNIFICANT CHANGE UP (ref 150–400)
POTASSIUM SERPL-MCNC: 3.7 MMOL/L — SIGNIFICANT CHANGE UP (ref 3.5–5.3)
POTASSIUM SERPL-SCNC: 3.7 MMOL/L — SIGNIFICANT CHANGE UP (ref 3.5–5.3)
PROT SERPL-MCNC: 6.1 G/DL — SIGNIFICANT CHANGE UP (ref 6–8.3)
RBC # BLD: 3.09 M/UL — LOW (ref 3.8–5.2)
RBC # BLD: 3.09 M/UL — LOW (ref 3.8–5.2)
RBC # BLD: 3.12 M/UL — LOW (ref 3.8–5.2)
RBC # BLD: 3.36 M/UL — LOW (ref 3.8–5.2)
RBC # FLD: 18.4 % — HIGH (ref 10.3–14.5)
RBC # FLD: 18.5 % — HIGH (ref 10.3–14.5)
RBC # FLD: 18.5 % — HIGH (ref 10.3–14.5)
RETICS #: 48.5 K/UL — SIGNIFICANT CHANGE UP (ref 25–125)
RETICS/RBC NFR: 1.6 % — SIGNIFICANT CHANGE UP (ref 0.5–2.5)
SODIUM SERPL-SCNC: 141 MMOL/L — SIGNIFICANT CHANGE UP (ref 135–145)
SPECIMEN SOURCE: SIGNIFICANT CHANGE UP
TIBC SERPL-MCNC: 249 UG/DL — SIGNIFICANT CHANGE UP (ref 220–430)
UIBC SERPL-MCNC: 236 UG/DL — SIGNIFICANT CHANGE UP (ref 110–370)
WBC # BLD: 11.92 K/UL — HIGH (ref 3.8–10.5)
WBC # BLD: 13.08 K/UL — HIGH (ref 3.8–10.5)
WBC # BLD: 8.91 K/UL — SIGNIFICANT CHANGE UP (ref 3.8–10.5)
WBC # FLD AUTO: 11.92 K/UL — HIGH (ref 3.8–10.5)
WBC # FLD AUTO: 13.08 K/UL — HIGH (ref 3.8–10.5)
WBC # FLD AUTO: 8.91 K/UL — SIGNIFICANT CHANGE UP (ref 3.8–10.5)

## 2022-03-15 PROCEDURE — 99233 SBSQ HOSP IP/OBS HIGH 50: CPT | Mod: GC

## 2022-03-15 RX ORDER — LEVOTHYROXINE SODIUM 125 MCG
75 TABLET ORAL
Refills: 0 | Status: DISCONTINUED | OUTPATIENT
Start: 2022-03-15 | End: 2022-03-17

## 2022-03-15 RX ORDER — SODIUM CHLORIDE 9 MG/ML
1000 INJECTION INTRAMUSCULAR; INTRAVENOUS; SUBCUTANEOUS
Refills: 0 | Status: DISCONTINUED | OUTPATIENT
Start: 2022-03-15 | End: 2022-03-17

## 2022-03-15 RX ADMIN — Medication 112 MICROGRAM(S): at 05:22

## 2022-03-15 RX ADMIN — Medication 1: at 08:48

## 2022-03-15 RX ADMIN — PIPERACILLIN AND TAZOBACTAM 25 GRAM(S): 4; .5 INJECTION, POWDER, LYOPHILIZED, FOR SOLUTION INTRAVENOUS at 18:15

## 2022-03-15 RX ADMIN — APIXABAN 2.5 MILLIGRAM(S): 2.5 TABLET, FILM COATED ORAL at 05:22

## 2022-03-15 RX ADMIN — Medication 1: at 12:30

## 2022-03-15 RX ADMIN — SODIUM CHLORIDE 50 MILLILITER(S): 9 INJECTION INTRAMUSCULAR; INTRAVENOUS; SUBCUTANEOUS at 15:13

## 2022-03-15 RX ADMIN — PIPERACILLIN AND TAZOBACTAM 25 GRAM(S): 4; .5 INJECTION, POWDER, LYOPHILIZED, FOR SOLUTION INTRAVENOUS at 05:21

## 2022-03-15 NOTE — PHYSICAL THERAPY INITIAL EVALUATION ADULT - DISCHARGE DISPOSITION, PT EVAL
Spoke with daughter who stated patient did not have success with subacute rehab the last time the patient was in the hospital & would prefer Home with home PT. Patient has all DME including walker, wheelchair, & hospital bed. Aide 7 hours/day.

## 2022-03-15 NOTE — SWALLOW BEDSIDE ASSESSMENT ADULT - PHARYNGEAL PHASE
cough in 1/2x trials; unable to distinguish from baseline congested cough/Cough post oral intake audible swallow

## 2022-03-15 NOTE — PROGRESS NOTE ADULT - SUBJECTIVE AND OBJECTIVE BOX
Authored By:  Ludwig Schroeder MD  PGY-1, Internal Medicine    INTERVAL HPI/OVERNIGHT EVENTS:     SUBJECTIVE: Patient seen and examined at bedside this morning.     OBJECTIVE:       @ 07:  -  03-15 @ 07:00  --------------------------------------------------------  IN: 0 mL / OUT: 900 mL / NET: -900 mL      CAPILLARY BLOOD GLUCOSE      POCT Blood Glucose.: 151 mg/dL (15 Mar 2022 08:17)      ICU Vital Signs Last 24 Hrs  T(C): 37.2 (14 Mar 2022 23:33), Max: 38 (14 Mar 2022 18:12)  T(F): 99 (14 Mar 2022 23:33), Max: 100.4 (14 Mar 2022 18:12)  HR: 84 (14 Mar 2022 23:33) (84 - 96)  BP: 126/49 (14 Mar 2022 23:33) (126/49 - 151/70)  BP(mean): --  ABP: --  ABP(mean): --  RR: 18 (14 Mar 2022 23:33) (16 - 18)  SpO2: 91% (14 Mar 2022 23:33) (91% - 98%)    PHYSICAL EXAM:      MEDICATIONS:  MEDICATIONS  (STANDING):  apixaban 2.5 milliGRAM(s) Oral two times a day  atorvastatin 40 milliGRAM(s) Oral at bedtime  dextrose 40% Gel 15 Gram(s) Oral once  dextrose 5%. 1000 milliLiter(s) (50 mL/Hr) IV Continuous <Continuous>  dextrose 5%. 1000 milliLiter(s) (100 mL/Hr) IV Continuous <Continuous>  dextrose 50% Injectable 25 Gram(s) IV Push once  dextrose 50% Injectable 12.5 Gram(s) IV Push once  dextrose 50% Injectable 25 Gram(s) IV Push once  glucagon  Injectable 1 milliGRAM(s) IntraMuscular once  hydroxyurea 500 milliGRAM(s) Oral <User Schedule>  insulin glargine Injectable (LANTUS) 4 Unit(s) SubCutaneous at bedtime  insulin lispro (ADMELOG) corrective regimen sliding scale   SubCutaneous every 6 hours  levothyroxine 112 MICROGram(s) Oral daily  oseltamivir 30 milliGRAM(s) Oral daily  piperacillin/tazobactam IVPB.. 3.375 Gram(s) IV Intermittent every 12 hours    MEDICATIONS  (PRN):  acetaminophen     Tablet .. 650 milliGRAM(s) Oral every 6 hours PRN Temp greater or equal to 38C (100.4F)  QUEtiapine 12.5 milliGRAM(s) Oral at bedtime PRN agitation      ALLERGIES:  Allergies    No Known Drug Allergies  spices (Rash)    Intolerances        LABS:                        7.3    13.08 )-----------( 347      ( 15 Mar 2022 07:11 )             26.0     Hemoglobin: 7.3 g/dL (03-15 @ 07:11)  Hemoglobin: 9.4 g/dL ( @ 20:51)    CBC Full  -  ( 15 Mar 2022 07:11 )  WBC Count : 13.08 K/uL  RBC Count : 3.12 M/uL  Hemoglobin : 7.3 g/dL  Hematocrit : 26.0 %  Platelet Count - Automated : 347 K/uL  Mean Cell Volume : 83.3 fl  Mean Cell Hemoglobin : 23.4 pg  Mean Cell Hemoglobin Concentration : 28.1 gm/dL  Auto Neutrophil # : 11.20 K/uL  Auto Lymphocyte # : 0.85 K/uL  Auto Monocyte # : 0.57 K/uL  Auto Eosinophil # : 0.04 K/uL  Auto Basophil # : 0.07 K/uL  Auto Neutrophil % : 85.6 %  Auto Lymphocyte % : 6.5 %  Auto Monocyte % : 4.4 %  Auto Eosinophil % : 0.3 %  Auto Basophil % : 0.5 %    03-15    141  |  109<H>  |  36<H>  ----------------------------<  134<H>  3.7   |  19<L>  |  1.54<H>    Ca    9.1      15 Mar 2022 07:11  Phos  3.0     15  Mg     1.7     03-15    TPro  6.1  /  Alb  2.8<L>  /  TBili  0.3  /  DBili  x   /  AST  21  /  ALT  23  /  AlkPhos  78  03-15    Creatinine Trend: 1.54<--, 1.81<--  LIVER FUNCTIONS - ( 15 Mar 2022 07:11 )  Alb: 2.8 g/dL / Pro: 6.1 g/dL / ALK PHOS: 78 U/L / ALT: 23 U/L / AST: 21 U/L / GGT: x           PT/INR - ( 13 Mar 2022 20:51 )   PT: 17.5 sec;   INR: 1.50 ratio         PTT - ( 13 Mar 2022 20:51 )  PTT:37.6 sec    hs Troponin:                40 <<== 22 @ 22:59                42 <<== 22 @ 20:51            Urinalysis Basic - ( 13 Mar 2022 21:17 )    Color: Yellow / Appearance: Slightly Turbid / S.020 / pH: x  Gluc: x / Ketone: Negative  / Bili: Negative / Urobili: Negative   Blood: x / Protein: 300 mg/dL / Nitrite: Negative   Leuk Esterase: Negative / RBC: 2 /hpf / WBC 15 /HPF   Sq Epi: x / Non Sq Epi: 17 / Bacteria: Negative      CSF:                      EKG:   MICROBIOLOGY:    Culture - Blood (collected 13 Mar 2022 23:02)  Source: .Blood Blood-Peripheral  Preliminary Report (15 Mar 2022 01:01):    No growth to date.    Culture - Blood (collected 13 Mar 2022 23:02)  Source: .Blood Blood-Peripheral  Preliminary Report (15 Mar 2022 01:01):    No growth to date.    Culture - Urine (collected 13 Mar 2022 22:57)  Source: Clean Catch Clean Catch (Midstream)  Final Report (15 Mar 2022 08:54):    No growth      IMAGING:      Labs, imaging, EKG personally reviewed    RADIOLOGY & ADDITIONAL TESTS: Reviewed. Authored By:  Ludwig Schroeder MD  PGY-1, Internal Medicine    INTERVAL HPI/OVERNIGHT EVENTS:     SUBJECTIVE: Patient seen and examined at bedside this morning. Tmax 100.4 overnight. Still AOx0-1. ROS negative.     OBJECTIVE:       @ 07:01  -  03-15 @ 07:00  --------------------------------------------------------  IN: 0 mL / OUT: 900 mL / NET: -900 mL      POCT Blood Glucose.: 151 mg/dL (15 Mar 2022 08:17)    Vital Signs Last 24 Hrs  T(C): 37.2 (14 Mar 2022 23:33), Max: 38 (14 Mar 2022 18:12)  T(F): 99 (14 Mar 2022 23:33), Max: 100.4 (14 Mar 2022 18:12)  HR: 84 (14 Mar 2022 23:33) (84 - 96)  BP: 126/49 (14 Mar 2022 23:33) (126/49 - 151/70)  BP(mean): --  ABP: --  ABP(mean): --  RR: 18 (14 Mar 2022 23:33) (16 - 18)  SpO2: 91% (14 Mar 2022 23:33) (91% - 98%)    PHYSICAL EXAM:  Gen: AAOx0-1, non-toxic  Lung: CTAB, no respiratory distress, no wheezes/rhonchi/rales B/L, speaking in full sentences  CV: RRR, no murmurs, rubs or gallops  Abd: soft, NTND, no guarding, no CVA tenderness  Neuro: No focal sensory or motor deficits, normal CN exam       MEDICATIONS:  MEDICATIONS  (STANDING):  apixaban 2.5 milliGRAM(s) Oral two times a day  atorvastatin 40 milliGRAM(s) Oral at bedtime  dextrose 40% Gel 15 Gram(s) Oral once  dextrose 5%. 1000 milliLiter(s) (50 mL/Hr) IV Continuous <Continuous>  dextrose 5%. 1000 milliLiter(s) (100 mL/Hr) IV Continuous <Continuous>  dextrose 50% Injectable 25 Gram(s) IV Push once  dextrose 50% Injectable 12.5 Gram(s) IV Push once  dextrose 50% Injectable 25 Gram(s) IV Push once  glucagon  Injectable 1 milliGRAM(s) IntraMuscular once  hydroxyurea 500 milliGRAM(s) Oral <User Schedule>  insulin glargine Injectable (LANTUS) 4 Unit(s) SubCutaneous at bedtime  insulin lispro (ADMELOG) corrective regimen sliding scale   SubCutaneous every 6 hours  levothyroxine 112 MICROGram(s) Oral daily  oseltamivir 30 milliGRAM(s) Oral daily  piperacillin/tazobactam IVPB.. 3.375 Gram(s) IV Intermittent every 12 hours    MEDICATIONS  (PRN):  acetaminophen     Tablet .. 650 milliGRAM(s) Oral every 6 hours PRN Temp greater or equal to 38C (100.4F)  QUEtiapine 12.5 milliGRAM(s) Oral at bedtime PRN agitation      ALLERGIES:  Allergies    No Known Drug Allergies  spices (Rash)    Intolerances        LABS:                        7.3    13.08 )-----------( 347      ( 15 Mar 2022 07:11 )             26.0     Hemoglobin: 7.3 g/dL (03-15 @ 07:11)  Hemoglobin: 9.4 g/dL ( @ 20:51)    CBC Full  -  ( 15 Mar 2022 07:11 )  WBC Count : 13.08 K/uL  RBC Count : 3.12 M/uL  Hemoglobin : 7.3 g/dL  Hematocrit : 26.0 %  Platelet Count - Automated : 347 K/uL  Mean Cell Volume : 83.3 fl  Mean Cell Hemoglobin : 23.4 pg  Mean Cell Hemoglobin Concentration : 28.1 gm/dL  Auto Neutrophil # : 11.20 K/uL  Auto Lymphocyte # : 0.85 K/uL  Auto Monocyte # : 0.57 K/uL  Auto Eosinophil # : 0.04 K/uL  Auto Basophil # : 0.07 K/uL  Auto Neutrophil % : 85.6 %  Auto Lymphocyte % : 6.5 %  Auto Monocyte % : 4.4 %  Auto Eosinophil % : 0.3 %  Auto Basophil % : 0.5 %    03-15    141  |  109<H>  |  36<H>  ----------------------------<  134<H>  3.7   |  19<L>  |  1.54<H>    Ca    9.1      15 Mar 2022 07:11  Phos  3.0     03-15  Mg     1.7     03-15    TPro  6.1  /  Alb  2.8<L>  /  TBili  0.3  /  DBili  x   /  AST  21  /  ALT  23  /  AlkPhos  78  03-15    Creatinine Trend: 1.54<--, 1.81<--  LIVER FUNCTIONS - ( 15 Mar 2022 07:11 )  Alb: 2.8 g/dL / Pro: 6.1 g/dL / ALK PHOS: 78 U/L / ALT: 23 U/L / AST: 21 U/L / GGT: x           PT/INR - ( 13 Mar 2022 20:51 )   PT: 17.5 sec;   INR: 1.50 ratio         PTT - ( 13 Mar 2022 20:51 )  PTT:37.6 sec    hs Troponin:                40 <<== 03- @ 22:59                42 <<== 22 @ 20:51      Urinalysis Basic - ( 13 Mar 2022 21:17 )    Color: Yellow / Appearance: Slightly Turbid / S.020 / pH: x  Gluc: x / Ketone: Negative  / Bili: Negative / Urobili: Negative   Blood: x / Protein: 300 mg/dL / Nitrite: Negative   Leuk Esterase: Negative / RBC: 2 /hpf / WBC 15 /HPF   Sq Epi: x / Non Sq Epi: 17 / Bacteria: Negative      EKG:   MICROBIOLOGY:    Culture - Blood (collected 13 Mar 2022 23:02)  Source: .Blood Blood-Peripheral  Preliminary Report (15 Mar 2022 01:01):    No growth to date.    Culture - Blood (collected 13 Mar 2022 23:02)  Source: .Blood Blood-Peripheral  Preliminary Report (15 Mar 2022 01:01):    No growth to date.    Culture - Urine (collected 13 Mar 2022 22:57)  Source: Clean Catch Clean Catch (Midstream)  Final Report (15 Mar 2022 08:54):    No growth      IMAGING:      Labs, imaging, EKG personally reviewed    RADIOLOGY & ADDITIONAL TESTS: Reviewed.

## 2022-03-15 NOTE — SWALLOW BEDSIDE ASSESSMENT ADULT - SLP PERTINENT HISTORY OF CURRENT PROBLEM
Patient is an 88F with PMH of JAK2 polycythemia vera, recurrent RLE DVT (2008, 2010) on Eliquis, hx of stroke (residual L side weakness), HTN, DM2, vascular dementia, with spontaneous SBO (12/2018) s/p ex lap with bowel resection and anastomosis now presents to ED with acute hypoxemic respiratory failure and sepsis in the setting of influenza A with possible superimposed RLL bacterial pneumonia vs. less likely PE in the setting pf PV, also with MIKKI. Patient is an 88F with PMH of JAK2 polycythemia vera, recurrent RLE DVT (2008, 2010) on Eliquis, hx of stroke (residual L side weakness), HTN, T2DM, vascular dementia, with spontaneous SBO (12/2018) s/p ex lap with bowel resection and anastomosis now presents to ED with acute hypoxemic respiratory failure and sepsis in the setting of influenza A with possible superimposed RLL bacterial pneumonia vs. less likely PE in the setting pf PV, also with MIKKI. Patient is an 88F brought in from home to ED with PMH of JAK2 polycythemia vera, recurrent RLE DVT (2008, 2010) on Eliquis, HTN, vascular dementia, with spontaneous SBO (12/2018) s/p ex lap with bowel resection and anastomosis now presents to ED with acute hypoxemic respiratory failure and sepsis in the setting of influenza A with possible superimposed RLL bacterial pneumonia vs. less likely PE in the setting pf PV, also with MIKKI.

## 2022-03-15 NOTE — SWALLOW BEDSIDE ASSESSMENT ADULT - ASR SWALLOW ASPIRATION MONITOR
change of breathing pattern/position upright (90Y) change of breathing pattern/position upright (90Y)/cough/gurgly voice/fever/pneumonia/throat clearing/upper respiratory infection

## 2022-03-15 NOTE — SWALLOW BEDSIDE ASSESSMENT ADULT - COMMENTS
Pt known to this service; [place] 4/12/18 "Patient presents with oropharyngeal dysphagia characterized by impaired oral management, premature bolus spillover, mild-moderate pharyngeal retention and difficulty initiating cued repeat swallows, deep silent laryngeal penetration with nectar thickened liquids without retrieval, and trace silent aspiration with thin liquids and chewables. A chin tuck posture is ineffective in maintaining airway protection.     Disorders include: reduced lingual strength/ROM/Rate of motion, reduced tongue to palate contact, reduced BOT to posterior pharyngeal wall contact, delay in trigger of the swallow reflex, reduced hyo-laryngeal excursion, reduced laryngeal closure, reduced pharyngeal contractility, and s/s of reduced supraglottic and subglottic sensation. Diet recommendation: puree and moderately thick liquids. [place] [facility] [place] "Patient presents with oropharyngeal dysphagia characterized by impaired oral management, premature bolus spillover, mild-moderate pharyngeal retention and difficulty initiating cued repeat swallows, deep silent laryngeal penetration with nectar thickened liquids without retrieval, and trace silent aspiration with thin liquids and chewables. A chin tuck posture is ineffective in maintaining airway protection.   Disorders include: reduced lingual strength/ROM/Rate of motion, reduced tongue to palate contact, reduced BOT to posterior pharyngeal wall contact, delay in trigger of the swallow reflex, reduced hyo-laryngeal excursion, reduced laryngeal closure, reduced pharyngeal contractility, and s/s of reduced supraglottic and subglottic sensation. Diet recommendation: puree and moderately thick liquids."    CXR 3/13 Low lung volumes. No focal lung consolidation. Have bilateral trace pleural effusions. New calcification projects over the left lower chest, of uncertain etiology. Question external to the patient or possibly a pleural calcification.    ID: consulted for sepsis in the setting of Influenza A and likely superimposed RLL bacterial pneumonia    Dysphagia screen not completed; speech consulted for c/f aspiration given CXR results. Pt known to this service: [facility] 4/12/18 "Patient presents with oropharyngeal dysphagia characterized by impaired oral management, premature bolus spillover, mild-moderate pharyngeal retention and difficulty initiating cued repeat swallows, deep silent laryngeal penetration with nectar thickened liquids without retrieval, and trace silent aspiration with thin liquids and chewables. A chin tuck posture is ineffective in maintaining airway protection.   Disorders include: reduced lingual strength/ROM/Rate of motion, reduced tongue to palate contact, reduced BOT to posterior pharyngeal wall contact, delay in trigger of the swallow reflex, reduced hyo-laryngeal excursion, reduced laryngeal closure, reduced pharyngeal contractility, and s/s of reduced supraglottic and subglottic sensation. Diet recommendation: puree and moderately thick liquids."    CXR 3/13 Low lung volumes. No focal lung consolidation. Have bilateral trace pleural effusions. New calcification projects over the left lower chest, of uncertain etiology. Question external to the patient or possibly a pleural calcification.    ID: consulted for sepsis in the setting of Influenza A and likely superimposed RLL bacterial pneumonia  3/14:Per RN, pt AOx0-1, follows commands but minimally verbal. Pt required NRB throughout night (attempted NC, but put back on NRB)  Per RN flowsheets, pt on 6L O2 via NC in the afternoon with SpO2 >90     Speech consulted for c/f aspiration Pt known to this service in 2018 with episode of Bell's Palsy resulting in dysphagia. [facility] 4/12/18 "Patient presents with oropharyngeal dysphagia characterized by impaired oral management, premature bolus spillover, mild-moderate pharyngeal retention and difficulty initiating cued repeat swallows, deep silent laryngeal penetration with nectar thickened liquids without retrieval, and trace silent aspiration with thin liquids and chewables. A chin tuck posture is ineffective in maintaining airway protection.   Disorders include: reduced lingual strength/ROM/Rate of motion, reduced tongue to palate contact, reduced BOT to posterior pharyngeal wall contact, delay in trigger of the swallow reflex, reduced hyo-laryngeal excursion, reduced laryngeal closure, reduced pharyngeal contractility, and s/s of reduced supraglottic and subglottic sensation." Diet recommendations were dysphagia 1 (puree) and honey thick liquids    Daughter Magalie reporting [facility]up dysphagia [facility] 2018 where she reported that the [place] Palsy had resolved and that the clinic recommended thin liquids and regular solids. Recently, due to pt adjusting to new lower dentures, pt has been tolerating minced and moist solids with thin liquids at home up until ED admission.    CXR 3/13 Low lung volumes. No focal lung consolidation. Have bilateral trace pleural effusions. New calcification projects over the left lower chest, of uncertain etiology. Question external to the patient or possibly a pleural calcification.    ID: consulted for sepsis in the setting of Influenza A and likely superimposed RLL bacterial pneumonia  3/14:Per RN, pt AOx0-1, follows commands but minimally verbal. Pt required NRB throughout night (attempted NC, but put back on NRB)  Per RN flowsheets, pt on 6L O2 via NC in the afternoon with SpO2 >90     Speech consulted for c/f aspiration Pt known to this service in 2018 with episode of Bell's Palsy resulting in dysphagia. [facility] 4/12/18 "Patient presents with oropharyngeal dysphagia characterized by impaired oral management, premature bolus spillover, mild-moderate pharyngeal retention and difficulty initiating cued repeat swallows, deep silent laryngeal penetration with nectar thickened liquids without retrieval, and trace silent aspiration with thin liquids and chewables. A chin tuck posture is ineffective in maintaining airway protection.   Disorders include: reduced lingual strength/ROM/Rate of motion, reduced tongue to palate contact, reduced BOT to posterior pharyngeal wall contact, delay in trigger of the swallow reflex, reduced hyo-laryngeal excursion, reduced laryngeal closure, reduced pharyngeal contractility, and s/s of reduced supraglottic and subglottic sensation." Diet recommendations were dysphagia 1 (puree) and honey thick liquids    Daughter Magalie reporting followup dysphagia consult [facility] in 2018 s/p resolution of [place] Palsy; clinic recommended thin liquids and regular solids. Recently, due to pt adjusting to new lower dentures, pt has been tolerating minced and moist solids with thin liquids at home up until ED admission.    CXR 3/13 Low lung volumes. No focal lung consolidation. Have bilateral trace pleural effusions. New calcification projects over the left lower chest, of uncertain etiology. Question external to the patient or possibly a pleural calcification.    ID: consulted for sepsis in the setting of Influenza A and likely superimposed RLL bacterial pneumonia  3/14:Per RN, pt AOx0-1, follows commands but minimally verbal. Pt required NRB throughout night (attempted NC, but put back on NRB)  Per RN flowsheets, pt on 6L O2 via NC in the afternoon with SpO2 >90     Speech consulted for c/f aspiration

## 2022-03-15 NOTE — PHYSICAL THERAPY INITIAL EVALUATION ADULT - RANGE OF MOTION EXAMINATION, REHAB EVAL
except bilateral shoulder flexion 0-90/bilateral upper extremity ROM was WFL (within functional limits)/bilateral lower extremity ROM was WFL (within functional limits)

## 2022-03-15 NOTE — SWALLOW BEDSIDE ASSESSMENT ADULT - SWALLOW EVAL: ORAL MUSCULATURE
generally intact Limited oral musculature evaluation due to pt difficulty completing verbal directions without model/generally intact Limited oral musculature assessment due to pt difficulty completing verbal directions without model/generally intact

## 2022-03-15 NOTE — PATIENT PROFILE ADULT - NSFALLSECTIONLABEL_GEN_A_CORE
.
Return to the ER for any change in abdominal pain, vomiting, further episodes of blood in vomit, any black or bloody stools or any concerns. Otherwise, follow up with a gastroenterologist on the next available date for an endoscopy.     Abdominal Pain    Many things can cause abdominal pain. Many times, abdominal pain is not caused by a disease and will improve without treatment. Your health care provider will do a physical exam to determine if there is a dangerous cause of your pain; blood tests and imaging may help determine the cause of your pain. However, in many cases, no cause may be found and you may need further testing as an outpatient. Monitor your abdominal pain for any changes.     SEEK IMMEDIATE MEDICAL CARE IF YOU HAVE ANY OF THE FOLLOWING SYMPTOMS: worsening abdominal pain, uncontrollable vomiting, profuse diarrhea, inability to have bowel movements or pass gas, black or bloody stools, fever accompanying chest pain or back pain, or fainting. These symptoms may represent a serious problem that is an emergency. Do not wait to see if the symptoms will go away. Get medical help right away. Call 911 and do not drive yourself to the hospital.

## 2022-03-15 NOTE — PHYSICAL THERAPY INITIAL EVALUATION ADULT - PERTINENT HX OF CURRENT PROBLEM, REHAB EVAL
as per chart review: PMHx of JAK2 polycythemia vera, recurrent RLE DVT (2008, 2010) on Eliquis, HTN, vascular dementia, with spontaneous SBO (12/2018) s/p ex lap with bowel resection and anastomosis now presents to ED with acute hypoxemic respiratory failure and sepsis in the setting of influenza A with possible superimposed RLL bacterial pneumonia vs. less likely PE in the setting pf PV, also with MIKKI.

## 2022-03-15 NOTE — SWALLOW BEDSIDE ASSESSMENT ADULT - ASPIRATION PRECAUTIONS
yes Monitor for s/s aspiration/laryngeal penetration. If noted:  D/C p.o. intake, provide non-oral nutrition/hydration/meds, and contact this service @ [id number]

## 2022-03-15 NOTE — PHYSICAL THERAPY INITIAL EVALUATION ADULT - MANUAL MUSCLE TESTING RESULTS, REHAB EVAL
grossly at least 3/5 throughout except bilateral shoulder flexion 2+/5 throughout, patient not cooperative during bilateral foot ankle testing

## 2022-03-15 NOTE — GOALS OF CARE CONVERSATION - ADVANCED CARE PLANNING - CONVERSATION DETAILS
Introduced self and role of the PCE, due to capacity Dementia spoke with HCP listed.  Magalie Lamar is HCP and pt also lives with her and family.  She will provide a copy of HCP form for medical records.  Pt has multiple hospital admissions since 2016 and each time she has noticed pts decline in health.  During prior discussions pt has indicated that she would want quality of life.  If her condition deteriorates to a vegetative state or is on machines only to prolong her death she would for go measures.  Would like pt to return home but realizes she may need more skilled care.    CPR/ intubation procedures and possible complications explained.  HCP wants pt to remain full code despite the health concerns.  She will determine if quality has been lost after interventions.   No Oriental orthodox exemptions noted. Pt is Advent and would always pray to the Rosary.  Unfortunately with her dementia decline she has not been active.  Chaplaincy services offered and would like pt to receive communion/prayer at bedside.  Code BJWT provided.   Contact information for further needs provided.    3/15 1700 HCP form received via email.    Lissy Pradhan RN  Palliative Care Educator  [phone] cell

## 2022-03-15 NOTE — SWALLOW BEDSIDE ASSESSMENT ADULT - NS ASR SWALLOW FINDINGS DISCUS
Ludwig Schroeder; RADHA Ruvalcaba (covering for Taran); daughter Magalie at bedside/Physician/Nursing/Patient/Family MD Ludwig Schroeder; RADHA Ruvalcaba (covering for Taran); daughter Magalie at bedside/Physician/Nursing/Patient/Family

## 2022-03-15 NOTE — SWALLOW BEDSIDE ASSESSMENT ADULT - SLP GENERAL OBSERVATIONS
Pt received with HOB elevated, alert, oriented x1 with prompting; on room air SpO2 92-94%. Pt pleasant throughout exam, with difficulty following 1 step verbal directions, but compliant with modeling. Inconsistent ability to make wants/needs known Daughter present at bedside to provide patient history. Pt received with HOB elevated, alert, oriented x1 with prompting; on room air SpO2 92-94%. Pt pleasant throughout exam, with difficulty following 1 step verbal directions, but compliant with modeling. Inconsistent ability to make wants/needs known. Daughter present at bedside to provide patient history (also stated that her mother is fluent in English and Tagalog) Pt received with HOB elevated, alert, oriented x1. On room air; SpO2 92-94%. Pt pleasant throughout exam, with difficulty following 1 step verbal directions, but compliant with modeling. Inconsistent ability to make wants/needs known. Daughter present at bedside to provide patient history (also stated that her mother is fluent in English and Tagalog)

## 2022-03-15 NOTE — SWALLOW BEDSIDE ASSESSMENT ADULT - SWALLOW EVAL: DIAGNOSIS
Patient is an 88F who presents to ED with acute hypoxemic respiratory failure and sepsis in the setting of influenza A with possible superimposed RLL bacterial pneumonia vs. less likely PE in the setting pf PV, also with MIKKI. Pt presents with oral dysphagia and clinical s/s of pharyngeal dysphagia. Pt presents with congested baseline cough but clear vocal quality when speaking. Oral impairments marked by minimal holding and prolonged A-P transit of puree solids; suspected premature spillage across consistencies. Pharyngeal phase impairments marked by audible swallow with honey thick liquids, suggesting incoordination of swallow; cough post puree solids, suggestive of potential laryngeal penetration/aspiration, however, difficult to distinguish from congested baseline cough without objective testing. Patient is an 88F who presents to ED with acute hypoxemic respiratory failure and sepsis in the setting of influenza A with possible superimposed RLL bacterial pneumonia vs. less likely PE in the setting pf PV, also with MIKKI. Pt presents with oral dysphagia and clinical s/s of pharyngeal dysphagia. Pt presents with congested baseline cough but clear vocal quality when speaking. Oral impairments marked by delayed oral transit time for tsp puree solids; suspected premature spillage across consistencies. Pharyngeal phase impairments marked by audible swallow with moderately thick liquids, suggestive of incoordination of swallow; cough post puree solids, suggestive of potential laryngeal penetration/aspiration, however, unable to distinguish from congested baseline cough without objective testing. Given hx of dysphasia, s/s of potential pen/asp, and high risk of pulmonary injury with aspiration given recent hypoxic episode, pt not safe for PO prior to objective testing. Patient is an 88F who presents to ED with acute hypoxemic respiratory failure and sepsis in the setting of influenza A with possible superimposed RLL bacterial pneumonia vs. less likely PE in the setting pf PV, also with MIKKI. Pt presents with oral dysphagia and clinical s/s of pharyngeal dysphagia. Oral impairments marked by delayed oral transit time for puree solids; suspected premature spillage across consistencies. Pharyngeal phase impairments marked by audible swallow with moderately thick liquids, suggestive of incoordination of swallow; cough post puree solids, suggestive of potential laryngeal penetration/aspiration, however, unable to distinguish from congested baseline cough without objective testing. Given hx of dysphasia, s/s of potential pen/asp, and high risk of pulmonary injury with aspiration event given recent hypoxic episode, pt not safe for PO prior to objective testing. Patient is an 88F who presents to ED with acute hypoxemic respiratory failure and sepsis in the setting of influenza A with possible superimposed RLL bacterial pneumonia vs. less likely PE in the setting pf PV, also with MIKKI. Pt presents with oral dysphagia and clinical s/s of pharyngeal dysphagia. Oral impairments marked by delayed oral transit time for puree solids; suspected premature spillage across consistencies. Pharyngeal phase impairments marked by audible swallow with moderately thick liquids, suggestive of incoordination of swallow; cough post puree solids, suggestive of potential laryngeal penetration/aspiration, however, unable to distinguish from congested baseline cough without objective testing. Given hx of dysphagia, s/s of potential pen/asp, and high risk of pulmonary injury with aspiration event given recent hypoxic episode, pt not recommended for PO prior to objective testing.

## 2022-03-16 ENCOUNTER — TRANSCRIPTION ENCOUNTER (OUTPATIENT)
Age: 87
End: 2022-03-16

## 2022-03-16 LAB
ANION GAP SERPL CALC-SCNC: 12 MMOL/L — SIGNIFICANT CHANGE UP (ref 5–17)
BUN SERPL-MCNC: 23 MG/DL — SIGNIFICANT CHANGE UP (ref 7–23)
CALCIUM SERPL-MCNC: 8.7 MG/DL — SIGNIFICANT CHANGE UP (ref 8.4–10.5)
CHLORIDE SERPL-SCNC: 114 MMOL/L — HIGH (ref 96–108)
CO2 SERPL-SCNC: 18 MMOL/L — LOW (ref 22–31)
CREAT SERPL-MCNC: 1.29 MG/DL — SIGNIFICANT CHANGE UP (ref 0.5–1.3)
EGFR: 40 ML/MIN/1.73M2 — LOW
FOLATE SERPL-MCNC: 11.6 NG/ML — SIGNIFICANT CHANGE UP
GLUCOSE BLDC GLUCOMTR-MCNC: 129 MG/DL — HIGH (ref 70–99)
GLUCOSE BLDC GLUCOMTR-MCNC: 146 MG/DL — HIGH (ref 70–99)
GLUCOSE BLDC GLUCOMTR-MCNC: 202 MG/DL — HIGH (ref 70–99)
GLUCOSE BLDC GLUCOMTR-MCNC: 346 MG/DL — HIGH (ref 70–99)
GLUCOSE SERPL-MCNC: 121 MG/DL — HIGH (ref 70–99)
HCT VFR BLD CALC: 27.6 % — LOW (ref 34.5–45)
HGB BLD-MCNC: 7.6 G/DL — LOW (ref 11.5–15.5)
MAGNESIUM SERPL-MCNC: 1.6 MG/DL — SIGNIFICANT CHANGE UP (ref 1.6–2.6)
MCHC RBC-ENTMCNC: 23.8 PG — LOW (ref 27–34)
MCHC RBC-ENTMCNC: 27.5 GM/DL — LOW (ref 32–36)
MCV RBC AUTO: 86.3 FL — SIGNIFICANT CHANGE UP (ref 80–100)
NRBC # BLD: 0 /100 WBCS — SIGNIFICANT CHANGE UP (ref 0–0)
PHOSPHATE SERPL-MCNC: 2.4 MG/DL — LOW (ref 2.5–4.5)
PLATELET # BLD AUTO: 351 K/UL — SIGNIFICANT CHANGE UP (ref 150–400)
POTASSIUM SERPL-MCNC: 3.6 MMOL/L — SIGNIFICANT CHANGE UP (ref 3.5–5.3)
POTASSIUM SERPL-SCNC: 3.6 MMOL/L — SIGNIFICANT CHANGE UP (ref 3.5–5.3)
RBC # BLD: 3.2 M/UL — LOW (ref 3.8–5.2)
RBC # FLD: 18.5 % — HIGH (ref 10.3–14.5)
SODIUM SERPL-SCNC: 144 MMOL/L — SIGNIFICANT CHANGE UP (ref 135–145)
VIT B12 SERPL-MCNC: 623 PG/ML — SIGNIFICANT CHANGE UP (ref 232–1245)
WBC # BLD: 7.63 K/UL — SIGNIFICANT CHANGE UP (ref 3.8–10.5)
WBC # FLD AUTO: 7.63 K/UL — SIGNIFICANT CHANGE UP (ref 3.8–10.5)

## 2022-03-16 PROCEDURE — 99232 SBSQ HOSP IP/OBS MODERATE 35: CPT | Mod: GC

## 2022-03-16 PROCEDURE — 74230 X-RAY XM SWLNG FUNCJ C+: CPT | Mod: 26

## 2022-03-16 RX ORDER — METFORMIN HYDROCHLORIDE 850 MG/1
1 TABLET ORAL
Qty: 0 | Refills: 0 | DISCHARGE

## 2022-03-16 RX ORDER — CIPROFLOXACIN LACTATE 400MG/40ML
1 VIAL (ML) INTRAVENOUS
Qty: 4 | Refills: 0
Start: 2022-03-16 | End: 2022-03-19

## 2022-03-16 RX ORDER — SITAGLIPTIN 50 MG/1
1 TABLET, FILM COATED ORAL
Qty: 0 | Refills: 0 | DISCHARGE

## 2022-03-16 RX ADMIN — Medication 75 MICROGRAM(S): at 07:45

## 2022-03-16 RX ADMIN — PIPERACILLIN AND TAZOBACTAM 25 GRAM(S): 4; .5 INJECTION, POWDER, LYOPHILIZED, FOR SOLUTION INTRAVENOUS at 07:45

## 2022-03-16 RX ADMIN — APIXABAN 2.5 MILLIGRAM(S): 2.5 TABLET, FILM COATED ORAL at 17:32

## 2022-03-16 RX ADMIN — PIPERACILLIN AND TAZOBACTAM 25 GRAM(S): 4; .5 INJECTION, POWDER, LYOPHILIZED, FOR SOLUTION INTRAVENOUS at 17:32

## 2022-03-16 NOTE — SWALLOW VFSS/MBS ASSESSMENT ADULT - DELAYED INITIATION OF THE PHARYNGEAL SWALLOW (IN SECONDS)
Floor 20 seconds Inconsistent timeliness of swallow trigger 20 seconds; not sensate, not triggered requiring mildly thickened wash Inconsistent timeliness of swallow trigger (timely half of the time; otherwise, swallow triggered when head of bolus is approaching the pyriform sinuses) Inconsistent timeliness of swallow trigger. Smaller boluses triggered when head of bolus is at the pyriform sinuses, improved timeliness with timely swallow trigger with larger volumes; optimized with single straw sip) Inconsistent timeliness of swallow trigger (1x timely; 3x triggered when head of bolus is approaching pyriform sinuses) Inconsistent timeliness of swallow trigger. Premature spillage to pool and fill the valleculae. Smaller boluses triggered when head of bolus is at the pyriform sinuses, improved timeliness with timely swallow trigger with larger volumes; optimized with single straw sip) Inconsistent timeliness of swallow trigger (timely 50% of the time; otherwise, swallow triggered when head of bolus is approaching the pyriform sinuses)

## 2022-03-16 NOTE — SWALLOW VFSS/MBS ASSESSMENT ADULT - RECOMMENDED CONSISTENCY
puree solids and nectar thick liquids with single straw sips puree solids and mildly thick liquids with single straw sips

## 2022-03-16 NOTE — SWALLOW VFSS/MBS ASSESSMENT ADULT - SLP GENERAL OBSERVATIONS
Pt received on 2L O2 via NC; SpO2 96-97% throughout examination. Pt with preference to slightly lean toward left side; cushion support provided to maintain upright positioning. Pt alert, pleasant, inconsistent in following 1 step verbal commands.

## 2022-03-16 NOTE — SWALLOW VFSS/MBS ASSESSMENT ADULT - SLP PERTINENT HISTORY OF CURRENT PROBLEM
Patient is an 88F brought in from home to ED with PMH of JAK2 polycythemia vera, recurrent RLE DVT (2008, 2010) on Eliquis, HTN, vascular dementia, with spontaneous SBO (12/2018) s/p ex lap with bowel resection and anastomosis now presents to ED with acute hypoxemic respiratory failure and sepsis in the setting of influenza A with possible superimposed RLL bacterial pneumonia vs. less likely PE in the setting pf PV, also with MIKKI.

## 2022-03-16 NOTE — SWALLOW VFSS/MBS ASSESSMENT ADULT - COMMENTS
Pt known to this service in 2018 with episode of Bell's Palsy resulting in dysphagia. [facility] 4/12/18 "Patient presents with oropharyngeal dysphagia characterized by impaired oral management, premature bolus spillover, mild-moderate pharyngeal retention and difficulty initiating cued repeat swallows, deep silent laryngeal penetration with nectar thickened liquids without retrieval, and trace silent aspiration with thin liquids and chewables. A chin tuck posture is ineffective in maintaining airway protection.   Disorders include: reduced lingual strength/ROM/Rate of motion, reduced tongue to palate contact, reduced BOT to posterior pharyngeal wall contact, delay in trigger of the swallow reflex, reduced hyo-laryngeal excursion, reduced laryngeal closure, reduced pharyngeal contractility, and s/s of reduced supraglottic and subglottic sensation." Diet recommendations were dysphagia 1 (puree) and honey thick liquids    Daughter Magalie reporting followup dysphagia consult [facility] in 2018 s/p resolution of [place] Palsy; clinic recommended thin liquids and regular solids. Recently, due to pt adjusting to new lower dentures, pt has been tolerating minced and moist solids with thin liquids at home up until ED admission.    CXR 3/13 Low lung volumes. No focal lung consolidation. Have bilateral trace pleural effusions. New calcification projects over the left lower chest, of uncertain etiology. Question external to the patient or possibly a pleural calcification.    ID: consulted for sepsis in the setting of Influenza A and likely superimposed RLL bacterial pneumonia  3/14:Per RN, pt AOx0-1, follows commands but minimally verbal. Pt required NRB throughout night (attempted NC, but put back on NRB)  Per RN flowsheets, pt on 6L O2 via NC in the afternoon with SpO2 >90     3/15: Bedside swallow evaluation completed. Pt with oral dysphagia and clinical s/s of pharyngeal dysphagia. Recommendations for NPO until objective testing on 3/16.

## 2022-03-16 NOTE — PROGRESS NOTE ADULT - SUBJECTIVE AND OBJECTIVE BOX
Authored By:  Ludwig Schroeder MD  PGY-1, Internal Medicine    INTERVAL HPI/OVERNIGHT EVENTS:     SUBJECTIVE: Patient seen and examined at bedside this morning. No acute events overnight. ROS negative.     OBJECTIVE:      03-15 @ 07:01 - 03-16 @ 07:00  --------------------------------------------------------  IN: 750 mL / OUT: 960 mL / NET: -210 mL    03-16 @ 07:01 - 03-16 @ 14:00  --------------------------------------------------------  IN: 0 mL / OUT: 0 mL / NET: 0 mL      POCT Blood Glucose.: 202 mg/dL (16 Mar 2022 11:23)      Vital Signs Last 24 Hrs  T(C): 37.2 (16 Mar 2022 07:44), Max: 37.6 (16 Mar 2022 00:38)  T(F): 99 (16 Mar 2022 07:44), Max: 99.7 (16 Mar 2022 00:38)  HR: 91 (16 Mar 2022 07:44) (85 - 92)  BP: 142/88 (16 Mar 2022 07:44) (133/67 - 158/83)  BP(mean): --  ABP: --  ABP(mean): --  RR: 18 (16 Mar 2022 07:44) (16 - 18)  SpO2: 93% (16 Mar 2022 07:44) (93% - 94%)    PHYSICAL EXAM:  Gen: AAOx3, non-toxic  HEENT: EOMI, oral mucosa moist, normal conjunctiva  Lung: CTAB, expiratory rhonki bilaterally, no wheezes/rhonchi/rales B/L, speaking in full sentences  CV: RRR, no murmurs, rubs or gallops, no aníbal edema, no jvd   Abd: soft, NTND, no guarding, no CVA tenderness  Neuro: No focal sensory or motor deficits, normal CN exam   Skin: Warm, well perfused, no rash  Psych: normal affect.      MEDICATIONS:  MEDICATIONS  (STANDING):  apixaban 2.5 milliGRAM(s) Oral two times a day  dextrose 40% Gel 15 Gram(s) Oral once  dextrose 5%. 1000 milliLiter(s) (50 mL/Hr) IV Continuous <Continuous>  dextrose 5%. 1000 milliLiter(s) (100 mL/Hr) IV Continuous <Continuous>  dextrose 50% Injectable 25 Gram(s) IV Push once  dextrose 50% Injectable 12.5 Gram(s) IV Push once  dextrose 50% Injectable 25 Gram(s) IV Push once  glucagon  Injectable 1 milliGRAM(s) IntraMuscular once  hydroxyurea 500 milliGRAM(s) Oral <User Schedule>  levothyroxine Injectable 75 MICROGram(s) IV Push <User Schedule>  piperacillin/tazobactam IVPB.. 3.375 Gram(s) IV Intermittent every 12 hours  sodium chloride 0.9%. 1000 milliLiter(s) (50 mL/Hr) IV Continuous <Continuous>    MEDICATIONS  (PRN):  acetaminophen     Tablet .. 650 milliGRAM(s) Oral every 6 hours PRN Temp greater or equal to 38C (100.4F)  QUEtiapine 12.5 milliGRAM(s) Oral at bedtime PRN agitation      ALLERGIES:  Allergies    No Known Drug Allergies  spices (Rash)    Intolerances        LABS:                        7.6    7.63  )-----------( 351      ( 16 Mar 2022 06:48 )             27.6     Hemoglobin: 7.6 g/dL (03-16 @ 06:48)  Hemoglobin: 7.8 g/dL (03-15 @ 21:22)  Hemoglobin: 7.2 g/dL (03-15 @ 11:22)  Hemoglobin: 7.3 g/dL (03-15 @ 07:11)  Hemoglobin: 9.4 g/dL (03-13 @ 20:51)    CBC Full  -  ( 16 Mar 2022 06:48 )  WBC Count : 7.63 K/uL  RBC Count : 3.20 M/uL  Hemoglobin : 7.6 g/dL  Hematocrit : 27.6 %  Platelet Count - Automated : 351 K/uL  Mean Cell Volume : 86.3 fl  Mean Cell Hemoglobin : 23.8 pg  Mean Cell Hemoglobin Concentration : 27.5 gm/dL  Auto Neutrophil # : x  Auto Lymphocyte # : x  Auto Monocyte # : x  Auto Eosinophil # : x  Auto Basophil # : x  Auto Neutrophil % : x  Auto Lymphocyte % : x  Auto Monocyte % : x  Auto Eosinophil % : x  Auto Basophil % : x    03-16    144  |  114<H>  |  23  ----------------------------<  121<H>  3.6   |  18<L>  |  1.29    Ca    8.7      16 Mar 2022 06:48  Phos  2.4     03-16  Mg     1.6     03-16    TPro  x   /  Alb  x   /  TBili  0.2  /  DBili  <0.1  /  AST  x   /  ALT  x   /  AlkPhos  x   03-15    Creatinine Trend: 1.29<--, 1.54<--, 1.81<--  LIVER FUNCTIONS - ( 15 Mar 2022 07:11 )  Alb: 2.8 g/dL / Pro: 6.1 g/dL / ALK PHOS: 78 U/L / ALT: 23 U/L / AST: 21 U/L / GGT: x             EKG:   MICROBIOLOGY:    Culture - Blood (collected 13 Mar 2022 23:02)  Source: .Blood Blood-Peripheral  Preliminary Report (15 Mar 2022 01:01):    No growth to date.    Culture - Blood (collected 13 Mar 2022 23:02)  Source: .Blood Blood-Peripheral  Preliminary Report (15 Mar 2022 01:01):    No growth to date.    Culture - Urine (collected 13 Mar 2022 22:57)  Source: Clean Catch Clean Catch (Midstream)  Final Report (15 Mar 2022 08:54):    No growth      IMAGING:      Labs, imaging, EKG personally reviewed    RADIOLOGY & ADDITIONAL TESTS: Reviewed.

## 2022-03-16 NOTE — SWALLOW VFSS/MBS ASSESSMENT ADULT - PHARYNGEAL PHASE COMMENTS
With large cup presentations, reduced tongue to palate seal resulting in premature spillage, pooling and filling the valleculae and approaching the pyriform sinuses prior to swallow trigger. No premature loss with tsp presentation. Given pt reduced oral awareness, pt difficulty initiating swallow with smaller volume (tsp). Although improved with larger volumes, large cup sip of mildly thick liquids resulted in PAS 2. No airway invasion with single straw sips. With thin puree, pt with mild oral residue, which spilled and minimally filled the valleculae and pyriform sinuses in-between swallows, cleared with additional bolus.  Timely initiation of swallow with thick puree. pt not sensate to small volume of thin viscosity; posterior spillage to the level of the pyriform sinuses, absent swallow trigger Given pt reduced oral awareness, pt difficulty initiating swallow with smaller volume (tsp). Although improved with larger volumes, large cup sip of mildly thick liquids resulted in PAS 2. No airway invasion with tsp or single straw sips. pt not sensate to small volume and thin viscosity; posterior spillage to the level of the pyriform sinuses, pt did not trigger swallow

## 2022-03-16 NOTE — DISCHARGE NOTE PROVIDER - CARE PROVIDER_API CALL
Radha Day)  Internal Medicine  [address], [address]  [place] [address]  Phone: [phone]  Fax: [phone]  Follow Up Time: 2 weeks

## 2022-03-16 NOTE — SWALLOW VFSS/MBS ASSESSMENT ADULT - ORAL PHASE COMMENTS
With thin puree, reduced tongue to palate seal resulting in posterior spillage, pooling and filling the valleculae and approaching the pyriform sinuses prior to initiation of swallow trigger. reduced sensation to bolus; did not attempt to swallow for 20 seconds, requiring mildly thick liquid tsp wash With large cup presentations, reduced tongue to palate seal resulting in premature spillage, pooling and filling the valleculae and approaching the pyriform sinuses prior to swallow trigger. No premature loss with tsp presentation. With thin puree, reduced tongue to palate seal resulting in posterior spillage, pooling and filling the valleculae and approaching the pyriform sinuses prior to initiation of swallow trigger.  With thin puree, pt with mild oral residue, which spilled and minimally filled the valleculae and pyriform sinuses in-between swallows, cleared with additional bolus (recommendation for alternation of liquids/solids).

## 2022-03-16 NOTE — SWALLOW VFSS/MBS ASSESSMENT ADULT - ROSENBEK'S PENETRATION ASPIRATION SCALE
(1) no aspiration, contrast does not enter airway PAS 2 in 1x with large cup sip only; PAS across all tsp and single sip straw trials/(1) no aspiration, contrast does not enter airway pt did not trigger swallow with thin liquids/(1) no aspiration, contrast does not enter airway PAS 2 in 1x with large cup sip only; PAS across tsp and single sip straw trials/(2) contrast enters airway, remains above the vocal cords, no residue remains (penetration) pt did not trigger swallow with thin liquids; required weighted bolus to trigger/(1) no aspiration, contrast does not enter airway PAS 2 in 1x with large cup sip only; PAS 1 across tsp and single sip straw trials/(2) contrast enters airway, remains above the vocal cords, no residue remains (penetration)

## 2022-03-16 NOTE — SWALLOW VFSS/MBS ASSESSMENT ADULT - DIAGNOSTIC IMPRESSIONS
Patient is an 88F brought in from home to ED and found to have acute hypoxemic respiratory failure and sepsis in the setting of influenza A with possible superimposed RLL bacterial pneumonia vs. less likely PE in the setting pf PV, also with MIKKI. Pt presents with oropharyngeal dysphagia, likely chronic related to vascular dementia and potentially exacerbated due to acute respiratory failure. Disorders characterized by reduced labial seal/strength; reduced tongue-palate seal; reduced base of tongue retraction; reduced sensation/awareness to bolus, likely due to dementia, improved with mildly thick viscosity (not sensate to thin liquids; difficult to clear mod thick), weighted bolus, moderate controlled volumes (single straw drink). No aspiration with puree solids and single straw sips of nectar thick liquids.  Given suspect CP bar, recommendation to monitor for potential s/s of gastric aspiration (especially in consideration of poor patient sitting balance and advancing dementia), which may indicate reflux from exacerbation of CP bar.  Of note, pt with 1x congested throat clear during assessment, upon fluoro, no penetration or aspiration visualized; note that any overt s/s of aspiration (coughing, throat clearing), may not be indicative of aspiration for this patient given active influenza and bacterial pneumonia.  Swallow safety is preserved with modified diet. Pt appears to be at a low-moderate risk for aspiration given hx of (resolved) dysphagia, progressive dementia, reduced physical mobility, and caregiver dependence for oral care and feeding. Diet modification is indicated. Patient is an 88F brought in from home to ED and found to have acute hypoxemic respiratory failure and sepsis in the setting of influenza A with possible superimposed RLL bacterial pneumonia vs. less likely PE in the setting pf PV, also with MIKKI. Pt presents with oropharyngeal dysphagia, likely chronic related to vascular dementia and potentially exacerbated due to influenza leading to acute respiratory failure. Dysphagia characterized by anterior labial spillage of thin liquids, trace BOT residue across all consistencies, inconsistent posterior spillage and loss, and esophageal retention with suspect CP bar ~C5-6.   Disorders characterized by reduced labial seal/strength; reduced tongue-palate seal; reduced base of tongue retraction; reduced sensation/awareness to thin/small volume bolus (likely due to dementia), improved with mildly thick viscosity (not sensate to thin liquids; increased time to initiate swallow for mod thick), and moderate controlled volumes (single straw drink). No aspiration with puree solids, moderately thick liquids, and single straw sips of nectar thick liquids.    Given suspect CP bar, recommendation to monitor for potential s/s of gastric aspiration (also consider poor patient sitting balance and advancing dementia), which may indicate exacerbation of CP bar.  Of note, pt with 1x congested throat clear during assessment, upon fluoro, no penetration or aspiration visualized; note that any overt s/s of aspiration (coughing, throat clearing), may not be indicative of aspiration for this patient given active influenza and bacterial pneumonia.  Swallow safety is preserved with modified diet. Pt appears to be at a low-moderate risk for aspiration given hx of (resolved) dysphagia, progressive dementia, reduced physical mobility, and caregiver dependence for oral care and feeding. Diet modification is indicated. Pt swallow prognosis is fair-good. Patient is an 88F brought in from home to ED and found to have acute hypoxemic respiratory failure and sepsis in the setting of influenza A with possible superimposed RLL bacterial pneumonia vs. less likely PE in the setting pf PV, also with MIKKI. Pt presents with oropharyngeal dysphagia, likely chronic related to vascular dementia and potentially exacerbated due to current condition. Dysphagia characterized by anterior labial spillage of thin liquids, trace BOT residue across all consistencies, inconsistent posterior spillage and loss, and trace amount esophageal retention with suspect CP bar vs other ~C5-6. No aspiration evident on exam.   Disorders: reduced lingual strength/ROM/Rate of motion, reduced BOT to posterior pharyngeal wall contact, delay in trigger of the swallow reflex    Given suspect CP bar, recommendation to monitor for s/s of aspiration (also consider poor patient sitting balance and advancing dementia), which may indicate exacerbation of CP bar.    Swallow safety is preserved with modified diet. Pt appears to be at a low-moderate risk for aspiration given hx of (resolved) dysphagia, progressive dementia, reduced physical mobility, and caregiver dependence for oral care and feeding. Diet modification is indicated. Pt swallow prognosis is fair-good.

## 2022-03-16 NOTE — SWALLOW VFSS/MBS ASSESSMENT ADULT - RECOMMENDED FEEDING/EATING TECHNIQUES
alternate food with liquid/crush medication (when feasible)/maintain upright posture during/after eating for 30 mins

## 2022-03-16 NOTE — SWALLOW VFSS/MBS ASSESSMENT ADULT - ESOPHAGEAL STAGE
Pt with non-obstructive cervical osteophytes; suspected CP bar vs other at ~C5-6; noted trace amount of retained material above and below; did not impact swallow function.

## 2022-03-16 NOTE — DISCHARGE NOTE PROVIDER - HOSPITAL COURSE
HPI: Patient is an 88F with PMH of JAK2 polycythemia vera, RLE DVT on Eliquis, HTN, vascular dementia, with spontaneous SBO (12/2018) s/p ex lap with bowel resection and anastomosis, now presents to ED for a few days of URI-like symptoms and productive cough x2 days. The following history was obtained from the daughter earlier, patient does not respond to questions at this time. Patient had a few days of URI symptoms before becoming acutely more short of breath, prompting the family to call EMS. Documented hypoxia to 69% per EMS, and patient was started on NRB. Documented sick contacts at home.    In the ED: T=102.4, HR=98, NP=165/98, RR=26, 98% on NRB 10L/min. Pertinent labs: wbc=21.13, gsl=342, BUN/Cr=43/1.81, bIVR=053, trop T=42-->40. VBG pH=7.27, pCO2=41, HCO3=19. +Influenza AH3.   CXR showed R lower lung field airspace opacity versus prominent vasculature  Given ofirmev, tamiflu, zosyn x1, LR 500cc x2.     Hospital Course: Patient treated with IV antibiotics. Was initially on non-rebreather, weaned off to room air. White count also downtrended and no further episodes of fever. Underwent swallow evaluation and recommended for pureed diet with moderately thickened liquids. Stable for discharge home on oral antibiotics.

## 2022-03-16 NOTE — DISCHARGE NOTE PROVIDER - NSDCMRMEDTOKEN_GEN_ALL_CORE_FT
acetaminophen 325 mg oral tablet: 2 tab(s) orally every 6 hours, As needed, Temp greater or equal to 38C (100.4F), Mild Pain (1 - 3)  alendronate 70 mg oral tablet: 1 tab(s) orally once a week ( wednesdays )  amLODIPine 10 mg oral tablet: 1 tab(s) orally once a day  apixaban 2.5 mg oral tablet: 1 tab(s) orally 2 times a day  hydroxyurea 500 mg oral capsule: 1 cap(s) orally 2 times a week on Monday and Friday  levoFLOXacin 750 mg oral tablet: 1 tab(s) orally once a day through March 19th  levothyroxine 112 mcg (0.112 mg) oral tablet: 1 tab(s) orally once a day  melatonin 5 mg oral tablet: 1 tab(s) orally once a day (at bedtime)  metoprolol tartrate 25 mg oral tablet: 1 tab(s) orally 2 times a day  nystatin 100,000 units/g topical cream: Apply topically to affected area 2 times a day  ocular lubricant ophthalmic solution: 1 drop(s) to each affected eye 2 times a day  polyethylene glycol 3350 oral powder for reconstitution: 17 gram(s) orally once a day  QUEtiapine 25 mg oral tablet: 0.5 tab(s) orally once a day (at bedtime), As Needed  senna oral tablet: 2 tab(s) orally once a day (at bedtime)  zinc oxide 20% topical ointment: 1 application topically 2 times a day   acetaminophen 325 mg oral tablet: 2 tab(s) orally every 6 hours, As needed, Temp greater or equal to 38C (100.4F), Mild Pain (1 - 3)  alendronate 70 mg oral tablet: 1 tab(s) orally once a week ( wednesdays )  amLODIPine 10 mg oral tablet: 1 tab(s) orally once a day  apixaban 2.5 mg oral tablet: 1 tab(s) orally 2 times a day  hydroxyurea 500 mg oral capsule: 1 cap(s) orally 2 times a week on Monday and Friday  levothyroxine 112 mcg (0.112 mg) oral tablet: 1 tab(s) orally once a day  melatonin 5 mg oral tablet: 1 tab(s) orally once a day (at bedtime)  metoprolol tartrate 25 mg oral tablet: 1 tab(s) orally 2 times a day  nystatin 100,000 units/g topical cream: Apply topically to affected area 2 times a day  ocular lubricant ophthalmic solution: 1 drop(s) to each affected eye 2 times a day  polyethylene glycol 3350 oral powder for reconstitution: 17 gram(s) orally once a day  QUEtiapine 25 mg oral tablet: 0.5 tab(s) orally once a day (at bedtime), As Needed  senna oral tablet: 2 tab(s) orally once a day (at bedtime)  zinc oxide 20% topical ointment: 1 application topically 2 times a day

## 2022-03-16 NOTE — SWALLOW VFSS/MBS ASSESSMENT ADULT - ORAL PREPARATORY PHASE
Functional anterior loss of 1/2 tsp bolus to below the chin due to reduced labial seal/Anterior loss

## 2022-03-16 NOTE — SWALLOW VFSS/MBS ASSESSMENT ADULT - ADDITIONAL INFORMATION
Pt with ?CP bar at ~C5-6; noted thumb-like protrusion with contrast remaining above and below post swallow; did not impact swallow function. Pt with non-obstructive cervical osteophytes; suspected CP bar vs other at ~C5-6; noted trace amount of retained material above and below; did not impact swallow function.

## 2022-03-16 NOTE — DISCHARGE NOTE PROVIDER - NSDCCPCAREPLAN_GEN_ALL_CORE_FT
PRINCIPAL DISCHARGE DIAGNOSIS  Diagnosis: Acute respiratory failure with hypoxia  Assessment and Plan of Treatment: You presented with shortness of breath, and were found to have pneumonia. You were treated with IV antibiotics, and then switched to oral for discharge. Please follow your specialized diet (pureed which thickened liquids) to avoid aspiration.       PRINCIPAL DISCHARGE DIAGNOSIS  Diagnosis: Acute respiratory failure with hypoxia  Assessment and Plan of Treatment: You presented with shortness of breath, and were found to have pneumonia. You were treated with IV antibiotics, and then switched to oral for discharge. Please take these antibiotics through 3/19.  Please follow your specialized diet (pureed which thickened liquids) to avoid aspiration.

## 2022-03-16 NOTE — SWALLOW VFSS/MBS ASSESSMENT ADULT - NS SWALLOW VFSS REC ASPIR MON
Monitor for s/s aspiration/laryngeal penetration. If noted:  D/C p.o. intake, provide non-oral nutrition/hydration/meds, and contact this service @ [phone]/cough/gurgly voice/fever/pneumonia/throat clearing/upper respiratory infection

## 2022-03-17 ENCOUNTER — TRANSCRIPTION ENCOUNTER (OUTPATIENT)
Age: 87
End: 2022-03-17

## 2022-03-17 VITALS
SYSTOLIC BLOOD PRESSURE: 148 MMHG | TEMPERATURE: 98 F | DIASTOLIC BLOOD PRESSURE: 74 MMHG | OXYGEN SATURATION: 92 % | RESPIRATION RATE: 18 BRPM | HEART RATE: 87 BPM

## 2022-03-17 LAB
GLUCOSE BLDC GLUCOMTR-MCNC: 136 MG/DL — HIGH (ref 70–99)
GLUCOSE BLDC GLUCOMTR-MCNC: 151 MG/DL — HIGH (ref 70–99)
GLUCOSE BLDC GLUCOMTR-MCNC: 156 MG/DL — HIGH (ref 70–99)
GLUCOSE BLDC GLUCOMTR-MCNC: 203 MG/DL — HIGH (ref 70–99)
HCT VFR BLD CALC: 29.3 % — LOW (ref 34.5–45)
HGB BLD-MCNC: 8.2 G/DL — LOW (ref 11.5–15.5)
MCHC RBC-ENTMCNC: 23.4 PG — LOW (ref 27–34)
MCHC RBC-ENTMCNC: 28 GM/DL — LOW (ref 32–36)
MCV RBC AUTO: 83.5 FL — SIGNIFICANT CHANGE UP (ref 80–100)
NRBC # BLD: 0 /100 WBCS — SIGNIFICANT CHANGE UP (ref 0–0)
PLATELET # BLD AUTO: 380 K/UL — SIGNIFICANT CHANGE UP (ref 150–400)
RBC # BLD: 3.51 M/UL — LOW (ref 3.8–5.2)
RBC # FLD: 18.6 % — HIGH (ref 10.3–14.5)
WBC # BLD: 9.67 K/UL — SIGNIFICANT CHANGE UP (ref 3.8–10.5)
WBC # FLD AUTO: 9.67 K/UL — SIGNIFICANT CHANGE UP (ref 3.8–10.5)

## 2022-03-17 PROCEDURE — 83540 ASSAY OF IRON: CPT

## 2022-03-17 PROCEDURE — 82330 ASSAY OF CALCIUM: CPT

## 2022-03-17 PROCEDURE — 0225U NFCT DS DNA&RNA 21 SARSCOV2: CPT

## 2022-03-17 PROCEDURE — 84100 ASSAY OF PHOSPHORUS: CPT

## 2022-03-17 PROCEDURE — 83605 ASSAY OF LACTIC ACID: CPT

## 2022-03-17 PROCEDURE — 85730 THROMBOPLASTIN TIME PARTIAL: CPT

## 2022-03-17 PROCEDURE — 84484 ASSAY OF TROPONIN QUANT: CPT

## 2022-03-17 PROCEDURE — 84295 ASSAY OF SERUM SODIUM: CPT

## 2022-03-17 PROCEDURE — 83036 HEMOGLOBIN GLYCOSYLATED A1C: CPT

## 2022-03-17 PROCEDURE — 84132 ASSAY OF SERUM POTASSIUM: CPT

## 2022-03-17 PROCEDURE — 87040 BLOOD CULTURE FOR BACTERIA: CPT

## 2022-03-17 PROCEDURE — 74230 X-RAY XM SWLNG FUNCJ C+: CPT

## 2022-03-17 PROCEDURE — 84300 ASSAY OF URINE SODIUM: CPT

## 2022-03-17 PROCEDURE — 85027 COMPLETE CBC AUTOMATED: CPT

## 2022-03-17 PROCEDURE — 93308 TTE F-UP OR LMTD: CPT

## 2022-03-17 PROCEDURE — 85014 HEMATOCRIT: CPT

## 2022-03-17 PROCEDURE — 36415 COLL VENOUS BLD VENIPUNCTURE: CPT

## 2022-03-17 PROCEDURE — 82247 BILIRUBIN TOTAL: CPT

## 2022-03-17 PROCEDURE — 92611 MOTION FLUOROSCOPY/SWALLOW: CPT

## 2022-03-17 PROCEDURE — 80053 COMPREHEN METABOLIC PANEL: CPT

## 2022-03-17 PROCEDURE — 83735 ASSAY OF MAGNESIUM: CPT

## 2022-03-17 PROCEDURE — 82728 ASSAY OF FERRITIN: CPT

## 2022-03-17 PROCEDURE — 93005 ELECTROCARDIOGRAM TRACING: CPT

## 2022-03-17 PROCEDURE — 81001 URINALYSIS AUTO W/SCOPE: CPT

## 2022-03-17 PROCEDURE — 84145 PROCALCITONIN (PCT): CPT

## 2022-03-17 PROCEDURE — 87641 MR-STAPH DNA AMP PROBE: CPT

## 2022-03-17 PROCEDURE — 82435 ASSAY OF BLOOD CHLORIDE: CPT

## 2022-03-17 PROCEDURE — 82607 VITAMIN B-12: CPT

## 2022-03-17 PROCEDURE — 99285 EMERGENCY DEPT VISIT HI MDM: CPT

## 2022-03-17 PROCEDURE — 80048 BASIC METABOLIC PNL TOTAL CA: CPT

## 2022-03-17 PROCEDURE — 82570 ASSAY OF URINE CREATININE: CPT

## 2022-03-17 PROCEDURE — 83615 LACTATE (LD) (LDH) ENZYME: CPT

## 2022-03-17 PROCEDURE — 82746 ASSAY OF FOLIC ACID SERUM: CPT

## 2022-03-17 PROCEDURE — 82962 GLUCOSE BLOOD TEST: CPT

## 2022-03-17 PROCEDURE — 83880 ASSAY OF NATRIURETIC PEPTIDE: CPT

## 2022-03-17 PROCEDURE — 87640 STAPH A DNA AMP PROBE: CPT

## 2022-03-17 PROCEDURE — 82947 ASSAY GLUCOSE BLOOD QUANT: CPT

## 2022-03-17 PROCEDURE — 85018 HEMOGLOBIN: CPT

## 2022-03-17 PROCEDURE — 83550 IRON BINDING TEST: CPT

## 2022-03-17 PROCEDURE — 87086 URINE CULTURE/COLONY COUNT: CPT

## 2022-03-17 PROCEDURE — 96375 TX/PRO/DX INJ NEW DRUG ADDON: CPT

## 2022-03-17 PROCEDURE — 85610 PROTHROMBIN TIME: CPT

## 2022-03-17 PROCEDURE — 82803 BLOOD GASES ANY COMBINATION: CPT

## 2022-03-17 PROCEDURE — 85045 AUTOMATED RETICULOCYTE COUNT: CPT

## 2022-03-17 PROCEDURE — 92610 EVALUATE SWALLOWING FUNCTION: CPT

## 2022-03-17 PROCEDURE — 99239 HOSP IP/OBS DSCHRG MGMT >30: CPT

## 2022-03-17 PROCEDURE — 97161 PT EVAL LOW COMPLEX 20 MIN: CPT

## 2022-03-17 PROCEDURE — 82248 BILIRUBIN DIRECT: CPT

## 2022-03-17 PROCEDURE — 85025 COMPLETE CBC W/AUTO DIFF WBC: CPT

## 2022-03-17 PROCEDURE — 96374 THER/PROPH/DIAG INJ IV PUSH: CPT

## 2022-03-17 PROCEDURE — 71045 X-RAY EXAM CHEST 1 VIEW: CPT

## 2022-03-17 RX ORDER — CIPROFLOXACIN LACTATE 400MG/40ML
1 VIAL (ML) INTRAVENOUS
Qty: 4 | Refills: 0
Start: 2022-03-17 | End: 2022-03-20

## 2022-03-17 RX ORDER — INSULIN LISPRO 100/ML
VIAL (ML) SUBCUTANEOUS
Refills: 0 | Status: DISCONTINUED | OUTPATIENT
Start: 2022-03-17 | End: 2022-03-17

## 2022-03-17 RX ORDER — METOPROLOL TARTRATE 50 MG
25 TABLET ORAL
Refills: 0 | Status: DISCONTINUED | OUTPATIENT
Start: 2022-03-17 | End: 2022-03-17

## 2022-03-17 RX ORDER — SITAGLIPTIN 50 MG/1
1 TABLET, FILM COATED ORAL
Qty: 30 | Refills: 0
Start: 2022-03-17 | End: 2022-04-15

## 2022-03-17 RX ORDER — AMLODIPINE BESYLATE 2.5 MG/1
10 TABLET ORAL DAILY
Refills: 0 | Status: DISCONTINUED | OUTPATIENT
Start: 2022-03-17 | End: 2022-03-17

## 2022-03-17 RX ORDER — LEVOFLOXACIN 5 MG/ML
1 INJECTION, SOLUTION INTRAVENOUS
Qty: 4 | Refills: 0
Start: 2022-03-17 | End: 2022-03-20

## 2022-03-17 RX ORDER — METFORMIN HYDROCHLORIDE 850 MG/1
1 TABLET ORAL
Qty: 60 | Refills: 0
Start: 2022-03-17 | End: 2022-04-15

## 2022-03-17 RX ADMIN — Medication 75 MICROGRAM(S): at 05:32

## 2022-03-17 RX ADMIN — Medication 25 MILLIGRAM(S): at 08:15

## 2022-03-17 RX ADMIN — PIPERACILLIN AND TAZOBACTAM 25 GRAM(S): 4; .5 INJECTION, POWDER, LYOPHILIZED, FOR SOLUTION INTRAVENOUS at 05:22

## 2022-03-17 RX ADMIN — Medication 1: at 08:14

## 2022-03-17 RX ADMIN — AMLODIPINE BESYLATE 10 MILLIGRAM(S): 2.5 TABLET ORAL at 08:15

## 2022-03-17 RX ADMIN — APIXABAN 2.5 MILLIGRAM(S): 2.5 TABLET, FILM COATED ORAL at 05:23

## 2022-03-17 NOTE — PROGRESS NOTE ADULT - PROBLEM SELECTOR PLAN 4
Patient with baseline dementia  - Quetiapine 12.5mg qHS PRN for agitation

## 2022-03-17 NOTE — DISCHARGE NOTE NURSING/CASE MANAGEMENT/SOCIAL WORK - NSDCPEFALRISK_GEN_ALL_CORE
For information on Fall & Injury Prevention, visit: [email or web address] OR  [email or web address] OR  https://www.cdc.gov/steadi/patient.html

## 2022-03-17 NOTE — PROGRESS NOTE ADULT - PROBLEM SELECTOR PLAN 5
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

## 2022-03-17 NOTE — PROGRESS NOTE ADULT - PROBLEM SELECTOR PLAN 1
Documented SpO2=69% by EMS, in the setting of influenza and possible superimposed bacterial infection  - CXR showed right lower lung field airspace opacity versus prominent vasculature  - s/p zosyn x1  - c/w infectious workup as below  - Now on RA
Documented SpO2=69% by EMS, in the setting of influenza and possible superimposed bacterial infection  - CXR showed right lower lung field airspace opacity versus prominent vasculature  - continue on zosyn   - c/w infectious workup as below  - Now on RA
Documented SpO2=69% by EMS, in the setting of influenza and possible superimposed bacterial infection  - CXR showed right lower lung field airspace opacity versus prominent vasculature  - continue on zosyn   - c/w infectious workup as below  - Now on RA
Documented SpO2=69% by EMS, in the setting of influenza and possible superimposed bacterial infection  - CXR showed right lower lung field airspace opacity versus prominent vasculature  - s/p zosyn x1  - c/w infectious workup as below  - Satting well on 6L now

## 2022-03-17 NOTE — PROGRESS NOTE ADULT - PROBLEM SELECTOR PLAN 7
BUN/Cr=43/1.81, baseline Cr=0.90 in Jan 2021, MIKKI possibly secondary to decreased PO intake  vs. sepsis  - s/p IVF in ED  - c/w maintenance NS @75cc/hr x12h  - avoid nephrotoxic agents  - trend BMP  - f/u Natalee, Ucr
BUN/Cr=43/1.81, baseline Cr=0.90 in Jan 2021, MIKKI possibly secondary to decreased PO intake  vs. sepsis  - s/p IVF in ED  - s/p maintenance NS @75cc/hr x24h  - avoid nephrotoxic agents  - trend BMP
BUN/Cr=43/1.81, baseline Cr=0.90 in Jan 2021, MIKKI possibly secondary to decreased PO intake  vs. sepsis  - s/p IVF in ED  - s/p maintenance NS @75cc/hr x12h  - avoid nephrotoxic agents  - trend BMP
BUN/Cr=43/1.81, baseline Cr=0.90 in Jan 2021, MIKKI possibly secondary to decreased PO intake  vs. sepsis  - s/p IVF in ED  - s/p maintenance NS @75cc/hr x24h  - avoid nephrotoxic agents  - trend BMP

## 2022-03-17 NOTE — PROGRESS NOTE ADULT - PROBLEM SELECTOR PLAN 3
Patient tested positive for +Influenza AH3  - s/p tamiflu and 500cc NS x2  in ED  - c/w tamiflu x5d  - c/w maintenance IVF @ 75cc/h x12h  - monitor fever curve
Patient tested positive for +Influenza AH3  - s/p tamiflu and 500cc NS x2  in ED  - c/w tamiflu x5d  - s/p maintenance IVF @ 75cc/h x24hr  - monitor fever curve
Patient tested positive for +Influenza AH3  - s/p tamiflu and 500cc NS x2  in ED  - c/w tamiflu x5d  - s/p maintenance IVF @ 75cc/h x24hr  - monitor fever curve
Patient tested positive for +Influenza AH3  - s/p tamiflu and 500cc NS x2  in ED  - c/w tamiflu x5d  - c/w maintenance IVF @ 75cc/h x12h  - monitor fever curve

## 2022-03-17 NOTE — PROVIDER CONTACT NOTE (OTHER) - ACTION/TREATMENT ORDERED:
Team 1 Will follow-up
MD aware and at bedside to assess pt. Continue to monitor and notify of any changes

## 2022-03-17 NOTE — PROGRESS NOTE ADULT - PROBLEM SELECTOR PROBLEM 9
T2DM (type 2 diabetes mellitus)

## 2022-03-17 NOTE — PROGRESS NOTE ADULT - PROBLEM SELECTOR PLAN 10
- c/w levothyroxine 112mcg daily  - f/u TSH
- c/w levothyroxine 112mcg daily
- c/w levothyroxine 112mcg daily  - f/u TSH
- c/w levothyroxine 112mcg daily  - f/u TSH

## 2022-03-17 NOTE — PROGRESS NOTE ADULT - PROBLEM SELECTOR PLAN 2
Patient meets sepsis criteria with fever to T=102.4, tachycardia, tachypnea and leukocytosis wbc=21.13, in the setting of Influenza A and likely superimposed RLL bacterial pneumonia  -c/w zosyn and Vancomycin empirically for superimposed bacterial infection  - f/u blood Cx x2, urine Cx, procalcitonin, MRSA PCR
Patient meets sepsis criteria with fever to T=102.4, tachycardia, tachypnea and leukocytosis wbc=21.13, in the setting of Influenza A and likely superimposed RLL bacterial pneumonia  - UA negative   - Febrile overnight to 100.4, likely in setting of viral illness   - c/w zosyn, MRSA negative so no need for Vanc   - Blood Cx NGTD, Ucx pending
Patient meets sepsis criteria with fever to T=102.4, tachycardia, tachypnea and leukocytosis wbc=21.13, in the setting of Influenza A and likely superimposed RLL bacterial pneumonia  - UA negative   - Febrile overnight to 100.4, likely in setting of viral illness   - c/w zosyn, MRSA negative so no need for Vanc   - Blood Cx NGTD, Ucx pending
Patient meets sepsis criteria with fever to T=102.4, tachycardia, tachypnea and leukocytosis wbc=21.13, in the setting of Influenza A and likely superimposed RLL bacterial pneumonia  - UA negative   - c/w zosyn, MRSA negative so no need for Vanc   - Blood Cx NGTD, Ucx pending  - D/c today

## 2022-03-17 NOTE — PROGRESS NOTE ADULT - PROBLEM SELECTOR PLAN 11
DVT ppx: Eliquis for prior DVT  Diet: Pureed diet, moderately thickened liquids
DVT ppx: Eliquis for prior DVT  Diet: S/S rec NPO pending MBS
DVT ppx: Eliquis for prior DVT  Diet: Now on NC, obtain dysphagia screen prior to starting diet
DVT ppx: Eliquis for prior DVT  Diet: Pureed diet, moderately thickened liquids    Dispo: D/c today

## 2022-03-17 NOTE — PROGRESS NOTE ADULT - PROBLEM SELECTOR PLAN 8
On home amlodipine 10mg qAM and metoprolol tartrate 25mg BID  -Restart antihypertensive medications for hypertension overnight
On home amlodipine 10mg qAM and metoprolol tartrate 25mg BID  - hold antihypertensive medications for now in the setting of sepsis and soft BP in ED

## 2022-03-17 NOTE — PROGRESS NOTE ADULT - SUBJECTIVE AND OBJECTIVE BOX
Authored By:  Ludwig Schroeder MD  PGY-1, Internal Medicine    INTERVAL HPI/OVERNIGHT EVENTS:     SUBJECTIVE: Patient seen and examined at bedside this morning.     OBJECTIVE:      03-16 @ 07:01  -  03-17 @ 07:00  --------------------------------------------------------  IN: 100 mL / OUT: 900 mL / NET: -800 mL      CAPILLARY BLOOD GLUCOSE      POCT Blood Glucose.: 156 mg/dL (17 Mar 2022 05:46)      ICU Vital Signs Last 24 Hrs  T(C): 36.9 (17 Mar 2022 06:00), Max: 37.8 (17 Mar 2022 00:26)  T(F): 98.5 (17 Mar 2022 06:00), Max: 100 (17 Mar 2022 00:26)  HR: 85 (17 Mar 2022 06:00) (83 - 91)  BP: 174/94 (17 Mar 2022 06:00) (142/88 - 190/97)  BP(mean): --  ABP: --  ABP(mean): --  RR: 18 (17 Mar 2022 06:00) (18 - 18)  SpO2: 94% (17 Mar 2022 06:00) (93% - 97%)    PHYSICAL EXAM:      MEDICATIONS:  MEDICATIONS  (STANDING):  amLODIPine   Tablet 10 milliGRAM(s) Oral daily  apixaban 2.5 milliGRAM(s) Oral two times a day  dextrose 40% Gel 15 Gram(s) Oral once  dextrose 5%. 1000 milliLiter(s) (50 mL/Hr) IV Continuous <Continuous>  dextrose 5%. 1000 milliLiter(s) (100 mL/Hr) IV Continuous <Continuous>  dextrose 50% Injectable 25 Gram(s) IV Push once  dextrose 50% Injectable 12.5 Gram(s) IV Push once  dextrose 50% Injectable 25 Gram(s) IV Push once  glucagon  Injectable 1 milliGRAM(s) IntraMuscular once  hydroxyurea 500 milliGRAM(s) Oral <User Schedule>  insulin lispro (ADMELOG) corrective regimen sliding scale   SubCutaneous three times a day before meals  levothyroxine Injectable 75 MICROGram(s) IV Push <User Schedule>  metoprolol tartrate 25 milliGRAM(s) Oral two times a day  piperacillin/tazobactam IVPB.. 3.375 Gram(s) IV Intermittent every 12 hours  sodium chloride 0.9%. 1000 milliLiter(s) (50 mL/Hr) IV Continuous <Continuous>    MEDICATIONS  (PRN):  acetaminophen     Tablet .. 650 milliGRAM(s) Oral every 6 hours PRN Temp greater or equal to 38C (100.4F)  QUEtiapine 12.5 milliGRAM(s) Oral at bedtime PRN agitation      ALLERGIES:  Allergies    No Known Drug Allergies  spices (Rash)    Intolerances        LABS:                        7.6    7.63  )-----------( 351      ( 16 Mar 2022 06:48 )             27.6     Hemoglobin: 7.6 g/dL (03-16 @ 06:48)  Hemoglobin: 7.8 g/dL (03-15 @ 21:22)  Hemoglobin: 7.2 g/dL (03-15 @ 11:22)  Hemoglobin: 7.3 g/dL (03-15 @ 07:11)  Hemoglobin: 9.4 g/dL (03-13 @ 20:51)    CBC Full  -  ( 16 Mar 2022 06:48 )  WBC Count : 7.63 K/uL  RBC Count : 3.20 M/uL  Hemoglobin : 7.6 g/dL  Hematocrit : 27.6 %  Platelet Count - Automated : 351 K/uL  Mean Cell Volume : 86.3 fl  Mean Cell Hemoglobin : 23.8 pg  Mean Cell Hemoglobin Concentration : 27.5 gm/dL  Auto Neutrophil # : x  Auto Lymphocyte # : x  Auto Monocyte # : x  Auto Eosinophil # : x  Auto Basophil # : x  Auto Neutrophil % : x  Auto Lymphocyte % : x  Auto Monocyte % : x  Auto Eosinophil % : x  Auto Basophil % : x    03-16    144  |  114<H>  |  23  ----------------------------<  121<H>  3.6   |  18<L>  |  1.29    Ca    8.7      16 Mar 2022 06:48  Phos  2.4     03-16  Mg     1.6     03-16    TPro  x   /  Alb  x   /  TBili  0.2  /  DBili  <0.1  /  AST  x   /  ALT  x   /  AlkPhos  x   03-15    Creatinine Trend: 1.29<--, 1.54<--, 1.81<--        hs Troponin:              CSF:                      EKG:   MICROBIOLOGY:    IMAGING:      Labs, imaging, EKG personally reviewed    RADIOLOGY & ADDITIONAL TESTS: Reviewed. Authored By:  Ludwig Schroeder MD  PGY-1, Internal Medicine    INTERVAL HPI/OVERNIGHT EVENTS:     SUBJECTIVE: Patient seen and examined at bedside this morning. No acute events overnight. AOx0, ROS unattainable.     OBJECTIVE:      03-16 @ 07:01  -  03-17 @ 07:00  --------------------------------------------------------  IN: 100 mL / OUT: 900 mL / NET: -800 mL      POCT Blood Glucose.: 156 mg/dL (17 Mar 2022 05:46)      Vital Signs Last 24 Hrs  T(C): 36.9 (17 Mar 2022 06:00), Max: 37.8 (17 Mar 2022 00:26)  T(F): 98.5 (17 Mar 2022 06:00), Max: 100 (17 Mar 2022 00:26)  HR: 85 (17 Mar 2022 06:00) (83 - 91)  BP: 174/94 (17 Mar 2022 06:00) (142/88 - 190/97)  BP(mean): --  ABP: --  ABP(mean): --  RR: 18 (17 Mar 2022 06:00) (18 - 18)  SpO2: 94% (17 Mar 2022 06:00) (93% - 97%)    PHYSICAL EXAM:  Gen: AAOx0, non-toxic  HEENT: EOMI, oral mucosa moist, normal conjunctiva  Lung: CTAB, no respiratory distress, no wheezes/rhonchi/rales B/L, speaking in full sentences  CV: RRR, no murmurs, rubs or gallops, no le edema, no jvd   Abd: soft, NTND, no guarding, no CVA tenderness  Neuro: No focal sensory or motor deficits, normal CN exam   Psych: normal affect.      MEDICATIONS:  MEDICATIONS  (STANDING):  amLODIPine   Tablet 10 milliGRAM(s) Oral daily  apixaban 2.5 milliGRAM(s) Oral two times a day  dextrose 40% Gel 15 Gram(s) Oral once  dextrose 5%. 1000 milliLiter(s) (50 mL/Hr) IV Continuous <Continuous>  dextrose 5%. 1000 milliLiter(s) (100 mL/Hr) IV Continuous <Continuous>  dextrose 50% Injectable 25 Gram(s) IV Push once  dextrose 50% Injectable 12.5 Gram(s) IV Push once  dextrose 50% Injectable 25 Gram(s) IV Push once  glucagon  Injectable 1 milliGRAM(s) IntraMuscular once  hydroxyurea 500 milliGRAM(s) Oral <User Schedule>  insulin lispro (ADMELOG) corrective regimen sliding scale   SubCutaneous three times a day before meals  levothyroxine Injectable 75 MICROGram(s) IV Push <User Schedule>  metoprolol tartrate 25 milliGRAM(s) Oral two times a day  piperacillin/tazobactam IVPB.. 3.375 Gram(s) IV Intermittent every 12 hours  sodium chloride 0.9%. 1000 milliLiter(s) (50 mL/Hr) IV Continuous <Continuous>    MEDICATIONS  (PRN):  acetaminophen     Tablet .. 650 milliGRAM(s) Oral every 6 hours PRN Temp greater or equal to 38C (100.4F)  QUEtiapine 12.5 milliGRAM(s) Oral at bedtime PRN agitation      ALLERGIES:  Allergies    No Known Drug Allergies  spices (Rash)    Intolerances        LABS:                        7.6    7.63  )-----------( 351      ( 16 Mar 2022 06:48 )             27.6     Hemoglobin: 7.6 g/dL (03-16 @ 06:48)  Hemoglobin: 7.8 g/dL (03-15 @ 21:22)  Hemoglobin: 7.2 g/dL (03-15 @ 11:22)  Hemoglobin: 7.3 g/dL (03-15 @ 07:11)  Hemoglobin: 9.4 g/dL (03-13 @ 20:51)    CBC Full  -  ( 16 Mar 2022 06:48 )  WBC Count : 7.63 K/uL  RBC Count : 3.20 M/uL  Hemoglobin : 7.6 g/dL  Hematocrit : 27.6 %  Platelet Count - Automated : 351 K/uL  Mean Cell Volume : 86.3 fl  Mean Cell Hemoglobin : 23.8 pg  Mean Cell Hemoglobin Concentration : 27.5 gm/dL  Auto Neutrophil # : x  Auto Lymphocyte # : x  Auto Monocyte # : x  Auto Eosinophil # : x  Auto Basophil # : x  Auto Neutrophil % : x  Auto Lymphocyte % : x  Auto Monocyte % : x  Auto Eosinophil % : x  Auto Basophil % : x    03-16    144  |  114<H>  |  23  ----------------------------<  121<H>  3.6   |  18<L>  |  1.29    Ca    8.7      16 Mar 2022 06:48  Phos  2.4     03-16  Mg     1.6     03-16    TPro  x   /  Alb  x   /  TBili  0.2  /  DBili  <0.1  /  AST  x   /  ALT  x   /  AlkPhos  x   03-15    Creatinine Trend: 1.29<--, 1.54<--, 1.81<--        EKG:   MICROBIOLOGY:    IMAGING:      Labs, imaging, EKG personally reviewed    RADIOLOGY & ADDITIONAL TESTS: Reviewed.

## 2022-03-17 NOTE — PROGRESS NOTE ADULT - ATTENDING COMMENTS
D/c planning for tomorrow.
Now on 6L NC, saturating well. Monitor.
Now on room air. Continue Zosyn. MBS tomorrow.
D/c home w f/u. D/c time 35 mins.

## 2022-03-17 NOTE — PROGRESS NOTE ADULT - PROBLEM SELECTOR PROBLEM 7
MIKKI (acute kidney injury)
MKIKI (acute kidney injury)

## 2022-03-17 NOTE — DISCHARGE NOTE NURSING/CASE MANAGEMENT/SOCIAL WORK - PATIENT PORTAL LINK FT
You can access the FollowMyHealth Patient Portal offered by [facility] by registering at the following website: [email or web address]. By joining [employer]’s FollowMyHealth portal, you will also be able to view your health information using other applications (apps) compatible with our system.

## 2022-03-17 NOTE — PROGRESS NOTE ADULT - PROBLEM SELECTOR PLAN 6
History of  recurrent RLE DVT (2008, 2010) on Eliquis  - c/w Eliquis 2.5mg BID

## 2022-03-17 NOTE — PROGRESS NOTE ADULT - ASSESSMENT
Patient is an 88F with PMH of JAK2 polycythemia vera, recurrent RLE DVT (2008, 2010) on Eliquis, HTN, vascular dementia, with spontaneous SBO (12/2018) s/p ex lap with bowel resection and anastomosis now presents to ED with acute hypoxemic respiratory failure and sepsis in the setting of influenza A with possible superimposed RLL bacterial pneumonia
Patient is an 88F with PMH of JAK2 polycythemia vera, recurrent RLE DVT (2008, 2010) on Eliquis, HTN, vascular dementia, with spontaneous SBO (12/2018) s/p ex lap with bowel resection and anastomosis now presents to ED with acute hypoxemic respiratory failure and sepsis in the setting of influenza A with possible superimposed RLL bacterial pneumonia, now improved. 
Patient is an 88F with PMH of JAK2 polycythemia vera, recurrent RLE DVT (2008, 2010) on Eliquis, HTN, vascular dementia, with spontaneous SBO (12/2018) s/p ex lap with bowel resection and anastomosis now presents to ED with acute hypoxemic respiratory failure and sepsis in the setting of influenza A with possible superimposed RLL bacterial pneumonia vs. less likely PE in the setting pf PV, also with MIKKI.
Patient is an 88F with PMH of JAK2 polycythemia vera, recurrent RLE DVT (2008, 2010) on Eliquis, HTN, vascular dementia, with spontaneous SBO (12/2018) s/p ex lap with bowel resection and anastomosis now presents to ED with acute hypoxemic respiratory failure and sepsis in the setting of influenza A with possible superimposed RLL bacterial pneumonia, now improved.

## 2022-03-17 NOTE — PROGRESS NOTE ADULT - PROBLEM SELECTOR PROBLEM 6
Recurrent deep vein thrombosis (DVT)

## 2022-03-18 ENCOUNTER — NON-APPOINTMENT (OUTPATIENT)
Age: 87
End: 2022-03-18

## 2022-03-20 NOTE — DISCHARGE NOTE ADULT - NS AS DC STROKE ED MATERIALS
Call 911 for Stroke/Stroke Education Booklet/Prescribed Medications/Need for Followup After Discharge/Stroke Warning Signs and Symptoms/Risk Factors for Stroke show

## 2022-04-01 ENCOUNTER — NON-APPOINTMENT (OUTPATIENT)
Age: 87
End: 2022-04-01

## 2022-04-13 LAB
BASOPHILS # BLD AUTO: 0.17 K/UL
BASOPHILS NFR BLD AUTO: 1.2 %
EOSINOPHIL # BLD AUTO: 0.7 K/UL
EOSINOPHIL NFR BLD AUTO: 4.8 %
HCT VFR BLD CALC: 35.7 %
HGB BLD-MCNC: 9.8 G/DL
IMM GRANULOCYTES NFR BLD AUTO: 0.7 %
LYMPHOCYTES # BLD AUTO: 1.6 K/UL
LYMPHOCYTES NFR BLD AUTO: 11 %
MAN DIFF?: NORMAL
MCHC RBC-ENTMCNC: 22.7 PG
MCHC RBC-ENTMCNC: 27.5 GM/DL
MCV RBC AUTO: 82.8 FL
MONOCYTES # BLD AUTO: 0.63 K/UL
MONOCYTES NFR BLD AUTO: 4.3 %
NEUTROPHILS # BLD AUTO: 11.29 K/UL
NEUTROPHILS NFR BLD AUTO: 78 %
PLATELET # BLD AUTO: 614 K/UL
RBC # BLD: 4.31 M/UL
RBC # FLD: 20.1 %
WBC # FLD AUTO: 14.49 K/UL

## 2022-04-20 ENCOUNTER — OUTPATIENT (OUTPATIENT)
Dept: OUTPATIENT SERVICES | Facility: HOSPITAL | Age: 87
LOS: 1 days | Discharge: HOME | End: 2022-04-20

## 2022-04-20 DIAGNOSIS — C92.10 CHRONIC MYELOID LEUKEMIA, BCR/ABL-POSITIVE, NOT HAVING ACHIEVED REMISSION: ICD-10-CM

## 2022-04-20 DIAGNOSIS — Z90.49 ACQUIRED ABSENCE OF OTHER SPECIFIED PARTS OF DIGESTIVE TRACT: Chronic | ICD-10-CM

## 2022-04-21 ENCOUNTER — LABORATORY RESULT (OUTPATIENT)
Age: 87
End: 2022-04-21

## 2022-04-22 ENCOUNTER — APPOINTMENT (OUTPATIENT)
Dept: HEMATOLOGY ONCOLOGY | Facility: CLINIC | Age: 87
End: 2022-04-22

## 2022-04-25 ENCOUNTER — APPOINTMENT (OUTPATIENT)
Dept: HEMATOLOGY ONCOLOGY | Facility: CLINIC | Age: 87
End: 2022-04-25
Payer: MEDICARE

## 2022-04-25 PROCEDURE — 99203 OFFICE O/P NEW LOW 30 MIN: CPT | Mod: 95

## 2022-04-25 RX ORDER — SITAGLIPTIN 25 MG/1
25 TABLET, FILM COATED ORAL DAILY
Qty: 30 | Refills: 3 | Status: DISCONTINUED | COMMUNITY
Start: 2020-12-14 | End: 2022-04-25

## 2022-04-25 RX ORDER — ZOSTER VACCINE RECOMBINANT, ADJUVANTED 50 MCG/0.5
50 KIT INTRAMUSCULAR ONCE
Qty: 1 | Refills: 0 | Status: DISCONTINUED | COMMUNITY
Start: 2018-10-19 | End: 2022-04-25

## 2022-04-26 NOTE — REVIEW OF SYSTEMS
[Confused] : confusion [Negative] : ENT [Fever] : no fever [Chills] : no chills [Night Sweats] : no night sweats [Fatigue] : no fatigue [Recent Change In Weight] : ~T no recent weight change [Eye Pain] : no eye pain [Dysphagia] : no dysphagia [Odynophagia] : no odynophagia [Chest Pain] : no chest pain [Shortness Of Breath] : no shortness of breath [Abdominal Pain] : no abdominal pain [Vomiting] : no vomiting [Joint Pain] : no joint pain [Skin Rash] : no skin rash [Easy Bleeding] : no tendency for easy bleeding [Easy Bruising] : no tendency for easy bruising [FreeTextEntry9] : left foot neuropathy and left heel pressure sore  [de-identified] : dementia

## 2022-04-26 NOTE — ASSESSMENT
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  \par -She is on Hydroxyurea 500 mg TIW- noted drop in HGB to 8.8 g/dl, WBC  8.53 K/uL,  HCT 32.1 %,  PLTS 751 K/uL. (4/21/22)  Results reviewed with patient's daughter. \par -Continue same dose\par -Will monitor labs monthly\par \par 2) DVT\par -patient is on  anticoagulation with Eliquis, she is very high risk for recurrent VTE including acute DVT/PE with HARRIS 2+ PV and history of multiple DVTs, benefit of anticoagulation outweights risk in patient's case. Continue Eliquis  2.5 mg BID as per recommendations in the literature >79 yo and <60Kg.\par \par Diabetes: On Tradjenta and Metformin\par HTN: On Amlodipine, Metoprolol\par HLD: on Atorvastatin.\par \par RTC 3 months. \par \par Case and management discussed with Dr. Chamberlain\par \par \par \par \par \par \par \par

## 2022-04-26 NOTE — PHYSICAL EXAM
[Capable of only limited self care, confined to bed or chair more than 50% of waking hours] : Status 3- Capable of only limited self care, confined to bed or chair more than 50% of waking hours [de-identified] : Elderly woman seen sitting in chair. limited due to TEB

## 2022-04-26 NOTE — HISTORY OF PRESENT ILLNESS
[Disease:__________________________] : Disease: [unfilled] [Home] : at home, [unfilled] , at the time of the visit. [Medical Office: ([facility])___] : at the medical office located in  [Other:____] : [unfilled] [de-identified] : 88F JAK2 + PV, recurrent RLE DVT (2008 then 2010) previously on warfarin (discontinued by neurology), HTN/HLD, DM II with spontaneous SBO in 12/2018 s/p ex lap with bowel resection and anastomosis now presents for follow up from rehab.\par \par  [de-identified] : Patient seen via telehealth in follow up with daughter, Magalie. Patient was admitted to [facility] from 3/13/22-3/17/22 for pneumonia. She was treated with IV antibiotics, and then\par switched to oral for discharge. Magalie states that patient had a pressure ulcer on left heel s/p two weeks after discharge, which is being followed by a podiatrist at home. Wound is cleaned and dressed every other day by a home care nurse. Patient is currently taking Hydrea 500 mg TIW, Monday and Friday. Per daughter, patient is doing well. Denies fevers, chills, night sweat, unintentional weight loss, headache, dizziness,pruritus, or any other medical complaints at this time.\par \par No other changes in her medical, surgical or social history since  1/24/2022.  [FreeTextEntry3] : Daughter Magalie

## 2022-05-02 ENCOUNTER — NON-APPOINTMENT (OUTPATIENT)
Age: 87
End: 2022-05-02

## 2022-05-02 RX ORDER — APIXABAN 2.5 MG/1
2.5 TABLET, FILM COATED ORAL
Qty: 180 | Refills: 6 | Status: DISCONTINUED | COMMUNITY
Start: 2019-10-31 | End: 2022-05-02

## 2022-05-19 ENCOUNTER — RX RENEWAL (OUTPATIENT)
Age: 87
End: 2022-05-19

## 2022-05-23 ENCOUNTER — NON-APPOINTMENT (OUTPATIENT)
Age: 87
End: 2022-05-23

## 2022-05-23 ENCOUNTER — RX RENEWAL (OUTPATIENT)
Age: 87
End: 2022-05-23

## 2022-05-23 RX ORDER — LEVOTHYROXINE SODIUM 0.1 MG/1
100 TABLET ORAL
Qty: 30 | Refills: 0 | Status: ACTIVE | COMMUNITY
Start: 2019-07-03 | End: 1900-01-01

## 2022-05-23 RX ORDER — AMLODIPINE BESYLATE 5 MG/1
5 TABLET ORAL DAILY
Qty: 30 | Refills: 0 | Status: ACTIVE | COMMUNITY
Start: 2021-02-11 | End: 1900-01-01

## 2022-05-24 ENCOUNTER — LABORATORY RESULT (OUTPATIENT)
Age: 87
End: 2022-05-24

## 2022-05-24 ENCOUNTER — NON-APPOINTMENT (OUTPATIENT)
Age: 87
End: 2022-05-24

## 2022-05-25 LAB
BASOPHILS # BLD AUTO: 0.15 K/UL
BASOPHILS NFR BLD AUTO: 1 %
EOSINOPHIL # BLD AUTO: 0.69 K/UL
EOSINOPHIL NFR BLD AUTO: 4.6 %
HCT VFR BLD CALC: 32.1 %
HGB BLD-MCNC: 9 G/DL
IMM GRANULOCYTES NFR BLD AUTO: 0.8 %
LYMPHOCYTES # BLD AUTO: 2.01 K/UL
LYMPHOCYTES NFR BLD AUTO: 13.5 %
MAN DIFF?: NORMAL
MCHC RBC-ENTMCNC: 22.3 PG
MCHC RBC-ENTMCNC: 28 GM/DL
MCV RBC AUTO: 79.5 FL
MONOCYTES # BLD AUTO: 0.89 K/UL
MONOCYTES NFR BLD AUTO: 6 %
NEUTROPHILS # BLD AUTO: 10.98 K/UL
NEUTROPHILS NFR BLD AUTO: 74.1 %
PLATELET # BLD AUTO: 649 K/UL
RBC # BLD: 4.04 M/UL
RBC # FLD: 20.8 %
WBC # FLD AUTO: 14.84 K/UL

## 2022-05-26 LAB
ALBUMIN SERPL ELPH-MCNC: 4.1 G/DL
ALP BLD-CCNC: 86 U/L
ALT SERPL-CCNC: 11 U/L
ANION GAP SERPL CALC-SCNC: 14 MMOL/L
AST SERPL-CCNC: 17 U/L
BILIRUB SERPL-MCNC: 0.2 MG/DL
BUN SERPL-MCNC: 27 MG/DL
CALCIUM SERPL-MCNC: 9.9 MG/DL
CHLORIDE SERPL-SCNC: 106 MMOL/L
CO2 SERPL-SCNC: 20 MMOL/L
CREAT SERPL-MCNC: 1.21 MG/DL
EGFR: 43 ML/MIN/1.73M2
GLUCOSE SERPL-MCNC: 110 MG/DL
POTASSIUM SERPL-SCNC: 5 MMOL/L
PROT SERPL-MCNC: 6.9 G/DL
SODIUM SERPL-SCNC: 140 MMOL/L

## 2022-06-01 ENCOUNTER — OUTPATIENT (OUTPATIENT)
Dept: OUTPATIENT SERVICES | Facility: HOSPITAL | Age: 87
LOS: 1 days | Discharge: HOME | End: 2022-06-01

## 2022-06-01 DIAGNOSIS — C92.10 CHRONIC MYELOID LEUKEMIA, BCR/ABL-POSITIVE, NOT HAVING ACHIEVED REMISSION: ICD-10-CM

## 2022-06-01 DIAGNOSIS — Z90.49 ACQUIRED ABSENCE OF OTHER SPECIFIED PARTS OF DIGESTIVE TRACT: Chronic | ICD-10-CM

## 2022-06-06 ENCOUNTER — APPOINTMENT (OUTPATIENT)
Dept: HEMATOLOGY ONCOLOGY | Facility: CLINIC | Age: 87
End: 2022-06-06
Payer: COMMERCIAL

## 2022-06-06 PROCEDURE — 99215 OFFICE O/P EST HI 40 MIN: CPT | Mod: 95

## 2022-06-06 RX ORDER — LEVOFLOXACIN 750 MG/1
750 TABLET, FILM COATED ORAL
Qty: 4 | Refills: 0 | Status: ACTIVE | COMMUNITY
Start: 2022-03-17

## 2022-06-06 NOTE — REVIEW OF SYSTEMS
[Confused] : confusion [Negative] : Heme/Lymph [Fever] : no fever [Chills] : no chills [Night Sweats] : no night sweats [Fatigue] : no fatigue [Recent Change In Weight] : ~T no recent weight change [Eye Pain] : no eye pain [Dysphagia] : no dysphagia [Odynophagia] : no odynophagia [Chest Pain] : no chest pain [Shortness Of Breath] : no shortness of breath [Abdominal Pain] : no abdominal pain [Vomiting] : no vomiting [Joint Pain] : no joint pain [Skin Rash] : no skin rash [Easy Bleeding] : no tendency for easy bleeding [Easy Bruising] : no tendency for easy bruising [FreeTextEntry9] : left foot neuropathy and left heel pressure sore  [de-identified] : dementia

## 2022-06-06 NOTE — PHYSICAL EXAM
[Capable of only limited self care, confined to bed or chair more than 50% of waking hours] : Status 3- Capable of only limited self care, confined to bed or chair more than 50% of waking hours [de-identified] : Elderly woman seen sitting in chair. limited due to TEB

## 2022-06-06 NOTE — HISTORY OF PRESENT ILLNESS
[Disease:__________________________] : Disease: [unfilled] [Home] : at home, [unfilled] , at the time of the visit. [Medical Office: ([facility])___] : at the medical office located in  [Other:____] : [unfilled] [60: Requires occasional assistance, but is able to care for most of his/her needs] : 60: Requires occasional assistance, but is able to care for most of his/her needs [Verbal consent obtained from patient] : the patient, [unfilled] [de-identified] : 88F JAK2 + PV, recurrent RLE DVT (2008 then 2010) previously on warfarin (discontinued by neurology), HTN/HLD, DM II with spontaneous SBO in 12/2018 s/p ex lap with bowel resection and anastomosis now presents for follow up from rehab.\par \par  [de-identified] : Patient seen via telehealth in follow up with daughter, Magalie.  Patient is currently taking Hydrea 500 mg, Monday and Friday. Per daughter, patient is doing well. Denies fevers, chills, night sweat, unintentional weight loss, headache, dizziness,pruritus, or any other medical complaints at this time.\par 4/29/22- Had a recurrent DVT and Eliquis was increased to 5 mg q 12 hrs\par \par No other changes in her medical, surgical or social history since  4/25/2022.  [FreeTextEntry3] : Daughter Magalie

## 2022-06-06 NOTE — ASSESSMENT
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  \par -She is on Hydroxyurea 500 mg on Monday and Friday.  Results reviewed with patient's daughter. \par -Continue same dose\par -Will monitor labs monthly\par \par 2) DVT\par -patient is on  anticoagulation with Eliquis, she is very high risk for recurrent VTE including acute DVT/PE with HARRIS 2+ PV and history of multiple DVTs, benefit of anticoagulation outweights risk in patient's case.\par  4/29/22- Had a recurrent DVT and Eliquis was increased to 5 mg q 12 hrs\par \par Diabetes: On Tradjenta and Metformin\par HTN: On Amlodipine, Metoprolol\par HLD: on Atorvastatin.\par \par \par This service was provided by using telehealth. The patient was at home and I was at [facility] [place]. The patient requested and participated in this encounter. The encounter face to face last 30   minutes coordinating his/her care and counseling.\par \par RTC 3 months. \par \par \par \par \par \par \par \par \par

## 2022-07-18 ENCOUNTER — RX RENEWAL (OUTPATIENT)
Age: 87
End: 2022-07-18

## 2022-09-08 ENCOUNTER — OUTPATIENT (OUTPATIENT)
Dept: OUTPATIENT SERVICES | Facility: HOSPITAL | Age: 87
LOS: 1 days | Discharge: HOME | End: 2022-09-08

## 2022-09-08 DIAGNOSIS — Z90.49 ACQUIRED ABSENCE OF OTHER SPECIFIED PARTS OF DIGESTIVE TRACT: Chronic | ICD-10-CM

## 2022-09-08 DIAGNOSIS — C92.10 CHRONIC MYELOID LEUKEMIA, BCR/ABL-POSITIVE, NOT HAVING ACHIEVED REMISSION: ICD-10-CM

## 2022-09-09 ENCOUNTER — APPOINTMENT (OUTPATIENT)
Dept: HEMATOLOGY ONCOLOGY | Facility: CLINIC | Age: 87
End: 2022-09-09

## 2022-09-09 PROCEDURE — 99447 NTRPROF PH1/NTRNET/EHR 11-20: CPT

## 2022-09-09 NOTE — HISTORY OF PRESENT ILLNESS
[Disease:__________________________] : Disease: [unfilled] [60: Requires occasional assistance, but is able to care for most of his/her needs] : 60: Requires occasional assistance, but is able to care for most of his/her needs [Home] : at home, [unfilled] , at the time of the visit. [Medical Office: ([facility])___] : at the medical office located in  [Other:____] : [unfilled] [Verbal consent obtained from patient] : the patient, [unfilled] [de-identified] : 88F JAK2 + PV, recurrent RLE DVT (2008 then 2010) previously on warfarin (discontinued by neurology), HTN/HLD, DM II with spontaneous SBO in 12/2018 s/p ex lap with bowel resection and anastomosis now presents for follow up from rehab.\par \par  [de-identified] : Patient seen via telehealth in follow up with daughter, Magalie.  Patient is currently taking Hydrea 500 mg, Monday and Friday. Per daughter, patient is doing well. Denies fevers, chills, night sweat, unintentional weight loss, headache, dizziness,pruritus, or any other medical complaints at this time.\par 4/29/22- Had a recurrent DVT and Eliquis was increased to 5 mg q 12 hrs\par \par No other changes in her medical, surgical or social history since  6/5/2022.  [0 - No Distress] : Distress Level: 0 [FreeTextEntry3] : Daughter Magalie

## 2022-09-09 NOTE — PHYSICAL EXAM
[Capable of only limited self care, confined to bed or chair more than 50% of waking hours] : Status 3- Capable of only limited self care, confined to bed or chair more than 50% of waking hours [de-identified] : Elderly woman seen sitting in chair. limited due to TEB

## 2022-09-09 NOTE — REVIEW OF SYSTEMS
[Confused] : confusion [Negative] : Heme/Lymph [Fever] : no fever [Chills] : no chills [Night Sweats] : no night sweats [Fatigue] : no fatigue [Recent Change In Weight] : ~T no recent weight change [Eye Pain] : no eye pain [Dysphagia] : no dysphagia [Odynophagia] : no odynophagia [Chest Pain] : no chest pain [Shortness Of Breath] : no shortness of breath [Abdominal Pain] : no abdominal pain [Vomiting] : no vomiting [Joint Pain] : no joint pain [Skin Rash] : no skin rash [Easy Bleeding] : no tendency for easy bleeding [Easy Bruising] : no tendency for easy bruising [FreeTextEntry9] : left foot neuropathy and left heel pressure sore  [de-identified] : dementia

## 2022-09-09 NOTE — ASSESSMENT
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  \par -She is on Hydroxyurea 500 mg on Monday and Friday.  Results pending \par -Continue same dose\par -Will monitor labs monthly\par \par 2) DVT\par -patient is on  anticoagulation with Eliquis, she is very high risk for recurrent VTE including acute DVT/PE with HARRIS 2+ PV and history of multiple DVTs, benefit of anticoagulation outweights risk in patient's case.\par  4/29/22- Had a recurrent DVT and Eliquis was increased to 5 mg q 12 hrs\par \par Diabetes: On Tradjenta and Metformin\par HTN: On Amlodipine, Metoprolol\par HLD: on Atorvastatin.\par \par \par This service was provided by using telehealth. The patient was at home and [place] [facility] patient requested and participated in this encounter. The encounter face to face last 30   minutes coordinating his/her care and counseling.\par \par RTC 3 months. \par \par \par \par \par \par \par \par \par

## 2022-09-27 RX ORDER — ATORVASTATIN CALCIUM 40 MG/1
40 TABLET, FILM COATED ORAL
Qty: 90 | Refills: 3 | Status: ACTIVE | OUTPATIENT
Start: 2020-12-14

## 2022-10-13 NOTE — ED ADULT TRIAGE NOTE - PAIN RATING/NUMBER SCALE (0-10): REST
October 13, 2022  Louis Keller Jr.  [address]   [place] [address]    Dear CHRISTINE Wyman [facility]     Thank you for talking with me on Oct 13, 2022 about your health and medications. As a follow-up to our conversation, I have included two documents:      1. Your Recommended To-Do List has steps you should take to get the best results from your medications.  2. Your Medication List will help you keep track of your medications and how to take them.    If you want to talk about these documents, please call Ayse Pérez RPH at phone: [phone], Monday-Friday 8-4:30pm.    I look forward to working with you and your doctors to make sure your medications work well for you.    Sincerely,  Ayse Pérez RPH  [facility] Pharmacist, [facility]   7

## 2022-12-09 ENCOUNTER — OUTPATIENT (OUTPATIENT)
Dept: OUTPATIENT SERVICES | Facility: HOSPITAL | Age: 87
LOS: 1 days | Discharge: HOME | End: 2022-12-09

## 2022-12-09 DIAGNOSIS — C92.10 CHRONIC MYELOID LEUKEMIA, BCR/ABL-POSITIVE, NOT HAVING ACHIEVED REMISSION: ICD-10-CM

## 2022-12-09 DIAGNOSIS — Z90.49 ACQUIRED ABSENCE OF OTHER SPECIFIED PARTS OF DIGESTIVE TRACT: Chronic | ICD-10-CM

## 2022-12-12 ENCOUNTER — APPOINTMENT (OUTPATIENT)
Dept: HEMATOLOGY ONCOLOGY | Facility: CLINIC | Age: 87
End: 2022-12-12

## 2022-12-12 PROCEDURE — 99442: CPT | Mod: 95

## 2022-12-12 RX ORDER — LEVOTHYROXINE SODIUM 0.09 MG/1
88 TABLET ORAL
Qty: 90 | Refills: 0 | Status: ACTIVE | COMMUNITY
Start: 2022-08-26

## 2022-12-12 NOTE — HISTORY OF PRESENT ILLNESS
[Disease:__________________________] : Disease: [unfilled] [0 - No Distress] : Distress Level: 0 [60: Requires occasional assistance, but is able to care for most of his/her needs] : 60: Requires occasional assistance, but is able to care for most of his/her needs [Other:____] : [unfilled] [Home] : at home, [unfilled] , at the time of the visit. [Medical Office: ([facility])___] : at the medical office located in  [Verbal consent obtained from patient] : the patient, [unfilled] [de-identified] : 88F JAK2 + PV, recurrent RLE DVT (2008 then 2010) previously on warfarin (discontinued by neurology), HTN/HLD, DM II with spontaneous SBO in 12/2018 s/p ex lap with bowel resection and anastomosis now presents for follow up from rehab.\par \par  [de-identified] : Patient seen via telehealth in follow up with daughter, Magalie.  Patient is currently taking Hydrea 500 mg, Monday and Friday. Per daughter, patient is doing well. Denies fevers, chills, night sweat, unintentional weight loss, headache, dizziness,pruritus, or any other medical complaints at this time.\par 4/29/22- Had a recurrent DVT and Eliquis was increased to 5 mg q 12 hrs\par \par No other changes in her medical, surgical or social history since  9/9/2022.  [FreeTextEntry3] : Daughter Magalie

## 2022-12-12 NOTE — ASSESSMENT
[facility] Hospitalist Group  Progress Note    Patient: Judith Hernandez Age: 46 y.o. : 1965 MR#: [id number] SSN: [id number]  Date/Time: 2017 3:15 PM    Subjective/24-hour events:     No complaints acutely - denies chest pain, SOB, dizziness/lightheadedness. Assessment:   Symptomatic anemia  SC disease  Leukocytosis, suspect reactive  Recent R hip replacement  Fibromyalgia  Breast cancer hx    Plan:  Monitor counts, transfuse per hematology recommendations. Difficult given multiple antibodies. Symptom management as necessary - pain well controlled currently. Following. Case discussed with:  [x]Patient  []Family  [x]Nursing  []Case Management  DVT Prophylaxis:  []Lovenox  []Hep SQ  [x]SCDs  []Coumadin   []On Heparin gtt    Objective:   VS:   Visit Vitals    /66    Pulse 90    Temp 99.6 °F (37.6 °C)    Resp 20    Ht 5' 6\" (1.676 m)    Wt 101.3 kg (223 lb 5.2 oz)    SpO2 99%    Breastfeeding No    BMI 36.05 kg/m2      Tmax/24hrs: Temp (24hrs), Av.1 °F (37.3 °C), Min:98.5 °F (36.9 °C), Max:99.6 °F (37.6 °C)    Intake/Output Summary (Last 24 hours) at 17 1515  Last data filed at 17 1200   Gross per 24 hour   Intake          3051.67 ml   Output              810 ml   Net          2241.67 ml       General:  In NAD. Nontoxic-appearing. Cardiovascular:  RRR. Pulmonary:  Clear, no wheezes. Effort nonlabored. GI:  Abdomen soft, NTTP. Extremities:  Warm, no ischemia. Neuro:  Alert/appropriate. Moves extremities spontaneously.     Labs:    Recent Results (from the past 24 hour(s))   CBC WITH AUTOMATED DIFF    Collection Time: 17  5:20 AM   Result Value Ref Range    WBC 13.7 (H) 4.6 - 13.2 K/uL    RBC 1.32 (L) 4.20 - 5.30 M/uL    HGB 4.0 (LL) 12.0 - 16.0 g/dL    HCT 12.5 (LL) 35.0 - 45.0 %    MCV 94.7 74.0 - 97.0 FL    MCH 30.3 24.0 - 34.0 PG    MCHC 32.0 31.0 - 37.0 g/dL    RDW 21.1 (H) 11.6 - 14.5 %    PLATELET 63 (L) 234 - 420 K/uL    MPV [FreeTextEntry1] : 89 F JAK2 + PV, recurrent RLE DVT (2008 then 2010), HTN/HLD, DM II \par \par 1) JAK2 + PV.  \par -She is on Hydroxyurea 500 mg on Monday and Friday.  Hb 10 g/dl, Hct 34.7, PLTs 856, WBC 14.78. \par -Continue same dose\par -Will monitor labs monthly\par \par 2) DVT\par -patient is on  anticoagulation with Eliquis, she is very high risk for recurrent VTE including acute DVT/PE with HARRIS 2+ PV and history of multiple DVTs, benefit of anticoagulation outweights risk in patient's case.\par  4/29/22- Had a recurrent DVT and Eliquis was increased to 5 mg q 12 hrs\par \par Diabetes: On Tradjenta and Metformin\par HTN: On Amlodipine, Metoprolol\par HLD: on Atorvastatin.\par \par \par This service was provided by using telehealth. The patient was at home and I was at [facility]. The patient requested and participated in this encounter. The encounter face to face last 30   minutes coordinating his/her care and counseling.\par \par RTC 3 months. \par \par \par \par \par \par \par \par \par  9.4 9.2 - 11.8 FL    NEUTROPHILS 70 42 - 75 %    BAND NEUTROPHILS 2 0 - 5 %    LYMPHOCYTES 20 20 - 51 %    MONOCYTES 3 2 - 9 %    EOSINOPHILS 1 0 - 5 %    BASOPHILS 0 0 - 3 %    OTHER CELL 4 (H) 0      NRBC 7.0 (H) 0  WBC    ABS. NEUTROPHILS 9.9 (H) 1.8 - 8.0 K/UL    ABS. LYMPHOCYTES 2.7 0.8 - 3.5 K/UL    ABS. MONOCYTES 0.4 0 - 1.0 K/UL    ABS. EOSINOPHILS 0.1 0.0 - 0.4 K/UL    ABS. BASOPHILS 0.0 0.0 - 0.06 K/UL    DF MANUAL      PLATELET COMMENTS DECREASED PLATELETS      RBC COMMENTS ANISOCYTOSIS  2+        RBC COMMENTS POLYCHROMASIA  1+        RBC COMMENTS STOMATOCYTES  2+        RBC COMMENTS HYPOCHROMIA  1+       METABOLIC PANEL, COMPREHENSIVE    Collection Time: 02/22/17  5:20 AM   Result Value Ref Range    Sodium 140 136 - 145 mmol/L    Potassium 4.2 3.5 - 5.5 mmol/L    Chloride 108 100 - 108 mmol/L    CO2 25 21 - 32 mmol/L    Anion gap 7 3.0 - 18 mmol/L    Glucose 98 74 - 99 mg/dL    BUN 11 7.0 - 18 MG/DL    Creatinine 1.10 0.6 - 1.3 MG/DL    BUN/Creatinine ratio 10 (L) 12 - 20      GFR est AA >60 >60 ml/min/1.73m2    GFR est non-AA 52 (L) >60 ml/min/1.73m2    Calcium 8.5 8.5 - 10.1 MG/DL    Bilirubin, total 7.2 (H) 0.2 - 1.0 MG/DL    ALT (SGPT) 17 13 - 56 U/L    AST (SGOT) 22 15 - 37 U/L    Alk.  phosphatase 141 (H) 45 - 117 U/L    Protein, total 6.5 6.4 - 8.2 g/dL    Albumin 3.2 (L) 3.4 - 5.0 g/dL    Globulin 3.3 2.0 - 4.0 g/dL    A-G Ratio 1.0 0.8 - 1.7     MAGNESIUM    Collection Time: 02/22/17  5:20 AM   Result Value Ref Range    Magnesium 2.3 1.8 - 2.4 mg/dL   PHOSPHORUS    Collection Time: 02/22/17  5:20 AM   Result Value Ref Range    Phosphorus 2.9 2.5 - 4.9 MG/DL       Signed By: Virgie Mancera MD     February 22, 2017 3:15 PM

## 2022-12-12 NOTE — REVIEW OF SYSTEMS
[Confused] : confusion [Negative] : Heme/Lymph [Fever] : no fever [Chills] : no chills [Night Sweats] : no night sweats [Fatigue] : no fatigue [Recent Change In Weight] : ~T no recent weight change [Eye Pain] : no eye pain [Dysphagia] : no dysphagia [Odynophagia] : no odynophagia [Chest Pain] : no chest pain [Shortness Of Breath] : no shortness of breath [Abdominal Pain] : no abdominal pain [Vomiting] : no vomiting [Joint Pain] : no joint pain [Skin Rash] : no skin rash [Easy Bleeding] : no tendency for easy bleeding [Easy Bruising] : no tendency for easy bruising [FreeTextEntry9] : left foot neuropathy and left heel pressure sore  [de-identified] : dementia

## 2023-02-06 ENCOUNTER — APPOINTMENT (OUTPATIENT)
Dept: HEMATOLOGY ONCOLOGY | Facility: CLINIC | Age: 88
End: 2023-02-06
Payer: MEDICARE

## 2023-02-06 PROCEDURE — 99447 NTRPROF PH1/NTRNET/EHR 11-20: CPT

## 2023-02-06 RX ORDER — APIXABAN 5 MG/1
5 TABLET, FILM COATED ORAL
Qty: 60 | Refills: 6 | Status: ACTIVE | OUTPATIENT
Start: 2022-05-02

## 2023-02-06 NOTE — ASSESSMENT
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  \par -She is on Hydroxyurea 500 mg on Monday and Friday. 1/4/23 Hb 10.8 g/dl, Hct 37.2, PLTs 783, WBC 17.11\par -Continue same dose, will call daughter with new results from last week. \par -Will monitor labs monthly\par \par 2) DVT\par -patient is on  anticoagulation with Eliquis, she is very high risk for recurrent VTE including acute DVT/PE with HARRIS 2+ PV and history of multiple DVTs, benefit of anticoagulation outweights risk in patient's case.\par  4/29/22- Had a recurrent DVT and Eliquis was increased to 5 mg q 12 hrs\par \par Diabetes: On Tradjenta and Metformin\par HTN: On Amlodipine, Metoprolol\par HLD: on Atorvastatin.\par \par \par This service was provided by using telehealth. The patient was at home and I was at [facility]. The patient requested and participated in this encounter. The encounter  last 30   minutes coordinating his/her care and counseling.\par \par RTC 3 months. \par \par \par \par \par \par \par \par \par

## 2023-02-06 NOTE — REVIEW OF SYSTEMS
[Confused] : confusion [Negative] : Heme/Lymph [Fever] : no fever [Chills] : no chills [Night Sweats] : no night sweats [Fatigue] : no fatigue [Recent Change In Weight] : ~T no recent weight change [Eye Pain] : no eye pain [Dysphagia] : no dysphagia [Odynophagia] : no odynophagia [Chest Pain] : no chest pain [Shortness Of Breath] : no shortness of breath [Abdominal Pain] : no abdominal pain [Vomiting] : no vomiting [Joint Pain] : no joint pain [Skin Rash] : no skin rash [Easy Bleeding] : no tendency for easy bleeding [Easy Bruising] : no tendency for easy bruising [FreeTextEntry9] : left foot neuropathy and left heel pressure sore  [de-identified] : dementia

## 2023-02-06 NOTE — HISTORY OF PRESENT ILLNESS
[Disease:__________________________] : Disease: [unfilled] [0 - No Distress] : Distress Level: 0 [60: Requires occasional assistance, but is able to care for most of his/her needs] : 60: Requires occasional assistance, but is able to care for most of his/her needs [Home] : at home, [unfilled] , at the time of the visit. [Medical Office: ([facility])___] : at the medical office located in  [Other:____] : [unfilled] [Verbal consent obtained from patient] : the patient, [unfilled] [de-identified] : 88F JAK2 + PV, recurrent RLE DVT (2008 then 2010) previously on warfarin (discontinued by neurology), HTN/HLD, DM II with spontaneous SBO in 12/2018 s/p ex lap with bowel resection and anastomosis now presents for follow up from rehab.\par \par  [de-identified] : Patient seen via telehealth in follow up with daughter, Magalie.  Patient is currently taking Hydrea 500 mg, Monday and Friday. Per daughter, patient is doing well. Denies fevers, chills, night sweat, unintentional weight loss, headache, dizziness,pruritus, or any other medical complaints at this time.\par 4/29/22- Had a recurrent DVT and Eliquis was increased to 5 mg q 12 hrs\par \par No other changes in her medical, surgical or social history since  12/12/2022.  [FreeTextEntry3] : Daughter Magalie

## 2023-02-24 ENCOUNTER — NON-APPOINTMENT (OUTPATIENT)
Age: 88
End: 2023-02-24

## 2023-03-13 ENCOUNTER — INPATIENT (INPATIENT)
Facility: HOSPITAL | Age: 88
LOS: 2 days | Discharge: HOME | DRG: 391 | End: 2023-03-16
Attending: STUDENT IN AN ORGANIZED HEALTH CARE EDUCATION/TRAINING PROGRAM | Admitting: HOSPITALIST
Payer: MEDICARE

## 2023-03-13 VITALS
TEMPERATURE: 98 F | HEIGHT: 61 IN | DIASTOLIC BLOOD PRESSURE: 85 MMHG | WEIGHT: 110.01 LBS | OXYGEN SATURATION: 96 % | SYSTOLIC BLOOD PRESSURE: 149 MMHG | HEART RATE: 92 BPM | RESPIRATION RATE: 18 BRPM

## 2023-03-13 DIAGNOSIS — E03.9 HYPOTHYROIDISM, UNSPECIFIED: ICD-10-CM

## 2023-03-13 DIAGNOSIS — Z86.718 PERSONAL HISTORY OF OTHER VENOUS THROMBOSIS AND EMBOLISM: ICD-10-CM

## 2023-03-13 DIAGNOSIS — F01.50 VASCULAR DEMENTIA WITHOUT BEHAVIORAL DISTURBANCE: ICD-10-CM

## 2023-03-13 DIAGNOSIS — Z29.9 ENCOUNTER FOR PROPHYLACTIC MEASURES, UNSPECIFIED: ICD-10-CM

## 2023-03-13 DIAGNOSIS — E11.9 TYPE 2 DIABETES MELLITUS WITHOUT COMPLICATIONS: ICD-10-CM

## 2023-03-13 DIAGNOSIS — K52.89 OTHER SPECIFIED NONINFECTIVE GASTROENTERITIS AND COLITIS: ICD-10-CM

## 2023-03-13 DIAGNOSIS — Z90.49 ACQUIRED ABSENCE OF OTHER SPECIFIED PARTS OF DIGESTIVE TRACT: Chronic | ICD-10-CM

## 2023-03-13 DIAGNOSIS — D75.1 SECONDARY POLYCYTHEMIA: ICD-10-CM

## 2023-03-13 DIAGNOSIS — E78.5 HYPERLIPIDEMIA, UNSPECIFIED: ICD-10-CM

## 2023-03-13 DIAGNOSIS — K92.1 MELENA: ICD-10-CM

## 2023-03-13 LAB
ALBUMIN SERPL ELPH-MCNC: 3.3 G/DL — SIGNIFICANT CHANGE UP (ref 3.3–5)
ALP SERPL-CCNC: 128 U/L — HIGH (ref 40–120)
ALT FLD-CCNC: 31 U/L — SIGNIFICANT CHANGE UP (ref 10–45)
ANION GAP SERPL CALC-SCNC: 14 MMOL/L — SIGNIFICANT CHANGE UP (ref 5–17)
ANISOCYTOSIS BLD QL: SLIGHT — SIGNIFICANT CHANGE UP
AST SERPL-CCNC: 27 U/L — SIGNIFICANT CHANGE UP (ref 10–40)
BASE EXCESS BLDV CALC-SCNC: -5.3 MMOL/L — LOW (ref -2–3)
BASOPHILS # BLD AUTO: 0.19 K/UL — SIGNIFICANT CHANGE UP (ref 0–0.2)
BASOPHILS NFR BLD AUTO: 0.9 % — SIGNIFICANT CHANGE UP (ref 0–2)
BILIRUB SERPL-MCNC: 0.2 MG/DL — SIGNIFICANT CHANGE UP (ref 0.2–1.2)
BUN SERPL-MCNC: 36 MG/DL — HIGH (ref 7–23)
CA-I SERPL-SCNC: 1.31 MMOL/L — SIGNIFICANT CHANGE UP (ref 1.15–1.33)
CALCIUM SERPL-MCNC: 9.9 MG/DL — SIGNIFICANT CHANGE UP (ref 8.4–10.5)
CHLORIDE BLDV-SCNC: 109 MMOL/L — HIGH (ref 96–108)
CHLORIDE SERPL-SCNC: 110 MMOL/L — HIGH (ref 96–108)
CO2 BLDV-SCNC: 23 MMOL/L — SIGNIFICANT CHANGE UP (ref 22–26)
CO2 SERPL-SCNC: 18 MMOL/L — LOW (ref 22–31)
CREAT SERPL-MCNC: 1.02 MG/DL — SIGNIFICANT CHANGE UP (ref 0.5–1.3)
EGFR: 53 ML/MIN/1.73M2 — LOW
EOSINOPHIL # BLD AUTO: 0.56 K/UL — HIGH (ref 0–0.5)
EOSINOPHIL NFR BLD AUTO: 2.6 % — SIGNIFICANT CHANGE UP (ref 0–6)
FLUAV AG NPH QL: SIGNIFICANT CHANGE UP
FLUBV AG NPH QL: SIGNIFICANT CHANGE UP
GAS PNL BLDV: 139 MMOL/L — SIGNIFICANT CHANGE UP (ref 136–145)
GAS PNL BLDV: SIGNIFICANT CHANGE UP
GAS PNL BLDV: SIGNIFICANT CHANGE UP
GLUCOSE BLDC GLUCOMTR-MCNC: 123 MG/DL — HIGH (ref 70–99)
GLUCOSE BLDV-MCNC: 141 MG/DL — HIGH (ref 70–99)
GLUCOSE SERPL-MCNC: 152 MG/DL — HIGH (ref 70–99)
HCO3 BLDV-SCNC: 21 MMOL/L — LOW (ref 22–29)
HCT VFR BLD CALC: 38.7 % — SIGNIFICANT CHANGE UP (ref 34.5–45)
HCT VFR BLDA CALC: 38 % — SIGNIFICANT CHANGE UP (ref 34.5–46.5)
HGB BLD CALC-MCNC: 12.7 G/DL — SIGNIFICANT CHANGE UP (ref 11.7–16.1)
HGB BLD-MCNC: 11.2 G/DL — LOW (ref 11.5–15.5)
LACTATE BLDV-MCNC: 1.8 MMOL/L — SIGNIFICANT CHANGE UP (ref 0.5–2)
LYMPHOCYTES # BLD AUTO: 11.3 % — LOW (ref 13–44)
LYMPHOCYTES # BLD AUTO: 2.43 K/UL — SIGNIFICANT CHANGE UP (ref 1–3.3)
MANUAL SMEAR VERIFICATION: SIGNIFICANT CHANGE UP
MCHC RBC-ENTMCNC: 23.3 PG — LOW (ref 27–34)
MCHC RBC-ENTMCNC: 28.9 GM/DL — LOW (ref 32–36)
MCV RBC AUTO: 80.6 FL — SIGNIFICANT CHANGE UP (ref 80–100)
MICROCYTES BLD QL: SLIGHT — SIGNIFICANT CHANGE UP
MONOCYTES # BLD AUTO: 1.12 K/UL — HIGH (ref 0–0.9)
MONOCYTES NFR BLD AUTO: 5.2 % — SIGNIFICANT CHANGE UP (ref 2–14)
NEUTROPHILS # BLD AUTO: 17.19 K/UL — HIGH (ref 1.8–7.4)
NEUTROPHILS NFR BLD AUTO: 80 % — HIGH (ref 43–77)
OB PNL STL: POSITIVE
OVALOCYTES BLD QL SMEAR: SLIGHT — SIGNIFICANT CHANGE UP
PCO2 BLDV: 44 MMHG — HIGH (ref 39–42)
PH BLDV: 7.29 — LOW (ref 7.32–7.43)
PLAT MORPH BLD: ABNORMAL
PLATELET # BLD AUTO: 943 K/UL — HIGH (ref 150–400)
PO2 BLDV: 27 MMHG — SIGNIFICANT CHANGE UP (ref 25–45)
POIKILOCYTOSIS BLD QL AUTO: SLIGHT — SIGNIFICANT CHANGE UP
POLYCHROMASIA BLD QL SMEAR: SLIGHT — SIGNIFICANT CHANGE UP
POTASSIUM BLDV-SCNC: 4.3 MMOL/L — SIGNIFICANT CHANGE UP (ref 3.5–5.1)
POTASSIUM SERPL-MCNC: 4.7 MMOL/L — SIGNIFICANT CHANGE UP (ref 3.5–5.3)
POTASSIUM SERPL-SCNC: 4.7 MMOL/L — SIGNIFICANT CHANGE UP (ref 3.5–5.3)
PROT SERPL-MCNC: 7.1 G/DL — SIGNIFICANT CHANGE UP (ref 6–8.3)
RBC # BLD: 4.8 M/UL — SIGNIFICANT CHANGE UP (ref 3.8–5.2)
RBC # FLD: 22 % — HIGH (ref 10.3–14.5)
RBC BLD AUTO: ABNORMAL
RSV RNA NPH QL NAA+NON-PROBE: SIGNIFICANT CHANGE UP
SAO2 % BLDV: 34.1 % — LOW (ref 67–88)
SARS-COV-2 RNA SPEC QL NAA+PROBE: SIGNIFICANT CHANGE UP
SODIUM SERPL-SCNC: 142 MMOL/L — SIGNIFICANT CHANGE UP (ref 135–145)
SPHEROCYTES BLD QL SMEAR: SLIGHT — SIGNIFICANT CHANGE UP
WBC # BLD: 21.49 K/UL — HIGH (ref 3.8–10.5)
WBC # FLD AUTO: 21.49 K/UL — HIGH (ref 3.8–10.5)

## 2023-03-13 PROCEDURE — 99285 EMERGENCY DEPT VISIT HI MDM: CPT

## 2023-03-13 PROCEDURE — G1004: CPT

## 2023-03-13 PROCEDURE — 74177 CT ABD & PELVIS W/CONTRAST: CPT | Mod: 26,MG

## 2023-03-13 PROCEDURE — 71046 X-RAY EXAM CHEST 2 VIEWS: CPT | Mod: 26

## 2023-03-13 PROCEDURE — 99223 1ST HOSP IP/OBS HIGH 75: CPT | Mod: GC

## 2023-03-13 RX ORDER — SENNA PLUS 8.6 MG/1
2 TABLET ORAL AT BEDTIME
Refills: 0 | Status: DISCONTINUED | OUTPATIENT
Start: 2023-03-13 | End: 2023-03-16

## 2023-03-13 RX ORDER — LANOLIN ALCOHOL/MO/W.PET/CERES
5 CREAM (GRAM) TOPICAL AT BEDTIME
Refills: 0 | Status: DISCONTINUED | OUTPATIENT
Start: 2023-03-13 | End: 2023-03-16

## 2023-03-13 RX ORDER — DEXTROSE 50 % IN WATER 50 %
25 SYRINGE (ML) INTRAVENOUS ONCE
Refills: 0 | Status: DISCONTINUED | OUTPATIENT
Start: 2023-03-13 | End: 2023-03-16

## 2023-03-13 RX ORDER — PIPERACILLIN AND TAZOBACTAM 4; .5 G/20ML; G/20ML
3.38 INJECTION, POWDER, LYOPHILIZED, FOR SOLUTION INTRAVENOUS ONCE
Refills: 0 | Status: COMPLETED | OUTPATIENT
Start: 2023-03-13 | End: 2023-03-13

## 2023-03-13 RX ORDER — PIPERACILLIN AND TAZOBACTAM 4; .5 G/20ML; G/20ML
3.38 INJECTION, POWDER, LYOPHILIZED, FOR SOLUTION INTRAVENOUS EVERY 8 HOURS
Refills: 0 | Status: DISCONTINUED | OUTPATIENT
Start: 2023-03-14 | End: 2023-03-15

## 2023-03-13 RX ORDER — AMLODIPINE BESYLATE 2.5 MG/1
5 TABLET ORAL DAILY
Refills: 0 | Status: DISCONTINUED | OUTPATIENT
Start: 2023-03-13 | End: 2023-03-16

## 2023-03-13 RX ORDER — ACETAMINOPHEN 500 MG
650 TABLET ORAL EVERY 6 HOURS
Refills: 0 | Status: DISCONTINUED | OUTPATIENT
Start: 2023-03-13 | End: 2023-03-16

## 2023-03-13 RX ORDER — POLYETHYLENE GLYCOL 3350 17 G/17G
17 POWDER, FOR SOLUTION ORAL DAILY
Refills: 0 | Status: DISCONTINUED | OUTPATIENT
Start: 2023-03-13 | End: 2023-03-14

## 2023-03-13 RX ORDER — INSULIN LISPRO 100/ML
VIAL (ML) SUBCUTANEOUS
Refills: 0 | Status: DISCONTINUED | OUTPATIENT
Start: 2023-03-13 | End: 2023-03-16

## 2023-03-13 RX ORDER — DEXTROSE 50 % IN WATER 50 %
15 SYRINGE (ML) INTRAVENOUS ONCE
Refills: 0 | Status: DISCONTINUED | OUTPATIENT
Start: 2023-03-13 | End: 2023-03-16

## 2023-03-13 RX ORDER — HYDROXYUREA 500 MG/1
500 CAPSULE ORAL
Refills: 0 | Status: DISCONTINUED | OUTPATIENT
Start: 2023-03-13 | End: 2023-03-16

## 2023-03-13 RX ORDER — DEXTROSE 50 % IN WATER 50 %
12.5 SYRINGE (ML) INTRAVENOUS ONCE
Refills: 0 | Status: DISCONTINUED | OUTPATIENT
Start: 2023-03-13 | End: 2023-03-16

## 2023-03-13 RX ORDER — ACETAMINOPHEN 500 MG
750 TABLET ORAL ONCE
Refills: 0 | Status: COMPLETED | OUTPATIENT
Start: 2023-03-13 | End: 2023-03-13

## 2023-03-13 RX ORDER — METOPROLOL TARTRATE 50 MG
25 TABLET ORAL
Refills: 0 | Status: DISCONTINUED | OUTPATIENT
Start: 2023-03-13 | End: 2023-03-16

## 2023-03-13 RX ORDER — ATORVASTATIN CALCIUM 80 MG/1
40 TABLET, FILM COATED ORAL AT BEDTIME
Refills: 0 | Status: DISCONTINUED | OUTPATIENT
Start: 2023-03-13 | End: 2023-03-16

## 2023-03-13 RX ORDER — PIPERACILLIN AND TAZOBACTAM 4; .5 G/20ML; G/20ML
3.38 INJECTION, POWDER, LYOPHILIZED, FOR SOLUTION INTRAVENOUS ONCE
Refills: 0 | Status: COMPLETED | OUTPATIENT
Start: 2023-03-13 | End: 2023-03-14

## 2023-03-13 RX ORDER — GLUCAGON INJECTION, SOLUTION 0.5 MG/.1ML
1 INJECTION, SOLUTION SUBCUTANEOUS ONCE
Refills: 0 | Status: DISCONTINUED | OUTPATIENT
Start: 2023-03-13 | End: 2023-03-16

## 2023-03-13 RX ORDER — SODIUM CHLORIDE 9 MG/ML
1000 INJECTION INTRAMUSCULAR; INTRAVENOUS; SUBCUTANEOUS ONCE
Refills: 0 | Status: COMPLETED | OUTPATIENT
Start: 2023-03-13 | End: 2023-03-13

## 2023-03-13 RX ORDER — INSULIN LISPRO 100/ML
VIAL (ML) SUBCUTANEOUS AT BEDTIME
Refills: 0 | Status: DISCONTINUED | OUTPATIENT
Start: 2023-03-13 | End: 2023-03-16

## 2023-03-13 RX ORDER — LEVOTHYROXINE SODIUM 125 MCG
88 TABLET ORAL DAILY
Refills: 0 | Status: DISCONTINUED | OUTPATIENT
Start: 2023-03-13 | End: 2023-03-16

## 2023-03-13 RX ADMIN — Medication 750 MILLIGRAM(S): at 16:20

## 2023-03-13 RX ADMIN — Medication 650 MILLIGRAM(S): at 22:32

## 2023-03-13 RX ADMIN — PIPERACILLIN AND TAZOBACTAM 200 GRAM(S): 4; .5 INJECTION, POWDER, LYOPHILIZED, FOR SOLUTION INTRAVENOUS at 19:36

## 2023-03-13 RX ADMIN — Medication 750 MILLIGRAM(S): at 16:30

## 2023-03-13 RX ADMIN — SODIUM CHLORIDE 1000 MILLILITER(S): 9 INJECTION INTRAMUSCULAR; INTRAVENOUS; SUBCUTANEOUS at 15:43

## 2023-03-13 RX ADMIN — Medication 300 MILLIGRAM(S): at 15:56

## 2023-03-13 RX ADMIN — SODIUM CHLORIDE 1000 MILLILITER(S): 9 INJECTION INTRAMUSCULAR; INTRAVENOUS; SUBCUTANEOUS at 14:43

## 2023-03-13 RX ADMIN — PIPERACILLIN AND TAZOBACTAM 3.38 GRAM(S): 4; .5 INJECTION, POWDER, LYOPHILIZED, FOR SOLUTION INTRAVENOUS at 15:10

## 2023-03-13 RX ADMIN — SENNA PLUS 2 TABLET(S): 8.6 TABLET ORAL at 22:33

## 2023-03-13 RX ADMIN — PIPERACILLIN AND TAZOBACTAM 200 GRAM(S): 4; .5 INJECTION, POWDER, LYOPHILIZED, FOR SOLUTION INTRAVENOUS at 14:43

## 2023-03-13 RX ADMIN — Medication 650 MILLIGRAM(S): at 23:00

## 2023-03-13 NOTE — ED PROVIDER NOTE - ATTENDING CONTRIBUTION TO CARE
Private Physician [facility], Dr Bulmaro Morel,  89y female pmh HARRIS 2+ P. Vera.,  Hypothyroidism, HTN, DVT on eliquis, DM on metformin. HLD, CVA, HLD,  SBO sp resection, Now comes to ed c/o Rectal bleeding w loose bm yesterday, w/o clots, Happened x3, No fever, nausea and vomiting, abd pain. No hematuria, cp/cough, PE Private Physician [facility], Dr Bulmaro Morel,  89y female pmh HARRIS 2+ P. Vera.,  Hypothyroidism, HTN, DVT on eliquis, DM on metformin. HLD, CVA, HLD,  SBO sp resection, Now comes to ed c/o Rectal bleeding w loose bm yesterday, w/o clots, Happened x3, No fever, nausea and vomiting, abd pain. No hematuria, cp/cough, Pt non ambulatory for past  year. PE WDWN eldelry feamle awake alert normocephalic atraumatic neck supple chest clear cv no rubs, gallops or murmurs abd soft +bs no mass guarding neruo awake alert  Brant Lewis MD, Facep

## 2023-03-13 NOTE — ED PROVIDER NOTE - OBJECTIVE STATEMENT
89-year-old woman with PMH vascular dementia, polycythemia vera, hypothyroidism, diabetes on metformin, presenting due to bright blood per rectum that started last night.  Patient is on Eliquis and hydroxyurea for her polycythemia vera, last Eliquis last night.  Follows with hematology Dr. Park Chamberlain.  Patient lives with daughter and has home health aide, yesterday patient had a blood-streaked bowel movement, this morning appeared to have about 1/2 cup worth of bright blood in her diaper.  Patient nonambulatory, verbal at baseline.  Currently endorsing lower abdominal pain.  Denies fever, chest pain, SOB, dysuria. Daughter states patient has +internal hemorrhoids, concern for buttock skin irritation.

## 2023-03-13 NOTE — ED ADULT NURSE NOTE - OBJECTIVE STATEMENT
Pt's daughter reports that pt had blood in her stool; pt has history of dementia and did not answer any questions; no apparent distress; will continue to monitor.

## 2023-03-13 NOTE — H&P ADULT - NSHPREVIEWOFSYSTEMS_GEN_ALL_CORE
Patient nonverbal but limited ROS able to be performed with  yes/no questioning as below:     CONSTITUTIONAL: No fevers or chills  RESPIRATORY: No shortness of breath  CARDIOVASCULAR: No chest pain  GASTROINTESTINAL: +abdominal. No nausea, vomiting,

## 2023-03-13 NOTE — PATIENT PROFILE ADULT - FUNCTIONAL ASSESSMENT - BASIC MOBILITY 6.
2-calculated by average/Not able to assess (calculate score using [facility] averaging method)

## 2023-03-13 NOTE — H&P ADULT - ATTENDING COMMENTS
88 y/o F with history notable for vascular dementia ( nonverbal), PV, presenting with 1 day of brbpr c/w hematochezia. Imaging notable for significant stool burden with c/f stercoral colitis.   #Hematochezia  #Stercoral colitis  #Dementia  #PV  -HDS without significant drop in hgb  -etiology unclear. Difficult to ascertain if painful vs painless, although most c/w LGIB  -continue to hold eliquis  -Aggressive bowel regimen as significant stool impaction  -GI consulted  -Continue hydroxyurea as labs relatively stable

## 2023-03-13 NOTE — ED PROCEDURE NOTE - PROCEDURE ADDITIONAL DETAILS
Emergency Department Focused Ultrasound performed at patient's bedside for placement of ultrasound guided IV. The study was confirmed with blood return and ease of flushing saline.    Upper extremity laterality: left   IV Gauge: 18

## 2023-03-13 NOTE — ED PROVIDER NOTE - CLINICAL SUMMARY MEDICAL DECISION MAKING FREE TEXT BOX
Antoni, PGY2 - 89-year-old woman presenting with bright red blood per rectum x2.  Patient has never had this before per daughter at bedside.  Currently takes Eliquis for her polycythemia vera, 5 mg twice a day, did not take it this morning.  Labs, guaiac, CT abdomen pelvis due to abdominal tenderness on physical exam. Guaiac sent. reassess, tba. *The above represents an initial assessment/impression. Please refer to progress notes for potential changes in patient clinical course*

## 2023-03-13 NOTE — H&P ADULT - NSHPLABSRESULTS_GEN_ALL_CORE
11.2   21.49 )-----------( 943      ( 13 Mar 2023 12:04 )             38.7     03-13    142  |  110<H>  |  36<H>  ----------------------------<  152<H>  4.7   |  18<L>  |  1.02    Ca    9.9      13 Mar 2023 12:04    TPro  7.1  /  Alb  3.3  /  TBili  0.2  /  DBili  x   /  AST  27  /  ALT  31  /  AlkPhos  128<H>  03-13      Creatinine, Serum: 1.02 mg/dL (03-13-23 @ 12:04)            WBC Count: 21.49 K/uL (03-13-23 @ 12:04)        Alkaline Phosphatase, Serum: 128 U/L (03-13-23 @ 12:04)  Alanine Aminotransferase (ALT/SGPT): 31 U/L (03-13-23 @ 12:04)  Aspartate Aminotransferase (AST/SGOT): 27 U/L (03-13-23 @ 12:04)  Bilirubin Total, Serum: 0.2 mg/dL (03-13-23 @ 12:04)

## 2023-03-13 NOTE — ED ADULT NURSE NOTE - CAS TRG GENERAL NORM CIRC DET
From: Angelique Diane  To: Gwen Tipton  Sent: 1/13/2021 6:31 PM CST  Subject: Medication Question    Hi Dr. Tipton. Update with how the vyvanse is going. I actually do feel like it helps me focus without as much anxiety and not be as irritable with noises, but only for half of my day. I take it when I wake up at 5, about an hour before going to work, and notice that I feel calmer and like my anxieties are more manageable until around 12:30/1:00. That's when I start feeling very anxious and irritable again, so I am still having some anxiety attacks in the evenings. I have not experienced any negative side effects so far, I'm sleeping normal and actually eating more regularly when I take it. I notice I don't stress eat or binge on foods as much as what is normal for me. Do you suggest I start taking it at a different time than I currently do, or are there certain foods that I should avoid at lunchtime to help the medication last longer? I feel like it is actually helping me feel calmer, which I was skeptical about before taking it.     I know it's your job, but I am very grateful for your help with trying to get me emotionally balanced. I have been getting my anxiety treated on and off since I was 7 and I feel like I'm finally finding something that actually helps me. I don't quite understand how a stimulant can help me feel less anxious for a while, but I'm going to do some research so that I understand the process better. If you have any resources to suggest for educational purposes, please do.   Strong peripheral pulses

## 2023-03-13 NOTE — H&P ADULT - PROBLEM SELECTOR PLAN 2
JAK2+. Follows outpt with Dr. Park Chamberlain. Home: eliquis 5mg BID, hydroxyurea M & F  - hold home eliquis i/s/o GIB  - c/w home hydroxyurea

## 2023-03-13 NOTE — H&P ADULT - HISTORY OF PRESENT ILLNESS
Ms. Terry is an 89yo woman with a h/o vascular dementia, polycythemia vera (on eliquis and hydroxyurea_follows with heme Dr. Park Chamberlain), CVA, RLE DVT, HTN, HLD, spontaneous SBO s/p resection in 2018,  hypothyroidism and T2DM who presents to the [facility] ED with bright red blood per rectum that began yesterday.     Currently endorses some abdominal pain. No associated fever, nausea, vomiting.     Upon arrival in the ED, patient afebrile with /85, breathing comfortably on RA. Labs remarkable for leukocytosis to 21K with neutrophilic predominance, anemia with Hgb 11.2. VBG showing acute respiratory acidosis with pH 7.29. pCO2 44, HCO3 21, pO2 27.  CT AP revealing "Distention of the rectum with stool. Mild perirectal fat stranding, which   may reflect early findings of stercoral colitis." CXR clear. GI consulted by ED providers. s/p zosyn x1.  Ms. Terry is an 88yo woman with a h/o vascular dementia, polycythemia vera (on eliquis and hydroxyurea_follows with heme Dr. Park Chamberlain), CVA, RLE DVT, HTN, HLD, spontaneous SBO s/p resection in 2018,  hypothyroidism and T2DM who presents to the [facility] ED with bright red blood per rectum that began yesterday. Patient nonverbal at baseline. As per daughter, patient first had a loose, watery BM with some blood within it yesterday morning. In the afternoon, patient had a large bloody bowel movement with minimal stool. This AM, when HHA was changing patient and notified daughter about a "1/2 cup" of blood in her diaper, at which point the daughter contacted the patient's PCP who recommended bringing her to the hospital. Throughout this time, the patient did not endorse any abdominal pain, fever, chills, weakness, or chest pain. Also denies any associated nausea, vomiting. A did not give patient her AM eliquis dose today in the setting of her bleeding.     Upon arrival in the ED, patient afebrile with /85, breathing comfortably on RA. Labs remarkable for leukocytosis to 21K with neutrophilic predominance, anemia with Hgb 11.2. VBG showing acute respiratory acidosis with pH 7.29. pCO2 44, HCO3 21, pO2 27.  CT AP revealing "Distention of the rectum with stool. Mild perirectal fat stranding, which   may reflect early findings of stercoral colitis." CXR clear. GI consulted by ED providers. s/p zosyn x1.

## 2023-03-13 NOTE — ED PROVIDER NOTE - PROGRESS NOTE DETAILS
Antoni, PGY2 - WBC >21, will send blood cx, cxr, vbg, give zosyn. tba Antoni, PGY2 - spoke with hospitalist bobbi for sterocoral colitis/sepsis, accepting patient. GI was consulted via email. Antoni, PGY2 - spoke with hospitalist bobbi for stercoral colitis/sepsis, accepting patient. GI was consulted via email.

## 2023-03-13 NOTE — H&P ADULT - PROBLEM SELECTOR PLAN 1
Brought to ED by daughter for BRBPR x3. On eliquis for PCV and h/o DVT and CVA. On arrival in ED, Afebrile and normotensive but with leukocytosis to 21K. CT AP revealing distention of the rectum with stool. Mild perirectal fat stranding, which may reflect early findings of stercoral colitis.    - aggressive bowel regimen with miralax, senna, tap water enema  - GI consulted by ED, pending recs  - c/w empiric zosyn for now   - stool count by RNs

## 2023-03-13 NOTE — H&P ADULT - PROBLEM SELECTOR PLAN 3
hold home eliquis for now as above  no evidence of DVT at present time Patient informed/Warm blanket

## 2023-03-13 NOTE — H&P ADULT - NSHPPHYSICALEXAM_GEN_ALL_CORE
VITALS:   T(C): 36.7 (03-13-23 @ 10:40), Max: 36.7 (03-13-23 @ 10:40)  HR: 65 (03-13-23 @ 11:03) (65 - 92)  BP: 139/68 (03-13-23 @ 11:03) (139/68 - 149/85)  RR: 16 (03-13-23 @ 11:03) (16 - 18)  SpO2: 97% (03-13-23 @ 11:03) (96% - 97%)    GENERAL: NAD, lying in bed comfortably  HEAD:  Normocephalic  EYES: Sclera clear  ENT: Moist mucous membranes  NECK: Supple, No JVD  CHEST/LUNG: Clear to auscultation bilaterally; No rales, rhonchi, wheezing, or rubs. Unlabored respirations  HEART: Regular rate and rhythm; No murmurs, rubs, or gallops  ABDOMEN: BSx4; Soft, nondistended. Diffuse, mild tenderness to palpation. No rebound tenderness or guarding  EXTREMITIES:  No clubbing, cyanosis, or edema  NERVOUS SYSTEM: AAOx1 (responds to name), nonverbal

## 2023-03-13 NOTE — PATIENT PROFILE ADULT - FALL HARM RISK - HARM RISK INTERVENTIONS

## 2023-03-13 NOTE — H&P ADULT - ASSESSMENT
Ms. Terry is an 88yo woman with a h/o vascular dementia, polycythemia vera (on eliquis and hydroxyurea_follows with heme Dr. Park Chamberlain), CVA, RLE DVT, HTN, HLD, spontaneous SBO s/p resection in 2018,  hypothyroidism and T2DM who presents to the [facility] ED with bright red blood per rectum

## 2023-03-13 NOTE — ED PROVIDER NOTE - NSICDXPASTMEDICALHX_GEN_ALL_CORE_FT
PAST MEDICAL HISTORY:  2019 novel coronavirus disease (COVID-19)     Adult Hypothyroidism     Benign Hypertension     COVID-19 vaccine series completed     Deep Vein Thrombosis (DVT)     Diabetes     Hypercholesteremia     SBO (Small Bowel Obstruction) 08/2019    Stroke

## 2023-03-13 NOTE — ED PROVIDER NOTE - PHYSICAL EXAMINATION
Gen: NAD, AOx3, able to make needs known, non-toxic  Head: NCAT  HEENT: EOMI, normal conjunctiva  Lung: CTAB, no respiratory distress, no wheezes/rhonchi/rales B/L, speaking in full sentences  CV: RRR, no M/R/G, pulses bilaterally   Abd: soft, NTND, no guarding, no CVA tenderness  Rectal: dark brown/red stool in diaper, MAYELA +stool in vault, ?internal hemorrhoids. No pressure ulcers visualized on exam. Guaiac collected. Chaperone: Elise Espino MD   MSK: no visible bony deformities  Neuro: No focal sensory or motor deficits  Skin: Warm, well perfused, no rash  Psych: normal affect Gen: NAD, AOx3, able to make needs known, non-toxic  Head: NCAT  HEENT: EOMI, normal conjunctiva  Lung: CTAB, no respiratory distress, no wheezes/rhonchi/rales B/L, speaking in full sentences  CV: RRR, no M/R/G, pulses bilaterally   Abd: soft, tender in suprapubic region, no guarding, no CVA tenderness  Rectal: dark brown/red stool in diaper, MAYELA +stool in vault, ?internal hemorrhoids. No pressure ulcers visualized on exam. Guaiac collected. Chaperone: Elise Espino MD   MSK: no visible bony deformities  Neuro: No focal sensory or motor deficits  Skin: Warm, well perfused, no rash  Psych: normal affect

## 2023-03-13 NOTE — ED PROVIDER NOTE - PRINCIPAL DIAGNOSIS
Pt. Contacted. Procedure and follow up scheduled. Educated on pre-procedure instructions, also mailed. Verbalized understanding.   Pt. denies taking any blood thinners prior to procedure scheduling Bright red blood per rectum Stercoral colitis

## 2023-03-14 LAB
A1C WITH ESTIMATED AVERAGE GLUCOSE RESULT: 6.4 % — HIGH (ref 4–5.6)
ALBUMIN SERPL ELPH-MCNC: 3.3 G/DL — SIGNIFICANT CHANGE UP (ref 3.3–5)
ALP SERPL-CCNC: 140 U/L — HIGH (ref 40–120)
ALT FLD-CCNC: 32 U/L — SIGNIFICANT CHANGE UP (ref 10–45)
ANION GAP SERPL CALC-SCNC: 14 MMOL/L — SIGNIFICANT CHANGE UP (ref 5–17)
APTT BLD: 40.1 SEC — HIGH (ref 27.5–35.5)
AST SERPL-CCNC: 22 U/L — SIGNIFICANT CHANGE UP (ref 10–40)
BILIRUB SERPL-MCNC: 0.3 MG/DL — SIGNIFICANT CHANGE UP (ref 0.2–1.2)
BLD GP AB SCN SERPL QL: NEGATIVE — SIGNIFICANT CHANGE UP
BUN SERPL-MCNC: 28 MG/DL — HIGH (ref 7–23)
CALCIUM SERPL-MCNC: 9.7 MG/DL — SIGNIFICANT CHANGE UP (ref 8.4–10.5)
CHLORIDE SERPL-SCNC: 108 MMOL/L — SIGNIFICANT CHANGE UP (ref 96–108)
CO2 SERPL-SCNC: 19 MMOL/L — LOW (ref 22–31)
CREAT SERPL-MCNC: 0.98 MG/DL — SIGNIFICANT CHANGE UP (ref 0.5–1.3)
EGFR: 55 ML/MIN/1.73M2 — LOW
ESTIMATED AVERAGE GLUCOSE: 137 MG/DL — HIGH (ref 68–114)
GLUCOSE BLDC GLUCOMTR-MCNC: 109 MG/DL — HIGH (ref 70–99)
GLUCOSE BLDC GLUCOMTR-MCNC: 152 MG/DL — HIGH (ref 70–99)
GLUCOSE BLDC GLUCOMTR-MCNC: 158 MG/DL — HIGH (ref 70–99)
GLUCOSE BLDC GLUCOMTR-MCNC: 262 MG/DL — HIGH (ref 70–99)
GLUCOSE SERPL-MCNC: 181 MG/DL — HIGH (ref 70–99)
HCT VFR BLD CALC: 37.9 % — SIGNIFICANT CHANGE UP (ref 34.5–45)
HGB BLD-MCNC: 11.2 G/DL — LOW (ref 11.5–15.5)
INR BLD: 1.56 RATIO — HIGH (ref 0.88–1.16)
MAGNESIUM SERPL-MCNC: 2.1 MG/DL — SIGNIFICANT CHANGE UP (ref 1.6–2.6)
MCHC RBC-ENTMCNC: 23.7 PG — LOW (ref 27–34)
MCHC RBC-ENTMCNC: 29.6 GM/DL — LOW (ref 32–36)
MCV RBC AUTO: 80.3 FL — SIGNIFICANT CHANGE UP (ref 80–100)
NRBC # BLD: 0 /100 WBCS — SIGNIFICANT CHANGE UP (ref 0–0)
PHOSPHATE SERPL-MCNC: 3.9 MG/DL — SIGNIFICANT CHANGE UP (ref 2.5–4.5)
PLATELET # BLD AUTO: 1045 K/UL — CRITICAL HIGH (ref 150–400)
POTASSIUM SERPL-MCNC: 4.7 MMOL/L — SIGNIFICANT CHANGE UP (ref 3.5–5.3)
POTASSIUM SERPL-SCNC: 4.7 MMOL/L — SIGNIFICANT CHANGE UP (ref 3.5–5.3)
PROT SERPL-MCNC: 7.1 G/DL — SIGNIFICANT CHANGE UP (ref 6–8.3)
PROTHROM AB SERPL-ACNC: 18 SEC — HIGH (ref 10.5–13.4)
RBC # BLD: 4.72 M/UL — SIGNIFICANT CHANGE UP (ref 3.8–5.2)
RBC # FLD: 22.2 % — HIGH (ref 10.3–14.5)
RH IG SCN BLD-IMP: POSITIVE — SIGNIFICANT CHANGE UP
SODIUM SERPL-SCNC: 141 MMOL/L — SIGNIFICANT CHANGE UP (ref 135–145)
WBC # BLD: 21.79 K/UL — HIGH (ref 3.8–10.5)
WBC # FLD AUTO: 21.79 K/UL — HIGH (ref 3.8–10.5)

## 2023-03-14 PROCEDURE — 99232 SBSQ HOSP IP/OBS MODERATE 35: CPT | Mod: GC

## 2023-03-14 PROCEDURE — 99222 1ST HOSP IP/OBS MODERATE 55: CPT | Mod: GC

## 2023-03-14 RX ORDER — SODIUM CHLORIDE 9 MG/ML
1000 INJECTION, SOLUTION INTRAVENOUS
Refills: 0 | Status: DISCONTINUED | OUTPATIENT
Start: 2023-03-14 | End: 2023-03-16

## 2023-03-14 RX ORDER — POLYETHYLENE GLYCOL 3350 17 G/17G
17 POWDER, FOR SOLUTION ORAL EVERY 12 HOURS
Refills: 0 | Status: DISCONTINUED | OUTPATIENT
Start: 2023-03-14 | End: 2023-03-16

## 2023-03-14 RX ORDER — CHLORHEXIDINE GLUCONATE 213 G/1000ML
1 SOLUTION TOPICAL DAILY
Refills: 0 | Status: DISCONTINUED | OUTPATIENT
Start: 2023-03-14 | End: 2023-03-16

## 2023-03-14 RX ADMIN — ATORVASTATIN CALCIUM 40 MILLIGRAM(S): 80 TABLET, FILM COATED ORAL at 22:34

## 2023-03-14 RX ADMIN — PIPERACILLIN AND TAZOBACTAM 25 GRAM(S): 4; .5 INJECTION, POWDER, LYOPHILIZED, FOR SOLUTION INTRAVENOUS at 00:05

## 2023-03-14 RX ADMIN — Medication 650 MILLIGRAM(S): at 12:13

## 2023-03-14 RX ADMIN — POLYETHYLENE GLYCOL 3350 17 GRAM(S): 17 POWDER, FOR SOLUTION ORAL at 17:22

## 2023-03-14 RX ADMIN — SENNA PLUS 2 TABLET(S): 8.6 TABLET ORAL at 22:34

## 2023-03-14 RX ADMIN — Medication 3: at 13:26

## 2023-03-14 RX ADMIN — Medication 650 MILLIGRAM(S): at 17:21

## 2023-03-14 RX ADMIN — Medication 5 MILLIGRAM(S): at 22:34

## 2023-03-14 RX ADMIN — Medication 25 MILLIGRAM(S): at 17:20

## 2023-03-14 RX ADMIN — POLYETHYLENE GLYCOL 3350 17 GRAM(S): 17 POWDER, FOR SOLUTION ORAL at 12:14

## 2023-03-14 RX ADMIN — Medication 650 MILLIGRAM(S): at 17:23

## 2023-03-14 RX ADMIN — PIPERACILLIN AND TAZOBACTAM 25 GRAM(S): 4; .5 INJECTION, POWDER, LYOPHILIZED, FOR SOLUTION INTRAVENOUS at 05:59

## 2023-03-14 RX ADMIN — SODIUM CHLORIDE 75 MILLILITER(S): 9 INJECTION, SOLUTION INTRAVENOUS at 13:50

## 2023-03-14 RX ADMIN — Medication 88 MICROGRAM(S): at 06:01

## 2023-03-14 RX ADMIN — Medication 25 MILLIGRAM(S): at 06:00

## 2023-03-14 RX ADMIN — Medication 650 MILLIGRAM(S): at 13:00

## 2023-03-14 RX ADMIN — AMLODIPINE BESYLATE 5 MILLIGRAM(S): 2.5 TABLET ORAL at 06:01

## 2023-03-14 RX ADMIN — Medication 650 MILLIGRAM(S): at 06:00

## 2023-03-14 RX ADMIN — Medication 1: at 08:53

## 2023-03-14 RX ADMIN — Medication 25 MILLIGRAM(S): at 00:06

## 2023-03-14 RX ADMIN — PIPERACILLIN AND TAZOBACTAM 25 GRAM(S): 4; .5 INJECTION, POWDER, LYOPHILIZED, FOR SOLUTION INTRAVENOUS at 16:20

## 2023-03-14 RX ADMIN — HYDROXYUREA 500 MILLIGRAM(S): 500 CAPSULE ORAL at 00:06

## 2023-03-14 RX ADMIN — Medication 650 MILLIGRAM(S): at 06:30

## 2023-03-14 RX ADMIN — ATORVASTATIN CALCIUM 40 MILLIGRAM(S): 80 TABLET, FILM COATED ORAL at 00:06

## 2023-03-14 NOTE — PROGRESS NOTE ADULT - SUBJECTIVE AND OBJECTIVE BOX
Dora Rojas MD  Internal Medicine, PGY-1        Patient Name: BISHOP BURNS  Patient MRN: [id number]    Patient is a 89y old  Female who presents with a chief complaint of Bloody stools (13 Mar 2023 17:30)      **SUBJECTIVE**    Last 24 hours: Overnight, patient with small BM after tap water enema. SMOG enema given.         **OBJECTIVE**        VITALS:   T(C): 36.7 (03-14-23 @ 04:46), Max: 36.9 (03-13-23 @ 20:20)  HR: 78 (03-14-23 @ 04:46) (65 - 92)  BP: 123/75 (03-14-23 @ 04:46) (123/75 - 149/85)  RR: 18 (03-14-23 @ 04:46) (16 - 18)  SpO2: 98% (03-14-23 @ 04:46) (96% - 98%)    GENERAL: NAD  NECK: Supple, No JVD  CHEST/LUNG: Clear to auscultation bilaterally;Unlabored respirations  HEART: Regular rate and rhythm  ABDOMEN: BSx4; Soft, nondistended, minimal tenderness to palpation diffusely   EXTREMITIES:No clubbing, cyanosis, or edema  NERVOUS SYSTEM: nonverbal, responsive to name and appropriately shakes head yes or no to questions    Labs  03-13    142  |  110<H>  |  36<H>  ----------------------------<  152<H>  4.7   |  18<L>  |  1.02    Ca    9.9      13 Mar 2023 12:04    TPro  7.1  /  Alb  3.3  /  TBili  0.2  /  DBili  x   /  AST  27  /  ALT  31  /  AlkPhos  128<H>  03-13    CBC Full  -  ( 13 Mar 2023 12:04 )  WBC Count : 21.49 K/uL  RBC Count : 4.80 M/uL  Hemoglobin : 11.2 g/dL  Hematocrit : 38.7 %  Platelet Count - Automated : 943 K/uL  Mean Cell Volume : 80.6 fl  Mean Cell Hemoglobin : 23.3 pg  Mean Cell Hemoglobin Concentration : 28.9 gm/dL  Auto Neutrophil # : 17.19 K/uL  Auto Lymphocyte # : 2.43 K/uL  Auto Monocyte # : 1.12 K/uL  Auto Eosinophil # : 0.56 K/uL  Auto Basophil # : 0.19 K/uL  Auto Neutrophil % : 80.0 %  Auto Lymphocyte % : 11.3 %  Auto Monocyte % : 5.2 %  Auto Eosinophil % : 2.6 %  Auto Basophil % : 0.9 %              POC Glucose  CAPILLARY BLOOD GLUCOSE      POCT Blood Glucose.: 123 mg/dL (13 Mar 2023 22:23)      I&O's  I&O's Summary      UA (if applicable)      Micro (if applicable)      Imaging (if applicable)    Active Medications  MEDICATIONS  (STANDING):  acetaminophen     Tablet .. 650 milliGRAM(s) Oral every 6 hours  amLODIPine   Tablet 5 milliGRAM(s) Oral daily  atorvastatin 40 milliGRAM(s) Oral at bedtime  dextrose 50% Injectable 25 Gram(s) IV Push once  dextrose 50% Injectable 12.5 Gram(s) IV Push once  dextrose 50% Injectable 25 Gram(s) IV Push once  dextrose Oral Gel 15 Gram(s) Oral once  glucagon  Injectable 1 milliGRAM(s) IntraMuscular once  hydroxyurea 500 milliGRAM(s) Oral <User Schedule>  hydroxyurea 500 milliGRAM(s) Oral <User Schedule>  insulin lispro (ADMELOG) corrective regimen sliding scale   SubCutaneous three times a day before meals  insulin lispro (ADMELOG) corrective regimen sliding scale   SubCutaneous at bedtime  levothyroxine 88 MICROGram(s) Oral daily  metoprolol tartrate 25 milliGRAM(s) Oral two times a day  piperacillin/tazobactam IVPB.. 3.375 Gram(s) IV Intermittent every 8 hours  polyethylene glycol 3350 17 Gram(s) Oral daily  senna 2 Tablet(s) Oral at bedtime    MEDICATIONS  (PRN):  artificial  tears Solution 1 Drop(s) Both EYES two times a day PRN Dry Eyes  melatonin 5 milliGRAM(s) Oral at bedtime PRN Insomnia         Dora Rojas MD  Internal Medicine, PGY-1        Patient Name: BISHOP BURNS  Patient MRN: [id number]    Patient is a 89y old  Female who presents with a chief complaint of Bloody stools (13 Mar 2023 17:30)      **SUBJECTIVE**    Last 24 hours: Overnight, patient with small BM after tap water enema. SMOG enema given at 6am with moderate non-bloody BM around 830AM. AAOx1 (responsive to name but nonverbal)         **OBJECTIVE**        VITALS:   T(C): 36.7 (03-14-23 @ 04:46), Max: 36.9 (03-13-23 @ 20:20)  HR: 78 (03-14-23 @ 04:46) (65 - 92)  BP: 123/75 (03-14-23 @ 04:46) (123/75 - 149/85)  RR: 18 (03-14-23 @ 04:46) (16 - 18)  SpO2: 98% (03-14-23 @ 04:46) (96% - 98%)    GENERAL: NAD  NECK: Supple, No JVD  CHEST/LUNG: Clear to auscultation bilaterally;Unlabored respirations  HEART: Regular rate and rhythm  ABDOMEN: BSx4; Soft, nondistended, minimal tenderness to palpation diffusely   EXTREMITIES:No clubbing, cyanosis, or edema  NERVOUS SYSTEM: nonverbal, responsive to name and appropriately shakes head yes or no to questions    Labs  03-13    142  |  110<H>  |  36<H>  ----------------------------<  152<H>  4.7   |  18<L>  |  1.02    Ca    9.9      13 Mar 2023 12:04    TPro  7.1  /  Alb  3.3  /  TBili  0.2  /  DBili  x   /  AST  27  /  ALT  31  /  AlkPhos  128<H>  03-13    CBC Full  -  ( 13 Mar 2023 12:04 )  WBC Count : 21.49 K/uL  RBC Count : 4.80 M/uL  Hemoglobin : 11.2 g/dL  Hematocrit : 38.7 %  Platelet Count - Automated : 943 K/uL  Mean Cell Volume : 80.6 fl  Mean Cell Hemoglobin : 23.3 pg  Mean Cell Hemoglobin Concentration : 28.9 gm/dL  Auto Neutrophil # : 17.19 K/uL  Auto Lymphocyte # : 2.43 K/uL  Auto Monocyte # : 1.12 K/uL  Auto Eosinophil # : 0.56 K/uL  Auto Basophil # : 0.19 K/uL  Auto Neutrophil % : 80.0 %  Auto Lymphocyte % : 11.3 %  Auto Monocyte % : 5.2 %  Auto Eosinophil % : 2.6 %  Auto Basophil % : 0.9 %              POC Glucose  CAPILLARY BLOOD GLUCOSE      POCT Blood Glucose.: 123 mg/dL (13 Mar 2023 22:23)      I&O's  I&O's Summary      UA (if applicable)      Micro (if applicable)      Imaging (if applicable)    Active Medications  MEDICATIONS  (STANDING):  acetaminophen     Tablet .. 650 milliGRAM(s) Oral every 6 hours  amLODIPine   Tablet 5 milliGRAM(s) Oral daily  atorvastatin 40 milliGRAM(s) Oral at bedtime  dextrose 50% Injectable 25 Gram(s) IV Push once  dextrose 50% Injectable 12.5 Gram(s) IV Push once  dextrose 50% Injectable 25 Gram(s) IV Push once  dextrose Oral Gel 15 Gram(s) Oral once  glucagon  Injectable 1 milliGRAM(s) IntraMuscular once  hydroxyurea 500 milliGRAM(s) Oral <User Schedule>  hydroxyurea 500 milliGRAM(s) Oral <User Schedule>  insulin lispro (ADMELOG) corrective regimen sliding scale   SubCutaneous three times a day before meals  insulin lispro (ADMELOG) corrective regimen sliding scale   SubCutaneous at bedtime  levothyroxine 88 MICROGram(s) Oral daily  metoprolol tartrate 25 milliGRAM(s) Oral two times a day  piperacillin/tazobactam IVPB.. 3.375 Gram(s) IV Intermittent every 8 hours  polyethylene glycol 3350 17 Gram(s) Oral daily  senna 2 Tablet(s) Oral at bedtime    MEDICATIONS  (PRN):  artificial  tears Solution 1 Drop(s) Both EYES two times a day PRN Dry Eyes  melatonin 5 milliGRAM(s) Oral at bedtime PRN Insomnia

## 2023-03-14 NOTE — CONSULT NOTE ADULT - SUBJECTIVE AND OBJECTIVE BOX
HPI:  BISHOP BURNS is a 89 year old female with history of HTN, HLD, T2DM, hypothyroidism, CVA, vascular dementia, polycythemia vera on Eliquis and hydroxyurea, RLE DVT, and SBO s/p bowel resection in 2018 who presents with BRBPR.    Unable to obtain full HPI due to underlying mental status and currently A/Ox0 upon my interview.  Patient on Eliquis at home came in for reported "BRBPR" however report indicates blood in diaper at home found by aid/nurse.    ROS:   Unable to obtain full ROS due to underlying mental status.    PMHX/PSHX:    Benign Hypertension    Diabetes    Diabetes    Hypercholesteremia    Adult Hypothyroidism    Deep Vein Thrombosis (DVT)    Stroke    SBO (Small Bowel Obstruction)    2019 novel coronavirus disease (COVID-19)    COVID-19 vaccine series completed    FH: Total Abdominal Hysterectomy and Bilateral Salpingo-Oophorectomy    S/P small bowel resection      Allergies:  No Known Drug Allergies  spices (Rash)      Home Medications: reviewed  Hospital Medications:  acetaminophen     Tablet .. 650 milliGRAM(s) Oral every 6 hours  amLODIPine   Tablet 5 milliGRAM(s) Oral daily  artificial  tears Solution 1 Drop(s) Both EYES two times a day PRN  atorvastatin 40 milliGRAM(s) Oral at bedtime  dextrose 50% Injectable 25 Gram(s) IV Push once  dextrose 50% Injectable 12.5 Gram(s) IV Push once  dextrose 50% Injectable 25 Gram(s) IV Push once  dextrose Oral Gel 15 Gram(s) Oral once  glucagon  Injectable 1 milliGRAM(s) IntraMuscular once  hydroxyurea 500 milliGRAM(s) Oral <User Schedule>  hydroxyurea 500 milliGRAM(s) Oral <User Schedule>  insulin lispro (ADMELOG) corrective regimen sliding scale   SubCutaneous three times a day before meals  insulin lispro (ADMELOG) corrective regimen sliding scale   SubCutaneous at bedtime  levothyroxine 88 MICROGram(s) Oral daily  melatonin 5 milliGRAM(s) Oral at bedtime PRN  metoprolol tartrate 25 milliGRAM(s) Oral two times a day  piperacillin/tazobactam IVPB.. 3.375 Gram(s) IV Intermittent every 8 hours  polyethylene glycol 3350 17 Gram(s) Oral every 12 hours  senna 2 Tablet(s) Oral at bedtime      Social History:   Unable to obtain due to underlying mental status.    Family history:    Family history of diabetes mellitus (DM) (Child)    Family history of secondary lung cancer    Family history of breast cancer (Child)        PHYSICAL EXAM:   Vital Signs:  Vital Signs Last 24 Hrs  T(C): 36.7 (14 Mar 2023 04:46), Max: 36.9 (13 Mar 2023 20:20)  T(F): 98.1 (14 Mar 2023 04:46), Max: 98.4 (13 Mar 2023 20:20)  HR: 78 (14 Mar 2023 04:46) (75 - 78)  BP: 123/75 (14 Mar 2023 04:46) (123/75 - 128/66)  BP(mean): --  RR: 18 (14 Mar 2023 04:46) (18 - 18)  SpO2: 98% (14 Mar 2023 04:46) (98% - 98%)    Parameters below as of 14 Mar 2023 04:46  Patient On (Oxygen Delivery Method): room air      Daily Height in cm: 157.48 (13 Mar 2023 21:37)    Daily     GENERAL: no acute distress  NEURO: not fully alert/oriented  HEENT: NCAT, no conjunctival pallor appreciated  CHEST: no respiratory distress, no accessory muscle use  CARDIAC: regular rate, +S1/S2  ABDOMEN: soft, nontender, no rebound or guarding  EXTREMITIES: warm, well perfused  SKIN: no lesions noted    LABS: reviewed                        11.2   21.79 )-----------( 1045     ( 14 Mar 2023 12:13 )             37.9     03-13    142  |  110<H>  |  36<H>  ----------------------------<  152<H>  4.7   |  18<L>  |  1.02    Ca    9.9      13 Mar 2023 12:04    TPro  7.1  /  Alb  3.3  /  TBili  0.2  /  DBili  x   /  AST  27  /  ALT  31  /  AlkPhos  128<H>  03-13    LIVER FUNCTIONS - ( 13 Mar 2023 12:04 )  Alb: 3.3 g/dL / Pro: 7.1 g/dL / ALK PHOS: 128 U/L / ALT: 31 U/L / AST: 27 U/L / GGT: x               Diagnostic Studies: see sunrise for full report         HPI:  BISHOP BURNS is a 89 year old female with history of HTN, HLD, T2DM, hypothyroidism, CVA, vascular dementia, polycythemia vera on Eliquis and hydroxyurea, RLE DVT, and SBO s/p bowel resection in 2018 who presents with BRBPR.    Unable to obtain full HPI due to underlying mental status and currently A/Ox0 upon my interview.  Patient on Eliquis at home came in for reported "BRBPR" however report indicates blood in diaper at home found by aid/nurse. Per daughter at bedside, she  has hard small balls of stools at home, perhaps every other day.    ROS:   Unable to obtain full ROS due to underlying mental status.    PMHX/PSHX:    Benign Hypertension    Diabetes    Diabetes    Hypercholesteremia    Adult Hypothyroidism    Deep Vein Thrombosis (DVT)    Stroke    SBO (Small Bowel Obstruction)    2019 novel coronavirus disease (COVID-19)    COVID-19 vaccine series completed    FH: Total Abdominal Hysterectomy and Bilateral Salpingo-Oophorectomy    S/P small bowel resection      Allergies:  No Known Drug Allergies  spices (Rash)      Home Medications: reviewed  Hospital Medications:  acetaminophen     Tablet .. 650 milliGRAM(s) Oral every 6 hours  amLODIPine   Tablet 5 milliGRAM(s) Oral daily  artificial  tears Solution 1 Drop(s) Both EYES two times a day PRN  atorvastatin 40 milliGRAM(s) Oral at bedtime  dextrose 50% Injectable 25 Gram(s) IV Push once  dextrose 50% Injectable 12.5 Gram(s) IV Push once  dextrose 50% Injectable 25 Gram(s) IV Push once  dextrose Oral Gel 15 Gram(s) Oral once  glucagon  Injectable 1 milliGRAM(s) IntraMuscular once  hydroxyurea 500 milliGRAM(s) Oral <User Schedule>  hydroxyurea 500 milliGRAM(s) Oral <User Schedule>  insulin lispro (ADMELOG) corrective regimen sliding scale   SubCutaneous three times a day before meals  insulin lispro (ADMELOG) corrective regimen sliding scale   SubCutaneous at bedtime  levothyroxine 88 MICROGram(s) Oral daily  melatonin 5 milliGRAM(s) Oral at bedtime PRN  metoprolol tartrate 25 milliGRAM(s) Oral two times a day  piperacillin/tazobactam IVPB.. 3.375 Gram(s) IV Intermittent every 8 hours  polyethylene glycol 3350 17 Gram(s) Oral every 12 hours  senna 2 Tablet(s) Oral at bedtime      Social History:   Unable to obtain due to underlying mental status.    Family history:    Family history of diabetes mellitus (DM) (Child)    Family history of secondary lung cancer    Family history of breast cancer (Child)      ROS: Unable to obtain due to dementia    PHYSICAL EXAM:   Vital Signs:  Vital Signs Last 24 Hrs  T(C): 36.7 (14 Mar 2023 04:46), Max: 36.9 (13 Mar 2023 20:20)  T(F): 98.1 (14 Mar 2023 04:46), Max: 98.4 (13 Mar 2023 20:20)  HR: 78 (14 Mar 2023 04:46) (75 - 78)  BP: 123/75 (14 Mar 2023 04:46) (123/75 - 128/66)  BP(mean): --  RR: 18 (14 Mar 2023 04:46) (18 - 18)  SpO2: 98% (14 Mar 2023 04:46) (98% - 98%)    Parameters below as of 14 Mar 2023 04:46  Patient On (Oxygen Delivery Method): room air      Daily Height in cm: 157.48 (13 Mar 2023 21:37)    Daily     GENERAL: no acute distress  NEURO: not fully alert/oriented  HEENT: NCAT, no conjunctival pallor appreciated  CHEST: no respiratory distress, no accessory muscle use  CARDIAC: regular rate, +S1/S2  ABDOMEN: soft, nontender, no rebound or guarding  RECTAL: Soft brown stools  EXTREMITIES: warm, well perfused  SKIN: no lesions noted  PSYCH: Calm    LABS: reviewed                        11.2   21.79 )-----------( 1045     ( 14 Mar 2023 12:13 )             37.9     03-13    142  |  110<H>  |  36<H>  ----------------------------<  152<H>  4.7   |  18<L>  |  1.02    Ca    9.9      13 Mar 2023 12:04    TPro  7.1  /  Alb  3.3  /  TBili  0.2  /  DBili  x   /  AST  27  /  ALT  31  /  AlkPhos  128<H>  03-13    LIVER FUNCTIONS - ( 13 Mar 2023 12:04 )  Alb: 3.3 g/dL / Pro: 7.1 g/dL / ALK PHOS: 128 U/L / ALT: 31 U/L / AST: 27 U/L / GGT: x               Diagnostic Studies: see sunrise for full report        < from: CT Abdomen and Pelvis w/ IV Cont (03.13.23 @ 15:45) >  FINDINGS:  LOWER CHEST: Coronary artery and aortic root calcifications. Dilated   pulmonary artery to 3.5 cm in diameter, previously 3.0 cm on 12/25/2020.   Bibasilar subsegmental atelectasis. Left lower lobe air-filled cyst.    LIVER: Within normal limits.  BILE DUCTS: Mild prominence of the common bile duct to 11 mm, unchanged   from 12/25/2020, and may be related to the patient's age.  GALLBLADDER: Cholelithiasis.  SPLEEN: Indeterminate hypodense splenic lesion measuring 1.7 cm without   significant change in size since 12/25/2020.  PANCREAS: Within normal limits.  ADRENALS: Within normal limits.  KIDNEYS/URETERS: Right renal atrophy, similar to the prior exam. No   hydronephrosis.    BLADDER: Within normal limits.  REPRODUCTIVE ORGANS: Hysterectomy.    BOWEL: Periampullary duodenal diverticulum. Distention of the rectum with   stool with mild perirectal fat infiltration. No bowel obstruction.   Appendix is normal.  PERITONEUM: No ascites.  VESSELS: Atherosclerotic changes. Focal ectasia of the abdominal aorta   immediately inferior to the left renal artery, measures 2.5 cm,   previously 2.3 cm on 12/25/2020.  RETROPERITONEUM/LYMPH NODES: No lymphadenopathy.  ABDOMINAL WALL: Within normal limits.  BONES: Degenerative changes.    IMPRESSION:  Distention of the rectum with stool. Mild perirectal fat stranding, which   may reflect early findings of stercoral colitis.    Dilated main pulmonary artery, which may be seen with pulmonary   hypertension.      < end of copied text >

## 2023-03-14 NOTE — CONSULT NOTE ADULT - ATTENDING COMMENTS
Agree with above. Pt with large stool burden, rectal exam shows no blood. Would give enemas and laxatives as above to clear rectum of stool burden, and place on standing bowel regiment. D/w daughter at bedside.

## 2023-03-14 NOTE — CONSULT NOTE ADULT - ASSESSMENT
Daughter called back    Daughter states she did confirm with Dr. Arnold Mckenna office that patient should be taking atorvastatin. Daughter states would like 90 day supply filled. Med review completed of active meds in chart. Daughter states her mom is starting to fall behind doing meds herself and daughter will now be helping. Also wanted amlodipine filled. They have transferred [facility] to [facility]. Call back number for daughter- which is listed as Amairani's mobile number in chart if I need to call her back.      Kym Arthur, PharmD, [address] [place] Pharmacist  Department: [phone] 89 year old female with history of HTN, HLD, T2DM, hypothyroidism, CVA, vascular dementia, polycythemia vera on Eliquis and hydroxyurea, RLE DVT, and SBO s/p bowel resection in 2018 who presents with "BRBPR" however report indicates blood in diaper at home found by aid/nurse.    # Rectal distention and large stool burden on CT  # Reported BRBPR: rectal exam notable for brown stool; may have component of stercoral colitis or solitary rectal ulcer syndrome in the setting of immobility and constipation, however no overt signs of clinically significant GI bleeding    Recommendations:  -trend clinical symptoms, exam findings, vital signs, CBC, CMP, INR  -minimize further risk for constipation by treating with standing dose of daily miralax [can uptitrate dose as needed until having regular, daily bowel movements] +/- enemas for symptomatic relief  -no plans for colonoscopy or endoscopy    Note incomplete until finalized by attending signature/attestation.    Juaquin Azar  GI/Hepatology Fellow    MONDAY-FRIDAY 8AM-5PM:  Pager# [phone] ([facility]) or [phone] ([facility])    NON-URGENT CONSULTS:  Please email [email or web address]  AT NIGHT AND ON WEEKENDS:  Contact on-call GI fellow via answering service ([phone]) from 5pm-8am and on weekends/holidays   89 year old female with history of HTN, HLD, T2DM, hypothyroidism, CVA, vascular dementia, polycythemia vera on Eliquis and hydroxyurea, RLE DVT, and SBO s/p bowel resection in 2018 who presents with "BRBPR" however report indicates blood in diaper at home found by aid/nurse.    # Rectal distention and large stool burden on CT  # Reported BRBPR: rectal exam notable for brown stool; may have component of stercoral colitis or solitary rectal ulcer syndrome in the setting of immobility and constipation, however no overt signs of clinically significant GI bleeding    Recommendations:  -trend clinical symptoms, exam findings, vital signs, CBC, CMP, INR  - Given SMOG enema x 2 to clear rectum  - Consider half of a Moviprep or bowel prep to clear residual stool burden  - Place patient on standing Miralax daily standing even on discharge to prevent recurrent stool impaction  -no plans for colonoscopy or endoscopy    Note incomplete until finalized by attending signature/attestation.    Juaquin Azar  GI/Hepatology Fellow    MONDAY-FRIDAY 8AM-5PM:  Pager# [phone] ([facility]) or [phone] ([facility])    NON-URGENT CONSULTS:  Please email [email or web address]  AT NIGHT AND ON WEEKENDS:  Contact on-call GI fellow via answering service ([phone]) from 5pm-8am and on weekends/holidays

## 2023-03-15 ENCOUNTER — TRANSCRIPTION ENCOUNTER (OUTPATIENT)
Age: 88
End: 2023-03-15

## 2023-03-15 LAB
ALBUMIN SERPL ELPH-MCNC: 3 G/DL — LOW (ref 3.3–5)
ALP SERPL-CCNC: 149 U/L — HIGH (ref 40–120)
ALT FLD-CCNC: 81 U/L — HIGH (ref 10–45)
ANION GAP SERPL CALC-SCNC: 13 MMOL/L — SIGNIFICANT CHANGE UP (ref 5–17)
ANISOCYTOSIS BLD QL: SLIGHT — SIGNIFICANT CHANGE UP
AST SERPL-CCNC: 61 U/L — HIGH (ref 10–40)
BASE EXCESS BLDV CALC-SCNC: -10 MMOL/L — LOW (ref -2–3)
BASE EXCESS BLDV CALC-SCNC: -5.4 MMOL/L — LOW (ref -2–3)
BASOPHILS # BLD AUTO: 0 K/UL — SIGNIFICANT CHANGE UP (ref 0–0.2)
BASOPHILS NFR BLD AUTO: 0 % — SIGNIFICANT CHANGE UP (ref 0–2)
BILIRUB SERPL-MCNC: 0.2 MG/DL — SIGNIFICANT CHANGE UP (ref 0.2–1.2)
BUN SERPL-MCNC: 26 MG/DL — HIGH (ref 7–23)
BURR CELLS BLD QL SMEAR: PRESENT — SIGNIFICANT CHANGE UP
CA-I SERPL-SCNC: 1.31 MMOL/L — SIGNIFICANT CHANGE UP (ref 1.15–1.33)
CALCIUM SERPL-MCNC: 8.9 MG/DL — SIGNIFICANT CHANGE UP (ref 8.4–10.5)
CHLORIDE BLDV-SCNC: 112 MMOL/L — HIGH (ref 96–108)
CHLORIDE SERPL-SCNC: 109 MMOL/L — HIGH (ref 96–108)
CHOLEST SERPL-MCNC: 135 MG/DL — SIGNIFICANT CHANGE UP
CO2 BLDV-SCNC: 18 MMOL/L — LOW (ref 22–26)
CO2 BLDV-SCNC: 21 MMOL/L — LOW (ref 22–26)
CO2 SERPL-SCNC: 19 MMOL/L — LOW (ref 22–31)
CREAT SERPL-MCNC: 0.88 MG/DL — SIGNIFICANT CHANGE UP (ref 0.5–1.3)
DACRYOCYTES BLD QL SMEAR: SLIGHT — SIGNIFICANT CHANGE UP
EGFR: 63 ML/MIN/1.73M2 — SIGNIFICANT CHANGE UP
ELLIPTOCYTES BLD QL SMEAR: SLIGHT — SIGNIFICANT CHANGE UP
EOSINOPHIL # BLD AUTO: 0.36 K/UL — SIGNIFICANT CHANGE UP (ref 0–0.5)
EOSINOPHIL NFR BLD AUTO: 1.7 % — SIGNIFICANT CHANGE UP (ref 0–6)
GAS PNL BLDV: 138 MMOL/L — SIGNIFICANT CHANGE UP (ref 136–145)
GAS PNL BLDV: SIGNIFICANT CHANGE UP
GLUCOSE BLDC GLUCOMTR-MCNC: 106 MG/DL — HIGH (ref 70–99)
GLUCOSE BLDC GLUCOMTR-MCNC: 109 MG/DL — HIGH (ref 70–99)
GLUCOSE BLDC GLUCOMTR-MCNC: 141 MG/DL — HIGH (ref 70–99)
GLUCOSE BLDC GLUCOMTR-MCNC: 314 MG/DL — HIGH (ref 70–99)
GLUCOSE BLDV-MCNC: 105 MG/DL — HIGH (ref 70–99)
GLUCOSE SERPL-MCNC: 119 MG/DL — HIGH (ref 70–99)
HCO3 BLDV-SCNC: 17 MMOL/L — LOW (ref 22–29)
HCO3 BLDV-SCNC: 20 MMOL/L — LOW (ref 22–29)
HCT VFR BLD CALC: 38.9 % — SIGNIFICANT CHANGE UP (ref 34.5–45)
HCT VFR BLDA CALC: 34 % — LOW (ref 34.5–46.5)
HDLC SERPL-MCNC: 22 MG/DL — LOW
HGB BLD CALC-MCNC: 11.4 G/DL — LOW (ref 11.7–16.1)
HGB BLD-MCNC: 10.9 G/DL — LOW (ref 11.5–15.5)
LACTATE BLDV-MCNC: 2.8 MMOL/L — HIGH (ref 0.5–2)
LACTATE BLDV-MCNC: 3 MMOL/L — HIGH (ref 0.5–2)
LIPID PNL WITH DIRECT LDL SERPL: 83 MG/DL — SIGNIFICANT CHANGE UP
LYMPHOCYTES # BLD AUTO: 0.72 K/UL — LOW (ref 1–3.3)
LYMPHOCYTES # BLD AUTO: 3.4 % — LOW (ref 13–44)
MAGNESIUM SERPL-MCNC: 2 MG/DL — SIGNIFICANT CHANGE UP (ref 1.6–2.6)
MANUAL SMEAR VERIFICATION: SIGNIFICANT CHANGE UP
MCHC RBC-ENTMCNC: 22.6 PG — LOW (ref 27–34)
MCHC RBC-ENTMCNC: 28 GM/DL — LOW (ref 32–36)
MCV RBC AUTO: 80.5 FL — SIGNIFICANT CHANGE UP (ref 80–100)
MONOCYTES # BLD AUTO: 0.89 K/UL — SIGNIFICANT CHANGE UP (ref 0–0.9)
MONOCYTES NFR BLD AUTO: 4.2 % — SIGNIFICANT CHANGE UP (ref 2–14)
MRSA PCR RESULT.: SIGNIFICANT CHANGE UP
NEUTROPHILS # BLD AUTO: 19.21 K/UL — HIGH (ref 1.8–7.4)
NEUTROPHILS NFR BLD AUTO: 90.7 % — HIGH (ref 43–77)
NON HDL CHOLESTEROL: 113 MG/DL — SIGNIFICANT CHANGE UP
OVALOCYTES BLD QL SMEAR: SLIGHT — SIGNIFICANT CHANGE UP
PCO2 BLDV: 38 MMHG — LOW (ref 39–42)
PCO2 BLDV: 42 MMHG — SIGNIFICANT CHANGE UP (ref 39–42)
PH BLDV: 7.22 — LOW (ref 7.32–7.43)
PH BLDV: 7.33 — SIGNIFICANT CHANGE UP (ref 7.32–7.43)
PHOSPHATE SERPL-MCNC: 3 MG/DL — SIGNIFICANT CHANGE UP (ref 2.5–4.5)
PLAT MORPH BLD: NORMAL — SIGNIFICANT CHANGE UP
PLATELET # BLD AUTO: 1153 K/UL — CRITICAL HIGH (ref 150–400)
PO2 BLDV: 107 MMHG — HIGH (ref 25–45)
PO2 BLDV: 27 MMHG — SIGNIFICANT CHANGE UP (ref 25–45)
POIKILOCYTOSIS BLD QL AUTO: SLIGHT — SIGNIFICANT CHANGE UP
POTASSIUM BLDV-SCNC: 4.2 MMOL/L — SIGNIFICANT CHANGE UP (ref 3.5–5.1)
POTASSIUM SERPL-MCNC: 3.6 MMOL/L — SIGNIFICANT CHANGE UP (ref 3.5–5.3)
POTASSIUM SERPL-SCNC: 3.6 MMOL/L — SIGNIFICANT CHANGE UP (ref 3.5–5.3)
PROT SERPL-MCNC: 6.5 G/DL — SIGNIFICANT CHANGE UP (ref 6–8.3)
RBC # BLD: 4.83 M/UL — SIGNIFICANT CHANGE UP (ref 3.8–5.2)
RBC # FLD: 22.3 % — HIGH (ref 10.3–14.5)
RBC BLD AUTO: ABNORMAL
S AUREUS DNA NOSE QL NAA+PROBE: SIGNIFICANT CHANGE UP
SAO2 % BLDV: 48.1 % — LOW (ref 67–88)
SAO2 % BLDV: 96.5 % — HIGH (ref 67–88)
SCHISTOCYTES BLD QL AUTO: SLIGHT — SIGNIFICANT CHANGE UP
SODIUM SERPL-SCNC: 141 MMOL/L — SIGNIFICANT CHANGE UP (ref 135–145)
TRIGL SERPL-MCNC: 150 MG/DL — HIGH
WBC # BLD: 21.18 K/UL — HIGH (ref 3.8–10.5)
WBC # FLD AUTO: 21.18 K/UL — HIGH (ref 3.8–10.5)

## 2023-03-15 PROCEDURE — 99232 SBSQ HOSP IP/OBS MODERATE 35: CPT | Mod: GC

## 2023-03-15 RX ORDER — SODIUM CHLORIDE 9 MG/ML
1000 INJECTION INTRAMUSCULAR; INTRAVENOUS; SUBCUTANEOUS
Refills: 0 | Status: DISCONTINUED | OUTPATIENT
Start: 2023-03-15 | End: 2023-03-16

## 2023-03-15 RX ORDER — APIXABAN 2.5 MG/1
2.5 TABLET, FILM COATED ORAL EVERY 12 HOURS
Refills: 0 | Status: DISCONTINUED | OUTPATIENT
Start: 2023-03-15 | End: 2023-03-16

## 2023-03-15 RX ADMIN — ATORVASTATIN CALCIUM 40 MILLIGRAM(S): 80 TABLET, FILM COATED ORAL at 21:28

## 2023-03-15 RX ADMIN — Medication 650 MILLIGRAM(S): at 17:38

## 2023-03-15 RX ADMIN — SODIUM CHLORIDE 75 MILLILITER(S): 9 INJECTION INTRAMUSCULAR; INTRAVENOUS; SUBCUTANEOUS at 16:20

## 2023-03-15 RX ADMIN — POLYETHYLENE GLYCOL 3350 17 GRAM(S): 17 POWDER, FOR SOLUTION ORAL at 07:00

## 2023-03-15 RX ADMIN — Medication 5 MILLIGRAM(S): at 21:29

## 2023-03-15 RX ADMIN — CHLORHEXIDINE GLUCONATE 1 APPLICATION(S): 213 SOLUTION TOPICAL at 11:42

## 2023-03-15 RX ADMIN — Medication 1 TABLET(S): at 11:40

## 2023-03-15 RX ADMIN — Medication 88 MICROGRAM(S): at 07:00

## 2023-03-15 RX ADMIN — APIXABAN 2.5 MILLIGRAM(S): 2.5 TABLET, FILM COATED ORAL at 21:29

## 2023-03-15 RX ADMIN — APIXABAN 2.5 MILLIGRAM(S): 2.5 TABLET, FILM COATED ORAL at 11:41

## 2023-03-15 RX ADMIN — Medication 25 MILLIGRAM(S): at 17:38

## 2023-03-15 RX ADMIN — SENNA PLUS 2 TABLET(S): 8.6 TABLET ORAL at 21:29

## 2023-03-15 RX ADMIN — AMLODIPINE BESYLATE 5 MILLIGRAM(S): 2.5 TABLET ORAL at 07:00

## 2023-03-15 RX ADMIN — Medication 650 MILLIGRAM(S): at 06:59

## 2023-03-15 RX ADMIN — Medication 650 MILLIGRAM(S): at 23:53

## 2023-03-15 RX ADMIN — Medication 25 MILLIGRAM(S): at 07:00

## 2023-03-15 RX ADMIN — Medication 650 MILLIGRAM(S): at 00:06

## 2023-03-15 RX ADMIN — Medication 1 TABLET(S): at 17:38

## 2023-03-15 RX ADMIN — Medication 650 MILLIGRAM(S): at 11:41

## 2023-03-15 RX ADMIN — Medication 650 MILLIGRAM(S): at 12:34

## 2023-03-15 RX ADMIN — Medication 4: at 12:47

## 2023-03-15 RX ADMIN — Medication 650 MILLIGRAM(S): at 23:52

## 2023-03-15 NOTE — DISCHARGE NOTE PROVIDER - CARE PROVIDERS DIRECT ADDRESSES
[email or web address]

## 2023-03-15 NOTE — DISCHARGE NOTE PROVIDER - CARE PROVIDER_API CALL
Park Chamberlain; MSc)  Internal Medicine; Medical Oncology  [address], [place] [address]  Phone: [phone]  Fax: [phone]  Established Patient  Follow Up Time: 2 weeks    Radha Day)  Internal Medicine  [address], [place] [address]  Phone: [phone]  Fax: [phone]  Follow Up Time: 2 weeks

## 2023-03-15 NOTE — CHART NOTE - NSCHARTNOTEFT_GEN_A_CORE
Pt. had two bowel movements yesterday, responded well to Miralax. She denied abd distension, pain, nausea and vomiting.     Recommendations:  - Continue with Miralax daily even on discharge to prevent recurrent stool impaction  - Consider half of a Moviprep or bowel prep to clear residual stool burden as it can worsen potential stercoral coliitis leading to further bleeding.   - no plans for colonoscopy or endoscopy at this time.     Thank you for the consult. Please call us back if you have any questions or concerns.     Evelin Weaver, PGY-4  Gastroenterology/Hepatology Fellow  Available on [employer] Teams  [phone] (Short Range Pager)  [phone] (Long Range Pager)    After 5pm, please contact the on-call GI fellow. [phone]

## 2023-03-15 NOTE — CONSULT NOTE ADULT - ASSESSMENT
A/P: 89yo woman with a h/o vascular dementia, polycythemia vera (on eliquis and hydroxyurea_follows with heme Dr. Park Chamberlain), CVA, RLE DVT, HTN, HLD, spontaneous SBO s/p resection in 2018,  hypothyroidism and T2DM who presents to the [facility] ED with bright red blood per rectum      Wound Consult requested to assist w/ management of Incontinence Associate dermatitis     Buttocks/ Sacrum  TRIAD BID and prn soiling       Continue w/ attends under pads and Pericare as per protocol  Moisturize intact skin w/ SWEEN cream BID  Nutrition Consult for optimization          encourage high quality protein, jorge/ prosource, MVI & Vit C to promote wound healing  Continue turning and positioning w/ offloading assistive devices as per protocol  Waffle Cushion to chair when oob to chair  Continue w/ low air loss pressure redistribution bed surface   Care as per medicine, remain available as requested  Upon discharge f/u as outpatient at Wound Center [address] [place], [place] [id number]  D/w team & RN & attng  Thank you for this consult  Rosario Gerardo PA-C [id number]  Nights/ Weekends/ Holidays please call:  General Surgery Consult pager ([phone]) for emergencies  Wound PT for multilayer leg wrapping or VAC issues ([phone])   I spent 55minutes face to face w/ this pt of which more than 50% of the time was spent counseling & coordinating care of this pt.

## 2023-03-15 NOTE — DISCHARGE NOTE PROVIDER - NSDCMRMEDTOKEN_GEN_ALL_CORE_FT
acetaminophen 325 mg oral tablet: 2 tab(s) orally every 6 hours, As needed, Temp greater or equal to 38C (100.4F), Mild Pain (1 - 3)  alendronate 70 mg oral tablet: 1 tab(s) orally once a week ( wednesdays )  amLODIPine 5 mg oral tablet: 1 tab(s) orally once a day  apixaban 2.5 mg oral tablet: 1 tab(s) orally 2 times a day  atorvastatin 40 mg oral tablet: 1 tab(s) orally once a day  Home Health care :   hydroxyurea 500 mg oral capsule: 1 cap(s) orally 2 times a week on Monday and Friday  levothyroxine 88 mcg (0.088 mg) oral tablet: 1 tab(s) orally once a day  melatonin 5 mg oral tablet: 1 tab(s) orally once a day (at bedtime)  metFORMIN 500 mg oral tablet, extended release: 1 tab(s) orally once a day  metoprolol tartrate 25 mg oral tablet: 1 tab(s) orally 2 times a day  ocular lubricant ophthalmic solution: 1 drop(s) to each affected eye 2 times a day  polyethylene glycol 3350 oral powder for reconstitution: 17 gram(s) orally once a day  QUEtiapine 25 mg oral tablet: 0.5 tab(s) orally once a day (at bedtime), As Needed  senna oral tablet: 2 tab(s) orally once a day (at bedtime)   acetaminophen 325 mg oral tablet: 2 tab(s) orally every 6 hours, As needed, Temp greater or equal to 38C (100.4F), Mild Pain (1 - 3)  alendronate 70 mg oral tablet: 1 tab(s) orally once a week ( wednesdays )  amLODIPine 5 mg oral tablet: 1 tab(s) orally once a day  amoxicillin-clavulanate 875 mg-125 mg oral tablet: 1 tab(s) orally 2 times a day  apixaban 2.5 mg oral tablet: 1 tab(s) orally 2 times a day  atorvastatin 40 mg oral tablet: 1 tab(s) orally once a day  bisacodyl 5 mg oral delayed release tablet: 1 tab(s) orally once a day (at bedtime)  Home Health care :   hydroxyurea 500 mg oral capsule: 1 cap(s) orally 2 times a week on Monday and Friday  levothyroxine 88 mcg (0.088 mg) oral tablet: 1 tab(s) orally once a day  melatonin 5 mg oral tablet: 1 tab(s) orally once a day (at bedtime)  metFORMIN 500 mg oral tablet, extended release: 1 tab(s) orally once a day  metoprolol tartrate 25 mg oral tablet: 1 tab(s) orally 2 times a day  ocular lubricant ophthalmic solution: 1 drop(s) to each affected eye 2 times a day  polyethylene glycol 3350 oral powder for reconstitution: 17 gram(s) orally 2 times a day   senna oral tablet: 2 tab(s) orally once a day (at bedtime)

## 2023-03-15 NOTE — DISCHARGE NOTE PROVIDER - PROVIDER TOKENS
PROVIDER:[TOKEN:[[id number]],FOLLOWUP:[2 weeks],ESTABLISHEDPATIENT:[T]],PROVIDER:[TOKEN:[[id number]],FOLLOWUP:[2 weeks]]

## 2023-03-15 NOTE — DISCHARGE NOTE PROVIDER - NSDCCPCAREPLAN_GEN_ALL_CORE_FT
PRINCIPAL DISCHARGE DIAGNOSIS  Diagnosis: Stercoral colitis  Assessment and Plan of Treatment: You were brought into the hospital becuase you had some bleeding from your lower GI tract. You were admitted to the hospital because you had inflammation of your colon due to severe constipation and build up of fecal matter. You were treated with an aggressive bowel regimen including multiple laxatives and enemas which helped to clear the blockage. This subsequently improved your abdominal pain and you clinically improved. We also treated you with anitbiotics out of precaution for overlying infection of the colon, and can continue these antibiotics as an outpatient through 3/18.

## 2023-03-15 NOTE — CONSULT NOTE ADULT - SUBJECTIVE AND OBJECTIVE BOX
Wound SURGERY CONSULT NOTE    HPI:  88yo woman with a h/o vascular dementia, polycythemia vera (on eliquis and hydroxyurea_follows with heme Dr. Park Chamberlain), CVA, RLE DVT, HTN, HLD, spontaneous SBO s/p resection in 2018,  hypothyroidism and T2DM who presents to the [facility] ED with bright red blood per rectum that began yesterday. Patient nonverbal at baseline. As per daughter, patient first had a loose, watery BM with some blood within it yesterday morning. In the afternoon, patient had a large bloody bowel movement with minimal stool. This AM, when HHA was changing patient and notified daughter about a "1/2 cup" of blood in her diaper, at which point the daughter contacted the patient's PCP who recommended bringing her to the hospital. Throughout this time, the patient did not endorse any abdominal pain, fever, chills, weakness, or chest pain. Also denies any associated nausea, vomiting. A did not give patient her AM eliquis dose today in the setting of her bleeding.     Upon arrival in the ED, patient afebrile with /85, breathing comfortably on RA. Labs remarkable for leukocytosis to 21K with neutrophilic predominance, anemia with Hgb 11.2. VBG showing acute respiratory acidosis with pH 7.29. pCO2 44, HCO3 21, pO2 27.  CT AP revealing "Distention of the rectum with stool. Mild perirectal fat stranding, which   may reflect early findings of stercoral colitis." CXR clear. GI consulted by ED providers. s/p zosyn x1.    Wound consult requested by team to assist w/ management of      wound/ pressure injury.   Pt (unable to)  c/o pain, drainage, odor, color change,  or worsening swelling. Offloading and pericare initiated upon admission as pt Increasingly sedentary 2/2 to illness. Pt is Incontinent of urine & stool. (+)sylvester/ ostomy.   No h/o bites, scratches, falls, trauma.  Pt seen by Wound RN  CAVILON Advance/  Antonia,TRIAD/ Aquacell/ medihoney/ Allevyn foam/ dakins/ Adaptic/ DSD recommended used at home/ while awaiting consult.  Appetite good/ decreased.  weight loss.  S&S / RD consult appreciated All questions asked and answered to pt's and family's expressed understanding and satisfaction.    Current Diet: Diet, Consistent Carbohydrate Clear Liquid (03-13-23 @ 18:37)      PAST MEDICAL & SURGICAL HISTORY:  Benign Hypertension    Diabetes    Hypercholesteremia    Adult Hypothyroidism    Deep Vein Thrombosis (DVT)    Stroke    SBO (Small Bowel Obstruction), S/P small bowel resection    2019 novel coronavirus disease (COVID-19)    s/p Total Abdominal Hysterectomy and Bilateral Salpingo-Oophorectomy          REVIEW OF SYSTEMS: Pt unable to offer  General/ Breast/ Skin/Vasc/ Neuro/ MSK: see HPI  All other systems negative    MEDICATIONS  (STANDING):  acetaminophen     Tablet .. 650 milliGRAM(s) Oral every 6 hours  amLODIPine   Tablet 5 milliGRAM(s) Oral daily  amoxicillin  875 milliGRAM(s)/clavulanate 1 Tablet(s) Oral two times a day  apixaban 2.5 milliGRAM(s) Oral every 12 hours  atorvastatin 40 milliGRAM(s) Oral at bedtime  bisacodyl 5 milliGRAM(s) Oral at bedtime  chlorhexidine 4% Liquid 1 Application(s) Topical daily  dextrose 50% Injectable 25 Gram(s) IV Push once  dextrose 50% Injectable 12.5 Gram(s) IV Push once  dextrose 50% Injectable 25 Gram(s) IV Push once  dextrose Oral Gel 15 Gram(s) Oral once  glucagon  Injectable 1 milliGRAM(s) IntraMuscular once  hydroxyurea 500 milliGRAM(s) Oral <User Schedule>  hydroxyurea 500 milliGRAM(s) Oral <User Schedule>  insulin lispro (ADMELOG) corrective regimen sliding scale   SubCutaneous three times a day before meals  insulin lispro (ADMELOG) corrective regimen sliding scale   SubCutaneous at bedtime  lactated ringers. 1000 milliLiter(s) (75 mL/Hr) IV Continuous <Continuous>  levothyroxine 88 MICROGram(s) Oral daily  metoprolol tartrate 25 milliGRAM(s) Oral two times a day  polyethylene glycol 3350 17 Gram(s) Oral every 12 hours  senna 2 Tablet(s) Oral at bedtime  sodium chloride 0.9%. 1000 milliLiter(s) (75 mL/Hr) IV Continuous <Continuous>    MEDICATIONS  (PRN):  artificial  tears Solution 1 Drop(s) Both EYES two times a day PRN Dry Eyes  melatonin 5 milliGRAM(s) Oral at bedtime PRN Insomnia      Allergies  spices (Rash)      SOCIAL HISTORY:  (+)HHA/ lives w/daughter; No smoking, ETOH, drugs    FAMILY HISTORY: no h/o PVD or wound healing or skin/ significant problems    (+)FMH/o diabetes mellitus (DM) (Child), secondary lung cancer, & breast cancer (Child)    `  PHYSICAL EXAM:  Vital Signs Last 24 Hrs  T(C): 36.6 (15 Mar 2023 17:35), Max: 36.7 (14 Mar 2023 21:13)  T(F): 97.8 (15 Mar 2023 17:35), Max: 98 (14 Mar 2023 21:13)  HR: 83 (15 Mar 2023 17:35) (83 - 89)  BP: 159/75 (15 Mar 2023 17:35) (119/66 - 159/75)  BP(mean): --  RR: 16 (15 Mar 2023 17:35) (16 - 18)  SpO2: 95% (15 Mar 2023 17:35) (95% - 98%)    Parameters below as of 15 Mar 2023 17:35  Patient On (Oxygen Delivery Method): room air        NAD, Guarded but stable,  A&Ox3/ Alert/ Confused  cachectic/ thin, MO/ Obese, frail,  WD/ WN/ WG,  Disheveled  Total Care Sport/ Versa Care P500 / Envella Progressa bed     HEENT:  NC/AT, PERRL, EOMI, sclera clear, mucosa moist, throat clear, trachea midline, neck supple, trach  Respiratory: nonlabored w/ equal chest rise  Gastrointestinal soft NT/ND (+)BS  (+)PEG (+)ostomy (+)NGT  : (+)sylvester/ purewick/ condom cath  Neurology:  weakened strength & sensation grossly intact, paraesthesia  nonverbal, no follow commands, paraplegic  Psych: calm/ appropriate/ flat affect/ easily aggitated/ restless  Musculoskeletal:  limited stiff / p/FROM, no deformities/ contractures  Vascular: BLE equally warm/ cool,  no cyanosis, clubbing, edema nor acute ischemia           >LE //BLE edema equal           BLE DP/PT pulses palpable          BLE hemosiderin staining/ varicose veins  Skin:  moist w/ good turgor  thin, dry, pale, frail,  ecchymosis w/o hematoma  blistering  or serosanguinous drainage  No odor, erythema, increased warmth, tenderness, induration, fluctuance, nor crepitus    LABS/ CULTURES/ RADIOLOGY:                        10.9   21.18 )-----------( 1153     ( 15 Mar 2023 10:28 )             38.9       141  |  109  |  26  ----------------------------<  119      [03-15-23 @ 10:28]  3.6   |  19  |  0.88        Ca     8.9     [03-15-23 @ 10:28]      Mg     2.0     [03-15-23 @ 10:28]      Phos  3.0     [03-15-23 @ 10:28]    TPro  6.5  /  Alb  3.0  /  TBili  0.2  /  DBili  x   /  AST  61  /  ALT  81  /  AlkPhos  149  [03-15-23 @ 10:28]    PT/INR: PT 18.0 , INR 1.56       [03-14-23 @ 12:13]  PTT: 40.1       [03-14-23 @ 12:13]      A1C with Estimated Average Glucose Result: 6.4 % (03-14-23 @ 12:13)                  A/P: 88yo woman with a h/o vascular dementia, polycythemia vera (on eliquis and hydroxyurea_follows with heme Dr. Park Chamberlain), CVA, RLE DVT, HTN, HLD, spontaneous SBO s/p resection in 2018,  hypothyroidism and T2DM who presents to the [facility] ED with bright red blood per rectum      Wound Consult requested to assist w/ management of    Buttocks/ Sacrum  TRIAD BID and prn soiling       Continue w/ attends under pads and Pericare as per protocol  Moisturize intact skin w/ SWEEN cream BID  Nutrition Consult for optimization          encourage high quality protein, jorge/ prosource, MVI & Vit C to promote wound healing  Continue turning and positioning w/ offloading assistive devices as per protocol  Waffle Cushion to chair when oob to chair  Continue w/ low air loss pressure redistribution bed surface   Care as per medicine, will follow w/ you/ remain available as requested  Upon discharge f/u as outpatient at [facility]ound [facility] [address] [place] [address] [phone]  D/w team & RN & attng  Thank you for this consult  Rosario Gerardo PA-C [id number]  Nights/ Weekends/ Holidays please call:  General Surgery Consult pager ([phone]) for emergencies  Wound PT for multilayer leg wrapping or VAC issues (x [phone])      Wound SURGERY CONSULT NOTE    HPI:  89yo woman with a h/o vascular dementia, polycythemia vera (on eliquis and hydroxyurea_follows with heme Dr. Park Chamberlain), CVA, RLE DVT, HTN, HLD, spontaneous SBO s/p resection in 2018,  hypothyroidism and T2DM who presents to the [facility] ED with bright red blood per rectum that began yesterday. Patient nonverbal at baseline. As per daughter, patient first had a loose, watery BM with some blood within it yesterday morning. In the afternoon, patient had a large bloody bowel movement with minimal stool. This AM, when HHA was changing patient and notified daughter about a "1/2 cup" of blood in her diaper, at which point the daughter contacted the patient's PCP who recommended bringing her to the hospital. Throughout this time, the patient did not endorse any abdominal pain, fever, chills, weakness, or chest pain. Also denies any associated nausea, vomiting. A did not give patient her AM eliquis dose today in the setting of her bleeding.     Upon arrival in the ED, patient afebrile with /85, breathing comfortably on RA. Labs remarkable for leukocytosis to 21K with neutrophilic predominance, anemia with Hgb 11.2. VBG showing acute respiratory acidosis with pH 7.29. pCO2 44, HCO3 21, pO2 27.  CT AP revealing "Distention of the rectum with stool. Mild perirectal fat stranding, which   may reflect early findings of stercoral colitis." CXR clear. GI consulted by ED providers. s/p zosyn x1.    Wound consult requested by team to assist w/ management of buttocks/ perineum.  Pt unable to c/o pain, drainage, odor, color change, or swelling. Offloading and pericare initiated upon. Pt is Incontinent of urine & stool.   No h/o bites, scratches, falls, trauma. Appetite good w/o weight loss.  S      Current Diet: Diet, Consistent Carbohydrate Clear Liquid (03-13-23 @ 18:37)      PAST MEDICAL & SURGICAL HISTORY:  Benign Hypertension    Diabetes    Hypercholesteremia    Adult Hypothyroidism    Deep Vein Thrombosis (DVT)    Stroke    SBO (Small Bowel Obstruction), S/P small bowel resection    2019 novel coronavirus disease (COVID-19)    s/p Total Abdominal Hysterectomy and Bilateral Salpingo-Oophorectomy          REVIEW OF SYSTEMS: Pt unable to offer  General/ Breast/ Skin/Vasc/ Neuro/ MSK: see HPI  All other systems negative    MEDICATIONS  (STANDING):  acetaminophen     Tablet .. 650 milliGRAM(s) Oral every 6 hours  amLODIPine   Tablet 5 milliGRAM(s) Oral daily  amoxicillin  875 milliGRAM(s)/clavulanate 1 Tablet(s) Oral two times a day  apixaban 2.5 milliGRAM(s) Oral every 12 hours  atorvastatin 40 milliGRAM(s) Oral at bedtime  bisacodyl 5 milliGRAM(s) Oral at bedtime  chlorhexidine 4% Liquid 1 Application(s) Topical daily  dextrose 50% Injectable 25 Gram(s) IV Push once  dextrose 50% Injectable 12.5 Gram(s) IV Push once  dextrose 50% Injectable 25 Gram(s) IV Push once  dextrose Oral Gel 15 Gram(s) Oral once  glucagon  Injectable 1 milliGRAM(s) IntraMuscular once  hydroxyurea 500 milliGRAM(s) Oral <User Schedule>  hydroxyurea 500 milliGRAM(s) Oral <User Schedule>  insulin lispro (ADMELOG) corrective regimen sliding scale   SubCutaneous three times a day before meals  insulin lispro (ADMELOG) corrective regimen sliding scale   SubCutaneous at bedtime  lactated ringers. 1000 milliLiter(s) (75 mL/Hr) IV Continuous <Continuous>  levothyroxine 88 MICROGram(s) Oral daily  metoprolol tartrate 25 milliGRAM(s) Oral two times a day  polyethylene glycol 3350 17 Gram(s) Oral every 12 hours  senna 2 Tablet(s) Oral at bedtime  sodium chloride 0.9%. 1000 milliLiter(s) (75 mL/Hr) IV Continuous <Continuous>    MEDICATIONS  (PRN):  artificial  tears Solution 1 Drop(s) Both EYES two times a day PRN Dry Eyes  melatonin 5 milliGRAM(s) Oral at bedtime PRN Insomnia      Allergies  spices (Rash)      SOCIAL HISTORY:  (+)HHA/ lives w/daughter; No smoking, ETOH, drugs    FAMILY HISTORY: no h/o PVD or wound healing or skin/ significant problems    (+)FMH/o diabetes mellitus (DM) (Child), secondary lung cancer, & breast cancer (Child)    `  PHYSICAL EXAM:  Vital Signs Last 24 Hrs  T(C): 36.6 (15 Mar 2023 17:35), Max: 36.7 (14 Mar 2023 21:13)  T(F): 97.8 (15 Mar 2023 17:35), Max: 98 (14 Mar 2023 21:13)  HR: 83 (15 Mar 2023 17:35) (83 - 89)  BP: 159/75 (15 Mar 2023 17:35) (119/66 - 159/75)  BP(mean): --  RR: 16 (15 Mar 2023 17:35) (16 - 18)  SpO2: 95% (15 Mar 2023 17:35) (95% - 98%)    Parameters below as of 15 Mar 2023 17:35  Patient On (Oxygen Delivery Method): room air      NAD, Alert, frail,  WD/ WN/ WG  Versa Care P500 bed  HEENT:  NC/AT, EOMI, sclera clear, mucosa moist, throat clear, trachea midline, neck supple  Respiratory: nonlabored w/ equal chest rise  Gastrointestinal soft NT/ND  Neurology:  weakened strength & sensation grossly intact  Psych: calm/ appropriate  Musculoskeletal: FROM, no deformities/ contractures  Vascular: BLE equally warm,  no cyanosis, clubbing, edema, nor acute ischemia  Skin:  moist w/ good turgor  Bilateral buttocks/ perineum w/ erythema of incontinence/ moisture  No blistering  or serosanguinous drainage  No odor, erythema, increased warmth, tenderness, induration, fluctuance, nor crepitus    LABS/ CULTURES/ RADIOLOGY:                        10.9   21.18 )-----------( 1153     ( 15 Mar 2023 10:28 )             38.9       141  |  109  |  26  ----------------------------<  119      [03-15-23 @ 10:28]  3.6   |  19  |  0.88        Ca     8.9     [03-15-23 @ 10:28]      Mg     2.0     [03-15-23 @ 10:28]      Phos  3.0     [03-15-23 @ 10:28]    TPro  6.5  /  Alb  3.0  /  TBili  0.2  /  DBili  x   /  AST  61  /  ALT  81  /  AlkPhos  149  [03-15-23 @ 10:28]    PT/INR: PT 18.0 , INR 1.56       [03-14-23 @ 12:13]  PTT: 40.1       [03-14-23 @ 12:13]      A1C with Estimated Average Glucose Result: 6.4 % (03-14-23 @ 12:13)

## 2023-03-15 NOTE — DISCHARGE NOTE PROVIDER - NSDCCPTREATMENT_GEN_ALL_CORE_FT
PRINCIPAL PROCEDURE  Procedure: CT abdomen pelvis  Findings and Treatment:   < end of copied text >  IMPRESSION:  Distention of the rectum with stool. Mild perirectal fat stranding, which   may reflect early findings of stercoral colitis.  Dilated main pulmonary artery, which may be seen with pulmonary   hypertension.< from: CT Abdomen and Pelvis w/ IV Cont (03.13.23 @ 15:45) >

## 2023-03-15 NOTE — PROGRESS NOTE ADULT - SUBJECTIVE AND OBJECTIVE BOX
Dora Rojas MD  Internal Medicine, PGY-1        Patient Name: BISHOP BURNS  Patient MRN: [id number]    Patient is a 89y old  Female who presents with a chief complaint of Bloody stools (14 Mar 2023 12:36)      **SUBJECTIVE**    Last 24 hours: Overnight, lost IV access. New IVs attempted to be placed by night float MD; however unsuccessful. This AM, patient AAOx1 and nonverbal. Appears comfortable and in no acute distress. Does not grimace or flinch upon palpation of abdomen. 2 BM documented overnight, to follow up with RN about possible AM BM.         **OBJECTIVE**        VITALS:   T(C): 36.3 (03-15-23 @ 04:17), Max: 36.7 (03-14-23 @ 21:13)  HR: 89 (03-15-23 @ 04:17) (85 - 89)  BP: 119/66 (03-15-23 @ 04:17) (119/66 - 134/67)  RR: 18 (03-15-23 @ 04:17) (18 - 18)  SpO2: 97% (03-15-23 @ 04:17) (97% - 98%)    GENERAL: NAD  NECK: Supple, No JVD  CHEST/LUNG: Clear to auscultation bilaterally;Unlabored respirations  HEART: Regular rate and rhythm  ABDOMEN: BSx4; Soft, nontender, nondistended  EXTREMITIES: No clubbing, cyanosis, or edema  NERVOUS SYSTEM:  A&Ox0-1    Labs  03-14    141  |  108  |  28<H>  ----------------------------<  181<H>  4.7   |  19<L>  |  0.98    Ca    9.7      14 Mar 2023 12:12  Phos  3.9     03-14  Mg     2.1     03-14    TPro  7.1  /  Alb  3.3  /  TBili  0.3  /  DBili  x   /  AST  22  /  ALT  32  /  AlkPhos  140<H>  03-14    CBC Full  -  ( 14 Mar 2023 12:13 )  WBC Count : 21.79 K/uL  RBC Count : 4.72 M/uL  Hemoglobin : 11.2 g/dL  Hematocrit : 37.9 %  Platelet Count - Automated : 1045 K/uL  Mean Cell Volume : 80.3 fl  Mean Cell Hemoglobin : 23.7 pg  Mean Cell Hemoglobin Concentration : 29.6 gm/dL  Auto Neutrophil # : x  Auto Lymphocyte # : x  Auto Monocyte # : x  Auto Eosinophil # : x  Auto Basophil # : x  Auto Neutrophil % : x  Auto Lymphocyte % : x  Auto Monocyte % : x  Auto Eosinophil % : x  Auto Basophil % : x          PT/INR - ( 14 Mar 2023 12:13 )   PT: 18.0 sec;   INR: 1.56 ratio         PTT - ( 14 Mar 2023 12:13 )  PTT:40.1 sec    POC Glucose  CAPILLARY BLOOD GLUCOSE      POCT Blood Glucose.: 152 mg/dL (14 Mar 2023 22:40)  POCT Blood Glucose.: 109 mg/dL (14 Mar 2023 17:31)  POCT Blood Glucose.: 262 mg/dL (14 Mar 2023 13:20)  POCT Blood Glucose.: 158 mg/dL (14 Mar 2023 08:46)      I&O's  I&O's Summary    14 Mar 2023 07:01  -  15 Mar 2023 07:00  --------------------------------------------------------  IN: 0 mL / OUT: 350 mL / NET: -350 mL        UA (if applicable)      Micro (if applicable)      Imaging (if applicable)    Active Medications  MEDICATIONS  (STANDING):  acetaminophen     Tablet .. 650 milliGRAM(s) Oral every 6 hours  amLODIPine   Tablet 5 milliGRAM(s) Oral daily  atorvastatin 40 milliGRAM(s) Oral at bedtime  bisacodyl 5 milliGRAM(s) Oral at bedtime  chlorhexidine 4% Liquid 1 Application(s) Topical daily  dextrose 50% Injectable 25 Gram(s) IV Push once  dextrose 50% Injectable 12.5 Gram(s) IV Push once  dextrose 50% Injectable 25 Gram(s) IV Push once  dextrose Oral Gel 15 Gram(s) Oral once  glucagon  Injectable 1 milliGRAM(s) IntraMuscular once  hydroxyurea 500 milliGRAM(s) Oral <User Schedule>  hydroxyurea 500 milliGRAM(s) Oral <User Schedule>  insulin lispro (ADMELOG) corrective regimen sliding scale   SubCutaneous three times a day before meals  insulin lispro (ADMELOG) corrective regimen sliding scale   SubCutaneous at bedtime  lactated ringers. 1000 milliLiter(s) (75 mL/Hr) IV Continuous <Continuous>  levothyroxine 88 MICROGram(s) Oral daily  metoprolol tartrate 25 milliGRAM(s) Oral two times a day  piperacillin/tazobactam IVPB.. 3.375 Gram(s) IV Intermittent every 8 hours  polyethylene glycol 3350 17 Gram(s) Oral every 12 hours  senna 2 Tablet(s) Oral at bedtime    MEDICATIONS  (PRN):  artificial  tears Solution 1 Drop(s) Both EYES two times a day PRN Dry Eyes  melatonin 5 milliGRAM(s) Oral at bedtime PRN Insomnia

## 2023-03-16 ENCOUNTER — TRANSCRIPTION ENCOUNTER (OUTPATIENT)
Age: 88
End: 2023-03-16

## 2023-03-16 VITALS
RESPIRATION RATE: 20 BRPM | HEART RATE: 89 BPM | OXYGEN SATURATION: 100 % | SYSTOLIC BLOOD PRESSURE: 132 MMHG | DIASTOLIC BLOOD PRESSURE: 62 MMHG | TEMPERATURE: 97 F

## 2023-03-16 LAB
ANION GAP SERPL CALC-SCNC: 12 MMOL/L — SIGNIFICANT CHANGE UP (ref 5–17)
BASOPHILS # BLD AUTO: 0.18 K/UL — SIGNIFICANT CHANGE UP (ref 0–0.2)
BASOPHILS NFR BLD AUTO: 0.9 % — SIGNIFICANT CHANGE UP (ref 0–2)
BUN SERPL-MCNC: 25 MG/DL — HIGH (ref 7–23)
CALCIUM SERPL-MCNC: 8.3 MG/DL — LOW (ref 8.4–10.5)
CHLORIDE SERPL-SCNC: 112 MMOL/L — HIGH (ref 96–108)
CO2 SERPL-SCNC: 15 MMOL/L — LOW (ref 22–31)
CREAT SERPL-MCNC: 0.76 MG/DL — SIGNIFICANT CHANGE UP (ref 0.5–1.3)
EGFR: 75 ML/MIN/1.73M2 — SIGNIFICANT CHANGE UP
EOSINOPHIL # BLD AUTO: 0.66 K/UL — HIGH (ref 0–0.5)
EOSINOPHIL NFR BLD AUTO: 3.4 % — SIGNIFICANT CHANGE UP (ref 0–6)
GAS PNL BLDA: SIGNIFICANT CHANGE UP
GLUCOSE BLDC GLUCOMTR-MCNC: 178 MG/DL — HIGH (ref 70–99)
GLUCOSE BLDC GLUCOMTR-MCNC: 84 MG/DL — SIGNIFICANT CHANGE UP (ref 70–99)
GLUCOSE BLDC GLUCOMTR-MCNC: 98 MG/DL — SIGNIFICANT CHANGE UP (ref 70–99)
GLUCOSE SERPL-MCNC: 160 MG/DL — HIGH (ref 70–99)
HCT VFR BLD CALC: 33.5 % — LOW (ref 34.5–45)
HGB BLD-MCNC: 10 G/DL — LOW (ref 11.5–15.5)
IMM GRANULOCYTES NFR BLD AUTO: 1 % — HIGH (ref 0–0.9)
LYMPHOCYTES # BLD AUTO: 1.31 K/UL — SIGNIFICANT CHANGE UP (ref 1–3.3)
LYMPHOCYTES # BLD AUTO: 6.8 % — LOW (ref 13–44)
MAGNESIUM SERPL-MCNC: 1.7 MG/DL — SIGNIFICANT CHANGE UP (ref 1.6–2.6)
MCHC RBC-ENTMCNC: 23.4 PG — LOW (ref 27–34)
MCHC RBC-ENTMCNC: 29.9 GM/DL — LOW (ref 32–36)
MCV RBC AUTO: 78.5 FL — LOW (ref 80–100)
MONOCYTES # BLD AUTO: 1.07 K/UL — HIGH (ref 0–0.9)
MONOCYTES NFR BLD AUTO: 5.6 % — SIGNIFICANT CHANGE UP (ref 2–14)
NEUTROPHILS # BLD AUTO: 15.74 K/UL — HIGH (ref 1.8–7.4)
NEUTROPHILS NFR BLD AUTO: 82.3 % — HIGH (ref 43–77)
NRBC # BLD: 0 /100 WBCS — SIGNIFICANT CHANGE UP (ref 0–0)
PHOSPHATE SERPL-MCNC: 2.3 MG/DL — LOW (ref 2.5–4.5)
PLATELET # BLD AUTO: 969 K/UL — HIGH (ref 150–400)
POTASSIUM SERPL-MCNC: 4.2 MMOL/L — SIGNIFICANT CHANGE UP (ref 3.5–5.3)
POTASSIUM SERPL-SCNC: 4.2 MMOL/L — SIGNIFICANT CHANGE UP (ref 3.5–5.3)
RBC # BLD: 4.27 M/UL — SIGNIFICANT CHANGE UP (ref 3.8–5.2)
RBC # FLD: 22.4 % — HIGH (ref 10.3–14.5)
SODIUM SERPL-SCNC: 139 MMOL/L — SIGNIFICANT CHANGE UP (ref 135–145)
WBC # BLD: 19.15 K/UL — HIGH (ref 3.8–10.5)
WBC # FLD AUTO: 19.15 K/UL — HIGH (ref 3.8–10.5)

## 2023-03-16 PROCEDURE — 82435 ASSAY OF BLOOD CHLORIDE: CPT

## 2023-03-16 PROCEDURE — 84100 ASSAY OF PHOSPHORUS: CPT

## 2023-03-16 PROCEDURE — 83605 ASSAY OF LACTIC ACID: CPT

## 2023-03-16 PROCEDURE — 87637 SARSCOV2&INF A&B&RSV AMP PRB: CPT

## 2023-03-16 PROCEDURE — G1004: CPT

## 2023-03-16 PROCEDURE — 80053 COMPREHEN METABOLIC PANEL: CPT

## 2023-03-16 PROCEDURE — 84295 ASSAY OF SERUM SODIUM: CPT

## 2023-03-16 PROCEDURE — 85027 COMPLETE CBC AUTOMATED: CPT

## 2023-03-16 PROCEDURE — 85018 HEMOGLOBIN: CPT

## 2023-03-16 PROCEDURE — 85610 PROTHROMBIN TIME: CPT

## 2023-03-16 PROCEDURE — 80048 BASIC METABOLIC PNL TOTAL CA: CPT

## 2023-03-16 PROCEDURE — 85014 HEMATOCRIT: CPT

## 2023-03-16 PROCEDURE — 85730 THROMBOPLASTIN TIME PARTIAL: CPT

## 2023-03-16 PROCEDURE — 82565 ASSAY OF CREATININE: CPT

## 2023-03-16 PROCEDURE — 85025 COMPLETE CBC W/AUTO DIFF WBC: CPT

## 2023-03-16 PROCEDURE — 99232 SBSQ HOSP IP/OBS MODERATE 35: CPT | Mod: GC

## 2023-03-16 PROCEDURE — 86900 BLOOD TYPING SEROLOGIC ABO: CPT

## 2023-03-16 PROCEDURE — 82330 ASSAY OF CALCIUM: CPT

## 2023-03-16 PROCEDURE — 82803 BLOOD GASES ANY COMBINATION: CPT

## 2023-03-16 PROCEDURE — 80061 LIPID PANEL: CPT

## 2023-03-16 PROCEDURE — 99285 EMERGENCY DEPT VISIT HI MDM: CPT | Mod: 25

## 2023-03-16 PROCEDURE — 83735 ASSAY OF MAGNESIUM: CPT

## 2023-03-16 PROCEDURE — 84132 ASSAY OF SERUM POTASSIUM: CPT

## 2023-03-16 PROCEDURE — 83036 HEMOGLOBIN GLYCOSYLATED A1C: CPT

## 2023-03-16 PROCEDURE — 87640 STAPH A DNA AMP PROBE: CPT

## 2023-03-16 PROCEDURE — 71046 X-RAY EXAM CHEST 2 VIEWS: CPT

## 2023-03-16 PROCEDURE — 87641 MR-STAPH DNA AMP PROBE: CPT

## 2023-03-16 PROCEDURE — 86901 BLOOD TYPING SEROLOGIC RH(D): CPT

## 2023-03-16 PROCEDURE — 36415 COLL VENOUS BLD VENIPUNCTURE: CPT

## 2023-03-16 PROCEDURE — 74177 CT ABD & PELVIS W/CONTRAST: CPT | Mod: MG

## 2023-03-16 PROCEDURE — 82947 ASSAY GLUCOSE BLOOD QUANT: CPT

## 2023-03-16 PROCEDURE — 86850 RBC ANTIBODY SCREEN: CPT

## 2023-03-16 PROCEDURE — 82272 OCCULT BLD FECES 1-3 TESTS: CPT

## 2023-03-16 PROCEDURE — 82962 GLUCOSE BLOOD TEST: CPT

## 2023-03-16 RX ORDER — POLYETHYLENE GLYCOL 3350 17 G/17G
17 POWDER, FOR SOLUTION ORAL
Qty: 1020 | Refills: 0
Start: 2023-03-16 | End: 2023-04-14

## 2023-03-16 RX ORDER — SODIUM,POTASSIUM PHOSPHATES 278-250MG
1 POWDER IN PACKET (EA) ORAL ONCE
Refills: 0 | Status: COMPLETED | OUTPATIENT
Start: 2023-03-16 | End: 2023-03-16

## 2023-03-16 RX ORDER — SENNA PLUS 8.6 MG/1
2 TABLET ORAL
Qty: 60 | Refills: 0
Start: 2023-03-16 | End: 2023-04-14

## 2023-03-16 RX ORDER — QUETIAPINE FUMARATE 200 MG/1
0.5 TABLET, FILM COATED ORAL
Qty: 0 | Refills: 0 | DISCHARGE

## 2023-03-16 RX ADMIN — Medication 25 MILLIGRAM(S): at 09:10

## 2023-03-16 RX ADMIN — Medication 1: at 13:42

## 2023-03-16 RX ADMIN — APIXABAN 2.5 MILLIGRAM(S): 2.5 TABLET, FILM COATED ORAL at 09:11

## 2023-03-16 RX ADMIN — Medication 88 MICROGRAM(S): at 05:12

## 2023-03-16 RX ADMIN — APIXABAN 2.5 MILLIGRAM(S): 2.5 TABLET, FILM COATED ORAL at 17:57

## 2023-03-16 RX ADMIN — POLYETHYLENE GLYCOL 3350 17 GRAM(S): 17 POWDER, FOR SOLUTION ORAL at 05:12

## 2023-03-16 RX ADMIN — Medication 650 MILLIGRAM(S): at 17:57

## 2023-03-16 RX ADMIN — AMLODIPINE BESYLATE 5 MILLIGRAM(S): 2.5 TABLET ORAL at 09:11

## 2023-03-16 RX ADMIN — Medication 650 MILLIGRAM(S): at 05:11

## 2023-03-16 RX ADMIN — Medication 650 MILLIGRAM(S): at 12:52

## 2023-03-16 RX ADMIN — CHLORHEXIDINE GLUCONATE 1 APPLICATION(S): 213 SOLUTION TOPICAL at 12:55

## 2023-03-16 RX ADMIN — Medication 1 TABLET(S): at 17:57

## 2023-03-16 RX ADMIN — Medication 650 MILLIGRAM(S): at 05:14

## 2023-03-16 RX ADMIN — Medication 1 PACKET(S): at 16:59

## 2023-03-16 RX ADMIN — Medication 25 MILLIGRAM(S): at 17:57

## 2023-03-16 RX ADMIN — Medication 1 TABLET(S): at 05:11

## 2023-03-16 NOTE — PROGRESS NOTE ADULT - PROBLEM SELECTOR PLAN 7
DVT ppx: hold for now i/s/o bleed  Diet: CLD  Dispo: pending medical optimization
DVT ppx: hold for now i/s/o bleed  Diet: CLD  Dispo: pending medical optimization
DVT ppx: eliquis   Diet: CLD  Dispo: home with A

## 2023-03-16 NOTE — DIETITIAN INITIAL EVALUATION ADULT - ENERGY INTAKE
- Pt reports fair appetite/PO intake since admission, is currently ordered for a clear liquid diet x 4 days.

## 2023-03-16 NOTE — DIETITIAN INITIAL EVALUATION ADULT - NSFNSGIIOFT_GEN_A_CORE
- Pt denies nausea, vomiting, diarrhea, or constipation.   - Last BM: 3/16; ordered for senna and miralax

## 2023-03-16 NOTE — DIETITIAN INITIAL EVALUATION ADULT - REASON INDICATOR FOR ASSESSMENT
Nutrition assessment warranted for: inadequate diet x 4 days and BMI<19   Information obtained from: electronic medical record and patient's daughter at bedside   Chart reviewed, events noted.

## 2023-03-16 NOTE — PROGRESS NOTE ADULT - PROBLEM SELECTOR PLAN 3
hold home eliquis for now as above  no evidence of DVT at present time

## 2023-03-16 NOTE — PROGRESS NOTE ADULT - ASSESSMENT
Ms. Terry is an 88yo woman with a h/o vascular dementia, polycythemia vera (on eliquis and hydroxyurea_follows with heme Dr. Park Chamberlain), CVA, RLE DVT, HTN, HLD, spontaneous SBO s/p resection in 2018,  hypothyroidism and T2DM who presents to the [facility] ED with bright red blood per rectum
Ms. Terry is an 88yo woman with a h/o vascular dementia, polycythemia vera (on eliquis and hydroxyurea_follows with heme Dr. Park Chamberlain), CVA, RLE DVT, HTN, HLD, spontaneous SBO s/p resection in 2018,  hypothyroidism and T2DM who presents to the [facility] ED with bright red blood per rectum
Ms. Terry is an 88yo woman with a h/o vascular dementia, polycythemia vera (on eliquis and hydroxyurea_follows with heme Dr. Park Chamberlain), CVA, RLE DVT, HTN, HLD, spontaneous SBO s/p resection in 2018,  hypothyroidism and T2DM who presents to the [facility] ED with bright red blood per rectum

## 2023-03-16 NOTE — PROGRESS NOTE ADULT - PROBLEM SELECTOR PLAN 4
hold home metformin while inpatient.   - ISS for now
hold home metformin while inpatient.   - f.u A1c in AM  - ISS for now
hold home metformin while inpatient.   - ISS for now

## 2023-03-16 NOTE — DIETITIAN INITIAL EVALUATION ADULT - ORAL INTAKE PTA/DIET HISTORY
Pt's daughter reports fair appetite/PO intake PTA  - Pt is allergic to spices, reports swelling and coughing when consumed.   - No therapeutic restrictions reported.  - Micronutrient/Other supplementation: none   - Protein-energy supplementation: none   - No hx of chewing/swallowing difficulties. Of note, wears dentures.

## 2023-03-16 NOTE — DIETITIAN INITIAL EVALUATION ADULT - PHYSCIAL ASSESSMENT
Weight Hx Per:  - Source: patient's daughter   - UBW: 113 pounds   - Reported weight changes: Reports gradual weight loss secondary to age.     [facility]    Current Admission Weights:  - Dosing weight: 97 pounds    Weight Change:  - 14 % weight loss x 1 year    **  Will continue to monitor weight trends as available/able.     IBW: 120 pounds   %IBW: 81 %/underweight

## 2023-03-16 NOTE — DIETITIAN INITIAL EVALUATION ADULT - PERTINENT MEDS FT
MEDICATIONS  (STANDING):  acetaminophen     Tablet .. 650 milliGRAM(s) Oral every 6 hours  amLODIPine   Tablet 5 milliGRAM(s) Oral daily  amoxicillin  875 milliGRAM(s)/clavulanate 1 Tablet(s) Oral two times a day  apixaban 2.5 milliGRAM(s) Oral every 12 hours  atorvastatin 40 milliGRAM(s) Oral at bedtime  bisacodyl 5 milliGRAM(s) Oral at bedtime  chlorhexidine 4% Liquid 1 Application(s) Topical daily  dextrose 50% Injectable 25 Gram(s) IV Push once  dextrose 50% Injectable 12.5 Gram(s) IV Push once  dextrose 50% Injectable 25 Gram(s) IV Push once  dextrose Oral Gel 15 Gram(s) Oral once  glucagon  Injectable 1 milliGRAM(s) IntraMuscular once  hydroxyurea 500 milliGRAM(s) Oral <User Schedule>  hydroxyurea 500 milliGRAM(s) Oral <User Schedule>  insulin lispro (ADMELOG) corrective regimen sliding scale   SubCutaneous three times a day before meals  insulin lispro (ADMELOG) corrective regimen sliding scale   SubCutaneous at bedtime  lactated ringers. 1000 milliLiter(s) (75 mL/Hr) IV Continuous <Continuous>  levothyroxine 88 MICROGram(s) Oral daily  metoprolol tartrate 25 milliGRAM(s) Oral two times a day  polyethylene glycol 3350 17 Gram(s) Oral every 12 hours  potassium phosphate / sodium phosphate Powder (PHOS-NaK) 1 Packet(s) Oral once  senna 2 Tablet(s) Oral at bedtime  sodium chloride 0.9%. 1000 milliLiter(s) (75 mL/Hr) IV Continuous <Continuous>    MEDICATIONS  (PRN):  artificial  tears Solution 1 Drop(s) Both EYES two times a day PRN Dry Eyes  melatonin 5 milliGRAM(s) Oral at bedtime PRN Insomnia

## 2023-03-16 NOTE — PROGRESS NOTE ADULT - ATTENDING COMMENTS
88 y/o F with history notable for vascular dementia ( nonverbal), PV, presenting with 1 day of brbpr c/w hematochezia. Imaging notable for significant stool burden with c/f stercoral colitis.   #Hematochezia  #Stercoral colitis  #Dementia  #PV  #Thrombocytosis  #Metabolic Acidosis  -s/p multiple enemas with resulting bms  -placed on standing bowel regimen with continued BMS  -Leukocytosis does not appear infectious given clinical improvement  -Continue hydroxyurea, restarting AC today  -transitioning to augmentin  -VBG this am with PH 7.22 Lactate of 2.8, given improvement unclear reliability of this test, repeating
89 y/o F with history notable for vascular dementia ( nonverbal), PV, presenting with 1 day of brbpr c/w hematochezia. Imaging notable for significant stool burden with c/f stercoral colitis.   #Hematochezia  #Stercoral colitis  #Dementia  #PV  #Thrombocytosis  #Metabolic Acidosis  -s/p multiple enemas with resulting bms  -placed on standing bowel regimen with continued BMS  -Leukocytosis does not appear infectious given clinical improvement  -Continue hydroxyurea, AC  -Complete short course of augmentin  -repeat VBG with ph wnl but persistent elevated lactate. Without signs of hemodynamic instability poor perfusion or ongoing sepsis.   -Ongoing d/c planning. d/c time spent by provider 40 min
88 y/o F with history notable for vascular dementia ( nonverbal), PV, presenting with 1 day of brbpr c/w hematochezia. Imaging notable for significant stool burden with c/f stercoral colitis.   #Hematochezia  #Stercoral colitis  #Dementia  #PV  #Thrombocytosis  -s/p multiple enemas with resulting bms  -placing on standing bowel regimen to reduce constipation  -trending leukocytosis  -Continue hydroxyurea  -will restart AC soon given no further bleeding episodes, hgb stable and Plts rising.  -Discussed with daughter at bedside

## 2023-03-16 NOTE — DISCHARGE NOTE NURSING/CASE MANAGEMENT/SOCIAL WORK - NSDCPEFALRISK_GEN_ALL_CORE
For information on Fall & Injury Prevention, visit: [email or web address] OR  [email or web address] OR  https://www.cdc.gov/steadi/patient.html

## 2023-03-16 NOTE — DIETITIAN INITIAL EVALUATION ADULT - PERTINENT LABORATORY DATA
03-16    139  |  112<H>  |  25<H>  ----------------------------<  160<H>  4.2   |  15<L>  |  0.76    Ca    8.3<L>      16 Mar 2023 14:57  Phos  2.3     03-16  Mg     1.7     03-16    TPro  6.5  /  Alb  3.0<L>  /  TBili  0.2  /  DBili  x   /  AST  61<H>  /  ALT  81<H>  /  AlkPhos  149<H>  03-15  POCT Blood Glucose.: 178 mg/dL (03-16-23 @ 13:20)  A1C with Estimated Average Glucose Result: 6.4 % (03-14-23 @ 12:13)

## 2023-03-16 NOTE — PROGRESS NOTE ADULT - PROBLEM SELECTOR PLAN 5
- c/w home levothyroxine 88mcg QD

## 2023-03-16 NOTE — DIETITIAN INITIAL EVALUATION ADULT - REASON FOR ADMISSION
Per chart, "Ms. Terry is an 89yo woman with a h/o vascular dementia, polycythemia vera (on eliquis and hydroxyurea_follows with heme Dr. Park Chamberlain), CVA, RLE DVT, HTN, HLD, spontaneous SBO s/p resection in 2018,  hypothyroidism and T2DM who presents to the [facility] ED with bright red blood per rectum."

## 2023-03-16 NOTE — PROGRESS NOTE ADULT - SUBJECTIVE AND OBJECTIVE BOX
Dora Rojas MD  Internal Medicine, PGY-1        Patient Name: BISHOP BURNS  Patient MRN: [id number]    Patient is a 89y old  Female who presents with a chief complaint of Bloody stools (15 Mar 2023 18:21)      **SUBJECTIVE**    Last 24 hours: No acute events overnight.     **OBJECTIVE**    VITALS:   T(C): 36.4 (03-16-23 @ 04:21), Max: 36.6 (03-15-23 @ 17:35)  HR: 89 (03-16-23 @ 04:21) (83 - 89)  BP: 107/71 (03-16-23 @ 04:21) (107/71 - 159/75)  RR: 18 (03-16-23 @ 04:21) (16 - 18)  SpO2: 95% (03-16-23 @ 04:21) (95% - 98%)    GENERAL: NAD  NECK: Supple, No JVD  CHEST/LUNG: Clear to auscultation bilaterally; Unlabored respirations  HEART: Regular rate and rhythm  ABDOMEN: BSx4; Soft, nontender, nondistended  EXTREMITIES: No clubbing, cyanosis, or edema  NERVOUS SYSTEM:  Awake and alert but nonverbal, nods to questioning appropriately     Labs  03-15    141  |  109<H>  |  26<H>  ----------------------------<  119<H>  3.6   |  19<L>  |  0.88    Ca    8.9      15 Mar 2023 10:28  Phos  3.0     03-15  Mg     2.0     03-15    TPro  6.5  /  Alb  3.0<L>  /  TBili  0.2  /  DBili  x   /  AST  61<H>  /  ALT  81<H>  /  AlkPhos  149<H>  03-15    CBC Full  -  ( 15 Mar 2023 10:28 )  WBC Count : 21.18 K/uL  RBC Count : 4.83 M/uL  Hemoglobin : 10.9 g/dL  Hematocrit : 38.9 %  Platelet Count - Automated : 1153 K/uL  Mean Cell Volume : 80.5 fl  Mean Cell Hemoglobin : 22.6 pg  Mean Cell Hemoglobin Concentration : 28.0 gm/dL  Auto Neutrophil # : 19.21 K/uL  Auto Lymphocyte # : 0.72 K/uL  Auto Monocyte # : 0.89 K/uL  Auto Eosinophil # : 0.36 K/uL  Auto Basophil # : 0.00 K/uL  Auto Neutrophil % : 90.7 %  Auto Lymphocyte % : 3.4 %  Auto Monocyte % : 4.2 %  Auto Eosinophil % : 1.7 %  Auto Basophil % : 0.0 %          PT/INR - ( 14 Mar 2023 12:13 )   PT: 18.0 sec;   INR: 1.56 ratio         PTT - ( 14 Mar 2023 12:13 )  PTT:40.1 sec    POC Glucose  CAPILLARY BLOOD GLUCOSE      POCT Blood Glucose.: 106 mg/dL (15 Mar 2023 21:27)  POCT Blood Glucose.: 141 mg/dL (15 Mar 2023 17:11)  POCT Blood Glucose.: 314 mg/dL (15 Mar 2023 12:40)  POCT Blood Glucose.: 109 mg/dL (15 Mar 2023 08:34)      I&O's  I&O's Summary    15 Mar 2023 07:01  -  16 Mar 2023 07:00  --------------------------------------------------------  IN: 200 mL / OUT: 202 mL / NET: -2 mL        UA (if applicable)      Micro (if applicable)      Imaging (if applicable)    Active Medications  MEDICATIONS  (STANDING):  acetaminophen     Tablet .. 650 milliGRAM(s) Oral every 6 hours  amLODIPine   Tablet 5 milliGRAM(s) Oral daily  amoxicillin  875 milliGRAM(s)/clavulanate 1 Tablet(s) Oral two times a day  apixaban 2.5 milliGRAM(s) Oral every 12 hours  atorvastatin 40 milliGRAM(s) Oral at bedtime  bisacodyl 5 milliGRAM(s) Oral at bedtime  chlorhexidine 4% Liquid 1 Application(s) Topical daily  dextrose 50% Injectable 25 Gram(s) IV Push once  dextrose 50% Injectable 12.5 Gram(s) IV Push once  dextrose 50% Injectable 25 Gram(s) IV Push once  dextrose Oral Gel 15 Gram(s) Oral once  glucagon  Injectable 1 milliGRAM(s) IntraMuscular once  hydroxyurea 500 milliGRAM(s) Oral <User Schedule>  hydroxyurea 500 milliGRAM(s) Oral <User Schedule>  insulin lispro (ADMELOG) corrective regimen sliding scale   SubCutaneous three times a day before meals  insulin lispro (ADMELOG) corrective regimen sliding scale   SubCutaneous at bedtime  lactated ringers. 1000 milliLiter(s) (75 mL/Hr) IV Continuous <Continuous>  levothyroxine 88 MICROGram(s) Oral daily  metoprolol tartrate 25 milliGRAM(s) Oral two times a day  polyethylene glycol 3350 17 Gram(s) Oral every 12 hours  senna 2 Tablet(s) Oral at bedtime  sodium chloride 0.9%. 1000 milliLiter(s) (75 mL/Hr) IV Continuous <Continuous>    MEDICATIONS  (PRN):  artificial  tears Solution 1 Drop(s) Both EYES two times a day PRN Dry Eyes  melatonin 5 milliGRAM(s) Oral at bedtime PRN Insomnia

## 2023-03-16 NOTE — PROGRESS NOTE ADULT - PROBLEM SELECTOR PROBLEM 7
Dr. Carrillo ordered Hydrocodone to the [facility] in [place] per the pt's request.  
Need for prophylactic measure

## 2023-03-16 NOTE — PROGRESS NOTE ADULT - PROBLEM SELECTOR PLAN 1
Brought to ED by daughter for BRBPR x3. On eliquis for PCV and h/o DVT and CVA. On arrival in ED, Afebrile and normotensive but with leukocytosis to 21K. CT AP revealing distention of the rectum with stool. Mild perirectal fat stranding, which may reflect early findings of stercoral colitis.    - aggressive bowel regimen with miralax, senna, tap water enema  - small BM overnight, now s/p SMOG enema   - GI consulted by ED, pending recs  - c/w empiric zosyn for now   - stool count by RNs reinforced
Brought to ED by daughter for BRBPR x3. On eliquis for PCV and h/o DVT and CVA. On arrival in ED, Afebrile and normotensive but with leukocytosis to 21K. CT AP revealing distention of the rectum with stool. Mild perirectal fat stranding, which may reflect early findings of stercoral colitis.    - aggressive bowel regimen with miralax, senna  -s/p tap water enema x1, SMOG enema x1  - multiple BM overnight  - GI consulted by ED, no new interventions at this time   - c/w empiric zosyn for now   - stool count by RNs reinforced
Brought to ED by daughter for BRBPR x3. On eliquis for PCV and h/o DVT and CVA. On arrival in ED, Afebrile and normotensive but with leukocytosis to 21K. CT AP revealing distention of the rectum with stool. Mild perirectal fat stranding, which may reflect early findings of stercoral colitis.    - aggressive bowel regimen with miralax, senna  -s/p tap water enema x1, SMOG enema x1  - multiple BM overnight  - GI consulted by ED, no new interventions at this time   - c/w empiric zosyn for now   - stool count by RNs reinforced

## 2023-03-16 NOTE — DIETITIAN INITIAL EVALUATION ADULT - ADD RECOMMEND
1) New malnutrition notification sent.   2) Upon diet advancement recommend no therapeutic diet restrictions.   3) Recommend Ensure Clear 3x/day (600 sfoy, 21 Gm protein) to optimize PO intake.   4) Encourage adequate intake of meals and supplements to optimize PO intake.   5) Monitor PO intake/tolerance, weights, labs, hydration status, bowels, and skin integrity.

## 2023-04-12 ENCOUNTER — OUTPATIENT (OUTPATIENT)
Dept: OUTPATIENT SERVICES | Facility: HOSPITAL | Age: 88
LOS: 1 days | Discharge: HOME | End: 2023-04-12

## 2023-04-12 DIAGNOSIS — C92.10 CHRONIC MYELOID LEUKEMIA, BCR/ABL-POSITIVE, NOT HAVING ACHIEVED REMISSION: ICD-10-CM

## 2023-04-12 DIAGNOSIS — Z90.49 ACQUIRED ABSENCE OF OTHER SPECIFIED PARTS OF DIGESTIVE TRACT: Chronic | ICD-10-CM

## 2023-04-12 NOTE — PATIENT PROFILE ADULT - NSTOBACCONEVERSMOKERY/N_GEN_A
[place] diet no greasy fried fatty spicy food or caffeine.  No anti-inflammatories.  Please follow-up with the GI doctor call tomorrow for follow-up.  Return for increased pain vomiting blood black or bloody stool dizziness passing out chest pain shortness of breath or any other new or worse problems or concerns return immediately to the ER.  
No

## 2023-04-13 PROBLEM — U07.1 COVID-19: Chronic | Status: ACTIVE | Noted: 2023-03-13

## 2023-04-13 PROBLEM — Z92.29 PERSONAL HISTORY OF OTHER DRUG THERAPY: Chronic | Status: ACTIVE | Noted: 2023-03-13

## 2023-04-14 ENCOUNTER — APPOINTMENT (OUTPATIENT)
Dept: HEMATOLOGY ONCOLOGY | Facility: CLINIC | Age: 88
End: 2023-04-14
Payer: MEDICARE

## 2023-04-14 ENCOUNTER — NON-APPOINTMENT (OUTPATIENT)
Age: 88
End: 2023-04-14

## 2023-04-14 PROCEDURE — 99447 NTRPROF PH1/NTRNET/EHR 11-20: CPT

## 2023-04-14 RX ORDER — LINAGLIPTIN 5 MG/1
5 TABLET, FILM COATED ORAL
Qty: 30 | Refills: 3 | Status: DISCONTINUED | OUTPATIENT
Start: 2022-01-24 | End: 2023-04-14

## 2023-04-14 NOTE — REVIEW OF SYSTEMS
[Confused] : confusion [Negative] : Heme/Lymph [Fever] : no fever [Chills] : no chills [Night Sweats] : no night sweats [Fatigue] : no fatigue [Recent Change In Weight] : ~T no recent weight change [Eye Pain] : no eye pain [Dysphagia] : no dysphagia [Odynophagia] : no odynophagia [Chest Pain] : no chest pain [Shortness Of Breath] : no shortness of breath [Abdominal Pain] : no abdominal pain [Vomiting] : no vomiting [Joint Pain] : no joint pain [Skin Rash] : no skin rash [Easy Bleeding] : no tendency for easy bleeding [Easy Bruising] : no tendency for easy bruising [FreeTextEntry9] : left foot neuropathy and left heel pressure sore  [de-identified] : dementia

## 2023-04-14 NOTE — ASSESSMENT
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  \par -She is on Hydroxyurea 500 mg on Monday and Friday. 1/4/23 Hb 10.8 g/dl, Hct 37.2, PLTs 783, WBC 17.11\par -Continue same dose, will call daughter with new results. Schedule home draw every 2 months. \par -Will monitor labs monthly\par \par 2) DVT\par -patient is on  anticoagulation with Eliquis, she is very high risk for recurrent VTE including acute DVT/PE with HARRIS 2+ PV and history of multiple DVTs, benefit of anticoagulation outweights risk in patient's case.\par  4/29/22- Had a recurrent DVT and Eliquis was increased to 5 mg q 12 hrs\par \par Diabetes:  Metformin\par HTN: On Amlodipine, Metoprolol\par HLD: on Atorvastatin.\par \par \par This service was provided by using telehealth. The patient was at home and I was at [facility]. The patient requested and participated in this encounter. The encounter  last 30   minutes coordinating his/her care and counseling.\par \par RTC 3 months. \par \par \par \par \par \par \par \par \par

## 2023-04-14 NOTE — HISTORY OF PRESENT ILLNESS
[Disease:__________________________] : Disease: [unfilled] [0 - No Distress] : Distress Level: 0 [60: Requires occasional assistance, but is able to care for most of his/her needs] : 60: Requires occasional assistance, but is able to care for most of his/her needs [Home] : at home, [unfilled] , at the time of the visit. 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  Patient is currently taking Hydrea 500 mg, Monday and Friday. Per daughter, patient is doing well. Denies fevers, chills, night sweat, unintentional weight loss, headache, dizziness,pruritus, or any other medical complaints at this time.\par \par Patient was recently admitted with constipation on 3/13/23 to [facility], had some BRBPR, patient has recovered.\par \par No other changes in her medical, surgical or social history since  2/6/2023.  [FreeTextEntry3] : Daughter Magalie

## 2023-04-20 ENCOUNTER — LABORATORY RESULT (OUTPATIENT)
Age: 88
End: 2023-04-20

## 2023-04-25 NOTE — PHYSICAL THERAPY INITIAL EVALUATION ADULT - FOLLOWS COMMANDS/ANSWERS QUESTIONS, REHAB EVAL
Third attempt was made to schedule patient's mammogram order. Patient was called twice, and a reminder letter sent without a reply to schedule.     
50% of the time

## 2023-05-02 NOTE — CDI QUERY NOTE - NSCDIOTHERTXTBX_GEN_ALL_CORE_HH
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  - pt has a h/o vascular dementia, DM2, polycythemia vera  - on admission pt also found to be Covid19+ but asymptomatic, no hypoxia, CXR clear, conservative management  - 12/20 on admission: pt afebrile, not tachycardic, not tachypneic, WBC- 11.3, UCx negative, UA negative, Na-127  - 12/24 pt had one febrile episode T-101.7, HR 98, leukocytosis to 15.1, procalcitonin- 0.15, UA+, UCx+  - 12/28 Medicine note documented SIRS: 12/24 T-101.7, leukocytosis, met sepsis criteria on admission, now resolved  - 12/24-->12/26 pt received IV abx for UTI  - 12/27-->12/31 received PO abx for UTI    Based on your judgement and the clinical findings above, please clarify the status of sepsis:     > Sepsis ruled out   > Sepsis ruled in on admission (specify etiology)  > Sepsis ruled in, not present on admission  > Other (please specify)  > Clinically undetermined no

## 2023-06-29 ENCOUNTER — OUTPATIENT (OUTPATIENT)
Dept: OUTPATIENT SERVICES | Facility: HOSPITAL | Age: 88
LOS: 1 days | Discharge: HOME | End: 2023-06-29

## 2023-06-29 DIAGNOSIS — Z90.49 ACQUIRED ABSENCE OF OTHER SPECIFIED PARTS OF DIGESTIVE TRACT: Chronic | ICD-10-CM

## 2023-06-29 DIAGNOSIS — C92.10 CHRONIC MYELOID LEUKEMIA, BCR/ABL-POSITIVE, NOT HAVING ACHIEVED REMISSION: ICD-10-CM

## 2023-07-10 ENCOUNTER — APPOINTMENT (OUTPATIENT)
Dept: HEMATOLOGY ONCOLOGY | Facility: CLINIC | Age: 88
End: 2023-07-10
Payer: COMMERCIAL

## 2023-07-10 DIAGNOSIS — E11.42 TYPE 2 DIABETES MELLITUS WITH DIABETIC POLYNEUROPATHY: ICD-10-CM

## 2023-07-10 PROCEDURE — 99447 NTRPROF PH1/NTRNET/EHR 11-20: CPT

## 2023-07-10 RX ORDER — APIXABAN 5 MG/1
5 TABLET, FILM COATED ORAL
Qty: 60 | Refills: 6 | Status: ACTIVE | COMMUNITY
Start: 2023-07-10 | End: 1900-01-01

## 2023-07-10 NOTE — HISTORY OF PRESENT ILLNESS
[Disease:__________________________] : Disease: [unfilled] [0 - No Distress] : Distress Level: 0 [60: Requires occasional assistance, but is able to care for most of his/her needs] : 60: Requires occasional assistance, but is able to care for most of his/her needs [Home] : at home, [unfilled] , at the time of the visit. 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  Patient is currently taking Hydrea 500 mg, Monday and Friday. Per daughter, patient is doing well. Denies fevers, chills, night sweat, unintentional weight loss, headache, dizziness,pruritus, or any other medical complaints at this time.\par \par No other changes in her medical, surgical or social history since  4/14/2023.  [FreeTextEntry3] : daughter

## 2023-07-10 NOTE — REVIEW OF SYSTEMS
[Confused] : confusion [Negative] : Heme/Lymph [Fever] : no fever [Chills] : no chills [Night Sweats] : no night sweats [Fatigue] : no fatigue [Recent Change In Weight] : ~T no recent weight change [Eye Pain] : no eye pain [Dysphagia] : no dysphagia [Odynophagia] : no odynophagia [Chest Pain] : no chest pain [Shortness Of Breath] : no shortness of breath [Abdominal Pain] : no abdominal pain [Vomiting] : no vomiting [Joint Pain] : no joint pain [Skin Rash] : no skin rash [Easy Bleeding] : no tendency for easy bleeding [Easy Bruising] : no tendency for easy bruising [FreeTextEntry9] : left foot neuropathy and left heel pressure sore  [de-identified] : dementia

## 2023-07-10 NOTE — ASSESSMENT
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  \par -She is on Hydroxyurea 500 mg on Monday and Friday. 1/4/23 Hb 10.8 g/dl, Hct 37.2, PLTs 783, WBC 17.11\par -Continue same dose, will call daughter with new results. Schedule home draw every 2 months. \par -Will monitor labs monthly\par \par 2) DVT\par -patient is on  anticoagulation with Eliquis, she is very high risk for recurrent VTE including acute DVT/PE with HARRIS 2+ PV and history of multiple DVTs, benefit of anticoagulation outweights risk in patient's case.\par  4/29/22- Had a recurrent DVT and Eliquis was increased to 5 mg q 12 hrs\par \par Diabetes:  Metformin\par HTN: On Amlodipine, Metoprolol\par HLD: on Atorvastatin.\par \par \par This service was provided by using telehealth. The patient was at home and I was at [facility]. The patient requested and participated in this encounter. The encounter  last 30   minutes coordinating his/her care and counseling.\par \par RTC 3 months. \par \par \par \par \par \par \par \par \par

## 2023-08-29 NOTE — PATIENT PROFILE ADULT. - DOES PATIENT HAVE ADVANCE DIRECTIVE
Yes 76yF w/ afib on eliquis, HTN, DM, PPM, R sided breast ca in the past, bronchiectasis (followed by Dr Hart), CRISTIN on CPAP at night, CHF, admission at [facility] 2 months ago for sepsis 2/2 aspiration pneumonia comes in for sepsis  Pt was in usual state of health yesterday, today spiked fever at home Tmax 101, at 8pm was eating and began coughing and was dyspneic. Per ?medical staff who comes to the home, pt was hypoxic to 84% on RA which improved w/ NC, they did home labs w/ WBC 20 and lactate 2.2 so brought her to ED.   Pt denies abd pain, vomiting, diarrhea, chest pain. 76yF w/ afib on eliquis, HTN, DM, PPM, R sided breast ca in the past, bronchiectasis (followed by Dr Hart), CRISTIN on CPAP at night, CHF, admission at [facility] 2 months ago for sepsis 2/2 aspiration pneumonia comes in for sepsis  Pt was in usual state of health yesterday, today at 8pm was eating and began coughing and was febrile to 101 and dyspneic. Per ?medical staff who comes to the home, pt was hypoxic to 84% on RA which improved w/ NC, they did home labs w/ WBC 20 and lactate 2.2 so brought her to ED.   Pt denies abd pain, vomiting, diarrhea, chest pain.

## 2023-09-08 ENCOUNTER — INPATIENT (INPATIENT)
Facility: HOSPITAL | Age: 88
LOS: 6 days | Discharge: HOME | DRG: 189 | End: 2023-09-15
Attending: HOSPITALIST | Admitting: STUDENT IN AN ORGANIZED HEALTH CARE EDUCATION/TRAINING PROGRAM
Payer: MEDICARE

## 2023-09-08 VITALS
SYSTOLIC BLOOD PRESSURE: 133 MMHG | OXYGEN SATURATION: 90 % | HEIGHT: 62 IN | DIASTOLIC BLOOD PRESSURE: 66 MMHG | RESPIRATION RATE: 24 BRPM | HEART RATE: 106 BPM | TEMPERATURE: 98 F | WEIGHT: 110.01 LBS

## 2023-09-08 DIAGNOSIS — E78.5 HYPERLIPIDEMIA, UNSPECIFIED: ICD-10-CM

## 2023-09-08 DIAGNOSIS — E11.9 TYPE 2 DIABETES MELLITUS WITHOUT COMPLICATIONS: ICD-10-CM

## 2023-09-08 DIAGNOSIS — I10 ESSENTIAL (PRIMARY) HYPERTENSION: ICD-10-CM

## 2023-09-08 DIAGNOSIS — Z90.49 ACQUIRED ABSENCE OF OTHER SPECIFIED PARTS OF DIGESTIVE TRACT: Chronic | ICD-10-CM

## 2023-09-08 DIAGNOSIS — Z91.89 OTHER SPECIFIED PERSONAL RISK FACTORS, NOT ELSEWHERE CLASSIFIED: ICD-10-CM

## 2023-09-08 DIAGNOSIS — Z29.9 ENCOUNTER FOR PROPHYLACTIC MEASURES, UNSPECIFIED: ICD-10-CM

## 2023-09-08 DIAGNOSIS — J96.01 ACUTE RESPIRATORY FAILURE WITH HYPOXIA: ICD-10-CM

## 2023-09-08 DIAGNOSIS — J18.9 PNEUMONIA, UNSPECIFIED ORGANISM: ICD-10-CM

## 2023-09-08 DIAGNOSIS — D45 POLYCYTHEMIA VERA: ICD-10-CM

## 2023-09-08 DIAGNOSIS — Z86.718 PERSONAL HISTORY OF OTHER VENOUS THROMBOSIS AND EMBOLISM: ICD-10-CM

## 2023-09-08 LAB
ALBUMIN SERPL ELPH-MCNC: 3.6 G/DL — SIGNIFICANT CHANGE UP (ref 3.3–5)
ALP SERPL-CCNC: 108 U/L — SIGNIFICANT CHANGE UP (ref 40–120)
ALT FLD-CCNC: 19 U/L — SIGNIFICANT CHANGE UP (ref 10–45)
ANION GAP SERPL CALC-SCNC: 14 MMOL/L — SIGNIFICANT CHANGE UP (ref 5–17)
APPEARANCE UR: CLEAR — SIGNIFICANT CHANGE UP
APTT BLD: 40.8 SEC — HIGH (ref 24.5–35.6)
AST SERPL-CCNC: 41 U/L — HIGH (ref 10–40)
BACTERIA # UR AUTO: NEGATIVE — SIGNIFICANT CHANGE UP
BASE EXCESS BLDV CALC-SCNC: -2.4 MMOL/L — LOW (ref -2–3)
BASOPHILS # BLD AUTO: 0.26 K/UL — HIGH (ref 0–0.2)
BASOPHILS NFR BLD AUTO: 1.2 % — SIGNIFICANT CHANGE UP (ref 0–2)
BILIRUB SERPL-MCNC: 0.2 MG/DL — SIGNIFICANT CHANGE UP (ref 0.2–1.2)
BILIRUB UR-MCNC: NEGATIVE — SIGNIFICANT CHANGE UP
BUN SERPL-MCNC: 18 MG/DL — SIGNIFICANT CHANGE UP (ref 7–23)
CA-I SERPL-SCNC: 1.07 MMOL/L — LOW (ref 1.15–1.33)
CALCIUM SERPL-MCNC: 9.7 MG/DL — SIGNIFICANT CHANGE UP (ref 8.4–10.5)
CHLORIDE BLDV-SCNC: 105 MMOL/L — SIGNIFICANT CHANGE UP (ref 96–108)
CHLORIDE SERPL-SCNC: 104 MMOL/L — SIGNIFICANT CHANGE UP (ref 96–108)
CO2 BLDV-SCNC: 26 MMOL/L — SIGNIFICANT CHANGE UP (ref 22–26)
CO2 SERPL-SCNC: 21 MMOL/L — LOW (ref 22–31)
COLOR SPEC: SIGNIFICANT CHANGE UP
CREAT SERPL-MCNC: 1.03 MG/DL — SIGNIFICANT CHANGE UP (ref 0.5–1.3)
DIFF PNL FLD: ABNORMAL
EGFR: 52 ML/MIN/1.73M2 — LOW
EOSINOPHIL # BLD AUTO: 0.44 K/UL — SIGNIFICANT CHANGE UP (ref 0–0.5)
EOSINOPHIL NFR BLD AUTO: 2 % — SIGNIFICANT CHANGE UP (ref 0–6)
EPI CELLS # UR: 7 /HPF — HIGH
GAS PNL BLDV: 124 MMOL/L — LOW (ref 136–145)
GAS PNL BLDV: SIGNIFICANT CHANGE UP
GLUCOSE BLDC GLUCOMTR-MCNC: 117 MG/DL — HIGH (ref 70–99)
GLUCOSE BLDV-MCNC: 139 MG/DL — HIGH (ref 70–99)
GLUCOSE SERPL-MCNC: 124 MG/DL — HIGH (ref 70–99)
GLUCOSE UR QL: NEGATIVE — SIGNIFICANT CHANGE UP
HCO3 BLDV-SCNC: 25 MMOL/L — SIGNIFICANT CHANGE UP (ref 22–29)
HCT VFR BLD CALC: 42 % — SIGNIFICANT CHANGE UP (ref 34.5–45)
HCT VFR BLDA CALC: 41 % — SIGNIFICANT CHANGE UP (ref 34.5–46.5)
HGB BLD CALC-MCNC: 13.7 G/DL — SIGNIFICANT CHANGE UP (ref 11.7–16.1)
HGB BLD-MCNC: 12.6 G/DL — SIGNIFICANT CHANGE UP (ref 11.5–15.5)
HYALINE CASTS # UR AUTO: 3 /LPF — HIGH (ref 0–2)
IMM GRANULOCYTES NFR BLD AUTO: 0.9 % — SIGNIFICANT CHANGE UP (ref 0–0.9)
INR BLD: 1.5 RATIO — HIGH (ref 0.85–1.18)
KETONES UR-MCNC: NEGATIVE — SIGNIFICANT CHANGE UP
LACTATE BLDV-MCNC: 2 MMOL/L — SIGNIFICANT CHANGE UP (ref 0.5–2)
LEUKOCYTE ESTERASE UR-ACNC: NEGATIVE — SIGNIFICANT CHANGE UP
LYMPHOCYTES # BLD AUTO: 11 % — LOW (ref 13–44)
LYMPHOCYTES # BLD AUTO: 2.45 K/UL — SIGNIFICANT CHANGE UP (ref 1–3.3)
MCHC RBC-ENTMCNC: 25 PG — LOW (ref 27–34)
MCHC RBC-ENTMCNC: 30 GM/DL — LOW (ref 32–36)
MCV RBC AUTO: 83.5 FL — SIGNIFICANT CHANGE UP (ref 80–100)
MONOCYTES # BLD AUTO: 0.94 K/UL — HIGH (ref 0–0.9)
MONOCYTES NFR BLD AUTO: 4.2 % — SIGNIFICANT CHANGE UP (ref 2–14)
NEUTROPHILS # BLD AUTO: 17.91 K/UL — HIGH (ref 1.8–7.4)
NEUTROPHILS NFR BLD AUTO: 80.7 % — HIGH (ref 43–77)
NITRITE UR-MCNC: NEGATIVE — SIGNIFICANT CHANGE UP
NRBC # BLD: 0 /100 WBCS — SIGNIFICANT CHANGE UP (ref 0–0)
PCO2 BLDV: 50 MMHG — HIGH (ref 39–42)
PH BLDV: 7.3 — LOW (ref 7.32–7.43)
PH UR: 6.5 — SIGNIFICANT CHANGE UP (ref 5–8)
PLATELET # BLD AUTO: 839 K/UL — HIGH (ref 150–400)
PO2 BLDV: 45 MMHG — SIGNIFICANT CHANGE UP (ref 25–45)
POTASSIUM BLDV-SCNC: >10 MMOL/L — CRITICAL HIGH (ref 3.5–5.1)
POTASSIUM SERPL-MCNC: 5.1 MMOL/L — SIGNIFICANT CHANGE UP (ref 3.5–5.3)
POTASSIUM SERPL-SCNC: 5.1 MMOL/L — SIGNIFICANT CHANGE UP (ref 3.5–5.3)
PROT SERPL-MCNC: 7.5 G/DL — SIGNIFICANT CHANGE UP (ref 6–8.3)
PROT UR-MCNC: ABNORMAL
PROTHROM AB SERPL-ACNC: 15.6 SEC — HIGH (ref 9.5–13)
RAPID RVP RESULT: SIGNIFICANT CHANGE UP
RBC # BLD: 5.03 M/UL — SIGNIFICANT CHANGE UP (ref 3.8–5.2)
RBC # FLD: 19.3 % — HIGH (ref 10.3–14.5)
RBC CASTS # UR COMP ASSIST: 7 /HPF — HIGH (ref 0–4)
SAO2 % BLDV: 73.8 % — SIGNIFICANT CHANGE UP (ref 67–88)
SARS-COV-2 RNA SPEC QL NAA+PROBE: SIGNIFICANT CHANGE UP
SODIUM SERPL-SCNC: 139 MMOL/L — SIGNIFICANT CHANGE UP (ref 135–145)
SP GR SPEC: 1.02 — SIGNIFICANT CHANGE UP (ref 1.01–1.02)
TROPONIN T, HIGH SENSITIVITY RESULT: 19 NG/L — SIGNIFICANT CHANGE UP (ref 0–51)
UROBILINOGEN FLD QL: NEGATIVE — SIGNIFICANT CHANGE UP
WBC # BLD: 22.21 K/UL — HIGH (ref 3.8–10.5)
WBC # FLD AUTO: 22.21 K/UL — HIGH (ref 3.8–10.5)
WBC UR QL: 2 /HPF — SIGNIFICANT CHANGE UP (ref 0–5)

## 2023-09-08 PROCEDURE — 99285 EMERGENCY DEPT VISIT HI MDM: CPT

## 2023-09-08 PROCEDURE — 99223 1ST HOSP IP/OBS HIGH 75: CPT | Mod: GC

## 2023-09-08 PROCEDURE — 71045 X-RAY EXAM CHEST 1 VIEW: CPT | Mod: 26

## 2023-09-08 RX ORDER — METOPROLOL TARTRATE 50 MG
25 TABLET ORAL
Refills: 0 | Status: DISCONTINUED | OUTPATIENT
Start: 2023-09-08 | End: 2023-09-15

## 2023-09-08 RX ORDER — CEFEPIME 1 G/1
1000 INJECTION, POWDER, FOR SOLUTION INTRAMUSCULAR; INTRAVENOUS ONCE
Refills: 0 | Status: COMPLETED | OUTPATIENT
Start: 2023-09-08 | End: 2023-09-08

## 2023-09-08 RX ORDER — AMLODIPINE BESYLATE 2.5 MG/1
5 TABLET ORAL DAILY
Refills: 0 | Status: DISCONTINUED | OUTPATIENT
Start: 2023-09-09 | End: 2023-09-13

## 2023-09-08 RX ORDER — ASPIRIN/CALCIUM CARB/MAGNESIUM 324 MG
81 TABLET ORAL DAILY
Refills: 0 | Status: DISCONTINUED | OUTPATIENT
Start: 2023-09-08 | End: 2023-09-15

## 2023-09-08 RX ORDER — DEXTROSE 50 % IN WATER 50 %
25 SYRINGE (ML) INTRAVENOUS ONCE
Refills: 0 | Status: DISCONTINUED | OUTPATIENT
Start: 2023-09-08 | End: 2023-09-15

## 2023-09-08 RX ORDER — SODIUM CHLORIDE 9 MG/ML
1000 INJECTION, SOLUTION INTRAVENOUS
Refills: 0 | Status: DISCONTINUED | OUTPATIENT
Start: 2023-09-08 | End: 2023-09-15

## 2023-09-08 RX ORDER — INSULIN LISPRO 100/ML
VIAL (ML) SUBCUTANEOUS
Refills: 0 | Status: DISCONTINUED | OUTPATIENT
Start: 2023-09-08 | End: 2023-09-15

## 2023-09-08 RX ORDER — ALENDRONATE SODIUM 70 MG/1
1 TABLET ORAL
Qty: 0 | Refills: 0 | DISCHARGE

## 2023-09-08 RX ORDER — DEXTROSE 50 % IN WATER 50 %
12.5 SYRINGE (ML) INTRAVENOUS ONCE
Refills: 0 | Status: DISCONTINUED | OUTPATIENT
Start: 2023-09-08 | End: 2023-09-15

## 2023-09-08 RX ORDER — DEXTROSE 50 % IN WATER 50 %
15 SYRINGE (ML) INTRAVENOUS ONCE
Refills: 0 | Status: DISCONTINUED | OUTPATIENT
Start: 2023-09-08 | End: 2023-09-15

## 2023-09-08 RX ORDER — AMLODIPINE BESYLATE 2.5 MG/1
5 TABLET ORAL DAILY
Refills: 0 | Status: DISCONTINUED | OUTPATIENT
Start: 2023-09-08 | End: 2023-09-08

## 2023-09-08 RX ORDER — LEVOTHYROXINE SODIUM 125 MCG
88 TABLET ORAL DAILY
Refills: 0 | Status: DISCONTINUED | OUTPATIENT
Start: 2023-09-08 | End: 2023-09-15

## 2023-09-08 RX ORDER — METFORMIN HYDROCHLORIDE 850 MG/1
1 TABLET ORAL
Qty: 0 | Refills: 0 | DISCHARGE

## 2023-09-08 RX ORDER — VANCOMYCIN HCL 1 G
1000 VIAL (EA) INTRAVENOUS ONCE
Refills: 0 | Status: COMPLETED | OUTPATIENT
Start: 2023-09-08 | End: 2023-09-08

## 2023-09-08 RX ORDER — GLUCAGON INJECTION, SOLUTION 0.5 MG/.1ML
1 INJECTION, SOLUTION SUBCUTANEOUS ONCE
Refills: 0 | Status: DISCONTINUED | OUTPATIENT
Start: 2023-09-08 | End: 2023-09-15

## 2023-09-08 RX ORDER — HYDROXYUREA 500 MG/1
500 CAPSULE ORAL
Refills: 0 | Status: DISCONTINUED | OUTPATIENT
Start: 2023-09-08 | End: 2023-09-15

## 2023-09-08 RX ORDER — APIXABAN 2.5 MG/1
5 TABLET, FILM COATED ORAL
Refills: 0 | Status: DISCONTINUED | OUTPATIENT
Start: 2023-09-08 | End: 2023-09-15

## 2023-09-08 RX ORDER — INSULIN LISPRO 100/ML
VIAL (ML) SUBCUTANEOUS AT BEDTIME
Refills: 0 | Status: DISCONTINUED | OUTPATIENT
Start: 2023-09-08 | End: 2023-09-15

## 2023-09-08 RX ORDER — ATORVASTATIN CALCIUM 80 MG/1
40 TABLET, FILM COATED ORAL AT BEDTIME
Refills: 0 | Status: DISCONTINUED | OUTPATIENT
Start: 2023-09-08 | End: 2023-09-15

## 2023-09-08 RX ADMIN — Medication 250 MILLIGRAM(S): at 11:08

## 2023-09-08 RX ADMIN — CEFEPIME 100 MILLIGRAM(S): 1 INJECTION, POWDER, FOR SOLUTION INTRAMUSCULAR; INTRAVENOUS at 10:14

## 2023-09-08 NOTE — ED PROVIDER NOTE - OBJECTIVE STATEMENT
89-year-old female patient past medical history of polycythemia vera on Eliquis, dementia, diabetes who was brought in via EMS to the ED for hypoxia and cough for the past couple days.  Per EMS, patient was hypoxic to 70s at room air and was started on nasal cannula at 6 L.

## 2023-09-08 NOTE — ED ADULT NURSE NOTE - NSFALLHARMRISKINTERV_ED_ALL_ED
Assistance OOB with selected safe patient handling equipment if applicable/Communicate risk of Fall with Harm to all staff, patient, and family/Provide visual cue: red socks, yellow wristband, yellow gown, etc/Reinforce activity limits and safety measures with patient and family/Bed in lowest position, wheels locked, appropriate side rails in place/Call bell, personal items and telephone in reach/Instruct patient to call for assistance before getting out of bed/chair/stretcher/Non-slip footwear applied when patient is off stretcher/[place] to call system/Physically safe environment - no spills, clutter or unnecessary equipment/Purposeful Proactive Rounding/Room/bathroom lighting operational, light cord in reach

## 2023-09-08 NOTE — ED PROVIDER NOTE - PHYSICAL EXAMINATION
GENERAL: Awake, alert, nonverbal  HEENT: NC/AT, dry mucous membranes, EOMI  LUNGS: Right crackles throughout, left apex clear and crackles on the bases.  CARDIAC: RRR, no m/r/g  ABDOMEN: Soft, non tender, non distended, no rebound, no guarding  BACK: No midline spinal tenderness  EXT: No edema, no calf tenderness, 2+ PT pulses bilaterally, no deformities.  NEURO: Moving all extremities.  SKIN: Warm and dry. No rash.  PSYCH: Normal affect.

## 2023-09-08 NOTE — ED PROVIDER NOTE - INTERPRETATION
EKG reviewed for rate, rhythm, axis, intervals and segments, including QRS morphology, P wave appearance T wave appearance, OK interval, and QT interval.  I find the EKG to be unremarkable in all of these regards except as follows: sinus tach, prominent precordial T

## 2023-09-08 NOTE — ED PROVIDER NOTE - ATTENDING CONTRIBUTION TO CARE
89 yo female, hx of dementia, brought in for SOB, cough, hypoxia per EMS to the 70s, improved on nasal cannula.  ? aspiration.  patient awake but nonverbal here, attends to stimuli but not particularly interactive, scattered rhonchi.  check CBC, CMP, CXR, continued supplemental O2, RVP, admit for further management.

## 2023-09-08 NOTE — ED ADULT NURSE NOTE - OBJECTIVE STATEMENT
Patient is a 89 yo female A&OX1 baseline mental status PMH dementia, polycythemia vera, DM, hx DVTs on eliquis presenting to the ED by EMS from home for SOB. Patient lives at home with daughter who is primary caretaker. As per daughter, patient has had a cough x5 days, checked SpO2 at home and found patient to be in 60s RA. Patient bedbound. Denies fever/chills, chest pain, abd pain, n/v/diarrhea, urinary symptoms. Patient placed on cardiac monitor, HR tachy 110s. Patient straight cathed for urine, second RN at bedside to ensure sterile technique, patient tolerated well.

## 2023-09-08 NOTE — H&P ADULT - PROBLEM SELECTOR PLAN 3

## 2023-09-08 NOTE — ED PROVIDER NOTE - CLINICAL SUMMARY MEDICAL DECISION MAKING FREE TEXT BOX
89-year-old female patient past medical history of polycythemia vera on Eliquis, dementia, diabetes who was brought into the emergency department for hypoxia since this AM.  On exam, patient found with nasal cannula at 6 L, crackles greater on the right side of the lung.  Will eval with sepsis work-up including urine, chest x-ray, blood work.

## 2023-09-08 NOTE — H&P ADULT - PROBLEM SELECTOR PLAN 1
- P/w SOB, cough  - Leukocytosis on admission  - RVP, COVID negative  - CXR unremarkable  - S/p 1x vancomycin, cefepime  - Blood, sputum cx pending  - C/w 6L NC - P/w acute hypoxia at home w/ O2 sat 64%  - Leukocytosis at baseline  - RVP, COVID negative  - CXR unremarkable  - S/p 1x vancomycin, cefepime  - Blood, sputum cx pending  - C/w O2 supplementation, wean as tolerated - P/w acute hypoxia at home w/ O2 sat 64%  - Leukocytosis at baseline  - RVP, COVID negative  - CXR unremarkable  - S/p 1x vancomycin, cefepime  - Blood, sputum cx pending  - C/w O2 supplementation, wean as tolerated  - CTM - P/w reported acute hypoxia at home w/ O2 sat 64%  - Leukocytosis at baseline  - RVP, COVID negative  - CXR unremarkable  - S/p 1x vancomycin, cefepime  - Blood, sputum cx pending  - C/w O2 supplementation, wean as tolerated  - CTM

## 2023-09-08 NOTE — H&P ADULT - NSHPPHYSICALEXAM_GEN_ALL_CORE
Pt transported to floor via cart accompanied by pt transporter. Pt left dept in NAD, VSS, A&O x 4. Pt belongings verified w/ pt & accompanying pt to floor. T(C): 36.7 (09-08-23 @ 12:33), Max: 36.8 (09-08-23 @ 09:05)  HR: 90 (09-08-23 @ 14:18) (90 - 106)  BP: 152/53 (09-08-23 @ 14:18) (113/99 - 152/53)  RR: 20 (09-08-23 @ 14:18) (20 - 24)  SpO2: 100% (09-08-23 @ 14:18) (90% - 100%)    CONSTITUTIONAL: Well groomed, no apparent distress  EYES: PERRLA and symmetric, EOMI, No conjunctival or scleral injection, non-icteric  ENMT: Oral mucosa with moist membranes. Normal dentition; no pharyngeal injection or exudates             NECK: Supple, symmetric and without tracheal deviation   RESP: No respiratory distress, no use of accessory muscles; CTA b/l, no WRR  CV: RRR, +S1S2, no MRG; no JVD; no peripheral edema  GI: Soft, NT, ND, no rebound, no guarding; no palpable masses; no hepatosplenomegaly; no hernia palpated  LYMPH: No cervical LAD or tenderness; no axillary LAD or tenderness; no inguinal LAD or tenderness  MSK: Normal gait; No digital clubbing or cyanosis; examination of the (head/neck/spine/ribs/pelvis, RUE, LUE, RLE, LLE) without misalignment,            Normal ROM without pain, no spinal tenderness, normal muscle strength/tone  SKIN: No rashes or ulcers noted; no subcutaneous nodules or induration palpable  NEURO: CN II-XII intact; normal reflexes in upper and lower extremities, sensation intact in upper and lower extremities b/l to light touch   PSYCH: Appropriate insight/judgment; A+O x 3, mood and affect appropriate, recent/remote memory intact T(C): 36.7 (09-08-23 @ 12:33), Max: 36.8 (09-08-23 @ 09:05)  HR: 90 (09-08-23 @ 14:18) (90 - 106)  BP: 152/53 (09-08-23 @ 14:18) (113/99 - 152/53)  RR: 20 (09-08-23 @ 14:18) (20 - 24)  SpO2: 100% (09-08-23 @ 14:18) (90% - 100%)    CONSTITUTIONAL: Well groomed, no apparent distress  EYES: PERRLA and symmetric, EOMI, No conjunctival or scleral injection, non-icteric  ENMT: Oral mucosa with moist membranes. Normal dentition; no pharyngeal injection or exudates             NECK: Supple, symmetric and without tracheal deviation   RESP: No respiratory distress, no use of accessory muscles; CTA b/l, no WRR  CV: RRR, +S1S2, no MRG; no JVD; no peripheral edema  GI: Soft, NT, ND, no rebound, no guarding; no palpable masses; no hepatosplenomegaly; no hernia palpated  LYMPH: No cervical LAD or tenderness; no axillary LAD or tenderness; no inguinal LAD or tenderness  MSK: Normal gait; No digital clubbing or cyanosis; examination of the (head/neck/spine/ribs/pelvis, RUE, LUE, RLE, LLE) without misalignment,            Normal ROM without pain, no spinal tenderness, normal muscle strength/tone  SKIN: No rashes or ulcers noted; no subcutaneous nodules or induration palpable  NEURO: AAOx0, responsive to stimuli  PSYCH: AAOx0

## 2023-09-08 NOTE — H&P ADULT - ATTENDING COMMENTS
89-year-old female w/ PMH of polycythemia vera on Hydroxyurea, h/o CVA and DVT, on Eliquis, HTN, HLD, dementia, spontaneous SBO s/p bowel resection, and diabetes brought in by daughter for reported hypoxia to 60s/70s on pulse oximetry. Some dry cough.     On exam, pt appears comfortable, 96% on 2L. CXR clear. Leukocytosis seems to be chronic.     Unclear if hypoxia a reading error? Monitor on pulse oximetry for now. Continue home meds including Eliquis.     Would discuss further plan and overall GOC w family in the morning.

## 2023-09-08 NOTE — H&P ADULT - ASSESSMENT
Ms. Rissa Terry is a 89-year-old female w/ PMH of polycythemia vera on Eliquis, h/o CVA and DVT, HTN, HLD, dementia, spontaneous SBO s/p bowel resection, and diabetes who is presenting with SOB and cough over the past several days.

## 2023-09-08 NOTE — H&P ADULT - HISTORY OF PRESENT ILLNESS
Ms. Rissa Terry is a 89-year-old female w/ PMH of polycythemia vera on Eliquis, h/o CVA and DVT, HTN, HLD, dementia, spontaneous SBO s/p bowel resection, and diabetes who is presenting with SOB and cough over the past several days. Ms. Rissa Terry is a 89-year-old female w/ PMH of polycythemia vera on Eliquis, h/o CVA and DVT, HTN, HLD, dementia, spontaneous SBO s/p bowel resection, and diabetes who is presenting with acute hypoxia. Pt's daughter noted hypoxia on home pulse-ox this AM.  Ms. Rissa Terry is a 89-year-old female w/ PMH of polycythemia vera on Eliquis, h/o CVA and DVT, HTN, HLD, dementia, spontaneous SBO s/p bowel resection, and diabetes who is presenting with acute hypoxia. Pt's daughter noted hypoxia on home pulse-ox this AM and called EMS. She was subsequently started on 6L NC and Ms. Rissa Terry is a 89-year-old female w/ PMH of polycythemia vera on Eliquis, h/o CVA and DVT, HTN, HLD, dementia, spontaneous SBO s/p bowel resection, and diabetes who is presenting with acute hypoxia. Pt's daughter noted hypoxia on home pulse-ox this AM and called EMS. She was subsequently started on 6L NC by EMS and brought to ED. Per daughter, pt has not experienced fever, chills, chest pain, nausea, vomiting. She denies sick contacts.

## 2023-09-08 NOTE — H&P ADULT - NSHPLABSRESULTS_GEN_ALL_CORE
12.6   22.21 )-----------( 839      ( 08 Sep 2023 09:50 )             42.0       -    139  |  104  |  18  ----------------------------<  124<H>  5.1   |  21<L>  |  1.03    Ca    9.7      08 Sep 2023 09:50    TPro  7.5  /  Alb  3.6  /  TBili  0.2  /  DBili  x   /  AST  41<H>  /  ALT  19  /  AlkPhos  108  09-08              Urinalysis Basic - ( 08 Sep 2023 10:51 )    Color: Light Yellow / Appearance: Clear / S.016 / pH: x  Gluc: x / Ketone: Negative  / Bili: Negative / Urobili: Negative   Blood: x / Protein: 30 mg/dL / Nitrite: Negative   Leuk Esterase: Negative / RBC: 7 /hpf / WBC 2 /HPF   Sq Epi: x / Non Sq Epi: x / Bacteria: Negative        PT/INR - ( 08 Sep 2023 09:50 )   PT: 15.6 sec;   INR: 1.50 ratio         PTT - ( 08 Sep 2023 09:50 )  PTT:40.8 sec          CAPILLARY BLOOD GLUCOSE      POCT Blood Glucose.: 119 mg/dL (08 Sep 2023 09:13)

## 2023-09-09 ENCOUNTER — TRANSCRIPTION ENCOUNTER (OUTPATIENT)
Age: 88
End: 2023-09-09

## 2023-09-09 LAB
-  COAGULASE NEGATIVE STAPHYLOCOCCUS: SIGNIFICANT CHANGE UP
CULTURE RESULTS: SIGNIFICANT CHANGE UP
GLUCOSE BLDC GLUCOMTR-MCNC: 103 MG/DL — HIGH (ref 70–99)
GLUCOSE BLDC GLUCOMTR-MCNC: 207 MG/DL — HIGH (ref 70–99)
GLUCOSE BLDC GLUCOMTR-MCNC: 93 MG/DL — SIGNIFICANT CHANGE UP (ref 70–99)
GLUCOSE BLDC GLUCOMTR-MCNC: 99 MG/DL — SIGNIFICANT CHANGE UP (ref 70–99)
GRAM STN FLD: SIGNIFICANT CHANGE UP
GRAM STN FLD: SIGNIFICANT CHANGE UP
METHOD TYPE: SIGNIFICANT CHANGE UP
SPECIMEN SOURCE: SIGNIFICANT CHANGE UP
SPECIMEN SOURCE: SIGNIFICANT CHANGE UP

## 2023-09-09 PROCEDURE — 99232 SBSQ HOSP IP/OBS MODERATE 35: CPT | Mod: GC

## 2023-09-09 RX ORDER — CHLORHEXIDINE GLUCONATE 213 G/1000ML
1 SOLUTION TOPICAL DAILY
Refills: 0 | Status: DISCONTINUED | OUTPATIENT
Start: 2023-09-09 | End: 2023-09-15

## 2023-09-09 RX ADMIN — Medication 81 MILLIGRAM(S): at 17:53

## 2023-09-09 RX ADMIN — Medication 25 MILLIGRAM(S): at 17:54

## 2023-09-09 RX ADMIN — ATORVASTATIN CALCIUM 40 MILLIGRAM(S): 80 TABLET, FILM COATED ORAL at 21:27

## 2023-09-09 RX ADMIN — APIXABAN 5 MILLIGRAM(S): 2.5 TABLET, FILM COATED ORAL at 17:54

## 2023-09-09 RX ADMIN — CHLORHEXIDINE GLUCONATE 1 APPLICATION(S): 213 SOLUTION TOPICAL at 17:54

## 2023-09-09 NOTE — PATIENT PROFILE ADULT - FALL HARM RISK - HARM RISK INTERVENTIONS

## 2023-09-09 NOTE — PATIENT PROFILE ADULT - NSPROGENBLOODRESTRICT_GEN_A_NUR
REVIEW OF SYSTEMS:  CONSTITUTIONAL: No weakness. No fevers. No chills. No rigors. No poor appetite.  EYES: No blurry or double vision. No eye pain.  ENT: No hearing difficulty. No sore throat. No Sinusitis/rhinorrhea.   NECK: No pain. No stiffness/rigidity.  CARDIAC: +chest pain. +palpitations. No lightheadedness. No syncope.  RESPIRATORY: No cough. +SOB.   GASTROINTESTINAL: No abdominal pain. No nausea. No vomiting. No diarrhea. No constipation.   GENITOURINARY: No dysuria. No frequency. No oliguria.  NEUROLOGICAL: No numbness/tingling. No focal weakness. No headache. No unsteady gait.  BACK: No back pain. No flank pain.  EXTREMITIES: No lower extremity edema. Full ROM. No joint pain.  SKIN: No rashes. No itching. No other lesions.  PSYCHIATRIC: No depression. No anxiety.   ALLERGIC: No lip swelling. No hives.  All other review of systems is negative unless indicated above.  Unless indicated above, unable to assess ROS 2/2 none

## 2023-09-09 NOTE — DISCHARGE NOTE PROVIDER - NSDCFUADDAPPT_GEN_ALL_CORE_FT
APPTS ARE READY TO BE MADE: [X] YES    Best Family or Patient Contact (if needed):    Additional Information about above appointments (if needed):    1:   2:   3:     Other comments or requests:  Please follow up with your PCP within 2 weeks of discharge.  Please follow up with your PCP within 2 weeks of discharge.     Patient/Caregiver was provided with follow up request details and prefers to call the providers office on their own to schedule.

## 2023-09-09 NOTE — DISCHARGE NOTE PROVIDER - NSDCCPCAREPLAN_GEN_ALL_CORE_FT
PRINCIPAL DISCHARGE DIAGNOSIS  Diagnosis: Hypoxia  Assessment and Plan of Treatment: You were brought to the hospital because the oxygen level on your home pulse-oximeter was low. You were given oxygen through a nasal cannula and your oxygen level was good. We decreased     PRINCIPAL DISCHARGE DIAGNOSIS  Diagnosis: Hypoxia  Assessment and Plan of Treatment: You were brought to the hospital because the oxygen level on your home pulse-oximeter was low. You were given oxygen through a nasal cannula and your oxygen level was good. We decreased your oxygen until you were taken off completely. You continued to have good blood oxygen levels and were subsequently discharged. Angel randleke all medications exactly as prescribed. Please follow up with your PCP in 1-2 weeks. Please return to the ED if any new or worsening symptoms.     PRINCIPAL DISCHARGE DIAGNOSIS  Diagnosis: Hypoxia  Assessment and Plan of Treatment: You were brought to the hospital because the oxygen level on your home pulse-oximeter was low. You were given oxygen through a nasal cannula and your oxygen level was good. We decreased your oxygen until you were taken off completely. Our initial work up showed no infectious reason for the hypoxia. You did not need antibiotics. You continued to have good blood oxygen levels and were subsequently discharged. Please alex all medications exactly as prescribed. Please follow up with your PCP in 1-2 weeks. Please return to the ED if any new or worsening symptoms.      SECONDARY DISCHARGE DIAGNOSES  Diagnosis: Bacteremia  Assessment and Plan of Treatment: It was noted that you had a high white count when you came to the ED. Initial blood cultures showed potential infection in the blood. You were monitored off of antibiotics, until it was determined that it was safe to discharge you home.     PRINCIPAL DISCHARGE DIAGNOSIS  Diagnosis: Hypoxia  Assessment and Plan of Treatment: You were brought to the hospital because the oxygen level on your home pulse-oximeter was low. You were given oxygen through a nasal cannula and your oxygen level was good. We decreased your oxygen until you were taken off completely. Our initial work up showed no infectious reason for the hypoxia. You did not need antibiotics. You continued to have good blood oxygen levels and were subsequently discharged. Please alex all medications exactly as prescribed. Please follow up with your PCP in 1-2 weeks. Please return to the ED if any new or worsening symptoms.      SECONDARY DISCHARGE DIAGNOSES  Diagnosis: Bacteremia  Assessment and Plan of Treatment: It was noted that you had a high white count when you came to the ED. Initial blood cultures showed potential infection in the blood. You were monitored off of antibiotics, until it was determined that it was safe to discharge you home. The source of the bacteria in the culture was determined to be a contaminant.

## 2023-09-09 NOTE — PROGRESS NOTE ADULT - SUBJECTIVE AND OBJECTIVE BOX
PROGRESS NOTE:     Patient is a 89y old  Female who presents with a chief complaint of Hypoxia (09 Sep 2023 11:36)      SUBJECTIVE / OVERNIGHT EVENTS:    OVERNIGHT: No acute overnight events.      Patient was examined at bedside. Mental status remains at baseline. Unable to obtain ROS due to AMS. Family of patient is amenable to current treatment plan.      REVIEW OF SYSTEMS:    CONSTITUTIONAL:  No weakness, fevers or chills  EYES/ENT:  No visual changes;  No vertigo or throat pain   NECK:  No pain or stiffness  RESPIRATORY:  No cough, wheezing, hemoptysis; No shortness of breath  CARDIOVASCULAR:  No chest pain or palpitations  GASTROINTESTINAL:  No abdominal or epigastric pain. No nausea, vomiting, or hematemesis; No diarrhea or constipation. No melena or hematochezia.  GENITOURINARY:  No dysuria, frequency or hematuria  NEUROLOGICAL:  No numbness or weakness  SKIN:  No itching, rashes      MEDICATIONS  (STANDING):  amLODIPine   Tablet 5 milliGRAM(s) Oral daily  apixaban 5 milliGRAM(s) Oral two times a day  aspirin  chewable 81 milliGRAM(s) Oral daily  atorvastatin 40 milliGRAM(s) Oral at bedtime  chlorhexidine 4% Liquid 1 Application(s) Topical daily  dextrose 5%. 1000 milliLiter(s) (50 mL/Hr) IV Continuous <Continuous>  dextrose 5%. 1000 milliLiter(s) (100 mL/Hr) IV Continuous <Continuous>  dextrose 50% Injectable 25 Gram(s) IV Push once  dextrose 50% Injectable 25 Gram(s) IV Push once  dextrose 50% Injectable 12.5 Gram(s) IV Push once  glucagon  Injectable 1 milliGRAM(s) IntraMuscular once  hydroxyurea 500 milliGRAM(s) Oral <User Schedule>  insulin lispro (ADMELOG) corrective regimen sliding scale   SubCutaneous three times a day before meals  insulin lispro (ADMELOG) corrective regimen sliding scale   SubCutaneous at bedtime  levothyroxine 88 MICROGram(s) Oral daily  metoprolol tartrate 25 milliGRAM(s) Oral two times a day    MEDICATIONS  (PRN):  dextrose Oral Gel 15 Gram(s) Oral once PRN Blood Glucose LESS THAN 70 milliGRAM(s)/deciliter      CAPILLARY BLOOD GLUCOSE      POCT Blood Glucose.: 93 mg/dL (09 Sep 2023 08:46)  POCT Blood Glucose.: 103 mg/dL (09 Sep 2023 05:58)  POCT Blood Glucose.: 117 mg/dL (08 Sep 2023 23:25)    I&O's Summary    09 Sep 2023 07:01  -  09 Sep 2023 14:43  --------------------------------------------------------  IN: 0 mL / OUT: 300 mL / NET: -300 mL        PHYSICAL EXAM:  Vital Signs Last 24 Hrs  T(C): 36.5 (09 Sep 2023 04:05), Max: 36.7 (08 Sep 2023 22:39)  T(F): 97.7 (09 Sep 2023 04:05), Max: 98.1 (08 Sep 2023 22:39)  HR: 91 (09 Sep 2023 04:05) (78 - 91)  BP: 146/80 (09 Sep 2023 04:05) (119/72 - 146/80)  BP(mean): --  RR: 18 (09 Sep 2023 04:05) (18 - 20)  SpO2: 100% (09 Sep 2023 04:05) (96% - 100%)    Parameters below as of 09 Sep 2023 04:05  Patient On (Oxygen Delivery Method): nasal cannula  O2 Flow (L/min): 2      CONSTITUTIONAL: NAD; well-developed  HEENT: PERRL, clear conjunctiva  RESPIRATORY: Normal respiratory effort; lungs are clear to auscultation bilaterally; No Crackles/Rhonchi/Wheezing  CARDIOVASCULAR: Regular rate and rhythm, normal S1 and S2, no murmur/rub/gallop; No lower extremity edema; Peripheral pulses are 2+ bilaterally  ABDOMEN: Nontender to palpation, normoactive bowel sounds, no rebound/guarding; No hepatosplenomegaly  MUSCULOSKELETAL: no clubbing or cyanosis of digits; no joint swelling or tenderness to palpation  EXTREMITY: Lower extremities Non-tender to palpation; non-erythematous B/L  NEURO: A&Ox3; no focal deficits   PSYCH: normal mood; Affect appropriate    LABS:                        12.6   22.21 )-----------( 839      ( 08 Sep 2023 09:50 )             42.0     09-08    139  |  104  |  18  ----------------------------<  124<H>  5.1   |  21<L>  |  1.03    Ca    9.7      08 Sep 2023 09:50    TPro  7.5  /  Alb  3.6  /  TBili  0.2  /  DBili  x   /  AST  41<H>  /  ALT  19  /  AlkPhos  108  09-08    PT/INR - ( 08 Sep 2023 09:50 )   PT: 15.6 sec;   INR: 1.50 ratio         PTT - ( 08 Sep 2023 09:50 )  PTT:40.8 sec      Urinalysis Basic - ( 08 Sep 2023 10:51 )    Color: Light Yellow / Appearance: Clear / S.016 / pH: x  Gluc: x / Ketone: Negative  / Bili: Negative / Urobili: Negative   Blood: x / Protein: 30 mg/dL / Nitrite: Negative   Leuk Esterase: Negative / RBC: 7 /hpf / WBC 2 /HPF   Sq Epi: x / Non Sq Epi: x / Bacteria: Negative        Culture - Blood (collected 08 Sep 2023 09:15)  Source: .Blood Blood-Peripheral  Gram Stain (09 Sep 2023 13:26):    Growth in aerobic bottle: Gram Positive Cocci in Clusters  Preliminary Report (09 Sep 2023 13:27):    Growth in aerobic bottle: Gram Positive Cocci in Clusters    Direct identification is available within approximately 3-5    hours either by Blood Panel Multiplexed PCR or Direct    MALDI-TOF. Details: [email or web address]  Organism: Blood Culture PCR (09 Sep 2023 14:43)  Organism: Blood Culture PCR (09 Sep 2023 14:43)        RADIOLOGY & ADDITIONAL TESTS:  N/A

## 2023-09-09 NOTE — PROGRESS NOTE ADULT - PROBLEM SELECTOR PLAN 1
- P/w reported acute hypoxia at home w/ O2 sat 64%  - Leukocytosis at baseline  - RVP, COVID negative  - CXR unremarkable  - S/p 1x vancomycin, cefepime  - Blood cx -> Gram-positive cocci in clusters; f/u repeat blood cxs  - Sputum cx pending  - C/w O2 supplementation, wean as tolerated  - CTM

## 2023-09-09 NOTE — DISCHARGE NOTE PROVIDER - NSDCMRMEDTOKEN_GEN_ALL_CORE_FT
amLODIPine 5 mg oral tablet: 1 tab(s) orally once a day  atorvastatin 40 mg oral tablet: 1 tab(s) orally once a day  Eliquis 5 mg oral tablet: 1 tab(s) orally 2 times a day  hydroxyurea 500 mg oral capsule: 1 cap(s) orally 2 times a week on Monday and Friday  levothyroxine 88 mcg (0.088 mg) oral tablet: 1 tab(s) orally once a day  metFORMIN 500 mg oral tablet: 1 tab(s) orally once a day  metoprolol tartrate 25 mg oral tablet: 1 tab(s) orally 2 times a day  ocular lubricant ophthalmic solution: 1 drop(s) to each affected eye 2 times a day  polyethylene glycol 3350 oral powder for reconstitution: 17 gram(s) orally as needed  senna (sennosides) 8.6 mg oral tablet: 1 tab(s) orally as needed  Seroquel 25 mg oral tablet: 1 tab(s) orally once a day   amLODIPine 10 mg oral tablet: 1 tab(s) orally once a day  atorvastatin 40 mg oral tablet: 1 tab(s) orally once a day  Eliquis 5 mg oral tablet: 1 tab(s) orally 2 times a day  hydroxyurea 500 mg oral capsule: 1 cap(s) orally 2 times a week on Monday and Friday  levothyroxine 88 mcg (0.088 mg) oral tablet: 1 tab(s) orally once a day  metFORMIN 500 mg oral tablet: 1 tab(s) orally once a day  metoprolol tartrate 25 mg oral tablet: 1 tab(s) orally 2 times a day  ocular lubricant ophthalmic solution: 1 drop(s) to each affected eye 2 times a day  polyethylene glycol 3350 oral powder for reconstitution: 17 gram(s) orally as needed  senna (sennosides) 8.6 mg oral tablet: 1 tab(s) orally as needed  Seroquel 25 mg oral tablet: 1 tab(s) orally once a day

## 2023-09-09 NOTE — PROGRESS NOTE ADULT - ATTENDING COMMENTS
89F w/ polycythemia vera on Hydroxyurea, h/o CVA and DVT, on Eliquis, HTN, HLD, dementia, spontaneous SBO s/p bowel resection, and diabetes brought in by daughter for reported hypoxia to 60s/70s on pulse oximetry. Some dry cough.     On exam, pt appears comfortable. Currently on 2 liter O2 supplementation satting 100%. Obtain RA sat - if okay, would clear for discharge today. Leukocytosis is chronic. Do no suspect a pulmonary infection. CXR clear.   Continue home meds including Eliquis.   Blood culture +coag neg staph - strongly suspect it's a contaminant

## 2023-09-09 NOTE — PATIENT PROFILE ADULT - DIETITIAN.
[facility]      HPI:  Vivian Galvan is a 32 y.o. male that presents to the office today with complaints of increasing right shoulder pain along the anterior aspect of the right shoulder. Patient states that about 8 years ago he was lifting weights at school when he was a high school student and he thinks he injured the right shoulder. He states that after period of time the right shoulder pain subsided and then recently he reaggravated the shoulder and it is just not been right since then. He rates the pain at a 4/10, mild ache and intermittent. Some days he states the shoulder does not hurt at all. Other days he states that the shoulder bothers him enough that he cannot do certain activities. Past Medical History:   Diagnosis Date    Diarrhea     H/O Doppler renal arterial ultrasound 05/02/2019    Normal renal artery doppler.  H/O echocardiogram 05/02/2019    EF 55-60%, Normal study.     Hypertension     Lymphadenopathy        Past Surgical History:   Procedure Laterality Date    VASCULAR SURGERY      varicose veins       Family History   Problem Relation Age of Onset    Arthritis Father     High Blood Pressure Father     High Cholesterol Father     Obesity Father     Substance Abuse Father     Early Death Paternal Uncle         mid to late 29's    Heart Attack Paternal Uncle     Heart Disease Paternal Uncle     Mental Illness Paternal Uncle     Arthritis Paternal Grandmother     High Cholesterol Paternal Grandmother     Alcohol Abuse Paternal Grandfather        Social History     Socioeconomic History    Marital status:      Spouse name: None    Number of children: None    Years of education: None    Highest education level: None   Occupational History    None   Social Needs    Financial resource strain: None    Food insecurity     Worry: None     Inability: None    Transportation needs     Medical: None     Non-medical: None   Tobacco Use    Smoking status: Never Smoker    Smokeless tobacco: Never Used   Substance and Sexual Activity    Alcohol use: Yes     Frequency: Monthly or less    Drug use: Never    Sexual activity: None   Lifestyle    Physical activity     Days per week: None     Minutes per session: None    Stress: None   Relationships    Social connections     Talks on phone: None     Gets together: None     Attends Rastafarian service: None     Active member of club or organization: None     Attends meetings of clubs or organizations: None     Relationship status: None    Intimate partner violence     Fear of current or ex partner: None     Emotionally abused: None     Physically abused: None     Forced sexual activity: None   Other Topics Concern    None   Social History Narrative    None       Current Outpatient Medications   Medication Sig Dispense Refill    amLODIPine (NORVASC) 5 MG tablet Take 1 tablet by mouth daily 30 tablet 11     No current facility-administered medications for this visit. No Known Allergies    Vitals:    09/30/20 1534   Pulse: 64   Resp: 16   SpO2: 98%   Weight: 191 lb (86.6 kg)   Height: 6' 2\" (1.88 m)         Review of Systems:   Review of Systems   Constitutional: Negative. HENT: Negative. Eyes: Negative. Respiratory: Negative. Cardiovascular: Negative. Gastrointestinal: Negative. Genitourinary: Negative. Musculoskeletal: Positive for arthralgias and myalgias. Skin: Negative. Neurological: Negative. Psychiatric/Behavioral: Negative. Physical Exam:   Gen/Psych:Examination reveals a pleasant individual in no acute distress. The patient is oriented to time, place and person. The patient's mood and affect are appropriate. Patient appears well nourished. Body habitus is normal    HEENT: Head is atraumatic normocephalic, ears are symmetric, eyes show equal pupils bilaterally, extraocular muscles intact. Hearing is intact to normal voice at 5 feet.  Nares are patent bilaterally, no epistaxis, no rhinorrhea. Lymph: no obvious lymphedema in bilateral upper extremities     Skin: Intact in bilateral upper extremities with no ulcerations, lesions, rash, erythema. Vascular: There are no varicosities in bilateral upper  extremities, sensation intact to light touch over bilateral upper extremities. Musculoskeletal:  Right shoulder exam:  Skin:  Clear with no erythema, there is no significant joint effusion  Deformity:  none  Atrophy:  none  Tenderness: AC joint, coracoid and anterior glenoid. Active ROM:   FE:180    IR side: T12           ER side: 60   Ad/Abduction: 180      Passive ROM is the same as active     Strength: FE:5/5     ER:5/5 IR:5/5      Elbow flexion: 5/5  Neer:  not positive  Garcia:  not positive  Yergason:mildly positive  Arbon:  mildly positive    Cervical Spine tenderness: no      Outside Record review: Office notes and past medical record reviewed    Imagin views of the right shoulder taken reviewed in the office today show no acute fractures, no dislocations, no significant degenerative change within the glenohumeral joint or the acromioclavicular joint. The official read and interpretation of these x-rays will be done by the the [facility]       Assessment: Chronic right shoulder pain (possible labral tear versus pectoralis tear)    Plan:   I explained to the patient that his symptoms could be coming from a labral tear within the shoulder that healed over time. I also explained that some of his symptoms sound like he may have injured a pectoralis tendon and that we could try a steroid injection in the shoulder joint to see if there is relief with that injection and if there is that it would point more towards a labral tear than a pectoralis tear and at that point it could help direct treatment.     Right shoulder joint injection given     Right [facility]) Joint Aspiration / Injection Procedure:  Multiple treatment options were discussed. This injection was recommended as a part of the overall treatment plan. Details of the procedure, potential risks, and potential benefits were discussed. Patient's questions were answered. Patient elected to proceed with procedure. Medication: 3 mL's of 1% plain lidocaine, 3 mL of 0.5% bupivacaine, 2 mL (40 mg/mL) Kenalog,  Procedure:  Sterile technique was used as the skin over the injection site was prepped with alcohol. The right glenohumeral joint was then injected with the above listed medication. A sterile bandage was placed over the injection site. The patient tolerated the procedure well without complication. dietitian/nutrition services

## 2023-09-09 NOTE — DISCHARGE NOTE PROVIDER - HOSPITAL COURSE
Ms. Rissa Terry is a 89-year-old female w/ PMH of polycythemia vera on Eliquis, h/o CVA and DVT, HTN, HLD, dementia, spontaneous SBO s/p bowel resection, and diabetes who is presenting with acute hypoxia. Pt's daughter noted hypoxia on home pulse-ox this AM and called EMS. She was subsequently started on 6L NC by EMS and brought to ED. In the ED, patient was afebrile and tachycardic. Pt had a leukocytosis on admission but similar to her baseline value on previous CBCs. RVP and COVID negative. CXR unremarkable. Pt received empiric vancomycin and cefepime x1 and oxygen was weaned overnight. on 9/8, pt was saturating well on RA and was deemed medically stable for discharge.    On day of discharge, patient is clinically stable with no new exam findings or acute symptoms compared to prior. The patient was seen by the attending physician on the date of discharge and deemed stable and acceptable for discharge. The patient's chronic medical conditions were treated accordingly per the patient's home medication regimen. The patient's medication reconciliation (with changes made to chronic medications), follow up appointments, discharge orders, instructions, and significant lab and diagnostic studies are as noted.   HPI:  Ms. Rissa Terry is a 89-year-old female w/ PMH of polycythemia vera on Eliquis, h/o CVA and DVT, HTN, HLD, dementia, spontaneous SBO s/p bowel resection, and diabetes who is presenting with acute hypoxia. Pt's daughter noted hypoxia on home pulse-ox this AM and called EMS. She was subsequently started on 6L NC by EMS and brought to ED. Per daughter, pt has not experienced fever, chills, chest pain, nausea, vomiting. She denies sick contacts. (08 Sep 2023 16:28)    Hospital Course:  Ms. Rissa Terry is a 89-year-old female w/ PMH of polycythemia vera on Eliquis, h/o CVA and DVT, HTN, HLD, dementia, spontaneous SBO s/p bowel resection, and diabetes who is presenting with acute hypoxia. Pt's daughter noted hypoxia on home pulse-ox this AM and called EMS. She was subsequently started on 6L NC by EMS and brought to ED. In the ED, patient was afebrile and tachycardic. Pt had a leukocytosis on admission but similar to her baseline value on previous CBCs. RVP and COVID negative. CXR unremarkable. Pt received empiric vancomycin and cefepime x1 and oxygen was weaned overnight. On 9/8, pt was saturating well on RA. However, course complicated by positive blood cultures for staph. epi, haemolyticus, capitis. ID consulted and pt started on vancomycin. TTE and ROSIBEL both unremarkable for vegatations. Repeat blood cultures negative without systemic signs of infections.    On day of discharge, patient is clinically stable with no new exam findings or acute symptoms compared to prior. The patient was seen by the attending physician on the date of discharge and deemed stable and acceptable for discharge. The patient's chronic medical conditions were treated accordingly per the patient's home medication regimen. The patient's medication reconciliation (with changes made to chronic medications), follow up appointments, discharge orders, instructions, and significant lab and diagnostic studies are as noted.    Important Medication Changes and Reason:  - None    Active or Pending Issues Requiring Follow-up:  - None    Advanced Directives:   [ ] Full code  [X] DNR  [ ] Hospice    Discharge Diagnoses:  - Bacteremia vs culture contaminant     HPI:  Ms. Rissa Terry is a 89-year-old female w/ PMH of polycythemia vera on Eliquis, h/o CVA and DVT, HTN, HLD, dementia, spontaneous SBO s/p bowel resection, and diabetes who is presenting with acute hypoxia. Pt's daughter noted hypoxia on home pulse-ox this AM and called EMS. She was subsequently started on 6L NC by EMS and brought to ED. Per daughter, pt has not experienced fever, chills, chest pain, nausea, vomiting. She denies sick contacts. (08 Sep 2023 16:28)    Hospital Course:  Ms. Rissa Terry is a 89-year-old female w/ PMH of polycythemia vera on Eliquis, h/o CVA and DVT, HTN, HLD, dementia, spontaneous SBO s/p bowel resection, and diabetes who is presenting with acute hypoxia. Pt's daughter noted hypoxia on home pulse-ox this AM and called EMS. She was subsequently started on 6L NC by EMS and brought to ED. In the ED, patient was afebrile and tachycardic. Pt had a leukocytosis on admission but similar to her baseline value on previous CBCs. RVP and COVID negative. CXR unremarkable. Pt received empiric vancomycin and cefepime x1 and oxygen was weaned overnight. On 9/8, pt was saturating well on RA. However, course complicated by positive blood cultures for staph. epi, haemolyticus, capitis. ID consulted and pt started on vancomycin. TTE and ROSIBEL both unremarkable for vegatations. Repeat blood cultures negative without systemic signs of infections. Antibiotics were discontinued    On day of discharge, patient is clinically stable with no new exam findings or acute symptoms compared to prior. The patient was seen by the attending physician on the date of discharge and deemed stable and acceptable for discharge. The patient's chronic medical conditions were treated accordingly per the patient's home medication regimen. The patient's medication reconciliation (with changes made to chronic medications), follow up appointments, discharge orders, instructions, and significant lab and diagnostic studies are as noted.    Important Medication Changes and Reason:  - None    Active or Pending Issues Requiring Follow-up:  - None    Advanced Directives:   [ ] Full code  [X] DNR  [ ] Hospice    Discharge Diagnoses:  - Bacteremia   - Polycythemia vera  - Thrombocytosis  - Leukocytosis  - Dementia

## 2023-09-09 NOTE — DISCHARGE NOTE PROVIDER - DETAILS OF MALNUTRITION DIAGNOSIS/DIAGNOSES
This patient has been assessed with a concern for Malnutrition and was treated during this hospitalization for the following Nutrition diagnosis/diagnoses:     -  09/11/2023: Severe protein-calorie malnutrition   -  09/11/2023: Underweight (BMI < 19)

## 2023-09-10 DIAGNOSIS — R78.81 BACTEREMIA: ICD-10-CM

## 2023-09-10 LAB
ANION GAP SERPL CALC-SCNC: 13 MMOL/L — SIGNIFICANT CHANGE UP (ref 5–17)
BUN SERPL-MCNC: 17 MG/DL — SIGNIFICANT CHANGE UP (ref 7–23)
CALCIUM SERPL-MCNC: 9.7 MG/DL — SIGNIFICANT CHANGE UP (ref 8.4–10.5)
CHLORIDE SERPL-SCNC: 103 MMOL/L — SIGNIFICANT CHANGE UP (ref 96–108)
CO2 SERPL-SCNC: 21 MMOL/L — LOW (ref 22–31)
CREAT SERPL-MCNC: 0.7 MG/DL — SIGNIFICANT CHANGE UP (ref 0.5–1.3)
CULTURE RESULTS: SIGNIFICANT CHANGE UP
CULTURE RESULTS: SIGNIFICANT CHANGE UP
EGFR: 82 ML/MIN/1.73M2 — SIGNIFICANT CHANGE UP
GLUCOSE BLDC GLUCOMTR-MCNC: 119 MG/DL — HIGH (ref 70–99)
GLUCOSE BLDC GLUCOMTR-MCNC: 135 MG/DL — HIGH (ref 70–99)
GLUCOSE BLDC GLUCOMTR-MCNC: 136 MG/DL — HIGH (ref 70–99)
GLUCOSE BLDC GLUCOMTR-MCNC: 181 MG/DL — HIGH (ref 70–99)
GLUCOSE SERPL-MCNC: 109 MG/DL — HIGH (ref 70–99)
GRAM STN FLD: SIGNIFICANT CHANGE UP
HCT VFR BLD CALC: 42 % — SIGNIFICANT CHANGE UP (ref 34.5–45)
HGB BLD-MCNC: 12.5 G/DL — SIGNIFICANT CHANGE UP (ref 11.5–15.5)
MCHC RBC-ENTMCNC: 24.9 PG — LOW (ref 27–34)
MCHC RBC-ENTMCNC: 29.8 GM/DL — LOW (ref 32–36)
MCV RBC AUTO: 83.5 FL — SIGNIFICANT CHANGE UP (ref 80–100)
NRBC # BLD: 0 /100 WBCS — SIGNIFICANT CHANGE UP (ref 0–0)
ORGANISM # SPEC MICROSCOPIC CNT: SIGNIFICANT CHANGE UP
PLATELET # BLD AUTO: 842 K/UL — HIGH (ref 150–400)
POTASSIUM SERPL-MCNC: 4.5 MMOL/L — SIGNIFICANT CHANGE UP (ref 3.5–5.3)
POTASSIUM SERPL-SCNC: 4.5 MMOL/L — SIGNIFICANT CHANGE UP (ref 3.5–5.3)
RBC # BLD: 5.03 M/UL — SIGNIFICANT CHANGE UP (ref 3.8–5.2)
RBC # FLD: 19 % — HIGH (ref 10.3–14.5)
SODIUM SERPL-SCNC: 137 MMOL/L — SIGNIFICANT CHANGE UP (ref 135–145)
SPECIMEN SOURCE: SIGNIFICANT CHANGE UP
SPECIMEN SOURCE: SIGNIFICANT CHANGE UP
WBC # BLD: 16.4 K/UL — HIGH (ref 3.8–10.5)
WBC # FLD AUTO: 16.4 K/UL — HIGH (ref 3.8–10.5)

## 2023-09-10 PROCEDURE — 99223 1ST HOSP IP/OBS HIGH 75: CPT

## 2023-09-10 PROCEDURE — 99232 SBSQ HOSP IP/OBS MODERATE 35: CPT | Mod: GC

## 2023-09-10 RX ORDER — VANCOMYCIN HCL 1 G
750 VIAL (EA) INTRAVENOUS EVERY 24 HOURS
Refills: 0 | Status: DISCONTINUED | OUTPATIENT
Start: 2023-09-10 | End: 2023-09-12

## 2023-09-10 RX ADMIN — ATORVASTATIN CALCIUM 40 MILLIGRAM(S): 80 TABLET, FILM COATED ORAL at 22:36

## 2023-09-10 RX ADMIN — Medication 81 MILLIGRAM(S): at 12:01

## 2023-09-10 RX ADMIN — Medication 250 MILLIGRAM(S): at 12:00

## 2023-09-10 RX ADMIN — APIXABAN 5 MILLIGRAM(S): 2.5 TABLET, FILM COATED ORAL at 05:46

## 2023-09-10 RX ADMIN — Medication 88 MICROGRAM(S): at 05:46

## 2023-09-10 RX ADMIN — APIXABAN 5 MILLIGRAM(S): 2.5 TABLET, FILM COATED ORAL at 17:17

## 2023-09-10 RX ADMIN — Medication 25 MILLIGRAM(S): at 05:46

## 2023-09-10 RX ADMIN — Medication 25 MILLIGRAM(S): at 17:17

## 2023-09-10 RX ADMIN — CHLORHEXIDINE GLUCONATE 1 APPLICATION(S): 213 SOLUTION TOPICAL at 12:01

## 2023-09-10 RX ADMIN — AMLODIPINE BESYLATE 5 MILLIGRAM(S): 2.5 TABLET ORAL at 05:46

## 2023-09-10 NOTE — PROGRESS NOTE ADULT - PROBLEM SELECTOR PLAN 1
- P/w reported acute hypoxia at home w/ O2 sat 64%. Saturation well in the hospital  - Leukocytosis at baseline  - RVP, COVID negative  - CXR unremarkable  - S/p 1x vancomycin, cefepime  - Blood cx -> Gram-positive cocci in clusters, coag negative.   - C/w O2 supplementation, wean as tolerated  - CTM Pt w/ gram + cocci in cluster in clusters in 2 bottles. Unclear source. ?Contamination, will treat empirically at this time  - Start vancomycin 750mg q24  - ID consulted  - Repeat BCx  - Will continued to have positive blood Cx, would need further imaging including TTE

## 2023-09-10 NOTE — PROGRESS NOTE ADULT - ATTENDING COMMENTS
89F w/ polycythemia vera on Hydroxyurea, h/o CVA and DVT, on Eliquis, HTN, HLD, dementia, spontaneous SBO s/p bowel resection, and diabetes brought in by daughter for reported hypoxia to 60s/70s on pulse oximetry. Some dry cough.     # Gram positive bacteremia - Blood culture x2 growing coag neg staph, less likely to be contaminant given both cultures are positive. ID consulted, Vancomycin IV re-started.     On exam, pt appears comfortable. Currently on room air, sats in high 90s. Leukocytosis is chronic. Do no suspect a pulmonary infection. CXR clear.

## 2023-09-10 NOTE — CONSULT NOTE ADULT - ATTENDING COMMENTS
89-yo F w/ PMH of PV on Eliquis, h/o CVA and DVT, spontaneous SBO s/p bowel resection, and DM, admitted with hypoxia. BCx on presentation w/ CoNS in 3 out of 4 bottles. Afebrile. WBC was 22, down to 16. Unable to assess symptoms 2/2 dementia. RVP and UA not suspicious for infection. CXR w/ no signs of pneumonia. Currently receiving vancomycin.    #CoNS bacteremia  #Leukocytosis  #Heart murmur  #Dementia  #At risk of aspiration  - Unclear source of CoNS in the blood. Aspiration?  - Systolic murmurs noted on physical exam.  - Unable to communicate re: symptoms 2/2 dementia.  - Bed head elevation. Aspiration precautions.  - Repeat BCx until clears  - TTE. Need ROSIBEL if TTE neg  - C/w vancomycin    Discussed with ID fellow and primary team.  Thank you for this consult. Inpatient ID team will follow.    Tomas Dyer MD, PhD  Attending Physician  Division of Infectious Diseases  Department of Medicine    Please contact through MS Teams message.  Office: [phone] (after 5 PM or weekend)

## 2023-09-10 NOTE — PROGRESS NOTE ADULT - SUBJECTIVE AND OBJECTIVE BOX
**************************************  Bruceaye Rojas, PGY-2  After 7PM, please contact night float at #[phone] or [phone]  **************************************    INTERVAL HPI/OVERNIGHT EVENTS:  Patient was seen and examined at bedside. As per nurse and patient, no o/n events, patient resting comfortably. Mental status remains at baseline. Unable to obtain ROS due to AMS. Family of patient is amenable to current treatment plan.    VITAL SIGNS:  T(F): 98.2 (09-10-23 @ 05:37)  HR: 96 (09-10-23 @ 05:37)  BP: 146/88 (09-10-23 @ 05:37)  RR: 18 (09-10-23 @ 05:37)  SpO2: 99% (09-10-23 @ 05:37)  Wt(kg): --    PHYSICAL EXAM:    Constitutional: WDWN, NAD  HEENT: PERRL, EOMI, sclera non-icteric, neck supple, trachea midline, no masses, no JVD, MMM, good dentition  Respiratory: CTA b/l, good air entry b/l, no wheezing, no rhonchi, no rales, without accessory muscle use and no intercostal retractions  Cardiovascular: RRR, normal S1S2, no M/R/G  Gastrointestinal: soft, NTND, no masses palpable, BS normal  Extremities: Warm, well perfused, pulses equal bilateral upper and lower extremities, no edema, no clubbing. Capillary refill <2 sec  Neurological: AAOx3, CN Grossly intact  Skin: Normal temperature, warm, dry    MEDICATIONS  (STANDING):  amLODIPine   Tablet 5 milliGRAM(s) Oral daily  apixaban 5 milliGRAM(s) Oral two times a day  aspirin  chewable 81 milliGRAM(s) Oral daily  atorvastatin 40 milliGRAM(s) Oral at bedtime  chlorhexidine 4% Liquid 1 Application(s) Topical daily  dextrose 5%. 1000 milliLiter(s) (50 mL/Hr) IV Continuous <Continuous>  dextrose 5%. 1000 milliLiter(s) (100 mL/Hr) IV Continuous <Continuous>  dextrose 50% Injectable 25 Gram(s) IV Push once  dextrose 50% Injectable 12.5 Gram(s) IV Push once  dextrose 50% Injectable 25 Gram(s) IV Push once  glucagon  Injectable 1 milliGRAM(s) IntraMuscular once  hydroxyurea 500 milliGRAM(s) Oral <User Schedule>  insulin lispro (ADMELOG) corrective regimen sliding scale   SubCutaneous three times a day before meals  insulin lispro (ADMELOG) corrective regimen sliding scale   SubCutaneous at bedtime  levothyroxine 88 MICROGram(s) Oral daily  metoprolol tartrate 25 milliGRAM(s) Oral two times a day    MEDICATIONS  (PRN):  dextrose Oral Gel 15 Gram(s) Oral once PRN Blood Glucose LESS THAN 70 milliGRAM(s)/deciliter      Allergies    No Known Drug Allergies  spices (Rash)    Intolerances        LABS:                        12.6   22.21 )-----------( 839      ( 08 Sep 2023 09:50 )             42.0     09-08    139  |  104  |  18  ----------------------------<  124<H>  5.1   |  21<L>  |  1.03    Ca    9.7      08 Sep 2023 09:50    TPro  7.5  /  Alb  3.6  /  TBili  0.2  /  DBili  x   /  AST  41<H>  /  ALT  19  /  AlkPhos  108  09-08    PT/INR - ( 08 Sep 2023 09:50 )   PT: 15.6 sec;   INR: 1.50 ratio         PTT - ( 08 Sep 2023 09:50 )  PTT:40.8 sec  Urinalysis Basic - ( 08 Sep 2023 10:51 )    Color: Light Yellow / Appearance: Clear / S.016 / pH: x  Gluc: x / Ketone: Negative  / Bili: Negative / Urobili: Negative   Blood: x / Protein: 30 mg/dL / Nitrite: Negative   Leuk Esterase: Negative / RBC: 7 /hpf / WBC 2 /HPF   Sq Epi: x / Non Sq Epi: x / Bacteria: Negative        RADIOLOGY & ADDITIONAL TESTS:  Reviewed **************************************  Brcue Rojas, PGY-2  After 7PM, please contact night float at [phone] or [phone]  **************************************    INTERVAL HPI/OVERNIGHT EVENTS:  Patient was seen and examined at bedside. As per nurse and patient, no o/n events, patient resting comfortably. Mental status remains at baseline. Unable to obtain ROS due to AMS. Interactive    VITAL SIGNS:  T(F): 98.2 (09-10-23 @ 05:37)  HR: 96 (09-10-23 @ 05:37)  BP: 146/88 (09-10-23 @ 05:37)  RR: 18 (09-10-23 @ 05:37)  SpO2: 99% (09-10-23 @ 05:37)  Wt(kg): --    PHYSICAL EXAM:    Constitutional: WDWN, NAD  HEENT: PERRL, EOMI, sclera non-icteric, neck supple, trachea midline, no masses, no JVD, MMM, good dentition  Respiratory: CTA b/l, good air entry b/l, no wheezing, no rhonchi, no rales, without accessory muscle use and no intercostal retractions  Cardiovascular: RRR, normal S1S2, no M/R/G  Gastrointestinal: soft, NTND, no masses palpable, BS normal  Extremities: Warm, well perfused, pulses equal bilateral upper and lower extremities, no edema, no clubbing. Capillary refill <2 sec  Neurological: AAOx3, CN Grossly intact  Skin: Normal temperature, warm, dry    MEDICATIONS  (STANDING):  amLODIPine   Tablet 5 milliGRAM(s) Oral daily  apixaban 5 milliGRAM(s) Oral two times a day  aspirin  chewable 81 milliGRAM(s) Oral daily  atorvastatin 40 milliGRAM(s) Oral at bedtime  chlorhexidine 4% Liquid 1 Application(s) Topical daily  dextrose 5%. 1000 milliLiter(s) (50 mL/Hr) IV Continuous <Continuous>  dextrose 5%. 1000 milliLiter(s) (100 mL/Hr) IV Continuous <Continuous>  dextrose 50% Injectable 25 Gram(s) IV Push once  dextrose 50% Injectable 12.5 Gram(s) IV Push once  dextrose 50% Injectable 25 Gram(s) IV Push once  glucagon  Injectable 1 milliGRAM(s) IntraMuscular once  hydroxyurea 500 milliGRAM(s) Oral <User Schedule>  insulin lispro (ADMELOG) corrective regimen sliding scale   SubCutaneous three times a day before meals  insulin lispro (ADMELOG) corrective regimen sliding scale   SubCutaneous at bedtime  levothyroxine 88 MICROGram(s) Oral daily  metoprolol tartrate 25 milliGRAM(s) Oral two times a day    MEDICATIONS  (PRN):  dextrose Oral Gel 15 Gram(s) Oral once PRN Blood Glucose LESS THAN 70 milliGRAM(s)/deciliter      Allergies    No Known Drug Allergies  spices (Rash)    Intolerances        LABS:                        12.6   22.21 )-----------( 839      ( 08 Sep 2023 09:50 )             42.0     09-08    139  |  104  |  18  ----------------------------<  124<H>  5.1   |  21<L>  |  1.03    Ca    9.7      08 Sep 2023 09:50    TPro  7.5  /  Alb  3.6  /  TBili  0.2  /  DBili  x   /  AST  41<H>  /  ALT  19  /  AlkPhos  108  09-08    PT/INR - ( 08 Sep 2023 09:50 )   PT: 15.6 sec;   INR: 1.50 ratio         PTT - ( 08 Sep 2023 09:50 )  PTT:40.8 sec  Urinalysis Basic - ( 08 Sep 2023 10:51 )    Color: Light Yellow / Appearance: Clear / S.016 / pH: x  Gluc: x / Ketone: Negative  / Bili: Negative / Urobili: Negative   Blood: x / Protein: 30 mg/dL / Nitrite: Negative   Leuk Esterase: Negative / RBC: 7 /hpf / WBC 2 /HPF   Sq Epi: x / Non Sq Epi: x / Bacteria: Negative        RADIOLOGY & ADDITIONAL TESTS:  Reviewed

## 2023-09-10 NOTE — PROGRESS NOTE ADULT - PROBLEM SELECTOR PLAN 2
- C/w Eliquis  - C/w hydroxyurea - P/w reported acute hypoxia at home w/ O2 sat 64%. Saturation well in the hospital  - Leukocytosis at baseline  - RVP, COVID negative  - CXR unremarkable  - S/p 1x vancomycin, cefepime  - Blood cx -> Gram-positive cocci in clusters, coag negative.   - C/w O2 supplementation, wean as tolerated  - CTM

## 2023-09-10 NOTE — CONSULT NOTE ADULT - ASSESSMENT
1-year-old female w/ PMH of polycythemia vera on Eliquis, h/o CVA and DVT, HTN, HLD, dementia, spontaneous SBO s/p bowel resection, and diabetes who presented for hypoxia.    ID consulted for high grade CoNS bacteremia (one set both bottles with CoNS, one set both bottles with gram+ cocci in clusters)  No known hardware  Pt has been afebrile, WBC 22 -> 16 with neutrophilia  She does not have any symptoms but history limited due to pt's baseline dementia  She has a systolic murmur on exam  TTE in 2020 with moderate tricuspid regurgitation, mild mitral/aortic regurgitation  UA no pyuria, UCx contaminated  RVP negative  CXR no pneumonia  Hypoxia has resolved    Repeat BCx ordered    Vancomycin (9/8, 9/10-)  Cefepime (9/8) x1    Recs to follow. 1-year-old female w/ PMH of polycythemia vera on Eliquis, h/o CVA and DVT, HTN, HLD, dementia, spontaneous SBO s/p bowel resection, and diabetes who presented for hypoxia.    Pt likely with high grade Staph capitis (one set both bottles with Staph capitis, one set both bottles with gram+ cocci in clusters)  No known hardware  Pt has been afebrile, WBC 22 -> 16 with neutrophilia  She does not have any symptoms but history limited due to pt's baseline dementia  She has a systolic murmur on exam  TTE in 2020 with moderate tricuspid regurgitation, mild mitral/aortic regurgitation  UA no pyuria, UCx contaminated  RVP negative  CXR no pneumonia  Hypoxia has resolved    Repeat BCx ordered    Vancomycin (9/8, 9/10-)  Cefepime (9/8) x1    #High-grade Staph capitis  - Vancomycin 750 mg daily, check trough prior to 3rd dose, goal 10-15  - F/u repeat BCx  - Obtain TTE to eval for IE  - Duration depending on echo findings and clinical course    Ever Alvarenga, PGY4  ID Fellow  [employer] Preferred  After 5pm/weekends call [phone] 89-year-old female w/ PMH of polycythemia vera on Eliquis, h/o CVA and DVT, HTN, HLD, dementia, spontaneous SBO s/p bowel resection, and diabetes who presented for hypoxia.    Pt likely with high grade Staph capitis (one set both bottles with Staph capitis, one set both bottles with gram+ cocci in clusters)  No known hardware  Pt has been afebrile, WBC 22 -> 16 with neutrophilia  She does not have any symptoms but history limited due to pt's baseline dementia  She has a systolic murmur on exam  TTE in 2020 with moderate tricuspid regurgitation, mild mitral/aortic regurgitation  UA no pyuria, UCx contaminated  RVP negative  CXR no pneumonia  Hypoxia has resolved    Repeat BCx ordered    Vancomycin (9/8, 9/10-)  Cefepime (9/8) x1    #High-grade Staph capitis  - Vancomycin 750 mg daily, check trough prior to 3rd dose, goal 10-15  - F/u repeat BCx  - Obtain TTE to eval for IE  - Duration depending on echo findings and clinical course    Ever Alvarenga, PGY4  ID Fellow  [employer] Preferred  After 5pm/weekends call [phone]

## 2023-09-10 NOTE — CONSULT NOTE ADULT - SUBJECTIVE AND OBJECTIVE BOX
Patient is a 89y old  Female who presents with a chief complaint of Hypoxia (10 Sep 2023 08:14)    HPI:  Ms. Rissa Terry is a 89-year-old female w/ PMH of polycythemia vera on Eliquis, h/o CVA and DVT, HTN, HLD, dementia, spontaneous SBO s/p bowel resection, and diabetes who is presenting with acute hypoxia. Pt's daughter noted hypoxia on home pulse-ox this AM and called EMS. She was subsequently started on 6L NC by EMS and brought to ED. Per daughter, pt has not experienced fever, chills, chest pain, nausea, vomiting. She denies sick contacts. (08 Sep 2023 16:28)       REVIEW OF SYSTEMS  Constitutional: No fevers, chills, weight loss or fatigue   Skin: No rash, no phlebitis	  Eyes: No discharge	  ENMT: No sore throat, oral thrush, ulcers or exudate  Respiratory: No cough, no SOB  Cardiovascular:  No chest pain, palpitations or edema   Gastrointestinal: No pain, nausea, vomiting, diarrhea or constipation	  Genitourinary: No dysuria, discharge or flank pain  MSK: No arthralgias or back pain   Neurological: No HA, no weakness, no seizures, no AMS       prior hospital charts reviewed [V]  primary team notes reviewed [V]  other consultant notes reviewed [V]    PAST MEDICAL & SURGICAL HISTORY:  Benign Hypertension      Diabetes      Hypercholesteremia      Adult Hypothyroidism      Deep Vein Thrombosis (DVT)      Stroke      SBO (Small Bowel Obstruction)  08/2019 2019 novel coronavirus disease (COVID-19)      COVID-19 vaccine series completed      FH: Total Abdominal Hysterectomy and Bilateral Salpingo-Oophorectomy      S/P small bowel resection  08/2019          SOCIAL HISTORY:  - Denied smoking/vaping/alcohol/recreational drug use    FAMILY HISTORY:  Family history of diabetes mellitus (DM) (Child)    Family history of secondary lung cancer    Family history of breast cancer (Child)      Allergies  No Known Drug Allergies  spices (Rash)        ANTIMICROBIALS:  vancomycin  IVPB 750 every 24 hours      ANTIMICROBIALS (past 90 days):  MEDICATIONS  (STANDING):  cefepime   IVPB   100 mL/Hr IV Intermittent (09-08-23 @ 10:14)    vancomycin  IVPB.   250 mL/Hr IV Intermittent (09-08-23 @ 11:08)      OTHER MEDS:   MEDICATIONS  (STANDING):  amLODIPine   Tablet 5 daily  apixaban 5 two times a day  aspirin  chewable 81 daily  atorvastatin 40 at bedtime  dextrose 50% Injectable 25 once  dextrose 50% Injectable 12.5 once  dextrose 50% Injectable 25 once  dextrose Oral Gel 15 once PRN  glucagon  Injectable 1 once  hydroxyurea 500 <User Schedule>  insulin lispro (ADMELOG) corrective regimen sliding scale  three times a day before meals  insulin lispro (ADMELOG) corrective regimen sliding scale  at bedtime  levothyroxine 88 daily  metoprolol tartrate 25 two times a day      VITALS:  Vital Signs Last 24 Hrs  T(F): 98.2 (09-10-23 @ 05:37), Max: 98.3 (09-08-23 @ 09:05)    Vital Signs Last 24 Hrs  HR: 96 (09-10-23 @ 05:37) (79 - 96)  BP: 146/88 (09-10-23 @ 05:37) (81/67 - 158/89)  RR: 18 (09-10-23 @ 05:37)  SpO2: 99% (09-10-23 @ 05:37) (97% - 99%)  Wt(kg): --    EXAM:  General: Patient in no acute distress   HEENT: NCAT, EOMI, PERRL, no oral lesions  CV: S1+S2, no m/r/g appreciated   Lungs: No respiratory distress, CTAB  Abd: Soft, nontender, no guarding, no rebound tenderness, + bowel sounds   Ext: No cyanosis, no edema  Neuro: Alert and oriented, no focal deficits, CN II-XII grossly intact   Skin: No rash   IV: No phlebitis      Labs:    WBC Trend:  WBC Count: 22.21 (09-08-23 @ 09:50)      Auto Neutrophil #: 17.91 K/uL (09-08-23 @ 09:50)  Auto Neutrophil #: 15.74 K/uL (03-16-23 @ 14:57)  Auto Neutrophil #: 19.21 K/uL (03-15-23 @ 10:28)  Auto Neutrophil #: 17.19 K/uL (03-13-23 @ 12:04)      Creatine Trend:  Creatinine: 1.03 (09-08)      Liver Biochemical Testing Trend:  Alanine Aminotransferase (ALT/SGPT): 19 (09-08)  Alanine Aminotransferase (ALT/SGPT): 81 *H* (03-15)  Alanine Aminotransferase (ALT/SGPT): 32 (03-14)  Alanine Aminotransferase (ALT/SGPT): 31 (03-13)  Aspartate Aminotransferase (AST/SGOT): 41 (09-08-23 @ 09:50)  Aspartate Aminotransferase (AST/SGOT): 61 (03-15-23 @ 10:28)  Aspartate Aminotransferase (AST/SGOT): 22 (03-14-23 @ 12:12)  Aspartate Aminotransferase (AST/SGOT): 27 (03-13-23 @ 12:04)  Bilirubin Total: 0.2 (09-08)  Bilirubin Total, Serum: 0.2 (03-15)  Bilirubin Total, Serum: 0.3 (03-14)  Bilirubin Total, Serum: 0.2 (03-13)      Trend LDH  03-15-22 @ 11:22  234  03-15-22 @ 09:44  350<H>  12-26-20 @ 07:05  249<H>  12-25-20 @ 12:44  270<H>  12-21-20 @ 11:06  328<H>      Auto Eosinophil %: 2.0 % (09-08-23 @ 09:50)      MICROBIOLOGY:    MRSA PCR Result.: NotDetec (03-14-23 @ 18:45)      Culture - Urine (collected 08 Sep 2023 10:51)  Source: Clean Catch Clean Catch (Midstream)  Final Report:    Culture grew 3 or more types of organisms which indicate    collection contamination; consider recollection only if clinically    indicated.    Culture - Blood (collected 08 Sep 2023 09:30)  Source: .Blood Blood-Peripheral  Preliminary Report:    Growth in aerobic bottle: Gram Positive Cocci in Clusters    Culture - Blood (collected 08 Sep 2023 09:15)  Source: .Blood Blood-Peripheral  Preliminary Report:    Growth in aerobic bottle: Gram Positive Cocci in Clusters    Growth in anaerobic bottle: Gram Positive Cocci in Clusters    Direct identification is available within approximately 3-5    hours either by Blood Panel Multiplexed PCR or Direct    MALDI-TOF. Details: [email or web address]  Organism: Blood Culture PCR  Organism: Blood Culture PCR    Sensitivities:      Method Type: PCR      -  Coagulase negative Staphylococcus: Detec    Culture - Blood (collected 13 Mar 2022 23:02)  Source: .Blood Blood-Peripheral  Final Report:    No Growth Final    Culture - Blood (collected 13 Mar 2022 23:02)  Source: .Blood Blood-Peripheral  Final Report:    No Growth Final    Culture - Urine (collected 13 Mar 2022 22:57)  Source: Clean Catch Clean Catch (Midstream)  Final Report:    No growth      Rapid RVP Result: NotDetec (09-08 @ 09:50)    Troponin T, High Sensitivity Result: 19 (09-08)    Blood Gas Venous - Lactate: 2.0 (09-08 @ 12:12)    A1C with Estimated Average Glucose Result: 6.4 % (03-14-23 @ 12:13)      RADIOLOGY:  imaging below personally reviewed   Patient is a 89y old  Female who presents with a chief complaint of Hypoxia (10 Sep 2023 08:14)    HPI:  Ms. Rissa Terry is a 89-year-old female w/ PMH of polycythemia vera on Eliquis, h/o CVA and DVT, HTN, HLD, dementia, spontaneous SBO s/p bowel resection, and diabetes who is presenting with acute hypoxia. Pt's daughter noted hypoxia on home pulse-ox this AM and called EMS. She was subsequently started on 6L NC by EMS and brought to ED. Per daughter, pt has not experienced fever, chills, chest pain, nausea, vomiting. She denies sick contacts. (08 Sep 2023 16:28)       REVIEW OF SYSTEMS  Constitutional: No fevers, chills, weight loss or fatigue   Skin: No rash, no phlebitis	  Eyes: No discharge	  ENMT: No sore throat, oral thrush, ulcers or exudate  Respiratory: No cough, no SOB  Cardiovascular:  No chest pain, palpitations or edema   Gastrointestinal: No pain, nausea, vomiting, diarrhea or constipation	  Genitourinary: No dysuria, discharge or flank pain  MSK: No arthralgias or back pain   Neurological: No HA, no weakness, no seizures, no AMS       prior hospital charts reviewed [V]  primary team notes reviewed [V]  other consultant notes reviewed [V]    PAST MEDICAL & SURGICAL HISTORY:  Benign Hypertension      Diabetes      Hypercholesteremia      Adult Hypothyroidism      Deep Vein Thrombosis (DVT)      Stroke      SBO (Small Bowel Obstruction)  08/2019 2019 novel coronavirus disease (COVID-19)      COVID-19 vaccine series completed      FH: Total Abdominal Hysterectomy and Bilateral Salpingo-Oophorectomy      S/P small bowel resection  08/2019          SOCIAL HISTORY:  - Denied smoking/vaping/alcohol/recreational drug use    FAMILY HISTORY:  Family history of diabetes mellitus (DM) (Child)    Family history of secondary lung cancer    Family history of breast cancer (Child)      Allergies  No Known Drug Allergies  spices (Rash)        ANTIMICROBIALS:  vancomycin  IVPB 750 every 24 hours      ANTIMICROBIALS (past 90 days):  MEDICATIONS  (STANDING):  cefepime   IVPB   100 mL/Hr IV Intermittent (09-08-23 @ 10:14)    vancomycin  IVPB.   250 mL/Hr IV Intermittent (09-08-23 @ 11:08)      OTHER MEDS:   MEDICATIONS  (STANDING):  amLODIPine   Tablet 5 daily  apixaban 5 two times a day  aspirin  chewable 81 daily  atorvastatin 40 at bedtime  dextrose 50% Injectable 25 once  dextrose 50% Injectable 12.5 once  dextrose 50% Injectable 25 once  dextrose Oral Gel 15 once PRN  glucagon  Injectable 1 once  hydroxyurea 500 <User Schedule>  insulin lispro (ADMELOG) corrective regimen sliding scale  three times a day before meals  insulin lispro (ADMELOG) corrective regimen sliding scale  at bedtime  levothyroxine 88 daily  metoprolol tartrate 25 two times a day      VITALS:  Vital Signs Last 24 Hrs  T(F): 98.2 (09-10-23 @ 05:37), Max: 98.3 (09-08-23 @ 09:05)    Vital Signs Last 24 Hrs  HR: 96 (09-10-23 @ 05:37) (79 - 96)  BP: 146/88 (09-10-23 @ 05:37) (81/67 - 158/89)  RR: 18 (09-10-23 @ 05:37)  SpO2: 99% (09-10-23 @ 05:37) (97% - 99%)  Wt(kg): --    EXAM:  General: Patient in no acute distress   HEENT: NCAT, EOMI, PERRL, no oral lesions  CV: S1+S2, +systolic murmur  Lungs: No respiratory distress, CTAB  Abd: Soft, diffuse mild tenderness  Ext: No cyanosis, no edema  Neuro: Alert and oriented, no focal deficits, CN II-XII grossly intact   Skin: No rash   IV: No phlebitis      Labs:    WBC Trend:  WBC Count: 22.21 (09-08-23 @ 09:50)      Auto Neutrophil #: 17.91 K/uL (09-08-23 @ 09:50)  Auto Neutrophil #: 15.74 K/uL (03-16-23 @ 14:57)  Auto Neutrophil #: 19.21 K/uL (03-15-23 @ 10:28)  Auto Neutrophil #: 17.19 K/uL (03-13-23 @ 12:04)      Creatine Trend:  Creatinine: 1.03 (09-08)      Liver Biochemical Testing Trend:  Alanine Aminotransferase (ALT/SGPT): 19 (09-08)  Alanine Aminotransferase (ALT/SGPT): 81 *H* (03-15)  Alanine Aminotransferase (ALT/SGPT): 32 (03-14)  Alanine Aminotransferase (ALT/SGPT): 31 (03-13)  Aspartate Aminotransferase (AST/SGOT): 41 (09-08-23 @ 09:50)  Aspartate Aminotransferase (AST/SGOT): 61 (03-15-23 @ 10:28)  Aspartate Aminotransferase (AST/SGOT): 22 (03-14-23 @ 12:12)  Aspartate Aminotransferase (AST/SGOT): 27 (03-13-23 @ 12:04)  Bilirubin Total: 0.2 (09-08)  Bilirubin Total, Serum: 0.2 (03-15)  Bilirubin Total, Serum: 0.3 (03-14)  Bilirubin Total, Serum: 0.2 (03-13)      Trend LDH  03-15-22 @ 11:22  234  03-15-22 @ 09:44  350<H>  12-26-20 @ 07:05  249<H>  12-25-20 @ 12:44  270<H>  12-21-20 @ 11:06  328<H>      Auto Eosinophil %: 2.0 % (09-08-23 @ 09:50)      MICROBIOLOGY:    MRSA PCR Result.: NotDetec (03-14-23 @ 18:45)      Culture - Urine (collected 08 Sep 2023 10:51)  Source: Clean Catch Clean Catch (Midstream)  Final Report:    Culture grew 3 or more types of organisms which indicate    collection contamination; consider recollection only if clinically    indicated.    Culture - Blood (collected 08 Sep 2023 09:30)  Source: .Blood Blood-Peripheral  Preliminary Report:    Growth in aerobic bottle: Gram Positive Cocci in Clusters    Culture - Blood (collected 08 Sep 2023 09:15)  Source: .Blood Blood-Peripheral  Preliminary Report:    Growth in aerobic bottle: Gram Positive Cocci in Clusters    Growth in anaerobic bottle: Gram Positive Cocci in Clusters    Direct identification is available within approximately 3-5    hours either by Blood Panel Multiplexed PCR or Direct    MALDI-TOF. Details: [email or web address]  Organism: Blood Culture PCR  Organism: Blood Culture PCR    Sensitivities:      Method Type: PCR      -  Coagulase negative Staphylococcus: Detec    Culture - Blood (collected 13 Mar 2022 23:02)  Source: .Blood Blood-Peripheral  Final Report:    No Growth Final    Culture - Blood (collected 13 Mar 2022 23:02)  Source: .Blood Blood-Peripheral  Final Report:    No Growth Final    Culture - Urine (collected 13 Mar 2022 22:57)  Source: Clean Catch Clean Catch (Midstream)  Final Report:    No growth      Rapid RVP Result: NotDetec (09-08 @ 09:50)    Troponin T, High Sensitivity Result: 19 (09-08)    Blood Gas Venous - Lactate: 2.0 (09-08 @ 12:12)    A1C with Estimated Average Glucose Result: 6.4 % (03-14-23 @ 12:13)      RADIOLOGY:  imaging below personally reviewed    < from: Xray Chest 1 View- PORTABLE-Urgent (09.08.23 @ 11:17) >  IMPRESSION:    No focal airspace opacity.    < end of copied text >   Patient is a 89y old  Female who presents with a chief complaint of Hypoxia (10 Sep 2023 08:14)    HPI:  Ms. Rissa Terry is a 89-year-old female w/ PMH of polycythemia vera on Eliquis, h/o CVA and DVT, HTN, HLD, dementia, spontaneous SBO s/p bowel resection, and diabetes who is presenting with acute hypoxia. Pt's daughter noted hypoxia on home pulse-ox this AM and called EMS. She was subsequently started on 6L NC by EMS and brought to ED. Per daughter, pt has not experienced fever, chills, chest pain, nausea, vomiting. She denies sick contacts. (08 Sep 2023 16:28)       REVIEW OF SYSTEMS  Constitutional: No fevers, chills, weight loss or fatigue   Skin: No rash, no phlebitis	  Eyes: No discharge	  ENMT: No sore throat, oral thrush, ulcers or exudate  Respiratory: No cough, no SOB  Cardiovascular:  No chest pain, palpitations or edema   Gastrointestinal: No pain, nausea, vomiting, diarrhea or constipation	  Genitourinary: No dysuria, discharge or flank pain  MSK: No arthralgias or back pain   Neurological: No HA, no weakness, no seizures, no AMS       prior hospital charts reviewed [V]  primary team notes reviewed [V]  other consultant notes reviewed [V]    PAST MEDICAL & SURGICAL HISTORY:  Benign Hypertension      Diabetes      Hypercholesteremia      Adult Hypothyroidism      Deep Vein Thrombosis (DVT)      Stroke      SBO (Small Bowel Obstruction)  08/2019 2019 novel coronavirus disease (COVID-19)      COVID-19 vaccine series completed      FH: Total Abdominal Hysterectomy and Bilateral Salpingo-Oophorectomy      S/P small bowel resection  08/2019          SOCIAL HISTORY:  - Pt cannot accurately answer the questions    FAMILY HISTORY:  Family history of diabetes mellitus (DM) (Child)    Family history of secondary lung cancer    Family history of breast cancer (Child)      Allergies  No Known Drug Allergies  spices (Rash)        ANTIMICROBIALS:  vancomycin  IVPB 750 every 24 hours      ANTIMICROBIALS (past 90 days):  MEDICATIONS  (STANDING):  cefepime   IVPB   100 mL/Hr IV Intermittent (09-08-23 @ 10:14)    vancomycin  IVPB.   250 mL/Hr IV Intermittent (09-08-23 @ 11:08)      OTHER MEDS:   MEDICATIONS  (STANDING):  amLODIPine   Tablet 5 daily  apixaban 5 two times a day  aspirin  chewable 81 daily  atorvastatin 40 at bedtime  dextrose 50% Injectable 25 once  dextrose 50% Injectable 12.5 once  dextrose 50% Injectable 25 once  dextrose Oral Gel 15 once PRN  glucagon  Injectable 1 once  hydroxyurea 500 <User Schedule>  insulin lispro (ADMELOG) corrective regimen sliding scale  three times a day before meals  insulin lispro (ADMELOG) corrective regimen sliding scale  at bedtime  levothyroxine 88 daily  metoprolol tartrate 25 two times a day      VITALS:  Vital Signs Last 24 Hrs  T(F): 98.2 (09-10-23 @ 05:37), Max: 98.3 (09-08-23 @ 09:05)    Vital Signs Last 24 Hrs  HR: 96 (09-10-23 @ 05:37) (79 - 96)  BP: 146/88 (09-10-23 @ 05:37) (81/67 - 158/89)  RR: 18 (09-10-23 @ 05:37)  SpO2: 99% (09-10-23 @ 05:37) (97% - 99%)  Wt(kg): --    EXAM:  General: Patient in no acute distress   HEENT: NCAT, EOMI, PERRL, no oral lesions  CV: S1+S2, +systolic murmur  Lungs: No respiratory distress, CTAB  Abd: Soft, diffuse mild tenderness  Ext: No cyanosis, no edema  Neuro: Alert and oriented, no focal deficits  Skin: No rash   IV: No phlebitis      Labs:    WBC Trend:  WBC Count: 22.21 (09-08-23 @ 09:50)      Auto Neutrophil #: 17.91 K/uL (09-08-23 @ 09:50)  Auto Neutrophil #: 15.74 K/uL (03-16-23 @ 14:57)  Auto Neutrophil #: 19.21 K/uL (03-15-23 @ 10:28)  Auto Neutrophil #: 17.19 K/uL (03-13-23 @ 12:04)      Creatine Trend:  Creatinine: 1.03 (09-08)      Liver Biochemical Testing Trend:  Alanine Aminotransferase (ALT/SGPT): 19 (09-08)  Alanine Aminotransferase (ALT/SGPT): 81 *H* (03-15)  Alanine Aminotransferase (ALT/SGPT): 32 (03-14)  Alanine Aminotransferase (ALT/SGPT): 31 (03-13)  Aspartate Aminotransferase (AST/SGOT): 41 (09-08-23 @ 09:50)  Aspartate Aminotransferase (AST/SGOT): 61 (03-15-23 @ 10:28)  Aspartate Aminotransferase (AST/SGOT): 22 (03-14-23 @ 12:12)  Aspartate Aminotransferase (AST/SGOT): 27 (03-13-23 @ 12:04)  Bilirubin Total: 0.2 (09-08)  Bilirubin Total, Serum: 0.2 (03-15)  Bilirubin Total, Serum: 0.3 (03-14)  Bilirubin Total, Serum: 0.2 (03-13)      Trend LDH  03-15-22 @ 11:22  234  03-15-22 @ 09:44  350<H>  12-26-20 @ 07:05  249<H>  12-25-20 @ 12:44  270<H>  12-21-20 @ 11:06  328<H>      Auto Eosinophil %: 2.0 % (09-08-23 @ 09:50)      MICROBIOLOGY:    MRSA PCR Result.: NotDetec (03-14-23 @ 18:45)      Culture - Urine (collected 08 Sep 2023 10:51)  Source: Clean Catch Clean Catch (Midstream)  Final Report:    Culture grew 3 or more types of organisms which indicate    collection contamination; consider recollection only if clinically    indicated.    Culture - Blood (collected 08 Sep 2023 09:30)  Source: .Blood Blood-Peripheral  Preliminary Report:    Growth in aerobic bottle: Gram Positive Cocci in Clusters    Culture - Blood (collected 08 Sep 2023 09:15)  Source: .Blood Blood-Peripheral  Preliminary Report:    Growth in aerobic bottle: Gram Positive Cocci in Clusters    Growth in anaerobic bottle: Gram Positive Cocci in Clusters    Direct identification is available within approximately 3-5    hours either by Blood Panel Multiplexed PCR or Direct    MALDI-TOF. Details: [email or web address]  Organism: Blood Culture PCR  Organism: Blood Culture PCR    Sensitivities:      Method Type: PCR      -  Coagulase negative Staphylococcus: Detec    Culture - Blood (collected 13 Mar 2022 23:02)  Source: .Blood Blood-Peripheral  Final Report:    No Growth Final    Culture - Blood (collected 13 Mar 2022 23:02)  Source: .Blood Blood-Peripheral  Final Report:    No Growth Final    Culture - Urine (collected 13 Mar 2022 22:57)  Source: Clean Catch Clean Catch (Midstream)  Final Report:    No growth      Rapid RVP Result: NotDetec (09-08 @ 09:50)    Troponin T, High Sensitivity Result: 19 (09-08)    Blood Gas Venous - Lactate: 2.0 (09-08 @ 12:12)    A1C with Estimated Average Glucose Result: 6.4 % (03-14-23 @ 12:13)      RADIOLOGY:  imaging below personally reviewed    < from: Xray Chest 1 View- PORTABLE-Urgent (09.08.23 @ 11:17) >  IMPRESSION:    No focal airspace opacity.    < end of copied text >   Patient is a 89y old  Female who presents with a chief complaint of Hypoxia (10 Sep 2023 08:14)    HPI:  Ms. Rissa Terry is a 89-year-old female w/ PMH of polycythemia vera on Eliquis, h/o CVA and DVT, HTN, HLD, dementia, spontaneous SBO s/p bowel resection, and diabetes who is presenting with acute hypoxia. Pt's daughter noted hypoxia on home pulse-ox this AM and called EMS. She was subsequently started on 6L NC by EMS and brought to ED. Per daughter, pt has not experienced fever, chills, chest pain, nausea, vomiting. She denies sick contacts. (08 Sep 2023 16:28)       REVIEW OF SYSTEMS  Constitutional: No fevers, chills, weight loss or fatigue   Skin: No rash, no phlebitis	  Eyes: No discharge	  ENMT: No sore throat, oral thrush, ulcers or exudate  Respiratory: No cough, no SOB  Cardiovascular:  No chest pain, palpitations or edema   Gastrointestinal: No pain, nausea, vomiting, diarrhea or constipation	  Genitourinary: No dysuria, discharge or flank pain  MSK: No arthralgias or back pain   Neurological: No HA, no weakness, no seizures, no AMS       prior hospital charts reviewed [V]  primary team notes reviewed [V]  other consultant notes reviewed [V]    PAST MEDICAL & SURGICAL HISTORY:  Benign Hypertension      Diabetes      Hypercholesteremia      Adult Hypothyroidism      Deep Vein Thrombosis (DVT)      Stroke      SBO (Small Bowel Obstruction)  08/2019 2019 novel coronavirus disease (COVID-19)      COVID-19 vaccine series completed      FH: Total Abdominal Hysterectomy and Bilateral Salpingo-Oophorectomy      S/P small bowel resection  08/2019          SOCIAL HISTORY:  No known history of tobacco or illicit drugs    FAMILY HISTORY:  Family history of diabetes mellitus (DM) (Child)    Family history of secondary lung cancer    Family history of breast cancer (Child)      Allergies  No Known Drug Allergies  spices (Rash)        ANTIMICROBIALS:  vancomycin  IVPB 750 every 24 hours      ANTIMICROBIALS (past 90 days):  MEDICATIONS  (STANDING):  cefepime   IVPB   100 mL/Hr IV Intermittent (09-08-23 @ 10:14)    vancomycin  IVPB.   250 mL/Hr IV Intermittent (09-08-23 @ 11:08)      OTHER MEDS:   MEDICATIONS  (STANDING):  amLODIPine   Tablet 5 daily  apixaban 5 two times a day  aspirin  chewable 81 daily  atorvastatin 40 at bedtime  dextrose 50% Injectable 25 once  dextrose 50% Injectable 12.5 once  dextrose 50% Injectable 25 once  dextrose Oral Gel 15 once PRN  glucagon  Injectable 1 once  hydroxyurea 500 <User Schedule>  insulin lispro (ADMELOG) corrective regimen sliding scale  three times a day before meals  insulin lispro (ADMELOG) corrective regimen sliding scale  at bedtime  levothyroxine 88 daily  metoprolol tartrate 25 two times a day      VITALS:  Vital Signs Last 24 Hrs  T(F): 98.2 (09-10-23 @ 05:37), Max: 98.3 (09-08-23 @ 09:05)    Vital Signs Last 24 Hrs  HR: 96 (09-10-23 @ 05:37) (79 - 96)  BP: 146/88 (09-10-23 @ 05:37) (81/67 - 158/89)  RR: 18 (09-10-23 @ 05:37)  SpO2: 99% (09-10-23 @ 05:37) (97% - 99%)  Wt(kg): --    EXAM:  General: Patient in no acute distress   HEENT: NCAT, EOMI, PERRL, no oral lesions  CV: S1+S2, +systolic murmur  Lungs: No respiratory distress, CTAB  Abd: Soft, diffuse mild tenderness  Ext: No cyanosis, no edema  Neuro: Alert and oriented, no focal deficits  Skin: No rash   IV: No phlebitis      Labs:    WBC Trend:  WBC Count: 22.21 (09-08-23 @ 09:50)      Auto Neutrophil #: 17.91 K/uL (09-08-23 @ 09:50)  Auto Neutrophil #: 15.74 K/uL (03-16-23 @ 14:57)  Auto Neutrophil #: 19.21 K/uL (03-15-23 @ 10:28)  Auto Neutrophil #: 17.19 K/uL (03-13-23 @ 12:04)      Creatine Trend:  Creatinine: 1.03 (09-08)      Liver Biochemical Testing Trend:  Alanine Aminotransferase (ALT/SGPT): 19 (09-08)  Alanine Aminotransferase (ALT/SGPT): 81 *H* (03-15)  Alanine Aminotransferase (ALT/SGPT): 32 (03-14)  Alanine Aminotransferase (ALT/SGPT): 31 (03-13)  Aspartate Aminotransferase (AST/SGOT): 41 (09-08-23 @ 09:50)  Aspartate Aminotransferase (AST/SGOT): 61 (03-15-23 @ 10:28)  Aspartate Aminotransferase (AST/SGOT): 22 (03-14-23 @ 12:12)  Aspartate Aminotransferase (AST/SGOT): 27 (03-13-23 @ 12:04)  Bilirubin Total: 0.2 (09-08)  Bilirubin Total, Serum: 0.2 (03-15)  Bilirubin Total, Serum: 0.3 (03-14)  Bilirubin Total, Serum: 0.2 (03-13)      Trend LDH  03-15-22 @ 11:22  234  03-15-22 @ 09:44  350<H>  12-26-20 @ 07:05  249<H>  12-25-20 @ 12:44  270<H>  12-21-20 @ 11:06  328<H>      Auto Eosinophil %: 2.0 % (09-08-23 @ 09:50)      MICROBIOLOGY:    MRSA PCR Result.: NotDetec (03-14-23 @ 18:45)      Culture - Urine (collected 08 Sep 2023 10:51)  Source: Clean Catch Clean Catch (Midstream)  Final Report:    Culture grew 3 or more types of organisms which indicate    collection contamination; consider recollection only if clinically    indicated.    Culture - Blood (collected 08 Sep 2023 09:30)  Source: .Blood Blood-Peripheral  Preliminary Report:    Growth in aerobic bottle: Gram Positive Cocci in Clusters    Culture - Blood (collected 08 Sep 2023 09:15)  Source: .Blood Blood-Peripheral  Preliminary Report:    Growth in aerobic bottle: Gram Positive Cocci in Clusters    Growth in anaerobic bottle: Gram Positive Cocci in Clusters    Direct identification is available within approximately 3-5    hours either by Blood Panel Multiplexed PCR or Direct    MALDI-TOF. Details: [email or web address]  Organism: Blood Culture PCR  Organism: Blood Culture PCR    Sensitivities:      Method Type: PCR      -  Coagulase negative Staphylococcus: Detec    Culture - Blood (collected 13 Mar 2022 23:02)  Source: .Blood Blood-Peripheral  Final Report:    No Growth Final    Culture - Blood (collected 13 Mar 2022 23:02)  Source: .Blood Blood-Peripheral  Final Report:    No Growth Final    Culture - Urine (collected 13 Mar 2022 22:57)  Source: Clean Catch Clean Catch (Midstream)  Final Report:    No growth      Rapid RVP Result: NotDetec (09-08 @ 09:50)    Troponin T, High Sensitivity Result: 19 (09-08)    Blood Gas Venous - Lactate: 2.0 (09-08 @ 12:12)    A1C with Estimated Average Glucose Result: 6.4 % (03-14-23 @ 12:13)      RADIOLOGY:  imaging below personally reviewed    < from: Xray Chest 1 View- PORTABLE-Urgent (09.08.23 @ 11:17) >  IMPRESSION:    No focal airspace opacity.    < end of copied text >

## 2023-09-11 LAB
ALBUMIN SERPL ELPH-MCNC: 3.4 G/DL — SIGNIFICANT CHANGE UP (ref 3.3–5)
ALP SERPL-CCNC: 97 U/L — SIGNIFICANT CHANGE UP (ref 40–120)
ALT FLD-CCNC: 13 U/L — SIGNIFICANT CHANGE UP (ref 10–45)
ANION GAP SERPL CALC-SCNC: 10 MMOL/L — SIGNIFICANT CHANGE UP (ref 5–17)
AST SERPL-CCNC: 15 U/L — SIGNIFICANT CHANGE UP (ref 10–40)
BASOPHILS # BLD AUTO: 0.26 K/UL — HIGH (ref 0–0.2)
BASOPHILS NFR BLD AUTO: 1.7 % — SIGNIFICANT CHANGE UP (ref 0–2)
BILIRUB SERPL-MCNC: 0.2 MG/DL — SIGNIFICANT CHANGE UP (ref 0.2–1.2)
BUN SERPL-MCNC: 17 MG/DL — SIGNIFICANT CHANGE UP (ref 7–23)
CALCIUM SERPL-MCNC: 9.3 MG/DL — SIGNIFICANT CHANGE UP (ref 8.4–10.5)
CHLORIDE SERPL-SCNC: 104 MMOL/L — SIGNIFICANT CHANGE UP (ref 96–108)
CO2 SERPL-SCNC: 25 MMOL/L — SIGNIFICANT CHANGE UP (ref 22–31)
CREAT SERPL-MCNC: 0.81 MG/DL — SIGNIFICANT CHANGE UP (ref 0.5–1.3)
EGFR: 69 ML/MIN/1.73M2 — SIGNIFICANT CHANGE UP
EOSINOPHIL # BLD AUTO: 0.58 K/UL — HIGH (ref 0–0.5)
EOSINOPHIL NFR BLD AUTO: 3.9 % — SIGNIFICANT CHANGE UP (ref 0–6)
GLUCOSE BLDC GLUCOMTR-MCNC: 110 MG/DL — HIGH (ref 70–99)
GLUCOSE BLDC GLUCOMTR-MCNC: 113 MG/DL — HIGH (ref 70–99)
GLUCOSE BLDC GLUCOMTR-MCNC: 165 MG/DL — HIGH (ref 70–99)
GLUCOSE BLDC GLUCOMTR-MCNC: 221 MG/DL — HIGH (ref 70–99)
GLUCOSE BLDC GLUCOMTR-MCNC: 223 MG/DL — HIGH (ref 70–99)
GLUCOSE BLDC GLUCOMTR-MCNC: 239 MG/DL — HIGH (ref 70–99)
GLUCOSE SERPL-MCNC: 114 MG/DL — HIGH (ref 70–99)
HCT VFR BLD CALC: 41.1 % — SIGNIFICANT CHANGE UP (ref 34.5–45)
HGB BLD-MCNC: 12.1 G/DL — SIGNIFICANT CHANGE UP (ref 11.5–15.5)
IMM GRANULOCYTES NFR BLD AUTO: 0.9 % — SIGNIFICANT CHANGE UP (ref 0–0.9)
LYMPHOCYTES # BLD AUTO: 1.92 K/UL — SIGNIFICANT CHANGE UP (ref 1–3.3)
LYMPHOCYTES # BLD AUTO: 12.8 % — LOW (ref 13–44)
MAGNESIUM SERPL-MCNC: 2 MG/DL — SIGNIFICANT CHANGE UP (ref 1.6–2.6)
MCHC RBC-ENTMCNC: 24.5 PG — LOW (ref 27–34)
MCHC RBC-ENTMCNC: 29.4 GM/DL — LOW (ref 32–36)
MCV RBC AUTO: 83.2 FL — SIGNIFICANT CHANGE UP (ref 80–100)
MONOCYTES # BLD AUTO: 0.94 K/UL — HIGH (ref 0–0.9)
MONOCYTES NFR BLD AUTO: 6.3 % — SIGNIFICANT CHANGE UP (ref 2–14)
NEUTROPHILS # BLD AUTO: 11.14 K/UL — HIGH (ref 1.8–7.4)
NEUTROPHILS NFR BLD AUTO: 74.4 % — SIGNIFICANT CHANGE UP (ref 43–77)
NRBC # BLD: 0 /100 WBCS — SIGNIFICANT CHANGE UP (ref 0–0)
PHOSPHATE SERPL-MCNC: 2.5 MG/DL — SIGNIFICANT CHANGE UP (ref 2.5–4.5)
PLATELET # BLD AUTO: 967 K/UL — HIGH (ref 150–400)
POTASSIUM SERPL-MCNC: 4.2 MMOL/L — SIGNIFICANT CHANGE UP (ref 3.5–5.3)
POTASSIUM SERPL-SCNC: 4.2 MMOL/L — SIGNIFICANT CHANGE UP (ref 3.5–5.3)
PROT SERPL-MCNC: 6.7 G/DL — SIGNIFICANT CHANGE UP (ref 6–8.3)
RBC # BLD: 4.94 M/UL — SIGNIFICANT CHANGE UP (ref 3.8–5.2)
RBC # FLD: 18.8 % — HIGH (ref 10.3–14.5)
SODIUM SERPL-SCNC: 139 MMOL/L — SIGNIFICANT CHANGE UP (ref 135–145)
WBC # BLD: 14.97 K/UL — HIGH (ref 3.8–10.5)
WBC # FLD AUTO: 14.97 K/UL — HIGH (ref 3.8–10.5)

## 2023-09-11 PROCEDURE — 93306 TTE W/DOPPLER COMPLETE: CPT | Mod: 26

## 2023-09-11 PROCEDURE — 99233 SBSQ HOSP IP/OBS HIGH 50: CPT | Mod: GC

## 2023-09-11 RX ORDER — ASCORBIC ACID 60 MG
500 TABLET,CHEWABLE ORAL DAILY
Refills: 0 | Status: DISCONTINUED | OUTPATIENT
Start: 2023-09-11 | End: 2023-09-15

## 2023-09-11 RX ADMIN — Medication 25 MILLIGRAM(S): at 18:14

## 2023-09-11 RX ADMIN — HYDROXYUREA 500 MILLIGRAM(S): 500 CAPSULE ORAL at 04:57

## 2023-09-11 RX ADMIN — Medication 25 MILLIGRAM(S): at 04:58

## 2023-09-11 RX ADMIN — Medication 2: at 13:54

## 2023-09-11 RX ADMIN — ATORVASTATIN CALCIUM 40 MILLIGRAM(S): 80 TABLET, FILM COATED ORAL at 21:08

## 2023-09-11 RX ADMIN — Medication 250 MILLIGRAM(S): at 20:30

## 2023-09-11 RX ADMIN — CHLORHEXIDINE GLUCONATE 1 APPLICATION(S): 213 SOLUTION TOPICAL at 11:30

## 2023-09-11 RX ADMIN — APIXABAN 5 MILLIGRAM(S): 2.5 TABLET, FILM COATED ORAL at 04:58

## 2023-09-11 RX ADMIN — AMLODIPINE BESYLATE 5 MILLIGRAM(S): 2.5 TABLET ORAL at 04:58

## 2023-09-11 RX ADMIN — Medication 88 MICROGRAM(S): at 04:58

## 2023-09-11 RX ADMIN — Medication 1: at 18:13

## 2023-09-11 RX ADMIN — Medication 81 MILLIGRAM(S): at 11:25

## 2023-09-11 RX ADMIN — APIXABAN 5 MILLIGRAM(S): 2.5 TABLET, FILM COATED ORAL at 18:13

## 2023-09-11 NOTE — ADVANCED PRACTICE NURSE CONSULT - ASSESSMENT
Consult received & events noted to date. The pt was encountered on 5M- Mrs. Terry is alert & with mumbled speaking. She was in bed, with staff at bedside to assist w/breakfast & am care.  Introduced self & role of CWOCN to pt. Pt requires assistance w/turning & repositioning & staff PCA at bedside to assist. Pt is on an alternating air with low air loss support surface. Pt is pleasant & cooperative. External female catheter in place at this time due to urinary incontinence and incontinent of stool. On exam- +mushy stool noted & perineal/incontinent care provided.  Pt w/bony coccyx. .  Her extreme immobility, inactivity, gross incontinence of stool as well as poor nutritional status all contribute to his high risk for pressure injury development and hinder healing.    The following was noted:  1. Sacrococcygeal/bilateral buttocks is a 5cm L x 4cm W x 0cm D area of intact, hyperpigmented skin with a central area of dusky purple intact discoloration at coccyx suggesting that there may be a component of deep tissue injury.    2. R ischium- 2cm L x 1 cm W x 0 cm D area of intact hyperpigmented skin, nonblanchable dusky purple discoloration suggesting there may be a component of deep tissue injury.    Findings suggest that these skin injuries appear to be a deep tissue injury present on admission.  Will recommend to continue to monitor & apply Natonia moisture barrier ointment twice daily & prn soiling episodes to lay down a protective coating to skin as pt is also incontinent of urine & stool.  As pt was admitted with a pressure injury, a dietician consult is pending.   Once consult was complete, education regarding the need for routine turning and positioning to prevent pressure injuries, use of waffle seat cushion when OOB to  and utilizing pillow positioner assistive devices.

## 2023-09-11 NOTE — DIETITIAN INITIAL EVALUATION ADULT - NSFNSGIIOFT_GEN_A_CORE
Per pt's RN, pt is not experiencing any GI distress at this time.   Last documented BM: 09-08 (per flow sheet)  Bowel Regimen: Pt is not currently ordered for a bowel regimen (per orders)

## 2023-09-11 NOTE — PROGRESS NOTE ADULT - PROBLEM SELECTOR PLAN 2
- P/w reported acute hypoxia at home w/ O2 sat 64%; saturation well in the hospital  - Leukocytosis at baseline  - RVP, COVID negative  - CXR unremarkable  - S/p 1x vancomycin, cefepime  - Blood cx -> Gram-positive cocci in clusters, coag negative  - C/w O2 supplementation, wean as tolerated  - CTM

## 2023-09-11 NOTE — PROVIDER CONTACT NOTE (OTHER) - BACKGROUND
patient p/w RIght knee pain, s/p fall. patient p/w shortness of breath and cough, dx with bacteremia

## 2023-09-11 NOTE — ADVANCED PRACTICE NURSE CONSULT - RECOMMEDATIONS
Will recommend:  1. Topical therapy- sacrococcygeal/bilateral buttocks- cleanse w/incontinent cleanser, pat dry & apply Antonia moisture barrier ointment twice daily & prn soiling episodes  2. Incontinent management - incontinent cleanser, pads, pericare BID, continue with external female catheter  3. Maintain on an alternating air with low air loss surface  4. Turn & reposition every 2 hr; Use positioning pillow to turn and reposition, soft pillow between bony prominences; continue measures to decrease friction/shear/pressure  5. Elevate heels; apply Complete Cair air fluidized boot to feet; ensure that the soles of the feet are not resting on the foot board of the bed  6. Nutrition optimization.  7. Apply "Sween 24 once a day moisturizer" daily.  Discussed treatment plan w/pt, staff RN, Shaylee, & PCA at bedside.

## 2023-09-11 NOTE — ADVANCED PRACTICE NURSE CONSULT - REASON FOR CONSULT
Requested by nursing staff to assess skin status of pt admitted with possible deep tissue injury to sacrum.  PMH is noted as obtained by chart review:  Rissa Terry is a 89-year-old female w/ PMH of polycythemia vera on Eliquis, h/o CVA and DVT, HTN, HLD, dementia, spontaneous SBO s/p bowel resection, and diabetes who is presenting with acute hypoxia. Pt's daughter noted hypoxia on home pulse-ox this AM and called EMS. She was subsequently started on 6L NC by EMS and brought to ED. Per daughter, pt has not experienced fever, chills, chest pain, nausea, vomiting. She denies sick contacts. Prior to admission, pt resided at home with her daughter, son in  law and grandson. Pt has Medicaid aides through [facility] [employer] [facility] 10.5hr/7days. Pt is bedbound as per pt's daughter and has a hospital bed, walker and wheelchair.

## 2023-09-11 NOTE — PROGRESS NOTE ADULT - TIME BILLING
left upper arm
Time spent reviewing the chart documentation, reviewing labs and imaging studies, evaluating the patient, discussing the plan of care with the medical team, and documenting.
Time spent reviewing the chart documentation, reviewing labs and imaging studies, evaluating the patient, discussing the plan of care with the consultants & medical team, and documenting.
Time spent reviewing the chart documentation, reviewing labs and imaging studies, evaluating the patient, discussing the plan of care with the consultants & medical team, and documenting.

## 2023-09-11 NOTE — PROVIDER CONTACT NOTE (OTHER) - ASSESSMENT
no MOLST form in chart, pt is DNR/DNI code status
unable to obtain PIV, patient due for vancomycin IVPB. staff RN tried 2x

## 2023-09-11 NOTE — DIETITIAN INITIAL EVALUATION ADULT - ETIOLOGY
related to increased physiological demand for nutrients  related to suspected inability to meet sufficient protein-energy needs in setting of increased needs, advanced age and dementia

## 2023-09-11 NOTE — DIETITIAN INITIAL EVALUATION ADULT - PERSON TAUGHT/METHOD
Encouraged daughter over the phone to continue promoting adequate protein-energy intake. Emphasized the importance of protein for maintenance of pt's lean body mass and for wound healing. Encouraged daughter to promote oral nutrition supplement. Pt's daughter aware RD remains available./verbal instruction/daughter instructed

## 2023-09-11 NOTE — DIETITIAN INITIAL EVALUATION ADULT - OTHER INFO
Weights:  - UBW (per patient's daughter): 112-113 pounds (2023)  - Dosing Weight (per chart): 91 pounds (bed scale) (09-08) (used for BMI)  - Daily Weights (per flow sheets): No daily weights documented (per flow sheets).  - Per [facility] HIE: 110 pounds (09-), 110 pounds (03-), 130.1 pounds (03-), 120 pounds (10-)  - Bed Scale Weight (taken by RD): 93.94 pounds (09-11) (question accuracy)  - Per previous RD note (03-16): 97.4 pounds   Note: Possible 6.5% wt loss x 6 months using current dosing wt (91 pounds) and wt from previous RD note on 03-16 (97.4 pounds), not clinically significant. Pt has experienced ~30% wt loss since 03-, however possibly age related? RD to continue to monitor PO intake and weight trends as able.     Pt with bacteremia (per chart).  - Pt ordered for antibiotics in-house (per orders).     Pt with history of DM (per chart).  - Ordered for Insulin Lispro (ADMELOG) Corrective Regimen Sliding Scale (per orders)  - Pt ordered for IV Dextrose 5% (per orders).    Pt with Polycythemia vera (per chart).  - Pt ordered for chemotherapy- hydroxyurea (per orders).  Weights:  - UBW (per patient's daughter): 112-113 pounds (2023)  - Dosing Weight (per chart): 91 pounds (bed scale) (09-08) (used for BMI)  - Daily Weights (per flow sheets): No daily weights documented (per flow sheets).  - Per [facility] HIE: 110 pounds (09-), 110 pounds (03-), 130.1 pounds (03-), 120 pounds (10-)  - Bed Scale Weight (taken by RD): 93.94 pounds (09-11) (question accuracy)  - Per previous RD note (03-16): 97.4 pounds   Note: Possible 6.5% wt loss x 6 months using current dosing wt (91 pounds) and wt from previous RD note on 03-16 (97.4 pounds), not clinically significant. Pt has experienced ~30% wt loss since 03-, possibly age related vs. poor PO? RD to continue to monitor PO intake and weight trends as able.     Pt with bacteremia (per chart).  - Pt ordered for antibiotics in-house (per orders).     Pt with history of DM (per chart).  - Ordered for Insulin Lispro (ADMELOG) Corrective Regimen Sliding Scale (per orders)  - Pt ordered for IV Dextrose 5% (per orders).    Pt with Polycythemia vera (per chart).  - Pt ordered for chemotherapy- hydroxyurea (per orders).

## 2023-09-11 NOTE — DIETITIAN INITIAL EVALUATION ADULT - PERTINENT MEDS FT
MEDICATIONS  (STANDING):  amLODIPine   Tablet 5 milliGRAM(s) Oral daily  apixaban 5 milliGRAM(s) Oral two times a day  aspirin  chewable 81 milliGRAM(s) Oral daily  atorvastatin 40 milliGRAM(s) Oral at bedtime  chlorhexidine 4% Liquid 1 Application(s) Topical daily  dextrose 5%. 1000 milliLiter(s) (50 mL/Hr) IV Continuous <Continuous>  dextrose 5%. 1000 milliLiter(s) (100 mL/Hr) IV Continuous <Continuous>  dextrose 50% Injectable 25 Gram(s) IV Push once  dextrose 50% Injectable 12.5 Gram(s) IV Push once  dextrose 50% Injectable 25 Gram(s) IV Push once  glucagon  Injectable 1 milliGRAM(s) IntraMuscular once  hydroxyurea 500 milliGRAM(s) Oral <User Schedule>  insulin lispro (ADMELOG) corrective regimen sliding scale   SubCutaneous three times a day before meals  insulin lispro (ADMELOG) corrective regimen sliding scale   SubCutaneous at bedtime  levothyroxine 88 MICROGram(s) Oral daily  metoprolol tartrate 25 milliGRAM(s) Oral two times a day  vancomycin  IVPB 750 milliGRAM(s) IV Intermittent every 24 hours    MEDICATIONS  (PRN):  dextrose Oral Gel 15 Gram(s) Oral once PRN Blood Glucose LESS THAN 70 milliGRAM(s)/deciliter

## 2023-09-11 NOTE — DIETITIAN INITIAL EVALUATION ADULT - OTHER CALCULATIONS
Estimated protein-energy needs calculated using IBW of 110 pounds with consideration for BMI <19, malnutrition, and multiple suspected deep tissue injuries.   Defer fluid calculations to the medical team.

## 2023-09-11 NOTE — DIETITIAN INITIAL EVALUATION ADULT - NSFNSADHERENCEPTAFT_GEN_A_CORE
Prior to admission, pt's daughter states pt followed a regular diet up until ~2 weeks ago, when pt started experiencing swallowing difficulties. Pt's daughter began pureeing foods and thickening water to assist pt with swallowing difficulty. Daughter stated pt had a history of dysphagia back in 2018, however it resolved. Daughter reported pt's UBW being around 112-113 pounds this year, however has noticed the pt looks thinner at recent hospital visit.     Daughter stated the pt has a history of diabetes, and takes Metformin 1x/day at home. Daughter stated she monitor's pt's blood glucose levels before breakfast every morning, and states the average number is around 105-110 mg/dL. No recent A1C documented (per charts). Daughter states she monitors the pt's carbohydrate intake, and adds Glucerna to the pt's smoothies to assist with protein-energy intake while maintaining good glycemic control.

## 2023-09-11 NOTE — DIETITIAN INITIAL EVALUATION ADULT - FACTORS AFF FOOD INTAKE
Per PCA, pt has difficulty feeding herself and requires total assistance, but has a good appetite./difficulty feeding self

## 2023-09-11 NOTE — PROGRESS NOTE ADULT - SUBJECTIVE AND OBJECTIVE BOX
PROGRESS NOTE:     Patient is a 89y old  Female who presents with a chief complaint of Hypoxia (10 Sep 2023 10:52)      SUBJECTIVE / OVERNIGHT EVENTS:    OVERNIGHT: No acute overnight events.      Patient was examined at bedside. She seems comfortable this morning. Unable to obtain ROS due to AMS. Family is amenable to current treatment plan.      REVIEW OF SYSTEMS:    Unable to obtain due to AMS      MEDICATIONS  (STANDING):  amLODIPine   Tablet 5 milliGRAM(s) Oral daily  apixaban 5 milliGRAM(s) Oral two times a day  aspirin  chewable 81 milliGRAM(s) Oral daily  atorvastatin 40 milliGRAM(s) Oral at bedtime  chlorhexidine 4% Liquid 1 Application(s) Topical daily  dextrose 5%. 1000 milliLiter(s) (50 mL/Hr) IV Continuous <Continuous>  dextrose 5%. 1000 milliLiter(s) (100 mL/Hr) IV Continuous <Continuous>  dextrose 50% Injectable 25 Gram(s) IV Push once  dextrose 50% Injectable 12.5 Gram(s) IV Push once  dextrose 50% Injectable 25 Gram(s) IV Push once  glucagon  Injectable 1 milliGRAM(s) IntraMuscular once  hydroxyurea 500 milliGRAM(s) Oral <User Schedule>  insulin lispro (ADMELOG) corrective regimen sliding scale   SubCutaneous three times a day before meals  insulin lispro (ADMELOG) corrective regimen sliding scale   SubCutaneous at bedtime  levothyroxine 88 MICROGram(s) Oral daily  metoprolol tartrate 25 milliGRAM(s) Oral two times a day  vancomycin  IVPB 750 milliGRAM(s) IV Intermittent every 24 hours    MEDICATIONS  (PRN):  dextrose Oral Gel 15 Gram(s) Oral once PRN Blood Glucose LESS THAN 70 milliGRAM(s)/deciliter      CAPILLARY BLOOD GLUCOSE      POCT Blood Glucose.: 239 mg/dL (11 Sep 2023 12:37)  POCT Blood Glucose.: 113 mg/dL (11 Sep 2023 08:49)  POCT Blood Glucose.: 181 mg/dL (10 Sep 2023 21:49)  POCT Blood Glucose.: 135 mg/dL (10 Sep 2023 17:31)    I&O's Summary    10 Sep 2023 07:01  -  11 Sep 2023 07:00  --------------------------------------------------------  IN: 250 mL / OUT: 600 mL / NET: -350 mL        PHYSICAL EXAM:  Vital Signs Last 24 Hrs  T(C): 36.6 (11 Sep 2023 04:55), Max: 36.6 (11 Sep 2023 04:55)  T(F): 97.9 (11 Sep 2023 04:55), Max: 97.9 (11 Sep 2023 04:55)  HR: 90 (11 Sep 2023 04:55) (80 - 90)  BP: 153/114 (11 Sep 2023 04:55) (140/87 - 153/114)  BP(mean): --  RR: 18 (11 Sep 2023 04:55) (18 - 20)  SpO2: 96% (11 Sep 2023 04:55) (93% - 98%)    Parameters below as of 11 Sep 2023 04:55  Patient On (Oxygen Delivery Method): room air        CONSTITUTIONAL: NAD; well-developed  HEENT: PERRL, clear conjunctiva  RESPIRATORY: Normal respiratory effort; lungs are clear to auscultation bilaterally; No Crackles/Rhonchi/Wheezing  CARDIOVASCULAR: Regular rate and rhythm, normal S1 and S2, no murmur/rub/gallop; No lower extremity edema; Peripheral pulses are 2+ bilaterally  ABDOMEN: Nontender to palpation, normoactive bowel sounds, no rebound/guarding; No hepatosplenomegaly  MUSCULOSKELETAL: no clubbing or cyanosis of digits; no joint swelling or tenderness to palpation  EXTREMITY: Lower extremities Non-tender to palpation; non-erythematous B/L  NEURO: A&Ox3; no focal deficits   PSYCH: normal mood; Affect appropriate    LABS:                        12.1   14.97 )-----------( 967      ( 11 Sep 2023 10:21 )             41.1     09-11    139  |  104  |  17  ----------------------------<  114<H>  4.2   |  25  |  0.81    Ca    9.3      11 Sep 2023 10:21  Phos  2.5     09-11  Mg     2.0     09-11    TPro  6.7  /  Alb  3.4  /  TBili  0.2  /  DBili  x   /  AST  15  /  ALT  13  /  AlkPhos  97  09-11          Urinalysis Basic - ( 11 Sep 2023 10:21 )    Color: x / Appearance: x / SG: x / pH: x  Gluc: 114 mg/dL / Ketone: x  / Bili: x / Urobili: x   Blood: x / Protein: x / Nitrite: x   Leuk Esterase: x / RBC: x / WBC x   Sq Epi: x / Non Sq Epi: x / Bacteria: x          RADIOLOGY & ADDITIONAL TESTS:    CXR (9/8)  IMPRESSION:    No focal airspace opacity.      TTE pending

## 2023-09-11 NOTE — DIETITIAN INITIAL EVALUATION ADULT - PROBLEM SELECTOR PLAN 1
- P/w reported acute hypoxia at home w/ O2 sat 64%  - Leukocytosis at baseline  - RVP, COVID negative  - CXR unremarkable  - S/p 1x vancomycin, cefepime  - Blood, sputum cx pending  - C/w O2 supplementation, wean as tolerated  - CTM

## 2023-09-11 NOTE — DIETITIAN INITIAL EVALUATION ADULT - NSFNSNUTRCHEWSWALLOWFT_GEN_A_CORE
Pt's daughter reports pt has been having difficulty chewing and swallowing foods. Daughter has been pureeing pt's foods for ~2 weeks now. Defer diet texture/consistency to Speech Language Pathologist/Medical Team.

## 2023-09-11 NOTE — DIETITIAN INITIAL EVALUATION ADULT - NSFNSPHYEXAMSKINFT_GEN_A_CORE
Pressure Injury 1: Right:, ischium, Suspected deep tissue injury (per flow sheets).    Per Advanced Practice Nurse Consult - Wound Note:   "1. Sacrococcygeal/bilateral buttocks is a 5cm L x 4cm W x 0cm D area of intact, hyperpigmented skin with a central area of dusky purple intact discoloration at coccyx suggesting that there may be a component of deep tissue injury.    2. R ischium- 2cm L x 1 cm W x 0 cm D area of intact hyperpigmented skin, nonblanchable dusky purple discoloration suggesting there may be a component of deep tissue injury."

## 2023-09-11 NOTE — DIETITIAN INITIAL EVALUATION ADULT - NS FNS REASON FOR WEIGHT CHANG
Possible wt loss due to decreased PO intake in setting of advanced age and dementia./decreased po intake 30% wt loss x 18 months and 6% wt loss in 6 months. Possible wt loss due to decreased PO intake in setting of advanced age and dementia./decreased po intake

## 2023-09-11 NOTE — DIETITIAN INITIAL EVALUATION ADULT - REASON FOR ADMISSION
Pneumonia due to infectious organism.  Per chart, "Ms. Rissa Terry is a 89-year-old female w/ PMH of polycythemia vera on Eliquis, h/o CVA and DVT, HTN, HLD, dementia, spontaneous SBO s/p bowel resection, and diabetes who is presenting with SOB and cough over the past several days."

## 2023-09-11 NOTE — PROGRESS NOTE ADULT - PROBLEM SELECTOR PLAN 1
- Pt w/ staph capitis w/o clear source  - Start vancomycin 750mg daily for 7 days, per ID  - Repeat BCx pending  - ID consulted, recommend starting vancomycin and TTE to r/o endocarditis

## 2023-09-11 NOTE — PROGRESS NOTE ADULT - ATTENDING COMMENTS
89F w/ polycythemia vera on Hydroxyurea, h/o CVA and DVT, on Eliquis, HTN, HLD, dementia, spontaneous SBO s/p bowel resection, and diabetes a/w acute hypoxic respiratory failure which has resolved, the patient is comfortable on room air. Her hospital course is now complicated by positive blood culture    # Gram positive bacteremia   - Blood culture x2 growing coag neg staph, less likely to be contaminant given 3/4 bottles were positive. polymicrobial bacteremia, Bld cx growing staph capitus, staph haemolyticus, staph epi --> unclear why patient is growing several staph species. She will been repeat bld cxs until bacteremia is cleared.  - check a tte to r/o endocarditis  - c/w Vanco  - ID f/u appreciated    Acute hypoxic respiratory failure resolved.  - resolved 89F w/ polycythemia vera on Hydroxyurea, h/o CVA and DVT, on Eliquis, HTN, HLD, dementia, spontaneous SBO s/p bowel resection, and diabetes a/w acute hypoxic respiratory failure which has resolved, the patient is comfortable on room air. Her hospital course is now complicated by positive blood culture    # Gram positive bacteremia   - Blood culture x2 growing coag neg staph, less likely to be contaminant given 3/4 bottles were positive. polymicrobial bacteremia, Bld cx growing staph capitus, staph haemolyticus, staph epi --> unclear why patient is growing several staph species. She will been repeat bld cxs until bacteremia is cleared.  - tte -negative for any vegetations  - repeat cultures are NGTD after 24 hrs  - c/w Vanco  - ID f/u appreciated --> ROSIBEL recommended, will need to discuss with family about wishes to pursue more invasive testing. If family does not want invasive testing the patient to be treated with a short course of antibiotics follow by surveillance cultures in the outpatient setting.    Acute hypoxic respiratory failure resolved.  - resolved

## 2023-09-11 NOTE — DIETITIAN INITIAL EVALUATION ADULT - REASON INDICATOR FOR ASSESSMENT
RD consult warranted for Pressure Injury Stage 2 or >  Source: Patient, Registered Nurse, Electronic Medical Record  Chart reviewed, events noted.  RD consult warranted for Pressure Injury Stage 2 or >  Source: Patient's Daughter (Magalie), Registered Nurse, Patient Care Assistant, Electronic Medical Record  Chart reviewed, events noted.

## 2023-09-11 NOTE — DIETITIAN INITIAL EVALUATION ADULT - ADD RECOMMEND
1) Recommend discontinuing DASH restriction in setting of reduced PO intake --> Recommend Low Sodium. Consider Speech Language Pathologist evaluation, as pt is on a textured-modified diet at home in setting of swallowing difficulties. Defer diet texture/consistency to Speech Language Pathologist/Medical Team.   2) Recommend Glucerna 2x/day to optimize protein-energy intake.   3) Recommend adding Multivitamin and Vitamin C daily for micronutrient coverage and wound healing unless contraindicated.   4) Recommend adding Trung BID for wound healing unless contraindicated .   5) Monitor electrolytes, replete as needed. Monitor and trend glucose fingersticks.   6) Provide feeding assistance at mealtimes to optimize PO intake.  7) Monitor nutritional intake, tolerance to diet prescription, weights, labs, and skin integrity.   8) Malnutrition and BMI alert placed in chart.

## 2023-09-11 NOTE — DIETITIAN INITIAL EVALUATION ADULT - NS FNS DIET ORDER
Diet, Regular:   DASH/TLC {Sodium & Cholesterol Restricted} (DASH)  Minced and Moist (MINCEDMOIST) (09-09-23 @ 18:24) [Active]

## 2023-09-11 NOTE — DIETITIAN INITIAL EVALUATION ADULT - PERTINENT LABORATORY DATA
09-11    139  |  104  |  17  ----------------------------<  114<H>  4.2   |  25  |  0.81    Ca    9.3      11 Sep 2023 10:21  Phos  2.5     09-11  Mg     2.0     09-11    TPro  6.7  /  Alb  3.4  /  TBili  0.2  /  DBili  x   /  AST  15  /  ALT  13  /  AlkPhos  97  09-11  POCT Blood Glucose.: 223 mg/dL (09-11-23 @ 13:46)  A1C with Estimated Average Glucose Result: 6.4 % (03-14-23 @ 12:13)

## 2023-09-11 NOTE — DIETITIAN INITIAL EVALUATION ADULT - SIGNS/SYMPTOMS
as evidenced by moderate muscle/fat loss.  as evidenced by multiple suspected deep tissue pressure injuries and acute respiratory failure.

## 2023-09-11 NOTE — DIETITIAN INITIAL EVALUATION ADULT - ORAL INTAKE PTA/DIET HISTORY
Nutrition assessment completed at bedside. Limited information obtained, as pt is non-verbal (per chart).  Nutrition assessment completed at bedside. Limited information obtained from pt, as pt is non-verbal (per chart). RD spoke with pt's daughter Magalie over the phone. Per pt's daughter, pt has a good appetite and PO intake at home. Daughter reported ~ 2 weeks ago, she began pureeing the pt's food due to the pt experiencing swallowing difficulties. Daughter also stated she thickened the pt's water. Confirmed food allergy to spices (coughing and swelling when consumed).

## 2023-09-11 NOTE — DIETITIAN INITIAL EVALUATION ADULT - ENERGY INTAKE
In-house, pt's PCA stated the pt had a good appetite this morning, and ate almost all of her breakfast tray. PCA stated pt has difficulty feeding herself, but eats well when assisted. Daughter is amenable to pt receiving oral nutrition supplement daily to optimize protein-energy intake.  Per PCA, pt had a good appetite this morning, ate almost all of her breakfast. Requires total feeding assistance./Adequate (%)

## 2023-09-12 LAB
ALBUMIN SERPL ELPH-MCNC: 3.3 G/DL — SIGNIFICANT CHANGE UP (ref 3.3–5)
ALP SERPL-CCNC: 97 U/L — SIGNIFICANT CHANGE UP (ref 40–120)
ALT FLD-CCNC: 13 U/L — SIGNIFICANT CHANGE UP (ref 10–45)
ANION GAP SERPL CALC-SCNC: 11 MMOL/L — SIGNIFICANT CHANGE UP (ref 5–17)
AST SERPL-CCNC: 16 U/L — SIGNIFICANT CHANGE UP (ref 10–40)
BASOPHILS # BLD AUTO: 0.27 K/UL — HIGH (ref 0–0.2)
BASOPHILS NFR BLD AUTO: 1.7 % — SIGNIFICANT CHANGE UP (ref 0–2)
BILIRUB SERPL-MCNC: 0.2 MG/DL — SIGNIFICANT CHANGE UP (ref 0.2–1.2)
BUN SERPL-MCNC: 21 MG/DL — SIGNIFICANT CHANGE UP (ref 7–23)
CALCIUM SERPL-MCNC: 9.6 MG/DL — SIGNIFICANT CHANGE UP (ref 8.4–10.5)
CHLORIDE SERPL-SCNC: 105 MMOL/L — SIGNIFICANT CHANGE UP (ref 96–108)
CO2 SERPL-SCNC: 23 MMOL/L — SIGNIFICANT CHANGE UP (ref 22–31)
CREAT SERPL-MCNC: 1.08 MG/DL — SIGNIFICANT CHANGE UP (ref 0.5–1.3)
EGFR: 49 ML/MIN/1.73M2 — LOW
EOSINOPHIL # BLD AUTO: 0.6 K/UL — HIGH (ref 0–0.5)
EOSINOPHIL NFR BLD AUTO: 3.8 % — SIGNIFICANT CHANGE UP (ref 0–6)
GLUCOSE BLDC GLUCOMTR-MCNC: 135 MG/DL — HIGH (ref 70–99)
GLUCOSE BLDC GLUCOMTR-MCNC: 136 MG/DL — HIGH (ref 70–99)
GLUCOSE BLDC GLUCOMTR-MCNC: 143 MG/DL — HIGH (ref 70–99)
GLUCOSE BLDC GLUCOMTR-MCNC: 183 MG/DL — HIGH (ref 70–99)
GLUCOSE SERPL-MCNC: 124 MG/DL — HIGH (ref 70–99)
HCT VFR BLD CALC: 41.4 % — SIGNIFICANT CHANGE UP (ref 34.5–45)
HGB BLD-MCNC: 12.6 G/DL — SIGNIFICANT CHANGE UP (ref 11.5–15.5)
IMM GRANULOCYTES NFR BLD AUTO: 1.2 % — HIGH (ref 0–0.9)
LYMPHOCYTES # BLD AUTO: 13.2 % — SIGNIFICANT CHANGE UP (ref 13–44)
LYMPHOCYTES # BLD AUTO: 2.07 K/UL — SIGNIFICANT CHANGE UP (ref 1–3.3)
MAGNESIUM SERPL-MCNC: 2.1 MG/DL — SIGNIFICANT CHANGE UP (ref 1.6–2.6)
MCHC RBC-ENTMCNC: 25.1 PG — LOW (ref 27–34)
MCHC RBC-ENTMCNC: 30.4 GM/DL — LOW (ref 32–36)
MCV RBC AUTO: 82.5 FL — SIGNIFICANT CHANGE UP (ref 80–100)
MONOCYTES # BLD AUTO: 0.86 K/UL — SIGNIFICANT CHANGE UP (ref 0–0.9)
MONOCYTES NFR BLD AUTO: 5.5 % — SIGNIFICANT CHANGE UP (ref 2–14)
NEUTROPHILS # BLD AUTO: 11.74 K/UL — HIGH (ref 1.8–7.4)
NEUTROPHILS NFR BLD AUTO: 74.6 % — SIGNIFICANT CHANGE UP (ref 43–77)
NRBC # BLD: 0 /100 WBCS — SIGNIFICANT CHANGE UP (ref 0–0)
PHOSPHATE SERPL-MCNC: 2.7 MG/DL — SIGNIFICANT CHANGE UP (ref 2.5–4.5)
PLATELET # BLD AUTO: 833 K/UL — HIGH (ref 150–400)
POTASSIUM SERPL-MCNC: 4.7 MMOL/L — SIGNIFICANT CHANGE UP (ref 3.5–5.3)
POTASSIUM SERPL-SCNC: 4.7 MMOL/L — SIGNIFICANT CHANGE UP (ref 3.5–5.3)
PROT SERPL-MCNC: 6.9 G/DL — SIGNIFICANT CHANGE UP (ref 6–8.3)
RBC # BLD: 5.02 M/UL — SIGNIFICANT CHANGE UP (ref 3.8–5.2)
RBC # FLD: 18.8 % — HIGH (ref 10.3–14.5)
SODIUM SERPL-SCNC: 139 MMOL/L — SIGNIFICANT CHANGE UP (ref 135–145)
VANCOMYCIN TROUGH SERPL-MCNC: 16.3 UG/ML — SIGNIFICANT CHANGE UP (ref 10–20)
WBC # BLD: 15.73 K/UL — HIGH (ref 3.8–10.5)
WBC # FLD AUTO: 15.73 K/UL — HIGH (ref 3.8–10.5)

## 2023-09-12 PROCEDURE — 99232 SBSQ HOSP IP/OBS MODERATE 35: CPT | Mod: GC

## 2023-09-12 RX ORDER — VANCOMYCIN HCL 1 G
750 VIAL (EA) INTRAVENOUS EVERY 24 HOURS
Refills: 0 | Status: DISCONTINUED | OUTPATIENT
Start: 2023-09-12 | End: 2023-09-14

## 2023-09-12 RX ADMIN — Medication 81 MILLIGRAM(S): at 12:08

## 2023-09-12 RX ADMIN — Medication 1 TABLET(S): at 12:10

## 2023-09-12 RX ADMIN — Medication 88 MICROGRAM(S): at 05:46

## 2023-09-12 RX ADMIN — Medication 25 MILLIGRAM(S): at 18:33

## 2023-09-12 RX ADMIN — Medication 250 MILLIGRAM(S): at 21:59

## 2023-09-12 RX ADMIN — APIXABAN 5 MILLIGRAM(S): 2.5 TABLET, FILM COATED ORAL at 18:34

## 2023-09-12 RX ADMIN — CHLORHEXIDINE GLUCONATE 1 APPLICATION(S): 213 SOLUTION TOPICAL at 12:09

## 2023-09-12 RX ADMIN — Medication 500 MILLIGRAM(S): at 12:08

## 2023-09-12 RX ADMIN — ATORVASTATIN CALCIUM 40 MILLIGRAM(S): 80 TABLET, FILM COATED ORAL at 21:59

## 2023-09-12 RX ADMIN — AMLODIPINE BESYLATE 5 MILLIGRAM(S): 2.5 TABLET ORAL at 05:46

## 2023-09-12 RX ADMIN — APIXABAN 5 MILLIGRAM(S): 2.5 TABLET, FILM COATED ORAL at 05:46

## 2023-09-12 RX ADMIN — Medication 25 MILLIGRAM(S): at 05:46

## 2023-09-12 NOTE — PROGRESS NOTE ADULT - PROBLEM SELECTOR PLAN 1
- Pt w/ staph epi,. capitis, haemolyticus w/o clear source  - C/w vancomycin 750mg daily for 7 days, per ID  - Repeat BCx pending  - TTE (9/11) -> No evidence of vegetations  - ID consulted, recommend ROSIBEL to r/o endocarditis; if ROSIBEL is declined, can consider short abx course w/ watchful waiting outpatient

## 2023-09-12 NOTE — CHART NOTE - NSCHARTNOTEFT_GEN_A_CORE
BCx+ 9/8/23 w/ S epi, S capitis, and S haemolyticus in 3 bottles. Likely contaminant, however patient presents with unexplained leukocytosis and hypoxia. Started vancomycin. ID was consulted for management.    TTE reviewed. No vegetation.  Repeat blood culture pending.    ROSIBEL could be offered to rule out endocarditis.    Patient is DNI/DNR. Depending on the family's decision re: ROSIBEL, the following can be offered:    If ROSIBEL is done and vegetation is visualized, 6-week IV antibiotics. If not visualized, can offer short duration of treatment.  If ROSIBEL is declined, can offer a short duration of antibiotics and watchful monitoring with close outpatient follow-up, accepting the uncertainty.      Tomas Dyer MD, PhD  Attending Physician  Division of Infectious Diseases  Department of Medicine

## 2023-09-12 NOTE — PROGRESS NOTE ADULT - SUBJECTIVE AND OBJECTIVE BOX
PROGRESS NOTE:     Patient is a 89y old  Female who presents with a chief complaint of Pneumonia due to infectious organism.  Per chart, "Ms. Rissa Terry is a 89-year-old female w/ PMH of polycythemia vera on Eliquis, h/o CVA and DVT, HTN, HLD, dementia, spontaneous SBO s/p bowel resection, and diabetes who is presenting with SOB and cough over the past several days."     (11 Sep 2023 14:04)      SUBJECTIVE / OVERNIGHT EVENTS:    OVERNIGHT: No acute overnight events.      Patient was examined at bedside. She seems comfortable this morning. Unable to obtain ROS due to AMS. Family is amenable to current treatment plan.      REVIEW OF SYSTEMS:    Unable to obtain ROS due to AMS      MEDICATIONS  (STANDING):  amLODIPine   Tablet 5 milliGRAM(s) Oral daily  apixaban 5 milliGRAM(s) Oral two times a day  ascorbic acid 500 milliGRAM(s) Oral daily  aspirin  chewable 81 milliGRAM(s) Oral daily  atorvastatin 40 milliGRAM(s) Oral at bedtime  chlorhexidine 4% Liquid 1 Application(s) Topical daily  dextrose 5%. 1000 milliLiter(s) (100 mL/Hr) IV Continuous <Continuous>  dextrose 5%. 1000 milliLiter(s) (50 mL/Hr) IV Continuous <Continuous>  dextrose 50% Injectable 25 Gram(s) IV Push once  dextrose 50% Injectable 12.5 Gram(s) IV Push once  dextrose 50% Injectable 25 Gram(s) IV Push once  glucagon  Injectable 1 milliGRAM(s) IntraMuscular once  hydroxyurea 500 milliGRAM(s) Oral <User Schedule>  insulin lispro (ADMELOG) corrective regimen sliding scale   SubCutaneous three times a day before meals  insulin lispro (ADMELOG) corrective regimen sliding scale   SubCutaneous at bedtime  levothyroxine 88 MICROGram(s) Oral daily  metoprolol tartrate 25 milliGRAM(s) Oral two times a day  multivitamin 1 Tablet(s) Oral daily  vancomycin  IVPB 750 milliGRAM(s) IV Intermittent every 24 hours    MEDICATIONS  (PRN):  dextrose Oral Gel 15 Gram(s) Oral once PRN Blood Glucose LESS THAN 70 milliGRAM(s)/deciliter      CAPILLARY BLOOD GLUCOSE      POCT Blood Glucose.: 136 mg/dL (12 Sep 2023 12:20)  POCT Blood Glucose.: 135 mg/dL (12 Sep 2023 08:36)  POCT Blood Glucose.: 221 mg/dL (11 Sep 2023 21:51)  POCT Blood Glucose.: 165 mg/dL (11 Sep 2023 17:45)    I&O's Summary    11 Sep 2023 07:01  -  12 Sep 2023 07:00  --------------------------------------------------------  IN: 0 mL / OUT: 600 mL / NET: -600 mL        PHYSICAL EXAM:  Vital Signs Last 24 Hrs  T(C): 36.3 (12 Sep 2023 12:27), Max: 36.6 (11 Sep 2023 19:48)  T(F): 97.4 (12 Sep 2023 12:27), Max: 97.8 (11 Sep 2023 19:48)  HR: 95 (12 Sep 2023 12:27) (81 - 95)  BP: 147/91 (12 Sep 2023 12:27) (132/84 - 164/82)  BP(mean): --  RR: 18 (12 Sep 2023 12:27) (18 - 18)  SpO2: 100% (12 Sep 2023 12:27) (98% - 100%)    Parameters below as of 12 Sep 2023 12:27  Patient On (Oxygen Delivery Method): room air        CONSTITUTIONAL: NAD; well-developed  HEENT: PERRL, clear conjunctiva  RESPIRATORY: Normal respiratory effort; lungs are clear to auscultation bilaterally; No Crackles/Rhonchi/Wheezing  CARDIOVASCULAR: Regular rate and rhythm, normal S1 and S2, no murmur/rub/gallop; No lower extremity edema; Peripheral pulses are 2+ bilaterally  ABDOMEN: Nontender to palpation, normoactive bowel sounds, no rebound/guarding; No hepatosplenomegaly  MUSCULOSKELETAL: no clubbing or cyanosis of digits; no joint swelling or tenderness to palpation  EXTREMITY: Lower extremities Non-tender to palpation; non-erythematous B/L  NEURO: A&Ox3; no focal deficits   PSYCH: normal mood; Affect appropriate    LABS:                        12.6   15.73 )-----------( 833      ( 12 Sep 2023 09:59 )             41.4     09-12    139  |  105  |  21  ----------------------------<  124<H>  4.7   |  23  |  1.08    Ca    9.6      12 Sep 2023 10:00  Phos  2.7     09-12  Mg     2.1     09-12    TPro  6.9  /  Alb  3.3  /  TBili  0.2  /  DBili  x   /  AST  16  /  ALT  13  /  AlkPhos  97  09-12          Urinalysis Basic - ( 12 Sep 2023 10:00 )    Color: x / Appearance: x / SG: x / pH: x  Gluc: 124 mg/dL / Ketone: x  / Bili: x / Urobili: x   Blood: x / Protein: x / Nitrite: x   Leuk Esterase: x / RBC: x / WBC x   Sq Epi: x / Non Sq Epi: x / Bacteria: x          RADIOLOGY & ADDITIONAL TESTS:    CXR (9/8)  IMPRESSION:    No focal airspace opacity.      TTE (9/11)  CONCLUSIONS:      1. Small left ventricular cavity size. Left ventricular systolic function is hyperdynamic with an ejection fraction visually estimated at >75 %. There are no regional wall motion abnormalities seen.   2. No evidence of systolic anterior motion of the mitral valve seen. Peak LVOT/AV gradient of 62 mmHg appears to be due to hyperdynamic left ventricle. Aortic valve is pooly visualized.   3. Normal right ventricular cavity size, normal right ventricular wall thickness and normal right ventricular systolic function.   4. Calcified mitral leaflet tips with normal leaflet opening.   5. Trace mitral regurgitation.   6. Aortic valve not well visualized; appears calcified.   7. Trace aortic regurgitation.   8. Moderate tricuspid regurgitation.   9. Estimated pulmonary artery systolic pressure is 44 mmHg, consistent with mild pulmonary hypertension.  10. No vegetations seen. This does not exclude the presence of endocarditis. Consider ROSIBEL to evaluate for the presence of endocarditis if clinically indicated.  11. No prior echocardiogram is available for comparison.

## 2023-09-12 NOTE — PROGRESS NOTE ADULT - ATTENDING COMMENTS
89F w/ polycythemia vera on Hydroxyurea, h/o CVA and DVT, on Eliquis, HTN, HLD, dementia, spontaneous SBO s/p bowel resection, and diabetes a/w acute hypoxic respiratory failure which has resolved, the patient is comfortable on room air. Her hospital course is now complicated by positive blood culture    # Gram positive bacteremia   - Blood culture x2 growing coag neg staph, less likely to be contaminant given 3/4 bottles were positive. polymicrobial bacteremia, Bld cx growing staph capitus, staph haemolyticus, staph epi --> unclear why patient is growing several staph species. She will been repeat bld cxs until bacteremia is cleared.  - tte -negative for any vegetations  - repeat cultures are NGTD after 24 hrs  - c/w Vanco  - ID f/u appreciated --> ROSIBEL recommended, will need to discuss with family about wishes to pursue more invasive testing. If family does not want invasive testing the patient to be treated with a short course of antibiotics follow by surveillance cultures in the outpatient setting.    Acute hypoxic respiratory failure resolved.  - resolved

## 2023-09-13 LAB
ALBUMIN SERPL ELPH-MCNC: 3.4 G/DL — SIGNIFICANT CHANGE UP (ref 3.3–5)
ALP SERPL-CCNC: 109 U/L — SIGNIFICANT CHANGE UP (ref 40–120)
ALT FLD-CCNC: 17 U/L — SIGNIFICANT CHANGE UP (ref 10–45)
ANION GAP SERPL CALC-SCNC: 14 MMOL/L — SIGNIFICANT CHANGE UP (ref 5–17)
AST SERPL-CCNC: 20 U/L — SIGNIFICANT CHANGE UP (ref 10–40)
BASOPHILS # BLD AUTO: 0.3 K/UL — HIGH (ref 0–0.2)
BASOPHILS NFR BLD AUTO: 1.6 % — SIGNIFICANT CHANGE UP (ref 0–2)
BILIRUB SERPL-MCNC: 0.3 MG/DL — SIGNIFICANT CHANGE UP (ref 0.2–1.2)
BUN SERPL-MCNC: 26 MG/DL — HIGH (ref 7–23)
CALCIUM SERPL-MCNC: 9.8 MG/DL — SIGNIFICANT CHANGE UP (ref 8.4–10.5)
CHLORIDE SERPL-SCNC: 102 MMOL/L — SIGNIFICANT CHANGE UP (ref 96–108)
CO2 SERPL-SCNC: 22 MMOL/L — SIGNIFICANT CHANGE UP (ref 22–31)
CREAT SERPL-MCNC: 1.04 MG/DL — SIGNIFICANT CHANGE UP (ref 0.5–1.3)
EGFR: 51 ML/MIN/1.73M2 — LOW
EOSINOPHIL # BLD AUTO: 0.58 K/UL — HIGH (ref 0–0.5)
EOSINOPHIL NFR BLD AUTO: 3 % — SIGNIFICANT CHANGE UP (ref 0–6)
GLUCOSE BLDC GLUCOMTR-MCNC: 128 MG/DL — HIGH (ref 70–99)
GLUCOSE BLDC GLUCOMTR-MCNC: 142 MG/DL — HIGH (ref 70–99)
GLUCOSE BLDC GLUCOMTR-MCNC: 194 MG/DL — HIGH (ref 70–99)
GLUCOSE BLDC GLUCOMTR-MCNC: 213 MG/DL — HIGH (ref 70–99)
GLUCOSE SERPL-MCNC: 122 MG/DL — HIGH (ref 70–99)
HCT VFR BLD CALC: 40 % — SIGNIFICANT CHANGE UP (ref 34.5–45)
HGB BLD-MCNC: 12 G/DL — SIGNIFICANT CHANGE UP (ref 11.5–15.5)
IMM GRANULOCYTES NFR BLD AUTO: 1.4 % — HIGH (ref 0–0.9)
LYMPHOCYTES # BLD AUTO: 11.4 % — LOW (ref 13–44)
LYMPHOCYTES # BLD AUTO: 2.18 K/UL — SIGNIFICANT CHANGE UP (ref 1–3.3)
MAGNESIUM SERPL-MCNC: 2.1 MG/DL — SIGNIFICANT CHANGE UP (ref 1.6–2.6)
MCHC RBC-ENTMCNC: 25.1 PG — LOW (ref 27–34)
MCHC RBC-ENTMCNC: 30 GM/DL — LOW (ref 32–36)
MCV RBC AUTO: 83.5 FL — SIGNIFICANT CHANGE UP (ref 80–100)
MONOCYTES # BLD AUTO: 1 K/UL — HIGH (ref 0–0.9)
MONOCYTES NFR BLD AUTO: 5.2 % — SIGNIFICANT CHANGE UP (ref 2–14)
NEUTROPHILS # BLD AUTO: 14.86 K/UL — HIGH (ref 1.8–7.4)
NEUTROPHILS NFR BLD AUTO: 77.4 % — HIGH (ref 43–77)
NRBC # BLD: 0 /100 WBCS — SIGNIFICANT CHANGE UP (ref 0–0)
PHOSPHATE SERPL-MCNC: 2.7 MG/DL — SIGNIFICANT CHANGE UP (ref 2.5–4.5)
PLATELET # BLD AUTO: 1014 K/UL — CRITICAL HIGH (ref 150–400)
POTASSIUM SERPL-MCNC: 5.1 MMOL/L — SIGNIFICANT CHANGE UP (ref 3.5–5.3)
POTASSIUM SERPL-SCNC: 5.1 MMOL/L — SIGNIFICANT CHANGE UP (ref 3.5–5.3)
PROT SERPL-MCNC: 7 G/DL — SIGNIFICANT CHANGE UP (ref 6–8.3)
RBC # BLD: 4.79 M/UL — SIGNIFICANT CHANGE UP (ref 3.8–5.2)
RBC # FLD: 18.7 % — HIGH (ref 10.3–14.5)
SODIUM SERPL-SCNC: 138 MMOL/L — SIGNIFICANT CHANGE UP (ref 135–145)
WBC # BLD: 19.18 K/UL — HIGH (ref 3.8–10.5)
WBC # FLD AUTO: 19.18 K/UL — HIGH (ref 3.8–10.5)

## 2023-09-13 PROCEDURE — 93312 ECHO TRANSESOPHAGEAL: CPT | Mod: 26

## 2023-09-13 PROCEDURE — 99232 SBSQ HOSP IP/OBS MODERATE 35: CPT | Mod: GC

## 2023-09-13 PROCEDURE — 93320 DOPPLER ECHO COMPLETE: CPT | Mod: 26

## 2023-09-13 PROCEDURE — 93325 DOPPLER ECHO COLOR FLOW MAPG: CPT | Mod: 26

## 2023-09-13 RX ORDER — AMLODIPINE BESYLATE 2.5 MG/1
10 TABLET ORAL DAILY
Refills: 0 | Status: DISCONTINUED | OUTPATIENT
Start: 2023-09-13 | End: 2023-09-15

## 2023-09-13 RX ADMIN — ATORVASTATIN CALCIUM 40 MILLIGRAM(S): 80 TABLET, FILM COATED ORAL at 21:54

## 2023-09-13 RX ADMIN — APIXABAN 5 MILLIGRAM(S): 2.5 TABLET, FILM COATED ORAL at 18:04

## 2023-09-13 RX ADMIN — AMLODIPINE BESYLATE 5 MILLIGRAM(S): 2.5 TABLET ORAL at 06:02

## 2023-09-13 RX ADMIN — APIXABAN 5 MILLIGRAM(S): 2.5 TABLET, FILM COATED ORAL at 06:02

## 2023-09-13 RX ADMIN — Medication 500 MILLIGRAM(S): at 18:04

## 2023-09-13 RX ADMIN — Medication 88 MICROGRAM(S): at 06:02

## 2023-09-13 RX ADMIN — Medication 25 MILLIGRAM(S): at 18:04

## 2023-09-13 RX ADMIN — Medication 1 TABLET(S): at 18:05

## 2023-09-13 RX ADMIN — Medication 81 MILLIGRAM(S): at 18:04

## 2023-09-13 RX ADMIN — CHLORHEXIDINE GLUCONATE 1 APPLICATION(S): 213 SOLUTION TOPICAL at 18:05

## 2023-09-13 RX ADMIN — Medication 25 MILLIGRAM(S): at 06:02

## 2023-09-13 RX ADMIN — Medication 250 MILLIGRAM(S): at 20:28

## 2023-09-13 NOTE — PROGRESS NOTE ADULT - PROBLEM SELECTOR PLAN 1
- Pt w/ staph epi,. capitis, haemolyticus w/o clear source  - C/w vancomycin 750mg daily for 7 days, per ID  - Repeat BCx NGTD  - TTE (9/11) -> No evidence of vegetations  - ID consulted, recommend ROSIBEL to r/o endocarditis; if ROSIBEL is unremarkable, can consider short abx course  - ROSIBEL pending

## 2023-09-13 NOTE — PROGRESS NOTE ADULT - SUBJECTIVE AND OBJECTIVE BOX
PROGRESS NOTE:     Patient is a 89y old  Female who presents with a chief complaint of Hypoxia (12 Sep 2023 15:35)      SUBJECTIVE / OVERNIGHT EVENTS:    OVERNIGHT: No acute overnight events.      Patient was examined at bedside. She seems comfortable this morning. Unable to obtain ROS due to AMS. Family is amenable to current treatment plan.      REVIEW OF SYSTEMS:    Unable to obtain ROS due to AMS      MEDICATIONS  (STANDING):  amLODIPine   Tablet 10 milliGRAM(s) Oral daily  apixaban 5 milliGRAM(s) Oral two times a day  ascorbic acid 500 milliGRAM(s) Oral daily  aspirin  chewable 81 milliGRAM(s) Oral daily  atorvastatin 40 milliGRAM(s) Oral at bedtime  chlorhexidine 4% Liquid 1 Application(s) Topical daily  dextrose 5%. 1000 milliLiter(s) (50 mL/Hr) IV Continuous <Continuous>  dextrose 5%. 1000 milliLiter(s) (100 mL/Hr) IV Continuous <Continuous>  dextrose 50% Injectable 25 Gram(s) IV Push once  dextrose 50% Injectable 25 Gram(s) IV Push once  dextrose 50% Injectable 12.5 Gram(s) IV Push once  glucagon  Injectable 1 milliGRAM(s) IntraMuscular once  hydroxyurea 500 milliGRAM(s) Oral <User Schedule>  insulin lispro (ADMELOG) corrective regimen sliding scale   SubCutaneous three times a day before meals  insulin lispro (ADMELOG) corrective regimen sliding scale   SubCutaneous at bedtime  levothyroxine 88 MICROGram(s) Oral daily  metoprolol tartrate 25 milliGRAM(s) Oral two times a day  multivitamin 1 Tablet(s) Oral daily  vancomycin  IVPB 750 milliGRAM(s) IV Intermittent every 24 hours    MEDICATIONS  (PRN):  dextrose Oral Gel 15 Gram(s) Oral once PRN Blood Glucose LESS THAN 70 milliGRAM(s)/deciliter      CAPILLARY BLOOD GLUCOSE      POCT Blood Glucose.: 194 mg/dL (13 Sep 2023 12:49)  POCT Blood Glucose.: 142 mg/dL (13 Sep 2023 09:31)  POCT Blood Glucose.: 183 mg/dL (12 Sep 2023 21:48)  POCT Blood Glucose.: 143 mg/dL (12 Sep 2023 17:30)    I&O's Summary    12 Sep 2023 07:01  -  13 Sep 2023 07:00  --------------------------------------------------------  IN: 0 mL / OUT: 300 mL / NET: -300 mL        PHYSICAL EXAM:  Vital Signs Last 24 Hrs  T(C): 36.3 (13 Sep 2023 13:24), Max: 36.4 (12 Sep 2023 20:14)  T(F): 97.3 (13 Sep 2023 04:44), Max: 97.6 (12 Sep 2023 20:14)  HR: 93 (13 Sep 2023 13:24) (93 - 94)  BP: 163/63 (13 Sep 2023 13:24) (105/63 - 163/63)  BP(mean): 79 (13 Sep 2023 13:24) (79 - 79)  RR: 18 (13 Sep 2023 13:24) (18 - 18)  SpO2: 99% (13 Sep 2023 13:24) (95% - 99%)    Parameters below as of 13 Sep 2023 13:24    O2 Flow (L/min): 2      CONSTITUTIONAL: NAD; well-developed  HEENT: PERRL, clear conjunctiva  RESPIRATORY: Normal respiratory effort; lungs are clear to auscultation bilaterally; No Crackles/Rhonchi/Wheezing  CARDIOVASCULAR: Regular rate and rhythm, normal S1 and S2, no murmur/rub/gallop; No lower extremity edema; Peripheral pulses are 2+ bilaterally  ABDOMEN: Nontender to palpation, normoactive bowel sounds, no rebound/guarding; No hepatosplenomegaly  MUSCULOSKELETAL: no clubbing or cyanosis of digits; no joint swelling or tenderness to palpation  EXTREMITY: Lower extremities Non-tender to palpation; non-erythematous B/L  NEURO: A&Ox3; no focal deficits   PSYCH: normal mood; Affect appropriate    LABS:                        12.0   19.18 )-----------( 1014     ( 13 Sep 2023 06:50 )             40.0     09-13    138  |  102  |  26<H>  ----------------------------<  122<H>  5.1   |  22  |  1.04    Ca    9.8      13 Sep 2023 06:51  Phos  2.7     09-13  Mg     2.1     09-13    TPro  7.0  /  Alb  3.4  /  TBili  0.3  /  DBili  x   /  AST  20  /  ALT  17  /  AlkPhos  109  09-13          Urinalysis Basic - ( 13 Sep 2023 06:51 )    Color: x / Appearance: x / SG: x / pH: x  Gluc: 122 mg/dL / Ketone: x  / Bili: x / Urobili: x   Blood: x / Protein: x / Nitrite: x   Leuk Esterase: x / RBC: x / WBC x   Sq Epi: x / Non Sq Epi: x / Bacteria: x        Culture - Blood (collected 11 Sep 2023 11:23)  Source: .Blood Blood-Venous  Preliminary Report (12 Sep 2023 16:01):    No growth at 24 hours    Culture - Blood (collected 11 Sep 2023 09:13)  Source: .Blood Blood-Peripheral  Preliminary Report (12 Sep 2023 16:01):    No growth at 24 hours        RADIOLOGY & ADDITIONAL TESTS:    TTE (9/11)  CONCLUSIONS:      1. Small left ventricular cavity size. Left ventricular systolic function is hyperdynamic with an ejection fraction visually estimated at >75 %. There are no regional wall motion abnormalities seen.   2. No evidence of systolic anterior motion of the mitral valve seen. Peak LVOT/AV gradient of 62 mmHg appears to be due to hyperdynamic left ventricle. Aortic valve is pooly visualized.   3. Normal right ventricular cavity size, normal right ventricular wall thickness and normal right ventricular systolic function.   4. Calcified mitral leaflet tips with normal leaflet opening.   5. Trace mitral regurgitation.   6. Aortic valve not well visualized; appears calcified.   7. Trace aortic regurgitation.   8. Moderate tricuspid regurgitation.   9. Estimated pulmonary artery systolic pressure is 44 mmHg, consistent with mild pulmonary hypertension.  10. No vegetations seen. This does not exclude the presence of endocarditis. Consider ROSIBEL to evaluate for the presence of endocarditis if clinically indicated.  11. No prior echocardiogram is available for comparison.      ROSIBEL (9/13) pending

## 2023-09-13 NOTE — PROGRESS NOTE ADULT - ATTENDING COMMENTS
89F w/ polycythemia vera on Hydroxyurea, h/o CVA and DVT, on Eliquis, HTN, HLD, dementia, spontaneous SBO s/p bowel resection, and diabetes a/w acute hypoxic respiratory failure which has resolved, the patient is comfortable on room air. Her hospital course is now complicated by positive blood culture    # Gram positive bacteremia   - Blood culture x2 growing coag neg staph, less likely to be contaminant given 3/4 bottles were positive. polymicrobial bacteremia, Bld cx growing staph capitus, staph haemolyticus, staph epi --> unclear why patient is growing several staph species. She will been repeat bld cxs until bacteremia is cleared.  - tte -negative for any vegetations  - repeat cultures are NGTD after 24 hrs  - c/w Vanco  - s/p ROSIBEL --> no vegetations were noted  - will f/u with ID regarding abx recommendations for discharge.    Acute hypoxic respiratory failure resolved.  - resolved

## 2023-09-14 ENCOUNTER — OUTPATIENT (OUTPATIENT)
Dept: OUTPATIENT SERVICES | Facility: HOSPITAL | Age: 88
LOS: 1 days | Discharge: HOME | End: 2023-09-14

## 2023-09-14 DIAGNOSIS — Z90.49 ACQUIRED ABSENCE OF OTHER SPECIFIED PARTS OF DIGESTIVE TRACT: Chronic | ICD-10-CM

## 2023-09-14 DIAGNOSIS — C92.10 CHRONIC MYELOID LEUKEMIA, BCR/ABL-POSITIVE, NOT HAVING ACHIEVED REMISSION: ICD-10-CM

## 2023-09-14 LAB
ALBUMIN SERPL ELPH-MCNC: 3.4 G/DL — SIGNIFICANT CHANGE UP (ref 3.3–5)
ALP SERPL-CCNC: 101 U/L — SIGNIFICANT CHANGE UP (ref 40–120)
ALT FLD-CCNC: 19 U/L — SIGNIFICANT CHANGE UP (ref 10–45)
ANION GAP SERPL CALC-SCNC: 10 MMOL/L — SIGNIFICANT CHANGE UP (ref 5–17)
AST SERPL-CCNC: 20 U/L — SIGNIFICANT CHANGE UP (ref 10–40)
BASOPHILS # BLD AUTO: 0.3 K/UL — HIGH (ref 0–0.2)
BASOPHILS NFR BLD AUTO: 1.6 % — SIGNIFICANT CHANGE UP (ref 0–2)
BILIRUB SERPL-MCNC: 0.2 MG/DL — SIGNIFICANT CHANGE UP (ref 0.2–1.2)
BUN SERPL-MCNC: 26 MG/DL — HIGH (ref 7–23)
CALCIUM SERPL-MCNC: 10 MG/DL — SIGNIFICANT CHANGE UP (ref 8.4–10.5)
CHLORIDE SERPL-SCNC: 103 MMOL/L — SIGNIFICANT CHANGE UP (ref 96–108)
CO2 SERPL-SCNC: 23 MMOL/L — SIGNIFICANT CHANGE UP (ref 22–31)
CREAT SERPL-MCNC: 0.89 MG/DL — SIGNIFICANT CHANGE UP (ref 0.5–1.3)
EGFR: 62 ML/MIN/1.73M2 — SIGNIFICANT CHANGE UP
EOSINOPHIL # BLD AUTO: 0.55 K/UL — HIGH (ref 0–0.5)
EOSINOPHIL NFR BLD AUTO: 2.9 % — SIGNIFICANT CHANGE UP (ref 0–6)
GLUCOSE BLDC GLUCOMTR-MCNC: 145 MG/DL — HIGH (ref 70–99)
GLUCOSE BLDC GLUCOMTR-MCNC: 147 MG/DL — HIGH (ref 70–99)
GLUCOSE BLDC GLUCOMTR-MCNC: 166 MG/DL — HIGH (ref 70–99)
GLUCOSE BLDC GLUCOMTR-MCNC: 220 MG/DL — HIGH (ref 70–99)
GLUCOSE SERPL-MCNC: 124 MG/DL — HIGH (ref 70–99)
HCT VFR BLD CALC: 36.7 % — SIGNIFICANT CHANGE UP (ref 34.5–45)
HGB BLD-MCNC: 11 G/DL — LOW (ref 11.5–15.5)
IMM GRANULOCYTES NFR BLD AUTO: 1.6 % — HIGH (ref 0–0.9)
LYMPHOCYTES # BLD AUTO: 13 % — SIGNIFICANT CHANGE UP (ref 13–44)
LYMPHOCYTES # BLD AUTO: 2.5 K/UL — SIGNIFICANT CHANGE UP (ref 1–3.3)
MAGNESIUM SERPL-MCNC: 2.1 MG/DL — SIGNIFICANT CHANGE UP (ref 1.6–2.6)
MCHC RBC-ENTMCNC: 25.2 PG — LOW (ref 27–34)
MCHC RBC-ENTMCNC: 30 GM/DL — LOW (ref 32–36)
MCV RBC AUTO: 84.2 FL — SIGNIFICANT CHANGE UP (ref 80–100)
MONOCYTES # BLD AUTO: 1.1 K/UL — HIGH (ref 0–0.9)
MONOCYTES NFR BLD AUTO: 5.7 % — SIGNIFICANT CHANGE UP (ref 2–14)
NEUTROPHILS # BLD AUTO: 14.46 K/UL — HIGH (ref 1.8–7.4)
NEUTROPHILS NFR BLD AUTO: 75.2 % — SIGNIFICANT CHANGE UP (ref 43–77)
NRBC # BLD: 0 /100 WBCS — SIGNIFICANT CHANGE UP (ref 0–0)
PHOSPHATE SERPL-MCNC: 3.3 MG/DL — SIGNIFICANT CHANGE UP (ref 2.5–4.5)
PLATELET # BLD AUTO: 1255 K/UL — CRITICAL HIGH (ref 150–400)
POTASSIUM SERPL-MCNC: 4.9 MMOL/L — SIGNIFICANT CHANGE UP (ref 3.5–5.3)
POTASSIUM SERPL-SCNC: 4.9 MMOL/L — SIGNIFICANT CHANGE UP (ref 3.5–5.3)
PROT SERPL-MCNC: 6.4 G/DL — SIGNIFICANT CHANGE UP (ref 6–8.3)
RBC # BLD: 4.36 M/UL — SIGNIFICANT CHANGE UP (ref 3.8–5.2)
RBC # FLD: 18.6 % — HIGH (ref 10.3–14.5)
SODIUM SERPL-SCNC: 136 MMOL/L — SIGNIFICANT CHANGE UP (ref 135–145)
WBC # BLD: 19.21 K/UL — HIGH (ref 3.8–10.5)
WBC # FLD AUTO: 19.21 K/UL — HIGH (ref 3.8–10.5)

## 2023-09-14 PROCEDURE — 99232 SBSQ HOSP IP/OBS MODERATE 35: CPT | Mod: GC

## 2023-09-14 PROCEDURE — 99232 SBSQ HOSP IP/OBS MODERATE 35: CPT

## 2023-09-14 RX ORDER — AMLODIPINE BESYLATE 2.5 MG/1
0.5 TABLET ORAL
Qty: 0 | Refills: 0 | DISCHARGE
Start: 2023-09-14

## 2023-09-14 RX ADMIN — ATORVASTATIN CALCIUM 40 MILLIGRAM(S): 80 TABLET, FILM COATED ORAL at 21:26

## 2023-09-14 RX ADMIN — Medication 1: at 13:12

## 2023-09-14 RX ADMIN — APIXABAN 5 MILLIGRAM(S): 2.5 TABLET, FILM COATED ORAL at 06:50

## 2023-09-14 RX ADMIN — APIXABAN 5 MILLIGRAM(S): 2.5 TABLET, FILM COATED ORAL at 17:38

## 2023-09-14 RX ADMIN — Medication 88 MICROGRAM(S): at 06:50

## 2023-09-14 RX ADMIN — AMLODIPINE BESYLATE 10 MILLIGRAM(S): 2.5 TABLET ORAL at 06:53

## 2023-09-14 RX ADMIN — Medication 25 MILLIGRAM(S): at 06:49

## 2023-09-14 RX ADMIN — Medication 25 MILLIGRAM(S): at 17:38

## 2023-09-14 NOTE — PROGRESS NOTE ADULT - PROBLEM SELECTOR PLAN 1
- Pt w/ staph epi,. capitis, haemolyticus w/o clear source  - C/w vancomycin 750mg daily for 7 days, per ID  - Repeat BCx NGTD  - TTE (9/11) -> No evidence of vegetations  - ID consulted, recommend ROSIBEL to r/o endocarditis; if ROSIBEL is unremarkable, can consider short abx course  - ROSIBEL pending  > f/u ID recs for PO vs IV abx

## 2023-09-14 NOTE — PROVIDER CONTACT NOTE (CRITICAL VALUE NOTIFICATION) - TEST AND RESULT REPORTED:
Blood Culture positive for Gram Cocci and Clusters, Growth in aerobic bottle
Platelets - 1014
Preliminary blood cultures: growth in anaerobic and aerobic bottle for gram positive cocci in clusters
cbc, platelet 1255

## 2023-09-14 NOTE — PROGRESS NOTE ADULT - SUBJECTIVE AND OBJECTIVE BOX
Kurt Mix  PGY-2 Resident Physician     Patient is a 89y old  Female who presents with a chief complaint of Hypoxia (13 Sep 2023 13:47)      SUBJECTIVE / OVERNIGHT EVENTS:  NAEON. AO x o.     MEDICATIONS  (STANDING):  amLODIPine   Tablet 10 milliGRAM(s) Oral daily  apixaban 5 milliGRAM(s) Oral two times a day  ascorbic acid 500 milliGRAM(s) Oral daily  aspirin  chewable 81 milliGRAM(s) Oral daily  atorvastatin 40 milliGRAM(s) Oral at bedtime  chlorhexidine 4% Liquid 1 Application(s) Topical daily  dextrose 5%. 1000 milliLiter(s) (50 mL/Hr) IV Continuous <Continuous>  dextrose 5%. 1000 milliLiter(s) (100 mL/Hr) IV Continuous <Continuous>  dextrose 50% Injectable 25 Gram(s) IV Push once  dextrose 50% Injectable 12.5 Gram(s) IV Push once  dextrose 50% Injectable 25 Gram(s) IV Push once  glucagon  Injectable 1 milliGRAM(s) IntraMuscular once  hydroxyurea 500 milliGRAM(s) Oral <User Schedule>  insulin lispro (ADMELOG) corrective regimen sliding scale   SubCutaneous three times a day before meals  insulin lispro (ADMELOG) corrective regimen sliding scale   SubCutaneous at bedtime  levothyroxine 88 MICROGram(s) Oral daily  metoprolol tartrate 25 milliGRAM(s) Oral two times a day  multivitamin 1 Tablet(s) Oral daily  vancomycin  IVPB 750 milliGRAM(s) IV Intermittent every 24 hours    MEDICATIONS  (PRN):  dextrose Oral Gel 15 Gram(s) Oral once PRN Blood Glucose LESS THAN 70 milliGRAM(s)/deciliter    Allergies    No Known Drug Allergies  black pepper, white pepper, cumin, paprika, johnston powder, chili powder (Rash)    Intolerances        Vital Signs Last 24 Hrs  T(C): 36.2 (14 Sep 2023 06:12), Max: 36.3 (13 Sep 2023 13:24)  T(F): 97.2 (14 Sep 2023 06:12), Max: 97.4 (13 Sep 2023 20:06)  HR: 82 (14 Sep 2023 06:12) (62 - 93)  BP: 142/68 (14 Sep 2023 06:12) (98/50 - 163/63)  BP(mean): 79 (13 Sep 2023 13:24) (79 - 79)  RR: 18 (14 Sep 2023 06:12) (15 - 18)  SpO2: 98% (14 Sep 2023 06:12) (97% - 100%)    Parameters below as of 14 Sep 2023 06:12  Patient On (Oxygen Delivery Method): room air      Daily Height in cm: 157.48 (13 Sep 2023 13:24)    Daily Weight in k.2 (13 Sep 2023 10:44)  I&O's Summary      Physical Exam  CONSTITUTIONAL: NAD; well-developed  HEENT: PERRL, clear conjunctiva  RESPIRATORY: Normal respiratory effort; lungs are clear to auscultation bilaterally; No Crackles/Rhonchi/Wheezing  CARDIOVASCULAR: Regular rate and rhythm, normal S1 and S2, no murmur/rub/gallop; No lower extremity edema; Peripheral pulses are 2+ bilaterally  ABDOMEN: Nontender to palpation, normoactive bowel sounds, no rebound/guarding; No hepatosplenomegaly  MUSCULOSKELETAL: no clubbing or cyanosis of digits; no joint swelling or tenderness to palpation  EXTREMITY: Lower extremities Non-tender to palpation; non-erythematous B/L  NEURO: A&Ox0; no focal deficits   PSYCH: normal mood; Affect appropriate    DIAGNOSTICS:                         11.0   19.21 )-----------( 1255     ( 14 Sep 2023 07:19 )             36.7     Hgb Trend: 11.0<--, 12.0<--, 12.6<--, 12.1<--, 12.5<--  09-14    136  |  103  |  26<H>  ----------------------------<  124<H>  4.9   |  23  |  0.89    Ca    10.0      14 Sep 2023 07:19  Phos  3.3     09-14  Mg     2.1     09-14    TPro  6.4  /  Alb  3.4  /  TBili  0.2  /  DBili  x   /  AST  20  /  ALT  19  /  AlkPhos  101  09-14    CAPILLARY BLOOD GLUCOSE      POCT Blood Glucose.: 147 mg/dL (14 Sep 2023 08:24)  POCT Blood Glucose.: 213 mg/dL (13 Sep 2023 21:29)  POCT Blood Glucose.: 128 mg/dL (13 Sep 2023 17:25)  POCT Blood Glucose.: 194 mg/dL (13 Sep 2023 12:49)    Creatinine Trend: 0.89<--, 1.04<--, 1.08<--, 0.81<--, 0.70<--, 1.03<--  LIVER FUNCTIONS - ( 14 Sep 2023 07:19 )  Alb: 3.4 g/dL / Pro: 6.4 g/dL / ALK PHOS: 101 U/L / ALT: 19 U/L / AST: 20 U/L / GGT: x                 Urinalysis Basic - ( 14 Sep 2023 07:19 )    Color: x / Appearance: x / SG: x / pH: x  Gluc: 124 mg/dL / Ketone: x  / Bili: x / Urobili: x   Blood: x / Protein: x / Nitrite: x   Leuk Esterase: x / RBC: x / WBC x   Sq Epi: x / Non Sq Epi: x / Bacteria: x

## 2023-09-14 NOTE — PROGRESS NOTE ADULT - SUBJECTIVE AND OBJECTIVE BOX
Follow Up:    BCx+ CoNS    Interval History/ROS:  VSS. ROSIBEL neg for vegetation. Patient was seen and examined at bedside. Unable to provide coherent ROS.    Allergies  No Known Drug Allergies  black pepper, white pepper, cumin, paprika, johnston powder, chili powder (Rash)        ANTIMICROBIALS:      OTHER MEDS:  MEDICATIONS  (STANDING):  amLODIPine   Tablet 10 daily  apixaban 5 two times a day  aspirin  chewable 81 daily  atorvastatin 40 at bedtime  dextrose 50% Injectable 25 once  dextrose 50% Injectable 12.5 once  dextrose 50% Injectable 25 once  dextrose Oral Gel 15 once PRN  glucagon  Injectable 1 once  hydroxyurea 500 <User Schedule>  insulin lispro (ADMELOG) corrective regimen sliding scale  three times a day before meals  insulin lispro (ADMELOG) corrective regimen sliding scale  at bedtime  levothyroxine 88 daily  metoprolol tartrate 25 two times a day      Vital Signs Last 24 Hrs  T(C): 36.7 (14 Sep 2023 12:25), Max: 36.7 (14 Sep 2023 11:56)  T(F): 98 (14 Sep 2023 12:25), Max: 98 (14 Sep 2023 11:56)  HR: 81 (14 Sep 2023 12:25) (70 - 90)  BP: 111/61 (14 Sep 2023 12:25) (111/61 - 148/88)  BP(mean): --  RR: 18 (14 Sep 2023 12:25) (18 - 18)  SpO2: 100% (14 Sep 2023 12:25) (97% - 100%)    Parameters below as of 14 Sep 2023 12:25  Patient On (Oxygen Delivery Method): room air        PHYSICAL EXAM:  Constitutional: non-toxic  ENT:  supple  Cardiovascular:   normal S1, S2, RRR; +systolic murmur  Respiratory:  clear BS bilaterally, no wheezes, no rales  GI:  soft, non-tender, normal bowel sounds  :  no sylvester  Musculoskeletal:  no BLE edema  Neurologic: awake                                11.0   19.21 )-----------( 1255     ( 14 Sep 2023 07:19 )             36.7       09-14    136  |  103  |  26<H>  ----------------------------<  124<H>  4.9   |  23  |  0.89    Ca    10.0      14 Sep 2023 07:19  Phos  3.3     09-14  Mg     2.1     09-14    TPro  6.4  /  Alb  3.4  /  TBili  0.2  /  DBili  x   /  AST  20  /  ALT  19  /  AlkPhos  101  09-14      Urinalysis Basic - ( 14 Sep 2023 07:19 )    Color: x / Appearance: x / SG: x / pH: x  Gluc: 124 mg/dL / Ketone: x  / Bili: x / Urobili: x   Blood: x / Protein: x / Nitrite: x   Leuk Esterase: x / RBC: x / WBC x   Sq Epi: x / Non Sq Epi: x / Bacteria: x        MICROBIOLOGY:  v  .Blood Blood-Venous  09-11-23   No growth at 72 Hours  --  --      .Blood Blood-Peripheral  09-11-23   No growth at 72 Hours  --  --      Clean Catch Clean Catch (Midstream)  09-08-23   Culture grew 3 or more types of organisms which indicate  collection contamination; consider recollection only if clinically  indicated.  --  --      .Blood Blood-Peripheral  09-08-23   Growth in aerobic bottle: Staphylococcus epidermidis  Coagulase Negative Staphylococci isolated from a single blood culture set  may represent contamination.  Contact the Microbiology Department at [phone] if susceptibility  testing is clinically indicated.  --    Growth in aerobic bottle: Gram Positive Cocci in Clusters      .Blood Blood-Peripheral  09-08-23   Growth in aerobic bottle: Staphylococcus capitis  Growth in anaerobic bottle: Staphylococcus haemolyticus  Coagulase Negative Staphylococci isolated from a single blood culture set  may represent contamination.  Contact the Microbiology Department at [phone] if susceptibility  testing is clinically indicated.  Direct identification is available within approximately 3-5  hours either by Blood Panel Multiplexed PCR or Direct  MALDI-TOF. Details: [email or web address]  --  Blood Culture PCR          Rapid RVP Result: NotDetec (09-08 @ 09:50)        RADIOLOGY:  Imaging below independently reviewed.  < from: Xray Chest 1 View- PORTABLE-Urgent (09.08.23 @ 11:17) >    ACC: [id number] EXAM:  XR CHEST PORTABLE URGENT 1V   ORDERED BY: BERKLEY LOZANO     PROCEDURE DATE:  09/08/2023          INTERPRETATION:  CLINICAL INFORMATION: Sepsis.    TECHNIQUE: Single portable view of the chest.    COMPARISON: Chest x-ray 3/13/2023.    FINDINGS:    No focal airspace opacity. Bibasilar atelectasis.  No pneumothorax or large pleural effusion.  Heart size within normal limits.    IMPRESSION:    No focal airspace opacity.    --- End of Report ---           ANIA GOLDSMITH MD; Resident Radiologist  This document has been electronically signed.  CARLOS ARCHIBALD MD; Attending Radiologist  This document has been electronically signed. Sep  8 2023 12:51PM    < end of copied text >

## 2023-09-14 NOTE — PROVIDER CONTACT NOTE (CRITICAL VALUE NOTIFICATION) - SITUATION
Blood Culture positive for Gram Cocci and Clusters, Growth in aerobic bottle
AM platelet [id number]
Preliminary blood cultures: growth in anaerobic and aerobic bottle for gram positive cocci in clusters

## 2023-09-14 NOTE — PROGRESS NOTE ADULT - ATTENDING COMMENTS
89F w/ polycythemia vera on Hydroxyurea, h/o CVA and DVT, on Eliquis, HTN, HLD, dementia, spontaneous SBO s/p bowel resection, and diabetes a/w acute hypoxic respiratory failure which has resolved, the patient is comfortable on room air. Her hospital course is now complicated by positive blood culture    # Gram positive bacteremia   - Blood culture x2 growing coag neg staph, less likely to be contaminant given 3/4 bottles were positive. polymicrobial bacteremia, Bld cx growing staph capitus, staph haemolyticus, staph epi --> unclear why patient is growing several staph species. She will been repeat bld cxs until bacteremia is cleared.  - tte -negative for any vegetations  - repeat cultures are NGTD after 24 hrs  - c/w Vanco  - s/p ROSIBEL --> no vegetations were noted --> the case was d/w Dr. Tomas Dyer from infectious disease, he recommended discontinuing antibiotics with no further need for additional antibiotics.    Acute hypoxic respiratory failure resolved.  - resolved    dispo: stable for discharge home  Discharge time spent: 34 min   I spoke with patients daughter and provided her updates.

## 2023-09-15 ENCOUNTER — TRANSCRIPTION ENCOUNTER (OUTPATIENT)
Age: 88
End: 2023-09-15

## 2023-09-15 VITALS
SYSTOLIC BLOOD PRESSURE: 134 MMHG | DIASTOLIC BLOOD PRESSURE: 65 MMHG | RESPIRATION RATE: 18 BRPM | TEMPERATURE: 98 F | HEART RATE: 85 BPM | OXYGEN SATURATION: 99 %

## 2023-09-15 LAB
EPO SERPL-MCNC: 6.8 MIU/ML — SIGNIFICANT CHANGE UP (ref 2.6–18.5)
GLUCOSE BLDC GLUCOMTR-MCNC: 145 MG/DL — HIGH (ref 70–99)

## 2023-09-15 PROCEDURE — 84132 ASSAY OF SERUM POTASSIUM: CPT

## 2023-09-15 PROCEDURE — 85730 THROMBOPLASTIN TIME PARTIAL: CPT

## 2023-09-15 PROCEDURE — 80048 BASIC METABOLIC PNL TOTAL CA: CPT

## 2023-09-15 PROCEDURE — 96375 TX/PRO/DX INJ NEW DRUG ADDON: CPT

## 2023-09-15 PROCEDURE — 83735 ASSAY OF MAGNESIUM: CPT

## 2023-09-15 PROCEDURE — 87040 BLOOD CULTURE FOR BACTERIA: CPT

## 2023-09-15 PROCEDURE — 71045 X-RAY EXAM CHEST 1 VIEW: CPT

## 2023-09-15 PROCEDURE — 80202 ASSAY OF VANCOMYCIN: CPT

## 2023-09-15 PROCEDURE — 96374 THER/PROPH/DIAG INJ IV PUSH: CPT

## 2023-09-15 PROCEDURE — 99285 EMERGENCY DEPT VISIT HI MDM: CPT | Mod: 25

## 2023-09-15 PROCEDURE — 80053 COMPREHEN METABOLIC PANEL: CPT

## 2023-09-15 PROCEDURE — 93325 DOPPLER ECHO COLOR FLOW MAPG: CPT

## 2023-09-15 PROCEDURE — 83605 ASSAY OF LACTIC ACID: CPT

## 2023-09-15 PROCEDURE — 85025 COMPLETE CBC W/AUTO DIFF WBC: CPT

## 2023-09-15 PROCEDURE — 82435 ASSAY OF BLOOD CHLORIDE: CPT

## 2023-09-15 PROCEDURE — 87086 URINE CULTURE/COLONY COUNT: CPT

## 2023-09-15 PROCEDURE — 93320 DOPPLER ECHO COMPLETE: CPT

## 2023-09-15 PROCEDURE — 84100 ASSAY OF PHOSPHORUS: CPT

## 2023-09-15 PROCEDURE — 85027 COMPLETE CBC AUTOMATED: CPT

## 2023-09-15 PROCEDURE — 36415 COLL VENOUS BLD VENIPUNCTURE: CPT

## 2023-09-15 PROCEDURE — 82803 BLOOD GASES ANY COMBINATION: CPT

## 2023-09-15 PROCEDURE — 84295 ASSAY OF SERUM SODIUM: CPT

## 2023-09-15 PROCEDURE — 82947 ASSAY GLUCOSE BLOOD QUANT: CPT

## 2023-09-15 PROCEDURE — 87077 CULTURE AEROBIC IDENTIFY: CPT

## 2023-09-15 PROCEDURE — 85014 HEMATOCRIT: CPT

## 2023-09-15 PROCEDURE — 82668 ASSAY OF ERYTHROPOIETIN: CPT

## 2023-09-15 PROCEDURE — 85610 PROTHROMBIN TIME: CPT

## 2023-09-15 PROCEDURE — 82962 GLUCOSE BLOOD TEST: CPT

## 2023-09-15 PROCEDURE — 82330 ASSAY OF CALCIUM: CPT

## 2023-09-15 PROCEDURE — 87150 DNA/RNA AMPLIFIED PROBE: CPT

## 2023-09-15 PROCEDURE — 99239 HOSP IP/OBS DSCHRG MGMT >30: CPT | Mod: GC

## 2023-09-15 PROCEDURE — 93306 TTE W/DOPPLER COMPLETE: CPT

## 2023-09-15 PROCEDURE — 81001 URINALYSIS AUTO W/SCOPE: CPT

## 2023-09-15 PROCEDURE — 0225U NFCT DS DNA&RNA 21 SARSCOV2: CPT

## 2023-09-15 PROCEDURE — 93312 ECHO TRANSESOPHAGEAL: CPT

## 2023-09-15 PROCEDURE — 84484 ASSAY OF TROPONIN QUANT: CPT

## 2023-09-15 PROCEDURE — 85018 HEMOGLOBIN: CPT

## 2023-09-15 RX ADMIN — Medication 88 MICROGRAM(S): at 06:26

## 2023-09-15 RX ADMIN — HYDROXYUREA 500 MILLIGRAM(S): 500 CAPSULE ORAL at 06:26

## 2023-09-15 RX ADMIN — APIXABAN 5 MILLIGRAM(S): 2.5 TABLET, FILM COATED ORAL at 06:26

## 2023-09-15 RX ADMIN — Medication 25 MILLIGRAM(S): at 06:26

## 2023-09-15 RX ADMIN — AMLODIPINE BESYLATE 10 MILLIGRAM(S): 2.5 TABLET ORAL at 06:26

## 2023-09-15 NOTE — PROGRESS NOTE ADULT - ATTENDING COMMENTS
89F w/ polycythemia vera on Hydroxyurea, h/o CVA and DVT, on Eliquis, HTN, HLD, dementia, spontaneous SBO s/p bowel resection, and diabetes a/w acute hypoxic respiratory failure which has resolved, the patient is comfortable on room air. Her hospital course is now complicated by positive blood culture    # Gram positive bacteremia   - Blood culture x2 growing coag neg staph, less likely to be contaminant given 3/4 bottles were positive. polymicrobial bacteremia, Bld cx growing staph capitus, staph haemolyticus, staph epi --> unclear why patient is growing several staph species. She will been repeat bld cxs until bacteremia is cleared.  - tte -negative for any vegetations  - repeat cultures are NGTD after 24 hrs  - Vanco discontinued  - s/p ROSIBEL --> no vegetations were noted --> the case was d/w Dr. Tomas Dyer from infectious disease, he recommended discontinuing antibiotics with no further need for additional antibiotics.    Acute hypoxic respiratory failure resolved.  - resolved    dispo: stable for discharge home  Discharge time spent: 34 min   I spoke with patients daughter and provided her updates.

## 2023-09-15 NOTE — PROGRESS NOTE ADULT - SUBJECTIVE AND OBJECTIVE BOX
PROGRESS NOTE:     Patient is a 89y old  Female who presents with a chief complaint of Hypoxia (14 Sep 2023 16:55)      SUBJECTIVE / OVERNIGHT EVENTS:    OVERNIGHT: No acute overnight events.      Patient was examined at bedside. She seems comfortable this morning. Unable to obtain ROS due to AMS. Family is amenable to current treatment plan.      REVIEW OF SYSTEMS:    Unable to obtain ROS due to AMS      MEDICATIONS  (STANDING):  amLODIPine   Tablet 10 milliGRAM(s) Oral daily  apixaban 5 milliGRAM(s) Oral two times a day  ascorbic acid 500 milliGRAM(s) Oral daily  aspirin  chewable 81 milliGRAM(s) Oral daily  atorvastatin 40 milliGRAM(s) Oral at bedtime  chlorhexidine 4% Liquid 1 Application(s) Topical daily  dextrose 5%. 1000 milliLiter(s) (100 mL/Hr) IV Continuous <Continuous>  dextrose 5%. 1000 milliLiter(s) (50 mL/Hr) IV Continuous <Continuous>  dextrose 50% Injectable 25 Gram(s) IV Push once  dextrose 50% Injectable 12.5 Gram(s) IV Push once  dextrose 50% Injectable 25 Gram(s) IV Push once  glucagon  Injectable 1 milliGRAM(s) IntraMuscular once  hydroxyurea 500 milliGRAM(s) Oral <User Schedule>  insulin lispro (ADMELOG) corrective regimen sliding scale   SubCutaneous three times a day before meals  insulin lispro (ADMELOG) corrective regimen sliding scale   SubCutaneous at bedtime  levothyroxine 88 MICROGram(s) Oral daily  metoprolol tartrate 25 milliGRAM(s) Oral two times a day  multivitamin 1 Tablet(s) Oral daily    MEDICATIONS  (PRN):  dextrose Oral Gel 15 Gram(s) Oral once PRN Blood Glucose LESS THAN 70 milliGRAM(s)/deciliter      CAPILLARY BLOOD GLUCOSE      POCT Blood Glucose.: 145 mg/dL (15 Sep 2023 08:47)  POCT Blood Glucose.: 220 mg/dL (14 Sep 2023 21:48)  POCT Blood Glucose.: 145 mg/dL (14 Sep 2023 17:17)  POCT Blood Glucose.: 166 mg/dL (14 Sep 2023 12:34)    I&O's Summary    14 Sep 2023 07:01  -  15 Sep 2023 07:00  --------------------------------------------------------  IN: 0 mL / OUT: 650 mL / NET: -650 mL        PHYSICAL EXAM:  Vital Signs Last 24 Hrs  T(C): 36.7 (15 Sep 2023 06:20), Max: 36.7 (14 Sep 2023 11:56)  T(F): 98.1 (15 Sep 2023 06:20), Max: 98.1 (15 Sep 2023 06:20)  HR: 92 (15 Sep 2023 06:20) (68 - 92)  BP: 157/76 (15 Sep 2023 06:20) (111/61 - 157/76)  BP(mean): --  RR: 18 (15 Sep 2023 06:20) (18 - 20)  SpO2: 100% (15 Sep 2023 06:20) (98% - 100%)    Parameters below as of 15 Sep 2023 06:20  Patient On (Oxygen Delivery Method): room air        CONSTITUTIONAL: NAD; well-developed  HEENT: PERRL, clear conjunctiva  RESPIRATORY: Normal respiratory effort; lungs are clear to auscultation bilaterally; No Crackles/Rhonchi/Wheezing  CARDIOVASCULAR: Regular rate and rhythm, normal S1 and S2, no murmur/rub/gallop; No lower extremity edema; Peripheral pulses are 2+ bilaterally  ABDOMEN: Nontender to palpation, normoactive bowel sounds, no rebound/guarding; No hepatosplenomegaly  MUSCULOSKELETAL: no clubbing or cyanosis of digits; no joint swelling or tenderness to palpation  EXTREMITY: Lower extremities Non-tender to palpation; non-erythematous B/L  NEURO: A&Ox3; no focal deficits   PSYCH: normal mood; Affect appropriate    LABS:                        11.0   19.21 )-----------( 1255     ( 14 Sep 2023 07:19 )             36.7     09-14    136  |  103  |  26<H>  ----------------------------<  124<H>  4.9   |  23  |  0.89    Ca    10.0      14 Sep 2023 07:19  Phos  3.3     09-14  Mg     2.1     09-14    TPro  6.4  /  Alb  3.4  /  TBili  0.2  /  DBili  x   /  AST  20  /  ALT  19  /  AlkPhos  101  09-14          Urinalysis Basic - ( 14 Sep 2023 07:19 )    Color: x / Appearance: x / SG: x / pH: x  Gluc: 124 mg/dL / Ketone: x  / Bili: x / Urobili: x   Blood: x / Protein: x / Nitrite: x   Leuk Esterase: x / RBC: x / WBC x   Sq Epi: x / Non Sq Epi: x / Bacteria: x          RADIOLOGY & ADDITIONAL TESTS:    TTE (9/11)  CONCLUSIONS:      1. Small left ventricular cavity size. Left ventricular systolic function is hyperdynamic with an ejection fraction visually estimated at >75 %. There are no regional wall motion abnormalities seen.   2. No evidence of systolic anterior motion of the mitral valve seen. Peak LVOT/AV gradient of 62 mmHg appears to be due to hyperdynamic left ventricle. Aortic valve is pooly visualized.   3. Normal right ventricular cavity size, normal right ventricular wall thickness and normal right ventricular systolic function.   4. Calcified mitral leaflet tips with normal leaflet opening.   5. Trace mitral regurgitation.   6. Aortic valve not well visualized; appears calcified.   7. Trace aortic regurgitation.   8. Moderate tricuspid regurgitation.   9. Estimated pulmonary artery systolic pressure is 44 mmHg, consistent with mild pulmonary hypertension.  10. No vegetations seen. This does not exclude the presence of endocarditis. Consider ROSIBEL to evaluate for the presence of endocarditis if clinically indicated.  11. No prior echocardiogram is available for comparison.      ROSIBEL (9/13)  CONCLUSIONS:  1. Left ventricular systolic function is hyperdynamic with an ejection fraction visually estimated at >75 %.  2. Severe LVH with markedly diminished LV cavity size. Intracavitary gradient of 83 mmHg. Mild ANGELA   noted but no appreciable LVOT gradient.  3. There is no evidence of any vegetations on aortic, mitral, triscupid or pulmonic valves  4. Moderate aortic calcification seen in the descending thoracic aorta.  5. Right ventricular cavity is normal in size and normal systolic function.  6. Agitated saline injection was negative for intracardiac shunt.  7. No echocardiographic evidence of endocarditis.  8. No evidence of left atrial or left atrial appendage thrombus.  9. No pericardial effusion seen.

## 2023-09-15 NOTE — DISCHARGE NOTE NURSING/CASE MANAGEMENT/SOCIAL WORK - NSDCFUADDAPPT_GEN_ALL_CORE_FT
Please follow up with your PCP within 2 weeks of discharge.     Patient/Caregiver was provided with follow up request details and prefers to call the providers office on their own to schedule.

## 2023-09-15 NOTE — DISCHARGE NOTE NURSING/CASE MANAGEMENT/SOCIAL WORK - PATIENT PORTAL LINK FT
You can access the FollowMyHealth Patient Portal offered by [facility] by registering at the following website: [email or web address]. By joining [facility]’s FollowMyHealth portal, you will also be able to view your health information using other applications (apps) compatible with our system.

## 2023-09-15 NOTE — PROGRESS NOTE ADULT - ASSESSMENT
Ms. Rissa Terry is a 89-year-old female w/ PMH of polycythemia vera on Eliquis, h/o CVA and DVT, HTN, HLD, dementia, spontaneous SBO s/p bowel resection, and diabetes who is presenting with SOB and cough over the past several days.
89-yo F w/ PMH of PV on Eliquis, h/o CVA and DVT, spontaneous SBO s/p bowel resection, and DM, admitted with hypoxia. BCx on presentation w/ CoNS in 3 out of 4 bottles. Afebrile. WBC was 22, down to 16. Unable to assess symptoms 2/2 dementia. RVP and UA not suspicious for infection. CXR w/ no signs of pneumonia. TTE and ROSIBEL neg for vegetation    #CoNS bacteremia  #Leukocytosis  #Heart murmur  #Dementia  #At risk of aspiration  - Unclear source of CoNS in the blood. Likely contamination, however with unexplained leukocytosis. Admitted after found w/ hypoxia.  - Systolic murmurs noted on physical exam. TTE and ROSIBEL w/o vegetation.  - Can discontinue vancomycin.  - Bed head elevation. Aspiration precautions.    Plan discussed with primary team house staff.  Thank you for this consult. Inpatient ID consult team will discontinue active follow-up for this patient.    Further changes in lab values, imaging studies, or clinical status will not be known to ID inpatient consultants unless specifically communicated by primary team.    Tomas Dyer MD, PhD  Attending Physician  Division of Infectious Diseases  Department of Medicine    Please contact through MS Teams message.  Office: [phone] (after 5 PM or weekend)  
Ms. Rissa Terry is a 89-year-old female w/ PMH of polycythemia vera on Eliquis, h/o CVA and DVT, HTN, HLD, dementia, spontaneous SBO s/p bowel resection, and diabetes who is presenting with SOB and cough over the past several days.

## 2023-09-15 NOTE — PROGRESS NOTE ADULT - NUTRITIONAL ASSESSMENT
This patient has been assessed with a concern for Malnutrition and has been determined to have a diagnosis/diagnoses of Severe protein-calorie malnutrition and Underweight (BMI < 19).    This patient is being managed with:   Diet Minced and Moist-  Low Sodium  Supplement Feeding Modality:  Oral  Glucerna Shake Cans or Servings Per Day:  1       Frequency:  Two Times a day  Entered: Sep 11 2023  5:10PM  
This patient has been assessed with a concern for Malnutrition and has been determined to have a diagnosis/diagnoses of Severe protein-calorie malnutrition and Underweight (BMI < 19).    This patient is being managed with:   Diet NPO after Midnight-     NPO Start Date: 12-Sep-2023   NPO Start Time: 23:59  Except Medications  Entered: Sep 12 2023  6:15PM    Diet Minced and Moist-  Low Sodium  Supplement Feeding Modality:  Oral  Glucerna Shake Cans or Servings Per Day:  1       Frequency:  Two Times a day  Entered: Sep 11 2023  5:10PM

## 2023-09-15 NOTE — DISCHARGE NOTE NURSING/CASE MANAGEMENT/SOCIAL WORK - NSDCPEFALRISK_GEN_ALL_CORE
For information on Fall & Injury Prevention, visit: [email or web address] OR  [email or web address] OR  https://www.cdc.gov/steadi/patient.html

## 2023-09-15 NOTE — PROGRESS NOTE ADULT - PROBLEM SELECTOR PLAN 8
DVT PPx: Eliquis  Diet: Pureed  Dispo: Pending
DVT PPx: Eliquis  Diet: Pureed  Dispo: Home
DVT PPx: Eliquis  Diet: Pureed  Dispo: Pending
DVT PPx: Eliquis  Diet: Pureed  Dispo: Pending

## 2023-09-15 NOTE — PROGRESS NOTE ADULT - PROBLEM SELECTOR PLAN 1
- Pt w/ staph epi,. capitis, haemolyticus w/o clear source  - C/w vancomycin 750mg daily for 7 days, per ID  - Repeat BCx NGTD  - TTE (9/11) -> No evidence of vegetations  - ID consulted, recommend ROSIBEL to r/o endocarditis  - ROSIBEL (9/13) -> No evidence of vegetations  - will monitor off abx, per ID

## 2023-09-22 DIAGNOSIS — I82.5Z9 CHRONIC EMBOLISM AND THROMBOSIS OF UNSPECIFIED DEEP VEINS OF UNSPECIFIED DISTAL LOWER EXTREMITY: ICD-10-CM

## 2023-09-25 ENCOUNTER — APPOINTMENT (OUTPATIENT)
Dept: HEMATOLOGY ONCOLOGY | Facility: CLINIC | Age: 88
End: 2023-09-25
Payer: COMMERCIAL

## 2023-09-25 DIAGNOSIS — Z15.89 GENETIC SUSCEPTIBILITY TO OTHER DISEASE: ICD-10-CM

## 2023-09-25 PROCEDURE — 99442: CPT

## 2023-09-25 RX ORDER — HYDROXYUREA 500 MG/1
500 CAPSULE ORAL
Qty: 48 | Refills: 2 | Status: ACTIVE | OUTPATIENT
Start: 2021-05-25

## 2023-09-25 NOTE — PATIENT PROFILE ADULT - BRADEN NUTRITION
Name: Juan Romero      : 1952      MRN: [id number]  Encounter Provider: Amy Bourgeois MD  Encounter Date: 2023   Encounter department: [address] is advised to continue her current anti- hypertension medication. She is also advised to continue with her daily walking routine and maintain her calcium and vitamin D intake due to her osteopenia. She is to have a blood test and a thyroid ultrasound to monitor her thyroid nodule. She is also advised to schedule a mammogram after March 10 2024. She will be given a referral for the mammogram. She is scheduled for a follow-AWV on . She is also advised to get her shingles vaccine, but she declined. .  1. Essential hypertension  -     Comprehensive metabolic panel; Future  -     chlorthalidone 25 mg tablet; Take 1 tablet (25 mg total) by mouth daily  -     losartan (Cozaar) 50 mg tablet; Take 1 tablet (50 mg total) by mouth daily    2. Needs flu shot  -     influenza vaccine, high-dose, PF 0.7 mL (FLUZONE HIGH-DOSE)    3. BMI 34.0-34.9,adult    4. Left thyroid nodule  -     US thyroid; Future; Expected date: 2023    5. Encounter for screening mammogram for malignant neoplasm of breast  -     Mammo screening bilateral w 3d & cad; Future; Expected date: 2024    6. Osteopenia of multiple sites  -     calcium carbonate-vitamin D 500 mg-5 mcg per tablet; Take 1 tablet by mouth daily with breakfast    7. Encounter for influenza immunization           Subjective      Subjective:   Ernesto Graham, a 70-year-old female with a history of hypertension, presented for a follow-up visit. She reported no headaches, chest pain, or breathing problems. She mentioned that she had a blood test six months ago. She also mentioned a biopsy done on 2022, which showed no signs of cancer. She reported walking three hours daily and losing weight.  However, she reported having trouble sleeping, often only getting two to three hours of sleep on weekdays due to caring for a child. She sleeps better on weekends. Objective:   Physical examination revealed normal heart and lung sounds. No thyroid nodules were palpable. Her blood pressure was excellent at 120/70. Her weight was 172 lbs with a BMI of 34.72. Review of Systems    Current Outpatient Medications on File Prior to Visit   Medication Sig   • [DISCONTINUED] calcium carbonate-vitamin D 500 mg-5 mcg per tablet Take 1 tablet by mouth daily with breakfast   • [DISCONTINUED] chlorthalidone 25 mg tablet Take 1 tablet (25 mg total) by mouth daily   • [DISCONTINUED] losartan (Cozaar) 50 mg tablet Take 1 tablet (50 mg total) by mouth daily       Objective     /70 (BP Location: Left arm, Patient Position: Sitting, Cuff Size: Standard)   Pulse 100   Temp 98 °F (36.7 °C) (Tympanic)   Resp 16   Ht 4' 11" (1.499 m)   Wt 78 kg (172 lb)   LMP  (LMP Unknown)   SpO2 98%   BMI 34.74 kg/m²     Physical Exam  Karen Peralta MD  Falls Plan of Care: Balance, strength, and gait training instructions were provided. BMI Counseling: Body mass index is 34.74 kg/m². The BMI is above normal. Nutrition recommendations include reducing portion sizes. Exercise recommendations include exercising 3-5 times per week. (2) probably inadequate

## 2023-10-11 NOTE — PROGRESS NOTE ADULT - PROBLEM/PLAN-9
"Pre-op Exam            History of Present Illness  Angelique Cordon is a 43 y.o. female who presents to [facility] as a f/u after vein treatment. Subjective    Pt states she had the venous us repeated with  1/20/23 in [place].    US Venous-1/20/23-Dr. Hyoos office in [place]    Pt would like to discuss options and surgery plan for liposuction.       Patient has no known allergies.  Allergies Reconciled.    Review of Systems    All review of system has been reviewed and it  is negative except the ones note above.     Objective     /73 (BP Location: Left arm, Patient Position: Sitting, Cuff Size: Adult)   Pulse 104   Temp 95.3 øF (35.2 øC) (Temporal)   Ht 152.4 cm (60\")   Wt 64.3 kg (141 lb 12.8 oz)   SpO2 96%   BMI 27.69 kg/mý     Body mass index is 27.69 kg/mý.    Physical Exam   Cardiovascular: Normal rate.     Pulmonary/Chest  Effort normal.   :  Measurements:   Right Thigh: 85.5, 42 upper knee, ankle 29  Left Thigh: ankle 24, knee 45, thigh 65  Result Review :       Procedures         Assessment and Plan    Diagnoses and all orders for this visit:    1. Pre-operative examination (Primary)  -     Nicotine Screen, Urine - Urine, Clean Catch  -     ondansetron (Zofran) 4 MG tablet; Take 1 tablet by mouth Every 6 (Six) Hours As Needed for Nausea or Vomiting.  Dispense: 20 tablet; Refill: 0  -     acetaminophen (TYLENOL) 500 MG tablet; Take 2 tablets by mouth Every 6 (Six) Hours As Needed for Moderate Pain for up to 18 doses.  Dispense: 18 tablet; Refill: 0  -     cephalexin (KEFLEX) 500 MG capsule; Take 1 capsule by mouth 4 (Four) Times a Day for 5 days.  Dispense: 20 capsule; Refill: 0  -     gabapentin (Neurontin) 300 MG capsule; Take 1 capsule by mouth 3 (Three) Times a Day for 18 doses.  Dispense: 18 capsule; Refill: 0  -     naproxen (NAPROSYN) 250 MG tablet; Take 1 tablet by mouth 2 (Two) Times a Day As Needed for Mild Pain.  "
Dispense: 12 tablet; Refill: 0    2. Lymph edema  -     Ambulatory Referral to Lymphedema Clinic    3. Lipedema    4. Localized edema    5. Pain in both lower extremities            Plan:  Given these options, the patient has verbally expressed an understanding of the risks of surgery and finds these risks acceptable. We will proceed with surgery as soon as possible.    Medications sent to pharmacy  Urine nicotine ordered  Entered referral to Lymphedema clinic  Photos obtained in clinic  Showed pt how to wrap leg after surgery  Discussed pt will stop Wegovy 1 week prior to surgery  Plan:  Given these options, the patient has verbally expressed an understanding of the risks of surgery and finds these risks acceptable. We will proceed with surgery as soon as possible.    Medications sent to pharmacy  Urine nicotine ordered    Scribed by Summer Egan, acting as a scribe for Raffaele Willams MD, 10/12/23 10:33 EDT.  Raffaele Willams MD's signature on the note affirms that the note adequately documents the care provided.        Scribed by Summer Egan, acting as a scribe for Raffaele Willams MD, 10/12/23 08:26 EDT.  Raffaele Willams MD's signature on the note affirms that the note adequately documents the care provided.        Follow Up     Return RTC 1 week after surgery.    Patient was given instructions and counseling regarding her condition. Please see specific information pulled into the AVS if appropriate.     Raffaele Willams MD  10/12/2023  
DISPLAY PLAN FREE TEXT

## 2023-10-23 DIAGNOSIS — D47.1 CHRONIC MYELOPROLIFERATIVE DISEASE: ICD-10-CM

## 2023-12-04 NOTE — ED ADULT TRIAGE NOTE - INTERNATIONAL TRAVEL
"SUBJECTIVE:   Carmelo is a 78 year old, presenting for the following:  Medicare Visit, Hypertension (Stopped taking BP medication about a year ago, just did not renew the RX once it ran out ), and Hyperlipidemia (Stopped medication about a year ago, didn't seem to be changing his cholesterol level)        12/4/2023     9:20 AM   Additional Questions   Roomed by Karolyn   Accompanied by none         12/4/2023     9:20 AM   Patient Reported Additional Medications   Patient reports taking the following new medications Uses Herbie med to clear out his sinus area, sinus rinses        Are you in the first 12 months of your Medicare coverage?  No    Hyperlipidemia  Associated symptoms include arthralgias, coughing and myalgias. Pertinent negatives include no abdominal pain, chest pain, chills, congestion, fever, headaches, joint swelling, nausea, rash, sore throat or weakness.   Healthy Habits:     In general, how would you rate your overall health?  Excellent    Frequency of exercise:  4-5 days/week    Duration of exercise:  Greater than 60 minutes    Do you usually eat at least 4 servings of fruit and vegetables a day, include whole grains    & fiber and avoid regularly eating high fat or \"junk\" foods?  Yes    Taking medications regularly:  Yes    Medication side effects:  Not applicable    Ability to successfully perform activities of daily living:  No assistance needed    Home Safety:  No safety concerns identified    Hearing Impairment:  No hearing concerns    In the past 6 months, have you been bothered by leaking of urine? Yes    In general, how would you rate your overall mental or emotional health?  Excellent    Additional concerns today:  No      Today's PHQ-2 Score:       12/4/2023     9:14 AM   PHQ-2 ( 1999 [employer])   Q1: Little interest or pleasure in doing things 0   Q2: Feeling down, depressed or hopeless 0   PHQ-2 Score 0   Q1: Little interest or pleasure in doing things Not at all   Q2: Feeling down, depressed or " hopeless Not at all   PHQ-2 Score 0           Have you ever done Advance Care Planning? (For example, a Health Directive, POLST, or a discussion with a medical provider or your loved ones about your wishes): Yes, patient states has an Advance Care Planning document and will bring a copy to the clinic.       Fall risk  Fallen 2 or more times in the past year?: No  Any fall with injury in the past year?: No    Cognitive Screening Word score 2, Clock score 2    Patient has daytime drowsiness, was advised sleep apnea, stopped using C-pap about 2 years ago, feels like he sleeps fine now     Reviewed and updated as needed this visit by clinical staff   Tobacco  Allergies  Meds              Reviewed and updated as needed this visit by Provider                 Social History     Tobacco Use    Smoking status: Former     Packs/day: 1.00     Years: 5.00     Additional pack years: 0.00     Total pack years: 5.00     Types: Cigarettes, Cigars, Pipe     Start date: 1966     Quit date: 1974     Years since quittin.9     Passive exposure: Past    Smokeless tobacco: Never   Substance Use Topics    Alcohol use: Yes     Alcohol/week: 7.0 standard drinks of alcohol     Types: 7 Glasses of wine per week             2023    10:15 AM   Alcohol Use   Prescreen: >3 drinks/day or >7 drinks/week? No     Do you have a current opioid prescription? No  Do you use any other controlled substances or medications that are not prescribed by a provider? None        Stopped taking cholesterol meds about 1 year ago, didn't seem to be bringing his level down  Hyperlipidemia Follow-Up    Are you regularly taking any medication or supplement to lower your cholesterol?   No  Are you having muscle aches or other side effects that you think could be caused by your cholesterol lowering medication?  No    Stopped taking his BP med about a year ago, just never refilled his meds   Hypertension Follow-up    Do you check your blood pressure  regularly outside of the clinic? No   Are you following a low salt diet? Yes  Are your blood pressures ever more than 140 on the top number (systolic) OR more   than 90 on the bottom number (diastolic), for example 140/90? No    Additional provider notes: Patient presents in clinic for annual physical and follow-up on HTN, HLD.     HTN: states valsartan was not helpful, he can't remember why or if there were any issues. He is not taking anything currently. Elevated BP in clinic today.     HLD: Not taking crestor. States it was not helpful. He had muscle pain after taking it. He was started on it after a syncopal episode and had a negative cardiac work up. States he has tried Lipitor in ~ and had severe joint pain and could barely walk up the stairs.  The ASCVD Risk score (Jose Enrique GARNICA, et al., 2019) failed to calculate for the following reasons:    The patient has a prior MI or stroke diagnosis    Hx of syncopal episodes after playing golf. States there was something abnormal with the EKG. He went to [place] and went through the full cardiac work up. Work up was normal. Stress test was normal.     Mom  of Hep C they think.     Travels to [place] every winter from January - April.     Chronic sinusitis: has seen 3 ENTs and has had a septoplasty. He does saline rinse every day.     No Known Allergies    Current Outpatient Medications   Medication    Multiple Vitamins-Minerals (PRESERVISION AREDS 2 PO)    multivitamin (CENTRUM SILVER) tablet    albuterol (PROAIR HFA/PROVENTIL HFA/VENTOLIN HFA) 108 (90 Base) MCG/ACT inhaler    rosuvastatin (CRESTOR) 5 MG tablet    valsartan (DIOVAN) 80 MG tablet     No current facility-administered medications for this visit.       Past Medical History:   Diagnosis Date    Chronic sinusitis     Hoarseness 2018    Obstructive sleep apnea     Sleep apnea 2017    Tinnitus         Current providers sharing in care for this patient include:   Patient Care Team:  Cameron Marroquin,  MD as PCP - General (Internal Medicine)  Meghana Oconnor MD as MD (Otolaryngology)  Cameron Marroquin MD as Assigned PCP  Jane Calvert APRN CNP as Nurse Practitioner (Cardiovascular Disease)  Jane Calvert APRN CNP as Nurse Practitioner (Cardiovascular Disease)  Vincent Ventura MD as Assigned Heart and Vascular Provider    The following health maintenance items are reviewed in Epic and correct as of today:  Health Maintenance   Topic Date Due    ADVANCE CARE PLANNING  Never done    HEPATITIS C SCREENING  Never done    DTAP/TDAP/TD IMMUNIZATION (1 - Tdap) Never done    RSV VACCINE (Pregnancy & 60+) (1 - 1-dose 60+ series) Never done    ZOSTER IMMUNIZATION (2 of 3) 05/21/2013    MEDICARE ANNUAL WELLNESS VISIT  11/12/2022    ANNUAL REVIEW OF HM ORDERS  05/27/2023    FALL RISK ASSESSMENT  12/04/2024    LIPID  11/12/2026    COLORECTAL CANCER SCREENING  02/22/2028    PHQ-2 (once per calendar year)  Completed    INFLUENZA VACCINE  Completed    Pneumococcal Vaccine: 65+ Years  Completed    COVID-19 Vaccine  Completed    IPV IMMUNIZATION  Aged Out    HPV IMMUNIZATION  Aged Out    MENINGITIS IMMUNIZATION  Aged Out    RSV MONOCLONAL ANTIBODY  Aged Out    LUNG CANCER SCREENING  Discontinued           Review of Systems   Constitutional:  Negative for chills and fever.   HENT:  Negative for congestion, ear pain, hearing loss and sore throat.    Eyes:  Negative for pain and visual disturbance.   Respiratory:  Positive for cough and shortness of breath.    Cardiovascular:  Negative for chest pain, palpitations and peripheral edema.   Gastrointestinal:  Negative for abdominal pain, constipation, diarrhea, heartburn, hematochezia and nausea.   Genitourinary:  Positive for frequency and urgency. Negative for dysuria, genital sores, hematuria, impotence and penile discharge.   Musculoskeletal:  Positive for arthralgias and myalgias. Negative for joint swelling.   Skin:  Negative for rash.   Neurological:  Negative for  "dizziness, weakness, headaches and paresthesias.   Psychiatric/Behavioral:  Negative for mood changes. The patient is not nervous/anxious.          OBJECTIVE:   BP (!) 182/80 (BP Location: Right arm, Patient Position: Sitting, Cuff Size: Adult Large)   Pulse 79   Temp 97.1  F (36.2  C) (Oral)   Ht 1.791 m (5' 10.5\")   Wt 92.9 kg (204 lb 12.8 oz)   SpO2 98%   BMI 28.97 kg/m   Estimated body mass index is 28.97 kg/m  as calculated from the following:    Height as of this encounter: 1.791 m (5' 10.5\").    Weight as of this encounter: 92.9 kg (204 lb 12.8 oz).  Physical Exam  GENERAL: healthy, alert and no distress  EYES: Eyes grossly normal to inspection, PERRL and conjunctivae and sclerae normal  HENT: ear canals and TM's normal, nose and mouth without ulcers or lesions  NECK: no adenopathy, no asymmetry, masses, or scars and thyroid normal to palpation  RESP: lungs clear to auscultation - no rales, rhonchi or wheezes  CV: regular rate and rhythm, normal S1 S2, no S3 or S4, no murmur, click or rub, no peripheral edema and peripheral pulses strong  MS: no gross musculoskeletal defects noted, no edema  SKIN: no suspicious lesions or rashes  NEURO: Normal strength and tone, mentation intact and speech normal  PSYCH: mentation appears normal, affect normal/bright    Diagnostic Test Results:  Labs reviewed in Epic    ASSESSMENT / PLAN:       ICD-10-CM    1. Encounter for Medicare annual wellness exam  Z00.00       2. Benign essential hypertension  I10 Comprehensive metabolic panel (BMP + Alb, Alk Phos, ALT, AST, Total. Bili, TP)     lisinopril (ZESTRIL) 10 MG tablet     PRIMARY CARE FOLLOW-UP SCHEDULING      3. Mixed hyperlipidemia  E78.2 PRIMARY CARE FOLLOW-UP SCHEDULING      4. Chronic maxillary sinusitis  J32.0 fluticasone (FLONASE) 50 MCG/ACT nasal spray      5. Need for hepatitis C screening test  Z11.59 Hepatitis C Screen Reflex to HCV RNA Quant and Genotype      6. Lipid screening  Z13.220 Lipid panel reflex " "to direct LDL Fasting      7. Screening for prostate cancer  Z12.5 PSA, screen      -fasting labs done today.   -Chronic sinusitis: recommended daily use of flonase. May need to return to ENT if he continues to have reoccurrence.   -HTN: uncontrolled. He currently is not on anything. Started lisinopril. Side effects, risks and benefits of medication were discussed with patient. Discussed how and when to take medication. He will follow-up in 1 week so we can titrate dose as needed. He is leaving the state in a few weeks (after x-mas).       Patient has been advised of split billing requirements and indicates understanding: No      COUNSELING:  Reviewed preventive health counseling, as reflected in patient instructions       Regular exercise       Healthy diet/nutrition       Vision screening       Dental care       Hepatitis C screening       Prostate cancer screening      BMI:   Estimated body mass index is 28.97 kg/m  as calculated from the following:    Height as of this encounter: 1.791 m (5' 10.5\").    Weight as of this encounter: 92.9 kg (204 lb 12.8 oz).   Weight management plan: Discussed healthy diet and exercise guidelines      He reports that he quit smoking about 49 years ago. His smoking use included cigarettes, cigars, and pipe. He started smoking about 57 years ago. He has a 5.00 pack-year smoking history. He has been exposed to tobacco smoke. He has never used smokeless tobacco.      Appropriate preventive services were discussed with this patient, including applicable screening as appropriate for fall prevention, nutrition, physical activity, Tobacco-use cessation, weight loss and cognition.  Checklist reviewing preventive services available has been given to the patient.    Reviewed patients plan of care and provided an AVS. The Basic Care Plan (routine screening as documented in Health Maintenance) for Matthew meets the Care Plan requirement. This Care Plan has been established and reviewed with " the Patient.        Melia Torre [facility]    Identified Health Risks:  I have reviewed Opioid Use Disorder and Substance Use Disorder risk factors and made any needed referrals. Information on urinary incontinence and treatment options given to patient.   No

## 2023-12-15 ENCOUNTER — OUTPATIENT (OUTPATIENT)
Dept: OUTPATIENT SERVICES | Facility: HOSPITAL | Age: 88
LOS: 1 days | Discharge: HOME | End: 2023-12-15

## 2023-12-15 DIAGNOSIS — Z90.49 ACQUIRED ABSENCE OF OTHER SPECIFIED PARTS OF DIGESTIVE TRACT: Chronic | ICD-10-CM

## 2023-12-15 DIAGNOSIS — C92.10 CHRONIC MYELOID LEUKEMIA, BCR/ABL-POSITIVE, NOT HAVING ACHIEVED REMISSION: ICD-10-CM

## 2023-12-18 ENCOUNTER — APPOINTMENT (OUTPATIENT)
Dept: HEMATOLOGY ONCOLOGY | Facility: CLINIC | Age: 88
End: 2023-12-18
Payer: COMMERCIAL

## 2023-12-18 ENCOUNTER — INPATIENT (INPATIENT)
Facility: HOSPITAL | Age: 88
LOS: 1 days | Discharge: HOME HEALTH | DRG: 592 | End: 2023-12-20
Attending: STUDENT IN AN ORGANIZED HEALTH CARE EDUCATION/TRAINING PROGRAM | Admitting: STUDENT IN AN ORGANIZED HEALTH CARE EDUCATION/TRAINING PROGRAM
Payer: MEDICARE

## 2023-12-18 VITALS
TEMPERATURE: 99 F | SYSTOLIC BLOOD PRESSURE: 131 MMHG | DIASTOLIC BLOOD PRESSURE: 73 MMHG | HEART RATE: 78 BPM | WEIGHT: 110.01 LBS | OXYGEN SATURATION: 97 % | HEIGHT: 60 IN | RESPIRATION RATE: 20 BRPM

## 2023-12-18 DIAGNOSIS — Z90.49 ACQUIRED ABSENCE OF OTHER SPECIFIED PARTS OF DIGESTIVE TRACT: Chronic | ICD-10-CM

## 2023-12-18 DIAGNOSIS — D45 POLYCYTHEMIA VERA: ICD-10-CM

## 2023-12-18 LAB
ANION GAP SERPL CALC-SCNC: 12 MMOL/L — SIGNIFICANT CHANGE UP (ref 5–17)
ANION GAP SERPL CALC-SCNC: 12 MMOL/L — SIGNIFICANT CHANGE UP (ref 5–17)
BASOPHILS # BLD AUTO: 0.21 K/UL — HIGH (ref 0–0.2)
BASOPHILS # BLD AUTO: 0.21 K/UL — HIGH (ref 0–0.2)
BASOPHILS NFR BLD AUTO: 1.2 % — SIGNIFICANT CHANGE UP (ref 0–2)
BASOPHILS NFR BLD AUTO: 1.2 % — SIGNIFICANT CHANGE UP (ref 0–2)
BUN SERPL-MCNC: 22 MG/DL — SIGNIFICANT CHANGE UP (ref 7–23)
BUN SERPL-MCNC: 22 MG/DL — SIGNIFICANT CHANGE UP (ref 7–23)
CALCIUM SERPL-MCNC: 9.7 MG/DL — SIGNIFICANT CHANGE UP (ref 8.4–10.5)
CALCIUM SERPL-MCNC: 9.7 MG/DL — SIGNIFICANT CHANGE UP (ref 8.4–10.5)
CHLORIDE SERPL-SCNC: 101 MMOL/L — SIGNIFICANT CHANGE UP (ref 96–108)
CHLORIDE SERPL-SCNC: 101 MMOL/L — SIGNIFICANT CHANGE UP (ref 96–108)
CO2 SERPL-SCNC: 22 MMOL/L — SIGNIFICANT CHANGE UP (ref 22–31)
CO2 SERPL-SCNC: 22 MMOL/L — SIGNIFICANT CHANGE UP (ref 22–31)
CREAT SERPL-MCNC: 0.65 MG/DL — SIGNIFICANT CHANGE UP (ref 0.5–1.3)
CREAT SERPL-MCNC: 0.65 MG/DL — SIGNIFICANT CHANGE UP (ref 0.5–1.3)
EGFR: 84 ML/MIN/1.73M2 — SIGNIFICANT CHANGE UP
EGFR: 84 ML/MIN/1.73M2 — SIGNIFICANT CHANGE UP
EOSINOPHIL # BLD AUTO: 0.36 K/UL — SIGNIFICANT CHANGE UP (ref 0–0.5)
EOSINOPHIL # BLD AUTO: 0.36 K/UL — SIGNIFICANT CHANGE UP (ref 0–0.5)
EOSINOPHIL NFR BLD AUTO: 2.1 % — SIGNIFICANT CHANGE UP (ref 0–6)
EOSINOPHIL NFR BLD AUTO: 2.1 % — SIGNIFICANT CHANGE UP (ref 0–6)
GLUCOSE SERPL-MCNC: 125 MG/DL — HIGH (ref 70–99)
GLUCOSE SERPL-MCNC: 125 MG/DL — HIGH (ref 70–99)
HCT VFR BLD CALC: 38.6 % — SIGNIFICANT CHANGE UP (ref 34.5–45)
HCT VFR BLD CALC: 38.6 % — SIGNIFICANT CHANGE UP (ref 34.5–45)
HGB BLD-MCNC: 12 G/DL — SIGNIFICANT CHANGE UP (ref 11.5–15.5)
HGB BLD-MCNC: 12 G/DL — SIGNIFICANT CHANGE UP (ref 11.5–15.5)
IMM GRANULOCYTES NFR BLD AUTO: 0.9 % — SIGNIFICANT CHANGE UP (ref 0–0.9)
IMM GRANULOCYTES NFR BLD AUTO: 0.9 % — SIGNIFICANT CHANGE UP (ref 0–0.9)
LYMPHOCYTES # BLD AUTO: 12 % — LOW (ref 13–44)
LYMPHOCYTES # BLD AUTO: 12 % — LOW (ref 13–44)
LYMPHOCYTES # BLD AUTO: 2.07 K/UL — SIGNIFICANT CHANGE UP (ref 1–3.3)
LYMPHOCYTES # BLD AUTO: 2.07 K/UL — SIGNIFICANT CHANGE UP (ref 1–3.3)
MCHC RBC-ENTMCNC: 26.5 PG — LOW (ref 27–34)
MCHC RBC-ENTMCNC: 26.5 PG — LOW (ref 27–34)
MCHC RBC-ENTMCNC: 31.1 GM/DL — LOW (ref 32–36)
MCHC RBC-ENTMCNC: 31.1 GM/DL — LOW (ref 32–36)
MCV RBC AUTO: 85.2 FL — SIGNIFICANT CHANGE UP (ref 80–100)
MCV RBC AUTO: 85.2 FL — SIGNIFICANT CHANGE UP (ref 80–100)
MONOCYTES # BLD AUTO: 1.17 K/UL — HIGH (ref 0–0.9)
MONOCYTES # BLD AUTO: 1.17 K/UL — HIGH (ref 0–0.9)
MONOCYTES NFR BLD AUTO: 6.8 % — SIGNIFICANT CHANGE UP (ref 2–14)
MONOCYTES NFR BLD AUTO: 6.8 % — SIGNIFICANT CHANGE UP (ref 2–14)
NEUTROPHILS # BLD AUTO: 13.25 K/UL — HIGH (ref 1.8–7.4)
NEUTROPHILS # BLD AUTO: 13.25 K/UL — HIGH (ref 1.8–7.4)
NEUTROPHILS NFR BLD AUTO: 77 % — SIGNIFICANT CHANGE UP (ref 43–77)
NEUTROPHILS NFR BLD AUTO: 77 % — SIGNIFICANT CHANGE UP (ref 43–77)
NRBC # BLD: 0 /100 WBCS — SIGNIFICANT CHANGE UP (ref 0–0)
NRBC # BLD: 0 /100 WBCS — SIGNIFICANT CHANGE UP (ref 0–0)
PLATELET # BLD AUTO: 703 K/UL — HIGH (ref 150–400)
PLATELET # BLD AUTO: 703 K/UL — HIGH (ref 150–400)
POTASSIUM SERPL-MCNC: 4.6 MMOL/L — SIGNIFICANT CHANGE UP (ref 3.5–5.3)
POTASSIUM SERPL-MCNC: 4.6 MMOL/L — SIGNIFICANT CHANGE UP (ref 3.5–5.3)
POTASSIUM SERPL-SCNC: 4.6 MMOL/L — SIGNIFICANT CHANGE UP (ref 3.5–5.3)
POTASSIUM SERPL-SCNC: 4.6 MMOL/L — SIGNIFICANT CHANGE UP (ref 3.5–5.3)
RBC # BLD: 4.53 M/UL — SIGNIFICANT CHANGE UP (ref 3.8–5.2)
RBC # BLD: 4.53 M/UL — SIGNIFICANT CHANGE UP (ref 3.8–5.2)
RBC # FLD: 19.2 % — HIGH (ref 10.3–14.5)
RBC # FLD: 19.2 % — HIGH (ref 10.3–14.5)
SODIUM SERPL-SCNC: 135 MMOL/L — SIGNIFICANT CHANGE UP (ref 135–145)
SODIUM SERPL-SCNC: 135 MMOL/L — SIGNIFICANT CHANGE UP (ref 135–145)
WBC # BLD: 17.22 K/UL — HIGH (ref 3.8–10.5)
WBC # BLD: 17.22 K/UL — HIGH (ref 3.8–10.5)
WBC # FLD AUTO: 17.22 K/UL — HIGH (ref 3.8–10.5)
WBC # FLD AUTO: 17.22 K/UL — HIGH (ref 3.8–10.5)

## 2023-12-18 PROCEDURE — 74177 CT ABD & PELVIS W/CONTRAST: CPT | Mod: 26,MH

## 2023-12-18 PROCEDURE — 99285 EMERGENCY DEPT VISIT HI MDM: CPT

## 2023-12-18 PROCEDURE — 71260 CT THORAX DX C+: CPT | Mod: 26,MH

## 2023-12-18 PROCEDURE — 99205 OFFICE O/P NEW HI 60 MIN: CPT | Mod: 95

## 2023-12-18 PROCEDURE — 71045 X-RAY EXAM CHEST 1 VIEW: CPT | Mod: 26

## 2023-12-18 PROCEDURE — 99215 OFFICE O/P EST HI 40 MIN: CPT

## 2023-12-18 RX ORDER — VANCOMYCIN HCL 1 G
1000 VIAL (EA) INTRAVENOUS ONCE
Refills: 0 | Status: COMPLETED | OUTPATIENT
Start: 2023-12-18 | End: 2023-12-18

## 2023-12-18 RX ORDER — SODIUM CHLORIDE 9 MG/ML
500 INJECTION, SOLUTION INTRAVENOUS ONCE
Refills: 0 | Status: COMPLETED | OUTPATIENT
Start: 2023-12-18 | End: 2023-12-18

## 2023-12-18 RX ORDER — PIPERACILLIN AND TAZOBACTAM 4; .5 G/20ML; G/20ML
3.38 INJECTION, POWDER, LYOPHILIZED, FOR SOLUTION INTRAVENOUS ONCE
Refills: 0 | Status: COMPLETED | OUTPATIENT
Start: 2023-12-18 | End: 2023-12-18

## 2023-12-18 NOTE — ED PROVIDER NOTE - SHIFT CHANGE DETAILS
Attending MD Onofre: 89F w advanced dementia, bed bound, +sacral decub at R buttock, does not appear acutely infected, TBA vs  if can set up outpt wound care TBD

## 2023-12-18 NOTE — ED ADULT NURSE NOTE - OBJECTIVE STATEMENT
patient BIBA from home accompanied by daughter. Awake, alert & responding appropriately in soft tone of voice. As per daughter they were sent here to evaluate the pressure ulcers. Noted with stage 2 to right lower back, superficial & no escar. Stage 2, nickel size. to right buttom of buttock. yellowish colored tissue on the center.

## 2023-12-18 NOTE — ED ADULT NURSE REASSESSMENT NOTE - NS ED NURSE REASSESS COMMENT FT1
Additional labs pending at this time, delayed due to difficulty with venipuncture/IV access. ED MD team aware.

## 2023-12-18 NOTE — ED PROVIDER NOTE - BIRTH SEX
RN spoke to [facility]. They will refax labs to our office today as we have not received anything as of yet.    Female

## 2023-12-18 NOTE — HISTORY OF PRESENT ILLNESS
[Disease:__________________________] : Disease: [unfilled] [0 - No Distress] : Distress Level: 0 [60: Requires occasional assistance, but is able to care for most of his/her needs] : 60: Requires occasional assistance, but is able to care for most of his/her needs [Home] : at home, [unfilled] , at the time of the visit. 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  Patient is currently taking Hydrea 500 mg, Monday, Wednesday and Friday. Per daughter, patient is doing well. Denies fevers, chills, night sweat, unintentional weight loss, headache, dizziness, pruritus, or any other medical complaints at this time. Patient has a bedsore in her lower extremity. Pending evaluation by wound nurse.   No other changes in her medical, surgical or social history since 9/25/2023.  [FreeTextEntry3] : daughter

## 2023-12-18 NOTE — ED PROVIDER NOTE - OBJECTIVE STATEMENT
89-year-old female with PMH of prior CVA, DVT, hypothyroidism, HLD, HTN, DM, dementia, bedbound since 2019 presents to the ED sent in from home for wounds to the sacrum.  Patient daughter at bedside offers supplemental history, reports patient has been in usual state of health, no complaints, acting at her usual baseline mental status, denies fevers, chills, cough, URI symptoms.  Patient has an aide at home who noted a wound, a call was placed to the visiting nurse service and they directed patient to the emergency department to further evaluate the wound.

## 2023-12-18 NOTE — ED PROVIDER NOTE - PROGRESS NOTE DETAILS
I talked to social work, all patient will need wound care eval in the ED in order to obtain outpatient home care with the wound care nurse.  I called wound care nurse and placed the order however they are unable to see the patient today as they leave at 4 PM and have many consults to see. I discussed with the patient's daughter at bedside who has a number for a  from patient's PCP outpatient who will help arrange wound care and has done so in the past however they state they also need this wound care consult to be performed in order to arrange for home visits by wound care.  Patient would not be able to seen by wound care until at least tomorrow, will admit for further observation and wound care consultation as well as arrangement of wound care at home.  ED social work team aware. Attending MD Onofre: Signed out to overnight team, Dr. Frost, pending CTs, UA, TBA

## 2023-12-18 NOTE — ED PROVIDER NOTE - WR ORDER ID 1
Date of Operation: 10/9/2023      Preoperative Diagnosis: Failed stool diversion  Postoperative Diagnosis: same as above  Procedure Performed: Revision of colostomy, intraoperative sigmoidoscopy    Surgeon: Alexys Enrique MD  Assistant: DOTTIE Kwok PGY5, Karri Montana PGY3  Anesthesiologist: Dr. Burgess  Anesthesia: General     EBL: 10cc  Findings: right sided distal stoma, left side proximal stoma  Specimen: none  Implants: none  Complications: None     Indication for Procedure: Damon Rod is a 50 year old male who initially presented with a sacral decubitus wound.  To promote healing, diverting loop colostomy was done however patient continues to have persistent BMs despite stoma.  It was discussed with Colorectal surgery that a revision would need to be done.  To further assess which lumen is the proximal vs distal end, an intraoperative sigmoidoscopy would be done.  IT was recommended that patient undergo revision of colostomy to end double barrel colostomy and intraoperative sigmoidoscopy. The risks and benefits of the procedure were discussed with the patient which included but were not limited to: infection, bleeding, reoperation.  Patient reported understanding of these risks and benefits and provided written consent for the procedure.    Description of Procedure:   Patient's identity was confirmed in preoperative holding and he was brought to the OR.  ID was confirmed and the patient was transferred to the operative table in lithotomy position.  General anesthesia was administered and he was intubated without any complication.  Perioperative antibiotics were administered. A timeout was performed and everyone was in agreement.     The procedure began with a sigmoidoscopy.  Using the colonoscope, the anus was intubated and despite hard stool the sigmoid was followed proximally until it was confirmed the right side of the stoma was the distal end.  Confirming this information, the abdomen portion was then  started.  The abdomen was prepped and draped in normal standard fashion.  The colostomy was circumferentially dissected from the skin and fascia then elevated allowing some length of the colon.  Hemostasis was achieved using suture and electrocautery.  The proximal and distal end of the stoma were tagged.  The large bowel was then transected and  proximal from distal end.  The distal end was stapled on an angle leaving a cuff on the antimesenteric side.      The stoma creation was then started. The proximal limb was debrided of unhealthy tissue and Sherry sutures were placed to imbricate the stoma.  The mucous fistula made from the distal end by resecting a small amount of the cuff.  The distal edge was also brooked and sutured to the skin on the right lateral side on the left.  At the end of the procedure the mucous fistula was sutured in place on the right lateral side while the proximal end was large and imbricated.  To confirm the distal end, a red rubber tube was advanced into the lumen of the mucous fistula and irrigation was poured inside.  There were significant amount of irrigation coming from the anus confirming correct orientation    Counts at the end of the case were correct.  General anesthesia was reversed and the patient was extubated without any complication.  The patient tolerated the entire procedure well without any complication and was transferred to Recovery.    Gene Kwok MD  General Surgery PGY5  If questions, please call              [id number]

## 2023-12-18 NOTE — ED ADULT NURSE REASSESSMENT NOTE - NS ED NURSE REASSESS COMMENT FT1
Report received from RADHA Graham. Pt noted to be resting in stretcher alert and oriented to person and place, per previous report patient has a h/x dementia. Pt speech is clear, able to follow commands, sensory intact, able to move extremities without difficulty. NAD noted, pt denies pain/discomfort at this time. Safety and comfort measures maintained.

## 2023-12-18 NOTE — ED PROVIDER NOTE - ATTENDING APP SHARED VISIT CONTRIBUTION OF CARE
attending Catie: 89yF h/o prior stroke, DVT, hypothyroidism, HLD, HTN, DM, dementia, bedbound since 2019 presents from home for evaluation of wounds to the sacrum.  Patient daughter at bedside offers history. Pt has been in her usual state of health, at her baseline mental status. No fever. Aide noticed the wound and sent pt to ED for evaluation. Exam as above. Will discuss with social work to possibly set up home wound care but patient might need admission to facilitate wound care visits outpatient

## 2023-12-18 NOTE — ASSESSMENT
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 -She is on Hydroxyurea 500 mg on Monday, Wednesday and Friday. -Will monitor labs monthly 9/14/23 PLT count was 1255, WBC 19, Hb 11g/dl. I recommended increasing hydroxyurea 500 mg to TIW.  2) DVT -patient is on anticoagulation with Eliquis, she is at high risk for recurrent VTE including acute DVT/PE with HARRIS 2+ PV and history of multiple DVTs, benefit of anticoagulation outweighs risk in patient's case.  4/29/22- Had a recurrent DVT and Eliquis was increased to 5 mg q 12 hrs  Diabetes: Metformin HTN: On Amlodipine, Metoprolol HLD: on Atorvastatin.   This service was provided by using telehealth. The patient was at home and [place] was at [facility]. The patient requested and participated in this encounter. The encounter last 30 minutes coordinating his/her care and counseling.  RTC 3 months.

## 2023-12-18 NOTE — ED PROVIDER NOTE - PHYSICAL EXAMINATION
GEN: Pt in NAD  PSYCH: Affect appropriate.  EYES: Sclera white w/o injection.   ENT: Head NCAT. Neck supple FROM.  RESP: CTA b/l, no wheezes, rales, or rhonchi.   CARDIAC: RRR, clear distinct S1, S2, no appreciable murmurs.  ABD: Abdomen soft, non-tender.   VASC: No edema or calf tenderness.  SKIN: Stage II sacral decubitus ulcers to the sacrum.  Unstageable decubitus ulcer to the right ischial tuberosity region.  Skin tear versus grade 2 decubitus ulcer to the right lateral lower leg.

## 2023-12-18 NOTE — ED ADULT NURSE NOTE - NSFALLHARMRISKINTERV_ED_ALL_ED
Communicate risk of Fall with Harm to all staff, patient, and family/Provide visual cue: red socks, yellow wristband, yellow gown, etc/Reinforce activity limits and safety measures with patient and family/Bed in lowest position, wheels locked, appropriate side rails in place/Call bell, personal items and telephone in reach/Instruct patient to call for assistance before getting out of bed/chair/stretcher/Non-slip footwear applied when patient is off stretcher/[place] to call system/Physically safe environment - no spills, clutter or unnecessary equipment/Purposeful Proactive Rounding/Room/bathroom lighting operational, light cord in reach Communicate risk of Fall with Harm to all staff, patient, and family/Provide visual cue: red socks, yellow wristband, yellow gown, etc/Reinforce activity limits and safety measures with patient and family/Bed in lowest position, wheels locked, appropriate side rails in place/Call bell, personal items and telephone in reach/Instruct patient to call for assistance before getting out of bed/chair/stretcher/Non-slip footwear applied when patient is off stretcher/[place] to call system/Physically safe environment - no spills, clutter or unnecessary equipment/Purposeful Proactive Rounding/Room/bathroom lighting operational, light cord in reach

## 2023-12-18 NOTE — ED PROVIDER NOTE - NS ED ATTENDING STATEMENT MOD
This was a shared visit with the KATHLEEN. I reviewed and verified the documentation and independently performed the documented:

## 2023-12-18 NOTE — REVIEW OF SYSTEMS
[Confused] : confusion [Negative] : Heme/Lymph [Fever] : no fever [Chills] : no chills [Night Sweats] : no night sweats [Fatigue] : no fatigue [Recent Change In Weight] : ~T no recent weight change [Eye Pain] : no eye pain [Dysphagia] : no dysphagia [Odynophagia] : no odynophagia [Chest Pain] : no chest pain [Shortness Of Breath] : no shortness of breath [Abdominal Pain] : no abdominal pain [Vomiting] : no vomiting [Joint Pain] : no joint pain [Skin Rash] : no skin rash [Easy Bleeding] : no tendency for easy bleeding [Easy Bruising] : no tendency for easy bruising [FreeTextEntry9] : left foot neuropathy and left heel pressure sore  [de-identified] : dementia

## 2023-12-19 DIAGNOSIS — E43 UNSPECIFIED SEVERE PROTEIN-CALORIE MALNUTRITION: ICD-10-CM

## 2023-12-19 DIAGNOSIS — F03.90 UNSPECIFIED DEMENTIA WITHOUT BEHAVIORAL DISTURBANCE: ICD-10-CM

## 2023-12-19 DIAGNOSIS — E11.9 TYPE 2 DIABETES MELLITUS WITHOUT COMPLICATIONS: ICD-10-CM

## 2023-12-19 DIAGNOSIS — L89.90 PRESSURE ULCER OF UNSPECIFIED SITE, UNSPECIFIED STAGE: ICD-10-CM

## 2023-12-19 DIAGNOSIS — Z86.718 PERSONAL HISTORY OF OTHER VENOUS THROMBOSIS AND EMBOLISM: ICD-10-CM

## 2023-12-19 DIAGNOSIS — E03.9 HYPOTHYROIDISM, UNSPECIFIED: ICD-10-CM

## 2023-12-19 DIAGNOSIS — D45 POLYCYTHEMIA VERA: ICD-10-CM

## 2023-12-19 LAB
ALBUMIN SERPL ELPH-MCNC: 3 G/DL — LOW (ref 3.3–5)
ALBUMIN SERPL ELPH-MCNC: 3 G/DL — LOW (ref 3.3–5)
ALP SERPL-CCNC: 104 U/L — SIGNIFICANT CHANGE UP (ref 40–120)
ALP SERPL-CCNC: 104 U/L — SIGNIFICANT CHANGE UP (ref 40–120)
ALT FLD-CCNC: 17 U/L — SIGNIFICANT CHANGE UP (ref 10–45)
ALT FLD-CCNC: 17 U/L — SIGNIFICANT CHANGE UP (ref 10–45)
ANION GAP SERPL CALC-SCNC: 11 MMOL/L — SIGNIFICANT CHANGE UP (ref 5–17)
ANION GAP SERPL CALC-SCNC: 11 MMOL/L — SIGNIFICANT CHANGE UP (ref 5–17)
APPEARANCE UR: CLEAR — SIGNIFICANT CHANGE UP
APPEARANCE UR: CLEAR — SIGNIFICANT CHANGE UP
APTT BLD: 42.9 SEC — HIGH (ref 24.5–35.6)
APTT BLD: 42.9 SEC — HIGH (ref 24.5–35.6)
AST SERPL-CCNC: 16 U/L — SIGNIFICANT CHANGE UP (ref 10–40)
AST SERPL-CCNC: 16 U/L — SIGNIFICANT CHANGE UP (ref 10–40)
BASE EXCESS BLDV CALC-SCNC: -0.9 MMOL/L — SIGNIFICANT CHANGE UP (ref -2–3)
BASE EXCESS BLDV CALC-SCNC: -0.9 MMOL/L — SIGNIFICANT CHANGE UP (ref -2–3)
BILIRUB SERPL-MCNC: 0.2 MG/DL — SIGNIFICANT CHANGE UP (ref 0.2–1.2)
BILIRUB SERPL-MCNC: 0.2 MG/DL — SIGNIFICANT CHANGE UP (ref 0.2–1.2)
BILIRUB UR-MCNC: NEGATIVE — SIGNIFICANT CHANGE UP
BILIRUB UR-MCNC: NEGATIVE — SIGNIFICANT CHANGE UP
BUN SERPL-MCNC: 20 MG/DL — SIGNIFICANT CHANGE UP (ref 7–23)
BUN SERPL-MCNC: 20 MG/DL — SIGNIFICANT CHANGE UP (ref 7–23)
CA-I SERPL-SCNC: 1.29 MMOL/L — SIGNIFICANT CHANGE UP (ref 1.15–1.33)
CA-I SERPL-SCNC: 1.29 MMOL/L — SIGNIFICANT CHANGE UP (ref 1.15–1.33)
CALCIUM SERPL-MCNC: 9.2 MG/DL — SIGNIFICANT CHANGE UP (ref 8.4–10.5)
CALCIUM SERPL-MCNC: 9.2 MG/DL — SIGNIFICANT CHANGE UP (ref 8.4–10.5)
CHLORIDE BLDV-SCNC: 101 MMOL/L — SIGNIFICANT CHANGE UP (ref 96–108)
CHLORIDE BLDV-SCNC: 101 MMOL/L — SIGNIFICANT CHANGE UP (ref 96–108)
CHLORIDE SERPL-SCNC: 101 MMOL/L — SIGNIFICANT CHANGE UP (ref 96–108)
CHLORIDE SERPL-SCNC: 101 MMOL/L — SIGNIFICANT CHANGE UP (ref 96–108)
CO2 BLDV-SCNC: 25 MMOL/L — SIGNIFICANT CHANGE UP (ref 22–26)
CO2 BLDV-SCNC: 25 MMOL/L — SIGNIFICANT CHANGE UP (ref 22–26)
CO2 SERPL-SCNC: 21 MMOL/L — LOW (ref 22–31)
CO2 SERPL-SCNC: 21 MMOL/L — LOW (ref 22–31)
COLOR SPEC: YELLOW — SIGNIFICANT CHANGE UP
COLOR SPEC: YELLOW — SIGNIFICANT CHANGE UP
CREAT SERPL-MCNC: 0.57 MG/DL — SIGNIFICANT CHANGE UP (ref 0.5–1.3)
CREAT SERPL-MCNC: 0.57 MG/DL — SIGNIFICANT CHANGE UP (ref 0.5–1.3)
DIFF PNL FLD: NEGATIVE — SIGNIFICANT CHANGE UP
DIFF PNL FLD: NEGATIVE — SIGNIFICANT CHANGE UP
EGFR: 86 ML/MIN/1.73M2 — SIGNIFICANT CHANGE UP
EGFR: 86 ML/MIN/1.73M2 — SIGNIFICANT CHANGE UP
FLUAV AG NPH QL: SIGNIFICANT CHANGE UP
FLUAV AG NPH QL: SIGNIFICANT CHANGE UP
FLUBV AG NPH QL: SIGNIFICANT CHANGE UP
FLUBV AG NPH QL: SIGNIFICANT CHANGE UP
GAS PNL BLDV: 131 MMOL/L — LOW (ref 136–145)
GAS PNL BLDV: 131 MMOL/L — LOW (ref 136–145)
GAS PNL BLDV: SIGNIFICANT CHANGE UP
GAS PNL BLDV: SIGNIFICANT CHANGE UP
GLUCOSE BLDC GLUCOMTR-MCNC: 110 MG/DL — HIGH (ref 70–99)
GLUCOSE BLDC GLUCOMTR-MCNC: 110 MG/DL — HIGH (ref 70–99)
GLUCOSE BLDV-MCNC: 183 MG/DL — HIGH (ref 70–99)
GLUCOSE BLDV-MCNC: 183 MG/DL — HIGH (ref 70–99)
GLUCOSE SERPL-MCNC: 175 MG/DL — HIGH (ref 70–99)
GLUCOSE SERPL-MCNC: 175 MG/DL — HIGH (ref 70–99)
GLUCOSE UR QL: NEGATIVE MG/DL — SIGNIFICANT CHANGE UP
GLUCOSE UR QL: NEGATIVE MG/DL — SIGNIFICANT CHANGE UP
HCO3 BLDV-SCNC: 24 MMOL/L — SIGNIFICANT CHANGE UP (ref 22–29)
HCO3 BLDV-SCNC: 24 MMOL/L — SIGNIFICANT CHANGE UP (ref 22–29)
HCT VFR BLDA CALC: 35 % — SIGNIFICANT CHANGE UP (ref 34.5–46.5)
HCT VFR BLDA CALC: 35 % — SIGNIFICANT CHANGE UP (ref 34.5–46.5)
HGB BLD CALC-MCNC: 11.5 G/DL — LOW (ref 11.7–16.1)
HGB BLD CALC-MCNC: 11.5 G/DL — LOW (ref 11.7–16.1)
INR BLD: 1.77 RATIO — HIGH (ref 0.85–1.18)
INR BLD: 1.77 RATIO — HIGH (ref 0.85–1.18)
KETONES UR-MCNC: NEGATIVE MG/DL — SIGNIFICANT CHANGE UP
KETONES UR-MCNC: NEGATIVE MG/DL — SIGNIFICANT CHANGE UP
LACTATE BLDV-MCNC: 1 MMOL/L — SIGNIFICANT CHANGE UP (ref 0.5–2)
LACTATE BLDV-MCNC: 1 MMOL/L — SIGNIFICANT CHANGE UP (ref 0.5–2)
LEUKOCYTE ESTERASE UR-ACNC: NEGATIVE — SIGNIFICANT CHANGE UP
LEUKOCYTE ESTERASE UR-ACNC: NEGATIVE — SIGNIFICANT CHANGE UP
MRSA PCR RESULT.: SIGNIFICANT CHANGE UP
MRSA PCR RESULT.: SIGNIFICANT CHANGE UP
NITRITE UR-MCNC: NEGATIVE — SIGNIFICANT CHANGE UP
NITRITE UR-MCNC: NEGATIVE — SIGNIFICANT CHANGE UP
PCO2 BLDV: 37 MMHG — LOW (ref 39–42)
PCO2 BLDV: 37 MMHG — LOW (ref 39–42)
PH BLDV: 7.41 — SIGNIFICANT CHANGE UP (ref 7.32–7.43)
PH BLDV: 7.41 — SIGNIFICANT CHANGE UP (ref 7.32–7.43)
PH UR: 7 — SIGNIFICANT CHANGE UP (ref 5–8)
PH UR: 7 — SIGNIFICANT CHANGE UP (ref 5–8)
PO2 BLDV: 84 MMHG — HIGH (ref 25–45)
PO2 BLDV: 84 MMHG — HIGH (ref 25–45)
POTASSIUM BLDV-SCNC: 4.2 MMOL/L — SIGNIFICANT CHANGE UP (ref 3.5–5.1)
POTASSIUM BLDV-SCNC: 4.2 MMOL/L — SIGNIFICANT CHANGE UP (ref 3.5–5.1)
POTASSIUM SERPL-MCNC: 4.3 MMOL/L — SIGNIFICANT CHANGE UP (ref 3.5–5.3)
POTASSIUM SERPL-MCNC: 4.3 MMOL/L — SIGNIFICANT CHANGE UP (ref 3.5–5.3)
POTASSIUM SERPL-SCNC: 4.3 MMOL/L — SIGNIFICANT CHANGE UP (ref 3.5–5.3)
POTASSIUM SERPL-SCNC: 4.3 MMOL/L — SIGNIFICANT CHANGE UP (ref 3.5–5.3)
PROT SERPL-MCNC: 6.4 G/DL — SIGNIFICANT CHANGE UP (ref 6–8.3)
PROT SERPL-MCNC: 6.4 G/DL — SIGNIFICANT CHANGE UP (ref 6–8.3)
PROT UR-MCNC: SIGNIFICANT CHANGE UP MG/DL
PROT UR-MCNC: SIGNIFICANT CHANGE UP MG/DL
PROTHROM AB SERPL-ACNC: 18.3 SEC — HIGH (ref 9.5–13)
PROTHROM AB SERPL-ACNC: 18.3 SEC — HIGH (ref 9.5–13)
RSV RNA NPH QL NAA+NON-PROBE: SIGNIFICANT CHANGE UP
RSV RNA NPH QL NAA+NON-PROBE: SIGNIFICANT CHANGE UP
S AUREUS DNA NOSE QL NAA+PROBE: SIGNIFICANT CHANGE UP
S AUREUS DNA NOSE QL NAA+PROBE: SIGNIFICANT CHANGE UP
SAO2 % BLDV: 97.8 % — HIGH (ref 67–88)
SAO2 % BLDV: 97.8 % — HIGH (ref 67–88)
SARS-COV-2 RNA SPEC QL NAA+PROBE: SIGNIFICANT CHANGE UP
SARS-COV-2 RNA SPEC QL NAA+PROBE: SIGNIFICANT CHANGE UP
SODIUM SERPL-SCNC: 133 MMOL/L — LOW (ref 135–145)
SODIUM SERPL-SCNC: 133 MMOL/L — LOW (ref 135–145)
SP GR SPEC: 1.02 — SIGNIFICANT CHANGE UP (ref 1–1.03)
SP GR SPEC: 1.02 — SIGNIFICANT CHANGE UP (ref 1–1.03)
UROBILINOGEN FLD QL: 0.2 MG/DL — SIGNIFICANT CHANGE UP (ref 0.2–1)
UROBILINOGEN FLD QL: 0.2 MG/DL — SIGNIFICANT CHANGE UP (ref 0.2–1)

## 2023-12-19 PROCEDURE — 99222 1ST HOSP IP/OBS MODERATE 55: CPT

## 2023-12-19 PROCEDURE — 99497 ADVNCD CARE PLAN 30 MIN: CPT | Mod: 25

## 2023-12-19 PROCEDURE — 99223 1ST HOSP IP/OBS HIGH 75: CPT

## 2023-12-19 RX ORDER — APIXABAN 2.5 MG/1
5 TABLET, FILM COATED ORAL
Refills: 0 | Status: DISCONTINUED | OUTPATIENT
Start: 2023-12-19 | End: 2023-12-20

## 2023-12-19 RX ORDER — CHLORHEXIDINE GLUCONATE 213 G/1000ML
1 SOLUTION TOPICAL
Refills: 0 | Status: DISCONTINUED | OUTPATIENT
Start: 2023-12-19 | End: 2023-12-20

## 2023-12-19 RX ORDER — LEVOTHYROXINE SODIUM 125 MCG
88 TABLET ORAL DAILY
Refills: 0 | Status: DISCONTINUED | OUTPATIENT
Start: 2023-12-19 | End: 2023-12-20

## 2023-12-19 RX ORDER — METOPROLOL TARTRATE 50 MG
25 TABLET ORAL
Refills: 0 | Status: DISCONTINUED | OUTPATIENT
Start: 2023-12-19 | End: 2023-12-20

## 2023-12-19 RX ORDER — POLYETHYLENE GLYCOL 3350 17 G/17G
17 POWDER, FOR SOLUTION ORAL DAILY
Refills: 0 | Status: DISCONTINUED | OUTPATIENT
Start: 2023-12-19 | End: 2023-12-20

## 2023-12-19 RX ORDER — SENNA PLUS 8.6 MG/1
2 TABLET ORAL AT BEDTIME
Refills: 0 | Status: DISCONTINUED | OUTPATIENT
Start: 2023-12-19 | End: 2023-12-20

## 2023-12-19 RX ORDER — QUETIAPINE FUMARATE 200 MG/1
25 TABLET, FILM COATED ORAL AT BEDTIME
Refills: 0 | Status: DISCONTINUED | OUTPATIENT
Start: 2023-12-19 | End: 2023-12-20

## 2023-12-19 RX ORDER — HYDROXYUREA 500 MG/1
500 CAPSULE ORAL
Refills: 0 | Status: DISCONTINUED | OUTPATIENT
Start: 2023-12-19 | End: 2023-12-20

## 2023-12-19 RX ADMIN — Medication 25 MILLIGRAM(S): at 18:58

## 2023-12-19 RX ADMIN — PIPERACILLIN AND TAZOBACTAM 200 GRAM(S): 4; .5 INJECTION, POWDER, LYOPHILIZED, FOR SOLUTION INTRAVENOUS at 01:09

## 2023-12-19 RX ADMIN — SODIUM CHLORIDE 500 MILLILITER(S): 9 INJECTION, SOLUTION INTRAVENOUS at 01:08

## 2023-12-19 RX ADMIN — Medication 250 MILLIGRAM(S): at 02:39

## 2023-12-19 RX ADMIN — APIXABAN 5 MILLIGRAM(S): 2.5 TABLET, FILM COATED ORAL at 18:58

## 2023-12-19 RX ADMIN — CHLORHEXIDINE GLUCONATE 1 APPLICATION(S): 213 SOLUTION TOPICAL at 18:58

## 2023-12-19 NOTE — CONSULT NOTE ADULT - ASSESSMENT
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     Wound Consult requested to assist w/ management of    Buttocks/ Sacrum TRIAD BID and prn soiling        Continue w/ attends under pads and Pericare as per protocol  RLE- medihoney dressing QD  BLE elevation & Compression  Consider MELLO/PVR as in line w/ GOC  A/P CT - appreciate pressure injury findings- will continue to monitor     GI/  findings as per primary team  Abx per Medicine/  Moisturize intact skin w/ SWEEN cream BID  Nutrition Consult for optimization in pt w/ Severe Protein Calorie Malnutrition as in line w/ GOC      encourage high quality protein, jorge/ prosource, MVI & Vit C to promote wound healing  Hyperglycemia -ADA diet and FS w/ ISS  Continue turning and positioning w/ offloading assistive devices as per protocol  Waffle Cushion to chair when oob to chair  Continue w/ low air loss pressure redistribution bed surface   Pt will need Group 2 mattress on hospital bed and ROHO cushion for wheel chair upon discharge home  Care as per medicine, will follow w/ you  Upon discharge f/u as outpatient at [facility] [address] [place] [address] [phone]  Seen w/ attng & RN and D/w team   Thank you for this consult  Rosario Gerardo PA-C CWS [id number]  Nights/ Weekends/ Holidays please call:  General Surgery Consult pager ([phone]) for emergencies  Wound PT for multilayer leg wrapping or VAC issues (x [phone])   I spent 55 minutes face to face w/ this pt of which more than 50% of the time was spent counseling & coordinating care of this pt.  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     Wound Consult requested to assist w/ management of    Buttocks/ Sacrum TRIAD BID and prn soiling        Continue w/ attends under pads and Pericare as per protocol  RLE- medihoney dressing QD  BLE elevation & Compression  Consider MELLO/PVR as in line w/ GOC  A/P CT - appreciate pressure injury findings- will continue to monitor     GI/  findings as per primary team  Abx per Medicine/  Moisturize intact skin w/ SWEEN cream BID  Nutrition Consult for optimization in pt w/ Severe Protein Calorie Malnutrition as in line w/ GOC      encourage high quality protein, jorge/ prosource, MVI & Vit C to promote wound healing  Hyperglycemia -ADA diet and FS w/ ISS  Continue turning and positioning w/ offloading assistive devices as per protocol  Waffle Cushion to chair when oob to chair  Continue w/ low air loss pressure redistribution bed surface   Pt will need Group 2 mattress on hospital bed and ROHO cushion for wheel chair upon discharge home  Care as per medicine, will follow w/ you  Upon discharge f/u as outpatient at [facility] [address] [place] [address] [phone]  Seen w/ attng & RN and D/w team   Thank you for this consult  Rosario Gerardo PA-C CWS [id number]  Nights/ Weekends/ Holidays please call:  General Surgery Consult pager ([phone]) for emergencies  Wound PT for multilayer leg wrapping or VAC issues (x [phone])   I spent 55 minutes face to face w/ this pt of which more than 50% of the time was spent counseling & coordinating care of this pt.  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     Wound Consult requested to assist w/ management of:  sacral evolving Deep Tissue injury  Rt ischium Unstageable pressure injury  RLE wound    Buttocks/ Sacrum TRIAD BID and prn soiling        Continue w/ attends under pads and Pericare as per protocol  RLE- medihoney dressing QD  BLE elevation & Compression  Consider MELLO/PVR as in line w/ GOC  A/P CT - appreciate pressure injury findings- will continue to monitor     GI/  findings as per primary team  Abx per Medicine/  Moisturize intact skin w/ SWEEN cream BID  Nutrition Consult for optimization in pt w/ Severe Protein Calorie Malnutrition as in line w/ GOC      encourage high quality protein, jorge/ prosource, MVI & Vit C to promote wound healing  Hyperglycemia -ADA diet and FS w/ ISS  Continue turning and positioning w/ offloading assistive devices as per protocol  Waffle Cushion to chair when oob to chair  Continue w/ low air loss pressure redistribution bed surface   Pt will need Group 2 mattress on hospital bed and ROHO cushion for wheel chair upon discharge home  Care as per medicine, will follow w/ you  Upon discharge f/u as outpatient at [facility] [address] [place] [address] [phone]  Seen w/ attng & RN and D/w team   Thank you for this consult  Rosario Gerardo PA-C CWS [id number]  Nights/ Weekends/ Holidays please call:  General Surgery Consult pager ([phone]) for emergencies  Wound PT for multilayer leg wrapping or VAC issues ([phone])     A/P:89F with PMHx of DVT, hypothyroidism, HTN, T2DM, and 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     Wound Consult requested to assist w/ management of:  sacral evolving Deep Tissue injury  Rt ischium Unstageable pressure injury  RLE wound    Buttocks/ Sacrum TRIAD BID and prn soiling        Continue w/ attends under pads and Pericare as per protocol  RLE- medihoney dressing QD  BLE elevation & Compression  Consider MELLO/PVR as in line w/ GOC  A/P CT - appreciate pressure injury findings- will continue to monitor     GI/  findings as per primary team  Abx per Medicine/  Moisturize intact skin w/ SWEEN cream BID  Nutrition Consult for optimization in pt w/ Severe Protein Calorie Malnutrition as in line w/ GOC      encourage high quality protein, jorge/ prosource, MVI & Vit C to promote wound healing  Hyperglycemia -ADA diet and FS w/ ISS  Continue turning and positioning w/ offloading assistive devices as per protocol  Waffle Cushion to chair when oob to chair  Continue w/ low air loss pressure redistribution bed surface   Pt will need Group 2 mattress on hospital bed and ROHO cushion for wheel chair upon discharge home  Care as per medicine, will follow w/ you  Upon discharge f/u as outpatient at [facility] [address] [place] [address] [phone]  Seen w/ attng & RN and D/w team   Thank you for this consult  Rosario Gerardo PA-C CWS [id number]  Nights/ Weekends/ Holidays please call:  General Surgery Consult pager ([phone]) for emergencies  Wound PT for multilayer leg wrapping or VAC issues ([phone])

## 2023-12-19 NOTE — H&P ADULT - ASSESSMENT
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 Per daughter aide noticed a sacral decubitus ulcer and right ischial tuberosity ulcer several days prior. Visiting PA saw patient and recommended presenting to the ED because she was unsure if wound could be managed at home. Examination in the ED with noted stage II sacral decubitus ulcer, unstageable decubitus ulcer to the right ischial tuberosity region, and skin tear versus grade 2 decubitus ulcer to the right lateral lower leg. CT of A&P showing "skin thickening overlying the right ischial tuberosity. Soft tissue reticulation and heterogeneous ill-defined fluid adjacent to the coccyx extending to the skin surface." Overall, low concern for acute infection and patient admitted for wound care assessment.

## 2023-12-19 NOTE — PATIENT PROFILE ADULT - FALL HARM RISK - HARM RISK INTERVENTIONS
Assistance with ambulation/Assistance OOB with selected safe patient handling equipment/Communicate Risk of Fall with Harm to all staff/Discuss with provider need for PT consult/Monitor gait and stability/Provide patient with walking aids - walker, cane, crutches/Reinforce activity limits and safety measures with patient and family/Tailored Fall Risk Interventions/Visual Cue: Yellow wristband and red socks/Bed in lowest position, wheels locked, appropriate side rails in place/Call bell, personal items and telephone in reach/Instruct patient to call for assistance before getting out of bed or chair/Non-slip footwear when patient is out of bed/[place] to call system/Physically safe environment - no spills, clutter or unnecessary equipment/Purposeful Proactive Rounding/Room/bathroom lighting operational, light cord in reach Assistance with ambulation/Assistance OOB with selected safe patient handling equipment/Communicate Risk of Fall with Harm to all staff/Discuss with provider need for PT consult/Monitor gait and stability/Provide patient with walking aids - walker, cane, crutches/Reinforce activity limits and safety measures with patient and family/Tailored Fall Risk Interventions/Visual Cue: Yellow wristband and red socks/Bed in lowest position, wheels locked, appropriate side rails in place/Call bell, personal items and telephone in reach/Instruct patient to call for assistance before getting out of bed or chair/Non-slip footwear when patient is out of bed/[place] to call system/Physically safe environment - no spills, clutter or unnecessary equipment/Purposeful Proactive Rounding/Room/bathroom lighting operational, light cord in reach

## 2023-12-19 NOTE — H&P ADULT - PROBLEM SELECTOR PLAN 2
- Endstage and qualifies for home hospice, daughter interested in exploring if it offers more assistance than current arrangement  - Takes Seroquel 25 mg qhs PRN insomnia  - Per GOC DNR/I, daughter with MOLST form at home

## 2023-12-19 NOTE — PATIENT PROFILE ADULT - FUNCTIONAL ASSESSMENT - BASIC MOBILITY 6.
1-calculated by average/Not able to assess (calculate [facility] method)  1-calculated by average/Not able to assess (calculate score using [facility] averaging method)

## 2023-12-19 NOTE — H&P ADULT - CONVERSATION DETAILS
GOC with daughter Magalie.    Patient with endstage dementia and fully dependent on others to perform ADLs. She has functional quadriplegia. Seems to have severe protein-calorie malnutrition. Per daughter goal is to maximize the time spent at home and they only brought her in because they were advised that her decubitus ulcer may not be able to be managed at home. Per daughter would like to keep her comfortable and okay with exploring home hospice if it provides value added benefit (she has [facility] 10 hours a day 7 days a week now).    Per daughter has MOLST form which states DNR/I.

## 2023-12-19 NOTE — ED ADULT NURSE REASSESSMENT NOTE - NS ED NURSE REASSESS COMMENT FT1
2 RN's at bedside for sterile confirmation of straight cath. 100cc clear pale yellow urine drained from bladder. Pt tolerated procedure well. Soiled diaper changed. Linen changed. Pt repositioned in stretcher for comfort.

## 2023-12-19 NOTE — ED ADULT NURSE REASSESSMENT NOTE - NS ED NURSE REASSESS COMMENT FT1
LUE peripheral IV infiltrated. Swelling noted above catheter site, cool to touch. No redness present. No signs of pain or discomfort present. Heat pack applied to area.

## 2023-12-19 NOTE — H&P ADULT - NSHPADDITIONALINFOADULT_GEN_ALL_CORE
[FreeTextEntry1] : IMPRESSION: Mr. Mao is a 54 year old man with a history of dyslipidemia and a family history of cardiomyopathy who presents for follow up of hyperlipidemia. \par \par PLAN:\par 1. His ECG today is normal, however, given his hyperlipidemia, I have asked him to schedule a Cardiac CT for further evaluation of CAD. \par 2. His blood pressure is adequately controlled, thus he will continue on diet modification.\par 3. He is off of Crestor given his elevated CPK. I will be checking a lipid profile, CMP, and CPK today. He will continue on diet modification and he will continue to stay well hydrated. \par 4. He will follow up with me after his Cardiac CT has been performed should there be any abnormalities, otherwise I will see him in 6 months.  Prepare for discharge    Home hospice?  Reinstate current services  Wound care assessment

## 2023-12-19 NOTE — CONSULT NOTE ADULT - SUBJECTIVE AND OBJECTIVE BOX
Wound SURGERY CONSULT NOTE    HPI:  Collateral obtained from daughter Magalie.    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 Per daughter aide noticed a sacral decubitus ulcer and right ischial tuberosity ulcer several days prior. Visiting PA saw patient and recommended presenting to the ED because she was unsure if wound could be managed at home. Per daughter patient is at baseline health. No noted fever. In the ED patient seems to be at baseline mentation and cognitively unable to articulate any complaints Examination in the ED with noted stage II sacral decubitus ulcer, unstageable decubitus ulcer to the right ischial tuberosity region, and skin tear versus grade 2 decubitus ulcer to the right lateral lower leg. CT of A&P showing "skin thickening overlying the right ischial tuberosity. Soft tissue reticulation and heterogeneous ill-defined fluid adjacent to the coccyx extending to the skin surface." She was given vancomycin, Zosyn and 500 cc NS. Admitted for further wound care assessment. (19 Dec 2023 05:55)        N/V/D,  BM/ Flatus,   NGT,     palp/ sob/dyspnea/ cp,       F/C/S  Wound consult requested by team to assist w/ management of      wound/ pressure injury.   Pt (unable to)  c/o pain, drainage, odor, color change,  or worsening swelling. Offloading and pericare initiated upon admission as pt Increasingly sedentary 2/2 to illness. Pt is Incontinent of urine & stool. (+)sylvester/ ostomy.   No h/o bites, scratches, falls, trauma.  Pt seen by Wound RN  CAVILON Advance/  Antonia,TRIAD/ Aquacell/ medihoney/ Allevyn foam/ dakins/ Adaptic/ DSD recommended used at home/ while awaiting consult.  Appetite good/ decreased.  weight loss.  S&S / RD consult appreciated All questions asked and answered to pt's and family's expressed understanding and satisfaction.    Current Diet: Diet, Pureed (12-19-23 @ 07:24)      PAST MEDICAL & SURGICAL HISTORY:  Benign Hypertension      Diabetes      Hypercholesteremia      Adult Hypothyroidism      Deep Vein Thrombosis (DVT)      Stroke      SBO (Small Bowel Obstruction)  08/2019 2019 novel coronavirus disease (COVID-19)      COVID-19 vaccine series completed      FH: Total Abdominal Hysterectomy and Bilateral Salpingo-Oophorectomy      S/P small bowel resection  08/2019          REVIEW OF SYSTEMS: Pt unable to offer  General/ Breast/ Skin/Vasc/ Neuro/ MSK: see HPI  All other systems negative    MEDICATIONS  (STANDING):  apixaban 5 milliGRAM(s) Oral two times a day  chlorhexidine 2% Cloths 1 Application(s) Topical <User Schedule>  hydroxyurea 500 milliGRAM(s) Oral <User Schedule>  levothyroxine 88 MICROGram(s) Oral daily  metoprolol tartrate 25 milliGRAM(s) Oral two times a day    MEDICATIONS  (PRN):  artificial  tears Solution 1 Drop(s) Both EYES two times a day PRN Dry Eyes  polyethylene glycol 3350 17 Gram(s) Oral daily PRN Constipation  QUEtiapine 25 milliGRAM(s) Oral at bedtime PRN for insomnia  senna 2 Tablet(s) Oral at bedtime PRN Constipation      Allergies    black pepper, white pepper, cumin, paprika, johnston powder, chili powder (Rash)  No Known Drug Allergies    Intolerances        SOCIAL HISTORY:  / /single/ ; (+)HHA/ lives in SNF; Former smoker, No current/ Denies smoking, ETOH, drugs    FAMILY HISTORY:  Family history of diabetes mellitus (DM) (Child)    Family history of secondary lung cancer    Family history of breast cancer (Child)     no h/o PVD or wound healing or skin/ significant problems    PHYSICAL EXAM:  Vital Signs Last 24 Hrs  T(C): 36.7 (19 Dec 2023 16:45), Max: 37 (19 Dec 2023 01:31)  T(F): 98 (19 Dec 2023 16:45), Max: 98.6 (19 Dec 2023 01:31)  HR: 99 (19 Dec 2023 16:45) (77 - 99)  BP: 121/73 (19 Dec 2023 16:45) (117/70 - 143/64)  BP(mean): --  RR: 18 (19 Dec 2023 16:45) (16 - 18)  SpO2: 97% (19 Dec 2023 16:45) (95% - 97%)    Parameters below as of 19 Dec 2023 16:45  Patient On (Oxygen Delivery Method): room air        NAD, Guarded but stable,  A&Ox3/ Alert/ Confused  cachectic/ thin, MO/ Obese, frail,  WD/ WN/ WG,  Disheveled  Total Care Sport/ Versa Care P500 / Envella Progressa bed     HEENT:  NC/AT, PERRL, EOMI, sclera clear, mucosa moist, throat clear, trachea midline, neck supple, trach  Respiratory: nonlabored w/ equal chest rise  Gastrointestinal: soft NT/ND (+)BS  (+)PEG (+)ostomy (+)NGT  : (+)sylvester/ purewick/ condom cath  Neurology:  weakened strength & sensation grossly intact, paraesthesia  nonverbal, no follow commands, paraplegic  Psych: calm/ appropriate/ flat affect/ easily agitated/ restless/ anxious/ difficult to assess  Musculoskeletal:  limited stiff / p/FROM, no deformities/ contractures  Vascular: BLE equally warm/ cool,  no cyanosis, clubbing, edema nor acute ischemia           >LE //BLE edema equal           BLE DP/PT pulses palpable          BLE hemosiderin staining/ varicose veins  Skin:  moist w/ good turgor  thin, dry, pale, frail,  ecchymosis w/o hematoma  blistering  or serosanguinous drainage  No odor, erythema, increased warmth, tenderness, induration, fluctuance, nor crepitus    LABS/ CULTURES/ RADIOLOGY:                        12.0   17.22 )-----------( 703      ( 18 Dec 2023 20:32 )             38.6       133  |  101  |  20  ----------------------------<  175      [12-19-23 @ 01:25]  4.3   |  21  |  0.57        Ca     9.2     [12-19-23 @ 01:25]    TPro  6.4  /  Alb  3.0  /  TBili  0.2  /  DBili  x   /  AST  16  /  ALT  17  /  AlkPhos  104  [12-19-23 @ 01:25]    PT/INR: PT 18.3 , INR 1.77       [12-19-23 @ 01:25]  PTT: 42.9       [12-19-23 @ 01:25]                              A/P:    Wound Consult requested to assist w/ management of    BLE elevation & Compression  Consider MELLO/PVR, Duplex, Xray, A/P BLE CT or MRI  Abx per Medicine/ ID  Moisturize intact skin w/ SWEEN cream BID  Nutrition Consult for optimization in pt w/ Severe Protein Calorie Malnutrition,        Inadequate PO intake, & Increased nutritional needs            encourage high quality protein, jorge/ prosource, MVI & Vit C to promote wound healing  Hyperglycemia - improving w/ ADA diet and Lantus/ NPH & FS w/ ISS, consider Endo Consult, consider HgA1c  Anemia- improving w/ transfusions, Fe studies, protonix  Continue turning and positioning w/ offloading assistive devices as per protocol  Buttocks/ Sacrum Antonia/ TRIAD BID /CAVILON ADVANCE TIW and prn soiling        Continue w/ attends under pads and Pericare w/ sylvester/ condom cath maintenance / purewick care as per protocol  Waffle Cushion to chair when oob to chair  Continue w/ low air loss pressure redistribution bed surface   Pt will need Group 2 mattress on hospital bed and ROHO cushion for wheel chair upon discharge home  Care as per medicine, will follow w/ you/ remain available as requested  Upon discharge f/u as outpatient at [facility] [address] [place] [address] [phone]  Seen w/ attng & RN and D/w team & RN  Thank you for this consult  Rosario Gerardo PA-C CWS [id number]  Nights/ Weekends/ Holidays please call:  General Surgery Consult pager [phone]) for emergencies  Wound PT for multilayer leg wrapping or VAC issues (x [phone])      Wound SURGERY CONSULT NOTE    HPI:  Collateral obtained from daughter Magalie.    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 Per daughter aide noticed a sacral decubitus ulcer and right ischial tuberosity ulcer several days prior. Visiting PA saw patient and recommended presenting to the ED because she was unsure if wound could be managed at home. Per daughter patient is at baseline health. No noted fever. In the ED patient seems to be at baseline mentation and cognitively unable to articulate any complaints Examination in the ED with noted stage II sacral decubitus ulcer, unstageable decubitus ulcer to the right ischial tuberosity region, and skin tear versus grade 2 decubitus ulcer to the right lateral lower leg. CT of A&P showing "skin thickening overlying the right ischial tuberosity. Soft tissue reticulation and heterogeneous ill-defined fluid adjacent to the coccyx extending to the skin surface." She was given vancomycin, Zosyn and 500 cc NS. Admitted for further wound care assessment. (19 Dec 2023 05:55)        N/V/D,  BM/ Flatus,   NGT,     palp/ sob/dyspnea/ cp,       F/C/S  Wound consult requested by team to assist w/ management of      wound/ pressure injury.   Pt (unable to)  c/o pain, drainage, odor, color change,  or worsening swelling. Offloading and pericare initiated upon admission as pt Increasingly sedentary 2/2 to illness. Pt is Incontinent of urine & stool. (+)sylvester/ ostomy.   No h/o bites, scratches, falls, trauma.  Pt seen by Wound RN  CAVILON Advance/  Antonia,TRIAD/ Aquacell/ medihoney/ Allevyn foam/ dakins/ Adaptic/ DSD recommended used at home/ while awaiting consult.  Appetite good/ decreased.  weight loss.  S&S / RD consult appreciated All questions asked and answered to pt's and family's expressed understanding and satisfaction.    Current Diet: Diet, Pureed (12-19-23 @ 07:24)      PAST MEDICAL & SURGICAL HISTORY:  Benign Hypertension      Diabetes      Hypercholesteremia      Adult Hypothyroidism      Deep Vein Thrombosis (DVT)      Stroke      SBO (Small Bowel Obstruction)  08/2019 2019 novel coronavirus disease (COVID-19)      COVID-19 vaccine series completed      FH: Total Abdominal Hysterectomy and Bilateral Salpingo-Oophorectomy      S/P small bowel resection  08/2019          REVIEW OF SYSTEMS: Pt unable to offer  General/ Breast/ Skin/Vasc/ Neuro/ MSK: see HPI  All other systems negative    MEDICATIONS  (STANDING):  apixaban 5 milliGRAM(s) Oral two times a day  chlorhexidine 2% Cloths 1 Application(s) Topical <User Schedule>  hydroxyurea 500 milliGRAM(s) Oral <User Schedule>  levothyroxine 88 MICROGram(s) Oral daily  metoprolol tartrate 25 milliGRAM(s) Oral two times a day    MEDICATIONS  (PRN):  artificial  tears Solution 1 Drop(s) Both EYES two times a day PRN Dry Eyes  polyethylene glycol 3350 17 Gram(s) Oral daily PRN Constipation  QUEtiapine 25 milliGRAM(s) Oral at bedtime PRN for insomnia  senna 2 Tablet(s) Oral at bedtime PRN Constipation      Allergies    black pepper, white pepper, cumin, paprika, johnston powder, chili powder (Rash)  No Known Drug Allergies    Intolerances        SOCIAL HISTORY:  / /single/ ; (+)HHA/ lives in SNF; Former smoker, No current/ Denies smoking, ETOH, drugs    FAMILY HISTORY:  Family history of diabetes mellitus (DM) (Child)    Family history of secondary lung cancer    Family history of breast cancer (Child)     no h/o PVD or wound healing or skin/ significant problems    PHYSICAL EXAM:  Vital Signs Last 24 Hrs  T(C): 36.7 (19 Dec 2023 16:45), Max: 37 (19 Dec 2023 01:31)  T(F): 98 (19 Dec 2023 16:45), Max: 98.6 (19 Dec 2023 01:31)  HR: 99 (19 Dec 2023 16:45) (77 - 99)  BP: 121/73 (19 Dec 2023 16:45) (117/70 - 143/64)  BP(mean): --  RR: 18 (19 Dec 2023 16:45) (16 - 18)  SpO2: 97% (19 Dec 2023 16:45) (95% - 97%)    Parameters below as of 19 Dec 2023 16:45  Patient On (Oxygen Delivery Method): room air        NAD, Guarded but stable,  A&Ox3/ Alert/ Confused  cachectic/ thin, MO/ Obese, frail,  WD/ WN/ WG,  Disheveled  Total Care Sport/ Versa Care P500 / Envella Progressa bed     HEENT:  NC/AT, PERRL, EOMI, sclera clear, mucosa moist, throat clear, trachea midline, neck supple, trach  Respiratory: nonlabored w/ equal chest rise  Gastrointestinal: soft NT/ND (+)BS  (+)PEG (+)ostomy (+)NGT  : (+)sylvester/ purewick/ condom cath  Neurology:  weakened strength & sensation grossly intact, paraesthesia  nonverbal, no follow commands, paraplegic  Psych: calm/ appropriate/ flat affect/ easily agitated/ restless/ anxious/ difficult to assess  Musculoskeletal:  limited stiff / p/FROM, no deformities/ contractures  Vascular: BLE equally warm/ cool,  no cyanosis, clubbing, edema nor acute ischemia           >LE //BLE edema equal           BLE DP/PT pulses palpable          BLE hemosiderin staining/ varicose veins  Skin:  moist w/ good turgor  thin, dry, pale, frail,  ecchymosis w/o hematoma  blistering  or serosanguinous drainage  No odor, erythema, increased warmth, tenderness, induration, fluctuance, nor crepitus    LABS/ CULTURES/ RADIOLOGY:                        12.0   17.22 )-----------( 703      ( 18 Dec 2023 20:32 )             38.6       133  |  101  |  20  ----------------------------<  175      [12-19-23 @ 01:25]  4.3   |  21  |  0.57        Ca     9.2     [12-19-23 @ 01:25]    TPro  6.4  /  Alb  3.0  /  TBili  0.2  /  DBili  x   /  AST  16  /  ALT  17  /  AlkPhos  104  [12-19-23 @ 01:25]    PT/INR: PT 18.3 , INR 1.77       [12-19-23 @ 01:25]  PTT: 42.9       [12-19-23 @ 01:25]                              A/P:    Wound Consult requested to assist w/ management of    BLE elevation & Compression  Consider MELLO/PVR, Duplex, Xray, A/P BLE CT or MRI  Abx per Medicine/ ID  Moisturize intact skin w/ SWEEN cream BID  Nutrition Consult for optimization in pt w/ Severe Protein Calorie Malnutrition,        Inadequate PO intake, & Increased nutritional needs            encourage high quality protein, jorge/ prosource, MVI & Vit C to promote wound healing  Hyperglycemia - improving w/ ADA diet and Lantus/ NPH & FS w/ ISS, consider Endo Consult, consider HgA1c  Anemia- improving w/ transfusions, Fe studies, protonix  Continue turning and positioning w/ offloading assistive devices as per protocol  Buttocks/ Sacrum Antonia/ TRIAD BID /CAVILON ADVANCE TIW and prn soiling        Continue w/ attends under pads and Pericare w/ sylvester/ condom cath maintenance / purewick care as per protocol  Waffle Cushion to chair when oob to chair  Continue w/ low air loss pressure redistribution bed surface   Pt will need Group 2 mattress on hospital bed and ROHO cushion for wheel chair upon discharge home  Care as per medicine, will follow w/ you/ remain available as requested  Upon discharge f/u as outpatient at [facility] [address] [place] [address] [phone]  Seen w/ attng & RN and D/w team & RN  Thank you for this consult  Rosario Gerardo PA-C CWS [id number]  Nights/ Weekends/ Holidays please call:  General Surgery Consult pager [phone]) for emergencies  Wound PT for multilayer leg wrapping or VAC issues (x [phone])      Wound SURGERY CONSULT NOTE    HPI:  89F with PMHx of DVT, hypothyroidism, HTN, T2DM, and 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 Per daughter aide noticed a sacral decubitus ulcer and right ischial tuberosity ulcer several days prior. Visiting PA saw patient and recommended presenting to the ED because she was unsure if wound could be managed at home. Per daughter patient is at baseline health. No noted fever. In the ED patient seems to be at baseline mentation and cognitively unable to articulate any complaints Examination in the ED with noted stage II sacral decubitus ulcer, unstageable decubitus ulcer to the right ischial tuberosity region, and skin tear versus grade 2 decubitus ulcer to the right lateral lower leg. CT of A&P showing "skin thickening overlying the right ischial tuberosity. Soft tissue reticulation and heterogeneous ill-defined fluid adjacent to the coccyx extending to the skin surface." She was given vancomycin, Zosyn and 500 cc NS. Admitted for further wound care assessment.     Wound consult requested by team to assist w/ management of pressure injury.  Pt unable to complain.  No pain, drainage, odor, color change,  or worsening swelling noted by nursing.  Off loading and pericare initiated upon admission as pt  sedentary 2/2 to illness. Pt is Incontinent of urine & stool. No other h/o bites, scratches, falls, trauma.  Appetite questionable w/o weight loss.  Pt requiring assist w/ all ADLs.    Current Diet: Diet, Pureed (12-19-23 @ 07:24)      PAST MEDICAL & SURGICAL HISTORY:  Benign Hypertension    Diabetes    Hypercholesteremia    Adult Hypothyroidism    Deep Vein Thrombosis (DVT)    Stroke    2019 novel coronavirus disease (COVID-19)    COVID-19 vaccine series completed    s/p Total Abdominal Hysterectomy and Bilateral Salpingo-Oophorectomy    SBO (Small Bowel Obstruction)  S/P small bowel resection    REVIEW OF SYSTEMS: Pt unable to offer      MEDICATIONS  (STANDING):  apixaban 5 milliGRAM(s) Oral two times a day  chlorhexidine 2% Cloths 1 Application(s) Topical <User Schedule>  hydroxyurea 500 milliGRAM(s) Oral <User Schedule>  levothyroxine 88 MICROGram(s) Oral daily  metoprolol tartrate 25 milliGRAM(s) Oral two times a day    MEDICATIONS  (PRN):  artificial  tears Solution 1 Drop(s) Both EYES two times a day PRN Dry Eyes  polyethylene glycol 3350 17 Gram(s) Oral daily PRN Constipation  QUEtiapine 25 milliGRAM(s) Oral at bedtime PRN for insomnia  senna 2 Tablet(s) Oral at bedtime PRN Constipation      Allergies  black pepper, white pepper, cumin, paprika, johnston powder, chili powder (Rash)      SOCIAL HISTORY:  /; (+)lives w/ daughter, (+)HHA; No smoking, ETOH, drugs    FAMILY HISTORY: diabetes mellitus (DM) (Child), lung cancer, breast cancer (Child)   no h/o PVD or wound healing or skin problems    PHYSICAL EXAM:  Vital Signs Last 24 Hrs  T(C): 36.7 (19 Dec 2023 16:45), Max: 37 (19 Dec 2023 01:31)  T(F): 98 (19 Dec 2023 16:45), Max: 98.6 (19 Dec 2023 01:31)  HR: 99 (19 Dec 2023 16:45) (77 - 99)  BP: 121/73 (19 Dec 2023 16:45) (117/70 - 143/64)  BP(mean): --  RR: 18 (19 Dec 2023 16:45) (16 - 18)  SpO2: 97% (19 Dec 2023 16:45) (95% - 97%)    Parameters below as of 19 Dec 2023 16:45  Patient On (Oxygen Delivery Method): room air    NAD, Alert/ Confused, cachectic, frail,  WD/ WN/ WG  HEENT:  NC/AT, EOMI, sclera clear, mucosa moist, throat clear, trachea midline, neck supple  Respiratory: nonlabored w/ equal chest rise  Gastrointestinal: soft NT/ND   Neurology:  nonverbal, no follow commands, paraplegic  Psych: easily agitated   Musculoskeletal:  limited stiff pROM, no deformities/ contractures  Vascular: BLE equally warm,  no cyanosis, clubbing,  nor acute ischemia       no BLE DP/PT pulses palpable          BLE hemosiderin staining       Rt lateral leg ecchymosis w/ wound w/ slough       no blistering         scant serosanguinous drainage  No odor, erythema, increased warmth, tenderness, induration, fluctuance, nor crepitus  Skin: thin, dry, pale, frail,  ecchymosis w/o hematoma  Sacral evolving DTI w/ maroon / purple skin changes w/ hyperpigmentation in periwound    4cm x 4cm    w/o blistering  or drainage  Rt Ischium unstageable pressure injury     2cm x 2cm x 0.1cm     soft adherent grey slough     w/o blistering  or drainage  No odor, erythema, increased warmth, tenderness, induration, fluctuance, nor crepitus    LABS/ CULTURES/ RADIOLOGY:                        12.0   17.22 )-----------( 703      ( 18 Dec 2023 20:32 )             38.6       133  |  101  |  20  ----------------------------<  175      [12-19-23 @ 01:25]  4.3   |  21  |  0.57        Ca     9.2     [12-19-23 @ 01:25]    TPro  6.4  /  Alb  3.0  /  TBili  0.2  /  DBili  x   /  AST  16  /  ALT  17  /  AlkPhos  104  [12-19-23 @ 01:25]    PT/INR: PT 18.3 , INR 1.77       [12-19-23 @ 01:25]  PTT: 42.9       [12-19-23 @ 01:25]      ACC: [id number] EXAM:  CT ABDOMEN AND PELVIS IC   ORDERED BY:  VERNON FOWLER     ACC: [id number] EXAM:  CT CHEST IC   ORDERED BY:  VERNON FOWLER     PROCEDURE DATE:  12/18/2023          INTERPRETATION:  CLINICAL INFORMATION: Sepsis workup. Sacral wound.    COMPARISON: CT abdomen pelvis from 3/13/2023.    CONTRAST/COMPLICATIONS:  IV Contrast: Omnipaque 350 (accession [id number]), IV contrast documented   in unlinked concurrent exam (accession [id number])  80 cc administered   20   cc discarded  Oral Contrast: NONE  Complications: None reported at time of study completion    PROCEDURE:  CT of the Chest, Abdomen and Pelvis was performed.  Sagittal and coronal reformats were performed.    FINDINGS:  CHEST:  LUNGS AND LARGE AIRWAYS: No endobronchial lesion. Emphysema. A   thin-walled cyst in the left lower lobe. Bibasilar linear atelectasis.   Calcified granuloma superior segment left lower lobe. Scarring at the   lung apices.  PLEURA: Trace left pleural effusion.  VESSELS: Atherosclerotic changes of the aorta and coronary arteries. The   main pulmonary artery is borderline enlarged and measures greater than   the adjacent ascending aorta, suggestive of pulmonary arterial   hypertension.  HEART: Heart size is normal. No pericardial effusion.  MEDIASTINUM AND INA: No lymphadenopathy.  CHEST WALL AND LOWER NECK: Within normal limits.    ABDOMEN AND PELVIS:  LIVER: Within normal limits.  BILE DUCTS: Normal caliber.  GALLBLADDER: Cholelithiasis.  SPLEEN: Peripherally enhancing hypodense focus measuring 1.4 cm,   suggestive of a hemangioma, similar to prior.  PANCREAS: Within normal limits.  ADRENALS: Within normal limits.  KIDNEYS/URETERS: No hydronephrosis. Atrophic right kidney is unchanged.    BLADDER: Circumferential bladderwall thickening.  REPRODUCTIVE ORGANS: Hysterectomy.    BOWEL: No bowel obstruction. Appendix is normal. Periampullary duodenal   diverticulum. Moderate stool within the rectum with mild rectal wall   thickening and adjacent fat stranding. Wall thickening of the sigmoid   colon.  PERITONEUM: No ascites.  VESSELS: Atherosclerotic changes. Stable caliber, including focal ectasia   of the infrarenal abdominal aorta.  RETROPERITONEUM/LYMPH NODES: No lymphadenopathy.  ABDOMINAL WALL: Skin thickening in the right gluteal region adjacent to   the ischial tuberosity. Soft tissue reticulation and heterogeneous   ill-defined fluid adjacent to the coccyx extending to the skin surface.  BONES: Degenerative changes. No osseous erosion.    IMPRESSION:  No pneumonia.    Circumferential bladder wall thickening, correlate with urinalysis.    Moderate retained fecal material in the rectum with wall thickening of   the distal sigmoid colon/rectum, may reflect stercoral proctocolitis.    Skin thickening overlying the right ischial tuberosity. Soft tissue   reticulation and heterogeneous ill-defined fluid adjacent to the coccyx   extending to the skin surface. No definite osseous erosions are   identified.           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 Per daughter aide noticed a sacral decubitus ulcer and right ischial tuberosity ulcer several days prior. Visiting PA saw patient and recommended presenting to the ED because she was unsure if wound could be managed at home. Per daughter patient is at baseline health. No noted fever. In the ED patient seems to be at baseline mentation and cognitively unable to articulate any complaints Examination in the ED with noted stage II sacral decubitus ulcer, unstageable decubitus ulcer to the right ischial tuberosity region, and skin tear versus grade 2 decubitus ulcer to the right lateral lower leg. CT of A&P showing "skin thickening overlying the right ischial tuberosity. Soft tissue reticulation and heterogeneous ill-defined fluid adjacent to the coccyx extending to the skin surface." She was given vancomycin, Zosyn and 500 cc NS. Admitted for further wound care assessment.     Wound consult requested by team to assist w/ management of pressure injury.  Pt unable to complain.  No pain, drainage, odor, color change,  or worsening swelling noted by nursing.  Off loading and pericare initiated upon admission as pt  sedentary 2/2 to illness. Pt is Incontinent of urine & stool. No other h/o bites, scratches, falls, trauma.  Appetite questionable w/o weight loss.  Pt requiring assist w/ all ADLs.    Current Diet: Diet, Pureed (12-19-23 @ 07:24)      PAST MEDICAL & SURGICAL HISTORY:  Benign Hypertension    Diabetes    Hypercholesteremia    Adult Hypothyroidism    Deep Vein Thrombosis (DVT)    Stroke    2019 novel coronavirus disease (COVID-19)    COVID-19 vaccine series completed    s/p Total Abdominal Hysterectomy and Bilateral Salpingo-Oophorectomy    SBO (Small Bowel Obstruction)  S/P small bowel resection    REVIEW OF SYSTEMS: Pt unable to offer      MEDICATIONS  (STANDING):  apixaban 5 milliGRAM(s) Oral two times a day  chlorhexidine 2% Cloths 1 Application(s) Topical <User Schedule>  hydroxyurea 500 milliGRAM(s) Oral <User Schedule>  levothyroxine 88 MICROGram(s) Oral daily  metoprolol tartrate 25 milliGRAM(s) Oral two times a day    MEDICATIONS  (PRN):  artificial  tears Solution 1 Drop(s) Both EYES two times a day PRN Dry Eyes  polyethylene glycol 3350 17 Gram(s) Oral daily PRN Constipation  QUEtiapine 25 milliGRAM(s) Oral at bedtime PRN for insomnia  senna 2 Tablet(s) Oral at bedtime PRN Constipation      Allergies  black pepper, white pepper, cumin, paprika, johnston powder, chili powder (Rash)      SOCIAL HISTORY:  /; (+)lives w/ daughter, (+)HHA; No smoking, ETOH, drugs    FAMILY HISTORY: diabetes mellitus (DM) (Child), lung cancer, breast cancer (Child)   no h/o PVD or wound healing or skin problems    PHYSICAL EXAM:  Vital Signs Last 24 Hrs  T(C): 36.7 (19 Dec 2023 16:45), Max: 37 (19 Dec 2023 01:31)  T(F): 98 (19 Dec 2023 16:45), Max: 98.6 (19 Dec 2023 01:31)  HR: 99 (19 Dec 2023 16:45) (77 - 99)  BP: 121/73 (19 Dec 2023 16:45) (117/70 - 143/64)  BP(mean): --  RR: 18 (19 Dec 2023 16:45) (16 - 18)  SpO2: 97% (19 Dec 2023 16:45) (95% - 97%)    Parameters below as of 19 Dec 2023 16:45  Patient On (Oxygen Delivery Method): room air    NAD, Alert/ Confused, cachectic, frail,  WD/ WN/ WG  HEENT:  NC/AT, EOMI, sclera clear, mucosa moist, throat clear, trachea midline, neck supple  Respiratory: nonlabored w/ equal chest rise  Gastrointestinal: soft NT/ND   Neurology:  nonverbal, no follow commands, paraplegic  Psych: easily agitated   Musculoskeletal:  limited stiff pROM, no deformities/ contractures  Vascular: BLE equally warm,  no cyanosis, clubbing,  nor acute ischemia       no BLE DP/PT pulses palpable          BLE hemosiderin staining       Rt lateral leg ecchymosis w/ wound w/ slough       no blistering         scant serosanguinous drainage  No odor, erythema, increased warmth, tenderness, induration, fluctuance, nor crepitus  Skin: thin, dry, pale, frail,  ecchymosis w/o hematoma  Sacral evolving DTI w/ maroon / purple skin changes w/ hyperpigmentation in periwound    4cm x 4cm    w/o blistering  or drainage  Rt Ischium unstageable pressure injury     2cm x 2cm x 0.1cm     soft adherent grey slough     w/o blistering  or drainage  No odor, erythema, increased warmth, tenderness, induration, fluctuance, nor crepitus    LABS/ CULTURES/ RADIOLOGY:                        12.0   17.22 )-----------( 703      ( 18 Dec 2023 20:32 )             38.6       133  |  101  |  20  ----------------------------<  175      [12-19-23 @ 01:25]  4.3   |  21  |  0.57        Ca     9.2     [12-19-23 @ 01:25]    TPro  6.4  /  Alb  3.0  /  TBili  0.2  /  DBili  x   /  AST  16  /  ALT  17  /  AlkPhos  104  [12-19-23 @ 01:25]    PT/INR: PT 18.3 , INR 1.77       [12-19-23 @ 01:25]  PTT: 42.9       [12-19-23 @ 01:25]      ACC: [id number] EXAM:  CT ABDOMEN AND PELVIS IC   ORDERED BY:  VERNON FOWLER     ACC: [id number] EXAM:  CT CHEST IC   ORDERED BY:  VERNON FOWLER     PROCEDURE DATE:  12/18/2023          INTERPRETATION:  CLINICAL INFORMATION: Sepsis workup. Sacral wound.    COMPARISON: CT abdomen pelvis from 3/13/2023.    CONTRAST/COMPLICATIONS:  IV Contrast: Omnipaque 350 (accession [id number]), IV contrast documented   in unlinked concurrent exam (accession [id number])  80 cc administered   20   cc discarded  Oral Contrast: NONE  Complications: None reported at time of study completion    PROCEDURE:  CT of the Chest, Abdomen and Pelvis was performed.  Sagittal and coronal reformats were performed.    FINDINGS:  CHEST:  LUNGS AND LARGE AIRWAYS: No endobronchial lesion. Emphysema. A   thin-walled cyst in the left lower lobe. Bibasilar linear atelectasis.   Calcified granuloma superior segment left lower lobe. Scarring at the   lung apices.  PLEURA: Trace left pleural effusion.  VESSELS: Atherosclerotic changes of the aorta and coronary arteries. The   main pulmonary artery is borderline enlarged and measures greater than   the adjacent ascending aorta, suggestive of pulmonary arterial   hypertension.  HEART: Heart size is normal. No pericardial effusion.  MEDIASTINUM AND INA: No lymphadenopathy.  CHEST WALL AND LOWER NECK: Within normal limits.    ABDOMEN AND PELVIS:  LIVER: Within normal limits.  BILE DUCTS: Normal caliber.  GALLBLADDER: Cholelithiasis.  SPLEEN: Peripherally enhancing hypodense focus measuring 1.4 cm,   suggestive of a hemangioma, similar to prior.  PANCREAS: Within normal limits.  ADRENALS: Within normal limits.  KIDNEYS/URETERS: No hydronephrosis. Atrophic right kidney is unchanged.    BLADDER: Circumferential bladderwall thickening.  REPRODUCTIVE ORGANS: Hysterectomy.    BOWEL: No bowel obstruction. Appendix is normal. Periampullary duodenal   diverticulum. Moderate stool within the rectum with mild rectal wall   thickening and adjacent fat stranding. Wall thickening of the sigmoid   colon.  PERITONEUM: No ascites.  VESSELS: Atherosclerotic changes. Stable caliber, including focal ectasia   of the infrarenal abdominal aorta.  RETROPERITONEUM/LYMPH NODES: No lymphadenopathy.  ABDOMINAL WALL: Skin thickening in the right gluteal region adjacent to   the ischial tuberosity. Soft tissue reticulation and heterogeneous   ill-defined fluid adjacent to the coccyx extending to the skin surface.  BONES: Degenerative changes. No osseous erosion.    IMPRESSION:  No pneumonia.    Circumferential bladder wall thickening, correlate with urinalysis.    Moderate retained fecal material in the rectum with wall thickening of   the distal sigmoid colon/rectum, may reflect stercoral proctocolitis.    Skin thickening overlying the right ischial tuberosity. Soft tissue   reticulation and heterogeneous ill-defined fluid adjacent to the coccyx   extending to the skin surface. No definite osseous erosions are   identified.

## 2023-12-19 NOTE — H&P ADULT - HISTORY OF PRESENT ILLNESS
Collateral obtained from daughter Magalie.    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 Per daughter aide noticed a sacral decubitus ulcer and right ischial tuberosity ulcer several days prior. Visiting PA saw patient and recommended presenting to the ED because she was unsure if wound could be managed at home. Per daughter patient is at baseline health. No noted fever. In the ED patient seems to be at baseline mentation and cognitively unable to articulate any complaints Examination in the ED with noted stage II sacral decubitus ulcer, unstageable decubitus ulcer to the right ischial tuberosity region, and skin tear versus grade 2 decubitus ulcer to the right lateral lower leg. CT of A&P showing "skin thickening overlying the right ischial tuberosity. Soft tissue reticulation and heterogeneous ill-defined fluid adjacent to the coccyx extending to the skin surface." She was given vancomycin, Zosyn and 500 cc NS. Admitted for further wound care assessment.

## 2023-12-20 ENCOUNTER — TRANSCRIPTION ENCOUNTER (OUTPATIENT)
Age: 88
End: 2023-12-20

## 2023-12-20 VITALS
OXYGEN SATURATION: 97 % | DIASTOLIC BLOOD PRESSURE: 71 MMHG | TEMPERATURE: 98 F | HEART RATE: 78 BPM | RESPIRATION RATE: 18 BRPM | SYSTOLIC BLOOD PRESSURE: 117 MMHG

## 2023-12-20 PROCEDURE — 87040 BLOOD CULTURE FOR BACTERIA: CPT

## 2023-12-20 PROCEDURE — 87086 URINE CULTURE/COLONY COUNT: CPT

## 2023-12-20 PROCEDURE — 71260 CT THORAX DX C+: CPT | Mod: MH

## 2023-12-20 PROCEDURE — 84132 ASSAY OF SERUM POTASSIUM: CPT

## 2023-12-20 PROCEDURE — 82435 ASSAY OF BLOOD CHLORIDE: CPT

## 2023-12-20 PROCEDURE — 85018 HEMOGLOBIN: CPT

## 2023-12-20 PROCEDURE — 85610 PROTHROMBIN TIME: CPT

## 2023-12-20 PROCEDURE — 87641 MR-STAPH DNA AMP PROBE: CPT

## 2023-12-20 PROCEDURE — 85025 COMPLETE CBC W/AUTO DIFF WBC: CPT

## 2023-12-20 PROCEDURE — 93005 ELECTROCARDIOGRAM TRACING: CPT

## 2023-12-20 PROCEDURE — 80048 BASIC METABOLIC PNL TOTAL CA: CPT

## 2023-12-20 PROCEDURE — 74177 CT ABD & PELVIS W/CONTRAST: CPT | Mod: MH

## 2023-12-20 PROCEDURE — 36415 COLL VENOUS BLD VENIPUNCTURE: CPT

## 2023-12-20 PROCEDURE — 82947 ASSAY GLUCOSE BLOOD QUANT: CPT

## 2023-12-20 PROCEDURE — 84145 PROCALCITONIN (PCT): CPT

## 2023-12-20 PROCEDURE — 82803 BLOOD GASES ANY COMBINATION: CPT

## 2023-12-20 PROCEDURE — 87637 SARSCOV2&INF A&B&RSV AMP PRB: CPT

## 2023-12-20 PROCEDURE — 71045 X-RAY EXAM CHEST 1 VIEW: CPT

## 2023-12-20 PROCEDURE — 99285 EMERGENCY DEPT VISIT HI MDM: CPT | Mod: 25

## 2023-12-20 PROCEDURE — 80053 COMPREHEN METABOLIC PANEL: CPT

## 2023-12-20 PROCEDURE — 81003 URINALYSIS AUTO W/O SCOPE: CPT

## 2023-12-20 PROCEDURE — 84295 ASSAY OF SERUM SODIUM: CPT

## 2023-12-20 PROCEDURE — 83605 ASSAY OF LACTIC ACID: CPT

## 2023-12-20 PROCEDURE — 85014 HEMATOCRIT: CPT

## 2023-12-20 PROCEDURE — 85730 THROMBOPLASTIN TIME PARTIAL: CPT

## 2023-12-20 PROCEDURE — 96374 THER/PROPH/DIAG INJ IV PUSH: CPT

## 2023-12-20 PROCEDURE — 82962 GLUCOSE BLOOD TEST: CPT

## 2023-12-20 PROCEDURE — 80076 HEPATIC FUNCTION PANEL: CPT

## 2023-12-20 PROCEDURE — 82330 ASSAY OF CALCIUM: CPT

## 2023-12-20 PROCEDURE — 87640 STAPH A DNA AMP PROBE: CPT

## 2023-12-20 PROCEDURE — 99239 HOSP IP/OBS DSCHRG MGMT >30: CPT

## 2023-12-20 RX ADMIN — Medication 88 MICROGRAM(S): at 05:11

## 2023-12-20 RX ADMIN — APIXABAN 5 MILLIGRAM(S): 2.5 TABLET, FILM COATED ORAL at 17:02

## 2023-12-20 RX ADMIN — APIXABAN 5 MILLIGRAM(S): 2.5 TABLET, FILM COATED ORAL at 05:11

## 2023-12-20 RX ADMIN — Medication 25 MILLIGRAM(S): at 17:02

## 2023-12-20 RX ADMIN — HYDROXYUREA 500 MILLIGRAM(S): 500 CAPSULE ORAL at 09:42

## 2023-12-20 RX ADMIN — Medication 25 MILLIGRAM(S): at 05:11

## 2023-12-20 RX ADMIN — CHLORHEXIDINE GLUCONATE 1 APPLICATION(S): 213 SOLUTION TOPICAL at 05:10

## 2023-12-20 NOTE — DISCHARGE NOTE PROVIDER - PROVIDER TOKENS
FREE:[LAST:[Wound care],PHONE:[(   )    -],FAX:[(   )    -]] FREE:[LAST:[Wound care],PHONE:[(   )    -],FAX:[(   )    -]],FREE:[LAST:[mikala],FIRST:[refugio],PHONE:[(   )    -],FAX:[(   )    -],ADDRESS:[[phone]]] FREE:[LAST:[Wound care],PHONE:[(   )    -],FAX:[(   )    -]],FREE:[LAST:[mikala],FIRST:[refugio],PHONE:[(   )    -],FAX:[(   )    -],ADDRESS:[[phone]]] PROVIDER:[TOKEN:[[id number]],FOLLOWUP:[1 week]],FREE:[LAST:[Wound care],PHONE:[(   )    -],FAX:[(   )    -]],FREE:[LAST:[mikala],FIRST:[refugio],PHONE:[(   )    -],FAX:[(   )    -],ADDRESS:[[phone]]],PROVIDER:[TOKEN:[[id number]],FOLLOWUP:[1 week]] PROVIDER:[TOKEN:[[id number]],FOLLOWUP:[1 week]],FREE:[LAST:[Wound care],PHONE:[(   )    -],FAX:[(   )    -]],FREE:[LAST:[mikala],FIRST:[refugio],PHONE:[(   )    -],FAX:[(   )    -],ADDRESS:[[phone]]],PROVIDER:[TOKEN:[[id number]],FOLLOWUP:[1 week]]

## 2023-12-20 NOTE — DISCHARGE NOTE NURSING/CASE MANAGEMENT/SOCIAL WORK - NSDCPEFALRISK_GEN_ALL_CORE
For information on Fall & Injury Prevention, visit: [email or web address] OR  [email or web address] OR  https://www.cdc.gov/steadi/patient.html For information on Fall & Injury Prevention, visit: [email or web address]://[email or web address]/[email or web address]-[email or web address]-mobility OR  [email or web address]://[email or web address]/[email or web address]-[email or web address]-tips-to-avoid-injury OR  https://www.cdc.gov/steadi/patient.html

## 2023-12-20 NOTE — DISCHARGE NOTE PROVIDER - NSDCFUADDAPPT_GEN_ALL_CORE_FT
APPTS ARE READY TO BE MADE: [x ] YES    Best Family or Patient Contact (if needed):    Additional Information about above appointments (if needed):    1:   2:   3:     Other comments or requests:    APPTS ARE READY TO BE MADE: [x ] YES    Best Family or Patient Contact (if needed):    Additional Information about above appointments (if needed):    1:   2:   3:     Other comments or requests:     Patient is being discharged to [facility]. Caregiver will arrange follow up. APPTS ARE READY TO BE MADE: [x ] YES    Best Family or Patient Contact (if needed):    Additional Information about above appointments (if needed):    1:   2:   3:     Other comments or requests:     Patient is being discharged to [facility]. Caregiver will arrange follow up.

## 2023-12-20 NOTE — DISCHARGE NOTE PROVIDER - ATTENDING DISCHARGE PHYSICAL EXAMINATION:
GEN: female in NAD, appears comfortable, no diaphoresis  CV: +S1/S2, no m/r/g, no abdominal bruit, no LE edema  RESP: breathing comfortably, no respiratory accessory muscle use, CTAB, no w/r/r  GI: normoactive BS, soft, NTND, no rebounding/guarding, no palpable masses  PSYCH: flat affect, says hi  SKIN: no petechiae, ecchymosis or maculopapular rash

## 2023-12-20 NOTE — DISCHARGE NOTE PROVIDER - NPI NUMBER (FOR SYSADMIN USE ONLY) :
[UNKNOWN] [UNKNOWN],[UNKNOWN] [[id number]],[UNKNOWN],[UNKNOWN],[[id number]] [[id number]],[UNKNOWN],[UNKNOWN],[[id number]]

## 2023-12-20 NOTE — DISCHARGE NOTE PROVIDER - NSDCCPCAREPLAN_GEN_ALL_CORE_FT
PRINCIPAL DISCHARGE DIAGNOSIS  Diagnosis: Decubitus ulcers  Assessment and Plan of Treatment: Wound Consult requested to assist w/ management of: sacral evolving Deep Tissue injury, Rt ischium Unstageable pressure injury, RLE wound  1)Buttocks/ Sacrum TRIAD BID and as needed for soiling 2)RLE- medihoney dressing daily.  BLE elevation & Compression. Upon discharge f/u as outpatient at [facility] [address] [place] [address] [phone]       PRINCIPAL DISCHARGE DIAGNOSIS  Diagnosis: Decubitus ulcers  Assessment and Plan of Treatment: Wound Consult requested to assist w/ management of: sacral evolving Deep Tissue injury, Rt ischium Unstageable pressure injury, RLE wound  1)Buttocks/ Sacrum TRIAD BID and as needed for soiling 2)RLE- medihoney dressing daily.  BLE elevation & Compression. Upon discharge f/u as outpatient at [facility] Center [address] [place] [address] [phone]       PRINCIPAL DISCHARGE DIAGNOSIS  Diagnosis: Decubitus ulcers  Assessment and Plan of Treatment: Wound Consult requested to assist w/ management of: sacral evolving Deep Tissue injury, Rt ischium Unstageable pressure injury, RLE wound  1)Buttocks/ Sacrum TRIAD BID and as needed for soiling 2)RLE- medihoney dressing daily.  BLE elevation & Compression.   T&P Q 2 hrs  Upon discharge f/u as outpatient at [facility] Center [address] [place] [address] [phone]        SECONDARY DISCHARGE DIAGNOSES  Diagnosis: Dementia  Assessment and Plan of Treatment: Frequent re orientation. Assist with ADLs     PRINCIPAL DISCHARGE DIAGNOSIS  Diagnosis: Decubitus ulcers  Assessment and Plan of Treatment: Wound Consult requested to assist w/ management of: sacral evolving Deep Tissue injury, Rt ischium Unstageable pressure injury, RLE wound  1)Buttocks/ Sacrum TRIAD BID and as needed for soiling 2)RLE- medihoney dressing daily.  BLE elevation & Compression.   T&P Q 2 hrs  Upon discharge f/u as outpatient at [facility] Center [address] [place] [address] [phone]        SECONDARY DISCHARGE DIAGNOSES  Diagnosis: Dementia  Assessment and Plan of Treatment: Frequent re orientation. Assist with ADLs

## 2023-12-20 NOTE — DISCHARGE NOTE PROVIDER - CARE PROVIDER_API CALL
Wound care,   Phone: (   )    -  Fax: (   )    -  Follow Up Time:    Wound care,   Phone: (   )    -  Fax: (   )    -  Follow Up Time:     refugio bach  [phone]  Phone: (   )    -  Fax: (   )    -  Follow Up Time:    Wound care,   Phone: (   )    -  Fax: (   )    -  Follow Up Time:     refugio bach  [phone]  Phone: (   )    -  Fax: (   )    -  Follow Up Time:    REFUGIO ROSAS [address] [address]  Phone: ()-  Fax: ()-  Follow Up Time: 1 week    Wound care,   Phone: (   )    -  Fax: (   )    -  Follow Up Time:     refugio rosas  [phone]  Phone: (   )    -  Fax: (   )    -  Follow Up Time:     Janett Santizo  Wound Care  [address] [place] [address], [address]  [place] [address]  Phone: [phone]  Fax: [phone]  Follow Up Time: 1 week   REFUGIO ROSAS [address] [address]  Phone: ()-  Fax: ()-  Follow Up Time: 1 week    Wound care,   Phone: (   )    -  Fax: (   )    -  Follow Up Time:     refugio rosas  [phone]  Phone: (   )    -  Fax: (   )    -  Follow Up Time:     Janett Santizo  Wound Care  [address] [place] [address], [address]  [place] [address]  Phone: [phone]  Fax: [phone]  Follow Up Time: 1 week

## 2023-12-20 NOTE — DISCHARGE NOTE PROVIDER - HOSPITAL COURSE
HPI:  Collateral obtained from daughter Magalie.    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 Per daughter aide noticed a sacral decubitus ulcer and right ischial tuberosity ulcer several days prior. Visiting PA saw patient and recommended presenting to the ED because she was unsure if wound could be managed at home. Per daughter patient is at baseline health. No noted fever. In the ED patient seems to be at baseline mentation and cognitively unable to articulate any complaints Examination in the ED with noted stage II sacral decubitus ulcer, unstageable decubitus ulcer to the right ischial tuberosity region, and skin tear versus grade 2 decubitus ulcer to the right lateral lower leg. CT of A&P showing "skin thickening overlying the right ischial tuberosity. Soft tissue reticulation and heterogeneous ill-defined fluid adjacent to the coccyx extending to the skin surface." She was given vancomycin, Zosyn and 500 cc NS. Admitted for further wound care assessment. (19 Dec 2023 05:55)    Hospital Course:  Wound care saw the patient . To CW wound care as recommended. PT recommended      Important Medication Changes and Reason:    Active or Pending Issues Requiring Follow-up:    Advanced Directives:   [ ] Full code  [ ] DNR  [ ] Hospice    Discharge Diagnoses:         HPI:  Collateral obtained from daughter Magalie.    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 Per daughter aide noticed a sacral decubitus ulcer and right ischial tuberosity ulcer several days prior. Visiting PA saw patient and recommended presenting to the ED because she was unsure if wound could be managed at home. Per daughter patient is at baseline health. No noted fever. In the ED patient seems to be at baseline mentation and cognitively unable to articulate any complaints Examination in the ED with noted stage II sacral decubitus ulcer, unstageable decubitus ulcer to the right ischial tuberosity region, and skin tear versus grade 2 decubitus ulcer to the right lateral lower leg. CT of A&P showing "skin thickening overlying the right ischial tuberosity. Soft tissue reticulation and heterogeneous ill-defined fluid adjacent to the coccyx extending to the skin surface." She was given vancomycin, Zosyn and 500 cc NS. Admitted for further wound care assessment. (19 Dec 2023 05:55)    Hospital Course:  Wound Consult requested to assist w/ management of: sacral evolving Deep Tissue injury, Rt ischium Unstageable pressure injury, RLE wound  1)Buttocks/ Sacrum TRIAD BID and prn soiling 2)RLE- medihoney dressing QD  BLE elevation & Compression. Upon [facility] [address] [place] [address] [phone]          Important Medication Changes and Reason:    Active or Pending Issues Requiring Follow-up:    Advanced Directives:   [ ] Full code  [ ] DNR  [ ] Hospice    Discharge Diagnoses:         HPI:  Collateral obtained from daughter Magalie.    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 Per daughter aide noticed a sacral decubitus ulcer and right ischial tuberosity ulcer several days prior. Visiting PA saw patient and recommended presenting to the ED because she was unsure if wound could be managed at home. Per daughter patient is at baseline health. No noted fever. In the ED patient seems to be at baseline mentation and cognitively unable to articulate any complaints Examination in the ED with noted stage II sacral decubitus ulcer, unstageable decubitus ulcer to the right ischial tuberosity region, and skin tear versus grade 2 decubitus ulcer to the right lateral lower leg. CT of A&P showing "skin thickening overlying the right ischial tuberosity. Soft tissue reticulation and heterogeneous ill-defined fluid adjacent to the coccyx extending to the skin surface." She was given vancomycin, Zosyn and 500 cc NS. Admitted for further wound care assessment. (19 Dec 2023 05:55)    Hospital Course:  Wound Consult requested to assist w/ management of: sacral evolving Deep Tissue injury, Rt ischium Unstageable pressure injury, RLE wound  1)Buttocks/ Sacrum TRIAD BID and prn soiling 2)RLE- medihoney dressing QD  BLE elevation & Compression. Upon discharge f/u as outpatient at [facility] [address] [place] [address] [phone]          Important Medication Changes and Reason:    Active or Pending Issues Requiring Follow-up:    Advanced Directives:   [ ] Full code  [ ] DNR  [ ] Hospice    Discharge Diagnoses:         HPI:  Collateral obtained from daughter Magalie.    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 Per daughter aide noticed a sacral decubitus ulcer and right ischial tuberosity ulcer several days prior. Visiting PA saw patient and recommended presenting to the ED because she was unsure if wound could be managed at home. Per daughter patient is at baseline health. No noted fever. In the ED patient seems to be at baseline mentation and cognitively unable to articulate any complaints Examination in the ED with noted stage II sacral decubitus ulcer, unstageable decubitus ulcer to the right ischial tuberosity region, and skin tear versus grade 2 decubitus ulcer to the right lateral lower leg. CT of A&P showing "skin thickening overlying the right ischial tuberosity. Soft tissue reticulation and heterogeneous ill-defined fluid adjacent to the coccyx extending to the skin surface." She was given vancomycin, Zosyn and 500 cc NS. Admitted for further wound care assessment. (19 Dec 2023 05:55)    Hospital Course:  Examination in the ED with noted stage II sacral decubitus ulcer, unstageable decubitus ulcer to the right ischial tuberosity region, and skin tear versus grade 2 decubitus ulcer to the right lateral lower leg. CT of A&P showing "skin thickening overlying the right ischial tuberosity. Soft tissue reticulation and heterogeneous ill-defined fluid adjacent to the coccyx extending to the skin surface." Overall, low concern for acute infection and patient admitted for wound care assessment.     Patient with  endstage  dementia, and qualifies for home hospice, daughter interested in exploring if it offers more assistance than current arrangement.    Wound Consult requested to assist w/ management of: sacral evolving Deep Tissue injury, Rt ischium Unstageable pressure injury, RLE wound  1)Buttocks/ Sacrum TRIAD BID and prn soiling 2)RLE- medihoney dressing QD  BLE elevation & Compression. Upon discharge f/u as outpatient at [facility] [address] [place] [address] [phone]      Important Medication Changes and Reason:    Active or Pending Issues Requiring Follow-up:    Advanced Directives:   [ ] Full code  [ x] DNR  [ ] Hospice    Discharge Diagnoses:  Decubitus ulcers  Dementia         HPI:  Collateral obtained from daughter Magalie.    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 Per daughter aide noticed a sacral decubitus ulcer and right ischial tuberosity ulcer several days prior. Visiting PA saw patient and recommended presenting to the ED because she was unsure if wound could be managed at home. Per daughter patient is at baseline health. No noted fever. In the ED patient seems to be at baseline mentation and cognitively unable to articulate any complaints Examination in the ED with noted stage II sacral decubitus ulcer, unstageable decubitus ulcer to the right ischial tuberosity region, and skin tear versus grade 2 decubitus ulcer to the right lateral lower leg. CT of A&P showing "skin thickening overlying the right ischial tuberosity. Soft tissue reticulation and heterogeneous ill-defined fluid adjacent to the coccyx extending to the skin surface." She was given vancomycin, Zosyn and 500 cc NS. Admitted for further wound care assessment. (19 Dec 2023 05:55)    Hospital Course:  Examination in the ED with noted stage II sacral decubitus ulcer, unstageable decubitus ulcer to the right ischial tuberosity region, and skin tear versus grade 2 decubitus ulcer to the right lateral lower leg. CT of A&P showing "skin thickening overlying the right ischial tuberosity. Soft tissue reticulation and heterogeneous ill-defined fluid adjacent to the coccyx extending to the skin surface." Overall, low concern for acute infection and patient admitted for wound care assessment.     Patient with  endstage  dementia, and qualifies for home hospice, daughter interested in exploring if it offers more assistance than current arrangement.    Wound Consult requested to assist w/ management of: sacral evolving Deep Tissue injury, Rt ischium Unstageable pressure injury, RLE wound  1)Buttocks/ Sacrum TRIAD BID and prn soiling 2)RLE- medihoney dressing QD  BLE elevation & Compression. Upon discharge f/u as outpatient at [facility] [address] [place] [address] [phone]      Important Medication Changes and Reason:    Active or Pending Issues Requiring Follow-up:    Advanced Directives:   [ ] Full code  [ x] DNR  [ ] Hospice    Discharge Diagnoses:  Decubitus ulcers  Dementia         HPI:  Collateral obtained from daughter Magalie.    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 Per daughter aide noticed a sacral decubitus ulcer and right ischial tuberosity ulcer several days prior. Visiting PA saw patient and recommended presenting to the ED because she was unsure if wound could be managed at home. Per daughter patient is at baseline health. No noted fever. In the ED patient seems to be at baseline mentation and cognitively unable to articulate any complaints Examination in the ED with noted stage II sacral decubitus ulcer, unstageable decubitus ulcer to the right ischial tuberosity region, and skin tear versus grade 2 decubitus ulcer to the right lateral lower leg. CT of A&P showing "skin thickening overlying the right ischial tuberosity. Soft tissue reticulation and heterogeneous ill-defined fluid adjacent to the coccyx extending to the skin surface." She was given vancomycin, Zosyn and 500 cc NS. Admitted for further wound care assessment. (19 Dec 2023 05:55)    Hospital Course:  Examination in the ED with noted stage II sacral decubitus ulcer, unstageable decubitus ulcer to the right ischial tuberosity region, and skin tear versus grade 2 decubitus ulcer to the right lateral lower leg. CT of A&P showing "skin thickening overlying the right ischial tuberosity. Soft tissue reticulation and heterogeneous ill-defined fluid adjacent to the coccyx extending to the skin surface." Overall, low concern for acute infection and patient admitted for wound care assessment.     Patient with  endstage  dementia, and qualifies for home hospice, daughter interested in exploring if it offers more assistance than current arrangement.  Family to follow up out patient for home hospice set up.   Wound Consult requested to assist w/ management of: sacral evolving Deep Tissue injury, Rt ischium Unstageable pressure injury, RLE wound  1)Buttocks/ Sacrum TRIAD BID and prn soiling 2)RLE- medihoney dressing QD  BLE elevation & Compression. Upon discharge f/u as outpatient at [facility] [address] [facility] [phone]  Medically cleared for DC back home. Dispo/Disch/Med rec DW Dr Stern.      Important Medication Changes and Reason:  None  Active or Pending Issues Requiring Follow-up:  Wound care  Advanced Directives:   [ ] Full code  [ x] DNR  [ ] Hospice    Discharge Diagnoses:  Decubitus ulcers  Dementia         HPI:  Collateral obtained from daughter Maglaie.    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 Per daughter aide noticed a sacral decubitus ulcer and right ischial tuberosity ulcer several days prior. Visiting PA saw patient and recommended presenting to the ED because she was unsure if wound could be managed at home. Per daughter patient is at baseline health. No noted fever. In the ED patient seems to be at baseline mentation and cognitively unable to articulate any complaints Examination in the ED with noted stage II sacral decubitus ulcer, unstageable decubitus ulcer to the right ischial tuberosity region, and skin tear versus grade 2 decubitus ulcer to the right lateral lower leg. CT of A&P showing "skin thickening overlying the right ischial tuberosity. Soft tissue reticulation and heterogeneous ill-defined fluid adjacent to the coccyx extending to the skin surface." She was given vancomycin, Zosyn and 500 cc NS. Admitted for further wound care assessment. (19 Dec 2023 05:55)    Hospital Course:  Examination in the ED with noted stage II sacral decubitus ulcer, unstageable decubitus ulcer to the right ischial tuberosity region, and skin tear versus grade 2 decubitus ulcer to the right lateral lower leg. CT of A&P showing "skin thickening overlying the right ischial tuberosity. Soft tissue reticulation and heterogeneous ill-defined fluid adjacent to the coccyx extending to the skin surface." Overall, low concern for acute infection and patient admitted for wound care assessment.     Patient with  endstage  dementia, and qualifies for home hospice, daughter interested in exploring if it offers more assistance than current arrangement.  Family to follow up out patient for home hospice set up.   Wound Consult requested to assist w/ management of: sacral evolving Deep Tissue injury, Rt ischium Unstageable pressure injury, RLE wound  1)Buttocks/ Sacrum TRIAD BID and prn soiling 2)RLE- medihoney dressing QD  BLE elevation & Compression. Upon discharge f/u as outpatient at [facility] [address] [facility] [phone]  Medically cleared for DC back home. Dispo/Disch/Med rec DW Dr Stern.      Important Medication Changes and Reason:  None  Active or Pending Issues Requiring Follow-up:  Wound care  Advanced Directives:   [ ] Full code  [ x] DNR  [ ] Hospice    Discharge Diagnoses:  Decubitus ulcers  Dementia         HPI:  Collateral obtained from daughter Magalie.    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 Per daughter aide noticed a sacral decubitus ulcer and right ischial tuberosity ulcer several days prior. Visiting PA saw patient and recommended presenting to the ED because she was unsure if wound could be managed at home. Per daughter patient is at baseline health. No noted fever. In the ED patient seems to be at baseline mentation and cognitively unable to articulate any complaints Examination in the ED with noted stage II sacral decubitus ulcer, unstageable decubitus ulcer to the right ischial tuberosity region, and skin tear versus grade 2 decubitus ulcer to the right lateral lower leg. CT of A&P showing "skin thickening overlying the right ischial tuberosity. Soft tissue reticulation and heterogeneous ill-defined fluid adjacent to the coccyx extending to the skin surface." She was given vancomycin, Zosyn and 500 cc NS. Admitted for further wound care assessment. (19 Dec 2023 05:55)    Hospital Course:  Examination in the ED with noted stage II sacral decubitus ulcer, unstageable decubitus ulcer to the right ischial tuberosity region, and skin tear versus grade 2 decubitus ulcer to the right lateral lower leg. CT of A&P showing "skin thickening overlying the right ischial tuberosity. Soft tissue reticulation and heterogeneous ill-defined fluid adjacent to the coccyx extending to the skin surface." Overall, low concern for acute infection and patient admitted for wound care assessment.     -Patient with  endstage  dementia, and qualifies for home hospice, daughter interested in exploring if it offers more assistance than current arrangement.  -Family to follow up out patient for home hospice set up.     -Wound Consult requested to assist w/ management of: sacral evolving Deep Tissue injury, Rt ischium Unstageable pressure injury, RLE wound  1)Buttocks/ Sacrum TRIAD BID and prn soiling 2)RLE- medihoney dressing QD  BLE elevation & Compression. Upon discharge f/u as outpatient at [facility] [address] [facility] [phone]  Medically cleared for DC back home. Dispo/Disch/Med rec DW Dr Stern.      Important Medication Changes and Reason:  None    Active or Pending Issues Requiring Follow-up:  Wound care    Advanced Directives:   [ ] Full code  [ x] DNR  [ ] Hospice    Discharge Diagnoses:  #Decubitus ulcers  #Dementia         HPI:  Collateral obtained from daughter Magalie.    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 Per daughter aide noticed a sacral decubitus ulcer and right ischial tuberosity ulcer several days prior. Visiting PA saw patient and recommended presenting to the ED because she was unsure if wound could be managed at home. Per daughter patient is at baseline health. No noted fever. In the ED patient seems to be at baseline mentation and cognitively unable to articulate any complaints Examination in the ED with noted stage II sacral decubitus ulcer, unstageable decubitus ulcer to the right ischial tuberosity region, and skin tear versus grade 2 decubitus ulcer to the right lateral lower leg. CT of A&P showing "skin thickening overlying the right ischial tuberosity. Soft tissue reticulation and heterogeneous ill-defined fluid adjacent to the coccyx extending to the skin surface." She was given vancomycin, Zosyn and 500 cc NS. Admitted for further wound care assessment. (19 Dec 2023 05:55)    Hospital Course:  Examination in the ED with noted stage II sacral decubitus ulcer, unstageable decubitus ulcer to the right ischial tuberosity region, and skin tear versus grade 2 decubitus ulcer to the right lateral lower leg. CT of A&P showing "skin thickening overlying the right ischial tuberosity. Soft tissue reticulation and heterogeneous ill-defined fluid adjacent to the coccyx extending to the skin surface." Overall, low concern for acute infection and patient admitted for wound care assessment.     -Patient with  endstage  dementia, and qualifies for home hospice, daughter interested in exploring if it offers more assistance than current arrangement.  -Family to follow up out patient for home hospice set up.     -Wound Consult requested to assist w/ management of: sacral evolving Deep Tissue injury, Rt ischium Unstageable pressure injury, RLE wound  1)Buttocks/ Sacrum TRIAD BID and prn soiling 2)RLE- medihoney dressing QD  BLE elevation & Compression. Upon discharge f/u as outpatient at [facility] [address] [facility] [phone]  Medically cleared for DC back home. Dispo/Disch/Med rec DW Dr Stern.      Important Medication Changes and Reason:  None    Active or Pending Issues Requiring Follow-up:  Wound care    Advanced Directives:   [ ] Full code  [ x] DNR  [ ] Hospice    Discharge Diagnoses:  #Decubitus ulcers  #Dementia

## 2023-12-20 NOTE — DISCHARGE NOTE NURSING/CASE MANAGEMENT/SOCIAL WORK - PATIENT PORTAL LINK FT
You can access the FollowMyHealth Patient Portal offered by [facility] by registering at the following website: [email or web address]. By joining [employer]’s FollowMyHealth portal, you will also be able to view your health information using other applications (apps) compatible with our system. You can access the FollowMyHealth Patient Portal offered by [facility] by registering at the following website: [email or web address]. By joining "[employer]’s FollowMyHealth portal, you will also be able to view your health information using other applications (apps) compatible with our system.

## 2023-12-20 NOTE — DISCHARGE NOTE PROVIDER - NSDCMRMEDTOKEN_GEN_ALL_CORE_FT
amLODIPine 10 mg oral tablet: 1 tab(s) orally once a day  atorvastatin 40 mg oral tablet: 1 tab(s) orally once a day  Eliquis 5 mg oral tablet: 1 tab(s) orally 2 times a day  hydroxyurea 500 mg oral capsule: 1 cap(s) orally 3 times a week Mon/Wed/Fri  levothyroxine 88 mcg (0.088 mg) oral tablet: 1 tab(s) orally once a day  metFORMIN 500 mg oral tablet: 1 tab(s) orally once a day  metoprolol tartrate 25 mg oral tablet: 1 tab(s) orally 2 times a day  ocular lubricant ophthalmic solution: 1 drop(s) to each affected eye 2 times a day  polyethylene glycol 3350 oral powder for reconstitution: 17 gram(s) orally as needed  senna (sennosides) 8.6 mg oral tablet: 1 tab(s) orally as needed  Seroquel 25 mg oral tablet: 1 tab(s) orally once a day as needed for  insomnia   amLODIPine 10 mg oral tablet: 0.5 tab(s) orally once a day 5 mg daily-as per patient&#x27;s daughter  atorvastatin 40 mg oral tablet: 1 tab(s) orally once a day  Eliquis 5 mg oral tablet: 1 tab(s) orally 2 times a day  hydroxyurea 500 mg oral capsule: 1 cap(s) orally 3 times a week Mon/Wed/Fri  levothyroxine 88 mcg (0.088 mg) oral tablet: 1 tab(s) orally once a day  metFORMIN 500 mg oral tablet: 1 tab(s) orally once a day  metoprolol tartrate 25 mg oral tablet: 1 tab(s) orally 2 times a day  ocular lubricant ophthalmic solution: 1 drop(s) to each affected eye 2 times a day  polyethylene glycol 3350 oral powder for reconstitution: 17 gram(s) orally as needed  senna (sennosides) 8.6 mg oral tablet: 1 tab(s) orally as needed  Seroquel 25 mg oral tablet: 1 tab(s) orally once a day as needed for  insomnia   amLODIPine 10 mg oral tablet: 0.5 tab(s) orally once a day 5 mg daily-as per patient&#x27;s daughter  atorvastatin 40 mg oral tablet: 1 tab(s) orally once a day  Eliquis 5 mg oral tablet: 1 tab(s) orally 2 times a day  hydroxyurea 500 mg oral capsule: 1 cap(s) orally 3 times a week Mon/Wed/Fri  levothyroxine 88 mcg (0.088 mg) oral tablet: 1 tab(s) orally once a day  metFORMIN 500 mg oral tablet: 1 tab(s) orally once a day  metoprolol tartrate 25 mg oral tablet: 1 tab(s) orally 2 times a day  ocular lubricant ophthalmic solution: 1 drop(s) to each affected eye 2 times a day  polyethylene glycol 3350 oral powder for reconstitution: 17 gram(s) orally as needed  senna (sennosides) 8.6 mg oral tablet: 1 tab(s) orally as needed  Seroquel 25 mg oral tablet: 1 tab(s) orally once a day as needed for  insomnia  VNS for wound care at home: daily

## 2023-12-21 NOTE — PROGRESS NOTE ADULT - PROBLEM/PLAN-10
In an effort to ensure that our patients [employer], a Team Member has reviewed your chart and identified an opportunity to provide the best care possible. An attempt was made to discuss or schedule overdue Preventive or Disease Management screening.     The Outcome was Contact was made, appointment scheduled. Care Gaps include Hypertension.    
DISPLAY PLAN FREE TEXT

## 2023-12-24 LAB
CULTURE RESULTS: SIGNIFICANT CHANGE UP
SPECIMEN SOURCE: SIGNIFICANT CHANGE UP

## 2024-01-03 NOTE — ED ADULT NURSE NOTE - NSICDXPASTSURGICALHX_GEN_ALL_CORE_FT
Received voicemail from client regarding being unable to attend class today but would like to reschedule. Call made to client , no answer to phone, voicemail left requesting return call to this clinician.    PAST SURGICAL HISTORY:  FH: Total Abdominal Hysterectomy and Bilateral Salpingo-Oophorectomy     S/P small bowel resection 08/2019

## 2024-02-28 ENCOUNTER — INPATIENT (INPATIENT)
Facility: HOSPITAL | Age: 89
LOS: 12 days | Discharge: NURSING, REHAB OR LONG-TERM CARE | DRG: 871 | End: 2024-03-12
Attending: STUDENT IN AN ORGANIZED HEALTH CARE EDUCATION/TRAINING PROGRAM | Admitting: STUDENT IN AN ORGANIZED HEALTH CARE EDUCATION/TRAINING PROGRAM
Payer: MEDICARE

## 2024-02-28 VITALS
SYSTOLIC BLOOD PRESSURE: 144 MMHG | TEMPERATURE: 98 F | OXYGEN SATURATION: 94 % | RESPIRATION RATE: 18 BRPM | DIASTOLIC BLOOD PRESSURE: 76 MMHG | HEART RATE: 96 BPM

## 2024-02-28 DIAGNOSIS — D72.829 ELEVATED WHITE BLOOD CELL COUNT, UNSPECIFIED: ICD-10-CM

## 2024-02-28 DIAGNOSIS — Z90.49 ACQUIRED ABSENCE OF OTHER SPECIFIED PARTS OF DIGESTIVE TRACT: Chronic | ICD-10-CM

## 2024-02-28 LAB
ALBUMIN SERPL ELPH-MCNC: 2.7 G/DL — LOW (ref 3.3–5)
ALP SERPL-CCNC: 245 U/L — HIGH (ref 40–120)
ALT FLD-CCNC: 44 U/L — SIGNIFICANT CHANGE UP (ref 10–45)
ANION GAP SERPL CALC-SCNC: 10 MMOL/L — SIGNIFICANT CHANGE UP (ref 5–17)
APPEARANCE UR: CLEAR — SIGNIFICANT CHANGE UP
APTT BLD: 39.8 SEC — HIGH (ref 24.5–35.6)
AST SERPL-CCNC: 28 U/L — SIGNIFICANT CHANGE UP (ref 10–40)
BACTERIA # UR AUTO: NEGATIVE /HPF — SIGNIFICANT CHANGE UP
BASE EXCESS BLDV CALC-SCNC: -2.3 MMOL/L — LOW (ref -2–3)
BASOPHILS # BLD AUTO: 0.12 K/UL — SIGNIFICANT CHANGE UP (ref 0–0.2)
BASOPHILS NFR BLD AUTO: 0.7 % — SIGNIFICANT CHANGE UP (ref 0–2)
BILIRUB SERPL-MCNC: 0.2 MG/DL — SIGNIFICANT CHANGE UP (ref 0.2–1.2)
BILIRUB UR-MCNC: NEGATIVE — SIGNIFICANT CHANGE UP
BUN SERPL-MCNC: 52 MG/DL — HIGH (ref 7–23)
CA-I SERPL-SCNC: 1.32 MMOL/L — SIGNIFICANT CHANGE UP (ref 1.15–1.33)
CALCIUM SERPL-MCNC: 9.5 MG/DL — SIGNIFICANT CHANGE UP (ref 8.4–10.5)
CAST: 2 /LPF — SIGNIFICANT CHANGE UP (ref 0–4)
CHLORIDE BLDV-SCNC: 107 MMOL/L — SIGNIFICANT CHANGE UP (ref 96–108)
CHLORIDE SERPL-SCNC: 106 MMOL/L — SIGNIFICANT CHANGE UP (ref 96–108)
CO2 BLDV-SCNC: 24 MMOL/L — SIGNIFICANT CHANGE UP (ref 22–26)
CO2 SERPL-SCNC: 21 MMOL/L — LOW (ref 22–31)
COLOR SPEC: YELLOW — SIGNIFICANT CHANGE UP
CREAT SERPL-MCNC: 0.66 MG/DL — SIGNIFICANT CHANGE UP (ref 0.5–1.3)
DIFF PNL FLD: NEGATIVE — SIGNIFICANT CHANGE UP
EGFR: 83 ML/MIN/1.73M2 — SIGNIFICANT CHANGE UP
EOSINOPHIL # BLD AUTO: 0.17 K/UL — SIGNIFICANT CHANGE UP (ref 0–0.5)
EOSINOPHIL NFR BLD AUTO: 0.9 % — SIGNIFICANT CHANGE UP (ref 0–6)
FLUAV AG NPH QL: SIGNIFICANT CHANGE UP
FLUBV AG NPH QL: SIGNIFICANT CHANGE UP
GAS PNL BLDV: 135 MMOL/L — LOW (ref 136–145)
GAS PNL BLDV: SIGNIFICANT CHANGE UP
GLUCOSE BLDV-MCNC: 359 MG/DL — HIGH (ref 70–99)
GLUCOSE SERPL-MCNC: 339 MG/DL — HIGH (ref 70–99)
GLUCOSE UR QL: 250 MG/DL
HCO3 BLDV-SCNC: 22 MMOL/L — SIGNIFICANT CHANGE UP (ref 22–29)
HCT VFR BLD CALC: 32.7 % — LOW (ref 34.5–45)
HCT VFR BLDA CALC: 28 % — LOW (ref 34.5–46.5)
HGB BLD CALC-MCNC: 9.2 G/DL — LOW (ref 11.7–16.1)
HGB BLD-MCNC: 10 G/DL — LOW (ref 11.5–15.5)
IMM GRANULOCYTES NFR BLD AUTO: 1.2 % — HIGH (ref 0–0.9)
INR BLD: 2.35 RATIO — HIGH (ref 0.85–1.18)
KETONES UR-MCNC: NEGATIVE MG/DL — SIGNIFICANT CHANGE UP
LACTATE BLDV-MCNC: 1.1 MMOL/L — SIGNIFICANT CHANGE UP (ref 0.5–2)
LEUKOCYTE ESTERASE UR-ACNC: NEGATIVE — SIGNIFICANT CHANGE UP
LYMPHOCYTES # BLD AUTO: 0.82 K/UL — LOW (ref 1–3.3)
LYMPHOCYTES # BLD AUTO: 4.6 % — LOW (ref 13–44)
MAGNESIUM SERPL-MCNC: 2.1 MG/DL — SIGNIFICANT CHANGE UP (ref 1.6–2.6)
MCHC RBC-ENTMCNC: 25.9 PG — LOW (ref 27–34)
MCHC RBC-ENTMCNC: 30.6 GM/DL — LOW (ref 32–36)
MCV RBC AUTO: 84.7 FL — SIGNIFICANT CHANGE UP (ref 80–100)
MONOCYTES # BLD AUTO: 1.09 K/UL — HIGH (ref 0–0.9)
MONOCYTES NFR BLD AUTO: 6.1 % — SIGNIFICANT CHANGE UP (ref 2–14)
NEUTROPHILS # BLD AUTO: 15.51 K/UL — HIGH (ref 1.8–7.4)
NEUTROPHILS NFR BLD AUTO: 86.5 % — HIGH (ref 43–77)
NITRITE UR-MCNC: NEGATIVE — SIGNIFICANT CHANGE UP
NRBC # BLD: 0 /100 WBCS — SIGNIFICANT CHANGE UP (ref 0–0)
PCO2 BLDV: 37 MMHG — LOW (ref 39–42)
PH BLDV: 7.39 — SIGNIFICANT CHANGE UP (ref 7.32–7.43)
PH UR: 6 — SIGNIFICANT CHANGE UP (ref 5–8)
PHOSPHATE SERPL-MCNC: 2.7 MG/DL — SIGNIFICANT CHANGE UP (ref 2.5–4.5)
PLATELET # BLD AUTO: 858 K/UL — HIGH (ref 150–400)
PO2 BLDV: 65 MMHG — HIGH (ref 25–45)
POTASSIUM BLDV-SCNC: 4.4 MMOL/L — SIGNIFICANT CHANGE UP (ref 3.5–5.1)
POTASSIUM SERPL-MCNC: 4.4 MMOL/L — SIGNIFICANT CHANGE UP (ref 3.5–5.3)
POTASSIUM SERPL-SCNC: 4.4 MMOL/L — SIGNIFICANT CHANGE UP (ref 3.5–5.3)
PROCALCITONIN SERPL-MCNC: 0.6 NG/ML — HIGH (ref 0.02–0.1)
PROT SERPL-MCNC: 6.7 G/DL — SIGNIFICANT CHANGE UP (ref 6–8.3)
PROT UR-MCNC: 30 MG/DL
PROTHROM AB SERPL-ACNC: 24.1 SEC — HIGH (ref 9.5–13)
RBC # BLD: 3.86 M/UL — SIGNIFICANT CHANGE UP (ref 3.8–5.2)
RBC # FLD: 16.7 % — HIGH (ref 10.3–14.5)
RBC CASTS # UR COMP ASSIST: 8 /HPF — HIGH (ref 0–4)
REVIEW: SIGNIFICANT CHANGE UP
RSV RNA NPH QL NAA+NON-PROBE: SIGNIFICANT CHANGE UP
SAO2 % BLDV: 94 % — HIGH (ref 67–88)
SARS-COV-2 RNA SPEC QL NAA+PROBE: SIGNIFICANT CHANGE UP
SODIUM SERPL-SCNC: 137 MMOL/L — SIGNIFICANT CHANGE UP (ref 135–145)
SP GR SPEC: 1.02 — SIGNIFICANT CHANGE UP (ref 1–1.03)
SQUAMOUS # UR AUTO: 1 /HPF — SIGNIFICANT CHANGE UP (ref 0–5)
UROBILINOGEN FLD QL: 0.2 MG/DL — SIGNIFICANT CHANGE UP (ref 0.2–1)
WBC # BLD: 17.92 K/UL — HIGH (ref 3.8–10.5)
WBC # FLD AUTO: 17.92 K/UL — HIGH (ref 3.8–10.5)
WBC UR QL: 0 /HPF — SIGNIFICANT CHANGE UP (ref 0–5)

## 2024-02-28 PROCEDURE — 99285 EMERGENCY DEPT VISIT HI MDM: CPT

## 2024-02-28 PROCEDURE — 71045 X-RAY EXAM CHEST 1 VIEW: CPT | Mod: 26

## 2024-02-28 PROCEDURE — 72193 CT PELVIS W/DYE: CPT | Mod: 26

## 2024-02-28 RX ORDER — SODIUM CHLORIDE 9 MG/ML
500 INJECTION INTRAMUSCULAR; INTRAVENOUS; SUBCUTANEOUS ONCE
Refills: 0 | Status: COMPLETED | OUTPATIENT
Start: 2024-02-28 | End: 2024-02-28

## 2024-02-28 RX ORDER — PIPERACILLIN AND TAZOBACTAM 4; .5 G/20ML; G/20ML
3.38 INJECTION, POWDER, LYOPHILIZED, FOR SOLUTION INTRAVENOUS ONCE
Refills: 0 | Status: COMPLETED | OUTPATIENT
Start: 2024-02-28 | End: 2024-02-28

## 2024-02-28 RX ORDER — ACETAMINOPHEN 500 MG
650 TABLET ORAL ONCE
Refills: 0 | Status: COMPLETED | OUTPATIENT
Start: 2024-02-28 | End: 2024-02-28

## 2024-02-28 RX ORDER — VANCOMYCIN HCL 1 G
1000 VIAL (EA) INTRAVENOUS ONCE
Refills: 0 | Status: COMPLETED | OUTPATIENT
Start: 2024-02-28 | End: 2024-02-28

## 2024-02-28 RX ADMIN — SODIUM CHLORIDE 500 MILLILITER(S): 9 INJECTION INTRAMUSCULAR; INTRAVENOUS; SUBCUTANEOUS at 18:12

## 2024-02-28 RX ADMIN — Medication 250 MILLIGRAM(S): at 19:57

## 2024-02-28 RX ADMIN — PIPERACILLIN AND TAZOBACTAM 200 GRAM(S): 4; .5 INJECTION, POWDER, LYOPHILIZED, FOR SOLUTION INTRAVENOUS at 18:13

## 2024-02-28 RX ADMIN — Medication 260 MILLIGRAM(S): at 22:52

## 2024-02-28 NOTE — ED PROVIDER NOTE - OBJECTIVE STATEMENT
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

## 2024-02-28 NOTE — ED ADULT NURSE REASSESSMENT NOTE - NS ED NURSE REASSESS COMMENT FT1
Report received from Oly RN. Pt A&Ox1, oriented to self. Pt in no acute distress at time. Patient safety maintained, bed is in lowest position, wheels locked, and side rails raised.

## 2024-02-28 NOTE — ED ADULT NURSE NOTE - OBJECTIVE STATEMENT
Pt bib EMS from home after she was found to have elevated wbc count.  She has a sacral decubitus whichn is believed to be the source. Pt bib EMS from home after she was found to have elevated wbc count.  She has a sacral decubitus which is believed to be the source.  Decubitus is located on left gluteal area, it has an eschar on lateral aspect and  areas of differing depths, therefore it is unstagable.  Some serosanguineous drainage noted.   Dressing applied.  Pt is bedbound, coming from home.  No known fevers at home.

## 2024-02-28 NOTE — ED ADULT NURSE REASSESSMENT NOTE - NS ED NURSE REASSESS COMMENT FT1
Patient straight cathed attempt using sterile technique, and before Straight could be inserted patient urinated on her own. Sterile specimens collected and sent to lab.  Note pre straight cath attempt the patient was inc of a moderate formed stool and urine. Perineal care provided and new diaper in place. Note patient has an unstageable sore to the sacrum that is minimally bleeding, covered with a Allevyn Dressing, and ED MD Team aware. Awaiting Ultrasound IV Placement to collect serum lab specimens @ this time.

## 2024-02-28 NOTE — ED PROVIDER NOTE - CLINICAL SUMMARY MEDICAL DECISION MAKING FREE TEXT BOX
Sindi: 89 year old female with pmhx of dvt, hypothyroid, htn, dm, severe demntia, sacral decubitus bib ems for wbc 21 on outpatient labs. PE: alert, dry mm, unstageble sacral decubitus, well appearing, no distress, + s1s2 rrr, abdomen soft nt/nd, alert and oriented x 0.  moving extremities. Plan: will get labs, flu covid, ivf, ua, r/o sepsis. possible imaging of sacral decub to r/o infection.   reassess.

## 2024-02-28 NOTE — ED ADULT NURSE NOTE - NSFALLHARMRISKINTERV_ED_ALL_ED
Assistance OOB with selected safe patient handling equipment if applicable/Communicate risk of Fall with Harm to all staff, patient, and family/Monitor for mental status changes and reorient to person, place, and time, as needed/Move patient closer to nursing station/within visual sight of ED staff/Provide visual cue: red socks, yellow wristband, yellow gown, etc/Reinforce activity limits and safety measures with patient and family/Toileting schedule using arm’s reach rule for commode and bathroom/Use of alarms - bed, stretcher, chair and/or video monitoring/Bed in lowest position, wheels locked, appropriate side rails in place/Call bell, personal items and telephone in reach/Instruct patient to call for assistance before getting out of bed/chair/stretcher/Non-slip footwear applied when patient is off stretcher/[place] to call system/Physically safe environment - no spills, clutter or unnecessary equipment/Purposeful Proactive Rounding/Room/bathroom lighting operational, light cord in reach

## 2024-02-29 DIAGNOSIS — A41.9 SEPSIS, UNSPECIFIED ORGANISM: ICD-10-CM

## 2024-02-29 DIAGNOSIS — D45 POLYCYTHEMIA VERA: ICD-10-CM

## 2024-02-29 DIAGNOSIS — E11.9 TYPE 2 DIABETES MELLITUS WITHOUT COMPLICATIONS: ICD-10-CM

## 2024-02-29 DIAGNOSIS — M46.28 OSTEOMYELITIS OF VERTEBRA, SACRAL AND SACROCOCCYGEAL REGION: ICD-10-CM

## 2024-02-29 DIAGNOSIS — Z71.89 OTHER SPECIFIED COUNSELING: ICD-10-CM

## 2024-02-29 DIAGNOSIS — I48.0 PAROXYSMAL ATRIAL FIBRILLATION: ICD-10-CM

## 2024-02-29 DIAGNOSIS — F03.C0 UNSPECIFIED DEMENTIA, SEVERE, WITHOUT BEHAVIORAL DISTURBANCE, PSYCHOTIC DISTURBANCE, MOOD DISTURBANCE, AND ANXIETY: ICD-10-CM

## 2024-02-29 DIAGNOSIS — L98.429 NON-PRESSURE CHRONIC ULCER OF BACK WITH UNSPECIFIED SEVERITY: ICD-10-CM

## 2024-02-29 DIAGNOSIS — Z29.9 ENCOUNTER FOR PROPHYLACTIC MEASURES, UNSPECIFIED: ICD-10-CM

## 2024-02-29 LAB
ADD ON TEST-SPECIMEN IN LAB: SIGNIFICANT CHANGE UP
ADD ON TEST-SPECIMEN IN LAB: SIGNIFICANT CHANGE UP
ALBUMIN SERPL ELPH-MCNC: 3 G/DL — LOW (ref 3.3–5)
ALP SERPL-CCNC: 264 U/L — HIGH (ref 40–120)
ALT FLD-CCNC: 47 U/L — HIGH (ref 10–45)
ANION GAP SERPL CALC-SCNC: 16 MMOL/L — SIGNIFICANT CHANGE UP (ref 5–17)
APTT BLD: 38.7 SEC — HIGH (ref 24.5–35.6)
AST SERPL-CCNC: 30 U/L — SIGNIFICANT CHANGE UP (ref 10–40)
BASE EXCESS BLDV CALC-SCNC: -1.5 MMOL/L — SIGNIFICANT CHANGE UP (ref -2–3)
BASOPHILS # BLD AUTO: 0.1 K/UL — SIGNIFICANT CHANGE UP (ref 0–0.2)
BASOPHILS NFR BLD AUTO: 0.6 % — SIGNIFICANT CHANGE UP (ref 0–2)
BILIRUB SERPL-MCNC: 0.2 MG/DL — SIGNIFICANT CHANGE UP (ref 0.2–1.2)
BLD GP AB SCN SERPL QL: NEGATIVE — SIGNIFICANT CHANGE UP
BUN SERPL-MCNC: 35 MG/DL — HIGH (ref 7–23)
CA-I SERPL-SCNC: 1.35 MMOL/L — HIGH (ref 1.15–1.33)
CALCIUM SERPL-MCNC: 9.9 MG/DL — SIGNIFICANT CHANGE UP (ref 8.4–10.5)
CHLORIDE BLDV-SCNC: 103 MMOL/L — SIGNIFICANT CHANGE UP (ref 96–108)
CHLORIDE SERPL-SCNC: 100 MMOL/L — SIGNIFICANT CHANGE UP (ref 96–108)
CO2 BLDV-SCNC: 26 MMOL/L — SIGNIFICANT CHANGE UP (ref 22–26)
CO2 SERPL-SCNC: 21 MMOL/L — LOW (ref 22–31)
CREAT SERPL-MCNC: 0.69 MG/DL — SIGNIFICANT CHANGE UP (ref 0.5–1.3)
CRP SERPL-MCNC: 86 MG/L — HIGH (ref 0–4)
EGFR: 82 ML/MIN/1.73M2 — SIGNIFICANT CHANGE UP
EOSINOPHIL # BLD AUTO: 0.14 K/UL — SIGNIFICANT CHANGE UP (ref 0–0.5)
EOSINOPHIL NFR BLD AUTO: 0.8 % — SIGNIFICANT CHANGE UP (ref 0–6)
ERYTHROCYTE [SEDIMENTATION RATE] IN BLOOD: 120 MM/HR — HIGH (ref 0–20)
GAS PNL BLDV: 134 MMOL/L — LOW (ref 136–145)
GAS PNL BLDV: SIGNIFICANT CHANGE UP
GAS PNL BLDV: SIGNIFICANT CHANGE UP
GLUCOSE BLDC GLUCOMTR-MCNC: 223 MG/DL — HIGH (ref 70–99)
GLUCOSE BLDC GLUCOMTR-MCNC: 236 MG/DL — HIGH (ref 70–99)
GLUCOSE BLDC GLUCOMTR-MCNC: 331 MG/DL — HIGH (ref 70–99)
GLUCOSE BLDC GLUCOMTR-MCNC: 393 MG/DL — HIGH (ref 70–99)
GLUCOSE BLDC GLUCOMTR-MCNC: 429 MG/DL — HIGH (ref 70–99)
GLUCOSE BLDV-MCNC: 223 MG/DL — HIGH (ref 70–99)
GLUCOSE SERPL-MCNC: 468 MG/DL — CRITICAL HIGH (ref 70–99)
HCO3 BLDV-SCNC: 24 MMOL/L — SIGNIFICANT CHANGE UP (ref 22–29)
HCT VFR BLD CALC: 38.7 % — SIGNIFICANT CHANGE UP (ref 34.5–45)
HCT VFR BLDA CALC: 38 % — SIGNIFICANT CHANGE UP (ref 34.5–46.5)
HGB BLD CALC-MCNC: 12.5 G/DL — SIGNIFICANT CHANGE UP (ref 11.7–16.1)
HGB BLD-MCNC: 11.6 G/DL — SIGNIFICANT CHANGE UP (ref 11.5–15.5)
IMM GRANULOCYTES NFR BLD AUTO: 0.8 % — SIGNIFICANT CHANGE UP (ref 0–0.9)
INR BLD: 1.67 RATIO — HIGH (ref 0.85–1.18)
LACTATE BLDV-MCNC: 2.9 MMOL/L — HIGH (ref 0.5–2)
LYMPHOCYTES # BLD AUTO: 0.66 K/UL — LOW (ref 1–3.3)
LYMPHOCYTES # BLD AUTO: 3.9 % — LOW (ref 13–44)
MAGNESIUM SERPL-MCNC: 2.2 MG/DL — SIGNIFICANT CHANGE UP (ref 1.6–2.6)
MCHC RBC-ENTMCNC: 25.8 PG — LOW (ref 27–34)
MCHC RBC-ENTMCNC: 30 GM/DL — LOW (ref 32–36)
MCV RBC AUTO: 86.2 FL — SIGNIFICANT CHANGE UP (ref 80–100)
MONOCYTES # BLD AUTO: 0.62 K/UL — SIGNIFICANT CHANGE UP (ref 0–0.9)
MONOCYTES NFR BLD AUTO: 3.7 % — SIGNIFICANT CHANGE UP (ref 2–14)
MRSA PCR RESULT.: SIGNIFICANT CHANGE UP
NEUTROPHILS # BLD AUTO: 15.2 K/UL — HIGH (ref 1.8–7.4)
NEUTROPHILS NFR BLD AUTO: 90.2 % — HIGH (ref 43–77)
NRBC # BLD: 0 /100 WBCS — SIGNIFICANT CHANGE UP (ref 0–0)
PCO2 BLDV: 44 MMHG — HIGH (ref 39–42)
PH BLDV: 7.35 — SIGNIFICANT CHANGE UP (ref 7.32–7.43)
PLATELET # BLD AUTO: 1095 K/UL — CRITICAL HIGH (ref 150–400)
PO2 BLDV: 38 MMHG — SIGNIFICANT CHANGE UP (ref 25–45)
POTASSIUM BLDV-SCNC: 4.9 MMOL/L — SIGNIFICANT CHANGE UP (ref 3.5–5.1)
POTASSIUM SERPL-MCNC: 4.5 MMOL/L — SIGNIFICANT CHANGE UP (ref 3.5–5.3)
POTASSIUM SERPL-SCNC: 4.5 MMOL/L — SIGNIFICANT CHANGE UP (ref 3.5–5.3)
PROT SERPL-MCNC: 7.5 G/DL — SIGNIFICANT CHANGE UP (ref 6–8.3)
PROTHROM AB SERPL-ACNC: 18.1 SEC — HIGH (ref 9.5–13)
RBC # BLD: 4.49 M/UL — SIGNIFICANT CHANGE UP (ref 3.8–5.2)
RBC # FLD: 16.8 % — HIGH (ref 10.3–14.5)
RH IG SCN BLD-IMP: POSITIVE — SIGNIFICANT CHANGE UP
S AUREUS DNA NOSE QL NAA+PROBE: SIGNIFICANT CHANGE UP
SAO2 % BLDV: 62.3 % — LOW (ref 67–88)
SODIUM SERPL-SCNC: 137 MMOL/L — SIGNIFICANT CHANGE UP (ref 135–145)
WBC # BLD: 16.86 K/UL — HIGH (ref 3.8–10.5)
WBC # FLD AUTO: 16.86 K/UL — HIGH (ref 3.8–10.5)

## 2024-02-29 PROCEDURE — 99222 1ST HOSP IP/OBS MODERATE 55: CPT

## 2024-02-29 PROCEDURE — 99223 1ST HOSP IP/OBS HIGH 75: CPT | Mod: GC

## 2024-02-29 RX ORDER — ACETAMINOPHEN 500 MG
650 TABLET ORAL EVERY 6 HOURS
Refills: 0 | Status: DISCONTINUED | OUTPATIENT
Start: 2024-02-29 | End: 2024-03-12

## 2024-02-29 RX ORDER — SODIUM CHLORIDE 9 MG/ML
1000 INJECTION, SOLUTION INTRAVENOUS
Refills: 0 | Status: DISCONTINUED | OUTPATIENT
Start: 2024-02-29 | End: 2024-03-12

## 2024-02-29 RX ORDER — LANOLIN ALCOHOL/MO/W.PET/CERES
3 CREAM (GRAM) TOPICAL AT BEDTIME
Refills: 0 | Status: DISCONTINUED | OUTPATIENT
Start: 2024-02-29 | End: 2024-03-12

## 2024-02-29 RX ORDER — ATORVASTATIN CALCIUM 80 MG/1
40 TABLET, FILM COATED ORAL AT BEDTIME
Refills: 0 | Status: DISCONTINUED | OUTPATIENT
Start: 2024-02-29 | End: 2024-03-12

## 2024-02-29 RX ORDER — PIPERACILLIN AND TAZOBACTAM 4; .5 G/20ML; G/20ML
3.38 INJECTION, POWDER, LYOPHILIZED, FOR SOLUTION INTRAVENOUS ONCE
Refills: 0 | Status: COMPLETED | OUTPATIENT
Start: 2024-02-29 | End: 2024-02-29

## 2024-02-29 RX ORDER — SODIUM CHLORIDE 9 MG/ML
500 INJECTION, SOLUTION INTRAVENOUS ONCE
Refills: 0 | Status: COMPLETED | OUTPATIENT
Start: 2024-02-29 | End: 2024-03-02

## 2024-02-29 RX ORDER — INSULIN LISPRO 100/ML
4 VIAL (ML) SUBCUTANEOUS
Refills: 0 | Status: DISCONTINUED | OUTPATIENT
Start: 2024-03-01 | End: 2024-03-02

## 2024-02-29 RX ORDER — POLYETHYLENE GLYCOL 3350 17 G/17G
17 POWDER, FOR SOLUTION ORAL DAILY
Refills: 0 | Status: DISCONTINUED | OUTPATIENT
Start: 2024-02-29 | End: 2024-03-12

## 2024-02-29 RX ORDER — PIPERACILLIN AND TAZOBACTAM 4; .5 G/20ML; G/20ML
3.38 INJECTION, POWDER, LYOPHILIZED, FOR SOLUTION INTRAVENOUS EVERY 8 HOURS
Refills: 0 | Status: DISCONTINUED | OUTPATIENT
Start: 2024-02-29 | End: 2024-03-03

## 2024-02-29 RX ORDER — DEXTROSE 50 % IN WATER 50 %
12.5 SYRINGE (ML) INTRAVENOUS ONCE
Refills: 0 | Status: DISCONTINUED | OUTPATIENT
Start: 2024-02-29 | End: 2024-03-12

## 2024-02-29 RX ORDER — LEVOTHYROXINE SODIUM 125 MCG
88 TABLET ORAL DAILY
Refills: 0 | Status: DISCONTINUED | OUTPATIENT
Start: 2024-02-29 | End: 2024-03-12

## 2024-02-29 RX ORDER — APIXABAN 2.5 MG/1
2.5 TABLET, FILM COATED ORAL
Refills: 0 | Status: DISCONTINUED | OUTPATIENT
Start: 2024-02-29 | End: 2024-03-12

## 2024-02-29 RX ORDER — QUETIAPINE FUMARATE 200 MG/1
25 TABLET, FILM COATED ORAL DAILY
Refills: 0 | Status: DISCONTINUED | OUTPATIENT
Start: 2024-02-29 | End: 2024-03-12

## 2024-02-29 RX ORDER — PIPERACILLIN AND TAZOBACTAM 4; .5 G/20ML; G/20ML
3.38 INJECTION, POWDER, LYOPHILIZED, FOR SOLUTION INTRAVENOUS EVERY 8 HOURS
Refills: 0 | Status: DISCONTINUED | OUTPATIENT
Start: 2024-02-29 | End: 2024-02-29

## 2024-02-29 RX ORDER — SODIUM CHLORIDE 9 MG/ML
1000 INJECTION, SOLUTION INTRAVENOUS
Refills: 0 | Status: DISCONTINUED | OUTPATIENT
Start: 2024-02-29 | End: 2024-02-29

## 2024-02-29 RX ORDER — DEXTROSE 50 % IN WATER 50 %
15 SYRINGE (ML) INTRAVENOUS ONCE
Refills: 0 | Status: DISCONTINUED | OUTPATIENT
Start: 2024-02-29 | End: 2024-03-12

## 2024-02-29 RX ORDER — DEXTROSE 50 % IN WATER 50 %
25 SYRINGE (ML) INTRAVENOUS ONCE
Refills: 0 | Status: DISCONTINUED | OUTPATIENT
Start: 2024-02-29 | End: 2024-03-12

## 2024-02-29 RX ORDER — INSULIN LISPRO 100/ML
VIAL (ML) SUBCUTANEOUS AT BEDTIME
Refills: 0 | Status: DISCONTINUED | OUTPATIENT
Start: 2024-02-29 | End: 2024-02-29

## 2024-02-29 RX ORDER — METOPROLOL TARTRATE 50 MG
25 TABLET ORAL
Refills: 0 | Status: DISCONTINUED | OUTPATIENT
Start: 2024-02-29 | End: 2024-03-12

## 2024-02-29 RX ORDER — SENNA PLUS 8.6 MG/1
2 TABLET ORAL AT BEDTIME
Refills: 0 | Status: DISCONTINUED | OUTPATIENT
Start: 2024-02-29 | End: 2024-03-12

## 2024-02-29 RX ORDER — HYDROXYUREA 500 MG/1
500 CAPSULE ORAL
Refills: 0 | Status: DISCONTINUED | OUTPATIENT
Start: 2024-02-29 | End: 2024-03-12

## 2024-02-29 RX ORDER — INSULIN LISPRO 100/ML
4 VIAL (ML) SUBCUTANEOUS
Refills: 0 | Status: DISCONTINUED | OUTPATIENT
Start: 2024-03-01 | End: 2024-03-03

## 2024-02-29 RX ORDER — GLUCAGON INJECTION, SOLUTION 0.5 MG/.1ML
1 INJECTION, SOLUTION SUBCUTANEOUS ONCE
Refills: 0 | Status: DISCONTINUED | OUTPATIENT
Start: 2024-02-29 | End: 2024-02-29

## 2024-02-29 RX ORDER — DEXTROSE 50 % IN WATER 50 %
15 SYRINGE (ML) INTRAVENOUS ONCE
Refills: 0 | Status: DISCONTINUED | OUTPATIENT
Start: 2024-02-29 | End: 2024-02-29

## 2024-02-29 RX ORDER — INSULIN GLARGINE 100 [IU]/ML
5 INJECTION, SOLUTION SUBCUTANEOUS AT BEDTIME
Refills: 0 | Status: DISCONTINUED | OUTPATIENT
Start: 2024-02-29 | End: 2024-03-02

## 2024-02-29 RX ORDER — DEXTROSE 50 % IN WATER 50 %
25 SYRINGE (ML) INTRAVENOUS ONCE
Refills: 0 | Status: DISCONTINUED | OUTPATIENT
Start: 2024-02-29 | End: 2024-02-29

## 2024-02-29 RX ORDER — METFORMIN HYDROCHLORIDE 850 MG/1
1 TABLET ORAL
Refills: 0 | DISCHARGE

## 2024-02-29 RX ORDER — DEXTROSE 50 % IN WATER 50 %
12.5 SYRINGE (ML) INTRAVENOUS ONCE
Refills: 0 | Status: DISCONTINUED | OUTPATIENT
Start: 2024-02-29 | End: 2024-02-29

## 2024-02-29 RX ORDER — VANCOMYCIN HCL 1 G
500 VIAL (EA) INTRAVENOUS EVERY 12 HOURS
Refills: 0 | Status: DISCONTINUED | OUTPATIENT
Start: 2024-02-29 | End: 2024-02-29

## 2024-02-29 RX ORDER — SODIUM HYPOCHLORITE 0.125 %
1 SOLUTION, NON-ORAL MISCELLANEOUS EVERY 8 HOURS
Refills: 0 | Status: DISCONTINUED | OUTPATIENT
Start: 2024-02-29 | End: 2024-03-08

## 2024-02-29 RX ORDER — INSULIN LISPRO 100/ML
VIAL (ML) SUBCUTANEOUS
Refills: 0 | Status: DISCONTINUED | OUTPATIENT
Start: 2024-02-29 | End: 2024-03-12

## 2024-02-29 RX ORDER — GLUCAGON INJECTION, SOLUTION 0.5 MG/.1ML
1 INJECTION, SOLUTION SUBCUTANEOUS ONCE
Refills: 0 | Status: DISCONTINUED | OUTPATIENT
Start: 2024-02-29 | End: 2024-03-12

## 2024-02-29 RX ORDER — INSULIN LISPRO 100/ML
VIAL (ML) SUBCUTANEOUS
Refills: 0 | Status: DISCONTINUED | OUTPATIENT
Start: 2024-02-29 | End: 2024-02-29

## 2024-02-29 RX ORDER — INSULIN GLARGINE 100 [IU]/ML
4 INJECTION, SOLUTION SUBCUTANEOUS AT BEDTIME
Refills: 0 | Status: DISCONTINUED | OUTPATIENT
Start: 2024-02-29 | End: 2024-02-29

## 2024-02-29 RX ORDER — INSULIN LISPRO 100/ML
VIAL (ML) SUBCUTANEOUS AT BEDTIME
Refills: 0 | Status: DISCONTINUED | OUTPATIENT
Start: 2024-02-29 | End: 2024-03-12

## 2024-02-29 RX ADMIN — Medication 6: at 13:17

## 2024-02-29 RX ADMIN — SENNA PLUS 2 TABLET(S): 8.6 TABLET ORAL at 22:10

## 2024-02-29 RX ADMIN — PIPERACILLIN AND TAZOBACTAM 200 GRAM(S): 4; .5 INJECTION, POWDER, LYOPHILIZED, FOR SOLUTION INTRAVENOUS at 05:47

## 2024-02-29 RX ADMIN — Medication 25 MILLIGRAM(S): at 18:34

## 2024-02-29 RX ADMIN — PIPERACILLIN AND TAZOBACTAM 25 GRAM(S): 4; .5 INJECTION, POWDER, LYOPHILIZED, FOR SOLUTION INTRAVENOUS at 21:56

## 2024-02-29 RX ADMIN — Medication 4: at 18:03

## 2024-02-29 RX ADMIN — Medication 88 MICROGRAM(S): at 05:47

## 2024-02-29 RX ADMIN — Medication 3: at 21:58

## 2024-02-29 RX ADMIN — APIXABAN 2.5 MILLIGRAM(S): 2.5 TABLET, FILM COATED ORAL at 18:34

## 2024-02-29 RX ADMIN — Medication 25 MILLIGRAM(S): at 05:48

## 2024-02-29 RX ADMIN — QUETIAPINE FUMARATE 25 MILLIGRAM(S): 200 TABLET, FILM COATED ORAL at 22:58

## 2024-02-29 RX ADMIN — Medication 1 APPLICATION(S): at 21:57

## 2024-02-29 RX ADMIN — ATORVASTATIN CALCIUM 40 MILLIGRAM(S): 80 TABLET, FILM COATED ORAL at 22:09

## 2024-02-29 RX ADMIN — PIPERACILLIN AND TAZOBACTAM 25 GRAM(S): 4; .5 INJECTION, POWDER, LYOPHILIZED, FOR SOLUTION INTRAVENOUS at 14:34

## 2024-02-29 RX ADMIN — Medication 100 MILLIGRAM(S): at 09:07

## 2024-02-29 RX ADMIN — POLYETHYLENE GLYCOL 3350 17 GRAM(S): 17 POWDER, FOR SOLUTION ORAL at 13:49

## 2024-02-29 RX ADMIN — Medication 2: at 09:09

## 2024-02-29 RX ADMIN — INSULIN GLARGINE 5 UNIT(S): 100 INJECTION, SOLUTION SUBCUTANEOUS at 22:09

## 2024-02-29 RX ADMIN — Medication 3 MILLIGRAM(S): at 22:58

## 2024-02-29 RX ADMIN — PIPERACILLIN AND TAZOBACTAM 25 GRAM(S): 4; .5 INJECTION, POWDER, LYOPHILIZED, FOR SOLUTION INTRAVENOUS at 09:55

## 2024-02-29 NOTE — PROGRESS NOTE ADULT - PROBLEM SELECTOR PLAN 1
CT pelvis with L sacral/medial gluteal decubitis ulcer with lysis of coccyx with c/f osteomyelitis  Also with unstageable sacral ulcer, not receiving adequate wound care, and is in pain  Meeting sepsis criteria with tachycardia, tacypnea, and leukocytosis     Plan:  Vanc/Zosyn  Blood/Ucx, wound culture  Ortho consult for washout and biopsy if within goals of care of family  ID consult in Am  Unable to order Vanc weight-based and no weight-in chart despite asking for it to be charted  Pls f/u Vanc order in AM if weight is recorded CT pelvis with L sacral/medial gluteal decubitis ulcer with lysis of coccyx with c/f osteomyelitis  Also with unstageable sacral ulcer, not receiving adequate wound care, and is in pain  Meeting sepsis criteria with tachycardia, tacypnea, and leukocytosis   wound with exposed bone, diagnostic of ostemyelitis    - procal, crp elevated     Plan:  c/w Vanc/Zosyn  f/u Blood/Ucx, wound culture  wound care debrided   ID consulted

## 2024-02-29 NOTE — SWALLOW BEDSIDE ASSESSMENT ADULT - SWALLOW EVAL: DIAGNOSIS
89 F DNR/DNI, hx of severe dementia, CVA, presented to ED for elevated WBC count, found to have unstageable sacral decubitus ulcer and sepsis w/ imaging c/f osteomyelitis of coccyx, CXR unremarkable. Pt well known to this service for dysphagia w/u in 2018 and 2022. Today, pt continues to p/w evidence of an oropharyngeal dysphagia iso advanced dementia. Oral phase notable for anterior loss of minced and moist texture, decreased A-P movement of the bolus, prolonged, inefficient mastication of solid textures (iso edentulous state), and delayed oral transit time exacerbated by pt's tendency to talk while eating/drinking (w/ verbal cues unsuccessful in reducing behavior). Pharyngeal phase notable for suspected delay in swallow trigger and decreased hyolaryngeal elevation, w/ cough response s/p soft solid, suggestive of laryngeal penetration/aspiration. As per d/w with pt's daughter, pt has been tolerating puree and moderately thick liquid diet at home, c/w results of this evaluation.

## 2024-02-29 NOTE — CONSULT NOTE ADULT - SUBJECTIVE AND OBJECTIVE BOX
[facility] Initial Consult Note    HPI  89y old Female with history of T2DM, Hypothyroidism, Dementia, HTN, Hx of DVT, CVA, Sacral ulcer here with osteomyelitis.    History was obtained from chart review.    Patient with reported history of diabetes and was on metformin previously but was told by a provider to stop it as her A1C was normal. She is currently ordered for Lantus 5 units at night with sliding scale. Nurse reports she has a really good appetite and eating well.     Vital Signs Last 24 Hrs  T(C): 36.8 (02-29-24 @ 14:13), Max: 37.8 (02-28-24 @ 18:51)  HR: 99 (02-29-24 @ 14:13) (95 - 102)  BP: 149/68 (02-29-24 @ 14:13) (124/83 - 150/70)  RR: 18 (02-29-24 @ 14:13) (18 - 21)  SpO2: 96% (02-29-24 @ 14:13) (94% - 99%)    Physical Exam  Gen: NAD, alert, awake  HEENT: NC/AT, moist mucous membranes  Chest: normal respiratory effort  Heart: +S1 S2  Neuro: aaox1, incoherent    Medications  acetaminophen     Tablet .. 650 milliGRAM(s) Oral every 6 hours PRN  apixaban 2.5 milliGRAM(s) Oral two times a day  atorvastatin 40 milliGRAM(s) Oral at bedtime  Dakins Solution - 1/4 Strength 1 Application(s) Topical every 8 hours  dextrose 5%. 1000 milliLiter(s) IV Continuous <Continuous>  dextrose 5%. 1000 milliLiter(s) IV Continuous <Continuous>  dextrose 50% Injectable 12.5 Gram(s) IV Push once  dextrose 50% Injectable 25 Gram(s) IV Push once  dextrose 50% Injectable 25 Gram(s) IV Push once  dextrose Oral Gel 15 Gram(s) Oral once PRN  glucagon  Injectable 1 milliGRAM(s) IntraMuscular once  hydroxyurea 500 milliGRAM(s) Oral <User Schedule>  insulin glargine Injectable (LANTUS) 5 Unit(s) SubCutaneous at bedtime  insulin lispro (ADMELOG) corrective regimen sliding scale   SubCutaneous at bedtime  insulin lispro (ADMELOG) corrective regimen sliding scale   SubCutaneous three times a day before meals  lactated ringers Bolus 500 milliLiter(s) IV Bolus once  levothyroxine 88 MICROGram(s) Oral daily  melatonin 3 milliGRAM(s) Oral at bedtime PRN  metoprolol tartrate 25 milliGRAM(s) Oral two times a day  piperacillin/tazobactam IVPB.. 3.375 Gram(s) IV Intermittent every 8 hours  polyethylene glycol 3350 17 Gram(s) Oral daily  QUEtiapine 25 milliGRAM(s) Oral daily PRN  senna 2 Tablet(s) Oral at bedtime    Pertinent labs/imaging  POCT Blood Glucose.: 331 mg/dL (02-29-24 @ 17:20)  POCT Blood Glucose.: 429 mg/dL (02-29-24 @ 12:29)  POCT Blood Glucose.: 223 mg/dL (02-29-24 @ 08:38)  POCT Blood Glucose.: 236 mg/dL (02-29-24 @ 04:07)    eGFR: 82 mL/min/1.73m2 (02-29-24 @ 13:07)  eGFR: 83 mL/min/1.73m2 (02-28-24 @ 20:17)

## 2024-02-29 NOTE — PATIENT PROFILE ADULT - FALL HARM RISK - HARM RISK INTERVENTIONS

## 2024-02-29 NOTE — PROGRESS NOTE ADULT - PROBLEM SELECTOR PLAN 4
Hx of dementia, functional quadriplegia  AOx1 on admission, per daughter doesn't always know where she is  CTM  S/S eval Hx of dementia, functional quadriplegia  AOx1 on admission, per daughter doesn't always know where she is  CTM  S/S eval recommends pureed and moderately thickened diet

## 2024-02-29 NOTE — SWALLOW BEDSIDE ASSESSMENT ADULT - SLP PERTINENT HISTORY OF CURRENT PROBLEM
89 F DNR/DNI, hx DVT, hypothyrodism, P. 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 Found to have unstageable sacral decubitus ulcer and sepsis with imaging c/f osteomyelitis of the coccyx.

## 2024-02-29 NOTE — SWALLOW BEDSIDE ASSESSMENT ADULT - PHARYNGEAL PHASE
Delayed pharyngeal swallow/Decreased laryngeal elevation Cough post oral intake of soft solid/Decreased laryngeal elevation

## 2024-02-29 NOTE — H&P ADULT - NSHPPHYSICALEXAM_GEN_ALL_CORE
VITALS:   T(C): 36.5 (02-29-24 @ 02:20), Max: 37.8 (02-28-24 @ 18:51)  HR: 101 (02-29-24 @ 02:20) (95 - 102)  BP: 134/79 (02-29-24 @ 02:20) (124/83 - 144/76)  RR: 18 (02-29-24 @ 02:20) (18 - 21)  SpO2: 94% (02-29-24 @ 02:20) (94% - 99%)    GENERAL: NAD, lying in bed comfortably  HEAD:  Atraumatic, Normocephalic  EYES: EOMI, PERRLA, conjunctiva and sclera clear  ENT: Moist mucous membranes  NECK: Supple, No JVD  CHEST/LUNG: Clear to auscultation bilaterally; No rales, rhonchi, wheezing, or rubs. Unlabored respirations  HEART: Regular rate and rhythm; No murmurs, rubs, or gallops  ABDOMEN: BSx4; Soft, nontender, nondistended  EXTREMITIES:  2+ Peripheral Pulses, brisk capillary refill. No clubbing, cyanosis, or edema  NERVOUS SYSTEM:  A&Ox1, no new focal deficits  SKIN: unstageable sacral decubitus ulcer   psych: normal behavior, normal affect

## 2024-02-29 NOTE — PROGRESS NOTE ADULT - PROBLEM SELECTOR PLAN 9
Diet; Easy to chew, mod thick liquids  DVT: Eliquis  Dispo: pending clinical improvement Diet; pureed and moderately thickened   DVT: Eliquis  Dispo: pending clinical improvement

## 2024-02-29 NOTE — CONSULT NOTE ADULT - ASSESSMENT
89y old Female with history of T2DM, Hypothyroidism, Dementia, HTN, Hx of DVT, CVA, Sacral ulcer here with osteomyelitis.    1. T2DM with hyperglycemia  - She was significantly hyperglycemic this afternoon  - Continue Lantus 5 units at night  - Start Admelog 4 units with meals  - Continue low Admelog correctional scale before meals and bedtime  - Monitor FS before meals and bedtime  - Follow hospital hypoglycemic protocol    2. Hypothyroidism  - Continue Levothyroxine 88 mcg daily    Will continue to follow.    Matthew Willard MD  [facility]    [address] [place] [address]    T [phone]  F [phone]

## 2024-02-29 NOTE — H&P ADULT - CONVERSATION DETAILS
Confirmed with daughter that they wish her mom to be DNR/DNI as prior MOLST states. She also said that at some point they were looking into home hospice but ran into insurance issues. Explained that her mom had imaging findings with c/f OM of the coccyx and she said she wishes to have antibiotics but is unsure at this time about further workup such as washout or bone biopsy. We said we will revisit this conversation later as she needed time to think about it.

## 2024-02-29 NOTE — H&P ADULT - PROBLEM SELECTOR PLAN 7
c/w BB and blood thinner   heparin gtt in case of procedure, Eliquis afterwards c/w BB and blood thinner   heparin gtt in case of procedure, Eliquis afterwards  Attempted to order hep gtt and Vanc but was told by nursing staff multiple times that they can't get a weight because patient is in holding. Discussed importance of getting a weight for med administration c/w BB and blood thinner  Eliquis  Weight based- pls f.u in Am if weight is charted c/w BB and blood thinner  Eliquis

## 2024-02-29 NOTE — CONSULT NOTE ADULT - SUBJECTIVE AND OBJECTIVE BOX
[facility] DIVISION OF INFECTIOUS DISEASES  SANCHEZ Faria S. Shah, Y. Patel, G. [facility]  [phone]  ([phone] - weekdays after 5pm and weekends)    BISHOP BURNS  89y, Female  [id number]    HPI:  Patient is a 89 year old female DNR/DNI with PMH of DVT, hypothyroidism, P. 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 Patient with siginificant unstageable sacral decubitus ulcer. Discussed with daughter (reportedly HCP) who reported that her mother has been pointing to her back for past few days saying that it hurts her. However, she denies fever, chills, cp, sob, abdominal pain, constipation, diarrhea, or dysuria. She also reports she hasn't been getting official wound care (dressings taken care of by family) due to insurance issues getting wound care nurse approved. Daughter also reports that her mother's mental status at baseline is that she can say her name but doesn't always know where she is. She also reports her mom stopped taking her home metformin since a doctor told her that her A1C was normal. The family at some point was considering home hospice (per documentation and daughter) but there were also issues with insurance getting this investigated. In the ED, she received Vanc+Zosyn x1. (29 Feb 2024 02:32)  ROS: unable to obtain     Allergies: No Known Drug Allergies  black pepper, white pepper, cumin, paprika, johnston powder, chili powder (Rash)    PMH -- Benign Hypertension  Diabetes  Diabetes  Hypercholesteremia  Adult Hypothyroidism  Deep Vein Thrombosis (DVT)  Stroke  SBO (Small Bowel Obstruction)  2019 novel coronavirus disease (COVID-19)  COVID-19 vaccine series completed    PSH -- FH: Total Abdominal Hysterectomy and Bilateral Salpingo-Oophorectomy  S/P small bowel resection    FH -- Family history of diabetes mellitus (DM) (Child)  Family history of secondary lung cancer  Family history of breast cancer (Child)    Social History -- no known tobacco, alcohol or illicit drug use    Physical Exam--  Vital Signs Last 24 Hrs  T(F): 98.5 (29 Feb 2024 05:09), Max: 100.1 (28 Feb 2024 18:51)  HR: 101 (29 Feb 2024 05:09) (95 - 102)  BP: 150/70 (29 Feb 2024 05:09) (124/83 - 150/70)  RR: 18 (29 Feb 2024 05:09) (18 - 21)  SpO2: 95% (29 Feb 2024 05:09) (94% - 99%)  General: no acute distress  HEENT: NC/AT, EOMI, anicteric, neck supple  Lungs: decreased breath sounds b/l  Heart: S1, S2 present, normal rate   Abdomen: Soft. Nondistended. Nontender.    Neuro: AAOx, no obvious focal deficits   Extremities: No cyanosis. No edema.   Skin: Warm. Dry. No visible rash.   Lines: PIV    Laboratory & Imaging Data--  CBC:                       10.0   17.92 )-----------( 858      ( 28 Feb 2024 20:17 )             32.7     WBC Count: 17.92 K/uL (02-28-24 @ 20:17)    CMP: 02-28    137  |  106  |  52<H>  ----------------------------<  339<H>  4.4   |  21<L>  |  0.66    Ca    9.5      28 Feb 2024 20:17  Phos  2.7     02-28  Mg     2.1     02-28    TPro  6.7  /  Alb  2.7<L>  /  TBili  0.2  /  DBili  x   /  AST  28  /  ALT  44  /  AlkPhos  245<H>  02-28    LIVER FUNCTIONS - ( 28 Feb 2024 20:17 )  Alb: 2.7 g/dL / Pro: 6.7 g/dL / ALK PHOS: 245 U/L / ALT: 44 U/L / AST: 28 U/L / GGT: x           Urinalysis + Microscopic Examination (02.28.24 @ 18:37)    pH Urine: 6.0   Urine Appearance: Clear   Color: Yellow   Specific Gravity: 1.019   Protein, Urine: 30 mg/dL   Glucose Qualitative, Urine: 250 mg/dL   Ketone - Urine: Negative mg/dL   Blood, Urine: Negative   Bilirubin: Negative   Urobilinogen: 0.2 mg/dL   Leukocyte Esterase Concentration: Negative   Nitrite: Negative   Review: Reviewed   White Blood Cell - Urine: 0 /HPF   Red Blood Cell - Urine: 8 /HPF   Bacteria: Negative /HPF   Cast: 2 /LPF   Epithelial Cells: 1 /HPF    Microbiology: reviewed  Flu With COVID-19 By PAU (02.28.24 @ 21:33)    SARS-CoV-2 Result: [facility]: This Respiratory Panel uses polymerase chain reaction (PCR) to detect for  influenza A; influenza B; respiratory syncytial virus; and SARS-CoV-2.  This test was validated by [facility] and is in use under the FDA  Emergency Use Authorization (EUA) for clinical labs CLIA-certified to  perform high complexity testing. Test results should be correlated with  clinical presentation, patient history, and epidemiology.   Influenza A Result: [facility]   Influenza B Result: [facility]   Resp Syn Virus Result: [facility]    Radiology--reviewed  < from: CT Pelvis w/ IV Cont (02.28.24 @ 23:34) >  IMPRESSION:  Compared to the recent CT of December 18, 2023, interval development of   left sacral/medial gluteal decubitus ulcer with lysis of the distal   coccyx, compatible with osteomyelitis. No discrete focal drainable   collection.    < end of copied text >  < from: Xray Chest 1 View- PORTABLE-Urgent (02.28.24 @ 20:40) >  IMPRESSION:  No focal consolidations.        ******PRELIMINARY REPORT******      < end of copied text >    Active Medications--  acetaminophen     Tablet .. 650 milliGRAM(s) Oral every 6 hours PRN  apixaban 2.5 milliGRAM(s) Oral two times a day  atorvastatin 40 milliGRAM(s) Oral at bedtime  dextrose 5%. 1000 milliLiter(s) IV Continuous <Continuous>  dextrose 5%. 1000 milliLiter(s) IV Continuous <Continuous>  dextrose 50% Injectable 25 Gram(s) IV Push once  dextrose 50% Injectable 12.5 Gram(s) IV Push once  dextrose 50% Injectable 25 Gram(s) IV Push once  dextrose Oral Gel 15 Gram(s) Oral once PRN  glucagon  Injectable 1 milliGRAM(s) IntraMuscular once  hydroxyurea 500 milliGRAM(s) Oral <User Schedule>  insulin glargine Injectable (LANTUS) 5 Unit(s) SubCutaneous at bedtime  insulin lispro (ADMELOG) corrective regimen sliding scale   SubCutaneous three times a day before meals  insulin lispro (ADMELOG) corrective regimen sliding scale   SubCutaneous at bedtime  lactated ringers Bolus 500 milliLiter(s) IV Bolus once  levothyroxine 88 MICROGram(s) Oral daily  melatonin 3 milliGRAM(s) Oral at bedtime PRN  metoprolol tartrate 25 milliGRAM(s) Oral two times a day  piperacillin/tazobactam IVPB.- 3.375 Gram(s) IV Intermittent once  piperacillin/tazobactam IVPB.. 3.375 Gram(s) IV Intermittent every 8 hours  polyethylene glycol 3350 17 Gram(s) Oral daily  QUEtiapine 25 milliGRAM(s) Oral daily PRN  senna 2 Tablet(s) Oral at bedtime  vancomycin  IVPB 500 milliGRAM(s) IV Intermittent every 12 hours    Current Antimicrobials:   piperacillin/tazobactam IVPB.- 3.375 Gram(s) IV Intermittent once  piperacillin/tazobactam IVPB.. 3.375 Gram(s) IV Intermittent every 8 hours  vancomycin  IVPB 500 milliGRAM(s) IV Intermittent every 12 hours    Prior/Completed Antimicrobials:  piperacillin/tazobactam IVPB.  piperacillin/tazobactam IVPB...  vancomycin  IVPB. [facility] DIVISION OF INFECTIOUS DISEASES  SANCHEZ Faria S. Shah, Y. Patel, G. [facility]  [phone]  ([phone] - weekdays after 5pm and weekends)    BISHOP BURNS  89y, Female  [id number]    HPI:  Patient is a 89 year old female DNR/DNI with PMH of DVT, hypothyroidism, P. 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 Patient with significant unstageable sacral decubitus ulcer. Discussed with daughter (reportedly HCP) who reported that her mother has been pointing to her back for past few days saying that it hurts her. However, she denies fever, chills, cp, sob, abdominal pain, constipation, diarrhea, or dysuria. She also reports she hasn't been getting official wound care (dressings taken care of by family) due to insurance issues getting wound care nurse approved. Daughter also reports that her mother's mental status at baseline is that she can say her name but doesn't always know where she is. She also reports her mom stopped taking her home metformin since a doctor told her that her A1C was normal. The family at some point was considering home hospice (per documentation and daughter) but there were also issues with insurance getting this investigated. In the ED, she received Vanc+Zosyn x1. (29 Feb 2024 02:32)  Patient seen and examined at bedside this morning. Patient awake, unable to obtain history. States "I am good."  ROS: unable to obtain     Allergies: No Known Drug Allergies  black pepper, white pepper, cumin, paprika, johnston powder, chili powder (Rash)    PMH -- Benign Hypertension  Diabetes  Diabetes  Hypercholesteremia  Adult Hypothyroidism  Deep Vein Thrombosis (DVT)  Stroke  SBO (Small Bowel Obstruction)  2019 novel coronavirus disease (COVID-19)  COVID-19 vaccine series completed    PSH -- FH: Total Abdominal Hysterectomy and Bilateral Salpingo-Oophorectomy  S/P small bowel resection    FH -- Family history of diabetes mellitus (DM) (Child)  Family history of secondary lung cancer  Family history of breast cancer (Child)    Social History -- no known tobacco, alcohol or illicit drug use    Physical Exam--  Vital Signs Last 24 Hrs  T(F): 98.5 (29 Feb 2024 05:09), Max: 100.1 (28 Feb 2024 18:51)  HR: 101 (29 Feb 2024 05:09) (95 - 102)  BP: 150/70 (29 Feb 2024 05:09) (124/83 - 150/70)  RR: 18 (29 Feb 2024 05:09) (18 - 21)  SpO2: 95% (29 Feb 2024 05:09) (94% - 99%)  General: no acute distress  HEENT: NC/AT, EOMI, anicteric, neck supple  Lungs: decreased breath sounds b/l  Heart: S1, S2 present, normal rate   Abdomen: Soft. Nondistended. Nontender.    Neuro: AAOx, no obvious focal deficits   Extremities: No cyanosis. No edema.   Skin: Warm. Dry. No visible rash.   Lines: PIV    Laboratory & Imaging Data--  CBC:                       10.0   17.92 )-----------( 858      ( 28 Feb 2024 20:17 )             32.7     WBC Count: 17.92 K/uL (02-28-24 @ 20:17)    CMP: 02-28    137  |  106  |  52<H>  ----------------------------<  339<H>  4.4   |  21<L>  |  0.66    Ca    9.5      28 Feb 2024 20:17  Phos  2.7     02-28  Mg     2.1     02-28    TPro  6.7  /  Alb  2.7<L>  /  TBili  0.2  /  DBili  x   /  AST  28  /  ALT  44  /  AlkPhos  245<H>  02-28    LIVER FUNCTIONS - ( 28 Feb 2024 20:17 )  Alb: 2.7 g/dL / Pro: 6.7 g/dL / ALK PHOS: 245 U/L / ALT: 44 U/L / AST: 28 U/L / GGT: x           Urinalysis + Microscopic Examination (02.28.24 @ 18:37)    pH Urine: 6.0   Urine Appearance: Clear   Color: Yellow   Specific Gravity: 1.019   Protein, Urine: 30 mg/dL   Glucose Qualitative, Urine: 250 mg/dL   Ketone - Urine: Negative mg/dL   Blood, Urine: Negative   Bilirubin: Negative   Urobilinogen: 0.2 mg/dL   Leukocyte Esterase Concentration: Negative   Nitrite: Negative   Review: Reviewed   White Blood Cell - Urine: 0 /HPF   Red Blood Cell - Urine: 8 /HPF   Bacteria: Negative /HPF   Cast: 2 /LPF   Epithelial Cells: 1 /HPF    Microbiology: reviewed  Flu With COVID-19 By PAU (02.28.24 @ 21:33)    [facility]: This Respiratory Panel uses polymerase chain reaction (PCR) to detect for  influenza A; influenza B; respiratory syncytial virus; and SARS-CoV-2.  This test was validated by [facility] and is in use under the FDA  Emergency Use Authorization (EUA) for clinical labs CLIA-certified to  perform high complexity testing. Test results should be correlated with  clinical presentation, patient history, and epidemiology.   Influenza A Result: [facility]   Influenza B Result: [facility]   Resp Syn Virus Result: [facility]    Radiology--reviewed  < from: CT Pelvis w/ IV Cont (02.28.24 @ 23:34) >  IMPRESSION:  Compared to the recent CT of December 18, 2023, interval development of   left sacral/medial gluteal decubitus ulcer with lysis of the distal   coccyx, compatible with osteomyelitis. No discrete focal drainable   collection.    < end of copied text >  < from: Xray Chest 1 View- PORTABLE-Urgent (02.28.24 @ 20:40) >  IMPRESSION:  No focal consolidations.        ******PRELIMINARY REPORT******      < end of copied text >    Active Medications--  acetaminophen     Tablet .. 650 milliGRAM(s) Oral every 6 hours PRN  apixaban 2.5 milliGRAM(s) Oral two times a day  atorvastatin 40 milliGRAM(s) Oral at bedtime  dextrose 5%. 1000 milliLiter(s) IV Continuous <Continuous>  dextrose 5%. 1000 milliLiter(s) IV Continuous <Continuous>  dextrose 50% Injectable 25 Gram(s) IV Push once  dextrose 50% Injectable 12.5 Gram(s) IV Push once  dextrose 50% Injectable 25 Gram(s) IV Push once  dextrose Oral Gel 15 Gram(s) Oral once PRN  glucagon  Injectable 1 milliGRAM(s) IntraMuscular once  hydroxyurea 500 milliGRAM(s) Oral <User Schedule>  insulin glargine Injectable (LANTUS) 5 Unit(s) SubCutaneous at bedtime  insulin lispro (ADMELOG) corrective regimen sliding scale   SubCutaneous three times a day before meals  insulin lispro (ADMELOG) corrective regimen sliding scale   SubCutaneous at bedtime  lactated ringers Bolus 500 milliLiter(s) IV Bolus once  levothyroxine 88 MICROGram(s) Oral daily  melatonin 3 milliGRAM(s) Oral at bedtime PRN  metoprolol tartrate 25 milliGRAM(s) Oral two times a day  piperacillin/tazobactam IVPB.- 3.375 Gram(s) IV Intermittent once  piperacillin/tazobactam IVPB.. 3.375 Gram(s) IV Intermittent every 8 hours  polyethylene glycol 3350 17 Gram(s) Oral daily  QUEtiapine 25 milliGRAM(s) Oral daily PRN  senna 2 Tablet(s) Oral at bedtime  vancomycin  IVPB 500 milliGRAM(s) IV Intermittent every 12 hours    Current Antimicrobials:   piperacillin/tazobactam IVPB.- 3.375 Gram(s) IV Intermittent once  piperacillin/tazobactam IVPB.. 3.375 Gram(s) IV Intermittent every 8 hours  vancomycin  IVPB 500 milliGRAM(s) IV Intermittent every 12 hours    Prior/Completed Antimicrobials:  piperacillin/tazobactam IVPB.  piperacillin/tazobactam IVPB...  vancomycin  IVPB.

## 2024-02-29 NOTE — PROGRESS NOTE ADULT - ATTENDING COMMENTS
Patient seen and examined at bedside. No acute events overnight. Patient very well known to me. She has severe dementia and functional paraplegia. She has known decubitus ulcer which has worsened over the past two months and now with likely osteomyelitis. Wound care consult pending at which time we will personally visualize, but will cover broadly for now.     ID consulted  Likely has poorly controlled diabetes with wide ranging FS. Off home Metformin. Send A1c and consulted endocrine    Further GOC to determine best course of action. Favor hospice with limited antibiotics given life is limited.

## 2024-02-29 NOTE — SWALLOW BEDSIDE ASSESSMENT ADULT - ORAL PREPARATORY PHASE
Within functional limits munching pattern to mastication pattern/Anterior loss of bolus/Decreased mastication ability

## 2024-02-29 NOTE — SWALLOW BEDSIDE ASSESSMENT ADULT - ORAL PHASE
prolonged, inefficient bolus management iso edentulous state; Pt observed to be talking frequently while chewing, w/ verbal cues not entirely successful to reduce talking./Decreased anterior-posterior movement of the bolus/Delayed oral transit time Delayed oral transit time

## 2024-02-29 NOTE — PATIENT PROFILE ADULT - SURGICAL SITE INCISION
Impression: S/P Cataract Extraction by phacoemulsification with IOL placement; Dexycu OS - 28 Days. Encounter for surgical aftercare following surgery on a sense organ  [id number]. Plan: May proceed with 2nd eye OD she is experiencing headaches and double vision. no drops--Advised patient to use artificial tears for comfort. no

## 2024-02-29 NOTE — CONSULT NOTE ADULT - SUBJECTIVE AND OBJECTIVE BOX
Wound SURGERY CONSULT NOTE    HPI:  89 F DNR/DNI, hx DVT, hypothyrodism, P. 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 Patient with siginificant unstageable sacral decubitus ulcer. Discussed with daughter (reportedly HCP) who reported that her mother has been pointing to her back for past few days saying that it hurts her. However, she denies fever, chills, cp, sob, abdominal pain, constipation, diarrhea, or dysuria. She also reports she hasn't been getting official wound care (dressings taken care of by family) due to insurance issues getting wound care nurse approved. Daughter also reports that her mother's mental status at baseline is that she can say her name but doesn't always know where she is. She also reports her mom stopped taking her home metformin since a doctor told her that her A1C was normal. The family at some point was considering home hospice (per documentation and daughter) but there were also issues with insurance getting this investigated. In the ED, she received Vanc+Zosyn x1.     Wound consult requested by team to assist w/ management of sacral pressure injury.  Pt unable to complain.  No pain, drainage, odor, color change,  or worsening swelling noted by nursing.  Off loading and pericare initiated upon admission as pt sedentary 2/2 to illness. Pt is Incontinent of urine & stool. No other h/o bites, scratches, falls, trauma.  Appetite questionable w/o weight loss.  Pt requiring assist w/ all ADLs.      Current Diet: Diet, Pureed:   Moderately Thick Liquids (MODTHICKLIQS) (02-29-24 @ 11:43)      PAST MEDICAL & SURGICAL HISTORY:  Benign Hypertension    Diabetes    Hypercholesteremia    Adult Hypothyroidism    Deep Vein Thrombosis (DVT)    Stroke    2019 novel coronavirus disease (COVID-19)    COVID-19 vaccine series completed    s/p Total Abdominal Hysterectomy and Bilateral Salpingo-Oophorectomy    SBO (Small Bowel Obstruction)  S/P small bowel resection      REVIEW OF SYSTEMS: Pt unable to offer      MEDICATIONS  (STANDING):  apixaban 2.5 milliGRAM(s) Oral two times a day  atorvastatin 40 milliGRAM(s) Oral at bedtime  dextrose 5%. 1000 milliLiter(s) (50 mL/Hr) IV Continuous <Continuous>  dextrose 5%. 1000 milliLiter(s) (100 mL/Hr) IV Continuous <Continuous>  dextrose 50% Injectable 25 Gram(s) IV Push once  dextrose 50% Injectable 12.5 Gram(s) IV Push once  dextrose 50% Injectable 25 Gram(s) IV Push once  glucagon  Injectable 1 milliGRAM(s) IntraMuscular once  hydroxyurea 500 milliGRAM(s) Oral <User Schedule>  insulin glargine Injectable (LANTUS) 5 Unit(s) SubCutaneous at bedtime  insulin lispro (ADMELOG) corrective regimen sliding scale   SubCutaneous three times a day before meals  insulin lispro (ADMELOG) corrective regimen sliding scale   SubCutaneous at bedtime  lactated ringers Bolus 500 milliLiter(s) IV Bolus once  levothyroxine 88 MICROGram(s) Oral daily  metoprolol tartrate 25 milliGRAM(s) Oral two times a day  piperacillin/tazobactam IVPB.. 3.375 Gram(s) IV Intermittent every 8 hours  polyethylene glycol 3350 17 Gram(s) Oral daily  senna 2 Tablet(s) Oral at bedtime    MEDICATIONS  (PRN):  acetaminophen Tablet 650 milliGRAM(s) Oral every 6 hours PRN Temp greater or equal to 38C (100.4F), Mild Pain (1 - 3)  dextrose Oral Gel 15 Gram(s) Oral once PRN Blood Glucose LESS THAN 70 milliGRAM(s)/deciliter  melatonin 3 milliGRAM(s) Oral at bedtime PRN Insomnia  QUEtiapine 25 milliGRAM(s) Oral daily PRN for insomnia      Allergies  black pepper, white pepper, cumin, paprika, johnston powder, chili powder (Rash)      SOCIAL HISTORY: ; (+)lives w/ daughter, (+)HHA; No smoking, ETOH, drugs    FAMILY HISTORY: diabetes mellitus (DM) (Child), lung cancer, breast cancer (Child)   no h/o PVD or wound healing or skin problems      PHYSICAL EXAM:  Vital Signs Last 24 Hrs  T(C): 36.8 (29 Feb 2024 14:13), Max: 37.8 (28 Feb 2024 18:51)  T(F): 98.2 (29 Feb 2024 14:13), Max: 100.1 (28 Feb 2024 18:51)  HR: 99 (29 Feb 2024 14:13) (95 - 102)  BP: 149/68 (29 Feb 2024 14:13) (124/83 - 150/70)  BP(mean): 94 (28 Feb 2024 18:41) (94 - 94)  RR: 18 (29 Feb 2024 14:13) (18 - 21)  SpO2: 96% (29 Feb 2024 14:13) (94% - 99%)    Parameters below as of 29 Feb 2024 14:13  Patient On (Oxygen Delivery Method): room air    Parameters below as of 19 Dec 2023 16:45  Patient On (Oxygen Delivery Method): room air    NAD, Alert/ Confused, cachectic, frail,  WD/ WN/ WG  HEENT:  NC/AT, EOMI, sclera clear, mucosa moist, throat clear, trachea midline, neck supple  Respiratory: nonlabored w/ equal chest rise  Gastrointestinal: soft NT/ND   Neurology: minimally verbal, no follow commands, paraplegic  Psych: calm/ appropriate   Musculoskeletal:  limited stiff pROM, no deformities/ contractures  Vascular: BLE equally warm,  no cyanosis, clubbing,  nor acute ischemia       no BLE DP/PT pulses palpable          BLE hemosiderin staining      bilateral feet w/ hyperpigmented and hypopigmented skin heeling DTIs      Lt heel unstageable pressure injry       no blistering        scant serosanguinous drainage  No odor, erythema, increased warmth, tenderness, induration, fluctuance, nor crepitus  Skin: thin, dry, pale, frail,  ecchymosis w/o hematoma  Sacral evolving stage 4 pressure injury    w/ hyperpigmentation in periwound    lifting soft grey eschar w/ bone and granular tissue noted    6.5cm x 7cm x 2.5cm  Procedure Note  Using aseptic technique limited excisional debridement of sacral wound using a scissor and forceps through necrotic/ nonviable dermis subcutaneous tissue and gluteal muscle.  Pt tolerated procedure well.  Hemostasis was maintained throughout.  Debulking debridement of necrotic tissue. Post measurements 6.5cm x 7cm x 2.5cm undermining from 12-3:00 of 3cm.    serosanguinous drainage  Bilateral Ischium hyperpigmented intact skin- healed wounds     w/o blistering  or drainage  No odor, erythema, increased warmth, tenderness, induration, fluctuance, nor crepitus      LABS/ CULTURES/ RADIOLOGY:                        11.6   16.86 )-----------( 1095     ( 29 Feb 2024 13:07 )             38.7       137  |  100  |  35  ----------------------------<  468      [02-29-24 @ 13:07]  4.5   |  21  |  0.69        Ca     9.9     [02-29-24 @ 13:07]      Mg     2.2     [02-29-24 @ 13:07]      Phos  2.7     [02-28-24 @ 20:17]    TPro  7.5  /  Alb  3.0  /  TBili  0.2  /  DBili  x   /  AST  30  /  ALT  47  /  AlkPhos  264  [02-29-24 @ 13:07]    PT/INR: PT 18.1 , INR 1.67       [02-29-24 @ 13:07]  PTT: 38.7       [02-29-24 @ 13:07]    ACC: [id number] EXAM:  CT PELVIS ONLY IC   ORDERED BY: NICOLE BRODY     PROCEDURE DATE:  02/28/2024          INTERPRETATION:  CLINICAL INFORMATION: Sacral decubitus ulcer, evaluate   for osteomyelitis    COMPARISON: None.    CONTRAST/COMPLICATIONS:  IV Contrast: Omnipaque 350  90 cc administered   10 cc discarded  Oral Contrast: NONE  Complications: None reported at time of study completion    PROCEDURE:  CT of the Pelvis was performed.  Sagittal and coronal reformats were performed.    FINDINGS:  BLADDER: Incompletely distended.  REPRODUCTIVE ORGANS: Hysterectomy.  LYMPH NODES: No pelvic lymphadenopathy.    VISUALIZED PORTIONS:  ABDOMINAL ORGANS: Within normal limits.  BOWEL: Within normal limits.  PERITONEUM: No ascites.  VESSELS: Within normal limits.  ABDOMINAL WALL: There is a left gluteal and coccygeal ulcer with soft   tissue gas extending to the coccyx.  BONES: Compared to the recent CT of December 18, 2023, there is lysis of   the distal coccyx compatible with osteomyelitis.      IMPRESSION:  Compared to the recent CT of December 18, 2023, interval development of   left sacral/medial gluteal decubitus ulcer with lysis of the distal   coccyx, compatible with osteomyelitis. No discrete focal drainable   collection.     Wound SURGERY CONSULT NOTE    HPI:  89 F DNR/DNI, hx DVT, hypothyrodism, P. 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 Patient with siginificant unstageable sacral decubitus ulcer. Discussed with daughter (reportedly HCP) who reported that her mother has been pointing to her back for past few days saying that it hurts her. However, she denies fever, chills, cp, sob, abdominal pain, constipation, diarrhea, or dysuria. She also reports she hasn't been getting official wound care (dressings taken care of by family) due to insurance issues getting wound care nurse approved. Daughter also reports that her mother's mental status at baseline is that she can say her name but doesn't always know where she is. She also reports her mom stopped taking her home metformin since a doctor told her that her A1C was normal. The family at some point was considering home hospice (per documentation and daughter) but there were also issues with insurance getting this investigated. In the ED, she received Vanc+Zosyn x1.     Wound consult requested by team to assist w/ management of sacral pressure injury.  Pt unable to complain.  No pain, drainage, odor, color change,  or worsening swelling noted by nursing.  Off loading and pericare initiated upon admission as pt sedentary 2/2 to illness. Pt is Incontinent of urine & stool. No other h/o bites, scratches, falls, trauma.  Appetite questionable w/o weight loss.  Pt requiring assist w/ all ADLs.      Current Diet: Diet, Pureed:   Moderately Thick Liquids (MODTHICKLIQS) (02-29-24 @ 11:43)      PAST MEDICAL & SURGICAL HISTORY:  Benign Hypertension    Diabetes    Hypercholesteremia    Adult Hypothyroidism    Deep Vein Thrombosis (DVT)    Stroke    2019 novel coronavirus disease (COVID-19)    COVID-19 vaccine series completed    s/p Total Abdominal Hysterectomy and Bilateral Salpingo-Oophorectomy    SBO (Small Bowel Obstruction)  S/P small bowel resection      REVIEW OF SYSTEMS: Pt unable to offer      MEDICATIONS  (STANDING):  apixaban 2.5 milliGRAM(s) Oral two times a day  atorvastatin 40 milliGRAM(s) Oral at bedtime  dextrose 5%. 1000 milliLiter(s) (50 mL/Hr) IV Continuous <Continuous>  dextrose 5%. 1000 milliLiter(s) (100 mL/Hr) IV Continuous <Continuous>  dextrose 50% Injectable 25 Gram(s) IV Push once  dextrose 50% Injectable 12.5 Gram(s) IV Push once  dextrose 50% Injectable 25 Gram(s) IV Push once  glucagon  Injectable 1 milliGRAM(s) IntraMuscular once  hydroxyurea 500 milliGRAM(s) Oral <User Schedule>  insulin glargine Injectable (LANTUS) 5 Unit(s) SubCutaneous at bedtime  insulin lispro (ADMELOG) corrective regimen sliding scale   SubCutaneous three times a day before meals  insulin lispro (ADMELOG) corrective regimen sliding scale   SubCutaneous at bedtime  lactated ringers Bolus 500 milliLiter(s) IV Bolus once  levothyroxine 88 MICROGram(s) Oral daily  metoprolol tartrate 25 milliGRAM(s) Oral two times a day  piperacillin/tazobactam IVPB.. 3.375 Gram(s) IV Intermittent every 8 hours  polyethylene glycol 3350 17 Gram(s) Oral daily  senna 2 Tablet(s) Oral at bedtime    MEDICATIONS  (PRN):  acetaminophen Tablet 650 milliGRAM(s) Oral every 6 hours PRN Temp greater or equal to 38C (100.4F), Mild Pain (1 - 3)  dextrose Oral Gel 15 Gram(s) Oral once PRN Blood Glucose LESS THAN 70 milliGRAM(s)/deciliter  melatonin 3 milliGRAM(s) Oral at bedtime PRN Insomnia  QUEtiapine 25 milliGRAM(s) Oral daily PRN for insomnia      Allergies  black pepper, white pepper, cumin, paprika, johnston powder, chili powder (Rash)      SOCIAL HISTORY: ; (+)lives w/ daughter, (+)HHA; No smoking, ETOH, drugs    FAMILY HISTORY: diabetes mellitus (DM) (Child), lung cancer, breast cancer (Child)   no h/o PVD or wound healing or skin problems      PHYSICAL EXAM:  Vital Signs Last 24 Hrs  T(C): 36.8 (29 Feb 2024 14:13), Max: 37.8 (28 Feb 2024 18:51)  T(F): 98.2 (29 Feb 2024 14:13), Max: 100.1 (28 Feb 2024 18:51)  HR: 99 (29 Feb 2024 14:13) (95 - 102)  BP: 149/68 (29 Feb 2024 14:13) (124/83 - 150/70)  BP(mean): 94 (28 Feb 2024 18:41) (94 - 94)  RR: 18 (29 Feb 2024 14:13) (18 - 21)  SpO2: 96% (29 Feb 2024 14:13) (94% - 99%)    Parameters below as of 29 Feb 2024 14:13  Patient On (Oxygen Delivery Method): room air    Parameters below as of 19 Dec 2023 16:45  Patient On (Oxygen Delivery Method): room air    NAD, Alert/ Confused, cachectic, frail,  WD/ WN/ WG  HEENT:  NC/AT, EOMI, sclera clear, mucosa moist, throat clear, trachea midline, neck supple  Respiratory: nonlabored w/ equal chest rise  Gastrointestinal: soft NT/ND   Neurology: minimally verbal, no follow commands, paraplegic  Psych: calm/ appropriate   Musculoskeletal:  limited stiff pROM, no deformities/ contractures  Vascular: BLE equally warm,  no cyanosis, clubbing,  nor acute ischemia       no BLE DP/PT pulses palpable          BLE hemosiderin staining      bilateral feet w/ hyperpigmented and hypopigmented skin heeling DTIs      Lt heel unstageable pressure injry       no blistering        scant serosanguinous drainage  No odor, erythema, increased warmth, tenderness, induration, fluctuance, nor crepitus  Skin: thin, dry, pale, frail,  ecchymosis w/o hematoma  Sacral evolving stage 4 pressure injury    w/ hyperpigmentation in periwound    lifting soft grey eschar w/ bone and granular tissue noted    6.5cm x 7cm x 2.5cm  Procedure Note  Using aseptic technique selective excisional debridement of sacral wound was peformed using a scissor and forceps through necrotic/ nonviable dermis subcutaneous tissue and gluteal muscle.  Pt tolerated procedure well.  Hemostasis was maintained throughout.  Debulking debridement of necrotic tissue. Post measurements 6.5cm x 7cm x 2.5cm undermining from 12-3:00 of 3cm.    serosanguinous drainage  Bilateral Ischium hyperpigmented intact skin- healed wounds     w/o blistering  or drainage  No odor, erythema, increased warmth, tenderness, induration, fluctuance, nor crepitus      LABS/ CULTURES/ RADIOLOGY:                        11.6   16.86 )-----------( 1095     ( 29 Feb 2024 13:07 )             38.7       137  |  100  |  35  ----------------------------<  468      [02-29-24 @ 13:07]  4.5   |  21  |  0.69        Ca     9.9     [02-29-24 @ 13:07]      Mg     2.2     [02-29-24 @ 13:07]      Phos  2.7     [02-28-24 @ 20:17]    TPro  7.5  /  Alb  3.0  /  TBili  0.2  /  DBili  x   /  AST  30  /  ALT  47  /  AlkPhos  264  [02-29-24 @ 13:07]    PT/INR: PT 18.1 , INR 1.67       [02-29-24 @ 13:07]  PTT: 38.7       [02-29-24 @ 13:07]    ACC: [id number] EXAM:  CT PELVIS ONLY IC   ORDERED BY: NICOLE BRODY     PROCEDURE DATE:  02/28/2024          INTERPRETATION:  CLINICAL INFORMATION: Sacral decubitus ulcer, evaluate   for osteomyelitis    COMPARISON: None.    CONTRAST/COMPLICATIONS:  IV Contrast: Omnipaque 350  90 cc administered   10 cc discarded  Oral Contrast: NONE  Complications: None reported at time of study completion    PROCEDURE:  CT of the Pelvis was performed.  Sagittal and coronal reformats were performed.    FINDINGS:  BLADDER: Incompletely distended.  REPRODUCTIVE ORGANS: Hysterectomy.  LYMPH NODES: No pelvic lymphadenopathy.    VISUALIZED PORTIONS:  ABDOMINAL ORGANS: Within normal limits.  BOWEL: Within normal limits.  PERITONEUM: No ascites.  VESSELS: Within normal limits.  ABDOMINAL WALL: There is a left gluteal and coccygeal ulcer with soft   tissue gas extending to the coccyx.  BONES: Compared to the recent CT of December 18, 2023, there is lysis of   the distal coccyx compatible with osteomyelitis.      IMPRESSION:  Compared to the recent CT of December 18, 2023, interval development of   left sacral/medial gluteal decubitus ulcer with lysis of the distal   coccyx, compatible with osteomyelitis. No discrete focal drainable   collection.

## 2024-02-29 NOTE — PROGRESS NOTE ADULT - PROBLEM SELECTOR PLAN 2
Pharmacy giving pt 50mg of losartan, spoke w/ pt states [facility] changed Losartan to 100mg, Please advise CT pelvis with L sacral/medial gluteal decubitis ulcer with lysis of coccyx with c/f osteomyelitis  Also with unstageable sacral ulcer, not receiving adequate wound care, and is in pain  Meeting sepsis criteria with tachycardia, tacypnea, and leukocytosis     Plan:  Vanc/Zosyn  Blood/Ucx, wound culture  Ortho consult for washout and biopsy if within goals of care of family  ID consult in Am CT pelvis with L sacral/medial gluteal decubitis ulcer with lysis of coccyx with c/f osteomyelitis  Also with unstageable sacral ulcer, not receiving adequate wound care, and is in pain  Meeting sepsis criteria with tachycardia, tacypnea, and leukocytosis     - procal, crp elevated     Plan:  c/w Vanc/Zosyn  f/u Blood/Ucx, wound culture  wound care debrided   ID consulted

## 2024-02-29 NOTE — SWALLOW BEDSIDE ASSESSMENT ADULT - ASPIRATION PRECAUTIONS
Monitor for s/s aspiration/laryngeal penetration. If noted:  D/C p.o. intake, provide non-oral nutrition/hydration/meds, and contact this service @ [phone]

## 2024-02-29 NOTE — PHYSICAL THERAPY INITIAL EVALUATION ADULT - PERTINENT HX OF CURRENT PROBLEM, REHAB EVAL
89 F DNR/DNI, hx DVT, hypothyrodism, P. 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 Found to have unstageable sacral decubitus ulcer and sepsis with imaging c/f osteomyelitis of the coccyx. Dx: Osteomyelitis of coccyx, Sepsis, Sacral Ulcer, Severe Dementia, GOC counseling, Polycthemia vera, Paroxysmal aFib, DM.  CT pelivs: Compared to the recent CT of December 18, 2023, interval development of left sacral/medial gluteal decubitus ulcer with lysis of the distal coccyx, compatible with osteomyelitis. No discrete focal drainable collection. XrayChest- (clear).

## 2024-02-29 NOTE — PROGRESS NOTE ADULT - PROBLEM SELECTOR PLAN 3
CT pelvis with L sacral/medial gluteal decubitis ulcer with lysis of coccyx with c/f osteomyelitis  Also with unstageable sacral ulcer, not receiving adequate wound care, and is in pain  Meeting sepsis criteria with tachycardia, tacypnea, and leukocytosis     Plan:  Vanc/Zosyn  Blood/Ucx, wound culture  Ortho consult for washout and biopsy if within goals of care of family  ID consult in Am CT pelvis with L sacral/medial gluteal decubitis ulcer with lysis of coccyx with c/f osteomyelitis  Also with unstageable sacral ulcer, not receiving adequate wound care, and is in pain  Meeting sepsis criteria with tachycardia, tacypnea, and leukocytosis     - procal, crp elevated     Plan:  c/w Vanc/Zosyn  f/u Blood/Ucx, wound culture  wound care debrided   ID consulted

## 2024-02-29 NOTE — SWALLOW BEDSIDE ASSESSMENT ADULT - ASR SWALLOW DENTITION
As per d/w pt's daughter, pt has lower dentures; however, not present at time of evaluation./edentulous, does not have dentures

## 2024-02-29 NOTE — PHYSICAL THERAPY INITIAL EVALUATION ADULT - ADDITIONAL COMMENTS
PT spoke with daughter Magalie, she is considering LTC or hospice options. Magalie states her mom has been bed bound (hospital bed) since 2021. Pt used to be transferred to chair but no longer.

## 2024-02-29 NOTE — SWALLOW BEDSIDE ASSESSMENT ADULT - SLP GENERAL OBSERVATIONS
Pt encountered in bed, awake, alert, pleasantly confused, on room air. Pt w/ baseline hx of advanced dementia, able to answer Y/N question about first name; however, not oriented to place or time. Pt cooperative for purposes of evaluation, often requiring redirection to refrain from talking while eating/drinking, w/ pt with decreased awareness of rationale for evaluation.

## 2024-02-29 NOTE — CONSULT NOTE ADULT - ASSESSMENT
89 F DNR/DNI, hx DVT, hypothyrodism, P. 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 Found to have unstageable sacral decubitus ulcer and sepsis with imaging c/f osteomyelitis of the coccyx.     Wound Consult requested to assist w/ management of:  sacral stage 4 pressure injury  Feet w/ healing wounds    Sacrum- pack w/ 1/4 strength Dakins packing TID  Buttocks TRIAD BID and prn soiling        Continue w/ attends under pads and Pericare as per protocol  Consider POD for Feet wounds  Consider MELLO/PVR as in line w/ GOC  A/P CT - appreciate pressure injury findings- development of OM  Abx per Medicine/  Moisturize intact skin w/ SWEEN cream BID  Nutrition Consult for optimization in pt previously noted w/ Severe Protein Calorie Malnutrition       as in line w/ GOC encourage high quality protein, jorge/ prosource, MVI & Vit C to promote wound healing  Hyperglycemia -ADA diet and FS w/ ISS  Continue turning and positioning w/ offloading assistive devices as per protocol  Waffle Cushion to chair when oob to chair  Continue w/ low air loss pressure redistribution bed surface   Pt will need Group 2 mattress on hospital bed and ROHO cushion for wheel chair upon discharge home  Care as per medicine, will follow w/ you  Upon discharge f/u as outpatient at [facility]ound Center [address] [place] [address] [phone]  Seen w/ attng and D/w team & RN  Thank you for this consult  Rosario Gerardo PA-C CWS [id number]  Nights/ Weekends/ Holidays please call:  General Surgery Consult pager ([phone]) for emergencies  Wound PT for multilayer leg wrapping or VAC issues ([phone])   I spent 55minutes face to face w/ this pt of which more than 50% of the time was spent counseling & coordinating care of this pt.  89 F DNR/DNI, hx DVT, hypothyrodism, P. 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 Found to have unstageable sacral decubitus ulcer and sepsis with imaging c/f osteomyelitis of the coccyx.     Wound Consult requested to assist w/ management of:  sacral stage 4 pressure injury  Feet w/ healing wounds    Sacrum- pack w/ 1/4 strength Dakins packing TID  Buttocks TRIAD BID and prn soiling        Continue w/ attends under pads and Pericare as per protocol  Consider POD for Feet wounds  Consider MELLO/PVR as in line w/ GOC  A/P CT - appreciate pressure injury findings- development of OM  Abx per Medicine/  Moisturize intact skin w/ SWEEN cream BID  Nutrition Consult for optimization in pt previously noted w/ Severe Protein Calorie Malnutrition       as in line w/ GOC encourage high quality protein, jorge/ prosource, MVI & Vit C to promote wound healing  Hyperglycemia -ADA diet and FS w/ ISS  Continue turning and positioning w/ offloading assistive devices as per protocol  Waffle Cushion to chair when oob to chair  Continue w/ low air loss pressure redistribution bed surface   Pt will need Group 2 mattress on hospital bed and ROHO cushion for wheel chair upon discharge home  Care as per medicine, will follow w/ you  Upon discharge f/u as outpatient at Wound Center [address] [place] [address] [phone]  Seen w/ attng and D/w team & RN  Thank you for this consult  Rosario Gerardo PA-C CWS [id number]  Nights/ Weekends/ Holidays please call:  General Surgery Consult pager ([phone]) for emergencies  Wound PT for multilayer leg wrapping or VAC issues (x [phone])

## 2024-02-29 NOTE — H&P ADULT - ATTENDING COMMENTS
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), CVA pw leukocytosis on outpt labs found to be setpic i/s/o unstageable sacral decubitus ulcer w/ imaging c/f OM of the coccyx    # Sacral Decub  # OM  - c/w Vanc/Zosyn  - f/u MRSA PCR  - Further GOC re management per family discussion in AM  - Consider ID consult in AM    # Uncontrolled DM  - Start weight based basal/bolus (0.3U/kg/day) once weight established  - f/u FS and adjust insulin accordingly to avoid hypoglycemia  - Diabetic education     # pAF  - c/w Lopressor 25mg BID  - c/w Eliquis 5mg BID    # Rest of plan as above    I have personally spent 75 minutes reviewing tests and imaging, independently obtaining a history, performing a physical examination, discussing the plan with the patient, documenting clinical information, and coordinating care.

## 2024-02-29 NOTE — PROGRESS NOTE ADULT - PROBLEM SELECTOR PLAN 5
Goals of care discussion above  Follow up in AM for continued GOC Goals of care discussion above  will have GOC discussion with family today

## 2024-02-29 NOTE — H&P ADULT - NSHPLABSRESULTS_GEN_ALL_CORE
.  LABS:                         10.0   17.92 )-----------( 858      ( 28 Feb 2024 20:17 )             32.7     02-28    137  |  106  |  52<H>  ----------------------------<  339<H>  4.4   |  21<L>  |  0.66    Ca    9.5      28 Feb 2024 20:17  Phos  2.7     02-28  Mg     2.1     02-28    TPro  6.7  /  Alb  2.7<L>  /  TBili  0.2  /  DBili  x   /  AST  28  /  ALT  44  /  AlkPhos  245<H>  02-28    PT/INR - ( 28 Feb 2024 20:17 )   PT: 24.1 sec;   INR: 2.35 ratio         PTT - ( 28 Feb 2024 20:17 )  PTT:39.8 sec  Urinalysis Basic - ( 28 Feb 2024 20:17 )    Color: x / Appearance: x / SG: x / pH: x  Gluc: 339 mg/dL / Ketone: x  / Bili: x / Urobili: x   Blood: x / Protein: x / Nitrite: x   Leuk Esterase: x / RBC: x / WBC x   Sq Epi: x / Non Sq Epi: x / Bacteria: x            RADIOLOGY, EKG & ADDITIONAL TESTS: Reviewed. LABS:                    10.0   17.92 )-----------( 858      ( 28 Feb 2024 20:17 )             32.7     02-28    137  |  106  |  52<H>  ----------------------------<  339<H>  4.4   |  21<L>  |  0.66    Ca    9.5      28 Feb 2024 20:17  Phos  2.7     02-28  Mg     2.1     02-28    TPro  6.7  /  Alb  2.7<L>  /  TBili  0.2  /  DBili  x   /  AST  28  /  ALT  44  /  AlkPhos  245<H>  02-28    PT/INR - ( 28 Feb 2024 20:17 )   PT: 24.1 sec;   INR: 2.35 ratio    PTT - ( 28 Feb 2024 20:17 )  PTT:39.8 sec    Urinalysis Basic - ( 28 Feb 2024 20:17 )  Color: x / Appearance: x / SG: x / pH: x  Gluc: 339 mg/dL / Ketone: x  / Bili: x / Urobili: x   Blood: x / Protein: x / Nitrite: x   Leuk Esterase: x / RBC: x / WBC x   Sq Epi: x / Non Sq Epi: x / Bacteria: x    IMAGING:  CT Pelvis w/ IV Cont (02.28.24 @ 23:34)  IMPRESSION:  - Compared to the recent CT of December 18, 2023, interval development of left sacral/medial gluteal decubitus ulcer with lysis of the distal coccyx, compatible with osteomyelitis. No discrete focal drainable collection.

## 2024-02-29 NOTE — CONSULT NOTE ADULT - ASSESSMENT
Patient is a 89 year old female DNR/DNI with PMH of DVT, hypothyroidism, P. 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 Patient with significant unstageable sacral decubitus ulcer. Per daughter, she hasn't been getting official wound care (dressings taken care of by family) due to insurance issues getting wound care nurse approved. Daughter also reports that her mother's mental status at baseline is that she can say her name but doesn't always know where she is.    Sepsis, unstageable sacral ulcer with OM of coccyx   Poorly controlled DM  - tachycardia, tachypnea and leukocytosis on admission, low grade temp 100.1F x1 2/28pm    -- afebrile this am, WBC slight downtrend, CRP 86  - Flu/COVID/RSV negative   - CXR with no focal consolidations  - UA negative   - CT pelvis with left sacral/medial gluteal decubitus ulcer with OM of coccyx, no drainable collection   - MRSA PCR screen negative     Recommendations:  Follow Bcx, - in process   Follow Wcx - in process   Continue zosyn for now pending above   MRSA PCR screen negative, discontinued vancomycin   Treatment of sacral pressure ulcer and confirmed OM with antibiotics alone without debridement and wound coverage ineffective, also given location wound is at risk of contamination, further skin/ wound breakdown due to pressure and prolonged course of antibiotics unlikely to resolve this and will increase risk of C DIff, development of resistant organisms making treatment of infection more difficult in the future in addition to antibiotic adverse effects, therefore, recommended short course 7-10d course with local wound care, nutrition, offloading as able  Wound care evaluation  Monitor temps/WBC      Irasema Coy M.D.  [facility], Division of Infectious Diseases  [phone]  After 5pm on weekdays and all day on weekends - please call [phone]

## 2024-02-29 NOTE — H&P ADULT - PROBLEM SELECTOR PLAN 9
Diet; Easy to chew, mod thick liquids  DVT: heparin gtt in case of procedure, Eliquis afterwards  Dispo: pending clinical improvement Diet; Easy to chew, mod thick liquids  DVT: Eliquis  Dispo: pending clinical improvement

## 2024-02-29 NOTE — H&P ADULT - PROBLEM SELECTOR PLAN 8
hx of DM on metformin, but hasn't been taking since she as told she has normal A1C  Blood glucose elevated on admission   ISS hx of DM on metformin, but hasn't been taking since she as told she has normal A1C  Blood glucose elevated on admission   ISS  Ideally, weight based 0.2U would be given but no weight in chart, please address in AM hx of DM on metformin, but hasn't been taking since she as told she has normal A1C  Blood glucose elevated on admission   ISS  Ideally, weight based 0.3U would be given but no weight in chart, please address in AM

## 2024-02-29 NOTE — H&P ADULT - HISTORY OF PRESENT ILLNESS
89 F DNR/DNI, hx DVT, hypothyrodism, P. 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 Patient with siginificant unstageable sacral decubitus ulcer. Discussed with daughter (reportedly HCP) who reported that her mother has been pointing to her back for past few days saying that it hurts her. However, she denies fever, chills, cp, sob, abdominal pain, constipation, diarrhea, or dysuria. She also reports she hasn't been getting official wound care (dressings taken care of by family) due to insurance issues getting wound care nurse approved. Daughter also reports that her mother's mental status at baseline is that she can say her name but doesn't always know where she is. She also reports her mom stopped taking her home metformin since a doctor told her that her A1C was normal. The family at some point was considering home hospice (per documentation and daughter) but there were also issues with insurance getting this investigated. In the ED, she received Vanc+Zosyn x1.

## 2024-02-29 NOTE — PROGRESS NOTE ADULT - SUBJECTIVE AND OBJECTIVE BOX
Alexus Coronel MD   Internal Medicine, PGY 1  Contact via TEAMS.     SUBJECTIVE / OVERNIGHT EVENTS:  - Pt seen and examined at bedside  - TANYA    MEDICATIONS  (STANDING):  apixaban 2.5 milliGRAM(s) Oral two times a day  atorvastatin 40 milliGRAM(s) Oral at bedtime  dextrose 5%. 1000 milliLiter(s) (50 mL/Hr) IV Continuous <Continuous>  dextrose 5%. 1000 milliLiter(s) (100 mL/Hr) IV Continuous <Continuous>  dextrose 50% Injectable 25 Gram(s) IV Push once  dextrose 50% Injectable 12.5 Gram(s) IV Push once  dextrose 50% Injectable 25 Gram(s) IV Push once  glucagon  Injectable 1 milliGRAM(s) IntraMuscular once  hydroxyurea 500 milliGRAM(s) Oral <User Schedule>  insulin glargine Injectable (LANTUS) 5 Unit(s) SubCutaneous at bedtime  insulin lispro (ADMELOG) corrective regimen sliding scale   SubCutaneous at bedtime  insulin lispro (ADMELOG) corrective regimen sliding scale   SubCutaneous three times a day before meals  lactated ringers Bolus 500 milliLiter(s) IV Bolus once  levothyroxine 88 MICROGram(s) Oral daily  metoprolol tartrate 25 milliGRAM(s) Oral two times a day  piperacillin/tazobactam IVPB.- 3.375 Gram(s) IV Intermittent once  piperacillin/tazobactam IVPB.. 3.375 Gram(s) IV Intermittent every 8 hours  polyethylene glycol 3350 17 Gram(s) Oral daily  senna 2 Tablet(s) Oral at bedtime  vancomycin  IVPB 500 milliGRAM(s) IV Intermittent every 12 hours    MEDICATIONS  (PRN):  acetaminophen     Tablet .. 650 milliGRAM(s) Oral every 6 hours PRN Temp greater or equal to 38C (100.4F), Mild Pain (1 - 3)  dextrose Oral Gel 15 Gram(s) Oral once PRN Blood Glucose LESS THAN 70 milliGRAM(s)/deciliter  melatonin 3 milliGRAM(s) Oral at bedtime PRN Insomnia  QUEtiapine 25 milliGRAM(s) Oral daily PRN for insomnia          PHYSICAL EXAM:  Vital Signs Last 24 Hrs  T(C): 36.9 (29 Feb 2024 05:09), Max: 37.8 (28 Feb 2024 18:51)  T(F): 98.5 (29 Feb 2024 05:09), Max: 100.1 (28 Feb 2024 18:51)  HR: 101 (29 Feb 2024 05:09) (95 - 102)  BP: 150/70 (29 Feb 2024 05:09) (124/83 - 150/70)  BP(mean): 94 (28 Feb 2024 18:41) (94 - 94)  RR: 18 (29 Feb 2024 05:09) (18 - 21)  SpO2: 95% (29 Feb 2024 05:09) (94% - 99%)    Parameters below as of 29 Feb 2024 05:09  Patient On (Oxygen Delivery Method): room air        CAPILLARY BLOOD GLUCOSE      POCT Blood Glucose.: 236 mg/dL (29 Feb 2024 04:07)    I&O's Summary      CONSTITUTIONAL: NAD  HEENT: NC/AT  RESPIRATORY: Normal respiratory effort; lungs are clear to auscultation bilaterally  CARDIOVASCULAR: Regular rate and rhythm, normal S1 and S2, no murmur/rub/gallop; No lower extremity edema; Peripheral pulses are 2+ bilaterally  ABDOMEN: Nontender to palpation, normoactive bowel sounds, no rebound/guarding; No hepatosplenomegaly  MUSCLOSKELETAL: no clubbing or cyanosis of digits; no joint swelling or tenderness to palpation  EXTREMITIES: no peripheral edema, distal pulses intact   NEURO: no focal deficits   PSYCH: A+O to person, place, and time; affect appropriate    LABS:                        10.0   17.92 )-----------( 858      ( 28 Feb 2024 20:17 )             32.7     02-28    137  |  106  |  52<H>  ----------------------------<  339<H>  4.4   |  21<L>  |  0.66    Ca    9.5      28 Feb 2024 20:17  Phos  2.7     02-28  Mg     2.1     02-28    TPro  6.7  /  Alb  2.7<L>  /  TBili  0.2  /  DBili  x   /  AST  28  /  ALT  44  /  AlkPhos  245<H>  02-28    PT/INR - ( 28 Feb 2024 20:17 )   PT: 24.1 sec;   INR: 2.35 ratio         PTT - ( 28 Feb 2024 20:17 )  PTT:39.8 sec      Urinalysis Basic - ( 28 Feb 2024 20:17 )    Color: x / Appearance: x / SG: x / pH: x  Gluc: 339 mg/dL / Ketone: x  / Bili: x / Urobili: x   Blood: x / Protein: x / Nitrite: x   Leuk Esterase: x / RBC: x / WBC x   Sq Epi: x / Non Sq Epi: x / Bacteria: x          IMAGING:    [X] All pertinent imaging reviewed by me Alexus Coronel MD   Internal Medicine, PGY 1  Contact via TEAMS.     SUBJECTIVE / OVERNIGHT EVENTS:  - Pt seen and examined at bedside  - Pt comfortable at bedside today.     MEDICATIONS  (STANDING):  apixaban 2.5 milliGRAM(s) Oral two times a day  atorvastatin 40 milliGRAM(s) Oral at bedtime  dextrose 5%. 1000 milliLiter(s) (50 mL/Hr) IV Continuous <Continuous>  dextrose 5%. 1000 milliLiter(s) (100 mL/Hr) IV Continuous <Continuous>  dextrose 50% Injectable 25 Gram(s) IV Push once  dextrose 50% Injectable 12.5 Gram(s) IV Push once  dextrose 50% Injectable 25 Gram(s) IV Push once  glucagon  Injectable 1 milliGRAM(s) IntraMuscular once  hydroxyurea 500 milliGRAM(s) Oral <User Schedule>  insulin glargine Injectable (LANTUS) 5 Unit(s) SubCutaneous at bedtime  insulin lispro (ADMELOG) corrective regimen sliding scale   SubCutaneous at bedtime  insulin lispro (ADMELOG) corrective regimen sliding scale   SubCutaneous three times a day before meals  lactated ringers Bolus 500 milliLiter(s) IV Bolus once  levothyroxine 88 MICROGram(s) Oral daily  metoprolol tartrate 25 milliGRAM(s) Oral two times a day  piperacillin/tazobactam IVPB.- 3.375 Gram(s) IV Intermittent once  piperacillin/tazobactam IVPB.. 3.375 Gram(s) IV Intermittent every 8 hours  polyethylene glycol 3350 17 Gram(s) Oral daily  senna 2 Tablet(s) Oral at bedtime  vancomycin  IVPB 500 milliGRAM(s) IV Intermittent every 12 hours    MEDICATIONS  (PRN):  acetaminophen     Tablet .. 650 milliGRAM(s) Oral every 6 hours PRN Temp greater or equal to 38C (100.4F), Mild Pain (1 - 3)  dextrose Oral Gel 15 Gram(s) Oral once PRN Blood Glucose LESS THAN 70 milliGRAM(s)/deciliter  melatonin 3 milliGRAM(s) Oral at bedtime PRN Insomnia  QUEtiapine 25 milliGRAM(s) Oral daily PRN for insomnia          PHYSICAL EXAM:  Vital Signs Last 24 Hrs  T(C): 36.9 (29 Feb 2024 05:09), Max: 37.8 (28 Feb 2024 18:51)  T(F): 98.5 (29 Feb 2024 05:09), Max: 100.1 (28 Feb 2024 18:51)  HR: 101 (29 Feb 2024 05:09) (95 - 102)  BP: 150/70 (29 Feb 2024 05:09) (124/83 - 150/70)  BP(mean): 94 (28 Feb 2024 18:41) (94 - 94)  RR: 18 (29 Feb 2024 05:09) (18 - 21)  SpO2: 95% (29 Feb 2024 05:09) (94% - 99%)    Parameters below as of 29 Feb 2024 05:09  Patient On (Oxygen Delivery Method): room air        CAPILLARY BLOOD GLUCOSE      POCT Blood Glucose.: 236 mg/dL (29 Feb 2024 04:07)    I&O's Summary      GENERAL: NAD, lying in bed comfortably  HEAD:  Atraumatic, Normocephalic  EYES: EOMI, PERRLA, conjunctiva and sclera clear  ENT: Moist mucous membranes  NECK: Supple,   CHEST/LUNG: Clear to auscultation bilaterally; No rales, rhonchi, wheezing, or rubs. Unlabored respirations  HEART: Regular rate and rhythm; No murmurs, rubs, or gallops  ABDOMEN: BSx4; Soft, nontender, nondistended  EXTREMITIES:  2+ Peripheral Pulses, brisk capillary refill. No clubbing, cyanosis, or edema  NERVOUS SYSTEM:  A&Ox1, no new focal deficits  SKIN: unstageable sacral decubitus ulcer   psych: normal behavior, normal affect    LABS:                        10.0   17.92 )-----------( 858      ( 28 Feb 2024 20:17 )             32.7     02-28    137  |  106  |  52<H>  ----------------------------<  339<H>  4.4   |  21<L>  |  0.66    Ca    9.5      28 Feb 2024 20:17  Phos  2.7     02-28  Mg     2.1     02-28    TPro  6.7  /  Alb  2.7<L>  /  TBili  0.2  /  DBili  x   /  AST  28  /  ALT  44  /  AlkPhos  245<H>  02-28    PT/INR - ( 28 Feb 2024 20:17 )   PT: 24.1 sec;   INR: 2.35 ratio         PTT - ( 28 Feb 2024 20:17 )  PTT:39.8 sec      Urinalysis Basic - ( 28 Feb 2024 20:17 )    Color: x / Appearance: x / SG: x / pH: x  Gluc: 339 mg/dL / Ketone: x  / Bili: x / Urobili: x   Blood: x / Protein: x / Nitrite: x   Leuk Esterase: x / RBC: x / WBC x   Sq Epi: x / Non Sq Epi: x / Bacteria: x          IMAGING:    [X] All pertinent imaging reviewed by me

## 2024-02-29 NOTE — PROGRESS NOTE ADULT - PROBLEM SELECTOR PLAN 8
hx of DM on metformin, but hasn't been taking since she as told she has normal A1C  Blood glucose elevated on admission   ISS  Ideally, weight based 0.3U would be given but no weight in chart, please address in AM hx of DM on metformin, but hasn't been taking since she as told she has normal A1C  Blood glucose elevated on admission   ISS  Ideally, weight based 0.3U would be given but no weight in chart, please address in AM    - endocrinology consulted

## 2024-02-29 NOTE — SWALLOW BEDSIDE ASSESSMENT ADULT - ADDITIONAL RECOMMENDATIONS
Maintain good oral hygiene.    GOAL: Pt will tolerate current recommended diet w/o clinical s/s of aspiration.

## 2024-02-29 NOTE — PHYSICAL THERAPY INITIAL EVALUATION ADULT - GENERAL OBSERVATIONS, REHAB EVAL
Pt received semi-supine in bed in NAD, +IV Lock. JV=197. Pt AOx1, pt unable to answer questions, PT called daughter Magalie for social hx.

## 2024-02-29 NOTE — H&P ADULT - PROBLEM SELECTOR PLAN 4
Hx of dementia, functional quadriplegia  AOx1 on admission, per daughter doesn't always know where she is  CTM  S/S eval

## 2024-02-29 NOTE — CHART NOTE - NSCHARTNOTEFT_GEN_A_CORE
Spoke with RN Jesús multiple times to try to get a bed weight to order several medications that could be weight based including Vanc, weight based insulin, and Eliquis (pt is >89yo so may need decreased dosing). He was unable to obtain a weight given the patient "is in holding". Therefore, unable to order above meds. Did tell him to reach out if they have a weight in order to allow us to order these meds. Please follow up in AM if D.P. still does not have a weight recorded

## 2024-02-29 NOTE — H&P ADULT - PROBLEM SELECTOR PLAN 1
CT pelvis with L sacral/medial gluteal decubitis ulcer with lysis of coccyx with c/f osteomyelitis  Also with unstageable sacral ulcer, not receiving adequate wound care, and is in pain  Meeting sepsis criteria with tachycardia, tacypnea, and leukocytosis     Plan:  Vanc/Zosyn  Blood/Ucx, wound culture  Ortho consult for washout and biopsy if within goals of care of family  ID consult in Am CT pelvis with L sacral/medial gluteal decubitis ulcer with lysis of coccyx with c/f osteomyelitis  Also with unstageable sacral ulcer, not receiving adequate wound care, and is in pain  Meeting sepsis criteria with tachycardia, tacypnea, and leukocytosis     Plan:  Vanc/Zosyn  Blood/Ucx, wound culture  Ortho consult for washout and biopsy if within goals of care of family  ID consult in Am  Unable to order Vanc weight-based and no weight-in chart despite asking for it to be charted  Pls f/u Vanc order in AM if weight is recorded

## 2024-02-29 NOTE — H&P ADULT - PROBLEM SELECTOR PLAN 3
CT pelvis with L sacral/medial gluteal decubitis ulcer with lysis of coccyx with c/f osteomyelitis  Also with unstageable sacral ulcer, not receiving adequate wound care, and is in pain  Meeting sepsis criteria with tachycardia, tacypnea, and leukocytosis     Plan:  Vanc/Zosyn  Blood/Ucx, wound culture  Ortho consult for washout and biopsy if within goals of care of family  ID consult in Am CT pelvis with L sacral/medial gluteal decubitis ulcer with lysis of coccyx with c/f osteomyelitis  Also with unstageable sacral ulcer, not receiving adequate wound care, and is in pain  Meeting sepsis criteria with tachycardia, tacypnea, and leukocytosis     Plan:  Vanc/Zosyn  Blood/Ucx, wound culture  Ortho consult for washout and biopsy if within goals of care of family  ID consult in Am  Attempted to order hep gtt and Vanc but was told by nursing staff multiple times that they can't get a weight because patient is in holding. Discussed importance of getting a weight for med administration

## 2024-02-29 NOTE — H&P ADULT - PROBLEM SELECTOR PLAN 2
CT pelvis with L sacral/medial gluteal decubitis ulcer with lysis of coccyx with c/f osteomyelitis  Also with unstageable sacral ulcer, not receiving adequate wound care, and is in pain  Meeting sepsis criteria with tachycardia, tacypnea, and leukocytosis     Plan:  Vanc/Zosyn  Blood/Ucx, wound culture  Ortho consult for washout and biopsy if within goals of care of family  ID consult in Am

## 2024-02-29 NOTE — SWALLOW BEDSIDE ASSESSMENT ADULT - COMMENTS
Meeting sepsis criteria with tachycardia, tacypnea, and leukocytosis-->Vanc/Zosyn  AOx1 on admission, per daughter doesn't always know where she is.  Followup GOC discussion pending.    Imagin/28: CXR: No focal consolidations.    Swallow Hx: Pt well known to this service for bedside swallow evaluations and subsequent MBS assessments in 2018 and . MBS rx in 2018 for puree with moderately thick liquid. Most recent MBS in  w/ rx for puree solids and mildly thick liquids with single straw sips.

## 2024-03-01 LAB
A1C WITH ESTIMATED AVERAGE GLUCOSE RESULT: 7.5 % — HIGH (ref 4–5.6)
ANION GAP SERPL CALC-SCNC: 12 MMOL/L — SIGNIFICANT CHANGE UP (ref 5–17)
BASOPHILS # BLD AUTO: 0.17 K/UL — SIGNIFICANT CHANGE UP (ref 0–0.2)
BASOPHILS NFR BLD AUTO: 0.8 % — SIGNIFICANT CHANGE UP (ref 0–2)
BUN SERPL-MCNC: 33 MG/DL — HIGH (ref 7–23)
CALCIUM SERPL-MCNC: 9.5 MG/DL — SIGNIFICANT CHANGE UP (ref 8.4–10.5)
CHLORIDE SERPL-SCNC: 103 MMOL/L — SIGNIFICANT CHANGE UP (ref 96–108)
CO2 SERPL-SCNC: 21 MMOL/L — LOW (ref 22–31)
CREAT SERPL-MCNC: 0.97 MG/DL — SIGNIFICANT CHANGE UP (ref 0.5–1.3)
EGFR: 56 ML/MIN/1.73M2 — LOW
EOSINOPHIL # BLD AUTO: 0.53 K/UL — HIGH (ref 0–0.5)
EOSINOPHIL NFR BLD AUTO: 2.6 % — SIGNIFICANT CHANGE UP (ref 0–6)
ESTIMATED AVERAGE GLUCOSE: 169 MG/DL — HIGH (ref 68–114)
GLUCOSE BLDC GLUCOMTR-MCNC: 132 MG/DL — HIGH (ref 70–99)
GLUCOSE BLDC GLUCOMTR-MCNC: 168 MG/DL — HIGH (ref 70–99)
GLUCOSE BLDC GLUCOMTR-MCNC: 194 MG/DL — HIGH (ref 70–99)
GLUCOSE BLDC GLUCOMTR-MCNC: 226 MG/DL — HIGH (ref 70–99)
GLUCOSE BLDC GLUCOMTR-MCNC: 356 MG/DL — HIGH (ref 70–99)
GLUCOSE SERPL-MCNC: 218 MG/DL — HIGH (ref 70–99)
HCT VFR BLD CALC: 32.4 % — LOW (ref 34.5–45)
HGB BLD-MCNC: 9.7 G/DL — LOW (ref 11.5–15.5)
IMM GRANULOCYTES NFR BLD AUTO: 1.4 % — HIGH (ref 0–0.9)
LACTATE SERPL-SCNC: 2.3 MMOL/L — HIGH (ref 0.5–2)
LYMPHOCYTES # BLD AUTO: 1.76 K/UL — SIGNIFICANT CHANGE UP (ref 1–3.3)
LYMPHOCYTES # BLD AUTO: 8.6 % — LOW (ref 13–44)
MAGNESIUM SERPL-MCNC: 2.2 MG/DL — SIGNIFICANT CHANGE UP (ref 1.6–2.6)
MCHC RBC-ENTMCNC: 25.3 PG — LOW (ref 27–34)
MCHC RBC-ENTMCNC: 29.9 GM/DL — LOW (ref 32–36)
MCV RBC AUTO: 84.6 FL — SIGNIFICANT CHANGE UP (ref 80–100)
MONOCYTES # BLD AUTO: 1.38 K/UL — HIGH (ref 0–0.9)
MONOCYTES NFR BLD AUTO: 6.8 % — SIGNIFICANT CHANGE UP (ref 2–14)
MRSA PCR RESULT.: SIGNIFICANT CHANGE UP
NEUTROPHILS # BLD AUTO: 16.32 K/UL — HIGH (ref 1.8–7.4)
NEUTROPHILS NFR BLD AUTO: 79.8 % — HIGH (ref 43–77)
NRBC # BLD: 0 /100 WBCS — SIGNIFICANT CHANGE UP (ref 0–0)
PHOSPHATE SERPL-MCNC: 2.6 MG/DL — SIGNIFICANT CHANGE UP (ref 2.5–4.5)
PLATELET # BLD AUTO: 939 K/UL — HIGH (ref 150–400)
POTASSIUM SERPL-MCNC: 4.4 MMOL/L — SIGNIFICANT CHANGE UP (ref 3.5–5.3)
POTASSIUM SERPL-SCNC: 4.4 MMOL/L — SIGNIFICANT CHANGE UP (ref 3.5–5.3)
RBC # BLD: 3.83 M/UL — SIGNIFICANT CHANGE UP (ref 3.8–5.2)
RBC # FLD: 16.8 % — HIGH (ref 10.3–14.5)
S AUREUS DNA NOSE QL NAA+PROBE: SIGNIFICANT CHANGE UP
SODIUM SERPL-SCNC: 136 MMOL/L — SIGNIFICANT CHANGE UP (ref 135–145)
WBC # BLD: 20.44 K/UL — HIGH (ref 3.8–10.5)
WBC # FLD AUTO: 20.44 K/UL — HIGH (ref 3.8–10.5)

## 2024-03-01 PROCEDURE — 99232 SBSQ HOSP IP/OBS MODERATE 35: CPT | Mod: GC

## 2024-03-01 RX ORDER — ASCORBIC ACID 60 MG
500 TABLET,CHEWABLE ORAL DAILY
Refills: 0 | Status: DISCONTINUED | OUTPATIENT
Start: 2024-03-01 | End: 2024-03-12

## 2024-03-01 RX ORDER — CHLORHEXIDINE GLUCONATE 213 G/1000ML
1 SOLUTION TOPICAL DAILY
Refills: 0 | Status: DISCONTINUED | OUTPATIENT
Start: 2024-03-01 | End: 2024-03-12

## 2024-03-01 RX ADMIN — INSULIN GLARGINE 5 UNIT(S): 100 INJECTION, SOLUTION SUBCUTANEOUS at 22:19

## 2024-03-01 RX ADMIN — POLYETHYLENE GLYCOL 3350 17 GRAM(S): 17 POWDER, FOR SOLUTION ORAL at 09:27

## 2024-03-01 RX ADMIN — Medication 1 APPLICATION(S): at 15:42

## 2024-03-01 RX ADMIN — Medication 25 MILLIGRAM(S): at 17:59

## 2024-03-01 RX ADMIN — Medication 1 TABLET(S): at 17:54

## 2024-03-01 RX ADMIN — HYDROXYUREA 500 MILLIGRAM(S): 500 CAPSULE ORAL at 09:25

## 2024-03-01 RX ADMIN — PIPERACILLIN AND TAZOBACTAM 25 GRAM(S): 4; .5 INJECTION, POWDER, LYOPHILIZED, FOR SOLUTION INTRAVENOUS at 14:52

## 2024-03-01 RX ADMIN — ATORVASTATIN CALCIUM 40 MILLIGRAM(S): 80 TABLET, FILM COATED ORAL at 21:54

## 2024-03-01 RX ADMIN — Medication 1 APPLICATION(S): at 06:35

## 2024-03-01 RX ADMIN — CHLORHEXIDINE GLUCONATE 1 APPLICATION(S): 213 SOLUTION TOPICAL at 09:31

## 2024-03-01 RX ADMIN — Medication 4 UNIT(S): at 09:30

## 2024-03-01 RX ADMIN — Medication 88 MICROGRAM(S): at 06:32

## 2024-03-01 RX ADMIN — Medication 500 MILLIGRAM(S): at 17:55

## 2024-03-01 RX ADMIN — APIXABAN 2.5 MILLIGRAM(S): 2.5 TABLET, FILM COATED ORAL at 06:32

## 2024-03-01 RX ADMIN — Medication 1 APPLICATION(S): at 22:20

## 2024-03-01 RX ADMIN — PIPERACILLIN AND TAZOBACTAM 25 GRAM(S): 4; .5 INJECTION, POWDER, LYOPHILIZED, FOR SOLUTION INTRAVENOUS at 06:32

## 2024-03-01 RX ADMIN — SENNA PLUS 2 TABLET(S): 8.6 TABLET ORAL at 21:54

## 2024-03-01 RX ADMIN — Medication 25 MILLIGRAM(S): at 06:32

## 2024-03-01 RX ADMIN — Medication 4 UNIT(S): at 14:33

## 2024-03-01 RX ADMIN — Medication 1: at 14:32

## 2024-03-01 RX ADMIN — APIXABAN 2.5 MILLIGRAM(S): 2.5 TABLET, FILM COATED ORAL at 17:55

## 2024-03-01 RX ADMIN — Medication 4 UNIT(S): at 17:54

## 2024-03-01 RX ADMIN — PIPERACILLIN AND TAZOBACTAM 25 GRAM(S): 4; .5 INJECTION, POWDER, LYOPHILIZED, FOR SOLUTION INTRAVENOUS at 21:54

## 2024-03-01 RX ADMIN — Medication 5: at 17:53

## 2024-03-01 NOTE — DIETITIAN INITIAL EVALUATION ADULT - NSFNSPHYEXAMSKINFT_GEN_A_CORE
Per Wound Care Note on 2/29:  "sacral stage 4 pressure injury  Feet w/ healing wounds"    Per Podiatry Note on 3/1:  "Bilateral submetatarsal 5 DTI: Stable, noninfected, present on admission"

## 2024-03-01 NOTE — PROGRESS NOTE ADULT - PROBLEM SELECTOR PLAN 2
CT pelvis with L sacral/medial gluteal decubitis ulcer with lysis of coccyx with c/f osteomyelitis  Also with unstageable sacral ulcer, not receiving adequate wound care, and is in pain  Meeting sepsis criteria with tachycardia, tacypnea, and leukocytosis     - procal, crp elevated     Plan:  c/w Vanc/Zosyn  f/u Blood/Ucx, wound culture  wound care debrided   ID consulted CT pelvis with L sacral/medial gluteal decubitis ulcer with lysis of coccyx with c/f osteomyelitis  Also with unstageable sacral ulcer, not receiving adequate wound care, and is in pain  Meeting sepsis criteria with tachycardia, tacypnea, and leukocytosis     - procal, crp, esr elevated     Plan:  c/w zosyn  Blood cx neg  Ucx shows 50,000-100,000 GN rods  f/u wound culture  wound care debrided   ID consulted, plan for 7-10 day abx course

## 2024-03-01 NOTE — PROGRESS NOTE ADULT - ATTENDING COMMENTS
Patient seen and examined at bedside. No acute events overnight. Seems comfortable. Plan for 7 days if antibiotics. Patient s/p wound debridement. Will require aggressive local wound care. FS improving overall. Plan for LTC with hospice. Will ask endocrine for discharge recommendations (PO preferred given patient's comfort is of utmost importance).

## 2024-03-01 NOTE — DIETITIAN INITIAL EVALUATION ADULT - ADD RECOMMEND
1) Nutrition care plan to remain consistent with patient's/family's wishes/GOC.  2) Recommend continue diet free of restrictions as tolerated. Defer diet texture/consistency to Speech Language Pathologist/Medical Team.   3) If within GOC, recommend adding Glucerna 2x/day to optimize protein-energy intake in-house.  4) If within GOC, consider adding Multivitamin and Vitamin C daily for wound healing and micronutrient coverage unless contraindicated.   5) If within GOC, recommend adding Trung BID for wound healing unless contraindicated.   6) Monitor and encourage adequate PO intake, provide feeding assistance as needed to optimize protein-energy intake.   7) Monitor electrolytes, replete as needed. Monitor glucose fingersticks. Obtain updated A1C if medically feasible/within GOC.   8) Monitor nutritional intake, tolerance to diet prescription, bowel movements, weights, labs, and skin integrity.   1) Nutrition care plan to remain consistent with patient's/family's wishes/GOC.  2) Recommend continue diet free of restrictions as tolerated. Defer diet texture/consistency to Speech Language Pathologist/Medical Team.   3) If within GOC, recommend adding Glucerna 2x/day to optimize protein-energy intake in-house.  4) If within GOC, consider adding Multivitamin and Vitamin C daily for wound healing and micronutrient coverage unless contraindicated.   5) If within GOC, recommend adding Trung BID for wound healing unless contraindicated.   6) Monitor and encourage adequate PO intake, provide feeding assistance as needed to optimize protein-energy intake.   7) Monitor electrolytes, replete as needed. Monitor glucose fingersticks. Obtain updated A1C if medically feasible/within GOC.   8) Monitor nutritional intake, tolerance to diet prescription, bowel movements, weights, labs, and skin integrity.    9) BMI alert placed in chart.  1) Nutrition care plan to remain consistent with patient's/family's wishes/GOC. Recommend continue diet free of restrictions as tolerated. Defer diet texture/consistency to Speech Language Pathologist/Medical Team.   2) If within GOC, recommend adding Glucerna 2x/day to optimize protein-energy intake in-house.  3) If within GOC, consider adding Multivitamin and Vitamin C daily for wound healing and micronutrient coverage unless contraindicated. Recommend adding Trung BID for wound healing unless contraindicated.   4) Monitor and encourage adequate PO intake, provide feeding assistance as needed to optimize protein-energy intake.   5) Monitor electrolytes, replete as needed. Monitor glucose fingersticks. Obtain updated A1C if medically feasible/within GOC.   6) Monitor nutritional intake, tolerance to diet prescription, bowel movements, weights, labs, and skin integrity.    7) Obtain updated height and weights as medically feasible.   8) BMI alert placed in chart.

## 2024-03-01 NOTE — DIETITIAN INITIAL EVALUATION ADULT - REASON INDICATOR FOR ASSESSMENT
RD consult warranted for Pressure Injury 2 or >, MST Score 2 or >  Source: Previous RD Note on 9/11/23, Registered Nurse, Electronic Medical Record  Chart reviewed, events noted.

## 2024-03-01 NOTE — PROGRESS NOTE ADULT - SUBJECTIVE AND OBJECTIVE BOX
Podiatry pager #: [phone]/ [phone]    Patient is a 89y old  Female who presents with a chief complaint of Decubitus Ulcer (01 Mar 2024 08:23)Podiatry consulted for evaluation of bilateral lower extremity wounds      HPI:  89 F DNR/DNI, hx DVT, hypothyrodism, P. 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 Patient with siginificant unstageable sacral decubitus ulcer. Discussed with daughter (reportedly HCP) who reported that her mother has been pointing to her back for past few days saying that it hurts her. However, she denies fever, chills, cp, sob, abdominal pain, constipation, diarrhea, or dysuria. She also reports she hasn't been getting official wound care (dressings taken care of by family) due to insurance issues getting wound care nurse approved. Daughter also reports that her mother's mental status at baseline is that she can say her name but doesn't always know where she is. She also reports her mom stopped taking her home metformin since a doctor told her that her A1C was normal. The family at some point was considering home hospice (per documentation and daughter) but there were also issues with insurance getting this investigated. In the ED, she received Vanc+Zosyn x1.      (29 Feb 2024 02:32)      PAST MEDICAL & SURGICAL HISTORY:  Benign Hypertension      Diabetes      Hypercholesteremia      Adult Hypothyroidism      Deep Vein Thrombosis (DVT)      Stroke      SBO (Small Bowel Obstruction)  08/2019 2019 novel coronavirus disease (COVID-19)      COVID-19 vaccine series completed      FH: Total Abdominal Hysterectomy and Bilateral Salpingo-Oophorectomy      S/P small bowel resection  08/2019          MEDICATIONS  (STANDING):  apixaban 2.5 milliGRAM(s) Oral two times a day  atorvastatin 40 milliGRAM(s) Oral at bedtime  chlorhexidine 4% Liquid 1 Application(s) Topical daily  Dakins Solution - 1/4 Strength 1 Application(s) Topical every 8 hours  dextrose 5%. 1000 milliLiter(s) (50 mL/Hr) IV Continuous <Continuous>  dextrose 5%. 1000 milliLiter(s) (100 mL/Hr) IV Continuous <Continuous>  dextrose 50% Injectable 12.5 Gram(s) IV Push once  dextrose 50% Injectable 25 Gram(s) IV Push once  dextrose 50% Injectable 25 Gram(s) IV Push once  glucagon  Injectable 1 milliGRAM(s) IntraMuscular once  hydroxyurea 500 milliGRAM(s) Oral <User Schedule>  insulin glargine Injectable (LANTUS) 5 Unit(s) SubCutaneous at bedtime  insulin lispro (ADMELOG) corrective regimen sliding scale   SubCutaneous three times a day before meals  insulin lispro (ADMELOG) corrective regimen sliding scale   SubCutaneous at bedtime  insulin lispro Injectable (ADMELOG) 4 Unit(s) SubCutaneous before breakfast  insulin lispro Injectable (ADMELOG) 4 Unit(s) SubCutaneous before dinner  insulin lispro Injectable (ADMELOG) 4 Unit(s) SubCutaneous before lunch  lactated ringers Bolus 500 milliLiter(s) IV Bolus once  levothyroxine 88 MICROGram(s) Oral daily  metoprolol tartrate 25 milliGRAM(s) Oral two times a day  piperacillin/tazobactam IVPB.. 3.375 Gram(s) IV Intermittent every 8 hours  polyethylene glycol 3350 17 Gram(s) Oral daily  senna 2 Tablet(s) Oral at bedtime    MEDICATIONS  (PRN):  acetaminophen     Tablet .. 650 milliGRAM(s) Oral every 6 hours PRN Temp greater or equal to 38C (100.4F), Mild Pain (1 - 3)  dextrose Oral Gel 15 Gram(s) Oral once PRN Blood Glucose LESS THAN 70 milliGRAM(s)/deciliter  melatonin 3 milliGRAM(s) Oral at bedtime PRN Insomnia  QUEtiapine 25 milliGRAM(s) Oral daily PRN for insomnia      Allergies    No Known Drug Allergies  black pepper, white pepper, cumin, paprika, johnston powder, chili powder (Rash)    Intolerances        VITALS:    Vital Signs Last 24 Hrs  T(C): 37.1 (01 Mar 2024 07:58), Max: 37.1 (01 Mar 2024 07:58)  T(F): 98.7 (01 Mar 2024 07:58), Max: 98.7 (01 Mar 2024 07:58)  HR: 90 (01 Mar 2024 07:58) (90 - 99)  BP: 134/63 (01 Mar 2024 07:58) (125/66 - 149/68)  BP(mean): --  RR: 16 (01 Mar 2024 07:58) (16 - 18)  SpO2: 97% (01 Mar 2024 07:58) (96% - 97%)    Parameters below as of 01 Mar 2024 07:58  Patient On (Oxygen Delivery Method): room air        LABS:                          9.7    20.44 )-----------( 939      ( 01 Mar 2024 11:05 )             32.4       03-01    136  |  103  |  33<H>  ----------------------------<  218<H>  4.4   |  21<L>  |  0.97    Ca    9.5      01 Mar 2024 11:01  Phos  2.6     03-01  Mg     2.2     03-01    TPro  7.5  /  Alb  3.0<L>  /  TBili  0.2  /  DBili  x   /  AST  30  /  ALT  47<H>  /  AlkPhos  264<H>  02-29      CAPILLARY BLOOD GLUCOSE      POCT Blood Glucose.: 132 mg/dL (01 Mar 2024 08:49)  POCT Blood Glucose.: 393 mg/dL (29 Feb 2024 21:31)  POCT Blood Glucose.: 331 mg/dL (29 Feb 2024 17:20)  POCT Blood Glucose.: 429 mg/dL (29 Feb 2024 12:29)      PT/INR - ( 29 Feb 2024 13:07 )   PT: 18.1 sec;   INR: 1.67 ratio         PTT - ( 29 Feb 2024 13:07 )  PTT:38.7 sec    LOWER EXTREMITY PHYSICAL EXAM:    Vasular: DP/PT 1_/4, B/L, CFT <_2 seconds B/L, Temperature gradient wnl_, B/L.   Neuro: Epicritic sensation diminished to the level of _toes, B/L.  Skin:Bilateral submetatarsal 5 DTI: Positive bruising negative bulla, negative fluctuance or malodor.  Measuring 1 cm in diameter.  Negative drainage erythema or clinical signs of infection.      RADIOLOGY & ADDITIONAL STUDIES:

## 2024-03-01 NOTE — DIETITIAN INITIAL EVALUATION ADULT - NUTRITIONGOAL OUTCOME1
Pt will be able to meet >75% of estimated nutrient needs as feasible and have gradual weight gain towards IBW.

## 2024-03-01 NOTE — PROGRESS NOTE ADULT - SUBJECTIVE AND OBJECTIVE BOX
[facility], Division of Infectious Diseases  SANCHEZ Faria Y. Patel, S. Shah, G. [facility]  [phone]    Name: BISHOP BURNS  Age: 89y  Gender: Female  MRN: [id number]    Interval History:  Patient seen and examined at bedside  No acute overnight events. Afebrile  Notes reviewed    Antibiotics:  piperacillin/tazobactam IVPB.. 3.375 Gram(s) IV Intermittent every 8 hours      Medications:  acetaminophen     Tablet .. 650 milliGRAM(s) Oral every 6 hours PRN  apixaban 2.5 milliGRAM(s) Oral two times a day  atorvastatin 40 milliGRAM(s) Oral at bedtime  chlorhexidine 4% Liquid 1 Application(s) Topical daily  Dakins Solution - 1/4 Strength 1 Application(s) Topical every 8 hours  dextrose 5%. 1000 milliLiter(s) IV Continuous <Continuous>  dextrose 5%. 1000 milliLiter(s) IV Continuous <Continuous>  dextrose 50% Injectable 25 Gram(s) IV Push once  dextrose 50% Injectable 12.5 Gram(s) IV Push once  dextrose 50% Injectable 25 Gram(s) IV Push once  dextrose Oral Gel 15 Gram(s) Oral once PRN  glucagon  Injectable 1 milliGRAM(s) IntraMuscular once  hydroxyurea 500 milliGRAM(s) Oral <User Schedule>  insulin glargine Injectable (LANTUS) 5 Unit(s) SubCutaneous at bedtime  insulin lispro (ADMELOG) corrective regimen sliding scale   SubCutaneous at bedtime  insulin lispro (ADMELOG) corrective regimen sliding scale   SubCutaneous three times a day before meals  insulin lispro Injectable (ADMELOG) 4 Unit(s) SubCutaneous before lunch  insulin lispro Injectable (ADMELOG) 4 Unit(s) SubCutaneous before breakfast  insulin lispro Injectable (ADMELOG) 4 Unit(s) SubCutaneous before dinner  lactated ringers Bolus 500 milliLiter(s) IV Bolus once  levothyroxine 88 MICROGram(s) Oral daily  melatonin 3 milliGRAM(s) Oral at bedtime PRN  metoprolol tartrate 25 milliGRAM(s) Oral two times a day  piperacillin/tazobactam IVPB.. 3.375 Gram(s) IV Intermittent every 8 hours  polyethylene glycol 3350 17 Gram(s) Oral daily  QUEtiapine 25 milliGRAM(s) Oral daily PRN  senna 2 Tablet(s) Oral at bedtime      Review of Systems:  unable to obtain    Allergies: No Known Drug Allergies  black pepper, white pepper, cumin, paprika, johnston powder, chili powder (Rash)    For details regarding the patient's past medical history, social history, family history, and other miscellaneous elements, please refer the initial infectious diseases consultation and/or the admitting history and physical examination for this admission.    Objective:  Vitals:   T(C): 37.1 (03-01-24 @ 07:58), Max: 37.1 (03-01-24 @ 07:58)  HR: 90 (03-01-24 @ 07:58) (90 - 99)  BP: 134/63 (03-01-24 @ 07:58) (125/66 - 149/68)  RR: 16 (03-01-24 @ 07:58) (16 - 18)  SpO2: 97% (03-01-24 @ 07:58) (96% - 97%)    Physical Examination:  General: no acute distress  HEENT: NC/AT, EOMI, anicteric, neck supple  Lungs: decreased breath sounds b/l  Heart: S1, S2 present, normal rate   Abdomen: Soft. Nondistended. Nontender.    Neuro: AAOx, no obvious focal deficits   Extremities: No cyanosis. No edema.   Skin: Warm. Dry. No visible rash.   Lines: PIV      Laboratory Studies:  CBC:                       11.6   16.86 )-----------( 1095     ( 29 Feb 2024 13:07 )             38.7     CMP: 02-29    137  |  100  |  35<H>  ----------------------------<  468<HH>  4.5   |  21<L>  |  0.69    Ca    9.9      29 Feb 2024 13:07  Phos  2.7     02-28  Mg     2.2     02-29    TPro  7.5  /  Alb  3.0<L>  /  TBili  0.2  /  DBili  x   /  AST  30  /  ALT  47<H>  /  AlkPhos  264<H>  02-29    LIVER FUNCTIONS - ( 29 Feb 2024 13:07 )  Alb: 3.0 g/dL / Pro: 7.5 g/dL / ALK PHOS: 264 U/L / ALT: 47 U/L / AST: 30 U/L / GGT: x           Urinalysis Basic - ( 29 Feb 2024 13:07 )    Color: x / Appearance: x / SG: x / pH: x  Gluc: 468 mg/dL / Ketone: x  / Bili: x / Urobili: x   Blood: x / Protein: x / Nitrite: x   Leuk Esterase: x / RBC: x / WBC x   Sq Epi: x / Non Sq Epi: x / Bacteria: x        Microbiology: reviewed    Culture - Blood (collected 02-28-24 @ 19:50)  Source: .Blood Blood-Peripheral  Preliminary Report (03-01-24 @ 03:02):    No growth at 24 hours    Culture - Blood (collected 02-28-24 @ 19:40)  Source: .Blood Blood-Peripheral  Preliminary Report (03-01-24 @ 03:02):    No growth at 24 hours    Culture - Urine (collected 02-28-24 @ 18:37)  Source: Clean Catch Clean Catch (Midstream)  Preliminary Report (03-01-24 @ 06:56):    50,000 - 99,000 CFU/mL Gram Negative Rods          Radiology: reviewed

## 2024-03-01 NOTE — PROGRESS NOTE ADULT - SUBJECTIVE AND OBJECTIVE BOX
Alexus Coronel MD   Internal Medicine, PGY 1  Contact via TEAMS.     SUBJECTIVE / OVERNIGHT EVENTS:  - Pt seen and examined at bedside  - TANYA    MEDICATIONS  (STANDING):  apixaban 2.5 milliGRAM(s) Oral two times a day  atorvastatin 40 milliGRAM(s) Oral at bedtime  chlorhexidine 4% Liquid 1 Application(s) Topical daily  Dakins Solution - 1/4 Strength 1 Application(s) Topical every 8 hours  dextrose 5%. 1000 milliLiter(s) (50 mL/Hr) IV Continuous <Continuous>  dextrose 5%. 1000 milliLiter(s) (100 mL/Hr) IV Continuous <Continuous>  dextrose 50% Injectable 25 Gram(s) IV Push once  dextrose 50% Injectable 12.5 Gram(s) IV Push once  dextrose 50% Injectable 25 Gram(s) IV Push once  glucagon  Injectable 1 milliGRAM(s) IntraMuscular once  hydroxyurea 500 milliGRAM(s) Oral <User Schedule>  insulin glargine Injectable (LANTUS) 5 Unit(s) SubCutaneous at bedtime  insulin lispro (ADMELOG) corrective regimen sliding scale   SubCutaneous at bedtime  insulin lispro (ADMELOG) corrective regimen sliding scale   SubCutaneous three times a day before meals  insulin lispro Injectable (ADMELOG) 4 Unit(s) SubCutaneous before lunch  insulin lispro Injectable (ADMELOG) 4 Unit(s) SubCutaneous before breakfast  insulin lispro Injectable (ADMELOG) 4 Unit(s) SubCutaneous before dinner  lactated ringers Bolus 500 milliLiter(s) IV Bolus once  levothyroxine 88 MICROGram(s) Oral daily  metoprolol tartrate 25 milliGRAM(s) Oral two times a day  piperacillin/tazobactam IVPB.. 3.375 Gram(s) IV Intermittent every 8 hours  polyethylene glycol 3350 17 Gram(s) Oral daily  senna 2 Tablet(s) Oral at bedtime    MEDICATIONS  (PRN):  acetaminophen     Tablet .. 650 milliGRAM(s) Oral every 6 hours PRN Temp greater or equal to 38C (100.4F), Mild Pain (1 - 3)  dextrose Oral Gel 15 Gram(s) Oral once PRN Blood Glucose LESS THAN 70 milliGRAM(s)/deciliter  melatonin 3 milliGRAM(s) Oral at bedtime PRN Insomnia  QUEtiapine 25 milliGRAM(s) Oral daily PRN for insomnia        02-29-24 @ 07:01  -  03-01-24 @ 07:00  --------------------------------------------------------  IN: 200 mL / OUT: 150 mL / NET: 50 mL        PHYSICAL EXAM:  Vital Signs Last 24 Hrs  T(C): 37.1 (01 Mar 2024 07:58), Max: 37.1 (01 Mar 2024 07:58)  T(F): 98.7 (01 Mar 2024 07:58), Max: 98.7 (01 Mar 2024 07:58)  HR: 90 (01 Mar 2024 07:58) (90 - 99)  BP: 134/63 (01 Mar 2024 07:58) (125/66 - 149/68)  BP(mean): --  RR: 16 (01 Mar 2024 07:58) (16 - 18)  SpO2: 97% (01 Mar 2024 07:58) (96% - 97%)    Parameters below as of 01 Mar 2024 07:58  Patient On (Oxygen Delivery Method): room air        CAPILLARY BLOOD GLUCOSE      POCT Blood Glucose.: 393 mg/dL (29 Feb 2024 21:31)  POCT Blood Glucose.: 331 mg/dL (29 Feb 2024 17:20)  POCT Blood Glucose.: 429 mg/dL (29 Feb 2024 12:29)  POCT Blood Glucose.: 223 mg/dL (29 Feb 2024 08:38)    I&O's Summary    29 Feb 2024 07:01  -  01 Mar 2024 07:00  --------------------------------------------------------  IN: 200 mL / OUT: 150 mL / NET: 50 mL        CONSTITUTIONAL: NAD  HEENT: NC/AT  RESPIRATORY: Normal respiratory effort; lungs are clear to auscultation bilaterally  CARDIOVASCULAR: Regular rate and rhythm, normal S1 and S2, no murmur/rub/gallop; No lower extremity edema; Peripheral pulses are 2+ bilaterally  ABDOMEN: Nontender to palpation, normoactive bowel sounds, no rebound/guarding; No hepatosplenomegaly  MUSCLOSKELETAL: no clubbing or cyanosis of digits; no joint swelling or tenderness to palpation  EXTREMITIES: no peripheral edema, distal pulses intact   NEURO: no focal deficits   PSYCH: A+O to person, place, and time; affect appropriate    LABS:                        11.6   16.86 )-----------( 1095     ( 29 Feb 2024 13:07 )             38.7     02-29    137  |  100  |  35<H>  ----------------------------<  468<HH>  4.5   |  21<L>  |  0.69    Ca    9.9      29 Feb 2024 13:07  Phos  2.7     02-28  Mg     2.2     02-29    TPro  7.5  /  Alb  3.0<L>  /  TBili  0.2  /  DBili  x   /  AST  30  /  ALT  47<H>  /  AlkPhos  264<H>  02-29    PT/INR - ( 29 Feb 2024 13:07 )   PT: 18.1 sec;   INR: 1.67 ratio         PTT - ( 29 Feb 2024 13:07 )  PTT:38.7 sec      Urinalysis Basic - ( 29 Feb 2024 13:07 )    Color: x / Appearance: x / SG: x / pH: x  Gluc: 468 mg/dL / Ketone: x  / Bili: x / Urobili: x   Blood: x / Protein: x / Nitrite: x   Leuk Esterase: x / RBC: x / WBC x   Sq Epi: x / Non Sq Epi: x / Bacteria: x        Culture - Blood (collected 28 Feb 2024 19:50)  Source: .Blood Blood-Peripheral  Preliminary Report (01 Mar 2024 03:02):    No growth at 24 hours    Culture - Blood (collected 28 Feb 2024 19:40)  Source: .Blood Blood-Peripheral  Preliminary Report (01 Mar 2024 03:02):    No growth at 24 hours    Culture - Urine (collected 28 Feb 2024 18:37)  Source: Clean Catch Clean Catch (Midstream)  Preliminary Report (01 Mar 2024 06:56):    50,000 - 99,000 CFU/mL Gram Negative Rods        IMAGING:    [X] All pertinent imaging reviewed by me Alexus Coronel MD   Internal Medicine, PGY 1  Contact via TEAMS.     SUBJECTIVE / OVERNIGHT EVENTS:  - Pt seen and examined at bedside  - TANYA. Pt without complaints this morning.     MEDICATIONS  (STANDING):  apixaban 2.5 milliGRAM(s) Oral two times a day  atorvastatin 40 milliGRAM(s) Oral at bedtime  chlorhexidine 4% Liquid 1 Application(s) Topical daily  Dakins Solution - 1/4 Strength 1 Application(s) Topical every 8 hours  dextrose 5%. 1000 milliLiter(s) (50 mL/Hr) IV Continuous <Continuous>  dextrose 5%. 1000 milliLiter(s) (100 mL/Hr) IV Continuous <Continuous>  dextrose 50% Injectable 25 Gram(s) IV Push once  dextrose 50% Injectable 12.5 Gram(s) IV Push once  dextrose 50% Injectable 25 Gram(s) IV Push once  glucagon  Injectable 1 milliGRAM(s) IntraMuscular once  hydroxyurea 500 milliGRAM(s) Oral <User Schedule>  insulin glargine Injectable (LANTUS) 5 Unit(s) SubCutaneous at bedtime  insulin lispro (ADMELOG) corrective regimen sliding scale   SubCutaneous at bedtime  insulin lispro (ADMELOG) corrective regimen sliding scale   SubCutaneous three times a day before meals  insulin lispro Injectable (ADMELOG) 4 Unit(s) SubCutaneous before lunch  insulin lispro Injectable (ADMELOG) 4 Unit(s) SubCutaneous before breakfast  insulin lispro Injectable (ADMELOG) 4 Unit(s) SubCutaneous before dinner  lactated ringers Bolus 500 milliLiter(s) IV Bolus once  levothyroxine 88 MICROGram(s) Oral daily  metoprolol tartrate 25 milliGRAM(s) Oral two times a day  piperacillin/tazobactam IVPB.. 3.375 Gram(s) IV Intermittent every 8 hours  polyethylene glycol 3350 17 Gram(s) Oral daily  senna 2 Tablet(s) Oral at bedtime    MEDICATIONS  (PRN):  acetaminophen     Tablet .. 650 milliGRAM(s) Oral every 6 hours PRN Temp greater or equal to 38C (100.4F), Mild Pain (1 - 3)  dextrose Oral Gel 15 Gram(s) Oral once PRN Blood Glucose LESS THAN 70 milliGRAM(s)/deciliter  melatonin 3 milliGRAM(s) Oral at bedtime PRN Insomnia  QUEtiapine 25 milliGRAM(s) Oral daily PRN for insomnia        02-29-24 @ 07:01  -  03-01-24 @ 07:00  --------------------------------------------------------  IN: 200 mL / OUT: 150 mL / NET: 50 mL        PHYSICAL EXAM:  Vital Signs Last 24 Hrs  T(C): 37.1 (01 Mar 2024 07:58), Max: 37.1 (01 Mar 2024 07:58)  T(F): 98.7 (01 Mar 2024 07:58), Max: 98.7 (01 Mar 2024 07:58)  HR: 90 (01 Mar 2024 07:58) (90 - 99)  BP: 134/63 (01 Mar 2024 07:58) (125/66 - 149/68)  BP(mean): --  RR: 16 (01 Mar 2024 07:58) (16 - 18)  SpO2: 97% (01 Mar 2024 07:58) (96% - 97%)    Parameters below as of 01 Mar 2024 07:58  Patient On (Oxygen Delivery Method): room air        CAPILLARY BLOOD GLUCOSE      POCT Blood Glucose.: 393 mg/dL (29 Feb 2024 21:31)  POCT Blood Glucose.: 331 mg/dL (29 Feb 2024 17:20)  POCT Blood Glucose.: 429 mg/dL (29 Feb 2024 12:29)  POCT Blood Glucose.: 223 mg/dL (29 Feb 2024 08:38)    I&O's Summary    29 Feb 2024 07:01  -  01 Mar 2024 07:00  --------------------------------------------------------  IN: 200 mL / OUT: 150 mL / NET: 50 mL        GENERAL: NAD, lying in bed comfortably  HEAD:  Atraumatic, Normocephalic  EYES: EOMI, PERRLA, conjunctiva and sclera clear  ENT: Moist mucous membranes  NECK: Supple,   CHEST/LUNG: Clear to auscultation bilaterally; No rales, rhonchi, wheezing, or rubs. Unlabored respirations  HEART: Regular rate and rhythm; No murmurs, rubs, or gallops  ABDOMEN: BSx4; Soft, nontender, nondistended  EXTREMITIES:  2+ Peripheral Pulses, brisk capillary refill. No clubbing, cyanosis, or edema  NERVOUS SYSTEM:  A&Ox1, no new focal deficits  SKIN: unstageable sacral decubitus ulcer   psych: normal behavior, normal affect    LABS:                        11.6   16.86 )-----------( 1095     ( 29 Feb 2024 13:07 )             38.7     02-29    137  |  100  |  35<H>  ----------------------------<  468<HH>  4.5   |  21<L>  |  0.69    Ca    9.9      29 Feb 2024 13:07  Phos  2.7     02-28  Mg     2.2     02-29    TPro  7.5  /  Alb  3.0<L>  /  TBili  0.2  /  DBili  x   /  AST  30  /  ALT  47<H>  /  AlkPhos  264<H>  02-29    PT/INR - ( 29 Feb 2024 13:07 )   PT: 18.1 sec;   INR: 1.67 ratio         PTT - ( 29 Feb 2024 13:07 )  PTT:38.7 sec      Urinalysis Basic - ( 29 Feb 2024 13:07 )    Color: x / Appearance: x / SG: x / pH: x  Gluc: 468 mg/dL / Ketone: x  / Bili: x / Urobili: x   Blood: x / Protein: x / Nitrite: x   Leuk Esterase: x / RBC: x / WBC x   Sq Epi: x / Non Sq Epi: x / Bacteria: x        Culture - Blood (collected 28 Feb 2024 19:50)  Source: .Blood Blood-Peripheral  Preliminary Report (01 Mar 2024 03:02):    No growth at 24 hours    Culture - Blood (collected 28 Feb 2024 19:40)  Source: .Blood Blood-Peripheral  Preliminary Report (01 Mar 2024 03:02):    No growth at 24 hours    Culture - Urine (collected 28 Feb 2024 18:37)  Source: Clean Catch Clean Catch (Midstream)  Preliminary Report (01 Mar 2024 06:56):    50,000 - 99,000 CFU/mL Gram Negative Rods        IMAGING:    [X] All pertinent imaging reviewed by me

## 2024-03-01 NOTE — PROGRESS NOTE ADULT - PROBLEM SELECTOR PLAN 9
Diet; pureed and moderately thickened   DVT: Eliquis  Dispo: pending clinical improvement Diet; pureed and moderately thickened   DVT: Eliquis  Dispo: LTC with hospice

## 2024-03-01 NOTE — DIETITIAN INITIAL EVALUATION ADULT - OTHER CALCULATIONS
Estimated protein-energy needs calculated using IBW of 98 pounds based on height of 4 feet 11 inches, with consideration for weight discrepancies, BMI <19, stage 4 pressure injury, and deep tissue pressure injury.  Estimated protein-energy needs calculated using IBW of 98 pounds based on dosing height of 4 feet 11 inches, with consideration for weight discrepancies, BMI <19, stage 4 pressure injury, and deep tissue pressure injury.

## 2024-03-01 NOTE — PROGRESS NOTE ADULT - PROBLEM SELECTOR PLAN 3
CT pelvis with L sacral/medial gluteal decubitis ulcer with lysis of coccyx with c/f osteomyelitis  Also with unstageable sacral ulcer, not receiving adequate wound care, and is in pain  Meeting sepsis criteria with tachycardia, tacypnea, and leukocytosis     - procal, crp elevated     Plan:  c/w Vanc/Zosyn  f/u Blood/Ucx, wound culture  wound care debrided   ID consulted CT pelvis with L sacral/medial gluteal decubitis ulcer with lysis of coccyx with c/f osteomyelitis  Also with unstageable sacral ulcer, not receiving adequate wound care, and is in pain  Meeting sepsis criteria with tachycardia, tacypnea, and leukocytosis     - procal, crp, esr elevated     Plan:  c/w zosyn  Blood cx neg  f/u wound culture  wound care debrided   ID consulted, plan for 7-10 day abx course

## 2024-03-01 NOTE — DIETITIAN INITIAL EVALUATION ADULT - SIGNS/SYMPTOMS
as evidenced by pt with BMI <19.  as evidenced by pt with stage 4 pressure injury and deep tissue pressure injury.

## 2024-03-01 NOTE — DIETITIAN INITIAL EVALUATION ADULT - REASON
RD unable to perform nutrition focused physical exam at this time, as pt is AAOx1, unable to provide consent. No family present at bedside.  RD unable to perform nutrition focused physical exam at this time, as pt is AAOx1, unable to provide consent. No family present at bedside. Pt likely malnourished, however, RD unable to conduct nutrition focused physical exam at this time to provide supporting criteria for malnutrition. RD to perform NFPE at later date if medically feasible to have supporting criteria for diagnosis.

## 2024-03-01 NOTE — DIETITIAN INITIAL EVALUATION ADULT - ETIOLOGY
related to suspected energy expenditure exceeding estimated energy intake related to increased physiological demand for nutrients

## 2024-03-01 NOTE — PROGRESS NOTE ADULT - PROBLEM SELECTOR PLAN 5
Goals of care discussion above  will have GOC discussion with family today Goals of care discussion above  will have GOC discussion with family today    - Plan for LTC facility with hospice

## 2024-03-01 NOTE — DIETITIAN INITIAL EVALUATION ADULT - ENERGY INTAKE
Adequate (%) Currently in-house, pt noted to have eaten % of lunch yesterday (2/29), received total feeding assistance. Per RN, pt ate cream of wheat for breakfast this morning. Limited information obtained from RN, as RN began cleaning a patient during interview. Per previous RD note on 9/11/23, "Daughter is amenable to pt receiving oral nutrition supplement daily to optimize protein-energy intake."

## 2024-03-01 NOTE — PROGRESS NOTE ADULT - ASSESSMENT
Patient is a 89 year old female DNR/DNI with PMH of DVT, hypothyroidism, P. 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 Patient with significant unstageable sacral decubitus ulcer. Per daughter, she hasn't been getting official wound care (dressings taken care of by family) due to insurance issues getting wound care nurse approved. Daughter also reports that her mother's mental status at baseline is that she can say her name but doesn't always know where she is.    Sepsis, unstageable sacral ulcer with OM of coccyx   Poorly controlled DM  - tachycardia, tachypnea and leukocytosis on admission, low grade temp 100.1F x1 2/28pm    -- afebrile this am, WBC slight downtrend, CRP 86  - Flu/COVID/RSV negative   - CXR with no focal consolidations  - UA negative   - CT pelvis with left sacral/medial gluteal decubitus ulcer with OM of coccyx, no drainable collection   - MRSA PCR screen negative   - BCx NGTD    Recommendations:  Follow Wcx - in process   Continue zosyn for now pending above   MRSA PCR screen negative, discontinued vancomycin   Treatment of sacral pressure ulcer and confirmed OM with antibiotics alone without debridement and wound coverage ineffective, also given location wound is at risk of contamination, further skin/ wound breakdown due to pressure and prolonged course of antibiotics unlikely to resolve this and will increase risk of C DIff, development of resistant organisms making treatment of infection more difficult in the future in addition to antibiotic adverse effects, therefore, recommended short course 7-10d course with local wound care, nutrition, offloading as able  Wound care evaluation  Monitor temps/WBC    Dr. Archie Mortensen covering weekend service 3/2-3/3  Dr. Irasema Coy to resume care 3/4  Infectious Diseases will continue to follow. Please call with any questions.   Apolonia Bolden M.D.  [facility] [place] Division of Infectious Diseases [phone]  For after 5 P.M. and weekends, please call [phone]     Patient is a 89 year old female DNR/DNI with PMH of DVT, hypothyroidism, P. 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 Patient with significant unstageable sacral decubitus ulcer. Per daughter, she hasn't been getting official wound care (dressings taken care of by family) due to insurance issues getting wound care nurse approved. Daughter also reports that her mother's mental status at baseline is that she can say her name but doesn't always know where she is.    Sepsis, unstageable sacral ulcer with OM of coccyx   Poorly controlled DM  - tachycardia, tachypnea and leukocytosis on admission, low grade temp 100.1F x1 2/28pm    -- afebrile this am, WBC slight downtrend, CRP 86  - Flu/COVID/RSV negative   - CXR with no focal consolidations  - UA negative   - CT pelvis with left sacral/medial gluteal decubitus ulcer with OM of coccyx, no drainable collection   - MRSA PCR screen negative   - BCx NGTD    Recommendations:  Follow Wcx - in process   -- so far w/ P. mirabilis, E.coli and E. faecalis  Continue zosyn for now pending above   MRSA PCR screen negative, discontinued vancomycin   Treatment of sacral pressure ulcer and confirmed OM with antibiotics alone without debridement and wound coverage ineffective, also given location wound is at risk of contamination, further skin/ wound breakdown due to pressure and prolonged course of antibiotics unlikely to resolve this and will increase risk of C DIff, development of resistant organisms making treatment of infection more difficult in the future in addition to antibiotic adverse effects, therefore, recommended short course 7-10d course with local wound care, nutrition, offloading as able  Wound care evaluation  Monitor temps/WBC  Further recs to follow pending final cx results    Dr. Archie Mortensen covering weekend service 3/2-3/3  Dr. Irasema Coy to resume care 3/4  Infectious Diseases will continue to follow. Please call with any questions.   Apolonia Bolden M.D.  [facility] Division of Infectious Diseases [phone]  For after 5 P.M. and weekends, please call [phone]

## 2024-03-01 NOTE — DIETITIAN INITIAL EVALUATION ADULT - NSFNSNUTRHOMESUPPLEMENTFT_GEN_A_CORE
Per previous RD note on 9/11/23, "Per pt's daughter, pt drinks Glucerna 1x/day as well as Vitamin C and Vitamin D daily."

## 2024-03-01 NOTE — PROGRESS NOTE ADULT - SUBJECTIVE AND OBJECTIVE BOX
[facility] Progress Note    MAR, chart notes, fingersticks and labs reviewed    Subjective: plans for dc to long term care with hospice next week    Objective  Vital Signs  T(C): 37.1 (03-01-24 @ 07:58), Max: 37.1 (03-01-24 @ 07:58)  HR: 90 (03-01-24 @ 07:58) (90 - 99)  BP: 134/63 (03-01-24 @ 07:58) (125/66 - 149/68)  RR: 16 (03-01-24 @ 07:58) (16 - 18)  SpO2: 97% (03-01-24 @ 07:58) (96% - 97%)    Physical Exam  Gen: NAD, resting comfortably  HEENT: NC/AT  Neck: supple  Chest: breathing comfortably  Heart: +s1 +s2    Medications  acetaminophen     Tablet .. 650 milliGRAM(s) Oral every 6 hours PRN  apixaban 2.5 milliGRAM(s) Oral two times a day  atorvastatin 40 milliGRAM(s) Oral at bedtime  chlorhexidine 4% Liquid 1 Application(s) Topical daily  Dakins Solution - 1/4 Strength 1 Application(s) Topical every 8 hours  dextrose 5%. 1000 milliLiter(s) IV Continuous <Continuous>  dextrose 5%. 1000 milliLiter(s) IV Continuous <Continuous>  dextrose 50% Injectable 12.5 Gram(s) IV Push once  dextrose 50% Injectable 25 Gram(s) IV Push once  dextrose 50% Injectable 25 Gram(s) IV Push once  dextrose Oral Gel 15 Gram(s) Oral once PRN  glucagon  Injectable 1 milliGRAM(s) IntraMuscular once  hydroxyurea 500 milliGRAM(s) Oral <User Schedule>  insulin glargine Injectable (LANTUS) 5 Unit(s) SubCutaneous at bedtime  insulin lispro (ADMELOG) corrective regimen sliding scale   SubCutaneous three times a day before meals  insulin lispro (ADMELOG) corrective regimen sliding scale   SubCutaneous at bedtime  insulin lispro Injectable (ADMELOG) 4 Unit(s) SubCutaneous before breakfast  insulin lispro Injectable (ADMELOG) 4 Unit(s) SubCutaneous before dinner  insulin lispro Injectable (ADMELOG) 4 Unit(s) SubCutaneous before lunch  lactated ringers Bolus 500 milliLiter(s) IV Bolus once  levothyroxine 88 MICROGram(s) Oral daily  melatonin 3 milliGRAM(s) Oral at bedtime PRN  metoprolol tartrate 25 milliGRAM(s) Oral two times a day  piperacillin/tazobactam IVPB.. 3.375 Gram(s) IV Intermittent every 8 hours  polyethylene glycol 3350 17 Gram(s) Oral daily  QUEtiapine 25 milliGRAM(s) Oral daily PRN  senna 2 Tablet(s) Oral at bedtime    Pertinent labs/Imaging  POCT Blood Glucose.: 132 mg/dL (03-01-24 @ 08:49)  POCT Blood Glucose.: 393 mg/dL (02-29-24 @ 21:31)  POCT Blood Glucose.: 331 mg/dL (02-29-24 @ 17:20)  POCT Blood Glucose.: 429 mg/dL (02-29-24 @ 12:29)    eGFR: 56 mL/min/1.73m2 (03-01-24 @ 11:01)  eGFR: 82 mL/min/1.73m2 (02-29-24 @ 13:07)  eGFR: 83 mL/min/1.73m2 (02-28-24 @ 20:17)

## 2024-03-01 NOTE — PROGRESS NOTE ADULT - PROBLEM SELECTOR PLAN 1
CT pelvis with L sacral/medial gluteal decubitis ulcer with lysis of coccyx with c/f osteomyelitis  Also with unstageable sacral ulcer, not receiving adequate wound care, and is in pain  Meeting sepsis criteria with tachycardia, tacypnea, and leukocytosis   wound with exposed bone, diagnostic of ostemyelitis    - procal, crp elevated     Plan:  c/w Vanc/Zosyn  f/u Blood/Ucx, wound culture  wound care debrided   ID consulted CT pelvis with L sacral/medial gluteal decubitis ulcer with lysis of coccyx with c/f osteomyelitis  Also with unstageable sacral ulcer, not receiving adequate wound care, and is in pain  Meeting sepsis criteria with tachycardia, tacypnea, and leukocytosis   wound with exposed bone, diagnostic of ostemyelitis    - procal, crp, esr elevated     Plan:  c/w zosyn  Blood cx neg  Ucx shows 50,000-100,000 GN rods  f/u wound culture  wound care debrided   ID consulted, plan for 7-10 day abx course

## 2024-03-01 NOTE — DIETITIAN INITIAL EVALUATION ADULT - OTHER INFO
Weights:  - UBW (per patient's daughter per previous RD note): 112-113 pounds (2023)  - Dosing Weight (per chart): 76.2 pounds (2/29)  - Dosing Weight (per previous RD note): 91 pounds (bed scale) (09-08)  - Daily Weights (per flow sheets): 76.2 pounds (2/29)  - Bed Scale Weight (taken by RD): 91.3 pounds (3/1)  - Bed Scale Weight (taken by RD in previous RD note): 93.94 pounds (09-11) (question accuracy)  - Per [facility] HIE: 76.3 pounds (2/29/24), 110 pounds (12/18/23), 91.1 pounds (9/13/23), 110 pounds (9/8/23)  Weight discrepancies noted, question accuracy of current dosing weight. Using weight from 12/18 and current dosing weight, pt with possible 34 pound weight loss x 3 months? Question accuracy. RD to continue to monitor weights and trends as able.     Off note: Per Internal Medicine Note today, 3/1, "Plan for LTC facility with hospice."    Pt with osteomyelitis of coccyx and sepsis (per chart).   - Ordered for antibiotics in-house (per orders).     Pt with sacral ulcer (per chart).   - Per chart, "CT pelvis with L sacral/medial gluteal decubitis ulcer with lysis of coccyx with c/f osteomyelitis."    Pt with severe dementia (per chart).  - Seen by Speech Language Pathologist for a Bedside Swallow Assessment on 2/29, recommended "Puree with Moderately Thick Liquid" (per chart).     Pt with diabetes mellitus (per chart).   - Per chart, "hx of DM on metformin, but hasn't been taking since she as told she has normal A1C."  - No recent A1C noted, if within GOC, consider obtaining updated A1C.   - Ordered for Ordered for Insulin Lispro Injectable (ADMELOG), Insulin Glargine Injectable (LANTUS) and Insulin Lispro (ADMELOG) Corrective Regimen Sliding Scale in-house for glycemic control (per orders).   - Glucose elevated in-house, 218 mg/dL (3/1), 468 mg/dL (2/29) (per chart).     Pt with history of Polycythemia vera (per chart).  - Ordered for hydroxyurea in-house (per orders).     Orders:  - Ordered for IV Lactated Ringers in-house (per orders).   - Ordered for Synthroid in-house (per orders). Height: Height discrepancies noted in chart. Obtain updated height as medically feasible. Dosing height used for BMI and IBW calculations.     Weights:  - UBW (per patient's daughter per previous RD note on 9/11/23): 112-113 pounds (prior to September 2023)  - Dosing Weight (per chart): 76.2 pounds (2/29) (question accuracy)  - Dosing Weight (per previous RD note): 91 pounds (bed scale) (09-08)  - Daily Weights (per flow sheets): 76.2 pounds (2/29)  - Bed Scale Weight (taken by RD): 91.3 pounds (3/1) (bed) (Used for BMI)  - Bed Scale Weight (taken by RD in previous RD note): 93.94 pounds (09-11) (question accuracy)  - Per [facility] HIE: 76.3 pounds (2/29/24), 110 pounds (12/18/23), 91.1 pounds (9/13/23), 110 pounds (9/8/23)  Weight discrepancies noted, question accuracy of current dosing weight. Using weight from 12/18 and current dosing weight, pt with possible 34 pound weight loss x 3 months, question accuracy? Using UBW of 112-113 pounds (prior to September 2023) and current RD bed scale, pt with possible ~21 pound weight loss x 6 months (18%), clinically significant. RD to continue to monitor weights and trends as able.     Off note: Per Internal Medicine Note today, 3/1, "Plan for LTC facility with hospice."    Pt with osteomyelitis of coccyx and sepsis (per chart).   - Ordered for antibiotics in-house (per orders).     Pt with sacral ulcer (per chart).   - Per chart, "CT pelvis with L sacral/medial gluteal decubitis ulcer with lysis of coccyx with c/f osteomyelitis."    Pt with severe dementia (per chart).  - Seen by Speech Language Pathologist for a Bedside Swallow Assessment on 2/29, recommended "Puree with Moderately Thick Liquid" (per chart).     Pt with diabetes mellitus (per chart).   - Per chart, "hx of DM on metformin, but hasn't been taking since she as told she has normal A1C."  - No recent A1C noted, if within GOC, consider obtaining updated A1C.   - Ordered for Ordered for Insulin Lispro Injectable (ADMELOG), Insulin Glargine Injectable (LANTUS) and Insulin Lispro (ADMELOG) Corrective Regimen Sliding Scale in-house for glycemic control (per orders).   - Glucose elevated in-house, 218 mg/dL (3/1), 468 mg/dL (2/29) (per chart).     Pt with history of Polycythemia vera (per chart).  - Ordered for hydroxyurea in-house (per orders).     Orders:  - Ordered for IV Lactated Ringers in-house (per orders).   - Ordered for Synthroid in-house (per orders).

## 2024-03-01 NOTE — DIETITIAN INITIAL EVALUATION ADULT - NSFNSGIIOFT_GEN_A_CORE
No GI distress noted in nursing flow sheets, pt noted to have fecal incontinence (per flow sheets).  - Last Bowel Movement: 3/1 (per nursing flow sheets)  - Bowel Regimen: Miralax and Senna (per orders).   Continue to monitor bowel movements and bowel movement regularity. Adjust bowel regimen as needed.

## 2024-03-01 NOTE — DIETITIAN INITIAL EVALUATION ADULT - PERTINENT MEDS FT
MEDICATIONS  (STANDING):  apixaban 2.5 milliGRAM(s) Oral two times a day  atorvastatin 40 milliGRAM(s) Oral at bedtime  chlorhexidine 4% Liquid 1 Application(s) Topical daily  Dakins Solution - 1/4 Strength 1 Application(s) Topical every 8 hours  dextrose 5%. 1000 milliLiter(s) (50 mL/Hr) IV Continuous <Continuous>  dextrose 5%. 1000 milliLiter(s) (100 mL/Hr) IV Continuous <Continuous>  dextrose 50% Injectable 12.5 Gram(s) IV Push once  dextrose 50% Injectable 25 Gram(s) IV Push once  dextrose 50% Injectable 25 Gram(s) IV Push once  glucagon  Injectable 1 milliGRAM(s) IntraMuscular once  hydroxyurea 500 milliGRAM(s) Oral <User Schedule>  insulin glargine Injectable (LANTUS) 5 Unit(s) SubCutaneous at bedtime  insulin lispro (ADMELOG) corrective regimen sliding scale   SubCutaneous at bedtime  insulin lispro (ADMELOG) corrective regimen sliding scale   SubCutaneous three times a day before meals  insulin lispro Injectable (ADMELOG) 4 Unit(s) SubCutaneous before breakfast  insulin lispro Injectable (ADMELOG) 4 Unit(s) SubCutaneous before dinner  insulin lispro Injectable (ADMELOG) 4 Unit(s) SubCutaneous before lunch  lactated ringers Bolus 500 milliLiter(s) IV Bolus once  levothyroxine 88 MICROGram(s) Oral daily  metoprolol tartrate 25 milliGRAM(s) Oral two times a day  piperacillin/tazobactam IVPB.. 3.375 Gram(s) IV Intermittent every 8 hours  polyethylene glycol 3350 17 Gram(s) Oral daily  senna 2 Tablet(s) Oral at bedtime    MEDICATIONS  (PRN):  acetaminophen     Tablet .. 650 milliGRAM(s) Oral every 6 hours PRN Temp greater or equal to 38C (100.4F), Mild Pain (1 - 3)  dextrose Oral Gel 15 Gram(s) Oral once PRN Blood Glucose LESS THAN 70 milliGRAM(s)/deciliter  melatonin 3 milliGRAM(s) Oral at bedtime PRN Insomnia  QUEtiapine 25 milliGRAM(s) Oral daily PRN for insomnia

## 2024-03-01 NOTE — DIETITIAN INITIAL EVALUATION ADULT - NSFNSNUTRCHEWSWALLOWFT_GEN_A_CORE
Per Swallow Bedside Assessment note on 2/29: "As per d/w with pt's daughter, pt has been tolerating puree and moderately thick liquid diet at home."  Seen by Speech Language Pathologist for a Bedside Swallow Assessment on 2/29, recommended "Puree with Moderately Thick Liquid" (per chart). Defer diet texture/consistency to Speech Language Pathologist/Medical Team.

## 2024-03-01 NOTE — PROGRESS NOTE ADULT - PROBLEM SELECTOR PLAN 8
hx of DM on metformin, but hasn't been taking since she as told she has normal A1C  Blood glucose elevated on admission   ISS  Ideally, weight based 0.3U would be given but no weight in chart, please address in AM    - endocrinology consulted hx of DM on metformin, but hasn't been taking since she as told she has normal A1C  Blood glucose elevated on admission       - endocrinology consulted, appreciate recommendations  - lantus 5 u qhs  - ADMELOG 4 u with meals  - ISS  - ctm

## 2024-03-01 NOTE — DIETITIAN INITIAL EVALUATION ADULT - PROBLEM SELECTOR PLAN 8
hx of DM on metformin, but hasn't been taking since she as told she has normal A1C  Blood glucose elevated on admission   ISS  Ideally, weight based 0.3U would be given but no weight in chart, please address in AM

## 2024-03-01 NOTE — DIETITIAN INITIAL EVALUATION ADULT - PERTINENT LABORATORY DATA
03-01    136  |  103  |  33<H>  ----------------------------<  218<H>  4.4   |  21<L>  |  0.97    Ca    9.5      01 Mar 2024 11:01  Phos  2.6     03-01  Mg     2.2     03-01    TPro  7.5  /  Alb  3.0<L>  /  TBili  0.2  /  DBili  x   /  AST  30  /  ALT  47<H>  /  AlkPhos  264<H>  02-29  POCT Blood Glucose.: 168 mg/dL (03-01-24 @ 14:22)  A1C with Estimated Average Glucose Result: 6.4 % (03-14-23 @ 12:13)

## 2024-03-01 NOTE — DIETITIAN INITIAL EVALUATION ADULT - ORAL INTAKE PTA/DIET HISTORY
Nutrition assessment completed. Limited information obtained, as pt with "severe dementia (minimally verbal)" and A&Ox1 per chart. RD attempted to call pt's daughter, Magalie, for subjective information, however was unable to reach her. Visited pt's bedside 2x, no family present. RD unable to assess pt's appetite and PO intake prior to admission. Per previous RD note on 9/11/23, "Daughter reported pt's UBW being around 112-113 pounds this year, however has noticed the pt looks thinner at recent hospital visit." Per previous RD note on 9/11/23, "Confirmed food allergy to spices (coughing and swelling when consumed)." Pt noted to be allergic to black pepper, white pepper, cumin, paprika, johnston powder and chili powder (rash) (per H&P).

## 2024-03-01 NOTE — PROGRESS NOTE ADULT - ASSESSMENT
Assessment/plan:    Bilateral submetatarsal 5 DTI: Stable, noninfected, present on admission    Recommend Betadine paint and Allevyn foam dressing to bilateral fifth MPJ sites every other day.  Recommend continue decubitus precautions and use of Z flow boots.  Will continue to monitor for signs of infection and/or wound care recommendations.

## 2024-03-01 NOTE — PROGRESS NOTE ADULT - ASSESSMENT
89y old Female with history of T2DM, Hypothyroidism, Dementia, HTN, Hx of DVT, CVA, Sacral ulcer here with osteomyelitis.    1. T2DM with hyperglycemia    - Continue Lantus 5 units at night  - Continue Admelog 4 units with meals  - Continue low Admelog correctional scale before meals and bedtime  - Monitor FS before meals and bedtime  - Follow hospital hypoglycemic protocol    2. Hypothyroidism  - Continue Levothyroxine 88 mcg daily    Will continue to follow.    Matthew Willard MD  [facility]    [address] [place] [address]    T [phone]  F [phone]   89y old Female with history of T2DM, Hypothyroidism, Dementia, HTN, Hx of DVT, CVA, Sacral ulcer here with osteomyelitis.    1. T2DM with hyperglycemia  - fasting very well controlled  - Continue Lantus 5 units at night  - Continue Admelog 4 units with meals  - Continue low Admelog correctional scale before meals and bedtime  - Monitor FS before meals and bedtime  - Follow hospital hypoglycemic protocol    2. Hypothyroidism  - Continue Levothyroxine 88 mcg daily    Will continue to follow.    Matthew Willard MD  [facility]    [address] [place] [address]    T [phone]  F [phone]

## 2024-03-01 NOTE — DIETITIAN INITIAL EVALUATION ADULT - PROBLEM SELECTOR PLAN 1
CT pelvis with L sacral/medial gluteal decubitis ulcer with lysis of coccyx with c/f osteomyelitis  Also with unstageable sacral ulcer, not receiving adequate wound care, and is in pain  Meeting sepsis criteria with tachycardia, tacypnea, and leukocytosis     Plan:  Vanc/Zosyn  Blood/Ucx, wound culture  Ortho consult for washout and biopsy if within goals of care of family  ID consult in Am  Unable to order Vanc weight-based and no weight-in chart despite asking for it to be charted  Pls f/u Vanc order in AM if weight is recorded

## 2024-03-01 NOTE — PROGRESS NOTE ADULT - PROBLEM SELECTOR PLAN 4
Hx of dementia, functional quadriplegia  AOx1 on admission, per daughter doesn't always know where she is  CTM  S/S eval recommends pureed and moderately thickened diet

## 2024-03-01 NOTE — DIETITIAN INITIAL EVALUATION ADULT - NSFNSADHERENCEPTAFT_GEN_A_CORE
Limited information to assess regarding appetite and intake prior to admission at this time. Per Swallow Bedside Assessment note on 2/29: "As per d/w with pt's daughter, pt has been tolerating puree and moderately thick liquid diet at home."     Per H&P, pt's daughter "reports her mom stopped taking her home metformin since a doctor told her that her A1C was normal." No recent A1C noted at this time (per chart). Per previous RD note on 9/11/23, "Daughter stated she monitor's pt's blood glucose levels before breakfast every morning, and states the average number is around 105-110 mg/dL. Daughter states she monitors the pt's carbohydrate intake, and adds Glucerna to the pt's smoothies to assist with protein-energy intake while maintaining good glycemic control."

## 2024-03-02 LAB
-  AMOXICILLIN/CLAVULANIC ACID: SIGNIFICANT CHANGE UP
-  AMPICILLIN/SULBACTAM: SIGNIFICANT CHANGE UP
-  AMPICILLIN: SIGNIFICANT CHANGE UP
-  AZTREONAM: SIGNIFICANT CHANGE UP
-  CEFAZOLIN: SIGNIFICANT CHANGE UP
-  CEFEPIME: SIGNIFICANT CHANGE UP
-  CEFOXITIN: SIGNIFICANT CHANGE UP
-  CEFOXITIN: SIGNIFICANT CHANGE UP
-  CEFTRIAXONE: SIGNIFICANT CHANGE UP
-  CEFUROXIME: SIGNIFICANT CHANGE UP
-  CIPROFLOXACIN: SIGNIFICANT CHANGE UP
-  ERTAPENEM: SIGNIFICANT CHANGE UP
-  GENTAMICIN: SIGNIFICANT CHANGE UP
-  IMIPENEM: SIGNIFICANT CHANGE UP
-  IMIPENEM: SIGNIFICANT CHANGE UP
-  LEVOFLOXACIN: SIGNIFICANT CHANGE UP
-  MEROPENEM: SIGNIFICANT CHANGE UP
-  NITROFURANTOIN: SIGNIFICANT CHANGE UP
-  PIPERACILLIN/TAZOBACTAM: SIGNIFICANT CHANGE UP
-  TOBRAMYCIN: SIGNIFICANT CHANGE UP
-  TRIMETHOPRIM/SULFAMETHOXAZOLE: SIGNIFICANT CHANGE UP
-  VANCOMYCIN: SIGNIFICANT CHANGE UP
CULTURE RESULTS: ABNORMAL
CULTURE RESULTS: ABNORMAL
GLUCOSE BLDC GLUCOMTR-MCNC: 103 MG/DL — HIGH (ref 70–99)
GLUCOSE BLDC GLUCOMTR-MCNC: 131 MG/DL — HIGH (ref 70–99)
GLUCOSE BLDC GLUCOMTR-MCNC: 204 MG/DL — HIGH (ref 70–99)
GLUCOSE BLDC GLUCOMTR-MCNC: 242 MG/DL — HIGH (ref 70–99)
METHOD TYPE: SIGNIFICANT CHANGE UP
ORGANISM # SPEC MICROSCOPIC CNT: ABNORMAL
SPECIMEN SOURCE: SIGNIFICANT CHANGE UP
SPECIMEN SOURCE: SIGNIFICANT CHANGE UP

## 2024-03-02 PROCEDURE — 99232 SBSQ HOSP IP/OBS MODERATE 35: CPT | Mod: GC

## 2024-03-02 RX ORDER — INSULIN LISPRO 100/ML
6 VIAL (ML) SUBCUTANEOUS
Refills: 0 | Status: DISCONTINUED | OUTPATIENT
Start: 2024-03-02 | End: 2024-03-09

## 2024-03-02 RX ORDER — INSULIN LISPRO 100/ML
5 VIAL (ML) SUBCUTANEOUS
Refills: 0 | Status: DISCONTINUED | OUTPATIENT
Start: 2024-03-02 | End: 2024-03-03

## 2024-03-02 RX ADMIN — APIXABAN 2.5 MILLIGRAM(S): 2.5 TABLET, FILM COATED ORAL at 07:08

## 2024-03-02 RX ADMIN — SODIUM CHLORIDE 333.33 MILLILITER(S): 9 INJECTION, SOLUTION INTRAVENOUS at 13:28

## 2024-03-02 RX ADMIN — APIXABAN 2.5 MILLIGRAM(S): 2.5 TABLET, FILM COATED ORAL at 17:54

## 2024-03-02 RX ADMIN — Medication 1 APPLICATION(S): at 07:09

## 2024-03-02 RX ADMIN — Medication 1 TABLET(S): at 12:20

## 2024-03-02 RX ADMIN — PIPERACILLIN AND TAZOBACTAM 25 GRAM(S): 4; .5 INJECTION, POWDER, LYOPHILIZED, FOR SOLUTION INTRAVENOUS at 13:26

## 2024-03-02 RX ADMIN — Medication 1 APPLICATION(S): at 13:28

## 2024-03-02 RX ADMIN — CHLORHEXIDINE GLUCONATE 1 APPLICATION(S): 213 SOLUTION TOPICAL at 12:23

## 2024-03-02 RX ADMIN — PIPERACILLIN AND TAZOBACTAM 25 GRAM(S): 4; .5 INJECTION, POWDER, LYOPHILIZED, FOR SOLUTION INTRAVENOUS at 21:47

## 2024-03-02 RX ADMIN — Medication 4 UNIT(S): at 18:02

## 2024-03-02 RX ADMIN — ATORVASTATIN CALCIUM 40 MILLIGRAM(S): 80 TABLET, FILM COATED ORAL at 21:47

## 2024-03-02 RX ADMIN — PIPERACILLIN AND TAZOBACTAM 25 GRAM(S): 4; .5 INJECTION, POWDER, LYOPHILIZED, FOR SOLUTION INTRAVENOUS at 07:08

## 2024-03-02 RX ADMIN — Medication 2: at 13:26

## 2024-03-02 RX ADMIN — Medication 0: at 18:02

## 2024-03-02 RX ADMIN — Medication 5 UNIT(S): at 09:26

## 2024-03-02 RX ADMIN — Medication 0: at 09:26

## 2024-03-02 RX ADMIN — Medication 88 MICROGRAM(S): at 07:08

## 2024-03-02 RX ADMIN — SENNA PLUS 2 TABLET(S): 8.6 TABLET ORAL at 21:47

## 2024-03-02 RX ADMIN — Medication 25 MILLIGRAM(S): at 07:08

## 2024-03-02 RX ADMIN — Medication 500 MILLIGRAM(S): at 12:20

## 2024-03-02 RX ADMIN — POLYETHYLENE GLYCOL 3350 17 GRAM(S): 17 POWDER, FOR SOLUTION ORAL at 12:20

## 2024-03-02 RX ADMIN — Medication 1 APPLICATION(S): at 21:47

## 2024-03-02 RX ADMIN — Medication 6 UNIT(S): at 13:27

## 2024-03-02 RX ADMIN — Medication 25 MILLIGRAM(S): at 17:53

## 2024-03-02 NOTE — PROGRESS NOTE ADULT - ASSESSMENT
89y old Female with history of T2DM, Hypothyroidism, Dementia, HTN, Hx of DVT, CVA, Sacral ulcer here with osteomyelitis.    1. T2DM with hyperglycemia  - high after breakfast and lunch  - Continue Lantus 5 units at night  - Increase Admelog to 5 units with breakfast, 6 units with lunch and continue 4 units with dinner  - Continue low Admelog correctional scale before meals and bedtime  - Monitor FS before meals and bedtime  - Follow hospital hypoglycemic protocol    2. Hypothyroidism  - Continue Levothyroxine 88 mcg daily    Will continue to follow.    Matthew Willard MD  [facility]    [address] [place] [address]    T [phone]  F [phone]   89y old Female with history of T2DM, Hypothyroidism, Dementia, HTN, Hx of DVT, CVA, Sacral ulcer here with osteomyelitis.    1. T2DM with hyperglycemia  - high after breakfast and lunch and tight fasting so will stop Lantus  - Stop Lantus  - Increase Admelog to 5 units with breakfast, 6 units with lunch and continue 4 units with dinner  - Continue low Admelog correctional scale before meals and bedtime  - Monitor FS before meals and bedtime  - Follow hospital hypoglycemic protocol    2. Hypothyroidism  - Continue Levothyroxine 88 mcg daily    Will continue to follow.    Matthew Willard MD  [facility]    [address] [place] [address]    T [phone]  F [phone]

## 2024-03-02 NOTE — PROGRESS NOTE ADULT - PROBLEM SELECTOR PLAN 3
CT pelvis with L sacral/medial gluteal decubitis ulcer with lysis of coccyx with c/f osteomyelitis  Also with unstageable sacral ulcer, not receiving adequate wound care, and is in pain  Meeting sepsis criteria with tachycardia, tacypnea, and leukocytosis     - procal, crp, esr elevated     Plan:  c/w zosyn  Blood cx neg  f/u wound culture  wound care debrided   ID consulted, plan for 7-10 day abx course CT pelvis with L sacral/medial gluteal decubitis ulcer with lysis of coccyx with c/f osteomyelitis  Also with unstageable sacral ulcer, not receiving adequate wound care, and is in pain  Meeting sepsis criteria with tachycardia, tacypnea, and leukocytosis     - procal, crp, esr elevated     Plan:  c/w zosyn  Blood cx neg  wound culture w/ proteus, enterococcus, e.coli. Pending sensitivities   wound care debrided   ID consulted, plan for 7-10 day abx course

## 2024-03-02 NOTE — PROGRESS NOTE ADULT - SUBJECTIVE AND OBJECTIVE BOX
[facility] Progress Note    MAR, chart notes, fingersticks and labs reviewed    Objective  Vital Signs  T(C): 36.4 (03-02-24 @ 13:30), Max: 36.7 (03-01-24 @ 17:58)  HR: 89 (03-02-24 @ 13:30) (89 - 96)  BP: 154/87 (03-02-24 @ 13:30) (107/85 - 154/87)  RR: 18 (03-02-24 @ 13:30) (16 - 18)  SpO2: 99% (03-02-24 @ 13:30) (97% - 99%)    Physical Exam  Gen: NAD, resting comfortably  HEENT: NC/AT, EOMI  Neck: supple  Chest: breathing comfortably  Heart: +s1 +s2    Medications  acetaminophen     Tablet .. 650 milliGRAM(s) Oral every 6 hours PRN  apixaban 2.5 milliGRAM(s) Oral two times a day  ascorbic acid 500 milliGRAM(s) Oral daily  atorvastatin 40 milliGRAM(s) Oral at bedtime  chlorhexidine 4% Liquid 1 Application(s) Topical daily  Dakins Solution - 1/4 Strength 1 Application(s) Topical every 8 hours  dextrose 5%. 1000 milliLiter(s) IV Continuous <Continuous>  dextrose 5%. 1000 milliLiter(s) IV Continuous <Continuous>  dextrose 50% Injectable 25 Gram(s) IV Push once  dextrose 50% Injectable 12.5 Gram(s) IV Push once  dextrose 50% Injectable 25 Gram(s) IV Push once  dextrose Oral Gel 15 Gram(s) Oral once PRN  glucagon  Injectable 1 milliGRAM(s) IntraMuscular once  hydroxyurea 500 milliGRAM(s) Oral <User Schedule>  insulin lispro (ADMELOG) corrective regimen sliding scale   SubCutaneous three times a day before meals  insulin lispro (ADMELOG) corrective regimen sliding scale   SubCutaneous at bedtime  insulin lispro Injectable (ADMELOG) 4 Unit(s) SubCutaneous before dinner  insulin lispro Injectable (ADMELOG) 5 Unit(s) SubCutaneous before breakfast  insulin lispro Injectable (ADMELOG) 6 Unit(s) SubCutaneous before lunch  levothyroxine 88 MICROGram(s) Oral daily  melatonin 3 milliGRAM(s) Oral at bedtime PRN  metoprolol tartrate 25 milliGRAM(s) Oral two times a day  multivitamin 1 Tablet(s) Oral daily  piperacillin/tazobactam IVPB.. 3.375 Gram(s) IV Intermittent every 8 hours  polyethylene glycol 3350 17 Gram(s) Oral daily  QUEtiapine 25 milliGRAM(s) Oral daily PRN  senna 2 Tablet(s) Oral at bedtime    Pertinent labs/Imaging  POCT Blood Glucose.: 204 mg/dL (03-02-24 @ 12:52)  POCT Blood Glucose.: 103 mg/dL (03-02-24 @ 09:04)  POCT Blood Glucose.: 194 mg/dL (03-01-24 @ 22:02)  POCT Blood Glucose.: 356 mg/dL (03-01-24 @ 17:29)    eGFR: 56 mL/min/1.73m2 (03-01-24 @ 11:01)

## 2024-03-02 NOTE — PROGRESS NOTE ADULT - PROBLEM SELECTOR PLAN 2
CT pelvis with L sacral/medial gluteal decubitis ulcer with lysis of coccyx with c/f osteomyelitis  Also with unstageable sacral ulcer, not receiving adequate wound care, and is in pain  Meeting sepsis criteria with tachycardia, tacypnea, and leukocytosis     - procal, crp, esr elevated     Plan:  c/w zosyn  Blood cx neg  Ucx shows 50,000-100,000 GN rods  f/u wound culture  wound care debrided   ID consulted, plan for 7-10 day abx course

## 2024-03-02 NOTE — PROGRESS NOTE ADULT - PROBLEM SELECTOR PLAN 1
CT pelvis with L sacral/medial gluteal decubitis ulcer with lysis of coccyx with c/f osteomyelitis  Also with unstageable sacral ulcer, not receiving adequate wound care, and is in pain  Meeting sepsis criteria with tachycardia, tacypnea, and leukocytosis   wound with exposed bone, diagnostic of ostemyelitis    - procal, crp, esr elevated     Plan:  c/w zosyn  Blood cx neg  Ucx shows 50,000-100,000 GN rods  f/u wound culture  wound care debrided   ID consulted, plan for 7-10 day abx course

## 2024-03-02 NOTE — PROGRESS NOTE ADULT - PROBLEM SELECTOR PLAN 8
hx of DM on metformin, but hasn't been taking since she as told she has normal A1C  Blood glucose elevated on admission       - endocrinology consulted, appreciate recommendations  - lantus 5 u qhs  - ADMELOG 4 u with meals  - ISS  - ctm

## 2024-03-02 NOTE — PROGRESS NOTE ADULT - SUBJECTIVE AND OBJECTIVE BOX
SUBJECTIVE / OVERNIGHT EVENTS:  - Pt seen and examined at bedside  - TANYA. Pt without complaints this morning.      MEDICATIONS  (STANDING):  apixaban 2.5 milliGRAM(s) Oral two times a day  ascorbic acid 500 milliGRAM(s) Oral daily  atorvastatin 40 milliGRAM(s) Oral at bedtime  chlorhexidine 4% Liquid 1 Application(s) Topical daily  Dakins Solution - 1/4 Strength 1 Application(s) Topical every 8 hours  dextrose 5%. 1000 milliLiter(s) (50 mL/Hr) IV Continuous <Continuous>  dextrose 5%. 1000 milliLiter(s) (100 mL/Hr) IV Continuous <Continuous>  dextrose 50% Injectable 12.5 Gram(s) IV Push once  dextrose 50% Injectable 25 Gram(s) IV Push once  dextrose 50% Injectable 25 Gram(s) IV Push once  glucagon  Injectable 1 milliGRAM(s) IntraMuscular once  hydroxyurea 500 milliGRAM(s) Oral <User Schedule>  insulin glargine Injectable (LANTUS) 5 Unit(s) SubCutaneous at bedtime  insulin lispro (ADMELOG) corrective regimen sliding scale   SubCutaneous three times a day before meals  insulin lispro (ADMELOG) corrective regimen sliding scale   SubCutaneous at bedtime  insulin lispro Injectable (ADMELOG) 6 Unit(s) SubCutaneous before lunch  insulin lispro Injectable (ADMELOG) 4 Unit(s) SubCutaneous before dinner  insulin lispro Injectable (ADMELOG) 5 Unit(s) SubCutaneous before breakfast  lactated ringers Bolus 500 milliLiter(s) IV Bolus once  levothyroxine 88 MICROGram(s) Oral daily  metoprolol tartrate 25 milliGRAM(s) Oral two times a day  multivitamin 1 Tablet(s) Oral daily  piperacillin/tazobactam IVPB.. 3.375 Gram(s) IV Intermittent every 8 hours  polyethylene glycol 3350 17 Gram(s) Oral daily  senna 2 Tablet(s) Oral at bedtime    MEDICATIONS  (PRN):  acetaminophen     Tablet .. 650 milliGRAM(s) Oral every 6 hours PRN Temp greater or equal to 38C (100.4F), Mild Pain (1 - 3)  dextrose Oral Gel 15 Gram(s) Oral once PRN Blood Glucose LESS THAN 70 milliGRAM(s)/deciliter  melatonin 3 milliGRAM(s) Oral at bedtime PRN Insomnia  QUEtiapine 25 milliGRAM(s) Oral daily PRN for insomnia        02-29-24 @ 07:01  -  03-01-24 @ 07:00  --------------------------------------------------------  IN: 200 mL / OUT: 150 mL / NET: 50 mL        PHYSICAL EXAM:  Vital Signs Last 24 Hrs  T(C): 36.7 (02 Mar 2024 04:15), Max: 36.8 (01 Mar 2024 12:00)  T(F): 98 (02 Mar 2024 04:15), Max: 98.3 (01 Mar 2024 12:00)  HR: 90 (02 Mar 2024 07:04) (89 - 96)  BP: 152/79 (02 Mar 2024 07:04) (107/85 - 152/79)  BP(mean): --  RR: 18 (02 Mar 2024 04:15) (16 - 18)  SpO2: 99% (02 Mar 2024 04:15) (97% - 99%)    Parameters below as of 02 Mar 2024 04:15  Patient On (Oxygen Delivery Method): room air      GENERAL: NAD, lying in bed comfortably  HEAD:  Atraumatic, Normocephalic  EYES: EOMI, PERRLA, conjunctiva and sclera clear  ENT: Moist mucous membranes  NECK: Supple,   CHEST/LUNG: Clear to auscultation bilaterally; No rales, rhonchi, wheezing, or rubs. Unlabored respirations  HEART: Regular rate and rhythm; No murmurs, rubs, or gallops  ABDOMEN: BSx4; Soft, nontender, nondistended  EXTREMITIES:  2+ Peripheral Pulses, brisk capillary refill. No clubbing, cyanosis, or edema  NERVOUS SYSTEM:  A&Ox1, no new focal deficits  SKIN: unstageable sacral decubitus ulcer   psych: normal behavior, normal affect    LABS:                        9.7    20.44 )-----------( 939      ( 01 Mar 2024 11:05 )             32.4     03-01    136  |  103  |  33<H>  ----------------------------<  218<H>  4.4   |  21<L>  |  0.97    Ca    9.5      01 Mar 2024 11:01  Phos  2.6     03-01  Mg     2.2     03-01    TPro  7.5  /  Alb  3.0<L>  /  TBili  0.2  /  DBili  x   /  AST  30  /  ALT  47<H>  /  AlkPhos  264<H>  02-29    PT/INR - ( 29 Feb 2024 13:07 )   PT: 18.1 sec;   INR: 1.67 ratio         PTT - ( 29 Feb 2024 13:07 )  PTT:38.7 sec      Urinalysis Basic - ( 01 Mar 2024 11:01 )    Color: x / Appearance: x / SG: x / pH: x  Gluc: 218 mg/dL / Ketone: x  / Bili: x / Urobili: x   Blood: x / Protein: x / Nitrite: x   Leuk Esterase: x / RBC: x / WBC x   Sq Epi: x / Non Sq Epi: x / Bacteria: x        Culture - Other (collected 29 Feb 2024 14:29)  Source: Wound Wound  Preliminary Report (01 Mar 2024 18:29):    Numerous Proteus mirabilis    Numerous Escherichia coli    Numerous Enterococcus faecalis    Culture - Blood (collected 28 Feb 2024 19:50)  Source: .Blood Blood-Peripheral  Preliminary Report (02 Mar 2024 03:02):    No growth at 48 Hours    Culture - Blood (collected 28 Feb 2024 19:40)  Source: .Blood Blood-Peripheral  Preliminary Report (02 Mar 2024 03:02):    No growth at 48 Hours    Culture - Urine (collected 28 Feb 2024 18:37)  Source: Clean Catch Clean Catch (Midstream)  Final Report (02 Mar 2024 07:12):    50,000 - 99,000 CFU/mL Klebsiella pneumoniae  Organism: Klebsiella pneumoniae (02 Mar 2024 07:12)  Organism: Klebsiella pneumoniae (02 Mar 2024 07:12)

## 2024-03-02 NOTE — PROGRESS NOTE ADULT - PROBLEM SELECTOR PLAN 5
Goals of care discussion above  will have GOC discussion with family today    - Plan for LTC facility with hospice

## 2024-03-02 NOTE — PROGRESS NOTE ADULT - ATTENDING COMMENTS
SDU - 7 days of antibiotics. s/p wound debridement. wound cx growing e.coli, enterococcus, proteus pending sensitivities. Plan for LTC with hospice. Recall endo for discharge recommendations (PO preferred given patient's comfort is of utmost importance).

## 2024-03-03 LAB
GLUCOSE BLDC GLUCOMTR-MCNC: 172 MG/DL — HIGH (ref 70–99)
GLUCOSE BLDC GLUCOMTR-MCNC: 212 MG/DL — HIGH (ref 70–99)
GLUCOSE BLDC GLUCOMTR-MCNC: 217 MG/DL — HIGH (ref 70–99)
GLUCOSE BLDC GLUCOMTR-MCNC: 331 MG/DL — HIGH (ref 70–99)

## 2024-03-03 PROCEDURE — 99232 SBSQ HOSP IP/OBS MODERATE 35: CPT | Mod: GC

## 2024-03-03 RX ORDER — INSULIN LISPRO 100/ML
5 VIAL (ML) SUBCUTANEOUS
Refills: 0 | Status: DISCONTINUED | OUTPATIENT
Start: 2024-03-03 | End: 2024-03-12

## 2024-03-03 RX ORDER — INSULIN LISPRO 100/ML
6 VIAL (ML) SUBCUTANEOUS
Refills: 0 | Status: DISCONTINUED | OUTPATIENT
Start: 2024-03-03 | End: 2024-03-03

## 2024-03-03 RX ORDER — INSULIN LISPRO 100/ML
7 VIAL (ML) SUBCUTANEOUS
Refills: 0 | Status: DISCONTINUED | OUTPATIENT
Start: 2024-03-03 | End: 2024-03-04

## 2024-03-03 RX ADMIN — Medication 500 MILLIGRAM(S): at 11:36

## 2024-03-03 RX ADMIN — Medication 4: at 12:31

## 2024-03-03 RX ADMIN — ATORVASTATIN CALCIUM 40 MILLIGRAM(S): 80 TABLET, FILM COATED ORAL at 21:17

## 2024-03-03 RX ADMIN — Medication 6 UNIT(S): at 12:31

## 2024-03-03 RX ADMIN — CHLORHEXIDINE GLUCONATE 1 APPLICATION(S): 213 SOLUTION TOPICAL at 11:36

## 2024-03-03 RX ADMIN — Medication 1 TABLET(S): at 11:36

## 2024-03-03 RX ADMIN — Medication 1 APPLICATION(S): at 07:14

## 2024-03-03 RX ADMIN — Medication 25 MILLIGRAM(S): at 07:12

## 2024-03-03 RX ADMIN — Medication 88 MICROGRAM(S): at 07:12

## 2024-03-03 RX ADMIN — POLYETHYLENE GLYCOL 3350 17 GRAM(S): 17 POWDER, FOR SOLUTION ORAL at 11:36

## 2024-03-03 RX ADMIN — Medication 5 UNIT(S): at 17:31

## 2024-03-03 RX ADMIN — Medication 2: at 09:10

## 2024-03-03 RX ADMIN — Medication 1 APPLICATION(S): at 21:17

## 2024-03-03 RX ADMIN — Medication 7 UNIT(S): at 09:10

## 2024-03-03 RX ADMIN — APIXABAN 2.5 MILLIGRAM(S): 2.5 TABLET, FILM COATED ORAL at 17:05

## 2024-03-03 RX ADMIN — Medication 25 MILLIGRAM(S): at 17:04

## 2024-03-03 RX ADMIN — APIXABAN 2.5 MILLIGRAM(S): 2.5 TABLET, FILM COATED ORAL at 07:12

## 2024-03-03 RX ADMIN — SENNA PLUS 2 TABLET(S): 8.6 TABLET ORAL at 21:18

## 2024-03-03 RX ADMIN — Medication 1 APPLICATION(S): at 13:13

## 2024-03-03 RX ADMIN — Medication 2: at 17:30

## 2024-03-03 NOTE — PROGRESS NOTE ADULT - ASSESSMENT
Tell pt labs good 89y old Female with history of T2DM, Hypothyroidism, Dementia, HTN, Hx of DVT, CVA, Sacral ulcer here with osteomyelitis.    1. T2DM with hyperglycemia    - Increase Admelog to 7 units with breakfast, continue 6 units with lunch and increase to 5 units with dinner  - Continue low Admelog correctional scale before meals and bedtime  - Monitor FS before meals and bedtime  - Follow hospital hypoglycemic protocol    2. Hypothyroidism  - Continue Levothyroxine 88 mcg daily    Will continue to follow.    Matthew Willard MD  [facility]    [address] [place] [address]    T [phone]  F [phone]   89y old Female with history of T2DM, Hypothyroidism, Dementia, HTN, Hx of DVT, CVA, Sacral ulcer here with osteomyelitis.    1. T2DM with hyperglycemia  - numbers labile  - Increase Admelog to 7 units with breakfast, continue 6 units with lunch and increase to 5 units with dinner  - Continue low Admelog correctional scale before meals and bedtime  - Monitor FS before meals and bedtime  - Follow hospital hypoglycemic protocol    2. Hypothyroidism  - Continue Levothyroxine 88 mcg daily    Will continue to follow.    Matthew Willard MD  [facility]    [address] [place] [address]    T [phone]  F [phone]

## 2024-03-03 NOTE — PROGRESS NOTE ADULT - PROBLEM SELECTOR PLAN 1
CT pelvis with L sacral/medial gluteal decubitis ulcer with lysis of coccyx with c/f osteomyelitis  Also with unstageable sacral ulcer, not receiving adequate wound care, and is in pain  Meeting sepsis criteria with tachycardia, tacypnea, and leukocytosis   wound with exposed bone, diagnostic of ostemyelitis    - procal, crp, esr elevated     Plan:  c/w zosyn  Blood cx neg  Ucx shows 50,000-100,000 GN rods  f/u wound culture  wound care debrided   ID consulted, plan for 7-10 day abx course CT pelvis with L sacral/medial gluteal decubitis ulcer with lysis of coccyx with c/f osteomyelitis  Also with unstageable sacral ulcer, not receiving adequate wound care, and is in pain  Meeting sepsis criteria with tachycardia, tacypnea, and leukocytosis   wound with exposed bone, diagnostic of ostemyelitis    - procal, crp, esr elevated     Plan:  dc zosyn  c/w Levaquin based on sensitivities  Blood cx neg  Ucx shows 50,000-100,000 GN rods  wound culture grew e. coli, ESBL proteus, enterococcus   wound care debrided   ID consulted, plan for 7-10 day abx course

## 2024-03-03 NOTE — PROGRESS NOTE ADULT - PROBLEM SELECTOR PLAN 2
CT pelvis with L sacral/medial gluteal decubitis ulcer with lysis of coccyx with c/f osteomyelitis  Also with unstageable sacral ulcer, not receiving adequate wound care, and is in pain  Meeting sepsis criteria with tachycardia, tacypnea, and leukocytosis     - procal, crp, esr elevated     Plan:  c/w zosyn  Blood cx neg  Ucx shows 50,000-100,000 GN rods  f/u wound culture  wound care debrided   ID consulted, plan for 7-10 day abx course CT pelvis with L sacral/medial gluteal decubitis ulcer with lysis of coccyx with c/f osteomyelitis  Also with unstageable sacral ulcer, not receiving adequate wound care, and is in pain  Meeting sepsis criteria with tachycardia, tacypnea, and leukocytosis     - procal, crp, esr elevated     Plan:  dc zosyn  c/w Levaquin based on sensitivities  Blood cx neg  Ucx shows 50,000-100,000 GN rods  wound culture grew e. coli, ESBL proteus, enterococcus   wound care debrided   ID consulted, plan for 7-10 day abx course  f/u wound culture  wound care debrided   ID consulted, plan for 7-10 day abx course

## 2024-03-03 NOTE — PROGRESS NOTE ADULT - PROBLEM SELECTOR PLAN 3
CT pelvis with L sacral/medial gluteal decubitis ulcer with lysis of coccyx with c/f osteomyelitis  Also with unstageable sacral ulcer, not receiving adequate wound care, and is in pain  Meeting sepsis criteria with tachycardia, tacypnea, and leukocytosis     - procal, crp, esr elevated     Plan:  c/w zosyn  Blood cx neg  wound culture w/ proteus, enterococcus, e.coli. Pending sensitivities   wound care debrided   ID consulted, plan for 7-10 day abx course CT pelvis with L sacral/medial gluteal decubitis ulcer with lysis of coccyx with c/f osteomyelitis  Also with unstageable sacral ulcer, not receiving adequate wound care, and is in pain  Meeting sepsis criteria with tachycardia, tacypnea, and leukocytosis     - procal, crp, esr elevated     Plan:  dc zosyn  c/w Levaquin based on sensitivities  Blood cx neg  Ucx shows 50,000-100,000 GN rods  wound culture grew e. coli, ESBL proteus, enterococcus   wound care debrided   ID consulted, plan for 7-10 day abx course

## 2024-03-03 NOTE — PROGRESS NOTE ADULT - SUBJECTIVE AND OBJECTIVE BOX
[facility] Progress Note    MAR, chart notes, fingersticks and labs reviewed    Objective  Vital Signs  T(C): 36.7 (03-03-24 @ 13:00), Max: 36.8 (03-03-24 @ 04:41)  HR: 96 (03-03-24 @ 13:00) (85 - 99)  BP: 125/74 (03-03-24 @ 13:00) (125/74 - 167/86)  RR: 18 (03-03-24 @ 13:00) (18 - 18)  SpO2: 99% (03-03-24 @ 13:00) (98% - 99%)    Physical Exam  Gen: NAD, resting comfortably  HEENT: NC/AT  Neck: supple  Chest: breathing comfortably  Heart: +s1 +s2    Medications  acetaminophen     Tablet .. 650 milliGRAM(s) Oral every 6 hours PRN  apixaban 2.5 milliGRAM(s) Oral two times a day  ascorbic acid 500 milliGRAM(s) Oral daily  atorvastatin 40 milliGRAM(s) Oral at bedtime  chlorhexidine 4% Liquid 1 Application(s) Topical daily  Dakins Solution - 1/4 Strength 1 Application(s) Topical every 8 hours  dextrose 5%. 1000 milliLiter(s) IV Continuous <Continuous>  dextrose 5%. 1000 milliLiter(s) IV Continuous <Continuous>  dextrose 50% Injectable 12.5 Gram(s) IV Push once  dextrose 50% Injectable 25 Gram(s) IV Push once  dextrose 50% Injectable 25 Gram(s) IV Push once  dextrose Oral Gel 15 Gram(s) Oral once PRN  glucagon  Injectable 1 milliGRAM(s) IntraMuscular once  hydroxyurea 500 milliGRAM(s) Oral <User Schedule>  insulin lispro (ADMELOG) corrective regimen sliding scale   SubCutaneous three times a day before meals  insulin lispro (ADMELOG) corrective regimen sliding scale   SubCutaneous at bedtime  insulin lispro Injectable (ADMELOG) 5 Unit(s) SubCutaneous before dinner  insulin lispro Injectable (ADMELOG) 6 Unit(s) SubCutaneous before lunch  insulin lispro Injectable (ADMELOG) 7 Unit(s) SubCutaneous before breakfast  levoFLOXacin IVPB 750 milliGRAM(s) IV Intermittent every 48 hours  levothyroxine 88 MICROGram(s) Oral daily  melatonin 3 milliGRAM(s) Oral at bedtime PRN  metoprolol tartrate 25 milliGRAM(s) Oral two times a day  multivitamin 1 Tablet(s) Oral daily  polyethylene glycol 3350 17 Gram(s) Oral daily  QUEtiapine 25 milliGRAM(s) Oral daily PRN  senna 2 Tablet(s) Oral at bedtime    Pertinent labs/Imaging  POCT Blood Glucose.: 331 mg/dL (03-03-24 @ 12:28)  POCT Blood Glucose.: 212 mg/dL (03-03-24 @ 09:06)  POCT Blood Glucose.: 242 mg/dL (03-02-24 @ 22:29)  POCT Blood Glucose.: 131 mg/dL (03-02-24 @ 18:00)

## 2024-03-03 NOTE — PROGRESS NOTE ADULT - SUBJECTIVE AND OBJECTIVE BOX
Alexus Coronel MD   Internal Medicine, PGY 1  Contact via TEAMS.     SUBJECTIVE / OVERNIGHT EVENTS:  - Pt seen and examined at bedside  - TANYA    MEDICATIONS  (STANDING):  apixaban 2.5 milliGRAM(s) Oral two times a day  ascorbic acid 500 milliGRAM(s) Oral daily  atorvastatin 40 milliGRAM(s) Oral at bedtime  chlorhexidine 4% Liquid 1 Application(s) Topical daily  Dakins Solution - 1/4 Strength 1 Application(s) Topical every 8 hours  dextrose 5%. 1000 milliLiter(s) (50 mL/Hr) IV Continuous <Continuous>  dextrose 5%. 1000 milliLiter(s) (100 mL/Hr) IV Continuous <Continuous>  dextrose 50% Injectable 25 Gram(s) IV Push once  dextrose 50% Injectable 12.5 Gram(s) IV Push once  dextrose 50% Injectable 25 Gram(s) IV Push once  glucagon  Injectable 1 milliGRAM(s) IntraMuscular once  hydroxyurea 500 milliGRAM(s) Oral <User Schedule>  insulin lispro (ADMELOG) corrective regimen sliding scale   SubCutaneous three times a day before meals  insulin lispro (ADMELOG) corrective regimen sliding scale   SubCutaneous at bedtime  insulin lispro Injectable (ADMELOG) 6 Unit(s) SubCutaneous before lunch  insulin lispro Injectable (ADMELOG) 7 Unit(s) SubCutaneous before breakfast  insulin lispro Injectable (ADMELOG) 5 Unit(s) SubCutaneous before dinner  levoFLOXacin IVPB 750 milliGRAM(s) IV Intermittent every 48 hours  levothyroxine 88 MICROGram(s) Oral daily  metoprolol tartrate 25 milliGRAM(s) Oral two times a day  multivitamin 1 Tablet(s) Oral daily  polyethylene glycol 3350 17 Gram(s) Oral daily  senna 2 Tablet(s) Oral at bedtime    MEDICATIONS  (PRN):  acetaminophen     Tablet .. 650 milliGRAM(s) Oral every 6 hours PRN Temp greater or equal to 38C (100.4F), Mild Pain (1 - 3)  dextrose Oral Gel 15 Gram(s) Oral once PRN Blood Glucose LESS THAN 70 milliGRAM(s)/deciliter  melatonin 3 milliGRAM(s) Oral at bedtime PRN Insomnia  QUEtiapine 25 milliGRAM(s) Oral daily PRN for insomnia        03-02-24 @ 07:01  -  03-03-24 @ 07:00  --------------------------------------------------------  IN: 150 mL / OUT: 550 mL / NET: -400 mL        PHYSICAL EXAM:  Vital Signs Last 24 Hrs  T(C): 36.8 (03 Mar 2024 04:41), Max: 36.8 (03 Mar 2024 04:41)  T(F): 98.2 (03 Mar 2024 04:41), Max: 98.2 (03 Mar 2024 04:41)  HR: 99 (03 Mar 2024 04:41) (85 - 99)  BP: 167/86 (03 Mar 2024 04:41) (154/87 - 167/86)  BP(mean): --  RR: 18 (03 Mar 2024 04:41) (18 - 18)  SpO2: 98% (03 Mar 2024 04:41) (98% - 99%)    Parameters below as of 03 Mar 2024 04:41  Patient On (Oxygen Delivery Method): room air        CAPILLARY BLOOD GLUCOSE      POCT Blood Glucose.: 242 mg/dL (02 Mar 2024 22:29)  POCT Blood Glucose.: 131 mg/dL (02 Mar 2024 18:00)  POCT Blood Glucose.: 204 mg/dL (02 Mar 2024 12:52)  POCT Blood Glucose.: 103 mg/dL (02 Mar 2024 09:04)    I&O's Summary    02 Mar 2024 07:01  -  03 Mar 2024 07:00  --------------------------------------------------------  IN: 150 mL / OUT: 550 mL / NET: -400 mL        CONSTITUTIONAL: NAD  HEENT: NC/AT  RESPIRATORY: Normal respiratory effort; lungs are clear to auscultation bilaterally  CARDIOVASCULAR: Regular rate and rhythm, normal S1 and S2, no murmur/rub/gallop; No lower extremity edema; Peripheral pulses are 2+ bilaterally  ABDOMEN: Nontender to palpation, normoactive bowel sounds, no rebound/guarding; No hepatosplenomegaly  MUSCLOSKELETAL: no clubbing or cyanosis of digits; no joint swelling or tenderness to palpation  EXTREMITIES: no peripheral edema, distal pulses intact   NEURO: no focal deficits   PSYCH: A+O to person, place, and time; affect appropriate    LABS:                        9.7    20.44 )-----------( 939      ( 01 Mar 2024 11:05 )             32.4     03-01    136  |  103  |  33<H>  ----------------------------<  218<H>  4.4   |  21<L>  |  0.97    Ca    9.5      01 Mar 2024 11:01  Phos  2.6     03-01  Mg     2.2     03-01            Urinalysis Basic - ( 01 Mar 2024 11:01 )    Color: x / Appearance: x / SG: x / pH: x  Gluc: 218 mg/dL / Ketone: x  / Bili: x / Urobili: x   Blood: x / Protein: x / Nitrite: x   Leuk Esterase: x / RBC: x / WBC x   Sq Epi: x / Non Sq Epi: x / Bacteria: x        Culture - Other (collected 29 Feb 2024 14:29)  Source: Wound Wound  Final Report (02 Mar 2024 21:10):    Numerous Proteus mirabilis ESBL    Numerous Escherichia coli    Numerous Enterococcus faecalis  Organism: Proteus mirabilis ESBL  Escherichia coli  Enterococcus faecalis (02 Mar 2024 21:10)  Organism: Enterococcus faecalis (02 Mar 2024 21:10)  Organism: Escherichia coli (02 Mar 2024 21:10)  Organism: Proteus mirabilis ESBL (02 Mar 2024 21:10)        IMAGING:    [X] All pertinent imaging reviewed by me Alexus Coronel MD   Internal Medicine, PGY 1  Contact via TEAMS.     SUBJECTIVE / OVERNIGHT EVENTS:  - Pt seen and examined at bedside  - TANYA. Pt feels well today. No acute complaints.     MEDICATIONS  (STANDING):  apixaban 2.5 milliGRAM(s) Oral two times a day  ascorbic acid 500 milliGRAM(s) Oral daily  atorvastatin 40 milliGRAM(s) Oral at bedtime  chlorhexidine 4% Liquid 1 Application(s) Topical daily  Dakins Solution - 1/4 Strength 1 Application(s) Topical every 8 hours  dextrose 5%. 1000 milliLiter(s) (50 mL/Hr) IV Continuous <Continuous>  dextrose 5%. 1000 milliLiter(s) (100 mL/Hr) IV Continuous <Continuous>  dextrose 50% Injectable 25 Gram(s) IV Push once  dextrose 50% Injectable 12.5 Gram(s) IV Push once  dextrose 50% Injectable 25 Gram(s) IV Push once  glucagon  Injectable 1 milliGRAM(s) IntraMuscular once  hydroxyurea 500 milliGRAM(s) Oral <User Schedule>  insulin lispro (ADMELOG) corrective regimen sliding scale   SubCutaneous three times a day before meals  insulin lispro (ADMELOG) corrective regimen sliding scale   SubCutaneous at bedtime  insulin lispro Injectable (ADMELOG) 6 Unit(s) SubCutaneous before lunch  insulin lispro Injectable (ADMELOG) 7 Unit(s) SubCutaneous before breakfast  insulin lispro Injectable (ADMELOG) 5 Unit(s) SubCutaneous before dinner  levoFLOXacin IVPB 750 milliGRAM(s) IV Intermittent every 48 hours  levothyroxine 88 MICROGram(s) Oral daily  metoprolol tartrate 25 milliGRAM(s) Oral two times a day  multivitamin 1 Tablet(s) Oral daily  polyethylene glycol 3350 17 Gram(s) Oral daily  senna 2 Tablet(s) Oral at bedtime    MEDICATIONS  (PRN):  acetaminophen     Tablet .. 650 milliGRAM(s) Oral every 6 hours PRN Temp greater or equal to 38C (100.4F), Mild Pain (1 - 3)  dextrose Oral Gel 15 Gram(s) Oral once PRN Blood Glucose LESS THAN 70 milliGRAM(s)/deciliter  melatonin 3 milliGRAM(s) Oral at bedtime PRN Insomnia  QUEtiapine 25 milliGRAM(s) Oral daily PRN for insomnia        03-02-24 @ 07:01  -  03-03-24 @ 07:00  --------------------------------------------------------  IN: 150 mL / OUT: 550 mL / NET: -400 mL        PHYSICAL EXAM:  Vital Signs Last 24 Hrs  T(C): 36.8 (03 Mar 2024 04:41), Max: 36.8 (03 Mar 2024 04:41)  T(F): 98.2 (03 Mar 2024 04:41), Max: 98.2 (03 Mar 2024 04:41)  HR: 99 (03 Mar 2024 04:41) (85 - 99)  BP: 167/86 (03 Mar 2024 04:41) (154/87 - 167/86)  BP(mean): --  RR: 18 (03 Mar 2024 04:41) (18 - 18)  SpO2: 98% (03 Mar 2024 04:41) (98% - 99%)    Parameters below as of 03 Mar 2024 04:41  Patient On (Oxygen Delivery Method): room air        CAPILLARY BLOOD GLUCOSE      POCT Blood Glucose.: 242 mg/dL (02 Mar 2024 22:29)  POCT Blood Glucose.: 131 mg/dL (02 Mar 2024 18:00)  POCT Blood Glucose.: 204 mg/dL (02 Mar 2024 12:52)  POCT Blood Glucose.: 103 mg/dL (02 Mar 2024 09:04)    I&O's Summary    02 Mar 2024 07:01  -  03 Mar 2024 07:00  --------------------------------------------------------  IN: 150 mL / OUT: 550 mL / NET: -400 mL        GENERAL: NAD, lying in bed comfortably  HEAD:  Atraumatic, Normocephalic  EYES: EOMI, PERRLA, conjunctiva and sclera clear  ENT: Moist mucous membranes  NECK: Supple,   CHEST/LUNG: Clear to auscultation bilaterally; No rales, rhonchi, wheezing, or rubs. Unlabored respirations  HEART: Regular rate and rhythm; No murmurs, rubs, or gallops  ABDOMEN: BSx4; Soft, nontender, nondistended  EXTREMITIES:  2+ Peripheral Pulses, brisk capillary refill. No clubbing, cyanosis, or edema  NERVOUS SYSTEM:  A&Ox1, no new focal deficits  SKIN: unstageable sacral decubitus ulcer   psych: normal behavior, normal affect    LABS:                        9.7    20.44 )-----------( 939      ( 01 Mar 2024 11:05 )             32.4     03-01    136  |  103  |  33<H>  ----------------------------<  218<H>  4.4   |  21<L>  |  0.97    Ca    9.5      01 Mar 2024 11:01  Phos  2.6     03-01  Mg     2.2     03-01            Urinalysis Basic - ( 01 Mar 2024 11:01 )    Color: x / Appearance: x / SG: x / pH: x  Gluc: 218 mg/dL / Ketone: x  / Bili: x / Urobili: x   Blood: x / Protein: x / Nitrite: x   Leuk Esterase: x / RBC: x / WBC x   Sq Epi: x / Non Sq Epi: x / Bacteria: x        Culture - Other (collected 29 Feb 2024 14:29)  Source: Wound Wound  Final Report (02 Mar 2024 21:10):    Numerous Proteus mirabilis ESBL    Numerous Escherichia coli    Numerous Enterococcus faecalis  Organism: Proteus mirabilis ESBL  Escherichia coli  Enterococcus faecalis (02 Mar 2024 21:10)  Organism: Enterococcus faecalis (02 Mar 2024 21:10)  Organism: Escherichia coli (02 Mar 2024 21:10)  Organism: Proteus mirabilis ESBL (02 Mar 2024 21:10)        IMAGING:    [X] All pertinent imaging reviewed by me

## 2024-03-03 NOTE — PROGRESS NOTE ADULT - ATTENDING COMMENTS
SDU - 7 days of antibiotics. s/p wound debridement. wound cx growing e.coli, enterococcus, proteus - sensitivities back. f/u ID for abx recs as proteus resistant to zosyn  Plan for LTC with hospice  Recall endo for discharge recommendations (PO preferred given patient's comfort is of utmost importance).

## 2024-03-04 LAB
GLUCOSE BLDC GLUCOMTR-MCNC: 158 MG/DL — HIGH (ref 70–99)
GLUCOSE BLDC GLUCOMTR-MCNC: 171 MG/DL — HIGH (ref 70–99)
GLUCOSE BLDC GLUCOMTR-MCNC: 174 MG/DL — HIGH (ref 70–99)
GLUCOSE BLDC GLUCOMTR-MCNC: 289 MG/DL — HIGH (ref 70–99)

## 2024-03-04 PROCEDURE — 99232 SBSQ HOSP IP/OBS MODERATE 35: CPT | Mod: GC

## 2024-03-04 RX ORDER — INSULIN LISPRO 100/ML
9 VIAL (ML) SUBCUTANEOUS
Refills: 0 | Status: DISCONTINUED | OUTPATIENT
Start: 2024-03-05 | End: 2024-03-05

## 2024-03-04 RX ADMIN — APIXABAN 2.5 MILLIGRAM(S): 2.5 TABLET, FILM COATED ORAL at 06:22

## 2024-03-04 RX ADMIN — Medication 1: at 17:44

## 2024-03-04 RX ADMIN — Medication 1 APPLICATION(S): at 13:12

## 2024-03-04 RX ADMIN — APIXABAN 2.5 MILLIGRAM(S): 2.5 TABLET, FILM COATED ORAL at 17:06

## 2024-03-04 RX ADMIN — CHLORHEXIDINE GLUCONATE 1 APPLICATION(S): 213 SOLUTION TOPICAL at 11:53

## 2024-03-04 RX ADMIN — Medication 1 APPLICATION(S): at 06:22

## 2024-03-04 RX ADMIN — QUETIAPINE FUMARATE 25 MILLIGRAM(S): 200 TABLET, FILM COATED ORAL at 02:39

## 2024-03-04 RX ADMIN — Medication 500 MILLIGRAM(S): at 11:53

## 2024-03-04 RX ADMIN — POLYETHYLENE GLYCOL 3350 17 GRAM(S): 17 POWDER, FOR SOLUTION ORAL at 11:53

## 2024-03-04 RX ADMIN — Medication 1 APPLICATION(S): at 21:18

## 2024-03-04 RX ADMIN — Medication 25 MILLIGRAM(S): at 06:23

## 2024-03-04 RX ADMIN — Medication 6 UNIT(S): at 13:11

## 2024-03-04 RX ADMIN — Medication 88 MICROGRAM(S): at 05:12

## 2024-03-04 RX ADMIN — ATORVASTATIN CALCIUM 40 MILLIGRAM(S): 80 TABLET, FILM COATED ORAL at 21:19

## 2024-03-04 RX ADMIN — HYDROXYUREA 500 MILLIGRAM(S): 500 CAPSULE ORAL at 08:01

## 2024-03-04 RX ADMIN — Medication 3 MILLIGRAM(S): at 01:28

## 2024-03-04 RX ADMIN — Medication 5 UNIT(S): at 17:49

## 2024-03-04 RX ADMIN — Medication 7 UNIT(S): at 09:00

## 2024-03-04 RX ADMIN — Medication 1 TABLET(S): at 11:54

## 2024-03-04 RX ADMIN — Medication 1: at 09:00

## 2024-03-04 RX ADMIN — Medication 3: at 12:30

## 2024-03-04 RX ADMIN — Medication 25 MILLIGRAM(S): at 17:06

## 2024-03-04 NOTE — PROGRESS NOTE ADULT - ASSESSMENT
Patient is a 89 year old female DNR/DNI with PMH of DVT, hypothyroidism, P. 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 Patient with significant unstageable sacral decubitus ulcer. Per daughter, she hasn't been getting official wound care (dressings taken care of by family) due to insurance issues getting wound care nurse approved. Daughter also reports that her mother's mental status at baseline is that she can say her name but doesn't always know where she is.    Sepsis, unstageable sacral ulcer with OM of coccyx   Poorly controlled DM  - tachycardia, tachypnea and leukocytosis on admission, low grade temp 100.1F x1 2/28pm    -- afebrile >48h, WBC trend noted, CRP 86  - Flu/COVID/RSV negative   - CXR with no focal consolidations  - UA negative, Ucx with Klebsiella   - CT pelvis with left sacral/medial gluteal decubitus ulcer with OM of coccyx, no drainable collection   - MRSA PCR screen negative, off vancomycin   - BCx negative  - 2/29 s/p selective excisional debridement of sacral wound by Wound care team   - Wcx with E.coli, ESBL Proteus, E. faecalis-sensitivities reviewed   - s/p zosyn 2/28-3/3    Recommendations:  Treatment of sacral pressure ulcer and confirmed OM with antibiotics alone without debridement and wound coverage ineffective, also given location wound is at risk of contamination, further skin/ wound breakdown due to pressure and prolonged course of antibiotics unlikely to resolve this and will increase risk of C DIff, development of resistant organisms making treatment of infection more difficult in the future in addition to antibiotic adverse effects, therefore, recommended short course levofloxacin 750mg Q48h (renally dosed) to complete total 10d course on 3/12/24 with continued local wound care, nutrition, offloading as able  Stable from ID standpoint at this time.      Irasema Coy M.D.  [facility], Division of Infectious Diseases  [phone]  After 5pm on weekdays and all day on weekends - please call [phone]

## 2024-03-04 NOTE — PROGRESS NOTE ADULT - PROBLEM SELECTOR PLAN 2
CT pelvis with L sacral/medial gluteal decubitis ulcer with lysis of coccyx with c/f osteomyelitis  Also with unstageable sacral ulcer, not receiving adequate wound care, and is in pain  Meeting sepsis criteria with tachycardia, tacypnea, and leukocytosis     - procal, crp, esr elevated     Plan:  dc zosyn  c/w Levaquin based on sensitivities  Blood cx neg  Ucx shows 50,000-100,000 GN rods  wound culture grew e. coli, ESBL proteus, enterococcus   wound care debrided   ID consulted, plan for 7-10 day abx course  f/u wound culture  wound care debrided   ID consulted, plan for 7-10 day abx course CT pelvis with L sacral/medial gluteal decubitis ulcer with lysis of coccyx with c/f osteomyelitis  Also with unstageable sacral ulcer, not receiving adequate wound care, and is in pain  Meeting sepsis criteria with tachycardia, tacypnea, and leukocytosis     - procal, crp, esr elevated     Plan:  dc zosyn  c/w Levaquin based on sensitivities (3/3-  ID consulted, plan for 7-10 day abx course  Blood cx neg  Ucx shows 50,000-100,000 GN rods  wound culture grew e. coli, ESBL proteus, enterococcus   wound care debrided   ID consulted, plan for 7-10 day abx course  f/u wound culture  wound care debrided

## 2024-03-04 NOTE — PROGRESS NOTE ADULT - SUBJECTIVE AND OBJECTIVE BOX
Alexus Coronel MD   Internal Medicine, PGY 1  Contact via TEAMS.     SUBJECTIVE / OVERNIGHT EVENTS:  - Pt seen and examined at bedside  - TANYA    MEDICATIONS  (STANDING):  apixaban 2.5 milliGRAM(s) Oral two times a day  ascorbic acid 500 milliGRAM(s) Oral daily  atorvastatin 40 milliGRAM(s) Oral at bedtime  chlorhexidine 4% Liquid 1 Application(s) Topical daily  Dakins Solution - 1/4 Strength 1 Application(s) Topical every 8 hours  dextrose 5%. 1000 milliLiter(s) (50 mL/Hr) IV Continuous <Continuous>  dextrose 5%. 1000 milliLiter(s) (100 mL/Hr) IV Continuous <Continuous>  dextrose 50% Injectable 12.5 Gram(s) IV Push once  dextrose 50% Injectable 25 Gram(s) IV Push once  dextrose 50% Injectable 25 Gram(s) IV Push once  glucagon  Injectable 1 milliGRAM(s) IntraMuscular once  hydroxyurea 500 milliGRAM(s) Oral <User Schedule>  insulin lispro (ADMELOG) corrective regimen sliding scale   SubCutaneous at bedtime  insulin lispro (ADMELOG) corrective regimen sliding scale   SubCutaneous three times a day before meals  insulin lispro Injectable (ADMELOG) 6 Unit(s) SubCutaneous before lunch  insulin lispro Injectable (ADMELOG) 5 Unit(s) SubCutaneous before dinner  insulin lispro Injectable (ADMELOG) 7 Unit(s) SubCutaneous before breakfast  levoFLOXacin IVPB 750 milliGRAM(s) IV Intermittent every 48 hours  levothyroxine 88 MICROGram(s) Oral daily  metoprolol tartrate 25 milliGRAM(s) Oral two times a day  multivitamin 1 Tablet(s) Oral daily  polyethylene glycol 3350 17 Gram(s) Oral daily  senna 2 Tablet(s) Oral at bedtime    MEDICATIONS  (PRN):  acetaminophen     Tablet .. 650 milliGRAM(s) Oral every 6 hours PRN Temp greater or equal to 38C (100.4F), Mild Pain (1 - 3)  dextrose Oral Gel 15 Gram(s) Oral once PRN Blood Glucose LESS THAN 70 milliGRAM(s)/deciliter  melatonin 3 milliGRAM(s) Oral at bedtime PRN Insomnia  QUEtiapine 25 milliGRAM(s) Oral daily PRN for insomnia        03-03-24 @ 07:01  -  03-04-24 @ 07:00  --------------------------------------------------------  IN: 0 mL / OUT: 1000 mL / NET: -1000 mL        PHYSICAL EXAM:  Vital Signs Last 24 Hrs  T(C): 36.8 (04 Mar 2024 05:09), Max: 36.8 (04 Mar 2024 05:09)  T(F): 98.3 (04 Mar 2024 05:09), Max: 98.3 (04 Mar 2024 05:09)  HR: 116 (04 Mar 2024 05:09) (96 - 116)  BP: 157/91 (04 Mar 2024 06:20) (125/74 - 157/91)  BP(mean): --  RR: 18 (04 Mar 2024 05:09) (18 - 18)  SpO2: 99% (04 Mar 2024 05:09) (96% - 99%)    Parameters below as of 04 Mar 2024 05:09  Patient On (Oxygen Delivery Method): room air        CAPILLARY BLOOD GLUCOSE      POCT Blood Glucose.: 172 mg/dL (03 Mar 2024 21:45)  POCT Blood Glucose.: 217 mg/dL (03 Mar 2024 17:19)  POCT Blood Glucose.: 331 mg/dL (03 Mar 2024 12:28)  POCT Blood Glucose.: 212 mg/dL (03 Mar 2024 09:06)    I&O's Summary    03 Mar 2024 07:01  -  04 Mar 2024 07:00  --------------------------------------------------------  IN: 0 mL / OUT: 1000 mL / NET: -1000 mL        CONSTITUTIONAL: NAD  HEENT: NC/AT  RESPIRATORY: Normal respiratory effort; lungs are clear to auscultation bilaterally  CARDIOVASCULAR: Regular rate and rhythm, normal S1 and S2, no murmur/rub/gallop; No lower extremity edema; Peripheral pulses are 2+ bilaterally  ABDOMEN: Nontender to palpation, normoactive bowel sounds, no rebound/guarding; No hepatosplenomegaly  MUSCLOSKELETAL: no clubbing or cyanosis of digits; no joint swelling or tenderness to palpation  EXTREMITIES: no peripheral edema, distal pulses intact   NEURO: no focal deficits   PSYCH: A+O to person, place, and time; affect appropriate    LABS:                      IMAGING:    [X] All pertinent imaging reviewed by me Alexus Coronel MD   Internal Medicine, PGY 1  Contact via TEAMS.     SUBJECTIVE / OVERNIGHT EVENTS:  - Pt seen and examined at bedside  - TANYA. Pt had trouble sleeping. Pt eating well and passing stool. No acute complaints.     MEDICATIONS  (STANDING):  apixaban 2.5 milliGRAM(s) Oral two times a day  ascorbic acid 500 milliGRAM(s) Oral daily  atorvastatin 40 milliGRAM(s) Oral at bedtime  chlorhexidine 4% Liquid 1 Application(s) Topical daily  Dakins Solution - 1/4 Strength 1 Application(s) Topical every 8 hours  dextrose 5%. 1000 milliLiter(s) (50 mL/Hr) IV Continuous <Continuous>  dextrose 5%. 1000 milliLiter(s) (100 mL/Hr) IV Continuous <Continuous>  dextrose 50% Injectable 12.5 Gram(s) IV Push once  dextrose 50% Injectable 25 Gram(s) IV Push once  dextrose 50% Injectable 25 Gram(s) IV Push once  glucagon  Injectable 1 milliGRAM(s) IntraMuscular once  hydroxyurea 500 milliGRAM(s) Oral <User Schedule>  insulin lispro (ADMELOG) corrective regimen sliding scale   SubCutaneous at bedtime  insulin lispro (ADMELOG) corrective regimen sliding scale   SubCutaneous three times a day before meals  insulin lispro Injectable (ADMELOG) 6 Unit(s) SubCutaneous before lunch  insulin lispro Injectable (ADMELOG) 5 Unit(s) SubCutaneous before dinner  insulin lispro Injectable (ADMELOG) 7 Unit(s) SubCutaneous before breakfast  levoFLOXacin IVPB 750 milliGRAM(s) IV Intermittent every 48 hours  levothyroxine 88 MICROGram(s) Oral daily  metoprolol tartrate 25 milliGRAM(s) Oral two times a day  multivitamin 1 Tablet(s) Oral daily  polyethylene glycol 3350 17 Gram(s) Oral daily  senna 2 Tablet(s) Oral at bedtime    MEDICATIONS  (PRN):  acetaminophen     Tablet .. 650 milliGRAM(s) Oral every 6 hours PRN Temp greater or equal to 38C (100.4F), Mild Pain (1 - 3)  dextrose Oral Gel 15 Gram(s) Oral once PRN Blood Glucose LESS THAN 70 milliGRAM(s)/deciliter  melatonin 3 milliGRAM(s) Oral at bedtime PRN Insomnia  QUEtiapine 25 milliGRAM(s) Oral daily PRN for insomnia        03-03-24 @ 07:01  -  03-04-24 @ 07:00  --------------------------------------------------------  IN: 0 mL / OUT: 1000 mL / NET: -1000 mL        PHYSICAL EXAM:  Vital Signs Last 24 Hrs  T(C): 36.8 (04 Mar 2024 05:09), Max: 36.8 (04 Mar 2024 05:09)  T(F): 98.3 (04 Mar 2024 05:09), Max: 98.3 (04 Mar 2024 05:09)  HR: 116 (04 Mar 2024 05:09) (96 - 116)  BP: 157/91 (04 Mar 2024 06:20) (125/74 - 157/91)  BP(mean): --  RR: 18 (04 Mar 2024 05:09) (18 - 18)  SpO2: 99% (04 Mar 2024 05:09) (96% - 99%)    Parameters below as of 04 Mar 2024 05:09  Patient On (Oxygen Delivery Method): room air        CAPILLARY BLOOD GLUCOSE      POCT Blood Glucose.: 172 mg/dL (03 Mar 2024 21:45)  POCT Blood Glucose.: 217 mg/dL (03 Mar 2024 17:19)  POCT Blood Glucose.: 331 mg/dL (03 Mar 2024 12:28)  POCT Blood Glucose.: 212 mg/dL (03 Mar 2024 09:06)    I&O's Summary    03 Mar 2024 07:01  -  04 Mar 2024 07:00  --------------------------------------------------------  IN: 0 mL / OUT: 1000 mL / NET: -1000 mL        GENERAL: NAD, lying in bed comfortably  HEAD:  Atraumatic, Normocephalic  EYES: EOMI, PERRLA, conjunctiva and sclera clear  ENT: Moist mucous membranes  NECK: Supple  CHEST/LUNG: Clear to auscultation bilaterally; No rales, rhonchi, wheezing, or rubs. Unlabored respirations  HEART: Regular rate and rhythm; No murmurs, rubs, or gallops  ABDOMEN: BSx4; Soft, nontender, nondistended  EXTREMITIES:  2+ Peripheral Pulses, brisk capillary refill. No clubbing, cyanosis, or edema  NERVOUS SYSTEM:  A&Ox1, no new focal deficits  SKIN: unstageable sacral decubitus ulcer   psych: normal behavior, normal affect    LABS:                      IMAGING:    [X] All pertinent imaging reviewed by me

## 2024-03-04 NOTE — PROGRESS NOTE ADULT - SUBJECTIVE AND OBJECTIVE BOX
[facility]  SANCHEZ Faria Y. Patel, S. Shah, G. Casimir  [phone]  ([phone] - weekdays after 5pm and weekends)    Name: BISHOP BURNS  Age/Gender: 89y Female  MRN: [id number]    Interval History:  Patient seen and examined this morning.   Resting comfortably, denies pain.   Notes reviewed  No concerning overnight events  Afebrile     Allergies: No Known Drug Allergies  black pepper, white pepper, cumin, paprika, johnston powder, chili powder (Rash)      Objective:  Vitals:   T(F): 98.7 (03-04-24 @ 12:00), Max: 98.7 (03-04-24 @ 12:00)  HR: 114 (03-04-24 @ 13:43) (99 - 116)  BP: 139/102 (03-04-24 @ 13:43) (127/65 - 157/91)  RR: 18 (03-04-24 @ 12:00) (18 - 18)  SpO2: 100% (03-04-24 @ 12:00) (96% - 100%)  Physical Examination:  General: no acute distress, very [place], nontoxic appearing   HEENT: NC/AT, anicteric, neck supple  Respiratory: no acc muscle use, breathing comfortably  Cardiovascular: S1 and S2 present  Gastrointestinal: normal appearing, nondistended  Extremities: no edema, no cyanosis  Skin: no visible rash    Laboratory Studies:  CBC:   WBC Trend:  20.44 03-01-24 @ 11:05  16.86 02-29-24 @ 13:07  17.92 02-28-24 @ 20:17    CMP:   Creatinine: 0.97 mg/dL (03-01-24 @ 11:01)  Creatinine: 0.69 mg/dL (02-29-24 @ 13:07)  Creatinine: 0.66 mg/dL (02-28-24 @ 20:17)    Microbiology: reviewed   Culture - Other (collected 02-29-24 @ 14:29)  Source: Wound Wound  Final Report (03-02-24 @ 21:10):    Numerous Proteus mirabilis ESBL    Numerous Escherichia coli    Numerous Enterococcus faecalis  Organism: Proteus mirabilis ESBL  Escherichia coli  Enterococcus faecalis (03-02-24 @ 21:10)  Organism: Enterococcus faecalis (03-02-24 @ 21:10)      Method Type: JULIETTE      -  Ampicillin: S <=2 Predicts results to ampicillin/sulbactam, amoxacillin-clavulanate and  piperacillin-tazobactam.      -  Vancomycin: S 2  Organism: Escherichia coli (03-02-24 @ 21:10)      Method Type: JULIETTE      -  Amoxicillin/Clavulanic Acid: R >16/8      -  Ampicillin: R >16 These ampicillin results predict results for amoxicillin      -  Ampicillin/Sulbactam: I 16/8      -  Aztreonam: R >16      -  Cefazolin: R >16      -  Cefepime: S <=2      -  Cefoxitin: R >16      -  Ceftriaxone: R 32      -  Ciprofloxacin: S <=0.25      -  Ertapenem: S <=0.5      -  Gentamicin: S <=2      -  Imipenem: S <=1      -  Levofloxacin: S <=0.5      -  Meropenem: S <=1      -  Piperacillin/Tazobactam: S <=8      -  Tobramycin: S <=2      -  Trimethoprim/Sulfamethoxazole: S <=0.5/9.5  Organism: Proteus mirabilis ESBL (03-02-24 @ 21:10)      Method Type: JULIETTE      -  Amoxicillin/Clavulanic Acid: S <=8/4      -  Ampicillin: R >16 These ampicillin results predict results for amoxicillin      -  Ampicillin/Sulbactam: R <=4/2      -  Aztreonam: R 16      -  Cefazolin: R >16      -  Cefepime: R >16      -  Ceftriaxone: R >32      -  Ciprofloxacin: S <=0.25      -  Ertapenem: S <=0.5      -  Gentamicin: S <=2      -  Levofloxacin: S <=0.5      -  Meropenem: S <=1      -  Piperacillin/Tazobactam: R <=8      -  Tobramycin: S <=2      -  Trimethoprim/Sulfamethoxazole: S <=0.5/9.5    Culture - Blood (collected 02-28-24 @ 19:50)  Source: .Blood Blood-Peripheral  Preliminary Report (03-04-24 @ 03:01):    No growth at 4 days    Culture - Blood (collected 02-28-24 @ 19:40)  Source: .Blood Blood-Peripheral  Preliminary Report (03-04-24 @ 03:01):    No growth at 4 days    Culture - Urine (collected 02-28-24 @ 18:37)  Source: Clean Catch Clean Catch (Midstream)  Final Report (03-02-24 @ 07:12):    50,000 - 99,000 CFU/mL Klebsiella pneumoniae  Organism: Klebsiella pneumoniae (03-02-24 @ 07:12)  Organism: Klebsiella pneumoniae (03-02-24 @ 07:12)      Method Type: JULIETTE      -  Amoxicillin/Clavulanic Acid: S <=8/4      -  Ampicillin: R >16 These ampicillin results predict results for amoxicillin      -  Ampicillin/Sulbactam: I 16/8      -  Aztreonam: S <=4      -  Cefazolin: S <=2 For uncomplicated UTI with K. pneumoniae, E. coli, or P. mirablis: JULIETTE <=16 is sensitive and JULIETTE >=32 is resistant. This also predicts results for oral agents cefaclor, cefdinir, cefpodoxime, cefprozil, cefuroxime axetil, cephalexin and locarbef for uncomplicated UTI. Note that some isolates may be susceptible to these agents while testing resistant to cefazolin.      -  Cefepime: S <=2      -  Cefoxitin: S <=8      -  Ceftriaxone: S <=1      -  Cefuroxime: S <=4      -  Ciprofloxacin: S <=0.25      -  Ertapenem: S <=0.5      -  Gentamicin: S <=2      -  Imipenem: S <=1      -  Levofloxacin: S <=0.5      -  Meropenem: S <=1      -  Nitrofurantoin: I 64 Should not be used to treat pyelonephritis      -  Piperacillin/Tazobactam: S <=8      -  Tobramycin: S <=2      -  Trimethoprim/Sulfamethoxazole: S <=0.5/9.5        S[facility]2 [facility] ([facility] Feb [facility] 21:33)    Radiology: reviewed     Medications:  acetaminophen     Tablet .. 650 milliGRAM(s) Oral every 6 hours PRN  apixaban 2.5 milliGRAM(s) Oral two times a day  ascorbic acid 500 milliGRAM(s) Oral daily  atorvastatin 40 milliGRAM(s) Oral at bedtime  chlorhexidine 4% Liquid 1 Application(s) Topical daily  Dakins Solution - 1/4 Strength 1 Application(s) Topical every 8 hours  dextrose 5%. 1000 milliLiter(s) IV Continuous <Continuous>  dextrose 5%. 1000 milliLiter(s) IV Continuous <Continuous>  dextrose 50% Injectable 25 Gram(s) IV Push once  dextrose 50% Injectable 12.5 Gram(s) IV Push once  dextrose 50% Injectable 25 Gram(s) IV Push once  dextrose Oral Gel 15 Gram(s) Oral once PRN  glucagon  Injectable 1 milliGRAM(s) IntraMuscular once  hydroxyurea 500 milliGRAM(s) Oral <User Schedule>  insulin lispro (ADMELOG) corrective regimen sliding scale   SubCutaneous three times a day before meals  insulin lispro (ADMELOG) corrective regimen sliding scale   SubCutaneous at bedtime  insulin lispro Injectable (ADMELOG) 5 Unit(s) SubCutaneous before dinner  insulin lispro Injectable (ADMELOG) 6 Unit(s) SubCutaneous before lunch  levoFLOXacin IVPB 750 milliGRAM(s) IV Intermittent every 48 hours  levothyroxine 88 MICROGram(s) Oral daily  melatonin 3 milliGRAM(s) Oral at bedtime PRN  metoprolol tartrate 25 milliGRAM(s) Oral two times a day  multivitamin 1 Tablet(s) Oral daily  polyethylene glycol 3350 17 Gram(s) Oral daily  QUEtiapine 25 milliGRAM(s) Oral daily PRN  senna 2 Tablet(s) Oral at bedtime    Current Antimicrobials:  levoFLOXacin IVPB 750 milliGRAM(s) IV Intermittent every 48 hours    Prior/Completed Antimicrobials:  piperacillin/tazobactam IVPB.  piperacillin/tazobactam IVPB.-  piperacillin/tazobactam IVPB...  vancomycin  IVPB.

## 2024-03-04 NOTE — PROGRESS NOTE ADULT - PROBLEM SELECTOR PLAN 8
hx of DM on metformin, but hasn't been taking since she as told she has normal A1C  Blood glucose elevated on admission       - endocrinology consulted, appreciate recommendations  - lantus 5 u qhs  - ADMELOG 4 u with meals  - ISS  - ctm hx of DM on metformin, but hasn't been taking since she as told she has normal A1C  Blood glucose elevated on admission       - endocrinology consulted, appreciate recommendations  - Increase Admelog to 7 units with breakfast, continue 6 units with lunch and increase to 5 units with dinner  - Continue low Admelog correctional scale before meals and bedtime  - Monitor FS before meals and bedtime  - ctm

## 2024-03-04 NOTE — PROGRESS NOTE ADULT - SUBJECTIVE AND OBJECTIVE BOX
[facility] Progress Note    MAR, chart notes, fingersticks and labs reviewed    Subjective:    Objective  Vital Signs  T(C): 37.1 (03-04-24 @ 12:00), Max: 37.1 (03-04-24 @ 12:00)  HR: 114 (03-04-24 @ 13:43) (99 - 116)  BP: 139/102 (03-04-24 @ 13:43) (127/65 - 157/91)  RR: 18 (03-04-24 @ 12:00) (18 - 18)  SpO2: 100% (03-04-24 @ 12:00) (96% - 100%)    Physical Exam  Gen: NAD, alert, awake  HEENT: NC/AT, EOMI  Neck: supple  Chest: breathing comfortably  Heart: +s1 +s2    Medications  acetaminophen     Tablet .. 650 milliGRAM(s) Oral every 6 hours PRN  apixaban 2.5 milliGRAM(s) Oral two times a day  ascorbic acid 500 milliGRAM(s) Oral daily  atorvastatin 40 milliGRAM(s) Oral at bedtime  chlorhexidine 4% Liquid 1 Application(s) Topical daily  Dakins Solution - 1/4 Strength 1 Application(s) Topical every 8 hours  dextrose 5%. 1000 milliLiter(s) IV Continuous <Continuous>  dextrose 5%. 1000 milliLiter(s) IV Continuous <Continuous>  dextrose 50% Injectable 25 Gram(s) IV Push once  dextrose 50% Injectable 12.5 Gram(s) IV Push once  dextrose 50% Injectable 25 Gram(s) IV Push once  dextrose Oral Gel 15 Gram(s) Oral once PRN  glucagon  Injectable 1 milliGRAM(s) IntraMuscular once  hydroxyurea 500 milliGRAM(s) Oral <User Schedule>  insulin lispro (ADMELOG) corrective regimen sliding scale   SubCutaneous three times a day before meals  insulin lispro (ADMELOG) corrective regimen sliding scale   SubCutaneous at bedtime  insulin lispro Injectable (ADMELOG) 5 Unit(s) SubCutaneous before dinner  insulin lispro Injectable (ADMELOG) 6 Unit(s) SubCutaneous before lunch  levoFLOXacin IVPB 750 milliGRAM(s) IV Intermittent every 48 hours  levothyroxine 88 MICROGram(s) Oral daily  melatonin 3 milliGRAM(s) Oral at bedtime PRN  metoprolol tartrate 25 milliGRAM(s) Oral two times a day  multivitamin 1 Tablet(s) Oral daily  polyethylene glycol 3350 17 Gram(s) Oral daily  QUEtiapine 25 milliGRAM(s) Oral daily PRN  senna 2 Tablet(s) Oral at bedtime    Pertinent labs/Imaging  POCT Blood Glucose.: 289 mg/dL (03-04-24 @ 12:19)  POCT Blood Glucose.: 171 mg/dL (03-04-24 @ 08:59)  POCT Blood Glucose.: 172 mg/dL (03-03-24 @ 21:45)  POCT Blood Glucose.: 217 mg/dL (03-03-24 @ 17:19)         [facility] Progress Note    MAR, chart notes, fingersticks and labs reviewed    Subjective: spoke with RN, eats well but has to be fed    Objective  Vital Signs  T(C): 37.1 (03-04-24 @ 12:00), Max: 37.1 (03-04-24 @ 12:00)  HR: 114 (03-04-24 @ 13:43) (99 - 116)  BP: 139/102 (03-04-24 @ 13:43) (127/65 - 157/91)  RR: 18 (03-04-24 @ 12:00) (18 - 18)  SpO2: 100% (03-04-24 @ 12:00) (96% - 100%)    Physical Exam  Gen: NAD, alert, awake, incomprehensible speech  HEENT: NC/AT, EOMI  Neck: supple  Chest: breathing comfortably  Heart: +s1 +s2    Medications  acetaminophen     Tablet .. 650 milliGRAM(s) Oral every 6 hours PRN  apixaban 2.5 milliGRAM(s) Oral two times a day  ascorbic acid 500 milliGRAM(s) Oral daily  atorvastatin 40 milliGRAM(s) Oral at bedtime  chlorhexidine 4% Liquid 1 Application(s) Topical daily  Dakins Solution - 1/4 Strength 1 Application(s) Topical every 8 hours  dextrose 5%. 1000 milliLiter(s) IV Continuous <Continuous>  dextrose 5%. 1000 milliLiter(s) IV Continuous <Continuous>  dextrose 50% Injectable 25 Gram(s) IV Push once  dextrose 50% Injectable 12.5 Gram(s) IV Push once  dextrose 50% Injectable 25 Gram(s) IV Push once  dextrose Oral Gel 15 Gram(s) Oral once PRN  glucagon  Injectable 1 milliGRAM(s) IntraMuscular once  hydroxyurea 500 milliGRAM(s) Oral <User Schedule>  insulin lispro (ADMELOG) corrective regimen sliding scale   SubCutaneous three times a day before meals  insulin lispro (ADMELOG) corrective regimen sliding scale   SubCutaneous at bedtime  insulin lispro Injectable (ADMELOG) 5 Unit(s) SubCutaneous before dinner  insulin lispro Injectable (ADMELOG) 6 Unit(s) SubCutaneous before lunch  levoFLOXacin IVPB 750 milliGRAM(s) IV Intermittent every 48 hours  levothyroxine 88 MICROGram(s) Oral daily  melatonin 3 milliGRAM(s) Oral at bedtime PRN  metoprolol tartrate 25 milliGRAM(s) Oral two times a day  multivitamin 1 Tablet(s) Oral daily  polyethylene glycol 3350 17 Gram(s) Oral daily  QUEtiapine 25 milliGRAM(s) Oral daily PRN  senna 2 Tablet(s) Oral at bedtime    Pertinent labs/Imaging  POCT Blood Glucose.: 289 mg/dL (03-04-24 @ 12:19)  POCT Blood Glucose.: 171 mg/dL (03-04-24 @ 08:59)  POCT Blood Glucose.: 172 mg/dL (03-03-24 @ 21:45)  POCT Blood Glucose.: 217 mg/dL (03-03-24 @ 17:19)

## 2024-03-04 NOTE — PROGRESS NOTE ADULT - PROBLEM SELECTOR PLAN 3
CT pelvis with L sacral/medial gluteal decubitis ulcer with lysis of coccyx with c/f osteomyelitis  Also with unstageable sacral ulcer, not receiving adequate wound care, and is in pain  Meeting sepsis criteria with tachycardia, tacypnea, and leukocytosis     - procal, crp, esr elevated     Plan:  dc zosyn  c/w Levaquin based on sensitivities  Blood cx neg  Ucx shows 50,000-100,000 GN rods  wound culture grew e. coli, ESBL proteus, enterococcus   wound care debrided   ID consulted, plan for 7-10 day abx course CT pelvis with L sacral/medial gluteal decubitis ulcer with lysis of coccyx with c/f osteomyelitis  Also with unstageable sacral ulcer, not receiving adequate wound care, and is in pain  Meeting sepsis criteria with tachycardia, tacypnea, and leukocytosis     - procal, crp, esr elevated     Plan:  dc zosyn  c/w Levaquin based on sensitivities (3/3-  ID consulted, plan for 7-10 day abx course  Blood cx neg  Ucx shows 50,000-100,000 GN rods  wound culture grew e. coli, ESBL proteus, enterococcus   wound care debrided

## 2024-03-04 NOTE — PROGRESS NOTE ADULT - PROBLEM SELECTOR PLAN 1
CT pelvis with L sacral/medial gluteal decubitis ulcer with lysis of coccyx with c/f osteomyelitis  Also with unstageable sacral ulcer, not receiving adequate wound care, and is in pain  Meeting sepsis criteria with tachycardia, tacypnea, and leukocytosis   wound with exposed bone, diagnostic of ostemyelitis    - procal, crp, esr elevated     Plan:  dc zosyn  c/w Levaquin based on sensitivities  Blood cx neg  Ucx shows 50,000-100,000 GN rods  wound culture grew e. coli, ESBL proteus, enterococcus   wound care debrided   ID consulted, plan for 7-10 day abx course CT pelvis with L sacral/medial gluteal decubitis ulcer with lysis of coccyx with c/f osteomyelitis  Also with unstageable sacral ulcer, not receiving adequate wound care, and is in pain  Meeting sepsis criteria with tachycardia, tacypnea, and leukocytosis   wound with exposed bone, diagnostic of ostemyelitis    - procal, crp, esr elevated     Plan:  dc zosyn  c/w Levaquin based on sensitivities (3/3-  ID consulted, plan for 7-10 day abx course  Blood cx neg  Ucx shows 50,000-100,000 GN rods  wound culture grew e. coli, ESBL proteus, enterococcus   wound care debrided

## 2024-03-04 NOTE — PROGRESS NOTE ADULT - ASSESSMENT
89y old Female with history of T2DM, Hypothyroidism, Dementia, HTN, Hx of DVT, CVA, Sacral ulcer here with osteomyelitis.    1. T2DM with hyperglycemia  - high after breakfast  - Increase Admelog to 9 units with breakfast, continue 6 units with lunch and 5 units with dinner  - Continue low Admelog correctional scale before meals and bedtime  - Monitor FS before meals and bedtime  - Follow hospital hypoglycemic protocol    2. Hypothyroidism  - Continue Levothyroxine 88 mcg daily    Will continue to follow.    Matthew Willard MD  [facility]    [address] [place] [address]    T [phone]  F [phone]

## 2024-03-04 NOTE — PROGRESS NOTE ADULT - ATTENDING COMMENTS
Patient seen and examined at bedside. No acute events overnight. Doing okay. Wants me to take off her heel protectors. No pain. FS variable.    Wound culture reviewed, seems GI in origin. Zosyn switched to Levofloxacin for another 5 doses. Will discuss with endocrine discharge recommendations.    Plan to DC to LTC w/ hospice.

## 2024-03-05 ENCOUNTER — TRANSCRIPTION ENCOUNTER (OUTPATIENT)
Age: 89
End: 2024-03-05

## 2024-03-05 LAB
CULTURE RESULTS: SIGNIFICANT CHANGE UP
CULTURE RESULTS: SIGNIFICANT CHANGE UP
GLUCOSE BLDC GLUCOMTR-MCNC: 135 MG/DL — HIGH (ref 70–99)
GLUCOSE BLDC GLUCOMTR-MCNC: 204 MG/DL — HIGH (ref 70–99)
GLUCOSE BLDC GLUCOMTR-MCNC: 231 MG/DL — HIGH (ref 70–99)
GLUCOSE BLDC GLUCOMTR-MCNC: 237 MG/DL — HIGH (ref 70–99)
GLUCOSE BLDC GLUCOMTR-MCNC: 90 MG/DL — SIGNIFICANT CHANGE UP (ref 70–99)
SPECIMEN SOURCE: SIGNIFICANT CHANGE UP
SPECIMEN SOURCE: SIGNIFICANT CHANGE UP

## 2024-03-05 PROCEDURE — 99232 SBSQ HOSP IP/OBS MODERATE 35: CPT | Mod: GC

## 2024-03-05 RX ORDER — INSULIN LISPRO 100/ML
11 VIAL (ML) SUBCUTANEOUS
Refills: 0 | Status: DISCONTINUED | OUTPATIENT
Start: 2024-03-06 | End: 2024-03-10

## 2024-03-05 RX ADMIN — APIXABAN 2.5 MILLIGRAM(S): 2.5 TABLET, FILM COATED ORAL at 18:04

## 2024-03-05 RX ADMIN — Medication 2: at 13:04

## 2024-03-05 RX ADMIN — ATORVASTATIN CALCIUM 40 MILLIGRAM(S): 80 TABLET, FILM COATED ORAL at 22:51

## 2024-03-05 RX ADMIN — Medication 88 MICROGRAM(S): at 05:20

## 2024-03-05 RX ADMIN — Medication 25 MILLIGRAM(S): at 06:29

## 2024-03-05 RX ADMIN — APIXABAN 2.5 MILLIGRAM(S): 2.5 TABLET, FILM COATED ORAL at 06:29

## 2024-03-05 RX ADMIN — Medication 500 MILLIGRAM(S): at 13:12

## 2024-03-05 RX ADMIN — SENNA PLUS 2 TABLET(S): 8.6 TABLET ORAL at 22:51

## 2024-03-05 RX ADMIN — Medication 1 APPLICATION(S): at 06:29

## 2024-03-05 RX ADMIN — Medication 1 TABLET(S): at 13:05

## 2024-03-05 RX ADMIN — Medication 9 UNIT(S): at 10:28

## 2024-03-05 RX ADMIN — Medication 1 APPLICATION(S): at 13:11

## 2024-03-05 RX ADMIN — Medication 5 UNIT(S): at 18:05

## 2024-03-05 RX ADMIN — CHLORHEXIDINE GLUCONATE 1 APPLICATION(S): 213 SOLUTION TOPICAL at 13:04

## 2024-03-05 RX ADMIN — Medication 25 MILLIGRAM(S): at 18:04

## 2024-03-05 RX ADMIN — Medication 6 UNIT(S): at 13:05

## 2024-03-05 RX ADMIN — Medication 1 APPLICATION(S): at 22:50

## 2024-03-05 NOTE — DISCHARGE NOTE NURSING/CASE MANAGEMENT/SOCIAL WORK - NSDCPEFALRISK_GEN_ALL_CORE
For information on Fall & Injury Prevention, visit: [email or web address] OR  [email or web address] OR  https://www.cdc.gov/steadi/patient.html

## 2024-03-05 NOTE — DISCHARGE NOTE NURSING/CASE MANAGEMENT/SOCIAL WORK - PATIENT PORTAL LINK FT
You can access the FollowMyHealth Patient Portal offered by [facility] by registering at the following website: [email or web address]. By joining [facility]’s FollowMyHealth portal, you will also be able to view your health information using other applications (apps) compatible with our system.

## 2024-03-05 NOTE — PROGRESS NOTE ADULT - SUBJECTIVE AND OBJECTIVE BOX
[facility] DISEAS[facility]  SANCHEZ Faria Y. Patel, S. Shah, G. Casimir  [phone]  ([phone] - weekdays after 5pm and weekends)    Name: BISHOP BURNS  Age/Gender: 89y Female  MRN: [id number]    Interval History:  Patient seen and examined this morning.   Denies pain, having breakfast with PCA  Notes reviewed  No concerning overnight events  Afebrile     Allergies: No Known Drug Allergies  black pepper, white pepper, cumin, paprika, johnston powder, chili powder (Rash)      Objective:  Vitals:   T(F): 97.5 (03-04-24 @ 21:04), Max: 97.5 (03-04-24 @ 21:04)  HR: 109 (03-04-24 @ 21:04) (109 - 109)  BP: 157/92 (03-04-24 @ 21:04) (157/92 - 157/92)  RR: 18 (03-04-24 @ 21:04) (18 - 18)  SpO2: 96% (03-04-24 @ 21:04) (96% - 96%)  Physical Examination:  General: no acute distress, nontoxic appearing   HEENT: NC/AT, anicteric, neck supple  Respiratory: no acc muscle use, breathing comfortably  Cardiovascular: S1 and S2 present  Gastrointestinal: normal appearing, nondistended  Extremities: no edema, no cyanosis  Skin: no visible rash    Laboratory Studies:  CBC:   WBC Trend:  20.44 03-01-24 @ 11:05  16.86 02-29-24 @ 13:07  17.92 02-28-24 @ 20:17    CMP:   Creatinine: 0.97 mg/dL (03-01-24 @ 11:01)  Creatinine: 0.69 mg/dL (02-29-24 @ 13:07)  Creatinine: 0.66 mg/dL (02-28-24 @ 20:17)    Microbiology: reviewed   Culture - Other (collected 02-29-24 @ 14:29)  Source: Wound Wound  Final Report (03-02-24 @ 21:10):    Numerous Proteus mirabilis ESBL    Numerous Escherichia coli    Numerous Enterococcus faecalis  Organism: Proteus mirabilis ESBL  Escherichia coli  Enterococcus faecalis (03-02-24 @ 21:10)  Organism: Enterococcus faecalis (03-02-24 @ 21:10)      Method Type: JULIETTE      -  Ampicillin: S <=2 Predicts results to ampicillin/sulbactam, amoxacillin-clavulanate and  piperacillin-tazobactam.      -  Vancomycin: S 2  Organism: Escherichia coli (03-02-24 @ 21:10)      Method Type: JULIETTE      -  Amoxicillin/Clavulanic Acid: R >16/8      -  Ampicillin: R >16 These ampicillin results predict results for amoxicillin      -  Ampicillin/Sulbactam: I 16/8      -  Aztreonam: R >16      -  Cefazolin: R >16      -  Cefepime: S <=2      -  Cefoxitin: R >16      -  Ceftriaxone: R 32      -  Ciprofloxacin: S <=0.25      -  Ertapenem: S <=0.5      -  Gentamicin: S <=2      -  Imipenem: S <=1      -  Levofloxacin: S <=0.5      -  Meropenem: S <=1      -  Piperacillin/Tazobactam: S <=8      -  Tobramycin: S <=2      -  Trimethoprim/Sulfamethoxazole: S <=0.5/9.5  Organism: Proteus mirabilis ESBL (03-02-24 @ 21:10)      Method Type: JULIETTE      -  Amoxicillin/Clavulanic Acid: S <=8/4      -  Ampicillin: R >16 These ampicillin results predict results for amoxicillin      -  Ampicillin/Sulbactam: R <=4/2      -  Aztreonam: R 16      -  Cefazolin: R >16      -  Cefepime: R >16      -  Ceftriaxone: R >32      -  Ciprofloxacin: S <=0.25      -  Ertapenem: S <=0.5      -  Gentamicin: S <=2      -  Levofloxacin: S <=0.5      -  Meropenem: S <=1      -  Piperacillin/Tazobactam: R <=8      -  Tobramycin: S <=2      -  Trimethoprim/Sulfamethoxazole: S <=0.5/9.5    Culture - Blood (collected 02-28-24 @ 19:50)  Source: .Blood Blood-Peripheral  Final Report (03-05-24 @ 03:00):    No growth at 5 days    Culture - Blood (collected 02-28-24 @ 19:40)  Source: .Blood Blood-Peripheral  Final Report (03-05-24 @ 03:00):    No growth at 5 days    Culture - Urine (collected 02-28-24 @ 18:37)  Source: Clean Catch Clean Catch (Midstream)  Final Report (03-02-24 @ 07:12):    50,000 - 99,000 CFU/mL Klebsiella pneumoniae  Organism: Klebsiella pneumoniae (03-02-24 @ 07:12)  Organism: Klebsiella pneumoniae (03-02-24 @ 07:12)      Method Type: JULIETTE      -  Amoxicillin/Clavulanic Acid: S <=8/4      -  Ampicillin: R >16 These ampicillin results predict results for amoxicillin      -  Ampicillin/Sulbactam: I 16/8      -  Aztreonam: S <=4      -  Cefazolin: S <=2 For uncomplicated UTI with K. pneumoniae, E. coli, or P. mirablis: JULIETTE <=16 is sensitive and JULIETTE >=32 is resistant. This also predicts results for oral agents cefaclor, cefdinir, cefpodoxime, cefprozil, cefuroxime axetil, cephalexin and locarbef for uncomplicated UTI. Note that some isolates may be susceptible to these agents while testing resistant to cefazolin.      -  Cefepime: S <=2      -  Cefoxitin: S <=8      -  Ceftriaxone: S <=1      -  Cefuroxime: S <=4      -  Ciprofloxacin: S <=0.25      -  Ertapenem: S <=0.5      -  Gentamicin: S <=2      -  Imipenem: S <=1      -  Levofloxacin: S <=0.5      -  Meropenem: S <=1      -  Nitrofurantoin: I 64 Should not be used to treat pyelonephritis      -  Piperacillin/Tazobactam: S <=8      -  Tobramycin: S <=2      -  Trimethoprim/Sulfamethoxazole: S <=0.5/9.5    [facility] 21:33[facility]    Radiology: reviewed     Medications:  acetaminophen     Tablet .. 650 milliGRAM(s) Oral every 6 hours PRN  apixaban 2.5 milliGRAM(s) Oral two times a day  ascorbic acid 500 milliGRAM(s) Oral daily  atorvastatin 40 milliGRAM(s) Oral at bedtime  chlorhexidine 4% Liquid 1 Application(s) Topical daily  Dakins Solution - 1/4 Strength 1 Application(s) Topical every 8 hours  dextrose 5%. 1000 milliLiter(s) IV Continuous <Continuous>  dextrose 5%. 1000 milliLiter(s) IV Continuous <Continuous>  dextrose 50% Injectable 12.5 Gram(s) IV Push once  dextrose 50% Injectable 25 Gram(s) IV Push once  dextrose 50% Injectable 25 Gram(s) IV Push once  dextrose Oral Gel 15 Gram(s) Oral once PRN  glucagon  Injectable 1 milliGRAM(s) IntraMuscular once  hydroxyurea 500 milliGRAM(s) Oral <User Schedule>  insulin lispro (ADMELOG) corrective regimen sliding scale   SubCutaneous three times a day before meals  insulin lispro (ADMELOG) corrective regimen sliding scale   SubCutaneous at bedtime  insulin lispro Injectable (ADMELOG) 9 Unit(s) SubCutaneous before breakfast  insulin lispro Injectable (ADMELOG) 5 Unit(s) SubCutaneous before dinner  insulin lispro Injectable (ADMELOG) 6 Unit(s) SubCutaneous before lunch  levoFLOXacin IVPB 750 milliGRAM(s) IV Intermittent every 48 hours  levothyroxine 88 MICROGram(s) Oral daily  melatonin 3 milliGRAM(s) Oral at bedtime PRN  metoprolol tartrate 25 milliGRAM(s) Oral two times a day  multivitamin 1 Tablet(s) Oral daily  polyethylene glycol 3350 17 Gram(s) Oral daily  QUEtiapine 25 milliGRAM(s) Oral daily PRN  senna 2 Tablet(s) Oral at bedtime    Current Antimicrobials:  levoFLOXacin IVPB 750 milliGRAM(s) IV Intermittent every 48 hours    Prior/Completed Antimicrobials:  piperacillin/tazobactam IVPB.  piperacillin/tazobactam IVPB.-  piperacillin/tazobactam IVPB...  vancomycin  IVPB.

## 2024-03-05 NOTE — PROGRESS NOTE ADULT - ASSESSMENT
89y old Female with history of T2DM, Hypothyroidism, Dementia, HTN, Hx of DVT, CVA, Sacral ulcer here with osteomyelitis.    1. T2DM with hyperglycemia  - high after breakfast  - Increase Admelog to 11 units with breakfast, continue 6 units with lunch and 5 units with dinner  - Continue low Admelog correctional scale before meals and bedtime  - Monitor FS before meals and bedtime  - Follow hospital hypoglycemic protocol    2. Hypothyroidism  - Continue Levothyroxine 88 mcg daily    Discharge recs  1. Metformin  mg twice daily with food  2. Glipizide ER 2.5 mg daily with breakfast  3. No insulin on discharge    Matthew Willard MD  [facility]    [address] [place] [address]    T [phone]  F [phone]

## 2024-03-05 NOTE — PROGRESS NOTE ADULT - SUBJECTIVE AND OBJECTIVE BOX
Alexus Coronel MD   Internal Medicine, PGY 1  Contact via TEAMS.     SUBJECTIVE / OVERNIGHT EVENTS:  - Pt seen and examined at bedside  - TANYA    MEDICATIONS  (STANDING):  apixaban 2.5 milliGRAM(s) Oral two times a day  ascorbic acid 500 milliGRAM(s) Oral daily  atorvastatin 40 milliGRAM(s) Oral at bedtime  chlorhexidine 4% Liquid 1 Application(s) Topical daily  Dakins Solution - 1/4 Strength 1 Application(s) Topical every 8 hours  dextrose 5%. 1000 milliLiter(s) (50 mL/Hr) IV Continuous <Continuous>  dextrose 5%. 1000 milliLiter(s) (100 mL/Hr) IV Continuous <Continuous>  dextrose 50% Injectable 12.5 Gram(s) IV Push once  dextrose 50% Injectable 25 Gram(s) IV Push once  dextrose 50% Injectable 25 Gram(s) IV Push once  glucagon  Injectable 1 milliGRAM(s) IntraMuscular once  hydroxyurea 500 milliGRAM(s) Oral <User Schedule>  insulin lispro (ADMELOG) corrective regimen sliding scale   SubCutaneous three times a day before meals  insulin lispro (ADMELOG) corrective regimen sliding scale   SubCutaneous at bedtime  insulin lispro Injectable (ADMELOG) 5 Unit(s) SubCutaneous before dinner  insulin lispro Injectable (ADMELOG) 6 Unit(s) SubCutaneous before lunch  insulin lispro Injectable (ADMELOG) 9 Unit(s) SubCutaneous before breakfast  levoFLOXacin IVPB 750 milliGRAM(s) IV Intermittent every 48 hours  levothyroxine 88 MICROGram(s) Oral daily  metoprolol tartrate 25 milliGRAM(s) Oral two times a day  multivitamin 1 Tablet(s) Oral daily  polyethylene glycol 3350 17 Gram(s) Oral daily  senna 2 Tablet(s) Oral at bedtime    MEDICATIONS  (PRN):  acetaminophen     Tablet .. 650 milliGRAM(s) Oral every 6 hours PRN Temp greater or equal to 38C (100.4F), Mild Pain (1 - 3)  dextrose Oral Gel 15 Gram(s) Oral once PRN Blood Glucose LESS THAN 70 milliGRAM(s)/deciliter  melatonin 3 milliGRAM(s) Oral at bedtime PRN Insomnia  QUEtiapine 25 milliGRAM(s) Oral daily PRN for insomnia        03-04-24 @ 07:01  -  03-05-24 @ 07:00  --------------------------------------------------------  IN: 690 mL / OUT: 0 mL / NET: 690 mL        PHYSICAL EXAM:  Vital Signs Last 24 Hrs  T(C): 36.4 (04 Mar 2024 21:04), Max: 37.1 (04 Mar 2024 12:00)  T(F): 97.5 (04 Mar 2024 21:04), Max: 98.7 (04 Mar 2024 12:00)  HR: 109 (04 Mar 2024 21:04) (109 - 114)  BP: 157/92 (04 Mar 2024 21:04) (139/102 - 157/92)  BP(mean): --  RR: 18 (04 Mar 2024 21:04) (18 - 18)  SpO2: 96% (04 Mar 2024 21:04) (96% - 100%)    Parameters below as of 04 Mar 2024 21:04  Patient On (Oxygen Delivery Method): room air        CAPILLARY BLOOD GLUCOSE      POCT Blood Glucose.: 158 mg/dL (04 Mar 2024 21:01)  POCT Blood Glucose.: 174 mg/dL (04 Mar 2024 17:43)  POCT Blood Glucose.: 289 mg/dL (04 Mar 2024 12:19)  POCT Blood Glucose.: 171 mg/dL (04 Mar 2024 08:59)    I&O's Summary    04 Mar 2024 07:01  -  05 Mar 2024 07:00  --------------------------------------------------------  IN: 690 mL / OUT: 0 mL / NET: 690 mL        CONSTITUTIONAL: NAD  HEENT: NC/AT  RESPIRATORY: Normal respiratory effort; lungs are clear to auscultation bilaterally  CARDIOVASCULAR: Regular rate and rhythm, normal S1 and S2, no murmur/rub/gallop; No lower extremity edema; Peripheral pulses are 2+ bilaterally  ABDOMEN: Nontender to palpation, normoactive bowel sounds, no rebound/guarding; No hepatosplenomegaly  MUSCLOSKELETAL: no clubbing or cyanosis of digits; no joint swelling or tenderness to palpation  EXTREMITIES: no peripheral edema, distal pulses intact   NEURO: no focal deficits   PSYCH: A+O to person, place, and time; affect appropriate    LABS:                      IMAGING:    [X] All pertinent imaging reviewed by me Alexus Coronel MD   Internal Medicine, PGY 1  Contact via TEAMS.     SUBJECTIVE / OVERNIGHT EVENTS:  - Pt seen and examined at bedside  - Mildly tachycardic ON, otherwise no acute complaints. Pt more lucid this morning. No acute complaints.     MEDICATIONS  (STANDING):  apixaban 2.5 milliGRAM(s) Oral two times a day  ascorbic acid 500 milliGRAM(s) Oral daily  atorvastatin 40 milliGRAM(s) Oral at bedtime  chlorhexidine 4% Liquid 1 Application(s) Topical daily  Dakins Solution - 1/4 Strength 1 Application(s) Topical every 8 hours  dextrose 5%. 1000 milliLiter(s) (50 mL/Hr) IV Continuous <Continuous>  dextrose 5%. 1000 milliLiter(s) (100 mL/Hr) IV Continuous <Continuous>  dextrose 50% Injectable 12.5 Gram(s) IV Push once  dextrose 50% Injectable 25 Gram(s) IV Push once  dextrose 50% Injectable 25 Gram(s) IV Push once  glucagon  Injectable 1 milliGRAM(s) IntraMuscular once  hydroxyurea 500 milliGRAM(s) Oral <User Schedule>  insulin lispro (ADMELOG) corrective regimen sliding scale   SubCutaneous three times a day before meals  insulin lispro (ADMELOG) corrective regimen sliding scale   SubCutaneous at bedtime  insulin lispro Injectable (ADMELOG) 5 Unit(s) SubCutaneous before dinner  insulin lispro Injectable (ADMELOG) 6 Unit(s) SubCutaneous before lunch  insulin lispro Injectable (ADMELOG) 9 Unit(s) SubCutaneous before breakfast  levoFLOXacin IVPB 750 milliGRAM(s) IV Intermittent every 48 hours  levothyroxine 88 MICROGram(s) Oral daily  metoprolol tartrate 25 milliGRAM(s) Oral two times a day  multivitamin 1 Tablet(s) Oral daily  polyethylene glycol 3350 17 Gram(s) Oral daily  senna 2 Tablet(s) Oral at bedtime    MEDICATIONS  (PRN):  acetaminophen     Tablet .. 650 milliGRAM(s) Oral every 6 hours PRN Temp greater or equal to 38C (100.4F), Mild Pain (1 - 3)  dextrose Oral Gel 15 Gram(s) Oral once PRN Blood Glucose LESS THAN 70 milliGRAM(s)/deciliter  melatonin 3 milliGRAM(s) Oral at bedtime PRN Insomnia  QUEtiapine 25 milliGRAM(s) Oral daily PRN for insomnia        03-04-24 @ 07:01  -  03-05-24 @ 07:00  --------------------------------------------------------  IN: 690 mL / OUT: 0 mL / NET: 690 mL        PHYSICAL EXAM:  Vital Signs Last 24 Hrs  T(C): 36.4 (04 Mar 2024 21:04), Max: 37.1 (04 Mar 2024 12:00)  T(F): 97.5 (04 Mar 2024 21:04), Max: 98.7 (04 Mar 2024 12:00)  HR: 109 (04 Mar 2024 21:04) (109 - 114)  BP: 157/92 (04 Mar 2024 21:04) (139/102 - 157/92)  BP(mean): --  RR: 18 (04 Mar 2024 21:04) (18 - 18)  SpO2: 96% (04 Mar 2024 21:04) (96% - 100%)    Parameters below as of 04 Mar 2024 21:04  Patient On (Oxygen Delivery Method): room air        CAPILLARY BLOOD GLUCOSE      POCT Blood Glucose.: 158 mg/dL (04 Mar 2024 21:01)  POCT Blood Glucose.: 174 mg/dL (04 Mar 2024 17:43)  POCT Blood Glucose.: 289 mg/dL (04 Mar 2024 12:19)  POCT Blood Glucose.: 171 mg/dL (04 Mar 2024 08:59)    I&O's Summary    04 Mar 2024 07:01  -  05 Mar 2024 07:00  --------------------------------------------------------  IN: 690 mL / OUT: 0 mL / NET: 690 mL        GENERAL: NAD, lying in bed comfortably  HEAD:  Atraumatic, Normocephalic  EYES: EOMI, PERRLA, conjunctiva and sclera clear  ENT: Moist mucous membranes  NECK: Supple  CHEST/LUNG: Clear to auscultation bilaterally; No rales, rhonchi, wheezing, or rubs. Unlabored respirations  HEART: Regular rate and rhythm; No murmurs, rubs, or gallops  ABDOMEN: BSx4; Soft, nontender, nondistended  EXTREMITIES:  2+ Peripheral Pulses, brisk capillary refill. No clubbing, cyanosis, or edema  NERVOUS SYSTEM:  A&Ox1, no new focal deficits  SKIN: unstageable sacral decubitus ulcer   psych: normal behavior, normal affect    LABS:                      IMAGING:    [X] All pertinent imaging reviewed by me

## 2024-03-05 NOTE — PROGRESS NOTE ADULT - PROBLEM SELECTOR PLAN 1
CT pelvis with L sacral/medial gluteal decubitis ulcer with lysis of coccyx with c/f osteomyelitis  Also with unstageable sacral ulcer, not receiving adequate wound care, and is in pain  Meeting sepsis criteria with tachycardia, tacypnea, and leukocytosis   wound with exposed bone, diagnostic of ostemyelitis    - procal, crp, esr elevated     Plan:  dc zosyn  c/w Levaquin based on sensitivities (3/3-  ID consulted, plan for 7-10 day abx course  Blood cx neg  Ucx shows 50,000-100,000 GN rods  wound culture grew e. coli, ESBL proteus, enterococcus   wound care debrided CT pelvis with L sacral/medial gluteal decubitis ulcer with lysis of coccyx with c/f osteomyelitis  Also with unstageable sacral ulcer, not receiving adequate wound care, and is in pain  Meeting sepsis criteria with tachycardia, tacypnea, and leukocytosis   wound with exposed bone, diagnostic of ostemyelitis    - procal, crp, esr elevated     Plan:  dc zosyn  c/w Levaquin based on sensitivities (3/3-  ID consulted, plan for 10 day abx course  Blood cx neg  Ucx shows 50,000-100,000 GN rods  wound culture grew e. coli, ESBL proteus, enterococcus   wound care debrided CT pelvis with L sacral/medial gluteal decubitis ulcer with lysis of coccyx with c/f osteomyelitis  Also with unstageable sacral ulcer, not receiving adequate wound care, and is in pain  Meeting sepsis criteria with tachycardia, tacypnea, and leukocytosis   wound with exposed bone, diagnostic of ostemyelitis    - procal, crp, esr elevated     Plan:  dc zosyn  c/w Levaquin based on sensitivities for 5 doses  ID consulted, plan for 10 day abx course  Blood cx neg  wound culture grew e. coli, ESBL proteus, enterococcus   wound care debrided

## 2024-03-05 NOTE — PROGRESS NOTE ADULT - PROBLEM SELECTOR PLAN 3
CT pelvis with L sacral/medial gluteal decubitis ulcer with lysis of coccyx with c/f osteomyelitis  Also with unstageable sacral ulcer, not receiving adequate wound care, and is in pain  Meeting sepsis criteria with tachycardia, tacypnea, and leukocytosis     - procal, crp, esr elevated     Plan:  dc zosyn  c/w Levaquin based on sensitivities (3/3-  ID consulted, plan for 7-10 day abx course  Blood cx neg  Ucx shows 50,000-100,000 GN rods  wound culture grew e. coli, ESBL proteus, enterococcus   wound care debrided CT pelvis with L sacral/medial gluteal decubitis ulcer with lysis of coccyx with c/f osteomyelitis  Also with unstageable sacral ulcer, not receiving adequate wound care, and is in pain  Meeting sepsis criteria with tachycardia, tacypnea, and leukocytosis     - procal, crp, esr elevated     Plan:  dc zosyn  c/w Levaquin based on sensitivities (3/3-  ID consulted, plan for 10 day abx course  Blood cx neg  Ucx shows 50,000-100,000 GN rods  wound culture grew e. coli, ESBL proteus, enterococcus   wound care debrided CT pelvis with L sacral/medial gluteal decubitis ulcer with lysis of coccyx with c/f osteomyelitis  Also with unstageable sacral ulcer, not receiving adequate wound care, and is in pain  Meeting sepsis criteria with tachycardia, tacypnea, and leukocytosis     - procal, crp, esr elevated     Plan:  as above

## 2024-03-05 NOTE — PROGRESS NOTE ADULT - ASSESSMENT
Patient is a 89 year old female DNR/DNI with PMH of DVT, hypothyroidism, P. 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 Patient with significant unstageable sacral decubitus ulcer. Per daughter, she hasn't been getting official wound care (dressings taken care of by family) due to insurance issues getting wound care nurse approved. Daughter also reports that her mother's mental status at baseline is that she can say her name but doesn't always know where she is.    Sepsis, unstageable sacral ulcer with OM of coccyx   Poorly controlled DM  - tachycardia, tachypnea and leukocytosis on admission, low grade temp 100.1F x1 2/28pm    -- afebrile >48h, WBC trend noted, CRP 86  - Flu/COVID/RSV negative   - CXR with no focal consolidations  - UA negative, Ucx with Klebsiella   - CT pelvis with left sacral/medial gluteal decubitus ulcer with OM of coccyx, no drainable collection   - MRSA PCR screen negative, off vancomycin   - BCx negative  - 2/29 s/p selective excisional debridement of sacral wound by Wound care team   - Wcx with E.coli, ESBL Proteus, E. faecalis-sensitivities reviewed   - s/p zosyn 2/28-3/3    Recommendations:  Treatment of sacral pressure ulcer and confirmed OM with antibiotics alone without debridement and wound coverage ineffective, also given location wound is at risk of contamination, further skin/ wound breakdown due to pressure and prolonged course of antibiotics unlikely to resolve this and will increase risk of C DIff, development of resistant organisms making treatment of infection more difficult in the future in addition to antibiotic adverse effects, therefore, recommended short course levofloxacin 750mg Q48h (renally dosed) to complete total 10d course on 3/12/24 with continued local wound care, nutrition, offloading as able  Stable from ID standpoint at this time.      Irasema Coy M.D.  [facility], Division of Infectious Diseases  [phone]  After 5pm on weekdays and all day on weekends - please call [phone]

## 2024-03-05 NOTE — PROGRESS NOTE ADULT - PROBLEM SELECTOR PLAN 8
hx of DM on metformin, but hasn't been taking since she as told she has normal A1C  Blood glucose elevated on admission       - endocrinology consulted, appreciate recommendations  - Increase Admelog to 7 units with breakfast, continue 6 units with lunch and increase to 5 units with dinner  - Continue low Admelog correctional scale before meals and bedtime  - Monitor FS before meals and bedtime  - ctm hx of DM on metformin, but hasn't been taking since she as told she has normal A1C  Blood glucose elevated on admission       - endocrinology consulted, appreciate recommendations  - Increase Admelog to 9 units with breakfast, continue 6 units with lunch and 5 units with dinner  - Continue low Admelog correctional scale before meals and bedtime  - Monitor FS before meals and bedtime  - ctm

## 2024-03-05 NOTE — PROGRESS NOTE ADULT - ATTENDING COMMENTS
Patient seen and examined at bedside. No acute events overnight. At baseline health. Finishing Levofloxacin for sacral OM (limited course). Turn & position. Maintain diurnal cycle. FS somewhat adequate. Pending DC to LTC w/ hospice.

## 2024-03-05 NOTE — PROGRESS NOTE ADULT - PROBLEM SELECTOR PLAN 2
CT pelvis with L sacral/medial gluteal decubitis ulcer with lysis of coccyx with c/f osteomyelitis  Also with unstageable sacral ulcer, not receiving adequate wound care, and is in pain  Meeting sepsis criteria with tachycardia, tacypnea, and leukocytosis     - procal, crp, esr elevated     Plan:  dc zosyn  c/w Levaquin based on sensitivities (3/3-  ID consulted, plan for 7-10 day abx course  Blood cx neg  Ucx shows 50,000-100,000 GN rods  wound culture grew e. coli, ESBL proteus, enterococcus   wound care debrided   ID consulted, plan for 7-10 day abx course  f/u wound culture  wound care debrided CT pelvis with L sacral/medial gluteal decubitis ulcer with lysis of coccyx with c/f osteomyelitis  Also with unstageable sacral ulcer, not receiving adequate wound care, and is in pain  Meeting sepsis criteria with tachycardia, tacypnea, and leukocytosis     - procal, crp, esr elevated     Plan:  dc zosyn  c/w Levaquin based on sensitivities (3/3-  ID consulted, plan for 10 day abx course  Blood cx neg  Ucx shows 50,000-100,000 GN rods  wound culture grew e. coli, ESBL proteus, enterococcus   wound care debrided   ID consulted, plan for 7-10 day abx course  f/u wound culture  wound care debrided CT pelvis with L sacral/medial gluteal decubitis ulcer with lysis of coccyx with c/f osteomyelitis  Also with unstageable sacral ulcer, not receiving adequate wound care, and is in pain  Meeting sepsis criteria with tachycardia, tacypnea, and leukocytosis     - procal, crp, esr elevated     Plan:  dc zosyn  c/w Levaquin based on sensitivities  ID consulted, plan for 10 day abx course  Blood cx neg  Ucx shows 50,000-100,000 GN rods  wound culture grew e. coli, ESBL proteus, enterococcus   wound care debrided   ID consulted, plan for 7-10 day abx course

## 2024-03-05 NOTE — PROGRESS NOTE ADULT - SUBJECTIVE AND OBJECTIVE BOX
[facility] Progress Note    MAR, chart notes, fingersticks and labs reviewed    Subjective: plans for DC to SNF    Objective  Vital Signs  T(C): 36.7 (03-05-24 @ 14:12), Max: 36.7 (03-05-24 @ 14:12)  HR: 103 (03-05-24 @ 14:12) (103 - 109)  BP: 100/72 (03-05-24 @ 14:12) (100/72 - 157/92)  RR: 18 (03-05-24 @ 14:12) (18 - 18)  SpO2: 96% (03-05-24 @ 14:12) (96% - 96%)    Physical Exam  Gen: NAD, alert, awake  HEENT: NC/AT, EOMI  Neck: supple  Chest: breathing comfortably  Heart: +s1 +s2    Medications  acetaminophen     Tablet .. 650 milliGRAM(s) Oral every 6 hours PRN  apixaban 2.5 milliGRAM(s) Oral two times a day  ascorbic acid 500 milliGRAM(s) Oral daily  atorvastatin 40 milliGRAM(s) Oral at bedtime  chlorhexidine 4% Liquid 1 Application(s) Topical daily  Dakins Solution - 1/4 Strength 1 Application(s) Topical every 8 hours  dextrose 5%. 1000 milliLiter(s) IV Continuous <Continuous>  dextrose 5%. 1000 milliLiter(s) IV Continuous <Continuous>  dextrose 50% Injectable 25 Gram(s) IV Push once  dextrose 50% Injectable 12.5 Gram(s) IV Push once  dextrose 50% Injectable 25 Gram(s) IV Push once  dextrose Oral Gel 15 Gram(s) Oral once PRN  glucagon  Injectable 1 milliGRAM(s) IntraMuscular once  hydroxyurea 500 milliGRAM(s) Oral <User Schedule>  insulin lispro (ADMELOG) corrective regimen sliding scale   SubCutaneous three times a day before meals  insulin lispro (ADMELOG) corrective regimen sliding scale   SubCutaneous at bedtime  insulin lispro Injectable (ADMELOG) 6 Unit(s) SubCutaneous before lunch  insulin lispro Injectable (ADMELOG) 5 Unit(s) SubCutaneous before dinner  levoFLOXacin IVPB 750 milliGRAM(s) IV Intermittent every 48 hours  levothyroxine 88 MICROGram(s) Oral daily  melatonin 3 milliGRAM(s) Oral at bedtime PRN  metoprolol tartrate 25 milliGRAM(s) Oral two times a day  multivitamin 1 Tablet(s) Oral daily  polyethylene glycol 3350 17 Gram(s) Oral daily  QUEtiapine 25 milliGRAM(s) Oral daily PRN  senna 2 Tablet(s) Oral at bedtime    Pertinent labs/Imaging  POCT Blood Glucose.: 204 mg/dL (03-05-24 @ 12:48)  POCT Blood Glucose.: 231 mg/dL (03-05-24 @ 10:27)  POCT Blood Glucose.: 135 mg/dL (03-05-24 @ 08:48)  POCT Blood Glucose.: 158 mg/dL (03-04-24 @ 21:01)  POCT Blood Glucose.: 174 mg/dL (03-04-24 @ 17:43)         [facility] Progress Note    MAR, chart notes, fingersticks and labs reviewed    Subjective: plans for DC to SNF, spoke to RN, eating well    Objective  Vital Signs  T(C): 36.7 (03-05-24 @ 14:12), Max: 36.7 (03-05-24 @ 14:12)  HR: 103 (03-05-24 @ 14:12) (103 - 109)  BP: 100/72 (03-05-24 @ 14:12) (100/72 - 157/92)  RR: 18 (03-05-24 @ 14:12) (18 - 18)  SpO2: 96% (03-05-24 @ 14:12) (96% - 96%)    Physical Exam  Gen: NAD, alert, awake  HEENT: NC/AT, EOMI  Neck: supple  Chest: breathing comfortably  Heart: +s1 +s2    Medications  acetaminophen     Tablet .. 650 milliGRAM(s) Oral every 6 hours PRN  apixaban 2.5 milliGRAM(s) Oral two times a day  ascorbic acid 500 milliGRAM(s) Oral daily  atorvastatin 40 milliGRAM(s) Oral at bedtime  chlorhexidine 4% Liquid 1 Application(s) Topical daily  Dakins Solution - 1/4 Strength 1 Application(s) Topical every 8 hours  dextrose 5%. 1000 milliLiter(s) IV Continuous <Continuous>  dextrose 5%. 1000 milliLiter(s) IV Continuous <Continuous>  dextrose 50% Injectable 25 Gram(s) IV Push once  dextrose 50% Injectable 12.5 Gram(s) IV Push once  dextrose 50% Injectable 25 Gram(s) IV Push once  dextrose Oral Gel 15 Gram(s) Oral once PRN  glucagon  Injectable 1 milliGRAM(s) IntraMuscular once  hydroxyurea 500 milliGRAM(s) Oral <User Schedule>  insulin lispro (ADMELOG) corrective regimen sliding scale   SubCutaneous three times a day before meals  insulin lispro (ADMELOG) corrective regimen sliding scale   SubCutaneous at bedtime  insulin lispro Injectable (ADMELOG) 6 Unit(s) SubCutaneous before lunch  insulin lispro Injectable (ADMELOG) 5 Unit(s) SubCutaneous before dinner  levoFLOXacin IVPB 750 milliGRAM(s) IV Intermittent every 48 hours  levothyroxine 88 MICROGram(s) Oral daily  melatonin 3 milliGRAM(s) Oral at bedtime PRN  metoprolol tartrate 25 milliGRAM(s) Oral two times a day  multivitamin 1 Tablet(s) Oral daily  polyethylene glycol 3350 17 Gram(s) Oral daily  QUEtiapine 25 milliGRAM(s) Oral daily PRN  senna 2 Tablet(s) Oral at bedtime    Pertinent labs/Imaging  POCT Blood Glucose.: 204 mg/dL (03-05-24 @ 12:48)  POCT Blood Glucose.: 231 mg/dL (03-05-24 @ 10:27)  POCT Blood Glucose.: 135 mg/dL (03-05-24 @ 08:48)  POCT Blood Glucose.: 158 mg/dL (03-04-24 @ 21:01)  POCT Blood Glucose.: 174 mg/dL (03-04-24 @ 17:43)

## 2024-03-06 ENCOUNTER — TRANSCRIPTION ENCOUNTER (OUTPATIENT)
Age: 89
End: 2024-03-06

## 2024-03-06 LAB
GLUCOSE BLDC GLUCOMTR-MCNC: 198 MG/DL — HIGH (ref 70–99)
GLUCOSE BLDC GLUCOMTR-MCNC: 220 MG/DL — HIGH (ref 70–99)
GLUCOSE BLDC GLUCOMTR-MCNC: 227 MG/DL — HIGH (ref 70–99)
GLUCOSE BLDC GLUCOMTR-MCNC: 76 MG/DL — SIGNIFICANT CHANGE UP (ref 70–99)

## 2024-03-06 PROCEDURE — 99232 SBSQ HOSP IP/OBS MODERATE 35: CPT | Mod: GC

## 2024-03-06 RX ORDER — ASCORBIC ACID 60 MG
1 TABLET,CHEWABLE ORAL
Qty: 0 | Refills: 0 | DISCHARGE
Start: 2024-03-06

## 2024-03-06 RX ORDER — SODIUM HYPOCHLORITE 0.125 %
1 SOLUTION, NON-ORAL MISCELLANEOUS
Qty: 0 | Refills: 0 | DISCHARGE
Start: 2024-03-06

## 2024-03-06 RX ADMIN — Medication 1: at 17:28

## 2024-03-06 RX ADMIN — CHLORHEXIDINE GLUCONATE 1 APPLICATION(S): 213 SOLUTION TOPICAL at 12:20

## 2024-03-06 RX ADMIN — APIXABAN 2.5 MILLIGRAM(S): 2.5 TABLET, FILM COATED ORAL at 06:41

## 2024-03-06 RX ADMIN — SENNA PLUS 2 TABLET(S): 8.6 TABLET ORAL at 22:27

## 2024-03-06 RX ADMIN — HYDROXYUREA 500 MILLIGRAM(S): 500 CAPSULE ORAL at 08:59

## 2024-03-06 RX ADMIN — Medication 1 APPLICATION(S): at 06:44

## 2024-03-06 RX ADMIN — Medication 6 UNIT(S): at 12:47

## 2024-03-06 RX ADMIN — Medication 1 TABLET(S): at 12:20

## 2024-03-06 RX ADMIN — APIXABAN 2.5 MILLIGRAM(S): 2.5 TABLET, FILM COATED ORAL at 17:28

## 2024-03-06 RX ADMIN — ATORVASTATIN CALCIUM 40 MILLIGRAM(S): 80 TABLET, FILM COATED ORAL at 22:27

## 2024-03-06 RX ADMIN — Medication 1 APPLICATION(S): at 16:48

## 2024-03-06 RX ADMIN — Medication 5 UNIT(S): at 17:28

## 2024-03-06 RX ADMIN — Medication 11 UNIT(S): at 08:56

## 2024-03-06 RX ADMIN — Medication 500 MILLIGRAM(S): at 12:20

## 2024-03-06 RX ADMIN — Medication 2: at 12:47

## 2024-03-06 RX ADMIN — Medication 88 MICROGRAM(S): at 05:37

## 2024-03-06 RX ADMIN — Medication 1 APPLICATION(S): at 22:31

## 2024-03-06 RX ADMIN — Medication 25 MILLIGRAM(S): at 17:27

## 2024-03-06 RX ADMIN — Medication 2: at 08:56

## 2024-03-06 RX ADMIN — Medication 25 MILLIGRAM(S): at 06:41

## 2024-03-06 NOTE — PROGRESS NOTE ADULT - ASSESSMENT
89y old Female with history of T2DM, Hypothyroidism, Dementia, HTN, Hx of DVT, CVA, Sacral ulcer here with osteomyelitis.    1. T2DM with hyperglycemia  - Continue Admelog 11 units with breakfast, 6 units with lunch and 5 units with dinner  - Continue low Admelog correctional scale before meals and bedtime  - Monitor FS before meals and bedtime  - Follow hospital hypoglycemic protocol    2. Hypothyroidism  - Continue Levothyroxine 88 mcg daily    Discharge recs  1. Metformin  mg twice daily with food  2. Glipizide ER 2.5 mg daily with breakfast  3. No insulin on discharge    Matthew Willard MD  [facility]- [facility] of [facility]    [address] [place] [address]    T [phone]  F [phone]

## 2024-03-06 NOTE — DISCHARGE NOTE PROVIDER - NSDCCPCAREPLAN_GEN_ALL_CORE_FT
PRINCIPAL DISCHARGE DIAGNOSIS  Diagnosis: Osteomyelitis of coccyx  Assessment and Plan of Treatment: Finished antibiotics as prescribed. Turn and position regularly with local wound care.      SECONDARY DISCHARGE DIAGNOSES  Diagnosis: Paroxysmal atrial fibrillation  Assessment and Plan of Treatment: Continue with home medications.    Diagnosis: Polycythemia vera  Assessment and Plan of Treatment: Continue with home medications    Diagnosis: Diabetes mellitus  Assessment and Plan of Treatment: Seen by endocrine and optimized on orals    Diagnosis: Severe dementia  Assessment and Plan of Treatment: Maintain diurnal cycle and continue with Seroquel     PRINCIPAL DISCHARGE DIAGNOSIS  Diagnosis: Osteomyelitis of coccyx  Assessment and Plan of Treatment: When you were admitted to the hospital, there was concern that you had an infection of the wound of your sacrum, so we treated you with antibiotics, which you completed. Please care for your wound as below:  Wound care instructions:  Irrigate wound w/10cc syringe of NS  Pat dry  CAVILON to periwound skin  Pack wound Aquacel  Cover w/ gauze & tegaderm  [facility] outpatient at [facility] [address] [place] [address] [phone]      SECONDARY DISCHARGE DIAGNOSES  Diagnosis: Severe dementia  Assessment and Plan of Treatment: Maintain diurnal cycle and continue with Seroquel    Diagnosis: Paroxysmal atrial fibrillation  Assessment and Plan of Treatment: Continue with home medications.    Diagnosis: Diabetes mellitus  Assessment and Plan of Treatment: You were seen by endocrinology while inpatient and we optimized your medication regimen. Please take the medications as prescribed and see your Endocrinologist to follow up.    Diagnosis: Polycythemia vera  Assessment and Plan of Treatment: Continue with home medications: hydroxyurea as directed.

## 2024-03-06 NOTE — DISCHARGE NOTE PROVIDER - HOSPITAL COURSE
HPI:  89 F DNR/DNI, hx DVT, hypothyrodism, P. 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 Patient with siginificant unstageable sacral decubitus ulcer. Discussed with daughter (reportedly HCP) who reported that her mother has been pointing to her back for past few days saying that it hurts her. However, she denies fever, chills, cp, sob, abdominal pain, constipation, diarrhea, or dysuria. She also reports she hasn't been getting official wound care (dressings taken care of by family) due to insurance issues getting wound care nurse approved. Daughter also reports that her mother's mental status at baseline is that she can say her name but doesn't always know where she is. She also reports her mom stopped taking her home metformin since a doctor told her that her A1C was normal. The family at some point was considering home hospice (per documentation and daughter) but there were also issues with insurance getting this investigated. In the ED, she received Vanc+Zosyn x1.      (29 Feb 2024 02:32)    Hospital Course:  Patient admitted due to concern for osteomyelitis of sacrum as seen on CT scan. She was seen by wound care who performed debridement. Antibiotics tailored to wound culture results. ID was consulted and recommended limited course of antibiotics due to limited mortality benefit of long term antibiotics. Mainstay of therapy will be local wound care and offloading. Patient seen by endocrine and her diabetes was optimized.    Discussed GOC with daughter and plan for discharge to SNF with hospice.       HPI:  89 F DNR/DNI, hx DVT, hypothyrodism, P. 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 Patient with siginificant unstageable sacral decubitus ulcer. The family at some point was considering home hospice (per documentation and daughter) but there were also issues with insurance getting this investigated. In the ED, she received Vanc+Zosyn x1 and admitted due to concern for osteomyelitis of sacrum as seen on CT scan. She was seen by wound care who performed debridement. Antibiotics tailored to wound culture results. ID was consulted and recommended limited course of antibiotics due to limited mortality benefit of long term antibiotics. Mainstay of therapy will be local wound care and offloading. Patient seen by endocrine and her diabetes was optimized.    Discussed GOC with daughter and plan for discharge to SNF with hospice.       HPI:  89 F DNR/DNI, hx DVT, hypothyrodism, P. 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 Patient with siginificant unstageable sacral decubitus ulcer. The family at some point was considering home hospice (per documentation and daughter) but there were also issues with insurance getting this investigated. In the ED, she received Vanc+Zosyn x1 and admitted due to concern for osteomyelitis of sacrum as seen on CT scan. She was seen by wound care who performed debridement. Antibiotics tailored to wound culture results. ID was consulted and recommended limited course of antibiotics due to limited mortality benefit of long term antibiotics. Mainstay of therapy will be local wound care and offloading. Patient seen by endocrine and her diabetes was optimized. Due to urinary retention, sylvester was placed on 3/12/24.    Discussed GOC with daughter and plan for discharge to SNF with hospice.

## 2024-03-06 NOTE — PROGRESS NOTE ADULT - SUBJECTIVE AND OBJECTIVE BOX
[facility] Progress Note    MAR, chart notes, fingersticks and labs reviewed    Subjective:    Objective  Vital Signs  T(C): 37.1 (03-06-24 @ 12:00), Max: 37.1 (03-06-24 @ 04:36)  HR: 100 (03-06-24 @ 12:00) (94 - 110)  BP: 139/60 (03-06-24 @ 12:00) (139/60 - 160/111)  RR: 18 (03-06-24 @ 12:00) (18 - 18)  SpO2: 96% (03-06-24 @ 12:00) (96% - 99%)    Physical Exam  Gen: NAD, alert, awake  HEENT: NC/AT, EOMI  Neck: supple  Chest: breathing comfortably  Heart: +s1 +s2    Medications  acetaminophen     Tablet .. 650 milliGRAM(s) Oral every 6 hours PRN  apixaban 2.5 milliGRAM(s) Oral two times a day  ascorbic acid 500 milliGRAM(s) Oral daily  atorvastatin 40 milliGRAM(s) Oral at bedtime  chlorhexidine 4% Liquid 1 Application(s) Topical daily  Dakins Solution - 1/4 Strength 1 Application(s) Topical every 8 hours  dextrose 5%. 1000 milliLiter(s) IV Continuous <Continuous>  dextrose 5%. 1000 milliLiter(s) IV Continuous <Continuous>  dextrose 50% Injectable 12.5 Gram(s) IV Push once  dextrose 50% Injectable 25 Gram(s) IV Push once  dextrose 50% Injectable 25 Gram(s) IV Push once  dextrose Oral Gel 15 Gram(s) Oral once PRN  glucagon  Injectable 1 milliGRAM(s) IntraMuscular once  hydroxyurea 500 milliGRAM(s) Oral <User Schedule>  insulin lispro (ADMELOG) corrective regimen sliding scale   SubCutaneous at bedtime  insulin lispro (ADMELOG) corrective regimen sliding scale   SubCutaneous three times a day before meals  insulin lispro Injectable (ADMELOG) 6 Unit(s) SubCutaneous before lunch  insulin lispro Injectable (ADMELOG) 11 Unit(s) SubCutaneous before breakfast  insulin lispro Injectable (ADMELOG) 5 Unit(s) SubCutaneous before dinner  levoFLOXacin IVPB 750 milliGRAM(s) IV Intermittent every 48 hours  levothyroxine 88 MICROGram(s) Oral daily  melatonin 3 milliGRAM(s) Oral at bedtime PRN  metoprolol tartrate 25 milliGRAM(s) Oral two times a day  multivitamin 1 Tablet(s) Oral daily  polyethylene glycol 3350 17 Gram(s) Oral daily  QUEtiapine 25 milliGRAM(s) Oral daily PRN  senna 2 Tablet(s) Oral at bedtime    Pertinent labs/Imaging  POCT Blood Glucose.: 227 mg/dL (03-06-24 @ 12:29)  POCT Blood Glucose.: 220 mg/dL (03-06-24 @ 08:40)  POCT Blood Glucose.: 237 mg/dL (03-05-24 @ 22:11)  POCT Blood Glucose.: 90 mg/dL (03-05-24 @ 17:28)         [facility] Progress Note    MAR, chart notes, fingersticks and labs reviewed    Subjective: spoke to nurse, awaiting insurance auth for DC, was more communicative today    Objective  Vital Signs  T(C): 37.1 (03-06-24 @ 12:00), Max: 37.1 (03-06-24 @ 04:36)  HR: 100 (03-06-24 @ 12:00) (94 - 110)  BP: 139/60 (03-06-24 @ 12:00) (139/60 - 160/111)  RR: 18 (03-06-24 @ 12:00) (18 - 18)  SpO2: 96% (03-06-24 @ 12:00) (96% - 99%)    Physical Exam  Gen: NAD, alert, awake  HEENT: NC/AT, EOMI  Neck: supple  Chest: breathing comfortably  Heart: +s1 +s2    Medications  acetaminophen     Tablet .. 650 milliGRAM(s) Oral every 6 hours PRN  apixaban 2.5 milliGRAM(s) Oral two times a day  ascorbic acid 500 milliGRAM(s) Oral daily  atorvastatin 40 milliGRAM(s) Oral at bedtime  chlorhexidine 4% Liquid 1 Application(s) Topical daily  Dakins Solution - 1/4 Strength 1 Application(s) Topical every 8 hours  dextrose 5%. 1000 milliLiter(s) IV Continuous <Continuous>  dextrose 5%. 1000 milliLiter(s) IV Continuous <Continuous>  dextrose 50% Injectable 12.5 Gram(s) IV Push once  dextrose 50% Injectable 25 Gram(s) IV Push once  dextrose 50% Injectable 25 Gram(s) IV Push once  dextrose Oral Gel 15 Gram(s) Oral once PRN  glucagon  Injectable 1 milliGRAM(s) IntraMuscular once  hydroxyurea 500 milliGRAM(s) Oral <User Schedule>  insulin lispro (ADMELOG) corrective regimen sliding scale   SubCutaneous at bedtime  insulin lispro (ADMELOG) corrective regimen sliding scale   SubCutaneous three times a day before meals  insulin lispro Injectable (ADMELOG) 6 Unit(s) SubCutaneous before lunch  insulin lispro Injectable (ADMELOG) 11 Unit(s) SubCutaneous before breakfast  insulin lispro Injectable (ADMELOG) 5 Unit(s) SubCutaneous before dinner  levoFLOXacin IVPB 750 milliGRAM(s) IV Intermittent every 48 hours  levothyroxine 88 MICROGram(s) Oral daily  melatonin 3 milliGRAM(s) Oral at bedtime PRN  metoprolol tartrate 25 milliGRAM(s) Oral two times a day  multivitamin 1 Tablet(s) Oral daily  polyethylene glycol 3350 17 Gram(s) Oral daily  QUEtiapine 25 milliGRAM(s) Oral daily PRN  senna 2 Tablet(s) Oral at bedtime    Pertinent labs/Imaging  POCT Blood Glucose.: 227 mg/dL (03-06-24 @ 12:29)  POCT Blood Glucose.: 220 mg/dL (03-06-24 @ 08:40)  POCT Blood Glucose.: 237 mg/dL (03-05-24 @ 22:11)  POCT Blood Glucose.: 90 mg/dL (03-05-24 @ 17:28)

## 2024-03-06 NOTE — DISCHARGE NOTE PROVIDER - NSDCMRMEDTOKEN_GEN_ALL_CORE_FT
ascorbic acid 500 mg oral tablet: 1 tab(s) orally once a day  atorvastatin 40 mg oral tablet: 1 tab(s) orally once a day  Eliquis 5 mg oral tablet: 1 tab(s) orally 2 times a day  Glucotrol XL 2.5 mg oral tablet, extended release: 1 tab(s) orally once a day  hydroxyurea 500 mg oral capsule: 1 cap(s) orally 3 times a week Mon/Wed/Fri  levoFLOXacin 750 mg oral tablet: 1 tab(s) orally every 48 hours  levothyroxine 88 mcg (0.088 mg) oral tablet: 1 tab(s) orally once a day  metFORMIN 500 mg oral tablet, extended release: 1 tab(s) orally 2 times a day  metoprolol tartrate 25 mg oral tablet: 1 tab(s) orally 2 times a day  Multiple Vitamins oral tablet: 1 tab(s) orally once a day  ocular lubricant ophthalmic solution: 1 drop(s) to each affected eye 2 times a day  polyethylene glycol 3350 oral powder for reconstitution: 17 gram(s) orally once a day as needed for  constipation  senna (sennosides) 8.6 mg oral tablet: 1 tab(s) orally once a day as needed for  constipation  Seroquel 25 mg oral tablet: 1 tab(s) orally once a day as needed for  insomnia  sodium hypochlorite 0.125% topical solution: 1 Apply topically to affected area every 8 hours   ascorbic acid 500 mg oral tablet: 1 tab(s) orally once a day  atorvastatin 40 mg oral tablet: 1 tab(s) orally once a day  Eliquis 5 mg oral tablet: 1 tab(s) orally 2 times a day  Glucotrol XL 2.5 mg oral tablet, extended release: 1 tab(s) orally once a day  hydroxyurea 500 mg oral capsule: 1 cap(s) orally 3 times a week Mon/Wed/Fri  levothyroxine 88 mcg (0.088 mg) oral tablet: 1 tab(s) orally once a day  metFORMIN 500 mg oral tablet, extended release: 1 tab(s) orally 2 times a day  metoprolol tartrate 25 mg oral tablet: 1 tab(s) orally 2 times a day  Multiple Vitamins oral tablet: 1 tab(s) orally once a day  ocular lubricant ophthalmic solution: 1 drop(s) to each affected eye 2 times a day  polyethylene glycol 3350 oral powder for reconstitution: 17 gram(s) orally once a day as needed for  constipation  senna (sennosides) 8.6 mg oral tablet: 1 tab(s) orally once a day as needed for  constipation  Seroquel 25 mg oral tablet: 1 tab(s) orally once a day as needed for  insomnia  sodium hypochlorite 0.125% topical solution: 1 Apply topically to affected area every 8 hours   apixaban 2.5 mg oral tablet: 1 tab(s) orally 2 times a day  ascorbic acid 500 mg oral tablet: 1 tab(s) orally once a day  atorvastatin 40 mg oral tablet: 1 tab(s) orally once a day  Glucotrol XL 2.5 mg oral tablet, extended release: 1 tab(s) orally once a day  hydroxyurea 500 mg oral capsule: 1 cap(s) orally 3 times a week Mon/Wed/Fri  levothyroxine 88 mcg (0.088 mg) oral tablet: 1 tab(s) orally once a day  metFORMIN 500 mg oral tablet, extended release: 1 tab(s) orally 2 times a day  metoprolol tartrate 25 mg oral tablet: 1 tab(s) orally 2 times a day  Multiple Vitamins oral tablet: 1 tab(s) orally once a day  ocular lubricant ophthalmic solution: 1 drop(s) to each affected eye 2 times a day  polyethylene glycol 3350 oral powder for reconstitution: 17 gram(s) orally once a day as needed for  constipation  senna (sennosides) 8.6 mg oral tablet: 1 tab(s) orally once a day as needed for  constipation  Seroquel 25 mg oral tablet: 1 tab(s) orally once a day as needed for  insomnia

## 2024-03-06 NOTE — DISCHARGE NOTE PROVIDER - CARE PROVIDER_API CALL
BLAYNE ROSAS  [address] [place] [id number]  Phone: [phone]  Fax: [phone]  Follow Up Time: 1 month

## 2024-03-06 NOTE — PROGRESS NOTE ADULT - PROBLEM SELECTOR PLAN 1
CT pelvis with L sacral/medial gluteal decubitis ulcer with lysis of coccyx with c/f osteomyelitis  Also with unstageable sacral ulcer, not receiving adequate wound care, and is in pain  Meeting sepsis criteria with tachycardia, tacypnea, and leukocytosis   wound with exposed bone, diagnostic of ostemyelitis    - procal, crp, esr elevated     Plan:  dc zosyn  c/w Levaquin based on sensitivities for 5 doses  ID consulted, plan for 10 day abx course  Blood cx neg  wound culture grew e. coli, ESBL proteus, enterococcus   wound care debrided 2/29

## 2024-03-06 NOTE — DISCHARGE NOTE PROVIDER - NSDCFUADDINST_GEN_ALL_CORE_FT
SACRUM:  Irrigate wound w/10cc syringe of NS  Pat dry  CAVILON to periwound skin  Pack wound AQUACEL  Cover w/ gauze & tegaderm

## 2024-03-06 NOTE — PROGRESS NOTE ADULT - PROBLEM SELECTOR PLAN 8
hx of DM on metformin, but hasn't been taking since she as told she has normal A1C  Blood glucose elevated on admission       - endocrinology consulted, appreciate recommendations  - Increase Admelog to 9 units with breakfast, continue 6 units with lunch and 5 units with dinner  - Continue low Admelog correctional scale before meals and bedtime  - Monitor FS before meals and bedtime  - continue to monitor  - On discharge: metformin  mg BID and Glipizide 2.5 mg

## 2024-03-06 NOTE — PROGRESS NOTE ADULT - PROBLEM SELECTOR PLAN 3
CT pelvis with L sacral/medial gluteal decubitis ulcer with lysis of coccyx with c/f osteomyelitis  Also with unstageable sacral ulcer, not receiving adequate wound care, and is in pain  Meeting sepsis criteria with tachycardia, tacypnea, and leukocytosis     - procal, crp, esr elevated     Plan:  as above

## 2024-03-06 NOTE — PROGRESS NOTE ADULT - ASSESSMENT
Patient is a 89 year old female DNR/DNI with PMH of DVT, hypothyroidism, P. 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 Patient with significant unstageable sacral decubitus ulcer. Per daughter, she hasn't been getting official wound care (dressings taken care of by family) due to insurance issues getting wound care nurse approved. Daughter also reports that her mother's mental status at baseline is that she can say her name but doesn't always know where she is.    Sepsis, unstageable sacral ulcer with OM of coccyx   Poorly controlled DM  - tachycardia, tachypnea and leukocytosis on admission, low grade temp 100.1F x1 2/28pm    -- afebrile >48h, WBC trend noted, CRP 86  - Flu/COVID/RSV negative   - CXR with no focal consolidations  - UA negative, Ucx with Klebsiella   - CT pelvis with left sacral/medial gluteal decubitus ulcer with OM of coccyx, no drainable collection   - MRSA PCR screen negative, off vancomycin   - BCx negative  - 2/29 s/p selective excisional debridement of sacral wound by Wound care team   - Wcx with E.coli, ESBL Proteus, E. faecalis-sensitivities reviewed   - s/p zosyn 2/28-3/3    Recommendations:  Treatment of sacral pressure ulcer and confirmed OM with antibiotics alone without debridement and wound coverage ineffective, also given location wound is at risk of contamination, further skin/ wound breakdown due to pressure and prolonged course of antibiotics unlikely to resolve this and will increase risk of C DIff, development of resistant organisms making treatment of infection more difficult in the future in addition to antibiotic adverse effects, therefore, recommended short course levofloxacin 750mg Q48h (renally dosed) to complete total 10d course on 3/12/24 with continued local wound care, nutrition, offloading as able    Stable from ID standpoint at this time.  Discharge planning per primary team.    Irasema Coy M.D.  [facility], Division of Infectious Diseases  [phone]  After 5pm on weekdays and all day on weekends - please call [phone]

## 2024-03-06 NOTE — DISCHARGE NOTE PROVIDER - NSDCFUSCHEDAPPT_GEN_ALL_CORE_FT
Ellie Gooden  [facility] Physician [facility]  WOUNDCARE [address]  Scheduled Appointment: 03/14/2024

## 2024-03-06 NOTE — PROGRESS NOTE ADULT - SUBJECTIVE AND OBJECTIVE BOX
Patient is a 89y old  Female who presents with a chief complaint of Decubitus Ulcer (05 Mar 2024 16:02)      SUBJECTIVE / OVERNIGHT EVENTS: Patient seen and examined at bedside. No acute events overnight. Pending discharge to [facility]. At baseline health.    MEDICATIONS  (STANDING):  apixaban 2.5 milliGRAM(s) Oral two times a day  ascorbic acid 500 milliGRAM(s) Oral daily  atorvastatin 40 milliGRAM(s) Oral at bedtime  chlorhexidine 4% Liquid 1 Application(s) Topical daily  Dakins Solution - 1/4 Strength 1 Application(s) Topical every 8 hours  dextrose 5%. 1000 milliLiter(s) (50 mL/Hr) IV Continuous <Continuous>  dextrose 5%. 1000 milliLiter(s) (100 mL/Hr) IV Continuous <Continuous>  dextrose 50% Injectable 12.5 Gram(s) IV Push once  dextrose 50% Injectable 25 Gram(s) IV Push once  dextrose 50% Injectable 25 Gram(s) IV Push once  glucagon  Injectable 1 milliGRAM(s) IntraMuscular once  hydroxyurea 500 milliGRAM(s) Oral <User Schedule>  insulin lispro (ADMELOG) corrective regimen sliding scale   SubCutaneous three times a day before meals  insulin lispro (ADMELOG) corrective regimen sliding scale   SubCutaneous at bedtime  insulin lispro Injectable (ADMELOG) 5 Unit(s) SubCutaneous before dinner  insulin lispro Injectable (ADMELOG) 6 Unit(s) SubCutaneous before lunch  insulin lispro Injectable (ADMELOG) 11 Unit(s) SubCutaneous before breakfast  levoFLOXacin IVPB 750 milliGRAM(s) IV Intermittent every 48 hours  levothyroxine 88 MICROGram(s) Oral daily  metoprolol tartrate 25 milliGRAM(s) Oral two times a day  multivitamin 1 Tablet(s) Oral daily  polyethylene glycol 3350 17 Gram(s) Oral daily  senna 2 Tablet(s) Oral at bedtime    MEDICATIONS  (PRN):  acetaminophen     Tablet .. 650 milliGRAM(s) Oral every 6 hours PRN Temp greater or equal to 38C (100.4F), Mild Pain (1 - 3)  dextrose Oral Gel 15 Gram(s) Oral once PRN Blood Glucose LESS THAN 70 milliGRAM(s)/deciliter  melatonin 3 milliGRAM(s) Oral at bedtime PRN Insomnia  QUEtiapine 25 milliGRAM(s) Oral daily PRN for insomnia      CAPILLARY BLOOD GLUCOSE      POCT Blood Glucose.: 237 mg/dL (05 Mar 2024 22:11)  POCT Blood Glucose.: 90 mg/dL (05 Mar 2024 17:28)  POCT Blood Glucose.: 204 mg/dL (05 Mar 2024 12:48)  POCT Blood Glucose.: 231 mg/dL (05 Mar 2024 10:27)  POCT Blood Glucose.: 135 mg/dL (05 Mar 2024 08:48)    I&O's Summary    05 Mar 2024 07:01  -  06 Mar 2024 07:00  --------------------------------------------------------  IN: 0 mL / OUT: 400 mL / NET: -400 mL        PHYSICAL EXAM:  Vital Signs Last 24 Hrs  T(C): 37.1 (06 Mar 2024 04:36), Max: 37.1 (06 Mar 2024 04:36)  T(F): 98.7 (06 Mar 2024 04:36), Max: 98.7 (06 Mar 2024 04:36)  HR: 110 (06 Mar 2024 04:36) (94 - 110)  BP: 147/72 (06 Mar 2024 04:36) (100/72 - 160/111)  BP(mean): --  RR: 18 (06 Mar 2024 04:36) (18 - 18)  SpO2: 99% (06 Mar 2024 04:36) (96% - 99%)    Parameters below as of 06 Mar 2024 04:36  Patient On (Oxygen Delivery Method): room air        GEN: female in NAD, appears comfortable, no diaphoresis  EYES: No scleral injection, EOMI  ENTM: neck supple & symmetric without tracheal deviation, moist membranes, no gross hearing impairment, thyroid gland not enlarged  CV: +S1/S2, no m/r/g, no abdominal bruit, no LE edema  RESP: breathing comfortably, no respiratory accessory muscle use, CTAB, no w/r/r  GI: normoactive BS, soft, NTND, no rebounding/guarding, no palpable masses    LABS:                      RADIOLOGY & ADDITIONAL TESTS:  Results Reviewed:   Imaging Personally Reviewed:  Electrocardiogram Personally Reviewed:    COORDINATION OF CARE:  Care Discussed with Consultants/Other Providers [Y/N]:  Prior or Outpatient Records Reviewed [Y/N]:

## 2024-03-06 NOTE — PROGRESS NOTE ADULT - PROBLEM SELECTOR PLAN 2
CT pelvis with L sacral/medial gluteal decubitis ulcer with lysis of coccyx with c/f osteomyelitis  Also with unstageable sacral ulcer, not receiving adequate wound care, and is in pain  Meeting sepsis criteria with tachycardia, tacypnea, and leukocytosis     - procal, crp, esr elevated     Plan:  dc zosyn  c/w Levaquin based on sensitivities  ID consulted, plan for 10 day abx course  Blood cx neg  Ucx shows 50,000-100,000 GN rods  wound culture grew e. coli, ESBL proteus, enterococcus   wound care debrided 2/29  ID consulted, plan for 7-10 day abx course

## 2024-03-06 NOTE — PROGRESS NOTE ADULT - SUBJECTIVE AND OBJECTIVE BOX
[facility]FECTIOUS DISEASES  SANCHEZ Faria Y. Patel, S. Shah, G. Casimir  [phone]  ([phone] - weekdays after 5pm and weekends)    Name: BISHOP BURNS  Age/Gender: 89y Female  MRN: [id number]    Interval History:  Patient seen and examined this morning.   Resting comfortably on her side, denies pain.   Notes reviewed  No concerning overnight events  Afebrile   Allergies: No Known Drug Allergies  black pepper, white pepper, cumin, paprika, johnston powder, chili powder (Rash)      Objective:  Vitals:   T(F): 98.7 (03-06-24 @ 12:00), Max: 98.7 (03-06-24 @ 04:36)  HR: 100 (03-06-24 @ 12:00) (94 - 110)  BP: 139/60 (03-06-24 @ 12:00) (100/72 - 160/111)  RR: 18 (03-06-24 @ 12:00) (18 - 18)  SpO2: 96% (03-06-24 @ 12:00) (96% - 99%)  Physical Examination:  General: no acute distress, nontoxic appearing   HEENT: NC/AT, anicteric, neck supple  Respiratory: no acc muscle use, breathing comfortably  Cardiovascular: S1 and S2 present  Gastrointestinal: normal appearing, nondistended  Extremities: no edema, no cyanosis  Skin: no visible rash    Laboratory Studies:  CBC:   WBC Trend:  20.44 03-01-24 @ 11:05  16.86 02-29-24 @ 13:07  17.92 02-28-24 @ 20:17    CMP:   Creatinine: 0.97 mg/dL (03-01-24 @ 11:01)  Creatinine: 0.69 mg/dL (02-29-24 @ 13:07)  Creatinine: 0.66 mg/dL (02-28-24 @ 20:17)    Microbiology: reviewed     Culture - Other (collected 02-29-24 @ 14:29)  Source: Wound Wound  Final Report (03-02-24 @ 21:10):    Numerous Proteus mirabilis ESBL    Numerous Escherichia coli    Numerous Enterococcus faecalis  Organism: Proteus mirabilis ESBL  Escherichia coli  Enterococcus faecalis (03-02-24 @ 21:10)  Organism: Enterococcus faecalis (03-02-24 @ 21:10)      -  Vancomycin: S 2      -  Ampicillin: S <=2 Predicts results to ampicillin/sulbactam, amoxacillin-clavulanate and  piperacillin-tazobactam.      Method Type: JULIETTE  Organism: Escherichia coli (03-02-24 @ 21:10)      -  Levofloxacin: S <=0.5      -  Tobramycin: S <=2      -  Aztreonam: R >16      -  Gentamicin: S <=2      -  Cefepime: S <=2      -  Cefazolin: R >16      -  Piperacillin/Tazobactam: S <=8      -  Ciprofloxacin: S <=0.25      -  Imipenem: S <=1      -  Ceftriaxone: R 32      -  Ampicillin: R >16 These ampicillin results predict results for amoxicillin      Method Type: JULIETTE      -  Meropenem: S <=1      -  Ampicillin/Sulbactam: I 16/8      -  Cefoxitin: R >16      -  Amoxicillin/Clavulanic Acid: R >16/8      -  Trimethoprim/Sulfamethoxazole: S <=0.5/9.5      -  Ertapenem: S <=0.5  Organism: Proteus mirabilis ESBL (03-02-24 @ 21:10)      -  Levofloxacin: S <=0.5      -  Tobramycin: S <=2      -  Aztreonam: R 16      -  Gentamicin: S <=2      -  Cefazolin: R >16      -  Cefepime: R >16      -  Piperacillin/Tazobactam: R <=8      -  Ciprofloxacin: S <=0.25      -  Ceftriaxone: R >32      -  Ampicillin: R >16 These ampicillin results predict results for amoxicillin      Method Type: JULIETTE      -  Meropenem: S <=1      -  Ampicillin/Sulbactam: R <=4/2      -  Amoxicillin/Clavulanic Acid: S <=8/4      -  Trimethoprim/Sulfamethoxazole: S <=0.5/9.5      -  Ertapenem: S <=0.5    Culture - Blood (collected 02-28-24 @ 19:50)  Source: .Blood Blood-Peripheral  Final Report (03-05-24 @ 03:00):    No growth at 5 days    Culture - Blood (collected 02-28-24 @ 19:40)  Source: .Blood Blood-Peripheral  Final Report (03-05-24 @ 03:00):    No growth at 5 days    Culture - Urine (collected 02-28-24 @ 18:37)  Source: Clean Catch Clean Catch (Midstream)  Final Report (03-02-24 @ 07:12):    50,000 - 99,000 CFU/mL Klebsiella pneumoniae  Organism: Klebsiella pneumoniae (03-02-24 @ 07:12)  Organism: Klebsiella pneumoniae (03-02-24 @ 07:12)      -  Levofloxacin: S <=0.5      -  Tobramycin: S <=2      -  Nitrofurantoin: I 64 Should not be used to treat pyelonephritis      -  Aztreonam: S <=4      -  Gentamicin: S <=2      -  Cefazolin: S <=2 For uncomplicated UTI with K. pneumoniae, E. coli, or P. mirablis: JULIETTE <=16 is sensitive and JULIETTE >=32 is resistant. This also predicts results for oral agents cefaclor, cefdinir, cefpodoxime, cefprozil, cefuroxime axetil, cephalexin and locarbef for uncomplicated UTI. Note that some isolates may be susceptible to these agents while testing resistant to cefazolin.      -  Cefepime: S <=2      -  Piperacillin/Tazobactam: S <=8      -  Ciprofloxacin: S <=0.25      -  Imipenem: S <=1      -  Ceftriaxone: S <=1      -  Ampicillin: R >16 These ampicillin results predict results for amoxicillin      Method Type: JULIETTE      -  Meropenem: S <=1      -  Ampicillin/Sulbactam: I 16/8      -  Cefoxitin: S <=8      -  Cefuroxime: S <=4      -  Amoxicillin/Clavulanic Acid: S <=8/4      -  Trimethoprim/Sulfamethoxazole: S <=0.5/9.5      -  Ertapenem: S <=0.5    S[facility]-2 [facility] ([facility] Feb 2024 21:33)    Radiology: reviewed     Medications:  acetaminophen     Tablet .. 650 milliGRAM(s) Oral every 6 hours PRN  apixaban 2.5 milliGRAM(s) Oral two times a day  ascorbic acid 500 milliGRAM(s) Oral daily  atorvastatin 40 milliGRAM(s) Oral at bedtime  chlorhexidine 4% Liquid 1 Application(s) Topical daily  Dakins Solution - 1/4 Strength 1 Application(s) Topical every 8 hours  dextrose 5%. 1000 milliLiter(s) IV Continuous <Continuous>  dextrose 5%. 1000 milliLiter(s) IV Continuous <Continuous>  dextrose 50% Injectable 25 Gram(s) IV Push once  dextrose 50% Injectable 12.5 Gram(s) IV Push once  dextrose 50% Injectable 25 Gram(s) IV Push once  dextrose Oral Gel 15 Gram(s) Oral once PRN  glucagon  Injectable 1 milliGRAM(s) IntraMuscular once  hydroxyurea 500 milliGRAM(s) Oral <User Schedule>  insulin lispro (ADMELOG) corrective regimen sliding scale   SubCutaneous at bedtime  insulin lispro (ADMELOG) corrective regimen sliding scale   SubCutaneous three times a day before meals  insulin lispro Injectable (ADMELOG) 6 Unit(s) SubCutaneous before lunch  insulin lispro Injectable (ADMELOG) 11 Unit(s) SubCutaneous before breakfast  insulin lispro Injectable (ADMELOG) 5 Unit(s) SubCutaneous before dinner  levoFLOXacin IVPB 750 milliGRAM(s) IV Intermittent every 48 hours  levothyroxine 88 MICROGram(s) Oral daily  melatonin 3 milliGRAM(s) Oral at bedtime PRN  metoprolol tartrate 25 milliGRAM(s) Oral two times a day  multivitamin 1 Tablet(s) Oral daily  polyethylene glycol 3350 17 Gram(s) Oral daily  QUEtiapine 25 milliGRAM(s) Oral daily PRN  senna 2 Tablet(s) Oral at bedtime    Current Antimicrobials:  levoFLOXacin IVPB 750 milliGRAM(s) IV Intermittent every 48 hours    Prior/Completed Antimicrobials:  piperacillin/tazobactam IVPB.  piperacillin/tazobactam IVPB.-  piperacillin/tazobactam IVPB...  vancomycin  IVPB.

## 2024-03-07 LAB
GLUCOSE BLDC GLUCOMTR-MCNC: 103 MG/DL — HIGH (ref 70–99)
GLUCOSE BLDC GLUCOMTR-MCNC: 137 MG/DL — HIGH (ref 70–99)
GLUCOSE BLDC GLUCOMTR-MCNC: 149 MG/DL — HIGH (ref 70–99)
GLUCOSE BLDC GLUCOMTR-MCNC: 224 MG/DL — HIGH (ref 70–99)

## 2024-03-07 PROCEDURE — 99232 SBSQ HOSP IP/OBS MODERATE 35: CPT

## 2024-03-07 PROCEDURE — 99232 SBSQ HOSP IP/OBS MODERATE 35: CPT | Mod: GC

## 2024-03-07 RX ORDER — LEVOFLOXACIN 5 MG/ML
1 INJECTION, SOLUTION INTRAVENOUS
Qty: 0 | Refills: 0 | DISCHARGE
Start: 2024-03-07 | End: 2024-03-11

## 2024-03-07 RX ADMIN — Medication 5 UNIT(S): at 18:04

## 2024-03-07 RX ADMIN — APIXABAN 2.5 MILLIGRAM(S): 2.5 TABLET, FILM COATED ORAL at 18:07

## 2024-03-07 RX ADMIN — Medication 500 MILLIGRAM(S): at 13:02

## 2024-03-07 RX ADMIN — Medication 6 UNIT(S): at 12:42

## 2024-03-07 RX ADMIN — APIXABAN 2.5 MILLIGRAM(S): 2.5 TABLET, FILM COATED ORAL at 06:49

## 2024-03-07 RX ADMIN — Medication 11 UNIT(S): at 09:07

## 2024-03-07 RX ADMIN — SENNA PLUS 2 TABLET(S): 8.6 TABLET ORAL at 22:37

## 2024-03-07 RX ADMIN — POLYETHYLENE GLYCOL 3350 17 GRAM(S): 17 POWDER, FOR SOLUTION ORAL at 13:02

## 2024-03-07 RX ADMIN — Medication 1 TABLET(S): at 13:02

## 2024-03-07 RX ADMIN — Medication 88 MICROGRAM(S): at 06:48

## 2024-03-07 RX ADMIN — Medication 25 MILLIGRAM(S): at 18:09

## 2024-03-07 RX ADMIN — CHLORHEXIDINE GLUCONATE 1 APPLICATION(S): 213 SOLUTION TOPICAL at 13:03

## 2024-03-07 RX ADMIN — Medication 2: at 18:04

## 2024-03-07 RX ADMIN — Medication 25 MILLIGRAM(S): at 06:48

## 2024-03-07 RX ADMIN — ATORVASTATIN CALCIUM 40 MILLIGRAM(S): 80 TABLET, FILM COATED ORAL at 22:36

## 2024-03-07 RX ADMIN — Medication 1 APPLICATION(S): at 23:14

## 2024-03-07 RX ADMIN — Medication 1 APPLICATION(S): at 13:03

## 2024-03-07 RX ADMIN — Medication 1 APPLICATION(S): at 06:49

## 2024-03-07 NOTE — PROGRESS NOTE ADULT - PROBLEM SELECTOR PLAN 8
hx of DM on metformin, but hasn't been taking since she as told she has normal A1C  Blood glucose elevated on admission       - endocrinology consulted, appreciate recommendations  - Increase Admelog to 9 units with breakfast, continue 6 units with lunch and 5 units with dinner  - Continue low Admelog correctional scale before meals and bedtime  - Monitor FS before meals and bedtime  - continue to monitor  - On discharge: metformin  mg BID and Glipizide 2.5 mg hx of DM on metformin, but hasn't been taking since she as told she has normal A1C  Blood glucose elevated on admission       - endocrinology consulted, appreciate recommendations  - Continue Admelog 11 units with breakfast, 6 units with lunch and 5 units with dinner  - Continue low Admelog correctional scale before meals and bedtime  - Monitor FS before meals and bedtime  - continue to monitor  - On discharge: metformin  mg BID and Glipizide 2.5 mg

## 2024-03-07 NOTE — PROGRESS NOTE ADULT - SUBJECTIVE AND OBJECTIVE BOX
[facility]-- WOUND TEAM -- FOLLOW UP NOTE  --------------------------------------------------------------------------------    24 hour events/subjective:          Diet:  Diet, Pureed:   Moderately Thick Liquids (MODTHICKLIQS)  Supplement Feeding Modality:  Oral  Glucerna Shake Cans or Servings Per Day:  1       Frequency:  Two Times a day (03-01-24 @ 17:15)      ROS: General/ SKIN/ MSK/ Neuro/ GI see HPI  all other systems negative  pt unable to offer    ALLERGIES & MEDICATIONS  --------------------------------------------------------------------------------  Allergies    No Known Drug Allergies  black pepper, white pepper, cumin, paprika, johnston powder, chili powder (Rash)    Intolerances          STANDING INPATIENT MEDICATIONS    apixaban 2.5 milliGRAM(s) Oral two times a day  ascorbic acid 500 milliGRAM(s) Oral daily  atorvastatin 40 milliGRAM(s) Oral at bedtime  chlorhexidine 4% Liquid 1 Application(s) Topical daily  Dakins Solution - 1/4 Strength 1 Application(s) Topical every 8 hours  dextrose 5%. 1000 milliLiter(s) IV Continuous <Continuous>  dextrose 5%. 1000 milliLiter(s) IV Continuous <Continuous>  dextrose 50% Injectable 25 Gram(s) IV Push once  dextrose 50% Injectable 12.5 Gram(s) IV Push once  dextrose 50% Injectable 25 Gram(s) IV Push once  glucagon  Injectable 1 milliGRAM(s) IntraMuscular once  hydroxyurea 500 milliGRAM(s) Oral <User Schedule>  insulin lispro (ADMELOG) corrective regimen sliding scale   SubCutaneous three times a day before meals  insulin lispro (ADMELOG) corrective regimen sliding scale   SubCutaneous at bedtime  insulin lispro Injectable (ADMELOG) 6 Unit(s) SubCutaneous before lunch  insulin lispro Injectable (ADMELOG) 5 Unit(s) SubCutaneous before dinner  insulin lispro Injectable (ADMELOG) 11 Unit(s) SubCutaneous before breakfast  levoFLOXacin IVPB 750 milliGRAM(s) IV Intermittent every 48 hours  levothyroxine 88 MICROGram(s) Oral daily  metoprolol tartrate 25 milliGRAM(s) Oral two times a day  multivitamin 1 Tablet(s) Oral daily  polyethylene glycol 3350 17 Gram(s) Oral daily  senna 2 Tablet(s) Oral at bedtime      PRN INPATIENT MEDICATION  acetaminophen     Tablet .. 650 milliGRAM(s) Oral every 6 hours PRN  dextrose Oral Gel 15 Gram(s) Oral once PRN  melatonin 3 milliGRAM(s) Oral at bedtime PRN  QUEtiapine 25 milliGRAM(s) Oral daily PRN        VITALS/PHYSICAL EXAM  --------------------------------------------------------------------------------  T(C): 36.9 (03-07-24 @ 11:36), Max: 37.1 (03-07-24 @ 05:42)  HR: 102 (03-07-24 @ 11:36) (94 - 102)  BP: 123/64 (03-07-24 @ 11:36) (123/64 - 150/83)  RR: 18 (03-07-24 @ 11:36) (18 - 18)  SpO2: 98% (03-07-24 @ 11:36) (96% - 98%)  Wt(kg): --        03-06-24 @ 07:01  -  03-07-24 @ 07:00  --------------------------------------------------------  IN: 280 mL / OUT: 0 mL / NET: 280 mL    03-07-24 @ 07:01  -  03-07-24 @ 16:54  --------------------------------------------------------  IN: 0 mL / OUT: 100 mL / NET: -100 mL            LABS/ CULTURES/ RADIOLOGY:                    CAPILLARY BLOOD GLUCOSE      POCT Blood Glucose.: 137 mg/dL (07 Mar 2024 12:24)  POCT Blood Glucose.: 149 mg/dL (07 Mar 2024 09:01)  POCT Blood Glucose.: 76 mg/dL (06 Mar 2024 21:45)  POCT Blood Glucose.: 198 mg/dL (06 Mar 2024 17:09)                      A1C with Estimated Average Glucose Result: 7.5 % (02-29-24 @ 13:07)   [facility]-- WOUND TEAM -- FOLLOW UP NOTE  --------------------------------------------------------------------------------    24 hour events/subjective:    afebrile  tolerating po  requiring assist w/ ADLs  incontinent  tolerating dressings      no odor or excess drainage      Diet:  Diet, Pureed:   Moderately Thick Liquids (MODTHICKLIQS)  Supplement Feeding Modality:  Oral  Glucerna Shake Cans or Servings Per Day:  1       Frequency:  Two Times a day (03-01-24 @ 17:15)      ROS: pt unable to offer    ALLERGIES & MEDICATIONS  --------------------------------------------------------------------------------  Allergies  black pepper, white pepper, cumin, paprika, johnston powder, chili powder (Rash)      STANDING INPATIENT MEDICATIONS  apixaban 2.5 milliGRAM(s) Oral two times a day  ascorbic acid 500 milliGRAM(s) Oral daily  atorvastatin 40 milliGRAM(s) Oral at bedtime  chlorhexidine 4% Liquid 1 Application(s) Topical daily  Dakins Solution - 1/4 Strength 1 Application(s) Topical every 8 hours  dextrose 5%. 1000 milliLiter(s) IV Continuous <Continuous>  dextrose 5%. 1000 milliLiter(s) IV Continuous <Continuous>  dextrose 50% Injectable 25 Gram(s) IV Push once  dextrose 50% Injectable 12.5 Gram(s) IV Push once  dextrose 50% Injectable 25 Gram(s) IV Push once  glucagon  Injectable 1 milliGRAM(s) IntraMuscular once  hydroxyurea 500 milliGRAM(s) Oral <User Schedule>  insulin lispro (ADMELOG) corrective regimen sliding scale   SubCutaneous three times a day before meals  insulin lispro (ADMELOG) corrective regimen sliding scale   SubCutaneous at bedtime  insulin lispro Injectable (ADMELOG) 6 Unit(s) SubCutaneous before lunch  insulin lispro Injectable (ADMELOG) 5 Unit(s) SubCutaneous before dinner  insulin lispro Injectable (ADMELOG) 11 Unit(s) SubCutaneous before breakfast  levoFLOXacin IVPB 750 milliGRAM(s) IV Intermittent every 48 hours  levothyroxine 88 MICROGram(s) Oral daily  metoprolol tartrate 25 milliGRAM(s) Oral two times a day  multivitamin 1 Tablet(s) Oral daily  polyethylene glycol 3350 17 Gram(s) Oral daily  senna 2 Tablet(s) Oral at bedtime      PRN INPATIENT MEDICATION  acetaminophen     Tablet .. 650 milliGRAM(s) Oral every 6 hours PRN  dextrose Oral Gel 15 Gram(s) Oral once PRN  melatonin 3 milliGRAM(s) Oral at bedtime PRN  QUEtiapine 25 milliGRAM(s) Oral daily PRN        VITALS/PHYSICAL EXAM  --------------------------------------------------------------------------------  T(C): 36.9 (03-07-24 @ 11:36), Max: 37.1 (03-07-24 @ 05:42)  HR: 102 (03-07-24 @ 11:36) (94 - 102)  BP: 123/64 (03-07-24 @ 11:36) (123/64 - 150/83)  RR: 18 (03-07-24 @ 11:36) (18 - 18)  SpO2: 98% (03-07-24 @ 11:36) (96% - 98%)  Wt(kg): -      NAD, Alert/ Confused, cachectic, frail,  WD/ WN/ WG  HEENT:  NC/AT, EOMI, sclera clear, mucosa moist, throat clear, trachea midline, neck supple  Respiratory: nonlabored w/ equal chest rise  Gastrointestinal: soft NT/ND   Neurology: minimally verbal, no follow commands, paraplegic  Psych: calm/ appropriate   Musculoskeletal:  limited stiff pROM, no deformities/ contractures  Vascular: BLE equally warm,  no cyanosis, clubbing,  nor acute ischemia       no BLE DP/PT pulses palpable          BLE hemosiderin staining      bilateral feet w/ hyperpigmented and hypopigmented skin heeling DTIs      Lt heel unstageable pressure injry       no blistering        scant serosanguinous drainage  No odor, erythema, increased warmth, tenderness, induration, fluctuance, nor crepitus  Skin: thin, dry, pale, frail,  ecchymosis w/o hematoma  Sacral evolving stage 4 pressure injury    w/ hyperpigmentation in periwound    moist granular tissue w/ bone and granular tissue noted     6.5cm x 7cm x 2.5cm undermining from 12-3:00 of 3cm.    serosanguinous drainage  Bilateral Ischium hyperpigmented intact skin- healed wounds     w/o blistering  or drainage  No odor, erythema, increased warmth, tenderness, induration, fluctuance, nor crepitus    CAPILLARY BLOOD GLUCOSE  POCT Blood Glucose.: 137 mg/dL (07 Mar 2024 12:24)  POCT Blood Glucose.: 149 mg/dL (07 Mar 2024 09:01)  POCT Blood Glucose.: 76 mg/dL (06 Mar 2024 21:45)    A1C with Estimated Average Glucose Result: 7.5 % (02-29-24 @ 13:07)

## 2024-03-07 NOTE — PROGRESS NOTE ADULT - ASSESSMENT
{\rtf1\kzzpbf22503\ansi\yxfouxv1568\ftnbj\uc1\deff0  {\fonttbl{\f0 \fnil Segoe UI;}{\f1 \fnil \fcharset0 Segoe UI;}{\f2 \fnil Times New Mal;}}  {\colortbl ;\duz056\jgfdi188\tctk026 ;\red0\green0\blue0 ;\red0\green0\iwfa375 ;\red0\green0\blue0 ;}  {\stylesheet{\f0\fs20 Normal;}{\cs1 Default Paragraph Font;}{\cs2\f0\fs16 Line Number;}{\cs3\f2\fs24\ul\cf3 Hyperlink;}}  {\*\revtbl{Unknown;}}  \rookds03340\nagauq65777\bazsu8690\etbvw9773\lwyjc1411\uuzly8759\jrjsrfn463\vyyvsif296\nogrowautofit\akvhjb813\formshade\nofeaturethrottle1\dntblnsbdb\fet4\aendnotes\aftnnrlc\pgbrdrhead\pgbrdrfoot  \sectd\widnqt81598\duafyj74441\guttersxn0\qpxvuyfc0114\ebkikehv1551\jzglisaq5598\alhfufdh2576\uikmcdv486\qvdxszr296\sbkpage\pgncont\pgndec  \plain\plain\f0\fs24\ql\plain\f0\fs24\plain\f0\fs20\tuxh0481\hich\f0\dbch\f0\loch\f0\fs20 89 F DNR/DNI, hx DVT, hypothyrodism, P. 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 Found to have unstageable sacral decubitus ulcer and sepsis with imaging c/f osteomyelitis of the coccyx. \par  \par  Wound Consult requested to assist w/ management of:\par  sacral stage 4 pressure injury\par  Feet w/ healing wounds\par  \par  Sacrum- pack w/ Aquacel packing QD and prn soiling \par       Continue w/ attends under pads and Pericare as per protocol\par  Consider POD for Feet wounds\par  Consider MELLO/PVR as in line w/ GOC\par  A/P CT - appreciate pressure injury findings- development of OM\par  Abx per Medicine/\par  Moisturize intact skin w/ SWEEN cream BID\par  Nutrition Consult for optimization in pt previously noted w/ Severe Protein Calorie Malnutrition \par      as in line w/ GOC encourage high quality protein, jorge/ prosource, MVI & Vit C to promote wound healing\par  Hyperglycemia -ADA diet and FS w/ ISS\par  Continue turning and positioning w/ offloading assistive devices as per protocol\par  Waffle Cushion to chair when oob to chair\par  Continue w/ low air loss pressure redistribution bed surface \par  Pt will need Group 2 mattress on hospital bed and ROHO cushion for wheel chair upon discharge home\par  Care as per medicine, will follow w/ you\par  Upon discharge f/u as outpatient at [facility] [address] [place], [place] [phone]\par  D/w team & RN & attng\par  Rosario Gerardo PA-C [id number]\par  Nights/ Weekends/ Holidays please call:\par  General Surgery Consult pager ([phone]) for emergencies\par  Wound PT for multilayer leg wrapping or VAC issues (x [phone]) \par   \plain\f1\fs20\ehrf5442\hich\f1\dbch\f1\loch\f1\cf2\fs20\strike\plain\f1\fs20\igfx2727\hich\f1\dbch\f1\loch\f1\cf2\fs20\plain\f0\fs20\cxnd1248\hich\f0\dbch\f0\loch\f0\fs20 I spent 35minutes face to face w/ this pt of which more than 50% of the time was   spent counseling & coordinating care of this pt. \par  }

## 2024-03-07 NOTE — PROGRESS NOTE ADULT - ASSESSMENT
89y old Female with history of T2DM, Hypothyroidism, Dementia, HTN, Hx of DVT, CVA, Sacral ulcer here with osteomyelitis.    1. T2DM with hyperglycemia  - numbers somewhat labile but monitor for now  - Continue Admelog 11 units with breakfast, 6 units with lunch and 5 units with dinner  - Continue low Admelog correctional scale before meals and bedtime  - Monitor FS before meals and bedtime  - Follow hospital hypoglycemic protocol    2. Hypothyroidism  - Continue Levothyroxine 88 mcg daily    Discharge recs  1. Metformin  mg twice daily with food  2. Glipizide ER 2.5 mg daily with breakfast  3. No insulin on discharge    Matthew Willard MD  [facility]- Division of Endocrinology    [address] [place] [address]    T [phone]  F [phone]

## 2024-03-07 NOTE — PROGRESS NOTE ADULT - SUBJECTIVE AND OBJECTIVE BOX
[facility] Progress Note    MAR, chart notes, fingersticks and labs reviewed    Subjective:    Objective  Vital Signs  T(C): 36.9 (03-07-24 @ 11:36), Max: 37.1 (03-07-24 @ 05:42)  HR: 102 (03-07-24 @ 11:36) (94 - 102)  BP: 123/64 (03-07-24 @ 11:36) (123/64 - 150/83)  RR: 18 (03-07-24 @ 11:36) (18 - 18)  SpO2: 98% (03-07-24 @ 11:36) (96% - 98%)    Physical Exam  Gen: NAD, alert, awake  HEENT: NC/AT, EOMI  Neck: supple  Chest: breathing comfortably  Heart: +s1 +s2    Medications  acetaminophen     Tablet .. 650 milliGRAM(s) Oral every 6 hours PRN  apixaban 2.5 milliGRAM(s) Oral two times a day  ascorbic acid 500 milliGRAM(s) Oral daily  atorvastatin 40 milliGRAM(s) Oral at bedtime  chlorhexidine 4% Liquid 1 Application(s) Topical daily  Dakins Solution - 1/4 Strength 1 Application(s) Topical every 8 hours  dextrose 5%. 1000 milliLiter(s) IV Continuous <Continuous>  dextrose 5%. 1000 milliLiter(s) IV Continuous <Continuous>  dextrose 50% Injectable 25 Gram(s) IV Push once  dextrose 50% Injectable 12.5 Gram(s) IV Push once  dextrose 50% Injectable 25 Gram(s) IV Push once  dextrose Oral Gel 15 Gram(s) Oral once PRN  glucagon  Injectable 1 milliGRAM(s) IntraMuscular once  hydroxyurea 500 milliGRAM(s) Oral <User Schedule>  insulin lispro (ADMELOG) corrective regimen sliding scale   SubCutaneous three times a day before meals  insulin lispro (ADMELOG) corrective regimen sliding scale   SubCutaneous at bedtime  insulin lispro Injectable (ADMELOG) 6 Unit(s) SubCutaneous before lunch  insulin lispro Injectable (ADMELOG) 5 Unit(s) SubCutaneous before dinner  insulin lispro Injectable (ADMELOG) 11 Unit(s) SubCutaneous before breakfast  levoFLOXacin IVPB 750 milliGRAM(s) IV Intermittent every 48 hours  levothyroxine 88 MICROGram(s) Oral daily  melatonin 3 milliGRAM(s) Oral at bedtime PRN  metoprolol tartrate 25 milliGRAM(s) Oral two times a day  multivitamin 1 Tablet(s) Oral daily  polyethylene glycol 3350 17 Gram(s) Oral daily  QUEtiapine 25 milliGRAM(s) Oral daily PRN  senna 2 Tablet(s) Oral at bedtime    Pertinent labs/Imaging  POCT Blood Glucose.: 137 mg/dL (03-07-24 @ 12:24)  POCT Blood Glucose.: 149 mg/dL (03-07-24 @ 09:01)  POCT Blood Glucose.: 76 mg/dL (03-06-24 @ 21:45)  POCT Blood Glucose.: 198 mg/dL (03-06-24 @ 17:09)         [facility] Progress Note    MAR, chart notes, fingersticks and labs reviewed    Subjective: still pending auth for SNF    Objective  Vital Signs  T(C): 36.9 (03-07-24 @ 11:36), Max: 37.1 (03-07-24 @ 05:42)  HR: 102 (03-07-24 @ 11:36) (94 - 102)  BP: 123/64 (03-07-24 @ 11:36) (123/64 - 150/83)  RR: 18 (03-07-24 @ 11:36) (18 - 18)  SpO2: 98% (03-07-24 @ 11:36) (96% - 98%)    Physical Exam  Gen: NAD, alert, awake, not responding to any questions however is nodding  HEENT: NC/AT, EOMI  Neck: supple  Chest: breathing comfortably  Heart: +s1 +s2    Medications  acetaminophen     Tablet .. 650 milliGRAM(s) Oral every 6 hours PRN  apixaban 2.5 milliGRAM(s) Oral two times a day  ascorbic acid 500 milliGRAM(s) Oral daily  atorvastatin 40 milliGRAM(s) Oral at bedtime  chlorhexidine 4% Liquid 1 Application(s) Topical daily  Dakins Solution - 1/4 Strength 1 Application(s) Topical every 8 hours  dextrose 5%. 1000 milliLiter(s) IV Continuous <Continuous>  dextrose 5%. 1000 milliLiter(s) IV Continuous <Continuous>  dextrose 50% Injectable 25 Gram(s) IV Push once  dextrose 50% Injectable 12.5 Gram(s) IV Push once  dextrose 50% Injectable 25 Gram(s) IV Push once  dextrose Oral Gel 15 Gram(s) Oral once PRN  glucagon  Injectable 1 milliGRAM(s) IntraMuscular once  hydroxyurea 500 milliGRAM(s) Oral <User Schedule>  insulin lispro (ADMELOG) corrective regimen sliding scale   SubCutaneous three times a day before meals  insulin lispro (ADMELOG) corrective regimen sliding scale   SubCutaneous at bedtime  insulin lispro Injectable (ADMELOG) 6 Unit(s) SubCutaneous before lunch  insulin lispro Injectable (ADMELOG) 5 Unit(s) SubCutaneous before dinner  insulin lispro Injectable (ADMELOG) 11 Unit(s) SubCutaneous before breakfast  levoFLOXacin IVPB 750 milliGRAM(s) IV Intermittent every 48 hours  levothyroxine 88 MICROGram(s) Oral daily  melatonin 3 milliGRAM(s) Oral at bedtime PRN  metoprolol tartrate 25 milliGRAM(s) Oral two times a day  multivitamin 1 Tablet(s) Oral daily  polyethylene glycol 3350 17 Gram(s) Oral daily  QUEtiapine 25 milliGRAM(s) Oral daily PRN  senna 2 Tablet(s) Oral at bedtime    Pertinent labs/Imaging  POCT Blood Glucose.: 137 mg/dL (03-07-24 @ 12:24)  POCT Blood Glucose.: 149 mg/dL (03-07-24 @ 09:01)  POCT Blood Glucose.: 76 mg/dL (03-06-24 @ 21:45)  POCT Blood Glucose.: 198 mg/dL (03-06-24 @ 17:09)

## 2024-03-07 NOTE — PROGRESS NOTE ADULT - SUBJECTIVE AND OBJECTIVE BOX
Alexus Coronel MD   Internal Medicine, PGY 1  Contact via TEAMS.     SUBJECTIVE / OVERNIGHT EVENTS:  - Pt seen and examined at bedside  - TANYA    MEDICATIONS  (STANDING):  apixaban 2.5 milliGRAM(s) Oral two times a day  ascorbic acid 500 milliGRAM(s) Oral daily  atorvastatin 40 milliGRAM(s) Oral at bedtime  chlorhexidine 4% Liquid 1 Application(s) Topical daily  Dakins Solution - 1/4 Strength 1 Application(s) Topical every 8 hours  dextrose 5%. 1000 milliLiter(s) (50 mL/Hr) IV Continuous <Continuous>  dextrose 5%. 1000 milliLiter(s) (100 mL/Hr) IV Continuous <Continuous>  dextrose 50% Injectable 25 Gram(s) IV Push once  dextrose 50% Injectable 12.5 Gram(s) IV Push once  dextrose 50% Injectable 25 Gram(s) IV Push once  glucagon  Injectable 1 milliGRAM(s) IntraMuscular once  hydroxyurea 500 milliGRAM(s) Oral <User Schedule>  insulin lispro (ADMELOG) corrective regimen sliding scale   SubCutaneous three times a day before meals  insulin lispro (ADMELOG) corrective regimen sliding scale   SubCutaneous at bedtime  insulin lispro Injectable (ADMELOG) 6 Unit(s) SubCutaneous before lunch  insulin lispro Injectable (ADMELOG) 5 Unit(s) SubCutaneous before dinner  insulin lispro Injectable (ADMELOG) 11 Unit(s) SubCutaneous before breakfast  levoFLOXacin IVPB 750 milliGRAM(s) IV Intermittent every 48 hours  levothyroxine 88 MICROGram(s) Oral daily  metoprolol tartrate 25 milliGRAM(s) Oral two times a day  multivitamin 1 Tablet(s) Oral daily  polyethylene glycol 3350 17 Gram(s) Oral daily  senna 2 Tablet(s) Oral at bedtime    MEDICATIONS  (PRN):  acetaminophen     Tablet .. 650 milliGRAM(s) Oral every 6 hours PRN Temp greater or equal to 38C (100.4F), Mild Pain (1 - 3)  dextrose Oral Gel 15 Gram(s) Oral once PRN Blood Glucose LESS THAN 70 milliGRAM(s)/deciliter  melatonin 3 milliGRAM(s) Oral at bedtime PRN Insomnia  QUEtiapine 25 milliGRAM(s) Oral daily PRN for insomnia        03-06-24 @ 07:01  -  03-07-24 @ 07:00  --------------------------------------------------------  IN: 280 mL / OUT: 0 mL / NET: 280 mL        PHYSICAL EXAM:  Vital Signs Last 24 Hrs  T(C): 37.1 (07 Mar 2024 05:42), Max: 37.1 (06 Mar 2024 12:00)  T(F): 98.8 (07 Mar 2024 05:42), Max: 98.8 (07 Mar 2024 05:42)  HR: 94 (07 Mar 2024 05:42) (94 - 102)  BP: 147/74 (07 Mar 2024 05:42) (130/64 - 150/83)  BP(mean): --  RR: 18 (07 Mar 2024 05:42) (18 - 18)  SpO2: 96% (07 Mar 2024 05:42) (96% - 98%)    Parameters below as of 07 Mar 2024 05:42  Patient On (Oxygen Delivery Method): room air        CAPILLARY BLOOD GLUCOSE      POCT Blood Glucose.: 76 mg/dL (06 Mar 2024 21:45)  POCT Blood Glucose.: 198 mg/dL (06 Mar 2024 17:09)  POCT Blood Glucose.: 227 mg/dL (06 Mar 2024 12:29)  POCT Blood Glucose.: 220 mg/dL (06 Mar 2024 08:40)    I&O's Summary    06 Mar 2024 07:01  -  07 Mar 2024 07:00  --------------------------------------------------------  IN: 280 mL / OUT: 0 mL / NET: 280 mL        CONSTITUTIONAL: NAD  HEENT: NC/AT  RESPIRATORY: Normal respiratory effort; lungs are clear to auscultation bilaterally  CARDIOVASCULAR: Regular rate and rhythm, normal S1 and S2, no murmur/rub/gallop; No lower extremity edema; Peripheral pulses are 2+ bilaterally  ABDOMEN: Nontender to palpation, normoactive bowel sounds, no rebound/guarding; No hepatosplenomegaly  MUSCLOSKELETAL: no clubbing or cyanosis of digits; no joint swelling or tenderness to palpation  EXTREMITIES: no peripheral edema, distal pulses intact   NEURO: no focal deficits   PSYCH: A+O to person, place, and time; affect appropriate    LABS:                      IMAGING:    [X] All pertinent imaging reviewed by me Alexus Coronel MD   Internal Medicine, PGY 1  Contact via TEAMS.     SUBJECTIVE / OVERNIGHT EVENTS:  - Pt seen and examined at bedside  - TANYA. No acute complaints.     MEDICATIONS  (STANDING):  apixaban 2.5 milliGRAM(s) Oral two times a day  ascorbic acid 500 milliGRAM(s) Oral daily  atorvastatin 40 milliGRAM(s) Oral at bedtime  chlorhexidine 4% Liquid 1 Application(s) Topical daily  Dakins Solution - 1/4 Strength 1 Application(s) Topical every 8 hours  dextrose 5%. 1000 milliLiter(s) (50 mL/Hr) IV Continuous <Continuous>  dextrose 5%. 1000 milliLiter(s) (100 mL/Hr) IV Continuous <Continuous>  dextrose 50% Injectable 25 Gram(s) IV Push once  dextrose 50% Injectable 12.5 Gram(s) IV Push once  dextrose 50% Injectable 25 Gram(s) IV Push once  glucagon  Injectable 1 milliGRAM(s) IntraMuscular once  hydroxyurea 500 milliGRAM(s) Oral <User Schedule>  insulin lispro (ADMELOG) corrective regimen sliding scale   SubCutaneous three times a day before meals  insulin lispro (ADMELOG) corrective regimen sliding scale   SubCutaneous at bedtime  insulin lispro Injectable (ADMELOG) 6 Unit(s) SubCutaneous before lunch  insulin lispro Injectable (ADMELOG) 5 Unit(s) SubCutaneous before dinner  insulin lispro Injectable (ADMELOG) 11 Unit(s) SubCutaneous before breakfast  levoFLOXacin IVPB 750 milliGRAM(s) IV Intermittent every 48 hours  levothyroxine 88 MICROGram(s) Oral daily  metoprolol tartrate 25 milliGRAM(s) Oral two times a day  multivitamin 1 Tablet(s) Oral daily  polyethylene glycol 3350 17 Gram(s) Oral daily  senna 2 Tablet(s) Oral at bedtime    MEDICATIONS  (PRN):  acetaminophen     Tablet .. 650 milliGRAM(s) Oral every 6 hours PRN Temp greater or equal to 38C (100.4F), Mild Pain (1 - 3)  dextrose Oral Gel 15 Gram(s) Oral once PRN Blood Glucose LESS THAN 70 milliGRAM(s)/deciliter  melatonin 3 milliGRAM(s) Oral at bedtime PRN Insomnia  QUEtiapine 25 milliGRAM(s) Oral daily PRN for insomnia        03-06-24 @ 07:01  -  03-07-24 @ 07:00  --------------------------------------------------------  IN: 280 mL / OUT: 0 mL / NET: 280 mL        PHYSICAL EXAM:  Vital Signs Last 24 Hrs  T(C): 37.1 (07 Mar 2024 05:42), Max: 37.1 (06 Mar 2024 12:00)  T(F): 98.8 (07 Mar 2024 05:42), Max: 98.8 (07 Mar 2024 05:42)  HR: 94 (07 Mar 2024 05:42) (94 - 102)  BP: 147/74 (07 Mar 2024 05:42) (130/64 - 150/83)  BP(mean): --  RR: 18 (07 Mar 2024 05:42) (18 - 18)  SpO2: 96% (07 Mar 2024 05:42) (96% - 98%)    Parameters below as of 07 Mar 2024 05:42  Patient On (Oxygen Delivery Method): room air        CAPILLARY BLOOD GLUCOSE      POCT Blood Glucose.: 76 mg/dL (06 Mar 2024 21:45)  POCT Blood Glucose.: 198 mg/dL (06 Mar 2024 17:09)  POCT Blood Glucose.: 227 mg/dL (06 Mar 2024 12:29)  POCT Blood Glucose.: 220 mg/dL (06 Mar 2024 08:40)    I&O's Summary    06 Mar 2024 07:01  -  07 Mar 2024 07:00  --------------------------------------------------------  IN: 280 mL / OUT: 0 mL / NET: 280 mL      GENERAL: NAD, lying in bed comfortably  HEAD:  Atraumatic, Normocephalic  EYES: EOMI, PERRLA, conjunctiva and sclera clear  ENT: Moist mucous membranes  NECK: Supple  CHEST/LUNG: Clear to auscultation bilaterally; No rales, rhonchi, wheezing, or rubs. Unlabored respirations  HEART: Regular rate and rhythm; No murmurs, rubs, or gallops  ABDOMEN: BSx4; Soft, nontender, nondistended  EXTREMITIES:  2+ Peripheral Pulses, brisk capillary refill. No clubbing, cyanosis, or edema  NERVOUS SYSTEM:  A&Ox1, no new focal deficits  SKIN: unstageable sacral decubitus ulcer   psych: normal behavior, normal affect    LABS:                      IMAGING:    [X] All pertinent imaging reviewed by me

## 2024-03-07 NOTE — PROGRESS NOTE ADULT - ASSESSMENT
Patient is a 89 year old female DNR/DNI with PMH of DVT, hypothyroidism, P. 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 Patient with significant unstageable sacral decubitus ulcer. Per daughter, she hasn't been getting official wound care (dressings taken care of by family) due to insurance issues getting wound care nurse approved. Daughter also reports that her mother's mental status at baseline is that she can say her name but doesn't always know where she is.    Sepsis, unstageable sacral ulcer with OM of coccyx   Poorly controlled DM  - tachycardia, tachypnea and leukocytosis on admission, low grade temp 100.1F x1 2/28pm    -- afebrile >48h, WBC trend noted, CRP 86  - Flu/COVID/RSV negative   - CXR with no focal consolidations  - UA negative, Ucx with Klebsiella   - CT pelvis with left sacral/medial gluteal decubitus ulcer with OM of coccyx, no drainable collection   - MRSA PCR screen negative, off vancomycin   - BCx negative  - 2/29 s/p selective excisional debridement of sacral wound by Wound care team   - Wcx with E.coli, ESBL Proteus, E. faecalis-sensitivities reviewed   - s/p zosyn 2/28-3/3    Recommendations:  Treatment of sacral pressure ulcer and confirmed OM with antibiotics alone without debridement and wound coverage ineffective, also given location wound is at risk of contamination, further skin/ wound breakdown due to pressure and prolonged course of antibiotics unlikely to resolve this and will increase risk of C DIff, development of resistant organisms making treatment of infection more difficult in the future in addition to antibiotic adverse effects, therefore, recommended short course levofloxacin 750mg Q48h (renally dosed) to complete total 10d course on 3/12/24 with continued local wound care, nutrition, offloading as able    Stable from ID standpoint at this time.  Discharge planning per primary team.    Irasema Coy M.D.  [facility], Division of Infectious Diseases  [phone]  After 5pm on weekdays and all day on weekends - please call [phone]

## 2024-03-07 NOTE — PROGRESS NOTE ADULT - ATTENDING COMMENTS
Patient seen and examined at bedside. No acute events overnight. Awaiting auth to SNF. Medically stable and finishing abx.

## 2024-03-07 NOTE — PROGRESS NOTE ADULT - SUBJECTIVE AND OBJECTIVE BOX
[facility] DIVISION OF INFECTIOUS DISEASES  SANCHEZ Faria Y. Patel, S. Shah, G. Casimir  [phone]  ([phone] - weekdays after 5pm and weekends)    Name: BISHOP BURNS  Age/Gender: 89y Female  MRN: [id number]    Interval History:  Patient seen and examined this morning  Resting comfortably, states feels "fine"  Notes reviewed.   No concerning overnight events.  Afebrile.   Allergies: No Known Drug Allergies  black pepper, white pepper, cumin, paprika, johnston powder, chili powder (Rash)      Objective:  Vitals:   T(F): 98.8 (03-07-24 @ 05:42), Max: 98.8 (03-07-24 @ 05:42)  HR: 94 (03-07-24 @ 05:42) (94 - 102)  BP: 147/74 (03-07-24 @ 05:42) (130/64 - 150/83)  RR: 18 (03-07-24 @ 05:42) (18 - 18)  SpO2: 96% (03-07-24 @ 05:42) (96% - 98%)  Physical Examination:  General: no acute distress, nontoxic appearing   HEENT: NC/AT, anicteric, neck supple  Respiratory: no acc muscle use, breathing comfortably  Cardiovascular: S1 and S2 present  Gastrointestinal: normal appearing, nondistended  Extremities: no edema, no cyanosis  Skin: no visible rash    Laboratory Studies:  CBC:   WBC Trend:  20.44 03-01-24 @ 11:05  16.86 02-29-24 @ 13:07    CMP:       Creatinine: 0.97 mg/dL (03-01-24 @ 11:01)  Creatinine: 0.69 mg/dL (02-29-24 @ 13:07)    Microbiology: reviewed   Culture - Other (collected 02-29-24 @ 14:29)  Source: Wound Wound  Final Report (03-02-24 @ 21:10):    Numerous Proteus mirabilis ESBL    Numerous Escherichia coli    Numerous Enterococcus faecalis  Organism: Proteus mirabilis ESBL  Escherichia coli  Enterococcus faecalis (03-02-24 @ 21:10)  Organism: Enterococcus faecalis (03-02-24 @ 21:10)      Method Type: JULIETTE      -  Ampicillin: S <=2 Predicts results to ampicillin/sulbactam, amoxacillin-clavulanate and  piperacillin-tazobactam.      -  Vancomycin: S 2  Organism: Escherichia coli (03-02-24 @ 21:10)      Method Type: JULIETTE      -  Amoxicillin/Clavulanic Acid: R >16/8      -  Ampicillin: R >16 These ampicillin results predict results for amoxicillin      -  Ampicillin/Sulbactam: I 16/8      -  Aztreonam: R >16      -  Cefazolin: R >16      -  Cefepime: S <=2      -  Cefoxitin: R >16      -  Ceftriaxone: R 32      -  Ciprofloxacin: S <=0.25      -  Ertapenem: S <=0.5      -  Gentamicin: S <=2      -  Imipenem: S <=1      -  Levofloxacin: S <=0.5      -  Meropenem: S <=1      -  Piperacillin/Tazobactam: S <=8      -  Tobramycin: S <=2      -  Trimethoprim/Sulfamethoxazole: S <=0.5/9.5  Organism: Proteus mirabilis ESBL (03-02-24 @ 21:10)      Method Type: JULIETTE      -  Amoxicillin/Clavulanic Acid: S <=8/4      -  Ampicillin: R >16 These ampicillin results predict results for amoxicillin      -  Ampicillin/Sulbactam: R <=4/2      -  Aztreonam: R 16      -  Cefazolin: R >16      -  Cefepime: R >16      -  Ceftriaxone: R >32      -  Ciprofloxacin: S <=0.25      -  Ertapenem: S <=0.5      -  Gentamicin: S <=2      -  Levofloxacin: S <=0.5      -  Meropenem: S <=1      -  Piperacillin/Tazobactam: R <=8      -  Tobramycin: S <=2      -  Trimethoprim/Sulfamethoxazole: S <=0.5/9.5    Culture - Blood (collected 02-28-24 @ 19:50)  Source: .Blood Blood-Peripheral  Final Report (03-05-24 @ 03:00):    No growth at 5 days    Culture - Blood (collected 02-28-24 @ 19:40)  Source: .Blood Blood-Peripheral  Final Report (03-05-24 @ 03:00):    No growth at 5 days    Culture - Urine (collected 02-28-24 @ 18:37)  Source: Clean Catch Clean Catch (Midstream)  Final Report (03-02-24 @ 07:12):    50,000 - 99,000 CFU/mL Klebsiella pneumoniae  Organism: Klebsiella pneumoniae (03-02-24 @ 07:12)  Organism: Klebsiella pneumoniae (03-02-24 @ 07:12)      Method Type: JULIETTE      -  Amoxicillin/Clavulanic Acid: S <=8/4      -  Ampicillin: R >16 These ampicillin results predict results for amoxicillin      -  Ampicillin/Sulbactam: I 16/8      -  Aztreonam: S <=4      -  Cefazolin: S <=2 For uncomplicated UTI with K. pneumoniae, E. coli, or P. mirablis: JULIETTE <=16 is sensitive and JULIETTE >=32 is resistant. This also predicts results for oral agents cefaclor, cefdinir, cefpodoxime, cefprozil, cefuroxime axetil, cephalexin and locarbef for uncomplicated UTI. Note that some isolates may be susceptible to these agents while testing resistant to cefazolin.      -  Cefepime: S <=2      -  Cefoxitin: S <=8      -  Ceftriaxone: S <=1      -  Cefuroxime: S <=4      -  Ciprofloxacin: S <=0.25      -  Ertapenem: S <=0.5      -  Gentamicin: S <=2      -  Imipenem: S <=1      -  Levofloxacin: S <=0.5      -  Meropenem: S <=1      -  Nitrofurantoin: I 64 Should not be used to treat pyelonephritis      -  Piperacillin/Tazobactam: S <=8      -  Tobramycin: S <=2      -  Trimethoprim/Sulfamethoxazole: S <=0.5/9.5        SARS-CoV-2 Result: [facility][1] (28 Feb 2024 21:33)    Radiology: reviewed     Medications:  acetaminophen     Tablet .. 650 milliGRAM(s) Oral every 6 hours PRN  apixaban 2.5 milliGRAM(s) Oral two times a day  ascorbic acid 500 milliGRAM(s) Oral daily  atorvastatin 40 milliGRAM(s) Oral at bedtime  chlorhexidine 4% Liquid 1 Application(s) Topical daily  Dakins Solution - 1/4 Strength 1 Application(s) Topical every 8 hours  dextrose 5%. 1000 milliLiter(s) IV Continuous <Continuous>  dextrose 5%. 1000 milliLiter(s) IV Continuous <Continuous>  dextrose 50% Injectable 25 Gram(s) IV Push once  dextrose 50% Injectable 12.5 Gram(s) IV Push once  dextrose 50% Injectable 25 Gram(s) IV Push once  dextrose Oral Gel 15 Gram(s) Oral once PRN  glucagon  Injectable 1 milliGRAM(s) IntraMuscular once  hydroxyurea 500 milliGRAM(s) Oral <User Schedule>  insulin lispro (ADMELOG) corrective regimen sliding scale   SubCutaneous three times a day before meals  insulin lispro (ADMELOG) corrective regimen sliding scale   SubCutaneous at bedtime  insulin lispro Injectable (ADMELOG) 6 Unit(s) SubCutaneous before lunch  insulin lispro Injectable (ADMELOG) 5 Unit(s) SubCutaneous before dinner  insulin lispro Injectable (ADMELOG) 11 Unit(s) SubCutaneous before breakfast  levoFLOXacin IVPB 750 milliGRAM(s) IV Intermittent every 48 hours  levothyroxine 88 MICROGram(s) Oral daily  melatonin 3 milliGRAM(s) Oral at bedtime PRN  metoprolol tartrate 25 milliGRAM(s) Oral two times a day  multivitamin 1 Tablet(s) Oral daily  polyethylene glycol 3350 17 Gram(s) Oral daily  QUEtiapine 25 milliGRAM(s) Oral daily PRN  senna 2 Tablet(s) Oral at bedtime    Current Antimicrobials:  levoFLOXacin IVPB 750 milliGRAM(s) IV Intermittent every 48 hours    Prior/Completed Antimicrobials:  piperacillin/tazobactam IVPB.  piperacillin/tazobactam IVPB.-  piperacillin/tazobactam IVPB...  vancomycin  IVPB.

## 2024-03-08 LAB
GLUCOSE BLDC GLUCOMTR-MCNC: 119 MG/DL — HIGH (ref 70–99)
GLUCOSE BLDC GLUCOMTR-MCNC: 146 MG/DL — HIGH (ref 70–99)
GLUCOSE BLDC GLUCOMTR-MCNC: 206 MG/DL — HIGH (ref 70–99)
GLUCOSE BLDC GLUCOMTR-MCNC: 291 MG/DL — HIGH (ref 70–99)
GLUCOSE BLDC GLUCOMTR-MCNC: 66 MG/DL — LOW (ref 70–99)
GLUCOSE BLDC GLUCOMTR-MCNC: 75 MG/DL — SIGNIFICANT CHANGE UP (ref 70–99)

## 2024-03-08 PROCEDURE — 99232 SBSQ HOSP IP/OBS MODERATE 35: CPT | Mod: GC

## 2024-03-08 RX ADMIN — APIXABAN 2.5 MILLIGRAM(S): 2.5 TABLET, FILM COATED ORAL at 18:04

## 2024-03-08 RX ADMIN — Medication 88 MICROGRAM(S): at 05:44

## 2024-03-08 RX ADMIN — SENNA PLUS 2 TABLET(S): 8.6 TABLET ORAL at 21:33

## 2024-03-08 RX ADMIN — Medication 1 TABLET(S): at 11:31

## 2024-03-08 RX ADMIN — Medication 2: at 12:43

## 2024-03-08 RX ADMIN — Medication 500 MILLIGRAM(S): at 11:32

## 2024-03-08 RX ADMIN — HYDROXYUREA 500 MILLIGRAM(S): 500 CAPSULE ORAL at 11:31

## 2024-03-08 RX ADMIN — Medication 1: at 22:55

## 2024-03-08 RX ADMIN — ATORVASTATIN CALCIUM 40 MILLIGRAM(S): 80 TABLET, FILM COATED ORAL at 21:33

## 2024-03-08 RX ADMIN — Medication 25 MILLIGRAM(S): at 18:04

## 2024-03-08 RX ADMIN — APIXABAN 2.5 MILLIGRAM(S): 2.5 TABLET, FILM COATED ORAL at 05:44

## 2024-03-08 RX ADMIN — CHLORHEXIDINE GLUCONATE 1 APPLICATION(S): 213 SOLUTION TOPICAL at 11:32

## 2024-03-08 RX ADMIN — Medication 11 UNIT(S): at 09:24

## 2024-03-08 RX ADMIN — Medication 25 MILLIGRAM(S): at 05:44

## 2024-03-08 RX ADMIN — Medication 6 UNIT(S): at 12:44

## 2024-03-08 RX ADMIN — POLYETHYLENE GLYCOL 3350 17 GRAM(S): 17 POWDER, FOR SOLUTION ORAL at 11:32

## 2024-03-08 NOTE — PROGRESS NOTE ADULT - ATTENDING COMMENTS
Patient seen and examined at bedside. No acute events overnight. At baseline health. Still pending auth to [facility].

## 2024-03-08 NOTE — PROGRESS NOTE ADULT - ASSESSMENT
89y old Female with history of T2DM, Hypothyroidism, Dementia, HTN, Hx of DVT, CVA, Sacral ulcer here with osteomyelitis.    1. T2DM with hyperglycemia  - numbers somewhat labile but monitor for now since no consistent patterns  - Continue Admelog 11 units with breakfast, 6 units with lunch and 5 units with dinner  - Continue low Admelog correctional scale before meals and bedtime  - Monitor FS before meals and bedtime  - Follow hospital hypoglycemic protocol    2. Hypothyroidism  - Continue Levothyroxine 88 mcg daily    Discharge recs  1. Metformin  mg twice daily with food  2. Glipizide ER 2.5 mg daily with breakfast  3. No insulin on discharge    Matthew Willard MD  [facility]- Division of Endocrinology    [address] [place] [address]    T [phone]  F [phone]

## 2024-03-08 NOTE — PROGRESS NOTE ADULT - SUBJECTIVE AND OBJECTIVE BOX
Alexus Coronel MD   Internal Medicine, PGY 1  Contact via TEAMS.     SUBJECTIVE / OVERNIGHT EVENTS:  - Pt seen and examined at bedside  - TANYA    MEDICATIONS  (STANDING):  apixaban 2.5 milliGRAM(s) Oral two times a day  ascorbic acid 500 milliGRAM(s) Oral daily  atorvastatin 40 milliGRAM(s) Oral at bedtime  chlorhexidine 4% Liquid 1 Application(s) Topical daily  dextrose 5%. 1000 milliLiter(s) (50 mL/Hr) IV Continuous <Continuous>  dextrose 5%. 1000 milliLiter(s) (100 mL/Hr) IV Continuous <Continuous>  dextrose 50% Injectable 12.5 Gram(s) IV Push once  dextrose 50% Injectable 25 Gram(s) IV Push once  dextrose 50% Injectable 25 Gram(s) IV Push once  glucagon  Injectable 1 milliGRAM(s) IntraMuscular once  hydroxyurea 500 milliGRAM(s) Oral <User Schedule>  insulin lispro (ADMELOG) corrective regimen sliding scale   SubCutaneous three times a day before meals  insulin lispro (ADMELOG) corrective regimen sliding scale   SubCutaneous at bedtime  insulin lispro Injectable (ADMELOG) 6 Unit(s) SubCutaneous before lunch  insulin lispro Injectable (ADMELOG) 5 Unit(s) SubCutaneous before dinner  insulin lispro Injectable (ADMELOG) 11 Unit(s) SubCutaneous before breakfast  levoFLOXacin IVPB 750 milliGRAM(s) IV Intermittent every 48 hours  levothyroxine 88 MICROGram(s) Oral daily  metoprolol tartrate 25 milliGRAM(s) Oral two times a day  multivitamin 1 Tablet(s) Oral daily  polyethylene glycol 3350 17 Gram(s) Oral daily  senna 2 Tablet(s) Oral at bedtime    MEDICATIONS  (PRN):  acetaminophen     Tablet .. 650 milliGRAM(s) Oral every 6 hours PRN Temp greater or equal to 38C (100.4F), Mild Pain (1 - 3)  dextrose Oral Gel 15 Gram(s) Oral once PRN Blood Glucose LESS THAN 70 milliGRAM(s)/deciliter  melatonin 3 milliGRAM(s) Oral at bedtime PRN Insomnia  QUEtiapine 25 milliGRAM(s) Oral daily PRN for insomnia        03-07-24 @ 07:01  -  03-08-24 @ 07:00  --------------------------------------------------------  IN: 0 mL / OUT: 100 mL / NET: -100 mL        PHYSICAL EXAM:  Vital Signs Last 24 Hrs  T(C): 37.6 (08 Mar 2024 04:44), Max: 37.6 (08 Mar 2024 04:44)  T(F): 99.7 (08 Mar 2024 04:44), Max: 99.7 (08 Mar 2024 04:44)  HR: 88 (08 Mar 2024 04:44) (88 - 113)  BP: 114/56 (08 Mar 2024 04:44) (114/56 - 143/66)  BP(mean): --  RR: 18 (08 Mar 2024 04:44) (18 - 18)  SpO2: 94% (08 Mar 2024 04:44) (94% - 98%)    Parameters below as of 08 Mar 2024 04:44  Patient On (Oxygen Delivery Method): room air        CAPILLARY BLOOD GLUCOSE      POCT Blood Glucose.: 103 mg/dL (07 Mar 2024 22:50)  POCT Blood Glucose.: 224 mg/dL (07 Mar 2024 17:38)  POCT Blood Glucose.: 137 mg/dL (07 Mar 2024 12:24)  POCT Blood Glucose.: 149 mg/dL (07 Mar 2024 09:01)    I&O's Summary    07 Mar 2024 07:01  -  08 Mar 2024 07:00  --------------------------------------------------------  IN: 0 mL / OUT: 100 mL / NET: -100 mL        CONSTITUTIONAL: NAD  HEENT: NC/AT  RESPIRATORY: Normal respiratory effort; lungs are clear to auscultation bilaterally  CARDIOVASCULAR: Regular rate and rhythm, normal S1 and S2, no murmur/rub/gallop; No lower extremity edema; Peripheral pulses are 2+ bilaterally  ABDOMEN: Nontender to palpation, normoactive bowel sounds, no rebound/guarding; No hepatosplenomegaly  MUSCLOSKELETAL: no clubbing or cyanosis of digits; no joint swelling or tenderness to palpation  EXTREMITIES: no peripheral edema, distal pulses intact   NEURO: no focal deficits   PSYCH: A+O to person, place, and time; affect appropriate    LABS:                      IMAGING:    [X] All pertinent imaging reviewed by me Alexus Coronel MD   Internal Medicine, PGY 1  Contact via TEAMS.     SUBJECTIVE / OVERNIGHT EVENTS:  - Pt seen and examined at bedside  - TANYA. Pt without acute complaints.     MEDICATIONS  (STANDING):  apixaban 2.5 milliGRAM(s) Oral two times a day  ascorbic acid 500 milliGRAM(s) Oral daily  atorvastatin 40 milliGRAM(s) Oral at bedtime  chlorhexidine 4% Liquid 1 Application(s) Topical daily  dextrose 5%. 1000 milliLiter(s) (50 mL/Hr) IV Continuous <Continuous>  dextrose 5%. 1000 milliLiter(s) (100 mL/Hr) IV Continuous <Continuous>  dextrose 50% Injectable 12.5 Gram(s) IV Push once  dextrose 50% Injectable 25 Gram(s) IV Push once  dextrose 50% Injectable 25 Gram(s) IV Push once  glucagon  Injectable 1 milliGRAM(s) IntraMuscular once  hydroxyurea 500 milliGRAM(s) Oral <User Schedule>  insulin lispro (ADMELOG) corrective regimen sliding scale   SubCutaneous three times a day before meals  insulin lispro (ADMELOG) corrective regimen sliding scale   SubCutaneous at bedtime  insulin lispro Injectable (ADMELOG) 6 Unit(s) SubCutaneous before lunch  insulin lispro Injectable (ADMELOG) 5 Unit(s) SubCutaneous before dinner  insulin lispro Injectable (ADMELOG) 11 Unit(s) SubCutaneous before breakfast  levoFLOXacin IVPB 750 milliGRAM(s) IV Intermittent every 48 hours  levothyroxine 88 MICROGram(s) Oral daily  metoprolol tartrate 25 milliGRAM(s) Oral two times a day  multivitamin 1 Tablet(s) Oral daily  polyethylene glycol 3350 17 Gram(s) Oral daily  senna 2 Tablet(s) Oral at bedtime    MEDICATIONS  (PRN):  acetaminophen     Tablet .. 650 milliGRAM(s) Oral every 6 hours PRN Temp greater or equal to 38C (100.4F), Mild Pain (1 - 3)  dextrose Oral Gel 15 Gram(s) Oral once PRN Blood Glucose LESS THAN 70 milliGRAM(s)/deciliter  melatonin 3 milliGRAM(s) Oral at bedtime PRN Insomnia  QUEtiapine 25 milliGRAM(s) Oral daily PRN for insomnia        03-07-24 @ 07:01  -  03-08-24 @ 07:00  --------------------------------------------------------  IN: 0 mL / OUT: 100 mL / NET: -100 mL        PHYSICAL EXAM:  Vital Signs Last 24 Hrs  T(C): 37.6 (08 Mar 2024 04:44), Max: 37.6 (08 Mar 2024 04:44)  T(F): 99.7 (08 Mar 2024 04:44), Max: 99.7 (08 Mar 2024 04:44)  HR: 88 (08 Mar 2024 04:44) (88 - 113)  BP: 114/56 (08 Mar 2024 04:44) (114/56 - 143/66)  BP(mean): --  RR: 18 (08 Mar 2024 04:44) (18 - 18)  SpO2: 94% (08 Mar 2024 04:44) (94% - 98%)    Parameters below as of 08 Mar 2024 04:44  Patient On (Oxygen Delivery Method): room air        CAPILLARY BLOOD GLUCOSE      POCT Blood Glucose.: 103 mg/dL (07 Mar 2024 22:50)  POCT Blood Glucose.: 224 mg/dL (07 Mar 2024 17:38)  POCT Blood Glucose.: 137 mg/dL (07 Mar 2024 12:24)  POCT Blood Glucose.: 149 mg/dL (07 Mar 2024 09:01)    I&O's Summary    07 Mar 2024 07:01  -  08 Mar 2024 07:00  --------------------------------------------------------  IN: 0 mL / OUT: 100 mL / NET: -100 mL        GENERAL: NAD, lying in bed comfortably  HEAD:  Atraumatic, Normocephalic  EYES: EOMI, PERRLA, conjunctiva and sclera clear  ENT: Moist mucous membranes  NECK: Supple  CHEST/LUNG: Clear to auscultation bilaterally; No rales, rhonchi, wheezing, or rubs. Unlabored respirations  HEART: Regular rate and rhythm; No murmurs, rubs, or gallops  ABDOMEN: BSx4; Soft, nontender, nondistended  EXTREMITIES:  2+ Peripheral Pulses, brisk capillary refill. No clubbing, cyanosis, or edema  NERVOUS SYSTEM:  A&Ox1, no new focal deficits  SKIN: unstageable sacral decubitus ulcer   psych: normal behavior, normal affect    LABS:                      IMAGING:    [X] All pertinent imaging reviewed by me

## 2024-03-08 NOTE — PROGRESS NOTE ADULT - ASSESSMENT
Patient is a 89 year old female DNR/DNI with PMH of DVT, hypothyroidism, P. 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 Patient with significant unstageable sacral decubitus ulcer. Per daughter, she hasn't been getting official wound care (dressings taken care of by family) due to insurance issues getting wound care nurse approved. Daughter also reports that her mother's mental status at baseline is that she can say her name but doesn't always know where she is.    Sepsis, unstageable sacral ulcer with OM of coccyx   Poorly controlled DM  - tachycardia, tachypnea and leukocytosis on admission, low grade temp 100.1F x1 2/28pm    -- afebrile >48h, WBC trend noted, CRP 86  - Flu/COVID/RSV negative   - CXR with no focal consolidations  - UA negative, Ucx with Klebsiella   - CT pelvis with left sacral/medial gluteal decubitus ulcer with OM of coccyx, no drainable collection   - MRSA PCR screen negative, off vancomycin   - BCx negative  - 2/29 s/p selective excisional debridement of sacral wound by Wound care team   - Wcx with E.coli, ESBL Proteus, E. faecalis-sensitivities reviewed   - s/p zosyn 2/28-3/3    Recommendations:  Treatment of sacral pressure ulcer and confirmed OM with antibiotics alone without debridement and wound coverage ineffective, also given location wound is at risk of contamination, further skin/ wound breakdown due to pressure and prolonged course of antibiotics unlikely to resolve this and will increase risk of C DIff, development of resistant organisms making treatment of infection more difficult in the future in addition to antibiotic adverse effects, therefore, recommended short course levofloxacin 750mg Q48h (renally dosed) to complete total 10d course on 3/12/24 with continued local wound care, nutrition, offloading as able    Stable from ID standpoint at this time.  Discharge planning per primary team.  Please call if any questions, thank you.     Irasema Coy M.D.  [facility]ospit[facility], Division of Infectious Diseases  [phone]  After 5pm on weekdays and all day on weekends - please call [phone]

## 2024-03-08 NOTE — PROGRESS NOTE ADULT - SUBJECTIVE AND OBJECTIVE BOX
[facility]  SANCHEZ Faria Y. Patel, S. Shah, G. Casimir  [phone]  ([phone] - weekdays after 5pm and weekends)    Name: BISHOP BURNS  Age/Gender: 89y Female  MRN: [id number]    Interval History:  Patient seen and examined this morning.   Resting comfortably, no new complaints.   Notes reviewed  No concerning overnight events  Afebrile   Allergies: No Known Drug Allergies  black pepper, white pepper, cumin, paprika, johnston powder, chili powder (Rash)    Objective:  Vitals:   T(F): 98.5 (03-08-24 @ 12:00), Max: 99.7 (03-08-24 @ 04:44)  HR: 101 (03-08-24 @ 12:00) (88 - 113)  BP: 137/77 (03-08-24 @ 12:00) (114/56 - 143/66)  RR: 18 (03-08-24 @ 12:00) (18 - 18)  SpO2: 98% (03-08-24 @ 12:00) (94% - 98%)  Physical Examination:  General: no acute distress, nontoxic appearing   HEENT: NC/AT, anicteric, neck supple  Respiratory: no acc muscle use, breathing comfortably  Cardiovascular: S1 and S2 present  Gastrointestinal: normal appearing, nondistended  Extremities: no edema, no cyanosis  Skin: no visible rash    Laboratory Studies:  CBC:   WBC Trend:    CMP:     Microbiology: reviewed   Culture - Other (collected 02-29-24 @ 14:29)  Source: Wound Wound  Final Report (03-02-24 @ 21:10):    Numerous Proteus mirabilis ESBL    Numerous Escherichia coli    Numerous Enterococcus faecalis  Organism: Proteus mirabilis ESBL  Escherichia coli  Enterococcus faecalis (03-02-24 @ 21:10)  Organism: Enterococcus faecalis (03-02-24 @ 21:10)      Method Type: JULIETTE      -  Ampicillin: S <=2 Predicts results to ampicillin/sulbactam, amoxacillin-clavulanate and  piperacillin-tazobactam.      -  Vancomycin: S 2  Organism: Escherichia coli (03-02-24 @ 21:10)      Method Type: JULIETTE      -  Amoxicillin/Clavulanic Acid: R >16/8      -  Ampicillin: R >16 These ampicillin results predict results for amoxicillin      -  Ampicillin/Sulbactam: I 16/8      -  Aztreonam: R >16      -  Cefazolin: R >16      -  Cefepime: S <=2      -  Cefoxitin: R >16      -  Ceftriaxone: R 32      -  Ciprofloxacin: S <=0.25      -  Ertapenem: S <=0.5      -  Gentamicin: S <=2      -  Imipenem: S <=1      -  Levofloxacin: S <=0.5      -  Meropenem: S <=1      -  Piperacillin/Tazobactam: S <=8      -  Tobramycin: S <=2      -  Trimethoprim/Sulfamethoxazole: S <=0.5/9.5  Organism: Proteus mirabilis ESBL (03-02-24 @ 21:10)      Method Type: JULIETTE      -  Amoxicillin/Clavulanic Acid: S <=8/4      -  Ampicillin: R >16 These ampicillin results predict results for amoxicillin      -  Ampicillin/Sulbactam: R <=4/2      -  Aztreonam: R 16      -  Cefazolin: R >16      -  Cefepime: R >16      -  Ceftriaxone: R >32      -  Ciprofloxacin: S <=0.25      -  Ertapenem: S <=0.5      -  Gentamicin: S <=2      -  Levofloxacin: S <=0.5      -  Meropenem: S <=1      -  Piperacillin/Tazobactam: R <=8      -  Tobramycin: S <=2      -  Trimethoprim/Sulfamethoxazole: S <=0.5/9.5    Culture - Blood (collected 02-28-24 @ 19:50)  Source: .Blood Blood-Peripheral  Final Report (03-05-24 @ 03:00):    No growth at 5 days    Culture - Blood (collected 02-28-24 @ 19:40)  Source: .Blood Blood-Peripheral  Final Report (03-05-24 @ 03:00):    No growth at 5 days    Culture - Urine (collected 02-28-24 @ 18:37)  Source: Clean Catch Clean Catch (Midstream)  Final Report (03-02-24 @ 07:12):    50,000 - 99,000 CFU/mL Klebsiella pneumoniae  Organism: Klebsiella pneumoniae (03-02-24 @ 07:12)  Organism: Klebsiella pneumoniae (03-02-24 @ 07:12)      Method Type: JULIETTE      -  Amoxicillin/Clavulanic Acid: S <=8/4      -  Ampicillin: R >16 These ampicillin results predict results for amoxicillin      -  Ampicillin/Sulbactam: I 16/8      -  Aztreonam: S <=4      -  Cefazolin: S <=2 For uncomplicated UTI with K. pneumoniae, E. coli, or P. mirablis: JULIETTE <=16 is sensitive and JULIETTE >=32 is resistant. This also predicts results for oral agents cefaclor, cefdinir, cefpodoxime, cefprozil, cefuroxime axetil, cephalexin and locarbef for uncomplicated UTI. Note that some isolates may be susceptible to these agents while testing resistant to cefazolin.      -  Cefepime: S <=2      -  Cefoxitin: S <=8      -  Ceftriaxone: S <=1      -  Cefuroxime: S <=4      -  Ciprofloxacin: S <=0.25      -  Ertapenem: S <=0.5      -  Gentamicin: S <=2      -  Imipenem: S <=1      -  Levofloxacin: S <=0.5      -  Meropenem: S <=1      -  Nitrofurantoin: I 64 Should not be used to treat pyelonephritis      -  Piperacillin/Tazobactam: S <=8      -  Tobramycin: S <=2      -  Trimethoprim/Sulfamethoxazole: S <=0.5/9.5    SARS-CoV-2 Result: [facility] (28 Feb 2024 21:33)    Radiology: reviewed     Medications:  acetaminophen     Tablet .. 650 milliGRAM(s) Oral every 6 hours PRN  apixaban 2.5 milliGRAM(s) Oral two times a day  ascorbic acid 500 milliGRAM(s) Oral daily  atorvastatin 40 milliGRAM(s) Oral at bedtime  chlorhexidine 4% Liquid 1 Application(s) Topical daily  dextrose 5%. 1000 milliLiter(s) IV Continuous <Continuous>  dextrose 5%. 1000 milliLiter(s) IV Continuous <Continuous>  dextrose 50% Injectable 25 Gram(s) IV Push once  dextrose 50% Injectable 12.5 Gram(s) IV Push once  dextrose 50% Injectable 25 Gram(s) IV Push once  dextrose Oral Gel 15 Gram(s) Oral once PRN  glucagon  Injectable 1 milliGRAM(s) IntraMuscular once  hydroxyurea 500 milliGRAM(s) Oral <User Schedule>  insulin lispro (ADMELOG) corrective regimen sliding scale   SubCutaneous three times a day before meals  insulin lispro (ADMELOG) corrective regimen sliding scale   SubCutaneous at bedtime  insulin lispro Injectable (ADMELOG) 6 Unit(s) SubCutaneous before lunch  insulin lispro Injectable (ADMELOG) 5 Unit(s) SubCutaneous before dinner  insulin lispro Injectable (ADMELOG) 11 Unit(s) SubCutaneous before breakfast  levoFLOXacin IVPB 750 milliGRAM(s) IV Intermittent every 48 hours  levothyroxine 88 MICROGram(s) Oral daily  melatonin 3 milliGRAM(s) Oral at bedtime PRN  metoprolol tartrate 25 milliGRAM(s) Oral two times a day  multivitamin 1 Tablet(s) Oral daily  polyethylene glycol 3350 17 Gram(s) Oral daily  QUEtiapine 25 milliGRAM(s) Oral daily PRN  senna 2 Tablet(s) Oral at bedtime    Current Antimicrobials:  levoFLOXacin IVPB 750 milliGRAM(s) IV Intermittent every 48 hours    Prior/Completed Antimicrobials:  piperacillin/tazobactam IVPB.  piperacillin/tazobactam IVPB.-  piperacillin/tazobactam IVPB...  vancomycin  IVPB.

## 2024-03-08 NOTE — PROGRESS NOTE ADULT - SUBJECTIVE AND OBJECTIVE BOX
[facility] Progress Note    MAR, chart notes, fingersticks and labs reviewed    Subjective:    Objective  Vital Signs  T(C): 36.9 (03-08-24 @ 12:00), Max: 37.6 (03-08-24 @ 04:44)  HR: 101 (03-08-24 @ 12:00) (88 - 113)  BP: 137/77 (03-08-24 @ 12:00) (114/56 - 143/66)  RR: 18 (03-08-24 @ 12:00) (18 - 18)  SpO2: 98% (03-08-24 @ 12:00) (94% - 98%)    Physical Exam  Gen: NAD, alert, awake  HEENT: NC/AT, EOMI  Neck: supple  Chest: breathing comfortably  Heart: +s1 +s2    Medications  acetaminophen     Tablet .. 650 milliGRAM(s) Oral every 6 hours PRN  apixaban 2.5 milliGRAM(s) Oral two times a day  ascorbic acid 500 milliGRAM(s) Oral daily  atorvastatin 40 milliGRAM(s) Oral at bedtime  chlorhexidine 4% Liquid 1 Application(s) Topical daily  dextrose 5%. 1000 milliLiter(s) IV Continuous <Continuous>  dextrose 5%. 1000 milliLiter(s) IV Continuous <Continuous>  dextrose 50% Injectable 25 Gram(s) IV Push once  dextrose 50% Injectable 12.5 Gram(s) IV Push once  dextrose 50% Injectable 25 Gram(s) IV Push once  dextrose Oral Gel 15 Gram(s) Oral once PRN  glucagon  Injectable 1 milliGRAM(s) IntraMuscular once  hydroxyurea 500 milliGRAM(s) Oral <User Schedule>  insulin lispro (ADMELOG) corrective regimen sliding scale   SubCutaneous three times a day before meals  insulin lispro (ADMELOG) corrective regimen sliding scale   SubCutaneous at bedtime  insulin lispro Injectable (ADMELOG) 6 Unit(s) SubCutaneous before lunch  insulin lispro Injectable (ADMELOG) 5 Unit(s) SubCutaneous before dinner  insulin lispro Injectable (ADMELOG) 11 Unit(s) SubCutaneous before breakfast  levoFLOXacin IVPB 750 milliGRAM(s) IV Intermittent every 48 hours  levothyroxine 88 MICROGram(s) Oral daily  melatonin 3 milliGRAM(s) Oral at bedtime PRN  metoprolol tartrate 25 milliGRAM(s) Oral two times a day  multivitamin 1 Tablet(s) Oral daily  polyethylene glycol 3350 17 Gram(s) Oral daily  QUEtiapine 25 milliGRAM(s) Oral daily PRN  senna 2 Tablet(s) Oral at bedtime    Pertinent labs/Imaging  POCT Blood Glucose.: 206 mg/dL (03-08-24 @ 12:34)  POCT Blood Glucose.: 146 mg/dL (03-08-24 @ 08:44)  POCT Blood Glucose.: 103 mg/dL (03-07-24 @ 22:50)  POCT Blood Glucose.: 224 mg/dL (03-07-24 @ 17:38)         [facility] Progress Note    MAR, chart notes, fingersticks and labs reviewed    Subjective: still pending insurance auth    Objective  Vital Signs  T(C): 36.9 (03-08-24 @ 12:00), Max: 37.6 (03-08-24 @ 04:44)  HR: 101 (03-08-24 @ 12:00) (88 - 113)  BP: 137/77 (03-08-24 @ 12:00) (114/56 - 143/66)  RR: 18 (03-08-24 @ 12:00) (18 - 18)  SpO2: 98% (03-08-24 @ 12:00) (94% - 98%)    Physical Exam  Gen: NAD, alert, awake  HEENT: NC/AT, EOMI  Neck: supple  Chest: breathing comfortably  Heart: +s1 +s2    Medications  acetaminophen     Tablet .. 650 milliGRAM(s) Oral every 6 hours PRN  apixaban 2.5 milliGRAM(s) Oral two times a day  ascorbic acid 500 milliGRAM(s) Oral daily  atorvastatin 40 milliGRAM(s) Oral at bedtime  chlorhexidine 4% Liquid 1 Application(s) Topical daily  dextrose 5%. 1000 milliLiter(s) IV Continuous <Continuous>  dextrose 5%. 1000 milliLiter(s) IV Continuous <Continuous>  dextrose 50% Injectable 25 Gram(s) IV Push once  dextrose 50% Injectable 12.5 Gram(s) IV Push once  dextrose 50% Injectable 25 Gram(s) IV Push once  dextrose Oral Gel 15 Gram(s) Oral once PRN  glucagon  Injectable 1 milliGRAM(s) IntraMuscular once  hydroxyurea 500 milliGRAM(s) Oral <User Schedule>  insulin lispro (ADMELOG) corrective regimen sliding scale   SubCutaneous three times a day before meals  insulin lispro (ADMELOG) corrective regimen sliding scale   SubCutaneous at bedtime  insulin lispro Injectable (ADMELOG) 6 Unit(s) SubCutaneous before lunch  insulin lispro Injectable (ADMELOG) 5 Unit(s) SubCutaneous before dinner  insulin lispro Injectable (ADMELOG) 11 Unit(s) SubCutaneous before breakfast  levoFLOXacin IVPB 750 milliGRAM(s) IV Intermittent every 48 hours  levothyroxine 88 MICROGram(s) Oral daily  melatonin 3 milliGRAM(s) Oral at bedtime PRN  metoprolol tartrate 25 milliGRAM(s) Oral two times a day  multivitamin 1 Tablet(s) Oral daily  polyethylene glycol 3350 17 Gram(s) Oral daily  QUEtiapine 25 milliGRAM(s) Oral daily PRN  senna 2 Tablet(s) Oral at bedtime    Pertinent labs/Imaging  POCT Blood Glucose.: 206 mg/dL (03-08-24 @ 12:34)  POCT Blood Glucose.: 146 mg/dL (03-08-24 @ 08:44)  POCT Blood Glucose.: 103 mg/dL (03-07-24 @ 22:50)  POCT Blood Glucose.: 224 mg/dL (03-07-24 @ 17:38)

## 2024-03-08 NOTE — PROGRESS NOTE ADULT - PROBLEM SELECTOR PLAN 8
hx of DM on metformin, but hasn't been taking since she as told she has normal A1C  Blood glucose elevated on admission       - endocrinology consulted, appreciate recommendations  - Continue Admelog 11 units with breakfast, 6 units with lunch and 5 units with dinner  - Continue low Admelog correctional scale before meals and bedtime  - Monitor FS before meals and bedtime  - continue to monitor  - On discharge: metformin  mg BID and Glipizide 2.5 mg

## 2024-03-09 LAB
GLUCOSE BLDC GLUCOMTR-MCNC: 130 MG/DL — HIGH (ref 70–99)
GLUCOSE BLDC GLUCOMTR-MCNC: 163 MG/DL — HIGH (ref 70–99)
GLUCOSE BLDC GLUCOMTR-MCNC: 178 MG/DL — HIGH (ref 70–99)
GLUCOSE BLDC GLUCOMTR-MCNC: 265 MG/DL — HIGH (ref 70–99)

## 2024-03-09 PROCEDURE — 99232 SBSQ HOSP IP/OBS MODERATE 35: CPT

## 2024-03-09 RX ORDER — INSULIN LISPRO 100/ML
4 VIAL (ML) SUBCUTANEOUS
Refills: 0 | Status: DISCONTINUED | OUTPATIENT
Start: 2024-03-09 | End: 2024-03-12

## 2024-03-09 RX ADMIN — Medication 1: at 09:07

## 2024-03-09 RX ADMIN — QUETIAPINE FUMARATE 25 MILLIGRAM(S): 200 TABLET, FILM COATED ORAL at 04:27

## 2024-03-09 RX ADMIN — Medication 25 MILLIGRAM(S): at 05:51

## 2024-03-09 RX ADMIN — APIXABAN 2.5 MILLIGRAM(S): 2.5 TABLET, FILM COATED ORAL at 17:01

## 2024-03-09 RX ADMIN — CHLORHEXIDINE GLUCONATE 1 APPLICATION(S): 213 SOLUTION TOPICAL at 12:32

## 2024-03-09 RX ADMIN — Medication 650 MILLIGRAM(S): at 10:49

## 2024-03-09 RX ADMIN — Medication 11 UNIT(S): at 09:08

## 2024-03-09 RX ADMIN — Medication 500 MILLIGRAM(S): at 12:32

## 2024-03-09 RX ADMIN — SENNA PLUS 2 TABLET(S): 8.6 TABLET ORAL at 21:40

## 2024-03-09 RX ADMIN — ATORVASTATIN CALCIUM 40 MILLIGRAM(S): 80 TABLET, FILM COATED ORAL at 21:40

## 2024-03-09 RX ADMIN — Medication 5 UNIT(S): at 17:54

## 2024-03-09 RX ADMIN — Medication 1: at 17:54

## 2024-03-09 RX ADMIN — Medication 88 MICROGRAM(S): at 05:51

## 2024-03-09 RX ADMIN — Medication 3 MILLIGRAM(S): at 00:10

## 2024-03-09 RX ADMIN — Medication 25 MILLIGRAM(S): at 17:01

## 2024-03-09 RX ADMIN — Medication 1 TABLET(S): at 12:31

## 2024-03-09 RX ADMIN — Medication 3: at 13:24

## 2024-03-09 RX ADMIN — Medication 650 MILLIGRAM(S): at 09:44

## 2024-03-09 RX ADMIN — Medication 4 UNIT(S): at 13:25

## 2024-03-09 RX ADMIN — POLYETHYLENE GLYCOL 3350 17 GRAM(S): 17 POWDER, FOR SOLUTION ORAL at 12:31

## 2024-03-09 RX ADMIN — APIXABAN 2.5 MILLIGRAM(S): 2.5 TABLET, FILM COATED ORAL at 05:51

## 2024-03-09 NOTE — PROGRESS NOTE ADULT - SUBJECTIVE AND OBJECTIVE BOX
[facility] Progress Note    MAR, chart notes, fingersticks and labs reviewed    Objective  Vital Signs  T(C): 37.7 (03-10-24 @ 05:30), Max: 37.7 (03-10-24 @ 05:30)  HR: 118 (03-10-24 @ 05:30) (101 - 118)  BP: 135/84 (03-10-24 @ 05:30) (122/88 - 140/68)  RR: 18 (03-10-24 @ 05:30) (18 - 20)  SpO2: 98% (03-10-24 @ 05:30) (95% - 98%)    Physical Exam  Gen: NAD, alert, awake  HEENT: NC/AT, EOMI  Neck: supple  Chest: breathing comfortably  Heart: +s1 +s2    Medications  acetaminophen     Tablet .. 650 milliGRAM(s) Oral every 6 hours PRN  apixaban 2.5 milliGRAM(s) Oral two times a day  ascorbic acid 500 milliGRAM(s) Oral daily  atorvastatin 40 milliGRAM(s) Oral at bedtime  chlorhexidine 4% Liquid 1 Application(s) Topical daily  dextrose 5%. 1000 milliLiter(s) IV Continuous <Continuous>  dextrose 5%. 1000 milliLiter(s) IV Continuous <Continuous>  dextrose 50% Injectable 25 Gram(s) IV Push once  dextrose 50% Injectable 12.5 Gram(s) IV Push once  dextrose 50% Injectable 25 Gram(s) IV Push once  dextrose Oral Gel 15 Gram(s) Oral once PRN  glucagon  Injectable 1 milliGRAM(s) IntraMuscular once  hydroxyurea 500 milliGRAM(s) Oral <User Schedule>  insulin lispro (ADMELOG) corrective regimen sliding scale   SubCutaneous three times a day before meals  insulin lispro (ADMELOG) corrective regimen sliding scale   SubCutaneous at bedtime  insulin lispro Injectable (ADMELOG) 5 Unit(s) SubCutaneous before dinner  insulin lispro Injectable (ADMELOG) 11 Unit(s) SubCutaneous before breakfast  insulin lispro Injectable (ADMELOG) 4 Unit(s) SubCutaneous before lunch  levoFLOXacin IVPB 750 milliGRAM(s) IV Intermittent every 48 hours  levothyroxine 88 MICROGram(s) Oral daily  melatonin 3 milliGRAM(s) Oral at bedtime PRN  metoprolol tartrate 25 milliGRAM(s) Oral two times a day  multivitamin 1 Tablet(s) Oral daily  polyethylene glycol 3350 17 Gram(s) Oral daily  QUEtiapine 25 milliGRAM(s) Oral daily PRN  senna 2 Tablet(s) Oral at bedtime    Pertinent labs/Imaging  POCT Blood Glucose.: 130 mg/dL (03-09-24 @ 21:52)  POCT Blood Glucose.: 163 mg/dL (03-09-24 @ 17:33)  POCT Blood Glucose.: 265 mg/dL (03-09-24 @ 12:34)  POCT Blood Glucose.: 178 mg/dL (03-09-24 @ 09:02)

## 2024-03-09 NOTE — PROGRESS NOTE ADULT - PROBLEM SELECTOR PLAN 1
CT pelvis with L sacral/medial gluteal decubitis ulcer with lysis of coccyx with c/f osteomyelitis  Also with unstageable sacral ulcer, not receiving adequate wound care, and is in pain  Meeting sepsis criteria with tachycardia, tacypnea, and leukocytosis   wound with exposed bone, diagnostic of ostemyelitis    - procal, crp, esr elevated     Plan:  dc zosyn  c/w Levaquin based on sensitivities for 5 doses  ID consulted, plan for 10 day abx course  Blood cx neg  wound culture grew e. coli, ESBL proteus, enterococcus   wound care debrided 2/29 CT pelvis with L sacral/medial gluteal decubitus ulcer with lysis of coccyx with c/f osteomyelitis  Also with unstageable sacral ulcer, not receiving adequate wound care, and is in pain  Meeting sepsis criteria with tachycardia, tachypnea, and leukocytosis   wound with exposed bone, diagnostic of osteomyelitis    - procal, crp, esr elevated     Plan:  dc zosyn  c/w Levaquin based on sensitivities for 5 doses  ID consulted, plan for 10 day abx course  Blood cx neg  wound culture grew e. coli, ESBL proteus, enterococcus   wound care debrided 2/29 CT pelvis with L sacral/medial gluteal decubitus ulcer with lysis of coccyx with c/f osteomyelitis  Also with unstageable sacral ulcer, not receiving adequate wound care, and is in pain  Meeting sepsis criteria with tachycardia, tachypnea, and leukocytosis   wound with exposed bone, diagnostic of osteomyelitis    - procal, crp, esr elevated     Plan:  dc zosyn  c/w Levaquin based on sensitivities for 5 doses until 3/11  ID consulted, plan for 10 day abx course  Blood cx neg  wound culture grew e. coli, ESBL proteus, enterococcus   wound care debrided 2/29

## 2024-03-09 NOTE — PROVIDER CONTACT NOTE (OTHER) - ACTION/TREATMENT ORDERED:
No new order from MD
MD made aware via teams. MD ordered to hold insulin and recheck blood glucose 30 minutes after eating.
[facility] cath

## 2024-03-09 NOTE — PROGRESS NOTE ADULT - ATTENDING COMMENTS
Patient seen and examined at bedside. No acute events overnight. At baseline health. Monitor bladder scan.   Still pending auth to LTC.

## 2024-03-09 NOTE — PROGRESS NOTE ADULT - SUBJECTIVE AND OBJECTIVE BOX
Alexus Coronel MD   Internal Medicine, PGY 1  Contact via TEAMS.     SUBJECTIVE / OVERNIGHT EVENTS:  - Pt seen and examined at bedside  - TANYA    MEDICATIONS  (STANDING):  apixaban 2.5 milliGRAM(s) Oral two times a day  ascorbic acid 500 milliGRAM(s) Oral daily  atorvastatin 40 milliGRAM(s) Oral at bedtime  chlorhexidine 4% Liquid 1 Application(s) Topical daily  dextrose 5%. 1000 milliLiter(s) (50 mL/Hr) IV Continuous <Continuous>  dextrose 5%. 1000 milliLiter(s) (100 mL/Hr) IV Continuous <Continuous>  dextrose 50% Injectable 25 Gram(s) IV Push once  dextrose 50% Injectable 12.5 Gram(s) IV Push once  dextrose 50% Injectable 25 Gram(s) IV Push once  glucagon  Injectable 1 milliGRAM(s) IntraMuscular once  hydroxyurea 500 milliGRAM(s) Oral <User Schedule>  insulin lispro (ADMELOG) corrective regimen sliding scale   SubCutaneous three times a day before meals  insulin lispro (ADMELOG) corrective regimen sliding scale   SubCutaneous at bedtime  insulin lispro Injectable (ADMELOG) 5 Unit(s) SubCutaneous before dinner  insulin lispro Injectable (ADMELOG) 11 Unit(s) SubCutaneous before breakfast  insulin lispro Injectable (ADMELOG) 4 Unit(s) SubCutaneous before lunch  levoFLOXacin IVPB 750 milliGRAM(s) IV Intermittent every 48 hours  levothyroxine 88 MICROGram(s) Oral daily  metoprolol tartrate 25 milliGRAM(s) Oral two times a day  multivitamin 1 Tablet(s) Oral daily  polyethylene glycol 3350 17 Gram(s) Oral daily  senna 2 Tablet(s) Oral at bedtime    MEDICATIONS  (PRN):  acetaminophen     Tablet .. 650 milliGRAM(s) Oral every 6 hours PRN Temp greater or equal to 38C (100.4F), Mild Pain (1 - 3)  dextrose Oral Gel 15 Gram(s) Oral once PRN Blood Glucose LESS THAN 70 milliGRAM(s)/deciliter  melatonin 3 milliGRAM(s) Oral at bedtime PRN Insomnia  QUEtiapine 25 milliGRAM(s) Oral daily PRN for insomnia        03-08-24 @ 07:01  -  03-09-24 @ 07:00  --------------------------------------------------------  IN: 240 mL / OUT: 350 mL / NET: -110 mL        PHYSICAL EXAM:  Vital Signs Last 24 Hrs  T(C): 36.9 (09 Mar 2024 04:38), Max: 37.4 (08 Mar 2024 21:53)  T(F): 98.4 (09 Mar 2024 04:38), Max: 99.3 (08 Mar 2024 21:53)  HR: 101 (09 Mar 2024 04:38) (99 - 101)  BP: 149/98 (09 Mar 2024 04:38) (122/60 - 149/98)  BP(mean): --  RR: 18 (09 Mar 2024 04:38) (18 - 18)  SpO2: 97% (09 Mar 2024 04:38) (97% - 98%)    Parameters below as of 09 Mar 2024 04:38  Patient On (Oxygen Delivery Method): room air        CAPILLARY BLOOD GLUCOSE      POCT Blood Glucose.: 291 mg/dL (08 Mar 2024 22:35)  POCT Blood Glucose.: 119 mg/dL (08 Mar 2024 18:14)  POCT Blood Glucose.: 75 mg/dL (08 Mar 2024 17:51)  POCT Blood Glucose.: 66 mg/dL (08 Mar 2024 17:29)  POCT Blood Glucose.: 206 mg/dL (08 Mar 2024 12:34)  POCT Blood Glucose.: 146 mg/dL (08 Mar 2024 08:44)    I&O's Summary    08 Mar 2024 07:01  -  09 Mar 2024 07:00  --------------------------------------------------------  IN: 240 mL / OUT: 350 mL / NET: -110 mL        CONSTITUTIONAL: NAD  HEENT: NC/AT  RESPIRATORY: Normal respiratory effort; lungs are clear to auscultation bilaterally  CARDIOVASCULAR: Regular rate and rhythm, normal S1 and S2, no murmur/rub/gallop; No lower extremity edema; Peripheral pulses are 2+ bilaterally  ABDOMEN: Nontender to palpation, normoactive bowel sounds, no rebound/guarding; No hepatosplenomegaly  MUSCLOSKELETAL: no clubbing or cyanosis of digits; no joint swelling or tenderness to palpation  EXTREMITIES: no peripheral edema, distal pulses intact   NEURO: no focal deficits   PSYCH: A+O to person, place, and time; affect appropriate    LABS:                      IMAGING:    [X] All pertinent imaging reviewed by me Alexus Coronel MD   Internal Medicine, PGY 1  Contact via TEAMS.     SUBJECTIVE / OVERNIGHT EVENTS:  - Pt seen and examined at bedside  - TANYA. Pt without complaints this morning.     MEDICATIONS  (STANDING):  apixaban 2.5 milliGRAM(s) Oral two times a day  ascorbic acid 500 milliGRAM(s) Oral daily  atorvastatin 40 milliGRAM(s) Oral at bedtime  chlorhexidine 4% Liquid 1 Application(s) Topical daily  dextrose 5%. 1000 milliLiter(s) (50 mL/Hr) IV Continuous <Continuous>  dextrose 5%. 1000 milliLiter(s) (100 mL/Hr) IV Continuous <Continuous>  dextrose 50% Injectable 25 Gram(s) IV Push once  dextrose 50% Injectable 12.5 Gram(s) IV Push once  dextrose 50% Injectable 25 Gram(s) IV Push once  glucagon  Injectable 1 milliGRAM(s) IntraMuscular once  hydroxyurea 500 milliGRAM(s) Oral <User Schedule>  insulin lispro (ADMELOG) corrective regimen sliding scale   SubCutaneous three times a day before meals  insulin lispro (ADMELOG) corrective regimen sliding scale   SubCutaneous at bedtime  insulin lispro Injectable (ADMELOG) 5 Unit(s) SubCutaneous before dinner  insulin lispro Injectable (ADMELOG) 11 Unit(s) SubCutaneous before breakfast  insulin lispro Injectable (ADMELOG) 4 Unit(s) SubCutaneous before lunch  levoFLOXacin IVPB 750 milliGRAM(s) IV Intermittent every 48 hours  levothyroxine 88 MICROGram(s) Oral daily  metoprolol tartrate 25 milliGRAM(s) Oral two times a day  multivitamin 1 Tablet(s) Oral daily  polyethylene glycol 3350 17 Gram(s) Oral daily  senna 2 Tablet(s) Oral at bedtime    MEDICATIONS  (PRN):  acetaminophen     Tablet .. 650 milliGRAM(s) Oral every 6 hours PRN Temp greater or equal to 38C (100.4F), Mild Pain (1 - 3)  dextrose Oral Gel 15 Gram(s) Oral once PRN Blood Glucose LESS THAN 70 milliGRAM(s)/deciliter  melatonin 3 milliGRAM(s) Oral at bedtime PRN Insomnia  QUEtiapine 25 milliGRAM(s) Oral daily PRN for insomnia        03-08-24 @ 07:01  -  03-09-24 @ 07:00  --------------------------------------------------------  IN: 240 mL / OUT: 350 mL / NET: -110 mL        PHYSICAL EXAM:  Vital Signs Last 24 Hrs  T(C): 36.9 (09 Mar 2024 04:38), Max: 37.4 (08 Mar 2024 21:53)  T(F): 98.4 (09 Mar 2024 04:38), Max: 99.3 (08 Mar 2024 21:53)  HR: 101 (09 Mar 2024 04:38) (99 - 101)  BP: 149/98 (09 Mar 2024 04:38) (122/60 - 149/98)  BP(mean): --  RR: 18 (09 Mar 2024 04:38) (18 - 18)  SpO2: 97% (09 Mar 2024 04:38) (97% - 98%)    Parameters below as of 09 Mar 2024 04:38  Patient On (Oxygen Delivery Method): room air        CAPILLARY BLOOD GLUCOSE      POCT Blood Glucose.: 291 mg/dL (08 Mar 2024 22:35)  POCT Blood Glucose.: 119 mg/dL (08 Mar 2024 18:14)  POCT Blood Glucose.: 75 mg/dL (08 Mar 2024 17:51)  POCT Blood Glucose.: 66 mg/dL (08 Mar 2024 17:29)  POCT Blood Glucose.: 206 mg/dL (08 Mar 2024 12:34)  POCT Blood Glucose.: 146 mg/dL (08 Mar 2024 08:44)    I&O's Summary    08 Mar 2024 07:01  -  09 Mar 2024 07:00  --------------------------------------------------------  IN: 240 mL / OUT: 350 mL / NET: -110 mL        GENERAL: NAD, lying in bed comfortably  HEAD:  Atraumatic, Normocephalic  EYES: EOMI, PERRLA, conjunctiva and sclera clear  ENT: Moist mucous membranes  NECK: Supple  CHEST/LUNG: Clear to auscultation bilaterally; No rales, rhonchi, wheezing, or rubs. Unlabored respirations  HEART: Regular rate and rhythm; No murmurs, rubs, or gallops  ABDOMEN: BSx4; Soft, nontender, nondistended  EXTREMITIES:  2+ Peripheral Pulses, brisk capillary refill. No clubbing, cyanosis, or edema  NERVOUS SYSTEM:  A&Ox1, no new focal deficits  SKIN: unstageable sacral decubitus ulcer   psych: normal behavior, normal affect      LABS:                      IMAGING:    [X] All pertinent imaging reviewed by me

## 2024-03-09 NOTE — PROGRESS NOTE ADULT - PROBLEM SELECTOR PLAN 2
CT pelvis with L sacral/medial gluteal decubitis ulcer with lysis of coccyx with c/f osteomyelitis  Also with unstageable sacral ulcer, not receiving adequate wound care, and is in pain  Meeting sepsis criteria with tachycardia, tacypnea, and leukocytosis     - procal, crp, esr elevated     Plan:  dc zosyn  c/w Levaquin based on sensitivities  ID consulted, plan for 10 day abx course  Blood cx neg  Ucx shows 50,000-100,000 GN rods  wound culture grew e. coli, ESBL proteus, enterococcus   wound care debrided 2/29  ID consulted, plan for 7-10 day abx course CT pelvis with L sacral/medial gluteal decubitis ulcer with lysis of coccyx with c/f osteomyelitis  Also with unstageable sacral ulcer, not receiving adequate wound care, and is in pain  Meeting sepsis criteria with tachycardia, tacypnea, and leukocytosis     - procal, crp, esr elevated     Plan:  dc zosyn  c/w Levaquin based on sensitivities until 3/11  ID consulted, plan for 10 day abx course  Blood cx neg  Ucx shows 50,000-100,000 GN rods  wound culture grew e. coli, ESBL proteus, enterococcus   wound care debrided 2/29  ID consulted, plan for 7-10 day abx course

## 2024-03-09 NOTE — PROGRESS NOTE ADULT - ASSESSMENT
89 F DNR/DNI, hx DVT, hypothyrodism, P. 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 Found to have unstageable sacral decubitus ulcer and sepsis with imaging c/f osteomyelitis of the coccyx.  89 F DNR/DNI, hx DVT, hypothyrodism, P. 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 Found to have unstageable sacral decubitus ulcer and sepsis with imaging c/f osteomyelitis of the coccyx. Now s/p abx, pending LTC with hospice approval.

## 2024-03-09 NOTE — PROGRESS NOTE ADULT - PROBLEM SELECTOR PLAN 8
hx of DM on metformin, but hasn't been taking since she as told she has normal A1C  Blood glucose elevated on admission       - endocrinology consulted, appreciate recommendations  - Continue Admelog 11 units with breakfast, 6 units with lunch and 5 units with dinner  - Continue low Admelog correctional scale before meals and bedtime  - Monitor FS before meals and bedtime  - continue to monitor  - On discharge: metformin  mg BID and Glipizide 2.5 mg hx of DM on metformin, but hasn't been taking since she as told she has normal A1C  Blood glucose elevated on admission       - endocrinology consulted, appreciate recommendations  - Continue Admelog 11 units with breakfast, decrease to 4 units with lunch and continue 5 units with dinner  - Continue low Admelog correctional scale before meals and bedtime  - Monitor FS before meals and bedtime  - continue to monitor  - On discharge: metformin  mg BID and Glipizide 2.5 mg

## 2024-03-09 NOTE — PROGRESS NOTE ADULT - ASSESSMENT
89y old Female with history of T2DM, Hypothyroidism, Dementia, HTN, Hx of DVT, CVA, Sacral ulcer here with osteomyelitis.    1. T2DM with hyperglycemia  - hypoglycemic yesterday after lunch  - Continue Admelog 11 units with breakfast, decrease to 4 units with lunch and continue 5 units with dinner  - Continue low Admelog correctional scale before meals and bedtime  - Monitor FS before meals and bedtime  - Follow hospital hypoglycemic protocol    2. Hypothyroidism  - Continue Levothyroxine 88 mcg daily    Discharge recs  1. Metformin  mg twice daily with food  2. Glipizide ER 2.5 mg daily with breakfast  3. No insulin on discharge    Matthew Willard MD  [facility] of End[facility]    [address] [place] [address]    T [phone]  F [phone]

## 2024-03-10 LAB
GLUCOSE BLDC GLUCOMTR-MCNC: 138 MG/DL — HIGH (ref 70–99)
GLUCOSE BLDC GLUCOMTR-MCNC: 156 MG/DL — HIGH (ref 70–99)
GLUCOSE BLDC GLUCOMTR-MCNC: 197 MG/DL — HIGH (ref 70–99)
GLUCOSE BLDC GLUCOMTR-MCNC: 234 MG/DL — HIGH (ref 70–99)

## 2024-03-10 PROCEDURE — 99232 SBSQ HOSP IP/OBS MODERATE 35: CPT | Mod: GC

## 2024-03-10 RX ORDER — INSULIN LISPRO 100/ML
12 VIAL (ML) SUBCUTANEOUS
Refills: 0 | Status: DISCONTINUED | OUTPATIENT
Start: 2024-03-11 | End: 2024-03-12

## 2024-03-10 RX ADMIN — POLYETHYLENE GLYCOL 3350 17 GRAM(S): 17 POWDER, FOR SOLUTION ORAL at 12:05

## 2024-03-10 RX ADMIN — Medication 88 MICROGRAM(S): at 05:59

## 2024-03-10 RX ADMIN — Medication 11 UNIT(S): at 09:31

## 2024-03-10 RX ADMIN — APIXABAN 2.5 MILLIGRAM(S): 2.5 TABLET, FILM COATED ORAL at 17:49

## 2024-03-10 RX ADMIN — CHLORHEXIDINE GLUCONATE 1 APPLICATION(S): 213 SOLUTION TOPICAL at 12:05

## 2024-03-10 RX ADMIN — Medication 4 UNIT(S): at 13:07

## 2024-03-10 RX ADMIN — Medication 1: at 13:07

## 2024-03-10 RX ADMIN — ATORVASTATIN CALCIUM 40 MILLIGRAM(S): 80 TABLET, FILM COATED ORAL at 21:30

## 2024-03-10 RX ADMIN — Medication 0: at 09:31

## 2024-03-10 RX ADMIN — Medication 25 MILLIGRAM(S): at 06:35

## 2024-03-10 RX ADMIN — SENNA PLUS 2 TABLET(S): 8.6 TABLET ORAL at 21:30

## 2024-03-10 RX ADMIN — APIXABAN 2.5 MILLIGRAM(S): 2.5 TABLET, FILM COATED ORAL at 06:35

## 2024-03-10 RX ADMIN — Medication 5 UNIT(S): at 17:51

## 2024-03-10 RX ADMIN — Medication 1 TABLET(S): at 12:05

## 2024-03-10 RX ADMIN — Medication 1: at 17:51

## 2024-03-10 RX ADMIN — Medication 25 MILLIGRAM(S): at 17:48

## 2024-03-10 RX ADMIN — Medication 500 MILLIGRAM(S): at 12:05

## 2024-03-10 NOTE — PROGRESS NOTE ADULT - PROBLEM SELECTOR PLAN 8
hx of DM on metformin, but hasn't been taking since she as told she has normal A1C  Blood glucose elevated on admission       - endocrinology consulted, appreciate recommendations  - Continue Admelog 11 units with breakfast, decrease to 4 units with lunch and continue 5 units with dinner  - Continue low Admelog correctional scale before meals and bedtime  - Monitor FS before meals and bedtime  - continue to monitor  - On discharge: metformin  mg BID and Glipizide 2.5 mg

## 2024-03-10 NOTE — PROGRESS NOTE ADULT - TIME BILLING
- Ordering, reviewing, and/or interpreting labs, testing, and/or imaging  - Independently obtaining a review of systems and performing a physical exam  - Reviewing prior records and where necessary, outpatient records  - Counselling and educating patient and/or family regarding interpretation of aforementioned items and plan of care
- Ordering, reviewing, and/or interpreting labs, testing, and/or imaging  - Independently obtaining a review of systems and performing a physical exam  - Reviewing prior records and where necessary, outpatient records  - Counselling and educating patient and/or family regarding interpretation of aforementioned items and plan of care
reviewing documentation/labs/imaging, interviewing and examining patient, documentation, coordinating care with ACP/CM/Specialists
reviewing documentation/labs/imaging, interviewing and examining patient, documentation, coordinating care with ACP/CM/Specialists

## 2024-03-10 NOTE — PROGRESS NOTE ADULT - ASSESSMENT
89 F DNR/DNI, hx DVT, hypothyrodism, P. 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 Found to have unstageable sacral decubitus ulcer and sepsis with imaging c/f osteomyelitis of the coccyx. Now s/p abx, pending LTC with hospice approval.

## 2024-03-10 NOTE — PROGRESS NOTE ADULT - SUBJECTIVE AND OBJECTIVE BOX
[facility] Progress Note    MAR, chart notes, fingersticks and labs reviewed    Subjective: nodding today    Objective  Vital Signs  T(C): 37.1 (03-10-24 @ 09:56), Max: 37.7 (03-10-24 @ 05:30)  HR: 87 (03-10-24 @ 09:56) (87 - 118)  BP: 104/78 (03-10-24 @ 09:56) (104/78 - 140/68)  RR: 18 (03-10-24 @ 09:56) (18 - 20)  SpO2: 100% (03-10-24 @ 09:56) (95% - 100%)    Physical Exam  Gen: NAD, alert, awake  HEENT: NC/AT, EOMI  Neck: supple  Chest: breathing comfortably  Heart: +s1 +s2    Meds  acetaminophen     Tablet .. 650 milliGRAM(s) Oral every 6 hours PRN  apixaban 2.5 milliGRAM(s) Oral two times a day  ascorbic acid 500 milliGRAM(s) Oral daily  atorvastatin 40 milliGRAM(s) Oral at bedtime  chlorhexidine 4% Liquid 1 Application(s) Topical daily  dextrose 5%. 1000 milliLiter(s) IV Continuous <Continuous>  dextrose 5%. 1000 milliLiter(s) IV Continuous <Continuous>  dextrose 50% Injectable 25 Gram(s) IV Push once  dextrose 50% Injectable 25 Gram(s) IV Push once  dextrose 50% Injectable 12.5 Gram(s) IV Push once  dextrose Oral Gel 15 Gram(s) Oral once PRN  glucagon  Injectable 1 milliGRAM(s) IntraMuscular once  hydroxyurea 500 milliGRAM(s) Oral <User Schedule>  insulin lispro (ADMELOG) corrective regimen sliding scale   SubCutaneous three times a day before meals  insulin lispro (ADMELOG) corrective regimen sliding scale   SubCutaneous at bedtime  insulin lispro Injectable (ADMELOG) 5 Unit(s) SubCutaneous before dinner  insulin lispro Injectable (ADMELOG) 4 Unit(s) SubCutaneous before lunch  levoFLOXacin IVPB 750 milliGRAM(s) IV Intermittent every 48 hours  levothyroxine 88 MICROGram(s) Oral daily  melatonin 3 milliGRAM(s) Oral at bedtime PRN  metoprolol tartrate 25 milliGRAM(s) Oral two times a day  multivitamin 1 Tablet(s) Oral daily  polyethylene glycol 3350 17 Gram(s) Oral daily  QUEtiapine 25 milliGRAM(s) Oral daily PRN  senna 2 Tablet(s) Oral at bedtime    Pertinent labs/Imaging  POCT Blood Glucose.: 197 mg/dL (03-10-24 @ 12:30)  POCT Blood Glucose.: 138 mg/dL (03-10-24 @ 08:56)  POCT Blood Glucose.: 130 mg/dL (03-09-24 @ 21:52)  POCT Blood Glucose.: 163 mg/dL (03-09-24 @ 17:33)

## 2024-03-10 NOTE — PROGRESS NOTE ADULT - PROBLEM SELECTOR PLAN 2
CT pelvis with L sacral/medial gluteal decubitis ulcer with lysis of coccyx with c/f osteomyelitis  Also with unstageable sacral ulcer, not receiving adequate wound care, and is in pain  Meeting sepsis criteria with tachycardia, tacypnea, and leukocytosis     - procal, crp, esr elevated     Plan:  dc zosyn  c/w Levaquin based on sensitivities until 3/11  ID consulted, plan for 10 day abx course  Blood cx neg  Ucx shows 50,000-100,000 GN rods  wound culture grew e. coli, ESBL proteus, enterococcus   wound care debrided 2/29  ID consulted, plan for 7-10 day abx course

## 2024-03-10 NOTE — PROGRESS NOTE ADULT - PROBLEM SELECTOR PLAN 1
CT pelvis with L sacral/medial gluteal decubitus ulcer with lysis of coccyx with c/f osteomyelitis  Also with unstageable sacral ulcer, not receiving adequate wound care, and is in pain  Meeting sepsis criteria with tachycardia, tachypnea, and leukocytosis   wound with exposed bone, diagnostic of osteomyelitis    - procal, crp, esr elevated     Plan:  dc zosyn  c/w Levaquin based on sensitivities for 5 doses until 3/11  ID consulted, plan for 10 day abx course  Blood cx neg  wound culture grew e. coli, ESBL proteus, enterococcus   wound care debrided 2/29

## 2024-03-10 NOTE — PROGRESS NOTE ADULT - ASSESSMENT
89y old Female with history of T2DM, Hypothyroidism, Dementia, HTN, Hx of DVT, CVA, Sacral ulcer here with osteomyelitis.    1. T2DM with hyperglycemia  - high after breakfast  - Increase Admelog to 9 units with breakfast, continue 4 units with lunch and 5 units with dinner  - Continue low Admelog correctional scale before meals and bedtime  - Monitor FS before meals and bedtime  - Follow hospital hypoglycemic protocol    2. Hypothyroidism  - Continue Levothyroxine 88 mcg daily    Discharge recs  1. Metformin  mg twice daily with food  2. Glipizide ER 2.5 mg daily with breakfast  3. No insulin on discharge    Matthew Willard MD  [facility]    [address] [place] [address]    T [phone]  F [phone]

## 2024-03-10 NOTE — PROGRESS NOTE ADULT - SUBJECTIVE AND OBJECTIVE BOX
Viviane Boothe | PGY2| [employer] TEAMS ONLY  Interval Events: No acute events overnight. Pt seen and examined at bedside.  Patient denies any complaints overnight. The patient denies any fevers, chills, nausea, vomiting, or increased pain.  Remains status quo pending LTC + Hospice      OBJECTIVE:  ICU Vital Signs Last 24 Hrs  T(C): 37.7 (10 Mar 2024 05:30), Max: 37.7 (10 Mar 2024 05:30)  T(F): 99.8 (10 Mar 2024 05:30), Max: 99.8 (10 Mar 2024 05:30)  HR: 118 (10 Mar 2024 05:30) (101 - 118)  BP: 135/84 (10 Mar 2024 05:30) (122/88 - 140/68)  BP(mean): --  ABP: --  ABP(mean): --  RR: 18 (10 Mar 2024 05:30) (18 - 20)  SpO2: 98% (10 Mar 2024 05:30) (95% - 98%)    O2 Parameters below as of 10 Mar 2024 05:30  Patient On (Oxygen Delivery Method): room air              03-09 @ 06:01  -  03-10 @ 07:00  --------------------------------------------------------  IN: 0 mL / OUT: 200 mL / NET: -200 mL      CAPILLARY BLOOD GLUCOSE      POCT Blood Glucose.: 130 mg/dL (09 Mar 2024 21:52)      PHYSICAL EXAM:  General: NAD  Neurology: A&Ox1, nonfocal, RODRIGUEZ x 4  Eyes: PERRLA/ EOMI, Gross vision intact  ENT/Neck: Neck supple, trachea midline, No JVD, Gross hearing intact  Respiratory: CTA B/L, No wheezing, rales, rhonchi  CV: RRR, +S1/S2, -S3/S4, no murmurs, rubs or gallops  Abdominal: Soft, NT, ND +BS, No HSM  MSK: 5/5 strength UE/LE bilaterally  Extremities: No edema, 2+ peripheral pulses  Skin: No Rashes, Hematoma, Ecchymosis  Incisions:   Tubes:    HOSPITAL MEDICATIONS:  MEDICATIONS  (STANDING):  apixaban 2.5 milliGRAM(s) Oral two times a day  ascorbic acid 500 milliGRAM(s) Oral daily  atorvastatin 40 milliGRAM(s) Oral at bedtime  chlorhexidine 4% Liquid 1 Application(s) Topical daily  dextrose 5%. 1000 milliLiter(s) (50 mL/Hr) IV Continuous <Continuous>  dextrose 5%. 1000 milliLiter(s) (100 mL/Hr) IV Continuous <Continuous>  dextrose 50% Injectable 25 Gram(s) IV Push once  dextrose 50% Injectable 12.5 Gram(s) IV Push once  dextrose 50% Injectable 25 Gram(s) IV Push once  glucagon  Injectable 1 milliGRAM(s) IntraMuscular once  hydroxyurea 500 milliGRAM(s) Oral <User Schedule>  insulin lispro (ADMELOG) corrective regimen sliding scale   SubCutaneous three times a day before meals  insulin lispro (ADMELOG) corrective regimen sliding scale   SubCutaneous at bedtime  insulin lispro Injectable (ADMELOG) 5 Unit(s) SubCutaneous before dinner  insulin lispro Injectable (ADMELOG) 11 Unit(s) SubCutaneous before breakfast  insulin lispro Injectable (ADMELOG) 4 Unit(s) SubCutaneous before lunch  levoFLOXacin IVPB 750 milliGRAM(s) IV Intermittent every 48 hours  levothyroxine 88 MICROGram(s) Oral daily  metoprolol tartrate 25 milliGRAM(s) Oral two times a day  multivitamin 1 Tablet(s) Oral daily  polyethylene glycol 3350 17 Gram(s) Oral daily  senna 2 Tablet(s) Oral at bedtime    MEDICATIONS  (PRN):  acetaminophen     Tablet .. 650 milliGRAM(s) Oral every 6 hours PRN Temp greater or equal to 38C (100.4F), Mild Pain (1 - 3)  dextrose Oral Gel 15 Gram(s) Oral once PRN Blood Glucose LESS THAN 70 milliGRAM(s)/deciliter  melatonin 3 milliGRAM(s) Oral at bedtime PRN Insomnia  QUEtiapine 25 milliGRAM(s) Oral daily PRN for insomnia      LABS:    Hgb Trend:         Creatinine Trend: 0.97<--, 0.69<--, 0.66<--            MICROBIOLOGY:

## 2024-03-11 LAB
GLUCOSE BLDC GLUCOMTR-MCNC: 102 MG/DL — HIGH (ref 70–99)
GLUCOSE BLDC GLUCOMTR-MCNC: 174 MG/DL — HIGH (ref 70–99)
GLUCOSE BLDC GLUCOMTR-MCNC: 177 MG/DL — HIGH (ref 70–99)
GLUCOSE BLDC GLUCOMTR-MCNC: 210 MG/DL — HIGH (ref 70–99)

## 2024-03-11 PROCEDURE — 99231 SBSQ HOSP IP/OBS SF/LOW 25: CPT

## 2024-03-11 RX ADMIN — CHLORHEXIDINE GLUCONATE 1 APPLICATION(S): 213 SOLUTION TOPICAL at 13:38

## 2024-03-11 RX ADMIN — Medication 4 UNIT(S): at 13:30

## 2024-03-11 RX ADMIN — POLYETHYLENE GLYCOL 3350 17 GRAM(S): 17 POWDER, FOR SOLUTION ORAL at 13:29

## 2024-03-11 RX ADMIN — HYDROXYUREA 500 MILLIGRAM(S): 500 CAPSULE ORAL at 09:09

## 2024-03-11 RX ADMIN — Medication 25 MILLIGRAM(S): at 05:31

## 2024-03-11 RX ADMIN — SENNA PLUS 2 TABLET(S): 8.6 TABLET ORAL at 21:19

## 2024-03-11 RX ADMIN — Medication 88 MICROGRAM(S): at 05:30

## 2024-03-11 RX ADMIN — Medication 25 MILLIGRAM(S): at 18:01

## 2024-03-11 RX ADMIN — Medication 500 MILLIGRAM(S): at 13:29

## 2024-03-11 RX ADMIN — APIXABAN 2.5 MILLIGRAM(S): 2.5 TABLET, FILM COATED ORAL at 18:01

## 2024-03-11 RX ADMIN — Medication 1: at 09:03

## 2024-03-11 RX ADMIN — Medication 1 TABLET(S): at 13:29

## 2024-03-11 RX ADMIN — Medication 1: at 17:59

## 2024-03-11 RX ADMIN — Medication 12 UNIT(S): at 09:04

## 2024-03-11 RX ADMIN — APIXABAN 2.5 MILLIGRAM(S): 2.5 TABLET, FILM COATED ORAL at 05:30

## 2024-03-11 RX ADMIN — Medication 5 UNIT(S): at 18:00

## 2024-03-11 RX ADMIN — ATORVASTATIN CALCIUM 40 MILLIGRAM(S): 80 TABLET, FILM COATED ORAL at 21:19

## 2024-03-11 NOTE — PROGRESS NOTE ADULT - SUBJECTIVE AND OBJECTIVE BOX
PROGRESS NOTE:   Authored by Mirta Urban MD PGY-1  Internal Medicine      Patient is a 89y old  Female who presents with a chief complaint of Decubitus Ulcer (10 Mar 2024 13:00)      SUBJECTIVE / OVERNIGHT EVENTS: ***, patient seen and examined at bedside    MEDICATIONS  (STANDING):  apixaban 2.5 milliGRAM(s) Oral two times a day  ascorbic acid 500 milliGRAM(s) Oral daily  atorvastatin 40 milliGRAM(s) Oral at bedtime  chlorhexidine 4% Liquid 1 Application(s) Topical daily  dextrose 5%. 1000 milliLiter(s) (50 mL/Hr) IV Continuous <Continuous>  dextrose 5%. 1000 milliLiter(s) (100 mL/Hr) IV Continuous <Continuous>  dextrose 50% Injectable 12.5 Gram(s) IV Push once  dextrose 50% Injectable 25 Gram(s) IV Push once  dextrose 50% Injectable 25 Gram(s) IV Push once  glucagon  Injectable 1 milliGRAM(s) IntraMuscular once  hydroxyurea 500 milliGRAM(s) Oral <User Schedule>  insulin lispro (ADMELOG) corrective regimen sliding scale   SubCutaneous three times a day before meals  insulin lispro (ADMELOG) corrective regimen sliding scale   SubCutaneous at bedtime  insulin lispro Injectable (ADMELOG) 4 Unit(s) SubCutaneous before lunch  insulin lispro Injectable (ADMELOG) 5 Unit(s) SubCutaneous before dinner  insulin lispro Injectable (ADMELOG) 12 Unit(s) SubCutaneous before breakfast  levothyroxine 88 MICROGram(s) Oral daily  metoprolol tartrate 25 milliGRAM(s) Oral two times a day  multivitamin 1 Tablet(s) Oral daily  polyethylene glycol 3350 17 Gram(s) Oral daily  senna 2 Tablet(s) Oral at bedtime    MEDICATIONS  (PRN):  acetaminophen     Tablet .. 650 milliGRAM(s) Oral every 6 hours PRN Temp greater or equal to 38C (100.4F), Mild Pain (1 - 3)  dextrose Oral Gel 15 Gram(s) Oral once PRN Blood Glucose LESS THAN 70 milliGRAM(s)/deciliter  melatonin 3 milliGRAM(s) Oral at bedtime PRN Insomnia  QUEtiapine 25 milliGRAM(s) Oral daily PRN for insomnia      CAPILLARY BLOOD GLUCOSE      POCT Blood Glucose.: 234 mg/dL (10 Mar 2024 21:42)  POCT Blood Glucose.: 156 mg/dL (10 Mar 2024 17:19)  POCT Blood Glucose.: 197 mg/dL (10 Mar 2024 12:30)  POCT Blood Glucose.: 138 mg/dL (10 Mar 2024 08:56)    I&O's Summary    10 Mar 2024 07:01  -  11 Mar 2024 07:00  --------------------------------------------------------  IN: 0 mL / OUT: 300 mL / NET: -300 mL        PHYSICAL EXAM:  Vital Signs Last 24 Hrs  T(C): 37.1 (11 Mar 2024 05:49), Max: 37.4 (10 Mar 2024 21:57)  T(F): 98.7 (11 Mar 2024 05:49), Max: 99.3 (10 Mar 2024 21:57)  HR: 89 (11 Mar 2024 05:49) (84 - 100)  BP: 149/72 (11 Mar 2024 05:49) (104/78 - 149/72)  BP(mean): --  RR: 18 (11 Mar 2024 05:49) (18 - 18)  SpO2: 98% (11 Mar 2024 05:49) (96% - 100%)    Parameters below as of 11 Mar 2024 05:49  Patient On (Oxygen Delivery Method): room air      CONSTITUTIONAL: Well-groomed, in no apparent distress  RESPIRATORY: Breathing comfortably; no dullness to percussion; lungs CTA without wheeze/rhonchi/rales  CARDIOVASCULAR: +S1S2, RRR, no M/G/R; pedal pulses full and symmetric; no lower extremity edema  GASTROINTESTINAL: No palpable masses or tenderness, +BS throughout, no rebound/guarding; no hepatosplenomegaly; no hernia palpated  SKIN: No rashes or ulcers noted  NEUROLOGIC: A+O x 3, CN II-XII intact; sensation intact in LEs b/l to light touch    LABS:                   PROGRESS NOTE:   Authored by Mirta Urban MD PGY-1  Internal Medicine      Patient is a 89y old  Female who presents with a chief complaint of Decubitus Ulcer (10 Mar 2024 13:00)      SUBJECTIVE / OVERNIGHT EVENTS: NAEO. Denies new symptoms or complaints this AM. Seen and examined at bedside.    MEDICATIONS  (STANDING):  apixaban 2.5 milliGRAM(s) Oral two times a day  ascorbic acid 500 milliGRAM(s) Oral daily  atorvastatin 40 milliGRAM(s) Oral at bedtime  chlorhexidine 4% Liquid 1 Application(s) Topical daily  dextrose 5%. 1000 milliLiter(s) (50 mL/Hr) IV Continuous <Continuous>  dextrose 5%. 1000 milliLiter(s) (100 mL/Hr) IV Continuous <Continuous>  dextrose 50% Injectable 12.5 Gram(s) IV Push once  dextrose 50% Injectable 25 Gram(s) IV Push once  dextrose 50% Injectable 25 Gram(s) IV Push once  glucagon  Injectable 1 milliGRAM(s) IntraMuscular once  hydroxyurea 500 milliGRAM(s) Oral <User Schedule>  insulin lispro (ADMELOG) corrective regimen sliding scale   SubCutaneous three times a day before meals  insulin lispro (ADMELOG) corrective regimen sliding scale   SubCutaneous at bedtime  insulin lispro Injectable (ADMELOG) 4 Unit(s) SubCutaneous before lunch  insulin lispro Injectable (ADMELOG) 5 Unit(s) SubCutaneous before dinner  insulin lispro Injectable (ADMELOG) 12 Unit(s) SubCutaneous before breakfast  levothyroxine 88 MICROGram(s) Oral daily  metoprolol tartrate 25 milliGRAM(s) Oral two times a day  multivitamin 1 Tablet(s) Oral daily  polyethylene glycol 3350 17 Gram(s) Oral daily  senna 2 Tablet(s) Oral at bedtime    MEDICATIONS  (PRN):  acetaminophen     Tablet .. 650 milliGRAM(s) Oral every 6 hours PRN Temp greater or equal to 38C (100.4F), Mild Pain (1 - 3)  dextrose Oral Gel 15 Gram(s) Oral once PRN Blood Glucose LESS THAN 70 milliGRAM(s)/deciliter  melatonin 3 milliGRAM(s) Oral at bedtime PRN Insomnia  QUEtiapine 25 milliGRAM(s) Oral daily PRN for insomnia      CAPILLARY BLOOD GLUCOSE      POCT Blood Glucose.: 234 mg/dL (10 Mar 2024 21:42)  POCT Blood Glucose.: 156 mg/dL (10 Mar 2024 17:19)  POCT Blood Glucose.: 197 mg/dL (10 Mar 2024 12:30)  POCT Blood Glucose.: 138 mg/dL (10 Mar 2024 08:56)    I&O's Summary    10 Mar 2024 07:01  -  11 Mar 2024 07:00  --------------------------------------------------------  IN: 0 mL / OUT: 300 mL / NET: -300 mL        PHYSICAL EXAM:  Vital Signs Last 24 Hrs  T(C): 37.1 (11 Mar 2024 05:49), Max: 37.4 (10 Mar 2024 21:57)  T(F): 98.7 (11 Mar 2024 05:49), Max: 99.3 (10 Mar 2024 21:57)  HR: 89 (11 Mar 2024 05:49) (84 - 100)  BP: 149/72 (11 Mar 2024 05:49) (104/78 - 149/72)  RR: 18 (11 Mar 2024 05:49) (18 - 18)  SpO2: 98% (11 Mar 2024 05:49) (96% - 100%)    Parameters below as of 11 Mar 2024 05:49  Patient On (Oxygen Delivery Method): room air      CONSTITUTIONAL: Well-groomed, in no apparent distress  RESPIRATORY: Breathing comfortably; no dullness to percussion; lungs CTA without wheeze/rhonchi/rales  CARDIOVASCULAR: +S1S2, RRR, no M/G/R; pedal pulses full and symmetric; no lower extremity edema  GASTROINTESTINAL: No palpable masses or tenderness, +BS throughout, no rebound/guarding; no hepatosplenomegaly; no hernia palpated  SKIN: No rashes or ulcers noted  NEUROLOGIC: A+O x 3, CN II-XII intact; sensation intact in LEs b/l to light touch      LABS:                   PROGRESS NOTE:   Authored by Mirta Urban MD PGY-1  Internal Medicine      Patient is a 89y old  Female who presents with a chief complaint of Decubitus Ulcer (10 Mar 2024 13:00)      SUBJECTIVE / OVERNIGHT EVENTS: NAEO. Denies new symptoms or complaints this AM. Seen and examined at bedside.    MEDICATIONS  (STANDING):  apixaban 2.5 milliGRAM(s) Oral two times a day  ascorbic acid 500 milliGRAM(s) Oral daily  atorvastatin 40 milliGRAM(s) Oral at bedtime  chlorhexidine 4% Liquid 1 Application(s) Topical daily  dextrose 5%. 1000 milliLiter(s) (50 mL/Hr) IV Continuous <Continuous>  dextrose 5%. 1000 milliLiter(s) (100 mL/Hr) IV Continuous <Continuous>  dextrose 50% Injectable 12.5 Gram(s) IV Push once  dextrose 50% Injectable 25 Gram(s) IV Push once  dextrose 50% Injectable 25 Gram(s) IV Push once  glucagon  Injectable 1 milliGRAM(s) IntraMuscular once  hydroxyurea 500 milliGRAM(s) Oral <User Schedule>  insulin lispro (ADMELOG) corrective regimen sliding scale   SubCutaneous three times a day before meals  insulin lispro (ADMELOG) corrective regimen sliding scale   SubCutaneous at bedtime  insulin lispro Injectable (ADMELOG) 4 Unit(s) SubCutaneous before lunch  insulin lispro Injectable (ADMELOG) 5 Unit(s) SubCutaneous before dinner  insulin lispro Injectable (ADMELOG) 12 Unit(s) SubCutaneous before breakfast  levothyroxine 88 MICROGram(s) Oral daily  metoprolol tartrate 25 milliGRAM(s) Oral two times a day  multivitamin 1 Tablet(s) Oral daily  polyethylene glycol 3350 17 Gram(s) Oral daily  senna 2 Tablet(s) Oral at bedtime    MEDICATIONS  (PRN):  acetaminophen     Tablet .. 650 milliGRAM(s) Oral every 6 hours PRN Temp greater or equal to 38C (100.4F), Mild Pain (1 - 3)  dextrose Oral Gel 15 Gram(s) Oral once PRN Blood Glucose LESS THAN 70 milliGRAM(s)/deciliter  melatonin 3 milliGRAM(s) Oral at bedtime PRN Insomnia  QUEtiapine 25 milliGRAM(s) Oral daily PRN for insomnia      CAPILLARY BLOOD GLUCOSE      POCT Blood Glucose.: 234 mg/dL (10 Mar 2024 21:42)  POCT Blood Glucose.: 156 mg/dL (10 Mar 2024 17:19)  POCT Blood Glucose.: 197 mg/dL (10 Mar 2024 12:30)  POCT Blood Glucose.: 138 mg/dL (10 Mar 2024 08:56)    I&O's Summary    10 Mar 2024 07:01  -  11 Mar 2024 07:00  --------------------------------------------------------  IN: 0 mL / OUT: 300 mL / NET: -300 mL        PHYSICAL EXAM:  Vital Signs Last 24 Hrs  T(C): 37.1 (11 Mar 2024 05:49), Max: 37.4 (10 Mar 2024 21:57)  T(F): 98.7 (11 Mar 2024 05:49), Max: 99.3 (10 Mar 2024 21:57)  HR: 89 (11 Mar 2024 05:49) (84 - 100)  BP: 149/72 (11 Mar 2024 05:49) (104/78 - 149/72)  RR: 18 (11 Mar 2024 05:49) (18 - 18)  SpO2: 98% (11 Mar 2024 05:49) (96% - 100%)    Parameters below as of 11 Mar 2024 05:49  Patient On (Oxygen Delivery Method): room air      General: NAD  Neurology: A&Ox1, nonfocal, RODRIGUEZ x 4  Eyes: PERRLA/ EOMI, Gross vision intact  ENT/Neck: Neck supple, trachea midline, No JVD, Gross hearing intact  Respiratory: CTA B/L, No wheezing, rales, rhonchi  CV: RRR, +S1/S2, -S3/S4, no murmurs, rubs or gallops  Abdominal: Soft, NT, ND +BS, No HSM  MSK: 5/5 strength UE/LE bilaterally  Extremities: No edema, 2+ peripheral pulses  Skin: No Rashes, Hematoma, Ecchymosis        LABS:

## 2024-03-11 NOTE — PROGRESS NOTE ADULT - ATTENDING COMMENTS
89 F DNR/DNI, hx DVT, hypothyrodism, P. 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 Found to have unstageable sacral decubitus ulcer and sepsis with imaging c/f osteomyelitis of the coccyx. Now s/p abx, pending LTC with hospice approval.     Today, no complaints. Exam unchanged     #sepsis  #osteomeyletis   #severe dementia   #polycythemia vera   #hypothyroidism     Plan;   - C/w with abx   - No acute change clinically  - Blood glucose reviewed   - Pending LTC placement     Long term prognosis guarded w/o barriers to discharge.   Family/GOC: DNR/DNI    The necessity of the time spent during the encounter on this date of service was due to:     - Ordering, reviewing, and/or interpreting labs, testing, and/or imaging  - Independently obtaining a review of systems and performing a physical exam  - Reviewing prior records and where necessary, outpatient records  - Counselling and educating patient and/or family regarding interpretation of aforementioned items and plan of care.

## 2024-03-11 NOTE — PROGRESS NOTE ADULT - SUBJECTIVE AND OBJECTIVE BOX
[facility] Progress Note    MAR, chart notes, fingersticks and labs reviewed    Subjective:    Objective  Vital Signs  T(C): 37.1 (03-11-24 @ 05:49), Max: 37.4 (03-10-24 @ 21:57)  HR: 89 (03-11-24 @ 05:49) (84 - 100)  BP: 149/72 (03-11-24 @ 05:49) (140/72 - 149/72)  RR: 18 (03-11-24 @ 05:49) (18 - 18)  SpO2: 98% (03-11-24 @ 05:49) (96% - 98%)    Physical Exam  Gen: NAD, alert, awake  HEENT: NC/AT, EOMI  Neck: supple  Chest: breathing comfortably  Heart: +s1 +s2    Medications  acetaminophen     Tablet .. 650 milliGRAM(s) Oral every 6 hours PRN  apixaban 2.5 milliGRAM(s) Oral two times a day  ascorbic acid 500 milliGRAM(s) Oral daily  atorvastatin 40 milliGRAM(s) Oral at bedtime  chlorhexidine 4% Liquid 1 Application(s) Topical daily  dextrose 5%. 1000 milliLiter(s) IV Continuous <Continuous>  dextrose 5%. 1000 milliLiter(s) IV Continuous <Continuous>  dextrose 50% Injectable 25 Gram(s) IV Push once  dextrose 50% Injectable 12.5 Gram(s) IV Push once  dextrose 50% Injectable 25 Gram(s) IV Push once  dextrose Oral Gel 15 Gram(s) Oral once PRN  glucagon  Injectable 1 milliGRAM(s) IntraMuscular once  hydroxyurea 500 milliGRAM(s) Oral <User Schedule>  insulin lispro (ADMELOG) corrective regimen sliding scale   SubCutaneous at bedtime  insulin lispro (ADMELOG) corrective regimen sliding scale   SubCutaneous three times a day before meals  insulin lispro Injectable (ADMELOG) 12 Unit(s) SubCutaneous before breakfast  insulin lispro Injectable (ADMELOG) 5 Unit(s) SubCutaneous before dinner  insulin lispro Injectable (ADMELOG) 4 Unit(s) SubCutaneous before lunch  levothyroxine 88 MICROGram(s) Oral daily  melatonin 3 milliGRAM(s) Oral at bedtime PRN  metoprolol tartrate 25 milliGRAM(s) Oral two times a day  multivitamin 1 Tablet(s) Oral daily  polyethylene glycol 3350 17 Gram(s) Oral daily  QUEtiapine 25 milliGRAM(s) Oral daily PRN  senna 2 Tablet(s) Oral at bedtime    Pertinent labs/Imaging  POCT Blood Glucose.: 177 mg/dL (03-11-24 @ 08:36)  POCT Blood Glucose.: 234 mg/dL (03-10-24 @ 21:42)  POCT Blood Glucose.: 156 mg/dL (03-10-24 @ 17:19)         [facility] Progress Note    MAR, chart notes, fingersticks and labs reviewed    Subjective: still pending insurance auth for SNF    Objective  Vital Signs  T(C): 37.1 (03-11-24 @ 05:49), Max: 37.4 (03-10-24 @ 21:57)  HR: 89 (03-11-24 @ 05:49) (84 - 100)  BP: 149/72 (03-11-24 @ 05:49) (140/72 - 149/72)  RR: 18 (03-11-24 @ 05:49) (18 - 18)  SpO2: 98% (03-11-24 @ 05:49) (96% - 98%)    Physical Exam  Gen: NAD, resting comfortably  HEENT: NC/AT  Neck: supple  Chest: breathing comfortably  Heart: +s1 +s2    Medications  acetaminophen     Tablet .. 650 milliGRAM(s) Oral every 6 hours PRN  apixaban 2.5 milliGRAM(s) Oral two times a day  ascorbic acid 500 milliGRAM(s) Oral daily  atorvastatin 40 milliGRAM(s) Oral at bedtime  chlorhexidine 4% Liquid 1 Application(s) Topical daily  dextrose 5%. 1000 milliLiter(s) IV Continuous <Continuous>  dextrose 5%. 1000 milliLiter(s) IV Continuous <Continuous>  dextrose 50% Injectable 25 Gram(s) IV Push once  dextrose 50% Injectable 12.5 Gram(s) IV Push once  dextrose 50% Injectable 25 Gram(s) IV Push once  dextrose Oral Gel 15 Gram(s) Oral once PRN  glucagon  Injectable 1 milliGRAM(s) IntraMuscular once  hydroxyurea 500 milliGRAM(s) Oral <User Schedule>  insulin lispro (ADMELOG) corrective regimen sliding scale   SubCutaneous at bedtime  insulin lispro (ADMELOG) corrective regimen sliding scale   SubCutaneous three times a day before meals  insulin lispro Injectable (ADMELOG) 12 Unit(s) SubCutaneous before breakfast  insulin lispro Injectable (ADMELOG) 5 Unit(s) SubCutaneous before dinner  insulin lispro Injectable (ADMELOG) 4 Unit(s) SubCutaneous before lunch  levothyroxine 88 MICROGram(s) Oral daily  melatonin 3 milliGRAM(s) Oral at bedtime PRN  metoprolol tartrate 25 milliGRAM(s) Oral two times a day  multivitamin 1 Tablet(s) Oral daily  polyethylene glycol 3350 17 Gram(s) Oral daily  QUEtiapine 25 milliGRAM(s) Oral daily PRN  senna 2 Tablet(s) Oral at bedtime    Pertinent labs/Imaging  POCT Blood Glucose.: 177 mg/dL (03-11-24 @ 08:36)  POCT Blood Glucose.: 234 mg/dL (03-10-24 @ 21:42)  POCT Blood Glucose.: 156 mg/dL (03-10-24 @ 17:19)

## 2024-03-11 NOTE — CHART NOTE - NSCHARTNOTEFT_GEN_A_CORE
Nutrition Follow Up Note  Patient seen for: Nutrition Follow Up    Chart reviewed, events noted: "Patient is a 89y old  Female who presents with a chief complaint of Decubitus Ulcer"    Source: [] Patient       [x] Current Medical Record     [] RN        [] Family at bedside       [X] Other: PCA    -If unable to interview patient: [] Trach/Vent/BiPAP  [] Disoriented/confused/inappropriate to interview    Diet Order:   Diet, Pureed:   Moderately Thick Liquids (MODTHICKLIQS)  Supplement Feeding Modality:  Oral  Glucerna Shake Cans or Servings Per Day:  1       Frequency:  Two Times a day (24)    - Is current order appropriate/adequate? [X] Yes  []  No:     - PO intake :   [] >75%  Adequate    [X] 50-75%  Fair       [] <50%  Poor    - Nutrition-related concerns:      - Intake: fair to good per flow sheet documentation      - Endo: hx of diabetes, current A1c=7.5%, good glycemic control for age. Finger Sticks during this admission >180 mg/dl at times. Ordered for Lispro 3x/day before meals and SSI for coverage       -GI:  Last BM 3/10/24, ordered for Miralax and Senna for  Bowel Regimen    Nutrition Focused Physical Examination:  Conducted with patient's PCA present in pt's assigned room   Muscle Loss  [x] Temples - Moderate  [x] Clavicle - Severe    Fat Loss  [x] Orbital - Moderate  [x] Buccal - Moderate     Weights: Drug Dosing Weight  Weight (kg): 34.6 (2024 05:09)  BMI (kg/m2): 15.4 (2024 05:09)  Daily Weight in k.7 ()    Nutritionally Pertinent MEDICATIONS  (STANDING):  ascorbic acid  atorvastatin  dextrose 5%.  dextrose 5%.  dextrose 50% Injectable  dextrose 50% Injectable  dextrose 50% Injectable  glucagon  Injectable  hydroxyurea  insulin lispro (ADMELOG) corrective regimen sliding scale  insulin lispro (ADMELOG) corrective regimen sliding scale  insulin lispro Injectable (ADMELOG)  insulin lispro Injectable (ADMELOG)  insulin lispro Injectable (ADMELOG)  levothyroxine  metoprolol tartrate  multivitamin  polyethylene glycol 3350  senna    Pertinent Labs:   A1C with Estimated Average Glucose Result: 7.5 % (24 @ 13:07)    Finger Sticks:  POCT Blood Glucose.: 177 mg/dL ( @ 08:36)  POCT Blood Glucose.: 234 mg/dL (03-10 @ 21:42)  POCT Blood Glucose.: 156 mg/dL (03-10 @ 17:19)  POCT Blood Glucose.: 197 mg/dL (03-10 @ 12:30)      Skin per nursing documentation on 3/7/24:  sacral stage 4 pressure injury  Feet w/ healing wounds    Edema: -- +2 edema to right hand     Estimated Needs:   [] no change since previous assessment  [X] recalculated: Based on Most recent weight: 33.7 kg ()  37-42 kcal/kg= 1,247- 1,415 kcal  1.7-2.0 gm/kg= 57.3-67.5 gm pro  Defer Fluid Needs to Medical Team     Previous Nutrition Diagnosis:   1. Increased Nutrient Needs  2. Underweight  Nutrition Diagnosis is: [X] ongoing  [] resolved [] not applicable     New Nutrition Diagnosis: Acute Severe Malnutrition related to Decreased ability to consume sufficient energy in the setting of increased nutrient needs, as evidenced by moderate/severe muscle loss and moderate fat loss     Nutrition Care Plan:  [X] In Progress- addressed with diet order, PO encouragement and nutritional supplements   [] Achieved  [] Not applicable    Nutrition Interventions:     Education Provided:       [] Yes:  [X] No: Pt with dementia       Recommendations:     1. Continue Consistent Carbohydrate restricted diet. Defer diet/texture modification to medical team/SLP as indicated   2. Continue Glucerna 2x/day to help meet  pt's increased nutrient needs  3. Continue daily multivitamin and vitamin C supplements if no medical contraindications to aid in wound healing.   4. Encourage adequate PO intakes as tolerated. Staff to continue to provide assistance with feeding during meal times  5. Updated Malnutrition Sticker placed in pt. chart      Monitoring and Evaluation:   Continue to monitor nutritional intake, tolerance to diet prescription, weights, labs, skin integrity    RD remains available upon request and will follow up per protocol  Katie Chauhan, MS,RDN,CDN AVAILABLE ON TEAMS Nutrition Follow Up Note  Patient seen for: Nutrition Follow Up    Chart reviewed, events noted: "Patient is a 89y old  Female who presents with a chief complaint of Decubitus Ulcer"    Source: [] Patient       [x] Current Medical Record     [] RN        [] Family at bedside       [X] Other: PCA    -If unable to interview patient: [] Trach/Vent/BiPAP  [X] Disoriented/confused/inappropriate to interview    Diet Order:   Diet, Pureed:   Moderately Thick Liquids (MODTHICKLIQS)  Supplement Feeding Modality:  Oral  Glucerna Shake Cans or Servings Per Day:  1       Frequency:  Two Times a day (24)    - Is current order appropriate/adequate? [X] Yes  []  No:     - PO intake :   [] >75%  Adequate    [X] 50-75%  Fair       [] <50%  Poor    - Nutrition-related concerns:      - Intake: fair to good per flow sheet documentation      - Endo: hx of diabetes, current A1c=7.5%, good glycemic control for age. Finger Sticks during this admission >180 mg/dl at times. Ordered for Lispro 3x/day before meals and SSI for coverage       -GI:  Last BM 3/10/24, ordered for Miralax and Senna for  Bowel Regimen    Nutrition Focused Physical Examination:  Conducted with patient's PCA present in pt's assigned room   Muscle Loss  [x] Temples - Moderate  [x] Clavicle - Severe    Fat Loss  [x] Orbital - Moderate  [x] Buccal - Moderate     Weights: Drug Dosing Weight  Weight (kg): 34.6 (2024 05:09)  BMI (kg/m2): 15.4 (2024 05:09)  Daily Weight in k.7 ()    Nutritionally Pertinent MEDICATIONS  (STANDING):  ascorbic acid  atorvastatin  dextrose 5%.  dextrose 5%.  dextrose 50% Injectable  dextrose 50% Injectable  dextrose 50% Injectable  glucagon  Injectable  hydroxyurea  insulin lispro (ADMELOG) corrective regimen sliding scale  insulin lispro (ADMELOG) corrective regimen sliding scale  insulin lispro Injectable (ADMELOG)  insulin lispro Injectable (ADMELOG)  insulin lispro Injectable (ADMELOG)  levothyroxine  metoprolol tartrate  multivitamin  polyethylene glycol 3350  senna    Pertinent Labs:   A1C with Estimated Average Glucose Result: 7.5 % (24 @ 13:07)    Finger Sticks:  POCT Blood Glucose.: 177 mg/dL ( @ 08:36)  POCT Blood Glucose.: 234 mg/dL (03-10 @ 21:42)  POCT Blood Glucose.: 156 mg/dL (03-10 @ 17:19)  POCT Blood Glucose.: 197 mg/dL (03-10 @ 12:30)      Skin per nursing documentation on 3/7/24:  sacral stage 4 pressure injury  Feet w/ healing wounds    Edema: -- +2 edema to right hand     Estimated Needs:   [] no change since previous assessment  [X] recalculated: Based on Most recent weight: 33.7 kg ()  37-42 kcal/kg= 1,247- 1,415 kcal  1.7-2.0 gm/kg= 57.3-67.5 gm pro  Defer Fluid Needs to Medical Team     Previous Nutrition Diagnosis:   1. Increased Nutrient Needs  2. Underweight  Nutrition Diagnosis is: [X] ongoing  [] resolved [] not applicable     New Nutrition Diagnosis: Acute Severe Malnutrition related to Decreased ability to consume sufficient energy in the setting of increased nutrient needs, as evidenced by moderate/severe muscle loss and moderate fat loss     Nutrition Care Plan:  [X] In Progress- addressed with diet order, PO encouragement and nutritional supplements   [] Achieved  [] Not applicable    Nutrition Interventions:     Education Provided:       [] Yes:  [X] No: Pt with dementia       Recommendations:     1. Continue Consistent Carbohydrate restricted diet. Defer diet/texture modification to medical team/SLP as indicated   2. Continue Glucerna 2x/day to help meet  pt's increased nutrient needs  3. Continue daily multivitamin and vitamin C supplements if no medical contraindications to aid in wound healing.   4. Encourage adequate PO intakes as tolerated. Staff to continue to provide assistance with feeding during meal times  5. Updated Malnutrition Sticker placed in pt. chart      Monitoring and Evaluation:   Continue to monitor nutritional intake, tolerance to diet prescription, weights, labs, skin integrity    RD remains available upon request and will follow up per protocol  Katie Chauhan, MS,RDN,CDN AVAILABLE ON TEAMS

## 2024-03-11 NOTE — PROGRESS NOTE ADULT - ASSESSMENT
89y old Female with history of T2DM, Hypothyroidism, Dementia, HTN, Hx of DVT, CVA, Sacral ulcer here with osteomyelitis.    1. T2DM with hyperglycemia  - numbers near goal  - Continue Admelog 12 units with breakfast, 4 units with lunch and 5 units with dinner  - Continue low Admelog correctional scale before meals and bedtime  - Monitor FS before meals and bedtime  - Follow hospital hypoglycemic protocol    2. Hypothyroidism  - Continue Levothyroxine 88 mcg daily    Discharge recs  1. Metformin  mg twice daily with food  2. Glipizide ER 2.5 mg daily with breakfast  3. No insulin on discharge    Matthew Willard MD  [facility]    [address] [place] [address]    T [phone]  F [phone]

## 2024-03-11 NOTE — DIETITIAN NUTRITION RISK NOTIFICATION - TREATMENT: THE FOLLOWING DIET HAS BEEN RECOMMENDED
Diet, Pureed:   Moderately Thick Liquids (MODTHICKLIQS)  Supplement Feeding Modality:  Oral  Glucerna Shake Cans or Servings Per Day:  1       Frequency:  Two Times a day (03-01-24 @ 17:15) [Active]      
Diet, Pureed:   Moderately Thick Liquids (MODTHICKLIQS) (02-29-24 @ 11:43) [Active]

## 2024-03-12 VITALS
HEART RATE: 102 BPM | TEMPERATURE: 98 F | DIASTOLIC BLOOD PRESSURE: 82 MMHG | OXYGEN SATURATION: 98 % | SYSTOLIC BLOOD PRESSURE: 151 MMHG | RESPIRATION RATE: 17 BRPM

## 2024-03-12 LAB
GLUCOSE BLDC GLUCOMTR-MCNC: 145 MG/DL — HIGH (ref 70–99)
GLUCOSE BLDC GLUCOMTR-MCNC: 148 MG/DL — HIGH (ref 70–99)
GLUCOSE BLDC GLUCOMTR-MCNC: 184 MG/DL — HIGH (ref 70–99)

## 2024-03-12 PROCEDURE — 87186 SC STD MICRODIL/AGAR DIL: CPT

## 2024-03-12 PROCEDURE — 82947 ASSAY GLUCOSE BLOOD QUANT: CPT

## 2024-03-12 PROCEDURE — 84295 ASSAY OF SERUM SODIUM: CPT

## 2024-03-12 PROCEDURE — 84145 PROCALCITONIN (PCT): CPT

## 2024-03-12 PROCEDURE — 71045 X-RAY EXAM CHEST 1 VIEW: CPT

## 2024-03-12 PROCEDURE — 99231 SBSQ HOSP IP/OBS SF/LOW 25: CPT

## 2024-03-12 PROCEDURE — 85652 RBC SED RATE AUTOMATED: CPT

## 2024-03-12 PROCEDURE — 85025 COMPLETE CBC W/AUTO DIFF WBC: CPT

## 2024-03-12 PROCEDURE — 85730 THROMBOPLASTIN TIME PARTIAL: CPT

## 2024-03-12 PROCEDURE — 86900 BLOOD TYPING SEROLOGIC ABO: CPT

## 2024-03-12 PROCEDURE — 72193 CT PELVIS W/DYE: CPT | Mod: MC

## 2024-03-12 PROCEDURE — 85018 HEMOGLOBIN: CPT

## 2024-03-12 PROCEDURE — 87640 STAPH A DNA AMP PROBE: CPT

## 2024-03-12 PROCEDURE — 99285 EMERGENCY DEPT VISIT HI MDM: CPT

## 2024-03-12 PROCEDURE — 87637 SARSCOV2&INF A&B&RSV AMP PRB: CPT

## 2024-03-12 PROCEDURE — 86850 RBC ANTIBODY SCREEN: CPT

## 2024-03-12 PROCEDURE — 87086 URINE CULTURE/COLONY COUNT: CPT

## 2024-03-12 PROCEDURE — 83605 ASSAY OF LACTIC ACID: CPT

## 2024-03-12 PROCEDURE — 92610 EVALUATE SWALLOWING FUNCTION: CPT

## 2024-03-12 PROCEDURE — 82803 BLOOD GASES ANY COMBINATION: CPT

## 2024-03-12 PROCEDURE — 83036 HEMOGLOBIN GLYCOSYLATED A1C: CPT

## 2024-03-12 PROCEDURE — 81001 URINALYSIS AUTO W/SCOPE: CPT

## 2024-03-12 PROCEDURE — 87070 CULTURE OTHR SPECIMN AEROBIC: CPT

## 2024-03-12 PROCEDURE — 36415 COLL VENOUS BLD VENIPUNCTURE: CPT

## 2024-03-12 PROCEDURE — 87077 CULTURE AEROBIC IDENTIFY: CPT

## 2024-03-12 PROCEDURE — 80053 COMPREHEN METABOLIC PANEL: CPT

## 2024-03-12 PROCEDURE — 87040 BLOOD CULTURE FOR BACTERIA: CPT

## 2024-03-12 PROCEDURE — 84100 ASSAY OF PHOSPHORUS: CPT

## 2024-03-12 PROCEDURE — 83735 ASSAY OF MAGNESIUM: CPT

## 2024-03-12 PROCEDURE — 87641 MR-STAPH DNA AMP PROBE: CPT

## 2024-03-12 PROCEDURE — 85014 HEMATOCRIT: CPT

## 2024-03-12 PROCEDURE — 86901 BLOOD TYPING SEROLOGIC RH(D): CPT

## 2024-03-12 PROCEDURE — 96374 THER/PROPH/DIAG INJ IV PUSH: CPT

## 2024-03-12 PROCEDURE — 82435 ASSAY OF BLOOD CHLORIDE: CPT

## 2024-03-12 PROCEDURE — 86140 C-REACTIVE PROTEIN: CPT

## 2024-03-12 PROCEDURE — 82962 GLUCOSE BLOOD TEST: CPT

## 2024-03-12 PROCEDURE — 97162 PT EVAL MOD COMPLEX 30 MIN: CPT

## 2024-03-12 PROCEDURE — 84132 ASSAY OF SERUM POTASSIUM: CPT

## 2024-03-12 PROCEDURE — 82330 ASSAY OF CALCIUM: CPT

## 2024-03-12 PROCEDURE — 85610 PROTHROMBIN TIME: CPT

## 2024-03-12 PROCEDURE — 92526 ORAL FUNCTION THERAPY: CPT

## 2024-03-12 PROCEDURE — 80048 BASIC METABOLIC PNL TOTAL CA: CPT

## 2024-03-12 RX ORDER — APIXABAN 2.5 MG/1
1 TABLET, FILM COATED ORAL
Refills: 0 | DISCHARGE

## 2024-03-12 RX ORDER — APIXABAN 2.5 MG/1
1 TABLET, FILM COATED ORAL
Qty: 0 | Refills: 0 | DISCHARGE
Start: 2024-03-12

## 2024-03-12 RX ORDER — INSULIN LISPRO 100/ML
6 VIAL (ML) SUBCUTANEOUS
Refills: 0 | Status: DISCONTINUED | OUTPATIENT
Start: 2024-03-12 | End: 2024-03-12

## 2024-03-12 RX ADMIN — Medication 6 UNIT(S): at 17:42

## 2024-03-12 RX ADMIN — Medication 25 MILLIGRAM(S): at 06:02

## 2024-03-12 RX ADMIN — Medication 1 TABLET(S): at 11:29

## 2024-03-12 RX ADMIN — Medication 1: at 09:13

## 2024-03-12 RX ADMIN — POLYETHYLENE GLYCOL 3350 17 GRAM(S): 17 POWDER, FOR SOLUTION ORAL at 11:28

## 2024-03-12 RX ADMIN — Medication 88 MICROGRAM(S): at 06:02

## 2024-03-12 RX ADMIN — CHLORHEXIDINE GLUCONATE 1 APPLICATION(S): 213 SOLUTION TOPICAL at 11:30

## 2024-03-12 RX ADMIN — APIXABAN 2.5 MILLIGRAM(S): 2.5 TABLET, FILM COATED ORAL at 05:58

## 2024-03-12 RX ADMIN — Medication 500 MILLIGRAM(S): at 11:28

## 2024-03-12 RX ADMIN — APIXABAN 2.5 MILLIGRAM(S): 2.5 TABLET, FILM COATED ORAL at 17:41

## 2024-03-12 RX ADMIN — Medication 25 MILLIGRAM(S): at 17:40

## 2024-03-12 RX ADMIN — Medication 12 UNIT(S): at 09:14

## 2024-03-12 RX ADMIN — Medication 4 UNIT(S): at 12:51

## 2024-03-12 NOTE — PROGRESS NOTE ADULT - PROBLEM SELECTOR PLAN 1
CT pelvis with L sacral/medial gluteal decubitus ulcer with lysis of coccyx with c/f osteomyelitis  Also with unstageable sacral ulcer, not receiving adequate wound care, and is in pain  Meeting sepsis criteria with tachycardia, tachypnea, and leukocytosis   wound with exposed bone, diagnostic of osteomyelitis    - procal, crp, esr elevated     Plan:  dc zosyn  c/w Levaquin based on sensitivities for 5 doses until 3/11  ID consulted, plan for 10 day abx course  Blood cx neg  wound culture grew e. coli, ESBL proteus, enterococcus   wound care debrided 2/29 CT pelvis with L sacral/medial gluteal decubitus ulcer with lysis of coccyx with c/f osteomyelitis  Also with unstageable sacral ulcer, not receiving adequate wound care, and is in pain  Meeting sepsis criteria with tachycardia, tachypnea, and leukocytosis   wound with exposed bone, diagnostic of osteomyelitis  -procal, crp, esr elevated     dc zosyn  Completed Levaquin course  ID consulted, plan for 10 day abx course  Blood cx neg  wound culture grew e. coli, ESBL proteus, enterococcus   wound care debrided 2/29 CT pelvis with L sacral/medial gluteal decubitus ulcer with lysis of coccyx with c/f osteomyelitis  Also with unstageable sacral ulcer, not receiving adequate wound care, and is in pain  Meeting sepsis criteria with tachycardia, tachypnea, and leukocytosis   wound with exposed bone, diagnostic of osteomyelitis  -procal, crp, esr elevated     dc zosyn  Completed Levaquin course  ID consulted, plan for 10 day abx course  Blood cx neg  wound culture grew e. coli, ESBL proteus, enterococcus   wound care debrided 2/29  Dc to LTC 3/12

## 2024-03-12 NOTE — PROGRESS NOTE ADULT - PROBLEM SELECTOR PROBLEM 4
Severe dementia

## 2024-03-12 NOTE — PROGRESS NOTE ADULT - PROBLEM SELECTOR PLAN 8
hx of DM on metformin, but hasn't been taking since she as told she has normal A1C  Blood glucose elevated on admission       - endocrinology consulted, appreciate recommendations  - Continue Admelog 11 units with breakfast, decrease to 4 units with lunch and continue 5 units with dinner  - Continue low Admelog correctional scale before meals and bedtime  - Monitor FS before meals and bedtime  - continue to monitor  - On discharge: metformin  mg BID and Glipizide 2.5 mg hx of DM on metformin, but hasn't been taking since she as told she has normal A1C  Blood glucose elevated on admission    - endocrinology consulted, appreciate recommendations  - Continue Admelog 11 units with breakfast, decrease to 4 units with lunch and continue 5 units with dinner  - Continue low Admelog correctional scale before meals and bedtime  - Monitor FS before meals and bedtime  - continue to monitor  - On discharge: metformin  mg BID and Glipizide 2.5 mg

## 2024-03-12 NOTE — PROGRESS NOTE ADULT - PROBLEM SELECTOR PLAN 7
c/w BB and blood thinner  Eliquis

## 2024-03-12 NOTE — PROGRESS NOTE ADULT - SUBJECTIVE AND OBJECTIVE BOX
[facility] Progress Note    MAR, chart notes, fingersticks and labs reviewed    Subjective:    Objective  Vital Signs  T(C): 37.1 (03-12-24 @ 05:50), Max: 37.1 (03-11-24 @ 14:21)  HR: 100 (03-12-24 @ 05:50) (87 - 100)  BP: 151/66 (03-12-24 @ 05:50) (122/71 - 151/66)  RR: 18 (03-12-24 @ 05:50) (18 - 18)  SpO2: 97% (03-12-24 @ 05:50) (96% - 98%)    Physical Exam  Gen: NAD, alert, awake  HEENT: NC/AT, EOMI  Neck: supple  Chest: breathing comfortably  Heart: +s1 +s2    Medications  acetaminophen     Tablet .. 650 milliGRAM(s) Oral every 6 hours PRN  apixaban 2.5 milliGRAM(s) Oral two times a day  ascorbic acid 500 milliGRAM(s) Oral daily  atorvastatin 40 milliGRAM(s) Oral at bedtime  chlorhexidine 4% Liquid 1 Application(s) Topical daily  dextrose 5%. 1000 milliLiter(s) IV Continuous <Continuous>  dextrose 5%. 1000 milliLiter(s) IV Continuous <Continuous>  dextrose 50% Injectable 25 Gram(s) IV Push once  dextrose 50% Injectable 12.5 Gram(s) IV Push once  dextrose 50% Injectable 25 Gram(s) IV Push once  dextrose Oral Gel 15 Gram(s) Oral once PRN  glucagon  Injectable 1 milliGRAM(s) IntraMuscular once  hydroxyurea 500 milliGRAM(s) Oral <User Schedule>  insulin lispro (ADMELOG) corrective regimen sliding scale   SubCutaneous at bedtime  insulin lispro (ADMELOG) corrective regimen sliding scale   SubCutaneous three times a day before meals  insulin lispro Injectable (ADMELOG) 6 Unit(s) SubCutaneous before dinner  insulin lispro Injectable (ADMELOG) 12 Unit(s) SubCutaneous before breakfast  insulin lispro Injectable (ADMELOG) 4 Unit(s) SubCutaneous before lunch  levothyroxine 88 MICROGram(s) Oral daily  melatonin 3 milliGRAM(s) Oral at bedtime PRN  metoprolol tartrate 25 milliGRAM(s) Oral two times a day  multivitamin 1 Tablet(s) Oral daily  polyethylene glycol 3350 17 Gram(s) Oral daily  QUEtiapine 25 milliGRAM(s) Oral daily PRN  senna 2 Tablet(s) Oral at bedtime    Pertinent labs/Imaging  POCT Blood Glucose.: 184 mg/dL (03-12-24 @ 08:28)  POCT Blood Glucose.: 210 mg/dL (03-11-24 @ 22:17)  POCT Blood Glucose.: 174 mg/dL (03-11-24 @ 17:34)  POCT Blood Glucose.: 102 mg/dL (03-11-24 @ 12:56)         [facility] Progress Note    MAR, chart notes, fingersticks and labs reviewed    Subjective: supposed to be discharged today    Objective  Vital Signs  T(C): 37.1 (03-12-24 @ 05:50), Max: 37.1 (03-11-24 @ 14:21)  HR: 100 (03-12-24 @ 05:50) (87 - 100)  BP: 151/66 (03-12-24 @ 05:50) (122/71 - 151/66)  RR: 18 (03-12-24 @ 05:50) (18 - 18)  SpO2: 97% (03-12-24 @ 05:50) (96% - 98%)    Physical Exam  Gen: NAD  HEENT: NC/AT  Neck: supple  Chest: breathing comfortably  Heart: +s1 +s2    Medications  acetaminophen     Tablet .. 650 milliGRAM(s) Oral every 6 hours PRN  apixaban 2.5 milliGRAM(s) Oral two times a day  ascorbic acid 500 milliGRAM(s) Oral daily  atorvastatin 40 milliGRAM(s) Oral at bedtime  chlorhexidine 4% Liquid 1 Application(s) Topical daily  dextrose 5%. 1000 milliLiter(s) IV Continuous <Continuous>  dextrose 5%. 1000 milliLiter(s) IV Continuous <Continuous>  dextrose 50% Injectable 25 Gram(s) IV Push once  dextrose 50% Injectable 12.5 Gram(s) IV Push once  dextrose 50% Injectable 25 Gram(s) IV Push once  dextrose Oral Gel 15 Gram(s) Oral once PRN  glucagon  Injectable 1 milliGRAM(s) IntraMuscular once  hydroxyurea 500 milliGRAM(s) Oral <User Schedule>  insulin lispro (ADMELOG) corrective regimen sliding scale   SubCutaneous at bedtime  insulin lispro (ADMELOG) corrective regimen sliding scale   SubCutaneous three times a day before meals  insulin lispro Injectable (ADMELOG) 6 Unit(s) SubCutaneous before dinner  insulin lispro Injectable (ADMELOG) 12 Unit(s) SubCutaneous before breakfast  insulin lispro Injectable (ADMELOG) 4 Unit(s) SubCutaneous before lunch  levothyroxine 88 MICROGram(s) Oral daily  melatonin 3 milliGRAM(s) Oral at bedtime PRN  metoprolol tartrate 25 milliGRAM(s) Oral two times a day  multivitamin 1 Tablet(s) Oral daily  polyethylene glycol 3350 17 Gram(s) Oral daily  QUEtiapine 25 milliGRAM(s) Oral daily PRN  senna 2 Tablet(s) Oral at bedtime    Pertinent labs/Imaging  POCT Blood Glucose.: 184 mg/dL (03-12-24 @ 08:28)  POCT Blood Glucose.: 210 mg/dL (03-11-24 @ 22:17)  POCT Blood Glucose.: 174 mg/dL (03-11-24 @ 17:34)  POCT Blood Glucose.: 102 mg/dL (03-11-24 @ 12:56)

## 2024-03-12 NOTE — PROGRESS NOTE ADULT - SUBJECTIVE AND OBJECTIVE BOX
PROGRESS NOTE:   Authored by Mirta Urban MD PGY-1  Internal Medicine      Patient is a 89y old  Female who presents with a chief complaint of Decubitus Ulcer (11 Mar 2024 12:41)      SUBJECTIVE / OVERNIGHT EVENTS: Pt retained 600cc urine overnight and s/p straight cath. ***, patient seen and examined at bedside    MEDICATIONS  (STANDING):  apixaban 2.5 milliGRAM(s) Oral two times a day  ascorbic acid 500 milliGRAM(s) Oral daily  atorvastatin 40 milliGRAM(s) Oral at bedtime  chlorhexidine 4% Liquid 1 Application(s) Topical daily  dextrose 5%. 1000 milliLiter(s) (50 mL/Hr) IV Continuous <Continuous>  dextrose 5%. 1000 milliLiter(s) (100 mL/Hr) IV Continuous <Continuous>  dextrose 50% Injectable 12.5 Gram(s) IV Push once  dextrose 50% Injectable 25 Gram(s) IV Push once  dextrose 50% Injectable 25 Gram(s) IV Push once  glucagon  Injectable 1 milliGRAM(s) IntraMuscular once  hydroxyurea 500 milliGRAM(s) Oral <User Schedule>  insulin lispro (ADMELOG) corrective regimen sliding scale   SubCutaneous three times a day before meals  insulin lispro (ADMELOG) corrective regimen sliding scale   SubCutaneous at bedtime  insulin lispro Injectable (ADMELOG) 5 Unit(s) SubCutaneous before dinner  insulin lispro Injectable (ADMELOG) 4 Unit(s) SubCutaneous before lunch  insulin lispro Injectable (ADMELOG) 12 Unit(s) SubCutaneous before breakfast  levothyroxine 88 MICROGram(s) Oral daily  metoprolol tartrate 25 milliGRAM(s) Oral two times a day  multivitamin 1 Tablet(s) Oral daily  polyethylene glycol 3350 17 Gram(s) Oral daily  senna 2 Tablet(s) Oral at bedtime    MEDICATIONS  (PRN):  acetaminophen     Tablet .. 650 milliGRAM(s) Oral every 6 hours PRN Temp greater or equal to 38C (100.4F), Mild Pain (1 - 3)  dextrose Oral Gel 15 Gram(s) Oral once PRN Blood Glucose LESS THAN 70 milliGRAM(s)/deciliter  melatonin 3 milliGRAM(s) Oral at bedtime PRN Insomnia  QUEtiapine 25 milliGRAM(s) Oral daily PRN for insomnia      CAPILLARY BLOOD GLUCOSE      POCT Blood Glucose.: 210 mg/dL (11 Mar 2024 22:17)  POCT Blood Glucose.: 174 mg/dL (11 Mar 2024 17:34)  POCT Blood Glucose.: 102 mg/dL (11 Mar 2024 12:56)  POCT Blood Glucose.: 177 mg/dL (11 Mar 2024 08:36)    I&O's Summary    11 Mar 2024 07:01  -  12 Mar 2024 07:00  --------------------------------------------------------  IN: 240 mL / OUT: 1200 mL / NET: -960 mL        PHYSICAL EXAM:  Vital Signs Last 24 Hrs  T(C): 37.1 (12 Mar 2024 05:50), Max: 37.1 (11 Mar 2024 14:21)  T(F): 98.7 (12 Mar 2024 05:50), Max: 98.7 (11 Mar 2024 14:21)  HR: 100 (12 Mar 2024 05:50) (87 - 100)  BP: 151/66 (12 Mar 2024 05:50) (122/71 - 151/66)  RR: 18 (12 Mar 2024 05:50) (18 - 18)  SpO2: 97% (12 Mar 2024 05:50) (96% - 98%)    Parameters below as of 12 Mar 2024 05:50  Patient On (Oxygen Delivery Method): room air      CONSTITUTIONAL: Well-groomed, in no apparent distress  RESPIRATORY: Breathing comfortably; no dullness to percussion; lungs CTA without wheeze/rhonchi/rales  CARDIOVASCULAR: +S1S2, RRR, no M/G/R; pedal pulses full and symmetric; no lower extremity edema  GASTROINTESTINAL: No palpable masses or tenderness, +BS throughout, no rebound/guarding; no hepatosplenomegaly; no hernia palpated  SKIN: No rashes or ulcers noted  NEUROLOGIC: A+O x 3, CN II-XII intact; sensation intact in LEs b/l to light touch    LABS:                     PROGRESS NOTE:   Authored by Mirta Urban MD PGY-1  Internal Medicine      Patient is a 89y old  Female who presents with a chief complaint of Decubitus Ulcer (11 Mar 2024 12:41)      SUBJECTIVE / OVERNIGHT EVENTS: Pt retained 600cc urine overnight and s/p straight cath. Since has had bladder scan with 175cc. Otherwise no other events. Patient seen and examined at bedside    MEDICATIONS  (STANDING):  apixaban 2.5 milliGRAM(s) Oral two times a day  ascorbic acid 500 milliGRAM(s) Oral daily  atorvastatin 40 milliGRAM(s) Oral at bedtime  chlorhexidine 4% Liquid 1 Application(s) Topical daily  dextrose 5%. 1000 milliLiter(s) (50 mL/Hr) IV Continuous <Continuous>  dextrose 5%. 1000 milliLiter(s) (100 mL/Hr) IV Continuous <Continuous>  dextrose 50% Injectable 12.5 Gram(s) IV Push once  dextrose 50% Injectable 25 Gram(s) IV Push once  dextrose 50% Injectable 25 Gram(s) IV Push once  glucagon  Injectable 1 milliGRAM(s) IntraMuscular once  hydroxyurea 500 milliGRAM(s) Oral <User Schedule>  insulin lispro (ADMELOG) corrective regimen sliding scale   SubCutaneous three times a day before meals  insulin lispro (ADMELOG) corrective regimen sliding scale   SubCutaneous at bedtime  insulin lispro Injectable (ADMELOG) 5 Unit(s) SubCutaneous before dinner  insulin lispro Injectable (ADMELOG) 4 Unit(s) SubCutaneous before lunch  insulin lispro Injectable (ADMELOG) 12 Unit(s) SubCutaneous before breakfast  levothyroxine 88 MICROGram(s) Oral daily  metoprolol tartrate 25 milliGRAM(s) Oral two times a day  multivitamin 1 Tablet(s) Oral daily  polyethylene glycol 3350 17 Gram(s) Oral daily  senna 2 Tablet(s) Oral at bedtime    MEDICATIONS  (PRN):  acetaminophen     Tablet .. 650 milliGRAM(s) Oral every 6 hours PRN Temp greater or equal to 38C (100.4F), Mild Pain (1 - 3)  dextrose Oral Gel 15 Gram(s) Oral once PRN Blood Glucose LESS THAN 70 milliGRAM(s)/deciliter  melatonin 3 milliGRAM(s) Oral at bedtime PRN Insomnia  QUEtiapine 25 milliGRAM(s) Oral daily PRN for insomnia      CAPILLARY BLOOD GLUCOSE      POCT Blood Glucose.: 210 mg/dL (11 Mar 2024 22:17)  POCT Blood Glucose.: 174 mg/dL (11 Mar 2024 17:34)  POCT Blood Glucose.: 102 mg/dL (11 Mar 2024 12:56)  POCT Blood Glucose.: 177 mg/dL (11 Mar 2024 08:36)    I&O's Summary    11 Mar 2024 07:01  -  12 Mar 2024 07:00  --------------------------------------------------------  IN: 240 mL / OUT: 1200 mL / NET: -960 mL        PHYSICAL EXAM:  Vital Signs Last 24 Hrs  T(C): 37.1 (12 Mar 2024 05:50), Max: 37.1 (11 Mar 2024 14:21)  T(F): 98.7 (12 Mar 2024 05:50), Max: 98.7 (11 Mar 2024 14:21)  HR: 100 (12 Mar 2024 05:50) (87 - 100)  BP: 151/66 (12 Mar 2024 05:50) (122/71 - 151/66)  RR: 18 (12 Mar 2024 05:50) (18 - 18)  SpO2: 97% (12 Mar 2024 05:50) (96% - 98%)    Parameters below as of 12 Mar 2024 05:50  Patient On (Oxygen Delivery Method): room air      General: NAD  Neurology: A&Ox1, nonfocal, RODRIGUEZ x 4  Eyes: PERRLA/ EOMI, Gross vision intact  ENT/Neck: Neck supple, trachea midline, No JVD, Gross hearing intact  Respiratory: CTA B/L, No wheezing, rales, rhonchi  CV: RRR, +S1/S2, -S3/S4, no murmurs, rubs or gallops  Abdominal: Soft, NT, ND +BS, No HSM  MSK: 5/5 strength UE/LE bilaterally  Extremities: No edema, 2+ peripheral pulses  Skin: No Rashes, Hematoma, Ecchymosis      LABS:

## 2024-03-12 NOTE — PROGRESS NOTE ADULT - REASON FOR ADMISSION
Decubitus Ulcer

## 2024-03-12 NOTE — PROGRESS NOTE ADULT - PROBLEM SELECTOR PROBLEM 6
Polycythemia vera

## 2024-03-12 NOTE — PROGRESS NOTE ADULT - PROBLEM SELECTOR PROBLEM 1
Osteomyelitis of coccyx

## 2024-03-12 NOTE — PROGRESS NOTE ADULT - ASSESSMENT
89 F DNR/DNI, hx DVT, hypothyrodism, P. 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 Found to have unstageable sacral decubitus ulcer and sepsis with imaging c/f osteomyelitis of the coccyx. Now s/p abx, pending LTC with hospice approval.  89 F DNR/DNI, hx DVT, hypothyrodism, P. 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 Found to have unstageable sacral decubitus ulcer and sepsis with imaging c/f osteomyelitis of the coccyx. Now s/p abx. Plan to discharge to LTC 3/12.

## 2024-03-12 NOTE — PROGRESS NOTE ADULT - ASSESSMENT
89y old Female with history of T2DM, Hypothyroidism, Dementia, HTN, Hx of DVT, CVA, Sacral ulcer here with osteomyelitis.    1. T2DM with hyperglycemia  - high after dinner   - Continue Admelog 12 units with breakfast, 4 units with lunch and increase to 6 units with dinner  - Continue low Admelog correctional scale before meals and bedtime  - Monitor FS before meals and bedtime  - Follow hospital hypoglycemic protocol    2. Hypothyroidism  - Continue Levothyroxine 88 mcg daily    Discharge recs  1. Metformin  mg twice daily with food  2. Glipizide ER 2.5 mg daily with breakfast  3. No insulin on discharge    Matthew Willard MD  [facility]    [address] [place] [address]    T [phone]  F [phone]

## 2024-03-12 NOTE — PROGRESS NOTE ADULT - PROVIDER SPECIALTY LIST ADULT
Endocrinology
Infectious Disease
Internal Medicine
Endocrinology
Infectious Disease
Infectious Disease
Wound Care
Endocrinology
Endocrinology
Internal Medicine
Endocrinology
Infectious Disease
Podiatry
Internal Medicine

## 2024-03-12 NOTE — PROGRESS NOTE ADULT - ATTENDING COMMENTS
89 F DNR/DNI, hx DVT, hypothyrodism, P. 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 Found to have unstageable sacral decubitus ulcer and sepsis with imaging c/f osteomyelitis of the coccyx. Now s/p abx, accepted to LTC with hospice approval.     Today, no complaints. Exam unchanged     #sepsis  #osteomeyletis   #severe dementia   #polycythemia vera   #hypothyroidism     Plan;   - C/w with abx   - No acute change clinically  - Blood glucose reviewed and acceptable.  - LTC placement acceptaed     Long term prognosis guarded w/o barriers to discharge. DC plan for today.   Family/GOC: DNR/DNI    The necessity of the time spent during the encounter on this date of service was due to:     - Ordering, reviewing, and/or interpreting labs, testing, and/or imaging  - Independently obtaining a review of systems and performing a physical exam  - Reviewing prior records and where necessary, outpatient records  - Counselling and educating patient and/or family regarding interpretation of aforementioned items and plan of care. 89 F DNR/DNI, hx DVT, hypothyrodism, P. 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 Found to have unstageable sacral decubitus ulcer and sepsis with imaging c/f osteomyelitis of the coccyx. Now s/p abx, accepted to LTC with hospice approval.     Today, no complaints. Exam unchanged     #sepsis  #osteomeyletis   #severe dementia   #polycythemia vera   #hypothyroidism     Plan;   - C/w with abx   - No acute change clinically  - Blood glucose reviewed and acceptable.  - LTC placement accepted     Long term prognosis guarded w/o barriers to discharge. DC plan for today.   Family/GOC: DNR/DNI    A total of 40 minutes was spent on discharge planning including reviewing documentations, discussing plan and medications with the residents as well as examining the patient.     The necessity of the time spent during the encounter on this date of service was due to:     - Ordering, reviewing, and/or interpreting labs, testing, and/or imaging  - Independently obtaining a review of systems and performing a physical exam  - Reviewing prior records and where necessary, outpatient records  - Counselling and educating patient and/or family regarding interpretation of aforementioned items and plan of care.

## 2024-03-12 NOTE — PROGRESS NOTE ADULT - PROBLEM SELECTOR PLAN 9
Diet; pureed and moderately thickened   DVT: Eliquis  Dispo: LTC with hospice Diet: pureed and moderately thickened   DVT: Eliquis  Dispo: LTC with hospice

## 2024-03-12 NOTE — PROGRESS NOTE ADULT - PROBLEM SELECTOR PROBLEM 5
Goals of care, counseling/discussion

## 2024-03-12 NOTE — PROGRESS NOTE ADULT - PROBLEM SELECTOR PLAN 3
CT pelvis with L sacral/medial gluteal decubitis ulcer with lysis of coccyx with c/f osteomyelitis  Also with unstageable sacral ulcer, not receiving adequate wound care, and is in pain  Meeting sepsis criteria with tachycardia, tacypnea, and leukocytosis     - procal, crp, esr elevated     Plan:  as above See above  Wound care on discharge

## 2024-03-12 NOTE — PROGRESS NOTE ADULT - PROBLEM SELECTOR PROBLEM 3
18
Sacral ulcer

## 2024-03-12 NOTE — PROGRESS NOTE ADULT - PROBLEM SELECTOR PLAN 6
hx of P. Vera  c/w hydroxyurea

## 2024-03-12 NOTE — PROGRESS NOTE ADULT - PROBLEM SELECTOR PLAN 2
CT pelvis with L sacral/medial gluteal decubitis ulcer with lysis of coccyx with c/f osteomyelitis  Also with unstageable sacral ulcer, not receiving adequate wound care, and is in pain  Meeting sepsis criteria with tachycardia, tacypnea, and leukocytosis     - procal, crp, esr elevated     Plan:  dc zosyn  c/w Levaquin based on sensitivities until 3/11  ID consulted, plan for 10 day abx course  Blood cx neg  Ucx shows 50,000-100,000 GN rods  wound culture grew e. coli, ESBL proteus, enterococcus   wound care debrided 2/29  ID consulted, plan for 7-10 day abx course CT pelvis with L sacral/medial gluteal decubitis ulcer with lysis of coccyx with c/f osteomyelitis  Also with unstageable sacral ulcer, not receiving adequate wound care, and is in pain  Sepsis since resolved    Plan:  jennifer zosyn  Levaquin completed 3/11  Blood cx neg  Ucx shows 50,000-100,000 GN rods  wound culture grew e. coli, ESBL proteus, enterococcus   wound care debrided 2/29

## 2024-03-12 NOTE — PROGRESS NOTE ADULT - NUTRITIONAL ASSESSMENT
This patient has been assessed with a concern for Malnutrition and has been determined to have a diagnosis/diagnoses of Underweight (BMI < 19).    This patient is being managed with:   Diet Pureed-  Moderately Thick Liquids (MODTHICKLIQS)  Supplement Feeding Modality:  Oral  Glucerna Shake Cans or Servings Per Day:  1       Frequency:  Two Times a day  Entered: Mar  1 2024  5:15PM  

## 2024-03-14 ENCOUNTER — APPOINTMENT (OUTPATIENT)
Dept: WOUND CARE | Facility: CLINIC | Age: 89
End: 2024-03-14

## 2024-03-31 ENCOUNTER — INPATIENT (INPATIENT)
Facility: HOSPITAL | Age: 89
LOS: 4 days | Discharge: NURSING, REHAB OR LONG-TERM CARE | End: 2024-04-05
Attending: STUDENT IN AN ORGANIZED HEALTH CARE EDUCATION/TRAINING PROGRAM | Admitting: STUDENT IN AN ORGANIZED HEALTH CARE EDUCATION/TRAINING PROGRAM
Payer: COMMERCIAL

## 2024-03-31 VITALS
TEMPERATURE: 97 F | RESPIRATION RATE: 18 BRPM | HEART RATE: 104 BPM | OXYGEN SATURATION: 96 % | HEIGHT: 59 IN | DIASTOLIC BLOOD PRESSURE: 85 MMHG | SYSTOLIC BLOOD PRESSURE: 134 MMHG

## 2024-03-31 DIAGNOSIS — Z90.49 ACQUIRED ABSENCE OF OTHER SPECIFIED PARTS OF DIGESTIVE TRACT: Chronic | ICD-10-CM

## 2024-03-31 PROCEDURE — 71250 CT THORAX DX C-: CPT | Mod: 26,MC

## 2024-03-31 PROCEDURE — 99285 EMERGENCY DEPT VISIT HI MDM: CPT

## 2024-03-31 PROCEDURE — 71045 X-RAY EXAM CHEST 1 VIEW: CPT | Mod: 26

## 2024-03-31 NOTE — ED PROVIDER NOTE - CLINICAL SUMMARY MEDICAL DECISION MAKING FREE TEXT BOX
Kg Dey, PGY2 - This is a 89 year old female with pmh of DM2, a-fib, dementia, HTn, TIA, hypothyroidism, DVt on Eliquis 2.5mg as documented in the previous chart presenting from [facility] for concern for pneumothorax. Per ED prearrival note from NP from [facility] states “Yesterday O2 sat @ 84%- treated with non rebreather and nebulizer treatment with improvement.  Today O2 sat 95% on nasal O2 @ 2L but CXR shows left hemothorax .  DNR/DNI.” On presentation patient is non verbal and cannot provide history. Vitals show tachycardia to 100-100s. Pulse ox shows 92-95% on room air, put her on 6L nasal cannul in anticipation for L hemothorax. EKG showing sinus tachycardia. Physical exam shows frail appearing patient. Non verbal. Moving all extremities and opening eyes to voice. Decreased breath sounds of the L lower lung vs R lung. POCUS shows absent L sided lung sliding with barcode sign. Will obtain cxr to confirm. Will give oxygen with nonrebreather if pneumothorax confirmed. Depending on the size of the pneumothorax, may consider thoracentesis. Dispo pending imaging and reassessment.

## 2024-03-31 NOTE — ED PROVIDER NOTE - ATTENDING CONTRIBUTION TO CARE
89 year old female with pmh of DM2, a-fib, dementia, HTn, TIA, hypothyroidism, DVt on Eliquis 2.5mg as documented in the previous chart presenting from [facility] for concern for pneumothorax. Per ED prearrival note from NP from [facility] states “Yesterday O2 sat @ 84%- treated with non rebreather and nebulizer treatment with improvement.  Today O2 sat 95% on nasal O2 @ 2L but CXR shows left hemothorax .  DNR/DNI.” On presentation patient is non verbal and cannot provide history. Vitals show tachycardia to 100-100s. Pulse ox shows 92-95% on room air, put her on 6L nasal cannul in anticipation for L hemothorax. EKG showing sinus tachycardia. Physical exam shows frail appearing patient. Non verbal. Moving all extremities and opening eyes to voice. Decreased breath sounds of the L lower lung vs R lung. POCUS shows absent L sided lung sliding with barcode sign. Will obtain cxr to confirm. Will give oxygen with nonrebreather if pneumothorax confirmed. Depending on the size of the pneumothorax, may consider thoracentesis. Dispo pending imaging and reassessment.

## 2024-03-31 NOTE — ED ADULT NURSE NOTE - NS ED NURSE RECORD ANOTHER HT AND WT
No 66 yo male with discolored urine yesterday and then today developed left flank pain with nausea. No dysuria. No fever or chills. Exam revealed white male in mild distress with moderate left sided CVAT. I agree with plan and management outlined by PA.

## 2024-03-31 NOTE — ED ADULT NURSE NOTE - NSFALLHARMRISKINTERV_ED_ALL_ED
Assistance OOB with selected safe patient handling equipment if applicable/Assistance with ambulation/Communicate risk of Fall with Harm to all staff, patient, and family/Monitor gait and stability/Provide visual cue: red socks, yellow wristband, yellow gown, etc/Reinforce activity limits and safety measures with patient and family/Bed in lowest position, wheels locked, appropriate side rails in place/Call bell, personal items and telephone in reach/Instruct patient to call for assistance before getting out of bed/chair/stretcher/Non-slip footwear applied when patient is off stretcher/[place] to call system/Physically safe environment - no spills, clutter or unnecessary equipment/Purposeful Proactive Rounding/Room/bathroom lighting operational, light cord in reach

## 2024-03-31 NOTE — ED ADULT NURSE REASSESSMENT NOTE - NS ED NURSE REASSESS COMMENT FT1
MD Aguirre and MD Patel made aware of need for US guided IV access for pt due to 2 RN attempt to gain access for lab work. Pt respirations even and unlabored, safety maintained throughout

## 2024-03-31 NOTE — ED ADULT TRIAGE NOTE - CHIEF COMPLAINT QUOTE
from [facility] for pneumothorax on xray. hx of DMT2 afib, dementia, HTN, TIA. Pt responsive to verbal stimuli. from [facility] for pneumothorax on xray. hx of DMT2 afib, dementia, HTN, TIA. Pt responsive to verbal stimuli. has higinio and RADHA from facility from [facility] for pneumothorax on xray. hx of DMT2 afib, dementia, HTN, TIA. Pt responsive to verbal stimuli. has sylvester and PIV right hand from facility

## 2024-03-31 NOTE — ED PROVIDER NOTE - PHYSICAL EXAMINATION
General: NAD  HEENT: NCAT.  Cardiac: RRR, 2+ radial pulses  Chest: L sided absent breath sound of the L lower lung  Abdomen: soft, non-distended, no ttp, no rebound or guarding  Extremities: no peripheral edema, calf tenderness, or leg size discrepancies  Skin: no rashes  Neuro: AAOx0, Moving all extremities. Eyes opened.

## 2024-03-31 NOTE — ED ADULT NURSE NOTE - SUICIDE SCREENING QUESTION 2
HR=79 bpm, AKPA=371/78 mmhg, SpO2=97.0 %, Resp=20 B/min, EtCO2=33 mmHg, Apnea=1 Seconds, Perez=2 Patient unable to complete

## 2024-03-31 NOTE — ED ADULT NURSE REASSESSMENT NOTE - NS ED NURSE REASSESS COMMENT FT1
Report received from malinda russell. Pt A&Ox0, baseline status. Pt From [facility]. No sign of acute distress noted at this time. respirations even and unlabored, remains on 4L NC. Pt with chronic sylvester, insertion site clean, dry intact. Safety maintained throughout, bed locked in lowest position. Appropriate side rails in place. comfort maintained Report received from malinda russell. Pt A&Ox0, baseline status. Alert to verbal stimuli. Pt From [facility]. No sign of acute distress noted at this time. respirations even and unlabored, remains on 4L NC. Pt with chronic sylvester, insertion site clean, dry intact. Un-stageable wound noted to sacral region. Pt cleaned, changed, repositioned. Safety maintained throughout, bed locked in lowest position. Appropriate side rails in place. comfort maintained

## 2024-03-31 NOTE — H&P ADULT - PROBLEM SELECTOR PROBLEM 3
The patient's goals for the shift include  pain control    The clinical goals for the shift include  pain control       History of DVT in adulthood

## 2024-03-31 NOTE — ED ADULT NURSE NOTE - CHIEF COMPLAINT QUOTE
from [facility] for pneumothorax on xray. hx of DMT2 afib, dementia, HTN, TIA. Pt responsive to verbal stimuli. has sylvester and PIV right hand from facility

## 2024-03-31 NOTE — ED PROVIDER NOTE - PROGRESS NOTE DETAILS
Kg Dey, PGY2 - called [facility] for more collateral and dispo planning. [facility] states that her goals of care is DNR/DNI, Daughter was considering hospice for her. Will call daughter to confirm. Kg Dey, PGY2 - CXR consistent with pleural effusion and atelectasis. Unsure if this is either hemo or pneumothorax. Will obtain CT non con to evaluate further. Kg Dey, PGY2 - called daughter to clarify goc. Invasive measures are ok such as thoracentesis, catheter placement.

## 2024-03-31 NOTE — ED ADULT NURSE NOTE - OBJECTIVE STATEMENT
Patient came in sent from [facility]. As per report, they sent the patient in for left hemothorax. No other complaints. Patient is placed on cardiac monitor with continues pulse ox. Nursing care continues

## 2024-04-01 DIAGNOSIS — Z71.89 OTHER SPECIFIED COUNSELING: ICD-10-CM

## 2024-04-01 DIAGNOSIS — L89.159 PRESSURE ULCER OF SACRAL REGION, UNSPECIFIED STAGE: ICD-10-CM

## 2024-04-01 DIAGNOSIS — E11.9 TYPE 2 DIABETES MELLITUS WITHOUT COMPLICATIONS: ICD-10-CM

## 2024-04-01 DIAGNOSIS — J90 PLEURAL EFFUSION, NOT ELSEWHERE CLASSIFIED: ICD-10-CM

## 2024-04-01 DIAGNOSIS — R06.02 SHORTNESS OF BREATH: ICD-10-CM

## 2024-04-01 DIAGNOSIS — Z51.5 ENCOUNTER FOR PALLIATIVE CARE: ICD-10-CM

## 2024-04-01 DIAGNOSIS — D45 POLYCYTHEMIA VERA: ICD-10-CM

## 2024-04-01 DIAGNOSIS — I48.20 CHRONIC ATRIAL FIBRILLATION, UNSPECIFIED: ICD-10-CM

## 2024-04-01 DIAGNOSIS — R68.89 OTHER GENERAL SYMPTOMS AND SIGNS: ICD-10-CM

## 2024-04-01 DIAGNOSIS — F03.90 UNSPECIFIED DEMENTIA WITHOUT BEHAVIORAL DISTURBANCE: ICD-10-CM

## 2024-04-01 DIAGNOSIS — R53.2 FUNCTIONAL QUADRIPLEGIA: ICD-10-CM

## 2024-04-01 LAB
ALBUMIN SERPL ELPH-MCNC: 2.6 G/DL — LOW (ref 3.3–5)
ALP SERPL-CCNC: 183 U/L — HIGH (ref 40–120)
ALT FLD-CCNC: 20 U/L — SIGNIFICANT CHANGE UP (ref 4–33)
ANION GAP SERPL CALC-SCNC: 16 MMOL/L — HIGH (ref 7–14)
ANISOCYTOSIS BLD QL: SLIGHT — SIGNIFICANT CHANGE UP
AST SERPL-CCNC: 24 U/L — SIGNIFICANT CHANGE UP (ref 4–32)
BASE EXCESS BLDV CALC-SCNC: -4.5 MMOL/L — LOW (ref -2–3)
BASOPHILS # BLD AUTO: 0 K/UL — SIGNIFICANT CHANGE UP (ref 0–0.2)
BASOPHILS NFR BLD AUTO: 0 % — SIGNIFICANT CHANGE UP (ref 0–2)
BILIRUB SERPL-MCNC: 0.2 MG/DL — SIGNIFICANT CHANGE UP (ref 0.2–1.2)
BLOOD GAS VENOUS COMPREHENSIVE RESULT: SIGNIFICANT CHANGE UP
BUN SERPL-MCNC: 35 MG/DL — HIGH (ref 7–23)
BURR CELLS BLD QL SMEAR: PRESENT — SIGNIFICANT CHANGE UP
CALCIUM SERPL-MCNC: 9.3 MG/DL — SIGNIFICANT CHANGE UP (ref 8.4–10.5)
CHLORIDE BLDV-SCNC: 105 MMOL/L — SIGNIFICANT CHANGE UP (ref 96–108)
CHLORIDE SERPL-SCNC: 100 MMOL/L — SIGNIFICANT CHANGE UP (ref 98–107)
CO2 BLDV-SCNC: 22.3 MMOL/L — SIGNIFICANT CHANGE UP (ref 22–26)
CO2 SERPL-SCNC: 18 MMOL/L — LOW (ref 22–31)
CREAT SERPL-MCNC: 0.94 MG/DL — SIGNIFICANT CHANGE UP (ref 0.5–1.3)
EGFR: 58 ML/MIN/1.73M2 — LOW
EOSINOPHIL # BLD AUTO: 0 K/UL — SIGNIFICANT CHANGE UP (ref 0–0.5)
EOSINOPHIL NFR BLD AUTO: 0 % — SIGNIFICANT CHANGE UP (ref 0–6)
GAS PNL BLDV: 134 MMOL/L — LOW (ref 136–145)
GAS PNL BLDV: SIGNIFICANT CHANGE UP
GIANT PLATELETS BLD QL SMEAR: PRESENT — SIGNIFICANT CHANGE UP
GLUCOSE BLDC GLUCOMTR-MCNC: 183 MG/DL — HIGH (ref 70–99)
GLUCOSE BLDC GLUCOMTR-MCNC: 185 MG/DL — HIGH (ref 70–99)
GLUCOSE BLDC GLUCOMTR-MCNC: 199 MG/DL — HIGH (ref 70–99)
GLUCOSE BLDC GLUCOMTR-MCNC: 211 MG/DL — HIGH (ref 70–99)
GLUCOSE BLDC GLUCOMTR-MCNC: 222 MG/DL — HIGH (ref 70–99)
GLUCOSE BLDV-MCNC: 172 MG/DL — HIGH (ref 70–99)
GLUCOSE SERPL-MCNC: 183 MG/DL — HIGH (ref 70–99)
HCO3 BLDV-SCNC: 21 MMOL/L — LOW (ref 22–29)
HCT VFR BLD CALC: 32.6 % — LOW (ref 34.5–45)
HCT VFR BLDA CALC: 30 % — LOW (ref 34.5–46.5)
HGB BLD CALC-MCNC: 10 G/DL — LOW (ref 11.7–16.1)
HGB BLD-MCNC: 9.8 G/DL — LOW (ref 11.5–15.5)
IANC: 24.71 K/UL — HIGH (ref 1.8–7.4)
LACTATE BLDV-MCNC: 2 MMOL/L — SIGNIFICANT CHANGE UP (ref 0.5–2)
LYMPHOCYTES # BLD AUTO: 0.69 K/UL — LOW (ref 1–3.3)
LYMPHOCYTES # BLD AUTO: 2.6 % — LOW (ref 13–44)
MCHC RBC-ENTMCNC: 24.6 PG — LOW (ref 27–34)
MCHC RBC-ENTMCNC: 30.1 GM/DL — LOW (ref 32–36)
MCV RBC AUTO: 81.7 FL — SIGNIFICANT CHANGE UP (ref 80–100)
MONOCYTES # BLD AUTO: 0.93 K/UL — HIGH (ref 0–0.9)
MONOCYTES NFR BLD AUTO: 3.5 % — SIGNIFICANT CHANGE UP (ref 2–14)
NEUTROPHILS # BLD AUTO: 25.07 K/UL — HIGH (ref 1.8–7.4)
NEUTROPHILS NFR BLD AUTO: 93.9 % — HIGH (ref 43–77)
NT-PROBNP SERPL-SCNC: 1658 PG/ML — HIGH
OVALOCYTES BLD QL SMEAR: SLIGHT — SIGNIFICANT CHANGE UP
PCO2 BLDV: 40 MMHG — SIGNIFICANT CHANGE UP (ref 39–52)
PH BLDV: 7.33 — SIGNIFICANT CHANGE UP (ref 7.32–7.43)
PLAT MORPH BLD: NORMAL — SIGNIFICANT CHANGE UP
PLATELET # BLD AUTO: 974 K/UL — HIGH (ref 150–400)
PLATELET COUNT - ESTIMATE: ABNORMAL
PO2 BLDV: 44 MMHG — SIGNIFICANT CHANGE UP (ref 25–45)
POIKILOCYTOSIS BLD QL AUTO: SLIGHT — SIGNIFICANT CHANGE UP
POLYCHROMASIA BLD QL SMEAR: SLIGHT — SIGNIFICANT CHANGE UP
POTASSIUM BLDV-SCNC: 4.4 MMOL/L — SIGNIFICANT CHANGE UP (ref 3.5–5.1)
POTASSIUM SERPL-MCNC: 4.9 MMOL/L — SIGNIFICANT CHANGE UP (ref 3.5–5.3)
POTASSIUM SERPL-SCNC: 4.9 MMOL/L — SIGNIFICANT CHANGE UP (ref 3.5–5.3)
PROT SERPL-MCNC: 7.3 G/DL — SIGNIFICANT CHANGE UP (ref 6–8.3)
RBC # BLD: 3.99 M/UL — SIGNIFICANT CHANGE UP (ref 3.8–5.2)
RBC # FLD: 17.8 % — HIGH (ref 10.3–14.5)
RBC BLD AUTO: ABNORMAL
SAO2 % BLDV: 69.6 % — SIGNIFICANT CHANGE UP (ref 67–88)
SCHISTOCYTES BLD QL AUTO: SLIGHT — SIGNIFICANT CHANGE UP
SODIUM SERPL-SCNC: 134 MMOL/L — LOW (ref 135–145)
WBC # BLD: 26.7 K/UL — HIGH (ref 3.8–10.5)
WBC # FLD AUTO: 26.7 K/UL — HIGH (ref 3.8–10.5)

## 2024-04-01 PROCEDURE — 99223 1ST HOSP IP/OBS HIGH 75: CPT | Mod: GC

## 2024-04-01 PROCEDURE — 99223 1ST HOSP IP/OBS HIGH 75: CPT

## 2024-04-01 PROCEDURE — 99497 ADVNCD CARE PLAN 30 MIN: CPT | Mod: 25

## 2024-04-01 RX ORDER — SODIUM CHLORIDE 9 MG/ML
1000 INJECTION, SOLUTION INTRAVENOUS
Refills: 0 | Status: DISCONTINUED | OUTPATIENT
Start: 2024-04-01 | End: 2024-04-05

## 2024-04-01 RX ORDER — PIPERACILLIN AND TAZOBACTAM 4; .5 G/20ML; G/20ML
3.38 INJECTION, POWDER, LYOPHILIZED, FOR SOLUTION INTRAVENOUS ONCE
Refills: 0 | Status: COMPLETED | OUTPATIENT
Start: 2024-04-01 | End: 2024-04-01

## 2024-04-01 RX ORDER — APIXABAN 2.5 MG/1
2.5 TABLET, FILM COATED ORAL
Refills: 0 | Status: DISCONTINUED | OUTPATIENT
Start: 2024-04-01 | End: 2024-04-05

## 2024-04-01 RX ORDER — PIPERACILLIN AND TAZOBACTAM 4; .5 G/20ML; G/20ML
3.38 INJECTION, POWDER, LYOPHILIZED, FOR SOLUTION INTRAVENOUS EVERY 8 HOURS
Refills: 0 | Status: DISCONTINUED | OUTPATIENT
Start: 2024-04-01 | End: 2024-04-05

## 2024-04-01 RX ORDER — INSULIN LISPRO 100/ML
VIAL (ML) SUBCUTANEOUS AT BEDTIME
Refills: 0 | Status: DISCONTINUED | OUTPATIENT
Start: 2024-04-01 | End: 2024-04-04

## 2024-04-01 RX ORDER — DEXTROSE 50 % IN WATER 50 %
25 SYRINGE (ML) INTRAVENOUS ONCE
Refills: 0 | Status: DISCONTINUED | OUTPATIENT
Start: 2024-04-01 | End: 2024-04-05

## 2024-04-01 RX ORDER — POLYETHYLENE GLYCOL 3350 17 G/17G
17 POWDER, FOR SOLUTION ORAL
Qty: 0 | Refills: 0 | DISCHARGE

## 2024-04-01 RX ORDER — HYDROXYUREA 500 MG/1
500 CAPSULE ORAL
Refills: 0 | Status: DISCONTINUED | OUTPATIENT
Start: 2024-04-01 | End: 2024-04-05

## 2024-04-01 RX ORDER — ATORVASTATIN CALCIUM 80 MG/1
1 TABLET, FILM COATED ORAL
Qty: 0 | Refills: 0 | DISCHARGE

## 2024-04-01 RX ORDER — VANCOMYCIN HCL 1 G
1000 VIAL (EA) INTRAVENOUS ONCE
Refills: 0 | Status: COMPLETED | OUTPATIENT
Start: 2024-04-01 | End: 2024-04-01

## 2024-04-01 RX ORDER — ACETAMINOPHEN 500 MG
1000 TABLET ORAL ONCE
Refills: 0 | Status: COMPLETED | OUTPATIENT
Start: 2024-04-01 | End: 2024-04-01

## 2024-04-01 RX ORDER — QUETIAPINE FUMARATE 200 MG/1
1 TABLET, FILM COATED ORAL
Refills: 0 | DISCHARGE

## 2024-04-01 RX ORDER — ATORVASTATIN CALCIUM 80 MG/1
1 TABLET, FILM COATED ORAL
Refills: 0 | DISCHARGE

## 2024-04-01 RX ORDER — INSULIN LISPRO 100/ML
VIAL (ML) SUBCUTANEOUS
Refills: 0 | Status: DISCONTINUED | OUTPATIENT
Start: 2024-04-01 | End: 2024-04-05

## 2024-04-01 RX ORDER — SENNA PLUS 8.6 MG/1
1 TABLET ORAL
Qty: 0 | Refills: 0 | DISCHARGE

## 2024-04-01 RX ORDER — ASCORBIC ACID 60 MG
500 TABLET,CHEWABLE ORAL DAILY
Refills: 0 | Status: DISCONTINUED | OUTPATIENT
Start: 2024-04-01 | End: 2024-04-05

## 2024-04-01 RX ORDER — GLUCAGON INJECTION, SOLUTION 0.5 MG/.1ML
1 INJECTION, SOLUTION SUBCUTANEOUS ONCE
Refills: 0 | Status: DISCONTINUED | OUTPATIENT
Start: 2024-04-01 | End: 2024-04-05

## 2024-04-01 RX ORDER — IPRATROPIUM/ALBUTEROL SULFATE 18-103MCG
3 AEROSOL WITH ADAPTER (GRAM) INHALATION EVERY 6 HOURS
Refills: 0 | Status: DISCONTINUED | OUTPATIENT
Start: 2024-04-01 | End: 2024-04-05

## 2024-04-01 RX ORDER — METOPROLOL TARTRATE 50 MG
25 TABLET ORAL
Refills: 0 | Status: DISCONTINUED | OUTPATIENT
Start: 2024-04-01 | End: 2024-04-05

## 2024-04-01 RX ORDER — ATORVASTATIN CALCIUM 80 MG/1
10 TABLET, FILM COATED ORAL AT BEDTIME
Refills: 0 | Status: DISCONTINUED | OUTPATIENT
Start: 2024-04-01 | End: 2024-04-05

## 2024-04-01 RX ORDER — SODIUM CHLORIDE 9 MG/ML
500 INJECTION INTRAMUSCULAR; INTRAVENOUS; SUBCUTANEOUS ONCE
Refills: 0 | Status: COMPLETED | OUTPATIENT
Start: 2024-04-01 | End: 2024-04-01

## 2024-04-01 RX ORDER — SODIUM CHLORIDE 9 MG/ML
4 INJECTION INTRAMUSCULAR; INTRAVENOUS; SUBCUTANEOUS EVERY 12 HOURS
Refills: 0 | Status: DISCONTINUED | OUTPATIENT
Start: 2024-04-01 | End: 2024-04-05

## 2024-04-01 RX ORDER — DEXTROSE 50 % IN WATER 50 %
12.5 SYRINGE (ML) INTRAVENOUS ONCE
Refills: 0 | Status: DISCONTINUED | OUTPATIENT
Start: 2024-04-01 | End: 2024-04-05

## 2024-04-01 RX ORDER — LEVOTHYROXINE SODIUM 125 MCG
88 TABLET ORAL DAILY
Refills: 0 | Status: DISCONTINUED | OUTPATIENT
Start: 2024-04-01 | End: 2024-04-05

## 2024-04-01 RX ORDER — DEXTROSE 50 % IN WATER 50 %
15 SYRINGE (ML) INTRAVENOUS ONCE
Refills: 0 | Status: DISCONTINUED | OUTPATIENT
Start: 2024-04-01 | End: 2024-04-05

## 2024-04-01 RX ORDER — ACETAMINOPHEN 500 MG
650 TABLET ORAL EVERY 6 HOURS
Refills: 0 | Status: DISCONTINUED | OUTPATIENT
Start: 2024-04-01 | End: 2024-04-05

## 2024-04-01 RX ADMIN — PIPERACILLIN AND TAZOBACTAM 25 GRAM(S): 4; .5 INJECTION, POWDER, LYOPHILIZED, FOR SOLUTION INTRAVENOUS at 17:25

## 2024-04-01 RX ADMIN — Medication 1: at 12:01

## 2024-04-01 RX ADMIN — Medication 25 MILLIGRAM(S): at 17:50

## 2024-04-01 RX ADMIN — PIPERACILLIN AND TAZOBACTAM 200 GRAM(S): 4; .5 INJECTION, POWDER, LYOPHILIZED, FOR SOLUTION INTRAVENOUS at 03:31

## 2024-04-01 RX ADMIN — PIPERACILLIN AND TAZOBACTAM 25 GRAM(S): 4; .5 INJECTION, POWDER, LYOPHILIZED, FOR SOLUTION INTRAVENOUS at 10:27

## 2024-04-01 RX ADMIN — Medication 250 MILLIGRAM(S): at 04:48

## 2024-04-01 RX ADMIN — APIXABAN 2.5 MILLIGRAM(S): 2.5 TABLET, FILM COATED ORAL at 17:49

## 2024-04-01 RX ADMIN — Medication 1: at 17:48

## 2024-04-01 RX ADMIN — HYDROXYUREA 500 MILLIGRAM(S): 500 CAPSULE ORAL at 17:50

## 2024-04-01 RX ADMIN — Medication 500 MILLIGRAM(S): at 11:55

## 2024-04-01 RX ADMIN — ATORVASTATIN CALCIUM 10 MILLIGRAM(S): 80 TABLET, FILM COATED ORAL at 21:14

## 2024-04-01 RX ADMIN — PIPERACILLIN AND TAZOBACTAM 25 GRAM(S): 4; .5 INJECTION, POWDER, LYOPHILIZED, FOR SOLUTION INTRAVENOUS at 21:14

## 2024-04-01 RX ADMIN — SODIUM CHLORIDE 500 MILLILITER(S): 9 INJECTION INTRAMUSCULAR; INTRAVENOUS; SUBCUTANEOUS at 04:18

## 2024-04-01 RX ADMIN — SODIUM CHLORIDE 4 MILLILITER(S): 9 INJECTION INTRAMUSCULAR; INTRAVENOUS; SUBCUTANEOUS at 20:35

## 2024-04-01 RX ADMIN — Medication 1 TABLET(S): at 12:01

## 2024-04-01 RX ADMIN — Medication 400 MILLIGRAM(S): at 04:19

## 2024-04-01 NOTE — CONSULT NOTE ADULT - PROBLEM SELECTOR RECOMMENDATION 6
DNR/DNI, MOLST completed by primary medical team. See above     Dispo: [facility] when medically stable

## 2024-04-01 NOTE — H&P ADULT - HISTORY OF PRESENT ILLNESS
89 year old female with pmh of DM2, a-fib, dementia, HTn, TIA, hypothyroidism, DVt on Eliquis 2.5mg as documented in the previous chart presenting from [facility] for concern for pneumothorax. Per ED prearrival note from NP from [facility] states “Yesterday O2 sat @ 84%- treated with non rebreather and nebulizer treatment with improvement.  Today O2 sat 95% on nasal O2 @ 2L but CXR shows left hemothorax .  DNR/DNI.” On presentation patient is non verbal and cannot provide history. Vitals show tachycardia to 100-100s. Pulse ox shows 92-95% on room air, put her on 6L nasal cannul in anticipation for L hemothorax. EKG showing sinus tachycardia. Physical exam shows frail appearing patient. Non verbal. Moving all extremities and opening eyes to voice. Decreased breath sounds of the L lower lung vs R lung. POCUS shows absent L sided lung sliding with barcode sign. Will obtain cxr to confirm. Will give oxygen with nonrebreather if pneumothorax confirmed. Depending on the size of the pneumothorax, may consider thoracentesis. Dispo pending imaging and reassessment.    Small left pleural effusion mostly loculated along the left upper lobe   posteriorly. Complete collapse of the left lower lobe and partial left   upper lobe atelectasis. No pneumothorax.    Large left pleural effusion with adjacent atelectasis. Patient very well known to me.     89F with PMHx of endstage dementia, polycythemia vera, T2DM and known unstageable sacral decubitus ulcer sent in from [facility] for dyspnea/hypoxia. Collateral obtained from ED notes and paperwork sent for the patient. Patient recently admitted to my service at [facility] for fever and leukocytosis in setting of infected sacral decubitus ulcer with possible osteomyelitis. Patient seen by ID and it was determined that a short course of antibiotics for a soft tissue infection offered the most benefit as treating for the osteomyelitis would have no long term benefit. Antibiotics were Zosyn and eventually Levofloxacin based on wound culture data. She was seen by wound care and we performed local wound care. She was discharged to [facility] with plans for hospice, though it doesn't seem like this was established. It seems that at [facility] she began to decline even further and was on Ertapenem, IV fluids, and sent in due to CXR showing possible PTX. On arrival patient in respiratory distress prior to supplemental oxygen being increased. Daughter called overnight and confirmed DNR/I, but amenable to medical management such as catheter placement, thoracentesis, etc. While in the ED RRT called for respiratory distress, though improved with minimal intervention. CXR demonstrated large left pleural effusion with adjacent atelectasis. CT Chest showed: "Small left pleural effusion mostly loculated along the left upper lobe posteriorly. Complete collapse of the left lower lobe and partial left upper lobe atelectasis. No pneumothorax." Patient given vancomycin and Zosyn, 500 cc NS bolus, and Tylenol. Currently in the ED minimally verbal which is baseline. Doesn't appear to be in any distress.

## 2024-04-01 NOTE — GOALS OF CARE CONVERSATION - ADVANCED CARE PLANNING - CONVERSATION DETAILS
Discussed current medical status with patient's daughter Magalie who is the NOK. She understands her mother's dementia is progressive and will worsen with time. While at [place] they did not pursue hospice as it was never broached. She states her mother was doing well and at baseline after she was discharged from [facility]. She states her sacral decubitus ulcer was stable and that the speech therapist recently upgraded her diet.     She states several days prior was having more respiratory distress requiring IV abx and fluids. They discussed with her whether she wants patient to be sent to the hospital and she said yes. She would be agreeable to conservative measures such as medical management and will consent to some procedures such as thoracentesis if it would provide benefit. Though quality of life is important and daughter is not inclined for management if only to prolong life.    Discussed with daughter whether patient would want a feeding tube. She is thinking about it, but likely would not agree to it if offered. Explained to her if it ever got to that point unlikely would offer significant benefit. She is interested in having palliative see patient.

## 2024-04-01 NOTE — CONSULT NOTE ADULT - PROBLEM SELECTOR RECOMMENDATION 9
CT chest (3/31): Small left pleural effusion mostly loculated along the left upper lobe posteriorly. Complete collapse of the left lower lobe and partial left upper lobe atelectasis. No pneumothorax.  > Appreciate pulm recs- no plan for thoracentesis at this time.   > On supplemental oxygen 4L  > On IV abx - appreciate ID recs. Family amenable to continued medical management

## 2024-04-01 NOTE — CONSULT NOTE ADULT - PROBLEM SELECTOR RECOMMENDATION 3
Patient diagnosed with dementia [id number], essentially bedbound, predominantly non-verbal but does nod head yes/no occasionally.   > Supportive management- per daughter, patient may have been on a medication qhs for restlessness/persistent babbling   > Patient taking PO meds at this time. Per chart review of S&S notes at [facility] in 2/2024- patient recommended for puree with moderately thick liquids

## 2024-04-01 NOTE — H&P ADULT - PROBLEM SELECTOR PLAN 3
- Takes Eliquis 2.5 mg BID at home  - Hold AC given possible procedure  - Continue with metoprolol tartrate 25 mg BID

## 2024-04-01 NOTE — RAPID RESPONSE TEAM SUMMARY - NSSITUATIONBACKGROUNDRRT_GEN_ALL_CORE
1 year old female with pmh of DM2, a-fib, dementia, HTn, TIA, hypothyroidism, DVt on Eliquis 2.5mg as documented in the previous chart presenting from [facility] for concern for pneumothorax. CXR reviewed, c/f pleural effusion. RRT called for tachycardia and desat. to 80% on 4L NC, HR tachy to 140s. Pt suctioned, moderate amount of secretions noted in suction tubing. Pt switched to non-rebreather mask, o2 sat to 95% on nonrebreather mask. Rectal temp 100.1.   - Pt already on broad spectrum abx, added iv tylenol, c/w 500 cc fluids, if persistently hypotensive, increase IVF.

## 2024-04-01 NOTE — ED ADULT NURSE REASSESSMENT NOTE - NS ED NURSE REASSESS COMMENT FT1
Pt noted to have agonal respirations, desatting to 80% on 4L NC, HR tachy to 140s. Rapid response called. Pt suctioned, moderate amount of secretions noted in suction tubing. Pt switched to non-rebreather mask, o2 sat to 95% on nonrebreather mask. Rectal temp 100.1. Pt medicated as per MAR. Pt cleaned, changed, repositioned for comfort. Safety maintained throughout, bed locked in lowest position. Appropriate side rails in place.

## 2024-04-01 NOTE — CONSULT NOTE ADULT - ASSESSMENT
89F with PMHx of endstage dementia, polycythemia vera, T2DM and known unstageable sacral decubitus ulcer sent in from [facility] for dyspnea/hypoxia    PNA, hypoxic resp failure  RRT today for tachycardia and hypoxia  MICU consulted   CT chest- Small left pleural effusion mostly loculated along the left upper lobe posteriorly. Complete collapse of the left lower lobe and partial left upper lobe atelectasis. No pneumothorax.  procal elevated    sacral decubitus ulcer  does not appear infected    leukocytosis  on chart review patient with chronic leukocytosis  likely 2/2 polycythemia vera +/- underlying infection    Recommendations  c/w zosyn  f/u blood cultures  wean O2 as tolerated  wound care    Mushtaq Patel M.D.  [facility], Division of Infectious Diseases  [phone]  After 5pm on weekdays and all day on weekends - please call [phone]

## 2024-04-01 NOTE — H&P ADULT - PROBLEM SELECTOR PLAN 4
- Endstage and minimally verbal  - In the past daughter has expressed interest in home hospice  - DNR/I  - Palliative consult for further GOC

## 2024-04-01 NOTE — CONSULT NOTE ADULT - PROBLEM SELECTOR RECOMMENDATION 4
Patient with thick oral secretions, needs oral care   > Aspiration precautions  > suction as tolerated   > If patient has persistent secretions, can consider adding scopolamine patch or atropine sublingual drops q6hprn

## 2024-04-01 NOTE — CONSULT NOTE ADULT - SUBJECTIVE AND OBJECTIVE BOX
[facility] Geriatrics and Palliative Care  Reshma Devries Palliative Care Attending  Contact Info: Page [phone] (including Nights/Weekends), message on Microsoft Teams (Reshma Devries), or leave  at Palliative Office [phone] (non-urgent)     Date of [id number] @ 15:28  HPI:  Patient very well known to me.     89F with PMHx of endstage dementia, polycythemia vera, T2DM and known unstageable sacral decubitus ulcer sent in from [facility] for dyspnea/hypoxia. Collateral obtained from ED notes and paperwork sent for the patient. Patient recently admitted to my service at [facility] for fever and leukocytosis in setting of infected sacral decubitus ulcer with possible osteomyelitis. Patient seen by ID and it was determined that a short course of antibiotics for a soft tissue infection offered the most benefit as treating for the osteomyelitis would have no long term benefit. Antibiotics were Zosyn and eventually Levofloxacin based on wound culture data. She was seen by wound care and we performed local wound care. She was discharged to [place] with plans for hospice, though it doesn't seem like this was established. It seems that at [place] she began to decline even further and was on Ertapenem, IV fluids, and sent in due to CXR showing possible PTX. On arrival patient in respiratory distress prior to supplemental oxygen being increased. Daughter called overnight and confirmed DNR/I, but amenable to medical management such as catheter placement, thoracentesis, etc. While in the ED RRT called for respiratory distress, though improved with minimal intervention. CXR demonstrated large left pleural effusion with adjacent atelectasis. CT Chest showed: "Small left pleural effusion mostly loculated along the left upper lobe posteriorly. Complete collapse of the left lower lobe and partial left upper lobe atelectasis. No pneumothorax." Patient given vancomycin and Zosyn, 500 cc NS bolus, and Tylenol. Currently in the ED minimally verbal which is baseline. Doesn't appear to be in any distress. (01 Apr 2024 07:48)    PERTINENT PM/SXH:   Benign Hypertension    Diabetes    Diabetes    Hypercholesteremia    Adult Hypothyroidism    Deep Vein Thrombosis (DVT)    Stroke    SBO (Small Bowel Obstruction)    2019 novel coronavirus disease (COVID-19)    COVID-19 vaccine series completed      FH: Total Abdominal Hysterectomy and Bilateral Salpingo-Oophorectomy    S/P small bowel resection      FAMILY HISTORY:  Family history of diabetes mellitus (DM) (Child)    Family history of secondary lung cancer    Family history of breast cancer (Child)      Family Hx substance abuse [ ]yes [ ]no  ITEMS NOT CHECKED ARE NOT PRESENT    SOCIAL HISTORY:   Significant other/partner[ ]  Children[ ]  Sabianist/Spirituality:  Substance hx:  [ ]   Tobacco hx:  [ ]   Alcohol hx: [ ]   Home Opioid hx:  [ ] I-Stop Reference No:  Living Situation: [ ]Home  [ ]Long term care  [ ]Rehab [ ]Other    ADVANCE DIRECTIVES:    DNR/MOLST  [ ]  Living Will  [ ]   DECISION MAKER(s):  [ ] Health Care Proxy(s)  [ ] Surrogate(s)  [ ] Guardian           Name(s): Phone Number(s):    BASELINE (I)ADL(s) (prior to admission):  [place]: [ ]Total  [ ] Moderate [ ]Dependent    Allergies    No Known Drug Allergies  black pepper, white pepper, cumin, paprika, johnston powder, chili powder (Rash)    Intolerances    MEDICATIONS  (STANDING):  albuterol/ipratropium for Nebulization 3 milliLiter(s) Nebulizer every 6 hours  apixaban 2.5 milliGRAM(s) Oral two times a day  ascorbic acid 500 milliGRAM(s) Oral daily  atorvastatin 10 milliGRAM(s) Oral at bedtime  dextrose 5%. 1000 milliLiter(s) (100 mL/Hr) IV Continuous <Continuous>  dextrose 5%. 1000 milliLiter(s) (50 mL/Hr) IV Continuous <Continuous>  dextrose 50% Injectable 25 Gram(s) IV Push once  dextrose 50% Injectable 25 Gram(s) IV Push once  dextrose 50% Injectable 12.5 Gram(s) IV Push once  glucagon  Injectable 1 milliGRAM(s) IntraMuscular once  hydroxyurea 500 milliGRAM(s) Oral <User Schedule>  insulin lispro (ADMELOG) corrective regimen sliding scale   SubCutaneous three times a day before meals  insulin lispro (ADMELOG) corrective regimen sliding scale   SubCutaneous at bedtime  levothyroxine 88 MICROGram(s) Oral daily  metoprolol tartrate 25 milliGRAM(s) Oral two times a day  multivitamin 1 Tablet(s) Oral daily  piperacillin/tazobactam IVPB.. 3.375 Gram(s) IV Intermittent every 8 hours  sodium chloride 3%  Inhalation 4 milliLiter(s) Inhalation every 12 hours    MEDICATIONS  (PRN):  acetaminophen     Tablet .. 650 milliGRAM(s) Oral every 6 hours PRN Temp greater or equal to 38C (100.4F), Mild Pain (1 - 3), Moderate Pain (4 - 6)  dextrose Oral Gel 15 Gram(s) Oral once PRN Blood Glucose LESS THAN 70 milliGRAM(s)/deciliter    PRESENT SYMPTOMS: [ ]Unable to self-report  [ ] CPOT [ ] PAINADs [ ] RDOS  Source if other than patient:  [ ]Family   [ ]Team     Pain: [ ]yes [ ]no  QOL impact -   Location -                    Aggravating factors -  Quality -  Radiation -  Timing-  Severity (0-10 scale):  Minimal acceptable level/pain goal (0-10 scale):     CPOT:    https://www.[facility].org/getattachment/[email or web address]/Critical-Care-Pain-Observation-Tool-(CPOT)    Dyspnea:                           [ ]Mild [ ]Moderate [ ]Severe  Anxiety:                             [ ]Mild [ ]Moderate [ ]Severe  Fatigue:                             [ ]Mild [ ]Moderate [ ]Severe  Nausea:                             [ ]Mild [ ]Moderate [ ]Severe  Loss of appetite:              [ ]Mild [ ]Moderate [ ]Severe  Constipation:                    [ ]Mild [ ]Moderate [ ]Severe    Other Symptoms:  [ ]All other review of systems negative     PCSSQ[Palliative Care Spiritual Screening Question]   Severity (0-10):  Chaplaincy Referral: [ ] yes [ ] refused [ ] following [ ] deferred     Caregiver [place]? : [ ] yes [ ] no [ ] Deferred [ ] Declined             Social work referral [ ] Patient & Family Centered Care Referral [ ]  Anticipatory Grief present?:  [ ] yes [ ] no  [ ] Deferred                  Social work referral [ ] Patient & Family Centered Care Referral [ ]    PHYSICAL EXAM:  Vital Signs Last 24 Hrs  T(C): 36.9 (01 Apr 2024 15:12), Max: 37.8 (01 Apr 2024 04:07)  T(F): 98.4 (01 Apr 2024 15:12), Max: 100.1 (01 Apr 2024 04:07)  HR: 100 (01 Apr 2024 15:12) (77 - 144)  BP: 141/93 (01 Apr 2024 15:12) (94/61 - 151/63)  BP(mean): 82 (01 Apr 2024 04:43) (82 - 102)  RR: 17 (01 Apr 2024 15:12) (16 - 23)  SpO2: 98% (01 Apr 2024 15:12) (80% - 100%)    Parameters below as of 01 Apr 2024 15:12  Patient On (Oxygen Delivery Method): nasal cannula  O2 Flow (L/min): 4   I&O's Summary    GENERAL: [ ]Cachexia    [ ]Alert  [ ]Oriented x   [ ]Lethargic  [ ]Unarousable  [ ]Verbal  [ ]Non-Verbal  Behavioral:   [ ] Anxiety  [ ] Delirium [ ] Agitation [ ] Other  HEENT:  [ ]Normal   [ ]Dry mouth   [ ]ET Tube/Trach  [ ]Oral lesions  PULMONARY:   [ ]Clear [ ]Tachypnea  [ ]Audible excessive secretions   [ ]Rhonchi        [ ]Right [ ]Left [ ]Bilateral  [ ]Crackles        [ ]Right [ ]Left [ ]Bilateral  [ ]Wheezing     [ ]Right [ ]Left [ ]Bilateral  [ ]Diminished breath sounds [ ]right [ ]left [ ]bilateral  CARDIOVASCULAR:    [ ]Regular [ ]Irregular [ ]Tachy  [ ]Rush [ ]Murmur [ ]Other  GASTROINTESTINAL:  [ ]Soft  [ ]Distended   [ ]+BS  [ ]Non tender [ ]Tender  [ ]Other [ ]PEG [ ]OGT/ NGT  Last BM:  GENITOURINARY:  [ ]Normal [ ] Incontinent   [ ]Oliguria/Anuria   [ ]Claudio  MUSCULOSKELETAL:   [ ]Normal   [ ]Weakness  [ ]Bed/Wheelchair bound [ ]Edema  NEUROLOGIC:   [ ]No focal deficits  [ ]Cognitive impairment  [ ]Dysphagia [ ]Dysarthria [ ]Paresis [ ]Other   SKIN: Please see flowsheets   [ ]Normal  [ ]Rash  [ ]Other  [ ]Pressure ulcer(s)       Present on admission [ ]y [ ]n    CRITICAL CARE:  [ ] Shock Present  [ ]Septic [ ]Cardiogenic [ ]Neurologic [ ]Hypovolemic  [ ]  Vasopressors [ ]  Inotropes   [ ]Respiratory failure present [ ]Mechanical ventilation [ ]Non-invasive ventilatory support [ ]High flow    [ ]Acute  [ ]Chronic [ ]Hypoxic  [ ]Hypercarbic [ ]Other  [ ]Other organ failure     LABS:                        9.8    26.70 )-----------( 974      ( 01 Apr 2024 00:06 )             32.6   04-01    134<L>  |  100  |  35<H>  ----------------------------<  183<H>  4.9   |  18<L>  |  0.94    Ca    9.3      01 Apr 2024 00:06    TPro  7.3  /  Alb  2.6<L>  /  TBili  0.2  /  DBili  x   /  AST  24  /  ALT  20  /  AlkPhos  183<H>  04-01      Urinalysis Basic - ( 01 Apr 2024 00:06 )    Color: x / Appearance: x / SG: x / pH: x  Gluc: 183 mg/dL / Ketone: x  / Bili: x / Urobili: x   Blood: x / Protein: x / Nitrite: x   Leuk Esterase: x / RBC: x / WBC x   Sq Epi: x / Non Sq Epi: x / Bacteria: x      RADIOLOGY & ADDITIONAL STUDIES:    PROTEIN CALORIE MALNUTRITION PRESENT: [ ]mild [ ]moderate [ ]severe [ ]underweight [ ]morbid obesity  https://www.andeal.org/vault/9060/web/files/ONC/Table_Clinical%20Characteristics%20to%20Document%20Malnutrition-White%20JV%20et%20al%202012.pdf    Height (cm): 149.9 (03-31-24 @ 16:00), 149.9 (02-29-24 @ 05:09), 152.4 (12-18-23 @ 12:44)  Weight (kg): 34.6 (02-29-24 @ 05:09), 49.9 (12-18-23 @ 12:44), 41.3 (09-13-23 @ 13:24)  BMI (kg/m2): 15.4 (03-31-24 @ 16:00), 15.4 (02-29-24 @ 05:09), 21.5 (12-18-23 @ 12:44)    [ ]PPSV2 < or = to 30% [ ]significant weight loss  [ ]poor nutritional intake  [ ]anasarca[ ]Artificial Nutrition      Other REFERRALS:  [ ]Hospice  [ ]Child Life  [ ]Social Work  [ ]Case management [ ]Holistic Therapy     Goals of Care Document: LORRAINE Navarro (04-01-24 @ 11:38)  Goals of Care Conversation:   Participants:  · Participants  Family  · Child(shilpi)  Magalie    Conversation Discussion:  · Conversation  Diagnosis; Prognosis; MOLST Discussed  · Conversation Details  Discussed current medical status with patient's daughter Magalie who is the NOK. She understands her mother's dementia is progressive and will worsen with time. While at [place] they did not pursue hospice as it was never broached. She states her mother was doing well and at baseline after she was discharged from [facility]. She states her sacral decubitus ulcer was stable and that the speech therapist recently upgraded her diet.     She states several days prior was having more respiratory distress requiring IV abx and fluids. They discussed with her whether she wants patient to be sent to the hospital and she said yes. She would be agreeable to conservative measures such as medical management and will consent to some procedures such as thoracentesis if it would provide benefit. Though quality of life is important and daughter is not inclined for management if only to prolong life.    Discussed with daughter whether patient would want a feeding tube. She is thinking about it, but likely would not agree to it if offered. Explained to her if it ever got to that point unlikely would offer significant benefit. She is interested in having palliative see patient.    What Matters Most To Patient and Family:  · What matters most to patient and family  Conservative medical management    Personal Advance Directives Treatment Guidelines:   Treatment Guidelines:  · Treatment Guideline Comments  DNR/I, conservative interventions    Location of Discussion:   Time Spent on Advance Care Planning:  Attending or KATHLEEN Only.     I personally spent 20 minutes on advance care planning services with the patient. This time is separate and distinct from any other care management services provided on this date.      Electronic Signatures:  Albaro Navarro)  (Signed 01-Apr-2024 11:42)  	Authored: Goals of Care Conversation, Personal Advance Directives Treatment Guidelines, Location of Discussion      Last Updated: 01-Apr-2024 11:42 by Albaro Navarro)     [facility] Geriatrics and Palliative Care  Reshma Devries Palliative Care Attending  Contact Info: Page [phone] (including Nights/Weekends), message on Microsoft Teams (Reshma Devries), or leave VM at Palliative Office [phone] (non-urgent)     Date of [id number] @ 15:28  HPI: 89F with PMHx of endstage dementia, polycythemia vera, T2DM and known unstageable sacral decubitus ulcer sent in from [facility] for dyspnea/hypoxia. Collateral obtained from ED notes and paperwork sent for the patient. Patient recently admitted to my service at [facility] for fever and leukocytosis in setting of infected sacral decubitus ulcer with possible osteomyelitis. Patient seen by ID and it was determined that a short course of antibiotics for a soft tissue infection offered the most benefit as treating for the osteomyelitis would have no long term benefit. Antibiotics were Zosyn and eventually Levofloxacin based on wound culture data. She was seen by wound care and we performed local wound care. She was discharged to [place] with plans for hospice, though it doesn't seem like this was established. It seems that at [place] she began to decline even further and was on Ertapenem, IV fluids, and sent in due to CXR showing possible PTX. On arrival patient in respiratory distress prior to supplemental oxygen being increased. Daughter called overnight and confirmed DNR/I, but amenable to medical management such as catheter placement, thoracentesis, etc. While in the ED RRT called for respiratory distress, though improved with minimal intervention. CXR demonstrated large left pleural effusion with adjacent atelectasis. CT Chest showed: "Small left pleural effusion mostly loculated along the left upper lobe posteriorly. Complete collapse of the left lower lobe and partial left upper lobe atelectasis. No pneumothorax." Patient given vancomycin and Zosyn, 500 cc NS bolus, and Tylenol. Currently in the ED minimally verbal which is baseline. Doesn't appear to be in any distress. (01 Apr 2024 07:48)    PERTINENT PM/SXH:   Benign Hypertension  Diabetes  Hypercholesteremia  Adult Hypothyroidism  Deep Vein Thrombosis (DVT)  Stroke  SBO (Small Bowel Obstruction)  2019 novel coronavirus disease (COVID-19)  COVID-19 vaccine series completed    FH: Total Abdominal Hysterectomy and Bilateral Salpingo-Oophorectomy  S/P small bowel resection    FAMILY HISTORY:  Family history of diabetes mellitus (DM) (Child)  Family history of secondary lung cancer  Family history of breast cancer (Child)    Family Hx substance abuse [ ]yes [ ]no  ITEMS NOT CHECKED ARE NOT PRESENT    SOCIAL HISTORY: Patient is a resident of [facility]- has been there 2 weeks   Significant other/partner[ ]  Children[x- Magalie (daughter)]  Episcopalian/Spirituality:  Substance hx:  [ ]   Tobacco hx:  [ ]   Alcohol hx: [ ]   Home Opioid hx: NONE [ ] I-Stop Reference No: [id number]  Living Situation: [ ]Home  [x ]Long term care  [ ]Rehab [ ]Other    ADVANCE DIRECTIVES:    DNR/MOLST  [x ]  Living Will  [ ]   DECISION MAKER(s):  [ ] Health Care Proxy(s)  [ x] Surrogate(s)  [ ] Guardian           Name(s): Magalie Terry Phone Number(s): [phone]    BASELINE (I)ADL(s) (prior to admission): bedbound   [place]: [ ]Total  [ ] Moderate [x ]Dependent    Allergies    No Known Drug Allergies  black pepper, white pepper, cumin, paprika, johnston powder, chili powder (Rash)    Intolerances    MEDICATIONS  (STANDING):  albuterol/ipratropium for Nebulization 3 milliLiter(s) Nebulizer every 6 hours  apixaban 2.5 milliGRAM(s) Oral two times a day  ascorbic acid 500 milliGRAM(s) Oral daily  atorvastatin 10 milliGRAM(s) Oral at bedtime  dextrose 5%. 1000 milliLiter(s) (100 mL/Hr) IV Continuous <Continuous>  dextrose 5%. 1000 milliLiter(s) (50 mL/Hr) IV Continuous <Continuous>  dextrose 50% Injectable 25 Gram(s) IV Push once  dextrose 50% Injectable 25 Gram(s) IV Push once  dextrose 50% Injectable 12.5 Gram(s) IV Push once  glucagon  Injectable 1 milliGRAM(s) IntraMuscular once  hydroxyurea 500 milliGRAM(s) Oral <User Schedule>  insulin lispro (ADMELOG) corrective regimen sliding scale   SubCutaneous three times a day before meals  insulin lispro (ADMELOG) corrective regimen sliding scale   SubCutaneous at bedtime  levothyroxine 88 MICROGram(s) Oral daily  metoprolol tartrate 25 milliGRAM(s) Oral two times a day  multivitamin 1 Tablet(s) Oral daily  piperacillin/tazobactam IVPB.. 3.375 Gram(s) IV Intermittent every 8 hours  sodium chloride 3%  Inhalation 4 milliLiter(s) Inhalation every 12 hours    MEDICATIONS  (PRN):  acetaminophen     Tablet .. 650 milliGRAM(s) Oral every 6 hours PRN Temp greater or equal to 38C (100.4F), Mild Pain (1 - 3), Moderate Pain (4 - 6)  dextrose Oral Gel 15 Gram(s) Oral once PRN Blood Glucose LESS THAN 70 milliGRAM(s)/deciliter    PRESENT SYMPTOMS: [x ]Unable to self-report  [ ] CPOT [ x] PAINADs [x ] RDOS  Source if other than patient:  [x ]Family   [ ]Team     Pain: [ ]yes [ ]no  QOL impact -   Location -                    Aggravating factors -  Quality -  Radiation -  Timing-  Severity (0-10 scale):  Minimal acceptable level/pain goal (0-10 scale):     CPOT:    https://www.[facility].org/getattachment/[email or web address]/Critical-Care-Pain-Observation-Tool-(CPOT)    Dyspnea:                           [ ]Mild [ ]Moderate [ ]Severe  Anxiety:                             [ ]Mild [ ]Moderate [ ]Severe  Fatigue:                             [ ]Mild [ ]Moderate [ ]Severe  Nausea:                             [ ]Mild [ ]Moderate [ ]Severe  Loss of appetite:              [ ]Mild [ ]Moderate [ ]Severe  Constipation:                    [ ]Mild [ ]Moderate [ ]Severe    Other Symptoms:  [ ]All other review of systems negative     PCSSQ[Palliative Care Spiritual Screening Question]   Severity (0-10):  Chaplaincy Referral: [ ] yes [ ] refused [ ] following [x ] deferred     Caregiver [place]? : [ ] yes [ ] no [ x] Deferred [ ] Declined             Social work referral [ ] Patient & Family Centered Care Referral [ ]  Anticipatory Grief present?:  [ ] yes [ ] no  [x ] Deferred                  Social work referral [ ] Patient & Family Centered Care Referral [ ]    PHYSICAL EXAM:  Vital Signs Last 24 Hrs  T(C): 36.9 (01 Apr 2024 15:12), Max: 37.8 (01 Apr 2024 04:07)  T(F): 98.4 (01 Apr 2024 15:12), Max: 100.1 (01 Apr 2024 04:07)  HR: 100 (01 Apr 2024 15:12) (77 - 144)  BP: 141/93 (01 Apr 2024 15:12) (94/61 - 151/63)  BP(mean): 82 (01 Apr 2024 04:43) (82 - 102)  RR: 17 (01 Apr 2024 15:12) (16 - 23)  SpO2: 98% (01 Apr 2024 15:12) (80% - 100%)    Parameters below as of 01 Apr 2024 15:12  Patient On (Oxygen Delivery Method): nasal cannula  O2 Flow (L/min): 4   I&O's Summary    GENERAL: [ ]Cachexia  Utilized Tagalog : [id number] but patient not really answering appropriately  [x ]Alert  [ ]Oriented x   [ ]Lethargic  [ ]Unarousable  [ ]Verbal  [x ]Non-Verbal- nods head yes/no but unable to determine if she answers questions appropriately  Behavioral:   [ ] Anxiety  [ ] Delirium [ ] Agitation [x ] Other  HEENT: copious oral secretions   [ ]Normal   [ ]Dry mouth   [ ]ET Tube/Trach  [ ]Oral lesions  PULMONARY:   [ ]Clear [ ]Tachypnea  [x ]Audible excessive secretions   [ ]Rhonchi        [ ]Right [ ]Left [ ]Bilateral  [ ]Crackles        [ ]Right [ ]Left [ ]Bilateral  [ ]Wheezing     [ ]Right [ ]Left [ ]Bilateral  [ x]Diminished breath sounds [ ]right [ ]left [x ]bilateral  CARDIOVASCULAR:    [ ]Regular [ ]Irregular [x ]Tachy  [ ]Rush [ ]Murmur [ ]Other  GASTROINTESTINAL:  [x ]Soft  [ ]Distended   [ ]+BS  [x ]Non tender [ ]Tender  [ ]Other [ ]PEG [ ]OGT/ NGT  Last BM: ?   GENITOURINARY:  [ ]Normal [ ] Incontinent   [ ]Oliguria/Anuria   [x ]Claudio  MUSCULOSKELETAL:   [ ]Normal   [ ]Weakness  [x ]Bed/Wheelchair bound [ ]Edema  NEUROLOGIC:   [ ]No focal deficits  [ ]Cognitive impairment  [ ]Dysphagia [ ]Dysarthria [ ]Paresis [x ]Other   SKIN: Please see flowsheets - sacrum   [ ]Normal  [ ]Rash  [ ]Other  [x ]Pressure ulcer(s)       Present on admission [x ]y [ ]n    CRITICAL CARE:  [ ] Shock Present  [ ]Septic [ ]Cardiogenic [ ]Neurologic [ ]Hypovolemic  [ ]  Vasopressors [ ]  Inotropes   [ ]Respiratory failure present [ ]Mechanical ventilation [ ]Non-invasive ventilatory support [ ]High flow    [ ]Acute  [ ]Chronic [ ]Hypoxic  [ ]Hypercarbic [ ]Other  [ ]Other organ failure     LABS:                        9.8    26.70 )-----------( 974      ( 01 Apr 2024 00:06 )             32.6   04-01    134<L>  |  100  |  35<H>  ----------------------------<  183<H>  4.9   |  18<L>  |  0.94    Ca    9.3      01 Apr 2024 00:06    TPro  7.3  /  Alb  2.6<L>  /  TBili  0.2  /  DBili  x   /  AST  24  /  ALT  20  /  AlkPhos  183<H>  04-01      Urinalysis Basic - ( 01 Apr 2024 00:06 )    Color: x / Appearance: x / SG: x / pH: x  Gluc: 183 mg/dL / Ketone: x  / Bili: x / Urobili: x   Blood: x / Protein: x / Nitrite: x   Leuk Esterase: x / RBC: x / WBC x   Sq Epi: x / Non Sq Epi: x / Bacteria: x      RADIOLOGY & ADDITIONAL STUDIES:  < from: CT Chest No Cont (03.31.24 @ 20:21) >  IMPRESSION:  Small left pleural effusion mostly loculated along the left upper lobe   posteriorly. Complete collapse of the left lower lobe and partial left   upper lobe atelectasis. No pneumothorax.    < end of copied text >      PROTEIN CALORIE MALNUTRITION PRESENT: [ ]mild [ ]moderate [ ]severe [ ]underweight [ ]morbid obesity  https://www.andeal.org/vault/2440/web/files/ONC/Table_Clinical%20Characteristics%20to%20Document%20Malnutrition-White%20JV%20et%20al%202012.pdf    Height (cm): 149.9 (03-31-24 @ 16:00), 149.9 (02-29-24 @ 05:09), 152.4 (12-18-23 @ 12:44)  Weight (kg): 34.6 (02-29-24 @ 05:09), 49.9 (12-18-23 @ 12:44), 41.3 (09-13-23 @ 13:24)  BMI (kg/m2): 15.4 (03-31-24 @ 16:00), 15.4 (02-29-24 @ 05:09), 21.5 (12-18-23 @ 12:44)    [ x]PPSV2 < or = to 30% [ ]significant weight loss  [ ]poor nutritional intake  [ ]anasarca[ ]Artificial Nutrition      Other REFERRALS:  [ ]Hospice  [ ]Child Life  [ ]Social Work  [ ]Case management [ ]Holistic Therapy

## 2024-04-01 NOTE — CONSULT NOTE ADULT - PROBLEM SELECTOR RECOMMENDATION 7
At this time, goals are established. Palliative team signing off.     In the event of newly developing, evolving, or worsening symptoms, please contact the Palliative Medicine team via pager (if the patient is at [facility] #[phone] or if the patient is at [facility] #[phone]) The Geriatric and Palliative Medicine service has coverage 24 hours a day/ 7 days a week to provide medical recommendations regarding symptom management needs via telephone.

## 2024-04-01 NOTE — H&P ADULT - ASSESSMENT
89F with PMHx of endstage dementia, atrial fibrillation, polycythemia vera, T2DM and known unstageable sacral decubitus ulcer sent in from [facility] for dyspnea/hypoxia. CXR demonstrated large left pleural effusion with adjacent atelectasis. CT Chest showed: "Small left pleural effusion mostly loculated along the left upper lobe posteriorly. Complete collapse of the left lower lobe and partial left upper lobe atelectasis. No pneumothorax." Patient being admitted for pneumonia likely complicated by a left pleural effusion.

## 2024-04-01 NOTE — PATIENT PROFILE ADULT - FALL HARM RISK - HARM RISK INTERVENTIONS

## 2024-04-01 NOTE — CONSULT NOTE ADULT - PROBLEM SELECTOR RECOMMENDATION 2
Hematologist: Dr. Chamberlain   Per discussion with pt's daughter, Magalie, patient gets monthly bloodwork and patient is on hydroxyurea per Hematology recs

## 2024-04-01 NOTE — CONSULT NOTE ADULT - SUBJECTIVE AND OBJECTIVE BOX
Consult received. Full note to follow.     Mushtaq Patel M.D.  [facility], Division of Infectious Diseases  [phone]  After 5pm on weekdays and all day on weekends - please call [phone]  [facility], Division of Infectious Diseases  SANCHEZ Faria S. Shah, Y. Patel, G. Jorge  [phone]  ([phone] - weekdays after 5pm and weekends)    BISHOP BURNS  89y, Female  [id number]    HPI--  HPI:  Patient very well known to me.     89F with PMHx of endstage dementia, polycythemia vera, T2DM and known unstageable sacral decubitus ulcer sent in from [facility] for dyspnea/hypoxia. Collateral obtained from ED notes and paperwork sent for the patient. Patient recently admitted to my service at [facility] for fever and leukocytosis in setting of infected sacral decubitus ulcer with possible osteomyelitis. Patient seen by ID and it was determined that a short course of antibiotics for a soft tissue infection offered the most benefit as treating for the osteomyelitis would have no long term benefit. Antibiotics were Zosyn and eventually Levofloxacin based on wound culture data. She was seen by wound care and we performed local wound care. She was discharged to [facility] with plans for hospice, though it doesn't seem like this was established. It seems that at [facility] she began to decline even further and was on Ertapenem, IV fluids, and sent in due to CXR showing possible PTX. On arrival patient in respiratory distress prior to supplemental oxygen being increased. Daughter called overnight and confirmed DNR/I, but amenable to medical management such as catheter placement, thoracentesis, etc. While in the ED RRT called for respiratory distress, though improved with minimal intervention. CXR demonstrated large left pleural effusion with adjacent atelectasis. CT Chest showed: "Small left pleural effusion mostly loculated along the left upper lobe posteriorly. Complete collapse of the left lower lobe and partial left upper lobe atelectasis. No pneumothorax." Patient given vancomycin and Zosyn, 500 cc NS bolus, and Tylenol. Currently in the ED minimally verbal which is baseline. Doesn't appear to be in any distress. (01 Apr 2024 07:48)  Patient unable to provide history therefore history obtained from chart review.     ROS: unable to assess 2/2 patient's mentation     Allergies: No Known Drug Allergies  black pepper, white pepper, cumin, paprika, johnston powder, chili powder (Rash)    PMH -- Benign Hypertension    Diabetes    Diabetes    Hypercholesteremia    Adult Hypothyroidism    Deep Vein Thrombosis (DVT)    Stroke    SBO (Small Bowel Obstruction)    2019 novel coronavirus disease (COVID-19)    COVID-19 vaccine series completed      PS -- FH: Total Abdominal Hysterectomy and Bilateral Salpingo-Oophorectomy    S/P small bowel resection      FH -- Family history of diabetes mellitus (DM) (Child)    Family history of secondary lung cancer    Family history of breast cancer (Child)      Social History -- unable to assess 2/2 patient's mentation     Physical Exam--  Vital Signs Last 24 Hrs  T(F): 97.6 (01 Apr 2024 08:51), Max: 100.1 (01 Apr 2024 04:07)  HR: 79 (01 Apr 2024 12:38) (77 - 144)  BP: 108/62 (01 Apr 2024 12:38) (94/61 - 151/63)  RR: 20 (01 Apr 2024 12:38) (16 - 23)  SpO2: 98% (01 Apr 2024 12:38) (80% - 100%)  General: nontoxic-appearing, no acute distress  HEENT: anicteric, NC  Lungs: Clear bilaterally without rales  Heart: S1, S2, normal rate.  Abdomen: Soft. Nondistended.  : sylvester  Neuro: non-verbal  Extremities: No LE edema. b/l feet wrapped w/ dressings  Skin: sacral wound without purulence or surrounding erythema or edema  Psychiatric: unable to assess    Laboratory & Imaging Data--  CBC:                       9.8    26.70 )-----------( 974      ( 01 Apr 2024 00:06 )             32.6     WBC Count: 26.70 K/uL (04-01-24 @ 00:06)    CMP: 04-01    134<L>  |  100  |  35<H>  ----------------------------<  183<H>  4.9   |  18<L>  |  0.94    Ca    9.3      01 Apr 2024 00:06    TPro  7.3  /  Alb  2.6<L>  /  TBili  0.2  /  DBili  x   /  AST  24  /  ALT  20  /  AlkPhos  183<H>  04-01    LIVER FUNCTIONS - ( 01 Apr 2024 00:06 )  Alb: 2.6 g/dL / Pro: 7.3 g/dL / ALK PHOS: 183 U/L / ALT: 20 U/L / AST: 24 U/L / GGT: x           Urinalysis Basic - ( 01 Apr 2024 00:06 )    Color: x / Appearance: x / SG: x / pH: x  Gluc: 183 mg/dL / Ketone: x  / Bili: x / Urobili: x   Blood: x / Protein: x / Nitrite: x   Leuk Esterase: x / RBC: x / WBC x   Sq Epi: x / Non Sq Epi: x / Bacteria: x      Microbiology:       Radiology--  ***  Active Medications--  acetaminophen     Tablet .. 650 milliGRAM(s) Oral every 6 hours PRN  albuterol/ipratropium for Nebulization 3 milliLiter(s) Nebulizer every 6 hours  ascorbic acid 500 milliGRAM(s) Oral daily  atorvastatin 10 milliGRAM(s) Oral at bedtime  dextrose 5%. 1000 milliLiter(s) IV Continuous <Continuous>  dextrose 5%. 1000 milliLiter(s) IV Continuous <Continuous>  dextrose 50% Injectable 12.5 Gram(s) IV Push once  dextrose 50% Injectable 25 Gram(s) IV Push once  dextrose 50% Injectable 25 Gram(s) IV Push once  dextrose Oral Gel 15 Gram(s) Oral once PRN  glucagon  Injectable 1 milliGRAM(s) IntraMuscular once  hydroxyurea 500 milliGRAM(s) Oral <User Schedule>  insulin lispro (ADMELOG) corrective regimen sliding scale   SubCutaneous three times a day before meals  insulin lispro (ADMELOG) corrective regimen sliding scale   SubCutaneous at bedtime  levothyroxine 88 MICROGram(s) Oral daily  metoprolol tartrate 25 milliGRAM(s) Oral two times a day  multivitamin 1 Tablet(s) Oral daily  piperacillin/tazobactam IVPB.. 3.375 Gram(s) IV Intermittent every 8 hours  sodium chloride 3%  Inhalation 4 milliLiter(s) Inhalation every 12 hours    Antimicrobials:   piperacillin/tazobactam IVPB.. 3.375 Gram(s) IV Intermittent every 8 hours    Immunologic:

## 2024-04-01 NOTE — H&P ADULT - NSHPPHYSICALEXAM_GEN_ALL_CORE
T(C): 36.4 (04-01-24 @ 08:51), Max: 37.8 (04-01-24 @ 04:07)  HR: 77 (04-01-24 @ 08:51) (77 - 144)  BP: 112/63 (04-01-24 @ 08:51) (94/61 - 151/63)  RR: 20 (04-01-24 @ 08:51) (16 - 23)  SpO2: 100% (04-01-24 @ 08:51) (80% - 100%)    GEN: female in NAD, appears comfortable, no diaphoresis  EYES: No scleral injection, PERRL, EOMI  ENTM: neck supple & symmetric without tracheal deviation, moist membranes, no gross hearing impairment, thyroid gland not enlarged  CV: +S1/S2, no m/r/g, no abdominal bruit, no LE edema  RESP: breathing comfortably, no respiratory accessory muscle use, poor lung sounds noted on left side  GI: normoactive BS, soft, NTND, no rebounding/guarding, no palpable masses  LYMPHATICS: no LAD or tenderness to palpation  NEURO: non-verbal  PSYCH: non-verbal  SKIN: no petechiae, ecchymosis or maculopapular rash noted

## 2024-04-01 NOTE — ED ADULT NURSE REASSESSMENT NOTE - NS ED NURSE REASSESS COMMENT FT1
Break RN: Pt received in stretcher in room 8. Pt A&OX0, nonverbal, responsive to pain stimuli, tolerating 4L in NAD. DNR/DNI with molst in chart. Remains sinus tachycardiac on monitor. Pending CT result. Claudio noted with scant amount of sincere urine in bag

## 2024-04-01 NOTE — CONSULT NOTE ADULT - ASSESSMENT
89F with PMHx of endstage dementia, polycythemia vera, T2DM and known unstageable sacral decubitus ulcer sent in from [facility] for dyspnea/hypoxia.    #abnormal CT chest  pulmonary asked to evalute for possible thoracentesis with effusion seen on CT chest  On bedside pocus there is a trivial effusion, no safe window for thoracentesis  - abx per primary team for Tx of PNA  - Airway clearance with nebs+chest PT  - defer thoracentesis  Pulmonary will sign off, reconsult PRN

## 2024-04-01 NOTE — CONSULT NOTE ADULT - ASSESSMENT
89F with PMHx of endstage dementia, atrial fibrillation, polycythemia vera, T2DM and known unstageable sacral decubitus ulcer sent in from [facility] for dyspnea/hypoxia. CXR demonstrated large left pleural effusion with adjacent atelectasis. CT Chest showed: "Small left pleural effusion mostly loculated along the left upper lobe posteriorly. Complete collapse of the left lower lobe and partial left upper lobe atelectasis. No pneumothorax." Patient being admitted for pneumonia likely complicated by a left pleural effusion.       Palliative consulted for complex decision making regarding goc in setting of advanced dementia.

## 2024-04-01 NOTE — CONSULT NOTE ADULT - SUBJECTIVE AND OBJECTIVE BOX
CHIEF COMPLAINT:Patient is a 89y old  Female who presents with a chief complaint of Dyspnea (01 Apr 2024 15:26)      HPI:  89F with PMHx of endstage dementia, polycythemia vera, T2DM and known unstageable sacral decubitus ulcer sent in from [facility] for dyspnea/hypoxia. Collateral obtained from ED notes and paperwork sent for the patient. Patient recently admitted to my service at [facility] for fever and leukocytosis in setting of infected sacral decubitus ulcer with possible osteomyelitis. Patient seen by ID and it was determined that a short course of antibiotics for a soft tissue infection offered the most benefit as treating for the osteomyelitis would have no long term benefit. Antibiotics were Zosyn and eventually Levofloxacin based on wound culture data. She was seen by wound care and we performed local wound care. She was discharged to [facility] with plans for hospice, though it doesn't seem like this was established. It seems that at [place] she began to decline even further and was on Ertapenem, IV fluids, and sent in due to CXR showing possible PTX. On arrival patient in respiratory distress prior to supplemental oxygen being increased. Daughter called overnight and confirmed DNR/I, but amenable to medical management such as catheter placement, thoracentesis, etc. While in the ED RRT called for respiratory distress, though improved with minimal intervention. CXR demonstrated large left pleural effusion with adjacent atelectasis. CT Chest showed: "Small left pleural effusion mostly loculated along the left upper lobe posteriorly. Complete collapse of the left lower lobe and partial left upper lobe atelectasis. No pneumothorax." Patient given vancomycin and Zosyn, 500 cc NS bolus, and Tylenol. Currently in the ED minimally verbal which is baseline. Doesn't appear to be in any distress. (01 Apr 2024 07:48)      PAST MEDICAL & SURGICAL HISTORY:  Benign Hypertension      Diabetes      Hypercholesteremia      Adult Hypothyroidism      Deep Vein Thrombosis (DVT)      Stroke      SBO (Small Bowel Obstruction)  08/2019 2019 novel coronavirus disease (COVID-19)      COVID-19 vaccine series completed      FH: Total Abdominal Hysterectomy and Bilateral Salpingo-Oophorectomy      S/P small bowel resection  08/2019          FAMILY HISTORY:  Family history of diabetes mellitus (DM) (Child)    Family history of secondary lung cancer    Family history of breast cancer (Child)      Allergies    No Known Drug Allergies  black pepper, white pepper, cumin, paprika, johnston powder, chili powder (Rash)    Intolerances        HOME MEDICATIONS:  Home Medications:  apixaban 2.5 mg oral tablet: 1 tab(s) orally 2 times a day (01 Apr 2024 09:29)  ascorbic acid 500 mg oral tablet: 1 tab(s) orally once a day (01 Apr 2024 09:29)  atorvastatin 10 mg oral tablet: 1 tab(s) orally once a day (at bedtime) (01 Apr 2024 09:28)  Glucotrol XL 2.5 mg oral tablet, extended release: 1 tab(s) orally once a day (01 Apr 2024 09:29)  hydroxyurea 500 mg oral capsule: 1 cap(s) orally 3 times a week Mon/Wed/Fri (01 Apr 2024 09:29)  levothyroxine 88 mcg (0.088 mg) oral tablet: 1 tab(s) orally once a day (01 Apr 2024 09:29)  metFORMIN 500 mg oral tablet, extended release: 1 tab(s) orally 2 times a day (01 Apr 2024 09:29)  metoprolol tartrate 25 mg oral tablet: 1 tab(s) orally 2 times a day (01 Apr 2024 09:29)  Multiple Vitamins oral tablet: 1 tab(s) orally once a day (01 Apr 2024 09:29)  ocular lubricant ophthalmic solution: 1 drop(s) to each affected eye 2 times a day (01 Apr 2024 09:29)  polyethylene glycol 3350 oral powder for reconstitution: 17 gram(s) orally once a day as needed for  constipation (01 Apr 2024 09:29)  senna (sennosides) 8.6 mg oral tablet: 1 tab(s) orally once a day as needed for  constipation (01 Apr 2024 09:29)      REVIEW OF SYSTEMS:  [x ] Unable to assess ROS because pt unable to participate in interview    OBJECTIVE:  ICU Vital Signs Last 24 Hrs  T(C): 36.9 (01 Apr 2024 17:53), Max: 37.8 (01 Apr 2024 04:07)  T(F): 98.4 (01 Apr 2024 17:53), Max: 100.1 (01 Apr 2024 04:07)  HR: 82 (01 Apr 2024 17:53) (77 - 144)  BP: 136/63 (01 Apr 2024 17:53) (94/61 - 151/63)  BP(mean): 82 (01 Apr 2024 04:43) (82 - 94)  ABP: --  ABP(mean): --  RR: 18 (01 Apr 2024 17:53) (16 - 23)  SpO2: 98% (01 Apr 2024 17:53) (80% - 100%)    O2 Parameters below as of 01 Apr 2024 17:53  Patient On (Oxygen Delivery Method): nasal cannula  O2 Flow (L/min): 4            CAPILLARY BLOOD GLUCOSE      POCT Blood Glucose.: 183 mg/dL (01 Apr 2024 17:20)      PHYSICAL EXAM:  Gen: NAD  HEENT: PERRL  CV: regular  Lung: decreased bs Lt  Abd: Soft, NT, ND  Ext: WWP, no CCE  Skin: No rash  Neuro: awake    HOSPITAL MEDICATIONS:  Standing Meds:  albuterol/ipratropium for Nebulization 3 milliLiter(s) Nebulizer every 6 hours  apixaban 2.5 milliGRAM(s) Oral two times a day  ascorbic acid 500 milliGRAM(s) Oral daily  atorvastatin 10 milliGRAM(s) Oral at bedtime  dextrose 5%. 1000 milliLiter(s) IV Continuous <Continuous>  dextrose 5%. 1000 milliLiter(s) IV Continuous <Continuous>  dextrose 50% Injectable 25 Gram(s) IV Push once  dextrose 50% Injectable 25 Gram(s) IV Push once  dextrose 50% Injectable 12.5 Gram(s) IV Push once  glucagon  Injectable 1 milliGRAM(s) IntraMuscular once  hydroxyurea 500 milliGRAM(s) Oral <User Schedule>  insulin lispro (ADMELOG) corrective regimen sliding scale   SubCutaneous three times a day before meals  insulin lispro (ADMELOG) corrective regimen sliding scale   SubCutaneous at bedtime  levothyroxine 88 MICROGram(s) Oral daily  metoprolol tartrate 25 milliGRAM(s) Oral two times a day  multivitamin 1 Tablet(s) Oral daily  piperacillin/tazobactam IVPB.. 3.375 Gram(s) IV Intermittent every 8 hours  sodium chloride 3%  Inhalation 4 milliLiter(s) Inhalation every 12 hours      PRN Meds:  acetaminophen     Tablet .. 650 milliGRAM(s) Oral every 6 hours PRN  dextrose Oral Gel 15 Gram(s) Oral once PRN      LABS:    The Labs were reviewed by me   The Radiology was reviewed by me    EKG tracing reviewed by me    04-01    134<L>  |  100  |  35<H>  ----------------------------<  183<H>  4.9   |  18<L>  |  0.94    Ca    9.3      01 Apr 2024 00:06    TPro  7.3  /  Alb  2.6<L>  /  TBili  0.2  /  DBili  x   /  AST  24  /  ALT  20  /  AlkPhos  183<H>  04-01                        Urinalysis Basic - ( 01 Apr 2024 00:06 )    Color: x / Appearance: x / SG: x / pH: x  Gluc: 183 mg/dL / Ketone: x  / Bili: x / Urobili: x   Blood: x / Protein: x / Nitrite: x   Leuk Esterase: x / RBC: x / WBC x   Sq Epi: x / Non Sq Epi: x / Bacteria: x                              9.8    26.70 )-----------( 974      ( 01 Apr 2024 00:06 )             32.6     CAPILLARY BLOOD GLUCOSE      POCT Blood Glucose.: 183 mg/dL (01 Apr 2024 17:20)  POCT Blood Glucose.: 199 mg/dL (01 Apr 2024 11:58)  POCT Blood Glucose.: 185 mg/dL (01 Apr 2024 10:49)  POCT Blood Glucose.: 222 mg/dL (01 Apr 2024 07:32)    Blood Gas Source Venous: Venous (04-01-24 @ 00:06)      MICROBIOLOGY:       RADIOLOGY:  [ ] Reviewed and interpreted by me    PULMONARY FUNCTION TESTS:    EKG:

## 2024-04-01 NOTE — CONSULT NOTE ADULT - TIME BILLING
Time spent for extensive review of the physical chart, electronic medical record, and documentation to obtain collateral information including but not limited to:  [x ] Inpatient records (ED, H&P, primary team, and consultants if applicable, care coordination)  [x ] Inpatient values/results (biomarkers, immunoassays, imaging, and microbiology results)  [x ] Current or proposed treatment plans  [ x ] Discussion with the primary team  [x ] Discussion with the patient, surrogate decision maker, or family  [x] 30 mins spent discussing goc     Time spent: >104 min

## 2024-04-01 NOTE — ED ADULT NURSE REASSESSMENT NOTE - NS ED NURSE REASSESS COMMENT FT1
20G US guided IV established by MD GORMAN to right AC. Labs drawn and sent. Respirations even and unlabored, pt in no acute distress at this time. 400 mL of straw colored urine drained from sylvester bag. Safety maintained throughout.

## 2024-04-01 NOTE — CONSULT NOTE ADULT - CONVERSATION DETAILS
Referral for complex decision making and symptom management in setting of advanced dementia. Introduced role of GaP team to pt's daughter, Magalie, who was amenable to speaking to palliative provider. Magalie shared that patient had been living at home prior to hospitalization at [facility] (2/2024) and discharged to [facility] where she was a resident for 2 weeks before being admitted to [facility]. Magalie shared that patient was diagnosed with vascular dementia in [id number] and has progressively declined over these past few years. She does understand that dementia is a progressive irreversible dx. Her goal would be to focus on elevating patient's quality of life and continue medical management for her dementia and PCV. She stated that patient was seeing Dr. Chamberlain (hematology) monthly for blood work and her hydoxyurea was being adjusted based on levels. At this time, it does appear that patient is able to accept PO meds but palliative provider explained that if patient is unable to tolerate PO meds and unable to tolerate hydroxyurea, then patient may be more appropriate for hospice philosophy of care and avoiding back and forth for monthly bloodwork. She re-affirmed that patient is a DNR/DNI. Her goal is for her mom to complete IV abx for infection and return back to NH facility. I did explain that patient can continue to be assessed for her hospice eligibility at the Nursing home facility.

## 2024-04-01 NOTE — H&P ADULT - PROBLEM SELECTOR PLAN 1
- Likely with underlying pneumonia as well  - Given GOC will consult pulm for thoracentesis consideration given effusion and collapse lung  - NC to maintain saturation, can provide some positive pressure  - Zosyn empirically  - MRSA swab  - Follow up blood cultures  - Can monitor WBC, but chronically elevated given PV  - ID consulted

## 2024-04-02 DIAGNOSIS — Z29.9 ENCOUNTER FOR PROPHYLACTIC MEASURES, UNSPECIFIED: ICD-10-CM

## 2024-04-02 LAB
ANION GAP SERPL CALC-SCNC: 17 MMOL/L — HIGH (ref 7–14)
APTT BLD: 33.6 SEC — SIGNIFICANT CHANGE UP (ref 24.5–35.6)
BLD GP AB SCN SERPL QL: NEGATIVE — SIGNIFICANT CHANGE UP
BUN SERPL-MCNC: 38 MG/DL — HIGH (ref 7–23)
CALCIUM SERPL-MCNC: 9.6 MG/DL — SIGNIFICANT CHANGE UP (ref 8.4–10.5)
CHLORIDE SERPL-SCNC: 104 MMOL/L — SIGNIFICANT CHANGE UP (ref 98–107)
CO2 SERPL-SCNC: 19 MMOL/L — LOW (ref 22–31)
CREAT SERPL-MCNC: 1.1 MG/DL — SIGNIFICANT CHANGE UP (ref 0.5–1.3)
EGFR: 48 ML/MIN/1.73M2 — LOW
GLUCOSE BLDC GLUCOMTR-MCNC: 129 MG/DL — HIGH (ref 70–99)
GLUCOSE BLDC GLUCOMTR-MCNC: 136 MG/DL — HIGH (ref 70–99)
GLUCOSE BLDC GLUCOMTR-MCNC: 139 MG/DL — HIGH (ref 70–99)
GLUCOSE BLDC GLUCOMTR-MCNC: 144 MG/DL — HIGH (ref 70–99)
GLUCOSE SERPL-MCNC: 129 MG/DL — HIGH (ref 70–99)
HCT VFR BLD CALC: 29 % — LOW (ref 34.5–45)
HGB BLD-MCNC: 8.9 G/DL — LOW (ref 11.5–15.5)
INR BLD: 1.31 RATIO — HIGH (ref 0.85–1.18)
MCHC RBC-ENTMCNC: 24.9 PG — LOW (ref 27–34)
MCHC RBC-ENTMCNC: 30.7 GM/DL — LOW (ref 32–36)
MCV RBC AUTO: 81 FL — SIGNIFICANT CHANGE UP (ref 80–100)
NRBC # BLD: 0 /100 WBCS — SIGNIFICANT CHANGE UP (ref 0–0)
NRBC # FLD: 0 K/UL — SIGNIFICANT CHANGE UP (ref 0–0)
PLATELET # BLD AUTO: 1023 K/UL — CRITICAL HIGH (ref 150–400)
POTASSIUM SERPL-MCNC: 4.1 MMOL/L — SIGNIFICANT CHANGE UP (ref 3.5–5.3)
POTASSIUM SERPL-SCNC: 4.1 MMOL/L — SIGNIFICANT CHANGE UP (ref 3.5–5.3)
PROTHROM AB SERPL-ACNC: 14.7 SEC — HIGH (ref 9.5–13)
RBC # BLD: 3.58 M/UL — LOW (ref 3.8–5.2)
RBC # FLD: 17.9 % — HIGH (ref 10.3–14.5)
RH IG SCN BLD-IMP: POSITIVE — SIGNIFICANT CHANGE UP
SODIUM SERPL-SCNC: 140 MMOL/L — SIGNIFICANT CHANGE UP (ref 135–145)
WBC # BLD: 17.02 K/UL — HIGH (ref 3.8–10.5)
WBC # FLD AUTO: 17.02 K/UL — HIGH (ref 3.8–10.5)

## 2024-04-02 PROCEDURE — 99223 1ST HOSP IP/OBS HIGH 75: CPT

## 2024-04-02 PROCEDURE — 99232 SBSQ HOSP IP/OBS MODERATE 35: CPT

## 2024-04-02 RX ORDER — SENNA PLUS 8.6 MG/1
2 TABLET ORAL AT BEDTIME
Refills: 0 | Status: DISCONTINUED | OUTPATIENT
Start: 2024-04-02 | End: 2024-04-05

## 2024-04-02 RX ORDER — POLYETHYLENE GLYCOL 3350 17 G/17G
17 POWDER, FOR SOLUTION ORAL DAILY
Refills: 0 | Status: DISCONTINUED | OUTPATIENT
Start: 2024-04-02 | End: 2024-04-05

## 2024-04-02 RX ORDER — ZINC SULFATE TAB 220 MG (50 MG ZINC EQUIVALENT) 220 (50 ZN) MG
220 TAB ORAL DAILY
Refills: 0 | Status: DISCONTINUED | OUTPATIENT
Start: 2024-04-02 | End: 2024-04-05

## 2024-04-02 RX ADMIN — Medication 3 MILLILITER(S): at 02:15

## 2024-04-02 RX ADMIN — SENNA PLUS 2 TABLET(S): 8.6 TABLET ORAL at 22:05

## 2024-04-02 RX ADMIN — Medication 3 MILLILITER(S): at 21:55

## 2024-04-02 RX ADMIN — PIPERACILLIN AND TAZOBACTAM 25 GRAM(S): 4; .5 INJECTION, POWDER, LYOPHILIZED, FOR SOLUTION INTRAVENOUS at 22:05

## 2024-04-02 RX ADMIN — Medication 25 MILLIGRAM(S): at 06:42

## 2024-04-02 RX ADMIN — Medication 500 MILLIGRAM(S): at 13:44

## 2024-04-02 RX ADMIN — Medication 88 MICROGRAM(S): at 06:42

## 2024-04-02 RX ADMIN — Medication 3 MILLILITER(S): at 10:40

## 2024-04-02 RX ADMIN — APIXABAN 2.5 MILLIGRAM(S): 2.5 TABLET, FILM COATED ORAL at 18:44

## 2024-04-02 RX ADMIN — Medication 25 MILLIGRAM(S): at 18:44

## 2024-04-02 RX ADMIN — ATORVASTATIN CALCIUM 10 MILLIGRAM(S): 80 TABLET, FILM COATED ORAL at 22:05

## 2024-04-02 RX ADMIN — Medication 1 TABLET(S): at 13:44

## 2024-04-02 RX ADMIN — APIXABAN 2.5 MILLIGRAM(S): 2.5 TABLET, FILM COATED ORAL at 06:42

## 2024-04-02 RX ADMIN — PIPERACILLIN AND TAZOBACTAM 25 GRAM(S): 4; .5 INJECTION, POWDER, LYOPHILIZED, FOR SOLUTION INTRAVENOUS at 13:44

## 2024-04-02 RX ADMIN — SODIUM CHLORIDE 4 MILLILITER(S): 9 INJECTION INTRAMUSCULAR; INTRAVENOUS; SUBCUTANEOUS at 10:40

## 2024-04-02 RX ADMIN — PIPERACILLIN AND TAZOBACTAM 25 GRAM(S): 4; .5 INJECTION, POWDER, LYOPHILIZED, FOR SOLUTION INTRAVENOUS at 06:41

## 2024-04-02 RX ADMIN — Medication 3 MILLILITER(S): at 15:24

## 2024-04-02 RX ADMIN — Medication 1 DROP(S): at 22:03

## 2024-04-02 RX ADMIN — SODIUM CHLORIDE 4 MILLILITER(S): 9 INJECTION INTRAMUSCULAR; INTRAVENOUS; SUBCUTANEOUS at 21:57

## 2024-04-02 NOTE — PROGRESS NOTE ADULT - PROBLEM SELECTOR PLAN 3
- Takes Eliquis 2.5 mg BID at home  - Hold AC given possible procedure  - Continue with metoprolol tartrate 25 mg BID - Takes Eliquis 2.5 mg BID at home  - c/w Eliquis   - Continue with metoprolol tartrate 25 mg BID

## 2024-04-02 NOTE — PROGRESS NOTE ADULT - SUBJECTIVE AND OBJECTIVE BOX
[facility], Division of Infectious Diseases  SANCHEZ Faria Y. Patel, S. Shah, G. Casimir  [phone]  ([phone] - weekdays after 5pm and weekends)    Name: BISHOP BURNS  Age/Gender: 89y Female  MRN: [id number]    Interval History:  Notes reviewed.   No concerning overnight events.  Afebrile.   palliative care consulted yesterday    Allergies: No Known Drug Allergies  black pepper, white pepper, cumin, paprika, johnston powder, chili powder (Rash)      Objective:  Vitals:   T(F): 98 (04-02-24 @ 06:55), Max: 98.4 (04-01-24 @ 15:12)  HR: 91 (04-02-24 @ 06:55) (79 - 100)  BP: 129/60 (04-02-24 @ 06:55) (108/62 - 141/93)  RR: 18 (04-02-24 @ 06:55) (17 - 20)  SpO2: 100% (04-02-24 @ 06:55) (98% - 100%)  Physical Examination:  General: no acute distress  HEENT: anicteric, NC  Cardio: S1, S2, normal rate  Resp: decreased breath sounds  Abd: soft, NT, ND  : sylvester  Ext: b/l feet w/ dressings  Skin: warm, dry    Laboratory Studies:  CBC:                       8.9    17.02 )-----------( 1023     ( 02 Apr 2024 05:15 )             29.0     WBC Trend:  17.02 04-02-24 @ 05:15  26.70 04-01-24 @ 00:06    CMP: 04-02    140  |  104  |  38<H>  ----------------------------<  129<H>  4.1   |  19<L>  |  1.10    Ca    9.6      02 Apr 2024 05:15    TPro  7.3  /  Alb  2.6<L>  /  TBili  0.2  /  DBili  x   /  AST  24  /  ALT  20  /  AlkPhos  183<H>  04-01      LIVER FUNCTIONS - ( 01 Apr 2024 00:06 )  Alb: 2.6 g/dL / Pro: 7.3 g/dL / ALK PHOS: 183 U/L / ALT: 20 U/L / AST: 24 U/L / GGT: x             Urinalysis Basic - ( 02 Apr 2024 05:15 )    Color: x / Appearance: x / SG: x / pH: x  Gluc: 129 mg/dL / Ketone: x  / Bili: x / Urobili: x   Blood: x / Protein: x / Nitrite: x   Leuk Esterase: x / RBC: x / WBC x   Sq Epi: x / Non Sq Epi: x / Bacteria: x      Microbiology: reviewed     Culture - Blood (collected 04-01-24 @ 00:16)  Source: .Blood Blood-Peripheral  Preliminary Report (04-02-24 @ 09:01):    No growth at 24 hours    Culture - Blood (collected 04-01-24 @ 00:06)  Source: .Blood Blood  Preliminary Report (04-02-24 @ 09:01):    No growth at 24 hours        Radiology: reviewed     Medications:  acetaminophen     Tablet .. 650 milliGRAM(s) Oral every 6 hours PRN  albuterol/ipratropium for Nebulization 3 milliLiter(s) Nebulizer every 6 hours  apixaban 2.5 milliGRAM(s) Oral two times a day  ascorbic acid 500 milliGRAM(s) Oral daily  atorvastatin 10 milliGRAM(s) Oral at bedtime  dextrose 5%. 1000 milliLiter(s) IV Continuous <Continuous>  dextrose 5%. 1000 milliLiter(s) IV Continuous <Continuous>  dextrose 50% Injectable 12.5 Gram(s) IV Push once  dextrose 50% Injectable 25 Gram(s) IV Push once  dextrose 50% Injectable 25 Gram(s) IV Push once  dextrose Oral Gel 15 Gram(s) Oral once PRN  glucagon  Injectable 1 milliGRAM(s) IntraMuscular once  hydroxyurea 500 milliGRAM(s) Oral <User Schedule>  insulin lispro (ADMELOG) corrective regimen sliding scale   SubCutaneous three times a day before meals  insulin lispro (ADMELOG) corrective regimen sliding scale   SubCutaneous at bedtime  levothyroxine 88 MICROGram(s) Oral daily  metoprolol tartrate 25 milliGRAM(s) Oral two times a day  multivitamin 1 Tablet(s) Oral daily  piperacillin/tazobactam IVPB.. 3.375 Gram(s) IV Intermittent every 8 hours  sodium chloride 3%  Inhalation 4 milliLiter(s) Inhalation every 12 hours    Antimicrobials:  piperacillin/tazobactam IVPB.. 3.375 Gram(s) IV Intermittent every 8 hours

## 2024-04-02 NOTE — PROGRESS NOTE ADULT - PROBLEM SELECTOR PLAN 4
- Endstage and minimally verbal  - In the past daughter has expressed interest in home hospice  - DNR/I  - Palliative consult for further GOC - Endstage and minimally verbal  - In the past daughter has expressed interest in home hospice  - DNR/I  - Palliative consult appreciated

## 2024-04-02 NOTE — CONSULT NOTE ADULT - SUBJECTIVE AND OBJECTIVE BOX
*Incomplete, see assessment and plan for topical recommendations*  Wound SURGERY CONSULT NOTE    HPI:  Patient very well known to me.     89F with PMHx of endstage dementia, polycythemia vera, T2DM and known unstageable sacral decubitus ulcer sent in from [facility] for dyspnea/hypoxia. Collateral obtained from ED notes and paperwork sent for the patient. Patient recently admitted to my service at [facility] for fever and leukocytosis in setting of infected sacral decubitus ulcer with possible osteomyelitis. Patient seen by ID and it was determined that a short course of antibiotics for a soft tissue infection offered the most benefit as treating for the osteomyelitis would have no long term benefit. Antibiotics were Zosyn and eventually Levofloxacin based on wound culture data. She was seen by wound care and we performed local wound care. She was discharged to [place] with plans for hospice, though it doesn't seem like this was established. It seems that at [place] she began to decline even further and was on Ertapenem, IV fluids, and sent in due to CXR showing possible PTX. On arrival patient in respiratory distress prior to supplemental oxygen being increased. Daughter called overnight and confirmed DNR/I, but amenable to medical management such as catheter placement, thoracentesis, etc. While in the ED RRT called for respiratory distress, though improved with minimal intervention. CXR demonstrated large left pleural effusion with adjacent atelectasis. CT Chest showed: "Small left pleural effusion mostly loculated along the left upper lobe posteriorly. Complete collapse of the left lower lobe and partial left upper lobe atelectasis. No pneumothorax." Patient given vancomycin and Zosyn, 500 cc NS bolus, and Tylenol. Currently in the ED minimally verbal which is baseline. Doesn't appear to be in any distress. (01 Apr 2024 07:48)    Wound consult requested to assist w/ management of sacral stage 4 pressure injury. Per records patient had selective sharp debridement of sacrum wound on 2/29/24 at [facility], was treated with short course of Zosyn from 2/28-3/3 subsequently changed to Levofloxacin as mentioned above for infected sacral wound and OM. Most recent topical recommendations were Aquacel and foam daily. Patient with endstage dementia, unable to provide subjective data, no family at bedside during time of encounter.    Current Diet: Diet, Pureed:   Consistent Carbohydrate No Snacks (CSTCHO)  Mildly Thick Liquids (MILDTHICKLIQS) (04-01-24 @ 08:03)      PAST MEDICAL & SURGICAL HISTORY:  Benign Hypertension    Diabetes    Hypercholesteremia    Adult Hypothyroidism    Deep Vein Thrombosis (DVT)    Stroke    SBO (Small Bowel Obstruction)  08/2019 2019 novel coronavirus disease (COVID-19)    COVID-19 vaccine series completed    FH: Total Abdominal Hysterectomy and Bilateral Salpingo-Oophorectomy    S/P small bowel resection  08/2019      REVIEW OF SYSTEMS: Pt unable to offer due to endstage dementia.    MEDICATIONS  (STANDING):  albuterol/ipratropium for Nebulization 3 milliLiter(s) Nebulizer every 6 hours  apixaban 2.5 milliGRAM(s) Oral two times a day  ascorbic acid 500 milliGRAM(s) Oral daily  atorvastatin 10 milliGRAM(s) Oral at bedtime  dextrose 5%. 1000 milliLiter(s) (50 mL/Hr) IV Continuous <Continuous>  dextrose 5%. 1000 milliLiter(s) (100 mL/Hr) IV Continuous <Continuous>  dextrose 50% Injectable 12.5 Gram(s) IV Push once  dextrose 50% Injectable 25 Gram(s) IV Push once  dextrose 50% Injectable 25 Gram(s) IV Push once  glucagon  Injectable 1 milliGRAM(s) IntraMuscular once  hydroxyurea 500 milliGRAM(s) Oral <User Schedule>  insulin lispro (ADMELOG) corrective regimen sliding scale   SubCutaneous three times a day before meals  insulin lispro (ADMELOG) corrective regimen sliding scale   SubCutaneous at bedtime  levothyroxine 88 MICROGram(s) Oral daily  metoprolol tartrate 25 milliGRAM(s) Oral two times a day  multivitamin 1 Tablet(s) Oral daily  piperacillin/tazobactam IVPB.. 3.375 Gram(s) IV Intermittent every 8 hours  sodium chloride 3%  Inhalation 4 milliLiter(s) Inhalation every 12 hours    MEDICATIONS  (PRN):  acetaminophen     Tablet .. 650 milliGRAM(s) Oral every 6 hours PRN Temp greater or equal to 38C (100.4F), Mild Pain (1 - 3), Moderate Pain (4 - 6)  dextrose Oral Gel 15 Gram(s) Oral once PRN Blood Glucose LESS THAN 70 milliGRAM(s)/deciliter      Allergies    No Known Drug Allergies  black pepper, white pepper, cumin, paprika, johnston powder, chili powder (Rash)    Intolerances        SOCIAL HISTORY: Unable to obtain.    FAMILY HISTORY:  Family history of diabetes mellitus (DM) (Child)    Family history of secondary lung cancer    Family history of breast cancer (Child)        PHYSICAL EXAM:  Vital Signs Last 24 Hrs  T(C): 36.8 (02 Apr 2024 13:15), Max: 36.9 (01 Apr 2024 15:12)  T(F): 98.2 (02 Apr 2024 13:15), Max: 98.4 (01 Apr 2024 15:12)  HR: 84 (02 Apr 2024 13:15) (80 - 100)  BP: 128/58 (02 Apr 2024 13:15) (128/58 - 141/93)  BP(mean): --  RR: 18 (02 Apr 2024 13:15) (17 - 18)  SpO2: 100% (02 Apr 2024 13:15) (98% - 100%)    Parameters below as of 02 Apr 2024 13:15  Patient On (Oxygen Delivery Method): nasal cannula    Wt: 41.2 kg (04-)    Constitutional: Eyes open, A&Ox0, bedbound, severely cachetic, frail. Dependent on ADLs  (+) low airloss support surface, (+) fluidized positioning devices, (+) complete cair boots  HEENT:  NC/AT, head flexed towards left side, nonicteric, mucosa moist  Cardiovascular: rate regular  Respiratory: supplemental oxygen via NC, nonlabored, equal chest expansion.  Gastrointestinal: soft NT/ND, (+) fecal incontinence  : indwelling sylvester catheter in place  Musculoskeletal:  limited, bilateral upper and lower extremities flaccid in extension, (+) muscle wasting, no gross deformities or contractures.  Vascular: BLE equally warm, no edema noted, +2 dp pulses, capillary refill < 3 seconds.   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  -Wound edges with circumferential maceration  -Moderate serofibrinous drainage, no odor.  -Periwound skin with scattered areas of hyper and hypopigmentation, no erythema, no edema, no increased warmth, no induration, no fluctuance  Aquacel hydrofiber and silicone foam with border applied.      LABS/ CULTURES/ RADIOLOGY:                        8.9    17.02 )-----------( 1023     ( 02 Apr 2024 05:15 )             29.0       140  |  104  |  38  ----------------------------<  129      [04-02-24 @ 05:15]  4.1   |  19  |  1.10        Ca     9.6     [04-02-24 @ 05:15]    TPro  7.3  /  Alb  2.6  /  TBili  0.2  /  DBili  x   /  AST  24  /  ALT  20  /  AlkPhos  183  [04-01-24 @ 00:06]    PT/INR: PT 14.7 , INR 1.31       [04-02-24 @ 05:15]  PTT: 33.6       [04-02-24 @ 05:15]          Culture - Blood (collected 04-01-24 @ 00:16)  Source: .Blood Blood-Peripheral  Preliminary Report (04-02-24 @ 09:01):    No growth at 24 hours    Culture - Blood (collected 04-01-24 @ 00:06)  Source: .Blood Blood  Preliminary Report (04-02-24 @ 09:01):    No growth at 24 hours      ACC: [id number] EXAM:  CT PELVIS ONLY IC   ORDERED BY: NICOLE BRODY     PROCEDURE DATE:  02/28/2024          INTERPRETATION:  CLINICAL INFORMATION: Sacral decubitus ulcer, evaluate   for osteomyelitis    COMPARISON: None.    CONTRAST/COMPLICATIONS:  IV Contrast: Omnipaque 350  90 cc administered   10 cc discarded  Oral Contrast: NONE  Complications: None reported at time of study completion    PROCEDURE:  CT of the Pelvis was performed.  Sagittal and coronal reformats were performed.    FINDINGS:  BLADDER: Incompletely distended.  REPRODUCTIVE ORGANS: Hysterectomy.  LYMPH NODES: No pelvic lymphadenopathy.    VISUALIZED PORTIONS:  ABDOMINAL ORGANS: Within normal limits.  BOWEL: Within normal limits.  PERITONEUM: No ascites.  VESSELS: Within normal limits.  ABDOMINAL WALL: There is a left gluteal and coccygeal ulcer with soft   tissue gas extending to the coccyx.  BONES: Compared to the recent CT of December 18, 2023, there is lysis of   the distal coccyx compatible with osteomyelitis.      IMPRESSION:  Compared to the recent CT of December 18, 2023, interval development of   left sacral/medial gluteal decubitus ulcer with lysis of the distal   coccyx, compatible with osteomyelitis. No discrete focal drainable   collection.        --- End of Report ---            CARLI IBARRA MD; Attending Radiologist  This document has been electronically signed. Feb 29 2024  1:53AM     Wound SURGERY CONSULT NOTE    HPI:  Patient very well known to me.     89F with PMHx of endstage dementia, polycythemia vera, T2DM and known unstageable sacral decubitus ulcer sent in from [facility] for dyspnea/hypoxia. Collateral obtained from ED notes and paperwork sent for the patient. Patient recently admitted to my service at [facility] for fever and leukocytosis in setting of infected sacral decubitus ulcer with possible osteomyelitis. Patient seen by ID and it was determined that a short course of antibiotics for a soft tissue infection offered the most benefit as treating for the osteomyelitis would have no long term benefit. Antibiotics were Zosyn and eventually Levofloxacin based on wound culture data. She was seen by wound care and we performed local wound care. She was discharged to [place] with plans for hospice, though it doesn't seem like this was established. It seems that at [place] she began to decline even further and was on Ertapenem, IV fluids, and sent in due to CXR showing possible PTX. On arrival patient in respiratory distress prior to supplemental oxygen being increased. Daughter called overnight and confirmed DNR/I, but amenable to medical management such as catheter placement, thoracentesis, etc. While in the ED RRT called for respiratory distress, though improved with minimal intervention. CXR demonstrated large left pleural effusion with adjacent atelectasis. CT Chest showed: "Small left pleural effusion mostly loculated along the left upper lobe posteriorly. Complete collapse of the left lower lobe and partial left upper lobe atelectasis. No pneumothorax." Patient given vancomycin and Zosyn, 500 cc NS bolus, and Tylenol. Currently in the ED minimally verbal which is baseline. Doesn't appear to be in any distress. (01 Apr 2024 07:48)    Wound consult requested to assist w/ management of sacral stage 4 pressure injury. Per records patient had selective sharp debridement of sacrum wound on 2/29/24 at [facility], was treated with short course of Zosyn from 2/28-3/3 subsequently changed to Levofloxacin as mentioned above for infected sacral wound and OM. Most recent topical recommendations were Aquacel and foam daily. Patient with endstage dementia, unable to provide subjective data, no family at bedside during time of encounter.    Current Diet: Diet, Pureed:   Consistent Carbohydrate No Snacks (CSTCHO)  Mildly Thick Liquids (MILDTHICKLIQS) (04-01-24 @ 08:03)      PAST MEDICAL & SURGICAL HISTORY:  Benign Hypertension    Diabetes    Hypercholesteremia    Adult Hypothyroidism    Deep Vein Thrombosis (DVT)    Stroke    SBO (Small Bowel Obstruction)  08/2019 2019 novel coronavirus disease (COVID-19)    COVID-19 vaccine series completed    FH: Total Abdominal Hysterectomy and Bilateral Salpingo-Oophorectomy    S/P small bowel resection  08/2019      REVIEW OF SYSTEMS: Pt unable to offer due to endstage dementia.    MEDICATIONS  (STANDING):  albuterol/ipratropium for Nebulization 3 milliLiter(s) Nebulizer every 6 hours  apixaban 2.5 milliGRAM(s) Oral two times a day  ascorbic acid 500 milliGRAM(s) Oral daily  atorvastatin 10 milliGRAM(s) Oral at bedtime  dextrose 5%. 1000 milliLiter(s) (50 mL/Hr) IV Continuous <Continuous>  dextrose 5%. 1000 milliLiter(s) (100 mL/Hr) IV Continuous <Continuous>  dextrose 50% Injectable 12.5 Gram(s) IV Push once  dextrose 50% Injectable 25 Gram(s) IV Push once  dextrose 50% Injectable 25 Gram(s) IV Push once  glucagon  Injectable 1 milliGRAM(s) IntraMuscular once  hydroxyurea 500 milliGRAM(s) Oral <User Schedule>  insulin lispro (ADMELOG) corrective regimen sliding scale   SubCutaneous three times a day before meals  insulin lispro (ADMELOG) corrective regimen sliding scale   SubCutaneous at bedtime  levothyroxine 88 MICROGram(s) Oral daily  metoprolol tartrate 25 milliGRAM(s) Oral two times a day  multivitamin 1 Tablet(s) Oral daily  piperacillin/tazobactam IVPB.. 3.375 Gram(s) IV Intermittent every 8 hours  sodium chloride 3%  Inhalation 4 milliLiter(s) Inhalation every 12 hours    MEDICATIONS  (PRN):  acetaminophen     Tablet .. 650 milliGRAM(s) Oral every 6 hours PRN Temp greater or equal to 38C (100.4F), Mild Pain (1 - 3), Moderate Pain (4 - 6)  dextrose Oral Gel 15 Gram(s) Oral once PRN Blood Glucose LESS THAN 70 milliGRAM(s)/deciliter      Allergies    No Known Drug Allergies  black pepper, white pepper, cumin, paprika, johnston powder, chili powder (Rash)    Intolerances        SOCIAL HISTORY: Unable to obtain.    FAMILY HISTORY:  Family history of diabetes mellitus (DM) (Child)    Family history of secondary lung cancer    Family history of breast cancer (Child)        PHYSICAL EXAM:  Vital Signs Last 24 Hrs  T(C): 36.8 (02 Apr 2024 13:15), Max: 36.9 (01 Apr 2024 15:12)  T(F): 98.2 (02 Apr 2024 13:15), Max: 98.4 (01 Apr 2024 15:12)  HR: 84 (02 Apr 2024 13:15) (80 - 100)  BP: 128/58 (02 Apr 2024 13:15) (128/58 - 141/93)  BP(mean): --  RR: 18 (02 Apr 2024 13:15) (17 - 18)  SpO2: 100% (02 Apr 2024 13:15) (98% - 100%)    Parameters below as of 02 Apr 2024 13:15  Patient On (Oxygen Delivery Method): nasal cannula    Wt: 41.2 kg (04-)    Constitutional: Eyes open, A&Ox0, bedbound, severely cachetic, frail. Dependent on ADLs  (+) low airloss support surface, (+) fluidized positioning devices, (+) complete cair boots  HEENT:  NC/AT, head flexed towards left side, nonicteric, mucosa moist  Cardiovascular: rate regular  Respiratory: supplemental oxygen via NC, nonlabored, equal chest expansion.  Gastrointestinal: soft NT/ND, (+) fecal incontinence  : indwelling sylvester catheter in place  Musculoskeletal:  limited, bilateral upper and lower extremities flaccid in extension, (+) muscle wasting, no gross deformities or contractures.  Vascular: BLE equally warm, no edema noted, +2 dp pulses, capillary refill < 3 seconds.   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  -Wound edges with circumferential maceration  -Moderate serofibrinous drainage, no odor.  -Periwound skin with scattered areas of hyper and hypopigmentation, no erythema, no edema, no increased warmth, no induration, no fluctuance  Aquacel hydrofiber and silicone foam with border applied.      LABS/ CULTURES/ RADIOLOGY:                        8.9    17.02 )-----------( 1023     ( 02 Apr 2024 05:15 )             29.0       140  |  104  |  38  ----------------------------<  129      [04-02-24 @ 05:15]  4.1   |  19  |  1.10        Ca     9.6     [04-02-24 @ 05:15]    TPro  7.3  /  Alb  2.6  /  TBili  0.2  /  DBili  x   /  AST  24  /  ALT  20  /  AlkPhos  183  [04-01-24 @ 00:06]    PT/INR: PT 14.7 , INR 1.31       [04-02-24 @ 05:15]  PTT: 33.6       [04-02-24 @ 05:15]          Culture - Blood (collected 04-01-24 @ 00:16)  Source: .Blood Blood-Peripheral  Preliminary Report (04-02-24 @ 09:01):    No growth at 24 hours    Culture - Blood (collected 04-01-24 @ 00:06)  Source: .Blood Blood  Preliminary Report (04-02-24 @ 09:01):    No growth at 24 hours      ACC: [id number] EXAM:  CT PELVIS ONLY IC   ORDERED BY: NICOLE BRODY     PROCEDURE DATE:  02/28/2024          INTERPRETATION:  CLINICAL INFORMATION: Sacral decubitus ulcer, evaluate   for osteomyelitis    COMPARISON: None.    CONTRAST/COMPLICATIONS:  IV Contrast: Omnipaque 350  90 cc administered   10 cc discarded  Oral Contrast: NONE  Complications: None reported at time of study completion    PROCEDURE:  CT of the Pelvis was performed.  Sagittal and coronal reformats were performed.    FINDINGS:  BLADDER: Incompletely distended.  REPRODUCTIVE ORGANS: Hysterectomy.  LYMPH NODES: No pelvic lymphadenopathy.    VISUALIZED PORTIONS:  ABDOMINAL ORGANS: Within normal limits.  BOWEL: Within normal limits.  PERITONEUM: No ascites.  VESSELS: Within normal limits.  ABDOMINAL WALL: There is a left gluteal and coccygeal ulcer with soft   tissue gas extending to the coccyx.  BONES: Compared to the recent CT of December 18, 2023, there is lysis of   the distal coccyx compatible with osteomyelitis.      IMPRESSION:  Compared to the recent CT of December 18, 2023, interval development of   left sacral/medial gluteal decubitus ulcer with lysis of the distal   coccyx, compatible with osteomyelitis. No discrete focal drainable   collection.        --- End of Report ---            CARLI IBARRA MD; Attending Radiologist  This document has been electronically signed. Feb 29 2024  1:53AM

## 2024-04-02 NOTE — PROGRESS NOTE ADULT - SUBJECTIVE AND OBJECTIVE BOX
Patient is a 89y old  Female who presents with a chief complaint of Dyspnea (01 Apr 2024 15:26)      SUBJECTIVE / OVERNIGHT EVENTS:    MEDICATIONS  (STANDING):  albuterol/ipratropium for Nebulization 3 milliLiter(s) Nebulizer every 6 hours  apixaban 2.5 milliGRAM(s) Oral two times a day  ascorbic acid 500 milliGRAM(s) Oral daily  atorvastatin 10 milliGRAM(s) Oral at bedtime  dextrose 5%. 1000 milliLiter(s) (50 mL/Hr) IV Continuous <Continuous>  dextrose 5%. 1000 milliLiter(s) (100 mL/Hr) IV Continuous <Continuous>  dextrose 50% Injectable 12.5 Gram(s) IV Push once  dextrose 50% Injectable 25 Gram(s) IV Push once  dextrose 50% Injectable 25 Gram(s) IV Push once  glucagon  Injectable 1 milliGRAM(s) IntraMuscular once  hydroxyurea 500 milliGRAM(s) Oral <User Schedule>  insulin lispro (ADMELOG) corrective regimen sliding scale   SubCutaneous three times a day before meals  insulin lispro (ADMELOG) corrective regimen sliding scale   SubCutaneous at bedtime  levothyroxine 88 MICROGram(s) Oral daily  metoprolol tartrate 25 milliGRAM(s) Oral two times a day  multivitamin 1 Tablet(s) Oral daily  piperacillin/tazobactam IVPB.. 3.375 Gram(s) IV Intermittent every 8 hours  sodium chloride 3%  Inhalation 4 milliLiter(s) Inhalation every 12 hours    MEDICATIONS  (PRN):  acetaminophen     Tablet .. 650 milliGRAM(s) Oral every 6 hours PRN Temp greater or equal to 38C (100.4F), Mild Pain (1 - 3), Moderate Pain (4 - 6)  dextrose Oral Gel 15 Gram(s) Oral once PRN Blood Glucose LESS THAN 70 milliGRAM(s)/deciliter      Vital Signs Last 24 Hrs  T(C): 36.7 (02 Apr 2024 06:55), Max: 36.9 (01 Apr 2024 15:12)  T(F): 98 (02 Apr 2024 06:55), Max: 98.4 (01 Apr 2024 15:12)  HR: 91 (02 Apr 2024 06:55) (79 - 100)  BP: 129/60 (02 Apr 2024 06:55) (108/62 - 141/93)  BP(mean): --  RR: 18 (02 Apr 2024 06:55) (17 - 20)  SpO2: 100% (02 Apr 2024 06:55) (98% - 100%)    Parameters below as of 02 Apr 2024 06:55  Patient On (Oxygen Delivery Method): nasal cannula      CAPILLARY BLOOD GLUCOSE      POCT Blood Glucose.: 129 mg/dL (02 Apr 2024 08:34)  POCT Blood Glucose.: 211 mg/dL (01 Apr 2024 20:48)  POCT Blood Glucose.: 183 mg/dL (01 Apr 2024 17:20)  POCT Blood Glucose.: 199 mg/dL (01 Apr 2024 11:58)  POCT Blood Glucose.: 185 mg/dL (01 Apr 2024 10:49)    I&O's Summary      PHYSICAL EXAM:  GENERAL: NAD, well-developed  HEAD:  Atraumatic, Normocephalic  EYES: EOMI, PERRLA, conjunctiva and sclera clear  NECK: Supple, No JVD  CHEST/LUNG: Clear to auscultation bilaterally; No wheeze  HEART: Regular rate and rhythm; No murmurs, rubs, or gallops  ABDOMEN: Soft, Nontender, Nondistended; Bowel sounds present  EXTREMITIES:  2+ Peripheral Pulses, No clubbing, cyanosis, or edema  PSYCH: AAOx3  NEUROLOGY: non-focal  SKIN: No rashes or lesions    LABS:                        8.9    17.02 )-----------( 1023     ( 02 Apr 2024 05:15 )             29.0     04-02    140  |  104  |  38<H>  ----------------------------<  129<H>  4.1   |  19<L>  |  1.10    Ca    9.6      02 Apr 2024 05:15    TPro  7.3  /  Alb  2.6<L>  /  TBili  0.2  /  DBili  x   /  AST  24  /  ALT  20  /  AlkPhos  183<H>  04-01    PT/INR - ( 02 Apr 2024 05:15 )   PT: 14.7 sec;   INR: 1.31 ratio         PTT - ( 02 Apr 2024 05:15 )  PTT:33.6 sec      Urinalysis Basic - ( 02 Apr 2024 05:15 )    Color: x / Appearance: x / SG: x / pH: x  Gluc: 129 mg/dL / Ketone: x  / Bili: x / Urobili: x   Blood: x / Protein: x / Nitrite: x   Leuk Esterase: x / RBC: x / WBC x   Sq Epi: x / Non Sq Epi: x / Bacteria: x        RADIOLOGY & ADDITIONAL TESTS:    Imaging Personally Reviewed:    Consultant(s) Notes Reviewed:      Care Discussed with Consultants/Other Providers:   Patient is a 89y old  Female who presents with a chief complaint of Dyspnea (01 Apr 2024 15:26)      SUBJECTIVE / OVERNIGHT EVENTS: patient seen and examined by bedside, pt lying in bed with eyes open ,but not answering questions or following commands     MEDICATIONS  (STANDING):  albuterol/ipratropium for Nebulization 3 milliLiter(s) Nebulizer every 6 hours  apixaban 2.5 milliGRAM(s) Oral two times a day  ascorbic acid 500 milliGRAM(s) Oral daily  atorvastatin 10 milliGRAM(s) Oral at bedtime  dextrose 5%. 1000 milliLiter(s) (50 mL/Hr) IV Continuous <Continuous>  dextrose 5%. 1000 milliLiter(s) (100 mL/Hr) IV Continuous <Continuous>  dextrose 50% Injectable 12.5 Gram(s) IV Push once  dextrose 50% Injectable 25 Gram(s) IV Push once  dextrose 50% Injectable 25 Gram(s) IV Push once  glucagon  Injectable 1 milliGRAM(s) IntraMuscular once  hydroxyurea 500 milliGRAM(s) Oral <User Schedule>  insulin lispro (ADMELOG) corrective regimen sliding scale   SubCutaneous three times a day before meals  insulin lispro (ADMELOG) corrective regimen sliding scale   SubCutaneous at bedtime  levothyroxine 88 MICROGram(s) Oral daily  metoprolol tartrate 25 milliGRAM(s) Oral two times a day  multivitamin 1 Tablet(s) Oral daily  piperacillin/tazobactam IVPB.. 3.375 Gram(s) IV Intermittent every 8 hours  sodium chloride 3%  Inhalation 4 milliLiter(s) Inhalation every 12 hours    MEDICATIONS  (PRN):  acetaminophen     Tablet .. 650 milliGRAM(s) Oral every 6 hours PRN Temp greater or equal to 38C (100.4F), Mild Pain (1 - 3), Moderate Pain (4 - 6)  dextrose Oral Gel 15 Gram(s) Oral once PRN Blood Glucose LESS THAN 70 milliGRAM(s)/deciliter      Vital Signs Last 24 Hrs  T(C): 36.7 (02 Apr 2024 06:55), Max: 36.9 (01 Apr 2024 15:12)  T(F): 98 (02 Apr 2024 06:55), Max: 98.4 (01 Apr 2024 15:12)  HR: 91 (02 Apr 2024 06:55) (79 - 100)  BP: 129/60 (02 Apr 2024 06:55) (108/62 - 141/93)  BP(mean): --  RR: 18 (02 Apr 2024 06:55) (17 - 20)  SpO2: 100% (02 Apr 2024 06:55) (98% - 100%)    Parameters below as of 02 Apr 2024 06:55  Patient On (Oxygen Delivery Method): nasal cannula      CAPILLARY BLOOD GLUCOSE      POCT Blood Glucose.: 129 mg/dL (02 Apr 2024 08:34)  POCT Blood Glucose.: 211 mg/dL (01 Apr 2024 20:48)  POCT Blood Glucose.: 183 mg/dL (01 Apr 2024 17:20)  POCT Blood Glucose.: 199 mg/dL (01 Apr 2024 11:58)  POCT Blood Glucose.: 185 mg/dL (01 Apr 2024 10:49)    I&O's Summary      PHYSICAL EXAM:  GENERAL: elderly frail female in  NAD,   HEAD:  Atraumatic, Normocephalic  EYES: EOMI, PERRLA, conjunctiva and sclera clear  CHEST/LUNG: poor inspiratory effort  No wheeze  HEART: Regular rate and rhythm; No murmurs, rubs, or gallops  ABDOMEN: Soft, Nontender, Nondistended; Bowel sounds present  EXTREMITIES:  2+ Peripheral Pulses, No clubbing, cyanosis, or edema  PSYCH: nonverbal, calm   NEUROLOGY: cannot be assesses as pt not following commands     LABS:                        8.9    17.02 )-----------( 1023     ( 02 Apr 2024 05:15 )             29.0     04-02    140  |  104  |  38<H>  ----------------------------<  129<H>  4.1   |  19<L>  |  1.10    Ca    9.6      02 Apr 2024 05:15    TPro  7.3  /  Alb  2.6<L>  /  TBili  0.2  /  DBili  x   /  AST  24  /  ALT  20  /  AlkPhos  183<H>  04-01    PT/INR - ( 02 Apr 2024 05:15 )   PT: 14.7 sec;   INR: 1.31 ratio         PTT - ( 02 Apr 2024 05:15 )  PTT:33.6 sec      Urinalysis Basic - ( 02 Apr 2024 05:15 )    Color: x / Appearance: x / SG: x / pH: x  Gluc: 129 mg/dL / Ketone: x  / Bili: x / Urobili: x   Blood: x / Protein: x / Nitrite: x   Leuk Esterase: x / RBC: x / WBC x   Sq Epi: x / Non Sq Epi: x / Bacteria: x        RADIOLOGY & ADDITIONAL TESTS:    Imaging Personally Reviewed:    Consultant(s) Notes Reviewed:      Care Discussed with Consultants/Other Providers:

## 2024-04-02 NOTE — PROGRESS NOTE ADULT - PROBLEM SELECTOR PLAN 1
- Likely with underlying pneumonia as well  - Given GOC will consult pulm for thoracentesis consideration given effusion and collapse lung  - NC to maintain saturation, can provide some positive pressure  - Zosyn empirically  - MRSA swab  - Follow up blood cultures  - Can monitor WBC, but chronically elevated given PV  - ID consulted - Likely with underlying pneumonia as well  - Pulm piyush noted , Patient with mucous plugging and atelectasis with associated small effusion seen on CT chest.  No significant pleural effusion on POCUS. No plan for thoracentesis at this time. per pulm   Pulm recommend chest PT/airway clearance and treatment for pne  - NC to maintain saturation, can provide some positive pressure  - Zosyn empirically  - MRSA swab  - Follow up blood cultures  - Can monitor WBC, but chronically elevated given PV  - ID consult apprecaited, recommend c/w Zosyn for 5 days

## 2024-04-02 NOTE — CONSULT NOTE ADULT - ASSESSMENT
Assessment/Plan: 89F with PMHx of endstage dementia, atrial fibrillation, polycythemia vera, T2DM and known unstageable sacral decubitus ulcer sent in from [facility] for dyspnea/hypoxia. CXR demonstrated large left pleural effusion with adjacent atelectasis. CT Chest showed: "Small left pleural effusion mostly loculated along the left upper lobe posteriorly. Complete collapse of the left lower lobe and partial left upper lobe atelectasis. No pneumothorax." Patient being admitted for pneumonia likely complicated by a left pleural effusion.     Wound Consult requested to assist w/ management of sacral stage 4 pressure injury  -Sharp fragmented bone exposed at center, consistent with CT findings of coccyx OM from 2/28/24.   -Remainder of wound with viable pink-moist granulation and adipose tissue.  -Wound without s/s of acute soft tissue infection, no purulent drainage, no odor.   -S/p short term course of ABx with Zosyn subsequently changed to Levofloxacin at [facility] for sacral wound infection and OM. Per ID risk for longterm treatment with ABx outweighs benefit.  -Topical recommendations: Cleanse wound and periwound skin with NS. Apply Liquid barrier film to periwound skin. Pack dead space with Aquacel hydrofiber, cover with silicone foam with border, change daily and prn if soiled.  -Continue to offload pressure.  -Consider goals of care of discussion.    Compression BLE  Consider MELLO/PVR, XRay, Duplex, MRI, CT  BLE elevation  Abx per Medicine/ ID  Moisturize intact skin w/ SWEEN cream BID  Nutrition Consult for optimization as tolerated in pt w/ Protein Calorie Malnutirion        Inadequate PO intake        consider MVI & Vit C to promote wound healing        encourage high quality protein when appropriate  Hyperglycemia - consider HgA1c        FS w/ ISS q6h, consider Endo Consult  Anemia- transfusions, Fe studies  Continue turning and positioning w/ offloading assistive devices as per protocol  Continue w/ Pericare as per protocol  Waffle Cushion to chair when oob to chair  Continue w/ low air loss fluidized bed surface     Care as per medicine, will follow w/ you    Upon discharge f/u as outpatient at [facility] [address] [facility] [phone]  Seen w/ attng and D/w team    Thank you for this consult  NOEMI Garsia, CWBREANNA (pager #[phone])    If after 4PM or before 7:30AM on Mon-Friday or weekend/holiday please contact general surgery for urgent matters.   Team A- [id number]   Team B- [id number]  For non-urgent matters e-mail [email or web address]    I spent 75 minutes face to face with this patient of which more than 50% of the time was spent counseling & coordinating care of this pt       Assessment/Plan: 89F with PMHx of endstage dementia, atrial fibrillation, polycythemia vera, T2DM and known unstageable sacral decubitus ulcer sent in from [facility] for dyspnea/hypoxia. CXR demonstrated large left pleural effusion with adjacent atelectasis. CT Chest showed: "Small left pleural effusion mostly loculated along the left upper lobe posteriorly. Complete collapse of the left lower lobe and partial left upper lobe atelectasis. No pneumothorax." Patient being admitted for pneumonia likely complicated by a left pleural effusion.     Wound Consult requested to assist w/ management of sacral stage 4 pressure injury. 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   -Remainder of wound with viable pink-moist granulation and adipose tissue.  -Wound without s/s of acute soft tissue infection, no purulent drainage, no odor.   -S/p short term course of ABx with Zosyn subsequently changed to Levofloxacin (2/28-3/3) at [facility] for sacral wound infection and OM s/p selective sharp debridement 2/29. Per ID risk for longterm treatment with ABx outweighs benefit.  -No sharp debridement indicated today.  -Topical recommendations: Cleanse wound and periwound skin with NS. Apply Liquid barrier film to periwound skin. Pack dead space with Aquacel hydrofiber, cover with silicone foam with border, change daily and prn if soiled.  -Continue to offload pressure.  -Continue incontinence care per protocol  -Nutrition recommendations appreciated.  -Follow up goals of care of discussion  -Consider palliative care consult; patient with endstage dementia, bedbound, incontinent of stool, poor appetite, severe cachexia with muscle wasting, sacral stage 4 pressure injury. Wound is not expected to fully heal given multiple co-morbidities.    Bilateral feel with multiple deep tissue pressure injuries  -purple-maroon discoloration without palpable skin changes.   -No overt ischemia noted.  -Topical recommendations: apply liquid barrier film daily, allow to dry, keep open to air.  -Offload with complete cair boots.  -If inline with goals of care consider obtaining star/pvr.  -Consider consulting podiatry    Upon discharge if medically appropriate follow up at [facility] [address] [place], [place] [phone]  Findings and plan discussed with primary RN and primary team.  Remainder of care per primary team. Please reconsult us needed.    Thank you for this consult  NOEMI Garsia, ARCHIE (pager #[phone])    If after 4PM or before 7:30AM on Mon-Friday or weekend/holiday please contact general surgery for urgent matters.   Team A- [id number]   Team B- [id number]  For non-urgent matters e-mail [email or web address]    I spent 75 minutes face to face with this patient of which more than 50% of the time was spent counseling & coordinating care of this pt

## 2024-04-03 LAB
ALBUMIN SERPL ELPH-MCNC: 2.4 G/DL — LOW (ref 3.3–5)
ALP SERPL-CCNC: 128 U/L — HIGH (ref 40–120)
ALT FLD-CCNC: 15 U/L — SIGNIFICANT CHANGE UP (ref 4–33)
ANION GAP SERPL CALC-SCNC: 13 MMOL/L — SIGNIFICANT CHANGE UP (ref 7–14)
AST SERPL-CCNC: 19 U/L — SIGNIFICANT CHANGE UP (ref 4–32)
BASOPHILS # BLD AUTO: 0.09 K/UL — SIGNIFICANT CHANGE UP (ref 0–0.2)
BASOPHILS NFR BLD AUTO: 0.6 % — SIGNIFICANT CHANGE UP (ref 0–2)
BILIRUB SERPL-MCNC: 0.2 MG/DL — SIGNIFICANT CHANGE UP (ref 0.2–1.2)
BUN SERPL-MCNC: 32 MG/DL — HIGH (ref 7–23)
CALCIUM SERPL-MCNC: 9.4 MG/DL — SIGNIFICANT CHANGE UP (ref 8.4–10.5)
CHLORIDE SERPL-SCNC: 105 MMOL/L — SIGNIFICANT CHANGE UP (ref 98–107)
CO2 SERPL-SCNC: 21 MMOL/L — LOW (ref 22–31)
CREAT SERPL-MCNC: 1.05 MG/DL — SIGNIFICANT CHANGE UP (ref 0.5–1.3)
EGFR: 50 ML/MIN/1.73M2 — LOW
EOSINOPHIL # BLD AUTO: 0.23 K/UL — SIGNIFICANT CHANGE UP (ref 0–0.5)
EOSINOPHIL NFR BLD AUTO: 1.6 % — SIGNIFICANT CHANGE UP (ref 0–6)
GLUCOSE BLDC GLUCOMTR-MCNC: 104 MG/DL — HIGH (ref 70–99)
GLUCOSE BLDC GLUCOMTR-MCNC: 110 MG/DL — HIGH (ref 70–99)
GLUCOSE BLDC GLUCOMTR-MCNC: 110 MG/DL — HIGH (ref 70–99)
GLUCOSE BLDC GLUCOMTR-MCNC: 89 MG/DL — SIGNIFICANT CHANGE UP (ref 70–99)
GLUCOSE SERPL-MCNC: 94 MG/DL — SIGNIFICANT CHANGE UP (ref 70–99)
HCT VFR BLD CALC: 29.1 % — LOW (ref 34.5–45)
HGB BLD-MCNC: 8.6 G/DL — LOW (ref 11.5–15.5)
IANC: 12.42 K/UL — HIGH (ref 1.8–7.4)
IMM GRANULOCYTES NFR BLD AUTO: 0.7 % — SIGNIFICANT CHANGE UP (ref 0–0.9)
LYMPHOCYTES # BLD AUTO: 1.26 K/UL — SIGNIFICANT CHANGE UP (ref 1–3.3)
LYMPHOCYTES # BLD AUTO: 8.5 % — LOW (ref 13–44)
MAGNESIUM SERPL-MCNC: 2.4 MG/DL — SIGNIFICANT CHANGE UP (ref 1.6–2.6)
MCHC RBC-ENTMCNC: 24.1 PG — LOW (ref 27–34)
MCHC RBC-ENTMCNC: 29.6 GM/DL — LOW (ref 32–36)
MCV RBC AUTO: 81.5 FL — SIGNIFICANT CHANGE UP (ref 80–100)
MONOCYTES # BLD AUTO: 0.69 K/UL — SIGNIFICANT CHANGE UP (ref 0–0.9)
MONOCYTES NFR BLD AUTO: 4.7 % — SIGNIFICANT CHANGE UP (ref 2–14)
NEUTROPHILS # BLD AUTO: 12.42 K/UL — HIGH (ref 1.8–7.4)
NEUTROPHILS NFR BLD AUTO: 83.9 % — HIGH (ref 43–77)
NRBC # BLD: 0 /100 WBCS — SIGNIFICANT CHANGE UP (ref 0–0)
NRBC # FLD: 0 K/UL — SIGNIFICANT CHANGE UP (ref 0–0)
PHOSPHATE SERPL-MCNC: 3.1 MG/DL — SIGNIFICANT CHANGE UP (ref 2.5–4.5)
PLATELET # BLD AUTO: 996 K/UL — HIGH (ref 150–400)
POTASSIUM SERPL-MCNC: 4.4 MMOL/L — SIGNIFICANT CHANGE UP (ref 3.5–5.3)
POTASSIUM SERPL-SCNC: 4.4 MMOL/L — SIGNIFICANT CHANGE UP (ref 3.5–5.3)
PROT SERPL-MCNC: 6.7 G/DL — SIGNIFICANT CHANGE UP (ref 6–8.3)
RBC # BLD: 3.57 M/UL — LOW (ref 3.8–5.2)
RBC # FLD: 18.1 % — HIGH (ref 10.3–14.5)
SODIUM SERPL-SCNC: 139 MMOL/L — SIGNIFICANT CHANGE UP (ref 135–145)
WBC # BLD: 14.79 K/UL — HIGH (ref 3.8–10.5)
WBC # FLD AUTO: 14.79 K/UL — HIGH (ref 3.8–10.5)

## 2024-04-03 PROCEDURE — 99232 SBSQ HOSP IP/OBS MODERATE 35: CPT

## 2024-04-03 RX ADMIN — PIPERACILLIN AND TAZOBACTAM 25 GRAM(S): 4; .5 INJECTION, POWDER, LYOPHILIZED, FOR SOLUTION INTRAVENOUS at 22:41

## 2024-04-03 RX ADMIN — Medication 3 MILLILITER(S): at 22:04

## 2024-04-03 RX ADMIN — Medication 88 MICROGRAM(S): at 05:46

## 2024-04-03 RX ADMIN — Medication 3 MILLILITER(S): at 04:17

## 2024-04-03 RX ADMIN — PIPERACILLIN AND TAZOBACTAM 25 GRAM(S): 4; .5 INJECTION, POWDER, LYOPHILIZED, FOR SOLUTION INTRAVENOUS at 14:34

## 2024-04-03 RX ADMIN — APIXABAN 2.5 MILLIGRAM(S): 2.5 TABLET, FILM COATED ORAL at 05:47

## 2024-04-03 RX ADMIN — Medication 1 DROP(S): at 05:54

## 2024-04-03 RX ADMIN — Medication 3 MILLILITER(S): at 08:34

## 2024-04-03 RX ADMIN — SODIUM CHLORIDE 4 MILLILITER(S): 9 INJECTION INTRAMUSCULAR; INTRAVENOUS; SUBCUTANEOUS at 22:04

## 2024-04-03 RX ADMIN — Medication 3 MILLILITER(S): at 15:01

## 2024-04-03 RX ADMIN — SODIUM CHLORIDE 4 MILLILITER(S): 9 INJECTION INTRAMUSCULAR; INTRAVENOUS; SUBCUTANEOUS at 08:34

## 2024-04-03 RX ADMIN — Medication 25 MILLIGRAM(S): at 05:46

## 2024-04-03 RX ADMIN — PIPERACILLIN AND TAZOBACTAM 25 GRAM(S): 4; .5 INJECTION, POWDER, LYOPHILIZED, FOR SOLUTION INTRAVENOUS at 05:49

## 2024-04-03 NOTE — DIETITIAN INITIAL EVALUATION ADULT - ADD RECOMMEND
1. Monitor PO intake, Labs, weights, BMs, and skin integrity.   2. Monitor electrolytes, replete as needed. Monitor glucose fingersticks.   3. Recommend moderately thick liquids, defer to MD  4. Recommend adding Mighty Shake reduced sugar 2x daily (220kcal, 6gm pro per each serving) to optimize protein-energy intake in-house.  5. Continue Vitamin C and MVI daily for wound healing and micronutrient coverage unless contraindicated.   6. Recommend adding Liquid Protein Supplement (100cal, 15gm pro per each PK) 1x daily for wound healing unless contraindicated.   7. Monitor and encourage adequate PO intake, continue provide feeding assistance as needed to optimize protein-energy intake.  1. Monitor PO intake, Labs, weights, BMs, and skin integrity.   2. Monitor electrolytes, replete as needed. Monitor glucose fingersticks.   3. Continue Vitamin C and MVI daily for wound healing and micronutrient coverage unless contraindicated.   4. Monitor and encourage adequate PO intake, continue provide feeding assistance as needed to optimize protein-energy intake.

## 2024-04-03 NOTE — DIETITIAN INITIAL EVALUATION ADULT - REASON FOR ADMISSION
Per chart (4/3):89-year-old female with PMHx of endstage dementia, atrial fibrillation, polycythemia vera, T2DM and known sacral ulcer sent in from [facility] for dyspnea/hypoxia. CXR demonstrated large left pleural effusion with adjacent atelectasis. CT Chest showed: "Small left pleural effusion mostly loculated along the left upper lobe posteriorly. Complete collapse of the left lower lobe and partial left upper lobe atelectasis. No pneumothorax." Patient being admitted for pneumonia likely complicated by a left pleural effusion.      Per chart (4/3):89-year-old female with PMHx of endstage dementia, atrial fibrillation, polycythemia vera, T2DM and known sacral ulcer sent in from [facility] for dyspnea/hypoxia. CXR demonstrated large left pleural effusion with adjacent atelectasis. Patient being admitted for pneumonia likely complicated by a left pleural effusion.

## 2024-04-03 NOTE — DIETITIAN INITIAL EVALUATION ADULT - SIGNS/SYMPTOMS
pt with stage 4 pressure injury and DTI PO intake <75% of EER >= 1 month, physical findings noted above  PO intake <75% of EER >= 1 month, severe muscle wasting and fat loss

## 2024-04-03 NOTE — DIETITIAN INITIAL EVALUATION ADULT - NS FNS DIET ORDER
Diet, Pureed:   Consistent Carbohydrate {No Snacks} (CSTCHO)  Mildly Thick Liquids (MILDTHICKLIQS) (04-01-24 @ 08:03) [Active]

## 2024-04-03 NOTE — PROGRESS NOTE ADULT - SUBJECTIVE AND OBJECTIVE BOX
[facility], Division of Infectious Diseases  SANCHZE Faria Y. Patel, S. Shah, G. Jorge  [phone]  ([phone] - weekdays after 5pm and weekends)    Name: BISHOP BURNS  Age/Gender: 89y Female  MRN: [id number]    Interval History:  Notes reviewed.   No concerning overnight events.  Afebrile.     Allergies: No Known Drug Allergies  black pepper, white pepper, cumin, paprika, johnston powder, chili powder (Rash)      Objective:  Vitals:   T(F): 97.7 (04-03-24 @ 12:40), Max: 98.7 (04-02-24 @ 22:45)  HR: 80 (04-03-24 @ 12:40) (68 - 91)  BP: 127/56 (04-03-24 @ 12:40) (115/75 - 138/53)  RR: 18 (04-03-24 @ 12:40) (18 - 19)  SpO2: 100% (04-03-24 @ 12:40) (98% - 100%)  Physical Examination:  General: no acute distress  HEENT: anicteric, NC  Cardio: S1, S2, normal rate  Resp: decreased breath sounds  Abd: soft, NT, ND  : sylvester  Ext: no LE edema  Skin: warm, dry    Laboratory Studies:  CBC:                       8.6    14.79 )-----------( 996      ( 03 Apr 2024 06:10 )             29.1     WBC Trend:  14.79 04-03-24 @ 06:10  17.02 04-02-24 @ 05:15  26.70 04-01-24 @ 00:06    CMP: 04-03    139  |  105  |  32<H>  ----------------------------<  94  4.4   |  21<L>  |  1.05    Ca    9.4      03 Apr 2024 06:10  Phos  3.1     04-03  Mg     2.40     04-03    TPro  6.7  /  Alb  2.4<L>  /  TBili  0.2  /  DBili  x   /  AST  19  /  ALT  15  /  AlkPhos  128<H>  04-03      LIVER FUNCTIONS - ( 03 Apr 2024 06:10 )  Alb: 2.4 g/dL / Pro: 6.7 g/dL / ALK PHOS: 128 U/L / ALT: 15 U/L / AST: 19 U/L / GGT: x             Urinalysis Basic - ( 03 Apr 2024 06:10 )    Color: x / Appearance: x / SG: x / pH: x  Gluc: 94 mg/dL / Ketone: x  / Bili: x / Urobili: x   Blood: x / Protein: x / Nitrite: x   Leuk Esterase: x / RBC: x / WBC x   Sq Epi: x / Non Sq Epi: x / Bacteria: x      Microbiology: reviewed     Culture - Blood (collected 04-01-24 @ 00:16)  Source: .Blood Blood-Peripheral  Preliminary Report (04-03-24 @ 09:01):    No growth at 48 Hours    Culture - Blood (collected 04-01-24 @ 00:06)  Source: .Blood Blood  Preliminary Report (04-03-24 @ 09:01):    No growth at 48 Hours        Radiology: reviewed     Medications:  acetaminophen     Tablet .. 650 milliGRAM(s) Oral every 6 hours PRN  albuterol/ipratropium for Nebulization 3 milliLiter(s) Nebulizer every 6 hours  apixaban 2.5 milliGRAM(s) Oral two times a day  artificial  tears Solution 1 Drop(s) Both EYES two times a day  ascorbic acid 500 milliGRAM(s) Oral daily  atorvastatin 10 milliGRAM(s) Oral at bedtime  dextrose 5%. 1000 milliLiter(s) IV Continuous <Continuous>  dextrose 5%. 1000 milliLiter(s) IV Continuous <Continuous>  dextrose 50% Injectable 12.5 Gram(s) IV Push once  dextrose 50% Injectable 25 Gram(s) IV Push once  dextrose 50% Injectable 25 Gram(s) IV Push once  dextrose Oral Gel 15 Gram(s) Oral once PRN  glucagon  Injectable 1 milliGRAM(s) IntraMuscular once  hydroxyurea 500 milliGRAM(s) Oral <User Schedule>  insulin lispro (ADMELOG) corrective regimen sliding scale   SubCutaneous three times a day before meals  insulin lispro (ADMELOG) corrective regimen sliding scale   SubCutaneous at bedtime  levothyroxine 88 MICROGram(s) Oral daily  metoprolol tartrate 25 milliGRAM(s) Oral two times a day  multivitamin 1 Tablet(s) Oral daily  piperacillin/tazobactam IVPB.. 3.375 Gram(s) IV Intermittent every 8 hours  polyethylene glycol 3350 17 Gram(s) Oral daily PRN  senna 2 Tablet(s) Oral at bedtime  sodium chloride 3%  Inhalation 4 milliLiter(s) Inhalation every 12 hours  zinc sulfate 220 milliGRAM(s) Oral daily    Antimicrobials:  piperacillin/tazobactam IVPB.. 3.375 Gram(s) IV Intermittent every 8 hours

## 2024-04-03 NOTE — DIETITIAN INITIAL EVALUATION ADULT - OTHER INFO
PCA and RN at bedside and tried to feed pt but failed. Observed breakfast meal tray, 0% of intake. Per RN flowsheets pt needs total feed and intake is 0-25%, the same as observed this AM. No GI distress note per RN flowsheets. No last BM record and fecal incontinence noted per RN flowsheets. BM regimen noted.  Food allergy noted with black pepper, white pepper, cumin, paprika, johnston powder and chill powder. Medications notable for antibiotic, insulin, MVI and vitamin C. Labs reviewed, A1c 7.5% indicating fair control based on her age, fingersticks within acceptable limits. Current diet consistency is pureed with mildly thick liquids, defer to MD due to pt was on moderately thick liquids as per nursing home records. Food preferences obtained from nursing home records, diet office informed. Unable to provide education at this time. RD to remain available for further nutritional interventions as indicated  PCA and RN at bedside and tried to feed pt but failed as pt lethargic. Observed breakfast meal tray, 0% of intake. Per RN flowsheets pt needs total feed and intake is 0-25%, the same as observed this AM. No GI distress note per RN flowsheets. No last BM record and fecal incontinence noted per RN flowsheets. BM regimen noted.  Food allergy noted with black pepper, white pepper, cumin, paprika, johnston powder and chill powder. Medications notable for antibiotic, insulin, MVI and vitamin C. Labs reviewed, A1c 7.5% indicating fair control based on her age, fingersticks within acceptable limits. During recent hospitalization, s/p bedside swallow assessment (2/29/24) which recommend pureed with moderately thick liquid consistency. Food preferences obtained from nursing home records, diet office informed. Unable to provide education at this time. RD to remain available for further nutritional interventions as indicated

## 2024-04-03 NOTE — DIETITIAN INITIAL EVALUATION ADULT - PERTINENT MEDS FT
MEDICATIONS  (STANDING):  albuterol/ipratropium for Nebulization 3 milliLiter(s) Nebulizer every 6 hours  apixaban 2.5 milliGRAM(s) Oral two times a day  artificial  tears Solution 1 Drop(s) Both EYES two times a day  ascorbic acid 500 milliGRAM(s) Oral daily  atorvastatin 10 milliGRAM(s) Oral at bedtime  dextrose 5%. 1000 milliLiter(s) (100 mL/Hr) IV Continuous <Continuous>  dextrose 5%. 1000 milliLiter(s) (50 mL/Hr) IV Continuous <Continuous>  dextrose 50% Injectable 25 Gram(s) IV Push once  dextrose 50% Injectable 25 Gram(s) IV Push once  dextrose 50% Injectable 12.5 Gram(s) IV Push once  glucagon  Injectable 1 milliGRAM(s) IntraMuscular once  hydroxyurea 500 milliGRAM(s) Oral <User Schedule>  insulin lispro (ADMELOG) corrective regimen sliding scale   SubCutaneous three times a day before meals  insulin lispro (ADMELOG) corrective regimen sliding scale   SubCutaneous at bedtime  levothyroxine 88 MICROGram(s) Oral daily  metoprolol tartrate 25 milliGRAM(s) Oral two times a day  multivitamin 1 Tablet(s) Oral daily  piperacillin/tazobactam IVPB.. 3.375 Gram(s) IV Intermittent every 8 hours  senna 2 Tablet(s) Oral at bedtime  sodium chloride 3%  Inhalation 4 milliLiter(s) Inhalation every 12 hours  zinc sulfate 220 milliGRAM(s) Oral daily    MEDICATIONS  (PRN):  acetaminophen     Tablet .. 650 milliGRAM(s) Oral every 6 hours PRN Temp greater or equal to 38C (100.4F), Mild Pain (1 - 3), Moderate Pain (4 - 6)  dextrose Oral Gel 15 Gram(s) Oral once PRN Blood Glucose LESS THAN 70 milliGRAM(s)/deciliter  polyethylene glycol 3350 17 Gram(s) Oral daily PRN for constipation

## 2024-04-03 NOTE — PROGRESS NOTE ADULT - SUBJECTIVE AND OBJECTIVE BOX
Patient is a 89y old  Female who presents with a chief complaint of Dyspnea (02 Apr 2024 14:36)      SUBJECTIVE / OVERNIGHT EVENTS:    MEDICATIONS  (STANDING):  albuterol/ipratropium for Nebulization 3 milliLiter(s) Nebulizer every 6 hours  apixaban 2.5 milliGRAM(s) Oral two times a day  artificial  tears Solution 1 Drop(s) Both EYES two times a day  ascorbic acid 500 milliGRAM(s) Oral daily  atorvastatin 10 milliGRAM(s) Oral at bedtime  dextrose 5%. 1000 milliLiter(s) (100 mL/Hr) IV Continuous <Continuous>  dextrose 5%. 1000 milliLiter(s) (50 mL/Hr) IV Continuous <Continuous>  dextrose 50% Injectable 25 Gram(s) IV Push once  dextrose 50% Injectable 25 Gram(s) IV Push once  dextrose 50% Injectable 12.5 Gram(s) IV Push once  glucagon  Injectable 1 milliGRAM(s) IntraMuscular once  hydroxyurea 500 milliGRAM(s) Oral <User Schedule>  insulin lispro (ADMELOG) corrective regimen sliding scale   SubCutaneous three times a day before meals  insulin lispro (ADMELOG) corrective regimen sliding scale   SubCutaneous at bedtime  levothyroxine 88 MICROGram(s) Oral daily  metoprolol tartrate 25 milliGRAM(s) Oral two times a day  multivitamin 1 Tablet(s) Oral daily  piperacillin/tazobactam IVPB.. 3.375 Gram(s) IV Intermittent every 8 hours  senna 2 Tablet(s) Oral at bedtime  sodium chloride 3%  Inhalation 4 milliLiter(s) Inhalation every 12 hours  zinc sulfate 220 milliGRAM(s) Oral daily    MEDICATIONS  (PRN):  acetaminophen     Tablet .. 650 milliGRAM(s) Oral every 6 hours PRN Temp greater or equal to 38C (100.4F), Mild Pain (1 - 3), Moderate Pain (4 - 6)  dextrose Oral Gel 15 Gram(s) Oral once PRN Blood Glucose LESS THAN 70 milliGRAM(s)/deciliter  polyethylene glycol 3350 17 Gram(s) Oral daily PRN for constipation      Vital Signs Last 24 Hrs  T(C): 36.7 (03 Apr 2024 09:27), Max: 37.1 (02 Apr 2024 22:45)  T(F): 98 (03 Apr 2024 09:27), Max: 98.7 (02 Apr 2024 22:45)  HR: 74 (03 Apr 2024 09:27) (68 - 91)  BP: 138/53 (03 Apr 2024 09:27) (115/75 - 138/53)  BP(mean): --  RR: 18 (03 Apr 2024 09:27) (18 - 19)  SpO2: 100% (03 Apr 2024 09:27) (98% - 100%)    Parameters below as of 03 Apr 2024 09:27  Patient On (Oxygen Delivery Method): nasal cannula  O2 Flow (L/min): 2    CAPILLARY BLOOD GLUCOSE      POCT Blood Glucose.: 104 mg/dL (03 Apr 2024 08:44)  POCT Blood Glucose.: 136 mg/dL (02 Apr 2024 22:44)  POCT Blood Glucose.: 144 mg/dL (02 Apr 2024 18:40)  POCT Blood Glucose.: 139 mg/dL (02 Apr 2024 12:53)    I&O's Summary      PHYSICAL EXAM:  GENERAL: NAD, well-developed  HEAD:  Atraumatic, Normocephalic  EYES: EOMI, PERRLA, conjunctiva and sclera clear  NECK: Supple, No JVD  CHEST/LUNG: Clear to auscultation bilaterally; No wheeze  HEART: Regular rate and rhythm; No murmurs, rubs, or gallops  ABDOMEN: Soft, Nontender, Nondistended; Bowel sounds present  EXTREMITIES:  2+ Peripheral Pulses, No clubbing, cyanosis, or edema  PSYCH: AAOx3  NEUROLOGY: non-focal  SKIN: No rashes or lesions    LABS:                        8.6    14.79 )-----------( 996      ( 03 Apr 2024 06:10 )             29.1     04-03    139  |  105  |  32<H>  ----------------------------<  94  4.4   |  21<L>  |  1.05    Ca    9.4      03 Apr 2024 06:10  Phos  3.1     04-03  Mg     2.40     04-03    TPro  6.7  /  Alb  2.4<L>  /  TBili  0.2  /  DBili  x   /  AST  19  /  ALT  15  /  AlkPhos  128<H>  04-03    PT/INR - ( 02 Apr 2024 05:15 )   PT: 14.7 sec;   INR: 1.31 ratio         PTT - ( 02 Apr 2024 05:15 )  PTT:33.6 sec      Urinalysis Basic - ( 03 Apr 2024 06:10 )    Color: x / Appearance: x / SG: x / pH: x  Gluc: 94 mg/dL / Ketone: x  / Bili: x / Urobili: x   Blood: x / Protein: x / Nitrite: x   Leuk Esterase: x / RBC: x / WBC x   Sq Epi: x / Non Sq Epi: x / Bacteria: x        RADIOLOGY & ADDITIONAL TESTS:    Imaging Personally Reviewed:    Consultant(s) Notes Reviewed:      Care Discussed with Consultants/Other Providers:   Patient is a 89y old  Female who presents with a chief complaint of Dyspnea (02 Apr 2024 14:36)      SUBJECTIVE / OVERNIGHT EVENTS: patient seen and examined by bedside, pt lying in bed, not responding to verbal or tactile stimuli,     MEDICATIONS  (STANDING):  albuterol/ipratropium for Nebulization 3 milliLiter(s) Nebulizer every 6 hours  apixaban 2.5 milliGRAM(s) Oral two times a day  artificial  tears Solution 1 Drop(s) Both EYES two times a day  ascorbic acid 500 milliGRAM(s) Oral daily  atorvastatin 10 milliGRAM(s) Oral at bedtime  dextrose 5%. 1000 milliLiter(s) (100 mL/Hr) IV Continuous <Continuous>  dextrose 5%. 1000 milliLiter(s) (50 mL/Hr) IV Continuous <Continuous>  dextrose 50% Injectable 25 Gram(s) IV Push once  dextrose 50% Injectable 25 Gram(s) IV Push once  dextrose 50% Injectable 12.5 Gram(s) IV Push once  glucagon  Injectable 1 milliGRAM(s) IntraMuscular once  hydroxyurea 500 milliGRAM(s) Oral <User Schedule>  insulin lispro (ADMELOG) corrective regimen sliding scale   SubCutaneous three times a day before meals  insulin lispro (ADMELOG) corrective regimen sliding scale   SubCutaneous at bedtime  levothyroxine 88 MICROGram(s) Oral daily  metoprolol tartrate 25 milliGRAM(s) Oral two times a day  multivitamin 1 Tablet(s) Oral daily  piperacillin/tazobactam IVPB.. 3.375 Gram(s) IV Intermittent every 8 hours  senna 2 Tablet(s) Oral at bedtime  sodium chloride 3%  Inhalation 4 milliLiter(s) Inhalation every 12 hours  zinc sulfate 220 milliGRAM(s) Oral daily    MEDICATIONS  (PRN):  acetaminophen     Tablet .. 650 milliGRAM(s) Oral every 6 hours PRN Temp greater or equal to 38C (100.4F), Mild Pain (1 - 3), Moderate Pain (4 - 6)  dextrose Oral Gel 15 Gram(s) Oral once PRN Blood Glucose LESS THAN 70 milliGRAM(s)/deciliter  polyethylene glycol 3350 17 Gram(s) Oral daily PRN for constipation      Vital Signs Last 24 Hrs  T(C): 36.7 (03 Apr 2024 09:27), Max: 37.1 (02 Apr 2024 22:45)  T(F): 98 (03 Apr 2024 09:27), Max: 98.7 (02 Apr 2024 22:45)  HR: 74 (03 Apr 2024 09:27) (68 - 91)  BP: 138/53 (03 Apr 2024 09:27) (115/75 - 138/53)  BP(mean): --  RR: 18 (03 Apr 2024 09:27) (18 - 19)  SpO2: 100% (03 Apr 2024 09:27) (98% - 100%)    Parameters below as of 03 Apr 2024 09:27  Patient On (Oxygen Delivery Method): nasal cannula  O2 Flow (L/min): 2    CAPILLARY BLOOD GLUCOSE      POCT Blood Glucose.: 104 mg/dL (03 Apr 2024 08:44)  POCT Blood Glucose.: 136 mg/dL (02 Apr 2024 22:44)  POCT Blood Glucose.: 144 mg/dL (02 Apr 2024 18:40)  POCT Blood Glucose.: 139 mg/dL (02 Apr 2024 12:53)      PHYSICAL EXAM:  GENERAL: elderly frail female in  NAD,   HEAD:  Atraumatic, Normocephalic  EYES: EOMI, PERRLA, conjunctiva and sclera clear  CHEST/LUNG: poor inspiratory effort  No wheeze  HEART: Regular rate and rhythm; No murmurs, rubs, or gallops  ABDOMEN: Soft, Nontender, Nondistended; Bowel sounds present  EXTREMITIES:  2+ Peripheral Pulses, No clubbing, cyanosis, or edema  PSYCH: nonverbal, calm   NEUROLOGY: cannot be assesses as pt not following commands           LABS:                        8.6    14.79 )-----------( 996      ( 03 Apr 2024 06:10 )             29.1     04-03    139  |  105  |  32<H>  ----------------------------<  94  4.4   |  21<L>  |  1.05    Ca    9.4      03 Apr 2024 06:10  Phos  3.1     04-03  Mg     2.40     04-03    TPro  6.7  /  Alb  2.4<L>  /  TBili  0.2  /  DBili  x   /  AST  19  /  ALT  15  /  AlkPhos  128<H>  04-03    PT/INR - ( 02 Apr 2024 05:15 )   PT: 14.7 sec;   INR: 1.31 ratio         PTT - ( 02 Apr 2024 05:15 )  PTT:33.6 sec      Urinalysis Basic - ( 03 Apr 2024 06:10 )    Color: x / Appearance: x / SG: x / pH: x  Gluc: 94 mg/dL / Ketone: x  / Bili: x / Urobili: x   Blood: x / Protein: x / Nitrite: x   Leuk Esterase: x / RBC: x / WBC x   Sq Epi: x / Non Sq Epi: x / Bacteria: x        RADIOLOGY & ADDITIONAL TESTS:    Imaging Personally Reviewed:    Consultant(s) Notes Reviewed:  ID     Care Discussed with Consultants/Other Providers:

## 2024-04-03 NOTE — DIETITIAN INITIAL EVALUATION ADULT - DIET TYPE
Recommend moderately thick liquids, defer to MD/regular Continue CSTCHO diet. Diet texture/consistency per MD.

## 2024-04-03 NOTE — DIETITIAN INITIAL EVALUATION ADULT - ORAL INTAKE PTA/DIET HISTORY
Pt A&Ox0, no family at bedside. Pt transferred from [facility] and have recent hospitalization at [facility]. Pt has DPP(liquid protein) x4/dailyreports      appetite / PO intake PTA. States    PTA. Denies prior use of nutrition shakes/ vitamins PTA. No other reported issues.  Pt A&Ox0, no family presented at bedside. Pt transferred from [facility] and have recent hospitalization at [facility]. Pt has DPP(liquid protein) 4x daily, boost pudding 3x daily PTA. Pt is on no added salt, no concentrated sweet, pureed diet moderately thick liquids with Nectar thick PTA. Medications notable for multivitamin, metformin and Glipizide PTA. Per previous RD note on 9/11/23, "Per pt's daughter, pt drinks Glucerna 1x/day as well as Vitamin C and Vitamin D daily. Pt has Trung 2x daily for wound healing during the recent hospitalization (per previous RD note on 3/1/24). Pt A&Ox0, no family presented at bedside. Pt transferred from [facility] and have recent hospitalization at [facility]. Diet PTA per [facility] transfer records: DPP(liquid protein) 4x daily, boost pudding 3x daily PTA. no added salt, no concentrated sweet, pureed diet with Nectar thick. Medications PTA per [facility] transfer records: multivitamin, metformin and Glipizide.

## 2024-04-03 NOTE — PROGRESS NOTE ADULT - PROBLEM SELECTOR PLAN 4
- Endstage and minimally verbal  - In the past daughter has expressed interest in home hospice  - DNR/I  - Palliative consult appreciated - Endstage and minimally verbal  - In the past daughter has expressed interest in home hospice  - DNR/I  - Palliative consult appreciated, will f/u plan for hospice

## 2024-04-03 NOTE — DIETITIAN INITIAL EVALUATION ADULT - PROBLEM SELECTOR PLAN 5
- At home on Glipizide 2.5 mg daily and metformin 500 mg BID  - FS TID AC & insulin sliding scale for now

## 2024-04-03 NOTE — DIETITIAN INITIAL EVALUATION ADULT - ORAL NUTRITION SUPPLEMENTS
Recommend adding Liquid Protein Supplement (100cal, 15gm pro per each PK) 1x daily, Magic Cup reduced sugar 2x daily (580kcal, 18gm pro).  Recommend adding Liquid Protein Supplement (100cal, 15gm pro per each PK) 1x daily  RD to provide Magic Cup reduced sugar 2x daily (580kcal, 18gm pro)

## 2024-04-03 NOTE — PROGRESS NOTE ADULT - PROBLEM SELECTOR PLAN 1
- Likely with underlying pneumonia as well  - Pulm piyush noted , Patient with mucous plugging and atelectasis with associated small effusion seen on CT chest.  No significant pleural effusion on POCUS. No plan for thoracentesis at this time. per pulm   Pulm recommend chest PT/airway clearance and treatment for pne  - NC to maintain saturation, can provide some positive pressure  - Zosyn empirically  - MRSA swab  - Follow up blood cultures  - Can monitor WBC, but chronically elevated given PV  - ID consult apprecaited, recommend c/w Zosyn for 5 days - Likely with underlying pneumonia as well  - Pulm piyush noted , Patient with mucous plugging and atelectasis with associated small effusion seen on CT chest.  No significant pleural effusion on POCUS. No plan for thoracentesis at this time. per pulm   Pulm recommend chest PT/airway clearance and treatment for pne  - NC to maintain saturation, can provide some positive pressure  - on Zosyn , ID rec to continue for 5 days , end date 4/5   - MRSA swab  - Follow up blood cultures-NGTD   - Can monitor WBC, but chronically elevated given PV, wbc trending down   - ID consult apprecaited, recommend c/w Zosyn for 5 days

## 2024-04-03 NOTE — DIETITIAN INITIAL EVALUATION ADULT - PERTINENT LABORATORY DATA
04-03    139  |  105  |  32<H>  ----------------------------<  94  4.4   |  21<L>  |  1.05    Ca    9.4      03 Apr 2024 06:10  Phos  3.1     04-03  Mg     2.40     04-03    TPro  6.7  /  Alb  2.4<L>  /  TBili  0.2  /  DBili  x   /  AST  19  /  ALT  15  /  AlkPhos  128<H>  04-03  POCT Blood Glucose.: 104 mg/dL (04-03-24 @ 08:44)  A1C with Estimated Average Glucose Result: 7.5 % (02-29-24 @ 13:07)

## 2024-04-03 NOTE — DIETITIAN INITIAL EVALUATION ADULT - NS FNS WEIGHT CHANGE REASON
Per nursing home records, pt UBW of 40.4kg(89lbs). Current body weight 41.2kg. Weight loss is not clinically significante per [facility] weight history:34.6kg (2/29), 49.9kg(9/8/23), 49.9kg(3/13/23)./unintentional Per nursing home records, weight 40.4kg(89lbs). Current body weight 41.2kg. Weight loss is not clinically significante per [facility] weight history:34.6kg (2/29), 49.9kg(9/8/23), 49.9kg(3/13/23)./unintentional

## 2024-04-03 NOTE — DIETITIAN INITIAL EVALUATION ADULT - PERSON TAUGHT/METHOD
Pt A&Ox0, no family presented at bedside. Will attempt to provide education later./computer/Internet

## 2024-04-04 ENCOUNTER — TRANSCRIPTION ENCOUNTER (OUTPATIENT)
Age: 89
End: 2024-04-04

## 2024-04-04 LAB
ALBUMIN SERPL ELPH-MCNC: 2.6 G/DL — LOW (ref 3.3–5)
ALP SERPL-CCNC: 128 U/L — HIGH (ref 40–120)
ALT FLD-CCNC: 11 U/L — SIGNIFICANT CHANGE UP (ref 4–33)
ANION GAP SERPL CALC-SCNC: 18 MMOL/L — HIGH (ref 7–14)
AST SERPL-CCNC: 14 U/L — SIGNIFICANT CHANGE UP (ref 4–32)
BASOPHILS # BLD AUTO: 0.13 K/UL — SIGNIFICANT CHANGE UP (ref 0–0.2)
BASOPHILS NFR BLD AUTO: 0.9 % — SIGNIFICANT CHANGE UP (ref 0–2)
BILIRUB SERPL-MCNC: 0.2 MG/DL — SIGNIFICANT CHANGE UP (ref 0.2–1.2)
BUN SERPL-MCNC: 31 MG/DL — HIGH (ref 7–23)
CALCIUM SERPL-MCNC: 9.8 MG/DL — SIGNIFICANT CHANGE UP (ref 8.4–10.5)
CHLORIDE SERPL-SCNC: 107 MMOL/L — SIGNIFICANT CHANGE UP (ref 98–107)
CO2 SERPL-SCNC: 18 MMOL/L — LOW (ref 22–31)
CREAT SERPL-MCNC: 1.12 MG/DL — SIGNIFICANT CHANGE UP (ref 0.5–1.3)
EGFR: 47 ML/MIN/1.73M2 — LOW
EOSINOPHIL # BLD AUTO: 0.26 K/UL — SIGNIFICANT CHANGE UP (ref 0–0.5)
EOSINOPHIL NFR BLD AUTO: 1.9 % — SIGNIFICANT CHANGE UP (ref 0–6)
GLUCOSE BLDC GLUCOMTR-MCNC: 167 MG/DL — HIGH (ref 70–99)
GLUCOSE BLDC GLUCOMTR-MCNC: 180 MG/DL — HIGH (ref 70–99)
GLUCOSE BLDC GLUCOMTR-MCNC: 75 MG/DL — SIGNIFICANT CHANGE UP (ref 70–99)
GLUCOSE SERPL-MCNC: 66 MG/DL — LOW (ref 70–99)
HCT VFR BLD CALC: 31.2 % — LOW (ref 34.5–45)
HGB BLD-MCNC: 9.4 G/DL — LOW (ref 11.5–15.5)
IANC: 11.2 K/UL — HIGH (ref 1.8–7.4)
IMM GRANULOCYTES NFR BLD AUTO: 0.9 % — SIGNIFICANT CHANGE UP (ref 0–0.9)
LYMPHOCYTES # BLD AUTO: 1.4 K/UL — SIGNIFICANT CHANGE UP (ref 1–3.3)
LYMPHOCYTES # BLD AUTO: 10.2 % — LOW (ref 13–44)
MAGNESIUM SERPL-MCNC: 2.4 MG/DL — SIGNIFICANT CHANGE UP (ref 1.6–2.6)
MCHC RBC-ENTMCNC: 24.9 PG — LOW (ref 27–34)
MCHC RBC-ENTMCNC: 30.1 GM/DL — LOW (ref 32–36)
MCV RBC AUTO: 82.8 FL — SIGNIFICANT CHANGE UP (ref 80–100)
MONOCYTES # BLD AUTO: 0.64 K/UL — SIGNIFICANT CHANGE UP (ref 0–0.9)
MONOCYTES NFR BLD AUTO: 4.7 % — SIGNIFICANT CHANGE UP (ref 2–14)
NEUTROPHILS # BLD AUTO: 11.2 K/UL — HIGH (ref 1.8–7.4)
NEUTROPHILS NFR BLD AUTO: 81.4 % — HIGH (ref 43–77)
NRBC # BLD: 0 /100 WBCS — SIGNIFICANT CHANGE UP (ref 0–0)
NRBC # FLD: 0 K/UL — SIGNIFICANT CHANGE UP (ref 0–0)
PHOSPHATE SERPL-MCNC: 3.5 MG/DL — SIGNIFICANT CHANGE UP (ref 2.5–4.5)
PLATELET # BLD AUTO: 1128 K/UL — CRITICAL HIGH (ref 150–400)
POTASSIUM SERPL-MCNC: 4.5 MMOL/L — SIGNIFICANT CHANGE UP (ref 3.5–5.3)
POTASSIUM SERPL-SCNC: 4.5 MMOL/L — SIGNIFICANT CHANGE UP (ref 3.5–5.3)
PROT SERPL-MCNC: 7.1 G/DL — SIGNIFICANT CHANGE UP (ref 6–8.3)
RBC # BLD: 3.77 M/UL — LOW (ref 3.8–5.2)
RBC # FLD: 17.9 % — HIGH (ref 10.3–14.5)
SODIUM SERPL-SCNC: 143 MMOL/L — SIGNIFICANT CHANGE UP (ref 135–145)
WBC # BLD: 13.75 K/UL — HIGH (ref 3.8–10.5)
WBC # FLD AUTO: 13.75 K/UL — HIGH (ref 3.8–10.5)

## 2024-04-04 PROCEDURE — 99233 SBSQ HOSP IP/OBS HIGH 50: CPT

## 2024-04-04 RX ORDER — ACETAMINOPHEN 500 MG
2 TABLET ORAL
Qty: 0 | Refills: 0 | DISCHARGE
Start: 2024-04-04

## 2024-04-04 RX ORDER — ZINC SULFATE TAB 220 MG (50 MG ZINC EQUIVALENT) 220 (50 ZN) MG
1 TAB ORAL
Qty: 0 | Refills: 0 | DISCHARGE
Start: 2024-04-04

## 2024-04-04 RX ORDER — PIPERACILLIN AND TAZOBACTAM 4; .5 G/20ML; G/20ML
3.38 INJECTION, POWDER, LYOPHILIZED, FOR SOLUTION INTRAVENOUS
Qty: 0 | Refills: 0 | DISCHARGE
Start: 2024-04-04

## 2024-04-04 RX ORDER — IPRATROPIUM/ALBUTEROL SULFATE 18-103MCG
3 AEROSOL WITH ADAPTER (GRAM) INHALATION
Qty: 0 | Refills: 0 | DISCHARGE
Start: 2024-04-04

## 2024-04-04 RX ADMIN — PIPERACILLIN AND TAZOBACTAM 25 GRAM(S): 4; .5 INJECTION, POWDER, LYOPHILIZED, FOR SOLUTION INTRAVENOUS at 14:54

## 2024-04-04 RX ADMIN — ZINC SULFATE TAB 220 MG (50 MG ZINC EQUIVALENT) 220 MILLIGRAM(S): 220 (50 ZN) TAB at 12:05

## 2024-04-04 RX ADMIN — Medication 1 DROP(S): at 06:18

## 2024-04-04 RX ADMIN — Medication 3 MILLILITER(S): at 16:24

## 2024-04-04 RX ADMIN — APIXABAN 2.5 MILLIGRAM(S): 2.5 TABLET, FILM COATED ORAL at 17:16

## 2024-04-04 RX ADMIN — PIPERACILLIN AND TAZOBACTAM 25 GRAM(S): 4; .5 INJECTION, POWDER, LYOPHILIZED, FOR SOLUTION INTRAVENOUS at 05:32

## 2024-04-04 RX ADMIN — Medication 1: at 12:56

## 2024-04-04 RX ADMIN — Medication 1: at 17:12

## 2024-04-04 RX ADMIN — Medication 3 MILLILITER(S): at 22:58

## 2024-04-04 RX ADMIN — Medication 1 DROP(S): at 17:16

## 2024-04-04 RX ADMIN — SODIUM CHLORIDE 4 MILLILITER(S): 9 INJECTION INTRAMUSCULAR; INTRAVENOUS; SUBCUTANEOUS at 22:58

## 2024-04-04 RX ADMIN — Medication 3 MILLILITER(S): at 02:54

## 2024-04-04 RX ADMIN — Medication 25 MILLIGRAM(S): at 17:16

## 2024-04-04 RX ADMIN — Medication 3 MILLILITER(S): at 11:26

## 2024-04-04 RX ADMIN — Medication 1 TABLET(S): at 12:04

## 2024-04-04 RX ADMIN — Medication 500 MILLIGRAM(S): at 12:03

## 2024-04-04 RX ADMIN — SODIUM CHLORIDE 4 MILLILITER(S): 9 INJECTION INTRAMUSCULAR; INTRAVENOUS; SUBCUTANEOUS at 11:26

## 2024-04-04 NOTE — PROGRESS NOTE ADULT - PROBLEM SELECTOR PLAN 5
- At home on Glipizide 2.5 mg daily and metformin 500 mg BID  - FS TID AC & insulin sliding scale for now. held bedtime ISS
- At home on Glipizide 2.5 mg daily and metformin 500 mg BID  - FS TID AC & insulin sliding scale for now
- At home on Glipizide 2.5 mg daily and metformin 500 mg BID  - FS TID AC & insulin sliding scale for now

## 2024-04-04 NOTE — DISCHARGE NOTE PROVIDER - NSFOLLOWUPCLINICSTOKEN_GEN_ALL_ED_FT
859230:1 week|| ||00\01||False;889072:1 week|| ||00\01||False;817890:2 weeks|| ||00\01||False;052873:2 weeks|| ||00\01||False;

## 2024-04-04 NOTE — DISCHARGE NOTE PROVIDER - NSDCCAREPROVSEEN_GEN_ALL_CORE_FT
Park Smith Luis, Park Quintanilla, Roxanna Linder, Suzanna Patel, Mushtaq Dailey, Marianela Reynolds, George Brown, Talia Smith, Park Agustin, Jacob Lejeune, Davey Bay, Solitario Schmid, Scot Bob, Yuridia Zuniga, Payal Pascual, Lavonne Granda, Jae Danielson, Tahir

## 2024-04-04 NOTE — PROGRESS NOTE ADULT - PROBLEM SELECTOR PLAN 4
Endstage and minimally verbal  In the past daughter has expressed interest in home hospice  - DNR/DNI    - Palliative consulted and recs noted

## 2024-04-04 NOTE — DISCHARGE NOTE PROVIDER - NSDCFUADDAPPT_GEN_ALL_CORE_FT
[facility]   [address]   [phone]

## 2024-04-04 NOTE — PROGRESS NOTE ADULT - PROBLEM SELECTOR PLAN 7
c//w supportive care   skin care as per protocol  aspiration Precaution   encouragement and assistance with feeding

## 2024-04-04 NOTE — SWALLOW BEDSIDE ASSESSMENT ADULT - SWALLOW EVAL: DIAGNOSIS
Unable to assess oral pharyngeal stages given clinical presentation. SLP attempted to arouse patient given multi-modality cues (verbal, tactile via sternal sub, thermal via ice chip to labial surface). Patient without attempt to retrieve/ suck/ lick ice chip. PO trials deferred at this time given deep sleep state/ clinical presentation.

## 2024-04-04 NOTE — DISCHARGE NOTE PROVIDER - HOSPITAL COURSE
89F with PMHx of endstage dementia, atrial fibrillation, polycythemia vera, T2DM and known unstageable sacral decubitus ulcer sent in from [facility] for dyspnea/hypoxia. CXR demonstrated large left pleural effusion with adjacent atelectasis. CT Chest showed: "Small left pleural effusion mostly loculated along the left upper lobe posteriorly. Complete collapse of the left lower lobe and partial left upper lobe atelectasis. No pneumothorax." Patient being admitted for pneumonia likely complicated by a left pleural effusion.        Problem/Plan - 1:  ·  Problem: Pleural effusion, left.   ·  Plan: - Likely with underlying pneumonia as well  - Pulm piyush noted , Patient with mucous plugging and atelectasis with associated small effusion seen on CT chest.  No significant pleural effusion on POCUS. No plan for thoracentesis at this time. per pulm   Pulm recommend chest PT/airway clearance and treatment for pne  - NC to maintain saturation, can provide some positive pressure  - on Zosyn , ID rec to continue for 5 days , end date 4/5   - MRSA swab  - Follow up blood cultures-NGTD   - Can monitor WBC, but chronically elevated given PV, wbc trending down   - ID consult apprecaited, recommend c/w Zosyn for 5 days.     Problem/Plan - 2:  ·  Problem: Polycythemia vera.   ·  Plan: - Can continue with Hydroxyurea 500 mg M/W/F.     Problem/Plan - 3:  ·  Problem: Chronic atrial fibrillation.   ·  Plan: - Takes Eliquis 2.5 mg BID at home  - c/w Eliquis   - Continue with metoprolol tartrate 25 mg BID.     Problem/Plan - 4:  ·  Problem: Dementia.   ·  Plan: - Endstage and minimally verbal  - In the past daughter has expressed interest in home hospice  - DNR/I  - Palliative consult appreciated, will f/u plan for hospice.     Problem/Plan - 5:  ·  Problem: Diabetes mellitus.   ·  Plan: - At home on Glipizide 2.5 mg daily and metformin 500 mg BID  - FS TID AC & insulin sliding scale for now.     Problem/Plan - 6:  ·  Problem: Sacral pressure ulcer.   ·  Plan: - Local wound care  - Has known likely sacral OM  - Wound care consult  - Turn and position.     Problem/Plan - 7:  ·  Problem: Functional quadriplegia.   ·  Plan: c//w supportive care   skin care as per protocol  aspiration Precaution   encouragement and assistance with feeding.     89F with PMHx of endstage dementia, atrial fibrillation, polycythemia vera, T2DM and known unstageable sacral decubitus ulcer sent in from [facility] for dyspnea/hypoxia. CXR demonstrated large left pleural effusion with adjacent atelectasis. CT Chest showed: "Small left pleural effusion mostly loculated along the left upper lobe posteriorly. Complete collapse of the left lower lobe and partial left upper lobe atelectasis. No pneumothorax." Patient being admitted for pneumonia likely complicated by a left pleural effusion.     Pleural effusion, left.    Likely with underlying pneumonia as well  - Pulm piyush noted , Patient with mucous plugging and atelectasis with associated small effusion seen on CT chest.  No significant pleural effusion on POCUS. No plan for thoracentesis at this time. per pulm   Pulm recommend chest PT/airway clearance and treatment for pneumonia  pt was initially on NC and now weaned off to RA. Pt has been stable on RA.   Started on Zosyn , ID rec to continue for 5 days , end date 4/5   blood cultures- NGTD for 4 days   WBC chronically elevated given PV, wbc trending down   ID consult and started on Zosyn for 5 days and last dose 4/5/24.     PNA-   ID consult and started on Zosyn for 5 days and last dose 4/5/24. Aspiration precautions. chest PT/airway clearance    Polycythemia vera.   pt with hx of PV and previously had platelets much higher (1200) and hematology consulted and recommend continuing Hydroxyurea TID every other day for now.    Can continue with Hydroxyurea 500 mg M/W/F.     Chronic atrial fibrillation.   Takes Eliquis 2.5 mg BID at home. Continue Eliquis and Metoprolol tartrate 25 mg BID.     Dementia.   Endstage and minimally verbal, does nod her head. In the past daughter has expressed interest in home hospice. DNR and DNI.   Palliative consulted and discussed with daughter who expressed she'd like pt to go to [facility] at this time for now  but did express that she would like pt to be evaluated for hospice when she is at [facility].     Diabetes mellitus.   At home on Glipizide 2.5 mg daily and metformin 500 mg BID. Continue insulin sliding scale and monitor fingersticks.     Sacral pressure ulcer.   Has known likely sacral OM. 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   Remainder of wound with viable pink-moist granulation and adipose tissue.  -Wound without s/s of acute soft tissue infection, no purulent drainage, no odor.   -S/p short term course of ABx with Zosyn subsequently changed to Levofloxacin (2/28-3/3) at [facility] for sacral wound infection and OM s/p selective sharp debridement 2/29. Per ID risk for longterm treatment with ABx outweighs benefit.  -No sharp debridement indicated today.  -Topical recommendations: Cleanse wound and periwound skin with NS. Apply Liquid barrier film to periwound skin. Pack dead space with Aquacel hydrofiber, cover with silicone foam with border, change daily and prn if soiled.  -Continue to offload pressure.  -Continue incontinence care per protocol  Wound is not expected to fully heal given multiple co-morbidities.    Bilateral feel with multiple deep tissue pressure injuries  -purple-maroon discoloration without palpable skin changes. No overt ischemia noted.  Topical recommendations: apply liquid barrier film daily, allow to dry, keep open to air. Offload with complete cair boots.     Upon discharge if medically appropriate follow up at [facility] [address] [place], [place] [phone]      Functional quadriplegia. Supportive care. skin care as per protocol. Aspiration Precaution. Please provide assistance with feeding.  Daughter did express that she would like pt to be evaluated for hospice when she is at [facility].     Patient was seen and evaluated today. Patient is awake and alert. She does not say her name but was able to reply that she does not have any pain. Shakes/nods her head for simple questions but does not follow commands. Patient's mental status has improved and at baseline as per family.   Discussed discharge medications, plan and outpatient follow up with daughter (Magalie) over the phone. All questions answered and daughter in agreement with discharge plan.   Patient is hemodynamically stable for discharge with outpatient follow up with PCP, wound care, podiatry, Hematology, Pulmonology and Cardiology.      89F with PMHx of endstage dementia, atrial fibrillation, polycythemia vera, T2DM and known unstageable sacral decubitus ulcer sent in from [facility] for dyspnea/hypoxia. CXR demonstrated large left pleural effusion with adjacent atelectasis. CT Chest showed: "Small left pleural effusion mostly loculated along the left upper lobe posteriorly. Complete collapse of the left lower lobe and partial left upper lobe atelectasis. No pneumothorax." Patient being admitted for pneumonia likely complicated by a left pleural effusion.     Pleural effusion, left.    Likely with underlying pneumonia as well  - Pulm piyush noted , Patient with mucous plugging and atelectasis with associated small effusion seen on CT chest.  No significant pleural effusion on POCUS. No plan for thoracentesis at this time. per pulm   Pulm recommend chest PT/airway clearance and treatment for pneumonia  pt was initially on NC and now weaned off to RA. Pt has been stable on RA.   Started on Zosyn , ID rec to continue for 5 days , end date 4/5   blood cultures- NGTD for 4 days   WBC chronically elevated given PV, wbc trending down   ID consult and started on Zosyn for 5 days and last dose 4/5/24.     PNA-   ID consult and started on Zosyn for 5 days and last dose 4/5/24. Aspiration precautions. chest PT/airway clearance    Polycythemia vera.   pt with hx of PV and previously had platelets much higher (1200) and hematology consulted and recommend   going up to daily hydroxyurea 500mg if she can be followed with at least weekly CBCs at [facility], where she is being planned for discharge today.   Can continue with Hydroxyurea 500 mg M/W/F.     Chronic atrial fibrillation.   Takes Eliquis 2.5 mg BID at home. Continue Eliquis and Metoprolol tartrate 25 mg BID.     Dementia.   Endstage and minimally verbal, does nod her head. In the past daughter has expressed interest in home hospice. DNR and DNI.   Palliative consulted and discussed with daughter who expressed she'd like pt to go to [facility] at this time for now  but did express that she would like pt to be evaluated for hospice when she is at [facility].     Diabetes mellitus.   At home on Glipizide 2.5 mg daily and metformin 500 mg BID. Continue insulin sliding scale and monitor fingersticks.     Sacral pressure ulcer.   Has known likely sacral OM. 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   Remainder of wound with viable pink-moist granulation and adipose tissue.  -Wound without s/s of acute soft tissue infection, no purulent drainage, no odor.   -S/p short term course of ABx with Zosyn subsequently changed to Levofloxacin (2/28-3/3) at [facility] for sacral wound infection and OM s/p selective sharp debridement 2/29. Per ID risk for longterm treatment with ABx outweighs benefit.  -No sharp debridement indicated today.  -Topical recommendations: Cleanse wound and periwound skin with NS. Apply Liquid barrier film to periwound skin. Pack dead space with Aquacel hydrofiber, cover with silicone foam with border, change daily and prn if soiled.  -Continue to offload pressure.  -Continue incontinence care per protocol  Wound is not expected to fully heal given multiple co-morbidities.    Bilateral feel with multiple deep tissue pressure injuries  -purple-maroon discoloration without palpable skin changes. No overt ischemia noted.  Topical recommendations: apply liquid barrier film daily, allow to dry, keep open to air. Offload with complete cair boots.     Upon discharge if medically appropriate follow up at [facility] [address] [place] [address] [phone]      Functional quadriplegia. Supportive care. skin care as per protocol. Aspiration Precaution. Please provide assistance with feeding.  Daughter did express that she would like pt to be evaluated for hospice when she is at [facility].     Patient was seen and evaluated today. Patient is awake and alert. She does not say her name but was able to reply that she does not have any pain. Shakes/nods her head for simple questions but does not follow commands. Patient's mental status has improved and at baseline as per family.   Discussed discharge medications, plan and outpatient follow up with daughter (Magalie) over the phone. All questions answered and daughter in agreement with discharge plan.   Patient is hemodynamically stable for discharge with outpatient follow up with PCP, wound care, podiatry, Hematology, Pulmonology and Cardiology.

## 2024-04-04 NOTE — DISCHARGE NOTE NURSING/CASE MANAGEMENT/SOCIAL WORK - NURSING SECTION COMPLETE
Detail Level: Simple Render Risk Assessment In Note?: no Additional Notes: Briefly discussed treatment option with laser for improvement. Patient/Caregiver provided printed discharge information.

## 2024-04-04 NOTE — SWALLOW BEDSIDE ASSESSMENT ADULT - DIET PRIOR TO ADMI
Per transfer paperwork from [facility], 3/29 "Puree with [place] Thick Liquids allow for ice cream and jell-o"; 3/30 "NPO except medications until CXR"

## 2024-04-04 NOTE — DISCHARGE NOTE PROVIDER - NSFOLLOWUPCLINICS_GEN_ALL_ED_FT
[facility]  Hematology/Oncology  [address] [place] [address]  Phone: [phone]  Fax:   Follow Up Time: 1 week    [facility]  Podiatry  [address] [place] [address]  Phone: [phone]  Fax:   Follow Up Time: 1 week    [facility]  Podiatry/Wound Care  [address] [place] [address]  Phone: [phone]  Fax: [phone]  Follow Up Time: 2 weeks    [facility]  Pulmonary Medicine  [address] [place] [address]  Phone: [phone]  Fax: [phone]  Follow Up Time: 2 weeks

## 2024-04-04 NOTE — PROGRESS NOTE ADULT - SUBJECTIVE AND OBJECTIVE BOX
Patient is a 89y old  Female who presents with a chief complaint of Dyspnea (04 Apr 2024 11:36)      SUBJECTIVE / OVERNIGHT EVENTS:    No events overnight. This AM, patient resting in bed, does open her eyes to verbal stimuli. Does not follow commands.     MEDICATIONS  (STANDING):  albuterol/ipratropium for Nebulization 3 milliLiter(s) Nebulizer every 6 hours  apixaban 2.5 milliGRAM(s) Oral two times a day  artificial  tears Solution 1 Drop(s) Both EYES two times a day  ascorbic acid 500 milliGRAM(s) Oral daily  atorvastatin 10 milliGRAM(s) Oral at bedtime  dextrose 5%. 1000 milliLiter(s) (100 mL/Hr) IV Continuous <Continuous>  dextrose 5%. 1000 milliLiter(s) (50 mL/Hr) IV Continuous <Continuous>  dextrose 50% Injectable 12.5 Gram(s) IV Push once  dextrose 50% Injectable 25 Gram(s) IV Push once  dextrose 50% Injectable 25 Gram(s) IV Push once  glucagon  Injectable 1 milliGRAM(s) IntraMuscular once  hydroxyurea 500 milliGRAM(s) Oral <User Schedule>  insulin lispro (ADMELOG) corrective regimen sliding scale   SubCutaneous three times a day before meals  levothyroxine 88 MICROGram(s) Oral daily  metoprolol tartrate 25 milliGRAM(s) Oral two times a day  multivitamin 1 Tablet(s) Oral daily  piperacillin/tazobactam IVPB.. 3.375 Gram(s) IV Intermittent every 8 hours  senna 2 Tablet(s) Oral at bedtime  sodium chloride 3%  Inhalation 4 milliLiter(s) Inhalation every 12 hours  zinc sulfate 220 milliGRAM(s) Oral daily    MEDICATIONS  (PRN):  acetaminophen     Tablet .. 650 milliGRAM(s) Oral every 6 hours PRN Temp greater or equal to 38C (100.4F), Mild Pain (1 - 3), Moderate Pain (4 - 6)  dextrose Oral Gel 15 Gram(s) Oral once PRN Blood Glucose LESS THAN 70 milliGRAM(s)/deciliter  polyethylene glycol 3350 17 Gram(s) Oral daily PRN for constipation      PHYSICAL EXAM:  T(C): 36.8 (04-04-24 @ 13:08), Max: 36.9 (04-04-24 @ 05:35)  HR: 84 (04-04-24 @ 16:24) (81 - 93)  BP: 124/49 (04-04-24 @ 13:08) (124/49 - 134/53)  RR: 18 (04-04-24 @ 13:08) (18 - 18)  SpO2: 95% (04-04-24 @ 16:24) (95% - 100%)  I&O's Summary    03 Apr 2024 07:01  -  04 Apr 2024 07:00  --------------------------------------------------------  IN: 200 mL / OUT: 1200 mL / NET: -1000 mL      GENERAL: NAD, elderly, frail pt resting in bed comfortably  HEAD:  Atraumatic, Normocephalic, MMM  CHEST/LUNG: No use of accessory muscles, poor inspiratory effort, breathing non-labored  COR: RR, no mrcg  ABD: Soft, ND/NT, +BS  PSYCH: does not follow commands or answers qs. did open her eyes to verbal stimuli.   NEUROLOGY: unable to assess since pt not following commands   SKIN: + sacral ulcer  EXT: no LE edema noted B/L     LABS:  CAPILLARY BLOOD GLUCOSE      POCT Blood Glucose.: 167 mg/dL (04 Apr 2024 17:00)  POCT Blood Glucose.: 180 mg/dL (04 Apr 2024 12:12)  POCT Blood Glucose.: 75 mg/dL (04 Apr 2024 08:20)  POCT Blood Glucose.: 89 mg/dL (03 Apr 2024 22:10)                          9.4    13.75 )-----------( 1128     ( 04 Apr 2024 05:00 )             31.2     04-04    143  |  107  |  31<H>  ----------------------------<  66<L>  4.5   |  18<L>  |  1.12    Ca    9.8      04 Apr 2024 05:00  Phos  3.5     04-04  Mg     2.40     04-04    TPro  7.1  /  Alb  2.6<L>  /  TBili  0.2  /  DBili  x   /  AST  14  /  ALT  11  /  AlkPhos  128<H>  04-04          Urinalysis Basic - ( 04 Apr 2024 05:00 )    Color: x / Appearance: x / SG: x / pH: x  Gluc: 66 mg/dL / Ketone: x  / Bili: x / Urobili: x   Blood: x / Protein: x / Nitrite: x   Leuk Esterase: x / RBC: x / WBC x   Sq Epi: x / Non Sq Epi: x / Bacteria: x          RADIOLOGY & ADDITIONAL TESTS:    Telemetry Personally Reviewed -     Imaging Personally Reviewed -     Imaging Reviewed -     Consultant(s) Notes Reviewed -       Care Discussed with Consultants/Other Providers -

## 2024-04-04 NOTE — DISCHARGE NOTE PROVIDER - ATTENDING DISCHARGE PHYSICAL EXAMINATION:
GENERAL: NAD, elderly, frail pt resting in bed comfortably  HEAD:  Atraumatic, Normocephalic, MMM  CHEST/LUNG: No use of accessory muscles, poor inspiratory effort, breathing non-labored  COR: RR, no mrcg  ABD: Soft, ND/NT, +BS  PSYCH: does not follow commands or answers qs. awake and alert today. Does nod / shake her head for simple qs.   NEUROLOGY: unable to assess since pt not following commands   SKIN: + sacral ulcer and foot wound  EXT: no LE edema noted B/L

## 2024-04-04 NOTE — SWALLOW BEDSIDE ASSESSMENT ADULT - COMMENTS
Hospitalist 4/3, "89F with PMHx of endstage dementia, atrial fibrillation, polycythemia vera, T2DM and known unstageable sacral decubitus ulcer sent in from [facility] for dyspnea/hypoxia. CXR demonstrated large left pleural effusion with adjacent atelectasis. CT Chest showed: "Small left pleural effusion mostly loculated along the left upper lobe posteriorly. Complete collapse of the left lower lobe and partial left upper lobe atelectasis. No pneumothorax." Patient being admitted for pneumonia likely complicated by a left pleural effusion."     CT Chest 3/31: IMPRESSION: Small left pleural effusion mostly loculated along the left upper lobe posteriorly. Complete collapse of the left lower lobe and partial left upper lobe atelectasis. No pneumothorax.    Of note: Patient known to [facility] most recent cinesophagram completed 3/16/2022 with recommendations for Puree with Mildly Thick Liquids with single straw sips; seen for a bedside swallow evaluation at [facility] on 2/29/2024 with recommendations for "Puree with Moderately Thick Liquid" (see notes for details).     Patient received sleeping in bed. Noted with O2 via nasal cannula. SLP attempted to arouse patient given multi-modality cues (verbal, tactile via sternal sub, thermal via ice chip to labial surface). Unable to arouse patient to awake/ alert state. PO trials deferred at this time given clinical presentation.

## 2024-04-04 NOTE — DISCHARGE NOTE PROVIDER - CARE PROVIDER_API CALL
PCP,   Phone: (   )    -  Fax: (   )    -  Follow Up Time:    Fatmata Mattson  Internal Medicine  [address] [place] [address]  Phone: [phone]  Fax: [phone]  Established Patient  Follow Up Time: 1 week    Clement Mendoza  Internal Medicine  [address] [address] [place] [address]  Phone: [phone]  Fax: [phone]  Follow Up Time: 1 week

## 2024-04-04 NOTE — PROGRESS NOTE ADULT - PROBLEM SELECTOR PLAN 6
- Local wound care  - Has known likely sacral OM  - Wound care consult  - Turn and position

## 2024-04-04 NOTE — PROGRESS NOTE ADULT - SUBJECTIVE AND OBJECTIVE BOX
[facility], Division of Infectious Diseases  SANCHEZ Faria Y. Patel, S. Shah, G. Jorge  [phone]  ([phone] - weekdays after 5pm and weekends)    Name: BISHOP BURNS  Age/Gender: 89y Female  MRN: [id number]    Interval History:  Notes reviewed.   No concerning overnight events.  Afebrile.     Allergies: No Known Drug Allergies  black pepper, white pepper, cumin, paprika, johnston powder, chili powder (Rash)      Objective:  Vitals:   T(F): 98.5 (04-04-24 @ 05:35), Max: 98.5 (04-04-24 @ 05:35)  HR: 88 (04-04-24 @ 05:35) (79 - 89)  BP: 134/53 (04-04-24 @ 05:35) (126/48 - 134/53)  RR: 18 (04-04-24 @ 05:35) (18 - 18)  SpO2: 100% (04-04-24 @ 05:35) (98% - 100%)  Physical Examination:  General: no acute distress  HEENT: anicteric, NC  Cardio: S1, S2, normal rate  Resp: decreased breath sounds  Abd: soft, NT, ND  : sylvester  Ext: no LE edema  Skin: warm, dry    Laboratory Studies:  CBC:                       9.4    13.75 )-----------( 1128     ( 04 Apr 2024 05:00 )             31.2     WBC Trend:  13.75 04-04-24 @ 05:00  14.79 04-03-24 @ 06:10  17.02 04-02-24 @ 05:15  26.70 04-01-24 @ 00:06    CMP: 04-04    143  |  107  |  31<H>  ----------------------------<  66<L>  4.5   |  18<L>  |  1.12    Ca    9.8      04 Apr 2024 05:00  Phos  3.5     04-04  Mg     2.40     04-04    TPro  7.1  /  Alb  2.6<L>  /  TBili  0.2  /  DBili  x   /  AST  14  /  ALT  11  /  AlkPhos  128<H>  04-04      LIVER FUNCTIONS - ( 04 Apr 2024 05:00 )  Alb: 2.6 g/dL / Pro: 7.1 g/dL / ALK PHOS: 128 U/L / ALT: 11 U/L / AST: 14 U/L / GGT: x             Urinalysis Basic - ( 04 Apr 2024 05:00 )    Color: x / Appearance: x / SG: x / pH: x  Gluc: 66 mg/dL / Ketone: x  / Bili: x / Urobili: x   Blood: x / Protein: x / Nitrite: x   Leuk Esterase: x / RBC: x / WBC x   Sq Epi: x / Non Sq Epi: x / Bacteria: x      Microbiology: reviewed     Culture - Blood (collected 04-01-24 @ 00:16)  Source: .Blood Blood-Peripheral  Preliminary Report (04-04-24 @ 09:01):    No growth at 72 Hours    Culture - Blood (collected 04-01-24 @ 00:06)  Source: .Blood Blood  Preliminary Report (04-04-24 @ 09:01):    No growth at 72 Hours        Radiology: reviewed     Medications:  acetaminophen     Tablet .. 650 milliGRAM(s) Oral every 6 hours PRN  albuterol/ipratropium for Nebulization 3 milliLiter(s) Nebulizer every 6 hours  apixaban 2.5 milliGRAM(s) Oral two times a day  artificial  tears Solution 1 Drop(s) Both EYES two times a day  ascorbic acid 500 milliGRAM(s) Oral daily  atorvastatin 10 milliGRAM(s) Oral at bedtime  dextrose 5%. 1000 milliLiter(s) IV Continuous <Continuous>  dextrose 5%. 1000 milliLiter(s) IV Continuous <Continuous>  dextrose 50% Injectable 25 Gram(s) IV Push once  dextrose 50% Injectable 25 Gram(s) IV Push once  dextrose 50% Injectable 12.5 Gram(s) IV Push once  dextrose Oral Gel 15 Gram(s) Oral once PRN  glucagon  Injectable 1 milliGRAM(s) IntraMuscular once  hydroxyurea 500 milliGRAM(s) Oral <User Schedule>  insulin lispro (ADMELOG) corrective regimen sliding scale   SubCutaneous three times a day before meals  levothyroxine 88 MICROGram(s) Oral daily  metoprolol tartrate 25 milliGRAM(s) Oral two times a day  multivitamin 1 Tablet(s) Oral daily  piperacillin/tazobactam IVPB.. 3.375 Gram(s) IV Intermittent every 8 hours  polyethylene glycol 3350 17 Gram(s) Oral daily PRN  senna 2 Tablet(s) Oral at bedtime  sodium chloride 3%  Inhalation 4 milliLiter(s) Inhalation every 12 hours  zinc sulfate 220 milliGRAM(s) Oral daily    Antimicrobials:  piperacillin/tazobactam IVPB.. 3.375 Gram(s) IV Intermittent every 8 hours

## 2024-04-04 NOTE — DISCHARGE NOTE PROVIDER - NSDCCPCAREPLAN_GEN_ALL_CORE_FT
PRINCIPAL DISCHARGE DIAGNOSIS  Diagnosis: Pleural effusion, left  Assessment and Plan of Treatment:       SECONDARY DISCHARGE DIAGNOSES  Diagnosis: Polycythemia vera  Assessment and Plan of Treatment:     Diagnosis: Chronic atrial fibrillation  Assessment and Plan of Treatment:     Diagnosis: Diabetes mellitus  Assessment and Plan of Treatment:      PRINCIPAL DISCHARGE DIAGNOSIS  Diagnosis: Pleural effusion, left  Assessment and Plan of Treatment: Found to have a left pleural effusion on imaging. Evaluated by Pulm, no plan for thoracentesis at this time. Pulm recommend chest PT/airway clearance and treatment for PNA. You were on Zosyn, an antibiotic for 5 days, last day of medication will be 4/5.         SECONDARY DISCHARGE DIAGNOSES  Diagnosis: Polycythemia vera  Assessment and Plan of Treatment: Continue to take your Hydroxurea as prescribed on MWF.    Diagnosis: Chronic atrial fibrillation  Assessment and Plan of Treatment: Continue to take your Eliquis and Metoporolol as prescribed.    Diagnosis: Diabetes mellitus  Assessment and Plan of Treatment: Continue to take your medications as prescribed. Please ensure you follow up with your PCP, Opthamologist, and Podiatrist regularly. Ensure you are checking your blood glucose before meals and bedtime.     PRINCIPAL DISCHARGE DIAGNOSIS  Diagnosis: Pleural effusion, left  Assessment and Plan of Treatment: Found to have a left pleural effusion on imaging. Evaluated by Pulm, no plan for thoracentesis at this time. Pulm recommend chest PT/airway clearance and treatment for pneumonia. You were started on Zosyn for 5 days and last dose is 4/5.  Continue aspiration precautions and chest PT.  Please follow up with Pulmonologist within 1-2 weeks of discharge.    If you develop chest pain, shortness of breath, change in mental status, please go to nearest ER.      SECONDARY DISCHARGE DIAGNOSES  Diagnosis: Polycythemia vera  Assessment and Plan of Treatment: You have history of PV and previously had platelets much higher (1200) and hematology consulted and recommend continuing Hydroxyurea TID every other day for now.    Can continue with Hydroxyurea 500 mg M/W/F.    Diagnosis: Chronic atrial fibrillation  Assessment and Plan of Treatment: Continue to take your Eliquis and Metoporolol as prescribed. Please follow up with your cardiologist baljinder one week of discharge. Please call to schedule an appointment.    Diagnosis: Diabetes mellitus  Assessment and Plan of Treatment: Please continue your medication and montior fingersticks. Please follow up with PCP within one week of discharge.   Please see your PCP  to have your A1c checked every 3 months. You will need to return at least once each year to have your feet checked. You will need an eye exam once a year to check for retinopathy. You will also need urine tests every year to check for kidney problems. You may need tests to monitor for heart disease such as an EKG, stress test, blood pressure monitoring, and blood tests. Write down your questions so you remember to ask them during your visits.  You will need to check your blood sugar level at least 3 times each day if you are on insulin. If you check your blood sugar level before a meal , it should be between 80 and 130 mg/dL. If you check your blood sugar level 1 to 2 hours after a meal , it should be less than 180 mg/dL.  Your blood sugar level is too low if it goes below 70 mg/dL. If the level is too low, eat or drink 15 grams of fast-acting carbohydrates, such as 1/2 cup fruit juice or 4 oz. regular soda. Check your blood sugar level 15 minutes later. If the level is still low (less than 100 mg/dL), drink another serving.   Do not skip meals. Your blood sugar level may drop too low if you have taken diabetes medicine and do not eat.  Please seek medical attention immediately if:  You have severe abdominal pain, or the pain spreads to your back. You may also be vomiting.  You have trouble staying awake or focusing.  You are shaking or sweating.  You have blurred or double vision.  Your breath has a fruity, sweet smell.  Your breathing is deep and labored, or rapid and shallow.  Your heartbeat is fast and weak.    Diagnosis: Chronic ulcer of sacral region  Assessment and Plan of Treatment: You were seen by wound care for sacral wound and recommended the following:   -Topical recommendations: Cleanse wound and periwound skin with NS. Apply Liquid barrier film to periwound skin. Pack dead space with Aquacel hydrofiber, cover with silicone foam with border, change daily and prn if soiled.  -Continue to offload pressure.  -Continue incontinence care per protocol  Wound is not expected to fully heal given multiple co-morbidities.  After discharge please follow up at [facility] [address] [place] [address]. Phone number [phone]    Diagnosis: Emphysema lung  Assessment and Plan of Treatment: Please follow up with pulmonology within 1-2 weeks of discharge.     PRINCIPAL DISCHARGE DIAGNOSIS  Diagnosis: Pleural effusion, left  Assessment and Plan of Treatment: Found to have a left pleural effusion on imaging. Evaluated by Pulm, no plan for thoracentesis at this time. Pulm recommend chest PT/airway clearance and treatment for pneumonia. You were started on Zosyn for 5 days and last dose is 4/5.  Continue aspiration precautions and chest PT.  Please follow up with Pulmonologist within 1-2 weeks of discharge.    If you develop chest pain, shortness of breath, change in mental status, please go to nearest ER.      SECONDARY DISCHARGE DIAGNOSES  Diagnosis: Polycythemia vera  Assessment and Plan of Treatment: You have history of PV and previously had platelets much higher (1200) and hematology consulted and recommend recommend  daily hydroxyurea 500mg if she can be followed with at least weekly CBCs at [facility]. Please DO weekly CBC while pt is on Hydroxyurea 500mg daily.   Follow up with Hematology within one week of discharge.    Diagnosis: Chronic atrial fibrillation  Assessment and Plan of Treatment: Continue to take your Eliquis and Metoporolol as prescribed. Please follow up with your cardiologist baljinder one week of discharge. Please call to schedule an appointment.    Diagnosis: Diabetes mellitus  Assessment and Plan of Treatment: Please continue your medication and montior fingersticks. Please follow up with PCP within one week of discharge.   Please see your PCP  to have your A1c checked every 3 months. You will need to return at least once each year to have your feet checked. You will need an eye exam once a year to check for retinopathy. You will also need urine tests every year to check for kidney problems. You may need tests to monitor for heart disease such as an EKG, stress test, blood pressure monitoring, and blood tests. Write down your questions so you remember to ask them during your visits.  You will need to check your blood sugar level at least 3 times each day if you are on insulin. If you check your blood sugar level before a meal , it should be between 80 and 130 mg/dL. If you check your blood sugar level 1 to 2 hours after a meal , it should be less than 180 mg/dL.  Your blood sugar level is too low if it goes below 70 mg/dL. If the level is too low, eat or drink 15 grams of fast-acting carbohydrates, such as 1/2 cup fruit juice or 4 oz. regular soda. Check your blood sugar level 15 minutes later. If the level is still low (less than 100 mg/dL), drink another serving.   Do not skip meals. Your blood sugar level may drop too low if you have taken diabetes medicine and do not eat.  Please seek medical attention immediately if:  You have severe abdominal pain, or the pain spreads to your back. You may also be vomiting.  You have trouble staying awake or focusing.  You are shaking or sweating.  You have blurred or double vision.  Your breath has a fruity, sweet smell.  Your breathing is deep and labored, or rapid and shallow.  Your heartbeat is fast and weak.    Diagnosis: Chronic ulcer of sacral region  Assessment and Plan of Treatment: You were seen by wound care for sacral wound and recommended the following:   -Topical recommendations: Cleanse wound and periwound skin with NS. Apply Liquid barrier film to periwound skin. Pack dead space with Aquacel hydrofiber, cover with silicone foam with border, change daily and prn if soiled.  -Continue to offload pressure.  -Continue incontinence care per protocol  Wound is not expected to fully heal given multiple co-morbidities.  After discharge please follow up at [facility] [address] [place] [address]. Phone number [phone]    Diagnosis: Emphysema lung  Assessment and Plan of Treatment: Please follow up with pulmonology within 1-2 weeks of discharge.

## 2024-04-04 NOTE — DISCHARGE NOTE PROVIDER - NSDCMRMEDTOKEN_GEN_ALL_CORE_FT
apixaban 2.5 mg oral tablet: 1 tab(s) orally 2 times a day  ascorbic acid 500 mg oral tablet: 1 tab(s) orally once a day  atorvastatin 10 mg oral tablet: 1 tab(s) orally once a day (at bedtime)  Glucotrol XL 2.5 mg oral tablet, extended release: 1 tab(s) orally once a day  hydroxyurea 500 mg oral capsule: 1 cap(s) orally 3 times a week Mon/Wed/Fri  levothyroxine 88 mcg (0.088 mg) oral tablet: 1 tab(s) orally once a day  metFORMIN 500 mg oral tablet, extended release: 1 tab(s) orally 2 times a day  metoprolol tartrate 25 mg oral tablet: 1 tab(s) orally 2 times a day  Multiple Vitamins oral tablet: 1 tab(s) orally once a day  ocular lubricant ophthalmic solution: 1 drop(s) to each affected eye 2 times a day  polyethylene glycol 3350 oral powder for reconstitution: 17 gram(s) orally once a day as needed for  constipation  senna (sennosides) 8.6 mg oral tablet: 1 tab(s) orally once a day as needed for  constipation   acetaminophen 325 mg oral tablet: 2 tab(s) orally every 6 hours As needed Temp greater or equal to 38C (100.4F), Mild Pain (1 - 3), Moderate Pain (4 - 6)  apixaban 2.5 mg oral tablet: 1 tab(s) orally 2 times a day  ascorbic acid 500 mg oral tablet: 1 tab(s) orally once a day  atorvastatin 10 mg oral tablet: 1 tab(s) orally once a day (at bedtime)  Glucotrol XL 2.5 mg oral tablet, extended release: 1 tab(s) orally once a day  hydroxyurea 500 mg oral capsule: 1 cap(s) orally 3 times a week Mon/Wed/Fri  ipratropium-albuterol 0.5 mg-2.5 mg/3 mL inhalation solution: 3 milliliter(s) inhaled every 6 hours  levothyroxine 88 mcg (0.088 mg) oral tablet: 1 tab(s) orally once a day  metFORMIN 500 mg oral tablet, extended release: 1 tab(s) orally 2 times a day  metoprolol tartrate 25 mg oral tablet: 1 tab(s) orally 2 times a day  Multiple Vitamins oral tablet: 1 tab(s) orally once a day  ocular lubricant ophthalmic solution: 1 drop(s) to each affected eye 2 times a day  piperacillin-tazobactam: 3.375 milligram(s) intravenously every 8 hours Last dose to be given on 4/5/2024.  polyethylene glycol 3350 oral powder for reconstitution: 17 gram(s) orally once a day as needed for  constipation  senna (sennosides) 8.6 mg oral tablet: 1 tab(s) orally once a day as needed for  constipation  zinc sulfate 220 mg oral capsule: 1 cap(s) orally once a day   acetaminophen 325 mg oral tablet: 2 tab(s) orally every 6 hours As needed Temp greater or equal to 38C (100.4F), Mild Pain (1 - 3), Moderate Pain (4 - 6)  apixaban 2.5 mg oral tablet: 1 tab(s) orally 2 times a day  ascorbic acid 500 mg oral tablet: 1 tab(s) orally once a day  atorvastatin 10 mg oral tablet: 1 tab(s) orally once a day (at bedtime)  hydroxyurea 500 mg oral capsule: 1 cap(s) orally once a day  insulin lispro 100 units/mL injectable solution: 1 injectable 3 times a day (before meals) low sliding scale  ipratropium-albuterol 0.5 mg-2.5 mg/3 mL inhalation solution: 3 milliliter(s) inhaled every 6 hours  levothyroxine 88 mcg (0.088 mg) oral tablet: 1 tab(s) orally once a day  metoprolol tartrate 25 mg oral tablet: 1 tab(s) orally 2 times a day  Multiple Vitamins oral tablet: 1 tab(s) orally once a day  ocular lubricant ophthalmic solution: 1 drop(s) to each affected eye 2 times a day  piperacillin-tazobactam: 3.375 milligram(s) intravenously every 8 hours Last dose to be given on 4/5/2024.  polyethylene glycol 3350 oral powder for reconstitution: 17 gram(s) orally once a day as needed for  constipation  senna (sennosides) 8.6 mg oral tablet: 1 tab(s) orally once a day as needed for  constipation  zinc sulfate 220 mg oral capsule: 1 cap(s) orally once a day

## 2024-04-04 NOTE — DISCHARGE NOTE PROVIDER - PROVIDER TOKENS
FREE:[LAST:[PCP],PHONE:[(   )    -],FAX:[(   )    -]] PROVIDER:[TOKEN:[[id number]],FOLLOWUP:[1 week],ESTABLISHEDPATIENT:[T]],PROVIDER:[TOKEN:[[id number]],FOLLOWUP:[1 week]]

## 2024-04-04 NOTE — DISCHARGE NOTE NURSING/CASE MANAGEMENT/SOCIAL WORK - NSDCPEFALRISK_GEN_ALL_CORE
For information on Fall & Injury Prevention, visit: [email or web address] OR  [email or web address] OR  https://www.cdc.gov/steadi/patient.html

## 2024-04-04 NOTE — PROGRESS NOTE ADULT - PROBLEM SELECTOR PLAN 2
- Can continue with Hydroxyurea 500 mg M/W/F  - Monitor H/H
- Can continue with Hydroxyurea 500 mg M/W/F
- Can continue with Hydroxyurea 500 mg M/W/F

## 2024-04-04 NOTE — DISCHARGE NOTE PROVIDER - NSDCCPTREATMENT_GEN_ALL_CORE_FT
PRINCIPAL PROCEDURE  Procedure: CT chest wo contrast  Findings and Treatment:   < end of copied text >  FINDINGS:  Motion limited exam.  LUNGS AND AIRWAYS: Secretions in the trachea and extending into the left   mainstem and distal bronchi with mucus plugging. Emphysema. Complete   atelectasis of the left lower lobe and partial atelectasis of the left   upper lobe. Calcified posterior right upper lobe granuloma. Right lung   apex scarring and calcification.  PLEURA: Small left pleural effusion, mostly loculated along the left   upper lobe posteriorly. No right pleural effusion. No pneumothorax.  MEDIASTINUM AND INA: No lymphadenopathy.  VESSELS: Marked aortic and coronary artery calcifications. Prominence of   the main pulmonary artery, suggesting pulmonary arterial hypertension.  HEART: Heart size is normal. No pericardial effusion.  CHEST WALL AND LOWER NECK: Within normal limits.  VISUALIZED UPPER ABDOMEN: Atrophic, calcified right kidney.   Atherosclerotic calcifications of the aorta and its branches.  BONES: Degenerative changes. Old bilateral rib fractures.  IMPRESSION:  Small left pleural effusion mostly loculated along the left upper lobe   posteriorly. Complete collapse of the left lower lobe and partial left   upper lobe atelectasis. No pneumothorax.  --- End of Report ---  < from: CT Chest No Cont (03.31.24 @ 20:21) >

## 2024-04-04 NOTE — PROGRESS NOTE ADULT - PROBLEM SELECTOR PLAN 1
Interval History:  Reports feeling well. Denies any chest pain, shortness of breath. Hospice people came by yesterday evening. Patient reports that he understands and is agreeable with the plan.  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    Review of Systems   Constitutional: Negative for chills, fatigue, fever and unexpected weight change.   HENT: Negative for sore throat.    Eyes: Negative for visual disturbance.   Respiratory: Negative for cough, chest tightness and shortness of breath.    Cardiovascular: Negative for chest pain.   Gastrointestinal: Negative for abdominal pain, constipation, diarrhea, nausea and vomiting.   Endocrine: Negative for polyuria.   Genitourinary: Negative for dysuria and hematuria.   Neurological: Negative for headaches.     Objective:     Vital Signs (Most Recent):  Temp: 97.6 °F (36.4 °C) (09/28/19 0705)  Pulse: 78 (09/28/19 0851)  Resp: 18 (09/28/19 0851)  BP: 94/61 (09/28/19 0705)  SpO2: 98 % (09/28/19 0851) Vital Signs (24h Range):  Temp:  [97.6 °F (36.4 °C)-98 °F (36.7 °C)] 97.6 °F (36.4 °C)  Pulse:  [60-78] 78  Resp:  [18-20] 18  SpO2:  [94 %-100 %] 98 %  BP: ()/(51-61) 94/61     Weight: 90.4 kg (199 lb 4.7 oz)  Body mass index is 32.17 kg/m².    Intake/Output Summary (Last 24 hours) at 9/28/2019 1122  Last data filed at 9/28/2019 0049  Gross per 24 hour   Intake --   Output 2 ml   Net -2 ml      Physical Exam  Constitutional: He is oriented to person, place, and time. He appears well-developed and well-nourished. No distress.   HENT:   Head: Normocephalic and atraumatic.   Right Ear: External ear normal.   Left Ear: External ear normal.   Eyes: Conjunctivae and EOM are normal. Right eye exhibits no discharge. Left eye exhibits no discharge. No scleral icterus.   Neck: Normal range of motion.   Cardiovascular: Normal rate and regular rhythm.   Pulmonary/Chest: Effort normal. No respiratory distress. He has no wheezes. He has rales (Mild rales in lower bases).  He exhibits no tenderness.   Abdominal: Soft. He exhibits distension. There is no tenderness.   Musculoskeletal: He exhibits no edema or tenderness.   Neurological: He is alert and oriented to person, place, and time.   Skin: Skin is warm. He is not diaphoretic.   Psychiatric: He has a normal mood and affect. His behavior is normal. Judgment and thought content normal.   Nursing note and vitals reviewed.   Likely with underlying pneumonia as well  - Pulm piyush noted , Patient with mucous plugging and atelectasis with associated small effusion seen on CT chest.  No significant pleural effusion on POCUS. No plan for thoracentesis at this time. per pulm   Pulm recommend chest PT/airway clearance and treatment for pne  - NC to maintain saturation, can provide some positive pressure  - on Zosyn , ID rec to continue for 5 days , end date 4/5   - MRSA swab  - Follow up blood cultures-NGTD   - Can monitor WBC, but chronically elevated given PV, wbc trending down   - ID consult apprecaited, recommend c/w Zosyn for 5 days

## 2024-04-05 VITALS
HEART RATE: 100 BPM | SYSTOLIC BLOOD PRESSURE: 157 MMHG | DIASTOLIC BLOOD PRESSURE: 87 MMHG | RESPIRATION RATE: 18 BRPM | OXYGEN SATURATION: 97 % | TEMPERATURE: 99 F

## 2024-04-05 LAB
ADD ON TEST-SPECIMEN IN LAB: SIGNIFICANT CHANGE UP
ALBUMIN SERPL ELPH-MCNC: 2.6 G/DL — LOW (ref 3.3–5)
ALP SERPL-CCNC: 125 U/L — HIGH (ref 40–120)
ALT FLD-CCNC: 9 U/L — SIGNIFICANT CHANGE UP (ref 4–33)
ANION GAP SERPL CALC-SCNC: 12 MMOL/L — SIGNIFICANT CHANGE UP (ref 7–14)
AST SERPL-CCNC: 15 U/L — SIGNIFICANT CHANGE UP (ref 4–32)
BASOPHILS # BLD AUTO: 0.12 K/UL — SIGNIFICANT CHANGE UP (ref 0–0.2)
BASOPHILS NFR BLD AUTO: 0.8 % — SIGNIFICANT CHANGE UP (ref 0–2)
BILIRUB SERPL-MCNC: 0.2 MG/DL — SIGNIFICANT CHANGE UP (ref 0.2–1.2)
BUN SERPL-MCNC: 28 MG/DL — HIGH (ref 7–23)
CALCIUM SERPL-MCNC: 9.9 MG/DL — SIGNIFICANT CHANGE UP (ref 8.4–10.5)
CHLORIDE SERPL-SCNC: 107 MMOL/L — SIGNIFICANT CHANGE UP (ref 98–107)
CO2 SERPL-SCNC: 22 MMOL/L — SIGNIFICANT CHANGE UP (ref 22–31)
CREAT SERPL-MCNC: 1.1 MG/DL — SIGNIFICANT CHANGE UP (ref 0.5–1.3)
EGFR: 48 ML/MIN/1.73M2 — LOW
EOSINOPHIL # BLD AUTO: 0.3 K/UL — SIGNIFICANT CHANGE UP (ref 0–0.5)
EOSINOPHIL NFR BLD AUTO: 2.1 % — SIGNIFICANT CHANGE UP (ref 0–6)
GLUCOSE BLDC GLUCOMTR-MCNC: 130 MG/DL — HIGH (ref 70–99)
GLUCOSE BLDC GLUCOMTR-MCNC: 143 MG/DL — HIGH (ref 70–99)
GLUCOSE BLDC GLUCOMTR-MCNC: 259 MG/DL — HIGH (ref 70–99)
GLUCOSE BLDC GLUCOMTR-MCNC: 302 MG/DL — HIGH (ref 70–99)
GLUCOSE SERPL-MCNC: 260 MG/DL — HIGH (ref 70–99)
HCT VFR BLD CALC: 32.7 % — LOW (ref 34.5–45)
HGB BLD-MCNC: 9.5 G/DL — LOW (ref 11.5–15.5)
IANC: 11.88 K/UL — HIGH (ref 1.8–7.4)
IMM GRANULOCYTES NFR BLD AUTO: 1 % — HIGH (ref 0–0.9)
LYMPHOCYTES # BLD AUTO: 1.49 K/UL — SIGNIFICANT CHANGE UP (ref 1–3.3)
LYMPHOCYTES # BLD AUTO: 10.2 % — LOW (ref 13–44)
MCHC RBC-ENTMCNC: 24.1 PG — LOW (ref 27–34)
MCHC RBC-ENTMCNC: 29.1 GM/DL — LOW (ref 32–36)
MCV RBC AUTO: 82.8 FL — SIGNIFICANT CHANGE UP (ref 80–100)
MONOCYTES # BLD AUTO: 0.65 K/UL — SIGNIFICANT CHANGE UP (ref 0–0.9)
MONOCYTES NFR BLD AUTO: 4.5 % — SIGNIFICANT CHANGE UP (ref 2–14)
NEUTROPHILS # BLD AUTO: 11.88 K/UL — HIGH (ref 1.8–7.4)
NEUTROPHILS NFR BLD AUTO: 81.4 % — HIGH (ref 43–77)
NRBC # BLD: 0 /100 WBCS — SIGNIFICANT CHANGE UP (ref 0–0)
NRBC # FLD: 0 K/UL — SIGNIFICANT CHANGE UP (ref 0–0)
PLATELET # BLD AUTO: 1077 K/UL — CRITICAL HIGH (ref 150–400)
POTASSIUM SERPL-MCNC: 4.2 MMOL/L — SIGNIFICANT CHANGE UP (ref 3.5–5.3)
POTASSIUM SERPL-SCNC: 4.2 MMOL/L — SIGNIFICANT CHANGE UP (ref 3.5–5.3)
PROT SERPL-MCNC: 6.9 G/DL — SIGNIFICANT CHANGE UP (ref 6–8.3)
RBC # BLD: 3.95 M/UL — SIGNIFICANT CHANGE UP (ref 3.8–5.2)
RBC # FLD: 18.2 % — HIGH (ref 10.3–14.5)
SODIUM SERPL-SCNC: 141 MMOL/L — SIGNIFICANT CHANGE UP (ref 135–145)
WBC # BLD: 14.58 K/UL — HIGH (ref 3.8–10.5)
WBC # FLD AUTO: 14.58 K/UL — HIGH (ref 3.8–10.5)

## 2024-04-05 PROCEDURE — 99223 1ST HOSP IP/OBS HIGH 75: CPT

## 2024-04-05 PROCEDURE — 99239 HOSP IP/OBS DSCHRG MGMT >30: CPT

## 2024-04-05 RX ORDER — HYDROXYUREA 500 MG/1
1 CAPSULE ORAL
Qty: 30 | Refills: 0
Start: 2024-04-05 | End: 2024-05-04

## 2024-04-05 RX ORDER — INSULIN LISPRO 100/ML
1 VIAL (ML) SUBCUTANEOUS
Qty: 0 | Refills: 0 | DISCHARGE
Start: 2024-04-05

## 2024-04-05 RX ORDER — METFORMIN HYDROCHLORIDE 850 MG/1
1 TABLET ORAL
Qty: 0 | Refills: 0 | DISCHARGE

## 2024-04-05 RX ORDER — HYDROXYUREA 500 MG/1
1 CAPSULE ORAL
Refills: 0 | DISCHARGE

## 2024-04-05 RX ADMIN — Medication 1 DROP(S): at 18:34

## 2024-04-05 RX ADMIN — Medication 3 MILLILITER(S): at 03:56

## 2024-04-05 RX ADMIN — Medication 25 MILLIGRAM(S): at 05:40

## 2024-04-05 RX ADMIN — PIPERACILLIN AND TAZOBACTAM 25 GRAM(S): 4; .5 INJECTION, POWDER, LYOPHILIZED, FOR SOLUTION INTRAVENOUS at 09:18

## 2024-04-05 RX ADMIN — Medication 1 TABLET(S): at 12:53

## 2024-04-05 RX ADMIN — Medication 500 MILLIGRAM(S): at 12:54

## 2024-04-05 RX ADMIN — Medication 25 MILLIGRAM(S): at 18:33

## 2024-04-05 RX ADMIN — Medication 3: at 18:15

## 2024-04-05 RX ADMIN — PIPERACILLIN AND TAZOBACTAM 25 GRAM(S): 4; .5 INJECTION, POWDER, LYOPHILIZED, FOR SOLUTION INTRAVENOUS at 01:26

## 2024-04-05 RX ADMIN — APIXABAN 2.5 MILLIGRAM(S): 2.5 TABLET, FILM COATED ORAL at 05:40

## 2024-04-05 RX ADMIN — PIPERACILLIN AND TAZOBACTAM 25 GRAM(S): 4; .5 INJECTION, POWDER, LYOPHILIZED, FOR SOLUTION INTRAVENOUS at 16:24

## 2024-04-05 RX ADMIN — APIXABAN 2.5 MILLIGRAM(S): 2.5 TABLET, FILM COATED ORAL at 18:33

## 2024-04-05 RX ADMIN — Medication 88 MICROGRAM(S): at 05:40

## 2024-04-05 RX ADMIN — Medication 3 MILLILITER(S): at 14:34

## 2024-04-05 RX ADMIN — ZINC SULFATE TAB 220 MG (50 MG ZINC EQUIVALENT) 220 MILLIGRAM(S): 220 (50 ZN) TAB at 12:54

## 2024-04-05 RX ADMIN — Medication 3 MILLILITER(S): at 10:09

## 2024-04-05 RX ADMIN — Medication 4: at 12:53

## 2024-04-05 RX ADMIN — HYDROXYUREA 500 MILLIGRAM(S): 500 CAPSULE ORAL at 10:23

## 2024-04-05 NOTE — CONSULT NOTE ADULT - ASSESSMENT
89F with JAK2+ polycythemia vera, sent in from [facility] for dyspnea/hypoxia. Hematology consulted for rising platelet count.    - PMH: dementia, functional quadriplegia, atrial fibrillation, T2DM, hypothyroidism, known unstageable sacral decubitus ulcer.   - CXR demonstrated large left pleural effusion with adjacent atelectasis.   - CT Chest showed: "Small left pleural effusion mostly loculated along the left upper lobe posteriorly. Complete collapse of the left lower lobe and partial left upper lobe atelectasis. No pneumothorax." Patient being admitted for pneumonia likely complicated by a left pleural effusion.      - follows with hematology Dr. Chamberlain, last seen 12/18/23; he has JAK2+ PV on hydroxyurea 500mg MWF, which was increased in September for platelet count of 1255   - also with history of  multiple DVTs therefore is on eliquis 5mg q12 hrs, which was last prescribed on 2/2/24; was then admitted 2/28-3/12/24 with leukocytosis and sacral wound, and eliquis was changed to 2.5mg BID, unclear why    - CBC (4/4/24) WBC 13.75 (81.4% neutrophils), Hgb 9.4, Plt 1128; platelet trend: 858 (2/28/24) -> 1023 (4/2/24) -> 1128 (4/4/24)	    - CMP  (4/4/24): Na 143, K 4.5, Chl 107, bicarb 18, BUN/Cr 31/1.12, albumin 2.6     Suspect rising thrombocytosis i/s/o recent stressors and possible PNA or UTI (due to urinary retention, sylvester was placed on 3/12/24), currently on Zosyn.    Recommend:  - PRELIM    Recommendations preliminary until attending attestation   ***************************************************************  Alf East MD  Hematology/Oncology Fellow, PGY4  MS TEAMS  After 5pm or on weekends please contact  to page on-call fellow   ***************************************************************    89F with JAK2+ polycythemia vera, sent in from [facility] for dyspnea/hypoxia. Hematology consulted for rising platelet count.    - PMH: dementia, functional quadriplegia, atrial fibrillation, T2DM, hypothyroidism, known unstageable sacral decubitus ulcer.   - CXR demonstrated large left pleural effusion with adjacent atelectasis.   - CT Chest showed: "Small left pleural effusion mostly loculated along the left upper lobe posteriorly. Complete collapse of the left lower lobe and partial left upper lobe atelectasis. No pneumothorax." Patient being admitted for pneumonia likely complicated by a left pleural effusion.      - follows with hematology Dr. Chamberlain, last seen 12/18/23; he has JAK2+ PV on hydroxyurea 500mg MWF, which was increased in September for platelet count of 1255   - also with history of  multiple DVTs therefore is on eliquis 5mg q12 hrs, which was last prescribed on 2/2/24; was then admitted 2/28-3/12/24 with leukocytosis and sacral wound, and eliquis was changed to 2.5mg BID, unclear why    - CBC (4/4/24) WBC 13.75 (81.4% neutrophils), Hgb 9.4, Plt 1128; platelet trend: 858 (2/28/24) -> 1023 (4/2/24) -> 1128 (4/4/24)	    - CMP  (4/4/24): Na 143, K 4.5, Chl 107, bicarb 18, BUN/Cr 31/1.12, albumin 2.6     Suspect rising thrombocytosis i/s/o recent stressors and possible PNA or UTI (due to urinary retention, sylvester was placed on 3/12/24), currently on Zosyn.    Recommend:  - per primary team, plan for discharge tonight 6pm; if going to rehab with reliable f/u and CBC monitoring, can change hydrea to 500mg daily given thrombocytosis; if not and/or going to hospice, keep hydrea at current dose of 500mg TIW to avoid potential agranulocytosis, etc.  - continue eliquis 2.5mg BID for prophylactic dosing in elderly patient with low BMI if no evidence of DVT/PE in last 6 months  - already on eliquis so would avoid aspirin (pts can get secondary vWD with very high platelet counts, with higher risk of bleeding, so would avoid aspirin)    Recommendations preliminary until attending attestation   ***************************************************************  Alf East MD  Hematology/Oncology Fellow, PGY4  MS TEAMS  After 5pm or on weekends please contact  to page on-call fellow   ***************************************************************    89F with JAK2+ polycythemia vera, sent in from [facility] for dyspnea/hypoxia. Hematology consulted for rising platelet count.    - PMH: dementia, functional quadriplegia, atrial fibrillation, T2DM, hypothyroidism, known unstageable sacral decubitus ulcer.   - CXR demonstrated large left pleural effusion with adjacent atelectasis.   - CT Chest showed: "Small left pleural effusion mostly loculated along the left upper lobe posteriorly. Complete collapse of the left lower lobe and partial left upper lobe atelectasis. No pneumothorax." Patient being admitted for pneumonia likely complicated by a left pleural effusion.      - follows with hematology Dr. Chamberlain, last seen 12/18/23; he has JAK2+ PV on hydroxyurea 500mg MWF, which was increased in September for platelet count of 1255   - also with history of  multiple DVTs therefore is on eliquis 5mg q12 hrs, which was last prescribed on 2/2/24; was then admitted 2/28-3/12/24 with leukocytosis and sacral wound, and eliquis was changed to 2.5mg BID, unclear why  - per daughter Magalie, pt doesn't speak much, more nods  - CBC (4/4/24) WBC 13.75 (81.4% neutrophils), Hgb 9.4, Plt 1128; platelet trend: 858 (2/28/24) -> 1023 (4/2/24) -> 1128 (4/4/24)	    - CMP  (4/4/24): Na 143, K 4.5, Chl 107, bicarb 18, BUN/Cr 31/1.12, albumin 2.6     Suspect rising thrombocytosis i/s/o recent stressors and possible PNA or UTI (due to urinary retention, sylvester was placed on 3/12/24), currently on Zosyn.    Recommend:  - generally, use of hydrea with goal plts <600 is to avoid bleeding/clotting complications associated with marked thrombocytosis, while also avoiding causing leukopenia with hydrea; per primary team, plan is for discharge tonight; if pt is going to rehab with reliable f/u and CBC monitoring, can change hydrea to 500mg daily; if no reliable f/u, CBC checks, etc, and/or going to hospice, would keep hydrea at current dose of 500mg TIW to avoid potential agranulocytosis, etc.  - continue eliquis 2.5mg BID for prophylactic dosing if no evidence of DVT/PE in last 6 months  - already on eliquis so would avoid aspirin (pts can get secondary vWD with very high platelet counts, with higher risk of bleeding, so would avoid aspirin)    Discussed with pt's daughter Magalie Terry.  Recommendations preliminary until attending attestation   ***************************************************************  Alf East MD  Hematology/Oncology Fellow, PGY4  MS TEAMS  After 5pm or on weekends please contact  to page on-call fellow   ***************************************************************

## 2024-04-05 NOTE — PROGRESS NOTE ADULT - SUBJECTIVE AND OBJECTIVE BOX
[facility], Division of Infectious Diseases  SANCHEZ Faria Y. Patel, S. Shah, G. Jorge  [phone]  ([phone] - weekdays after 5pm and weekends)    Name: BISHOP BURNS  Age/Gender: 89y Female  MRN: [id number]    Interval History:  Notes reviewed.   No concerning overnight events.  Afebrile.     Allergies: No Known Drug Allergies  black pepper, white pepper, cumin, paprika, johnston powder, chili powder (Rash)      Objective:  Vitals:   T(F): 97.7 (04-05-24 @ 10:00), Max: 97.7 (04-04-24 @ 20:37)  HR: 84 (04-05-24 @ 10:41) (81 - 93)  BP: 144/66 (04-05-24 @ 10:00) (135/57 - 158/79)  RR: 17 (04-05-24 @ 10:00) (17 - 18)  SpO2: 94% (04-05-24 @ 10:41) (94% - 100%)  Physical Examination:  General: no acute distress  HEENT: anicteric  Cardio: S1, S2, normal rate  Resp: decreased breath sounds  Abd: soft, NT, ND  : sylvester  Ext: no LE edema  Skin: warm, dry    Laboratory Studies:  CBC:                       9.5    14.58 )-----------( 1077     ( 05 Apr 2024 11:19 )             32.7     WBC Trend:  14.58 04-05-24 @ 11:19  13.75 04-04-24 @ 05:00  14.79 04-03-24 @ 06:10  17.02 04-02-24 @ 05:15  26.70 04-01-24 @ 00:06    CMP: 04-05    141  |  107  |  28<H>  ----------------------------<  260<H>  4.2   |  22  |  1.10    Ca    9.9      05 Apr 2024 11:19  Phos  3.5     04-04  Mg     2.40     04-04    TPro  6.9  /  Alb  2.6<L>  /  TBili  0.2  /  DBili  x   /  AST  15  /  ALT  9   /  AlkPhos  125<H>  04-05      LIVER FUNCTIONS - ( 05 Apr 2024 11:19 )  Alb: 2.6 g/dL / Pro: 6.9 g/dL / ALK PHOS: 125 U/L / ALT: 9 U/L / AST: 15 U/L / GGT: x             Urinalysis Basic - ( 05 Apr 2024 11:19 )    Color: x / Appearance: x / SG: x / pH: x  Gluc: 260 mg/dL / Ketone: x  / Bili: x / Urobili: x   Blood: x / Protein: x / Nitrite: x   Leuk Esterase: x / RBC: x / WBC x   Sq Epi: x / Non Sq Epi: x / Bacteria: x      Microbiology: reviewed     Culture - Blood (collected 04-01-24 @ 00:16)  Source: .Blood Blood-Peripheral  Preliminary Report (04-05-24 @ 09:01):    No growth at 4 days    Culture - Blood (collected 04-01-24 @ 00:06)  Source: .Blood Blood  Preliminary Report (04-05-24 @ 09:00):    No growth at 4 days        Radiology: reviewed     Medications:  acetaminophen     Tablet .. 650 milliGRAM(s) Oral every 6 hours PRN  albuterol/ipratropium for Nebulization 3 milliLiter(s) Nebulizer every 6 hours  apixaban 2.5 milliGRAM(s) Oral two times a day  artificial  tears Solution 1 Drop(s) Both EYES two times a day  ascorbic acid 500 milliGRAM(s) Oral daily  atorvastatin 10 milliGRAM(s) Oral at bedtime  dextrose 5%. 1000 milliLiter(s) IV Continuous <Continuous>  dextrose 5%. 1000 milliLiter(s) IV Continuous <Continuous>  dextrose 50% Injectable 12.5 Gram(s) IV Push once  dextrose 50% Injectable 25 Gram(s) IV Push once  dextrose 50% Injectable 25 Gram(s) IV Push once  dextrose Oral Gel 15 Gram(s) Oral once PRN  glucagon  Injectable 1 milliGRAM(s) IntraMuscular once  hydroxyurea 500 milliGRAM(s) Oral <User Schedule>  insulin lispro (ADMELOG) corrective regimen sliding scale   SubCutaneous three times a day before meals  levothyroxine 88 MICROGram(s) Oral daily  metoprolol tartrate 25 milliGRAM(s) Oral two times a day  multivitamin 1 Tablet(s) Oral daily  piperacillin/tazobactam IVPB.. 3.375 Gram(s) IV Intermittent every 8 hours  polyethylene glycol 3350 17 Gram(s) Oral daily PRN  senna 2 Tablet(s) Oral at bedtime  sodium chloride 3%  Inhalation 4 milliLiter(s) Inhalation every 12 hours  zinc sulfate 220 milliGRAM(s) Oral daily    Antimicrobials:  piperacillin/tazobactam IVPB.. 3.375 Gram(s) IV Intermittent every 8 hours

## 2024-04-05 NOTE — CONSULT NOTE ADULT - ATTENDING COMMENTS
Pulmonary consulted for possible thoracentesis.  Patient with mucous plugging and atelectasis with associated small effusion seen on CT chest.  No significant pleural effusion on POCUS.   No plan for thoracentesis at this time.  Recommend chest PT/airway clearance and treatment for pneumonia.   Pulmonary team to sign off.   Please reconsult as needed.
This is a 89 year old woman with a history of HARRIS 2 positive polycythemia vera, following with Dr. Chamberlain at the [facility]. She is currently on treatment with Hydrea 500mg 3 times a week. Current resident form [facility], sent to hospital for dyspnea and hypoxia.  Patient experiencing high platelet counts, though these are actually better than prior counts from September where the platelets were in the 1200k/dl.  Currently platelet counts in the 800-900's range for the past couple of months.  Theoretically can advance the hydroxyurea to daily to decrease the palteelt count further, but given patient's poor functional status, and palliative care goals, can leave the dose the same.  GIven poor functional status, large decubitus ulcers this increase in platelets is likely the least serious of her current problems.    Continue Eliquis 2.5mg for VTE  prophylaxis

## 2024-04-05 NOTE — CONSULT NOTE ADULT - SUBJECTIVE AND OBJECTIVE BOX
HEME/ONC INITIAL CONSULT NOTE    CHIEF COMPLAINT:  dyspnea    HPI:  89F with PMHx of endstage dementia, polycythemia vera, T2DM and known unstageable sacral decubitus ulcer sent in from [facility] for dyspnea/hypoxia. Collateral obtained from ED notes and paperwork sent for the patient. Patient recently admitted to my service at [facility] for fever and leukocytosis in setting of infected sacral decubitus ulcer with possible osteomyelitis. Patient seen by ID and it was determined that a short course of antibiotics for a soft tissue infection offered the most benefit as treating for the osteomyelitis would have no long term benefit. Antibiotics were Zosyn and eventually Levofloxacin based on wound culture data. She was seen by wound care and we performed local wound care. She was discharged to [place] with plans for hospice, though it doesn't seem like this was established. It seems that at [facility] she began to decline even further and was on Ertapenem, IV fluids, and sent in due to CXR showing possible PTX. On arrival patient in respiratory distress prior to supplemental oxygen being increased. Daughter called overnight and confirmed DNR/I, but amenable to medical management such as catheter placement, thoracentesis, etc. While in the ED RRT called for respiratory distress, though improved with minimal intervention. CXR demonstrated large left pleural effusion with adjacent atelectasis. CT Chest showed: "Small left pleural effusion mostly loculated along the left upper lobe posteriorly. Complete collapse of the left lower lobe and partial left upper lobe atelectasis. No pneumothorax." Patient given vancomycin and Zosyn, 500 cc NS bolus, and Tylenol. Currently in the ED minimally verbal which is baseline. Doesn't appear to be in any distress. (01 Apr 2024 07:48)      REVIEW OF SYSTEMS:  Difficult to obtain given pt minimally verbal, however pt shakes her head in apparent denial of the following symptoms when interviewed: pain, fevers, or SOB.    PAST MEDICAL & SURGICAL HISTORY:  Benign Hypertension  Diabetes  Hypercholesteremia  Adult Hypothyroidism  AFib  Deep Vein Thrombosis (DVT)  Polycythemia vera  Stroke  dementia  TIA  decubitus ulcer    SBO (Small Bowel Obstruction) 08/2019 2019 novel coronavirus disease (COVID-19)  COVID-19 vaccine series completed  FH: Total Abdominal Hysterectomy and Bilateral Salpingo-Oophorectomy  S/P small bowel resection 08/2019    MEDICATIONS  (STANDING):  albuterol/ipratropium for Nebulization 3 milliLiter(s) Nebulizer every 6 hours  apixaban 2.5 milliGRAM(s) Oral two times a day  artificial  tears Solution 1 Drop(s) Both EYES two times a day  ascorbic acid 500 milliGRAM(s) Oral daily  atorvastatin 10 milliGRAM(s) Oral at bedtime  dextrose 5%. 1000 milliLiter(s) (100 mL/Hr) IV Continuous <Continuous>  dextrose 5%. 1000 milliLiter(s) (50 mL/Hr) IV Continuous <Continuous>  dextrose 50% Injectable 25 Gram(s) IV Push once  dextrose 50% Injectable 25 Gram(s) IV Push once  dextrose 50% Injectable 12.5 Gram(s) IV Push once  glucagon  Injectable 1 milliGRAM(s) IntraMuscular once  hydroxyurea 500 milliGRAM(s) Oral <User Schedule>  insulin lispro (ADMELOG) corrective regimen sliding scale   SubCutaneous three times a day before meals  levothyroxine 88 MICROGram(s) Oral daily  metoprolol tartrate 25 milliGRAM(s) Oral two times a day  multivitamin 1 Tablet(s) Oral daily  piperacillin/tazobactam IVPB.. 3.375 Gram(s) IV Intermittent every 8 hours  senna 2 Tablet(s) Oral at bedtime  sodium chloride 3%  Inhalation 4 milliLiter(s) Inhalation every 12 hours  zinc sulfate 220 milliGRAM(s) Oral daily    MEDICATIONS  (PRN):  acetaminophen     Tablet .. 650 milliGRAM(s) Oral every 6 hours PRN Temp greater or equal to 38C (100.4F), Mild Pain (1 - 3), Moderate Pain (4 - 6)  dextrose Oral Gel 15 Gram(s) Oral once PRN Blood Glucose LESS THAN 70 milliGRAM(s)/deciliter  polyethylene glycol 3350 17 Gram(s) Oral daily PRN for constipation      Allergies    No Known Drug Allergies  black pepper, white pepper, cumin, paprika, johnston powder, chili powder (Rash)    Intolerances        FAMILY HISTORY:  Family history of diabetes mellitus (DM) (Child)  Family history of secondary lung cancer  Family history of breast cancer (Child)    SOCIAL HISTORY:  - admitted form [facility]    VITAL SIGNS:  Vital Signs Last 24 Hrs  T(C): 36.5 (05 Apr 2024 10:00), Max: 36.8 (04 Apr 2024 13:08)  T(F): 97.7 (05 Apr 2024 10:00), Max: 98.3 (04 Apr 2024 13:08)  HR: 84 (05 Apr 2024 10:41) (81 - 93)  BP: 144/66 (05 Apr 2024 10:00) (124/49 - 158/79)  BP(mean): --  RR: 17 (05 Apr 2024 10:00) (17 - 18)  SpO2: 94% (05 Apr 2024 10:41) (94% - 100%)    Parameters below as of 05 Apr 2024 10:41  Patient On (Oxygen Delivery Method): room air        PHYSICAL EXAMINATION:    ------------------INCOMPLETE-----------------------  VITAL SIGNS:  T(C): 36.5 (04-05-24 @ 10:00), Max: 36.8 (04-04-24 @ 13:08)  T(F): 97.7 (04-05-24 @ 10:00), Max: 98.3 (04-04-24 @ 13:08)  HR: 84 (04-05-24 @ 10:41) (81 - 93)  BP: 144/66 (04-05-24 @ 10:00) (124/49 - 158/79)  BP(mean): --  RR: 17 (04-05-24 @ 10:00) (17 - 18)  SpO2: 94% (04-05-24 @ 10:41) (94% - 100%)  Wt(kg): --    PHYSICAL EXAM:  Constitutional: resting comfortably in bed; NAD  HEENT: NC/AT, EOMI, anicteric sclera, no nasal discharge; edentulous  Respiratory: breathing comfortably on room air  Cardiac: blowing murmur, regular rate  Gastrointestinal: abdomen soft, NT/ND  : sylvester draining yellow dark urine  Extremities: legs WWP, no peripheral edema noted  Dermatologic: skin warm, dry and intact; no rashes, wounds, or scars noted  Neurologic: follows some basic commands in [place] like sticking out tongue, squeezing hand, raising eyebrows, but minimal verbalizations  Psychiatric: affect and characteristics of appearance, verbalizations, behaviors are appropriate    LABS:                        9.4    13.75 )-----------( 1128     ( 04 Apr 2024 05:00 )             31.2     04-04    143  |  107  |  31<H>  ----------------------------<  66<L>  4.5   |  18<L>  |  1.12    Ca    9.8      04 Apr 2024 05:00  Phos  3.5     04-04  Mg     2.40     04-04    TPro  7.1  /  Alb  2.6<L>  /  TBili  0.2  /  DBili  x   /  AST  14  /  ALT  11  /  AlkPhos  128<H>  04-04      Urinalysis Basic - ( 04 Apr 2024 05:00 )    Color: x / Appearance: x / SG: x / pH: x  Gluc: 66 mg/dL / Ketone: x  / Bili: x / Urobili: x   Blood: x / Protein: x / Nitrite: x   Leuk Esterase: x / RBC: x / WBC x   Sq Epi: x / Non Sq Epi: x / Bacteria: x        RADIOLOGY & ADDITIONAL STUDIES:      IMPRESSION & RECOMMENDATIONS:   HEME/ONC INITIAL CONSULT NOTE    CHIEF COMPLAINT:  dyspnea    HPI:  89F with PMHx of endstage dementia, polycythemia vera, T2DM and known unstageable sacral decubitus ulcer sent in from [facility] for dyspnea/hypoxia. Collateral obtained from ED notes and paperwork sent for the patient. Patient recently admitted to my service at [facility] for fever and leukocytosis in setting of infected sacral decubitus ulcer with possible osteomyelitis. Patient seen by ID and it was determined that a short course of antibiotics for a soft tissue infection offered the most benefit as treating for the osteomyelitis would have no long term benefit. Antibiotics were Zosyn and eventually Levofloxacin based on wound culture data. She was seen by wound care and we performed local wound care. She was discharged to [place] with plans for hospice, though it doesn't seem like this was established. It seems that at [place] she began to decline even further and was on Ertapenem, IV fluids, and sent in due to CXR showing possible PTX. On arrival patient in respiratory distress prior to supplemental oxygen being increased. Daughter called overnight and confirmed DNR/I, but amenable to medical management such as catheter placement, thoracentesis, etc. While in the ED RRT called for respiratory distress, though improved with minimal intervention. CXR demonstrated large left pleural effusion with adjacent atelectasis. CT Chest showed: "Small left pleural effusion mostly loculated along the left upper lobe posteriorly. Complete collapse of the left lower lobe and partial left upper lobe atelectasis. No pneumothorax." Patient given vancomycin and Zosyn, 500 cc NS bolus, and Tylenol. Currently in the ED minimally verbal which is baseline. Doesn't appear to be in any distress. (01 Apr 2024 07:48)      REVIEW OF SYSTEMS:  Difficult to obtain given pt minimally verbal, however pt shakes her head in apparent denial of the following symptoms when interviewed: pain, fevers, or SOB.    PAST MEDICAL & SURGICAL HISTORY:  Benign Hypertension  Diabetes  Hypercholesteremia  Adult Hypothyroidism  AFib  Deep Vein Thrombosis (DVT)  Polycythemia vera  Stroke  dementia  TIA  decubitus ulcer    SBO (Small Bowel Obstruction) 08/2019 2019 novel coronavirus disease (COVID-19)  COVID-19 vaccine series completed  FH: Total Abdominal Hysterectomy and Bilateral Salpingo-Oophorectomy  S/P small bowel resection 08/2019    MEDICATIONS  (STANDING):  albuterol/ipratropium for Nebulization 3 milliLiter(s) Nebulizer every 6 hours  apixaban 2.5 milliGRAM(s) Oral two times a day  artificial  tears Solution 1 Drop(s) Both EYES two times a day  ascorbic acid 500 milliGRAM(s) Oral daily  atorvastatin 10 milliGRAM(s) Oral at bedtime  dextrose 5%. 1000 milliLiter(s) (100 mL/Hr) IV Continuous <Continuous>  dextrose 5%. 1000 milliLiter(s) (50 mL/Hr) IV Continuous <Continuous>  dextrose 50% Injectable 25 Gram(s) IV Push once  dextrose 50% Injectable 25 Gram(s) IV Push once  dextrose 50% Injectable 12.5 Gram(s) IV Push once  glucagon  Injectable 1 milliGRAM(s) IntraMuscular once  hydroxyurea 500 milliGRAM(s) Oral <User Schedule>  insulin lispro (ADMELOG) corrective regimen sliding scale   SubCutaneous three times a day before meals  levothyroxine 88 MICROGram(s) Oral daily  metoprolol tartrate 25 milliGRAM(s) Oral two times a day  multivitamin 1 Tablet(s) Oral daily  piperacillin/tazobactam IVPB.. 3.375 Gram(s) IV Intermittent every 8 hours  senna 2 Tablet(s) Oral at bedtime  sodium chloride 3%  Inhalation 4 milliLiter(s) Inhalation every 12 hours  zinc sulfate 220 milliGRAM(s) Oral daily    MEDICATIONS  (PRN):  acetaminophen     Tablet .. 650 milliGRAM(s) Oral every 6 hours PRN Temp greater or equal to 38C (100.4F), Mild Pain (1 - 3), Moderate Pain (4 - 6)  dextrose Oral Gel 15 Gram(s) Oral once PRN Blood Glucose LESS THAN 70 milliGRAM(s)/deciliter  polyethylene glycol 3350 17 Gram(s) Oral daily PRN for constipation      Allergies    No Known Drug Allergies  black pepper, white pepper, cumin, paprika, johnston powder, chili powder (Rash)    Intolerances        FAMILY HISTORY:  Family history of diabetes mellitus (DM) (Child)  Family history of secondary lung cancer  Family history of breast cancer (Child)    SOCIAL HISTORY:  - admitted form [facility]    VITAL SIGNS:  Vital Signs Last 24 Hrs  T(C): 36.5 (05 Apr 2024 10:00), Max: 36.8 (04 Apr 2024 13:08)  T(F): 97.7 (05 Apr 2024 10:00), Max: 98.3 (04 Apr 2024 13:08)  HR: 84 (05 Apr 2024 10:41) (81 - 93)  BP: 144/66 (05 Apr 2024 10:00) (124/49 - 158/79)  BP(mean): --  RR: 17 (05 Apr 2024 10:00) (17 - 18)  SpO2: 94% (05 Apr 2024 10:41) (94% - 100%)    Parameters below as of 05 Apr 2024 10:41  Patient On (Oxygen Delivery Method): room air    PHYSICAL EXAM:  Constitutional: resting comfortably in bed; NAD  HEENT: NC/AT, EOMI, anicteric sclera, no nasal discharge; edentulous  Respiratory: breathing comfortably on room air  Cardiac: blowing murmur, regular rate  Gastrointestinal: abdomen soft, NT/ND  : sylvester draining yellow dark urine  Extremities: legs WWP, no peripheral edema noted  Dermatologic: skin warm, dry and intact; no rashes, wounds, or scars noted  Neurologic: follows some basic commands in Pashto like sticking out tongue, squeezing hand, raising eyebrows, but minimal verbalizations  Psychiatric: affect and characteristics of appearance, verbalizations, behaviors are appropriate    LABS:                        9.4    13.75 )-----------( 1128     ( 04 Apr 2024 05:00 )             31.2     04-04    143  |  107  |  31<H>  ----------------------------<  66<L>  4.5   |  18<L>  |  1.12    Ca    9.8      04 Apr 2024 05:00  Phos  3.5     04-04  Mg     2.40     04-04    TPro  7.1  /  Alb  2.6<L>  /  TBili  0.2  /  DBili  x   /  AST  14  /  ALT  11  /  AlkPhos  128<H>  04-04      Urinalysis Basic - ( 04 Apr 2024 05:00 )    Color: x / Appearance: x / SG: x / pH: x  Gluc: 66 mg/dL / Ketone: x  / Bili: x / Urobili: x   Blood: x / Protein: x / Nitrite: x   Leuk Esterase: x / RBC: x / WBC x   Sq Epi: x / Non Sq Epi: x / Bacteria: x        RADIOLOGY & ADDITIONAL STUDIES:      IMPRESSION & RECOMMENDATIONS:

## 2024-04-05 NOTE — CHART NOTE - NSCHARTNOTEFT_GEN_A_CORE
Plan for discharge to [facility] at 6pm today. Patient will receive last Zosyn dose prior to leaving. Daughter, Magalie, informed and in agreement with discharge.  Please see Discharge note for more details.

## 2024-04-05 NOTE — PROGRESS NOTE ADULT - ASSESSMENT
89F with PMHx of endstage dementia, polycythemia vera, T2DM and known unstageable sacral decubitus ulcer sent in from [facility] for dyspnea/hypoxia    PNA, pleural effusion, hypoxic resp failure  CT chest- Small left pleural effusion mostly loculated along the left upper lobe posteriorly. Complete collapse of the left lower lobe and partial left upper lobe atelectasis. No pneumothorax.  procal elevated  s/p pulm eval- no significant pleural effusion on POCUS, no safe window    sacral ulcer with OM of coccyx  previously received 10 day course of antibiotics for SSTI  would not treat osteo at this time   - antibiotics alone without debridement & wound closure is ineffective, also given location, wound is at risk of contamination & further skin/ wound breakdown due to pressure   - prolonged course of antibiotics without closure- risks outweigh the benefits as patient would be at increased risk of C DIff, development of resistant organisms making treatment of infection more difficult in the future in addition to antibiotic adverse effects    leukocytosis  on chart review patient with chronic leukocytosis  likely 2/2 polycythemia vera +/- PNA  blood cultures- NGTD    Recommendations  c/w zosyn to complete 5 day course of antibiotics w/ last day 4/5/2024  airway clearance, duonebs  wean O2 as tolerated  local wound care, nutrition, pressure offloading    Mushtaq Patel M.D.  [facility], Division of Infectious Diseases  [phone]  After 5pm on weekdays and all day on weekends - please call [phone] 
89F with PMHx of endstage dementia, polycythemia vera, T2DM and known unstageable sacral decubitus ulcer sent in from [facility] for dyspnea/hypoxia    PNA, pleural effusion, hypoxic resp failure  CT chest- Small left pleural effusion mostly loculated along the left upper lobe posteriorly. Complete collapse of the left lower lobe and partial left upper lobe atelectasis. No pneumothorax.  procal elevated  s/p pulm eval- no significant pleural effusion on POCUS, no safe window  weaned off O2    sacral ulcer with OM of coccyx  previously received 10 day course of antibiotics for SSTI  would not treat osteo at this time   - antibiotics alone without debridement & wound closure is ineffective, also given location, wound is at risk of contamination & further skin/ wound breakdown due to pressure   - prolonged course of antibiotics without closure- risks outweigh the benefits as patient would be at increased risk of C DIff, development of resistant organisms making treatment of infection more difficult in the future in addition to antibiotic adverse effects    leukocytosis  on chart review patient with chronic leukocytosis  likely 2/2 polycythemia vera as well as PNA  blood cultures- NGTD  hem/onc consulted    Recommendations  c/w zosyn to complete 5 day course w/ last day today  local wound care, nutrition, pressure offloading  discharge planning    Mushtaq Patel M.D.  [facility], Division of Infectious Diseases  [phone]  After 5pm on weekdays and all day on weekends - please call [phone] 
89F with PMHx of endstage dementia, polycythemia vera, T2DM and known unstageable sacral decubitus ulcer sent in from [facility] for dyspnea/hypoxia    PNA, pleural effusion, hypoxic resp failure  CT chest- Small left pleural effusion mostly loculated along the left upper lobe posteriorly. Complete collapse of the left lower lobe and partial left upper lobe atelectasis. No pneumothorax.  procal elevated  s/p pulm eval- no significant pleural effusion on POCUS, no safe window    sacral ulcer with OM of coccyx  previously received 10 day course of antibiotics for SSTI  would not treat osteo at this time   - antibiotics alone without debridement & wound closure is ineffective, also given location, wound is at risk of contamination & further skin/ wound breakdown due to pressure   - prolonged course of antibiotics without closure- risks outweigh the benefits as patient would be at increased risk of C DIff, development of resistant organisms making treatment of infection more difficult in the future in addition to antibiotic adverse effects    leukocytosis  on chart review patient with chronic leukocytosis  likely 2/2 polycythemia vera as well as PNA  blood cultures- NGTD    Recommendations  c/w zosyn to complete 5 day course of antibiotics w/ last day tomorrow 4/5/2024  breathing treatments  wean O2 as tolerated  local wound care, nutrition, pressure offloading    Mushtaq Patel M.D.  [facility], Division of Infectious Diseases  [phone]  After 5pm on weekdays and all day on weekends - please call [phone] 
89F with PMHx of endstage dementia, polycythemia vera, T2DM and known unstageable sacral decubitus ulcer sent in from [facility] for dyspnea/hypoxia    PNA, pleural effusion, hypoxic resp failure  CT chest- Small left pleural effusion mostly loculated along the left upper lobe posteriorly. Complete collapse of the left lower lobe and partial left upper lobe atelectasis. No pneumothorax.  procal elevated  s/p pulm eval- no significant pleural effusion on POCUS, no safe window    leukocytosis  on chart review patient with chronic leukocytosis  likely 2/2 polycythemia vera +/- PNA    Recommendations  c/w zosyn    - plan for 5 day course of antibiotics  airway clearance, duonebs  f/u blood cultures- NGTD  wean O2 as tolerated  local wound care  trend temps, wbc    Mushtaq Patel M.D.  [facility], Division of Infectious Diseases  [phone]  After 5pm on weekdays and all day on weekends - please call [phone] 
89F with PMHx of endstage dementia, atrial fibrillation, polycythemia vera, T2DM and known unstageable sacral decubitus ulcer sent in from [facility] for dyspnea/hypoxia. CXR demonstrated large left pleural effusion with adjacent atelectasis. CT Chest showed: "Small left pleural effusion mostly loculated along the left upper lobe posteriorly. Complete collapse of the left lower lobe and partial left upper lobe atelectasis. No pneumothorax." Patient being admitted for pneumonia likely complicated by a left pleural effusion. 
89F with PMHx of endstage dementia, atrial fibrillation, polycythemia vera, T2DM and known unstageable sacral decubitus ulcer sent in from [facility] for dyspnea/hypoxia. CXR demonstrated large left pleural effusion with adjacent atelectasis. CT Chest showed: "Small left pleural effusion mostly loculated along the left upper lobe posteriorly. Complete collapse of the left lower lobe and partial left upper lobe atelectasis. No pneumothorax." Patient being admitted for pneumonia likely complicated by a left pleural effusion. 
89F with PMHx of endstage dementia, atrial fibrillation, polycythemia vera, T2DM and known unstageable sacral decubitus ulcer sent in from [facility] for dyspnea/hypoxia. CXR demonstrated large left pleural effusion with adjacent atelectasis. CT Chest showed: "Small left pleural effusion mostly loculated along the left upper lobe posteriorly. Complete collapse of the left lower lobe and partial left upper lobe atelectasis. No pneumothorax." Patient being admitted for pneumonia likely complicated by a left pleural effusion.

## 2024-04-05 NOTE — PROGRESS NOTE ADULT - PROVIDER SPECIALTY LIST ADULT
Infectious Disease
Hospitalist

## 2024-05-01 NOTE — ED ADULT NURSE NOTE - BREATH SOUNDS, MLM
What Type Of Note Output Would You Prefer (Optional)?: Standard Output
What Is The Reason For Today's Visit?: Full Body Skin Examination
What Is The Reason For Today's Visit? (Being Monitored For X): concerning skin lesions on a periodic basis
How Severe Are Your Spot(S)?: moderate
Clear

## 2024-07-24 ENCOUNTER — INPATIENT (INPATIENT)
Facility: HOSPITAL | Age: 89
LOS: 6 days | Discharge: HOSPICE | End: 2024-07-31
Attending: STUDENT IN AN ORGANIZED HEALTH CARE EDUCATION/TRAINING PROGRAM | Admitting: STUDENT IN AN ORGANIZED HEALTH CARE EDUCATION/TRAINING PROGRAM
Payer: MEDICAID

## 2024-07-24 VITALS
SYSTOLIC BLOOD PRESSURE: 112 MMHG | OXYGEN SATURATION: 97 % | HEART RATE: 99 BPM | RESPIRATION RATE: 18 BRPM | TEMPERATURE: 98 F | DIASTOLIC BLOOD PRESSURE: 72 MMHG

## 2024-07-24 DIAGNOSIS — Z90.49 ACQUIRED ABSENCE OF OTHER SPECIFIED PARTS OF DIGESTIVE TRACT: Chronic | ICD-10-CM

## 2024-07-24 PROCEDURE — 99285 EMERGENCY DEPT VISIT HI MDM: CPT | Mod: 25

## 2024-07-24 PROCEDURE — 36000 PLACE NEEDLE IN VEIN: CPT

## 2024-07-24 PROCEDURE — 99053 MED SERV 10PM-8AM 24 HR FAC: CPT

## 2024-07-24 NOTE — ED PROVIDER NOTE - CARE PLAN
Principal Discharge DX:	Anemia  Secondary Diagnosis:	Leukocytosis   1 Principal Discharge DX:	Anemia  Secondary Diagnosis:	Leukocytosis  Secondary Diagnosis:	Acute osteomyelitis

## 2024-07-24 NOTE — ED PROVIDER NOTE - OBJECTIVE STATEMENT
This is a 89yoF PMHx of endstage dementia, atrial fibrillation, polycythemia vera, T2DM, sacral decubitus ulcer presenting with lethargy. Sent from [facility] for reported lethargy and poor PO since this AM. Per daughter Magalie ([phone]), Staff at [facility] sent to ED for hydration as unable to get IV, also cold symptoms and congestion.  Has not had fever, shortness of breath, abdominal distention, nausea vomiting, diarrhea.  Called [facility] nurse manager for collateral, not yet able to reach.  Patient A&O x 0 at baseline.  Daughter confirms DNR/DNI status.

## 2024-07-24 NOTE — ED PROVIDER NOTE - ATTENDING CONTRIBUTION TO CARE
89yoF PMHx of endstage dementia, DNI/DNR, atrial fibrillation, polycythemia vera, T2DM, sacral decubitus ulcer presenting with lethargy. Sent from [facility] for reported lethargy and poor PO since this AM. Per daughter Magalie ([phone]), Staff at [facility] sent to ED for hydration as unable to get IV, also cold symptoms and congestion.  Has not had fever, shortness of breath, abdominal distention, nausea vomiting, diarrhea.  Called [facility] nurse manager for collateral, not yet able to reach.  Patient A&O x 0 at baseline.  Daughter confirms DNR/DNI status.    Patient is not arousable to voice but moves minimally secondary to sternal rubbing.  Pupils are bilaterally reactive oropharynx is dry poor dentition heart is tachycardic lungs are clear bilaterally abdomen with minimal tenderness suprapubic region but no rebound or guarding pelvis is stable to axial loading skin is dry poor skin turgor diffusely heels with healing ulcers no drainage diaper without stool or urine Claudio in place    89yoF PMHx of endstage dementia, DNI/DNR, atrial fibrillation, polycythemia vera, T2DM, sacral decubitus ulcer presenting with lethargy. Sent from [facility] for reported lethargy and poor PO since this AM and they failed to attempt to be able to obtain IV also has some cold symptoms and congestion and found to have minimal tenderness to palpation in lower abdomen.  At this time concern is for infection leading to her condition versus deterioration given her condition and age.  Will require labs and imaging as well as fluids and rehydration but patient is DNR/DNI per daughter.  Will reassess after workup for possible hospitalization versus discharge back to nursing home

## 2024-07-24 NOTE — ED PROVIDER NOTE - PROGRESS NOTE DETAILS
Eb Chambers MD  Called [facility] ([phone]) x2 for collateral: was unable to reach staff for discussion. Eb Chambers MD  Hgb 6.9, WBC 25. Discussed with daughter: consented for transfusion. Ordered PRBCs, ABx, CTAP. Tarah Guardado PGY3: Pt endorsed to me at sign out. Pt resting comfortably. Pending CT read. Tarah Guardado PGY3: Received call from radiology indicating that Claudio is in vaginal canal. RN informed and replaced Claudio with return of urine. CT also shows OM at location of sacral decub. Pt endorsed to hospitalist for admission to medicine.

## 2024-07-24 NOTE — ED PROVIDER NOTE - PHYSICAL EXAMINATION
GENERAL: resting in NAD  HEAD: normocephalic, atraumatic  HEENT: normal conjunctiva, oral mucosa moist  CARDIAC: regular rate and rhythm, normal S1S2, no appreciable murmurs, 2+ pulses in UE/LE b/l  PULM: normal breath sounds, clear to ascultation bilaterally, no rales, rhonchi, wheezing  GI: abdomen nondistended, soft, nontender, no guarding, rebound tenderness  : no CVA tenderness b/l, no suprapubic tenderness  NEURO: moves all extremities spontaneously, no focal motor or sensory deficits, PERRLA, EOMI, AAOx0  MSK: no peripheral edema, no calf tenderness b/l  SKIN: well-perfused, extremities warm, no visible rashes

## 2024-07-24 NOTE — ED ADULT TRIAGE NOTE - CHIEF COMPLAINT QUOTE
Pt from [facility], per RN at facility pt lethargic and not eating since this morning. A&Ox0 at baseline. Arrived with Claudio catheter. Hx DM2, afib, CVA, dementia

## 2024-07-24 NOTE — ED PROVIDER NOTE - CLINICAL SUMMARY MEDICAL DECISION MAKING FREE TEXT BOX
89yoF PMHx of endstage dementia, atrial fibrillation, polycythemia vera, T2DM, sacral decubitus ulcer presenting with lethargy. Sent from [facility] for reported lethargy and poor PO since this AM. Per daughter Magalie ([phone]), Staff at [facility] sent to ED for hydration as unable to get IV, also cold symptoms and congestion.  Has not had fever, shortness of breath, abdominal distention, nausea vomiting, diarrhea.  Called [facility] nurse manager for collateral, not yet able to reach.  Patient A&O x 0 at baseline.  Daughter confirms DNR/DNI status.      Presents afebrile hemodynamically stable.  A&O x 0 at baseline, moving all extremities spontaneously, PERRLA, extraocular is intact, clear lungs, no heart sounds, soft nondistended abdomen, no lower extremity swelling or  tenderness.  Will obtain labs including CBC, CMP, UA, flu COVID.  Chest x-ray.  Reassess.

## 2024-07-25 DIAGNOSIS — D64.9 ANEMIA, UNSPECIFIED: ICD-10-CM

## 2024-07-25 DIAGNOSIS — E11.9 TYPE 2 DIABETES MELLITUS WITHOUT COMPLICATIONS: ICD-10-CM

## 2024-07-25 DIAGNOSIS — Z29.9 ENCOUNTER FOR PROPHYLACTIC MEASURES, UNSPECIFIED: ICD-10-CM

## 2024-07-25 DIAGNOSIS — M46.28 OSTEOMYELITIS OF VERTEBRA, SACRAL AND SACROCOCCYGEAL REGION: ICD-10-CM

## 2024-07-25 DIAGNOSIS — I48.20 CHRONIC ATRIAL FIBRILLATION, UNSPECIFIED: ICD-10-CM

## 2024-07-25 DIAGNOSIS — E03.9 HYPOTHYROIDISM, UNSPECIFIED: ICD-10-CM

## 2024-07-25 DIAGNOSIS — F03.C0 UNSPECIFIED DEMENTIA, SEVERE, WITHOUT BEHAVIORAL DISTURBANCE, PSYCHOTIC DISTURBANCE, MOOD DISTURBANCE, AND ANXIETY: ICD-10-CM

## 2024-07-25 LAB
ADD ON TEST-SPECIMEN IN LAB: SIGNIFICANT CHANGE UP
ALBUMIN SERPL ELPH-MCNC: 2.8 G/DL — LOW (ref 3.3–5)
ALP SERPL-CCNC: 128 U/L — HIGH (ref 40–120)
ALT FLD-CCNC: 14 U/L — SIGNIFICANT CHANGE UP (ref 4–33)
ANION GAP SERPL CALC-SCNC: 12 MMOL/L — SIGNIFICANT CHANGE UP (ref 7–14)
ANION GAP SERPL CALC-SCNC: 14 MMOL/L — SIGNIFICANT CHANGE UP (ref 7–14)
APPEARANCE UR: CLEAR — SIGNIFICANT CHANGE UP
APTT BLD: 36.1 SEC — HIGH (ref 24.5–35.6)
AST SERPL-CCNC: 16 U/L — SIGNIFICANT CHANGE UP (ref 4–32)
BACTERIA # UR AUTO: NEGATIVE /HPF — SIGNIFICANT CHANGE UP
BASOPHILS # BLD AUTO: 0.15 K/UL — SIGNIFICANT CHANGE UP (ref 0–0.2)
BASOPHILS NFR BLD AUTO: 0.6 % — SIGNIFICANT CHANGE UP (ref 0–2)
BILIRUB SERPL-MCNC: 0.2 MG/DL — SIGNIFICANT CHANGE UP (ref 0.2–1.2)
BILIRUB UR-MCNC: NEGATIVE — SIGNIFICANT CHANGE UP
BLD GP AB SCN SERPL QL: NEGATIVE — SIGNIFICANT CHANGE UP
BUN SERPL-MCNC: 45 MG/DL — HIGH (ref 7–23)
BUN SERPL-MCNC: 55 MG/DL — HIGH (ref 7–23)
CALCIUM SERPL-MCNC: 9.1 MG/DL — SIGNIFICANT CHANGE UP (ref 8.4–10.5)
CALCIUM SERPL-MCNC: 9.5 MG/DL — SIGNIFICANT CHANGE UP (ref 8.4–10.5)
CAST: 1 /LPF — SIGNIFICANT CHANGE UP (ref 0–4)
CHLORIDE SERPL-SCNC: 102 MMOL/L — SIGNIFICANT CHANGE UP (ref 98–107)
CHLORIDE SERPL-SCNC: 104 MMOL/L — SIGNIFICANT CHANGE UP (ref 98–107)
CO2 SERPL-SCNC: 19 MMOL/L — LOW (ref 22–31)
CO2 SERPL-SCNC: 22 MMOL/L — SIGNIFICANT CHANGE UP (ref 22–31)
COLOR SPEC: YELLOW — SIGNIFICANT CHANGE UP
CREAT SERPL-MCNC: 0.92 MG/DL — SIGNIFICANT CHANGE UP (ref 0.5–1.3)
CREAT SERPL-MCNC: 1.04 MG/DL — SIGNIFICANT CHANGE UP (ref 0.5–1.3)
CRP SERPL-MCNC: 186.9 MG/L — HIGH
DIFF PNL FLD: ABNORMAL
EGFR: 51 ML/MIN/1.73M2 — LOW
EGFR: 59 ML/MIN/1.73M2 — LOW
EOSINOPHIL # BLD AUTO: 0.27 K/UL — SIGNIFICANT CHANGE UP (ref 0–0.5)
EOSINOPHIL NFR BLD AUTO: 1.1 % — SIGNIFICANT CHANGE UP (ref 0–6)
ERYTHROCYTE [SEDIMENTATION RATE] IN BLOOD: 140 MM/HR — HIGH (ref 4–25)
FLUAV AG NPH QL: SIGNIFICANT CHANGE UP
FLUBV AG NPH QL: SIGNIFICANT CHANGE UP
GLUCOSE SERPL-MCNC: 145 MG/DL — HIGH (ref 70–99)
GLUCOSE SERPL-MCNC: 155 MG/DL — HIGH (ref 70–99)
GLUCOSE UR QL: NEGATIVE MG/DL — SIGNIFICANT CHANGE UP
HAPTOGLOB SERPL-MCNC: 113 MG/DL — SIGNIFICANT CHANGE UP (ref 34–200)
HCT VFR BLD CALC: 24 % — LOW (ref 34.5–45)
HCT VFR BLD CALC: 27.1 % — LOW (ref 34.5–45)
HGB BLD-MCNC: 6.9 G/DL — CRITICAL LOW (ref 11.5–15.5)
HGB BLD-MCNC: 8.4 G/DL — LOW (ref 11.5–15.5)
IANC: 21.66 K/UL — HIGH (ref 1.8–7.4)
IMM GRANULOCYTES NFR BLD AUTO: 1.1 % — HIGH (ref 0–0.9)
INR BLD: 1.37 RATIO — HIGH (ref 0.85–1.18)
KETONES UR-MCNC: NEGATIVE MG/DL — SIGNIFICANT CHANGE UP
LDH SERPL L TO P-CCNC: 297 U/L — HIGH (ref 135–225)
LEUKOCYTE ESTERASE UR-ACNC: ABNORMAL
LYMPHOCYTES # BLD AUTO: 2.06 K/UL — SIGNIFICANT CHANGE UP (ref 1–3.3)
LYMPHOCYTES # BLD AUTO: 8.1 % — LOW (ref 13–44)
MAGNESIUM SERPL-MCNC: 2.1 MG/DL — SIGNIFICANT CHANGE UP (ref 1.6–2.6)
MCHC RBC-ENTMCNC: 24.7 PG — LOW (ref 27–34)
MCHC RBC-ENTMCNC: 26.3 PG — LOW (ref 27–34)
MCHC RBC-ENTMCNC: 28.8 GM/DL — LOW (ref 32–36)
MCHC RBC-ENTMCNC: 31 GM/DL — LOW (ref 32–36)
MCV RBC AUTO: 85 FL — SIGNIFICANT CHANGE UP (ref 80–100)
MCV RBC AUTO: 86 FL — SIGNIFICANT CHANGE UP (ref 80–100)
MONOCYTES # BLD AUTO: 0.89 K/UL — SIGNIFICANT CHANGE UP (ref 0–0.9)
MONOCYTES NFR BLD AUTO: 3.5 % — SIGNIFICANT CHANGE UP (ref 2–14)
NEUTROPHILS # BLD AUTO: 21.66 K/UL — HIGH (ref 1.8–7.4)
NEUTROPHILS NFR BLD AUTO: 85.6 % — HIGH (ref 43–77)
NITRITE UR-MCNC: POSITIVE
NRBC # BLD: 0 /100 WBCS — SIGNIFICANT CHANGE UP (ref 0–0)
NRBC # BLD: 0 /100 WBCS — SIGNIFICANT CHANGE UP (ref 0–0)
NRBC # FLD: 0 K/UL — SIGNIFICANT CHANGE UP (ref 0–0)
NRBC # FLD: 0 K/UL — SIGNIFICANT CHANGE UP (ref 0–0)
PH UR: 6.5 — SIGNIFICANT CHANGE UP (ref 5–8)
PHOSPHATE SERPL-MCNC: 3.4 MG/DL — SIGNIFICANT CHANGE UP (ref 2.5–4.5)
PLATELET # BLD AUTO: 792 K/UL — HIGH (ref 150–400)
PLATELET # BLD AUTO: 880 K/UL — HIGH (ref 150–400)
POTASSIUM SERPL-MCNC: 5.2 MMOL/L — SIGNIFICANT CHANGE UP (ref 3.5–5.3)
POTASSIUM SERPL-MCNC: 5.9 MMOL/L — HIGH (ref 3.5–5.3)
POTASSIUM SERPL-SCNC: 5.2 MMOL/L — SIGNIFICANT CHANGE UP (ref 3.5–5.3)
POTASSIUM SERPL-SCNC: 5.9 MMOL/L — HIGH (ref 3.5–5.3)
PROT SERPL-MCNC: 7.6 G/DL — SIGNIFICANT CHANGE UP (ref 6–8.3)
PROT UR-MCNC: 100 MG/DL
PROTHROM AB SERPL-ACNC: 15.3 SEC — HIGH (ref 9.5–13)
RBC # BLD: 2.79 M/UL — LOW (ref 3.8–5.2)
RBC # BLD: 3.05 M/UL — LOW (ref 3.8–5.2)
RBC # BLD: 3.19 M/UL — LOW (ref 3.8–5.2)
RBC # FLD: 17.2 % — HIGH (ref 10.3–14.5)
RBC # FLD: 17.9 % — HIGH (ref 10.3–14.5)
RBC CASTS # UR COMP ASSIST: 8 /HPF — HIGH (ref 0–4)
RETICS #: 67.4 K/UL — SIGNIFICANT CHANGE UP (ref 25–125)
RETICS/RBC NFR: 2.2 % — SIGNIFICANT CHANGE UP (ref 0.5–2.5)
REVIEW: SIGNIFICANT CHANGE UP
RH IG SCN BLD-IMP: POSITIVE — SIGNIFICANT CHANGE UP
RSV RNA NPH QL NAA+NON-PROBE: SIGNIFICANT CHANGE UP
SARS-COV-2 RNA SPEC QL NAA+PROBE: SIGNIFICANT CHANGE UP
SODIUM SERPL-SCNC: 135 MMOL/L — SIGNIFICANT CHANGE UP (ref 135–145)
SODIUM SERPL-SCNC: 138 MMOL/L — SIGNIFICANT CHANGE UP (ref 135–145)
SP GR SPEC: 1.02 — SIGNIFICANT CHANGE UP (ref 1–1.03)
SQUAMOUS # UR AUTO: 3 /HPF — SIGNIFICANT CHANGE UP (ref 0–5)
TSH SERPL-MCNC: 7.15 UIU/ML — HIGH (ref 0.27–4.2)
UROBILINOGEN FLD QL: 0.2 MG/DL — SIGNIFICANT CHANGE UP (ref 0.2–1)
WBC # BLD: 19.77 K/UL — HIGH (ref 3.8–10.5)
WBC # BLD: 25.3 K/UL — HIGH (ref 3.8–10.5)
WBC # FLD AUTO: 19.77 K/UL — HIGH (ref 3.8–10.5)
WBC # FLD AUTO: 25.3 K/UL — HIGH (ref 3.8–10.5)
WBC UR QL: 75 /HPF — HIGH (ref 0–5)

## 2024-07-25 PROCEDURE — 99223 1ST HOSP IP/OBS HIGH 75: CPT

## 2024-07-25 PROCEDURE — 74177 CT ABD & PELVIS W/CONTRAST: CPT | Mod: 26,MC

## 2024-07-25 PROCEDURE — 71046 X-RAY EXAM CHEST 2 VIEWS: CPT | Mod: 26

## 2024-07-25 RX ORDER — DEXTROSE MONOHYDRATE, SODIUM CHLORIDE, SODIUM LACTATE, CALCIUM CHLORIDE, MAGNESIUM CHLORIDE 1.5; 538; 448; 18.4; 5.08 G/100ML; MG/100ML; MG/100ML; MG/100ML; MG/100ML
1000 SOLUTION INTRAPERITONEAL
Refills: 0 | Status: DISCONTINUED | OUTPATIENT
Start: 2024-07-25 | End: 2024-07-31

## 2024-07-25 RX ORDER — HYDROXYUREA 500 MG/1
500 CAPSULE ORAL DAILY
Refills: 0 | Status: DISCONTINUED | OUTPATIENT
Start: 2024-07-25 | End: 2024-07-31

## 2024-07-25 RX ORDER — IPRATROPIUM BROMIDE AND ALBUTEROL SULFATE 2.5; .5 MG/3ML; MG/3ML
3 SOLUTION RESPIRATORY (INHALATION) EVERY 6 HOURS
Refills: 0 | Status: DISCONTINUED | OUTPATIENT
Start: 2024-07-25 | End: 2024-07-31

## 2024-07-25 RX ORDER — PIPERACILLIN SODIUM, TAZOBACTAM SODIUM 3; .375 G/15ML; G/15ML
3.38 INJECTION, POWDER, LYOPHILIZED, FOR SOLUTION INTRAVENOUS ONCE
Refills: 0 | Status: COMPLETED | OUTPATIENT
Start: 2024-07-25 | End: 2024-07-25

## 2024-07-25 RX ORDER — DEXTROSE 4 G
25 TABLET,CHEWABLE ORAL ONCE
Refills: 0 | Status: DISCONTINUED | OUTPATIENT
Start: 2024-07-25 | End: 2024-07-31

## 2024-07-25 RX ORDER — BACTERIOSTATIC SODIUM CHLORIDE 0.9 %
500 VIAL (ML) INJECTION ONCE
Refills: 0 | Status: COMPLETED | OUTPATIENT
Start: 2024-07-25 | End: 2024-07-25

## 2024-07-25 RX ORDER — LORATADINE 10 MG
17 TABLET,DISINTEGRATING ORAL DAILY
Refills: 0 | Status: DISCONTINUED | OUTPATIENT
Start: 2024-07-25 | End: 2024-07-31

## 2024-07-25 RX ORDER — ACETAMINOPHEN 500 MG
650 TABLET ORAL EVERY 6 HOURS
Refills: 0 | Status: DISCONTINUED | OUTPATIENT
Start: 2024-07-25 | End: 2024-07-31

## 2024-07-25 RX ORDER — INSULIN LISPRO 100/ML
VIAL (ML) SUBCUTANEOUS
Refills: 0 | Status: DISCONTINUED | OUTPATIENT
Start: 2024-07-25 | End: 2024-07-31

## 2024-07-25 RX ORDER — INSULIN ASPART 100 [IU]/ML
0 INJECTION, SOLUTION INTRAVENOUS; SUBCUTANEOUS
Refills: 0 | DISCHARGE

## 2024-07-25 RX ORDER — DEXTROSE 4 G
12.5 TABLET,CHEWABLE ORAL ONCE
Refills: 0 | Status: DISCONTINUED | OUTPATIENT
Start: 2024-07-25 | End: 2024-07-31

## 2024-07-25 RX ORDER — GLUCAGON INJECTION, SOLUTION 0.5 MG/.1ML
1 INJECTION, SOLUTION SUBCUTANEOUS ONCE
Refills: 0 | Status: DISCONTINUED | OUTPATIENT
Start: 2024-07-25 | End: 2024-07-31

## 2024-07-25 RX ORDER — APIXABAN 5 MG/1
2.5 TABLET, FILM COATED ORAL
Refills: 0 | Status: DISCONTINUED | OUTPATIENT
Start: 2024-07-25 | End: 2024-07-26

## 2024-07-25 RX ORDER — KETOTIFEN FUMARATE 0.025 %
1 DROPS OPHTHALMIC (EYE)
Refills: 0 | DISCHARGE

## 2024-07-25 RX ORDER — METOPROLOL TARTRATE 100 MG
25 TABLET ORAL
Refills: 0 | Status: DISCONTINUED | OUTPATIENT
Start: 2024-07-25 | End: 2024-07-31

## 2024-07-25 RX ORDER — INSULIN LISPRO 100/ML
VIAL (ML) SUBCUTANEOUS AT BEDTIME
Refills: 0 | Status: DISCONTINUED | OUTPATIENT
Start: 2024-07-25 | End: 2024-07-31

## 2024-07-25 RX ORDER — LEVOTHYROXINE SODIUM 175 MCG
175 TABLET ORAL DAILY
Refills: 0 | Status: DISCONTINUED | OUTPATIENT
Start: 2024-07-25 | End: 2024-07-31

## 2024-07-25 RX ORDER — DEXTROSE MONOHYDRATE, SODIUM CHLORIDE, SODIUM LACTATE, CALCIUM CHLORIDE, MAGNESIUM CHLORIDE 1.5; 538; 448; 18.4; 5.08 G/100ML; MG/100ML; MG/100ML; MG/100ML; MG/100ML
1000 SOLUTION INTRAPERITONEAL
Refills: 0 | Status: DISCONTINUED | OUTPATIENT
Start: 2024-07-25 | End: 2024-07-27

## 2024-07-25 RX ORDER — SODIUM ZIRCONIUM CYCLOSILICATE 10 G/10G
10 POWDER, FOR SUSPENSION ORAL ONCE
Refills: 0 | Status: COMPLETED | OUTPATIENT
Start: 2024-07-25 | End: 2024-07-25

## 2024-07-25 RX ORDER — ASPIRIN 325 MG
5 TABLET ORAL AT BEDTIME
Refills: 0 | Status: DISCONTINUED | OUTPATIENT
Start: 2024-07-25 | End: 2024-07-31

## 2024-07-25 RX ORDER — VANCOMYCIN HYDROCHLORIDE 5 G/100ML
1000 INJECTION, POWDER, LYOPHILIZED, FOR SOLUTION INTRAVENOUS ONCE
Refills: 0 | Status: COMPLETED | OUTPATIENT
Start: 2024-07-25 | End: 2024-07-25

## 2024-07-25 RX ORDER — DEXTROSE 4 G
15 TABLET,CHEWABLE ORAL ONCE
Refills: 0 | Status: DISCONTINUED | OUTPATIENT
Start: 2024-07-25 | End: 2024-07-31

## 2024-07-25 RX ORDER — LEVOTHYROXINE SODIUM 175 MCG
1 TABLET ORAL
Refills: 0 | DISCHARGE

## 2024-07-25 RX ADMIN — SODIUM ZIRCONIUM CYCLOSILICATE 10 GRAM(S): 10 POWDER, FOR SUSPENSION ORAL at 18:58

## 2024-07-25 RX ADMIN — Medication 1: at 17:16

## 2024-07-25 RX ADMIN — Medication 500 MILLILITER(S): at 11:15

## 2024-07-25 RX ADMIN — DEXTROSE MONOHYDRATE, SODIUM CHLORIDE, SODIUM LACTATE, CALCIUM CHLORIDE, MAGNESIUM CHLORIDE 75 MILLILITER(S): 1.5; 538; 448; 18.4; 5.08 SOLUTION INTRAPERITONEAL at 22:15

## 2024-07-25 RX ADMIN — VANCOMYCIN HYDROCHLORIDE 250 MILLIGRAM(S): 5 INJECTION, POWDER, LYOPHILIZED, FOR SOLUTION INTRAVENOUS at 07:03

## 2024-07-25 RX ADMIN — PIPERACILLIN SODIUM, TAZOBACTAM SODIUM 200 GRAM(S): 3; .375 INJECTION, POWDER, LYOPHILIZED, FOR SOLUTION INTRAVENOUS at 04:35

## 2024-07-25 RX ADMIN — Medication 1 TABLET(S): at 17:54

## 2024-07-25 NOTE — H&P ADULT - PROBLEM SELECTOR PLAN 5
On novolog 5U TID and glipizide 2.5mg BID  - F/up A1C  - RENE and carb consistent diet  - Given age/dementia would NOT resume glipizide on discharge

## 2024-07-25 NOTE — ED ADULT NURSE REASSESSMENT NOTE - NS ED NURSE REASSESS COMMENT FT1
Break RN: Pt resting in stretcjer, Break RN: Pt resting in stretcher, A&O x 0, unable to obtain IV access, MD Chambers made aware to place US IV, safety maintained, comfort provided, report to be endorsed to primary RN upon return to area.

## 2024-07-25 NOTE — ED ADULT NURSE NOTE - NSFALLHARMRISKINTERV_ED_ALL_ED
Assistance OOB with selected safe patient handling equipment if applicable/Assistance with ambulation/Communicate risk of Fall with Harm to all staff, patient, and family/Monitor gait and stability/Monitor for mental status changes and reorient to person, place, and time, as needed/Provide visual cue: red socks, yellow wristband, yellow gown, etc/Reinforce activity limits and safety measures with patient and family/Toileting schedule using arm’s reach rule for commode and bathroom/Use of alarms - bed, stretcher, chair and/or video monitoring/Bed in lowest position, wheels locked, appropriate side rails in place/Call bell, personal items and telephone in reach/Instruct patient to call for assistance before getting out of bed/chair/stretcher/Non-slip footwear applied when patient is off stretcher/[place] to call system/Physically safe environment - no spills, clutter or unnecessary equipment/Purposeful Proactive Rounding/Room/bathroom lighting operational, light cord in reach

## 2024-07-25 NOTE — H&P ADULT - CONVERSATION DETAILS
Continue Advair Per records from [facility], patient is DNR/DNI with [facility]OLST (probably at [facility]). Daughter confirms DNR/DNI status at this time. Per records from [facility], patient is DNR/DNI with MOLST completed (Form is at [facility]). Daughter confirms DNR/DNI status at this time.

## 2024-07-25 NOTE — H&P ADULT - PROBLEM SELECTOR PLAN 3
A&OxO at baseline, however today pt was able to follow some simple commands w/ me and answered some simple questions (her 1st name, she is "good" and answered YES when I asked if she is from the [facility])  - Pretty much bedbound, functional quadriplegia, at [facility] since March 2024  - Frequent reorientation  - DNR/DNI confirmed with daughter  - Per daughter pt is on a puree diet and nectar consistency liquids  - Passed dysphagia screen, will order S&S to assess for specific diet consistency recs  - F/up Dietitian Consultation and S&S recs

## 2024-07-25 NOTE — H&P ADULT - NSHPPHYSICALEXAM_GEN_ALL_CORE
Vital Signs Last 24 Hrs  T(C): 36.6 (25 Jul 2024 12:10), Max: 37.6 (25 Jul 2024 01:01)  T(F): 97.8 (25 Jul 2024 12:10), Max: 99.6 (25 Jul 2024 01:01)  HR: 89 (25 Jul 2024 12:10) (88 - 102)  BP: 162/80 (25 Jul 2024 12:10) (112/72 - 163/97)  BP(mean): --  RR: 18 (25 Jul 2024 12:10) (18 - 23)  SpO2: 99% (25 Jul 2024 12:10) (95% - 100%)    Parameters below as of 25 Jul 2024 12:10  Patient On (Oxygen Delivery Method): room air Vital Signs Last 24 Hrs  T(C): 36.6 (25 Jul 2024 12:10), Max: 37.6 (25 Jul 2024 01:01)  T(F): 97.8 (25 Jul 2024 12:10), Max: 99.6 (25 Jul 2024 01:01)  HR: 89 (25 Jul 2024 12:10) (88 - 102)  BP: 162/80 (25 Jul 2024 12:10) (112/72 - 163/97)  BP(mean): --  RR: 18 (25 Jul 2024 12:10) (18 - 23)  SpO2: 99% (25 Jul 2024 12:10) (95% - 100%)    Parameters below as of 25 Jul 2024 12:10  Patient On (Oxygen Delivery Method): room air    PHYSICAL EXAM:  GENERAL: NAD, Alert and oriented x 1 (able to tell me her name but not the last time), following very simple commands (squeeze hand/stick tongue out)  HEAD:  Atraumatic, Normocephalic  EYES: EOMI, PERRLA, conjunctiva and sclera clear  ENMT: No tonsillar erythema, exudates, or enlargement; Moist mucous membranes, Good dentition, No lesions  NECK: Supple, No JVD, Normal thyroid  NERVOUS SYSTEM:  Alert & Oriented X0, Good concentration; Motor Strength 5/5 B/L upper and lower extremities; DTRs 2+ intact and symmetric  CHEST/LUNG: Clear to percussion bilaterally; No rales, rhonchi, wheezing, or rubs  HEART: Regular rate and rhythm; No murmurs, rubs, or gallops  ABDOMEN: Soft, Nontender, Nondistended; Bowel sounds present  +HEARN in place w/ clear urine  EXTREMITIES:  2+ Peripheral Pulses, No clubbing, cyanosis, or edema  LYMPH: No lymphadenopathy noted  SKIN: No rashes or lesions Vital Signs Last 24 Hrs  T(C): 36.6 (25 Jul 2024 12:10), Max: 37.6 (25 Jul 2024 01:01)  T(F): 97.8 (25 Jul 2024 12:10), Max: 99.6 (25 Jul 2024 01:01)  HR: 89 (25 Jul 2024 12:10) (88 - 102)  BP: 162/80 (25 Jul 2024 12:10) (112/72 - 163/97)  BP(mean): --  RR: 18 (25 Jul 2024 12:10) (18 - 23)  SpO2: 99% (25 Jul 2024 12:10) (95% - 100%)    Parameters below as of 25 Jul 2024 12:10  Patient On (Oxygen Delivery Method): room air    PHYSICAL EXAM:  GENERAL: NAD, Alert and oriented x 1 (able to tell me her name but not the last time), following very simple commands (squeeze hand/stick tongue out)  HEAD:  Atraumatic, Normocephalic  EYES: EOMI, PERRLA, conjunctiva and sclera clear  ENMT: No tonsillar erythema, exudates, or enlargement; Moist mucous membranes, Good dentition, No lesions  NECK: Supple, No JVD, Normal thyroid  NERVOUS SYSTEM:  Alert & Oriented X0, Good concentration; Motor Strength 5/5 B/L upper and lower extremities; DTRs 2+ intact and symmetric  CHEST/LUNG: Clear to percussion bilaterally; No rales, rhonchi, wheezing, or rubs  HEART: S1S2 OK; No murmurs, rubs, or gallops  ABDOMEN: Soft, Nontender, Nondistended; Bowel sounds present  +HEARN in place w/ clear urine  EXTREMITIES:  2+ Peripheral Pulses, No clubbing, cyanosis, or edema  LYMPH: No lymphadenopathy noted  SKIN: No rashes or lesions

## 2024-07-25 NOTE — ED ADULT NURSE NOTE - CHIEF COMPLAINT QUOTE
Pt from [facility], per RN at facility pt lethargic and not eating since this morning. A&Ox0 at baseline. Arrived with Claudio catheter. Hx DM2, afib, CVA, dementia

## 2024-07-25 NOTE — H&P ADULT - TIME BILLING
Preparing to see the patient including review of tests and other providers' notes, confirming history with patient/family member, performing medical examination and evaluation, counseling and educating the patient/family/caregiver, ordering medications, tests and procedures, communicating with other health care professionals, documenting clinical information in the EMR, independently interpreting results and communicating results to the patient/family/caregiver and care coordination.

## 2024-07-25 NOTE — H&P ADULT - PROBLEM SELECTOR PLAN 6
On synthroid 175 mcg QD  - TSH elevated at 7.15  - F/up FT4  - C/w outpatient dose of synthroid for now

## 2024-07-25 NOTE — ED PROCEDURE NOTE - ATTENDING CONTRIBUTION TO CARE
DR. VALDEZ, ATTENDING MD-  I was in the exam room and observed and supervised the resident when they were completing the key portions of this procedure.

## 2024-07-25 NOTE — ED ADULT NURSE NOTE - OBJECTIVE STATEMENT
pt received to room 25 p/w lethargy. arrives from NH in gown, 16fr Shanon present on arrival. NSR on tele. unable to obtain peripheral IV.

## 2024-07-25 NOTE — H&P ADULT - PROBLEM SELECTOR PLAN 4
Currently on Sinus rhythm  - EKG 7/25: Sinus tachycardia, , LAE  - C/w eliquis  - Rate control w/ metoprolol 25mg BID

## 2024-07-25 NOTE — H&P ADULT - PROBLEM SELECTOR PLAN 7
Eliquis as above  PT eval (although very unlikely that pt will participate given underlying dementia/bedbound status)  Anticipate return to [facility]  DNR/DNI    Discussed with Daughter Magalie over phone at [phone] today 7/25.

## 2024-07-25 NOTE — ED PROCEDURE NOTE - PROCEDURE ADDITIONAL DETAILS
performed US guided PIV using 18ga IV. x5 attempts made. PIV inserted into R forearm with appropriate flush but unable to draw labs from line. Proper location confirmed with US

## 2024-07-25 NOTE — ED ADULT NURSE REASSESSMENT NOTE - NS ED NURSE REASSESS COMMENT FT1
Pt is resting comfortably in the stretcher. bilateral upper IV were blown. warm compress place to both IV site.   Several attempts  made to reinsert IV.  Admitting physician MD Francois made aware.  Pt will get meds via P.O for now at MD ordered.     Pt made comfortable. All safety precautions maintained.

## 2024-07-25 NOTE — ED ADULT NURSE REASSESSMENT NOTE - NS ED NURSE REASSESS COMMENT FT1
Report received from day RN; Patient resting comfortably in stretcher, breathing even and unlabored. NAD noted. Stretcher in lowest position, wheels locked, appropriate side rails in place. Received pt with no IV access, b/l up[per extremities appear to be infiltrated.  ACP Maxine aware pt has no IV access at this time; multiple nurses attempted peripheral access. Report given to ESSU 1 RN.

## 2024-07-25 NOTE — ED ADULT NURSE REASSESSMENT NOTE - NS ED NURSE REASSESS COMMENT FT1
16fr sylvester present on arrival replaced with new one 16fr using sterile technique. rectal temp 99.6. stage 4 sacral ulcer 4cmx6.5cm, dressing in place, replaced with wet to dry and Mepilex. pt positioned toward right side.

## 2024-07-25 NOTE — ED ADULT NURSE REASSESSMENT NOTE - NS ED NURSE REASSESS COMMENT FT1
Pt is resting comfortably in the stretcher. Blood transfusion completed. No adverse reaction noted.  Pt made comfortable. All safety precautions maintained.

## 2024-07-25 NOTE — H&P ADULT - NSHPLABSRESULTS_GEN_ALL_CORE
.  LABS:                         6.9    .30 )-----------( 792      ( 2024 00:50 )             24.0         138  |  104  |  55<H>  ----------------------------<  155<H>  5.2   |  22  |  1.04    Ca    9.5      2024 04:10    TPro  7.6  /  Alb  2.8<L>  /  TBili  0.2  /  DBili  x   /  AST  16  /  ALT  14  /  AlkPhos  128<H>      PT/INR - ( 2024 04:10 )   PT: 15.3 sec;   INR: 1.37 ratio         PTT - ( 2024 04:10 )  PTT:36.1 sec  Urinalysis Basic - ( 2024 09:32 )    Color: Yellow / Appearance: Clear / S.025 / pH: x  Gluc: x / Ketone: Negative mg/dL  / Bili: Negative / Urobili: 0.2 mg/dL   Blood: x / Protein: 100 mg/dL / Nitrite: Positive   Leuk Esterase: Small / RBC: 8 /HPF / WBC 75 /HPF   Sq Epi: x / Non Sq Epi: 3 /HPF / Bacteria: Negative /HPF            RADIOLOGY, EKG & ADDITIONAL TESTS: Reviewed.     CXR : Small Left pleural effusion    CT Abd/pelvis :  Claudio catheter located within the vagina  Mild circumferential bladder wall thickening. Correlate with urinalysis.  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  Small left and trace right pleural effusions.

## 2024-07-25 NOTE — H&P ADULT - ASSESSMENT
89 year old Female with PMHx Advanced Dementia, Afib, PCV on hydroxyurea, DM2, sacral decub, sent from [facility] for lethargy and not eating since morning of arrival. Patient is A&OxO at baseline. Has an indwelling Claudio. History obtained from chart and daughter Magalie at [phone]. Daughter states mother has been at [facility] since March 2024 and has been slowly declining overtime. Per daughter since the weekend she was being treated for a UTI and due to lack of successful IV at [facility] she was sent to [facility].  No recent fevers, chills, SOB or nausea/vomiting or abdominal pain. No diarrhea. Daughter usually visits patient at [facility] at end of the day after work.  Per RN patient had breakfast earlier, daughter states she is on a puree diet and nectar consistency liquids. 89 year old Female with PMHx Advanced Dementia, Afib, PCV on hydroxyurea, Hypothyroidism, DM2, sacral decub, sent from [facility] for lethargy and not eating since morning of arrival. Found to be anemic (Hg of 6.9) and imaging shows possible OM.

## 2024-07-25 NOTE — ED ADULT NURSE REASSESSMENT NOTE - NS ED NURSE REASSESS COMMENT FT1
pt went to CT, IV no good. MD Chambers at bedside for new USIV. pt went to CT, IV not functional.  MD Chambers at bedside for new USIV.

## 2024-07-25 NOTE — H&P ADULT - PROBLEM SELECTOR PLAN 1
With underlying history of PCV on hydroxyurea, prior transfusions per daughter  - Hg slightly low at 6.9, s/p 1U PRBC 7/25  - Adequate response to transfusion  - C/w hydroxyurea 500mg daily  - Keep Hg above 7  - Slight thrombocytosis of 800K  - Leukocytosis within baseline range  - Monitor CBC closely  - Hematology f/up as outpatient ([facility])

## 2024-07-25 NOTE — H&P ADULT - HISTORY OF PRESENT ILLNESS
89F Dementia, Afib, PCV, DM2, sacral decub, sent from [facility], p/w anemia s/p PRBC, OM from sacral decub,  Pt from [facility], per RN at facility pt lethargic and not eating since this morning. A&Ox0 at baseline. Arrived with Claudio catheter. Hx DM2, afib, CVA, dementia    · HPI Objective Statement: This is a 89yoF PMHx of endstage dementia, atrial fibrillation, polycythemia vera, T2DM, sacral decubitus ulcer presenting with lethargy. Sent from [facility] for reported lethargy and poor PO since this AM. Per daughter Magalie ([phone]), Staff at [facility] sent to ED for hydration as unable to get IV, also cold symptoms and congestion.  Has not had fever, shortness of breath, abdominal distention, nausea vomiting, diarrhea.  Called [facility] nurse manager for collateral, not yet able to reach.  Patient A&O x 0 at baseline.  Daughter confirms DNR/DNI status.     89yoF PMHx of endstage dementia, DNI/DNR, atrial fibrillation, polycythemia vera, T2DM, sacral decubitus ulcer presenting with lethargy. Sent from [facility] for reported lethargy and poor PO since this AM. Per daughter Magalie ([phone]), Staff at [facility] sent to ED for hydration as unable to get IV, also cold symptoms and congestion.  Has not had fever, shortness of breath, abdominal distention, nausea vomiting, diarrhea.  Called [facility] nurse manager for collateral, not yet able to reach.  Patient A&O x 0 at baseline.  Daughter confirms DNR/DNI status.    Patient is not arousable to voice but moves minimally secondary to sternal rubbing.  Pupils are bilaterally reactive oropharynx is dry poor dentition heart is tachycardic lungs are clear bilaterally abdomen with minimal tenderness suprapubic region but no rebound or guarding pelvis is stable to axial loading skin is dry poor skin turgor diffusely heels with healing ulcers no drainage diaper without stool or urine Claudio in place    89yoF PMHx of endstage dementia, DNI/DNR, atrial fibrillation, polycythemia vera, T2DM, sacral decubitus ulcer presenting with lethargy. Sent from [facility] for reported lethargy and poor PO since this AM and they failed to attempt to be able to obtain IV also has some cold symptoms and congestion and found to have minimal tenderness to palpation in lower abdomen.  At this time concern is for infection leading to her condition versus deterioration given her condition and age.  Will require labs and imaging as well as fluids and rehydration but patient is DNR/DNI per daughter.  Will reassess after workup for possible hospitalization versus discharge back to nursing home.    Tarah Guardado PGY3: Received call from radiology indicating that Claudio is in vaginal canal. RN informed and replaced Claudio with return of urine. CT also shows OM at location of sacral decub. Pt endorsed to hospitalist for admission to medicine.    VSS in ER  Received 500cc NS, IV zosyn/vanco, PRBC x1 89 year old Female with PMHx Advanced Dementia, Afib, PCV on hydroxyurea, DM2, sacral decub, sent from [facility] for lethargy and not eating since morning of arrival. Patient is A&OxO at baseline. Has an indwelling Claudio. History obtained from chart and daughter Magalie at [phone]. Daughter states mother has been at [facility] since March 2024 and has been slowly declining overtime. Per daughter since the weekend she was being treated for a UTI and due to lack of successful IV at [facility] she was sent to [facility].  No recent fevers, chills, SOB or nausea/vomiting or abdominal pain. No diarrhea. Daughter usually visits patient at [facility] at end of the day after work.  Per RN patient had breakfast earlier, daughter states she is on a puree diet and nectar consistency liquids.    VSS in ER  Received 500cc NS, IV zosyn/vanco, PRBC x1. 89 year old Female with PMHx Advanced Dementia, Afib, PCV on hydroxyurea, hypothyroidism, DM2, sacral decub, sent from [facility] for lethargy and not eating since morning of arrival. Patient is A&OxO at baseline. Has an indwelling Claudio. History obtained from chart and daughter Magalie at [phone]. Daughter states mother has been at [facility] since March 2024 and has been slowly declining overtime. Per daughter since the weekend she was being treated for a UTI and due to lack of successful IV at [facility] she was sent to [facility] (per records pt was on IV Meropenem). No recent fevers, chills, SOB or nausea/vomiting or abdominal pain. No diarrhea. Daughter usually visits patient at [facility] at end of the day after work.  Per RN patient had breakfast earlier, daughter states she is on a puree diet and nectar consistency liquids.    VSS in ER  Received 500cc NS, IV zosyn/vanco, PRBC x1.

## 2024-07-25 NOTE — ED PROCEDURE NOTE - EBL
Post Radial Cath Discharge Instructions  Keep puncture site covered with current bandage for 24 hours.  You may shower in 24 hours. Do not take a tub bath or submerge the puncture site in water for 72 hours.   You may cover the site with a Band-Aid. Keep Band-Aid clean and dry at all times.    No lifting over 5 pounds with the arm you puncture site is on for 72 hours.  No strenuous activity such as bowling, tennis, etc for 72 hours  Do not operate lawnmower, motorcycle, chainsaw, or all terrain vehicle for 72 hours.  No blood pressure or tourniquets on affected arm for 72 hours.   The puncture site may be slightly bruised and sore following your procedure.   Drink 6-8 glasses of clear liquids during the next 24 hours to flush the dye out.     If Bleeding Occurs, DO NOT PANIC  Place 1 or 2 fingers over the puncture site and hold firm pressure to stop bleeding. You may be able to feel your pulse as you hold pressure  Lift you fingers after 5 minutes to see if the bleeding stopped.  Once has stopped, gently wipe the wrist area clean and cover with a bandage.  If the bleeding does not stop after 30 minutes, or if there is a large amount of bleeding or spurting, call 911! Do not drive yourself to the hospital.     Should any of the following occur, Contact your Physician Immediately:  Redness, inflamation, swelling, chills or fever, or discolred drainage at procedure site   Coldness, discoloration, ongoing numbness, severe pain, or swelling. Expect mild tingling of hand and tenderness at the puncture site for up to 3 days. If this persists or other symptoms develop, notify your physician   
minimal

## 2024-07-25 NOTE — H&P ADULT - PROBLEM SELECTOR PLAN 2
Underlying sacral ulcers  - CT Abd/pelvis 7/25: Claudio catheter located within the vagina. Mild circumferential bladder wall thickening. 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 Small left and trace right pleural effusions  - UA shows pyuria but NO bacteria, per daughter pt was being treated for UTI at [facility] last weekend (but did not find documentation in chart, only that meropenem was given on 7/24)  - Claudio has already been replaced in ER (indwelling Claudio)  - Discussed with daughter risks/benefits of treating for possible OM with long term IV antibiotics, at this time we have opted for a more conservative approach given patient's poor functional baseline, will do a shorter course of oral Abx's  - Augmentin BID for 10-14 days for OM treatment  - ESR/CRP are elevated as expected  - Wound care consultation Underlying sacral ulcers  - CT Abd/pelvis 7/25: Claudio catheter located within the vagina. Mild circumferential bladder wall thickening. 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 Small left and trace right pleural effusions  - UA shows pyuria but NO bacteria, per daughter pt was being treated for UTI at [facility] last weekend (but did not find documentation in chart, only that meropenem was given on 7/24)  - Claudio has already been replaced in ER (indwelling Claudio)  - Discussed with daughter risks/benefits of treating for possible OM with long term IV antibiotics, at this time we have opted for a more conservative approach given patient's poor functional baseline, will do a shorter course of oral Abx's  - Augmentin BID for 10-14 days for OM treatment  - ESR/CRP are elevated as expected  - F/up Wound care consult recs

## 2024-07-25 NOTE — ED ADULT NURSE REASSESSMENT NOTE - NS ED NURSE REASSESS COMMENT FT1
Received pt after handoff in stable condition.  Pt is alert and oriented x0, non -verbal. Respiration even and non-labored.  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 DTI noted to RT heel.  Dressing is clean and intact.  Pt started on 1 unit of PRBC @ 8:55am.  No transfusion reaction noted.   Continuous Cardiac monitor in place with HR of  89  BPM  NSR and O2 sat of  99% RA. Breakfast was served. Pt made comfortable. All safety precautions maintained. Received pt after handoff in stable condition.  Pt is alert and oriented x0, non -verbal and responsive to all stimuli. Respiration even and non-labored.  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 DTI noted to RT heel.  Dressing is clean and intact.  Pt started on 1 unit of PRBC @ 8:55am.  No transfusion reaction noted.   Continuous Cardiac monitor in place with HR of  89  BPM  NSR and O2 sat of  99% RA. Breakfast was served. Pt made comfortable. All safety precautions maintained. Received pt after handoff in stable condition.  Pt is alert and oriented x0, non -verbal and responsive to all stimuli. Respiration even and non-labored.  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 DTI noted to RT heel.  Dressing is clean and intact.  Pt started on 1 unit of PRBC @ 8:55am.  No transfusion reaction noted.   Continuous Cardiac monitor in place with HR of  89  BPM  NSR and O2 sat of  99% RA.   #14 fr sylvester cath reinserted and urine was sent to Lab.  Breakfast was served. Pt made comfortable. All safety precautions maintained. Received pt after handoff in stable condition.  Pt is alert and oriented x0, non -verbal and responsive to all stimuli. Respiration even and non-labored.  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 DTI noted to RT heel.  Dressing is clean and intact.  Pt started on 1 unit of PRBC @ 8:55am.  No transfusion reaction noted.   Continuous Cardiac monitor in place with HR of  89  BPM  NSR and O2 sat of  99% RA.   #14 fr sylvester cath reinserted and urine was sent to Lab. Output was a total of 50cc of clear yellow urine. Breakfast was served. Pt made comfortable. All safety precautions maintained.

## 2024-07-26 DIAGNOSIS — Z71.89 OTHER SPECIFIED COUNSELING: ICD-10-CM

## 2024-07-26 DIAGNOSIS — Z51.5 ENCOUNTER FOR PALLIATIVE CARE: ICD-10-CM

## 2024-07-26 LAB
ANION GAP SERPL CALC-SCNC: 10 MMOL/L — SIGNIFICANT CHANGE UP (ref 7–14)
BUN SERPL-MCNC: 32 MG/DL — HIGH (ref 7–23)
CALCIUM SERPL-MCNC: 9.4 MG/DL — SIGNIFICANT CHANGE UP (ref 8.4–10.5)
CHLORIDE SERPL-SCNC: 102 MMOL/L — SIGNIFICANT CHANGE UP (ref 98–107)
CO2 SERPL-SCNC: 23 MMOL/L — SIGNIFICANT CHANGE UP (ref 22–31)
CREAT SERPL-MCNC: 0.83 MG/DL — SIGNIFICANT CHANGE UP (ref 0.5–1.3)
EGFR: 67 ML/MIN/1.73M2 — SIGNIFICANT CHANGE UP
ERYTHROCYTE [SEDIMENTATION RATE] IN BLOOD: 119 MM/HR — HIGH (ref 4–25)
GLUCOSE BLDC GLUCOMTR-MCNC: 117 MG/DL — HIGH (ref 70–99)
GLUCOSE BLDC GLUCOMTR-MCNC: 162 MG/DL — HIGH (ref 70–99)
GLUCOSE BLDC GLUCOMTR-MCNC: 197 MG/DL — HIGH (ref 70–99)
GLUCOSE SERPL-MCNC: 129 MG/DL — HIGH (ref 70–99)
HCT VFR BLD CALC: 27.4 % — LOW (ref 34.5–45)
HGB BLD-MCNC: 8.4 G/DL — LOW (ref 11.5–15.5)
MAGNESIUM SERPL-MCNC: 2 MG/DL — SIGNIFICANT CHANGE UP (ref 1.6–2.6)
MCHC RBC-ENTMCNC: 25.8 PG — LOW (ref 27–34)
MCHC RBC-ENTMCNC: 30.7 GM/DL — LOW (ref 32–36)
MCV RBC AUTO: 84 FL — SIGNIFICANT CHANGE UP (ref 80–100)
NRBC # BLD: 0 /100 WBCS — SIGNIFICANT CHANGE UP (ref 0–0)
NRBC # FLD: 0.02 K/UL — HIGH (ref 0–0)
PHOSPHATE SERPL-MCNC: 3.4 MG/DL — SIGNIFICANT CHANGE UP (ref 2.5–4.5)
PLATELET # BLD AUTO: 836 K/UL — HIGH (ref 150–400)
POTASSIUM SERPL-MCNC: 4.4 MMOL/L — SIGNIFICANT CHANGE UP (ref 3.5–5.3)
POTASSIUM SERPL-SCNC: 4.4 MMOL/L — SIGNIFICANT CHANGE UP (ref 3.5–5.3)
RBC # BLD: 3.26 M/UL — LOW (ref 3.8–5.2)
RBC # FLD: 17.3 % — HIGH (ref 10.3–14.5)
SODIUM SERPL-SCNC: 135 MMOL/L — SIGNIFICANT CHANGE UP (ref 135–145)
WBC # BLD: 14.81 K/UL — HIGH (ref 3.8–10.5)
WBC # FLD AUTO: 14.81 K/UL — HIGH (ref 3.8–10.5)

## 2024-07-26 PROCEDURE — 99497 ADVNCD CARE PLAN 30 MIN: CPT | Mod: 25

## 2024-07-26 PROCEDURE — 93010 ELECTROCARDIOGRAM REPORT: CPT

## 2024-07-26 PROCEDURE — 99223 1ST HOSP IP/OBS HIGH 75: CPT

## 2024-07-26 PROCEDURE — 99233 SBSQ HOSP IP/OBS HIGH 50: CPT

## 2024-07-26 RX ORDER — FLUCONAZOLE 150 MG
100 TABLET ORAL DAILY
Refills: 0 | Status: COMPLETED | OUTPATIENT
Start: 2024-07-26 | End: 2024-07-29

## 2024-07-26 RX ADMIN — Medication 175 MICROGRAM(S): at 06:34

## 2024-07-26 RX ADMIN — Medication 25 MILLIGRAM(S): at 06:34

## 2024-07-26 RX ADMIN — Medication 5 MILLIGRAM(S): at 21:30

## 2024-07-26 RX ADMIN — HYDROXYUREA 500 MILLIGRAM(S): 500 CAPSULE ORAL at 17:30

## 2024-07-26 RX ADMIN — Medication 1 TABLET(S): at 17:29

## 2024-07-26 RX ADMIN — APIXABAN 2.5 MILLIGRAM(S): 5 TABLET, FILM COATED ORAL at 06:34

## 2024-07-26 RX ADMIN — Medication 1 DROP(S): at 17:39

## 2024-07-26 RX ADMIN — Medication 25 MILLIGRAM(S): at 17:30

## 2024-07-26 RX ADMIN — Medication 1 TABLET(S): at 06:34

## 2024-07-26 RX ADMIN — Medication 1: at 12:09

## 2024-07-26 RX ADMIN — Medication 1 DROP(S): at 06:34

## 2024-07-26 RX ADMIN — Medication 17 GRAM(S): at 12:13

## 2024-07-26 NOTE — DIETITIAN INITIAL EVALUATION ADULT - REASON FOR ADMISSION
Per H&P, Pt is 89 year old Female with PMHx Advanced Dementia, Afib, PCV on hydroxyurea, hypothyroidism, DM2, sacral decub, sent from [facility] for lethargy and not eating since morning of arrival. Patient is A&OxO at baseline.

## 2024-07-26 NOTE — SWALLOW BEDSIDE ASSESSMENT ADULT - COMMENTS
Patient received upright alert Hospitalist [facility] 7/25, "89 year old Female with PMHx Advanced Dementia, Afib, PCV on hydroxyurea, Hypothyroidism, DM2, sacral decub, sent from [facility] for lethargy and not eating since morning of arrival. Found to be anemic (Hg of 6.9) and imaging shows possible OM."     CT Abdomen 7/25: FINDINGS: LOWER CHEST: Small right and trace left pleural effusions.    CXR 7/25: IMPRESSION: Small left pleural effusion.    Of Note 1: Patient known to this service, seen on previous admission 4/4/24 with recommendations to consider NPO with consideration for short-term non-oral means of nutrition/ hydration/ medication. (See Note for details).     Of Note 2: Patient known to [facility] most recent cinesophagram completed 3/16/2022 with recommendations for Puree with Mildly Thick Liquids with single straw sips; seen for a bedside swallow evaluation at [facility] on 2/29/2024 with recommendations for "Puree with Moderately Thick Liquid" (see notes for details).    Patient received upright sleeping in bed, arousable to awake alert state given verbal and tactile cues. Breakfast tray noted on bedside table over patients bed. Patient able to state name and basic greetings. Patient orally receptive to PO trials.

## 2024-07-26 NOTE — SWALLOW BEDSIDE ASSESSMENT ADULT - ORAL PREPARATORY PHASE
[facility]  Certificate of Child Health Examination  Student's Name:   Gema Ferraro Birth Date  2017  Sex  male  Race/Ethnicity   School/Grade Level/ID#     Address:    [address]  [facility] [address]  Parent/Guardian             Telephone#                                            Home:                               Work:   IMMUNIZATIONS: To be completed by health care provider. The mo/da/yr for every dose administered is required. If a specific vaccine is medically contraindicated, a separate written statement must be attached by the health care responsible for completing the health examination explaining the medical reason for the contraindication.      Immunization History   Administered Date(s) Administered   • DTaP(INFANRIX) 12/20/2018   • DTaP/HIB/IPV 2017, 2017, 2017   • Hep A, ped/adol, 2 dose 06/22/2018, 03/18/2019   • Hep B, adolescent or pediatric 2017, 2017, 04/05/2018   • Hep B, adult 2017   • Hib (PRP-T) 09/20/2018   • Influenza, injectable, quadrivalent, preservative free, pediatric 2017, 04/05/2018   • Influenza, injectable, quadrivalent, preservative-free 09/20/2018, 10/31/2019, 10/01/2020   • MMR 06/22/2018   • Pneumococcal Conjugate 13 valent 2017, 2017, 2017, 06/22/2018   • Rotavirus - monovalent 2017, 2017   • Varicella 09/20/2018      Immunizations given 10/1/2020: Influenza      Health care provider (MD, DO, APN, PA, school health professional, health official) verifying above immunization history must sign below. If adding dates to the above immunization history section, put your initials by date(s) and sign here.)  Signature                                                                 Title                                                Date  _____________________________________________________________________________________  Signature                                                                  Title                                                Date  _____________________________________________________________________________________   ALTERNATIVE PROOF OF IMMUNITY   1. Clinical diagnosis (measles, mumps, hepatitis B) is allowed when verified by physician and supported with lab confirmation.  Attach copy of lab result.    * MEASLES (Rubeola)  MO   DA  YR          **MUMPS  MO   DA  YR             HEPATITIS B  MO   DA   YR           VARICELLA  MO   DA  YR      2. History of varicella (chickenpox) disease is acceptable if verified by health care provider, school health professional or health official.  Person signing below verifies that the parent/guardian’s description of varicella disease history is indicative of past infection and is accepting such history as documentation of disease.  Date of Disease                                  Signature                                                           Title   3. Laboratory Evidence of Immunity (check one)      __ Measles*         __ Mumps**        __ Rubella        __Varicella    (Attach copy of lab result)         *All measles cases diagnosed on or after July 1, 2002, must be confirmed by laboratory evidence.      **All mumps cases diagnosed on or after July 1, 2013, must be confirmed by laboratory evidence.     Completion of Alternative 1 or 3 MUST be accompanied by Labs & Physician Signature: ___________________________  Physician Statements of Immunity MUST be submitted to [facility] for review.     Certificates of Denominational Exemption to Immunizations or Physician Medical Statements of Medical Contraindication Are Reviewed   and Maintained by the School Authority     (11/2015)                                         (COMPLETE BOTH SIDES)                                  Approved [facility] 3/2016      Student's Name:  Gema Ferraro Birth Date 2017 Sex male School Grade Level/ID#     Page 2 of 2   HEALTH HISTORY      TO BE COMPLETED AND SIGNED BY  PARENT/GUARDIAN AND VERIFIED BY HEALTH CARE PROVIDER  ALLERGIES: (Food, drug, insect, other) No has No Known Allergies.   MEDICATION: (List all prescribed or taken on a regular basis.)   No   Diagnosis of asthma?  Child wakes during night coughing? Yes    No  Yes    No  Loss of function of one of paired  organs?(eye/ear/kidney/testicle) Yes    No    Birth defects? Yes    No  Hospitalizations? Yes    No    Developmental delay? Yes    No   When? What for? Yes    No    Blood disorders? Hemophilia,  Sickle Cell, Other? Explain. Yes    No  Surgery? (List all.)  When? What for? Yes    No    Diabetes? Yes    No  Serious injury or illness? Yes    No    Head injury/Concussion/Passed out? Yes    No  TB skin test positive (past/present)? * Yes    No *If yes, refer to local health dept   Seizures? What are they like?  Yes    No  TB disease (past or present)? * Yes    No *If yes, refer to local [facility] dept   Heart problem/Shortness of breath?  Yes    No  Tobacco use (type, frequency)?  Yes    No    Heart murmur/High blood pressure? Yes    No  Alcohol/Drug use?  Yes    No    Dizziness or chest pain with exercise? Yes    No  Family history of sudden death  before age 50? (Cause?) Yes    No    Eye/Vision problems? ______           __Glasses          __Contacts  Last exam by eye doctor ______  Other concerns? (crossed eye, drooping lids, squinting, difficulty reading) Dental?   __ Braces     __ Bridge     __ Plate   __Other   Ear/Hearing problems? Yes    No  Information may be shared with appropriate personnel for health and educational purposes.   Bone/Joint problem/injury/scoliosis? Yes    No  Parent/Guardian  Signature                                               Date   PHYSICAL EXAMINATION REQUIREMENTS   Entire section below to be completed by MD/DO/APN/PA Head Circumference if <2-3 years old - BP 98/53   Pulse 121   Temp 98.1 °F (36.7 °C) (Temporal)   Ht 3' 1.76\" (0.959 m)   Wt 15 kg (33 lb 1.1 oz)   BMI 16.31 kg/m²    BSA 0.62 m²    64 %ile (Z= 0.36) based on CDC (Boys, 2-20 Years) BMI-for-age based on BMI available as of 10/1/2020.   DIABETES SCREENING (NOT REQUIRED FOR ) BMI>85% age/sex No     And any two of the following: Family History: No  Ethnic Minority: No    Signs of Insulin Resistance (hypertension, dyslipidemia, polycystic ovarian syndrome, acanthosis nigricans): No  At Risk: No   LEAD RISK QUESTIONNAIRE Required for children age 6 months through 6 years enrolled in licensed or public school operated day care, , nursery school and/or . (Blood test required if resides in [place] or high risk zip [facility]  Questionnaire Administered? Yes  Blood Test Indicated? No   Blood Test Date _____   Blood Test Result ______________   TB SKIN OR BLOOD TEST Recommended only for children in high-risk groups including children immunosuppressed due to HIV infection or other conditions, frequent travel to or born in high prevalence countries or those exposed to adults in high-risk categories. See CDC guidelines. http://www.cdc.gov/tb/publications/factsheets/testing/TB_testing.htm.  Test Needed: No     Test performed: No                          Skin Test:    Date Read              /      /             Result:   Positive__      Negative__        mm________                          Blood Test: Date Reported       /      /               Result:  Positive__      Negative__      Value________  LAB TESTS (recommended) Date/Result  Date/Results   Hemoglobin or Hematocrit NA Sickle Cell (when indicated) NA   Urinalysis NA Developmental Screening Tool NA        SYSTEM REVIEW Normal  Comments/Follow-up/Needs  Normal Comments/Follow-up/Needs   Skin  Yes  Endocrine Yes    Ears Yes                  Screening Result Gastrointestinal Yes    Eyes Yes                  Screening Result: Genito-Urinary Yes                                            LMP   Nose Yes  Neurologic Yes    Throat Yes  Musculoskeletal Yes    Mouth/Dental  Yes  Spinal Exam Yes    Cardiovascular/HTN Yes  Nutritional status Yes    Respiratory Yes Diagnosis of Asthma: No   Mental Health Yes    Currently Prescribed Asthma Medication:        No  Quick-relief medication (e.g. Short Acting Beta Antagonist)        No   Controller medication (e.g. inhaled corticosteroid) Other     NEEDS/MODIFICATIONS required in the school setting: No restrictions DIETARY Needs/Restrictions: No restrictions   SPECIAL INSTRUCTIONS/DEVICES e.g. safety glasses, glass eye, chest protector for arrhythmia, pacemaker, prosthetic device, dental bridge, false teeth, athletic support/cup: No restrictions   MENTAL HEALTH/OTHER Is there anything else the school should know about this student?  If you would like to discuss this student’s health with school or school health personnel:  Not needed   EMERGENCY ACTION needed while at school due to child’s health condition (e.g. ,seizures, asthma, insect sting, food, peanut allergy, bleeding problem, diabetes, heart problem)?   No   On the basis of the examination on this day, I approve this child’s participation in                                (If No or Modified please attach explanation.)  PHYSICAL EDUCATION:  Yes                               [employer]   Yes   Print Name  Nenita Cagle MD         Signature                                                                       Date: 10/1/2020   Address:  [facility] [address]  [facility] [address] [id number] [phone]         (Complete Both Sides)     Approved [facility] 3/2016   Within functional limits Reduced oral grading/Anterior loss of bolus

## 2024-07-26 NOTE — PHYSICAL THERAPY INITIAL EVALUATION ADULT - PERTINENT HX OF CURRENT PROBLEM, REHAB EVAL
89 year old Female with PMHx Advanced Dementia, Afib, PCV on hydroxyurea, Hypothyroidism, DM2, sacral decub, sent from [facility] for lethargy and not eating since morning of arrival. Found to be anemic (Hg of 6.9) and imaging shows possible OM.

## 2024-07-26 NOTE — DIETITIAN INITIAL EVALUATION ADULT - OTHER INFO
Patient seen at bedside. Breakfast tray observed at bedside untouched. Pt encouraged to eat unsuccessful. Pt receiving IVF at time of visit. Unable to conduct nutrition interview 2/2 decreased cognition. Information obtained from comprehensive chart review and per RN today. Pt was seen per SLP today 7/26 for Swallow Assessment at Bedside with recommendations for Puree with Mildly Thick Liquids. No intake reported per RN flowsheets. Continue to monitor and document PO in flowsheets. No GI distress (nausea/vomiting/diarrhea/constipation) per chart. No last BM documented per chart. BM in placed. As per RN today, patient with pressure injury to sacrum unstagable. Wound consult pending. Admission wt pending. RD obtain bed scale weight 98.6lbs (44.7kg ), 7/26/24. Requested to obtain daily weight to verify weight accuracy. Pt observed with severe muscle wasting and fat loss which identifies patient with severe malnutrition. Labs for elevated BUN, Cr and HgA1c 7.4%. Consistent carbohydrate diet in place in efforts to control blood glucose.

## 2024-07-26 NOTE — CONSULT NOTE ADULT - SUBJECTIVE AND OBJECTIVE BOX
HPI:  Patient is a 89 year old Female with PMHx Advanced Dementia, Afib, PCV on hydroxyurea, hypothyroidism, DM2, sacral decub, sent from [facility] for lethargy and not eating since morning of arrival. Patient is A&OxO at baseline. Has an indwelling Claudio. History obtained from chart and daughter Magalie at [phone]. Daughter states mother has been at [facility] since March 2024 and has been slowly declining overtime. Per daughter since the weekend she was being treated for a UTI and due to lack of successful IV at [facility] she was sent to [facility] (per records pt was on IV Meropenem). No recent fevers, chills, SOB or nausea/vomiting or abdominal pain. No diarrhea. Daughter usually visits patient at [facility] at end of the day after work.  Per RN patient had breakfast earlier, daughter states she is on a puree diet and nectar consistency liquids. VSS in ER. Received 500cc NS, IV zosyn/vanco, PRBC x1.     Patient seen this AM, awake, comfortable in no distress.     PERTINENT PM/SXH:   Benign Hypertension    Diabetes    Diabetes    Hypercholesteremia    Adult Hypothyroidism    Deep Vein Thrombosis (DVT)    Stroke    SBO (Small Bowel Obstruction)    2019 novel coronavirus disease (COVID-19)    COVID-19 vaccine series completed      FH: Total Abdominal Hysterectomy and Bilateral Salpingo-Oophorectomy    S/P small bowel resection      FAMILY HISTORY:  Family history of diabetes mellitus (DM) (Child)    Family history of secondary lung cancer    Family history of breast cancer (Child)    ------------------------------------------------------------------------------------------------------------  ITEMS NOT CHECKED ARE NOT PRESENT    SOCIAL HISTORY:   Living Situation: [ ]Home  [ ]Long term care  [ ]Rehab [ ]Other  Support: Akbar Mejias     Substance hx:  [ ]   Tobacco hx:  [ ]   Alcohol hx: [ ]   Family Hx substance abuse [ ]yes [ ]no    Christianity/Spirituality:  PCSSQ[Palliative Care Spiritual Screening Question]   Severity (0-10): 0  Score of 4 or > indicate consideration of Chaplaincy referral.  Chaplaincy Referral: [ ] yes [X ] refused [ ] following    Anticipatory Grief present?:  [ ] yes [X ] no  [ ] Deferred                      Other Referrals:    [ ]Hospice   [ ]Caregiver [place] Support  [ ]Child Life    [ ]Patient & Family Centered Care Referral  [ ]Holistic Therapy     ------------------------------------------------------------------------------------------------------------    PRESENT SYMPTOMS:     [X ] Unable to self-report      [X ] PAINADS     [X ] RDOS    [ ] No     [ ] Yes     Source if other than patient:  [ ]Family   [ ]Team     PAIN:   If blank, patient unable to specify     [ ]yes [ ]no    1. Location-   2. Radiation-    3. Quality-   4. Timing-   5. Minimal acceptable level/pain goal-   6. Aggravating factors-   7. QOL impact-     SYMPTOMS:   Dyspnea:                           [ ]Mild [ ]Moderate [ ]Severe  Anxiety:                             [ ]Mild [ ]Moderate [ ]Severe  Fatigue:                             [ ]Mild [ ]Moderate [ ]Severe  Nausea/Vomiting:              [ ]Mild [ ]Moderate [ ]Severe  Loss of appetite:                [ ]Mild [ ]Moderate [ ]Severe  Constipation:                     [ ]Mild [ ]Moderate [ ]Severe    Other Symptoms:  [X ]All other review of systems negative     ------------------------------------------------------------------------------------------------------------      I-Stop Reference No:     ------------------------------------------------------------------------------------------------------------    FUNCTIONAL STATUS:     Baseline ADL (prior to admission):  [ ] Independent  [ ] Moderate Assistance [X ] Dependent    Palliative Performance Score (current): 30%     [X ]PPSV2 < or = to 30%   [ ]artificial nutrition      NUTRITIONAL STATUS:     Protein Calorie Malnutrition Present:   [ ]mild   [ ]moderate or severe    Weight:   [ ]underweight (BMI 18.5 or less)   [ ]morbid obesity (BMI 30 or higher)   [ ]anasarca  [ ]significant weight loss     Height (cm): 149.9 (03-31-24 @ 16:00), 149.9 (02-29-24 @ 05:09), 152.4 (12-18-23 @ 12:44)  Weight (kg): 41.2 (04-01-24 @ 15:29), 34.6 (02-29-24 @ 05:09), 49.9 (12-18-23 @ 12:44)  BMI (kg/m2): 18.3 (04-01-24 @ 15:29), 15.4 (03-31-24 @ 16:00), 15.4 (02-29-24 @ 05:09)    ------------------------------------------------------------------------------------------------------------    PRIOR ADVANCE DIRECTIVES:    [X ] DNR/MOLST    [ ] Living Will    [ ] Health Care Proxy(s)    DECISION MAKER(s):  [ ] Patient    [X ] Surrogate(s)- daughter Magalie   [ ] HCP   [ ] Guardian             ------------------------------------------------------------------------------------------------------------  PHYSICAL EXAM:  Vital Signs Last 24 Hrs  T(C): 36.6 (26 Jul 2024 05:32), Max: 37.1 (25 Jul 2024 21:15)  T(F): 97.9 (26 Jul 2024 05:32), Max: 98.7 (25 Jul 2024 21:15)  HR: 64 (26 Jul 2024 05:32) (64 - 101)  BP: 132/37 (26 Jul 2024 05:32) (132/37 - 160/91)  BP(mean): 60 (26 Jul 2024 05:32) (60 - 60)  RR: 18 (26 Jul 2024 05:32) (18 - 24)  SpO2: 96% (26 Jul 2024 05:32) (96% - 100%)    Parameters below as of 26 Jul 2024 05:32  Patient On (Oxygen Delivery Method): nasal cannula  O2 Flow (L/min): 2   I&O's Summary    25 Jul 2024 07:01  -  26 Jul 2024 07:00  --------------------------------------------------------  IN: 0 mL / OUT: 1100 mL / NET: -1100 mL        GENERAL:  [ ]Cachexia  [ ] Frail  [ ]Awake  [ ]Oriented   [ ]Lethargic  [ ]Unarousable  [ ]Verbal  [ ]Non-Verbal    BEHAVIORAL:   [ ] Anxiety  [ ] Delirium [ ] Agitation [ ] Other    HEENT:   [ ]Normal   [ ]Dry mouth   [ ]ET Tube/Trach  [ ]Oral lesions    PULMONARY:   [ ]Clear              [ ]Tachypnea  [ ]Audible excessive secretions   [ ]Rhonchi        [ ]Right [ ]Left [ ]Bilateral  [ ]Crackles        [ ]Right [ ]Left [ ]Bilateral  [ ]Wheezing     [ ]Right [ ]Left [ ]Bilateral  [ ]Diminished breath sounds [ ]right [ ]left [ ]bilateral    CARDIOVASCULAR:    [ ]Regular [ ]Irregular [ ]Tachy  [ ]Rush [ ]Murmur [ ]Other    GASTROINTESTINAL:  [ ]Soft  [ ]Distended   [ ]+BS  [ ]Non tender [ ]Tender  [ ]Other [ ]PEG [ ]OGT/ NGT      GENITOURINARY:  [ ]Normal [ ] Incontinent   [ ]Oliguria/Anuria   [ ]Claudio    MUSCULOSKELETAL:   [ ]Normal   [ ]Weakness  [ ]Bed/Wheelchair bound [ ]Edema    NEUROLOGIC:   [ ]No focal deficits  [ ]Cognitive impairment  [ ]Dysphagia [ ]Dysarthria [ ]Paresis [ ]Other     SKIN:   [ ]Normal  [ ]Rash  [ ]Other  [ ]Pressure ulcer(s)       Present on admission [ ]y [ ]n    ------------------------------------------------------------------------------------------------------------    LABS:                        8.4    19.77 )-----------( 880      ( 25 Jul 2024 14:48 )             27.1   07-25    135  |  102  |  45<H>  ----------------------------<  145<H>  5.9<H>   |  19<L>  |  0.92    Ca    9.1      25 Jul 2024 14:50  Phos  3.4     07-25  Mg     2.10     07-25    TPro  7.6  /  Alb  2.8<L>  /  TBili  0.2  /  DBili  x   /  AST  16  /  ALT  14  /  AlkPhos  128<H>  07-25  PT/INR - ( 25 Jul 2024 04:10 )   PT: 15.3 sec;   INR: 1.37 ratio         PTT - ( 25 Jul 2024 04:10 )  PTT:36.1 sec    Urinalysis Basic - ( 25 Jul 2024 14:50 )    Color: x / Appearance: x / SG: x / pH: x  Gluc: 145 mg/dL / Ketone: x  / Bili: x / Urobili: x   Blood: x / Protein: x / Nitrite: x   Leuk Esterase: x / RBC: x / WBC x   Sq Epi: x / Non Sq Epi: x / Bacteria: x        ------------------------------------------------------------------------------------------------------------    CRITICAL CARE:  [ ]Shock Present  [ ]Septic [ ]Cardiogenic [ ]Neurologic [ ]Hypovolemic [ ] Undifferentiated/Mixed   [ ]Vasopressors [ ]Inotropes     [ ]Respiratory failure present: [ ]Acute  [ ]Chronic [ ]Hypoxic  [ ]Hypercarbic   [ ]Mechanical ventilation   [ ]Trach collar   [ ]Non-invasive ventilatory support   [ ]High flow    [ ]Non-rebreather/Venti     [ ]Other organ failure     ------------------------------------------------------------------------------------------------------------    RADIOLOGY & ADDITIONAL STUDIES:      ------------------------------------------------------------------------------------------------------------    ALLERGIES:  Allergies    black pepper, white pepper, cumin, paprika, johnston powder, chili powder (Rash)  No Known Drug Allergies    Intolerances        MEDICATIONS  (STANDING):  amoxicillin  500 milliGRAM(s)/clavulanate 1 Tablet(s) Oral two times a day  artificial  tears Solution 1 Drop(s) Both EYES two times a day  bisacodyl 5 milliGRAM(s) Oral at bedtime  dextrose 5%. 1000 milliLiter(s) (50 mL/Hr) IV Continuous <Continuous>  dextrose 5%. 1000 milliLiter(s) (100 mL/Hr) IV Continuous <Continuous>  dextrose 50% Injectable 25 Gram(s) IV Push once  dextrose 50% Injectable 25 Gram(s) IV Push once  dextrose 50% Injectable 12.5 Gram(s) IV Push once  glucagon  Injectable 1 milliGRAM(s) IntraMuscular once  hydroxyurea 500 milliGRAM(s) Oral daily  insulin lispro (ADMELOG) corrective regimen sliding scale   SubCutaneous at bedtime  insulin lispro (ADMELOG) corrective regimen sliding scale   SubCutaneous three times a day before meals  lactated ringers. 1000 milliLiter(s) (75 mL/Hr) IV Continuous <Continuous>  levothyroxine 175 MICROGram(s) Oral daily  metoprolol tartrate 25 milliGRAM(s) Oral two times a day  polyethylene glycol 3350 17 Gram(s) Oral daily    MEDICATIONS  (PRN):  acetaminophen     Tablet .. 650 milliGRAM(s) Oral every 6 hours PRN Temp greater or equal to 38C (100.4F), Mild Pain (1 - 3)  albuterol/ipratropium for Nebulization 3 milliLiter(s) Nebulizer every 6 hours PRN Shortness of Breath and/or Wheezing  dextrose Oral Gel 15 Gram(s) Oral once PRN Blood Glucose LESS THAN 70 milliGRAM(s)/deciliter           HPI:  Patient is a 89 year old Female with PMHx Advanced Dementia, Afib, PCV on hydroxyurea, hypothyroidism, DM2, sacral decub, sent from [facility] for lethargy and not eating since morning of arrival. Patient is A&OxO at baseline. Has an indwelling Claudio. History obtained from chart and daughter Magalie at [phone]. Daughter states mother has been at [facility] since March 2024 and has been slowly declining overtime. Per daughter since the weekend she was being treated for a UTI and due to lack of successful IV at [facility] she was sent to [facility] (per records pt was on IV Meropenem). No recent fevers, chills, SOB or nausea/vomiting or abdominal pain. No diarrhea. Daughter usually visits patient at [facility] at end of the day after work.  Per RN patient had breakfast earlier, daughter states she is on a puree diet and nectar consistency liquids. VSS in ER. Received 500cc NS, IV zosyn/vanco, PRBC x1.     Patient seen this AM, awake, comfortable in no distress.     PERTINENT PM/SXH:   Benign Hypertension    Diabetes    Diabetes    Hypercholesteremia    Adult Hypothyroidism    Deep Vein Thrombosis (DVT)    Stroke    SBO (Small Bowel Obstruction)    2019 novel coronavirus disease (COVID-19)    COVID-19 vaccine series completed      FH: Total Abdominal Hysterectomy and Bilateral Salpingo-Oophorectomy    S/P small bowel resection      FAMILY HISTORY:  Family history of diabetes mellitus (DM) (Child)    Family history of secondary lung cancer    Family history of breast cancer (Child)    ------------------------------------------------------------------------------------------------------------  ITEMS NOT CHECKED ARE NOT PRESENT    SOCIAL HISTORY:   Living Situation: [ ]Home  [X ]Long term care  [ ]Rehab [ ]Other  Support: Akbar Mejias     Substance hx:  [ ]   Tobacco hx:  [ ]   Alcohol hx: [ ]   Family Hx substance abuse [ ]yes [ ]no    Sabianism/Spirituality:  PCSSQ[Palliative Care Spiritual Screening Question]   Severity (0-10): 0  Score of 4 or > indicate consideration of Chaplaincy referral.  Chaplaincy Referral: [ ] yes [X ] refused [ ] following    Anticipatory Grief present?:  [ ] yes [X ] no  [ ] Deferred                      Other Referrals:    [ ]Hospice   [ ]Caregiver [place] Support  [ ]Child Life    [ ]Patient & Family Centered Care Referral  [ ]Holistic Therapy     ------------------------------------------------------------------------------------------------------------    PRESENT SYMPTOMS:     [X ] Unable to self-report      [X ] PAINADS     [X ] RDOS    [ ] No     [ ] Yes     Source if other than patient:  [ ]Family   [ ]Team     PAIN:   If blank, patient unable to specify     [ ]yes [ ]no    1. Location-   2. Radiation-    3. Quality-   4. Timing-   5. Minimal acceptable level/pain goal-   6. Aggravating factors-   7. QOL impact-     SYMPTOMS:   Dyspnea:                           [ ]Mild [ ]Moderate [ ]Severe  Anxiety:                             [ ]Mild [ ]Moderate [ ]Severe  Fatigue:                             [ ]Mild [ ]Moderate [ ]Severe  Nausea/Vomiting:              [ ]Mild [ ]Moderate [ ]Severe  Loss of appetite:                [ ]Mild [ ]Moderate [ ]Severe  Constipation:                     [ ]Mild [ ]Moderate [ ]Severe    Other Symptoms:  [X ]All other review of systems negative     ------------------------------------------------------------------------------------------------------------      I-Stop Reference No: [id number]    ------------------------------------------------------------------------------------------------------------    FUNCTIONAL STATUS:     Baseline ADL (prior to admission):  [ ] Independent  [ ] Moderate Assistance [X ] Dependent    Palliative Performance Score (current): 30%     [X ]PPSV2 < or = to 30%   [ ]artificial nutrition      NUTRITIONAL STATUS:     Protein Calorie Malnutrition Present:   [ ]mild   [ ]moderate or severe    Weight:   [ ]underweight (BMI 18.5 or less)   [ ]morbid obesity (BMI 30 or higher)   [ ]anasarca  [ ]significant weight loss     Height (cm): 149.9 (03-31-24 @ 16:00), 149.9 (02-29-24 @ 05:09), 152.4 (12-18-23 @ 12:44)  Weight (kg): 41.2 (04-01-24 @ 15:29), 34.6 (02-29-24 @ 05:09), 49.9 (12-18-23 @ 12:44)  BMI (kg/m2): 18.3 (04-01-24 @ 15:29), 15.4 (03-31-24 @ 16:00), 15.4 (02-29-24 @ 05:09)    ------------------------------------------------------------------------------------------------------------    PRIOR ADVANCE DIRECTIVES:    [X ] DNR/MOLST    [ ] Living Will    [ ] Health Care Proxy(s)    DECISION MAKER(s):  [ ] Patient    [X ] Surrogate(s)- daughter Magalie   [ ] HCP   [ ] Guardian             ------------------------------------------------------------------------------------------------------------  PHYSICAL EXAM:  Vital Signs Last 24 Hrs  T(C): 36.6 (26 Jul 2024 05:32), Max: 37.1 (25 Jul 2024 21:15)  T(F): 97.9 (26 Jul 2024 05:32), Max: 98.7 (25 Jul 2024 21:15)  HR: 64 (26 Jul 2024 05:32) (64 - 101)  BP: 132/37 (26 Jul 2024 05:32) (132/37 - 160/91)  BP(mean): 60 (26 Jul 2024 05:32) (60 - 60)  RR: 18 (26 Jul 2024 05:32) (18 - 24)  SpO2: 96% (26 Jul 2024 05:32) (96% - 100%)    Parameters below as of 26 Jul 2024 05:32  Patient On (Oxygen Delivery Method): nasal cannula  O2 Flow (L/min): 2   I&O's Summary    25 Jul 2024 07:01  -  26 Jul 2024 07:00  --------------------------------------------------------  IN: 0 mL / OUT: 1100 mL / NET: -1100 mL    GENERAL:  [X ]Cachexia  [X ] Frail  [X ]Awake  [ ]Oriented   [ ]Lethargic  [ ]Unarousable  [ ]Verbal  [ ]Non-Verbal    BEHAVIORAL:   [ ] Anxiety  [ ] Delirium [ ] Agitation [ ] Other    HEENT:   [ ]Normal   [X ]Dry mouth   [ ]ET Tube/Trach  [ ]Oral lesions    PULMONARY:   [X ]Clear              [ ]Tachypnea  [ ]Audible excessive secretions   [ ]Rhonchi        [ ]Right [ ]Left [ ]Bilateral  [ ]Crackles        [ ]Right [ ]Left [ ]Bilateral  [ ]Wheezing     [ ]Right [ ]Left [ ]Bilateral  [ ]Diminished breath sounds [ ]right [ ]left [ ]bilateral    CARDIOVASCULAR:    [X ]Regular [ ]Irregular [ ]Tachy  [ ]Rush [ ]Murmur [ ]Other    GASTROINTESTINAL:  [X ]Soft  [ ]Distended   [ ]+BS  [X ]Non tender [ ]Tender  [ ]Other [ ]PEG [ ]OGT/ NGT      GENITOURINARY:  [ ]Normal [X ] Incontinent   [ ]Oliguria/Anuria   [ ]Claudio    MUSCULOSKELETAL:   [ ]Normal   [ ]Weakness  [X ]Bed/Wheelchair bound [ ]Edema    NEUROLOGIC:   [ ]No focal deficits  [X ]Cognitive impairment  [ ]Dysphagia [ ]Dysarthria [ ]Paresis [ ]Other     SKIN:   [ ]Normal  [ ]Rash  [ ]Other  [X ]Pressure ulcer(s)       Present on admission [X ]y [ ]n    ------------------------------------------------------------------------------------------------------------    LABS:                        8.4    19.77 )-----------( 880      ( 25 Jul 2024 14:48 )             27.1   07-25    135  |  102  |  45<H>  ----------------------------<  145<H>  5.9<H>   |  19<L>  |  0.92    Ca    9.1      25 Jul 2024 14:50  Phos  3.4     07-25  Mg     2.10     07-25    TPro  7.6  /  Alb  2.8<L>  /  TBili  0.2  /  DBili  x   /  AST  16  /  ALT  14  /  AlkPhos  128<H>  07-25  PT/INR - ( 25 Jul 2024 04:10 )   PT: 15.3 sec;   INR: 1.37 ratio       PTT - ( 25 Jul 2024 04:10 )  PTT:36.1 sec    Urinalysis Basic - ( 25 Jul 2024 14:50 )    Color: x / Appearance: x / SG: x / pH: x  Gluc: 145 mg/dL / Ketone: x  / Bili: x / Urobili: x   Blood: x / Protein: x / Nitrite: x   Leuk Esterase: x / RBC: x / WBC x   Sq Epi: x / Non Sq Epi: x / Bacteria: x    ------------------------------------------------------------------------------------------------------------    CRITICAL CARE:  [ ]Shock Present  [ ]Septic [ ]Cardiogenic [ ]Neurologic [ ]Hypovolemic [ ] Undifferentiated/Mixed   [ ]Vasopressors [ ]Inotropes     [ ]Respiratory failure present: [ ]Acute  [ ]Chronic [ ]Hypoxic  [ ]Hypercarbic   [ ]Mechanical ventilation   [ ]Trach collar   [ ]Non-invasive ventilatory support   [ ]High flow    [ ]Non-rebreather/Venti     [ ]Other organ failure     ------------------------------------------------------------------------------------------------------------    RADIOLOGY & ADDITIONAL STUDIES:    < from: CT Abdomen and Pelvis w/ IV Cont (07.25.24 @ 07:32) >  IMPRESSION:  Claudio catheter located within the vagina    Mild circumferential bladder wall thickening. Correlate with urinalysis.    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    Small left and trace right pleural effusions.    < end of copied text >      ------------------------------------------------------------------------------------------------------------    ALLERGIES:  Allergies    black pepper, white pepper, cumin, paprika, ojhnston powder, chili powder (Rash)  No Known Drug Allergies    Intolerances    MEDICATIONS  (STANDING):  amoxicillin  500 milliGRAM(s)/clavulanate 1 Tablet(s) Oral two times a day  artificial  tears Solution 1 Drop(s) Both EYES two times a day  bisacodyl 5 milliGRAM(s) Oral at bedtime  dextrose 5%. 1000 milliLiter(s) (50 mL/Hr) IV Continuous <Continuous>  dextrose 5%. 1000 milliLiter(s) (100 mL/Hr) IV Continuous <Continuous>  dextrose 50% Injectable 25 Gram(s) IV Push once  dextrose 50% Injectable 25 Gram(s) IV Push once  dextrose 50% Injectable 12.5 Gram(s) IV Push once  glucagon  Injectable 1 milliGRAM(s) IntraMuscular once  hydroxyurea 500 milliGRAM(s) Oral daily  insulin lispro (ADMELOG) corrective regimen sliding scale   SubCutaneous at bedtime  insulin lispro (ADMELOG) corrective regimen sliding scale   SubCutaneous three times a day before meals  lactated ringers. 1000 milliLiter(s) (75 mL/Hr) IV Continuous <Continuous>  levothyroxine 175 MICROGram(s) Oral daily  metoprolol tartrate 25 milliGRAM(s) Oral two times a day  polyethylene glycol 3350 17 Gram(s) Oral daily    MEDICATIONS  (PRN):  acetaminophen     Tablet .. 650 milliGRAM(s) Oral every 6 hours PRN Temp greater or equal to 38C (100.4F), Mild Pain (1 - 3)  albuterol/ipratropium for Nebulization 3 milliLiter(s) Nebulizer every 6 hours PRN Shortness of Breath and/or Wheezing  dextrose Oral Gel 15 Gram(s) Oral once PRN Blood Glucose LESS THAN 70 milliGRAM(s)/deciliter           HPI:  Patient is a 89 year old Female with PMHx Advanced Dementia, Afib, PCV on hydroxyurea, hypothyroidism, DM2, sacral decub, sent from [facility] for lethargy and not eating since morning of arrival. Patient is A&OxO at baseline. Has an indwelling Claudio. History obtained from chart and daughter Magalie at [phone]. Daughter states mother has been at [facility] since 2024 and has been slowly declining overtime. Per daughter since the weekend she was being treated for a UTI and due to lack of successful IV at [facility] she was sent to [facility] (per records pt was on IV Meropenem). No recent fevers, chills, SOB or nausea/vomiting or abdominal pain. No diarrhea. Daughter usually visits patient at [facility] at end of the day after work.  Per RN patient had breakfast earlier, daughter states she is on a puree diet and nectar consistency liquids. VSS in ER. Received 500cc NS, IV zosyn/vanco, PRBC x1.     Patient seen this AM, awake, comfortable in no distress. See below for GOC with daughter Magalie.     PERTINENT PM/SXH:   Benign Hypertension    Diabetes    Diabetes    Hypercholesteremia    Adult Hypothyroidism    Deep Vein Thrombosis (DVT)    Stroke    SBO (Small Bowel Obstruction)    2019 novel coronavirus disease (COVID-19)    COVID-19 vaccine series completed      FH: Total Abdominal Hysterectomy and Bilateral Salpingo-Oophorectomy    S/P small bowel resection      FAMILY HISTORY:  Family history of diabetes mellitus (DM) (Child)    Family history of secondary lung cancer    Family history of breast cancer (Child)    ------------------------------------------------------------------------------------------------------------  ITEMS NOT CHECKED ARE NOT PRESENT    SOCIAL HISTORY:   Living Situation: [ ]Home  [X ]Long term care  [ ]Rehab [ ]Other  Support: Akbar Mejias; ; other daughter  3 years ago from heart attack     Substance hx:  [ ]   Tobacco hx:  [ ]   Alcohol hx: [ ]   Family Hx substance abuse [ ]yes [ ]no    Caodaism/Spirituality:  PCSSQ[Palliative Care Spiritual Screening Question]   Severity (0-10): 0  Score of 4 or > indicate consideration of Chaplaincy referral.  Chaplaincy Referral: [ ] yes [X ] refused [ ] following    Anticipatory Grief present?:  [ ] yes [X ] no  [ ] Deferred                      Other Referrals:    [ ]Hospice   [ ]Caregiver [place] Support  [ ]Child Life    [ ]Patient & Family Centered Care Referral  [ ]Holistic Therapy     ------------------------------------------------------------------------------------------------------------    PRESENT SYMPTOMS:     [X ] Unable to self-report      [X ] PAINADS     [X ] RDOS    [ ] No     [ ] Yes     Source if other than patient:  [ ]Family   [ ]Team     PAIN:   If blank, patient unable to specify     [ ]yes [ ]no    1. Location-   2. Radiation-    3. Quality-   4. Timing-   5. Minimal acceptable level/pain goal-   6. Aggravating factors-   7. QOL impact-     SYMPTOMS:   Dyspnea:                           [ ]Mild [ ]Moderate [ ]Severe  Anxiety:                             [ ]Mild [ ]Moderate [ ]Severe  Fatigue:                             [ ]Mild [ ]Moderate [ ]Severe  Nausea/Vomiting:              [ ]Mild [ ]Moderate [ ]Severe  Loss of appetite:                [ ]Mild [ ]Moderate [ ]Severe  Constipation:                     [ ]Mild [ ]Moderate [ ]Severe    Other Symptoms:  [ ]All other review of systems negative     ------------------------------------------------------------------------------------------------------------      I-Stop Reference No: [id number]    ------------------------------------------------------------------------------------------------------------    FUNCTIONAL STATUS:     Baseline ADL (prior to admission):  [ ] Independent  [ ] Moderate Assistance [X ] Dependent    Palliative Performance Score (current): 30%     [X ]PPSV2 < or = to 30%   [ ]artificial nutrition      NUTRITIONAL STATUS:     Protein Calorie Malnutrition Present:   [ ]mild   [ ]moderate or severe    Weight:   [ ]underweight (BMI 18.5 or less)   [ ]morbid obesity (BMI 30 or higher)   [ ]anasarca  [ ]significant weight loss     Height (cm): 149.9 (24 @ 16:00), 149.9 (24 @ 05:09), 152.4 (23 @ 12:44)  Weight (kg): 41.2 (24 @ 15:29), 34.6 (24 @ 05:09), 49.9 (23 @ 12:44)  BMI (kg/m2): 18.3 (24 @ 15:29), 15.4 (24 @ 16:00), 15.4 (24 @ 05:09)    ------------------------------------------------------------------------------------------------------------    PRIOR ADVANCE DIRECTIVES:    [X ] DNR/MOLST    [ ] Living Will    [ ] Health Care Proxy(s)    DECISION MAKER(s):  [ ] Patient    [X ] Surrogate(s)- daughter Magalie   [ ] HCP   [ ] Guardian             ------------------------------------------------------------------------------------------------------------  PHYSICAL EXAM:  Vital Signs Last 24 Hrs  T(C): 36.6 (2024 05:32), Max: 37.1 (2024 21:15)  T(F): 97.9 (2024 05:32), Max: 98.7 (2024 21:15)  HR: 64 (2024 05:32) (64 - 101)  BP: 132/37 (2024 05:32) (132/37 - 160/91)  BP(mean): 60 (2024 05:32) (60 - 60)  RR: 18 (2024 05:32) (18 - 24)  SpO2: 96% (2024 05:32) (96% - 100%)    Parameters below as of 2024 05:32  Patient On (Oxygen Delivery Method): nasal cannula  O2 Flow (L/min): 2   I&O's Summary    2024 07:01  -  2024 07:00  --------------------------------------------------------  IN: 0 mL / OUT: 1100 mL / NET: -1100 mL    GENERAL:  [X ]Cachexia  [X ] Frail  [X ]Awake  [ ]Oriented   [ ]Lethargic  [ ]Unarousable  [ ]Verbal  [ ]Non-Verbal    BEHAVIORAL:   [ ] Anxiety  [ ] Delirium [ ] Agitation [ ] Other    HEENT:   [ ]Normal   [X ]Dry mouth   [ ]ET Tube/Trach  [ ]Oral lesions    PULMONARY:   [X ]Clear              [ ]Tachypnea  [ ]Audible excessive secretions   [ ]Rhonchi        [ ]Right [ ]Left [ ]Bilateral  [ ]Crackles        [ ]Right [ ]Left [ ]Bilateral  [ ]Wheezing     [ ]Right [ ]Left [ ]Bilateral  [ ]Diminished breath sounds [ ]right [ ]left [ ]bilateral    CARDIOVASCULAR:    [X ]Regular [ ]Irregular [ ]Tachy  [ ]Rush [ ]Murmur [ ]Other    GASTROINTESTINAL:  [X ]Soft  [ ]Distended   [ ]+BS  [X ]Non tender [ ]Tender  [ ]Other [ ]PEG [ ]OGT/ NGT      GENITOURINARY:  [ ]Normal [X ] Incontinent   [ ]Oliguria/Anuria   [ ]Claudio    MUSCULOSKELETAL:   [ ]Normal   [ ]Weakness  [X ]Bed/Wheelchair bound [ ]Edema    NEUROLOGIC:   [ ]No focal deficits  [X ]Cognitive impairment  [ ]Dysphagia [ ]Dysarthria [ ]Paresis [ ]Other     SKIN:   [ ]Normal  [ ]Rash  [ ]Other  [X ]Pressure ulcer(s)       Present on admission [X ]y [ ]n    ------------------------------------------------------------------------------------------------------------    LABS:                        8.4    19.77 )-----------( 880      ( 2024 14:48 )             27.1       135  |  102  |  45<H>  ----------------------------<  145<H>  5.9<H>   |  19<L>  |  0.92    Ca    9.1      2024 14:50  Phos  3.4       Mg     2.10         TPro  7.6  /  Alb  2.8<L>  /  TBili  0.2  /  DBili  x   /  AST  16  /  ALT  14  /  AlkPhos  128<H>    PT/INR - ( 2024 04:10 )   PT: 15.3 sec;   INR: 1.37 ratio       PTT - ( 2024 04:10 )  PTT:36.1 sec    Urinalysis Basic - ( 2024 14:50 )    Color: x / Appearance: x / SG: x / pH: x  Gluc: 145 mg/dL / Ketone: x  / Bili: x / Urobili: x   Blood: x / Protein: x / Nitrite: x   Leuk Esterase: x / RBC: x / WBC x   Sq Epi: x / Non Sq Epi: x / Bacteria: x    ------------------------------------------------------------------------------------------------------------    CRITICAL CARE:  [ ]Shock Present  [ ]Septic [ ]Cardiogenic [ ]Neurologic [ ]Hypovolemic [ ] Undifferentiated/Mixed   [ ]Vasopressors [ ]Inotropes     [ ]Respiratory failure present: [ ]Acute  [ ]Chronic [ ]Hypoxic  [ ]Hypercarbic   [ ]Mechanical ventilation   [ ]Trach collar   [ ]Non-invasive ventilatory support   [ ]High flow    [ ]Non-rebreather/Venti     [ ]Other organ failure     ------------------------------------------------------------------------------------------------------------    RADIOLOGY & ADDITIONAL STUDIES:    < from: CT Abdomen and Pelvis w/ IV Cont (24 @ 07:32) >  IMPRESSION:  Claudio catheter located within the vagina    Mild circumferential bladder wall thickening. Correlate with urinalysis.    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    Small left and trace right pleural effusions.    < end of copied text >      ------------------------------------------------------------------------------------------------------------    ALLERGIES:  Allergies    black pepper, white pepper, cumin, paprika, johnston powder, chili powder (Rash)  No Known Drug Allergies    Intolerances    MEDICATIONS  (STANDING):  amoxicillin  500 milliGRAM(s)/clavulanate 1 Tablet(s) Oral two times a day  artificial  tears Solution 1 Drop(s) Both EYES two times a day  bisacodyl 5 milliGRAM(s) Oral at bedtime  dextrose 5%. 1000 milliLiter(s) (50 mL/Hr) IV Continuous <Continuous>  dextrose 5%. 1000 milliLiter(s) (100 mL/Hr) IV Continuous <Continuous>  dextrose 50% Injectable 25 Gram(s) IV Push once  dextrose 50% Injectable 25 Gram(s) IV Push once  dextrose 50% Injectable 12.5 Gram(s) IV Push once  glucagon  Injectable 1 milliGRAM(s) IntraMuscular once  hydroxyurea 500 milliGRAM(s) Oral daily  insulin lispro (ADMELOG) corrective regimen sliding scale   SubCutaneous at bedtime  insulin lispro (ADMELOG) corrective regimen sliding scale   SubCutaneous three times a day before meals  lactated ringers. 1000 milliLiter(s) (75 mL/Hr) IV Continuous <Continuous>  levothyroxine 175 MICROGram(s) Oral daily  metoprolol tartrate 25 milliGRAM(s) Oral two times a day  polyethylene glycol 3350 17 Gram(s) Oral daily    MEDICATIONS  (PRN):  acetaminophen     Tablet .. 650 milliGRAM(s) Oral every 6 hours PRN Temp greater or equal to 38C (100.4F), Mild Pain (1 - 3)  albuterol/ipratropium for Nebulization 3 milliLiter(s) Nebulizer every 6 hours PRN Shortness of Breath and/or Wheezing  dextrose Oral Gel 15 Gram(s) Oral once PRN Blood Glucose LESS THAN 70 milliGRAM(s)/deciliter

## 2024-07-26 NOTE — SWALLOW BEDSIDE ASSESSMENT ADULT - SWALLOW EVAL: DIAGNOSIS
1. Mild oral dysphagia for puree, moderately thick liquids, mildly thick liquids and thin liquids characterized by reduced oral containment for thin liquids resulting in trace-mild anterior loss from the oral cavity for thin liquids due to reduced labial seal closure with adequate oral containment of puree/ thickened liquids. Patient with slow bolus manipulation and slow anterior to posterior transport of puree> liquids with adequate oral clearance post swallow. 2. Functional pharyngeal stage suspected for puree, moderately thick liquids and mildly thick liquids characterized by initiation of the pharyngeal swallow and hyolaryngeal excursion upon digital palpation with no overt s/s penetration/ aspiration noted. 3. Moderate pharyngeal stage suspected for thin liquids characterized by suspect delayed initiation of the pharyngeal swallow and hyolaryngeal excursion upon digital palpation with a cough response noted post swallow.

## 2024-07-26 NOTE — PHYSICAL THERAPY INITIAL EVALUATION ADULT - MANUAL MUSCLE TESTING RESULTS, REHAB EVAL
Bilateral upper extremities 3/5, bilateral hamstrings 3-/5, bilateral quadriceps 2-/5, bilateral ankles 2-/5/grossly assessed due to

## 2024-07-26 NOTE — CONSULT NOTE ADULT - PROBLEM SELECTOR RECOMMENDATION 4
For GOC     Plan to return to [facility] daughter interested in comfort at LTC For GOC   Discussed with primary team and SW.     Plan to return to [facility], daughter interested in comfort at LTC    Palliative signing off

## 2024-07-26 NOTE — CONSULT NOTE ADULT - SUBJECTIVE AND OBJECTIVE BOX
Wound SURGERY CONSULT NOTE    HPI:  89 year old Female with PMHx Advanced Dementia, Afib, PCV on hydroxyurea, hypothyroidism, DM2, sacral decub, sent from [facility] for lethargy and not eating since morning of arrival. Patient is A&OxO at baseline. Has an indwelling Sylvester. History obtained from chart and daughter Magalie at [phone]. Daughter states mother has been at [facility] since March 2024 and has been slowly declining overtime. Per daughter since the weekend she was being treated for a UTI and due to lack of successful IV at [facility] she was sent to [facility] (per records pt was on IV Meropenem). No recent fevers, chills, SOB or nausea/vomiting or abdominal pain. No diarrhea. Daughter usually visits patient at [facility] at end of the day after work.  Per RN patient had breakfast earlier, daughter states she is on a puree diet and nectar consistency liquids.    VSS in ER  Received 500cc NS, IV zosyn/vanco, PRBC x1. 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 Pt known to wound service line from previous admission. Sacral wound with history for selective sharp debridement 2/29/24 at [facility], received short course of Zosyn subsequently changed to Levofloxacin (2/28-3/3) for infected sacral wound and OM, per ID at that time risk of longterm treatment with ABx outweighed benefit. Most recent records (April 2024) indicated that wound base was clean with exposed bone treated with Aquacel hydrofiber and silicone foam with border. Patient with endstage dementia, nonverbal, unable to provide subjective data, no family at bedside during time of encounter.      Current Diet: Diet, Regular:   Consistent Carbohydrate Evening Snack (CSTCHOSN)  Pureed (PUREED)  Mildly Thick Liquids (MILDTHICKLIQS) (07-25-24 @ 15:51)      PAST MEDICAL & SURGICAL HISTORY:  Benign Hypertension    Diabetes    Hypercholesteremia    Adult Hypothyroidism    Deep Vein Thrombosis (DVT)    Stroke    SBO (Small Bowel Obstruction)  08/2019 2019 novel coronavirus disease (COVID-19)    COVID-19 vaccine series completed    FH: Total Abdominal Hysterectomy and Bilateral Salpingo-Oophorectomy    S/P small bowel resection  08/2019      REVIEW OF SYSTEMS: Pt unable to offer due to baseline mental status      MEDICATIONS  (STANDING):  amoxicillin  500 milliGRAM(s)/clavulanate 1 Tablet(s) Oral two times a day  artificial  tears Solution 1 Drop(s) Both EYES two times a day  bisacodyl 5 milliGRAM(s) Oral at bedtime  dextrose 5%. 1000 milliLiter(s) (100 mL/Hr) IV Continuous <Continuous>  dextrose 5%. 1000 milliLiter(s) (50 mL/Hr) IV Continuous <Continuous>  dextrose 50% Injectable 12.5 Gram(s) IV Push once  dextrose 50% Injectable 25 Gram(s) IV Push once  dextrose 50% Injectable 25 Gram(s) IV Push once  glucagon  Injectable 1 milliGRAM(s) IntraMuscular once  hydroxyurea 500 milliGRAM(s) Oral daily  insulin lispro (ADMELOG) corrective regimen sliding scale   SubCutaneous three times a day before meals  insulin lispro (ADMELOG) corrective regimen sliding scale   SubCutaneous at bedtime  lactated ringers. 1000 milliLiter(s) (75 mL/Hr) IV Continuous <Continuous>  levothyroxine 175 MICROGram(s) Oral daily  metoprolol tartrate 25 milliGRAM(s) Oral two times a day  polyethylene glycol 3350 17 Gram(s) Oral daily    MEDICATIONS  (PRN):  acetaminophen     Tablet .. 650 milliGRAM(s) Oral every 6 hours PRN Temp greater or equal to 38C (100.4F), Mild Pain (1 - 3)  albuterol/ipratropium for Nebulization 3 milliLiter(s) Nebulizer every 6 hours PRN Shortness of Breath and/or Wheezing  dextrose Oral Gel 15 Gram(s) Oral once PRN Blood Glucose LESS THAN 70 milliGRAM(s)/deciliter      Allergies    black pepper, white pepper, cumin, paprika, johnston powder, chili powder (Rash)  No Known Drug Allergies    Intolerances        SOCIAL HISTORY: Resident at nursing [facility], has been at [facility] since March 2024. Bedbound, dependent on all ADLs.  Per records no history of smoking, etoh, illicit drug use.  (+) DNR/DNI    FAMILY HISTORY:  Family history of diabetes mellitus (DM) (Child)    Family history of secondary lung cancer    Family history of breast cancer (Child)        PHYSICAL EXAM:  Vital Signs Last 24 Hrs  T(C): 36.4 (26 Jul 2024 12:56), Max: 37.1 (25 Jul 2024 21:15)  T(F): 97.5 (26 Jul 2024 12:56), Max: 98.7 (25 Jul 2024 21:15)  HR: 70 (26 Jul 2024 12:56) (64 - 101)  BP: 136/72 (26 Jul 2024 12:56) (132/37 - 160/91)  BP(mean): 60 (26 Jul 2024 05:32) (60 - 60)  RR: 17 (26 Jul 2024 12:56) (17 - 24)  SpO2: 100% (26 Jul 2024 12:56) (96% - 100%)    Parameters below as of 26 Jul 2024 12:56  Patient On (Oxygen Delivery Method): room air      Constitutional: Eyes open, A&Ox0, bedbound, cachetic, frail. Dependent on ADLs  (+) low airloss support surface, (+) fluidized positioning devices, (+) complete cair boots  HEENT:  NC/AT, nonicteric, mucosa moist  Cardiovascular: rate regular  Respiratory: nonlabored, room air, equal chest expansion.  Gastrointestinal: soft NT/ND, (+) fecal incontinence  : indwelling sylvester catheter in place  Musculoskeletal:  limited, bilateral upper and lower extremities flaccid in extension, (+) muscle wasting, no gross deformities or contractures.  Vascular: BLE equally warm, no edema noted, +1 dp pulses, capillary refill < 3 seconds.   Bilateral toes and plantar feet with xerosis. Bilateral heels with stable callus: Left- 0.3cmx0.4cmx0, Right 0.5cmx0.5cmx0.   Bilateral lateral malleolus with hypopigmentation and circumferential hyperpigmentation.  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  -Moderate serofibrinous drainage, no odor.  -Periwound skin with extensive suspected candidiasis entire area measuring [id number] with pin-point satellite lesions in periphery and extending to perineum, otherwise no erythema, no edema, no increased warmth, no induration, no fluctuance, no s/s of acute soft tissue infection.  Antonia moisture barrier cream applied to periwound skin. Wound base packed with Aquacel hydrofiber, covered with silicone foam with border applied.      LABS/ CULTURES/ RADIOLOGY:                        8.4    19.77 )-----------( 880      ( 25 Jul 2024 14:48 )             27.1       135  |  102  |  45  ----------------------------<  145      [07-25-24 @ 14:50]  5.9   |  19  |  0.92        Ca     9.1     [07-25-24 @ 14:50]      Mg     2.10     [07-25-24 @ 14:50]      Phos  3.4     [07-25-24 @ 14:50]    TPro  7.6  /  Alb  2.8  /  TBili  0.2  /  DBili  x   /  AST  16  /  ALT  14  /  AlkPhos  128  [07-25-24 @ 04:10]    PT/INR: PT 15.3 , INR 1.37       [07-25-24 @ 04:10]  PTT: 36.1       [07-25-24 @ 04:10]          [07-25-24 @ 14:50]        ACC: [id number] EXAM:  CT ABDOMEN AND PELVIS IC   ORDERED BY: JEIMY BURGOS     PROCEDURE DATE:  07/25/2024          INTERPRETATION:  CLINICAL INFORMATION: Anemia. Abdominal tenderness.    COMPARISON: CT abdomen pelvis 12/18/2023    CONTRAST/COMPLICATIONS:  IV Contrast: Omnipaque 350  80 cc administered   20 cc discarded  Oral Contrast: NONE  Complications: None reported at time of study completion    PROCEDURE:  CT of the Abdomen and Pelvis was performed.  Sagittal and coronal reformats were performed.    FINDINGS:  LOWER CHEST: Small right and trace left pleural effusions.    LIVER: Within normal limits.  BILE DUCTS: Normal caliber.  GALLBLADDER: Cholelithiasis.  SPLEEN: Stable 1.4 cm hypodense lesion.  PANCREAS: Within normal limits.  ADRENALS: Within normal limits.  KIDNEYS/URETERS: Atrophic right kidney. No hydronephrosis.    BLADDER: Mild circumferential wall thickening. Small focus of   intraluminal air which may be related to recent dislocation.  REPRODUCTIVE ORGANS: Hysterectomy.Sylvester catheter balloon located in the   vagina.    BOWEL: No bowel obstruction. Appendix is normal.  PERITONEUM/RETROPERITONEUM: No ascites.  VESSELS: Severe atherosclerotic changes of the aorta. Stable focal   ectasia of the infrarenal abdominal aorta.  LYMPH NODES: No lymphadenopathy.  ABDOMINAL WALL: Large sacral decubitus ulcer.  BONES: Degenerative changes. Erosive changes of the coccyx.    IMPRESSION:  Sylvester catheter located within the vagina    Mild circumferential bladder wall thickening.Correlate with urinalysis.    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    Small left and trace right pleural effusions.    MD Geo was informed of these results by Jeimy Andujar MD with read   back at about 9:09 AM on 7/25/2024    --- End of Report ---          JEIMY ANDUJAR MD; Resident Radiologist  This document has been electronically signed.  SERJIO KAUR MD; Attending Radiologist  This document has been electronically signed. Jul 25 2024  9:23AM

## 2024-07-26 NOTE — SWALLOW BEDSIDE ASSESSMENT ADULT - ASR SWALLOW REFERRAL
patient may benefit from nutritional supplements (i.e., Ensure pudding) given documented concern for reduced PO intake/Registered Dietitian

## 2024-07-26 NOTE — DIETITIAN INITIAL EVALUATION ADULT - NS FNS DIET ORDER
Diet, Regular:   Consistent Carbohydrate {Evening Snack} (CSTCHOSN)  Pureed (PUREED)  Mildly Thick Liquids (MILDTHICKLIQS) (07-25-24 @ 15:51)

## 2024-07-26 NOTE — CONSULT NOTE ADULT - CONVERSATION DETAILS
Palliative consulted for complex medical decision making in the setting of serious illness.     Elicited family’s understanding of patient’s illness and prognosis. Daughter described patient's overall decline the past few months from progressive dementia. Patient now at [facility] for LTC.     Discussed that with various co-morbidities and poor functional status, patient was high risk with further medical events, hospitalizations. Explained that patient was a hospice candidate, discussed hospice philosophy. However patient will be going back to NH. Explained daughter can opt for a comfort care program at [facility]. Daughter understands patient will continue to deteriorate and is frustrated with patient's recurrent hospitalizations. She is interested in pursuing comfort at LTC to prevent further hospitalizations.     Daughter reaffirmed DNR/DNI.

## 2024-07-26 NOTE — SWALLOW BEDSIDE ASSESSMENT ADULT - SWALLOW EVAL: RECOMMENDED FEEDING/EATING TECHNIQUES
small spoonfuls; small sips; slow rate of intake; feed ONLY when Awake/ Alert/maintain upright posture during/after eating for 30 mins/oral hygiene/position upright (90 degrees)

## 2024-07-26 NOTE — CONSULT NOTE ADULT - PROBLEM SELECTOR RECOMMENDATION 9
- end stage dementia, FAST 7   - recurrent infections, wounds   - hospice appropriate-> daughter agreeable to transition to comfort at [facility]

## 2024-07-26 NOTE — PATIENT PROFILE ADULT - FALL HARM RISK - FACTORS NURSING JUDGEMENT
Impaired Activities of Daily Living    • Achieve highest/safest level of independence in self care Progressing        Impaired Functional Mobility    • Achieve highest/safest level of mobility/gait Progressing        Impaired Swallowing    • Minimize aspir Yes

## 2024-07-26 NOTE — DIETITIAN INITIAL EVALUATION ADULT - ORAL INTAKE PTA/DIET HISTORY
Pt unable to participate, A&Ox0; Comprehensive chart review completed. Per H&P, Daughter states mother has been at [facility] since March 2024 and has been slowly declining overtime. Per [facility] chart, Pt diet: No concentrated sweets, Puree, Nectar thick liquids. Pt received DDP, Gelatein plus, with total assistance with feeding. Per chart, Pt is allergic to black pepper, white pepper, cumin, paprika, johnston powder, chilli powder. Per [facility], UBW 97.5lbs (44.3kg). Per previous RD note 4/3/24, height 59 inches.

## 2024-07-26 NOTE — DIETITIAN INITIAL EVALUATION ADULT - ADD RECOMMEND
- Continue current diet order. Defer food and fluid consistency to SLP/Team.   - Nutrition department to provide Magic Cup reduced sugar 3x daily (280kcal, 9gm pro, serving).  - Please consistently document % PO intake in nursing flowsheets to assess adequacy of nutritional intake/monitor need for further nutritional intervention(s).   - Monitor weights, diet tolerance, skin integrity, BMs, pertinent labs.   - RDN services remain available as needed.

## 2024-07-26 NOTE — DIETITIAN NUTRITION RISK NOTIFICATION - TREATMENT: THE FOLLOWING DIET HAS BEEN RECOMMENDED
Diet, Regular:   Consistent Carbohydrate {Evening Snack} (CSTCHOSN)  Pureed (PUREED)  Mildly Thick Liquids (MILDTHICKLIQS) (07-25-24 @ 15:51) [Active]

## 2024-07-26 NOTE — PHYSICAL THERAPY INITIAL EVALUATION ADULT - GENERAL OBSERVATIONS, REHAB EVAL
Pt encountered in semisupine position, no distress, AxOx0, with +IV, and +sylvester. Pt agreeable to participate in PT evaluation. Vitals taken; /74mmhg.

## 2024-07-26 NOTE — SWALLOW BEDSIDE ASSESSMENT ADULT - ASR SWALLOW RECOMMEND DIAG
Objective testing Not warranted at this time given no overt s/s penetration/ aspiration noted with patient's baseline diet and no pneumonia indicated on chest imaging.

## 2024-07-26 NOTE — DIETITIAN INITIAL EVALUATION ADULT - PERTINENT LABORATORY DATA
07-25    135  |  102  |  45<H>  ----------------------------<  145<H>  5.9<H>   |  19<L>  |  0.92    Ca    9.1      25 Jul 2024 14:50  Phos  3.4     07-25  Mg     2.10     07-25    TPro  7.6  /  Alb  2.8<L>  /  TBili  0.2  /  DBili  x   /  AST  16  /  ALT  14  /  AlkPhos  128<H>  07-25  POCT Blood Glucose.: 132 mg/dL (07-26-24 @ 07:46)  A1C with Estimated Average Glucose Result: 7.4 % (07-25-24 @ 14:48)  A1C with Estimated Average Glucose Result: 7.5 % (02-29-24 @ 13:07)

## 2024-07-26 NOTE — CONSULT NOTE ADULT - ASSESSMENT
Patient is a 89 year old Female with PMHx Advanced Dementia, Afib, PCV on hydroxyurea, hypothyroidism, DM2, sacral decub, sent from [facility] for lethargy and not eating since morning of arrival. Patient is A&OxO at baseline. Has an indwelling Claudio. History obtained from chart and daughter Magalie at [phone]. Daughter states mother has been at [facility] since March 2024 and has been slowly declining overtime. Per daughter since the weekend she was being treated for a UTI and due to lack of successful IV at [facility] she was sent to [facility] (per records pt was on IV Meropenem). No recent fevers, chills, SOB or nausea/vomiting or abdominal pain. No diarrhea. Daughter usually visits patient at [facility] at end of the day after work.  Per RN patient had breakfast earlier, daughter states she is on a puree diet and nectar consistency liquids. VSS in ER. Received 500cc NS, IV zosyn/vanco, PRBC x1. 
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 Found to be anemic (Hg of 6.9) received PRBC, CT abd/pelvis with large sacral ulcer and osteomyelitis of the coccyx.    Wound Consult requested to assist w/ management of sacral stage 4 pressure injury with history of known Osteomyelitis.     Sacral stage 4 pressure injury with known OM  -Wound base clean; 25% bone palpable and exposed, remaining base pink-moist granular. (+) Semi-circumferential undermining, no tunnels. No palpable drainable collections.  -Periwound skin with suspected candidiasis affecting large surface area: [id number]  -Wound without s/s of acute soft tissue infection, no purulent drainage, no odor.  -7/25/24 CT abdomen/pelvis with large sacral ulcer with associated osteomyelitis of the coccyx, consistent with clinical exam  -Per records wound underwent selective sharp debridement 2/2024, appeared acutely infected at that time, treated with short course of Zosyn subsequently changed to Levofloxacin. ID records indicated that long term treatment with ABx outweighed benefit. At present, wound without s/s of acute soft tissue infection and is unlikely primary source of leukocytosis.  -Abx management per primary team/ID.  -Recommend systemic treatment of periwound suspected candidiasis with oral antifungal due to large surface area involved, will treat recommend topical antifungal as well.  -Topical recommendations: Cleanse wound and periwound skin with NS. Apply antifungal moisture barrier cream (Miconazole 2%) to periwound skin. Pack dead space with Aquacel hydrofiber, cover with silicone foam with border, change daily and prn if soiled. Once dressing is in place and perineal care is provided apply antifungal moisture barrier cream to perineum twice a day and prn with episodes of incontinence.  -Continue to offload pressure; continue low airloss support surface, continue to t&P per protocol and use of fluidized positioning devices, continue incontinence care per protocol with use of single breathable incontinence pad.  -Nutrition recommendations appreciated.  -Follow up goals of care of discussion  -Consider palliative care consult; patient with endstage dementia, bedbound, incontinent of stool, poor appetite, cachetic with muscle wasting, sacral stage 4 pressure injury. Wound is not expected to fully heal given multiple co-morbidities.    Bilateral feet with chronic skin changes and xerosis, no open ulcerations  -Bilateral heels with stable callus, bilateral lateral malleolus with hypopigmentation and circumferential hyperpigmentation  -Topical recommendations: bilateral heels and lateral malleolus apply liquid barrier film daily, allow to dry. Apply sween 24 to plantar feet daily, avoid between toes.  -Offload with complete cair boots.    Upon discharge follow up at [facility] [address] [place], [place] [id number]  Findings and plan discussed in detail with primary RN, primary team ACP Sudarshan Lees.  Patient seen with Dr. Gooden today, I was present for the entirety of this encounter.   Will continue to follow periodically throughout this hospitalization, please reconsult earlier as needed.  Remainder of care per primary team.    Thank you for this consult  NOEMI Garsia, JONATHONN (pager #[phone])    If after 4PM or before 7:30AM on Mon-Friday or weekend/holiday please contact general surgery for urgent matters.   Team A- [id number]   Team B- [id number]  For non-urgent matters e-mail [email or web address]    I spent 75 minutes face to face with this patient of which more than 50% of the time was spent counseling & coordinating care of this pt

## 2024-07-26 NOTE — CONSULT NOTE ADULT - CONSULT REASON
Sacral stage 4 pressure injury with history of known Osteomyelitis.
complex medical decision making in the setting of serious illness

## 2024-07-26 NOTE — DIETITIAN INITIAL EVALUATION ADULT - PERTINENT MEDS FT
MEDICATIONS  (STANDING):  amoxicillin  500 milliGRAM(s)/clavulanate 1 Tablet(s) Oral two times a day  apixaban 2.5 milliGRAM(s) Oral two times a day  artificial  tears Solution 1 Drop(s) Both EYES two times a day  bisacodyl 5 milliGRAM(s) Oral at bedtime  dextrose 5%. 1000 milliLiter(s) (50 mL/Hr) IV Continuous <Continuous>  dextrose 5%. 1000 milliLiter(s) (100 mL/Hr) IV Continuous <Continuous>  dextrose 50% Injectable 12.5 Gram(s) IV Push once  dextrose 50% Injectable 25 Gram(s) IV Push once  dextrose 50% Injectable 25 Gram(s) IV Push once  glucagon  Injectable 1 milliGRAM(s) IntraMuscular once  hydroxyurea 500 milliGRAM(s) Oral daily  insulin lispro (ADMELOG) corrective regimen sliding scale   SubCutaneous at bedtime  insulin lispro (ADMELOG) corrective regimen sliding scale   SubCutaneous three times a day before meals  lactated ringers. 1000 milliLiter(s) (75 mL/Hr) IV Continuous <Continuous>  levothyroxine 175 MICROGram(s) Oral daily  metoprolol tartrate 25 milliGRAM(s) Oral two times a day  polyethylene glycol 3350 17 Gram(s) Oral daily    MEDICATIONS  (PRN):  acetaminophen     Tablet .. 650 milliGRAM(s) Oral every 6 hours PRN Temp greater or equal to 38C (100.4F), Mild Pain (1 - 3)  albuterol/ipratropium for Nebulization 3 milliLiter(s) Nebulizer every 6 hours PRN Shortness of Breath and/or Wheezing  dextrose Oral Gel 15 Gram(s) Oral once PRN Blood Glucose LESS THAN 70 milliGRAM(s)/deciliter

## 2024-07-26 NOTE — PHYSICAL THERAPY INITIAL EVALUATION ADULT - ADDITIONAL COMMENTS
Pt is a poor historian/non-verbal. No updated care coordinator note at this time. As per last care coordinator note on 04/03/2024; [facility]  Patients DME;  wheelchair. Patient completes ADLs with the help of the nursing home staff. Patient receives medical care from the nursing home staff.    Pt left comfortable in bed, HOB elevated, NAD, all lines intact, all precautions maintained, with call bell in reach, bed alarm on, and RN aware of PT evaluation.

## 2024-07-26 NOTE — PHYSICAL THERAPY INITIAL EVALUATION ADULT - PATIENT PROFILE REVIEW, REHAB EVAL
No Formal Activity Order in the computer; Spoke with RN Hussain Snowden prior to PT evaluation-->Pt OK for PT consult./yes

## 2024-07-27 LAB
ANION GAP SERPL CALC-SCNC: 13 MMOL/L — SIGNIFICANT CHANGE UP (ref 7–14)
BUN SERPL-MCNC: 31 MG/DL — HIGH (ref 7–23)
CALCIUM SERPL-MCNC: 9.4 MG/DL — SIGNIFICANT CHANGE UP (ref 8.4–10.5)
CHLORIDE SERPL-SCNC: 100 MMOL/L — SIGNIFICANT CHANGE UP (ref 98–107)
CO2 SERPL-SCNC: 20 MMOL/L — LOW (ref 22–31)
CREAT SERPL-MCNC: 0.98 MG/DL — SIGNIFICANT CHANGE UP (ref 0.5–1.3)
EGFR: 54 ML/MIN/1.73M2 — LOW
GLUCOSE BLDC GLUCOMTR-MCNC: 145 MG/DL — HIGH (ref 70–99)
GLUCOSE BLDC GLUCOMTR-MCNC: 163 MG/DL — HIGH (ref 70–99)
GLUCOSE BLDC GLUCOMTR-MCNC: 165 MG/DL — HIGH (ref 70–99)
GLUCOSE BLDC GLUCOMTR-MCNC: 167 MG/DL — HIGH (ref 70–99)
GLUCOSE BLDC GLUCOMTR-MCNC: 189 MG/DL — HIGH (ref 70–99)
GLUCOSE SERPL-MCNC: 130 MG/DL — HIGH (ref 70–99)
HCT VFR BLD CALC: 27.3 % — LOW (ref 34.5–45)
HGB BLD-MCNC: 8.4 G/DL — LOW (ref 11.5–15.5)
MAGNESIUM SERPL-MCNC: 2 MG/DL — SIGNIFICANT CHANGE UP (ref 1.6–2.6)
MCHC RBC-ENTMCNC: 25.8 PG — LOW (ref 27–34)
MCHC RBC-ENTMCNC: 30.8 GM/DL — LOW (ref 32–36)
MCV RBC AUTO: 83.7 FL — SIGNIFICANT CHANGE UP (ref 80–100)
NRBC # BLD: 0 /100 WBCS — SIGNIFICANT CHANGE UP (ref 0–0)
NRBC # FLD: 0.02 K/UL — HIGH (ref 0–0)
PHOSPHATE SERPL-MCNC: 3.3 MG/DL — SIGNIFICANT CHANGE UP (ref 2.5–4.5)
PLATELET # BLD AUTO: 914 K/UL — HIGH (ref 150–400)
POTASSIUM SERPL-MCNC: 4.2 MMOL/L — SIGNIFICANT CHANGE UP (ref 3.5–5.3)
POTASSIUM SERPL-SCNC: 4.2 MMOL/L — SIGNIFICANT CHANGE UP (ref 3.5–5.3)
RBC # BLD: 3.26 M/UL — LOW (ref 3.8–5.2)
RBC # FLD: 17.4 % — HIGH (ref 10.3–14.5)
SODIUM SERPL-SCNC: 133 MMOL/L — LOW (ref 135–145)
WBC # BLD: 13.67 K/UL — HIGH (ref 3.8–10.5)
WBC # FLD AUTO: 13.67 K/UL — HIGH (ref 3.8–10.5)

## 2024-07-27 PROCEDURE — 99233 SBSQ HOSP IP/OBS HIGH 50: CPT

## 2024-07-27 RX ORDER — DEXTROSE MONOHYDRATE, SODIUM CHLORIDE, SODIUM LACTATE, CALCIUM CHLORIDE, MAGNESIUM CHLORIDE 1.5; 538; 448; 18.4; 5.08 G/100ML; MG/100ML; MG/100ML; MG/100ML; MG/100ML
1000 SOLUTION INTRAPERITONEAL
Refills: 0 | Status: DISCONTINUED | OUTPATIENT
Start: 2024-07-27 | End: 2024-07-30

## 2024-07-27 RX ADMIN — Medication 1: at 12:44

## 2024-07-27 RX ADMIN — Medication 1: at 08:39

## 2024-07-27 RX ADMIN — Medication 1 DROP(S): at 06:00

## 2024-07-27 RX ADMIN — Medication 17 GRAM(S): at 11:49

## 2024-07-27 RX ADMIN — Medication 1 DROP(S): at 16:46

## 2024-07-27 RX ADMIN — Medication 100 MILLIGRAM(S): at 11:53

## 2024-07-27 RX ADMIN — Medication 25 MILLIGRAM(S): at 16:46

## 2024-07-27 RX ADMIN — DEXTROSE MONOHYDRATE, SODIUM CHLORIDE, SODIUM LACTATE, CALCIUM CHLORIDE, MAGNESIUM CHLORIDE 75 MILLILITER(S): 1.5; 538; 448; 18.4; 5.08 SOLUTION INTRAPERITONEAL at 11:54

## 2024-07-27 RX ADMIN — Medication 1: at 17:14

## 2024-07-27 RX ADMIN — Medication 1 TABLET(S): at 05:57

## 2024-07-27 RX ADMIN — HYDROXYUREA 500 MILLIGRAM(S): 500 CAPSULE ORAL at 11:49

## 2024-07-27 RX ADMIN — Medication 5 MILLIGRAM(S): at 21:33

## 2024-07-27 RX ADMIN — Medication 1 TABLET(S): at 16:45

## 2024-07-27 RX ADMIN — Medication 25 MILLIGRAM(S): at 05:57

## 2024-07-27 RX ADMIN — Medication 175 MICROGRAM(S): at 05:57

## 2024-07-27 NOTE — PROGRESS NOTE ADULT - TIME-BASED
[Chills] : chills [Malaise] : malaise [Appetite Changes] : appetite changes [Vomiting] : vomiting [Diarrhea] : diarrhea [Abdominal Pain] : abdominal pain [Negative] : Genitourinary 45

## 2024-07-28 LAB
-  AMPICILLIN/SULBACTAM: SIGNIFICANT CHANGE UP
-  AMPICILLIN: SIGNIFICANT CHANGE UP
-  AZTREONAM: SIGNIFICANT CHANGE UP
-  CEFAZOLIN: SIGNIFICANT CHANGE UP
-  CEFEPIME: SIGNIFICANT CHANGE UP
-  CEFTRIAXONE: SIGNIFICANT CHANGE UP
-  CEFUROXIME: SIGNIFICANT CHANGE UP
-  CIPROFLOXACIN: SIGNIFICANT CHANGE UP
-  ERTAPENEM: SIGNIFICANT CHANGE UP
-  GENTAMICIN: SIGNIFICANT CHANGE UP
-  IMIPENEM: SIGNIFICANT CHANGE UP
-  LEVOFLOXACIN: SIGNIFICANT CHANGE UP
-  MEROPENEM: SIGNIFICANT CHANGE UP
-  NITROFURANTOIN: SIGNIFICANT CHANGE UP
-  PIPERACILLIN/TAZOBACTAM: SIGNIFICANT CHANGE UP
-  TOBRAMYCIN: SIGNIFICANT CHANGE UP
-  TRIMETHOPRIM/SULFAMETHOXAZOLE: SIGNIFICANT CHANGE UP
ANION GAP SERPL CALC-SCNC: 10 MMOL/L — SIGNIFICANT CHANGE UP (ref 7–14)
BLD GP AB SCN SERPL QL: NEGATIVE — SIGNIFICANT CHANGE UP
BUN SERPL-MCNC: 26 MG/DL — HIGH (ref 7–23)
CALCIUM SERPL-MCNC: 9.6 MG/DL — SIGNIFICANT CHANGE UP (ref 8.4–10.5)
CHLORIDE SERPL-SCNC: 102 MMOL/L — SIGNIFICANT CHANGE UP (ref 98–107)
CO2 SERPL-SCNC: 22 MMOL/L — SIGNIFICANT CHANGE UP (ref 22–31)
CREAT SERPL-MCNC: 0.88 MG/DL — SIGNIFICANT CHANGE UP (ref 0.5–1.3)
CULTURE RESULTS: ABNORMAL
EGFR: 62 ML/MIN/1.73M2 — SIGNIFICANT CHANGE UP
GLUCOSE BLDC GLUCOMTR-MCNC: 138 MG/DL — HIGH (ref 70–99)
GLUCOSE BLDC GLUCOMTR-MCNC: 153 MG/DL — HIGH (ref 70–99)
GLUCOSE BLDC GLUCOMTR-MCNC: 160 MG/DL — HIGH (ref 70–99)
GLUCOSE BLDC GLUCOMTR-MCNC: 213 MG/DL — HIGH (ref 70–99)
GLUCOSE BLDC GLUCOMTR-MCNC: 285 MG/DL — HIGH (ref 70–99)
GLUCOSE SERPL-MCNC: 138 MG/DL — HIGH (ref 70–99)
MAGNESIUM SERPL-MCNC: 1.9 MG/DL — SIGNIFICANT CHANGE UP (ref 1.6–2.6)
METHOD TYPE: SIGNIFICANT CHANGE UP
ORGANISM # SPEC MICROSCOPIC CNT: ABNORMAL
ORGANISM # SPEC MICROSCOPIC CNT: ABNORMAL
PHOSPHATE SERPL-MCNC: 3.5 MG/DL — SIGNIFICANT CHANGE UP (ref 2.5–4.5)
POTASSIUM SERPL-MCNC: 4.3 MMOL/L — SIGNIFICANT CHANGE UP (ref 3.5–5.3)
POTASSIUM SERPL-SCNC: 4.3 MMOL/L — SIGNIFICANT CHANGE UP (ref 3.5–5.3)
RH IG SCN BLD-IMP: POSITIVE — SIGNIFICANT CHANGE UP
SODIUM SERPL-SCNC: 134 MMOL/L — LOW (ref 135–145)
SPECIMEN SOURCE: SIGNIFICANT CHANGE UP

## 2024-07-28 PROCEDURE — 99232 SBSQ HOSP IP/OBS MODERATE 35: CPT

## 2024-07-28 RX ADMIN — Medication 1 DROP(S): at 18:11

## 2024-07-28 RX ADMIN — Medication 1 TABLET(S): at 18:07

## 2024-07-28 RX ADMIN — Medication 25 MILLIGRAM(S): at 05:31

## 2024-07-28 RX ADMIN — Medication 100 MILLIGRAM(S): at 12:30

## 2024-07-28 RX ADMIN — Medication 17 GRAM(S): at 12:30

## 2024-07-28 RX ADMIN — HYDROXYUREA 500 MILLIGRAM(S): 500 CAPSULE ORAL at 12:30

## 2024-07-28 RX ADMIN — Medication 25 MILLIGRAM(S): at 18:07

## 2024-07-28 RX ADMIN — Medication 1: at 08:32

## 2024-07-28 RX ADMIN — Medication 1 TABLET(S): at 05:31

## 2024-07-28 RX ADMIN — Medication 1 DROP(S): at 05:33

## 2024-07-28 RX ADMIN — Medication 175 MICROGRAM(S): at 05:32

## 2024-07-28 RX ADMIN — Medication 1: at 17:24

## 2024-07-28 NOTE — DISCHARGE NOTE PROVIDER - NSDCMRMEDTOKEN_GEN_ALL_CORE_FT
acetaminophen 325 mg oral tablet: 2 tab(s) orally every 6 hours As needed Temp greater or equal to 38C (100.4F), Mild Pain (1 - 3), Moderate Pain (4 - 6)  apixaban 2.5 mg oral tablet: 1 tab(s) orally 2 times a day  glipiZIDE 2.5 mg oral tablet: 1 tab(s) orally 2 times a day  hydroxyurea 500 mg oral capsule: 1 cap(s) orally once a day  ipratropium-albuterol 0.5 mg-2.5 mg/3 mL inhalation solution: 3 milliliter(s) inhaled every 6 hours  metoprolol tartrate 25 mg oral tablet: 1 tab(s) orally 2 times a day  NovoLOG FlexPen 100 units/mL injectable solution: 5 unit(s) injectable 3 times a day  ocular lubricant ophthalmic solution: 1 drop(s) to each affected eye 2 times a day  polyethylene glycol 3350 oral powder for reconstitution: 17 gram(s) orally once a day as needed for  constipation  senna (sennosides) 8.6 mg oral tablet: 1 tab(s) orally once a day as needed for  constipation  Synthroid 175 mcg (0.175 mg) oral tablet: 1 tab(s) orally once a day   amoxicillin-clavulanate 500 mg-125 mg oral tablet: 1 tab(s) orally 2 times a day  bisacodyl 5 mg oral delayed release tablet: 1 tab(s) orally once a day (at bedtime)  hydroxyurea 500 mg oral capsule: 1 cap(s) orally once a day  insulin lispro 100 units/mL injectable solution: injectable 3 times a day (before meals)  insulin lispro 100 units/mL injectable solution: injectable once a day (at bedtime)  ipratropium-albuterol 0.5 mg-2.5 mg/3 mL inhalation solution: 3 milliliter(s) inhaled every 6 hours As needed Shortness of Breath and/or Wheezing  metoprolol tartrate 25 mg oral tablet: 1 tab(s) orally 2 times a day  metoprolol tartrate 25 mg oral tablet: 1 tab(s) orally 2 times a day  ocular lubricant ophthalmic solution: 1 drop(s) to each affected eye 2 times a day  polyethylene glycol 3350 oral powder for reconstitution: 17 gram(s) orally once a day  Synthroid 175 mcg (0.175 mg) oral tablet: 1 tab(s) orally once a day

## 2024-07-28 NOTE — DISCHARGE NOTE PROVIDER - DETAILS OF MALNUTRITION DIAGNOSIS/DIAGNOSES
This patient has been assessed with a concern for Malnutrition and was treated during this hospitalization for the following Nutrition diagnosis/diagnoses:     -  07/26/2024: Severe protein-calorie malnutrition

## 2024-07-28 NOTE — DISCHARGE NOTE PROVIDER - ATTENDING DISCHARGE PHYSICAL EXAMINATION:
CONSTITUTIONAL: NAD, elderly, well-groomed  EYES: PERRLA; conjunctiva and sclera clear  ENMT: Moist oral mucosa, no pharyngeal injection or exudates  NECK: Supple, no palpable masses; no thyromegaly  RESPIRATORY: Normal respiratory effort; lungs are clear to auscultation bilaterally  CARDIOVASCULAR: Regular rate and rhythm, normal S1 and S2, no murmur/rub/gallop; No lower extremity edema; Peripheral pulses are 2+ bilaterally  ABDOMEN: Nontender to palpation, normoactive bowel sounds, no rebound/guarding; No hepatosplenomegaly  MUSCULOSKELETAL: no clubbing or cyanosis of digits; no joint swelling or tenderness to palpation  PSYCH: AAOx0 - says hello but does not answer questions  NEUROLOGY: not cooperative with neurologic exam; does not follow simple commands; tracks movements  SKIN: No rashes; no palpable lesions

## 2024-07-28 NOTE — PROGRESS NOTE ADULT - CONSTITUTIONAL
normal/well-groomed/no distress/cachectic
normal/well-groomed/no distress
normal/well-groomed/no distress

## 2024-07-28 NOTE — DISCHARGE NOTE PROVIDER - REASON FOR ADMISSION
Bed: G14  Expected date: 8/5/18  Expected time: 8:50 PM  Means of arrival: [facility] (44)  Comments:  88 F left hip and elbow pain after ground level fall, A&Ox4, GSC 15, 207/117, 92, 95%, 20    10mg ketamine given, pain now 1/10   Lethargy

## 2024-07-28 NOTE — DISCHARGE NOTE PROVIDER - NSDCCPCAREPLAN_GEN_ALL_CORE_FT
PRINCIPAL DISCHARGE DIAGNOSIS  Diagnosis: Anemia  Assessment and Plan of Treatment: received blood transfusion. h/h stable after.      SECONDARY DISCHARGE DIAGNOSES  Diagnosis: Advanced dementia  Assessment and Plan of Treatment: bedbound, poor PO. progressive decline. GOC discussed. family wishe to comfort care and hospice.    Diagnosis: Leukocytosis  Assessment and Plan of Treatment: UTI vs chronic OM vs polycythemia vs dehydration. improved with PO augmentin and IVF.    Diagnosis: Acute osteomyelitis  Assessment and Plan of Treatment: sacral wound w/o signs of acute bacterial infection. OM likely chronic due to deep sacral wound. complete 2 weeks of PO Augmentin.

## 2024-07-28 NOTE — DISCHARGE NOTE PROVIDER - HOSPITAL COURSE
HPI:  89 year old Female with PMHx Advanced Dementia, Afib, PCV on hydroxyurea, hypothyroidism, DM2, sacral decub, sent from [facility] for lethargy and not eating since morning of arrival. Patient is A&OxO at baseline. Has an indwelling Claudio. History obtained from chart and daughter Magalie at [phone]. Daughter states mother has been at [facility] since March 2024 and has been slowly declining overtime. Per daughter since the weekend she was being treated for a UTI and due to lack of successful IV at [facility] she was sent to [facility] (per records pt was on IV Meropenem). No recent fevers, chills, SOB or nausea/vomiting or abdominal pain. No diarrhea. Daughter usually visits patient at [facility] at end of the day after work.  Per RN patient had breakfast earlier, daughter states she is on a puree diet and nectar consistency liquids.    VSS in ER  Received 500cc NS, IV zosyn/vanco, PRBC x1. (25 Jul 2024 13:57)    Hospital Course:  GOC discussion on admission with family. They wish for conservative medical treatments, comfort care and Hospice. She was treated with PO abx. and IVF. clinically improved in mental status. she is now stable for return to [place] and transition to hospice.     Important Medication Changes and Reason:        Active or Pending Issues Requiring Follow-up:    Advanced Directives:   [ ] Full code  [ ] DNR  [ ] Hospice    Discharge Diagnoses:   HPI:  89 year old Female with PMHx Advanced Dementia, Afib, PCV on hydroxyurea, hypothyroidism, DM2, sacral decub, sent from [facility] for lethargy and not eating since morning of arrival. Patient is A&OxO at baseline. Has an indwelling Claudio. History obtained from chart and daughter Magalie at [phone]. Daughter states mother has been at [facility] since March 2024 and has been slowly declining overtime. Per daughter since the weekend she was being treated for a UTI and due to lack of successful IV at [facility] she was sent to [facility] (per records pt was on IV Meropenem). No recent fevers, chills, SOB or nausea/vomiting or abdominal pain. No diarrhea. Daughter usually visits patient at [facility] at end of the day after work.  Per RN patient had breakfast earlier, daughter states she is on a puree diet and nectar consistency liquids.    VSS in ER  Received 500cc NS, IV zosyn/vanco, PRBC x1. (25 Jul 2024 13:57)    Hospital Course:  GOC discussion on admission with family. They wish for conservative medical treatments, comfort care and Hospice. She was treated with PO abx. and IVF. clinically improved in mental status. she is now stable for return to [place] and transition to hospice. Anticoagulation was discontinued to align with hospice and in setting of patient's anemia.

## 2024-07-29 LAB
ANION GAP SERPL CALC-SCNC: 11 MMOL/L — SIGNIFICANT CHANGE UP (ref 7–14)
BUN SERPL-MCNC: 30 MG/DL — HIGH (ref 7–23)
CALCIUM SERPL-MCNC: 9.6 MG/DL — SIGNIFICANT CHANGE UP (ref 8.4–10.5)
CHLORIDE SERPL-SCNC: 98 MMOL/L — SIGNIFICANT CHANGE UP (ref 98–107)
CO2 SERPL-SCNC: 22 MMOL/L — SIGNIFICANT CHANGE UP (ref 22–31)
CREAT SERPL-MCNC: 1.04 MG/DL — SIGNIFICANT CHANGE UP (ref 0.5–1.3)
EGFR: 51 ML/MIN/1.73M2 — LOW
GLUCOSE BLDC GLUCOMTR-MCNC: 135 MG/DL — HIGH (ref 70–99)
GLUCOSE BLDC GLUCOMTR-MCNC: 144 MG/DL — HIGH (ref 70–99)
GLUCOSE BLDC GLUCOMTR-MCNC: 151 MG/DL — HIGH (ref 70–99)
GLUCOSE BLDC GLUCOMTR-MCNC: 231 MG/DL — HIGH (ref 70–99)
GLUCOSE SERPL-MCNC: 155 MG/DL — HIGH (ref 70–99)
MAGNESIUM SERPL-MCNC: 2 MG/DL — SIGNIFICANT CHANGE UP (ref 1.6–2.6)
PHOSPHATE SERPL-MCNC: 3.7 MG/DL — SIGNIFICANT CHANGE UP (ref 2.5–4.5)
POTASSIUM SERPL-MCNC: 5.2 MMOL/L — SIGNIFICANT CHANGE UP (ref 3.5–5.3)
POTASSIUM SERPL-SCNC: 5.2 MMOL/L — SIGNIFICANT CHANGE UP (ref 3.5–5.3)
SODIUM SERPL-SCNC: 131 MMOL/L — LOW (ref 135–145)

## 2024-07-29 PROCEDURE — 99232 SBSQ HOSP IP/OBS MODERATE 35: CPT

## 2024-07-29 RX ADMIN — Medication 1 DROP(S): at 19:21

## 2024-07-29 RX ADMIN — HYDROXYUREA 500 MILLIGRAM(S): 500 CAPSULE ORAL at 12:08

## 2024-07-29 RX ADMIN — Medication 100 MILLIGRAM(S): at 12:08

## 2024-07-29 RX ADMIN — Medication 5 MILLIGRAM(S): at 21:59

## 2024-07-29 RX ADMIN — Medication 175 MICROGRAM(S): at 05:30

## 2024-07-29 RX ADMIN — Medication 25 MILLIGRAM(S): at 05:30

## 2024-07-29 RX ADMIN — Medication 1 TABLET(S): at 19:07

## 2024-07-29 RX ADMIN — Medication 1 TABLET(S): at 05:30

## 2024-07-29 RX ADMIN — Medication 17 GRAM(S): at 12:08

## 2024-07-29 RX ADMIN — Medication 2: at 12:04

## 2024-07-29 RX ADMIN — Medication 25 MILLIGRAM(S): at 19:07

## 2024-07-29 NOTE — PROGRESS NOTE ADULT - TIME BILLING
Time-based billing (NON-critical care).     35  minutes spent on total encounter; more than 50% of the visit was spent counseling and / or coordinating care by the attending physician.  The necessity of the time spent during the encounter on this date of service was due to:     review of laboratory data, radiology results, consultants' recommendations, documentation in [place]

## 2024-07-29 NOTE — PROVIDER CONTACT NOTE (OTHER) - ACTION/TREATMENT ORDERED:
Provider notified. ANM attempted IV, with no success. Plan to have IV nurse try 7/29. Safety maintained.
ACP notified and aware, patient tolerating PO meals and fluids at this time. Ok for no IV access.

## 2024-07-30 LAB
ANION GAP SERPL CALC-SCNC: 13 MMOL/L — SIGNIFICANT CHANGE UP (ref 7–14)
ANION GAP SERPL CALC-SCNC: 14 MMOL/L — SIGNIFICANT CHANGE UP (ref 7–14)
BUN SERPL-MCNC: 36 MG/DL — HIGH (ref 7–23)
BUN SERPL-MCNC: 38 MG/DL — HIGH (ref 7–23)
CALCIUM SERPL-MCNC: 9.6 MG/DL — SIGNIFICANT CHANGE UP (ref 8.4–10.5)
CALCIUM SERPL-MCNC: 9.9 MG/DL — SIGNIFICANT CHANGE UP (ref 8.4–10.5)
CHLORIDE SERPL-SCNC: 99 MMOL/L — SIGNIFICANT CHANGE UP (ref 98–107)
CHLORIDE SERPL-SCNC: 99 MMOL/L — SIGNIFICANT CHANGE UP (ref 98–107)
CO2 SERPL-SCNC: 20 MMOL/L — LOW (ref 22–31)
CO2 SERPL-SCNC: 21 MMOL/L — LOW (ref 22–31)
CREAT SERPL-MCNC: 1.03 MG/DL — SIGNIFICANT CHANGE UP (ref 0.5–1.3)
CREAT SERPL-MCNC: 1.29 MG/DL — SIGNIFICANT CHANGE UP (ref 0.5–1.3)
EGFR: 39 ML/MIN/1.73M2 — LOW
EGFR: 51 ML/MIN/1.73M2 — LOW
GLUCOSE BLDC GLUCOMTR-MCNC: 171 MG/DL — HIGH (ref 70–99)
GLUCOSE BLDC GLUCOMTR-MCNC: 175 MG/DL — HIGH (ref 70–99)
GLUCOSE BLDC GLUCOMTR-MCNC: 249 MG/DL — HIGH (ref 70–99)
GLUCOSE BLDC GLUCOMTR-MCNC: 284 MG/DL — HIGH (ref 70–99)
GLUCOSE SERPL-MCNC: 170 MG/DL — HIGH (ref 70–99)
GLUCOSE SERPL-MCNC: 227 MG/DL — HIGH (ref 70–99)
HCT VFR BLD CALC: 29.1 % — LOW (ref 34.5–45)
HGB BLD-MCNC: 8.8 G/DL — LOW (ref 11.5–15.5)
MAGNESIUM SERPL-MCNC: 2 MG/DL — SIGNIFICANT CHANGE UP (ref 1.6–2.6)
MAGNESIUM SERPL-MCNC: 2.1 MG/DL — SIGNIFICANT CHANGE UP (ref 1.6–2.6)
MCHC RBC-ENTMCNC: 26 PG — LOW (ref 27–34)
MCHC RBC-ENTMCNC: 30.2 GM/DL — LOW (ref 32–36)
MCV RBC AUTO: 85.8 FL — SIGNIFICANT CHANGE UP (ref 80–100)
NRBC # BLD: 0 /100 WBCS — SIGNIFICANT CHANGE UP (ref 0–0)
NRBC # FLD: 0 K/UL — SIGNIFICANT CHANGE UP (ref 0–0)
PHOSPHATE SERPL-MCNC: 4.8 MG/DL — HIGH (ref 2.5–4.5)
PHOSPHATE SERPL-MCNC: 5.1 MG/DL — HIGH (ref 2.5–4.5)
PLATELET # BLD AUTO: 793 K/UL — HIGH (ref 150–400)
POTASSIUM SERPL-MCNC: 5.1 MMOL/L — SIGNIFICANT CHANGE UP (ref 3.5–5.3)
POTASSIUM SERPL-MCNC: 5.5 MMOL/L — HIGH (ref 3.5–5.3)
POTASSIUM SERPL-SCNC: 5.1 MMOL/L — SIGNIFICANT CHANGE UP (ref 3.5–5.3)
POTASSIUM SERPL-SCNC: 5.5 MMOL/L — HIGH (ref 3.5–5.3)
RBC # BLD: 3.39 M/UL — LOW (ref 3.8–5.2)
RBC # FLD: 17.7 % — HIGH (ref 10.3–14.5)
SODIUM SERPL-SCNC: 133 MMOL/L — LOW (ref 135–145)
SODIUM SERPL-SCNC: 133 MMOL/L — LOW (ref 135–145)
WBC # BLD: 13.24 K/UL — HIGH (ref 3.8–10.5)
WBC # FLD AUTO: 13.24 K/UL — HIGH (ref 3.8–10.5)

## 2024-07-30 PROCEDURE — 99232 SBSQ HOSP IP/OBS MODERATE 35: CPT

## 2024-07-30 RX ORDER — SODIUM POLYSTYRENE SULFONATE 15 G/60ML
15 SUSPENSION ORAL; RECTAL ONCE
Refills: 0 | Status: COMPLETED | OUTPATIENT
Start: 2024-07-30 | End: 2024-07-30

## 2024-07-30 RX ADMIN — Medication 5 MILLIGRAM(S): at 22:48

## 2024-07-30 RX ADMIN — Medication 25 MILLIGRAM(S): at 05:08

## 2024-07-30 RX ADMIN — SODIUM POLYSTYRENE SULFONATE 15 GRAM(S): 15 SUSPENSION ORAL; RECTAL at 11:37

## 2024-07-30 RX ADMIN — Medication 175 MICROGRAM(S): at 05:09

## 2024-07-30 RX ADMIN — Medication 3: at 12:32

## 2024-07-30 RX ADMIN — Medication 25 MILLIGRAM(S): at 18:00

## 2024-07-30 RX ADMIN — Medication 1: at 08:41

## 2024-07-30 RX ADMIN — Medication 17 GRAM(S): at 11:37

## 2024-07-30 RX ADMIN — Medication 2: at 17:58

## 2024-07-30 RX ADMIN — Medication 1 DROP(S): at 17:58

## 2024-07-30 RX ADMIN — HYDROXYUREA 500 MILLIGRAM(S): 500 CAPSULE ORAL at 11:38

## 2024-07-30 RX ADMIN — Medication 1 DROP(S): at 05:12

## 2024-07-30 RX ADMIN — Medication 1 TABLET(S): at 05:08

## 2024-07-30 RX ADMIN — Medication 1 TABLET(S): at 19:37

## 2024-07-30 NOTE — PROGRESS NOTE ADULT - TIME BILLING
Time-based billing (NON-critical care).     36 minutes spent on total encounter; more than 50% of the visit was spent counseling and / or coordinating care by the attending physician.  The necessity of the time spent during the encounter on this date of service was due to:     review of laboratory data, radiology results, consultants' recommendations, documentation in [place], discussion with patient/ACP and interdisciplinary staff (such as , social workers, etc). Interventions were performed as documented above.

## 2024-07-30 NOTE — CHART NOTE - NSCHARTNOTEFT_GEN_A_CORE
Pt seen for malnutrition follow up.     Medical Course:  - Per chart, pt is 89 year old female PMH advanced dementia, Afib, PCV, hypothyroidism, type 2 DM presenting from [facility] for lethargy, poor PO found to be anemic with possible OM of sacrum, likely chronic. Palliative was consulted, DNR/DNI with plan for discharge back to LTC with transition to hospice.     Nutrition Course:  - Pt visibly resting in bed, unable to participate in discussion; comprehensive chart review completed. Pt continues on texture/consistency modified diet per SLP recommendations following bedside swallow assessment (7/26). Flowsheets indicate good PO intake. Unknown acceptance of nutrition supplement. Pt requires total assistance at meal times. No noted GI distress, last BM 7/29 per flowsheets. Fecal incontinence noted. Ordered for bisacodyl 5 mg qHS and Miralax 17 gm qD. Today's labs notable for hyperkalemia and hyperphosphatemia.     Diet Prescription:   - Consistent Carbohydrate {Evening Snack}  - Pureed   - Mildly Thick Liquids  - Magic Cup Reduced Sugar ice cream 1 PO 3x daily (provided by kitchen on meal trays)    Pertinent Medications:   - amoxicillin/clavulanate tablet, bisacodyl, hydroxyurea, ADMELOG corrective regimen sliding scale, levothyroxine, polyethylene glycol, sodium polystyrene sulfonate Suspension     Pertinent Labs:   - (7/30) Na 133 mmol/L<L> Glu 170 mg/dL<H> K+ 5.5 mmol/L<H> Cr 1.29 mg/dL BUN 36 mg/dL<H> Phos 5.1 mg/dL<H>  - (7/25) HbA1c 7.4%<H>  - POCT: (7/30) 171-175, (7/29) 135-231    Food Allergy:  - black pepper, white pepper, cumin, paprika, johnston powder, chili powder    Weight: No dosing weight available. RDN obtained bed scale during initial assessment (7/26) 98.6 lbs / 44.7 kg.   Height: 59 in / 149.86 cm (per HIE as no dosing height available)  IBW: 98 lbs / 44.5 kg +/-10%  BMI: 19.9 kg/m^2    Physical Assessment:  Edema, per flowsheets: nonpitting left arm  Pressure Injury, per wound care: sacrum stage 4     Estimated Needs:   [X] No change since previous assessment, based on current body weight 98.6 lbs / 44.7 kg  5976-5054 kcal daily @30-35 kcal/kg, 58.11-67.05 gm protein daily @1.3-1.5 gm/kg     Previous Nutrition Diagnosis: [X] Severe malnutrition in the context of chronic illness, ongoing [X] Increased nutrient needs, ongoing   New Nutrition Diagnosis: [X] not applicable     Education:  [X] not applicable     Interventions:   - Nutrition care plan to align with GOC. May continue current diet order.   - RDN to continue provision of Magic Cup Reduced Sugar 1 PO 3x daily (provides 280 kcal, 9 gm protein per 4oz serving).  - Provide assistance with feeding at meal times.  - Obtain dosing height and weight.  - Recommend multivitamin to provide micronutrient support.     Monitor & Evaluate:  PO intake, tolerance to diet/supplement, nutrition related lab values, weight trends, BMs/GI distress, hydration status, skin integrity.  Yanelis Shanks RDN, CDN #[id number]  Also available on [employer] Teams Pt seen for malnutrition follow up.      Medical Course:  - Per chart, pt is 89 year old female PMH advanced dementia, Afib, PCV, hypothyroidism, type 2 DM presenting from [facility] for lethargy, poor PO found to be anemic with possible OM of sacrum, likely chronic. Palliative was consulted, DNR/DNI with plan for discharge back to LTC with transition to hospice.     Nutrition Course:  - Pt visibly resting in bed, unable to participate in discussion; comprehensive chart review completed. Pt continues on texture/consistency modified diet per SLP recommendations following bedside swallow assessment (7/26). Flowsheets indicate good PO intake. Unknown acceptance of nutrition supplement. Pt requires total assistance at meal times. No noted GI distress, last BM 7/29 per flowsheets. Fecal incontinence noted. Ordered for bisacodyl 5 mg qHS and Miralax 17 gm qD. Today's labs notable for hyperkalemia and hyperphosphatemia.     Diet Prescription:   - Consistent Carbohydrate {Evening Snack}  - Pureed   - Mildly Thick Liquids  - Magic Cup Reduced Sugar ice cream 1 PO 3x daily (provided by kitchen on meal trays)    Pertinent Medications:   - amoxicillin/clavulanate tablet, bisacodyl, hydroxyurea, ADMELOG corrective regimen sliding scale, levothyroxine, polyethylene glycol, sodium polystyrene sulfonate Suspension     Pertinent Labs:   - (7/30) Na 133 mmol/L<L> Glu 170 mg/dL<H> K+ 5.5 mmol/L<H> Cr 1.29 mg/dL BUN 36 mg/dL<H> Phos 5.1 mg/dL<H>  - (7/25) HbA1c 7.4%<H>  - POCT: (7/30) 171-175, (7/29) 135-231    Food Allergy:  - black pepper, white pepper, cumin, paprika, johnston powder, chili powder    Weight: No dosing weight available. RDN obtained bed scale during initial assessment (7/26) 98.6 lbs / 44.7 kg.   Height: 59 in / 149.86 cm (per HIE as no dosing height available)  IBW: 98 lbs / 44.5 kg +/-10%  BMI: 19.9 kg/m^2    Physical Assessment:  Edema, per flowsheets: nonpitting left arm  Pressure Injury, per wound care: sacrum stage 4     Estimated Needs:   [X] No change since previous assessment, based on current body weight 98.6 lbs / 44.7 kg  6694-0079 kcal daily @30-35 kcal/kg, 58.11-67.05 gm protein daily @1.3-1.5 gm/kg     Previous Nutrition Diagnosis: [X] Severe malnutrition in the context of chronic illness, ongoing [X] Increased nutrient needs, ongoing   New Nutrition Diagnosis: [X] not applicable     Education:  [X] not applicable     Interventions:   - Nutrition care plan to align with GOC. May continue current diet order.   - RDN to continue provision of Magic Cup Reduced Sugar 1 PO 3x daily (provides 280 kcal, 9 gm protein per 4oz serving).  - Provide assistance with feeding at meal times.  - Obtain dosing height and weight.  - Recommend multivitamin to provide micronutrient support.     Monitor & Evaluate:  PO intake, tolerance to diet/supplement, nutrition related lab values, weight trends, BMs/GI distress, hydration status, skin integrity.  Yanelis Shanks RDN, CDN #[id number]  Also available on [employer] Teams

## 2024-07-31 ENCOUNTER — TRANSCRIPTION ENCOUNTER (OUTPATIENT)
Age: 89
End: 2024-07-31

## 2024-07-31 VITALS — WEIGHT: 85.98 LBS

## 2024-07-31 LAB
GLUCOSE BLDC GLUCOMTR-MCNC: 167 MG/DL — HIGH (ref 70–99)
GLUCOSE BLDC GLUCOMTR-MCNC: 180 MG/DL — HIGH (ref 70–99)
GLUCOSE BLDC GLUCOMTR-MCNC: 218 MG/DL — HIGH (ref 70–99)

## 2024-07-31 PROCEDURE — 99239 HOSP IP/OBS DSCHRG MGMT >30: CPT

## 2024-07-31 RX ORDER — ASPIRIN 325 MG
1 TABLET ORAL
Qty: 0 | Refills: 0 | DISCHARGE
Start: 2024-07-31

## 2024-07-31 RX ORDER — LORATADINE 10 MG
17 TABLET,DISINTEGRATING ORAL
Qty: 0 | Refills: 0 | DISCHARGE
Start: 2024-07-31

## 2024-07-31 RX ORDER — INSULIN LISPRO 100/ML
0 VIAL (ML) SUBCUTANEOUS
Qty: 0 | Refills: 0 | DISCHARGE
Start: 2024-07-31

## 2024-07-31 RX ORDER — METOPROLOL TARTRATE 100 MG
1 TABLET ORAL
Qty: 0 | Refills: 0 | DISCHARGE
Start: 2024-07-31

## 2024-07-31 RX ORDER — GLIPIZIDE 10 MG/1
1 TABLET ORAL
Refills: 0 | DISCHARGE

## 2024-07-31 RX ORDER — IPRATROPIUM BROMIDE AND ALBUTEROL SULFATE 2.5; .5 MG/3ML; MG/3ML
3 SOLUTION RESPIRATORY (INHALATION)
Qty: 0 | Refills: 0 | DISCHARGE
Start: 2024-07-31

## 2024-07-31 RX ORDER — HYDROXYUREA 500 MG/1
1 CAPSULE ORAL
Qty: 0 | Refills: 0 | DISCHARGE
Start: 2024-07-31

## 2024-07-31 RX ORDER — INSULIN ASPART 100 [IU]/ML
5 INJECTION, SOLUTION INTRAVENOUS; SUBCUTANEOUS
Refills: 0 | DISCHARGE

## 2024-07-31 RX ADMIN — Medication 25 MILLIGRAM(S): at 06:05

## 2024-07-31 RX ADMIN — HYDROXYUREA 500 MILLIGRAM(S): 500 CAPSULE ORAL at 11:33

## 2024-07-31 RX ADMIN — Medication 1 DROP(S): at 06:03

## 2024-07-31 RX ADMIN — Medication 2: at 12:26

## 2024-07-31 RX ADMIN — Medication 1: at 09:21

## 2024-07-31 RX ADMIN — Medication 175 MICROGRAM(S): at 06:02

## 2024-07-31 RX ADMIN — Medication 17 GRAM(S): at 11:33

## 2024-07-31 RX ADMIN — Medication 1 TABLET(S): at 06:02

## 2024-07-31 NOTE — PROVIDER CONTACT NOTE (OTHER) - ASSESSMENT
Patient A+Ox0-1, nonverbal, resting comfortably in bed at this time.
No acute distress, no FLACC s/s of pain/discomfort
Patient is alert, nonverbal. no s/s of distress. resting comfortably in bed.

## 2024-07-31 NOTE — PROGRESS NOTE ADULT - SUBJECTIVE AND OBJECTIVE BOX
[facility] Division of Hospital Medicine  Jean-Claude Dacosta MD  In House Pager [phone]    Patient is a 89y old  Female who presents with a chief complaint of Lethargy (26 Jul 2024 14:59)    OVERNIGHT EVENTS: no acute events.   SUBJECTIVE: non-verbal baseline.   ROS: unable to obtain due to advanced dementia    MEDICATIONS  (STANDING):  amoxicillin  500 milliGRAM(s)/clavulanate 1 Tablet(s) Oral two times a day  artificial  tears Solution 1 Drop(s) Both EYES two times a day  bisacodyl 5 milliGRAM(s) Oral at bedtime  dextrose 5%. 1000 milliLiter(s) (50 mL/Hr) IV Continuous <Continuous>  dextrose 5%. 1000 milliLiter(s) (100 mL/Hr) IV Continuous <Continuous>  dextrose 50% Injectable 25 Gram(s) IV Push once  dextrose 50% Injectable 25 Gram(s) IV Push once  dextrose 50% Injectable 12.5 Gram(s) IV Push once  fluconAZOLE   Tablet 100 milliGRAM(s) Oral daily  glucagon  Injectable 1 milliGRAM(s) IntraMuscular once  hydroxyurea 500 milliGRAM(s) Oral daily  insulin lispro (ADMELOG) corrective regimen sliding scale   SubCutaneous at bedtime  insulin lispro (ADMELOG) corrective regimen sliding scale   SubCutaneous three times a day before meals  lactated ringers. 1000 milliLiter(s) (75 mL/Hr) IV Continuous <Continuous>  levothyroxine 175 MICROGram(s) Oral daily  metoprolol tartrate 25 milliGRAM(s) Oral two times a day  polyethylene glycol 3350 17 Gram(s) Oral daily    MEDICATIONS  (PRN):  acetaminophen     Tablet .. 650 milliGRAM(s) Oral every 6 hours PRN Temp greater or equal to 38C (100.4F), Mild Pain (1 - 3)  albuterol/ipratropium for Nebulization 3 milliLiter(s) Nebulizer every 6 hours PRN Shortness of Breath and/or Wheezing  dextrose Oral Gel 15 Gram(s) Oral once PRN Blood Glucose LESS THAN 70 milliGRAM(s)/deciliter    CAPILLARY BLOOD GLUCOSE      POCT Blood Glucose.: 165 mg/dL (27 Jul 2024 08:38)  POCT Blood Glucose.: 167 mg/dL (27 Jul 2024 07:14)  POCT Blood Glucose.: 197 mg/dL (26 Jul 2024 21:01)  POCT Blood Glucose.: 117 mg/dL (26 Jul 2024 17:19)  POCT Blood Glucose.: 162 mg/dL (26 Jul 2024 11:57)    I&O's Summary    26 Jul 2024 07:01  -  27 Jul 2024 07:00  --------------------------------------------------------  IN: 0 mL / OUT: 500 mL / NET: -500 mL        Vital Signs Last 24 Hrs  T(C): 36.8 (27 Jul 2024 05:56), Max: 37.1 (26 Jul 2024 21:27)  T(F): 98.2 (27 Jul 2024 05:56), Max: 98.8 (26 Jul 2024 21:27)  HR: 89 (27 Jul 2024 05:56) (70 - 94)  BP: 154/77 (27 Jul 2024 05:56) (136/72 - 154/77)  BP(mean): --  RR: 17 (27 Jul 2024 05:56) (17 - 18)  SpO2: 97% (27 Jul 2024 05:56) (97% - 100%)    Parameters below as of 27 Jul 2024 05:56  Patient On (Oxygen Delivery Method): room air        LABS:                        8.4    13.67 )-----------( 914      ( 27 Jul 2024 05:46 )             27.3     07-27    133<L>  |  100  |  31<H>  ----------------------------<  130<H>  4.2   |  20<L>  |  0.98    Ca    9.4      27 Jul 2024 05:46  Phos  3.3     07-27  Mg     2.00     07-27            Urinalysis Basic - ( 27 Jul 2024 05:46 )    Color: x / Appearance: x / SG: x / pH: x  Gluc: 130 mg/dL / Ketone: x  / Bili: x / Urobili: x   Blood: x / Protein: x / Nitrite: x   Leuk Esterase: x / RBC: x / WBC x   Sq Epi: x / Non Sq Epi: x / Bacteria: x        Culture - Urine (collected 25 Jul 2024 09:32)  Source: Catheterized  Preliminary Report (26 Jul 2024 23:20):    10,000 - 49,000 CFU/mL Gram Negative Rods    Urinalysis with Rflx Culture (collected 25 Jul 2024 09:32)      RADIOLOGY & ADDITIONAL TESTS:  Results Reviewed: Y  Imaging Personally Reviewed: Y  Electrocardiogram Personally Reviewed: Y    COORDINATION OF CARE:  Care Discussed with Consultants/Other Providers [Y/N]: Y  Prior or Outpatient Records Reviewed [Y/N]: Y  
[facility]-- WOUND TEAM -- FOLLOW UP NOTE  --------------------------------------------------------------------------------    subjective: Patient seen and examined at bedside. Patient with eyes open, not responding to questions but responds when her name is call. PCA at the bedside reporting patient is a total feed and patient ate all her farina for breakfast.     Interval HPI/24 hour events:   -->Appreciate Palliative team consult, Pending discharge back to LTC with transition to hospice. As per notes, DNR; DNI; Do not re-hospitalize    -->No recent blood work (last blood work 7/27), afebrile   -->Appreciate nutrition consult for severe protein malnutrition   -->Patient initiated on Diflucan x3 days as per primary team   Chart reviewed including labs and relevant images    Diet:  Diet, Regular:   Consistent Carbohydrate Evening Snack (CSTCHOSN)  Pureed (PUREED)  Mildly Thick Liquids (MILDTHICKLIQS) (07-25-24 @ 15:51)    ROS: pt unable to offer    ALLERGIES & MEDICATIONS  --------------------------------------------------------------------------------  Allergies    black pepper, white pepper, cumin, paprika, johnston powder, chili powder (Rash)  No Known Drug Allergies    Intolerances    STANDING INPATIENT MEDICATIONS    amoxicillin  500 milliGRAM(s)/clavulanate 1 Tablet(s) Oral two times a day  artificial  tears Solution 1 Drop(s) Both EYES two times a day  bisacodyl 5 milliGRAM(s) Oral at bedtime  dextrose 5%. 1000 milliLiter(s) IV Continuous <Continuous>  dextrose 5%. 1000 milliLiter(s) IV Continuous <Continuous>  dextrose 50% Injectable 25 Gram(s) IV Push once  dextrose 50% Injectable 25 Gram(s) IV Push once  dextrose 50% Injectable 12.5 Gram(s) IV Push once  fluconAZOLE   Tablet 100 milliGRAM(s) Oral daily  glucagon  Injectable 1 milliGRAM(s) IntraMuscular once  hydroxyurea 500 milliGRAM(s) Oral daily  insulin lispro (ADMELOG) corrective regimen sliding scale   SubCutaneous three times a day before meals  insulin lispro (ADMELOG) corrective regimen sliding scale   SubCutaneous at bedtime  lactated ringers. 1000 milliLiter(s) IV Continuous <Continuous>  levothyroxine 175 MICROGram(s) Oral daily  metoprolol tartrate 25 milliGRAM(s) Oral two times a day  polyethylene glycol 3350 17 Gram(s) Oral daily    PRN INPATIENT MEDICATION  acetaminophen     Tablet .. 650 milliGRAM(s) Oral every 6 hours PRN  albuterol/ipratropium for Nebulization 3 milliLiter(s) Nebulizer every 6 hours PRN  dextrose Oral Gel 15 Gram(s) Oral once PRN    Vital signs:  T(C): 36.8 (07-29-24 @ 05:10), Max: 36.8 (07-29-24 @ 05:10)  HR: 99 (07-29-24 @ 05:10) (88 - 99)  BP: 152/65 (07-29-24 @ 05:10) (148/88 - 152/82)  RR: 16 (07-29-24 @ 05:10) (16 - 18)  SpO2: 100% (07-29-24 @ 05:10) (98% - 100%)    Wt(kg): --    07-28-24 @ 07:01  -  07-29-24 @ 07:00  --------------------------------------------------------  IN: 0 mL / OUT: 600 mL / NET: -600 mL      Constitutional: Eyes open, A&Ox0, bedbound, cachetic, frail. Dependent on ADLs  (+) low airloss support surface, (+) fluidized positioning devices, (+) complete cair boots  HEENT:  NC/AT, nonicteric, mucosa moist  Cardiovascular: rate regular to tachy   Respiratory: nonlabored, room air, equal chest expansion.  Gastrointestinal: soft NT/ND, (+) fecal incontinence  : indwelling sylvester catheter in place  Musculoskeletal: limited, bilateral upper and lower extremities flaccid in extension, (+) muscle wasting, no gross deformities or contractures.  Vascular: BLE equally cool to touch, no edema noted, no pulses palpable. No overt ischemia.   Bilateral toes and plantar feet with xerosis, no open wounds between toes.   Bilateral lateral malleolus with hypopigmentation and circumferential hyperpigmentation.  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  -Moderate serofibrinous drainage, no odor.  -Periwound skin extending to buttocks and perineum with extensive suspected candidiasis entire area measuring [id number] (prev [id number]) with dry flaky skin in the periphery and well demarcated purple hue otherwise no erythema, no edema, no increased warmth, no induration, no fluctuance, no s/s of acute soft tissue infection.  Antonia Antifungal moisture barrier cream applied to periwound skin. Wound base packed with Aquacel hydrofiber, covered with silicone foam with border applied.      LABS/ CULTURES/ RADIOLOGY:    131  |  98  |  30  ----------------------------<  155      [07-29-24 @ 06:13]  5.2   |  22  |  1.04        Ca     9.6     [07-29-24 @ 06:13]      Mg     2.00     [07-29-24 @ 06:13]      Phos  3.7     [07-29-24 @ 06:13]      CAPILLARY BLOOD GLUCOSE    POCT Blood Glucose.: 144 mg/dL (29 Jul 2024 09:25)  POCT Blood Glucose.: 213 mg/dL (28 Jul 2024 23:45)  POCT Blood Glucose.: 285 mg/dL (28 Jul 2024 21:11)  POCT Blood Glucose.: 160 mg/dL (28 Jul 2024 16:48)  POCT Blood Glucose.: 138 mg/dL (28 Jul 2024 12:09)      A1C with Estimated Average Glucose Result: 7.4 % (07-25-24 @ 14:48)  A1C with Estimated Average Glucose Result: 7.5 % (02-29-24 @ 13:07)          
[facility] Division of Hospital Medicine  Peewee Coronado DO  Available via MS Teams  In house pager [phone]    SUBJECTIVE / OVERNIGHT EVENTS:  No acute events overnight. Patient seen and examined at bedside this morning.  Does not offer acute complaints.    ADDITIONAL REVIEW OF SYSTEMS:    MEDICATIONS  (STANDING):  amoxicillin  500 milliGRAM(s)/clavulanate 1 Tablet(s) Oral two times a day  artificial  tears Solution 1 Drop(s) Both EYES two times a day  bisacodyl 5 milliGRAM(s) Oral at bedtime  dextrose 5%. 1000 milliLiter(s) (50 mL/Hr) IV Continuous <Continuous>  dextrose 5%. 1000 milliLiter(s) (100 mL/Hr) IV Continuous <Continuous>  dextrose 50% Injectable 25 Gram(s) IV Push once  dextrose 50% Injectable 25 Gram(s) IV Push once  dextrose 50% Injectable 12.5 Gram(s) IV Push once  glucagon  Injectable 1 milliGRAM(s) IntraMuscular once  hydroxyurea 500 milliGRAM(s) Oral daily  insulin lispro (ADMELOG) corrective regimen sliding scale   SubCutaneous at bedtime  insulin lispro (ADMELOG) corrective regimen sliding scale   SubCutaneous three times a day before meals  lactated ringers. 1000 milliLiter(s) (75 mL/Hr) IV Continuous <Continuous>  levothyroxine 175 MICROGram(s) Oral daily  metoprolol tartrate 25 milliGRAM(s) Oral two times a day  polyethylene glycol 3350 17 Gram(s) Oral daily    MEDICATIONS  (PRN):  acetaminophen     Tablet .. 650 milliGRAM(s) Oral every 6 hours PRN Temp greater or equal to 38C (100.4F), Mild Pain (1 - 3)  albuterol/ipratropium for Nebulization 3 milliLiter(s) Nebulizer every 6 hours PRN Shortness of Breath and/or Wheezing  dextrose Oral Gel 15 Gram(s) Oral once PRN Blood Glucose LESS THAN 70 milliGRAM(s)/deciliter      I&O's Summary    28 Jul 2024 07:01  -  29 Jul 2024 07:00  --------------------------------------------------------  IN: 0 mL / OUT: 600 mL / NET: -600 mL        PHYSICAL EXAM:  Vital Signs Last 24 Hrs  T(C): 36.7 (29 Jul 2024 12:00), Max: 36.8 (29 Jul 2024 05:10)  T(F): 98.1 (29 Jul 2024 12:00), Max: 98.2 (29 Jul 2024 05:10)  HR: 89 (29 Jul 2024 12:00) (88 - 99)  BP: 144/63 (29 Jul 2024 12:00) (144/63 - 152/82)  BP(mean): --  RR: 17 (29 Jul 2024 12:00) (16 - 18)  SpO2: 99% (29 Jul 2024 12:00) (98% - 100%)    Parameters below as of 29 Jul 2024 12:00  Patient On (Oxygen Delivery Method): room air      CONSTITUTIONAL: NAD, elderly, well-groomed  EYES: PERRLA; conjunctiva and sclera clear  ENMT: Moist oral mucosa, no pharyngeal injection or exudates  NECK: Supple, no palpable masses; no thyromegaly  RESPIRATORY: Normal respiratory effort; lungs are clear to auscultation bilaterally  CARDIOVASCULAR: Regular rate and rhythm, normal S1 and S2, no murmur/rub/gallop; No lower extremity edema; Peripheral pulses are 2+ bilaterally  ABDOMEN: Nontender to palpation, normoactive bowel sounds, no rebound/guarding; No hepatosplenomegaly  MUSCULOSKELETAL: no clubbing or cyanosis of digits; no joint swelling or tenderness to palpation  PSYCH: AAOx0 - does not answer questions  NEUROLOGY: not cooperative with neurologic exam; does not follow simple commands; tracks movements  SKIN: No rashes; no palpable lesions    LABS:    07-29    131<L>  |  98  |  30<H>  ----------------------------<  155<H>  5.2   |  22  |  1.04    Ca    9.6      29 Jul 2024 06:13  Phos  3.7     07-29  Mg     2.00     07-29            Urinalysis Basic - ( 29 Jul 2024 06:13 )    Color: x / Appearance: x / SG: x / pH: x  Gluc: 155 mg/dL / Ketone: x  / Bili: x / Urobili: x   Blood: x / Protein: x / Nitrite: x   Leuk Esterase: x / RBC: x / WBC x   Sq Epi: x / Non Sq Epi: x / Bacteria: x    
[facility] Division of Hospital Medicine  Peewee CoronadoDO  Available via MS Teams  In house pager [phone]    SUBJECTIVE / OVERNIGHT EVENTS:  No acute events overnight. Patient seen and examined at bedside this morning.  Does not offer acute complaints.  Follows simple commands such as holding out hands but does not squeeze when instructed.    K 5.5 this morning, given kayexelate.    ADDITIONAL REVIEW OF SYSTEMS:    MEDICATIONS  (STANDING):  amoxicillin  500 milliGRAM(s)/clavulanate 1 Tablet(s) Oral two times a day  artificial  tears Solution 1 Drop(s) Both EYES two times a day  bisacodyl 5 milliGRAM(s) Oral at bedtime  dextrose 5%. 1000 milliLiter(s) (100 mL/Hr) IV Continuous <Continuous>  dextrose 5%. 1000 milliLiter(s) (50 mL/Hr) IV Continuous <Continuous>  dextrose 50% Injectable 12.5 Gram(s) IV Push once  dextrose 50% Injectable 25 Gram(s) IV Push once  dextrose 50% Injectable 25 Gram(s) IV Push once  glucagon  Injectable 1 milliGRAM(s) IntraMuscular once  hydroxyurea 500 milliGRAM(s) Oral daily  insulin lispro (ADMELOG) corrective regimen sliding scale   SubCutaneous at bedtime  insulin lispro (ADMELOG) corrective regimen sliding scale   SubCutaneous three times a day before meals  levothyroxine 175 MICROGram(s) Oral daily  metoprolol tartrate 25 milliGRAM(s) Oral two times a day  polyethylene glycol 3350 17 Gram(s) Oral daily    MEDICATIONS  (PRN):  acetaminophen     Tablet .. 650 milliGRAM(s) Oral every 6 hours PRN Temp greater or equal to 38C (100.4F), Mild Pain (1 - 3)  albuterol/ipratropium for Nebulization 3 milliLiter(s) Nebulizer every 6 hours PRN Shortness of Breath and/or Wheezing  dextrose Oral Gel 15 Gram(s) Oral once PRN Blood Glucose LESS THAN 70 milliGRAM(s)/deciliter      I&O's Summary      PHYSICAL EXAM:  Vital Signs Last 24 Hrs  T(C): 37.1 (30 Jul 2024 05:00), Max: 37.1 (30 Jul 2024 05:00)  T(F): 98.8 (30 Jul 2024 05:00), Max: 98.8 (30 Jul 2024 05:00)  HR: 98 (30 Jul 2024 05:00) (83 - 98)  BP: 153/74 (30 Jul 2024 05:00) (139/60 - 153/74)  BP(mean): --  RR: 16 (30 Jul 2024 05:00) (16 - 18)  SpO2: 96% (30 Jul 2024 05:00) (96% - 99%)    Parameters below as of 30 Jul 2024 05:00  Patient On (Oxygen Delivery Method): room air      CONSTITUTIONAL: NAD, elderly, well-groomed  EYES: PERRLA; conjunctiva and sclera clear  ENMT: Moist oral mucosa, no pharyngeal injection or exudates  NECK: Supple, no palpable masses; no thyromegaly  RESPIRATORY: Normal respiratory effort; lungs are clear to auscultation bilaterally  CARDIOVASCULAR: Regular rate and rhythm, normal S1 and S2, no murmur/rub/gallop; No lower extremity edema; Peripheral pulses are 2+ bilaterally  ABDOMEN: Nontender to palpation, normoactive bowel sounds, no rebound/guarding; No hepatosplenomegaly  MUSCULOSKELETAL: no clubbing or cyanosis of digits; no joint swelling or tenderness to palpation  PSYCH: AAOx0 - does not answer questions  NEUROLOGY: not cooperative with neurologic exam; does not follow simple commands; tracks movements  SKIN: No rashes; no palpable lesions    LABS:                        8.8    13.24 )-----------( 793      ( 30 Jul 2024 05:30 )             29.1     07-30    133<L>  |  99  |  36<H>  ----------------------------<  170<H>  5.5<H>   |  20<L>  |  1.29    Ca    9.9      30 Jul 2024 05:30  Phos  5.1     07-30  Mg     2.00     07-30            Urinalysis Basic - ( 30 Jul 2024 05:30 )    Color: x / Appearance: x / SG: x / pH: x  Gluc: 170 mg/dL / Ketone: x  / Bili: x / Urobili: x   Blood: x / Protein: x / Nitrite: x   Leuk Esterase: x / RBC: x / WBC x   Sq Epi: x / Non Sq Epi: x / Bacteria: x      
[facility] Division of Hospital Medicine  Peewee CoronadoDO  Available via MS Teams  In house pager [phone]    SUBJECTIVE / OVERNIGHT EVENTS:  No acute events overnight. Patient seen and examined at bedside this morning.  More engaged today, says hello, but does not answer questions.    ADDITIONAL REVIEW OF SYSTEMS:    MEDICATIONS  (STANDING):  amoxicillin  500 milliGRAM(s)/clavulanate 1 Tablet(s) Oral two times a day  artificial  tears Solution 1 Drop(s) Both EYES two times a day  bisacodyl 5 milliGRAM(s) Oral at bedtime  dextrose 5%. 1000 milliLiter(s) (100 mL/Hr) IV Continuous <Continuous>  dextrose 5%. 1000 milliLiter(s) (50 mL/Hr) IV Continuous <Continuous>  dextrose 50% Injectable 12.5 Gram(s) IV Push once  dextrose 50% Injectable 25 Gram(s) IV Push once  dextrose 50% Injectable 25 Gram(s) IV Push once  glucagon  Injectable 1 milliGRAM(s) IntraMuscular once  hydroxyurea 500 milliGRAM(s) Oral daily  insulin lispro (ADMELOG) corrective regimen sliding scale   SubCutaneous at bedtime  insulin lispro (ADMELOG) corrective regimen sliding scale   SubCutaneous three times a day before meals  levothyroxine 175 MICROGram(s) Oral daily  metoprolol tartrate 25 milliGRAM(s) Oral two times a day  polyethylene glycol 3350 17 Gram(s) Oral daily    MEDICATIONS  (PRN):  acetaminophen     Tablet .. 650 milliGRAM(s) Oral every 6 hours PRN Temp greater or equal to 38C (100.4F), Mild Pain (1 - 3)  albuterol/ipratropium for Nebulization 3 milliLiter(s) Nebulizer every 6 hours PRN Shortness of Breath and/or Wheezing  dextrose Oral Gel 15 Gram(s) Oral once PRN Blood Glucose LESS THAN 70 milliGRAM(s)/deciliter      I&O's Summary      PHYSICAL EXAM:  Vital Signs Last 24 Hrs  T(C): 36.6 (31 Jul 2024 10:06), Max: 37.1 (30 Jul 2024 22:00)  T(F): 97.8 (31 Jul 2024 10:06), Max: 98.7 (30 Jul 2024 22:00)  HR: 94 (31 Jul 2024 10:06) (67 - 120)  BP: 147/78 (31 Jul 2024 10:06) (133/65 - 182/83)  BP(mean): --  RR: 16 (31 Jul 2024 10:06) (15 - 18)  SpO2: 97% (31 Jul 2024 10:06) (95% - 98%)    Parameters below as of 31 Jul 2024 10:06  Patient On (Oxygen Delivery Method): room air      CONSTITUTIONAL: NAD, elderly, well-groomed  EYES: PERRLA; conjunctiva and sclera clear  ENMT: Moist oral mucosa, no pharyngeal injection or exudates  NECK: Supple, no palpable masses; no thyromegaly  RESPIRATORY: Normal respiratory effort; lungs are clear to auscultation bilaterally  CARDIOVASCULAR: Regular rate and rhythm, normal S1 and S2, no murmur/rub/gallop; No lower extremity edema; Peripheral pulses are 2+ bilaterally  ABDOMEN: Nontender to palpation, normoactive bowel sounds, no rebound/guarding; No hepatosplenomegaly  MUSCULOSKELETAL: no clubbing or cyanosis of digits; no joint swelling or tenderness to palpation  PSYCH: AAOx0 - says hello but does not answer questions  NEUROLOGY: not cooperative with neurologic exam; does not follow simple commands; tracks movements  SKIN: No rashes; no palpable lesions    LABS:                        8.8    13.24 )-----------( 793      ( 30 Jul 2024 05:30 )             29.1     07-30    133<L>  |  99  |  38<H>  ----------------------------<  227<H>  5.1   |  21<L>  |  1.03    Ca    9.6      30 Jul 2024 16:55  Phos  4.8     07-30  Mg     2.10     07-30            Urinalysis Basic - ( 30 Jul 2024 16:55 )    Color: x / Appearance: x / SG: x / pH: x  Gluc: 227 mg/dL / Ketone: x  / Bili: x / Urobili: x   Blood: x / Protein: x / Nitrite: x   Leuk Esterase: x / RBC: x / WBC x   Sq Epi: x / Non Sq Epi: x / Bacteria: x        
[facility] Division of Hospital Medicine  Jean-Claude Dacosta MD  In House Pager [phone]    Patient is a 89y old  Female who presents with a chief complaint of Lethargy (26 Jul 2024 13:57)    OVERNIGHT EVENTS: no acute events.   SUBJECTIVE: no new subjective symptoms. no distress on exam. h/h improved after transfusion.   ROS: Denied Fever, Chill, CP, SOB, Abd pain, N/V/D, LE swelling or pain.     MEDICATIONS  (STANDING):  amoxicillin  500 milliGRAM(s)/clavulanate 1 Tablet(s) Oral two times a day  artificial  tears Solution 1 Drop(s) Both EYES two times a day  bisacodyl 5 milliGRAM(s) Oral at bedtime  dextrose 5%. 1000 milliLiter(s) (50 mL/Hr) IV Continuous <Continuous>  dextrose 5%. 1000 milliLiter(s) (100 mL/Hr) IV Continuous <Continuous>  dextrose 50% Injectable 25 Gram(s) IV Push once  dextrose 50% Injectable 25 Gram(s) IV Push once  dextrose 50% Injectable 12.5 Gram(s) IV Push once  glucagon  Injectable 1 milliGRAM(s) IntraMuscular once  hydroxyurea 500 milliGRAM(s) Oral daily  insulin lispro (ADMELOG) corrective regimen sliding scale   SubCutaneous at bedtime  insulin lispro (ADMELOG) corrective regimen sliding scale   SubCutaneous three times a day before meals  lactated ringers. 1000 milliLiter(s) (75 mL/Hr) IV Continuous <Continuous>  levothyroxine 175 MICROGram(s) Oral daily  metoprolol tartrate 25 milliGRAM(s) Oral two times a day  polyethylene glycol 3350 17 Gram(s) Oral daily    MEDICATIONS  (PRN):  acetaminophen     Tablet .. 650 milliGRAM(s) Oral every 6 hours PRN Temp greater or equal to 38C (100.4F), Mild Pain (1 - 3)  albuterol/ipratropium for Nebulization 3 milliLiter(s) Nebulizer every 6 hours PRN Shortness of Breath and/or Wheezing  dextrose Oral Gel 15 Gram(s) Oral once PRN Blood Glucose LESS THAN 70 milliGRAM(s)/deciliter    CAPILLARY BLOOD GLUCOSE      POCT Blood Glucose.: 162 mg/dL (26 Jul 2024 11:57)  POCT Blood Glucose.: 132 mg/dL (26 Jul 2024 07:46)  POCT Blood Glucose.: 155 mg/dL (25 Jul 2024 22:05)  POCT Blood Glucose.: 159 mg/dL (25 Jul 2024 16:33)    I&O's Summary    25 Jul 2024 07:01  -  26 Jul 2024 07:00  --------------------------------------------------------  IN: 0 mL / OUT: 1100 mL / NET: -1100 mL        Vital Signs Last 24 Hrs  T(C): 36.4 (26 Jul 2024 12:56), Max: 37.1 (25 Jul 2024 21:15)  T(F): 97.5 (26 Jul 2024 12:56), Max: 98.7 (25 Jul 2024 21:15)  HR: 70 (26 Jul 2024 12:56) (64 - 101)  BP: 136/72 (26 Jul 2024 12:56) (132/37 - 160/91)  BP(mean): 60 (26 Jul 2024 05:32) (60 - 60)  RR: 17 (26 Jul 2024 12:56) (17 - 24)  SpO2: 100% (26 Jul 2024 12:56) (96% - 100%)    Parameters below as of 26 Jul 2024 12:56  Patient On (Oxygen Delivery Method): room air        LABS:                        8.4    19.77 )-----------( 880      ( 25 Jul 2024 14:48 )             27.1     07-25    135  |  102  |  45<H>  ----------------------------<  145<H>  5.9<H>   |  19<L>  |  0.92    Ca    9.1      25 Jul 2024 14:50  Phos  3.4     07-25  Mg     2.10     07-25    TPro  7.6  /  Alb  2.8<L>  /  TBili  0.2  /  DBili  x   /  AST  16  /  ALT  14  /  AlkPhos  128<H>  07-25    PT/INR - ( 25 Jul 2024 04:10 )   PT: 15.3 sec;   INR: 1.37 ratio         PTT - ( 25 Jul 2024 04:10 )  PTT:36.1 sec      Urinalysis Basic - ( 25 Jul 2024 14:50 )    Color: x / Appearance: x / SG: x / pH: x  Gluc: 145 mg/dL / Ketone: x  / Bili: x / Urobili: x   Blood: x / Protein: x / Nitrite: x   Leuk Esterase: x / RBC: x / WBC x   Sq Epi: x / Non Sq Epi: x / Bacteria: x        Urinalysis with Rflx Culture (collected 25 Jul 2024 09:32)      RADIOLOGY & ADDITIONAL TESTS:  Results Reviewed: Y  Imaging Personally Reviewed: Y  Electrocardiogram Personally Reviewed: Y    COORDINATION OF CARE:  Care Discussed with Consultants/Other Providers [Y/N]: Y  Prior or Outpatient Records Reviewed [Y/N]: Y  
[facility] Division of Hospital Medicine  Jean-Claude Dacosta MD  In House Pager [phone]    Patient is a 89y old  Female who presents with a chief complaint of Lethargy (27 Jul 2024 09:04)    OVERNIGHT EVENTS: no acute events.   SUBJECTIVE: no new subjective symptoms.   ROS: Denied Fever, Chill, CP, SOB, Abd pain, N/V/D, LE swelling or pain.     MEDICATIONS  (STANDING):  amoxicillin  500 milliGRAM(s)/clavulanate 1 Tablet(s) Oral two times a day  artificial  tears Solution 1 Drop(s) Both EYES two times a day  bisacodyl 5 milliGRAM(s) Oral at bedtime  dextrose 5%. 1000 milliLiter(s) (100 mL/Hr) IV Continuous <Continuous>  dextrose 5%. 1000 milliLiter(s) (50 mL/Hr) IV Continuous <Continuous>  dextrose 50% Injectable 12.5 Gram(s) IV Push once  dextrose 50% Injectable 25 Gram(s) IV Push once  dextrose 50% Injectable 25 Gram(s) IV Push once  fluconAZOLE   Tablet 100 milliGRAM(s) Oral daily  glucagon  Injectable 1 milliGRAM(s) IntraMuscular once  hydroxyurea 500 milliGRAM(s) Oral daily  insulin lispro (ADMELOG) corrective regimen sliding scale   SubCutaneous at bedtime  insulin lispro (ADMELOG) corrective regimen sliding scale   SubCutaneous three times a day before meals  lactated ringers. 1000 milliLiter(s) (75 mL/Hr) IV Continuous <Continuous>  levothyroxine 175 MICROGram(s) Oral daily  metoprolol tartrate 25 milliGRAM(s) Oral two times a day  polyethylene glycol 3350 17 Gram(s) Oral daily    MEDICATIONS  (PRN):  acetaminophen     Tablet .. 650 milliGRAM(s) Oral every 6 hours PRN Temp greater or equal to 38C (100.4F), Mild Pain (1 - 3)  albuterol/ipratropium for Nebulization 3 milliLiter(s) Nebulizer every 6 hours PRN Shortness of Breath and/or Wheezing  dextrose Oral Gel 15 Gram(s) Oral once PRN Blood Glucose LESS THAN 70 milliGRAM(s)/deciliter    CAPILLARY BLOOD GLUCOSE      POCT Blood Glucose.: 138 mg/dL (28 Jul 2024 12:09)  POCT Blood Glucose.: 153 mg/dL (28 Jul 2024 08:10)  POCT Blood Glucose.: 145 mg/dL (27 Jul 2024 21:27)  POCT Blood Glucose.: 163 mg/dL (27 Jul 2024 16:54)    I&O's Summary    27 Jul 2024 07:01  -  28 Jul 2024 07:00  --------------------------------------------------------  IN: 0 mL / OUT: 600 mL / NET: -600 mL        Vital Signs Last 24 Hrs  T(C): 36.8 (28 Jul 2024 10:41), Max: 37 (27 Jul 2024 13:50)  T(F): 98.2 (28 Jul 2024 10:41), Max: 98.6 (27 Jul 2024 13:50)  HR: 91 (28 Jul 2024 10:41) (85 - 92)  BP: 151/82 (28 Jul 2024 10:41) (151/82 - 168/81)  BP(mean): --  RR: 18 (28 Jul 2024 10:41) (17 - 18)  SpO2: 96% (28 Jul 2024 10:41) (94% - 100%)    Parameters below as of 28 Jul 2024 10:41  Patient On (Oxygen Delivery Method): room air        LABS:                        8.4    13.67 )-----------( 914      ( 27 Jul 2024 05:46 )             27.3     07-28    134<L>  |  102  |  26<H>  ----------------------------<  138<H>  4.3   |  22  |  0.88    Ca    9.6      28 Jul 2024 06:15  Phos  3.5     07-28  Mg     1.90     07-28            Urinalysis Basic - ( 28 Jul 2024 06:15 )    Color: x / Appearance: x / SG: x / pH: x  Gluc: 138 mg/dL / Ketone: x  / Bili: x / Urobili: x   Blood: x / Protein: x / Nitrite: x   Leuk Esterase: x / RBC: x / WBC x   Sq Epi: x / Non Sq Epi: x / Bacteria: x        RADIOLOGY & ADDITIONAL TESTS:  Results Reviewed: Y  Imaging Personally Reviewed: Y  Electrocardiogram Personally Reviewed: Y    COORDINATION OF CARE:  Care Discussed with Consultants/Other Providers [Y/N]: Y  Prior or Outpatient Records Reviewed [Y/N]: Y

## 2024-07-31 NOTE — PROGRESS NOTE ADULT - PROBLEM SELECTOR PLAN 7
Eliquis as above  PT eval (although very unlikely that pt will participate given underlying dementia/bedbound status)  Anticipate return to [facility] with hospice care.   DNR/DNI
Eliquis as above  PT eval (although very unlikely that pt will participate given underlying dementia/bedbound status)  Anticipate return to [facility] with hospice care.   DNR/DNI
Eliquis as above  PT eval (although very unlikely that pt will participate given underlying dementia/bedbound status)  Anticipate return to [facility]  DNR/DNI    Discussed with Daughter Magalie over phone at [phone] today 7/25.
Eliquis as above  PT eval (although very unlikely that pt will participate given underlying dementia/bedbound status)  Anticipate return to [facility] with hospice care.   DNR/DNI
Eliquis as above  PT eval (although very unlikely that pt will participate given underlying dementia/bedbound status)  Anticipate return to [facility] with hospice care.   DNR/DNI
Eliquis as above  PT eval (although very unlikely that pt will participate given underlying dementia/bedbound status)  Anticipate return to [facility] with hospice care.   DNR/DNI

## 2024-07-31 NOTE — PROGRESS NOTE ADULT - PROBLEM SELECTOR PLAN 8
DVT ppx: on Eliquis   plan of care d/w ACP    DC planning to LTC, comfort care/hospice at LTC.
DVT ppx: on Eliquis   plan of care d/w ACP    DC planning to LTC, comfort care/hospice at LTC. Pending insurance authorization.
DVT ppx: on Eliquis   plan of care d/w ACP    DC planning to LTC, comfort care/hospice at LTC.
DVT ppx: on Eliquis   plan of care d/w ACP    DC planning to LTC, comfort care/hospice at LTC. Pending insurance authorization.
DVT ppx: on Eliquis   plan of care d/w ACP    DC planning to LTC, comfort care/hospice at LTC.
DVT ppx: on Eliquis   plan of care d/w ACP    DC planning to LTC, comfort care/hospice at LTC.

## 2024-07-31 NOTE — PROGRESS NOTE ADULT - PROBLEM SELECTOR PLAN 4
Currently on Sinus rhythm  - EKG 7/25: Sinus tachycardia, , LAE  - Rate control w/ metoprolol 25mg BID  - hold Eliquis for now. monitor h/h. if stable after transfusion, will resume Eliquis.   - patient is comfort care and hospice, Eliquis would be provider much benefit and increase risk for bleeding and needing further transfusion.
Currently on Sinus rhythm  - EKG 7/25: Sinus tachycardia, , LAE  - Rate control w/ metoprolol 25mg BID  - hold Eliquis for now. monitor h/h. if stable after transfusion, will resume Eliquis.   - if patient is comfort care and hospice, Eliquis would be provider much benefit and increase risk for bleeding and needing further transfusion.
Currently on Sinus rhythm  - EKG 7/25: Sinus tachycardia, , LAE  - Rate control w/ metoprolol 25mg BID  - hold Eliquis for now. monitor h/h. if stable after transfusion, will resume Eliquis.   - patient is comfort care and hospice, Eliquis would be provider much benefit and increase risk for bleeding and needing further transfusion.

## 2024-07-31 NOTE — PROGRESS NOTE ADULT - PROBLEM SELECTOR PLAN 6
On synthroid 175 mcg QD  - TSH elevated at 7.15  - F/up FT4.   - C/w outpatient dose of synthroid for now

## 2024-07-31 NOTE — PROGRESS NOTE ADULT - PROBLEM SELECTOR PLAN 3
A&OxO at baseline, however today pt was able to follow some simple commands w/ me and answered some simple questions (her 1st name, she is "good" and answered YES when I asked if she is from the [facility])  - Pretty much bedbound, functional quadriplegia, at [facility] since March 2024  - Frequent reorientation  - DNR/DNI confirmed with daughter  - Per daughter pt is on a puree diet and nectar consistency liquids  - Passed dysphagia screen, will order S&S to assess for specific diet consistency recs  - F/up Dietitian Consultation and S&S recs  - Palliative consult for further C disucssion.
A&OxO at baseline, however today pt was able to follow some simple commands w/ me and answered some simple questions (her 1st name, she is "good" and answered YES when I asked if she is from the [facility])  - Pretty much bedbound, functional quadriplegia, at [facility] since March 2024  - Frequent reorientation  - DNR/DNI confirmed with daughter  - Per daughter pt is on a puree diet and nectar consistency liquids  - Passed dysphagia screen, will order S&S to assess for specific diet consistency recs  - Palliative had GOC discussion. family opt for comfort measures and hospice at LTC.   - f/u SW
A&OxO at baseline, however today pt was able to follow some simple commands w/ me and answered some simple questions (her 1st name, she is "good" and answered YES when I asked if she is from the [facility])  - Pretty much bedbound, functional quadriplegia, at [facility] since March 2024  - Frequent reorientation  - DNR/DNI confirmed with daughter  - Per daughter pt is on a puree diet and nectar consistency liquids  - Passed dysphagia screen, will order S&S to assess for specific diet consistency recs  - Palliative had GOC disucssion. family opt for comfort measures and hospice at LTC.   - f/u SW.  - IVF to keep hydrated. mental status is more alert with hydration.
A&OxO at baseline, however today pt was able to follow some simple commands w/ me and answered some simple questions (her 1st name, she is "good" and answered YES when I asked if she is from the [facility])  - Pretty much bedbound, functional quadriplegia, at [facility] since March 2024  - Frequent reorientation  - DNR/DNI confirmed with daughter  - Per daughter pt is on a puree diet and nectar consistency liquids  - Passed dysphagia screen, will order S&S to assess for specific diet consistency recs  - Palliative had GOC disucssion. family opt for comfort measures and hospice at LTC.   - f/u SW.  - IVF to keep hydrated. mental status is more alert with hydration.
A&OxO at baseline, however today pt was able to follow some simple commands w/ me and answered some simple questions (her 1st name, she is "good" and answered YES when I asked if she is from the [facility])  - Pretty much bedbound, functional quadriplegia, at [facility] since March 2024  - Frequent reorientation  - DNR/DNI confirmed with daughter  - Per daughter pt is on a puree diet and nectar consistency liquids  - Passed dysphagia screen, will order S&S to assess for specific diet consistency recs  - Palliative had GOC disucssion. family opt for comfort measures and hospice at LTC.   - f/u SW.  - IVF to keep hydrated. mental status is more alert with hydration.
A&OxO at baseline, however today pt was able to follow some simple commands w/ me and answered some simple questions (her 1st name, she is "good" and answered YES when I asked if she is from the [facility])  - Pretty much bedbound, functional quadriplegia, at [facility] since March 2024  - Frequent reorientation  - DNR/DNI confirmed with daughter  - Per daughter pt is on a puree diet and nectar consistency liquids  - Passed dysphagia screen, will order S&S to assess for specific diet consistency recs  - Palliative had GOC disucssion. family opt for comfort measures and hospice at LTC.   - f/u ANABELL.

## 2024-07-31 NOTE — PROVIDER CONTACT NOTE (OTHER) - BACKGROUND
89 y/o F Anemia PMHx DVT, SBO, HTN, DM
advanced dementia. DM. anemia.
advanced dementia. DM. anemia.

## 2024-07-31 NOTE — PROVIDER CONTACT NOTE (OTHER) - RECOMMENDATIONS
notify provider, awaiting IVF to be discontinued at this time.
ACP notified, due metoprolol given
notify provider

## 2024-07-31 NOTE — PROGRESS NOTE ADULT - NUTRITIONAL ASSESSMENT
This patient has been assessed with a concern for Malnutrition and has been determined to have a diagnosis/diagnoses of Severe protein-calorie malnutrition.    This patient is being managed with:   Diet Regular-  Consistent Carbohydrate {Evening Snack} (CSTCHOSN)  Pureed (PUREED)  Mildly Thick Liquids (MILDTHICKLIQS)  Entered: Jul 25 2024  3:50PM  

## 2024-07-31 NOTE — PROGRESS NOTE ADULT - PROBLEM SELECTOR PLAN 1
With underlying history of PCV on hydroxyurea, prior transfusions per daughter  - Hg slightly low at 6.9, s/p 1U PRBC 7/25  - Adequate response to transfusion  - C/w hydroxyurea 500mg daily  - Keep Hg above 7  - Slight thrombocytosis of 800K  - Hematology f/up as outpatient ([facility])  - hold Eliquis given anemia requiring transfusion. No benefit of continuing AC as patient will be transitioned to hospice.  - h/h stable after transfusion.
With underlying history of PCV on hydroxyurea, prior transfusions per daughter  - Hg slightly low at 6.9, s/p 1U PRBC 7/25  - Adequate response to transfusion  - C/w hydroxyurea 500mg daily  - Keep Hg above 7  - Slight thrombocytosis of 800K  - Hematology f/up as outpatient ([facility])  - hold Eliquis given anemia requiring transfusion. No benefit of continuing AC as patient will be transitioned to hospice.  - h/h stable after transfusion.
With underlying history of PCV on hydroxyurea, prior transfusions per daughter  - Hg slightly low at 6.9, s/p 1U PRBC 7/25  - Adequate response to transfusion  - C/w hydroxyurea 500mg daily  - Keep Hg above 7  - Slight thrombocytosis of 800K  - Hematology f/up as outpatient ([facility])  - hold Eliquis given anemia req transfusion. no benefit of continuing AC as patient will be transition to hospice.  - h/h stable after transfusion.
With underlying history of PCV on hydroxyurea, prior transfusions per daughter  - Hg slightly low at 6.9, s/p 1U PRBC 7/25  - Adequate response to transfusion  - C/w hydroxyurea 500mg daily  - Keep Hg above 7  - Slight thrombocytosis of 800K  - Leukocytosis within baseline range  - Monitor CBC closely  - Hematology f/up as outpatient ([facility])  - hold Eliquis given anemia req transfusion.
With underlying history of PCV on hydroxyurea, prior transfusions per daughter  - Hg slightly low at 6.9, s/p 1U PRBC 7/25  - Adequate response to transfusion  - C/w hydroxyurea 500mg daily  - Keep Hg above 7  - Slight thrombocytosis of 800K  - Hematology f/up as outpatient ([facility])  - hold Eliquis given anemia requiring transfusion. No benefit of continuing AC as patient will be transitioned to hospice.  - h/h stable after transfusion.
With underlying history of PCV on hydroxyurea, prior transfusions per daughter  - Hg slightly low at 6.9, s/p 1U PRBC 7/25  - Adequate response to transfusion  - C/w hydroxyurea 500mg daily  - Keep Hg above 7  - Slight thrombocytosis of 800K  - Hematology f/up as outpatient ([facility])  - hold Eliquis given anemia req transfusion. no benefit of continuing AC as patient will be transition to hospice.  - h/h stable after transfusion.

## 2024-07-31 NOTE — PROGRESS NOTE ADULT - ASSESSMENT
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 for lethargy and not eating since morning of arrival. Found to be anemic (Hg of 6.9) received PRBC, CT abd/pelvis with large sacral ulcer and osteomyelitis of the coccyx.    Wound Care surgery follow up for  sacral stage 4 pressure injury with history of known Osteomyelitis and (+) suspected candidiasis to surrounding  skin  Sacral stage 4 pressure injury with known OM  -Wound base clean; 15% bone palpable and exposed, remaining base pink-moist granular. No undermining (may be subjective to patients position at time of assessment), no tunnels. No palpable drainable collections.  -Periwound skin with suspected candidiasis affecting large surface area, well demarcated purple hue (previous satellite lesions), and dry flaky skin in the periphery [id number] )prev [id number])  -Continue measures to decrease moisture, use of single breathable pad, vigilant perineum care   -Wound without s/s of acute soft tissue infection, no purulent drainage, no odor.  -7/25/24 CT abdomen/pelvis with large sacral ulcer with associated osteomyelitis of the coccyx, consistent with clinical exam  -Per records wound underwent selective sharp debridement 2/2024, treated with short course of Zosyn subsequently changed to Levofloxacin. ID records indicated that long term treatment with ABx outweighed benefit. At present, wound without s/s of acute soft tissue infection and is unlikely primary source of leukocytosis.  -Deferred IV antibiotics to primary team   -Receiving  systemic treatment of periwound suspected candidiasis with oral antifungal as per primary team, continue  topical antifungal as well.  -Topical recommendations: Cleanse wound and periwound skin with NS. Apply antifungal moisture barrier cream (Miconazole 2%) to periwound skin. Pack dead space with Aquacel hydrofiber, cover with silicone foam with border, change daily and prn if soiled. Once dressing is in place and perineal care is provided apply antifungal moisture barrier cream to perineum and remaining exposed skin, apply twice a day and prn with episodes of incontinence.  -Continue to offload pressure; continue low airloss support surface, continue to t&P per protocol and use of fluidized positioning devices, continue incontinence care per protocol with use of single breathable incontinence pad.  -Nutrition recommendations appreciated.  **patient with endstage dementia, bedbound, incontinent of stool, severe protein malnutrition, cachetic with muscle wasting, sacral stage 4 pressure injury. Wound is not expected to fully heal given multiple co-morbidities.    Bilateral feet with chronic skin changes and xerosis, no open ulcerations  -bilateral lateral malleolus with hypopigmentation and circumferential hyperpigmentation  -Topical recommendations: bilateral heels and lateral malleolus apply liquid barrier film daily, allow to dry. Apply sween 24 to plantar feet daily, avoid between toes.  -Offload with complete cair boots.    Additional Recs   Continue low airloss support surface.  Continue to turn and position every 2 hours with z-camila fluidized positioning device.  Continue use of Complete Cair pressure offloading boots.  Continue Nutritional management as per RD recommendations.    Upon discharge follow up at outpatient [facility]. [address] [place] [address]. [phone].    Remainder of care as per primary team, Will continue to follow while inpatient. Please reconsult earlier if needed   Thank you.    Discussed findings and plan with primary team.  Payal Zuniga, DAVID-BC, CWN  pager #[phone] or available in MS teams     If after 4PM or before 7:30AM on Mon-Friday or weekend/holiday please contact general surgery for urgent matters.   Team A- [id number]   Team B- [id number]  For non-urgent matters e-mail [email or web address]    I spent 35  minutes face-to-face with this patient of which more than 50% of the time was spent counseling/coordinating care of this patient.      
89 year old Female with PMHx Advanced Dementia, Afib, PCV on hydroxyurea, Hypothyroidism, DM2, sacral decub, sent from [facility] for lethargy and not eating since morning of arrival. Found to be anemic (Hg of 6.9) and imaging shows possible OM of sacrum. On external exam, the wound does not show sign of infection. likely chronic OM due to severe DU. GOC with family and they opted for conservative approach to her care. h/h stable after transfusion. on PO abx. plan for d/c back to LTC with transition to hospice. 
89 year old Female with PMHx Advanced Dementia, Afib, PCV on hydroxyurea, Hypothyroidism, DM2, sacral decub, sent from [facility] for lethargy and not eating since morning of arrival. Found to be anemic (Hg of 6.9) and imaging shows possible OM of sacrum. On external exam, the wound does not show sign of infection. likely chronic OM due to severe DU. GOC with family and they opted for conservative approach to her care. h/h stable after transfusion. on PO abx. plan for d/c back to LTC with transition to hospice. 
89 year old Female with PMHx Advanced Dementia, Afib, PCV on hydroxyurea, Hypothyroidism, DM2, sacral decub, sent from [facility] for lethargy and not eating since morning of arrival. Found to be anemic (Hg of 6.9) and imaging shows possible OM of sacrum. On external exam, the wound does not show sign of infection. likely chronic OM due to severe DU. GOC with family and they opted for conservative approach to her care. h/h stable after transfusion. on PO abx. plan for d/c back to LTC with transition to hospice. 
89 year old Female with PMHx Advanced Dementia, Afib, PCV on hydroxyurea, Hypothyroidism, DM2, sacral decub, sent from [facility] for lethargy and not eating since morning of arrival. Found to be anemic (Hg of 6.9) and imaging shows possible OM of sacrum. On external exam, the wound does not show sign of infection. likely chronic OM due to severe DU. GOC with family and they opted for conservative approach to her care. h/h stable after transfusion. on PO abx. plan for d/c back to LTC with transition to hospice. 
89 year old Female with PMHx Advanced Dementia, Afib, PCV on hydroxyurea, Hypothyroidism, DM2, sacral decub, sent from [facility] for lethargy and not eating since morning of arrival. Found to be anemic (Hg of 6.9) and imaging shows possible OM of sacrum. On external exam, the wound does not show sign of infection. likely chronic OM due to severe DU. GOC with family and they opted for conservative approach to her care. h/h stable after transfusion. on PO abx. plan for d/c back to LTC with transition to hospice. 
89 year old Female with PMHx Advanced Dementia, Afib, PCV on hydroxyurea, Hypothyroidism, DM2, sacral decub, sent from [facility] for lethargy and not eating since morning of arrival. Found to be anemic (Hg of 6.9) and imaging shows possible OM.

## 2024-07-31 NOTE — PROGRESS NOTE ADULT - TIME BILLING
Time-based billing (NON-critical care).     35 minutes spent on total encounter; more than 50% of the visit was spent counseling and / or coordinating care by the attending physician.  The necessity of the time spent during the encounter on this date of service was due to:     review of laboratory data, radiology results, consultants' recommendations, documentation in [place], discussion with patient Time-based billing (NON-critical care).     39 minutes spent on total encounter, including 31 minutes coordinating discharge; more than 50% of the visit was spent counseling and / or coordinating care by the attending physician.  The necessity of the time spent during the encounter on this date of service was due to:     review of laboratory data, radiology results, consultants' recommendations, documentation in [place], discussion with patient

## 2024-07-31 NOTE — PROGRESS NOTE ADULT - PROBLEM SELECTOR PROBLEM 2
Sacral osteomyelitis

## 2024-07-31 NOTE — DISCHARGE NOTE NURSING/CASE MANAGEMENT/SOCIAL WORK - NSDCPEFALRISK_GEN_ALL_CORE
For information on Fall & Injury Prevention, visit: [email or web address] OR  [email or web address] OR  https://www.cdc.gov/steadi/patient.html

## 2024-07-31 NOTE — DISCHARGE NOTE NURSING/CASE MANAGEMENT/SOCIAL WORK - PATIENT PORTAL LINK FT
You can access the FollowMyHealth Patient Portal offered by [facility] by registering at the following website: [email or web address]. By joining [employer]’s FollowMyHealth portal, you will also be able to view your health information using other applications (apps) compatible with our system.

## 2024-07-31 NOTE — PROGRESS NOTE ADULT - PROBLEM SELECTOR PROBLEM 3
Advanced dementia

## 2024-07-31 NOTE — PROGRESS NOTE ADULT - PROBLEM SELECTOR PLAN 2
Underlying sacral ulcers  - CT Abd/pelvis 7/25: Claudio catheter located within the vagina. Mild circumferential bladder wall thickening. 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 Small left and trace right pleural effusions  - UA shows pyuria but NO bacteria, per daughter pt was being treated for UTI at [facility] last weekend (but did not find documentation in chart, only that meropenem was given on 7/24)  - Claudio has already been replaced in ER (indwelling Claudio)  - Discussed with daughter risks/benefits of treating for possible OM with long term IV antibiotics, at this time we have opted for a more conservative approach given patient's poor functional baseline, will do a shorter course of oral Abx's  - Augmentin BID for 10-14 days for OM treatment  - ESR/CRP are elevated as expected  - Wound care rec; topical and oral antifungal. started on fluconazole.
Underlying sacral ulcers  - CT Abd/pelvis 7/25: Claudio catheter located within the vagina. Mild circumferential bladder wall thickening. 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 Small left and trace right pleural effusions  - UA shows pyuria but NO bacteria, per daughter pt was being treated for UTI at [facility] last weekend (but did not find documentation in chart, only that meropenem was given on 7/24)  - Claudio has already been replaced in ER (indwelling Claudio)  - Discussed with daughter risks/benefits of treating for possible OM with long term IV antibiotics, at this time we have opted for a more conservative approach given patient's poor functional baseline, will do a shorter course of oral Abx's  - Augmentin BID for 10-14 days for OM treatment  - ESR/CRP are elevated as expected  - F/up Wound care consult recs
Underlying sacral ulcers  - CT Abd/pelvis 7/25: Claudio catheter located within the vagina. Mild circumferential bladder wall thickening. 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 Small left and trace right pleural effusions  - UA shows pyuria but NO bacteria, per daughter pt was being treated for UTI at [facility] last weekend (but did not find documentation in chart, only that meropenem was given on 7/24)  - Claudio has already been replaced in ER (indwelling Claudio)  - Discussed with daughter risks/benefits of treating for possible OM with long term IV antibiotics, at this time we have opted for a more conservative approach given patient's poor functional baseline, will do a shorter course of oral Abx's  - Augmentin BID for 10-14 days for OM treatment  - ESR/CRP are elevated as expected  - Wound care rec; topical and oral antifungal. started on fluconazole.
Underlying sacral ulcers  - CT Abd/pelvis 7/25: Claudio catheter located within the vagina. Mild circumferential bladder wall thickening. 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 Small left and trace right pleural effusions  - UA shows pyuria but NO bacteria, per daughter pt was being treated for UTI at [facility] last weekend (but did not find documentation in chart, only that meropenem was given on 7/24)  - Claudio has already been replaced in ER (indwelling Claudio)  - Discussed with daughter risks/benefits of treating for possible OM with long term IV antibiotics, at this time we have opted for a more conservative approach given patient's poor functional baseline, will do a shorter course of oral Abx's  - Augmentin BID for 10-14 days for OM treatment  - ESR/CRP are elevated as expected  - Wound care rec; topical and oral antifungal. started on fluconazole.
Underlying sacral ulcers  - CT Abd/pelvis 7/25: Claudio catheter located within the vagina. Mild circumferential bladder wall thickening. 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 Small left and trace right pleural effusions  - UA shows pyuria but NO bacteria, per daughter pt was being treated for UTI at [facility] last weekend (but did not find documentation in chart, only that meropenem was given on 7/24)  - Claudio has already been replaced in ER (indwelling Claudio)  - Discussed with daughter risks/benefits of treating for possible OM with long term IV antibiotics, at this time we have opted for a more conservative approach given patient's poor functional baseline, will do a shorter course of oral Abx's  - Augmentin BID for 10-14 days for OM treatment  - ESR/CRP are elevated as expected  - Wound care rec; topical and oral antifungal. started on fluconazole.
Underlying sacral ulcers  - CT Abd/pelvis 7/25: Claudio catheter located within the vagina. Mild circumferential bladder wall thickening. 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 Small left and trace right pleural effusions  - UA shows pyuria but NO bacteria, per daughter pt was being treated for UTI at [facility] last weekend (but did not find documentation in chart, only that meropenem was given on 7/24)  - Claudio has already been replaced in ER (indwelling Claudio)  - Discussed with daughter risks/benefits of treating for possible OM with long term IV antibiotics, at this time we have opted for a more conservative approach given patient's poor functional baseline, will do a shorter course of oral Abx's  - Augmentin BID for 10-14 days for OM treatment  - ESR/CRP are elevated as expected  - Wound care rec; topical and oral antifungal. started on fluconazole.

## 2024-08-02 NOTE — PROGRESS NOTE ADULT - PROBLEM SELECTOR PROBLEM 1
Anesthesia PreOp Note    HPI:     Teodora Burns is a 67 year old female who presents for preoperative consultation requested by: Behery, Omar Atef, MD    Date of Surgery: 2024    Procedure(s):  Right Anterior Total Hip Arthroplasty  Indication: Right Hip Osteoarthritis    Relevant Problems   No relevant active problems       NPO:  Last Liquid Consumption Date: 24  Last Liquid Consumption Time: 2330  Last Solid Consumption Date: 24  Last Solid Consumption Time: 233  Last Liquid Consumption Date: 24          History Review:  Patient Active Problem List    Diagnosis Date Noted    Arthritis of right hip 2024    Class 2 obesity due to excess calories without serious comorbidity with body mass index (BMI) of 39.0 to 39.9 in adult 2024    S/P knee surgery 2023    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) 2023    Essential hypertension 2023    Hypothyroidism 2023       Past Medical History:    Anxiety state    Back problem    cervical, thoracic, and lumbar    Hypothyroidism    Migraines    Osteoarthritis    Pneumonia due to organism    Visual impairment    glasses       Past Surgical History:   Procedure Laterality Date    Anesth, section  1983    Carpal tunnel release  2017    R hand    Cataract Bilateral     Foot surgery Bilateral 2011    Buinon removel - repair of joint between two toes Both feet    Hip arthroplasty Left 2000    Knee replacement surgery  2010    Bilateral    Laparoscopic cholecystectomy  2010    Other surgical history  2018    R shoulder Bicep tendon reattachment, supurior capsular reconstrction, L shoulder reconstruction    Other surgical history      Nose reset    Other surgical history      3 x Liver stone    Rotator cuff repair Left     Tonsillectomy  1978       Medications Prior to Admission   Medication Sig Dispense Refill Last Dose    cefadroxil 500 MG Oral Cap Take 1 capsule (500 mg total) by mouth 2  (two) times daily for 10 days. 20 capsule 0 8/1/2024 at 2330    fluconazole (DIFLUCAN) 150 MG Oral Tab Take 1 tablet (150 mg total) by mouth once for 1 dose. 1 tablet 1 8/1/2024 at 1000    pregabalin 75 MG Oral Cap Take 1 capsule (75 mg total) by mouth as needed.   8/1/2024 at 1200    ALPRAZolam 0.5 MG Oral Tab Take 1 tablet (0.5 mg total) by mouth as needed.   Past Week    spironolactone 100 MG Oral Tab Take 1 tablet (100 mg total) by mouth daily.  7 Past Week    Omega-3 1000 MG Oral Cap Take 1 capsule by mouth daily.   7/23/2024    Levothyroxine Sodium 150 MCG Oral Tab Take 0.137 tablets by mouth daily.  6 8/2/2024 at 430    furosemide 40 MG Oral Tab Take 1 tablet (40 mg total) by mouth as needed.  6 8/1/2024 at 1000    Cholecalciferol (VITAMIN D OR) Take 1 capsule by mouth daily.   Past Month    BuPROPion HCl ER, XL, 300 MG Oral Tablet 24 Hr Take 1 tablet (300 mg total) by mouth daily.   7/31/2024    metFORMIN 500 MG Oral Tab Take 1 tablet (500 mg total) by mouth 2 (two) times daily with meals. 60 tablet 2 7/31/2024     Current Facility-Administered Medications Ordered in Epic   Medication Dose Route Frequency Provider Last Rate Last Admin    lactated ringers infusion   Intravenous Continuous Behery, Omar Atef, MD 20 mL/hr at 08/02/24 0742 New Bag at 08/02/24 0742    ceFAZolin (Ancef) 2g in 10mL IV syringe premix  2 g Intravenous Once Behery, Omar Atef, MD        clonidine-EPINEPHrine-ropivacaine-ketorolac (CERTS) (Duraclon-Adrenalin-Naropin-Toradol) pain cocktail irrigation   Intra-articular Once (Intra-Op) Behery, Omar Atef, MD         No current [facility]-ordered outpatient medications on file.       Allergies   Allergen Reactions    Seasonal OTHER (SEE COMMENTS)     congestion    Dog Epithelium OTHER (SEE COMMENTS)     sneezing    Adhesive Tape RASH    Latex RASH       Family History   Problem Relation Age of Onset    Other (lung cancer) Father 73    Dementia Mother 80    Other (Other) Sister         factor v  leiden    Other (factor V leiden) Brother      Social History     Socioeconomic History    Marital status:    Tobacco Use    Smoking status: Never     Passive exposure: Past    Smokeless tobacco: Never   Vaping Use    Vaping status: Never Used   Substance and Sexual Activity    Alcohol use: Yes     Comment: 1-2 weekly    Drug use: No       Available pre-op labs reviewed.  Lab Results   Component Value Date    WBC 5.6 07/24/2024    RBC 4.79 07/24/2024    HGB 15.0 07/24/2024    HCT 44.4 07/24/2024    MCV 92.7 07/24/2024    MCH 31.3 07/24/2024    MCHC 33.8 07/24/2024    RDW 12.0 07/24/2024    .0 07/24/2024     Lab Results   Component Value Date     07/24/2024    K 4.7 07/24/2024     07/24/2024    CO2 30.0 07/24/2024    BUN 14 07/24/2024    CREATSERUM 0.83 07/24/2024    GLU 98 07/24/2024    CA 10.2 07/24/2024     Lab Results   Component Value Date    INR 0.90 07/24/2024       Vital Signs:  Body mass index is 40.1 kg/m².   height is 1.702 m (5' 7\") and weight is 116.1 kg (256 lb). Her oral temperature is 98.4 °F (36.9 °C). Her blood pressure is 110/64 and her pulse is 66. Her respiration is 16 and oxygen saturation is 94%.   Vitals:    07/23/24 1242 08/02/24 0708   BP:  110/64   Pulse:  66   Resp:  16   Temp:  98.4 °F (36.9 °C)   TempSrc:  Oral   SpO2:  94%   Weight: 113.4 kg (250 lb) 116.1 kg (256 lb)   Height: 1.702 m (5' 7\")         Anesthesia Evaluation     Patient summary reviewed and Nursing notes reviewed    No history of anesthetic complications   Airway   Mallampati: I  TM distance: >3 FB  Neck ROM: full  Dental      Pulmonary - normal exam   Cardiovascular - normal exam  (+) hypertension    Neuro/Psych    (+)  anxiety/panic attacks,        GI/Hepatic/Renal      Endo/Other    (+) arthritis  Abdominal                  Anesthesia Plan:   ASA:  2  Plan:   Spinal  Informed Consent Plan and Risks Discussed With:  Patient      I have informed Teodora Burns and/or legal guardian or family  member of the nature of the anesthetic plan, benefits, risks including possible dental damage if relevant, major complications, and any alternative forms of anesthetic management.   All of the patient's questions were answered to the best of my ability. The patient desires the anesthetic management as planned.  Clifford Morton MD  8/2/2024 8:06 AM  Present on Admission:  **None**       Hyponatremia
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
## 2024-08-14 NOTE — PROGRESS NOTE ADULT - ATTENDING COMMENTS
General: Appearance is consistent with chronological age. No abnormal facies.  Airway:  See Mallampati score  EENT: Anicteric sclera; oropharynx clear, moist mucus membranes  Cardiovascular:  Regular rate and rhythm  Respiratory: Unlabored breathing  Neurological: Awake and alert, moves all extremities  Constitutional: MET>4 Dc planning to [facility] awaiting COVID negative swabs

## 2024-10-18 NOTE — DIETITIAN INITIAL EVALUATION ADULT - PROBLEM/PLAN-4
Saray used [employer] and [employer] Provider portals to check for Prior Authorization needed for Y90 CPT code(s)     Prior Authorization is required, clinicals were submitted via portal.  Prior Authorization is Approved     Reference:[id number]  Valid Dates: 10/10/2024 - 12/31/2024 bilobar treatment   DISPLAY PLAN FREE TEXT

## 2024-10-24 NOTE — PROGRESS NOTE ADULT - PROBLEM SELECTOR PLAN 2
"Patient: Michele Maya  : 1933  PCP: Charanjit Mujica, APRN-CNP  MRN: [id number]  Program: Transitional Care Management  Status: Enrolled  Effective Dates: 10/21/2024 - present  Responsible Staff: Reginaldo Marcus RN  Social Drivers to be Addressed: Physical Activity         Michele Maya is a 89 y.o. male presenting today for follow-up after being discharged from the hospital 5 days ago. The main problem requiring admission was generalized weakness, fall and diarrhea. The patient's discharged diagnosis was sterile pyuria and has indwelling Short catheter in place. The discharge summary and/or Transitional Care Management documentation was reviewed. Medication reconciliation was performed as indicated via the \"Jeff as Reviewed\" timestamp.     Michele Maya was contacted by Transitional Care Management services two days after his discharge. This encounter and supporting documentation was reviewed.    Review of Systems   All other systems reviewed and are negative.      /62   Pulse 69   Ht 1.778 m (5' 10\")   Wt 83.5 kg (184 lb)   SpO2 95%   BMI 26.40 kg/m²     Physical Exam  Constitutional:       Appearance: Normal appearance.   HENT:      Head: Normocephalic and atraumatic.      Right Ear: External ear normal.      Left Ear: External ear normal.      Nose: Nose normal.      Mouth/Throat:      Mouth: Mucous membranes are moist.   Cardiovascular:      Rate and Rhythm: Normal rate. Rhythm irregular.   Pulmonary:      Effort: Pulmonary effort is normal.   Abdominal:      General: Bowel sounds are normal.      Palpations: Abdomen is soft.   Musculoskeletal:      Cervical back: Neck supple.   Skin:     General: Skin is warm and dry.   Neurological:      Mental Status: He is alert and oriented to person, place, and time. Mental status is at baseline.   Psychiatric:         Mood and Affect: Mood normal.         Behavior: Behavior normal.         Thought Content: Thought content normal.         " Judgment: Judgment normal.         The complexity of medical decision making for this patient's transitional care is high.    Assessment/Plan   Problem List Items Addressed This Visit             ICD-10-CM    Atrial fibrillation (Multi) I48.91    Relevant Orders    CBC    Protime-INR    Diabetes mellitus, type 2 (Multi) E11.9    Relevant Orders    Basic metabolic panel    Medicare annual wellness visit, subsequent Z00.00    Relevant Orders    Follow Up In [facility] - PCP - Medicare Annual    Hospital discharge follow-up - Primary Z09     Other Visit Diagnoses         Codes    Vitamin D deficiency     E55.9    Relevant Orders    Vitamin D 25-Hydroxy,Total (for eval of Vitamin D levels)    Vitamin B12    CKD (chronic kidney disease) stage 4, GFR 15-29 ml/min (Multi)     N18.4             - TSH is 0.51 with free T4 of 1.9  - received 90 mcg of IV Synthroid last night. Home dose of Synthroid is 125 mcg daily.   - given her age, would keep TSH in the mid-normal range. On discharge, would decrease Synthroid to 112 mcg daily and can change IV dose now to 84 mcg IV daily - TSH is 0.51 with free T4 of 1.9  - received 90 mcg of IV Synthroid last night. Home dose of Synthroid is 125 mcg daily.   - given her age, would keep TSH in the mid-normal range. On discharge, would decrease Synthroid to 112 mcg daily and can change IV dose now to 84 mcg IV daily.
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
## 2025-01-13 NOTE — DISCHARGE NOTE PROVIDER - HOSPITAL COURSE
CHIEF COMPLAINT  Chief Complaint   Patient presents with   • Right Knee - Injections   • Office Visit       SUBJECTIVE  Pt presents to the office today for her third viscosupplementation injection right knee    HISTORIES  I have personally reviewed and updated the following EMR sections: Current medications, Allergies, Problem list, Past Medical History, Past Surgical History, Social History and Family History.  Please reference intake form.    REVIEW OF SYSTEMS  All other systems are reviewed and are negative except as documented in the HPI (History of Present Illness)    MEDICATIONS  Medications were reviewed and updated today.    PHYSICAL EXAM  There were no vitals taken for this visit.   Constitutional:  Well developed, well nourished female in no acute distress.  Skin: Warm, dry, intact without rash or lesion.  Neurologic:  Alert & oriented x 3.  Psychiatric:  Speech and behavior appropriate.  Musculoskeletal: Right knee shows no sign of infection.    ASSESSMENT/PLAN  Primary osteoarthritis of right knee     Today using strict,  aseptic technique, the pts knee was injected with    Euflexxa.    L Inj/Asp: R knee on 1/13/2025 10:00 AM  Indications: pain  Details: 22 G needle, anterolateral approach  Medications: 6 mg betamethasone acet/sod phos 6 (3-3) MG/ML; 20 mg sodium haluronATE 20 MG/2ML  Procedure, treatment alternatives, risks and benefits explained, specific risks discussed. Immediately prior to procedure a time out was called to verify the correct patient, procedure, equipment, support staff and site/side marked as required. Patient was prepped and draped in the usual sterile fashion.          Ms. Terry is an 89yo woman with a h/o vascular dementia, polycythemia vera (on eliquis and hydroxyurea_follows with heme Dr. Park Chamberlain), CVA, RLE DVT, HTN, HLD, spontaneous SBO s/p resection in 2018,  hypothyroidism and T2DM who presents to the [facility] ED with bright red blood per rectum that began yesterday. Patient nonverbal at baseline. As per daughter, patient first had a loose, watery BM with some blood within it yesterday morning. In the afternoon, patient had a large bloody bowel movement with minimal stool. This AM, when HHA was changing patient and notified daughter about a "1/2 cup" of blood in her diaper, at which point the daughter contacted the patient's PCP who recommended bringing her to the hospital. Throughout this time, the patient did not endorse any abdominal pain, fever, chills, weakness, or chest pain. Also denies any associated nausea, vomiting. A did not give patient her AM eliquis dose today in the setting of her bleeding.     Upon arrival in the ED, patient afebrile with /85, breathing comfortably on RA. Labs remarkable for leukocytosis to 21K with neutrophilic predominance, anemia with Hgb 11.2. VBG showing acute respiratory acidosis with pH 7.29. pCO2 44, HCO3 21, pO2 27.  CT AP revealing "Distention of the rectum with stool. Mild perirectal fat stranding, which   may reflect early findings of stercoral colitis." CXR clear. GI consulted by ED providers. s/p zosyn x1.     Hospital course: Patient started on aggressive bowel regimen with miralax BID, dulcolax, senna. Given tap water enema x1 and smog enema x1 with resultant large bowel movements. Patient continued to have bowel movements without further enemas necessary, with subsequent improvement in abdominal pain and tenderness to palpation. Patient continued on zosyn x 2 days, then transitioned to PO augmentin. Ms. Terry is an 88yo woman with a h/o vascular dementia, polycythemia vera (on eliquis and hydroxyurea_follows with heme Dr. Park Chamberlain), CVA, RLE DVT, HTN, HLD, spontaneous SBO s/p resection in 2018,  hypothyroidism and T2DM who presents to the [facility] ED with bright red blood per rectum that began yesterday. Patient nonverbal at baseline. As per daughter, patient first had a loose, watery BM with some blood within it yesterday morning. In the afternoon, patient had a large bloody bowel movement with minimal stool. This AM, when HHA was changing patient and notified daughter about a "1/2 cup" of blood in her diaper, at which point the daughter contacted the patient's PCP who recommended bringing her to the hospital. Throughout this time, the patient did not endorse any abdominal pain, fever, chills, weakness, or chest pain. Also denies any associated nausea, vomiting. HHA did not give patient her AM eliquis dose today in the setting of her bleeding.     Upon arrival in the ED, patient afebrile with /85, breathing comfortably on RA. Labs remarkable for leukocytosis to 21K with neutrophilic predominance, anemia with Hgb 11.2. VBG showing acute respiratory acidosis with pH 7.29. pCO2 44, HCO3 21, pO2 27.  CT AP revealing "Distention of the rectum with stool. Mild perirectal fat stranding, which   may reflect early findings of stercoral colitis." CXR clear. GI consulted by ED providers. s/p zosyn x1.     Hospital course: Patient started on aggressive bowel regimen with miralax BID, dulcolax, senna. Given tap water enema x1 and smog enema x1 with resultant large bowel movements. Patient continued to have bowel movements without further enemas necessary, with subsequent improvement in abdominal pain and tenderness to palpation. Patient continued on zosyn x 2 days, then transitioned to PO augmentin. Pt to complete a 5 day course as an outpatient. Also recommend to continue taking Miralax daily and senna as needed for constipation.     Patient functionally bed bound at baseline and optimized for dc home with HHA in place today.

## 2025-01-21 NOTE — PROGRESS NOTE ADULT - PROBLEM SELECTOR PLAN 1
(1) More than 48 hours/None Non resolving  - cont NG suction  - s/p lysis of adhesions in OR yesterday  - continue pain control and care as per surgery

## 2025-05-25 NOTE — PATIENT PROFILE ADULT - FUNCTIONAL ASSESSMENT - BASIC MOBILITY ASSESSMENT TYPE
We discussed your results today. It is important for you to follow up with your primary care and orthopedics for further evaluation and management. Return to the emergency department for worsening symptoms.    Admission

## 2025-06-08 NOTE — ED ADULT NURSE NOTE - NSICDXPASTSURGICALHX_GEN_ALL_CORE_FT
No
PAST SURGICAL HISTORY:  FH: Total Abdominal Hysterectomy and Bilateral Salpingo-Oophorectomy     S/P small bowel resection 08/2019

## 2025-06-10 NOTE — PHYSICAL THERAPY INITIAL EVALUATION ADULT - DID THE PATIENT HAVE SURGERY?
Pt called left a vm about a appt she should have during the summer. Called pt back left a vm letting her know the doctor left and that she can call back to make a appt at [facility]  
n/a

## 2025-06-21 NOTE — DISCHARGE NOTE ADULT - ADMISSION DATE +STARTOFVISITDATE
Nephrology Progress Note      Patient: Marleny Lawrence               Sex: female            MRN:  [id number]      YOB: 1935      Age:  89 year old           Date: 6/21/2025    Subjective:   Feels well  Blood pressures elevated    PMHx, FHx, SHx, and review of systems reviewed and otherwise unchanged.    Reviewed on 6/21/2025      Objective:   Visit Vitals  BP (!) 178/72   Pulse 67   Temp 96.6 °F (35.9 °C) (Axillary)   Resp 18   Ht 5' 3\" (1.6 m)   Wt 77 kg (169 lb 12.1 oz)   SpO2 93%   BMI 30.07 kg/m²      No intake/output data recorded.    Physical Exam:  General Appearance: Alert, no distress, hard of hearing   HEENT:  Extraocular motion intact   Neck: Trachea midline, no adenopathy, no JVD   Lungs:   Clear to auscultation bilaterally, respirations unlabored   Heart:  Regular rate and rhythm, no murmurs or rub   Gastrointestinal:   Soft, non-tender, bowel sounds active   Musculoskeletal: 1+ lower extremity bilateral edema   Skin: No rashes or lesions   Neuro: Answers questions appropriately   HD Access:      Lab/Data Reviewed:    Recent Labs   Lab 06/19/25  1004   WBC 10.7   RBC 3.32*   HGB 9.6*   HCT 29.9*        Recent Labs   Lab 06/21/25  0724 06/20/25  0724 06/19/25  1004   SODIUM 139 140 140   POTASSIUM 3.4 3.9 3.8   CHLORIDE 102 106 106   CO2 27 23 23   BUN 25* 32* 47*   CREATININE 1.04* 1.17* 1.48*   GLUCOSE 88 95 98   CALCIUM 7.7* 8.1* 8.0*       Magnesium   Date Value Ref Range Status   06/14/2025 1.9 1.7 - 2.4 mg/dL Final             Assessment/Plan     1. Acute renal failure - Perioperative ATN likely, Cr has peaked and is down trending. Has preserved renal indices at baseline, now stabilized     2. HTN - BP elevated.  Start amlodipine 2.5mg po bid             Office Phone: [phone]  Office Fax: [phone]   Statement Selected

## 2025-07-17 NOTE — DISCHARGE NOTE PROVIDER - CARE PROVIDER_API CALL
BLAYNE ROSAS  [address] [place] [id number]  Phone: [phone]  Fax: [phone]  Established Patient  Follow Up Time: 1 week   [Joint Swelling] : no joint swelling [Joint Stiffness] : no joint stiffness [Limb Swelling] : no limb swelling [Skin Lesions] : skin lesion [Skin Wound] : no skin wound [Itching] : no itching [Confused] : no confusion [Convulsions] : no convulsions [Dizziness] : no dizziness [Fainting] : no fainting [Negative] : Psychiatric